# Patient Record
Sex: FEMALE | Race: BLACK OR AFRICAN AMERICAN | NOT HISPANIC OR LATINO | Employment: OTHER | URBAN - METROPOLITAN AREA
[De-identification: names, ages, dates, MRNs, and addresses within clinical notes are randomized per-mention and may not be internally consistent; named-entity substitution may affect disease eponyms.]

---

## 2017-01-05 ENCOUNTER — OFFICE VISIT (OUTPATIENT)
Dept: SLEEP MEDICINE | Facility: CLINIC | Age: 77
End: 2017-01-05
Payer: MEDICARE

## 2017-01-05 VITALS
SYSTOLIC BLOOD PRESSURE: 138 MMHG | HEART RATE: 60 BPM | DIASTOLIC BLOOD PRESSURE: 60 MMHG | BODY MASS INDEX: 48.7 KG/M2 | HEIGHT: 61 IN | WEIGHT: 257.94 LBS

## 2017-01-05 DIAGNOSIS — G47.33 OSA (OBSTRUCTIVE SLEEP APNEA): Primary | ICD-10-CM

## 2017-01-05 PROCEDURE — 99213 OFFICE O/P EST LOW 20 MIN: CPT | Mod: S$PBB,,, | Performed by: PSYCHIATRY & NEUROLOGY

## 2017-01-05 PROCEDURE — 99213 OFFICE O/P EST LOW 20 MIN: CPT | Mod: PBBFAC | Performed by: PSYCHIATRY & NEUROLOGY

## 2017-01-05 PROCEDURE — 99999 PR PBB SHADOW E&M-EST. PATIENT-LVL III: CPT | Mod: PBBFAC,,, | Performed by: PSYCHIATRY & NEUROLOGY

## 2017-01-05 NOTE — LETTER
January 5, 2017      Vale Howard MD  [address]  [facility] [address]           [facility]  [address]  [facility] [address]  Phone: [phone]          Patient: Sue Giordano   MR Number: [id number]   YOB: 1940   Date of Visit: 1/5/2017       Dear Dr. Vale Howard:    Thank you for referring Sue Giordano to me for evaluation. Attached you will find relevant portions of my assessment and plan of care.    If you have questions, please do not hesitate to call me. I look forward to following Sue Giordano along with you.    Sincerely,    Wade Chairez MD    Enclosure  CC:  No Recipients    If you would like to receive this communication electronically, please contact [email or web address] or [phone] to request more information on [employer] access.    For providers and/or their staff who would like to refer a patient to [facility], please contact us through our one-stop-shop provider referral line, [facility], at [phone].    If you feel you have received this communication in error or would no longer like to receive these types of communications, please e-mail [email or web address]

## 2017-01-05 NOTE — PROGRESS NOTES
"Sister Pasquale returns to clinic today regarding the management of obstructive sleep apnea and CPAP equipment check.    Prior weight 271 lbs. Weight down to 257 lbs.    Now dealing with sciatica since her bed rest.    Since she has been back home, she has had difficulty using her CPAP.  Prior to this, her pressure was increased to 13 cm (based on titration)  She states this pressure increase was "working nicely"    ESS = 7 (prior 18)    She previously had excellent tolerance/ adherence with CPAP at pressure 10cm.   She has a nasal pillows mask, which she likes. Martin FX nasal pillows small size mask  She states that she really likes CPAP and it helps her feel better after a night of use.    Recent titration at weight 281 lbs revealed she needed pressure increase from 10 to 13 cm    Interrogation: CPAP at 13 cm; 897 hours total; Machine good condition (F&P model), sleep style machine; 200 series. 5.2 hr/night.     BASELINE PSG 12/23/11: + REJI, AHI 14.2, low O2 79%, wt 281 lbs. (Patient had sleep study in 2003, at which time she was titrated to 11 cm, wt 248 lbs)   TITRATION 5/9/16: Titrated to 13 cm; wt 281 lbs    DME = Medicare ([employer]) - administered by Access      Past Medical History   Diagnosis Date    Adrenal mass     Anemia     Arthritis     Asthma     CKD (chronic kidney disease) stage 2, GFR 60-89 ml/min     Diabetes mellitus     Diabetes mellitus type II     Diastolic dysfunction 10/10/2013    GERD (gastroesophageal reflux disease) 8/13/2012    Gout     Hep B w/ coma 1971    Hives 10/1/2013    Hyperlipidemia     Hypertension     Morbid obesity with BMI of 50.0-59.9, adult 2/11/2014    Obesity     Obesity     Other specified anemias 6/25/2015    Sleep apnea     Unspecified essential hypertension 6/25/2015       Past Surgical History   Procedure Laterality Date    Right total knee replacement      Joint replacement      Hysterectomy  1982     secondary uterine fibroids       Family " "History   Problem Relation Age of Onset    Cancer Mother     Hypertension Father     Cancer Sister     No Known Problems Brother     No Known Problems Maternal Aunt     No Known Problems Maternal Uncle     No Known Problems Paternal Aunt     No Known Problems Paternal Uncle     No Known Problems Maternal Grandmother     No Known Problems Maternal Grandfather     No Known Problems Paternal Grandmother     No Known Problems Paternal Grandfather     Glaucoma Neg Hx     Breast cancer Neg Hx     Colon cancer Neg Hx     Ovarian cancer Neg Hx     Stroke Neg Hx     Allergic rhinitis Neg Hx     Allergies Neg Hx     Angioedema Neg Hx     Asthma Neg Hx     Atopy Neg Hx     Eczema Neg Hx     Immunodeficiency Neg Hx     Rhinitis Neg Hx     Urticaria Neg Hx     Amblyopia Neg Hx     Blindness Neg Hx     Cataracts Neg Hx     Diabetes Neg Hx     Macular degeneration Neg Hx     Retinal detachment Neg Hx     Strabismus Neg Hx     Thyroid disease Neg Hx     Psoriasis Neg Hx        Social History   Substance Use Topics    Smoking status: Never Smoker    Smokeless tobacco: Never Used    Alcohol use No       ALLERGIES: Reviewed in EPIC    CURRENT MEDICATIONS: Reviewed in EPIC    ROS:   Weight down another 10+ lbs (intentional)  Denies palpitations, headaches, sinus congestion improved with nasal spray qhs prn.   Denies oral drying or nasal drying.      PHYSICAL EXAM:     Visit Vitals    /60    Pulse 60    Ht 5' 1" (1.549 m)    Wt 117 kg (257 lb 15 oz)    BMI 48.74 kg/m2     GENERAL: morbid obese body habitus, well groomed       ASSESSMENT:     Obstructive sleep apnea, with improvement of CPAP after nightly use. Control of sleepiness should improved with increased nightly usage. She has done really well until hospitalization. She plans to get back on CPAP, when feeling better, because it really helps her.    She has medical co-morbidities of hypertension and morbid obesity (BMI >30) . "     PLAN:     Continue CPAP 13 cm,   DME = [employer] (she reports using nasal pillows)  Continue Advair or Flonase NS 1-2 sprays qhs PRN to reduce potential nasal congestion.     Follow up 6 weeks: MD/NP

## 2017-01-05 NOTE — MR AVS SNAPSHOT
[facility]  [address]  [facility] [address]  Phone: [phone]                  Sue Giordano   2017 8:40 AM   Office Visit    Description:  Female : 1940   Provider:  Wade Chairez MD   Department:  [facility]           Reason for Visit     Sleep Apnea           Diagnoses this Visit        Comments    REJI (obstructive sleep apnea)    -  Primary            To Do List           Future Appointments        Provider Department Dept Phone    2017 10:00 AM Bonny Phillips RD [facility] - Diabetes Management [phone]    2017 10:00 AM LAB, APPOINTMENT [place] [facility] [phone]    2017 3:40 PM Db Viramontes III, MD [facility] Neurology [phone]    2017 10:30 AM Yogi Munson APRN, FNP [facility] - Endocrinology [phone]    2017 11:40 AM Priya Grewal MD [facility]-Physical Med [facility] Rehab [phone]      Goals (5 Years of Data)              Today    11/11/16    11/3/16    Increase physical activity           Weight < 250 lb (113.399 kg)   117 kg (257 lb 15 oz)  117 kg (257 lb 15 oz)  115.7 kg (255 lb)      Follow-Up and Disposition     Return in about 6 weeks (around 2017).    Follow-up and Disposition History      [facility] On Call     [facility] On Call Nurse Care Line -  Assistance  Registered nurses in the [facility] On Call Center provide clinical advisement, health education, appointment booking, and other advisory services.  Call for this free service at [phone].             Medications           Message regarding Medications     Verify the changes and/or additions to your medication regime listed below are the same as discussed with your clinician today.  If any of these changes or additions are incorrect, please notify your healthcare provider.        STOP taking these medications     gabapentin (NEURONTIN) 300 MG capsule Take 2 capsules (600 mg total) by mouth every  "evening.    oxycodone-acetaminophen (PERCOCET) 5-325 mg per tablet Take 1 tablet by mouth every 4 (four) hours as needed.           Verify that the below list of medications is an accurate representation of the medications you are currently taking.  If none reported, the list may be blank. If incorrect, please contact your healthcare provider. Carry this list with you in case of emergency.           Current Medications     ammonium lactate 12 % Crea Apply topically every morning.     aspirin 81 MG Chew Take 1 tablet (81 mg total) by mouth once daily.    atorvastatin (LIPITOR) 40 MG tablet Take 1 tablet (40 mg total) by mouth once daily.    BD AUTOSHIELD DUO PEN NEEDLE 30 gauge x 3/16" Ndle 1 each by Misc.(Non-Drug; Combo Route) route 3 (three) times daily. Use with Humalog and Levemir pen injections.    blood sugar diagnostic Strp BG monitoring 4 times a day. Pt needs test strips for [employer] Talking meter.    carvedilol (COREG) 6.25 MG tablet Take 1 tablet (6.25 mg total) by mouth 2 (two) times daily.    econazole nitrate 1 % cream Apply topically once daily. Apply to feet daily as directed.    ergocalciferol (VITAMIN D2) 50,000 unit Cap Take 1 capsule (50,000 Units total) by mouth every 7 days.    fluticasone-salmeterol 100-50 mcg/dose (ADVAIR DISKUS) 100-50 mcg/dose diskus inhaler INHALE 1 PUFF 2 TIMES A DAY    HUMALOG 100 unit/mL injection INJECT 15 UNITS UNDER SKIN BEFORE BREAKFAST AND DINNER PLUS CORRECTION 180-230+2, 231-280+4, 281-330 +6, 331-380 +8, >380 +10.    insulin lispro (HUMALOG KWIKPEN) 100 unit/mL InPn pen Inject 19 units w/ breakfast, 16 units w/ dinner plus scale 180-230 +2, 231-280 +4, 281-330 +6, 331-380 +8.    insulin NPH (HUMULIN N) 100 unit/mL injection INJECT 40 UNITS UNDER THE SKIN EVERY MORNING AND 15 UNITS EVERY EVENING, 30 day supply    insulin syringe-needle U-100 (BD INSULIN SYRINGE ULT-FINE II) 1 mL 31 x 5/16" Syrg 1 Syringe by Misc.(Non-Drug; Combo Route) route 2 (two) times daily. " "   lancets Misc Test daily    losartan (COZAAR) 100 MG tablet 1 tab by mouth daily    omeprazole (PRILOSEC) 20 MG capsule Take 1 capsule (20 mg total) by mouth once daily.    pen needle, diabetic (PEN NEEDLE) 32 gauge x 5/32" Ndle Uses 4 times a day.    torsemide (DEMADEX) 20 MG Tab TAKE 1 TABLET BY MOUTH ONCE DAILY.    insulin detemir (LEVEMIR FLEXTOUCH) 100 unit/mL (3 mL) SubQ InPn pen Inject 45 units at night.    senna (SENOKOT) 8.6 mg tablet Take 1 tablet by mouth once daily.    tramadol (ULTRAM) 50 mg tablet Take 1 tablet (50 mg total) by mouth 3 (three) times daily as needed for Pain.           Clinical Reference Information           Vital Signs - Last Recorded  Most recent update: 1/5/2017  8:24 AM by Joe RIVERA MA    BP Pulse Ht Wt BMI    138/60 60 5' 1" (1.549 m) 117 kg (257 lb 15 oz) 48.74 kg/m2      Blood Pressure          Most Recent Value    BP  138/60      Allergies as of 1/5/2017     Bactrim [Sulfamethoxazole-trimethoprim]    Azithromycin    Erythromycin    Gentamicin    Levofloxacin    Vancomycin      Immunizations Administered on Date of Encounter - 1/5/2017     None      "

## 2017-01-19 ENCOUNTER — OFFICE VISIT (OUTPATIENT)
Dept: INTERNAL MEDICINE | Facility: CLINIC | Age: 77
End: 2017-01-19
Payer: MEDICARE

## 2017-01-19 ENCOUNTER — CLINICAL SUPPORT (OUTPATIENT)
Dept: DIABETES | Facility: CLINIC | Age: 77
End: 2017-01-19
Payer: MEDICARE

## 2017-01-19 VITALS
BODY MASS INDEX: 49.08 KG/M2 | HEIGHT: 61 IN | SYSTOLIC BLOOD PRESSURE: 149 MMHG | HEART RATE: 80 BPM | DIASTOLIC BLOOD PRESSURE: 60 MMHG | WEIGHT: 259.94 LBS | TEMPERATURE: 98 F

## 2017-01-19 DIAGNOSIS — L60.0 INGROWING NAIL WITH INFECTION: Primary | ICD-10-CM

## 2017-01-19 DIAGNOSIS — E11.29 TYPE 2 DIABETES MELLITUS WITH RENAL MANIFESTATIONS NOT AT GOAL: ICD-10-CM

## 2017-01-19 DIAGNOSIS — E66.01 MORBID OBESITY DUE TO EXCESS CALORIES: ICD-10-CM

## 2017-01-19 PROCEDURE — 99213 OFFICE O/P EST LOW 20 MIN: CPT | Mod: S$PBB,GC,, | Performed by: INTERNAL MEDICINE

## 2017-01-19 PROCEDURE — 99999 PR PBB SHADOW E&M-EST. PATIENT-LVL V: CPT | Mod: PBBFAC,GC,, | Performed by: STUDENT IN AN ORGANIZED HEALTH CARE EDUCATION/TRAINING PROGRAM

## 2017-01-19 PROCEDURE — 99215 OFFICE O/P EST HI 40 MIN: CPT | Mod: PBBFAC | Performed by: STUDENT IN AN ORGANIZED HEALTH CARE EDUCATION/TRAINING PROGRAM

## 2017-01-19 PROCEDURE — G0108 DIAB MANAGE TRN  PER INDIV: HCPCS | Mod: PBBFAC | Performed by: DIETITIAN, REGISTERED

## 2017-01-19 RX ORDER — AMOXICILLIN AND CLAVULANATE POTASSIUM 875; 125 MG/1; MG/1
1 TABLET, FILM COATED ORAL 2 TIMES DAILY
Qty: 20 TABLET | Refills: 0 | Status: SHIPPED | OUTPATIENT
Start: 2017-01-19 | End: 2017-01-29

## 2017-01-19 NOTE — MR AVS SNAPSHOT
[facility]  [address]  [facility] [address]  Phone: [phone]  Fax: [phone]                  Sue Giordano   2017 2:45 PM   Office Visit    Description:  Female : 1940   Provider:  Alejandro Lozada MD   Department:  [facility]           Reason for Visit     Nail Problem           Diagnoses this Visit        Comments    Ingrowing nail with infection    -  Primary     Morbid obesity due to excess calories                To Do List           Future Appointments        Provider Department Dept Phone    2017 10:20 AM Valentin Marcum OD [facility] - Pediatric Optometry [phone]    2017 10:00 AM LAB, APPOINTMENT [place] [facility] [phone]    2017 3:40 PM Db Viramontes III, MD [facility] - Neurology [phone]    2017 10:40 AM Carrie Grey DPM [facility] - Podiatry [phone]    2017 10:30 AM Yogi Munson APRN, FNP [facility] - Endocrinology [phone]      Goals (5 Years of Data)              Today    17    Increase physical activity           Weight < 250 lb (113.399 kg)   117.9 kg (259 lb 14.8 oz)  117 kg (257 lb 15 oz)  117 kg (257 lb 15 oz)       These Medications        Disp Refills Start End    amoxicillin-clavulanate 875-125mg (AUGMENTIN) 875-125 mg per tablet 20 tablet 0 2017    Take 1 tablet by mouth 2 (two) times daily. - Oral    Pharmacy: [facility], [facility] [place], [place] - [address] [address] Ph #: [phone]         [facility] On Call     [facility] On Call Nurse Care Line -  Assistance  Registered nurses in the [facility] Center provide clinical advisement, health education, appointment booking, and other advisory services.  Call for this free service at [phone].             Medications           Message regarding Medications     Verify the changes and/or additions to your medication regime listed below are the same  "as discussed with your clinician today.  If any of these changes or additions are incorrect, please notify your healthcare provider.        START taking these NEW medications        Refills    amoxicillin-clavulanate 875-125mg (AUGMENTIN) 875-125 mg per tablet 0    Sig: Take 1 tablet by mouth 2 (two) times daily.    Class: Normal    Route: Oral           Verify that the below list of medications is an accurate representation of the medications you are currently taking.  If none reported, the list may be blank. If incorrect, please contact your healthcare provider. Carry this list with you in case of emergency.           Current Medications     ammonium lactate 12 % Crea Apply topically every morning.     amoxicillin-clavulanate 875-125mg (AUGMENTIN) 875-125 mg per tablet Take 1 tablet by mouth 2 (two) times daily.    aspirin 81 MG Chew Take 1 tablet (81 mg total) by mouth once daily.    atorvastatin (LIPITOR) 40 MG tablet Take 1 tablet (40 mg total) by mouth once daily.    BD AUTOSHIELD DUO PEN NEEDLE 30 gauge x 3/16" Ndle 1 each by Misc.(Non-Drug; Combo Route) route 3 (three) times daily. Use with Humalog and Levemir pen injections.    blood sugar diagnostic Strp BG monitoring 4 times a day. Pt needs test strips for [employer] Talking meter.    carvedilol (COREG) 6.25 MG tablet Take 1 tablet (6.25 mg total) by mouth 2 (two) times daily.    econazole nitrate 1 % cream Apply topically once daily. Apply to feet daily as directed.    ergocalciferol (VITAMIN D2) 50,000 unit Cap Take 1 capsule (50,000 Units total) by mouth every 7 days.    fluticasone-salmeterol 100-50 mcg/dose (ADVAIR DISKUS) 100-50 mcg/dose diskus inhaler INHALE 1 PUFF 2 TIMES A DAY    HUMALOG 100 unit/mL injection INJECT 15 UNITS UNDER SKIN BEFORE BREAKFAST AND DINNER PLUS CORRECTION 180-230+2, 231-280+4, 281-330 +6, 331-380 +8, >380 +10.    insulin detemir (LEVEMIR FLEXTOUCH) 100 unit/mL (3 mL) SubQ InPn pen Inject 45 units at night.    insulin lispro " "(HUMALOG KWIKPEN) 100 unit/mL InPn pen Inject 19 units w/ breakfast, 16 units w/ dinner plus scale 180-230 +2, 231-280 +4, 281-330 +6, 331-380 +8.    insulin NPH (HUMULIN N) 100 unit/mL injection INJECT 40 UNITS UNDER THE SKIN EVERY MORNING AND 15 UNITS EVERY EVENING, 30 day supply    insulin syringe-needle U-100 (BD INSULIN SYRINGE ULT-FINE II) 1 mL 31 x 5/16" Syrg 1 Syringe by Misc.(Non-Drug; Combo Route) route 2 (two) times daily.    lancets Misc Test daily    losartan (COZAAR) 100 MG tablet 1 tab by mouth daily    omeprazole (PRILOSEC) 20 MG capsule Take 1 capsule (20 mg total) by mouth once daily.    pen needle, diabetic (PEN NEEDLE) 32 gauge x 5/32" Ndle Uses 4 times a day.    senna (SENOKOT) 8.6 mg tablet Take 1 tablet by mouth once daily.    torsemide (DEMADEX) 20 MG Tab TAKE 1 TABLET BY MOUTH ONCE DAILY.    tramadol (ULTRAM) 50 mg tablet Take 1 tablet (50 mg total) by mouth 3 (three) times daily as needed for Pain.           Clinical Reference Information           Vital Signs - Last Recorded  Most recent update: 1/19/2017  2:46 PM by Beryl Vivar MA    BP Pulse Temp Ht Wt BMI    (!) 149/60 (BP Location: Left arm, Patient Position: Sitting) 80 98.3 °F (36.8 °C) (Oral) 5' 1" (1.549 m) 117.9 kg (259 lb 14.8 oz) 49.11 kg/m2      Blood Pressure          Most Recent Value    BP  (!)  149/60      Allergies as of 1/19/2017     Bactrim [Sulfamethoxazole-trimethoprim]    Azithromycin    Erythromycin    Gentamicin    Levofloxacin    Vancomycin      Immunizations Administered on Date of Encounter - 1/19/2017     None      Orders Placed During Today's Visit      Normal Orders This Visit    Ambulatory consult to Podiatry       "

## 2017-01-19 NOTE — PROGRESS NOTES
01/19/17 0000   Diabetes Type   Diabetes Type  Type II   Monitoring    Self Monitoring  SMBG 2-3 times daily- reports BG ranging from 120s-150s   Blood Glucose Logs No   Current Diabetes Treatment    Current Treatment Insulin  (Pt has not yet picked up Humalog and Levemir pens. Still currently using Humulin N 40units in am and 15units in pm. Prescribed Humalog 19 units with breakfast and 16units with dinner + correction 1:25 target 180 and Levemir 45units once every evening. )   Social History   Preferred Learning Method Face to Face;Reading Materials   Primary Support Self   Occupation nun   Barriers to Change   Learning Challenges  None   Readiness to Learn    Readiness to Learn  Acceptance   Diabetes Education Visit   Diabetes Education Record Assessment/Progress Post Program/Follow-up   Diabetes Education Assessment/Progress   Acute Complications (preventing, detecting, and treating acute complications) DC;5;W;1;IS  (Reviewed s/s of hypoglycemia and appropriate treatment. )   Medications (states correct name, dose, onset, peak, duration, side effects & timing of meds) DC;5;W;1;IS;D;R;3  (Pt has not yet started using Humalog and Levemir pens. Almost out of insulin vials. Pt verbalized would benefit from re-fresher on pen use. Provided re-training on Humalog and Levemir pens.     Patient will be taking Humalog pen 19 units with breakfast and 16 units with dinner + correction 1:25 with target 180 and Levemir Flex Pen 45 units every evening. Discussed mechanism of action of both types of insulin, medication/ meal timing, storage, site rotation, and disposal of pen needles. Taught/trained patient how to use Humalog Kwik Pen and Levemir pen, and on subcutaneous injection techniques. Provided [facility] Insulin Care Guide. Patient performed successful return demonstration of all.     Stressed must take Humalog 5-15 minutes before eating and taking Levemir once daily, same time every day. )   Monitoring (monitoring  blood glucose/other parameters &using results) DC;5;W;IS;1  (Reviewed SMBG, goal BG ranges and bringing log to appts. Provided BG log sheets.)   Goals   Medications In Progress  (Pt to appropriately take Humalog and Levemir pens as prescribed. )   Diabetes Care Plan/Intervention   Education Plan/Intervention Other  (Pt has appt with endocrine NP in about 3 weeks. Provided contact number to call with questions or when ready to schedule follow-up DE appt.)   Diabetes Self-Management Support Plan F/U Prov   Education Units of Time    Time Spent 45 min     Also during visit, pt verbalized pain at site of ingrown toenail removal. Observed toe and appears to have some drainage. Called and had pt scheduled for Urgent Care appt today at 2:45pm.

## 2017-01-19 NOTE — PROGRESS NOTES
Teaching Statement  I have personally taken the history and examined this patient and agree with the resident's history, exam and assessment and plan as stated above.  Teo Rivas MD

## 2017-01-26 ENCOUNTER — TELEPHONE (OUTPATIENT)
Dept: NEUROLOGY | Facility: CLINIC | Age: 77
End: 2017-01-26

## 2017-01-26 NOTE — TELEPHONE ENCOUNTER
Dr. Viramontes not in clinic on next visit scheduled for 2/2. Left vmail advising I rescheduled appt for next available on 3/20.    Asked for a call back if she feels the need to be seen sooner.

## 2017-01-31 ENCOUNTER — LAB VISIT (OUTPATIENT)
Dept: LAB | Facility: HOSPITAL | Age: 77
End: 2017-01-31
Payer: MEDICARE

## 2017-01-31 DIAGNOSIS — E11.29 TYPE 2 DIABETES MELLITUS WITH RENAL MANIFESTATIONS NOT AT GOAL: ICD-10-CM

## 2017-01-31 LAB
ESTIMATED AVG GLUCOSE: 166 MG/DL
HBA1C MFR BLD HPLC: 7.4 %

## 2017-01-31 PROCEDURE — 83036 HEMOGLOBIN GLYCOSYLATED A1C: CPT

## 2017-01-31 PROCEDURE — 36415 COLL VENOUS BLD VENIPUNCTURE: CPT

## 2017-02-01 DIAGNOSIS — R60.9 EDEMA: ICD-10-CM

## 2017-02-01 RX ORDER — TORSEMIDE 20 MG/1
TABLET ORAL
Qty: 90 TABLET | Refills: 0 | Status: SHIPPED | OUTPATIENT
Start: 2017-02-01 | End: 2017-07-21 | Stop reason: SDUPTHER

## 2017-02-03 ENCOUNTER — OFFICE VISIT (OUTPATIENT)
Dept: OPTOMETRY | Facility: CLINIC | Age: 77
End: 2017-02-03
Payer: MEDICARE

## 2017-02-03 DIAGNOSIS — E11.9 TYPE 2 DIABETES MELLITUS WITHOUT OPHTHALMIC MANIFESTATIONS: Primary | ICD-10-CM

## 2017-02-03 DIAGNOSIS — H25.813 COMBINED FORMS OF AGE-RELATED CATARACT OF BOTH EYES: ICD-10-CM

## 2017-02-03 PROCEDURE — 99213 OFFICE O/P EST LOW 20 MIN: CPT | Mod: PBBFAC,PO | Performed by: OPTOMETRIST

## 2017-02-03 PROCEDURE — 99999 PR PBB SHADOW E&M-EST. PATIENT-LVL III: CPT | Mod: PBBFAC,,, | Performed by: OPTOMETRIST

## 2017-02-03 PROCEDURE — 92015 DETERMINE REFRACTIVE STATE: CPT | Mod: ,,, | Performed by: OPTOMETRIST

## 2017-02-03 PROCEDURE — 92014 COMPRE OPH EXAM EST PT 1/>: CPT | Mod: S$PBB,,, | Performed by: OPTOMETRIST

## 2017-02-03 NOTE — PROGRESS NOTES
HPI     Sis Duran Giordano is a/an 76 y.o. Female who returns  for continued   diabetic eye care. She was diagnosed with Type 2 DM 12-15 years ago.  It   is being treated with Humalog and Humulin.  Her most recent blood sugar   measurement was 127.    She reports that she has  problems with her distance vision sometimes. So   far she only used glasses for reading and feels as if she needs a new Rx   for that since the near vision occurs to be blurry even when wearing the   glasses. She feels as if her diabetes is well controlled.    Hemoglobin A1C       Date                     Value               Ref Range             Status                01/31/2017               7.4 (H)             4.5 - 6.2 %           Final                 ----------    (+)blurred vision  (--)Headaches  (--)diplopia  (--)flashes  (--)floaters  (--)pain  (--)Itching  (--)tearing  (--)burning  (--)Dryness  (--) OTC Drops  (--)Photophobia       Last edited by Valentin Marcum, OD on 2/3/2017 12:32 PM.     ROS     Positive for: Gastrointestinal (GERD), Cardiovascular, Respiratory   (asthma), Allergic/Imm (drugs)    Negative for: Constitutional, Neurological, Skin, Genitourinary (htn,   hld, CAD), Musculoskeletal, HENT, Endocrine, Eyes, Psychiatric, Heme/Lymph      Last edited by Valentin Marcum, OD on 2/3/2017 12:32 PM. (History)        Assessment /Plan     For exam results, see Encounter Report.    1. Type 2 diabetes mellitus without ophthalmic manifestations  - No ocular  treatment needed; monitor annually    2. Combined forms of age-related cataract of both eyes  - no treatment needed at this time; Monitor annually    3. Refractive error with Presbyopia  - same specs ok for near vision only    Patient education; RTC in 1 year, sooner prn

## 2017-02-06 ENCOUNTER — OFFICE VISIT (OUTPATIENT)
Dept: PODIATRY | Facility: CLINIC | Age: 77
End: 2017-02-06
Payer: MEDICARE

## 2017-02-06 VITALS
HEIGHT: 61 IN | BODY MASS INDEX: 48.9 KG/M2 | WEIGHT: 259 LBS | DIASTOLIC BLOOD PRESSURE: 76 MMHG | SYSTOLIC BLOOD PRESSURE: 156 MMHG | HEART RATE: 73 BPM

## 2017-02-06 DIAGNOSIS — L91.8 SKIN TAG: ICD-10-CM

## 2017-02-06 DIAGNOSIS — E11.51 TYPE II DIABETES MELLITUS WITH PERIPHERAL CIRCULATORY DISORDER: Primary | ICD-10-CM

## 2017-02-06 DIAGNOSIS — L60.0 INGROWN NAIL: ICD-10-CM

## 2017-02-06 DIAGNOSIS — I89.0 LYMPHEDEMA: ICD-10-CM

## 2017-02-06 PROCEDURE — 99203 OFFICE O/P NEW LOW 30 MIN: CPT | Mod: 25,S$PBB,, | Performed by: PODIATRIST

## 2017-02-06 PROCEDURE — 99999 PR PBB SHADOW E&M-EST. PATIENT-LVL III: CPT | Mod: PBBFAC,,, | Performed by: PODIATRIST

## 2017-02-06 PROCEDURE — 99213 OFFICE O/P EST LOW 20 MIN: CPT | Mod: PBBFAC | Performed by: PODIATRIST

## 2017-02-06 NOTE — PATIENT INSTRUCTIONS
Wound Care Instructions:     Soaks: Soak in small clean basin of approximately 2 quarts of warm water filled with the following:   __X__ 2 Tablespoons of Epsom Salt   _____ 1/2 Cup of White Vinegar   _____ 1/4 Cup of Betadine Solution   _____ 4 Packets of Domeboro Tablets or Powder (See Package Instructions)     Soak for 10 minutes, 2 times per day, and continue soaks for 30 days.     Irrigate/Cleanse Daily with:   _____ 0.9% Saline Solution   _____ Dakins Solution   _____ Diluted Vinegar Solution     Location: Left  /  Right  /  Bilateral   Extremity: ___________________     Wound:         _____ Polysporin Ointment   __X__ Neosporin Plus Cream   _____ Bacitracin Ointment   _____ Betadine Ointment       ** AND **   _____ Betadine/Iodine Solution   _____ Prescribed Medication: ____ Silvadene, ____ Santyl, ____ Iodosorb, or    ____ Bactroban Cream/Ointment     Dressing:   __X__ Flexible Bandaid       ** OR **   _____ Mepilex   _____ Allevyn   __X__ Gauze Pad   _____ Nothing but Clean Sock   _____ Other: ___________     Wrap:   _____ Judith   _____ Kerlix   __X__ Paper Tape   _____ Coban   _____ Ace Bandage   _____ Other: ___________     Please call [phone] or [phone] to speak with the nurse regarding any questions about these instructions.     Remember the importance of good nutrition and blood sugar control to help prevent foot complications of diabetes and see your internist at least ever six months. Always wear proper shoe gear. Inspect your feet daily. Always call the clinic immediately if you notice something on your feet that is not normal such as a blister, wound, or foreign object stuck in your foot.

## 2017-02-06 NOTE — PROGRESS NOTES
Subjective:      Patient ID: Sue Giordano is a 76 y.o. female.    Chief Complaint: Ingrown Toenail and PCP (Lee 1/19/2017)    Sue Giordano is a 76 y.o. year-old diabetic female here by referral from Alejandro Lozada MD for diabetic foot evaluation and complaint of painful ingrown toenail bilateral hallux nail worse on the left. The patient is managing the diabetes under the care of Dr. Howard.  Sue Garzon has a past medical history of Adrenal mass; Anemia; Arthritis; Asthma; CKD (chronic kidney disease) stage 2, GFR 60-89 ml/min; Diabetes mellitus; Diabetes mellitus type II; Diastolic dysfunction (10/10/2013); GERD (gastroesophageal reflux disease) (8/13/2012); Gout; Hep B w/ coma (1971); Hives (10/1/2013); Hyperlipidemia; Hypertension; Morbid obesity with BMI of 50.0-59.9, adult (2/11/2014); Obesity; Obesity; Other specified anemias (6/25/2015); Sleep apnea; and Unspecified essential hypertension (6/25/2015)..    Sue Giordano has been seen by Dr. Timmons and Ellie in the past, but denies any history of wound.     Sue Giordano does not have complaints of numbness, tingling, burning of the feet. Sue Giordano is primarily here today for diabetic foot check and ingrown toenails and recent skin growth/mole.  Patient relates that about a month ago she was visited by a podiatrist at her facility who clipped out the corners of her nail and was told that her ingrown toenail should resolve.  Since then she has continued to have pain and discomfort along the right hallux lateral nail border and also 2 weeks ago started noticing pain on the left hallux lateral nail border.  She also relates that about 6 months ago she started noticing a growth on her right forefoot between the first and second toes that she believes has been enlarging and has been concerning to her.  She also request a prescription for diabetic shoes today.    PCP: Vale Howard MD    Date Last Seen by PCP: January 19, 2017    Current  shoe gear: Casual shoes    Hemoglobin A1C   Date Value Ref Range Status   01/31/2017 7.4 (H) 4.5 - 6.2 % Final     Comment:     According to ADA guidelines, hemoglobin A1C <7.0% represents  optimal control in non-pregnant diabetic patients.  Different  metrics may apply to specific populations.   Standards of Medical Care in Diabetes - 2016.  For the purpose of screening for the presence of diabetes:  <5.7%     Consistent with the absence of diabetes  5.7-6.4%  Consistent with increasing risk for diabetes   (prediabetes)  >or=6.5%  Consistent with diabetes  Currently no consensus exists for use of hemoglobin A1C  for diagnosis of diabetes for children.     10/26/2016 7.6 (H) 4.5 - 6.2 % Final     Comment:     According to ADA guidelines, hemoglobin A1C <7.0% represents  optimal control in non-pregnant diabetic patients.  Different  metrics may apply to specific populations.   Standards of Medical Care in Diabetes - 2016.  For the purpose of screening for the presence of diabetes:  <5.7%     Consistent with the absence of diabetes  5.7-6.4%  Consistent with increasing risk for diabetes   (prediabetes)  >or=6.5%  Consistent with diabetes  Currently no consensus exists for use of hemoglobin A1C  for diagnosis of diabetes for children.     07/13/2016 8.4 (H) 4.5 - 6.2 % Final     Comment:     According to ADA guidelines, hemoglobin A1C <7.0% represents  optimal control in non-pregnant diabetic patients.  Different  metrics may apply to specific populations.   Standards of Medical Care in Diabetes - 2016.  For the purpose of screening for the presence of diabetes:  <5.7%     Consistent with the absence of diabetes  5.7-6.4%  Consistent with increasing risk for diabetes   (prediabetes)  >or=6.5%  Consistent with diabetes  Currently no consensus exists for use of hemoglobin A1C  for diagnosis of diabetes for children.           Review of Systems   Constitution: Negative for chills and fever.   Cardiovascular: Negative for  chest pain.   Respiratory: Negative for shortness of breath.    Gastrointestinal: Negative for nausea and vomiting.           Objective:      Physical Exam   Constitutional: She is oriented to person, place, and time. She appears well-developed and well-nourished. No distress.   Cardiovascular:   Pulses:       Dorsalis pedis pulses are 2+ on the right side, and 2+ on the left side.        Posterior tibial pulses are 0 on the right side, and 0 on the left side.   Capillary fill time 3-5 seconds to digits bilateral feet.   Musculoskeletal:   Lower extremities:    Deformities: Right dorsal first webspace a 7 mm diameter raised skin lesion that is slightly darkened and firm.    Normal arch height and rectus feet bilaterally.     5/5 muscle strength and tone in all four quadrants bilaterally.     Pain-free range of motion in all four quadrants with stiffness and limitation bilaterally.     Pain on palpation: Bilateral hallux lateral nail borders.   Neurological: She is alert and oriented to person, place, and time. She displays no Babinski's sign on the right side. She displays no Babinski's sign on the left side.   Plantar protective threshold sensation by touch via 5.07 SWMF normal to the digits bilaterally.      Skin:   Lower extremities:    Normal turgor, texture, temperature bilaterally. Digital hair absent bilaterally.  Warm equally bilaterally.    Significiant bilateral foot and ankle edema.    No varicosities, pigmentary changes bilaterally.     No wounds, drainage, malodor, erythema, interdigital maceration were noted.     Skin lesions: dorsal 2nd interspace 7mm diameter evenly darkened raised firm nodule right foot.     Nails are thick, dystrophic and discolored with incurvation along the lateral nail borders of bilateral hallux.  The right hallux lateral nail border also with some fullness and keratotic buildup and mild serous sanguinous drainage.   Psychiatric: She has a normal mood and affect.   Nursing note  and vitals reviewed.            Assessment:       Encounter Diagnoses   Name Primary?    Type II diabetes mellitus with peripheral circulatory disorder Yes    Lymphedema     Ingrown nail     Skin tag          Plan:       Sue Garzon was seen today for ingrown toenail and pcp.    Diagnoses and all orders for this visit:    Type II diabetes mellitus with peripheral circulatory disorder  -     DIABETIC SHOES FOR HOME USE    Lymphedema  -     DIABETIC SHOES FOR HOME USE    Ingrown nail  -     DIABETIC SHOES FOR HOME USE    Skin tag  -     DIABETIC SHOES FOR HOME USE      I counseled the patient on her conditions, their implications and medical management. Basic diabetic foot care education and management was reviewed with the patient according to changes exhibited based on today's exam.  By patient request a prescription was also written for new diabetic shoes and insoles.  She will continue to receive palliative care at her facility through visiting podiatrist. For lower extremity swelling patient was guided on elevation. Patient has also consult with his primary care on medications that may be contributing to lower extremity swelling as well.     Reviewed treatment options with the patient today and the patient wished to defer nail surgery today because patient has not had trouble with ingrown in the past, she agreed to try a wedge resection today.     Utilizing sterile toenail clippers, the offending nail border was resected approximately 3 mm from its edge and carried down along the nail plate at an angle in order to wedge out the offending cryptotic portion of the nail plate. The offending border was then removed in toto. The remaining nail was currettaged smooth.  No blood was drawn. Patient tolerated the procedure well reporting relief of pain.    We did discuss the possibility of nail surgery in the future in the event of recurrence of pain or symptoms.  Patient was given a prescription for urea lotion for  softening of the surrounding skin and nail plate.  Patient will follow up as needed and call if any problems arise.    Reviewed findings and counseled patient on treatment options for the pigmented lesion. I explained to the patient that most skin lesions which have been present for years with no growth, single even coloration, symmetrical even borders, and no inflammatory symptoms are usually benign however no guarantees can be made and only way to make a definitive diagnosis is by biopsy and pathologic examination. Due to the recent accelerated growth and increased size the patient wished to proceed with scheduling for biopsy of the lesion.   .

## 2017-02-06 NOTE — MR AVS SNAPSHOT
[facility] - Podiatry  [address]  [facility] [address]  Phone: [phone]                  Sue Giordano   2017 10:40 AM   Office Visit    Description:  Female : 1940   Provider:  Carrie Grey DPM   Department:  [facility] - Podiatry           Reason for Visit     Ingrown Toenail     PCP           Diagnoses this Visit        Comments    Type II diabetes mellitus with peripheral circulatory disorder    -  Primary     Lymphedema         Ingrown nail         Skin tag                To Do List           Future Appointments        Provider Department Dept Phone    2017 10:30 AM LIBRA Seaman, FNP [facility] - Endocrinology [phone]    2017 11:40 AM Priay Grewal MD [facility]-Physical Med & Rehab [phone]    2017 11:20 AM Wade Chairez MD [facility] - Sleep Clinic [phone]    2017 11:00 AM Vale Howard MD [facility] - Internal Medicine [phone]    3/15/2017 10:00 AM Rhonda Anand DPM [facility] Podiat[facility] [phone]      Goals (5 Years of Data)              Today    17    Increase physical activity           Weight < 250 lb (113.399 kg)   117.5 kg (259 lb)  117.9 kg (259 lb 14.8 oz)  117 kg (257 lb 15 oz)      Follow-Up and Disposition     Return in about 1 month (around 3/6/2017) for DM check and discuss skin tag removal.      [facility]     [facility] -  Assistance  Registered nurses in the [facility] provide clinical advisement, health education, appointment booking, and other advisory services.  Call for this free service at [phone].             Medications           Message regarding Medications     Verify the changes and/or additions to your medication regime listed below are the same as discussed with your clinician today.  If any of these changes or additions are incorrect, please notify your healthcare provider.             Verify that the below list of  "medications is an accurate representation of the medications you are currently taking.  If none reported, the list may be blank. If incorrect, please contact your healthcare provider. Carry this list with you in case of emergency.           Current Medications     ammonium lactate 12 % Crea Apply topically every morning.     aspirin 81 MG Chew Take 1 tablet (81 mg total) by mouth once daily.    atorvastatin (LIPITOR) 40 MG tablet Take 1 tablet (40 mg total) by mouth once daily.    BD AUTOSHIELD DUO PEN NEEDLE 30 gauge x 3/16" Ndle 1 each by Misc.(Non-Drug; Combo Route) route 3 (three) times daily. Use with Humalog and Levemir pen injections.    blood sugar diagnostic Strp BG monitoring 4 times a day. Pt needs test strips for [employer] Talking meter.    carvedilol (COREG) 6.25 MG tablet Take 1 tablet (6.25 mg total) by mouth 2 (two) times daily.    econazole nitrate 1 % cream Apply topically once daily. Apply to feet daily as directed.    ergocalciferol (VITAMIN D2) 50,000 unit Cap Take 1 capsule (50,000 Units total) by mouth every 7 days.    fluticasone-salmeterol 100-50 mcg/dose (ADVAIR DISKUS) 100-50 mcg/dose diskus inhaler INHALE 1 PUFF 2 TIMES A DAY    HUMALOG 100 unit/mL injection INJECT 15 UNITS UNDER SKIN BEFORE BREAKFAST AND DINNER PLUS CORRECTION 180-230+2, 231-280+4, 281-330 +6, 331-380 +8, >380 +10.    insulin detemir (LEVEMIR FLEXTOUCH) 100 unit/mL (3 mL) SubQ InPn pen Inject 45 units at night.    insulin lispro (HUMALOG KWIKPEN) 100 unit/mL InPn pen Inject 19 units w/ breakfast, 16 units w/ dinner plus scale 180-230 +2, 231-280 +4, 281-330 +6, 331-380 +8.    insulin NPH (HUMULIN N) 100 unit/mL injection INJECT 40 UNITS UNDER THE SKIN EVERY MORNING AND 15 UNITS EVERY EVENING, 30 day supply    insulin syringe-needle U-100 (BD INSULIN SYRINGE ULT-FINE II) 1 mL 31 x 5/16" Syrg 1 Syringe by Misc.(Non-Drug; Combo Route) route 2 (two) times daily.    lancets [facility] Test daily    losartan (COZAAR) 100 MG tablet 1 " "tab by mouth daily    omeprazole (PRILOSEC) 20 MG capsule Take 1 capsule (20 mg total) by mouth once daily.    pen needle, diabetic (PEN NEEDLE) 32 gauge x 5/32" Ndle Uses 4 times a day.    senna (SENOKOT) 8.6 mg tablet Take 1 tablet by mouth once daily.    torsemide (DEMADEX) 20 MG Tab TAKE 1 TABLET BY MOUTH ONCE DAILY.    tramadol (ULTRAM) 50 mg tablet Take 1 tablet (50 mg total) by mouth 3 (three) times daily as needed for Pain.           Clinical Reference Information           Your Vitals Were     BP Pulse Height Weight BMI    156/76 73 5' 1" (1.549 m) 117.5 kg (259 lb) 48.94 kg/m2      Blood Pressure          Most Recent Value    BP  (!)  156/76      Allergies as of 2/6/2017     Bactrim [Sulfamethoxazole-trimethoprim]    Azithromycin    Erythromycin    Gentamicin    Levofloxacin    Vancomycin      Immunizations Administered on Date of Encounter - 2/6/2017     None      Orders Placed During Today's Visit      Normal Orders This Visit    DIABETIC SHOES FOR HOME USE       Instructions    Wound Care Instructions:     Soaks: Soak in small clean basin of approximately 2 quarts of warm water filled with the following:   __X__ 2 Tablespoons of Epsom Salt   _____ 1/2 Cup of White Vinegar   _____ 1/4 Cup of Betadine Solution   _____ 4 Packets of Domeboro Tablets or Powder (See Package Instructions)     Soak for 10 minutes, 2 times per day, and continue soaks for 30 days.     Irrigate/Cleanse Daily with:   _____ 0.9% Saline Solution   _____ Dakins Solution   _____ Diluted Vinegar Solution     Location: Left  /  Right  /  Bilateral   Extremity: ___________________     Wound:         _____ Polysporin Ointment   __X__ Neosporin Plus Cream   _____ Bacitracin Ointment   _____ Betadine Ointment       ** AND **   _____ Betadine/Iodine Solution   _____ Prescribed Medication: ____ Silvadene, ____ Santyl, ____ Iodosorb, or    ____ Bactroban Cream/Ointment     Dressing:   __X__ Flexible Bandaid       ** OR **   _____ Mepilex "   _____ Allevyn   __X__ Gauze Pad   _____ Nothing but Clean Sock   _____ Other: ___________     Wrap:   _____ Judith   _____ Kerlix   __X__ Paper Tape   _____ Coban   _____ Ace Bandage   _____ Other: ___________     Please call [phone] or [phone] to speak with the nurse regarding any questions about these instructions.     Remember the importance of good nutrition and blood sugar control to help prevent foot complications of diabetes and see your internist at least ever six months. Always wear proper shoe gear. Inspect your feet daily. Always call the clinic immediately if you notice something on your feet that is not normal such as a blister, wound, or foreign object stuck in your foot.          Language Assistance Services     ATTENTION: Language assistance services are available, free of charge. Please call [phone].      ATENCIÓN: Si ashley smith, tiene a cummings disposición servicios gratuitos de asistencia lingüística. Llame al [phone].     MONIQUE Ý: N?u b?n nói Ti?ng Vi?t, có các d?ch v? h? tr? ngôn ng? mi?n phí dành cho b?n. G?i s? [phone].         [facility] Pod[facility] complies with applicable Federal civil rights laws and does not discriminate on the basis of race, color, national origin, age, disability, or sex.

## 2017-02-06 NOTE — LETTER
February 6, 2017      Alejandro Lozada MD  [address]  [facility] [address]           [facility]  [address]  [facility] [address]  Phone: [phone]          Patient: Sue Giordano   MR Number: [id number]   YOB: 1940   Date of Visit: 2/6/2017       Dear Dr. Alejandro Lozada:    Thank you for referring Sue Giordano to me for evaluation. Attached you will find relevant portions of my assessment and plan of care.    If you have questions, please do not hesitate to call me. I look forward to following Sue Giordano along with you.    Sincerely,    Carrie Grey, DPM    Enclosure  CC:  No Recipients    If you would like to receive this communication electronically, please contact [email or web address] or [phone] to request more information on Meet My Friends Link access.    For providers and/or their staff who would like to refer a patient to [facility], please contact us through our one-stop-shop provider referral line, [facility] , at [phone].    If you feel you have received this communication in error or would no longer like to receive these types of communications, please e-mail [email or web address]

## 2017-02-07 ENCOUNTER — OFFICE VISIT (OUTPATIENT)
Dept: ENDOCRINOLOGY | Facility: CLINIC | Age: 77
End: 2017-02-07
Payer: COMMERCIAL

## 2017-02-07 ENCOUNTER — OFFICE VISIT (OUTPATIENT)
Dept: PHYSICAL MEDICINE AND REHAB | Facility: CLINIC | Age: 77
End: 2017-02-07
Payer: MEDICARE

## 2017-02-07 VITALS
DIASTOLIC BLOOD PRESSURE: 60 MMHG | HEART RATE: 68 BPM | BODY MASS INDEX: 48.03 KG/M2 | SYSTOLIC BLOOD PRESSURE: 138 MMHG | WEIGHT: 261 LBS | HEIGHT: 62 IN

## 2017-02-07 DIAGNOSIS — M54.32 BILATERAL SCIATICA: ICD-10-CM

## 2017-02-07 DIAGNOSIS — E66.01 MORBID OBESITY WITH BMI OF 50.0-59.9, ADULT: ICD-10-CM

## 2017-02-07 DIAGNOSIS — E78.2 MIXED HYPERLIPIDEMIA: ICD-10-CM

## 2017-02-07 DIAGNOSIS — G47.33 OBSTRUCTIVE SLEEP APNEA SYNDROME: ICD-10-CM

## 2017-02-07 DIAGNOSIS — E78.5 HYPERLIPIDEMIA, UNSPECIFIED HYPERLIPIDEMIA TYPE: ICD-10-CM

## 2017-02-07 DIAGNOSIS — R30.0 DYSURIA: ICD-10-CM

## 2017-02-07 DIAGNOSIS — E11.29 TYPE 2 DIABETES MELLITUS WITH RENAL MANIFESTATIONS NOT AT GOAL: Primary | ICD-10-CM

## 2017-02-07 DIAGNOSIS — M48.061 LUMBAR SPINAL STENOSIS: ICD-10-CM

## 2017-02-07 DIAGNOSIS — M47.816 LUMBAR FACET ARTHROPATHY: ICD-10-CM

## 2017-02-07 DIAGNOSIS — Z96.651 STATUS POST TOTAL KNEE REPLACEMENT, RIGHT: ICD-10-CM

## 2017-02-07 DIAGNOSIS — L03.90 CELLULITIS, UNSPECIFIED CELLULITIS SITE: ICD-10-CM

## 2017-02-07 DIAGNOSIS — M54.16 LEFT LUMBAR RADICULOPATHY: ICD-10-CM

## 2017-02-07 DIAGNOSIS — M54.31 BILATERAL SCIATICA: ICD-10-CM

## 2017-02-07 DIAGNOSIS — I51.89 DIASTOLIC DYSFUNCTION: ICD-10-CM

## 2017-02-07 DIAGNOSIS — N18.30 CKD (CHRONIC KIDNEY DISEASE) STAGE 3, GFR 30-59 ML/MIN: ICD-10-CM

## 2017-02-07 DIAGNOSIS — I25.10 CORONARY ARTERY DISEASE INVOLVING NATIVE CORONARY ARTERY WITHOUT ANGINA PECTORIS, UNSPECIFIED WHETHER NATIVE OR TRANSPLANTED HEART: ICD-10-CM

## 2017-02-07 DIAGNOSIS — I10 ESSENTIAL HYPERTENSION: ICD-10-CM

## 2017-02-07 DIAGNOSIS — G89.29 CHRONIC LEFT-SIDED LOW BACK PAIN, WITH SCIATICA PRESENCE UNSPECIFIED: Primary | ICD-10-CM

## 2017-02-07 DIAGNOSIS — E55.9 VITAMIN D DEFICIENCY DISEASE: ICD-10-CM

## 2017-02-07 DIAGNOSIS — M54.5 CHRONIC LEFT-SIDED LOW BACK PAIN, WITH SCIATICA PRESENCE UNSPECIFIED: Primary | ICD-10-CM

## 2017-02-07 DIAGNOSIS — M17.12 PRIMARY OSTEOARTHRITIS OF LEFT KNEE: ICD-10-CM

## 2017-02-07 DIAGNOSIS — R60.9 EDEMA, UNSPECIFIED TYPE: ICD-10-CM

## 2017-02-07 DIAGNOSIS — E66.01 MORBID OBESITY WITH BODY MASS INDEX OF 40.0-49.9: ICD-10-CM

## 2017-02-07 DIAGNOSIS — I89.0 LYMPHEDEMA: ICD-10-CM

## 2017-02-07 DIAGNOSIS — J45.909 ASTHMA IN ADULT, UNSPECIFIED ASTHMA SEVERITY, UNCOMPLICATED: ICD-10-CM

## 2017-02-07 PROCEDURE — 99213 OFFICE O/P EST LOW 20 MIN: CPT | Mod: PBBFAC | Performed by: NURSE PRACTITIONER

## 2017-02-07 PROCEDURE — 99211 OFF/OP EST MAY X REQ PHY/QHP: CPT | Mod: PBBFAC,27 | Performed by: PHYSICAL MEDICINE & REHABILITATION

## 2017-02-07 PROCEDURE — 99213 OFFICE O/P EST LOW 20 MIN: CPT | Mod: S$PBB,,, | Performed by: PHYSICAL MEDICINE & REHABILITATION

## 2017-02-07 PROCEDURE — 99999 PR PBB SHADOW E&M-EST. PATIENT-LVL I: CPT | Mod: PBBFAC,,, | Performed by: PHYSICAL MEDICINE & REHABILITATION

## 2017-02-07 PROCEDURE — 99214 OFFICE O/P EST MOD 30 MIN: CPT | Mod: S$GLB,,, | Performed by: NURSE PRACTITIONER

## 2017-02-07 PROCEDURE — 99999 PR PBB SHADOW E&M-EST. PATIENT-LVL III: CPT | Mod: PBBFAC,,, | Performed by: NURSE PRACTITIONER

## 2017-02-07 RX ORDER — INSULIN LISPRO 100 [IU]/ML
INJECTION, SOLUTION INTRAVENOUS; SUBCUTANEOUS
Qty: 1 BOX | Refills: 6 | Status: SHIPPED | OUTPATIENT
Start: 2017-02-07 | End: 2017-05-31 | Stop reason: SDUPTHER

## 2017-02-07 RX ORDER — DULOXETIN HYDROCHLORIDE 30 MG/1
30 CAPSULE, DELAYED RELEASE ORAL DAILY
Qty: 30 CAPSULE | Refills: 1 | Status: SHIPPED | OUTPATIENT
Start: 2017-02-07 | End: 2017-04-07 | Stop reason: SDUPTHER

## 2017-02-07 RX ORDER — ATORVASTATIN CALCIUM 80 MG/1
80 TABLET, FILM COATED ORAL DAILY
Qty: 90 TABLET | Refills: 3 | Status: SHIPPED | OUTPATIENT
Start: 2017-02-07 | End: 2017-09-29 | Stop reason: SDUPTHER

## 2017-02-07 NOTE — MR AVS SNAPSHOT
[facility]  [address]  [facility] [address]  Phone: [phone]                  Sue Giordano   2017 10:30 AM   Office Visit    Description:  Female : 1940   Provider:  LIBRA Seaman, CASEYP   Department:  [facility]           Reason for Visit     Diabetes Mellitus           Diagnoses this Visit        Comments    Type 2 diabetes mellitus with renal manifestations not at goal    -  Primary     CKD (chronic kidney disease) stage 3, GFR 30-59 ml/min         Mixed hyperlipidemia         Essential hypertension         Morbid obesity with body mass index of 40.0-49.9         Edema, unspecified type         Vitamin D deficiency disease         Obstructive sleep apnea syndrome         Bilateral sciatica         Coronary artery disease involving native coronary artery without angina pectoris, unspecified whether native or transplanted heart         Asthma in adult, unspecified asthma severity, uncomplicated         Hyperlipidemia, unspecified hyperlipidemia type         Lymphedema         Diastolic dysfunction         Cellulitis, unspecified cellulitis site         Dysuria                To Do List           Future Appointments        Provider Department Dept Phone    2017 11:40 AM MD Phillip Armendariz angelo-Physical Med & Rehab [phone]    2017 11:20 AM Wade Chairez MD [facility] - Sleep Clinic [phone]    2017 11:00 AM MD Phillip Flores [facility] - Internal Medicine [phone]    3/15/2017 10:00 AM EHSAN Guzman angelo - Podiatry [phone]    3/20/2017 3:20 PM Db Viramontes III, MD [facility] - Neurology [phone]      Goals (5 Years of Data)              Today    17    Increase physical activity           Weight < 250 lb (113.399 kg)   118.4 kg (261 lb)  117.5 kg (259 lb)  117.9 kg (259 lb 14.8 oz)      Follow-Up and Disposition     Return in about 3 months (around 2017).        These Medications        Disp Refills Start End    atorvastatin (LIPITOR) 80 MG tablet 90 tablet 3 2/7/2017 2/7/2018    Take 1 tablet (80 mg total) by mouth once daily. - Oral    [facility] [place] [id number] Ph #: [phone]       insulin lispro (HUMALOG KWIKPEN) 100 unit/mL InPn pen 1 Box 6 2/7/2017     Inject Humalog 19 units with breakfast and 16 units with Dinner.    [facility] [address] [facility] Ph #: [phone]         [facility]     [facility] On [facility] Nurse Care Line - 24/7 Assistance  Registered nurses in the [facility] provide clinical advisement, health education, appointment booking, and other advisory services.  Call for this free service at [phone].             Medications           Message regarding Medications     Verify the changes and/or additions to your medication regime listed below are the same as discussed with your clinician today.  If any of these changes or additions are incorrect, please notify your healthcare provider.        START taking these NEW medications        Refills    atorvastatin (LIPITOR) 80 MG tablet 3    Sig: Take 1 tablet (80 mg total) by mouth once daily.    Class: Normal    Route: Oral    insulin lispro (HUMALOG KWIKPEN) 100 unit/mL InPn pen 6    Sig: Inject Humalog 19 units with breakfast and 16 units with Dinner.    Class: Normal      STOP taking these medications     insulin NPH (HUMULIN N) 100 unit/mL injection INJECT 40 UNITS UNDER THE SKIN EVERY MORNING AND 15 UNITS EVERY EVENING, 30 day supply    HUMALOG 100 unit/mL injection INJECT 15 UNITS UNDER SKIN BEFORE BREAKFAST AND DINNER PLUS CORRECTION 180-230+2, 231-280+4, 281-330 +6, 331-380 +8, >380 +10.           Verify that the below list of medications is an accurate representation of the medications you are currently taking.  If none reported, the list may be blank. If incorrect, please contact your  "healthcare provider. Carry this list with you in case of emergency.           Current Medications     ammonium lactate 12 % Crea Apply topically every morning.     aspirin 81 MG Chew Take 1 tablet (81 mg total) by mouth once daily.    BD AUTOSHIELD DUO PEN NEEDLE 30 gauge x 3/16" Ndle 1 each by Misc.(Non-Drug; Combo Route) route 3 (three) times daily. Use with Humalog and Levemir pen injections.    blood sugar diagnostic Strp BG monitoring 4 times a day. Pt needs test strips for [employer] Talking meter.    carvedilol (COREG) 6.25 MG tablet Take 1 tablet (6.25 mg total) by mouth 2 (two) times daily.    econazole nitrate 1 % cream Apply topically once daily. Apply to feet daily as directed.    ergocalciferol (VITAMIN D2) 50,000 unit Cap Take 1 capsule (50,000 Units total) by mouth every 7 days.    fluticasone-salmeterol 100-50 mcg/dose (ADVAIR DISKUS) 100-50 mcg/dose diskus inhaler INHALE 1 PUFF 2 TIMES A DAY    insulin detemir (LEVEMIR FLEXTOUCH) 100 unit/mL (3 mL) SubQ InPn pen Inject 45 units at night.    insulin syringe-needle U-100 (BD INSULIN SYRINGE ULT-FINE II) 1 mL 31 x 5/16" Syrg 1 Syringe by Misc.(Non-Drug; Combo Route) route 2 (two) times daily.    lancets [facility] Test daily    losartan (COZAAR) 100 MG tablet 1 tab by mouth daily    pen needle, diabetic (PEN NEEDLE) 32 gauge x 5/32" Ndle Uses 4 times a day.    torsemide (DEMADEX) 20 MG Tab TAKE 1 TABLET BY MOUTH ONCE DAILY.    atorvastatin (LIPITOR) 80 MG tablet Take 1 tablet (80 mg total) by mouth once daily.    insulin lispro (HUMALOG KWIKPEN) 100 unit/mL InPn pen Inject Humalog 19 units with breakfast and 16 units with Dinner.    omeprazole (PRILOSEC) 20 MG capsule Take 1 capsule (20 mg total) by mouth once daily.    senna (SENOKOT) 8.6 mg tablet Take 1 tablet by mouth once daily.    tramadol (ULTRAM) 50 mg tablet Take 1 tablet (50 mg total) by mouth 3 (three) times daily as needed for Pain.           Clinical Reference Information           Your Vitals Were " "    BP Pulse Height Weight BMI    138/60 (BP Location: Right arm, Patient Position: Sitting, BP Method: Manual) 68 5' 2" (1.575 m) 118.4 kg (261 lb) 47.74 kg/m2      Blood Pressure          Most Recent Value    BP  138/60      Allergies as of 2/7/2017     Bactrim [Sulfamethoxazole-trimethoprim]    Azithromycin    Erythromycin    Gentamicin    Levofloxacin    Vancomycin      Immunizations Administered on Date of Encounter - 2/7/2017     None      Orders Placed During Today's Visit     Future Labs/Procedures Expected by Expires    Comprehensive metabolic panel  2/7/2017 4/8/2018    Hemoglobin A1c  2/7/2017 4/8/2018    Lipid panel  2/7/2017 4/8/2018    TSH  2/7/2017 4/8/2018    Vitamin D  2/7/2017 4/8/2018      Instructions    Start Levemir 45 units nightly.     Continue Humalog 19 units with breakfast and 16 units with dinner.     Increase Lipitor to 80 mg nightly. (for now take 2 tabs of 40 mg)        Language Assistance Services     ATTENTION: Language assistance services are available, free of charge. Please call [phone].      ATENCIÓN: Si ashley smith, tiene a cummings disposición servicios gratuitos de asistencia lingüística. Llame al [phone].     MONIQUE Ý: N?u b?n nói Ti?ng Vi?t, có các d?ch v? h? tr? ngôn ng? mi?n phí dành cho b?n. G?i s? [phone].         [facility] complies with applicable Federal civil rights laws and does not discriminate on the basis of race, color, national origin, age, disability, or sex.        "
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
## 2017-02-07 NOTE — PATIENT INSTRUCTIONS
Start Levemir 45 units nightly.     Continue Humalog 19 units with breakfast and 16 units with dinner.     Increase Lipitor to 80 mg nightly. (for now take 2 tabs of 40 mg)

## 2017-02-07 NOTE — PROGRESS NOTES
Subjective:       Patient ID: Sue Giordano is a 76 y.o. female.    Chief Complaint: No chief complaint on file.    HPI     HISTORY OF PRESENT ILLNESS:  Sister Pasquale is a 76-year-old black female with   HTN, DM, asthma, arthritis, morbid obesity, who is followed up in the Physical   Medicine Clinic for chronic low back pain with left lumbar radiculopathy.  His   last visit to the clinic was on 11/03/2016.  She was maintained on meloxicam,   gabapentin and p.r.n. tramadol.    The patient is coming today to the clinic due to recent exacerbation of her back   pain.  Her pain started getting worse about a month ago.  She denies any   preceding injury.  It is currently a constant aching pain in the lumbar spine   and across her back.  She started again having shooting pain to the left foot   with numbness.  Her pain is worse with standing and bending.  It is better with   sitting down.  Her maximum pain is 9/10 and minimum 2/10.  Today, it is 3/10.    The patient denies any lower extremity weakness.  She denies any bowel or   bladder incontinence.    The patient was taking meloxicam, but apparently ran out and has not restarted   yet.  She was taking gabapentin, but had to stop it secondary to severe   constipation.  She was taking tramadol, but it was not helping.  She was   previously on oxycodone/APAP with limited relief.  She is taking   over-the-counter Tylenol Extra Strength, usually one or two tablets daily with   reportedly good relief.      MS/HN  dd: 02/07/2017 12:00:23 (CST)  td: 02/07/2017 18:43:15 (CST)  Doc ID   #[id number]  Job ID #[id number]    CC:           Review of Systems   Constitutional: Negative for fatigue.   Eyes: Negative for visual disturbance.   Respiratory: Negative for chest tightness.    Cardiovascular: Negative for chest pain.   Gastrointestinal: Negative for blood in stool, constipation, nausea and vomiting.   Genitourinary: Negative for difficulty urinating.   Musculoskeletal:  Positive for arthralgias, back pain and gait problem. Negative for joint swelling and neck pain.   Skin: Negative for rash.   Neurological: Negative for dizziness and headaches.   Psychiatric/Behavioral: Negative for behavioral problems and sleep disturbance.       Objective:      Physical Exam   Constitutional: She appears well-developed and well-nourished. No distress.   HENT:   Head: Normocephalic and atraumatic.   Neck: Normal range of motion.   Musculoskeletal:   BLE:  ROM decreased at hips & knees (b/o girth & BLE edema).  Healed Rt TKA scar.  Strength:      RLE: 5 to 5-/5 at hip flexion, knee extension, ankle DF/PF.     LLE:  5 to 5-/5 at hip flexion, knee extension, ankle DF/PF.  Sensation to pinprick:     RLE: intact.      LLE: intact.   SLR (siting):      RLE: -ve.      LLE: -ve.     -ve tenderness over lumbar spine.      Gait: ambulating with a rollator walker.       Neurological: She is alert.   Skin: Skin is warm.   Psychiatric: She has a normal mood and affect.   Vitals reviewed.            Assessment:       1. Chronic left-sided low back pain, with sciatica presence unspecified    2. Lumbar facet arthropathy    3. Lumbar spinal stenosis    4. Primary osteoarthritis of left knee    5. Status post total knee replacement, right    6. Morbid obesity with BMI of 50.0-59.9, adult        Plan:       - Gabapentin (NEURONTIN) was discontinued b/o constipation.  - Start duloxetine (CYMBALTA) 30 MG capsule; Take 1 capsule (30 mg total) by mouth once daily.  - Increase Tylenol 500 mg, 1-2 po tid prn for pain. If no relief, may try Tylenol#3.  - Weight loss was encouraged (weight increased from 255 lbs last visit to 261 today).  - Return in about 2 months (around 4/7/2017).

## 2017-02-14 ENCOUNTER — OFFICE VISIT (OUTPATIENT)
Dept: SLEEP MEDICINE | Facility: CLINIC | Age: 77
End: 2017-02-14
Payer: COMMERCIAL

## 2017-02-14 VITALS
BODY MASS INDEX: 47.75 KG/M2 | DIASTOLIC BLOOD PRESSURE: 64 MMHG | WEIGHT: 259.5 LBS | HEART RATE: 65 BPM | SYSTOLIC BLOOD PRESSURE: 148 MMHG | HEIGHT: 62 IN

## 2017-02-14 DIAGNOSIS — G47.33 OSA (OBSTRUCTIVE SLEEP APNEA): Primary | ICD-10-CM

## 2017-02-14 DIAGNOSIS — E66.09 OBESITY DUE TO EXCESS CALORIES, UNSPECIFIED OBESITY SEVERITY: ICD-10-CM

## 2017-02-14 DIAGNOSIS — I10 ESSENTIAL HYPERTENSION: ICD-10-CM

## 2017-02-14 PROCEDURE — 99213 OFFICE O/P EST LOW 20 MIN: CPT | Mod: S$GLB,,, | Performed by: PSYCHIATRY & NEUROLOGY

## 2017-02-14 PROCEDURE — 99999 PR PBB SHADOW E&M-EST. PATIENT-LVL III: CPT | Mod: PBBFAC,,, | Performed by: PSYCHIATRY & NEUROLOGY

## 2017-02-14 PROCEDURE — 99213 OFFICE O/P EST LOW 20 MIN: CPT | Mod: PBBFAC | Performed by: PSYCHIATRY & NEUROLOGY

## 2017-02-14 NOTE — MR AVS SNAPSHOT
[facility]  [address]  [facility] [address]  Phone: [phone]                  Sue Giordano   2017 11:20 AM   Office Visit    Description:  Female : 1940   Provider:  Wade Chairez MD   Department:  [facility]           Reason for Visit     Sleep Apnea           Diagnoses this Visit        Comments    REJI (obstructive sleep apnea)    -  Primary     Essential hypertension         Obesity due to excess calories, unspecified obesity severity                To Do List           Future Appointments        Provider Department Dept Phone    2017 11:00 AM MD [facility] - Internal Medicine [phone]    3/15/2017 10:00 AM EHSAN Guzman angelo - Podiatry [phone]    3/20/2017 3:20 PM Db Viramontes III, MD [facility] - Neurology [phone]    4/3/2017 9:05 AM LAB, APPOINTMENT [place] [facility] [phone]    4/3/2017 9:10 AM SPECIMEN, [facility] [phone]      Goals (5 Years of Data)              Today    17    Increase physical activity           Weight < 250 lb (113.399 kg)   117.7 kg (259 lb 7.7 oz)  118.4 kg (261 lb)  117.5 kg (259 lb)      Follow-Up and Disposition     Return in about 3 months (around 2017).    Follow-up and Disposition History      [facility]     [facility] -  Assistance  Registered nurses in the [facility] provide clinical advisement, health education, appointment booking, and other advisory services.  Call for this free service at [phone].             Medications           Message regarding Medications     Verify the changes and/or additions to your medication regime listed below are the same as discussed with your clinician today.  If any of these changes or additions are incorrect, please notify your healthcare provider.             Verify that the below list  "of medications is an accurate representation of the medications you are currently taking.  If none reported, the list may be blank. If incorrect, please contact your healthcare provider. Carry this list with you in case of emergency.           Current Medications     ammonium lactate 12 % Crea Apply topically every morning.     aspirin 81 MG Chew Take 1 tablet (81 mg total) by mouth once daily.    atorvastatin (LIPITOR) 80 MG tablet Take 1 tablet (80 mg total) by mouth once daily.    blood sugar diagnostic Strp BG monitoring 4 times a day. Pt needs test strips for [employer] Talking meter.    carvedilol (COREG) 6.25 MG tablet Take 1 tablet (6.25 mg total) by mouth 2 (two) times daily.    duloxetine (CYMBALTA) 30 MG capsule Take 1 capsule (30 mg total) by mouth once daily.    econazole nitrate 1 % cream Apply topically once daily. Apply to feet daily as directed.    ergocalciferol (VITAMIN D2) 50,000 unit Cap Take 1 capsule (50,000 Units total) by mouth every 7 days.    fluticasone-salmeterol 100-50 mcg/dose (ADVAIR DISKUS) 100-50 mcg/dose diskus inhaler INHALE 1 PUFF 2 TIMES A DAY    insulin detemir (LEVEMIR FLEXTOUCH) 100 unit/mL (3 mL) SubQ InPn pen Inject 45 units at night.    insulin lispro (HUMALOG KWIKPEN) 100 unit/mL InPn pen Inject Humalog 19 units with breakfast and 16 units with Dinner.    lancets Misc Test daily    losartan (COZAAR) 100 MG tablet 1 tab by mouth daily    pen needle, diabetic (PEN NEEDLE) 32 gauge x 5/32" Ndle Uses 4 times a day.    torsemide (DEMADEX) 20 MG Tab TAKE 1 TABLET BY MOUTH ONCE DAILY.    omeprazole (PRILOSEC) 20 MG capsule Take 1 capsule (20 mg total) by mouth once daily.    senna (SENOKOT) 8.6 mg tablet Take 1 tablet by mouth once daily.    tramadol (ULTRAM) 50 mg tablet Take 1 tablet (50 mg total) by mouth 3 (three) times daily as needed for Pain.           Clinical Reference Information           Your Vitals Were     BP Pulse Height Weight BMI    148/64 65 5' 2" (1.575 m) 117.7 " kg (259 lb 7.7 oz) 47.46 kg/m2      Blood Pressure          Most Recent Value    BP  (!)  148/64      Allergies as of 2/14/2017     Bactrim [Sulfamethoxazole-trimethoprim]    Azithromycin    Erythromycin    Gentamicin    Levofloxacin    Vancomycin      Immunizations Administered on Date of Encounter - 2/14/2017     None      Language Assistance Services     ATTENTION: Language assistance services are available, free of charge. Please call [phone].      ATENCIÓN: Si habla español, tiene a cummings disposición servicios gratuitos de asistencia lingüística. Llame al [phone].     CHÚ Ý: N?u b?n nói Ti?ng Vi?t, có các d?ch v? h? tr? ngôn ng? mi?n phí dành cho b?n. G?i s? [phone].         [facility] complies with applicable Federal civil rights laws and does not discriminate on the basis of race, color, national origin, age, disability, or sex.

## 2017-02-14 NOTE — PROGRESS NOTES
"Sister Pasquale returns to clinic today regarding the management of obstructive sleep apnea and CPAP equipment check.    Prior weight 271 lbs. Weight down to 259 lbs.    She is struggling with CPAP, so sleeping in a chair (also using a wedge pillow in bed).  She is wanting to discontinue CPAP, because it is too cumbersome.  Since she has been back home, she has had difficulty using her CPAP.  Prior to this, her pressure was increased to 13 cm (based on titration)  She states this pressure increase was "working nicely"  She previously had excellent tolerance/ adherence with CPAP at pressure 10cm.   She has a nasal pillows mask, which she likes. Martin FX nasal pillows small size mask  She states that she really likes CPAP and it helps her feel better after a night of use.    Since starting celexa, she feels like she is doing so much better with pain control.  Still sleepy during the day.    ESS = 15    Recent titration at weight 281 lbs revealed she needed pressure increase from 10 to 13 cm    Interrogation: CPAP at 13 cm; 897 hours total; Machine good condition (F&P model), sleep style machine; 200 series. 5.2 hr/night.     BASELINE PSG 12/23/11: + REJI, AHI 14.2, low O2 79%, wt 281 lbs. (Patient had sleep study in 2003, at which time she was titrated to 11 cm, wt 248 lbs)   TITRATION 5/9/16: Titrated to 13 cm; wt 281 lbs    DME = Medicare (MOGL) - administered by [employer]      Past Medical History   Diagnosis Date    Adrenal mass     Anemia     Arthritis     Asthma     CKD (chronic kidney disease) stage 2, GFR 60-89 ml/min     Diabetes mellitus     Diabetes mellitus type II     Diastolic dysfunction 10/10/2013    GERD (gastroesophageal reflux disease) 8/13/2012    Gout     Hep B w/ coma 1971    Hives 10/1/2013    Hyperlipidemia     Hypertension     Morbid obesity with BMI of 50.0-59.9, adult 2/11/2014    Obesity     Obesity     Other specified anemias 6/25/2015    Sleep apnea     Unspecified " "essential hypertension 6/25/2015       Past Surgical History   Procedure Laterality Date    Right total knee replacement      Joint replacement      Hysterectomy  1982     secondary uterine fibroids       Family History   Problem Relation Age of Onset    Cancer Mother     Hypertension Father     Cancer Sister     No Known Problems Brother     No Known Problems Maternal Aunt     No Known Problems Maternal Uncle     No Known Problems Paternal Aunt     No Known Problems Paternal Uncle     No Known Problems Maternal Grandmother     No Known Problems Maternal Grandfather     No Known Problems Paternal Grandmother     No Known Problems Paternal Grandfather     Glaucoma Neg Hx     Breast cancer Neg Hx     Colon cancer Neg Hx     Ovarian cancer Neg Hx     Stroke Neg Hx     Allergic rhinitis Neg Hx     Allergies Neg Hx     Angioedema Neg Hx     Asthma Neg Hx     Atopy Neg Hx     Eczema Neg Hx     Immunodeficiency Neg Hx     Rhinitis Neg Hx     Urticaria Neg Hx     Amblyopia Neg Hx     Blindness Neg Hx     Cataracts Neg Hx     Diabetes Neg Hx     Macular degeneration Neg Hx     Retinal detachment Neg Hx     Strabismus Neg Hx     Thyroid disease Neg Hx     Psoriasis Neg Hx        Social History   Substance Use Topics    Smoking status: Never Smoker    Smokeless tobacco: Never Used    Alcohol use No       ALLERGIES: Reviewed in EPIC    CURRENT MEDICATIONS: Reviewed in EPIC    ROS:   Weight down another 10+ lbs (intentional)  Denies palpitations, headaches, sinus congestion improved with nasal spray qhs prn.   Denies oral drying or nasal drying.      PHYSICAL EXAM:     Visit Vitals    BP (!) 148/64    Pulse 65    Ht 5' 2" (1.575 m)    Wt 117.7 kg (259 lb 7.7 oz)    BMI 47.46 kg/m2     GENERAL: morbid obese body habitus, well groomed       ASSESSMENT:     Obstructive sleep apnea, with improvement of CPAP after nightly use. Control of sleepiness should improved with increased nightly " CPAP usage. She is losing weight since her peak. Now with better pain control, she feels more successful with exercise and diet.    She has medical co-morbidities of hypertension and morbid obesity (BMI >30) .     PLAN:     1. Continue CPAP 13 cm, Goal of an additional 400 hours before next visit.  2. Sleep with head of bed elevation and incline  3. Weight loss goal of 225 lbs.  4. If achieves weight, we can reassess whether CPAP is indicated.    DME = [employer] (she reports using nasal pillows)    Continue Advair or Flonase NS 1-2 sprays qhs PRN to reduce potential nasal congestion.     Follow up 3 months: MD/NP

## 2017-02-16 ENCOUNTER — OFFICE VISIT (OUTPATIENT)
Dept: INTERNAL MEDICINE | Facility: CLINIC | Age: 77
End: 2017-02-16
Payer: MEDICARE

## 2017-02-16 VITALS
WEIGHT: 257.94 LBS | DIASTOLIC BLOOD PRESSURE: 80 MMHG | SYSTOLIC BLOOD PRESSURE: 138 MMHG | HEIGHT: 59 IN | BODY MASS INDEX: 52 KG/M2

## 2017-02-16 DIAGNOSIS — L03.115 CELLULITIS OF BOTH LOWER EXTREMITIES: Primary | ICD-10-CM

## 2017-02-16 DIAGNOSIS — A04.8 H. PYLORI INFECTION: ICD-10-CM

## 2017-02-16 DIAGNOSIS — E66.01 MORBID OBESITY DUE TO EXCESS CALORIES: ICD-10-CM

## 2017-02-16 DIAGNOSIS — G47.33 OBSTRUCTIVE SLEEP APNEA SYNDROME: ICD-10-CM

## 2017-02-16 DIAGNOSIS — I10 ESSENTIAL HYPERTENSION: ICD-10-CM

## 2017-02-16 DIAGNOSIS — E78.2 MIXED HYPERLIPIDEMIA: ICD-10-CM

## 2017-02-16 DIAGNOSIS — L03.116 CELLULITIS OF BOTH LOWER EXTREMITIES: Primary | ICD-10-CM

## 2017-02-16 DIAGNOSIS — E11.29 TYPE 2 DIABETES MELLITUS WITH RENAL MANIFESTATIONS NOT AT GOAL: ICD-10-CM

## 2017-02-16 PROCEDURE — 99999 PR PBB SHADOW E&M-EST. PATIENT-LVL IV: CPT | Mod: PBBFAC,,, | Performed by: INTERNAL MEDICINE

## 2017-02-16 PROCEDURE — 99214 OFFICE O/P EST MOD 30 MIN: CPT | Mod: S$PBB,,, | Performed by: INTERNAL MEDICINE

## 2017-02-16 PROCEDURE — 99214 OFFICE O/P EST MOD 30 MIN: CPT | Mod: PBBFAC | Performed by: INTERNAL MEDICINE

## 2017-02-16 RX ORDER — DOXYCYCLINE 100 MG/1
100 CAPSULE ORAL 2 TIMES DAILY
Qty: 28 CAPSULE | Refills: 0 | Status: SHIPPED | OUTPATIENT
Start: 2017-02-16 | End: 2017-03-22

## 2017-02-16 RX ORDER — CEPHALEXIN 500 MG/1
500 CAPSULE ORAL EVERY 8 HOURS
Qty: 42 CAPSULE | Refills: 0 | Status: SHIPPED | OUTPATIENT
Start: 2017-02-16 | End: 2017-02-26

## 2017-02-16 NOTE — PROGRESS NOTES
Subjective:       Patient ID: Sue Giordano is a 76 y.o. female.    Chief Complaint: Follow-up    HPI Comments: Follow up multiple issues    Doing well on Cymbalta other than making her sleepy, but it is helping in place of the diclofenac and gabapentin. Discussed taking this at night.    BP a little high today    DM doing well.    Saw the sleep team recently- REJI adjusted.  Discussed    Deliberate weight loss, a few pounds in past few months.    Still with leg swelling, recurrent venous stasis.   Red in past few weeks again.  Ingrown toenail in January and tx with Augmentin.    Patient Active Problem List:     Vitamin D deficiency disease     Obstructive sleep apnea syndrome     Mixed hyperlipidemia     Adrenal mass- adenoma stable since 2004 (1.8 cm and 8/13/12 (2 cm); stable 2016 2.4 cm     Gastroesophageal reflux disease without esophagitis     H. 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   HENT: Negative for ear discharge, sinus pressure and sore throat.    Eyes: Negative for visual disturbance.   Respiratory: Positive for apnea. Negative for cough, chest tightness, shortness of breath and wheezing.         On tx   Cardiovascular: Positive for leg swelling. Negative for chest pain and palpitations.   Gastrointestinal: Negative for abdominal distention, abdominal pain, anal bleeding, blood in stool, constipation, diarrhea, nausea and vomiting.    Genitourinary: Negative for dysuria, frequency, hematuria and vaginal bleeding.   Musculoskeletal: Positive for arthralgias. Negative for gait problem, joint swelling and myalgias.        Sx better   Skin: Positive for rash.        Venous stasis- cellulitis   Neurological: Negative for dizziness, tremors, weakness, light-headedness and headaches.   Hematological: Negative for adenopathy. Does not bruise/bleed easily.   Psychiatric/Behavioral: Negative for confusion, hallucinations, sleep disturbance and suicidal ideas.       Objective:      Physical Exam   Constitutional: She is oriented to person, place, and time. She appears well-developed and well-nourished.   HENT:   Head: Normocephalic and atraumatic.   Right Ear: External ear normal.   Left Ear: External ear normal.   Nose: Nose normal.   Mouth/Throat: Oropharynx is clear and moist. No oropharyngeal exudate.   Eyes: Conjunctivae and EOM are normal. No scleral icterus.   Neck: Normal range of motion. Neck supple. No JVD present. No thyromegaly present.   Cardiovascular: Normal rate, regular rhythm, normal heart sounds and intact distal pulses.  Exam reveals no gallop.    No murmur heard.  Pulmonary/Chest: Effort normal and breath sounds normal. No respiratory distress. She has no wheezes. She exhibits no tenderness.   Abdominal: Soft. Bowel sounds are normal. She exhibits no distension and no mass. There is no tenderness. There is no rebound and no guarding.   Musculoskeletal: Normal range of motion. She exhibits no edema or tenderness.   Lymphadenopathy:     She has no cervical adenopathy.   Neurological: She is alert and oriented to person, place, and time. She displays normal reflexes. No cranial nerve deficit. Coordination normal.   Skin: Skin is warm. Rash noted. No erythema.   Venous stasis, mild cellulitis both LE   Psychiatric: She has a normal mood and affect. Her behavior is normal. Judgment and thought content normal.   Nursing note and vitals  reviewed.      Assessment:       1. Cellulitis of both lower extremities    2. Essential hypertension    3. Mixed hyperlipidemia    4. Obstructive sleep apnea syndrome    5. 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 Morbid obesity due to excess calories    7. H. pylori infection        Plan:         Cellulitis of both lower extremities: Topical measures, elevation support hose reviewed again.  Follow-up one month, return sooner with problems prior  -     Ambulatory consult to Wound Clinic  -     doxycycline (MONODOX) 100 MG capsule; Take 1 capsule (100 mg total) by mouth 2 (two) times daily.  Dispense: 28 capsule; Refill: 0  -     cephALEXin (KEFLEX) 500 MG capsule; Take 1 capsule (500 mg total) by mouth every 8 (eight) hours.  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 pylori infection  -     H. pylori antigen, stool; Future; Expected date: 2/16/17

## 2017-02-16 NOTE — MR AVS SNAPSHOT
[facility]  [address]  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 pylori infection                To Do List           Future Appointments        Provider Department Dept Phone    3/15/2017 10:00 AM Rhonda Anand DPM [facility] Podiatry [phone]    3/20/2017 3:20 PM Db Viramontes III, MD [facility] Neurology [phone]    4/3/2017 9:05 AM LAB, APPOINTMENT [place] [facility] [phone]    4/3/2017 9:10 AM SPECIMEN, MAIN CAMPUS [facility] [phone]    2017 11:20 AM Santi Schulz MD [facility] - Nephrology [phone]      Goals (5 Years of Data)              Today    17    Increase physical activity           Weight < 250 lb (113.399 kg)   117 kg (257 lb 15 oz)  117.7 kg (259 lb 7.7 oz)  118.4 kg (261 lb)       These Medications        Disp Refills Start End    doxycycline (MONODOX) 100 MG capsule 28 capsule 0 2017     Take 1 capsule (100 mg total) by mouth 2 (two) times daily. - Oral    Pharm[facility]: [facility] [place] [facility] [id number]  [facility] Ph #: [phone]       cephALEXin (KEFLEX) 500 MG capsule 42 capsule 0 2017    Take 1 capsule (500 mg total) by mouth every 8 (eight) hours. - Oral    Pharmacy: [facility] [place] [facility] [place] [address]  [facility] Ph #: [phone]          [facility]     [facility] Care [facility] - 24/7 Assistance  Registered nurses in the [facility] provide clinical advisement, health education, appointment booking, and other advisory services.  Call for this free service at [phone].             Medications           Message regarding Medications     Verify the changes and/or additions to your medication regime listed below are the same as discussed with your clinician today.  If any of these changes or additions are incorrect, please notify your healthcare provider.        START taking these NEW medications        Refills    doxycycline (MONODOX) 100 MG capsule 0    Sig: Take 1 capsule (100 mg total) by mouth 2 (two) times daily.    Class: Normal    Route: Oral    cephALEXin (KEFLEX) 500 MG capsule 0    Sig: Take 1 capsule (500 mg total) by mouth every 8 (eight) hours.    Class: Normal    Route: Oral      STOP taking these medications     tramadol (ULTRAM) 50 mg tablet Take 1 tablet (50 mg total) by mouth 3 (three) times daily as needed for Pain.           Verify that the below list of medications is an accurate representation of the medications you are currently taking.  If none reported, the list may be blank. If incorrect, please contact your healthcare provider. Carry this list with you in case of emergency.           Current Medications     ammonium lactate 12 % Crea Apply topically every morning.     aspirin 81 MG Chew Take 1 tablet (81 mg total) by mouth once daily.    atorvastatin (LIPITOR) 80 MG tablet Take 1 tablet (80 mg total) by mouth once daily.    blood sugar diagnostic Strp BG monitoring 4 times a day. Pt needs test strips for [employer] T[employer] meter.    carvedilol (COREG) 6.25 MG tablet Take 1 tablet (6.25 mg total) by mouth 2 (two) times daily.    duloxetine (CYMBALTA) 30 MG capsule Take 1 capsule (30 mg total) by mouth once daily.    econazole nitrate 1 % cream Apply topically once daily. Apply to feet daily as  "directed.    ergocalciferol (VITAMIN D2) 50,000 unit Cap Take 1 capsule (50,000 Units total) by mouth every 7 days.    fluticasone-salmeterol 100-50 mcg/dose (ADVAIR DISKUS) 100-50 mcg/dose diskus inhaler INHALE 1 PUFF 2 TIMES A DAY    insulin detemir (LEVEMIR FLEXTOUCH) 100 unit/mL (3 mL) SubQ InPn pen Inject 45 units at night.    insulin lispro (HUMALOG KWIKPEN) 100 unit/mL InPn pen Inject Humalog 19 units with breakfast and 16 units with Dinner.    lancets Misc Test daily    losartan (COZAAR) 100 MG tablet 1 tab by mouth daily    pen needle, diabetic (PEN NEEDLE) 32 gauge x 5/32" Ndle Uses 4 times a day.    torsemide (DEMADEX) 20 MG Tab TAKE 1 TABLET BY MOUTH ONCE DAILY.    cephALEXin (KEFLEX) 500 MG capsule Take 1 capsule (500 mg total) by mouth every 8 (eight) hours.    doxycycline (MONODOX) 100 MG capsule Take 1 capsule (100 mg total) by mouth 2 (two) times daily.    omeprazole (PRILOSEC) 20 MG capsule Take 1 capsule (20 mg total) by mouth once daily.    senna (SENOKOT) 8.6 mg tablet Take 1 tablet by mouth once daily.           Clinical Reference Information           Your Vitals Were     BP Height Weight BMI       138/80 4' 11" (1.499 m) 117 kg (257 lb 15 oz) 52.1 kg/m2       Blood Pressure          Most Recent Value    BP  138/80      Allergies as of 2/16/2017     Bactrim [Sulfamethoxazole-trimethoprim]    Azithromycin    Erythromycin    Gentamicin    Levofloxacin    Vancomycin      Immunizations Administered on Date of Encounter - 2/16/2017     None      Orders Placed During Today's Visit      Normal Orders This Visit    Ambulatory consult to Wound Clinic     Future Labs/Procedures Expected by Expires    H. pylori antigen, stool  2/16/2017 2/16/2018      Language Assistance Services     ATTENTION: Language assistance services are available, free of charge. Please call [phone].      ATENCIÓN: Si habla luis, tiene a cummings disposición servicios gratuitos de asistencia lingüística. Llame al " [phone].     MONIQUE Ý: N?u b?n nói Ti?ng Vi?t, có các d?ch v? h? tr? ngôn ng? mi?n phí dành cho b?n. G?i s? [phone].         [facility] complies with applicable Federal civil rights laws and does not discriminate on the basis of race, color, national origin, age, disability, or sex.

## 2017-02-20 ENCOUNTER — TELEPHONE (OUTPATIENT)
Dept: PODIATRY | Facility: CLINIC | Age: 77
End: 2017-02-20

## 2017-02-20 NOTE — TELEPHONE ENCOUNTER
Extended conversation, no open wound, no temp, no pain.  Currently wearing Circaid garment.  Will call if any drainage occurs.

## 2017-03-08 RX ORDER — DICLOFENAC SODIUM 10 MG/G
2 GEL TOPICAL DAILY
Qty: 100 G | Refills: 1 | Status: SHIPPED | OUTPATIENT
Start: 2017-03-08 | End: 2018-08-30 | Stop reason: SDUPTHER

## 2017-03-08 RX ORDER — DICLOFENAC SODIUM 10 MG/G
2 GEL TOPICAL DAILY
COMMUNITY
End: 2017-03-08 | Stop reason: SDUPTHER

## 2017-03-08 NOTE — TELEPHONE ENCOUNTER
----- Message from Yunier Martínez sent at 3/8/2017  1:28 PM CST -----  Contact: [facility]  Stated that a PA is needed for Rx diclofenac sodium 1 % Gel    Please call [facility] [phone] (Phone)  [phone] (Fax)

## 2017-03-08 NOTE — TELEPHONE ENCOUNTER
Called insurance company for Diclofenac sodium for pt and it approved up to 2020 and PA no is [id number].

## 2017-03-08 NOTE — TELEPHONE ENCOUNTER
----- Message from Jing Real sent at 3/8/2017  9:45 AM CST -----  Contact: [facility] / [phone]  Pt is requesting a prescription for Diclofenac 1% Gel.     [facility] - [place], [place] - [address] [facility] [address]  [phone] (Phone)  [phone] (Fax)

## 2017-03-09 ENCOUNTER — TELEPHONE (OUTPATIENT)
Dept: ENDOCRINOLOGY | Facility: CLINIC | Age: 77
End: 2017-03-09

## 2017-03-09 NOTE — TELEPHONE ENCOUNTER
----- Message from Geri Linder sent at 3/8/2017  3:36 PM CST -----  Contact: [facility]  tel: [phone]  Returning a call To ms. uMnson.    Needs the prior authorization no. And the expiration date.   The insurance co. [employer] has no record of this. Called [phone].    Pls call to discuss this further .

## 2017-03-09 NOTE — TELEPHONE ENCOUNTER
Called insurance again regarding another PA for Diclofan gel is approved up to Dec 31st 2017. PA no is [id number]. Also notified a pharmacy.

## 2017-03-15 ENCOUNTER — OFFICE VISIT (OUTPATIENT)
Dept: PODIATRY | Facility: CLINIC | Age: 77
End: 2017-03-15
Payer: MEDICARE

## 2017-03-15 VITALS
RESPIRATION RATE: 18 BRPM | HEIGHT: 59 IN | HEART RATE: 82 BPM | SYSTOLIC BLOOD PRESSURE: 173 MMHG | BODY MASS INDEX: 51.81 KG/M2 | DIASTOLIC BLOOD PRESSURE: 74 MMHG | WEIGHT: 257 LBS

## 2017-03-15 DIAGNOSIS — L03.039 PARONYCHIA OF TOENAIL, UNSPECIFIED LATERALITY: Primary | ICD-10-CM

## 2017-03-15 DIAGNOSIS — E11.51 TYPE II DIABETES MELLITUS WITH PERIPHERAL CIRCULATORY DISORDER: ICD-10-CM

## 2017-03-15 PROCEDURE — 11750 EXCISION NAIL&NAIL MATRIX: CPT | Mod: 51,TA,S$GLB, | Performed by: PODIATRIST

## 2017-03-15 PROCEDURE — 99213 OFFICE O/P EST LOW 20 MIN: CPT | Mod: S$PBB,25,, | Performed by: PODIATRIST

## 2017-03-15 PROCEDURE — 99213 OFFICE O/P EST LOW 20 MIN: CPT | Mod: PBBFAC | Performed by: PODIATRIST

## 2017-03-15 PROCEDURE — 99999 PR PBB SHADOW E&M-EST. PATIENT-LVL III: CPT | Mod: PBBFAC,,, | Performed by: PODIATRIST

## 2017-03-15 PROCEDURE — 11750 EXCISION NAIL&NAIL MATRIX: CPT | Mod: PBBFAC | Performed by: PODIATRIST

## 2017-03-15 NOTE — PROGRESS NOTES
Subjective:      Patient ID: Sue Giordano is a 77 y.o. female.    Chief Complaint: PCP (Vale Howard MD 2/16/17); Diabetic Foot Exam (mold on top of Rt foot biopsy); Nail Care; Foot Problem; Nail Problem (bilateral great toenails ); and Leg Swelling (bilateral )    Sue Garzon is a 77 y.o. female who presents to the clinic upon referral from Dr. Lesly larry. provider found  for evaluation and treatment of diabetic feet. Sue Garzon has a past medical history of Adrenal mass; Anemia; Arthritis; Asthma; CKD (chronic kidney disease) stage 2, GFR 60-89 ml/min; Diabetes mellitus; Diabetes mellitus type II; Diastolic dysfunction (10/10/2013); GERD (gastroesophageal reflux disease) (8/13/2012); Gout; Hep B w/ coma (1971); Hives (10/1/2013); Hyperlipidemia; Hypertension; Morbid obesity with BMI of 50.0-59.9, adult (2/11/2014); Obesity; Obesity; Other specified anemias (6/25/2015); Sleep apnea; and Unspecified essential hypertension (6/25/2015). Patient relates no major problem with feet. Only complaints today consist of painful ingrown toenails .    PCP: Vale Howard MD    Date Last Seen by PCP:   Chief Complaint   Patient presents with    PCP     Vale Howard MD 2/16/17    Diabetic Foot Exam     mold on top of Rt foot biopsy    Nail Care    Foot Problem    Nail Problem     bilateral great toenails     Leg Swelling     bilateral          Current shoe gear: Casual shoes    Hemoglobin A1C   Date Value Ref Range Status   01/31/2017 7.4 (H) 4.5 - 6.2 % Final     Comment:     According to ADA guidelines, hemoglobin A1C <7.0% represents  optimal control in non-pregnant diabetic patients.  Different  metrics may apply to specific populations.   Standards of Medical Care in Diabetes - 2016.  For the purpose of screening for the presence of diabetes:  <5.7%     Consistent with the absence of diabetes  5.7-6.4%  Consistent with increasing risk for diabetes   (prediabetes)  >or=6.5%  Consistent with diabetes  Currently  no consensus exists for use of hemoglobin A1C  for diagnosis of diabetes for children.     10/26/2016 7.6 (H) 4.5 - 6.2 % Final     Comment:     According to ADA guidelines, hemoglobin A1C <7.0% represents  optimal control in non-pregnant diabetic patients.  Different  metrics may apply to specific populations.   Standards of Medical Care in Diabetes - 2016.  For the purpose of screening for the presence of diabetes:  <5.7%     Consistent with the absence of diabetes  5.7-6.4%  Consistent with increasing risk for diabetes   (prediabetes)  >or=6.5%  Consistent with diabetes  Currently no consensus exists for use of hemoglobin A1C  for diagnosis of diabetes for children.     07/13/2016 8.4 (H) 4.5 - 6.2 % Final     Comment:     According to ADA guidelines, hemoglobin A1C <7.0% represents  optimal control in non-pregnant diabetic patients.  Different  metrics may apply to specific populations.   Standards of Medical Care in Diabetes - 2016.  For the purpose of screening for the presence of diabetes:  <5.7%     Consistent with the absence of diabetes  5.7-6.4%  Consistent with increasing risk for diabetes   (prediabetes)  >or=6.5%  Consistent with diabetes  Currently no consensus exists for use of hemoglobin A1C  for diagnosis of diabetes for children.             Review of Systems   Constitution: Negative for chills, decreased appetite and fever.   Cardiovascular: Negative for leg swelling.   Skin: Positive for nail changes.   Musculoskeletal: Negative for arthritis, joint pain, joint swelling and myalgias.   Gastrointestinal: Negative for nausea and vomiting.   Neurological: Negative for loss of balance, numbness and paresthesias.           Objective:      Physical Exam   Constitutional: She is oriented to person, place, and time. She appears well-developed and well-nourished.   Cardiovascular:   Pulses:       Dorsalis pedis pulses are 2+ on the right side, and 2+ on the left side.        Posterior tibial pulses are  2+ on the right side, and 2+ on the left side.   Musculoskeletal: She exhibits no edema or tenderness.        Right ankle: Normal.        Left ankle: Normal.        Right foot: There is no swelling, no crepitus and no deformity.        Left foot: There is no swelling, no crepitus and no deformity.   Adequate joint range of motion without pain, limitation, nor crepitation Bilateral feet and ankle joints. Muscle strength is 5/5 in all groups bilaterally.         Lymphadenopathy:   No palpable lymph nodes   Neurological: She is alert and oriented to person, place, and time. She has normal strength.   Skin: Skin is warm, dry and intact. No abrasion, no bruising and no rash noted. No erythema. Nails show no clubbing.   Medial R, lateral L  hallux nail margin  with ingrown nail plate. Surrounding erythema and minimal edema is noted there is small  granuloma formation noted. no malodor      Psychiatric: She has a normal mood and affect. Her behavior is normal.             Assessment:       Encounter Diagnoses   Name Primary?    Paronychia of toenail, unspecified laterality Yes    Type II diabetes mellitus with peripheral circulatory disorder          Plan:       Sue Garzon was seen today for pcp, diabetic foot exam, nail care, foot problem, nail problem and leg swelling.    Diagnoses and all orders for this visit:    Paronychia of toenail, unspecified laterality    Type II diabetes mellitus with peripheral circulatory disorder      I counseled the patient on her conditions, their implications and medical management.    Treatment options discussed with patient.  Patient would like permanent procedure.  Patient understands a 5-10% recurrence rate of ingrown nail.  Patient understands all potential risks and complications as well as alternatives.  No guarantees are given or implied as to the outcome.  Consent forms read signed witnessed and the chart.  See op report.    MATRIXECTOMY OP REPORT    SURGEON: Rhonda Whitfield,  DPM    PRE-OP DX: onychocryptosis    POST-OP DX: same    PROCEDURE: L medial, R lateral hallux  matrixectomy      ANESTHESIA: local    HEMOSTASIS: penrose digital tourniquet for less then 3 minutes.    EBL: Less than 5 cc    After obtaining verbal and written consent for nail procedure, I injected the toe via digital block with 3 cc of  2% Xylocaine plain  for anesthesia. After anesthesia was achieved,Tourniquet applied to toe. The area was cleansed with betadine. Next I incised the nail border approximately 3 mm from its edge and carried the nail plate incision down the entire length of the nail plate to the matrix area. The offending border was freed from all fibrous adhesions and removed from the operative site in toto.  Phenol 90% was then applied to the matrix area 3 times for a total of 90 seconds. The tourniquet was released and CFT was immediate. The area was flushed with alcohol and dried, and dressed with antibiotic cream and a dry, sterile, compressive dressing. Patient tolerated the procedure well. Written and verbal post-operative instructions were dispensed to the patient on post-operative nail care. Pt. to follow-up in one week but should call immediately if any signs of infection, such as fever, chills, sweats, increased redness or pain.          .

## 2017-03-15 NOTE — MR AVS SNAPSHOT
[facility] - Podiatry  [address]  [place] [address]  Phone: [phone]                  Sue Giordano   3/15/2017 10:00 AM   Office Visit    Description:  Female : 1940   Provider:  Rhonda Anand DPM   Department:  [facility] - Podiatry           Reason for Visit     PCP     Diabetic Foot Exam     Nail Care     Foot Problem     Nail Problem     Leg Swelling           Diagnoses this Visit        Comments    Paronychia of toenail, unspecified laterality    -  Primary            To Do List           Future Appointments        Provider Department Dept Phone    3/20/2017 3:20 PM Db Viramontes III, MD [facility] - Neurology [phone]    4/3/2017 9:05 AM LAB, APPOINT[place] [facility] [phone]    4/3/2017 9:10 AM SPECIMEN, MAIN CAMPUS [facility] [phone]    2017 11:20 AM Santi Schulz MD [facility] - Nephrology [phone]    2017 11:40 AM Priya Grewal MD [facility] Med [facility] Re[facility] [phone]      Goals (5 Years of Data)              Today    17    Increase physical activity           Weight < 250 lb (113.399 kg)   116.6 kg (257 lb)  117 kg (257 lb 15 oz)  117.7 kg (259 lb 7.7 oz)      [facility]     [facility] -  Assistance  Registered nurses in the [facility] provide clinical advisement, health education, appointment booking, and other advisory services.  Call for this free service at [phone].             Medications           Message regarding Medications     Verify the changes and/or additions to your medication regime listed below are the same as discussed with your clinician today.  If any of these changes or additions are incorrect, please notify your healthcare provider.             Verify that the below list of medications is an accurate representation of the medications you are currently taking.  If none reported, the list may  "be blank. If incorrect, please contact your healthcare provider. Carry this list with you in case of emergency.           Current Medications     ammonium lactate 12 % Crea Apply topically every morning.     aspirin 81 MG Chew Take 1 tablet (81 mg total) by mouth once daily.    atorvastatin (LIPITOR) 80 MG tablet Take 1 tablet (80 mg total) by mouth once daily.    blood sugar diagnostic Strp BG monitoring 4 times a day. Pt needs test strips for Em[employer] Talking meter.    carvedilol (COREG) 6.25 MG tablet Take 1 tablet (6.25 mg total) by mouth 2 (two) times daily.    diclofenac sodium 1 % Gel Apply 2 g topically once daily.    doxycycline (MONODOX) 100 MG capsule Take 1 capsule (100 mg total) by mouth 2 (two) times daily.    econazole nitrate 1 % cream Apply topically once daily. Apply to feet daily as directed.    ergocalciferol (VITAMIN D2) 50,000 unit Cap Take 1 capsule (50,000 Units total) by mouth every 7 days.    fluticasone-salmeterol 100-50 mcg/dose (ADVAIR DISKUS) 100-50 mcg/dose diskus inhaler INHALE 1 PUFF 2 TIMES A DAY    insulin detemir (LEVEMIR FLEXTOUCH) 100 unit/mL (3 mL) SubQ InPn pen Inject 45 units at night.    insulin lispro (HUMALOG KWIKPEN) 100 unit/mL InPn pen Inject Humalog 19 units with breakfast and 16 units with Dinner.    lancets Misc Test daily    losartan (COZAAR) 100 MG tablet 1 tab by mouth daily    pen needle, diabetic (PEN NEEDLE) 32 gauge x 5/32" Ndle Uses 4 times a day.    torsemide (DEMADEX) 20 MG Tab TAKE 1 TABLET BY MOUTH ONCE DAILY.    duloxetine (CYMBALTA) 30 MG capsule Take 1 capsule (30 mg total) by mouth once daily.    omeprazole (PRILOSEC) 20 MG capsule Take 1 capsule (20 mg total) by mouth once daily.    senna (SENOKOT) 8.6 mg tablet Take 1 tablet by mouth once daily.           Clinical Reference Information           Your Vitals Were     BP Pulse Resp Height Weight BMI    173/74 82 18 4' 11" (1.499 m) 116.6 kg (257 lb) 51.91 kg/m2      Blood Pressure          Most Recent " "Value    BP  (!)  173/74      Allergies as of 3/15/2017     Bactrim [Sulfamethoxazole-trimethoprim]    Azithromycin    Erythromycin    Gentamicin    Levofloxacin    Vancomycin      Immunizations Administered on Date of Encounter - 3/15/2017     None      Instructions    POST NAIL PROCEDURE INSTRUCTIONS    1. Leave bandage intact for 12-24 hours. If the bandage sticks as you try to remove it, soak it in warm water until it lifts off.    2. Soak toe daily in warm water and Epsom salts for 5 - 8 minutes.     3. Dry foot completely after soaking, and apply a small amount of triple antibiotic ointment (neosporin works fine!) and a fabric or cloth bandaid ("plastic" bandaids tend to lift off with ointment use).  Wear open-toed shoes as needed for comfort.     4. Take Advil or Tylenol as needed for pain.     5. Your toe may drain for the next few days. Normal drainage is yellow-to-pink, and clear, much like the fluid in a blister. Watch for redness spreading up your toe into your foot, white thick drainage (pus), pain unrelieved by medication, or nausea/vomiting/fever/chills. These are signs of infection. Please call the clinic or visit your doctor.    6. You may stop soaking and dressing toe once it stops draining (about 3 - 7 days).           Language Assistance Services     ATTENTION: Language assistance services are available, free of charge. Please call [phone].      ATENCIÓN: Si habla español, tiene a cummings disposición servicios gratuitos de asistencia lingüística. Llame al [phone].     [facility] Ý: N?u b?n nói Ti?ng Vi?t, có các d?ch v? h? tr? ngôn ng? mi?n phí dành cho b?n. G?i s? [phone].         [facility] complies with applicable Federal civil rights laws and does not discriminate on the basis of race, color, national origin, age, disability, or sex.        "

## 2017-03-22 ENCOUNTER — TELEPHONE (OUTPATIENT)
Dept: INTERNAL MEDICINE | Facility: CLINIC | Age: 77
End: 2017-03-22

## 2017-03-22 ENCOUNTER — OFFICE VISIT (OUTPATIENT)
Dept: INTERNAL MEDICINE | Facility: CLINIC | Age: 77
End: 2017-03-22
Payer: MEDICARE

## 2017-03-22 ENCOUNTER — HOSPITAL ENCOUNTER (OUTPATIENT)
Dept: RADIOLOGY | Facility: HOSPITAL | Age: 77
Discharge: HOME | End: 2017-03-22
Attending: NURSE PRACTITIONER
Payer: MEDICARE

## 2017-03-22 VITALS
TEMPERATURE: 99 F | WEIGHT: 263 LBS | HEART RATE: 73 BPM | DIASTOLIC BLOOD PRESSURE: 50 MMHG | OXYGEN SATURATION: 98 % | SYSTOLIC BLOOD PRESSURE: 160 MMHG | HEIGHT: 62 IN | BODY MASS INDEX: 48.4 KG/M2

## 2017-03-22 DIAGNOSIS — I87.2 VENOUS STASIS DERMATITIS, UNSPECIFIED LATERALITY: ICD-10-CM

## 2017-03-22 DIAGNOSIS — M25.562 ACUTE PAIN OF LEFT KNEE: Primary | ICD-10-CM

## 2017-03-22 DIAGNOSIS — M25.562 ACUTE PAIN OF LEFT KNEE: ICD-10-CM

## 2017-03-22 DIAGNOSIS — M17.12 PRIMARY OSTEOARTHRITIS OF LEFT KNEE: Primary | ICD-10-CM

## 2017-03-22 PROCEDURE — 99999 PR PBB SHADOW E&M-EST. PATIENT-LVL IV: CPT | Mod: PBBFAC,,, | Performed by: NURSE PRACTITIONER

## 2017-03-22 PROCEDURE — 99213 OFFICE O/P EST LOW 20 MIN: CPT | Mod: S$PBB,,, | Performed by: NURSE PRACTITIONER

## 2017-03-22 PROCEDURE — 73560 X-RAY EXAM OF KNEE 1 OR 2: CPT | Mod: 26,LT,, | Performed by: RADIOLOGY

## 2017-03-22 PROCEDURE — 73560 X-RAY EXAM OF KNEE 1 OR 2: CPT | Mod: TC,LT

## 2017-03-22 RX ORDER — TRAMADOL HYDROCHLORIDE 50 MG/1
50 TABLET ORAL EVERY 8 HOURS PRN
Qty: 20 TABLET | Refills: 0 | Status: SHIPPED | OUTPATIENT
Start: 2017-03-22 | End: 2017-04-01

## 2017-03-22 NOTE — MR AVS SNAPSHOT
[facility]  [address]  [facility] [address]  Phone: [phone]  Fax: [phone]                  Sue Giordano   3/22/2017 2:00 PM   Office Visit    Description:  Female : 1940   Provider:  Alta No NP   Department:  [facility]           Reason for Visit     Knee Pain           Diagnoses this Visit        Comments    Acute pain of left knee    -  Primary     Venous stasis dermatitis, unspecified laterality                To Do List           Future Appointments        Provider Department Dept Phone    4/3/2017 9:05 AM LAB, APPOINTMENT [place] [facility] [phone]    4/3/2017 9:10 AM SPECIMEN, MAIN CAMPUS [facility] [phone]    2017 11:20 AM Santi Schulz MD [facility] - Nephrology [phone]    2017 11:40 AM Priya Grewal MD [facility] Re[facility] [phone]    2017 8:00 AM LAB, APPOINTMENT [place] [facility] [phone]      Goals (5 Years of Data)              Today    3/15/17    2/16/17    Increase physical activity           Weight < 250 lb (113.399 kg)   119.3 kg (263 lb)  116.6 kg (257 lb)  117 kg (257 lb 15 oz)       These Medications        Disp Refills Start End    tramadol (ULTRAM) 50 mg tablet 20 tablet 0 3/22/2017 2017    Take 1 tablet (50 mg total) by mouth every 8 (eight) hours as needed for Pain. - Oral    Pharmacy: [facility] - [place], [place] - [address] Ph #: [phone]         [facility]     [facility] -  Assistance  Registered nurses in the [facility] provide clinical advisement, health education, appointment booking, and other advisory services.  Call for this free service at [phone].             Medications           Message regarding Medications     Verify the changes and/or additions to your medication regime listed below  are the same as discussed with your clinician today.  If any of these changes or additions are incorrect, please notify your healthcare provider.        START taking these NEW medications        Refills    tramadol (ULTRAM) 50 mg tablet 0    Sig: Take 1 tablet (50 mg total) by mouth every 8 (eight) hours as needed for Pain.    Class: Normal    Route: Oral      STOP taking these medications     doxycycline (MONODOX) 100 MG capsule Take 1 capsule (100 mg total) by mouth 2 (two) times daily.           Verify that the below list of medications is an accurate representation of the medications you are currently taking.  If none reported, the list may be blank. If incorrect, please contact your healthcare provider. Carry this list with you in case of emergency.           Current Medications     ammonium lactate 12 % Crea Apply topically every morning.     aspirin 81 MG Chew Take 1 tablet (81 mg total) by mouth once daily.    atorvastatin (LIPITOR) 80 MG tablet Take 1 tablet (80 mg total) by mouth once daily.    blood sugar diagnostic Strp BG monitoring 4 times a day. Pt needs test strips for Em[employer] Talking meter.    carvedilol (COREG) 6.25 MG tablet Take 1 tablet (6.25 mg total) by mouth 2 (two) times daily.    diclofenac sodium 1 % Gel Apply 2 g topically once daily.    duloxetine (CYMBALTA) 30 MG capsule Take 1 capsule (30 mg total) by mouth once daily.    econazole nitrate 1 % cream Apply topically once daily. Apply to feet daily as directed.    ergocalciferol (VITAMIN D2) 50,000 unit Cap Take 1 capsule (50,000 Units total) by mouth every 7 days.    fluticasone-salmeterol 100-50 mcg/dose (ADVAIR DISKUS) 100-50 mcg/dose diskus inhaler INHALE 1 PUFF 2 TIMES A DAY    insulin detemir (LEVEMIR FLEXTOUCH) 100 unit/mL (3 mL) SubQ InPn pen Inject 45 units at night.    insulin lispro (HUMALOG KWIKPEN) 100 unit/mL InPn pen Inject Humalog 19 units with breakfast and 16 units with Dinner.    lancets Misc Test daily    losartan  "(COZAAR) 100 MG tablet 1 tab by mouth daily    pen needle, diabetic (PEN NEEDLE) 32 gauge x 5/32" Ndle Uses 4 times a day.    torsemide (DEMADEX) 20 MG Tab TAKE 1 TABLET BY MOUTH ONCE DAILY.    omeprazole (PRILOSEC) 20 MG capsule Take 1 capsule (20 mg total) by mouth once daily.    senna (SENOKOT) 8.6 mg tablet Take 1 tablet by mouth once daily.    tramadol (ULTRAM) 50 mg tablet Take 1 tablet (50 mg total) by mouth every 8 (eight) hours as needed for Pain.           Clinical Reference Information           Your Vitals Were     BP Pulse Temp Height Weight SpO2    160/50 73 98.7 °F (37.1 °C) 5' 2" (1.575 m) 119.3 kg (263 lb) 98%    BMI                48.1 kg/m2          Blood Pressure          Most Recent Value    BP  (!)  160/50      Allergies as of 3/22/2017     Bactrim [Sulfamethoxazole-trimethoprim]    Azithromycin    Erythromycin    Gentamicin    Levofloxacin    Vancomycin      Immunizations Administered on Date of Encounter - 3/22/2017     None      Orders Placed During Today's Visit     Future Labs/Procedures Expected by Expires    CBC auto differential  3/22/2017 5/21/2018    Comprehensive metabolic panel  3/22/2017 3/22/2018    X-Ray Knee AP LAT with [place] Left  3/22/2017 3/22/2018      Language Assistance Services     ATTENTION: Language assistance services are available, free of charge. Please call [phone].      ATENCIÓN: Si habla español, tiene a cummings disposición servicios gratuitos de asistencia lingüística. Llame al [phone].     CHÚ Ý: N?u b?n nói Ti?ng Vi?t, có các d?ch v? h? tr? ngôn ng? mi?n phí dành cho b?n. G?i s? [phone].         [facility] complies with applicable Federal civil rights laws and does not discriminate on the basis of race, color, national origin, age, disability, or sex.        "

## 2017-03-22 NOTE — MEDICAL/APP STUDENT
"Subjective:       Patient ID: Sue Giordano is a 77 y.o. female.    Chief Complaint: Knee Pain    Knee Pain    The incident occurred 2 days ago. The incident occurred at home. There was no injury mechanism. The pain is present in the left knee. The quality of the pain is described as aching, cramping, shooting and stabbing. The pain is severe. Associated symptoms include an inability to bear weight and a loss of motion. Pertinent negatives include no muscle weakness, numbness or tingling. The symptoms are aggravated by movement, palpation and weight bearing. Treatments tried: Voltaren gel. The treatment provided moderate relief.     Review of Systems   Constitutional: Positive for fatigue. Negative for fever.   Eyes: Positive for redness.   Respiratory: Negative for shortness of breath.    Cardiovascular: Negative for chest pain.   Gastrointestinal: Negative for diarrhea, nausea and vomiting.   Genitourinary: Negative for difficulty urinating.   Musculoskeletal: Positive for arthralgias and joint swelling.   Skin: Positive for color change.        "I have had this cellulitis on both my legs for about 25 years and they say it is never going to go away. It is not painful at all."   Neurological: Negative for tingling and numbness.       Objective:      Physical Exam   Constitutional: She is oriented to person, place, and time. She appears well-developed.   HENT:   Head: Normocephalic and atraumatic.   Cardiovascular: Normal rate and regular rhythm.    Pulmonary/Chest: Effort normal and breath sounds normal.   Musculoskeletal:        Left knee: She exhibits decreased range of motion and swelling. She exhibits no effusion, no deformity, no laceration and no erythema. Tenderness found.   Neurological: She is alert and oriented to person, place, and time.   Skin: Skin is warm and dry. There is erythema.        Erythematic, raised lesions with welps. Non tender, not warm to touch.    Psychiatric: She has a normal " mood and affect. Her behavior is normal. Judgment and thought content normal.   Vitals reviewed.      Assessment:       1. Acute pain of left knee    2. Venous stasis dermatitis, unspecified laterality        Plan:       Sue Garzon was seen today for knee pain.    Diagnoses and all orders for this visit:    Acute pain of left knee  -     X-Ray Knee AP LAT with [place] Left; Future  -     CBC auto differential; Future  -     Comprehensive metabolic panel; Future  -     tramadol (ULTRAM) 50 mg tablet; Take 1 tablet (50 mg total) by mouth every 8 (eight) hours as needed for Pain.    Venous stasis dermatitis, unspecified laterality

## 2017-03-22 NOTE — PROGRESS NOTES
"I attest that this patient was seen and evaluated by KATHY Ecnarnacion, then presented to me. I then examined the patient independently and together we agreed on a diagnosis and treatment plan which was then presented to the patient. See her note dated today for full visit information.    Subjective:        Patient ID: Sue Giordano is a 77 y.o. female.     Chief Complaint: Knee Pain     Knee Pain    The incident occurred 2 days ago. The incident occurred at home. There was no injury mechanism. The pain is present in the left knee. The quality of the pain is described as aching, cramping, shooting and stabbing. The pain is severe. Associated symptoms include an inability to bear weight and a loss of motion. Pertinent negatives include no muscle weakness, numbness or tingling. The symptoms are aggravated by movement, palpation and weight bearing. Treatments tried: Voltaren gel. The treatment provided moderate relief.      Review of Systems   Constitutional: Positive for fatigue. Negative for fever.   Eyes: Positive for redness.   Respiratory: Negative for shortness of breath.   Cardiovascular: Negative for chest pain.   Gastrointestinal: Negative for diarrhea, nausea and vomiting.   Genitourinary: Negative for difficulty urinating.   Musculoskeletal: Positive for arthralgias and joint swelling.   Skin: Positive for color change.   "I have had this cellulitis on both my legs for about 25 years and they say it is never going to go away. It is not painful at all."   Neurological: Negative for tingling and numbness.       Objective:      Physical Exam   Constitutional: She is oriented to person, place, and time. She appears well-developed.   HENT:   Head: Normocephalic and atraumatic.   Cardiovascular: Normal rate and regular rhythm.   Pulmonary/Chest: Effort normal and breath sounds normal.   Musculoskeletal:   Left knee: She exhibits decreased range of motion and swelling. She exhibits no effusion, no " deformity, no laceration and no erythema. Tenderness found.   Neurological: She is alert and oriented to person, place, and time.   Skin: Skin is warm and dry. There is erythema.        Erythematic, raised lesions with bullae. Non tender, not warm to touch.    Psychiatric: She has a normal mood and affect. Her behavior is normal. Judgment and thought content normal.   Vitals reviewed.      Assessment:       1. Acute pain of left knee    2. Venous stasis dermatitis, unspecified laterality        Plan:       Sue Garzon was seen today for knee pain.     Diagnoses and all orders for this visit:     Acute pain of left knee  - X-Ray Knee AP LAT with [place] Left; Future  - CBC auto differential; Future  - Comprehensive metabolic panel; Future  - tramadol (ULTRAM) 50 mg tablet; Take 1 tablet (50 mg total) by mouth every 8 (eight) hours as needed for Pain.     Venous stasis dermatitis, unspecified laterality

## 2017-03-22 NOTE — TELEPHONE ENCOUNTER
LM, awaiting return call.   She should schedule follow up with orthopedics for degenerative joint disease and spurring of left knee.

## 2017-03-24 ENCOUNTER — OFFICE VISIT (OUTPATIENT)
Dept: ORTHOPEDICS | Facility: CLINIC | Age: 77
End: 2017-03-24
Payer: MEDICARE

## 2017-03-24 VITALS — BODY MASS INDEX: 48.61 KG/M2 | WEIGHT: 264.13 LBS | HEIGHT: 62 IN

## 2017-03-24 DIAGNOSIS — M17.12 PRIMARY OSTEOARTHRITIS OF LEFT KNEE: Primary | ICD-10-CM

## 2017-03-24 PROCEDURE — 20610 DRAIN/INJ JOINT/BURSA W/O US: CPT | Mod: S$PBB,LT,, | Performed by: NURSE PRACTITIONER

## 2017-03-24 PROCEDURE — 99203 OFFICE O/P NEW LOW 30 MIN: CPT | Mod: S$PBB,25,, | Performed by: NURSE PRACTITIONER

## 2017-03-24 PROCEDURE — 20610 DRAIN/INJ JOINT/BURSA W/O US: CPT | Mod: PBBFAC | Performed by: NURSE PRACTITIONER

## 2017-03-24 PROCEDURE — 99213 OFFICE O/P EST LOW 20 MIN: CPT | Mod: PBBFAC,25 | Performed by: NURSE PRACTITIONER

## 2017-03-24 PROCEDURE — 96372 THER/PROPH/DIAG INJ SC/IM: CPT | Mod: PBBFAC | Performed by: NURSE PRACTITIONER

## 2017-03-24 RX ORDER — TRIAMCINOLONE ACETONIDE 40 MG/ML
40 INJECTION, SUSPENSION INTRA-ARTICULAR; INTRAMUSCULAR
Status: COMPLETED | OUTPATIENT
Start: 2017-03-24 | End: 2017-03-24

## 2017-03-24 RX ADMIN — TRIAMCINOLONE ACETONIDE 40 MG: 40 INJECTION, SUSPENSION INTRA-ARTICULAR; INTRAMUSCULAR at 10:03

## 2017-03-24 NOTE — PROGRESS NOTES
"CC: Pain of the Left Knee      HPI: Pt with left knee pain for the past 2-3 days. She was walking down the hallway at school on Monday and felt significant pain in the left knee suddenly. She felt like the knee "locked up" and she has had trouble standing on the left leg since. She had a right knee replacement years ago in [place]. She was seen in urgent care and the xray showed degenerative changes in the left knee so she was referred to orthopedics for further treatment. She has been taking tramadol for the pain. She is not ready for a knee replacement at this time. She is unable to ambulate due to pain and she has been getting around in a wheelchair.    ROS  General: denies fever and chills  Resp: no c/o sob  CVS: no c/o cp  MSK: c/o left knee pain which is aching and feels "stuck"     PE  General: AAOx3, pleasant and cooperative  Resp: respirations even and unlabored  MSK: left knee exam  0 degrees extension  90 degrees flexion  No warmth or erythema   - effusion    Xray:  Reviewed by me: Degenerative changes and bony spurring can be seen in the left knee.  Joint space narrowing narrowing laterally and patellofemoral bony spurring.  No joint effusion is seen.    Assessment:  Left knee djd    Plan:  Cortisone injection today  RICE  F/u if no improvement to discuss knee replacement    Knee Injection Procedure Note    Pre-operative Diagnosis: left knee degenerative arthritis    Post-operative Diagnosis: same    Indications: left knee pain    Anesthesia: none    Procedure Details     Verbal consent was obtained for the procedure. The injection site was identified and the skin was prepared with alcohol. The left knee was injected from an anterolateral approach with 1 ml of Kenalog and 5 ml Lidocaine under sterile technique using a 22 gauge needle. The needle was removed and the area cleansed and dressed.    Complications:  None; patient tolerated the procedure well.    she was advised to rest the knee today, using " ice and elevation as needed for comfort and swelling. she did receive immediate relief of the knee pain. she was told this would be short lived and is secondary to the lidocaine. she may have an increase in discomfort tonight followed by steady improvement over the next several days. It may take 1-3 weeks following the injection to get the full benefit of the medication.

## 2017-03-27 ENCOUNTER — TELEPHONE (OUTPATIENT)
Dept: ORTHOPEDICS | Facility: CLINIC | Age: 77
End: 2017-03-27

## 2017-03-27 NOTE — TELEPHONE ENCOUNTER
I spoke with patient regarding call and she stated she's still having pain and was instructed by Nahomy to call if no relief. I informed Nahomy of patient's condition and she instructed me to inform patient that she will check with Dr Jimenez for an appointment and we'll call her back. Patient verbalized understanding.

## 2017-03-27 NOTE — TELEPHONE ENCOUNTER
----- Message from Anila Alas sent at 3/27/2017  9:26 AM CDT -----  Contact: self: [phone]  Pt called and would like to speak to doctor. No other information was given.     Pt can be reached at [phone]      Thank you

## 2017-03-28 RX ORDER — INSULIN HUMAN 100 [IU]/ML
INJECTION, SUSPENSION SUBCUTANEOUS
Qty: 20 ML | Refills: 11 | Status: SHIPPED | OUTPATIENT
Start: 2017-03-28 | End: 2017-05-31

## 2017-03-29 DIAGNOSIS — M25.562 ACUTE PAIN OF LEFT KNEE: Primary | ICD-10-CM

## 2017-03-29 NOTE — PROGRESS NOTES
Pt is having severe left knee pain and is unable to bear weight on the knee. Will order an MRI for further evaluation. She is getting no relief with tramadol or mobic.

## 2017-03-30 ENCOUNTER — HOSPITAL ENCOUNTER (OUTPATIENT)
Dept: RADIOLOGY | Facility: HOSPITAL | Age: 77
Discharge: HOME | End: 2017-03-30
Attending: NURSE PRACTITIONER
Payer: MEDICARE

## 2017-03-30 ENCOUNTER — TELEPHONE (OUTPATIENT)
Dept: ORTHOPEDICS | Facility: CLINIC | Age: 77
End: 2017-03-30

## 2017-03-30 DIAGNOSIS — M25.562 ACUTE PAIN OF LEFT KNEE: ICD-10-CM

## 2017-03-30 PROCEDURE — 73721 MRI JNT OF LWR EXTRE W/O DYE: CPT | Mod: 26,LT,, | Performed by: RADIOLOGY

## 2017-03-30 PROCEDURE — 73721 MRI JNT OF LWR EXTRE W/O DYE: CPT | Mod: TC,LT

## 2017-03-30 NOTE — TELEPHONE ENCOUNTER
----- Message from Nahomy Rockwell NP sent at 3/29/2017  5:48 PM CDT -----  Can you please please please help me and see if there is any way they can get this patient's MRI done Thursday or Friday. I took the next available on Tuesday, but she can't even bear weight on the knee. Thank you so much!  Nahomy

## 2017-03-30 NOTE — TELEPHONE ENCOUNTER
Spoke to pt and she was given an appointment on today at 6:45 pm with our mri department. Pt was pleased.

## 2017-03-31 ENCOUNTER — TELEPHONE (OUTPATIENT)
Dept: ORTHOPEDICS | Facility: CLINIC | Age: 77
End: 2017-03-31

## 2017-04-03 ENCOUNTER — LAB VISIT (OUTPATIENT)
Dept: LAB | Facility: HOSPITAL | Age: 77
End: 2017-04-03
Attending: INTERNAL MEDICINE
Payer: MEDICARE

## 2017-04-03 ENCOUNTER — OFFICE VISIT (OUTPATIENT)
Dept: ORTHOPEDICS | Facility: CLINIC | Age: 77
End: 2017-04-03
Payer: COMMERCIAL

## 2017-04-03 DIAGNOSIS — N18.30 CHRONIC KIDNEY DISEASE, STAGE III (MODERATE): ICD-10-CM

## 2017-04-03 DIAGNOSIS — M17.12 PRIMARY OSTEOARTHRITIS OF LEFT KNEE: Primary | ICD-10-CM

## 2017-04-03 LAB
ALBUMIN SERPL BCP-MCNC: 3.1 G/DL
ANION GAP SERPL CALC-SCNC: 7 MMOL/L
BUN SERPL-MCNC: 21 MG/DL
CALCIUM SERPL-MCNC: 9.7 MG/DL
CHLORIDE SERPL-SCNC: 105 MMOL/L
CO2 SERPL-SCNC: 28 MMOL/L
CREAT SERPL-MCNC: 1 MG/DL
EST. GFR  (AFRICAN AMERICAN): >60 ML/MIN/1.73 M^2
EST. GFR  (NON AFRICAN AMERICAN): 55.2 ML/MIN/1.73 M^2
GLUCOSE SERPL-MCNC: 105 MG/DL
PHOSPHATE SERPL-MCNC: 3.4 MG/DL
POTASSIUM SERPL-SCNC: 4 MMOL/L
SODIUM SERPL-SCNC: 140 MMOL/L

## 2017-04-03 PROCEDURE — 99214 OFFICE O/P EST MOD 30 MIN: CPT | Mod: S$PBB,,, | Performed by: ORTHOPAEDIC SURGERY

## 2017-04-03 PROCEDURE — 80069 RENAL FUNCTION PANEL: CPT

## 2017-04-03 PROCEDURE — 36415 COLL VENOUS BLD VENIPUNCTURE: CPT

## 2017-04-03 NOTE — PROGRESS NOTES
CHIEF COMPLAINT: Left  Knee pain.                                                          HISTORY OF PRESENT ILLNESS:  The patient is a 77 y.o. female  who presents  for evaluation of her left knee pain. She had the sudden onset of knee pain 3 weeks ago.  Minimal/no symptoms prior.   She wishes to discuss TKR as she has had a prior TKR.    Pain Duration: 3 weeks  Pain Quality: sharp  Pain Context:improving  Pain Timing: intermittent  Pain Location:anterior/medial  Pain Severity: moderate  Modifying Factors: cannot take NSAID's due to renal disease; recent injection beginning to help.  Associated Signs and Symptoms: she has pre-existing sciatica    She  presents for further treatment recommendations.    She admits radicular pain and low back pain.  She denies distal paresthesias, or calf pain concerning for vascular claudication.  She has no history of discreet prior trauma.                                                                                                                       PAST MEDICAL HISTORY:    Past Medical History:   Diagnosis Date    Adrenal mass     Anemia     Arthritis     Asthma     CKD (chronic kidney disease) stage 2, GFR 60-89 ml/min     Diabetes mellitus     Diabetes mellitus type II     Diastolic dysfunction 10/10/2013    GERD (gastroesophageal reflux disease) 8/13/2012    Gout     Hep B w/ coma 1971    Hives 10/1/2013    Hyperlipidemia     Hypertension     Morbid obesity with BMI of 50.0-59.9, adult 2/11/2014    Obesity     Obesity     Other specified anemias 6/25/2015    Sleep apnea     Unspecified essential hypertension 6/25/2015                                                                                                            PAST SURGICAL HISTORY:    Past Surgical History:   Procedure Laterality Date    HYSTERECTOMY  1982    secondary uterine fibroids    JOINT REPLACEMENT      Right total knee replacement                                                                                                                  SOCIAL HISTORY:  Reviewed per EPIC history for tobacco or alcohol use and she   is an active  77 y.o.  female                                                                             FAMILY MEDICAL HISTORY:  family history includes Cancer in her mother and sister; Hypertension in her father; No Known Problems in her brother, maternal aunt, maternal grandfather, maternal grandmother, maternal uncle, paternal aunt, paternal grandfather, paternal grandmother, and paternal uncle. There is no history of Glaucoma, Breast cancer, Colon cancer, Ovarian cancer, Stroke, Allergic rhinitis, Allergies, Angioedema, Asthma, Atopy, Eczema, Immunodeficiency, Rhinitis, Urticaria, Amblyopia, Blindness, Cataracts, Diabetes, Macular degeneration, Retinal detachment, Strabismus, Thyroid disease, or Psoriasis.                                                                                                                                       PHYSICAL EXAMINATION:                                                        GENERAL:  A well-developed, well-nourished 77 y.o. female who is alert and       oriented in no acute distress.      Gait: She  Comes in a wheelchair.                   EXTREMITIES:  Examination of lower extremities reveals there is no visible mass or deformity.    Left knee:  ROM 5-80    Ligamentously stable to varus/valgus stress.    Anterior and posterior drawers negative.    No pain over pes bursa.    No warmth    No erythema    Effusion No    Right knee:  ROM 5-85 - well healed anterior incision    Ligamentously stable to varus/valgus stress.    Anterior and posterior drawers negative.    No pain over pes bursa.    No warmth    No erythema    Effusion No    The skin over both lower extremities is normal and unremarkable.  She has a  painless range of motion of the hips and ankles bilaterally.   Sensation is intact in both lower extremities.    There are  no motor deficits in the lower extremities bilaterally.   Pedal pulses are palpable distally bilaterally.    She has no calf tenderness to palpation nor edema.                                   She has imaging which was reviewed with the patient and shows mild - moderate osteoarthritis.  She has an MRI which shows advanced PF DJD, but overall maintenance of tibial/femoral cartilage                                                                               IMPRESSION: Osteoarthritis left knee.                             PLAN:  Knee was asymptomatic until acute onset of pain 3 weeks ago.  Pain is improving.  Given radiographic findings, clinical symptoms and her relatively high risk profile/co-morbidities I have recommended against TKR and she concurs.  Cannot take NSAID's due to renal disease.

## 2017-04-06 ENCOUNTER — OFFICE VISIT (OUTPATIENT)
Dept: NEPHROLOGY | Facility: CLINIC | Age: 77
End: 2017-04-06
Payer: MEDICARE

## 2017-04-06 VITALS
BODY MASS INDEX: 47.67 KG/M2 | WEIGHT: 259.06 LBS | DIASTOLIC BLOOD PRESSURE: 80 MMHG | OXYGEN SATURATION: 98 % | HEIGHT: 62 IN | SYSTOLIC BLOOD PRESSURE: 130 MMHG | HEART RATE: 66 BPM

## 2017-04-06 DIAGNOSIS — E11.21 DIABETIC NEPHROPATHY ASSOCIATED WITH TYPE 2 DIABETES MELLITUS: ICD-10-CM

## 2017-04-06 DIAGNOSIS — I10 ESSENTIAL HYPERTENSION: ICD-10-CM

## 2017-04-06 DIAGNOSIS — N18.1 CKD (CHRONIC KIDNEY DISEASE) STAGE 1, GFR 90 ML/MIN OR GREATER: Primary | ICD-10-CM

## 2017-04-06 DIAGNOSIS — E66.01 MORBID OBESITY, UNSPECIFIED OBESITY TYPE: ICD-10-CM

## 2017-04-06 DIAGNOSIS — M50.30 DDD (DEGENERATIVE DISC DISEASE), CERVICAL: ICD-10-CM

## 2017-04-06 PROCEDURE — 99999 PR PBB SHADOW E&M-EST. PATIENT-LVL IV: CPT | Mod: PBBFAC,,, | Performed by: INTERNAL MEDICINE

## 2017-04-06 PROCEDURE — 99213 OFFICE O/P EST LOW 20 MIN: CPT | Mod: S$PBB,,, | Performed by: INTERNAL MEDICINE

## 2017-04-06 PROCEDURE — 99214 OFFICE O/P EST MOD 30 MIN: CPT | Mod: PBBFAC | Performed by: INTERNAL MEDICINE

## 2017-04-06 NOTE — PROGRESS NOTES
Patient is here today for follow up evaluation of CKD Last seen in renal office 10/16/16, Baseline Cr 1.0 mg/dl; Her most recent lab (4/3/17) Cr 1.0 mg/dl; eGFR; > 60 ml/min; potassium 4.0 mmol/L. There is a hx of diabetes; complicated by nephropathy; last A1c; 7.4% She has chronic pain syndrome. Today she has no new complaints    ROS;  Morbidly obese; lymphadema; s/p CVA  Mood and Affect;  Appropriate    Exam  Morbidly obese woman; no acute distress; oriented x 3  HEENT;  CHEST; Clear P&A  HEART; RR; S1&S2 no murmur rub gallop  ABD; Obese  EXT; (+) lymphaedema    Impression  CKD Stable    Plan  Return Visit; 6 mo

## 2017-04-07 ENCOUNTER — OFFICE VISIT (OUTPATIENT)
Dept: PHYSICAL MEDICINE AND REHAB | Facility: CLINIC | Age: 77
End: 2017-04-07
Payer: MEDICARE

## 2017-04-07 VITALS — HEART RATE: 65 BPM | HEIGHT: 62 IN | DIASTOLIC BLOOD PRESSURE: 72 MMHG | SYSTOLIC BLOOD PRESSURE: 177 MMHG

## 2017-04-07 DIAGNOSIS — M54.16 LEFT LUMBAR RADICULOPATHY: ICD-10-CM

## 2017-04-07 DIAGNOSIS — G89.29 CHRONIC LEFT-SIDED LOW BACK PAIN, WITH SCIATICA PRESENCE UNSPECIFIED: Primary | ICD-10-CM

## 2017-04-07 DIAGNOSIS — E66.01 MORBID OBESITY WITH BMI OF 50.0-59.9, ADULT: ICD-10-CM

## 2017-04-07 DIAGNOSIS — Z96.651 STATUS POST TOTAL KNEE REPLACEMENT, RIGHT: ICD-10-CM

## 2017-04-07 DIAGNOSIS — M17.12 PRIMARY OSTEOARTHRITIS OF LEFT KNEE: ICD-10-CM

## 2017-04-07 DIAGNOSIS — M54.5 CHRONIC LEFT-SIDED LOW BACK PAIN, WITH SCIATICA PRESENCE UNSPECIFIED: Primary | ICD-10-CM

## 2017-04-07 DIAGNOSIS — M48.061 LUMBAR SPINAL STENOSIS: ICD-10-CM

## 2017-04-07 DIAGNOSIS — M47.816 LUMBAR FACET ARTHROPATHY: ICD-10-CM

## 2017-04-07 PROCEDURE — 99213 OFFICE O/P EST LOW 20 MIN: CPT | Mod: S$PBB,,, | Performed by: PHYSICAL MEDICINE & REHABILITATION

## 2017-04-07 PROCEDURE — 99212 OFFICE O/P EST SF 10 MIN: CPT | Mod: PBBFAC | Performed by: PHYSICAL MEDICINE & REHABILITATION

## 2017-04-07 PROCEDURE — 99999 PR PBB SHADOW E&M-EST. PATIENT-LVL II: CPT | Mod: PBBFAC,,, | Performed by: PHYSICAL MEDICINE & REHABILITATION

## 2017-04-07 RX ORDER — DULOXETIN HYDROCHLORIDE 60 MG/1
60 CAPSULE, DELAYED RELEASE ORAL DAILY
Qty: 30 CAPSULE | Refills: 3 | Status: SHIPPED | OUTPATIENT
Start: 2017-04-07 | End: 2017-07-14 | Stop reason: SDUPTHER

## 2017-04-07 NOTE — PROGRESS NOTES
Subjective:       Patient ID: Sue Giordano is a 77 y.o. female.    Chief Complaint: No chief complaint on file.    HPI     HISTORY OF PRESENT ILLNESS:  Mrs. Giordano is a 77-year-old black female with   past medical history of hypertension, diabetes mellitus, asthma, osteoarthritis   status post right TKA, and morbid obesity.  She is [facility] for chronic low back pain with history of lumbar radiculopathy.    Her last visit to the clinic was on 02/07/2017 after exacerbation of her back   pain.  She was placed on Cymbalta.  The gabapentin was discontinued secondary to   side effect (constipation).  Since her last visit, the patient was seen by   Orthopedic Surgery.  She underwent left intraarticular steroid injection with   limited relief.  However, her pain improved and no need for surgical   intervention was planned.    The patient reports that her back pain has been better.  It is an intermittent   aching pain in the lumbar spine.  She used to have shooting pain to the left   foot, but this has nearly subsided.  Her pain is worse with activity and better   with rest.  Her maximum pain is 7/10 and minimum 2/10.  Today, it is 5/10.  The   patient denies any change in her lower extremity strength.  She denies any bowel   or bladder incontinence.  Her left knee pain has been under fair control.    She is currently taking Cymbalta 50 mg p.o. once per day.  She takes Tylenol 500   mg p.r.n., usually two tablets daily.      MS/HN  dd: 04/07/2017 12:18:33 (CDT)  td: 04/08/2017 12:01:35 (CDT)  Doc ID   #[id number]  Job ID #[id number]    CC:         Review of Systems   Constitutional: Negative for fatigue.   Respiratory: Negative for chest tightness.    Cardiovascular: Negative for chest pain.   Gastrointestinal: Negative for blood in stool, constipation, nausea and vomiting.   Genitourinary: Negative for difficulty urinating.   Musculoskeletal: Positive for arthralgias, back pain and  gait problem. Negative for joint swelling and neck pain.   Skin: Negative for rash.   Neurological: Negative for dizziness and headaches.   Psychiatric/Behavioral: Negative for behavioral problems and sleep disturbance.       Objective:      Physical Exam   Constitutional: She appears well-developed and well-nourished. No distress.   Coming to the clinic in a manual wheelchair propelled by medical assistant.     HENT:   Head: Normocephalic and atraumatic.   Neck: Normal range of motion.   Musculoskeletal:   BLE:  ROM decreased at hips & knees (b/o girth & BLE edema).  Healed Rt TKA scar.  Strength:      RLE: 5 to 5-/5 at hip flexion, knee extension, ankle DF/PF.     LLE:  5 to 5-/5 at hip flexion, knee extension, ankle DF/PF.  Sensation to pinprick:     RLE: intact.      LLE: intact.   SLR (siting):      RLE: -ve.      LLE: -ve.     -ve tenderness over lumbar spine.           Neurological: She is alert.   Skin: Skin is warm.   Psychiatric: She has a normal mood and affect.   Vitals reviewed.            Assessment:       1. Chronic left-sided low back pain, with sciatica presence unspecified    2. Lumbar facet arthropathy    3. Lumbar spinal stenosis    4. Left lumbar radiculopathy    5. Primary osteoarthritis of left knee    6. Status post total knee replacement, right    7. Morbid obesity with BMI of 50.0-59.9, adult        Plan:       - Increase duloxetine (CYMBALTA) 60 MG capsule; Take 1 capsule (60 mg total) by mouth once daily.  - Increase Tylenol 500 mg, 1-2 po tid prn for pain. If no relief, may try Tylenol#3.  - Weight loss was encouraged.  - Return in about 3 months (around 7/7/2017).

## 2017-04-19 ENCOUNTER — TELEPHONE (OUTPATIENT)
Dept: NEUROLOGY | Facility: CLINIC | Age: 77
End: 2017-04-19

## 2017-04-19 NOTE — TELEPHONE ENCOUNTER
Dr. Viramontes will not be in the clinic on 5/18 when Sister Duran Logan is scheduled.     Unable to reach by phone. Rescheduled to 5/23 at 3pm. Letter mailed advising of this.

## 2017-05-02 ENCOUNTER — LAB VISIT (OUTPATIENT)
Dept: LAB | Facility: HOSPITAL | Age: 77
End: 2017-05-02
Payer: COMMERCIAL

## 2017-05-02 DIAGNOSIS — E55.9 VITAMIN D DEFICIENCY DISEASE: ICD-10-CM

## 2017-05-02 DIAGNOSIS — E11.29 TYPE 2 DIABETES MELLITUS WITH RENAL MANIFESTATIONS NOT AT GOAL: ICD-10-CM

## 2017-05-02 LAB
25(OH)D3+25(OH)D2 SERPL-MCNC: 30 NG/ML
ALBUMIN SERPL BCP-MCNC: 3 G/DL
ALP SERPL-CCNC: 93 U/L
ALT SERPL W/O P-5'-P-CCNC: 16 U/L
ANION GAP SERPL CALC-SCNC: 8 MMOL/L
AST SERPL-CCNC: 15 U/L
BILIRUB SERPL-MCNC: 0.4 MG/DL
BUN SERPL-MCNC: 17 MG/DL
CALCIUM SERPL-MCNC: 9.4 MG/DL
CHLORIDE SERPL-SCNC: 106 MMOL/L
CHOLEST/HDLC SERPL: 4.5 {RATIO}
CO2 SERPL-SCNC: 26 MMOL/L
CREAT SERPL-MCNC: 1 MG/DL
EST. GFR  (AFRICAN AMERICAN): >60 ML/MIN/1.73 M^2
EST. GFR  (NON AFRICAN AMERICAN): 55.2 ML/MIN/1.73 M^2
GLUCOSE SERPL-MCNC: 117 MG/DL
HDL/CHOLESTEROL RATIO: 22 %
HDLC SERPL-MCNC: 177 MG/DL
HDLC SERPL-MCNC: 39 MG/DL
LDLC SERPL CALC-MCNC: 119.6 MG/DL
NONHDLC SERPL-MCNC: 138 MG/DL
POTASSIUM SERPL-SCNC: 4.2 MMOL/L
PROT SERPL-MCNC: 7.7 G/DL
SODIUM SERPL-SCNC: 140 MMOL/L
TRIGL SERPL-MCNC: 92 MG/DL
TSH SERPL DL<=0.005 MIU/L-ACNC: 0.83 UIU/ML

## 2017-05-02 PROCEDURE — 82306 VITAMIN D 25 HYDROXY: CPT

## 2017-05-02 PROCEDURE — 80061 LIPID PANEL: CPT

## 2017-05-02 PROCEDURE — 83036 HEMOGLOBIN GLYCOSYLATED A1C: CPT

## 2017-05-02 PROCEDURE — 36415 COLL VENOUS BLD VENIPUNCTURE: CPT

## 2017-05-02 PROCEDURE — 80053 COMPREHEN METABOLIC PANEL: CPT

## 2017-05-02 PROCEDURE — 84443 ASSAY THYROID STIM HORMONE: CPT

## 2017-05-03 LAB
ESTIMATED AVG GLUCOSE: 192 MG/DL
HBA1C MFR BLD HPLC: 8.3 %

## 2017-05-17 ENCOUNTER — OFFICE VISIT (OUTPATIENT)
Dept: SLEEP MEDICINE | Facility: CLINIC | Age: 77
End: 2017-05-17
Payer: MEDICARE

## 2017-05-17 VITALS
WEIGHT: 264.31 LBS | SYSTOLIC BLOOD PRESSURE: 152 MMHG | HEIGHT: 62 IN | BODY MASS INDEX: 48.64 KG/M2 | HEART RATE: 91 BPM | DIASTOLIC BLOOD PRESSURE: 67 MMHG

## 2017-05-17 DIAGNOSIS — G47.33 OSA (OBSTRUCTIVE SLEEP APNEA): Primary | ICD-10-CM

## 2017-05-17 PROCEDURE — 99213 OFFICE O/P EST LOW 20 MIN: CPT | Mod: S$PBB,,, | Performed by: PSYCHIATRY & NEUROLOGY

## 2017-05-17 PROCEDURE — 3078F DIAST BP <80 MM HG: CPT | Mod: ,,, | Performed by: PSYCHIATRY & NEUROLOGY

## 2017-05-17 PROCEDURE — 99213 OFFICE O/P EST LOW 20 MIN: CPT | Mod: PBBFAC | Performed by: PSYCHIATRY & NEUROLOGY

## 2017-05-17 PROCEDURE — 3077F SYST BP >= 140 MM HG: CPT | Mod: ,,, | Performed by: PSYCHIATRY & NEUROLOGY

## 2017-05-17 PROCEDURE — 1159F MED LIST DOCD IN RCRD: CPT | Mod: ,,, | Performed by: PSYCHIATRY & NEUROLOGY

## 2017-05-17 PROCEDURE — 1125F AMNT PAIN NOTED PAIN PRSNT: CPT | Mod: ,,, | Performed by: PSYCHIATRY & NEUROLOGY

## 2017-05-17 PROCEDURE — 99999 PR PBB SHADOW E&M-EST. PATIENT-LVL III: CPT | Mod: PBBFAC,,, | Performed by: PSYCHIATRY & NEUROLOGY

## 2017-05-17 PROCEDURE — 1160F RVW MEDS BY RX/DR IN RCRD: CPT | Mod: ,,, | Performed by: PSYCHIATRY & NEUROLOGY

## 2017-05-17 NOTE — PROGRESS NOTES
"Sister Pasquale returns to clinic today regarding the management of obstructive sleep apnea and CPAP equipment check.    Prior weight 271 lbs. Weight down to 264 lbs.    She is still limited to use CPAP by her bad knees. Staying up late to watch political news.    She is struggling with CPAP, so sleeping in a chair (also using a wedge pillow in bed).  She is wanting to discontinue CPAP, because it is too cumbersome.  Since she has been back home, she has had difficulty using her CPAP.  Prior to this, her pressure was increased to 13 cm (based on titration)  She states this pressure increase was "working nicely".    She previously had excellent tolerance/ adherence with CPAP at pressure 10cm.   She has a nasal pillows mask, which she likes. [employer] FX nasal pillows small size mask  She states that she really likes CPAP and it helps her feel better after a night of use.    Since starting celexa, she feels like she is doing so much better with pain control.  Still sleepy during the day.    ESS = 15    Recent titration at weight 281 lbs revealed she needed pressure increase from 10 to 13 cm    Interrogation: CPAP at 13 cm; 897 hours total; Machine good condition (F&P model), sleep style machine; 200 series. 5.2 hr/night.     BASELINE PSG 12/23/11: + REJI, AHI 14.2, low O2 79%, wt 281 lbs. (Patient had sleep study in 2003, at which time she was titrated to 11 cm, wt 248 lbs)   TITRATION 5/9/16: Titrated to 13 cm; wt 281 lbs    DME = Medicare ([employer]) - administered by [employer]      Past Medical History:   Diagnosis Date    Adrenal mass     Anemia     Arthritis     Asthma     CKD (chronic kidney disease) stage 2, GFR 60-89 ml/min     Diabetes mellitus     Diabetes mellitus type II     Diastolic dysfunction 10/10/2013    GERD (gastroesophageal reflux disease) 8/13/2012    Gout     Hep B w/ coma 1971    Hives 10/1/2013    Hyperlipidemia     Hypertension     Morbid obesity with BMI of 50.0-59.9, adult 2/11/2014    " "Obesity     Obesity     Other specified anemias 6/25/2015    Sleep apnea     Unspecified essential hypertension 6/25/2015       Past Surgical History:   Procedure Laterality Date    HYSTERECTOMY  1982    secondary uterine fibroids    JOINT REPLACEMENT      Right total knee replacement         Family History   Problem Relation Age of Onset    Cancer Mother     Hypertension Father     Cancer Sister     No Known Problems Brother     No Known Problems Maternal Aunt     No Known Problems Maternal Uncle     No Known Problems Paternal Aunt     No Known Problems Paternal Uncle     No Known Problems Maternal Grandmother     No Known Problems Maternal Grandfather     No Known Problems Paternal Grandmother     No Known Problems Paternal Grandfather     Glaucoma Neg Hx     Breast cancer Neg Hx     Colon cancer Neg Hx     Ovarian cancer Neg Hx     Stroke Neg Hx     Allergic rhinitis Neg Hx     Allergies Neg Hx     Angioedema Neg Hx     Asthma Neg Hx     Atopy Neg Hx     Eczema Neg Hx     Immunodeficiency Neg Hx     Rhinitis Neg Hx     Urticaria Neg Hx     Amblyopia Neg Hx     Blindness Neg Hx     Cataracts Neg Hx     Diabetes Neg Hx     Macular degeneration Neg Hx     Retinal detachment Neg Hx     Strabismus Neg Hx     Thyroid disease Neg Hx     Psoriasis Neg Hx        Social History   Substance Use Topics    Smoking status: Never Smoker    Smokeless tobacco: Never Used    Alcohol use No       ALLERGIES: Reviewed in EPIC    CURRENT MEDICATIONS: Reviewed in EPIC    ROS:   Weight up another 4 lbs  Denies palpitations, headaches, sinus congestion improved with nasal spray qhs prn.   Denies oral drying or nasal drying.      PHYSICAL EXAM:   BP (!) 152/67  Pulse 91  Ht 5' 2" (1.575 m)  Wt 119.9 kg (264 lb 5.3 oz)  BMI 48.35 kg/m2  GENERAL: morbid obese body habitus, well groomed       ASSESSMENT:     Obstructive sleep apnea, with improvement of CPAP after nightly use. Control of " sleepiness should improved with increased nightly CPAP usage. She is losing weight since her peak. Now with better pain control, she feels more successful with exercise and diet.    She has medical co-morbidities of hypertension and morbid obesity (BMI >30) .     PLAN:     1. Continue CPAP 13 cm, Goal of an additional 400 hours before next visit.  2. Sleep with head of bed elevation and incline  3. Weight loss goal to 225 lbs.  4. If achieves weight, we can reassess whether CPAP is indicated.    DME = [employer] (she reports using nasal pillows)    Continue Advair or Flonase NS 1-2 sprays qhs PRN to reduce potential nasal congestion.     Follow up 3 months: MD/NP

## 2017-05-17 NOTE — MR AVS SNAPSHOT
[facility]  [address]  [facility] [address]  Phone: [phone]                  Sue Giordano   2017 9:00 AM   Office Visit    Description:  Female : 1940   Provider:  Wade Chairez MD   Department:  [facility]           Reason for Visit     Sleep Apnea           Diagnoses this Visit        Comments    REJI (obstructive sleep apnea)    -  Primary            To Do List           Future Appointments        Provider Department Dept Phone    2017 3:00 PM Db Viramontes III, MD [facility] [phone]    2017 11:20 AM Priya Grewal MD [facility] [phone]    2017 9:20 AM Wade Chairez MD [facility] [phone]      Goals (5 Years of Data)              Today    4/6/17    3/24/17    Increase physical activity           Weight < 250 lb (113.399 kg)   119.9 kg (264 lb 5.3 oz)  117.5 kg (259 lb 0.7 oz)  119.8 kg (264 lb 1.8 oz)      Follow-Up and Disposition     Return in about 3 months (around 2017).    Follow-up and Disposition History      [facility]     [facility] Nurse Care Line -  Assistance  Unless otherwise directed by your provider, please contact [facility], our nurse care line that is available for  assistance.     Registered nurses in the [facility] provide: appointment scheduling, clinical advisement, health education, and other advisory services.  Call: [phone] (toll free)               Medications           Message regarding Medications     Verify the changes and/or additions to your medication regime listed below are the same as discussed with your clinician today.  If any of these changes or additions are incorrect, please notify your healthcare provider.             Verify that the below list of medications is an accurate representation of the medications you are currently taking.  If none reported, the list may be blank. If  "incorrect, please contact your healthcare provider. Carry this list with you in case of emergency.           Current Medications     ammonium lactate 12 % Crea Apply topically every morning.     aspirin 81 MG Chew Take 1 tablet (81 mg total) by mouth once daily.    atorvastatin (LIPITOR) 80 MG tablet Take 1 tablet (80 mg total) by mouth once daily.    blood sugar diagnostic Strp BG monitoring 4 times a day. Pt needs test strips for [employer] Talking meter.    carvedilol (COREG) 6.25 MG tablet Take 1 tablet (6.25 mg total) by mouth 2 (two) times daily.    diclofenac sodium 1 % Gel Apply 2 g topically once daily.    econazole nitrate 1 % cream Apply topically once daily. Apply to feet daily as directed.    ergocalciferol (VITAMIN D2) 50,000 unit Cap Take 1 capsule (50,000 Units total) by mouth every 7 days.    fluticasone-salmeterol 100-50 mcg/dose (ADVAIR DISKUS) 100-50 mcg/dose diskus inhaler INHALE 1 PUFF 2 TIMES A DAY    HUMULIN N 100 unit/mL injection INJECT 46 UNITS UNDER SKIN EVERY MORNING AND 15 UNITS EVERY EVENING    insulin detemir (LEVEMIR FLEXTOUCH) 100 unit/mL (3 mL) SubQ InPn pen Inject 45 units at night.    insulin lispro (HUMALOG KWIKPEN) 100 unit/mL InPn pen Inject Humalog 19 units with breakfast and 16 units with Dinner.    lancets Misc Test daily    losartan (COZAAR) 100 MG tablet 1 tab by mouth daily    pen needle, diabetic (PEN NEEDLE) 32 gauge x 5/32" Ndle Uses 4 times a day.    torsemide (DEMADEX) 20 MG Tab TAKE 1 TABLET BY MOUTH ONCE DAILY.    duloxetine (CYMBALTA) 60 MG capsule Take 1 capsule (60 mg total) by mouth once daily.    omeprazole (PRILOSEC) 20 MG capsule Take 1 capsule (20 mg total) by mouth once daily.    senna (SENOKOT) 8.6 mg tablet Take 1 tablet by mouth once daily.           Clinical Reference Information           Your Vitals Were     BP Pulse Height Weight BMI    152/67 91 5' 2" (1.575 m) 119.9 kg (264 lb 5.3 oz) 48.35 kg/m2      Blood Pressure          Most Recent Value    BP  " (!)  152/67      Allergies as of 5/17/2017     Bactrim [Sulfamethoxazole-trimethoprim]    Azithromycin    Erythromycin    Gentamicin    Levofloxacin    Vancomycin      Immunizations Administered on Date of Encounter - 5/17/2017     None      [facility] Sign-Up     Activating your [facility] account is as easy as 1-2-3!     1) Visit [email or web address], select Sign Up Now, enter this activation code and your date of birth, then select Next.  Activation code not generated  Current Patient Portal Status: Account disabled      2) Create a username and password to use when you visit [facility] in the future and select a security question in case you lose your password and select Next.    3) Enter your e-mail address and click Sign Up!    Additional Information  If you have questions, please e-mail [email or web address] or call [phone] to talk to our [facility] staff. Remember, [facility] is NOT to be used for urgent needs. For medical emergencies, dial 911.         Language Assistance Services     ATTENTION: Language assistance services are available, free of charge. Please call [phone].      ATENCIÓN: Si habla español, tiene a cummings disposición servicios gratuitos de asistencia lingüística. Llame al [phone].     CHÚ Ý: N?u b?n nói Ti?ng Vi?t, có các d?ch v? h? tr? ngôn ng? mi?n phí dành cho b?n. G?i s? [phone].         [facility] complies with applicable Federal civil rights laws and does not discriminate on the basis of race, color, national origin, age, disability, or sex.

## 2017-05-18 ENCOUNTER — TELEPHONE (OUTPATIENT)
Dept: INTERNAL MEDICINE | Facility: CLINIC | Age: 77
End: 2017-05-18

## 2017-05-18 ENCOUNTER — OFFICE VISIT (OUTPATIENT)
Dept: INTERNAL MEDICINE | Facility: CLINIC | Age: 77
End: 2017-05-18
Payer: MEDICARE

## 2017-05-18 VITALS
DIASTOLIC BLOOD PRESSURE: 70 MMHG | BODY MASS INDEX: 49.01 KG/M2 | TEMPERATURE: 98 F | SYSTOLIC BLOOD PRESSURE: 158 MMHG | HEART RATE: 93 BPM | OXYGEN SATURATION: 90 % | WEIGHT: 266.31 LBS | HEIGHT: 62 IN

## 2017-05-18 DIAGNOSIS — E11.59 HYPERTENSION ASSOCIATED WITH DIABETES: ICD-10-CM

## 2017-05-18 DIAGNOSIS — L03.115 CELLULITIS OF BOTH LOWER EXTREMITIES: Primary | ICD-10-CM

## 2017-05-18 DIAGNOSIS — L03.116 CELLULITIS OF BOTH LOWER EXTREMITIES: Primary | ICD-10-CM

## 2017-05-18 DIAGNOSIS — I15.2 HYPERTENSION ASSOCIATED WITH DIABETES: ICD-10-CM

## 2017-05-18 DIAGNOSIS — I89.0 LYMPHEDEMA OF BOTH LOWER EXTREMITIES: ICD-10-CM

## 2017-05-18 PROCEDURE — 3045F PR MOST RECENT HEMOGLOBIN A1C LEVEL 7.0-9.0%: CPT | Mod: ,,, | Performed by: INTERNAL MEDICINE

## 2017-05-18 PROCEDURE — 3066F NEPHROPATHY DOC TX: CPT | Mod: ,,, | Performed by: INTERNAL MEDICINE

## 2017-05-18 PROCEDURE — 99214 OFFICE O/P EST MOD 30 MIN: CPT | Mod: PBBFAC | Performed by: INTERNAL MEDICINE

## 2017-05-18 PROCEDURE — 3078F DIAST BP <80 MM HG: CPT | Mod: ,,, | Performed by: INTERNAL MEDICINE

## 2017-05-18 PROCEDURE — 1126F AMNT PAIN NOTED NONE PRSNT: CPT | Mod: ,,, | Performed by: INTERNAL MEDICINE

## 2017-05-18 PROCEDURE — 3077F SYST BP >= 140 MM HG: CPT | Mod: ,,, | Performed by: INTERNAL MEDICINE

## 2017-05-18 PROCEDURE — 99213 OFFICE O/P EST LOW 20 MIN: CPT | Mod: S$PBB,,, | Performed by: INTERNAL MEDICINE

## 2017-05-18 PROCEDURE — 99999 PR PBB SHADOW E&M-EST. PATIENT-LVL IV: CPT | Mod: PBBFAC,,, | Performed by: INTERNAL MEDICINE

## 2017-05-18 PROCEDURE — 1160F RVW MEDS BY RX/DR IN RCRD: CPT | Mod: ,,, | Performed by: INTERNAL MEDICINE

## 2017-05-18 PROCEDURE — 1159F MED LIST DOCD IN RCRD: CPT | Mod: ,,, | Performed by: INTERNAL MEDICINE

## 2017-05-18 RX ORDER — DOXYCYCLINE 100 MG/1
100 CAPSULE ORAL 2 TIMES DAILY
Qty: 14 CAPSULE | Refills: 0 | Status: SHIPPED | OUTPATIENT
Start: 2017-05-18 | End: 2017-05-25

## 2017-05-18 RX ORDER — CEPHALEXIN 500 MG/1
500 CAPSULE ORAL EVERY 8 HOURS
Qty: 21 CAPSULE | Refills: 0 | Status: SHIPPED | OUTPATIENT
Start: 2017-05-18 | End: 2017-07-21

## 2017-05-18 NOTE — TELEPHONE ENCOUNTER
----- Message from Donya Cadet sent at 5/18/2017  1:49 PM CDT -----  Contact: call pt at [phone]  Patient is calling in regards to her earlier message about her cellulitis with blisters, she states some are now popping and she would really like to speak with someone from the office

## 2017-05-18 NOTE — Clinical Note
Brian Collazo, Seen as Urgent Care visit for cellulitis. Asked the team to schedule her a follow up visit with you in 1-2 weeks. ~[facility]

## 2017-05-18 NOTE — PROGRESS NOTES
"Subjective:       Patient ID: Sue Giordano is a 77 y.o. female.    Chief Complaint: Blisters- Cellulitis both legs    HPI    Patient of Vale Howard MD, here today for Urgent Care visit. PMH of REJI, CVA, sciatica, asthma, HTN, CAD, T2DM, CKD, HLD, GERD, obesity, lymphedema. Has history of left leg abscess 07/2016, failed outpt [facility], admitted and changed to Rocephin and [facility], discharge with doxy and Keflex.    Patient reports worsening lymphedema over the last few weeks.  Over the last few days she has developed redness and swelling of her bilateral lower extremities along with blisters, one of which has ruptured and has led to some leaking.  She denies fevers, chills, nausea, or vomiting.    Review of Systems    As per HPI    Objective:      Physical Exam   Constitutional: She is oriented to person, place, and time. No distress.   -American woman whose Body mass index is 48.71 kg/(m^2).    Neurological: She is alert and oriented to person, place, and time.   Skin: She is not diaphoretic.   Diffuse, tense lymphedema of bilateral lower extremities along with erythema is told to his shins bilaterally.  On the right leg medial aspect along the calf is an area of ulceration without active exudate or drainage.  tenderness to palpation along blister in left lower extremity, no other tenderness.   Nursing note and vitals reviewed.      Vitals:    05/18/17 1708   BP: (!) 158/70   Pulse: 93   Temp: 98.1 °F (36.7 °C)   TempSrc: Oral   SpO2: (!) 90%   Weight: 120.8 kg (266 lb 5.1 oz)   Height: 5' 2" (1.575 m)     Body mass index is 48.71 kg/(m^2).    RESULTS: Reviewed labs from last 1 months    Assessment:       1. Cellulitis of both lower extremities    2. Lymphedema of both lower extremities    3. Hypertension associated with diabetes        Plan:   Sue Garzon was seen today for blisters- cellulitis both legs.    Diagnoses and all orders for this visit:    Cellulitis of both lower extremities:  Recurrent " problem, we'll treat as below.  Follow-up with PCP in 1-2 weeks to ensure resolution of infection.  Notified patient to contact the office for fever, chills, or other signs of systemic infection.  Nursing to assist with managing the wounds today.  -     cephALEXin (KEFLEX) 500 MG capsule; Take 1 capsule (500 mg total) by mouth every 8 (eight) hours.  -     doxycycline (VIBRAMYCIN) 100 MG Cap; Take 1 capsule (100 mg total) by mouth 2 (two) times daily.  -     Ambulatory referral to Wound Clinic    Lymphedema of both lower extremities:  Return to physical therapy for exercises to limit lymphedema.  Patient reports swelling has been getting worse, continue compression stockings.  -     Ambulatory Referral to Physical/Occupational Therapy    Hypertension associated with diabetes:  Not at goal today.  Follow-up with PCP for continued evaluation and monitoring.    Keep regular follow up appointments with Vale Howard MD. Will ask for her to be scheduled in 1-2 weeks.  Fei Hickman MD  Internal Medicine    Portions of this note were completed using [employer] dictation software. Please excuse typographical or syntax errors.

## 2017-05-18 NOTE — TELEPHONE ENCOUNTER
Should pt come in for and UC appointment or can she have something called in to the pharmacy   Please advise

## 2017-05-18 NOTE — TELEPHONE ENCOUNTER
----- Message from Anabel Cortes sent at 5/18/2017 11:38 AM CDT -----  Contact: Self/[phone] cell   Pt said that she is calling in regards to she has Cellulitis in both legs with blisters pt stated that she does not want the blisters to pop and would like to know what does  recommend. Please call and advise            Thank you

## 2017-05-19 ENCOUNTER — TELEPHONE (OUTPATIENT)
Dept: INTERNAL MEDICINE | Facility: CLINIC | Age: 77
End: 2017-05-19

## 2017-05-19 DIAGNOSIS — L03.119 CELLULITIS OF LOWER EXTREMITY, UNSPECIFIED LATERALITY: Primary | ICD-10-CM

## 2017-05-19 DIAGNOSIS — I89.0 LYMPHEDEMA: ICD-10-CM

## 2017-05-19 NOTE — TELEPHONE ENCOUNTER
----- Message from Fei Hickman MD sent at 5/18/2017  5:36 PM CDT -----  Brian Collazo,  Seen as Urgent Care visit for cellulitis. Asked the team to schedule her a follow up visit with you in 1-2 weeks. ~[facility]

## 2017-05-19 NOTE — TELEPHONE ENCOUNTER
Tried to call pt---pt unable to come to phone. I scheduled a wound care appt for 5/31/17 at 9am. Need to make sure this is ok with the pt.

## 2017-05-19 NOTE — TELEPHONE ENCOUNTER
She needs an EP with me in 2-3 weeks    She needs wound care now orders in    Please let me know when scheduled thanks

## 2017-05-22 NOTE — TELEPHONE ENCOUNTER
Wound care appt 5/23 also 5/31    I tried to call both numbers that are listed was not able to leave a message is at either one    Will review chart; I'm hopeful that she will go to the appointment tomorrow and if not we will continue to try to reach her to schedule the appointment.

## 2017-05-23 ENCOUNTER — OFFICE VISIT (OUTPATIENT)
Dept: WOUND CARE | Facility: CLINIC | Age: 77
End: 2017-05-23
Payer: MEDICARE

## 2017-05-23 ENCOUNTER — OFFICE VISIT (OUTPATIENT)
Dept: NEUROLOGY | Facility: CLINIC | Age: 77
End: 2017-05-23
Payer: MEDICARE

## 2017-05-23 VITALS
TEMPERATURE: 97 F | SYSTOLIC BLOOD PRESSURE: 167 MMHG | HEART RATE: 104 BPM | HEIGHT: 59 IN | DIASTOLIC BLOOD PRESSURE: 84 MMHG | WEIGHT: 264 LBS | BODY MASS INDEX: 53.22 KG/M2

## 2017-05-23 VITALS
WEIGHT: 262.38 LBS | DIASTOLIC BLOOD PRESSURE: 88 MMHG | SYSTOLIC BLOOD PRESSURE: 142 MMHG | HEIGHT: 59 IN | HEART RATE: 80 BPM | BODY MASS INDEX: 52.89 KG/M2

## 2017-05-23 DIAGNOSIS — L97.921 ULCERS OF BOTH LOWER EXTREMITIES, LIMITED TO BREAKDOWN OF SKIN: ICD-10-CM

## 2017-05-23 DIAGNOSIS — I89.0 LYMPHEDEMA: Primary | ICD-10-CM

## 2017-05-23 DIAGNOSIS — G50.1 ATYPICAL FACIAL PAIN: ICD-10-CM

## 2017-05-23 DIAGNOSIS — G50.0 TRIGEMINAL NEURALGIA OF RIGHT SIDE OF FACE: Primary | ICD-10-CM

## 2017-05-23 DIAGNOSIS — L97.911 ULCERS OF BOTH LOWER EXTREMITIES, LIMITED TO BREAKDOWN OF SKIN: ICD-10-CM

## 2017-05-23 PROCEDURE — 99212 OFFICE O/P EST SF 10 MIN: CPT | Mod: 25,S$PBB,, | Performed by: NURSE PRACTITIONER

## 2017-05-23 PROCEDURE — 3079F DIAST BP 80-89 MM HG: CPT | Mod: ,,, | Performed by: NURSE PRACTITIONER

## 2017-05-23 PROCEDURE — 3045F PR MOST RECENT HEMOGLOBIN A1C LEVEL 7.0-9.0%: CPT | Mod: ,,, | Performed by: NURSE PRACTITIONER

## 2017-05-23 PROCEDURE — 99214 OFFICE O/P EST MOD 30 MIN: CPT | Mod: S$PBB,,, | Performed by: PSYCHIATRY & NEUROLOGY

## 2017-05-23 PROCEDURE — 1126F AMNT PAIN NOTED NONE PRSNT: CPT | Mod: ,,, | Performed by: NURSE PRACTITIONER

## 2017-05-23 PROCEDURE — 3077F SYST BP >= 140 MM HG: CPT | Mod: ,,, | Performed by: NURSE PRACTITIONER

## 2017-05-23 PROCEDURE — 99999 PR PBB SHADOW E&M-EST. PATIENT-LVL V: CPT | Mod: PBBFAC,,, | Performed by: NURSE PRACTITIONER

## 2017-05-23 PROCEDURE — 99999 PR PBB SHADOW E&M-EST. PATIENT-LVL III: CPT | Mod: PBBFAC,,, | Performed by: PSYCHIATRY & NEUROLOGY

## 2017-05-23 PROCEDURE — 1160F RVW MEDS BY RX/DR IN RCRD: CPT | Mod: ,,, | Performed by: NURSE PRACTITIONER

## 2017-05-23 PROCEDURE — 29580 STRAPPING UNNA BOOT: CPT | Mod: 50,S$PBB,, | Performed by: NURSE PRACTITIONER

## 2017-05-23 PROCEDURE — 29580 STRAPPING UNNA BOOT: CPT | Mod: 50,PBBFAC | Performed by: NURSE PRACTITIONER

## 2017-05-23 PROCEDURE — 99215 OFFICE O/P EST HI 40 MIN: CPT | Mod: PBBFAC,27 | Performed by: NURSE PRACTITIONER

## 2017-05-23 PROCEDURE — 3066F NEPHROPATHY DOC TX: CPT | Mod: ,,, | Performed by: NURSE PRACTITIONER

## 2017-05-23 PROCEDURE — 99213 OFFICE O/P EST LOW 20 MIN: CPT | Mod: PBBFAC | Performed by: PSYCHIATRY & NEUROLOGY

## 2017-05-23 PROCEDURE — 1159F MED LIST DOCD IN RCRD: CPT | Mod: ,,, | Performed by: NURSE PRACTITIONER

## 2017-05-23 NOTE — PROGRESS NOTES
Subjective:       Patient ID: Sue Giordano is a 77 y.o. female.    Reason for Consult: Headache      Interval History:  Sue Giordano is here for follow up. Their condition is clinically stable.  She was unable to continue taking gabapentin due to constipation.  Upon looking up in the pharmacopeia apparently there is a 1-4% chance for constipation with gabapentin.  She notes that she is still having right-sided facial pain after a dental procedure and notes that she has been prescribed duloxetine for other neuropathic pain and this seems to be helping.  She notes occasionally she will take an over-the-counter Tylenol for her pain and this seems to help    Objective:     Vitals:    05/23/17 1513   BP: (!) 142/88   Pulse: 80     No allodynia on examination on the right V2 dermatome.  She does have her pain distribution in this area. Otherwise CN 2-12 without focal deficit.  Focused examination was undertaken today. Over 50% of face to face time of 25 minute visit time was in giving guidance, counseling and discussing treatment options.    Assessment/Plan:     Problem List        Neuro    Atypical facial pain    Trigeminal neuralgia of right side of face - Primary    Overview     For years now  Previously on Gabapentin, but caused constipation         Current Assessment & Plan     Obtain MRI Fei for secondary Trigeminal Neuralgia  Continue Duloxetine, Tylenol PRN         Relevant Orders    MRI Brain W WO Contrast    MRA Brain with and without contrast        77-year-old female presents for evaluation and follow-up of trigeminal neuralgia.  At this point in time as she has been refractory to treatment by conventional means that we'll obtain an MRI of the brain with and without contrast as well as an MRA of the brain to look for secondary causes of trigeminal neuralgia such as a structural lesion and/or tortuous intracranial vasculature.  We have discussed that for now she may continue taking Cymbalta  and taking Tylenol on an as-needed basis as her symptoms are not severe.  Whether or not this is related to a dental procedure is hard for me to  at this point in time I have told her that we will obtain the brain imaging and proceed from there.  I will follow up with them in 2 month(s).  The patient verbalizes understanding and agreement with the treatment plan. I have discussed risks, benefits and alternatives to the treatment plan. Questions were sought and answered to her stated verbal satisfaction.        Ronald Viramontes MD    This note is dictated on Dragon Natural Speaking word recognition program. There are word recognition mistakes that are occasionally missed on review.

## 2017-05-23 NOTE — PROGRESS NOTES
Subjective:       Patient ID: Sue Giordano is a 77 y.o. female.    Chief Complaint: Wound Check    Edema     Wound Check     This patient is well known to my service.  She has had problems with lymphedema for 20 years now.  When her legs swell, she has problems with weeping and blister formation.  She was wearing her circaid stockings when she developed blisters to both lower legs.  When the blisters erupted they left ulcers in their place.  She has no pain.  She is afebrile.  She denies increased swelling, redness or purulent drainage.  Her medical history is significant for poorly controlled type II diabetes, lymphedema and chronic kidney disease.    Review of Systems  Unchanged from prior visit.  Objective:      Physical Exam   Constitutional: She is oriented to person, place, and time. She appears well-developed and well-nourished. No distress.   HENT:   Head: Normocephalic and atraumatic.   Neck: Normal range of motion.   Pulmonary/Chest: Effort normal. No respiratory distress.   Musculoskeletal: Normal range of motion. She exhibits edema. She exhibits no tenderness.        Legs:  Neurological: She is alert and oriented to person, place, and time.   Skin: Skin is warm and dry. No rash noted. She is not diaphoretic. No cyanosis or erythema. Nails show no clubbing.   Psychiatric: She has a normal mood and affect. Her behavior is normal. Judgment and thought content normal.   Nursing note and vitals reviewed.    ..  Hemoglobin A1C   Date Value Ref Range Status   05/02/2017 8.3 (H) 4.5 - 6.2 % Final     Comment:     According to ADA guidelines, hemoglobin A1C <7.0% represents  optimal control in non-pregnant diabetic patients.  Different  metrics may apply to specific populations.   Standards of Medical Care in Diabetes - 2016.  For the purpose of screening for the presence of diabetes:  <5.7%     Consistent with the absence of diabetes  5.7-6.4%  Consistent with increasing risk for diabetes    (prediabetes)  >or=6.5%  Consistent with diabetes  Currently no consensus exists for use of hemoglobin A1C  for diagnosis of diabetes for children.     01/31/2017 7.4 (H) 4.5 - 6.2 % Final     Comment:     According to ADA guidelines, hemoglobin A1C <7.0% represents  optimal control in non-pregnant diabetic patients.  Different  metrics may apply to specific populations.   Standards of Medical Care in Diabetes - 2016.  For the purpose of screening for the presence of diabetes:  <5.7%     Consistent with the absence of diabetes  5.7-6.4%  Consistent with increasing risk for diabetes   (prediabetes)  >or=6.5%  Consistent with diabetes  Currently no consensus exists for use of hemoglobin A1C  for diagnosis of diabetes for children.     10/26/2016 7.6 (H) 4.5 - 6.2 % Final     Comment:     According to ADA guidelines, hemoglobin A1C <7.0% represents  optimal control in non-pregnant diabetic patients.  Different  metrics may apply to specific populations.   Standards of Medical Care in Diabetes - 2016.  For the purpose of screening for the presence of diabetes:  <5.7%     Consistent with the absence of diabetes  5.7-6.4%  Consistent with increasing risk for diabetes   (prediabetes)  >or=6.5%  Consistent with diabetes  Currently no consensus exists for use of hemoglobin A1C  for diagnosis of diabetes for children.       Sue Garzon was seen in the clinic room and placed in the supine position on the treatment table.  Both legs were cleansed with Easi-clense sponges and dried thoroughly.  A hydrofiber dressing was applied to the wounds.  Eucerin cream was applied to the lower legs.  The patient's feet were positioned at a 90 degree angle.  A zinc oxide wrap, followed by kerlix roll gauze and coban were applied using a spiral technique avoiding creases or folds.  The wraps were started behind the first metatarsal and ended below the tibial tubercle of the knee.  There was overlap of each turn half the width of the previous  turn.  The compression wraps will be changed every 7 days.    Assessment:       1. Lymphedema    2. Uncontrolled type 2 diabetes mellitus with complication, with long-term current use of insulin    3. Ulcers of both lower extremities, limited to breakdown of skin        Plan:           Unna boot bilateral lower legs as detailed above.  Patient was warned not to get the dressings wet and to use cast covers for showering.  Should the dressing become wet, she is to remove it, place a wet-to-dry dressing over the wound, cover with gauze and roll gauze and use ace wraps for compression and to secure bandages.  She should then notify this office as soon as possible to have a new dressing applied.  Return to clinic in one week.    Left lateral leg    Right medial leg

## 2017-05-29 ENCOUNTER — TELEPHONE (OUTPATIENT)
Dept: ENDOCRINOLOGY | Facility: CLINIC | Age: 77
End: 2017-05-29

## 2017-05-29 ENCOUNTER — TELEPHONE (OUTPATIENT)
Dept: INTERNAL MEDICINE | Facility: CLINIC | Age: 77
End: 2017-05-29

## 2017-05-29 NOTE — TELEPHONE ENCOUNTER
----- Message from LIBRA Seaman, RANULFO sent at 5/29/2017 10:06 AM CDT -----  Hi yall!  Can you all please make sure Sis. Duran Giordano brings logs to next visit.  a1c went up by a point.  Primary care doctor has concerns,-just sent message.  She has appt. with me on 5/31 (Wed).  Please give her a call, she is not on the portal system.  Thanks,  Yogi

## 2017-05-29 NOTE — TELEPHONE ENCOUNTER
Spoke with pt. She did not realize she missed the endo appt. Rescheduled for 5/31/17. Also, scheduled for follow up with Dr. Howard 7/21/17. Pt would like to wait 3 weeks until legs have healed before scheduling a podiatry appt.

## 2017-05-29 NOTE — TELEPHONE ENCOUNTER
Spoke with the pt and notified that bring a BG logs when she have a appointment with Analilia. Pt verbally  understand.

## 2017-05-29 NOTE — TELEPHONE ENCOUNTER
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  She probably was in training w/ me when orders were placed.  I have a f/u appt w/ Sis. Duran Giordano on 5/31.  I see that her a1c had gone up by almost 1%.  I will have her send logs in June and will make adjustments this Wednesday.  Have a great day and week.  I will also send a message for her to bring logs to next appt as well.    ----- Message -----  From: Vale Howard MD  Sent: 5/28/2017   7:17 PM  To: Jennifer Borrego NP    Hi Ms Borrego-    Can you tell me which department you are with?  You ordered labs on [facility] about a month ago but it does not look like you addressed them.  Are you with Endocrinology?    Vale Howard MD, FACP

## 2017-05-29 NOTE — TELEPHONE ENCOUNTER
She did not keep her endo appt and her sugar is out of range    This needs to be rescheduled    Ms Arpit    She also needs to see Podiatry and me    Please call her to schedule all thanks

## 2017-05-31 ENCOUNTER — OFFICE VISIT (OUTPATIENT)
Dept: ENDOCRINOLOGY | Facility: CLINIC | Age: 77
End: 2017-05-31
Payer: MEDICARE

## 2017-05-31 ENCOUNTER — OFFICE VISIT (OUTPATIENT)
Dept: WOUND CARE | Facility: CLINIC | Age: 77
End: 2017-05-31
Payer: MEDICARE

## 2017-05-31 VITALS
TEMPERATURE: 98 F | DIASTOLIC BLOOD PRESSURE: 83 MMHG | BODY MASS INDEX: 53.59 KG/M2 | HEIGHT: 59 IN | SYSTOLIC BLOOD PRESSURE: 197 MMHG | WEIGHT: 265.81 LBS | HEART RATE: 64 BPM

## 2017-05-31 VITALS
HEIGHT: 59 IN | DIASTOLIC BLOOD PRESSURE: 84 MMHG | BODY MASS INDEX: 53.48 KG/M2 | SYSTOLIC BLOOD PRESSURE: 130 MMHG | WEIGHT: 265.31 LBS | HEART RATE: 78 BPM

## 2017-05-31 DIAGNOSIS — L03.119 CELLULITIS OF LOWER EXTREMITY, UNSPECIFIED LATERALITY: ICD-10-CM

## 2017-05-31 DIAGNOSIS — E66.01 MORBID OBESITY DUE TO EXCESS CALORIES: ICD-10-CM

## 2017-05-31 DIAGNOSIS — E78.2 MIXED HYPERLIPIDEMIA: ICD-10-CM

## 2017-05-31 DIAGNOSIS — I25.84 CORONARY ARTERY DISEASE DUE TO CALCIFIED CORONARY LESION: Chronic | ICD-10-CM

## 2017-05-31 DIAGNOSIS — E11.29 TYPE 2 DIABETES MELLITUS WITH RENAL MANIFESTATIONS NOT AT GOAL: Primary | ICD-10-CM

## 2017-05-31 DIAGNOSIS — L97.911 ULCERS OF BOTH LOWER EXTREMITIES, LIMITED TO BREAKDOWN OF SKIN: Primary | ICD-10-CM

## 2017-05-31 DIAGNOSIS — E55.9 VITAMIN D DEFICIENCY DISEASE: ICD-10-CM

## 2017-05-31 DIAGNOSIS — B35.3 TINEA PEDIS OF BOTH FEET: ICD-10-CM

## 2017-05-31 DIAGNOSIS — I10 ESSENTIAL HYPERTENSION: ICD-10-CM

## 2017-05-31 DIAGNOSIS — L97.921 ULCERS OF BOTH LOWER EXTREMITIES, LIMITED TO BREAKDOWN OF SKIN: Primary | ICD-10-CM

## 2017-05-31 DIAGNOSIS — I89.0 LYMPHEDEMA: ICD-10-CM

## 2017-05-31 DIAGNOSIS — N18.30 CKD (CHRONIC KIDNEY DISEASE) STAGE 3, GFR 30-59 ML/MIN: ICD-10-CM

## 2017-05-31 DIAGNOSIS — I25.10 CORONARY ARTERY DISEASE DUE TO CALCIFIED CORONARY LESION: Chronic | ICD-10-CM

## 2017-05-31 PROCEDURE — 3045F PR MOST RECENT HEMOGLOBIN A1C LEVEL 7.0-9.0%: CPT | Mod: ,,, | Performed by: NURSE PRACTITIONER

## 2017-05-31 PROCEDURE — 29580 STRAPPING UNNA BOOT: CPT | Mod: 50,PBBFAC | Performed by: NURSE PRACTITIONER

## 2017-05-31 PROCEDURE — 99215 OFFICE O/P EST HI 40 MIN: CPT | Mod: S$PBB,,, | Performed by: NURSE PRACTITIONER

## 2017-05-31 PROCEDURE — 99499 UNLISTED E&M SERVICE: CPT | Mod: S$PBB,,, | Performed by: NURSE PRACTITIONER

## 2017-05-31 PROCEDURE — 99215 OFFICE O/P EST HI 40 MIN: CPT | Mod: PBBFAC,25 | Performed by: NURSE PRACTITIONER

## 2017-05-31 PROCEDURE — 1159F MED LIST DOCD IN RCRD: CPT | Mod: ,,, | Performed by: NURSE PRACTITIONER

## 2017-05-31 PROCEDURE — 1126F AMNT PAIN NOTED NONE PRSNT: CPT | Mod: ,,, | Performed by: NURSE PRACTITIONER

## 2017-05-31 PROCEDURE — 3066F NEPHROPATHY DOC TX: CPT | Mod: ,,, | Performed by: NURSE PRACTITIONER

## 2017-05-31 PROCEDURE — 99999 PR PBB SHADOW E&M-EST. PATIENT-LVL V: CPT | Mod: PBBFAC,,, | Performed by: NURSE PRACTITIONER

## 2017-05-31 PROCEDURE — 29580 STRAPPING UNNA BOOT: CPT | Mod: 50,S$PBB,, | Performed by: NURSE PRACTITIONER

## 2017-05-31 RX ORDER — INSULIN LISPRO 100 [IU]/ML
INJECTION, SOLUTION INTRAVENOUS; SUBCUTANEOUS
Qty: 1 BOX | Refills: 6 | Status: ON HOLD | OUTPATIENT
Start: 2017-05-31 | End: 2017-08-25 | Stop reason: ALTCHOICE

## 2017-05-31 RX ORDER — ERGOCALCIFEROL 1.25 MG/1
50000 CAPSULE ORAL
Qty: 12 CAPSULE | Refills: 3 | Status: SHIPPED | OUTPATIENT
Start: 2017-05-31 | End: 2018-02-02 | Stop reason: DRUGHIGH

## 2017-05-31 NOTE — PROGRESS NOTES
Subjective:       Patient ID: Sue Giordano is a 77 y.o. female.    Chief Complaint: Wound Check    Wound Check     Edema     This patient is seen today for reevaluation of ulcers to both lower leg.  She has had problems with lymphedema for 20 years now.  When her legs swell, she has problems with weeping and blister formation.  She was wearing her circaid stockings when she developed blisters to both lower legs. However, the circaid stockings are a year old. Unna boots were placed bilaterally last week.  The wounds are healing as evidenced by wound contracture and epithelialization.  She has no pain.  She is afebrile.  She denies increased swelling, redness or purulent drainage.  Her medical history is significant for poorly controlled type II diabetes, lymphedema and chronic kidney disease.    Review of Systems  Unchanged from prior visit.  Objective:      Physical Exam   Constitutional: She is oriented to person, place, and time. She appears well-developed and well-nourished. No distress.   HENT:   Head: Normocephalic and atraumatic.   Neck: Normal range of motion.   Pulmonary/Chest: Effort normal. No respiratory distress.   Musculoskeletal: Normal range of motion. She exhibits edema. She exhibits no tenderness.        Legs:  Neurological: She is alert and oriented to person, place, and time.   Skin: Skin is warm and dry. No rash noted. She is not diaphoretic. No cyanosis or erythema. Nails show no clubbing.   Psychiatric: She has a normal mood and affect. Her behavior is normal. Judgment and thought content normal.   Nursing note and vitals reviewed.    ..  Hemoglobin A1C   Date Value Ref Range Status   05/02/2017 8.3 (H) 4.5 - 6.2 % Final     Comment:     According to ADA guidelines, hemoglobin A1C <7.0% represents  optimal control in non-pregnant diabetic patients.  Different  metrics may apply to specific populations.   Standards of Medical Care in Diabetes - 2016.  For the purpose of screening for  the presence of diabetes:  <5.7%     Consistent with the absence of diabetes  5.7-6.4%  Consistent with increasing risk for diabetes   (prediabetes)  >or=6.5%  Consistent with diabetes  Currently no consensus exists for use of hemoglobin A1C  for diagnosis of diabetes for children.     01/31/2017 7.4 (H) 4.5 - 6.2 % Final     Comment:     According to ADA guidelines, hemoglobin A1C <7.0% represents  optimal control in non-pregnant diabetic patients.  Different  metrics may apply to specific populations.   Standards of Medical Care in Diabetes - 2016.  For the purpose of screening for the presence of diabetes:  <5.7%     Consistent with the absence of diabetes  5.7-6.4%  Consistent with increasing risk for diabetes   (prediabetes)  >or=6.5%  Consistent with diabetes  Currently no consensus exists for use of hemoglobin A1C  for diagnosis of diabetes for children.     10/26/2016 7.6 (H) 4.5 - 6.2 % Final     Comment:     According to ADA guidelines, hemoglobin A1C <7.0% represents  optimal control in non-pregnant diabetic patients.  Different  metrics may apply to specific populations.   Standards of Medical Care in Diabetes - 2016.  For the purpose of screening for the presence of diabetes:  <5.7%     Consistent with the absence of diabetes  5.7-6.4%  Consistent with increasing risk for diabetes   (prediabetes)  >or=6.5%  Consistent with diabetes  Currently no consensus exists for use of hemoglobin A1C  for diagnosis of diabetes for children.       Sue Garzon was seen in the clinic room and placed in the supine position on the treatment table.  Both legs were cleansed with Easi-clense sponges and dried thoroughly.  A mepilex foam dressing was applied to the right leg wound and mepilex lite foam dressing was applied to the left leg wounds.  Eucerin cream was applied to the lower legs.  The patient's feet were positioned at a 90 degree angle.  A zinc oxide wrap, followed by kerlix roll gauze and coban were applied using a  spiral technique avoiding creases or folds.  The wraps were started behind the first metatarsal and ended below the tibial tubercle of the knee.  There was overlap of each turn half the width of the previous turn.  The compression wraps will be changed every 7 days.    Assessment:       1. Ulcers of both lower extremities, limited to breakdown of skin    2. Uncontrolled type 2 diabetes mellitus with complication, with long-term current use of insulin    3. Lymphedema    4. Tinea pedis of both feet        Plan:           Unna boot bilateral lower legs as detailed above.  Patient was warned not to get the dressings wet and to use cast covers for showering.  Should the dressing become wet, she is to remove it, place a wet-to-dry dressing over the wound, cover with gauze and roll gauze and use ace wraps for compression and to secure bandages.  She should then notify this office as soon as possible to have a new dressing applied.  Return to clinic in one week.    Left lateral leg      Left posterior leg        Right medial leg

## 2017-05-31 NOTE — PATIENT INSTRUCTIONS
Snacks can be an important part of a balanced, healthy meal plan. They allow you to eat more frequently, feeling full and satisfied throughout the day. Also, they allow you to spread carbohydrates evenly, which may stabilize blood sugars.  Plus, snacks are enjoyable!     The amount of carbohydrate needed at snacks varies. Generally, about 15-30 grams of carbohydrate per snack is recommended.  Below you will find some tasty treats.       0-5 gm carb   Crystal Light   Vitamin Water Zero   Herbal tea, unsweetened   2 tsp peanut butter on celery   1./2 cup sugar-free jell-o   1 sugar-free popsicle   ¼ cup blueberries   8oz Blue Aniyah unsweetened almond milk   5 baby carrots & celery sticks, cucumbers, bell peppers dipped in ¼ cup salsa, 2Tbsp light ranch dressing or 2Tbsp plain Greek yogurt   10 Goldfish crackers   ½ oz low-fat cheese or string cheese   1 closed handful of nuts, unsalted   1 Tbsp of sunflower seeds, unsalted   1 cup Smart Pop popcorn   1 whole grain brown rice cake        15 gm carb   1 small piece of fruit or ½ banana or 1/2 cup lite canned fruit   3 ramon cracker squares   3 cups Smart Pop popcorn, top spray butter, York lite salt or cinnamon and Truvia   5 Vanilla Wafers   ½ cup low fat, no added sugar ice cream or frozen yogurt (Blue bell, Blue Bunny, Weight Watchers, Skinny Cow)   ½ turkey, ham, or chicken sandwich   ½ c fruit with ½ c Cottage cheese   4-6 unsalted wheat crackers with 1 oz low fat cheese or 1 tbsp peanut butter    30-45 goldfish crackers (depending on flavor)    7-8 Yarsani mini brown rice cakes (caramel, apple cinnamon, chocolate)    12 Yarsani mini brown rice cakes (cheddar, bbq, ranch)    1/3 cup hummus dip with raw veg   1/2 whole wheat jessica, 1Tbsp hummus   Mini Pizza (1/2 whole wheat English muffin, low-fat  cheese, tomato sauce)   100 calorie snack pack (Oreo, Chips Ahoy, Ritz Mix, Baked Cheetos)   4-6 oz. light or Greek Style yogurt  (Flores, Shila, Placido, [facility][note 3])   ½ cup sugar-free pudding     6 in. wheat tortilla or jessica oven toasted chips (topped with spray butter flavoring, cinnamon, Truvia OR spray butter, garlic powder, chili powder)    18 BBQ Popchips (available at [employer], [employer], Fresh Market)                   Diabetes Support Group Meetings    Date Topics   February 9 Health Promotion/Nutrition   March 9 Taking Care of Your Kidneys   April 13 Taking Care of Your Feet   May 11 Ease Your Mind with Diabetes   June 8 Hurricane and Emergency Preparedness   July 13 Family & Caregiver Support for Diabetes   *August 17  Taking Care of Your Eyes   September 14 Devices & Technology   October 12 Recipes & Treats   November 9 Getting Pumped Up for Diabetes   December 14 Year-End Close Out            Meetings are held in the [facility]) of the [facility] located at [address] [place] [address]. Please call [phone] for additional information.    Free service, offered every 2nd Thursday of every month, except in August! Family members and/or friends are welcome as well!  Support group is for patients with type 1 or type 2 diabetes.    From 3:30p to 4:30p        Insulin Solution for injection  What is this medicine?  INSULIN DETEMIR (IN cummings juanita DE [place]) is a human-made form of insulin. This drug lowers the amount of sugar in your blood. It is a long-acting insulin that is usually given once or twice a day.  How should I use this medicine?  This medicine is for injection under the skin. Take this medicine at the same time(s) each day. Use exactly as directed. This insulin should never be mixed in the same syringe with other insulins before injection. Do not vigorously shake insulin before use. You will be taught how to adjust doses for activities and illness. Do not use more insulin than prescribed. Do not use more or less often than prescribed.  Always check the  appearance of your insulin before using it. This medicine should be clear and colorless like water. Do not use if it is cloudy, thickened, colored, or has solid particles in it.  It is important that you put your used needles and syringes in a special sharps container. Do not put them in a trash can. If you do not have a sharps container, call your pharmacist or healthcare provider to get one.  Talk to your pediatrician regarding the use of this medicine in children. While this drug may be prescribed for children as young as 2 years for selected conditions, precautions do apply.  What side effects may I notice from receiving this medicine?  Side effects that you should report to your health care professional or doctor as soon as possible:  · allergic reactions like skin rash, itching or hives, swelling of the face, lips, or tongue  · breathing problems  · signs and symptoms of high blood sugar such as dizziness, dry mouth, dry skin, fruity breath, nausea, stomach pain, increased hunger or thirst, increased urination  · signs and symptoms of low blood sugar such as feeling anxious, confusion, dizziness, increased hunger, unusually weak or tired, sweating, shakiness, cold, irritable, headache, blurred vision, fast heartbeat, loss of consciousness  Side effects that usually do not require medical attention (report to your health care professional or doctor if they continue or are bothersome):  · increase or decrease in fatty tissue under the skin due to overuse of a particular injection site  · itching, burning, swelling, or rash at site where injected  What may interact with this medicine?  · other medicines for diabetes  Many medications may cause an increase or decrease in blood sugar, these include:  · alcohol containing beverages  · aspirin and aspirin-like drugs  · chloramphenicol  · chromium  · diuretics  · female hormones, like estrogens or progestins and birth control pills  · heart  medicines  · isoniazid  · MAOIs like Carbex, Eldepryl, Marplan, Nardil, and Parnate  · male hormones or anabolic steroids  · medicines for weight loss  · medicines for allergies, asthma, cold, or cough  · medicines for mental problems  · niacin  · NSAIDs, medicines for pain and inflammation, like ibuprofen or naproxen  · pentamidine  · phenytoin  · probenecid  · quinolone antibiotics like ciprofloxacin, levofloxacin, ofloxacin  · some herbal dietary supplements  · steroid medicines like prednisone or cortisone  · thyroid medicine  Some medications can hide the warning symptoms of low blood sugar. You may need to monitor your blood sugar more closely if you are taking one of these medications. These include:  · beta-blockers such as atenolol, metoprolol, propranolol  · clonidine  · guanethidine  · reserpine  What if I miss a dose?  It is important not to miss a dose. Your health care professional or doctor should discuss a plan for missed doses with you. If you do miss a dose, follow their plan. Do not take double doses.  Where should I keep my medicine?  Keep out of the reach of children.  Store Flextouch Pens in a refrigerator between 2 and 8 degrees C (36 and 46 degrees F.) Do not freeze or use if the insulin has been frozen. Once opened, the pens should be kept at room temperature, below 30 degrees C (86 degrees F). Do not store in the refrigerator once opened. Once opened, the insulin can be used for 42 days. After 42 days, the Flextouch Pen should be thrown away.  Store unopened insulin vials in a refrigerator between 2 and 8 degrees C (36 and 46 degrees F). Do not freeze or use if the insulin has been frozen. Opened vials (vials currently in use) should be stored in a refrigerator, never a freezer. If refrigeration is not possible, the opened vial can be stored unrefrigerated at room temperature, below 30 degrees C (86 degrees F) for up to 42 days. After 42 days, the vial of insulin should be thrown away.  Keeping your insulin at room temperature decreases the amount of pain during injection.  Protect from light and excessive heat. Throw away any unused medicine after the expiration date or after the specified time for room temperature storage has passed.  What should I tell my health care provider before I take this medicine?  They need to know if you have any of these conditions:  · episodes of hypoglycemia  · kidney disease  · liver disease  · an unusual or allergic reaction to insulin, metacresol, other medicines, foods, dyes, or preservatives  · pregnant or trying to get pregnant  · breast-feeding  What should I watch for while using this medicine?  Visit your health care professional or doctor for regular checks on your progress.  A test called the HbA1C (A1C) will be monitored. This is a simple blood test. It measures your blood sugar control over the last 2 to 3 months. You will receive this test every 3 to 6 months.  Learn how to check your blood sugar. Learn the symptoms of low and high blood sugar and how to manage them.  Always carry a quick-source of sugar with you in case you have symptoms of low blood sugar. Examples include hard sugar candy or glucose tablets. Make sure others know that you can choke if you eat or drink when you develop serious symptoms of low blood sugar, such as seizures or unconsciousness. They must get medical help at once.  Tell your doctor or health care professional if you have high blood sugar. You might need to change the dose of your medicine. If you are sick or exercising more than usual, you might need to change the dose of your medicine.  Do not skip meals. Ask your doctor or health care professional if you should avoid alcohol. Many nonprescription cough and cold products contain sugar or alcohol. These can affect blood sugar.  Make sure that you have the right kind of syringe for the type of insulin you use. Try not to change the brand and type of insulin or syringe  unless your health care professional or doctor tells you to. Switching insulin brand or type can cause dangerously high or low blood sugar. Always keep an extra supply of insulin, syringes, and needles on hand. Use a syringe one time only. Throw away syringe and needle in a closed container to prevent accidental needle sticks.  Insulin pens and cartridges should never be shared. Even if the needle is changed, sharing may result in passing of viruses like hepatitis or HIV.  Wear a medical ID bracelet or chain, and carry a card that describes your disease and details of your medicine and dosage times.  Date Last Reviewed:   NOTE:This sheet is a summary. It may not cover all possible information. If you have questions about this medicine, talk to your doctor, pharmacist, or health care provider. Copyright© 2016 [employer]        Insulin Lispro injection  What is this medicine?  INSULIN LISPRO (IN cummings juanita LYE sproe) is a human-made form of insulin. This drug lowers the amount of sugar in your blood. This medicine is a rapid-acting insulin that starts working faster than regular insulin. It will not work as long as regular insulin.  How should I use this medicine?  This medicine is for injection under the skin or infusion into a vein. This medicine may be given by health care professional in a hospital or clinic setting. It is important to follow the directions given to you by your health care professional or doctor. You should inject this medicine within 15 minutes before or after your meal. Have food ready before injection. Do not delay eating. You will be taught how to use this medicine and how to adjust doses for activities and illness. Do not use more insulin than prescribed. Do not use more or less often than prescribed.  Always check the appearance of your insulin before using it. This medicine should be clear and colorless like water. Do not use it if it is cloudy, thickened, colored, or has solid particles in  it.  It is important that you put your used needles and syringes in a special sharps container. Do not put them in a trash can. If you do not have a sharps container, call your pharmacist or healthcare provider to get one.  Talk to your pediatrician regarding the use of this medicine in children. Special care may be needed.  What side effects may I notice from receiving this medicine?  Side effects that you should report to your health care professional or doctor as soon as possible:  · allergic reactions like skin rash, itching or hives, swelling of the face, lips, or tongue  · breathing problems  · signs and symptoms of high blood sugar such as dizziness, dry mouth, dry skin, fruity breath, nausea, stomach pain, increased hunger or thirst, increased urination  · signs and symptoms of low blood sugar such as feeling anxious, confusion, dizziness, increased hunger, unusually weak or tired, sweating, shakiness, cold, irritable, headache, blurred vision, fast heartbeat, loss of consciousness  Side effects that usually do not require medical attention (report to your health care professional or doctor if they continue or are bothersome):  · increase or decrease in fatty tissue under the skin due to overuse of a particular injection site  · itching, burning, swelling, or rash at site where injected  What may interact with this medicine?  · other medicines for diabetes  Many medications may cause an increase or decrease in blood sugar, these include:  · alcohol containing beverages  · aspirin and aspirin-like drugs  · chloramphenicol  · chromium  · diuretics  · female hormones, like estrogens or progestins and birth control pills  · heart medicines  · isoniazid  · male hormones or anabolic steroids  · medicines for weight loss  · medicines for allergies, asthma, cold, or cough  · medicines for mental problems  · medicines called MAO Inhibitors like Nardil, Parnate, Marplan, Eldepryl  · niacin  · NSAIDs, medicines for  pain and inflammation, like ibuprofen or naproxen  · pentamidine  · phenytoin  · probenecid  · quinolone antibiotics like ciprofloxacin, levofloxacin, ofloxacin  · some herbal dietary supplements  · steroid medicines like prednisone or cortisone  · thyroid medicine  Some medications can hide the warning symptoms of low blood sugar. You may need to monitor your blood sugar more closely if you are taking one of these medications. These include:  · beta-blockers such as atenolol, metoprolol, propranolol  · clonidine  · guanethidine  · reserpine  What if I miss a dose?  It is important not to miss a dose. Your health care professional or doctor should discuss a plan for missed doses with you. If you do miss a dose, follow their plan. Do not take double doses.  Where should I keep my medicine?  Keep out of the reach of children.  Store unopened insulin vials in a refrigerator between 2 and 8 degrees C (36 and 46 degrees F). Do not freeze or use if the insulin has been frozen. Opened vials (vials currently in use) may be stored in the refrigerator or at room temperature, at approximately 30 degrees C (86 degrees F) or cooler. Keeping your insulin at room temperature decreases the amount of pain during injection. Once opened, your insulin can be used for 28 days. After 28 days, the vial of insulin should be thrown away.  Store unopened cartridges or disposable pens in a refrigerator between 2 and 8 degrees C (36 and 46 degrees F.) Do not freeze or use if the insulin has been frozen. Once opened, the disposable pens and cartridges that are inserted into pens should be kept at room temperature, approximately 30 degrees C (80 degrees F) or cooler. Do not store in the refrigerator. Once opened, the insulin can be used for 28 days. After 28 days, the cartridge or disposable pen should be thrown away.  Protect from light and excessive heat. Throw away any unused medicine after the expiration date or after the specified time for  room temperature storage has passed.  What should I tell my health care provider before I take this medicine?  They need to know if you have any of these conditions:  · episodes of hypoglycemia  · kidney disease  · liver disease  · an unusual or allergic reaction to insulin, metacresol, other medicines, foods, dyes, or preservatives  · pregnant or trying to get pregnant  · breast-feeding  What should I watch for while using this medicine?  Visit your health care professional or doctor for regular checks on your progress.  A test called the HbA1C (A1C) will be monitored. This is a simple blood test. It measures your blood sugar control over the last 2 to 3 months. You will receive this test every 3 to 6 months.  Learn how to check your blood sugar. Learn the symptoms of low and high blood sugar and how to manage them.  Always carry a quick-source of sugar with you in case you have symptoms of low blood sugar. Examples include hard sugar candy or glucose tablets. Make sure others know that you can choke if you eat or drink when you develop serious symptoms of low blood sugar, such as seizures or unconsciousness. They must get medical help at once.  Tell your doctor or health care professional if you have high blood sugar. You might need to change the dose of your medicine. If you are sick or exercising more than usual, you might need to change the dose of your medicine.  Do not skip meals. Ask your doctor or health care professional if you should avoid alcohol. Many nonprescription cough and cold products contain sugar or alcohol. These can affect blood sugar.  Make sure that you have the right kind of syringe for the type of insulin you use. Try not to change the brand and type of insulin or syringe unless your health care professional or doctor tells you to. Switching insulin brand or type can cause dangerously high or low blood sugar. Always keep an extra supply of insulin, syringes, and needles on hand. Use a  syringe one time only. Throw away syringe and needle in a closed container to prevent accidental needle sticks.  Insulin pens and cartridges should never be shared. Even if the needle is changed, sharing may result in passing of viruses like hepatitis or HIV.  Wear a medical ID bracelet or chain, and carry a card that describes your disease and details of your medicine and dosage times.  Date Last Reviewed:   NOTE:This sheet is a summary. It may not cover all possible information. If you have questions about this medicine, talk to your doctor, pharmacist, or health care provider. Copyright© 2016 [employer]        Hypoglycemia (Low Blood Sugar)     Fast-acting sugar includes a cup of nonfat milk.     Too little sugar (glucose) in your blood is called hypoglycemia or low blood sugar. Low blood sugar usually means anything lower than 70 mg/dL. Talk with your healthcare provider about your target range and what level is too low for you. Diabetes itself doesnt cause low blood sugar. But some of the treatments for diabetes, such as pills or insulin, may raise your risk for it. Low blood sugar may cause you to pass out or have a seizure. So always treat low blood sugar right away, but don't overeat.  Special note: Always carry a source of fast-acting sugar and a snack in case of hypoglycemia.   What you may notice  If you have low blood sugar, you may have one or more of these symptoms:  · Shakiness or dizziness  · Cold, clammy skin or sweating  · Feelings of hunger  · Headache  · Nervousness  · A hard, fast heartbeat  · Weakness  · Confusion or irritability  · Blurred vision  · Having nightmares or waking up confused or sweating  · Numbness or tingling in the lips or tongue  What you should do  Here are tips to follow if you have hypoglycemia:   · First check your blood sugar. If it is too low (out of your target range), eat or drink 15 to 20 grams of fast-acting sugar. This may be 3 to 4 glucose tablets, 4 ounces  (half a cup) of fruit juice or regular (nondiet) soda, 8 ounces (1 cup) of fat-free milk, or 1 tablespoon of honey. Dont take more than this, or your blood sugar may go too high.  · Wait 15 minutes. Then recheck your blood sugar if you can.  · If your blood sugar is still too low, repeat the steps above and check your blood sugar again. If your blood sugar still has not returned to your target range, contact your healthcare provider or seek emergency care.  · Once your blood sugar returns to target range, eat a snack or meal.  Preventing low blood sugar  Things you can do include the following:   · If your condition needs a strict treatment plan, eat your meals and snacks at the same times each day. Dont skip meals!  · If your treatment plan lets you change when you eat and what you eat, learn how to change the time and dose of your rapid-acting insulin to match this.   · Ask your healthcare provider if it is safe for you to drink alcohol. Never drink on an empty stomach.  · Take your medicine at the prescribed times.  · Always carry a source of fast-acting sugar and a snack when youre away from home.  Other things to do  Additional tips include the following:  · Carry a medical ID card, a compact USB drive, or wear a medical alert bracelet or necklace. It should say that you have diabetes. It should also say what to do if you pass out or have a seizure.  · Make sure your family, friends, and coworkers know the signs of low blood sugar. Tell them what to do if your blood sugar falls very low and you cant treat yourself.  · Keep a glucagon emergency kit handy. Be sure your family, friends, and coworkers know how and when to use it. Check it regularly and replace the glucagon before it expires.  · Talk with your health care team about other things you can do to prevent low blood sugar.     If you have unexplained hypoglycemia or hypoglycemia several times, call your healthcare provider.   Date Last Reviewed:  5/1/2016  © 3639-7435 [employer]. [address] [place] [address], [place] [address]. All rights reserved. This information is not intended as a substitute for professional medical care. Always follow your healthcare professional's instructions.
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
## 2017-06-02 ENCOUNTER — HOSPITAL ENCOUNTER (OUTPATIENT)
Dept: RADIOLOGY | Facility: OTHER | Age: 77
Discharge: HOME | End: 2017-06-02
Attending: PSYCHIATRY & NEUROLOGY
Payer: MEDICARE

## 2017-06-02 DIAGNOSIS — G50.0 TRIGEMINAL NEURALGIA OF RIGHT SIDE OF FACE: ICD-10-CM

## 2017-06-02 PROCEDURE — 25500020 PHARM REV CODE 255: Performed by: PSYCHIATRY & NEUROLOGY

## 2017-06-02 PROCEDURE — A9585 GADOBUTROL INJECTION: HCPCS | Performed by: PSYCHIATRY & NEUROLOGY

## 2017-06-02 PROCEDURE — 70553 MRI BRAIN STEM W/O & W/DYE: CPT | Mod: 26,,, | Performed by: RADIOLOGY

## 2017-06-02 PROCEDURE — 70553 MRI BRAIN STEM W/O & W/DYE: CPT | Mod: TC

## 2017-06-02 PROCEDURE — 70544 MR ANGIOGRAPHY HEAD W/O DYE: CPT | Mod: TC

## 2017-06-02 RX ORDER — GADOBUTROL 604.72 MG/ML
10 INJECTION INTRAVENOUS
Status: COMPLETED | OUTPATIENT
Start: 2017-06-02 | End: 2017-06-02

## 2017-06-02 RX ADMIN — GADOBUTROL 10 ML: 604.72 INJECTION INTRAVENOUS at 02:06

## 2017-06-07 ENCOUNTER — OFFICE VISIT (OUTPATIENT)
Dept: WOUND CARE | Facility: CLINIC | Age: 77
End: 2017-06-07
Payer: MEDICARE

## 2017-06-07 VITALS
SYSTOLIC BLOOD PRESSURE: 162 MMHG | DIASTOLIC BLOOD PRESSURE: 69 MMHG | HEIGHT: 59 IN | TEMPERATURE: 98 F | HEART RATE: 67 BPM | WEIGHT: 261.88 LBS | BODY MASS INDEX: 52.8 KG/M2

## 2017-06-07 DIAGNOSIS — I89.0 LYMPHEDEMA: ICD-10-CM

## 2017-06-07 DIAGNOSIS — L97.911 ULCER OF RIGHT LOWER EXTREMITY, LIMITED TO BREAKDOWN OF SKIN: Primary | ICD-10-CM

## 2017-06-07 DIAGNOSIS — B35.3 TINEA PEDIS OF BOTH FEET: ICD-10-CM

## 2017-06-07 PROCEDURE — 99499 UNLISTED E&M SERVICE: CPT | Mod: S$PBB,,, | Performed by: NURSE PRACTITIONER

## 2017-06-07 PROCEDURE — 99999 PR PBB SHADOW E&M-EST. PATIENT-LVL V: CPT | Mod: PBBFAC,,, | Performed by: NURSE PRACTITIONER

## 2017-06-07 PROCEDURE — 99215 OFFICE O/P EST HI 40 MIN: CPT | Mod: PBBFAC,25 | Performed by: NURSE PRACTITIONER

## 2017-06-07 PROCEDURE — 29580 STRAPPING UNNA BOOT: CPT | Mod: PBBFAC,RT | Performed by: NURSE PRACTITIONER

## 2017-06-07 PROCEDURE — 29580 STRAPPING UNNA BOOT: CPT | Mod: S$PBB,CAST,RT, | Performed by: NURSE PRACTITIONER

## 2017-06-07 NOTE — PROGRESS NOTES
Subjective:       Patient ID: Sue Giordano is a 77 y.o. female.    Chief Complaint: Wound Check    Wound Check     Edema     This patient is seen today for reevaluation of ulcers to both lower leg.  She has had problems with lymphedema for 20 years now.  When her legs swell, she has problems with weeping and blister formation.  She was wearing her circaid stockings when she developed blisters to both lower legs. However, the circaid stockings are a year old. Unna boots were placed bilaterally last week.  The left leg wounds are healed and the right leg wound is healing as evidenced by wound contracture and epithelialization.  She has no pain.  She is afebrile.  She denies increased swelling, redness or purulent drainage.  Her medical history is significant for poorly controlled type II diabetes, lymphedema and chronic kidney disease.    Review of Systems  Unchanged from prior visit.  Objective:      Physical Exam   Constitutional: She is oriented to person, place, and time. She appears well-developed and well-nourished. No distress.   HENT:   Head: Normocephalic and atraumatic.   Neck: Normal range of motion.   Pulmonary/Chest: Effort normal. No respiratory distress.   Musculoskeletal: Normal range of motion. She exhibits edema. She exhibits no tenderness.        Legs:  Neurological: She is alert and oriented to person, place, and time.   Skin: Skin is warm and dry. No rash noted. She is not diaphoretic. No cyanosis or erythema. Nails show no clubbing.   Psychiatric: She has a normal mood and affect. Her behavior is normal. Judgment and thought content normal.   Nursing note and vitals reviewed.    ..  Hemoglobin A1C   Date Value Ref Range Status   05/02/2017 8.3 (H) 4.5 - 6.2 % Final     Comment:     According to ADA guidelines, hemoglobin A1C <7.0% represents  optimal control in non-pregnant diabetic patients.  Different  metrics may apply to specific populations.   Standards of Medical Care in Diabetes -  2016.  For the purpose of screening for the presence of diabetes:  <5.7%     Consistent with the absence of diabetes  5.7-6.4%  Consistent with increasing risk for diabetes   (prediabetes)  >or=6.5%  Consistent with diabetes  Currently no consensus exists for use of hemoglobin A1C  for diagnosis of diabetes for children.     01/31/2017 7.4 (H) 4.5 - 6.2 % Final     Comment:     According to ADA guidelines, hemoglobin A1C <7.0% represents  optimal control in non-pregnant diabetic patients.  Different  metrics may apply to specific populations.   Standards of Medical Care in Diabetes - 2016.  For the purpose of screening for the presence of diabetes:  <5.7%     Consistent with the absence of diabetes  5.7-6.4%  Consistent with increasing risk for diabetes   (prediabetes)  >or=6.5%  Consistent with diabetes  Currently no consensus exists for use of hemoglobin A1C  for diagnosis of diabetes for children.     10/26/2016 7.6 (H) 4.5 - 6.2 % Final     Comment:     According to ADA guidelines, hemoglobin A1C <7.0% represents  optimal control in non-pregnant diabetic patients.  Different  metrics may apply to specific populations.   Standards of Medical Care in Diabetes - 2016.  For the purpose of screening for the presence of diabetes:  <5.7%     Consistent with the absence of diabetes  5.7-6.4%  Consistent with increasing risk for diabetes   (prediabetes)  >or=6.5%  Consistent with diabetes  Currently no consensus exists for use of hemoglobin A1C  for diagnosis of diabetes for children.       Sue Garzon was seen in the clinic room and placed in the supine position on the treatment table.  Both legs were cleansed with Easi-clense sponges and dried thoroughly.  A hydrofiber dressing was applied to the right leg wound.  Eucerin cream was applied to the lower legs.  The patient's foot was positioned at a 90 degree angle.  A zinc oxide wrap, followed by kerlix roll gauze and coban were applied using a spiral technique avoiding  creases or folds.  The wrap was started behind the first metatarsal and ended below the tibial tubercle of the knee.  There was overlap of each turn half the width of the previous turn.  The compression wrap will be changed every 7 days.    Assessment:       1. Ulcer of right lower extremity, limited to breakdown of skin    2. Uncontrolled type 2 diabetes mellitus with complication, with long-term current use of insulin    3. Lymphedema    4. Tinea pedis of both feet        Plan:           Unna boot right lower leg as detailed above.  Kerlix and coban left lower leg for compression.  Patient was warned not to get the dressings wet and to use cast covers for showering.  Should the dressing become wet, she is to remove it, place a wet-to-dry dressing over the wound, cover with gauze and roll gauze and use ace wraps for compression and to secure bandages.  She should then notify this office as soon as possible to have a new dressing applied.  Return to clinic in one week.    Left lateral leg      Left posterior leg        Right medial leg

## 2017-06-14 ENCOUNTER — OFFICE VISIT (OUTPATIENT)
Dept: WOUND CARE | Facility: CLINIC | Age: 77
End: 2017-06-14
Payer: MEDICARE

## 2017-06-14 VITALS
SYSTOLIC BLOOD PRESSURE: 172 MMHG | HEART RATE: 67 BPM | BODY MASS INDEX: 53.18 KG/M2 | HEIGHT: 59 IN | WEIGHT: 263.81 LBS | TEMPERATURE: 98 F | DIASTOLIC BLOOD PRESSURE: 69 MMHG

## 2017-06-14 DIAGNOSIS — I89.0 LYMPHEDEMA: ICD-10-CM

## 2017-06-14 DIAGNOSIS — B35.3 TINEA PEDIS OF BOTH FEET: ICD-10-CM

## 2017-06-14 DIAGNOSIS — L97.911 ULCER OF RIGHT LOWER EXTREMITY, LIMITED TO BREAKDOWN OF SKIN: ICD-10-CM

## 2017-06-14 PROCEDURE — 99215 OFFICE O/P EST HI 40 MIN: CPT | Mod: PBBFAC,25 | Performed by: NURSE PRACTITIONER

## 2017-06-14 PROCEDURE — 29580 STRAPPING UNNA BOOT: CPT | Mod: S$PBB,CAST,RT, | Performed by: NURSE PRACTITIONER

## 2017-06-14 PROCEDURE — 99499 UNLISTED E&M SERVICE: CPT | Mod: S$PBB,,, | Performed by: NURSE PRACTITIONER

## 2017-06-14 PROCEDURE — 29580 STRAPPING UNNA BOOT: CPT | Mod: PBBFAC,RT | Performed by: NURSE PRACTITIONER

## 2017-06-14 PROCEDURE — 99999 PR PBB SHADOW E&M-EST. PATIENT-LVL V: CPT | Mod: PBBFAC,,, | Performed by: NURSE PRACTITIONER

## 2017-06-14 NOTE — PATIENT INSTRUCTIONS
Elevate legs as much as possible. Do not get the dressings wet and use cast covers for showering.  Should the dressing become wet, remove it, place a wet-to-dry dressing over the wound, cover with gauze and roll gauze and use ace wraps for compression and to secure bandages.  Notify this office as soon as possible to have a new dressing applied.

## 2017-06-14 NOTE — PROGRESS NOTES
Subjective:       Patient ID: Sue Giordano is a 77 y.o. female.    Chief Complaint: Wound Check    Wound Check     Edema     This patient is seen today for reevaluation of ulcers to both lower leg.  She has had problems with lymphedema for 20 years now.  When her legs swell, she has problems with weeping and blister formation.  She was wearing her circaid stockings when she developed blisters to both lower legs. However, the circaid stockings are a year old. An Unna boot was placed on the right leg last week.  The wound is healing as evidenced by wound contracture and epithelialization.  Her pain level is 6/10.  She is afebrile.  She denies increased swelling, redness or purulent drainage.  Her medical history is significant for poorly controlled type II diabetes, lymphedema and chronic kidney disease.    Review of Systems  Unchanged from prior visit.  Objective:      Physical Exam   Constitutional: She is oriented to person, place, and time. She appears well-developed and well-nourished. No distress.   HENT:   Head: Normocephalic and atraumatic.   Neck: Normal range of motion.   Pulmonary/Chest: Effort normal. No respiratory distress.   Musculoskeletal: Normal range of motion. She exhibits edema. She exhibits no tenderness.        Legs:  Neurological: She is alert and oriented to person, place, and time.   Skin: Skin is warm and dry. No rash noted. She is not diaphoretic. No cyanosis or erythema. Nails show no clubbing.   Psychiatric: She has a normal mood and affect. Her behavior is normal. Judgment and thought content normal.   Nursing note and vitals reviewed.    ..  Hemoglobin A1C   Date Value Ref Range Status   05/02/2017 8.3 (H) 4.5 - 6.2 % Final     Comment:     According to ADA guidelines, hemoglobin A1C <7.0% represents  optimal control in non-pregnant diabetic patients.  Different  metrics may apply to specific populations.   Standards of Medical Care in Diabetes - 2016.  For the purpose of  screening for the presence of diabetes:  <5.7%     Consistent with the absence of diabetes  5.7-6.4%  Consistent with increasing risk for diabetes   (prediabetes)  >or=6.5%  Consistent with diabetes  Currently no consensus exists for use of hemoglobin A1C  for diagnosis of diabetes for children.     01/31/2017 7.4 (H) 4.5 - 6.2 % Final     Comment:     According to ADA guidelines, hemoglobin A1C <7.0% represents  optimal control in non-pregnant diabetic patients.  Different  metrics may apply to specific populations.   Standards of Medical Care in Diabetes - 2016.  For the purpose of screening for the presence of diabetes:  <5.7%     Consistent with the absence of diabetes  5.7-6.4%  Consistent with increasing risk for diabetes   (prediabetes)  >or=6.5%  Consistent with diabetes  Currently no consensus exists for use of hemoglobin A1C  for diagnosis of diabetes for children.     10/26/2016 7.6 (H) 4.5 - 6.2 % Final     Comment:     According to ADA guidelines, hemoglobin A1C <7.0% represents  optimal control in non-pregnant diabetic patients.  Different  metrics may apply to specific populations.   Standards of Medical Care in Diabetes - 2016.  For the purpose of screening for the presence of diabetes:  <5.7%     Consistent with the absence of diabetes  5.7-6.4%  Consistent with increasing risk for diabetes   (prediabetes)  >or=6.5%  Consistent with diabetes  Currently no consensus exists for use of hemoglobin A1C  for diagnosis of diabetes for children.       Sue Garzon was seen in the clinic room and placed in the supine position on the treatment table.  Both legs were cleansed with Easi-clense sponges and dried thoroughly.  A mepilex foam dressing was applied to the right leg wound.  Eucerin cream was applied to the lower legs.  The patient's foot was positioned at a 90 degree angle.  A zinc oxide wrap, followed by kerlix roll gauze and coban were applied using a spiral technique avoiding creases or folds.  The  wrap was started behind the first metatarsal and ended below the tibial tubercle of the knee.  There was overlap of each turn half the width of the previous turn.  The compression wrap will be changed every 7 days.    Assessment:       1. Uncontrolled type 2 diabetes mellitus with complication, with long-term current use of insulin    2. Ulcer of right lower extremity, limited to breakdown of skin    3. Lymphedema    4. Tinea pedis of both feet        Plan:           Unna boot right lower leg as detailed above.  Kerlix and coban left lower leg for compression.  Patient was warned not to get the dressings wet and to use cast covers for showering.  Should the dressing become wet, she is to remove it, place a wet-to-dry dressing over the wound, cover with gauze and roll gauze and use ace wraps for compression and to secure bandages.  She should then notify this office as soon as possible to have a new dressing applied.  Return to clinic in one week.    Right medial leg

## 2017-06-21 ENCOUNTER — OFFICE VISIT (OUTPATIENT)
Dept: WOUND CARE | Facility: CLINIC | Age: 77
End: 2017-06-21
Payer: MEDICARE

## 2017-06-21 VITALS
BODY MASS INDEX: 53.32 KG/M2 | HEART RATE: 85 BPM | WEIGHT: 264.5 LBS | TEMPERATURE: 98 F | HEIGHT: 59 IN | DIASTOLIC BLOOD PRESSURE: 80 MMHG | SYSTOLIC BLOOD PRESSURE: 175 MMHG

## 2017-06-21 DIAGNOSIS — I89.0 LYMPHEDEMA: ICD-10-CM

## 2017-06-21 DIAGNOSIS — B35.3 TINEA PEDIS OF BOTH FEET: ICD-10-CM

## 2017-06-21 DIAGNOSIS — L60.3 DYSTROPHIC NAIL: ICD-10-CM

## 2017-06-21 PROBLEM — L97.911 ULCER OF RIGHT LOWER EXTREMITY, LIMITED TO BREAKDOWN OF SKIN: Status: RESOLVED | Noted: 2017-05-23 | Resolved: 2017-06-21

## 2017-06-21 PROCEDURE — 3066F NEPHROPATHY DOC TX: CPT | Mod: ,,, | Performed by: NURSE PRACTITIONER

## 2017-06-21 PROCEDURE — 99212 OFFICE O/P EST SF 10 MIN: CPT | Mod: S$PBB,,, | Performed by: NURSE PRACTITIONER

## 2017-06-21 PROCEDURE — 1126F AMNT PAIN NOTED NONE PRSNT: CPT | Mod: ,,, | Performed by: NURSE PRACTITIONER

## 2017-06-21 PROCEDURE — 1159F MED LIST DOCD IN RCRD: CPT | Mod: ,,, | Performed by: NURSE PRACTITIONER

## 2017-06-21 PROCEDURE — 3045F PR MOST RECENT HEMOGLOBIN A1C LEVEL 7.0-9.0%: CPT | Mod: ,,, | Performed by: NURSE PRACTITIONER

## 2017-06-21 PROCEDURE — 99215 OFFICE O/P EST HI 40 MIN: CPT | Mod: PBBFAC | Performed by: NURSE PRACTITIONER

## 2017-06-21 PROCEDURE — 99999 PR PBB SHADOW E&M-EST. PATIENT-LVL V: CPT | Mod: PBBFAC,,, | Performed by: NURSE PRACTITIONER

## 2017-06-21 NOTE — PROGRESS NOTES
Subjective:       Patient ID: Sue Giordano is a 77 y.o. female.    Chief Complaint: Wound Check    Wound Check     Edema     This patient is seen today for reevaluation of ulcers to both lower leg.  She has had problems with lymphedema for 20 years now.  When her legs swell, she has problems with weeping and blister formation.  She was wearing her circaid stockings when she developed blisters to both lower legs. However, the circaid stockings are a year old. An Unna boot was placed on the right leg last week.  The wound is now healed.  She does not have any pain.  She is afebrile.  She denies increased swelling, redness or purulent drainage.  Her medical history is significant for poorly controlled type II diabetes, lymphedema and chronic kidney disease.    Review of Systems  Unchanged from prior visit.  Objective:      Physical Exam   Constitutional: She is oriented to person, place, and time. She appears well-developed and well-nourished. No distress.   HENT:   Head: Normocephalic and atraumatic.   Neck: Normal range of motion.   Pulmonary/Chest: Effort normal. No respiratory distress.   Musculoskeletal: Normal range of motion. She exhibits edema. She exhibits no tenderness.        Legs:  Neurological: She is alert and oriented to person, place, and time.   Skin: Skin is warm and dry. No rash noted. She is not diaphoretic. No cyanosis or erythema. Nails show no clubbing.   Psychiatric: She has a normal mood and affect. Her behavior is normal. Judgment and thought content normal.   Nursing note and vitals reviewed.    ..  Hemoglobin A1C   Date Value Ref Range Status   05/02/2017 8.3 (H) 4.5 - 6.2 % Final     Comment:     According to ADA guidelines, hemoglobin A1C <7.0% represents  optimal control in non-pregnant diabetic patients.  Different  metrics may apply to specific populations.   Standards of Medical Care in Diabetes - 2016.  For the purpose of screening for the presence of diabetes:  <5.7%      Consistent with the absence of diabetes  5.7-6.4%  Consistent with increasing risk for diabetes   (prediabetes)  >or=6.5%  Consistent with diabetes  Currently no consensus exists for use of hemoglobin A1C  for diagnosis of diabetes for children.     01/31/2017 7.4 (H) 4.5 - 6.2 % Final     Comment:     According to ADA guidelines, hemoglobin A1C <7.0% represents  optimal control in non-pregnant diabetic patients.  Different  metrics may apply to specific populations.   Standards of Medical Care in Diabetes - 2016.  For the purpose of screening for the presence of diabetes:  <5.7%     Consistent with the absence of diabetes  5.7-6.4%  Consistent with increasing risk for diabetes   (prediabetes)  >or=6.5%  Consistent with diabetes  Currently no consensus exists for use of hemoglobin A1C  for diagnosis of diabetes for children.     10/26/2016 7.6 (H) 4.5 - 6.2 % Final     Comment:     According to ADA guidelines, hemoglobin A1C <7.0% represents  optimal control in non-pregnant diabetic patients.  Different  metrics may apply to specific populations.   Standards of Medical Care in Diabetes - 2016.  For the purpose of screening for the presence of diabetes:  <5.7%     Consistent with the absence of diabetes  5.7-6.4%  Consistent with increasing risk for diabetes   (prediabetes)  >or=6.5%  Consistent with diabetes  Currently no consensus exists for use of hemoglobin A1C  for diagnosis of diabetes for children.         Assessment:       1. Uncontrolled type 2 diabetes mellitus with complication, with long-term current use of insulin    2. Tinea pedis of both feet    3. Lymphedema    4. Dystrophic nail        Plan:           Refer to podiatry for diabetic foot evaluation and nail reduction.  Pad ankles with ABD pad.  Kerlix and coban bilateral lower legs to prevent recurrence of wounds.  Prescription given for 20-30 mmHg knee high compression hose with a zipper.  Return to clinic as needed.    Right medial leg  healed

## 2017-06-27 ENCOUNTER — OFFICE VISIT (OUTPATIENT)
Dept: NEUROLOGY | Facility: CLINIC | Age: 77
End: 2017-06-27
Payer: COMMERCIAL

## 2017-06-27 VITALS
WEIGHT: 262.13 LBS | HEIGHT: 59 IN | DIASTOLIC BLOOD PRESSURE: 74 MMHG | BODY MASS INDEX: 52.84 KG/M2 | HEART RATE: 83 BPM | SYSTOLIC BLOOD PRESSURE: 172 MMHG

## 2017-06-27 DIAGNOSIS — I63.89 CEREBRAL INFARCTION DUE TO OTHER MECHANISM: Primary | ICD-10-CM

## 2017-06-27 DIAGNOSIS — G50.0 TRIGEMINAL NEURALGIA OF RIGHT SIDE OF FACE: ICD-10-CM

## 2017-06-27 PROCEDURE — 1159F MED LIST DOCD IN RCRD: CPT | Mod: S$GLB,,, | Performed by: PSYCHIATRY & NEUROLOGY

## 2017-06-27 PROCEDURE — 99213 OFFICE O/P EST LOW 20 MIN: CPT | Mod: PBBFAC | Performed by: PSYCHIATRY & NEUROLOGY

## 2017-06-27 PROCEDURE — 1125F AMNT PAIN NOTED PAIN PRSNT: CPT | Mod: S$GLB,,, | Performed by: PSYCHIATRY & NEUROLOGY

## 2017-06-27 PROCEDURE — 99214 OFFICE O/P EST MOD 30 MIN: CPT | Mod: S$GLB,,, | Performed by: PSYCHIATRY & NEUROLOGY

## 2017-06-27 PROCEDURE — 99999 PR PBB SHADOW E&M-EST. PATIENT-LVL III: CPT | Mod: PBBFAC,,, | Performed by: PSYCHIATRY & NEUROLOGY

## 2017-06-27 NOTE — PATIENT INSTRUCTIONS
Physical Activity in the pool 30 minutes for 5 days a week   Continue Statin and ASA  Hypertension/Diabetes/Lipids management with PCP

## 2017-06-27 NOTE — PROGRESS NOTES
Subjective:       Patient ID: Sue Giordano is a 77 y.o. female.    Reason for Consult: Facial Pain      Interval History:  Sue Giordano is here for follow up. Their condition she notes that this has diminished.  We had previously spoken about this may be possibly related to a dental procedure.  We have reviewed her MRI on today's visit discussing fact that there is no space-occupying lesion or mass in the brain that would be responsible for her current symptoms.     Objective:     Vitals:    06/27/17 1532   BP: (!) 172/74   Pulse: 83     Patient is awake alert oriented to person place and time.  Moves all 4 extremities against gravity.  Gait and station within normal limits.  There is no allodynia or hyperpathia on sensation of the face.  Focused examination was undertaken today. Over 50% of face to face time of 25 minute visit time was in giving guidance, counseling and discussing treatment options.    I personally reviewed the patient's imaging and relayed my impression to her.  Results for orders placed or performed during the hospital encounter of 06/02/17   MRA Brain without contrast    Narrative    CLINICAL INDICATION: 77 year old F with Trigeminal Neuralgia of the right side of the face, refractory to treatment, concern for brainstem lesion or compression of cranial nerve roots     TECHNIQUE: MRI of the trigeminal nerves with and without contrast and noncontrast MRA of the brain. Multiplanar multisequence thin sequence MR imaging of the trigeminal nerves was performed before and after the administration of 10 mL Gadavist intravenous contrast. Additional limited whole brain imaging was also performed. Noncontrast 3D time-of-flight intracranial MR angiography was performed through the [facility] with MIP reformatting.    COMPARISON: Head CT 12/22/15.  Maxillofacial CT 02/24/16.    FINDINGS:    Examination is overall mildly degraded related to motion artifact.       Cisternal trigeminal  nerves: No obvious morphologic abnormality or abnormal enhancement.      Remainder of Intracranial compartment:    Ventricles and sulci are normal in size for age without evidence of hydrocephalus. No extra-axial blood or fluid collections.    Moderate patchy and confluent periventricular and subcortical white matter T2/FLAIR signal hyperintensity as well as mild central pontine T2/low signal hyperintensity likely relates to sequela of chronic microvascular ischemic disease. No mass lesion, acute hemorrhage, edema or acute infarct. No abnormal enhancement.      Anterior intracranial circulation: No high-grade focal stenosis, occlusion, aneurysm, or vascular malformation. Bilateral posterior committing arteries are present.    Posterior intracranial circulation: No high-grade focal stenosis, occlusion, aneurysm, or vascular malformation. Left vertebral artery is dominant.      Skull/extracranial contents (limited evaluation): Bone marrow signal intensity is normal.    Impression    Mildly motion degraded examination without significant abnormality, apart from findings of chronic microvascular ischemic disease.      Electronically signed by: NATE CARTER  Date:     06/02/17  Time:    16:45    Results for orders placed or performed during the hospital encounter of 06/02/17   MRI Brain W WO Contrast    Narrative    CLINICAL INDICATION: 77 year old F with Trigeminal Neuralgia of the right side of the face, refractory to treatment, concern for brainstem lesion or compression of cranial nerve roots     TECHNIQUE: MRI of the trigeminal nerves with and without contrast and noncontrast MRA of the brain. Multiplanar multisequence thin sequence MR imaging of the trigeminal nerves was performed before and after the administration of 10 mL Gadavist intravenous contrast. Additional limited whole brain imaging was also performed. Noncontrast 3D time-of-flight intracranial MR angiography was performed through the [facility] with  MIP reformatting.    COMPARISON: Head CT 12/22/15.  Maxillofacial CT 02/24/16.    FINDINGS:    Examination is overall mildly degraded related to motion artifact.       Cisternal trigeminal nerves: No obvious morphologic abnormality or abnormal enhancement.      Remainder of Intracranial compartment:    Ventricles and sulci are normal in size for age without evidence of hydrocephalus. No extra-axial blood or fluid collections.    Moderate patchy and confluent periventricular and subcortical white matter T2/FLAIR signal hyperintensity as well as mild central pontine T2/low signal hyperintensity likely relates to sequela of chronic microvascular ischemic disease. No mass lesion, acute hemorrhage, edema or acute infarct. No abnormal enhancement.      Anterior intracranial circulation: No high-grade focal stenosis, occlusion, aneurysm, or vascular malformation. Bilateral posterior committing arteries are present.    Posterior intracranial circulation: No high-grade focal stenosis, occlusion, aneurysm, or vascular malformation. Left vertebral artery is dominant.      Skull/extracranial contents (limited evaluation): Bone marrow signal intensity is normal.    Impression    Mildly motion degraded examination without significant abnormality, apart from findings of chronic microvascular ischemic disease.      Electronically signed by: NATE CARTER  Date:     06/02/17  Time:    16:45    Results for orders placed or performed during the hospital encounter of 12/22/15   CT Head Without Contrast    Narrative    CT brain without contrast.    Comparison: None     Technique: Multiple 5 mm axial images of the head were obtained without intravenous contrast.    Findings: Small focal areas of hypoattenuation involving the right insula and left putamen suggestive of prominent perivascular spaces versus lacunar type infarcts. no evidence for acute intracranial hemorrhage or sulcal effacement. The ventricles are normal in size and  configuration without evidence for hydrocephalus.  There is no midline shift or mass effect. The visualized paranasal sinuses and mastoid air cells are clear.    Impression    No acute intracranial abnormality. No intracranial hemorrhage. Further evaluation as warranted clinically.    Small foci of hypoattenuation involving the right insula and left putamen suggestive of lacunar type infarct versus prominent perivascular spaces.      Electronically signed by: LATASHA HERNANDEZ DO  Date:     12/22/15  Time:    13:21        Assessment/Plan:        Problem List Items Addressed This Visit        Neuro    Cerebral infarction - Primary    Overview     Multiple areas of lacunar infarction. Stroke risk factors include HTN, DM2, Dyslipidemia.   Continue ASA 81mg/ Statin therapy  I have encouraged 30 minutes of physical activity daily for 5 days a week. She has access to a pool so this should not be so jarring to her joints.          Trigeminal neuralgia of right side of face    Overview     For years now  Previously on Gabapentin, but caused constipation  Dissipating over time  Not related to intracranial abnormalities  Possibly related to dental procedure         Current Assessment & Plan     Symptoms improving, continue to monitor clinically           Other Visit Diagnoses    None.         I have discussed the patient's imaging with her at great length today it does appear that she has significant problems with chronic microvascular ischemic change.  There are multiple areas of gliosis.  We have had a long discussion today about her stroke risk factors.  I have encouraged physical activity per the American Heart Association and American stroke Association.  I will give her time to active these healthy lifestyle changes and see her back approximately 6 months from now.  With regards to her multiple stroke risk factors I will defer to primary care doctor about blood pressure control and diabetes control.  She can continue her  aspirin and her statin therapy.  I will follow up with them in 6 month(s).  The patient verbalizes understanding and agreement with the treatment plan. I have discussed risks, benefits and alternatives to the treatment plan. Questions were sought and answered to her stated verbal satisfaction.        Ronald Viramontes MD    This note is dictated on Dragon Natural Speaking word recognition program. There are word recognition mistakes that are occasionally missed on review.

## 2017-06-29 ENCOUNTER — OFFICE VISIT (OUTPATIENT)
Dept: PODIATRY | Facility: CLINIC | Age: 77
End: 2017-06-29
Payer: MEDICARE

## 2017-06-29 VITALS
BODY MASS INDEX: 53.02 KG/M2 | WEIGHT: 263 LBS | SYSTOLIC BLOOD PRESSURE: 175 MMHG | HEART RATE: 69 BPM | HEIGHT: 59 IN | DIASTOLIC BLOOD PRESSURE: 70 MMHG | RESPIRATION RATE: 18 BRPM

## 2017-06-29 DIAGNOSIS — I89.0 LYMPHEDEMA: ICD-10-CM

## 2017-06-29 DIAGNOSIS — B35.1 ONYCHOMYCOSIS DUE TO DERMATOPHYTE: ICD-10-CM

## 2017-06-29 DIAGNOSIS — L84 CORN OR CALLUS: ICD-10-CM

## 2017-06-29 DIAGNOSIS — E11.51 TYPE II DIABETES MELLITUS WITH PERIPHERAL CIRCULATORY DISORDER: Primary | ICD-10-CM

## 2017-06-29 PROCEDURE — 99999 PR PBB SHADOW E&M-EST. PATIENT-LVL III: CPT | Mod: PBBFAC,,, | Performed by: PODIATRIST

## 2017-06-29 PROCEDURE — 11721 DEBRIDE NAIL 6 OR MORE: CPT | Mod: 59,PBBFAC | Performed by: PODIATRIST

## 2017-06-29 PROCEDURE — 11056 PARNG/CUTG B9 HYPRKR LES 2-4: CPT | Mod: S$PBB,Q9,, | Performed by: PODIATRIST

## 2017-06-29 PROCEDURE — 99213 OFFICE O/P EST LOW 20 MIN: CPT | Mod: PBBFAC,25 | Performed by: PODIATRIST

## 2017-06-29 PROCEDURE — 11056 PARNG/CUTG B9 HYPRKR LES 2-4: CPT | Mod: PBBFAC | Performed by: PODIATRIST

## 2017-06-29 PROCEDURE — 99499 UNLISTED E&M SERVICE: CPT | Mod: S$PBB,,, | Performed by: PODIATRIST

## 2017-06-29 PROCEDURE — 11721 DEBRIDE NAIL 6 OR MORE: CPT | Mod: 59,Q9,S$PBB, | Performed by: PODIATRIST

## 2017-06-29 NOTE — LETTER
June 29, 2017      Kellie Blue, ROSALIO  [address]  [facility] [address]           [facility]  [address]  [facility] [address]  Phone: [phone]          Patient: Sue Giordano   MR Number: [id number]   YOB: 1940   Date of Visit: 6/29/2017       Dear Kellie Blue:    Thank you for referring Sue Giordano to me for evaluation. Attached you will find relevant portions of my assessment and plan of care.    If you have questions, please do not hesitate to call me. I look forward to following Sue Giordano along with you.    Sincerely,    Rhonda Anand, EHSAN    Enclosure  CC:  No Recipients    If you would like to receive this communication electronically, please contact [email or web address] or [phone] to request more information on [facility] access.    For providers and/or their staff who would like to refer a patient to [facility], please contact us through our one-stop-shop provider referral line, [facility], at [phone].    If you feel you have received this communication in error or would no longer like to receive these types of communications, please e-mail [email or web address]

## 2017-06-29 NOTE — PROGRESS NOTES
Subjective:      Patient ID: Sue Giordano is a 77 y.o. female.    Chief Complaint: PCP (Fei Hickman MD 5/18/17); Diabetic Foot Exam; Nail Care; and Foot Swelling    Sue Garzon is a 77 y.o. female who presents to the clinic for evaluation and treatment of high risk feet. Sue Garzon has a past medical history of Adrenal mass; Anemia; Arthritis; Asthma; CKD (chronic kidney disease) stage 2, GFR 60-89 ml/min; Diabetes mellitus; Diabetes mellitus type II; Diastolic dysfunction (10/10/2013); GERD (gastroesophageal reflux disease) (8/13/2012); Gout; Hep B w/ coma (1971); Hives (10/1/2013); Hyperlipidemia; Hypertension; Morbid obesity with BMI of 50.0-59.9, adult (2/11/2014); Obesity; Obesity; Other specified anemias (6/25/2015); Sleep apnea; and Unspecified essential hypertension (6/25/2015). The patient's chief complaint is long, thick toenails. This patient has documented high risk feet requiring routine maintenance secondary to diabetes mellitis and those secondary complications of diabetes, as mentioned..    PCP: Vale Howard MD    Date Last Seen by PCP:   Chief Complaint   Patient presents with    PCP     Fei Hickman MD 5/18/17    Diabetic Foot Exam    Nail Care    Foot Swelling       Hemoglobin A1C   Date Value Ref Range Status   05/02/2017 8.3 (H) 4.5 - 6.2 % Final     Comment:     According to ADA guidelines, hemoglobin A1C <7.0% represents  optimal control in non-pregnant diabetic patients.  Different  metrics may apply to specific populations.   Standards of Medical Care in Diabetes - 2016.  For the purpose of screening for the presence of diabetes:  <5.7%     Consistent with the absence of diabetes  5.7-6.4%  Consistent with increasing risk for diabetes   (prediabetes)  >or=6.5%  Consistent with diabetes  Currently no consensus exists for use of hemoglobin A1C  for diagnosis of diabetes for children.     01/31/2017 7.4 (H) 4.5 - 6.2 % Final     Comment:     According to ADA guidelines,  hemoglobin A1C <7.0% represents  optimal control in non-pregnant diabetic patients.  Different  metrics may apply to specific populations.   Standards of Medical Care in Diabetes - 2016.  For the purpose of screening for the presence of diabetes:  <5.7%     Consistent with the absence of diabetes  5.7-6.4%  Consistent with increasing risk for diabetes   (prediabetes)  >or=6.5%  Consistent with diabetes  Currently no consensus exists for use of hemoglobin A1C  for diagnosis of diabetes for children.     10/26/2016 7.6 (H) 4.5 - 6.2 % Final     Comment:     According to ADA guidelines, hemoglobin A1C <7.0% represents  optimal control in non-pregnant diabetic patients.  Different  metrics may apply to specific populations.   Standards of Medical Care in Diabetes - 2016.  For the purpose of screening for the presence of diabetes:  <5.7%     Consistent with the absence of diabetes  5.7-6.4%  Consistent with increasing risk for diabetes   (prediabetes)  >or=6.5%  Consistent with diabetes  Currently no consensus exists for use of hemoglobin A1C  for diagnosis of diabetes for children.         Review of Systems   Constitution: Negative for chills, decreased appetite and fever.   Cardiovascular: Positive for leg swelling.   Skin: Positive for dry skin, nail changes and poor wound healing.   Musculoskeletal: Negative for arthritis, back pain, joint pain, joint swelling and myalgias.   Gastrointestinal: Negative for nausea and vomiting.   Neurological: Negative for loss of balance, numbness and paresthesias.           Objective:      Physical Exam   Constitutional: She is oriented to person, place, and time. She appears well-developed and well-nourished.   Cardiovascular:   Pulses:       Dorsalis pedis pulses are 2+ on the right side, and 2+ on the left side.        Posterior tibial pulses are 2+ on the right side, and 2+ on the left side.   Musculoskeletal: She exhibits no edema or tenderness.        Right ankle: Normal.         Left ankle: Normal.        Right foot: There is no swelling, no crepitus and no deformity.        Left foot: There is no swelling, no crepitus and no deformity.   Adequate joint range of motion without pain, limitation, nor crepitation Bilateral feet and ankle joints. Muscle strength is 5/5 in all groups bilaterally.         Lymphadenopathy:   B/l lymph edema    Neurological: She is alert and oriented to person, place, and time. She has normal strength.   Skin: Skin is warm, dry and intact. No rash noted. No erythema. Nails show no clubbing.   Nails x10 are elongated by  2-4mm's, thickened by 2-7 mm's, dystrophic, and are darkened in  coloration . Xerosis Bilaterally. No open lesions noted.    Hyperkeratotic tissue noted to medial HIPJ b/l      Psychiatric: She has a normal mood and affect. Her behavior is normal.             Assessment:       Encounter Diagnoses   Name Primary?    Type II diabetes mellitus with peripheral circulatory disorder Yes    Lymphedema     Corn or callus     Onychomycosis due to dermatophyte          Plan:       Sue Garzon was seen today for pcp, diabetic foot exam, nail care and foot swelling.    Diagnoses and all orders for this visit:    Type II diabetes mellitus with peripheral circulatory disorder    Lymphedema    Corn or callus    Onychomycosis due to dermatophyte      I counseled the patient on her conditions, their implications and medical management.        - Shoe inspection. Diabetic Foot Education. Patient reminded of the importance of good nutrition and blood sugar control to help prevent podiatric complications of diabetes. Patient instructed on proper foot hygeine. We discussed wearing proper shoe gear, daily foot inspections, never walking without protective shoe gear, never putting sharp instruments to feet, routine podiatric nail visits every 2-3 months.      - With patient's permission, nails were aggressively reduced and debrided x 10 to their soft tissue attachment  mechanically and with electric , removing all offending nail and debris. Patient relates relief following the procedure. She will continue to monitor the areas daily, inspect her feet, wear protective shoe gear when ambulatory, moisturizer to maintain skin integrity and follow in this office in approximately 2-3 months, sooner p.r.n.    - After cleansing the  area w/ alcohol prep pad the above mentioned hyperkeratosis was trimmed utilizing No 15 scapel, to a smooth base with out incident. Patient tolerated this  well and reported comfort to the area of  medial HIPJ b/l

## 2017-07-03 ENCOUNTER — TELEPHONE (OUTPATIENT)
Dept: ENDOCRINOLOGY | Facility: CLINIC | Age: 77
End: 2017-07-03

## 2017-07-03 NOTE — TELEPHONE ENCOUNTER
Called a pt regarding needles Rx. Pt need a long needle to enough to hold her insulin to her skin. Called in Rx for long needle to the pharmacy for pt.

## 2017-07-05 ENCOUNTER — TELEPHONE (OUTPATIENT)
Dept: ENDOCRINOLOGY | Facility: CLINIC | Age: 77
End: 2017-07-05

## 2017-07-05 NOTE — TELEPHONE ENCOUNTER
----- Message from Yunier Martínez sent at 7/5/2017  4:12 PM CDT -----  Contact: [employer]  Requesting a call back in regards to the patient medication.    [phone] (Phone)  [phone] (Fax)

## 2017-07-14 ENCOUNTER — OFFICE VISIT (OUTPATIENT)
Dept: PHYSICAL MEDICINE AND REHAB | Facility: CLINIC | Age: 77
End: 2017-07-14
Payer: MEDICARE

## 2017-07-14 VITALS
HEIGHT: 59 IN | DIASTOLIC BLOOD PRESSURE: 71 MMHG | SYSTOLIC BLOOD PRESSURE: 159 MMHG | BODY MASS INDEX: 53.22 KG/M2 | HEART RATE: 77 BPM | WEIGHT: 264 LBS

## 2017-07-14 DIAGNOSIS — E66.01 MORBID OBESITY WITH BMI OF 50.0-59.9, ADULT: ICD-10-CM

## 2017-07-14 DIAGNOSIS — G89.29 CHRONIC LEFT-SIDED LOW BACK PAIN, WITH SCIATICA PRESENCE UNSPECIFIED: Primary | ICD-10-CM

## 2017-07-14 DIAGNOSIS — M17.12 PRIMARY OSTEOARTHRITIS OF LEFT KNEE: ICD-10-CM

## 2017-07-14 DIAGNOSIS — M54.16 LEFT LUMBAR RADICULOPATHY: ICD-10-CM

## 2017-07-14 DIAGNOSIS — Z96.651 STATUS POST TOTAL KNEE REPLACEMENT, RIGHT: ICD-10-CM

## 2017-07-14 DIAGNOSIS — M54.5 CHRONIC LEFT-SIDED LOW BACK PAIN, WITH SCIATICA PRESENCE UNSPECIFIED: Primary | ICD-10-CM

## 2017-07-14 DIAGNOSIS — M47.816 LUMBAR FACET ARTHROPATHY: ICD-10-CM

## 2017-07-14 DIAGNOSIS — M48.061 LUMBAR SPINAL STENOSIS: ICD-10-CM

## 2017-07-14 PROCEDURE — 99999 PR PBB SHADOW E&M-EST. PATIENT-LVL III: CPT | Mod: PBBFAC,,, | Performed by: PHYSICAL MEDICINE & REHABILITATION

## 2017-07-14 PROCEDURE — 99213 OFFICE O/P EST LOW 20 MIN: CPT | Mod: S$PBB,,, | Performed by: PHYSICAL MEDICINE & REHABILITATION

## 2017-07-14 PROCEDURE — 1126F AMNT PAIN NOTED NONE PRSNT: CPT | Mod: ,,, | Performed by: PHYSICAL MEDICINE & REHABILITATION

## 2017-07-14 PROCEDURE — 99213 OFFICE O/P EST LOW 20 MIN: CPT | Mod: PBBFAC | Performed by: PHYSICAL MEDICINE & REHABILITATION

## 2017-07-14 PROCEDURE — 1159F MED LIST DOCD IN RCRD: CPT | Mod: ,,, | Performed by: PHYSICAL MEDICINE & REHABILITATION

## 2017-07-14 RX ORDER — DULOXETIN HYDROCHLORIDE 30 MG/1
30 CAPSULE, DELAYED RELEASE ORAL 2 TIMES DAILY
Qty: 60 CAPSULE | Refills: 3 | Status: SHIPPED | OUTPATIENT
Start: 2017-07-14 | End: 2017-08-31

## 2017-07-14 NOTE — PROGRESS NOTES
Subjective:       Patient ID: Sue Giordano is a 77 y.o. female.    Chief Complaint: No chief complaint on file.    HPI     HISTORY OF PRESENT ILLNESS:  Sister Pasquale is a 77-year-old black female with   past medical history of HTN, DM, asthma, OA status post right TKA and morbid   obesity.  She is followed up in the Physical Medicine Clinic for chronic low   back pain with history of lumbar radiculopathy and OA of the left knee.  Her   last visit was on 04/07/2017.  She was maintained on Cymbalta and p.r.n.    Tylenol.    The patient is coming today to the clinic for followup.  Her back pain has been   under fair control.  It is an intermittent aching pain in the lumbar spine.  She   denies any radiation to her legs.  Her pain is worse with activity and better   with rest.  Her maximum pain is 7-8/10 and minimum 3-4/10.  Today, it is 5-6/10.    The patient denies any change in her lower extremity strength.  She denies any   bowel or bladder incontinence.  She denies any leg numbness.    Her left knee pain has been recently worse.  She did, however, have some   increased activity with walking.  Her maximum pain is 7-8/10 and minimum 3-4/10.    Today, it is 5-6/10.  The patient denies any swelling or warmth of her knee.    She has been ambulating using a Rollator walker.    She is currently taking Cymbalta 60 mg p.o. once per day.  She believes it   helps, but making her sleepy during the day even when she takes it in the   evening.  She takes Tylenol 500 mg p.r.n., usually one tablet three times per   day.      MS/HN  dd: 07/14/2017 11:59:56 (CDT)  td: 07/15/2017 09:09:55 (CDT)  Doc ID   #[id number]  Job ID #[id number]    CC:           Review of Systems   Constitutional: Negative for fatigue.   Respiratory: Negative for chest tightness.    Cardiovascular: Negative for chest pain.   Gastrointestinal: Negative for blood in stool, constipation, nausea and vomiting.   Genitourinary: Negative for difficulty urinating.    Musculoskeletal: Positive for arthralgias, back pain and gait problem. Negative for joint swelling and neck pain.   Skin: Negative for rash.   Neurological: Negative for dizziness and headaches.   Psychiatric/Behavioral: Negative for behavioral problems and sleep disturbance.       Objective:      Physical Exam   Constitutional: She appears well-developed and well-nourished. No distress.   Coming to the clinic in a manual wheelchair propelled by medical assistant.     HENT:   Head: Normocephalic and atraumatic.   Neck: Normal range of motion.   Musculoskeletal:   BLE:  ROM decreased at hips & knees (b/o girth & BLE edema).  Healed Rt TKA scar.  Strength:      RLE: 5 to 5-/5 at hip flexion, knee extension, ankle DF/PF.     LLE:  5 to 5-/5 at hip flexion, knee extension, ankle DF/PF.  Sensation to pinprick:     RLE: intact.      LLE: intact.   SLR (siting):      RLE: -ve.      LLE: -ve.     -ve tenderness over lumbar spine.           Neurological: She is alert.   Skin: Skin is warm.   Psychiatric: She has a normal mood and affect.   Vitals reviewed.            Assessment:       1. Chronic left-sided low back pain, with sciatica presence unspecified    2. Lumbar facet arthropathy    3. Lumbar spinal stenosis    4. Left lumbar radiculopathy    5. Primary osteoarthritis of left knee    6. Status post total knee replacement, right    7. Morbid obesity with BMI of 50.0-59.9, adult        Plan:       - Change duloxetine (CYMBALTA) 30 MG capsule; Take 1 capsule (30 mg total) by mouth 2 (two) times daily. May lower it to once daily as instructed if sedation persists  - Increase Tylenol 500 mg, 1-2 po tid prn for pain. If no relief.  - Weight loss was again encouraged.  - Return in about 3 months (around 10/14/2017).

## 2017-07-21 ENCOUNTER — OFFICE VISIT (OUTPATIENT)
Dept: INTERNAL MEDICINE | Facility: CLINIC | Age: 77
End: 2017-07-21
Payer: COMMERCIAL

## 2017-07-21 ENCOUNTER — HOSPITAL ENCOUNTER (OUTPATIENT)
Dept: RADIOLOGY | Facility: HOSPITAL | Age: 77
Discharge: HOME | End: 2017-07-21
Attending: INTERNAL MEDICINE
Payer: MEDICARE

## 2017-07-21 VITALS
WEIGHT: 271.19 LBS | HEART RATE: 75 BPM | OXYGEN SATURATION: 98 % | SYSTOLIC BLOOD PRESSURE: 150 MMHG | HEIGHT: 60 IN | BODY MASS INDEX: 53.24 KG/M2 | DIASTOLIC BLOOD PRESSURE: 70 MMHG

## 2017-07-21 DIAGNOSIS — I25.10 CORONARY ARTERY DISEASE DUE TO CALCIFIED CORONARY LESION: Chronic | ICD-10-CM

## 2017-07-21 DIAGNOSIS — I89.0 LYMPHEDEMA: ICD-10-CM

## 2017-07-21 DIAGNOSIS — I50.33 ACUTE ON CHRONIC DIASTOLIC CONGESTIVE HEART FAILURE: Primary | ICD-10-CM

## 2017-07-21 DIAGNOSIS — N18.30 CKD (CHRONIC KIDNEY DISEASE) STAGE 3, GFR 30-59 ML/MIN: ICD-10-CM

## 2017-07-21 DIAGNOSIS — E27.8 ADRENAL MASS: ICD-10-CM

## 2017-07-21 DIAGNOSIS — I10 ESSENTIAL HYPERTENSION: ICD-10-CM

## 2017-07-21 DIAGNOSIS — I25.84 CORONARY ARTERY DISEASE DUE TO CALCIFIED CORONARY LESION: Chronic | ICD-10-CM

## 2017-07-21 DIAGNOSIS — G47.33 OBSTRUCTIVE SLEEP APNEA SYNDROME: ICD-10-CM

## 2017-07-21 DIAGNOSIS — E78.2 MIXED HYPERLIPIDEMIA: ICD-10-CM

## 2017-07-21 DIAGNOSIS — E11.29 TYPE 2 DIABETES MELLITUS WITH RENAL MANIFESTATIONS NOT AT GOAL: ICD-10-CM

## 2017-07-21 DIAGNOSIS — I51.89 DIASTOLIC DYSFUNCTION: ICD-10-CM

## 2017-07-21 DIAGNOSIS — E66.01 MORBID OBESITY DUE TO EXCESS CALORIES: ICD-10-CM

## 2017-07-21 DIAGNOSIS — J45.909 ASTHMA IN ADULT WITHOUT COMPLICATION: ICD-10-CM

## 2017-07-21 DIAGNOSIS — I77.9 BILATERAL CAROTID ARTERY DISEASE: ICD-10-CM

## 2017-07-21 DIAGNOSIS — R07.9 CHEST PAIN, UNSPECIFIED TYPE: ICD-10-CM

## 2017-07-21 PROBLEM — G50.1 ATYPICAL FACIAL PAIN: Status: RESOLVED | Noted: 2017-05-23 | Resolved: 2017-07-21

## 2017-07-21 PROCEDURE — 99215 OFFICE O/P EST HI 40 MIN: CPT | Mod: PBBFAC,25 | Performed by: INTERNAL MEDICINE

## 2017-07-21 PROCEDURE — 1159F MED LIST DOCD IN RCRD: CPT | Mod: S$GLB,,, | Performed by: INTERNAL MEDICINE

## 2017-07-21 PROCEDURE — 3066F NEPHROPATHY DOC TX: CPT | Mod: S$GLB,,, | Performed by: INTERNAL MEDICINE

## 2017-07-21 PROCEDURE — 99215 OFFICE O/P EST HI 40 MIN: CPT | Mod: S$GLB,,, | Performed by: INTERNAL MEDICINE

## 2017-07-21 PROCEDURE — 93010 ELECTROCARDIOGRAM REPORT: CPT | Mod: S$GLB,,, | Performed by: INTERNAL MEDICINE

## 2017-07-21 PROCEDURE — 99999 PR PBB SHADOW E&M-EST. PATIENT-LVL V: CPT | Mod: PBBFAC,,, | Performed by: INTERNAL MEDICINE

## 2017-07-21 PROCEDURE — 94640 AIRWAY INHALATION TREATMENT: CPT | Mod: PBBFAC

## 2017-07-21 PROCEDURE — 71020 XR CHEST PA AND LATERAL: CPT | Mod: 26,,, | Performed by: RADIOLOGY

## 2017-07-21 PROCEDURE — 3045F PR MOST RECENT HEMOGLOBIN A1C LEVEL 7.0-9.0%: CPT | Mod: S$GLB,,, | Performed by: INTERNAL MEDICINE

## 2017-07-21 PROCEDURE — 93005 ELECTROCARDIOGRAM TRACING: CPT | Mod: PBBFAC | Performed by: INTERNAL MEDICINE

## 2017-07-21 PROCEDURE — 1125F AMNT PAIN NOTED PAIN PRSNT: CPT | Mod: S$GLB,,, | Performed by: INTERNAL MEDICINE

## 2017-07-21 PROCEDURE — 71020 XR CHEST PA AND LATERAL: CPT | Mod: TC

## 2017-07-21 RX ORDER — CARVEDILOL 6.25 MG/1
6.25 TABLET ORAL 2 TIMES DAILY
Qty: 60 TABLET | Refills: 11 | Status: ON HOLD | OUTPATIENT
Start: 2017-07-21 | End: 2017-08-30

## 2017-07-21 RX ORDER — ALBUTEROL SULFATE 0.83 MG/ML
2.5 SOLUTION RESPIRATORY (INHALATION)
Status: COMPLETED | OUTPATIENT
Start: 2017-07-21 | End: 2017-07-21

## 2017-07-21 RX ORDER — LOSARTAN POTASSIUM 100 MG/1
TABLET ORAL
Qty: 30 TABLET | Refills: 6 | Status: SHIPPED | OUTPATIENT
Start: 2017-07-21 | End: 2018-02-02 | Stop reason: SDUPTHER

## 2017-07-21 RX ORDER — TORSEMIDE 20 MG/1
20 TABLET ORAL DAILY
Qty: 90 TABLET | Refills: 3 | Status: SHIPPED | OUTPATIENT
Start: 2017-07-21 | End: 2018-02-02 | Stop reason: SDUPTHER

## 2017-07-21 RX ORDER — FLUTICASONE PROPIONATE AND SALMETEROL 100; 50 UG/1; UG/1
POWDER RESPIRATORY (INHALATION)
Qty: 60 EACH | Refills: 6 | Status: SHIPPED | OUTPATIENT
Start: 2017-07-21 | End: 2017-09-15 | Stop reason: DRUGHIGH

## 2017-07-21 RX ADMIN — ALBUTEROL SULFATE 2.5 MG: 0.83 SOLUTION RESPIRATORY (INHALATION) at 05:07

## 2017-07-21 NOTE — PROGRESS NOTES
Subjective:       Patient ID: Sue Giordano is a 77 y.o. female.    Chief Complaint: Follow-up; Shortness of Breath; and Hypertension    BP still high today.   Admits to eating more salt than usual.  Unfortunately, has gained all of her weight back.  Has also been using Naprosyn on a regular basis.  I've advised her that she cannot take any anti-inflammatories due to her multiple issues particularly her diastolic dysfunction, hypertension and CKD.     Weight up.   Discussed again.  She is willing to be seen in the bariatric clinic.  She admits to eating sweets and large portions.     DM not doing well.  Most recent A1c is above 8.  Denies polydipsia but does have some polyuria.  Is on a fluid pill but has not been taking regularly.  Discussed that she really needs to be taking this Demadex on a regular basis.     Saw the sleep team recently- REJI adjusted.  Discussed.    She's been found to have evidence of CVA based on MRIs done in 2017.  She's had no focal symptoms.  This was reviewed.  She does have carotid disease and uncontrolled hypertension.      Still with leg swelling, recurrent venous stasis.   No recent cellulitis.    Has not seen her cardiologist since 2015.    She has seen podiatry in June 2017, neurology in May 2017, endocrinology May 2017, sleep clinic May 2017, and nephrology in April 2017.    She's had some atypical chest discomfort that is not exactly exertional.  She's been using her inhalers area she still feels short of breath on occasion.  She cannot lie down to sleep but this is not a new issue.    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     CKD (chronic kidney disease) stage 3, GFR 30-59  ml/min     Chronic rhinitis     Diastolic dysfunction: see ECHO 2016     Tinea pedis     Lymphedema     Senile cataracts of both eyes     Cervical radiculopathy     Essential hypertension     Asthma in adult without complication     Coronary artery disease due to calcified coronary lesion     Cerebral infarction     Bilateral sciatica     Morbid obesity due to excess calories     Trigeminal neuralgia of right side of face     Dystrophic nail     Bilateral carotid artery disease        Shortness of Breath   Associated symptoms include leg swelling. Pertinent negatives include no ear pain, fever or wheezing.   Hypertension   Associated symptoms include shortness of breath.     Review of Systems   Constitutional: Positive for fatigue. Negative for chills and fever.   HENT: Negative for congestion, ear pain and postnasal drip.    Eyes: Negative.    Respiratory: Positive for cough and shortness of breath. Negative for chest tightness and wheezing.    Cardiovascular: Positive for leg swelling.        See above   Gastrointestinal: Negative.    Endocrine: Positive for polyuria. Negative for polydipsia and polyphagia.   Genitourinary: Positive for frequency.   Musculoskeletal: Positive for arthralgias.   Skin:        Venous stasis       Objective:      Physical Exam   Constitutional: She is oriented to person, place, and time. She appears well-developed and well-nourished.   HENT:   Head: Normocephalic and atraumatic.   Right Ear: External ear normal.   Left Ear: External ear normal.   Mouth/Throat: Oropharynx is clear and moist.   Eyes: Conjunctivae are normal. Pupils are equal, round, and reactive to light.   Neck: Normal range of motion. Neck supple. No thyromegaly present.   Cardiovascular: Normal rate, regular rhythm and normal heart sounds.    Pulmonary/Chest: No respiratory distress. She has no wheezes. She has no rales.   Pulse ox 98% on RA  Peak flow 250     Abdominal: Soft. Bowel sounds are normal.    Musculoskeletal: She exhibits no edema.   Lymphadenopathy:     She has no cervical adenopathy.   Neurological: She is alert and oriented to person, place, and time.   Skin: Skin is warm and dry. No rash noted. No erythema.       Assessment:       1. Acute on chronic diastolic congestive heart failure    2. Obstructive sleep apnea syndrome    3. Essential hypertension    4. Coronary artery disease due to calcified coronary lesion    5. 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 CKD (chronic kidney disease) stage 3, GFR 30-59 ml/min    7. Adrenal mass- adenoma stable since 2004 (1.8 cm and 8/13/12 (2 cm); stable 2016 2.4 cm    8. Lymphedema    9. Mixed hyperlipidemia    10. Morbid obesity due to excess calories    11. Diastolic dysfunction: see ECHO 2016    12. Asthma in adult without complication    13. Bilateral carotid artery disease    14. Chest pain, unspecified type        Plan:         Acute on chronic diastolic congestive heart failure: see below; keep cardiology asessment    Obstructive sleep apnea syndrome: Compliance urged    Essential hypertension: Resume all meds, compliance urged    Coronary artery disease due to calcified coronary lesion: No current issues to suggest ischemia, compliance with all medication reviewed.  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: Diet and lifestyle issues reviewed; keep endocrinology follow-up    CKD (chronic kidney disease) stage 3, GFR 30-59 ml/min: Discontinue NSAIDs    Adrenal mass- adenoma stable since 2004 (1.8 cm and 8/13/12 (2 cm); stable 2016 2.4 cm    Lymphedema: Stable    Mixed hyperlipidemia: Stable, continue meds    Morbid obesity due to excess calories  -     Ambulatory consult to Bariatric Surgery    Diastolic dysfunction: see ECHO 2016  -     2D Echo w/ Color Flow Doppler; Future  -     X-Ray Chest PA And Lateral;  Future; Expected date: 07/21/2017  -     Brain natriuretic peptide; Future; Expected date: 07/21/2017    Asthma in adult without complication  -     X-Ray Chest PA And Lateral; Future; Expected date: 07/21/2017  -     albuterol nebulizer solution 2.5 mg; Take 3 mLs (2.5 mg total) by nebulization one time.    Bilateral carotid artery disease  -     CAR Ultrasound doppler carotid bliateral; Future    Chest pain, unspecified type  -     EKG 12-lead    Other orders  -     fluticasone-salmeterol 100-50 mcg/dose (ADVAIR DISKUS) 100-50 mcg/dose diskus inhaler; INHALE 1 PUFF 2 TIMES A DAY  Dispense: 60 each; Refill: 6  -     torsemide (DEMADEX) 20 MG Tab; Take 1 tablet (20 mg total) by mouth once daily.  Dispense: 90 tablet; Refill: 3  -     losartan (COZAAR) 100 MG tablet; 1 tab by mouth daily  Dispense: 30 tablet; Refill: 6  -     carvedilol (COREG) 6.25 MG tablet; Take 1 tablet (6.25 mg total) by mouth 2 (two) times daily.  Dispense: 60 tablet; Refill: 11     EKG in office shows no ischemia   Peak flow 175-200, poor effort.  With better effort was 250.  After breathing treatment improved to 300   Chest x-ray shows a little bit of increased fluid, discussed options including hospitalization.  She does not feel that she needs to be hospitalized.  She will to try outpatient treatment.  I recommended that she completely discontinue anti-inflammatories altogether and resume her Demadex daily.  She needs to see cardiology within the next 1-2 weeks.  Signs and symptoms of CHF reviewed.  If not improved in the next several days, can bring in to the hospital as a direct admission.  Low salt diet reviewed  Compliance with all medications reviewed, several of her blood pressure medications have not been refilled for the past several months and all of them were refilled today and all of this was educated and reiterated  Diet issues discussed at length  Diabetes compliance and control reviewed  Follow-up with me in 6 weeks, sooner  with problems prior    Patient evaluated for over 60 minutes with this appoinment, including diagnostic testing and treatment.  All questions answered,  chart reviewed and care coordinated.    I will review all studies and determine further tx depending on findings

## 2017-07-22 ENCOUNTER — TELEPHONE (OUTPATIENT)
Dept: INTERNAL MEDICINE | Facility: CLINIC | Age: 77
End: 2017-07-22

## 2017-07-22 DIAGNOSIS — J45.909 ASTHMA IN ADULT WITHOUT COMPLICATION: Primary | ICD-10-CM

## 2017-08-04 ENCOUNTER — TELEPHONE (OUTPATIENT)
Dept: INTERNAL MEDICINE | Facility: CLINIC | Age: 77
End: 2017-08-04

## 2017-08-04 RX ORDER — DOXYCYCLINE 100 MG/1
100 CAPSULE ORAL 2 TIMES DAILY
Qty: 20 CAPSULE | Refills: 1 | Status: ON HOLD | OUTPATIENT
Start: 2017-08-04 | End: 2017-08-25 | Stop reason: CLARIF

## 2017-08-04 NOTE — TELEPHONE ENCOUNTER
----- Message from Sarah Bethmontse Zarco sent at 8/4/2017  3:29 PM CDT -----  Contact: self/[phone]  RX request - refill or new RX.  Is this a refill or new RX:  Refill  RX name and strength: doxycycline (VIBRAMYCIN) 100 MG Cap      RX request - refill or new RX.  Is this a refill or new RX:  Refill  RX name and strength: Kiflexin  Directions:   Is this a 30 day or 90 day RX:    Pharmacy name and phone #: [facility] - [place], [place] - [address] [facility] [address]  # [phone]  Comments:  Please call once rx is sent to the pharmacy.       Thank you!!!

## 2017-08-04 NOTE — TELEPHONE ENCOUNTER
----- Message from Sarah Bethmontse Zarco sent at 8/4/2017  3:29 PM CDT -----  Contact: self/[phone]  RX request - refill or new RX.  Is this a refill or new RX:  Refill  RX name and strength: doxycycline (VIBRAMYCIN) 100 MG Cap      RX request - refill or new RX.  Is this a refill or new RX:  Refill  RX name and strength: Kiflexin  Directions:   Is this a 30 day or 90 day RX:    Pharmacy name and phone #: [facility] - [place], [place] - [address] [facility] [address]  # [phone]  Comments:  Please call once rx is sent to the pharmacy.       Thank you!!!

## 2017-08-04 NOTE — TELEPHONE ENCOUNTER
Pt stated that she is having a abiola up  On her wound her legs that is starting to bubble and she stated that Doxy usually takes care of it   Please advice

## 2017-08-04 NOTE — TELEPHONE ENCOUNTER
Pt stated that she needs Doxy to be send to her pharmacy because sh is having a flare up on her legs a little painful and this usually takes care of it before it gets works   Please advise

## 2017-08-04 NOTE — TELEPHONE ENCOUNTER
Bella, prescription sent in.  Follow-up poor results.  May need appointment in infectious disease clinic or wound clinic.

## 2017-08-16 ENCOUNTER — OFFICE VISIT (OUTPATIENT)
Dept: PULMONOLOGY | Facility: CLINIC | Age: 77
End: 2017-08-16
Payer: COMMERCIAL

## 2017-08-16 VITALS
OXYGEN SATURATION: 98 % | TEMPERATURE: 98 F | HEIGHT: 59 IN | DIASTOLIC BLOOD PRESSURE: 76 MMHG | WEIGHT: 270.94 LBS | BODY MASS INDEX: 54.62 KG/M2 | HEART RATE: 73 BPM | SYSTOLIC BLOOD PRESSURE: 126 MMHG

## 2017-08-16 DIAGNOSIS — J45.909 UNCOMPLICATED ASTHMA, UNSPECIFIED ASTHMA SEVERITY: Primary | ICD-10-CM

## 2017-08-16 DIAGNOSIS — G47.33 OSA (OBSTRUCTIVE SLEEP APNEA): ICD-10-CM

## 2017-08-16 DIAGNOSIS — R09.82 POST-NASAL DRAINAGE: ICD-10-CM

## 2017-08-16 DIAGNOSIS — I51.89 DIASTOLIC DYSFUNCTION: ICD-10-CM

## 2017-08-16 DIAGNOSIS — E66.01 MORBID OBESITY DUE TO EXCESS CALORIES: ICD-10-CM

## 2017-08-16 DIAGNOSIS — R06.02 SHORTNESS OF BREATH: ICD-10-CM

## 2017-08-16 PROCEDURE — 99999 PR PBB SHADOW E&M-EST. PATIENT-LVL IV: CPT | Mod: PBBFAC,,, | Performed by: NURSE PRACTITIONER

## 2017-08-16 PROCEDURE — 99214 OFFICE O/P EST MOD 30 MIN: CPT | Mod: S$PBB,,, | Performed by: NURSE PRACTITIONER

## 2017-08-16 PROCEDURE — 3074F SYST BP LT 130 MM HG: CPT | Mod: ,,, | Performed by: NURSE PRACTITIONER

## 2017-08-16 PROCEDURE — 3078F DIAST BP <80 MM HG: CPT | Mod: ,,, | Performed by: NURSE PRACTITIONER

## 2017-08-16 PROCEDURE — 1126F AMNT PAIN NOTED NONE PRSNT: CPT | Mod: ,,, | Performed by: NURSE PRACTITIONER

## 2017-08-16 PROCEDURE — 99214 OFFICE O/P EST MOD 30 MIN: CPT | Mod: PBBFAC | Performed by: NURSE PRACTITIONER

## 2017-08-16 PROCEDURE — 1159F MED LIST DOCD IN RCRD: CPT | Mod: ,,, | Performed by: NURSE PRACTITIONER

## 2017-08-16 RX ORDER — FLUTICASONE PROPIONATE 50 MCG
2 SPRAY, SUSPENSION (ML) NASAL DAILY
Qty: 1 BOTTLE | Refills: 3 | Status: SHIPPED | OUTPATIENT
Start: 2017-08-16 | End: 2019-01-18 | Stop reason: SDUPTHER

## 2017-08-16 RX ORDER — ALBUTEROL SULFATE 90 UG/1
2 AEROSOL, METERED RESPIRATORY (INHALATION) EVERY 6 HOURS PRN
Qty: 1 INHALER | Refills: 3 | Status: SHIPPED | OUTPATIENT
Start: 2017-08-16 | End: 2017-09-15 | Stop reason: SDUPTHER

## 2017-08-16 NOTE — PATIENT INSTRUCTIONS
Continue with advair 100 2 puffs 2 times daily  Take albuterol for rescue 2 puffs every 6 hours as needed for wheezing or shortness of breath.  Nasal Saline:  Recipe 1/4 teaspoon of salt and 1/4 teaspoon of baking soda in distilled water.  Be sure to tilt head forward as shown in picture.Squirt into nostril 2-3 times until you taste saline in back of throat. Spit, and blow nose. Do this 4 times, alternating right and left nostril. Do this routine 2 times per day once in morning and once at night.      Gargle with warm salt water 2 times per day once in morning and once at night.   Flonase 2 sprays each nostril daily  Continue all of these things for 2 weeks  Follow up with cardiology for echocardiogram and consult  [place], Lung volumes, DLCO, 6MWT prior to visit  Follow up in 1 month  Contact us prior to if any issues or concerns should arise.

## 2017-08-16 NOTE — PROGRESS NOTES
Subjective:       Patient ID: Sue Giordano is a 77 y.o. female.    Chief Complaint: asthma     HPI  Sue Giordano is a 77 y.o. female who presents as new patient for pulmonary evaluation. Her medical history includes CKD, DM, diastolic dysfunction, HTN, REJI, and asthma. She is compliant with her advair 100 mcg 2 puffs 2 times daily. She states she was diagnosed with asthma several years ago and has been using advair which she feels helps her breathing. She does not have an albuterol inhaler for rescue and states she does get short of breath during the day at times. She was scheduled to obtain an echo on 8/2 with cards consult to follow but missed those appointments. She would like to schedule a new appointment today if possible. She complains of having to sleep sitting She has not had any formal lung function testing. She has never been hospitalized for breathing issues but does go to ER at least once a year for shortness of breath with last episode in July. She is a never smoker. 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 No hx TB or exposure. No cough, chest pain, wheezing fever or chills.     Review of patient's allergies indicates:   Allergen Reactions    Bactrim [sulfamethoxazole-trimethoprim]      Other reaction(s): up set stomach rash/hives    Azithromycin     Erythromycin Other (See Comments)     Other reaction(s): Unknown  Other reaction(s): Stomach upset    Gentamicin      Other reaction(s): Unknown    Levofloxacin Hives     Other reaction(s): nervousness    Vancomycin      Other reaction(s): Itching     Past Medical History:   Diagnosis Date    Adrenal mass     Anemia     Arthritis     Asthma     Bilateral carotid artery disease 7/21/2017    CKD (chronic kidney disease) stage 2, GFR 60-89 ml/min     Diabetes mellitus     Diabetes mellitus type II     Diastolic dysfunction 10/10/2013    GERD (gastroesophageal reflux disease) 8/13/2012    Gout     Hep B w/ coma 1971    Hives  "10/1/2013    Hyperlipidemia     Hypertension     Morbid obesity with BMI of 50.0-59.9, adult 2/11/2014    Obesity     Obesity     Other specified anemias 6/25/2015    Sleep apnea     Unspecified essential hypertension 6/25/2015     Past Surgical History:   Procedure Laterality Date    HYSTERECTOMY  1982    secondary uterine fibroids    JOINT REPLACEMENT      Right total knee replacement       Family History     Problem Relation (Age of Onset)    Cancer Mother, Sister    Hypertension Father    No Known Problems Brother, Maternal Aunt, Maternal Uncle, Paternal Aunt, Paternal Uncle, Maternal Grandmother, Maternal Grandfather, Paternal Grandmother, Paternal Grandfather        Social History Main Topics    Smoking status: Never Smoker    Smokeless tobacco: Never Used    Alcohol use No    Drug use: No    Sexual activity: No       Review of Systems   Constitutional: Negative for fever and chills.   HENT: Positive for postnasal drip and congestion. Negative for trouble swallowing.    Eyes: Negative for redness.   Respiratory: Positive for orthopnea. Negative for cough, hemoptysis and wheezing.    Cardiovascular: Positive for leg swelling (bilaterally).   Musculoskeletal: Positive for gait problem (ambulates with cane).   Gastrointestinal: Negative for acid reflux.   Neurological: Negative for dizziness.   Hematological: Negative for adenopathy.   Psychiatric/Behavioral: Positive for sleep disturbance.       Objective:       Vitals:    08/16/17 1314   BP: 126/76   Pulse: 73   Temp: 97.7 °F (36.5 °C)   SpO2: 98%   Weight: 122.9 kg (270 lb 15.1 oz)   Height: 4' 11" (1.499 m)     Physical Exam   Constitutional: She is oriented to person, place, and time.   HENT:   Head: Normocephalic.   Mouth/Throat: Mallampati Score: III.   Neck: Normal range of motion.   Cardiovascular: Normal rate and regular rhythm.    Pulmonary/Chest: Normal expansion, symmetric chest wall expansion, effort normal and breath sounds normal. " She has no wheezes. She has no rhonchi. She has no rales.   Musculoskeletal: She exhibits edema.   Lymphadenopathy: No supraclavicular adenopathy is present.     She has no cervical adenopathy.   Neurological: She is alert and oriented to person, place, and time.   Psychiatric: She has a normal mood and affect.   Vitals reviewed.    Personal Diagnostic Review    CXR reviewed 7/21/17:   Labs reviewed   Assessment:         Outpatient Encounter Prescriptions as of 8/16/2017   Medication Sig Dispense Refill    ammonium lactate 12 % Crea Apply topically every morning.       aspirin 81 MG Chew Take 1 tablet (81 mg total) by mouth once daily.      atorvastatin (LIPITOR) 80 MG tablet Take 1 tablet (80 mg total) by mouth once daily. 90 tablet 3    blood sugar diagnostic Strp BG monitoring 4 times a day. Pt needs test strips for Embrace Talking meter. (Patient taking differently: BG monitoring 2 times a day. Pt needs test strips for Embrace Talking meter.) 450 strip prn    carvedilol (COREG) 6.25 MG tablet Take 1 tablet (6.25 mg total) by mouth 2 (two) times daily. 60 tablet 11    diclofenac sodium 1 % Gel Apply 2 g topically once daily. 100 g 1    doxycycline (MONODOX) 100 MG capsule Take 1 capsule (100 mg total) by mouth 2 (two) times daily. 20 capsule 1    duloxetine (CYMBALTA) 30 MG capsule Take 1 capsule (30 mg total) by mouth 2 (two) times daily. May lower it to once daily as instructed if sedation persists 60 capsule 3    econazole nitrate 1 % cream Apply topically once daily. Apply to feet daily as directed. 85 g 1    ergocalciferol (VITAMIN D2) 50,000 unit Cap Take 1 capsule (50,000 Units total) by mouth every 7 days. 12 capsule 3    fluticasone-salmeterol 100-50 mcg/dose (ADVAIR DISKUS) 100-50 mcg/dose diskus inhaler INHALE 1 PUFF 2 TIMES A DAY 60 each 6    insulin detemir (LEVEMIR FLEXTOUCH) 100 unit/mL (3 mL) SubQ InPn pen Inject 48 units at night. 1 Box 6    insulin lispro (HUMALOG KWIKPEN) 100  "unit/mL InPn pen Inject 18 units w/ breakfast and 18 units w/ dinner. Scale 150-200 +2, 201-250 +4, 251-300 +6, 301-350 +8. 1 Box 6    lancets Misc Test daily 100 each 12    losartan (COZAAR) 100 MG tablet 1 tab by mouth daily 30 tablet 6    pen needle, diabetic (PEN NEEDLE) 32 gauge x 5/32" Ndle Uses 4 times a day. 150 each 6    torsemide (DEMADEX) 20 MG Tab Take 1 tablet (20 mg total) by mouth once daily. 90 tablet 3     No facility-administered encounter medications on file as of 8/16/2017.      Problem List Items Addressed This Visit        Cardiac/Vascular    Diastolic dysfunction: see ECHO 2016       Endocrine    Morbid obesity due to excess calories      Other Visit Diagnoses     Uncomplicated asthma, unspecified asthma severity    -  Primary    Relevant Medications    fluticasone (FLONASE) 50 mcg/actuation nasal spray    albuterol 90 mcg/actuation inhaler    Other Relevant Orders    DLCO-Carbon Monoxide Diffusing Capacity    LUNG VOLUMES    Spirometry with/without bronchodilator    Stress test, pulmonary    Shortness of breath        Relevant Orders    Ambulatory consult to Cardiology    Post-nasal drainage        REJI (obstructive sleep apnea)            Plan:       Continue with advair 100 2 puffs 2 times daily  Take albuterol for rescue 2 puffs every 6 hours as needed for wheezing or shortness of breath.  Nasal Saline:  Recipe 1/4 teaspoon of salt and 1/4 teaspoon of baking soda in distilled water.  Be sure to tilt head forward as shown in picture.Squirt into nostril 2-3 times until you taste saline in back of throat. Spit, and blow nose. Do this 4 times, alternating right and left nostril. Do this routine 2 times per day once in morning and once at night.      Gargle with warm salt water 2 times per day once in morning and once at night.   Flonase 2 sprays each nostril daily  Continue all of these things for 2 weeks  [place], Lung volumes, DLCO, 6MWT prior to visit  Continue with follow up for REJI "   Follow up in 1 month  Contact prior to if any issues or concerns should arise.   Patient verbalized understanding of all information, instruction, education, recommendations provided and agrees with plan of care.

## 2017-08-16 NOTE — LETTER
August 16, 2017      Vale Howard MD  [address]  [place] [address]           [facility]  [address]  [facility] [address]  Phone: [phone]          Patient: Sue Giordano   MR Number: [id number]   YOB: 1940   Date of Visit: 8/16/2017       Dear Dr. Vale Howard:    Thank you for referring Sue Giordano to me for evaluation. Attached you will find relevant portions of my assessment and plan of care.    If you have questions, please do not hesitate to call me. I look forward to following Sue Giordano along with you.    Sincerely,    Shaunna Connolly, NP    Enclosure  CC:  No Recipients    If you would like to receive this communication electronically, please contact [email or web address] or [phone] to request more information on [employer] [employer] access.    For providers and/or their staff who would like to refer a patient to [facility], please contact us through our one-stop-shop provider referral line, Clare Cisneros, at [phone].    If you feel you have received this communication in error or would no longer like to receive these types of communications, please e-mail [email or web address]

## 2017-08-20 ENCOUNTER — HOSPITAL ENCOUNTER (OUTPATIENT)
Facility: HOSPITAL | Age: 77
LOS: 1 days | Discharge: HOME | End: 2017-08-21
Attending: EMERGENCY MEDICINE | Admitting: HOSPITALIST
Payer: MEDICARE
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
## 2017-08-20 DIAGNOSIS — I89.0 LYMPHEDEMA: ICD-10-CM

## 2017-08-20 DIAGNOSIS — L03.115 CELLULITIS OF RIGHT LOWER EXTREMITY: Primary | ICD-10-CM

## 2017-08-20 DIAGNOSIS — B99.9 INFECTION: ICD-10-CM

## 2017-08-20 PROCEDURE — 12002 RPR S/N/AX/GEN/TRNK2.6-7.5CM: CPT | Mod: RT,,, | Performed by: EMERGENCY MEDICINE

## 2017-08-20 PROCEDURE — 99284 EMERGENCY DEPT VISIT MOD MDM: CPT | Mod: 25,,, | Performed by: EMERGENCY MEDICINE

## 2017-08-20 PROCEDURE — 96372 THER/PROPH/DIAG INJ SC/IM: CPT

## 2017-08-20 PROCEDURE — 99285 EMERGENCY DEPT VISIT HI MDM: CPT | Mod: 25

## 2017-08-20 PROCEDURE — 82962 GLUCOSE BLOOD TEST: CPT

## 2017-08-20 PROCEDURE — 12002 RPR S/N/AX/GEN/TRNK2.6-7.5CM: CPT

## 2017-08-20 PROCEDURE — 96360 HYDRATION IV INFUSION INIT: CPT

## 2017-08-21 ENCOUNTER — TELEPHONE (OUTPATIENT)
Dept: PODIATRY | Facility: CLINIC | Age: 77
End: 2017-08-21

## 2017-08-21 VITALS
HEART RATE: 58 BPM | TEMPERATURE: 99 F | WEIGHT: 270 LBS | OXYGEN SATURATION: 99 % | DIASTOLIC BLOOD PRESSURE: 65 MMHG | RESPIRATION RATE: 19 BRPM | SYSTOLIC BLOOD PRESSURE: 151 MMHG | BODY MASS INDEX: 54.43 KG/M2 | HEIGHT: 59 IN

## 2017-08-21 PROBLEM — L03.115 CELLULITIS OF RIGHT LOWER EXTREMITY: Status: ACTIVE | Noted: 2017-08-21

## 2017-08-21 PROBLEM — I87.2 VENOUS STASIS DERMATITIS OF BOTH LOWER EXTREMITIES: Status: ACTIVE | Noted: 2017-08-21

## 2017-08-21 LAB
ALBUMIN SERPL BCP-MCNC: 2.7 G/DL
ALP SERPL-CCNC: 78 U/L
ALT SERPL W/O P-5'-P-CCNC: 11 U/L
ANION GAP SERPL CALC-SCNC: 10 MMOL/L
AST SERPL-CCNC: 16 U/L
BASOPHILS # BLD AUTO: 0.03 K/UL
BASOPHILS NFR BLD: 0.4 %
BILIRUB SERPL-MCNC: 0.2 MG/DL
BUN SERPL-MCNC: 22 MG/DL
CALCIUM SERPL-MCNC: 8.8 MG/DL
CHLORIDE SERPL-SCNC: 108 MMOL/L
CO2 SERPL-SCNC: 21 MMOL/L
CREAT SERPL-MCNC: 1 MG/DL
DIFFERENTIAL METHOD: ABNORMAL
EOSINOPHIL # BLD AUTO: 0.2 K/UL
EOSINOPHIL NFR BLD: 3.1 %
ERYTHROCYTE [DISTWIDTH] IN BLOOD BY AUTOMATED COUNT: 15.1 %
EST. GFR  (AFRICAN AMERICAN): >60 ML/MIN/1.73 M^2
EST. GFR  (NON AFRICAN AMERICAN): 55.2 ML/MIN/1.73 M^2
ESTIMATED AVG GLUCOSE: 189 MG/DL
GLUCOSE SERPL-MCNC: 239 MG/DL
HBA1C MFR BLD HPLC: 8.2 %
HCT VFR BLD AUTO: 35.2 %
HGB BLD-MCNC: 10.9 G/DL
LACTATE SERPL-SCNC: 2.6 MMOL/L
LYMPHOCYTES # BLD AUTO: 1.6 K/UL
LYMPHOCYTES NFR BLD: 21.3 %
MCH RBC QN AUTO: 28.2 PG
MCHC RBC AUTO-ENTMCNC: 31 G/DL
MCV RBC AUTO: 91 FL
MONOCYTES # BLD AUTO: 0.6 K/UL
MONOCYTES NFR BLD: 8 %
NEUTROPHILS # BLD AUTO: 5.1 K/UL
NEUTROPHILS NFR BLD: 66.9 %
PLATELET # BLD AUTO: 186 K/UL
PMV BLD AUTO: 10.5 FL
POCT GLUCOSE: 284 MG/DL (ref 70–110)
POTASSIUM SERPL-SCNC: 4.6 MMOL/L
PROT SERPL-MCNC: 7 G/DL
RBC # BLD AUTO: 3.87 M/UL
SODIUM SERPL-SCNC: 139 MMOL/L
WBC # BLD AUTO: 7.66 K/UL

## 2017-08-21 PROCEDURE — 94761 N-INVAS EAR/PLS OXIMETRY MLT: CPT

## 2017-08-21 PROCEDURE — 93010 ELECTROCARDIOGRAM REPORT: CPT | Mod: ,,, | Performed by: INTERNAL MEDICINE

## 2017-08-21 PROCEDURE — 85025 COMPLETE CBC W/AUTO DIFF WBC: CPT

## 2017-08-21 PROCEDURE — 83036 HEMOGLOBIN GLYCOSYLATED A1C: CPT

## 2017-08-21 PROCEDURE — 25000003 PHARM REV CODE 250: Performed by: STUDENT IN AN ORGANIZED HEALTH CARE EDUCATION/TRAINING PROGRAM

## 2017-08-21 PROCEDURE — 99234 HOSP IP/OBS SM DT SF/LOW 45: CPT | Mod: ,,, | Performed by: HOSPITALIST

## 2017-08-21 PROCEDURE — 83605 ASSAY OF LACTIC ACID: CPT

## 2017-08-21 PROCEDURE — 63600175 PHARM REV CODE 636 W HCPCS: Performed by: STUDENT IN AN ORGANIZED HEALTH CARE EDUCATION/TRAINING PROGRAM

## 2017-08-21 PROCEDURE — 80053 COMPREHEN METABOLIC PANEL: CPT

## 2017-08-21 PROCEDURE — G0378 HOSPITAL OBSERVATION PER HR: HCPCS

## 2017-08-21 PROCEDURE — 12002 RPR S/N/AX/GEN/TRNK2.6-7.5CM: CPT

## 2017-08-21 RX ORDER — NAPROXEN SODIUM 220 MG/1
81 TABLET, FILM COATED ORAL DAILY
Status: DISCONTINUED | OUTPATIENT
Start: 2017-08-21 | End: 2017-08-21 | Stop reason: HOSPADM

## 2017-08-21 RX ORDER — GABAPENTIN 100 MG/1
300 CAPSULE ORAL 3 TIMES DAILY
Status: ON HOLD | COMMUNITY
End: 2017-08-25

## 2017-08-21 RX ORDER — LOSARTAN POTASSIUM 50 MG/1
100 TABLET ORAL DAILY
Status: DISCONTINUED | OUTPATIENT
Start: 2017-08-21 | End: 2017-08-21 | Stop reason: HOSPADM

## 2017-08-21 RX ORDER — IBUPROFEN 200 MG
16 TABLET ORAL
Status: DISCONTINUED | OUTPATIENT
Start: 2017-08-21 | End: 2017-08-21 | Stop reason: HOSPADM

## 2017-08-21 RX ORDER — ONDANSETRON 2 MG/ML
4 INJECTION INTRAMUSCULAR; INTRAVENOUS EVERY 12 HOURS PRN
Status: DISCONTINUED | OUTPATIENT
Start: 2017-08-21 | End: 2017-08-21 | Stop reason: HOSPADM

## 2017-08-21 RX ORDER — INSULIN ASPART 100 [IU]/ML
1-10 INJECTION, SOLUTION INTRAVENOUS; SUBCUTANEOUS
Status: DISCONTINUED | OUTPATIENT
Start: 2017-08-21 | End: 2017-08-21 | Stop reason: HOSPADM

## 2017-08-21 RX ORDER — INSULIN ASPART 100 [IU]/ML
10 INJECTION, SOLUTION INTRAVENOUS; SUBCUTANEOUS 2 TIMES DAILY WITH MEALS
Status: DISCONTINUED | OUTPATIENT
Start: 2017-08-21 | End: 2017-08-21 | Stop reason: HOSPADM

## 2017-08-21 RX ORDER — DIMETHYL FUMARATE 240 MG/1
240 CAPSULE ORAL 2 TIMES DAILY
COMMUNITY
End: 2017-08-23

## 2017-08-21 RX ORDER — TORSEMIDE 20 MG/1
20 TABLET ORAL DAILY
Status: DISCONTINUED | OUTPATIENT
Start: 2017-08-21 | End: 2017-08-21 | Stop reason: HOSPADM

## 2017-08-21 RX ORDER — ACETAMINOPHEN 325 MG/1
650 TABLET ORAL EVERY 4 HOURS PRN
Status: DISCONTINUED | OUTPATIENT
Start: 2017-08-21 | End: 2017-08-21 | Stop reason: HOSPADM

## 2017-08-21 RX ORDER — ALBUTEROL SULFATE 90 UG/1
2 AEROSOL, METERED RESPIRATORY (INHALATION) EVERY 6 HOURS PRN
Status: DISCONTINUED | OUTPATIENT
Start: 2017-08-21 | End: 2017-08-21 | Stop reason: HOSPADM

## 2017-08-21 RX ORDER — CARVEDILOL 3.12 MG/1
6.25 TABLET ORAL 2 TIMES DAILY
Status: DISCONTINUED | OUTPATIENT
Start: 2017-08-21 | End: 2017-08-21 | Stop reason: HOSPADM

## 2017-08-21 RX ORDER — ATORVASTATIN CALCIUM 20 MG/1
80 TABLET, FILM COATED ORAL DAILY
Status: DISCONTINUED | OUTPATIENT
Start: 2017-08-21 | End: 2017-08-21 | Stop reason: HOSPADM

## 2017-08-21 RX ORDER — SENNOSIDES 8.6 MG/1
1 TABLET ORAL DAILY
COMMUNITY
End: 2017-08-23

## 2017-08-21 RX ORDER — FLUTICASONE PROPIONATE 50 MCG
2 SPRAY, SUSPENSION (ML) NASAL DAILY
Status: DISCONTINUED | OUTPATIENT
Start: 2017-08-21 | End: 2017-08-21 | Stop reason: HOSPADM

## 2017-08-21 RX ORDER — ASPIRIN 325 MG
100 TABLET, DELAYED RELEASE (ENTERIC COATED) ORAL DAILY
COMMUNITY
End: 2017-08-23

## 2017-08-21 RX ORDER — GLUCAGON 1 MG
1 KIT INJECTION
Status: DISCONTINUED | OUTPATIENT
Start: 2017-08-21 | End: 2017-08-21 | Stop reason: HOSPADM

## 2017-08-21 RX ORDER — MELOXICAM 7.5 MG/1
7.5 TABLET ORAL 2 TIMES DAILY
COMMUNITY
End: 2017-08-21 | Stop reason: HOSPADM

## 2017-08-21 RX ORDER — CARVEDILOL 3.12 MG/1
6.25 TABLET ORAL
Status: COMPLETED | OUTPATIENT
Start: 2017-08-21 | End: 2017-08-21

## 2017-08-21 RX ORDER — TRAZODONE HYDROCHLORIDE 100 MG/1
100 TABLET ORAL NIGHTLY
COMMUNITY
End: 2017-08-23

## 2017-08-21 RX ORDER — SODIUM CHLORIDE 0.9 % (FLUSH) 0.9 %
3 SYRINGE (ML) INJECTION EVERY 8 HOURS
Status: DISCONTINUED | OUTPATIENT
Start: 2017-08-21 | End: 2017-08-21 | Stop reason: HOSPADM

## 2017-08-21 RX ORDER — CARVEDILOL 3.12 MG/1
6.25 TABLET ORAL
Status: DISCONTINUED | OUTPATIENT
Start: 2017-08-21 | End: 2017-08-21

## 2017-08-21 RX ORDER — FLUTICASONE FUROATE AND VILANTEROL 100; 25 UG/1; UG/1
1 POWDER RESPIRATORY (INHALATION) DAILY
Status: DISCONTINUED | OUTPATIENT
Start: 2017-08-21 | End: 2017-08-21 | Stop reason: HOSPADM

## 2017-08-21 RX ORDER — ENOXAPARIN SODIUM 100 MG/ML
40 INJECTION SUBCUTANEOUS EVERY 12 HOURS
Status: DISCONTINUED | OUTPATIENT
Start: 2017-08-21 | End: 2017-08-21 | Stop reason: HOSPADM

## 2017-08-21 RX ORDER — LIDOCAINE HYDROCHLORIDE 10 MG/ML
100 INJECTION, SOLUTION EPIDURAL; INFILTRATION; INTRACAUDAL; PERINEURAL
Status: COMPLETED | OUTPATIENT
Start: 2017-08-21 | End: 2017-08-21

## 2017-08-21 RX ORDER — IBUPROFEN 200 MG
24 TABLET ORAL
Status: DISCONTINUED | OUTPATIENT
Start: 2017-08-21 | End: 2017-08-21 | Stop reason: HOSPADM

## 2017-08-21 RX ORDER — BACLOFEN 10 MG/1
10 TABLET ORAL 3 TIMES DAILY
COMMUNITY
End: 2017-08-23

## 2017-08-21 RX ORDER — LORAZEPAM 1 MG/1
1 TABLET ORAL EVERY 12 HOURS PRN
COMMUNITY
End: 2017-08-23

## 2017-08-21 RX ORDER — OXYBUTYNIN CHLORIDE 5 MG/1
5 TABLET ORAL DAILY
Status: ON HOLD | COMMUNITY
End: 2017-08-25 | Stop reason: CLARIF

## 2017-08-21 RX ADMIN — SODIUM CHLORIDE 500 ML: 0.9 INJECTION, SOLUTION INTRAVENOUS at 02:08

## 2017-08-21 RX ADMIN — INSULIN DETEMIR 30 UNITS: 100 INJECTION, SOLUTION SUBCUTANEOUS at 03:08

## 2017-08-21 RX ADMIN — LIDOCAINE HYDROCHLORIDE 100 MG: 10 INJECTION, SOLUTION EPIDURAL; INFILTRATION; INTRACAUDAL; PERINEURAL at 01:08

## 2017-08-21 RX ADMIN — ACETAMINOPHEN 650 MG: 325 TABLET ORAL at 05:08

## 2017-08-21 RX ADMIN — CARVEDILOL 6.25 MG: 3.12 TABLET, FILM COATED ORAL at 02:08

## 2017-08-21 NOTE — ED PROVIDER NOTES
Encounter Date: 8/20/2017       History     Chief Complaint   Patient presents with    Fall     Pt fell walking up bus steps, has laceration to right leg. Bleeding controlled by EMS. Denies hitting head. Denies blood thinners.      76 y/o F h/o COPD, CKD, chronic lower extremity cellulitis, HTN, DM2 p/w with laceration. Patient was attempting to get on bus and hit right leg on rim of step.  Was caught by surrounding people, did not fall, did not hit head, did not lose consciousness, mechanical injury.  Denies numbness, coldness, tingling to toes.  Denies other injuries.  Currently being treated for cellulitis, on day 5 of doxy, had negative DVT study.           Review of patient's allergies indicates:   Allergen Reactions    Bactrim [sulfamethoxazole-trimethoprim]      Other reaction(s): up set stomach rash/hives    Azithromycin     Erythromycin Other (See Comments)     Other reaction(s): Unknown  Other reaction(s): Stomach upset    Gentamicin      Other reaction(s): Unknown    Levofloxacin Hives     Other reaction(s): nervousness    Vancomycin      Other reaction(s): Itching     Past Medical History:   Diagnosis Date    Adrenal mass     Anemia     Arthritis     Asthma     Bilateral carotid artery disease 7/21/2017    CKD (chronic kidney disease) stage 2, GFR 60-89 ml/min     Diabetes mellitus     Diabetes mellitus type II     Diastolic dysfunction 10/10/2013    GERD (gastroesophageal reflux disease) 8/13/2012    Gout     Hep B w/ coma 1971    Hives 10/1/2013    Hyperlipidemia     Hypertension     Morbid obesity with BMI of 50.0-59.9, adult 2/11/2014    Obesity     Obesity     Other specified anemias 6/25/2015    Sleep apnea     Unspecified essential hypertension 6/25/2015     Past Surgical History:   Procedure Laterality Date    HYSTERECTOMY  1982    secondary uterine fibroids    JOINT REPLACEMENT      Right total knee replacement       Family History   Problem Relation Age of Onset     Cancer Mother     Hypertension Father     Cancer Sister     No Known Problems Brother     No Known Problems Maternal Aunt     No Known Problems Maternal Uncle     No Known Problems Paternal Aunt     No Known Problems Paternal Uncle     No Known Problems Maternal Grandmother     No Known Problems Maternal Grandfather     No Known Problems Paternal Grandmother     No Known Problems Paternal Grandfather     Glaucoma Neg Hx     Breast cancer Neg Hx     Colon cancer Neg Hx     Ovarian cancer Neg Hx     Stroke Neg Hx     Allergic rhinitis Neg Hx     Allergies Neg Hx     Angioedema Neg Hx     Asthma Neg Hx     Atopy Neg Hx     Eczema Neg Hx     Immunodeficiency Neg Hx     Rhinitis Neg Hx     Urticaria Neg Hx     Amblyopia Neg Hx     Blindness Neg Hx     Cataracts Neg Hx     Diabetes Neg Hx     Macular degeneration Neg Hx     Retinal detachment Neg Hx     Strabismus Neg Hx     Thyroid disease Neg Hx     Psoriasis Neg Hx      Social History   Substance Use Topics    Smoking status: Never Smoker    Smokeless tobacco: Never Used    Alcohol use No     Review of Systems   Constitutional: Negative for fever.   HENT: Negative for sore throat.    Respiratory: Negative for shortness of breath.    Cardiovascular: Negative for chest pain.   Gastrointestinal: Negative for nausea.   Genitourinary: Negative for dysuria.   Musculoskeletal: Negative for back pain.   Skin: Positive for wound. Negative for rash.   Neurological: Negative for weakness.   Hematological: Does not bruise/bleed easily.       Physical Exam     Initial Vitals [08/20/17 2320]   BP Pulse Resp Temp SpO2   (!) 199/90 82 18 98.9 °F (37.2 °C) 98 %      MAP       126.33         Physical Exam    Nursing note and vitals reviewed.  Constitutional: She appears well-developed and well-nourished. She is not diaphoretic. No distress.   HENT:   Head: Normocephalic and atraumatic.   Right Ear: External ear normal.   Left Ear: External ear  normal.   Mouth/Throat: Oropharynx is clear and moist. No oropharyngeal exudate.   Eyes: Conjunctivae and EOM are normal. Pupils are equal, round, and reactive to light. Right eye exhibits no discharge. Left eye exhibits no discharge. No scleral icterus.   Neck: Normal range of motion. No tracheal deviation present. No JVD present.   Cardiovascular: Normal rate, regular rhythm, normal heart sounds and intact distal pulses. Exam reveals no gallop and no friction rub.    No murmur heard.  Pulmonary/Chest: Breath sounds normal. No stridor. No respiratory distress. She has no wheezes. She has no rhonchi. She has no rales.   Abdominal: Soft. She exhibits no distension. There is no tenderness.   Musculoskeletal: Normal range of motion. She exhibits no edema or tenderness.        Right lower leg: She exhibits laceration.        Legs:  Neurological: She is alert and oriented to person, place, and time.   Skin: Skin is warm and dry. Capillary refill takes less than 2 seconds.         ED Course   Lac Repair  Date/Time: 8/21/2017 1:45 AM  Performed by: TERI BLAKE  Authorized by: REBECCA AGEE   Consent Done: Yes  Consent: Verbal consent obtained.  Risks and benefits: risks, benefits and alternatives were discussed  Consent given by: patient  Body area: lower extremity  Location details: right lower leg  Laceration length: 3 cm  Foreign bodies: no foreign bodies  Tendon involvement: none  Nerve involvement: none  Vascular damage: no  Anesthesia: local infiltration    Anesthesia:  Local Anesthetic: bupivacaine 0.25% with epinephrine  Anesthetic total: 6 mL  Patient sedated: no  Irrigation solution: saline  Irrigation method: jet lavage  Amount of cleaning: standard  Debridement: none  Degree of undermining: none  Skin closure: 3-0 Prolene  Number of sutures: 8  Technique: simple  Approximation: close  Approximation difficulty: simple  Dressing: Steri-Strips and gauze roll  Patient tolerance: Patient tolerated the  procedure well with no immediate complications        Labs Reviewed   CBC W/ AUTO DIFFERENTIAL - Abnormal; Notable for the following:        Result Value    RBC 3.87 (*)     Hemoglobin 10.9 (*)     Hematocrit 35.2 (*)     MCHC 31.0 (*)     RDW 15.1 (*)     All other components within normal limits   COMPREHENSIVE METABOLIC PANEL - Abnormal; Notable for the following:     CO2 21 (*)     Glucose 239 (*)     Albumin 2.7 (*)     eGFR if non  55.2 (*)     All other components within normal limits   LACTIC ACID, PLASMA - Abnormal; Notable for the following:     Lactate (Lactic Acid) 2.6 (*)     All other components within normal limits   HEMOGLOBIN A1C - Abnormal; Notable for the following:     Hemoglobin A1C 8.2 (*)     Estimated Avg Glucose 189 (*)     All other components within normal limits    Narrative:     ADD ON TEST HEMOGLOBIN A1C PER DR DAYANA GOLDSTEIN ORDER #[id number]   08/21/2017  03:57    POCT GLUCOSE - Abnormal; Notable for the following:     POCT Glucose 284 (*)     All other components within normal limits   HEMOGLOBIN A1C   POCT GLUCOSE MONITORING CONTINUOUS             Medical Decision Making:   Initial Assessment:   76 y/o F presents after fall, only injury reported in laceration to right anterior lower leg, neurovascularly in tact, no other injuries. Will get plain film of tib-fib, repair.     Butch Ventura MD [facility] Emergency Medicine  12:29 AM 8/21/2017    ED Management:  Repaired laceration. Patient complaining of chills, cellulitis appears not improved after 5d doxy. Will admit for IV antibiotic therapy.     Butch Ventura MD [facility] Emergency Medicine  1:44 AM 8/21/2017                     ED Course     Clinical Impression:   The primary encounter diagnosis was Cellulitis of right lower extremity. Diagnoses of Fall and Infection were also pertinent to this visit.                           Butch Ventura MD  Resident  08/21/17 0433

## 2017-08-21 NOTE — PLAN OF CARE
Vale Howard MD   [address] / [place] [address]       [facility] - [place] - [address]  [place] [address]  Phone: [phone] Fax: [phone]    [facility] [place] - [address]  [address]  [place] [address]  Phone: [phone] Fax: [phone]    [facility] - [place] - [address]  [address]  [place] [address]  Phone: [phone] Fax: [phone]    Payor: [employer] / Plan: [employer] / Product Type: Commercial /         08/21/17 0851   Discharge Assessment   Assessment Type Discharge Planning Assessment   Confirmed/corrected address and phone number on facesheet? Yes   Assessment information obtained from? Patient   Expected Length of Stay (days) 1   Communicated expected length of stay with patient/caregiver yes   Prior to hospitilization cognitive status: Alert/Oriented   Prior to hospitalization functional status: Assistive Equipment   Current cognitive status: Alert/Oriented   Current Functional Status: Assistive Equipment   Arrived From home or self-care   Lives With other (see comments)  ([facility] lives in a convent.)   Able to Return to Prior Arrangements yes   Is patient able to care for self after discharge? Yes   Patient's perception of discharge disposition home or selfcare   Readmission Within The Last 30 Days no previous admission in last 30 days   Patient currently being followed by outpatient case management? No   Patient currently receives home health services? No   Does the patient currently use HME? Yes   Patient currently receives private duty nursing? No   Patient currently receives any other outside agency services? No   Equipment Currently Used at Home walker, standard;cane, straight   Do you have any problems affording any of your prescribed medications? No   Is the patient taking  medications as prescribed? yes   Do you have any financial concerns preventing you from receiving the healthcare you need? No   Does the patient have transportation to healthcare appointments? Yes   Transportation Available car;family or friend will provide   On Dialysis? No   Does the patient receive services at the Coumadin Clinic? No   Discharge Plan A Home   Patient/Family In Agreement With Plan yes

## 2017-08-21 NOTE — HOSPITAL COURSE
Ms. Giordano was seen in the ED for a laceration to her right lower extremity. Hospital medicine was then consulted for right lower extremity cellulitis with potential need of IV antibiotics.  In the ED, patient's lac was sutured, and she received a 500cc bolus of NS.  Her BP was found to be elevated at 199/90 that improved after given her home carvedilol. Patient's blood sugar was also elevated at 284 for which she was given detemir 30U.  After a thorough physical exam, it was decided that patient's lower extremity erythema more likely resembled stasis dermatitis and did not have other signs of cellulitis (tenderness, warm to touch).  Patient did not need to be admitted for IV antibiotics, and she was discharged in stable condition from the ED.  She was instructed to complete her course of Doxycycline that she had been taking prior to presentation.

## 2017-08-21 NOTE — ASSESSMENT & PLAN NOTE
- BP uncontrolled with latest /88  - Start home Coreg 6.25mg BID, Losartan 100mg qday, and Torsemide 20mg qday  - Will continue to follow and adjust meds as needed

## 2017-08-21 NOTE — DISCHARGE SUMMARY
[facility] Hospital Medicine  Discharge Summary      Patient Name: Sue Giordano  MRN: [id number]  Admission Date: 8/20/2017  Hospital Length of Stay: 0 days  Discharge Date and Time:  08/21/2017 4:42 AM  Attending Physician: Umesh Lennon MD   Discharging Provider: Jeanie Mckeon MD  Primary Care Provider: Vale Howard MD  [facility] Medicine Team: [facility] HOSP MED 2 Jeanie Mckeon MD    HPI:   Ms. Giordano is a 76 yo female with obesity, REJI, asthma, CKD3 (baseline Cr 1.0-1.3), uncontrolled HTN, DM2 (5/2017 A1c 8.3), and CAD who presents to the ED for a laceration to her right lower extremity.  Patient fell while walking up the step to the bus, cutting her right lower leg and reports bleeding profusely.  She did not lose consciousness prior to falling or even after, and she did not hit her head.  Patient has chronic bilateral lower extremity edema and chronic cellulitis, for which she is currently taking doxycycline.  She says that she has been taking 100mg BID for about a week now with minimal improvement in lower extremity erythema.  Denies any pain or weeping from b/l lower extremities. Patient also reports having difficulty controlling her BP lately, although she has been compliant with her medications.  However, she reports not following a low sodium or diabetic diet. Patient has a CPAP for nighttime, but she does not like to use it.  She does not use home oxygen either.  She denies any fevers, chills, headaches, dizziness, vision changes, SOB, chest pain, N/V/D, or changes in urinary frequency.     In the ED, patient's BP on arrival was 199/90 and her home carvedilol was given.  She had the laceration to her RLE sutured and was given a 500 cc bolus of NS.     * No surgery found *      Indwelling Lines/Drains at time of discharge:   Lines/Drains/Airways          No matching active lines, drains, or airways        Hospital Course:   Ms. Giordano was seen in the ED for a  laceration to her right lower extremity. Hospital medicine was then consulted for right lower extremity cellulitis with potential need of IV antibiotics.  In the ED, patient's lac was sutured, and she received a 500cc bolus of NS.  Her BP was found to be elevated at 199/90 that improved after given her home carvedilol. Patient's blood sugar was also elevated at 284 for which she was given detemir 30U.  After a thorough physical exam, it was decided that patient's lower extremity erythema more likely resembled stasis dermatitis and did not have other signs of cellulitis (tenderness, warm to touch).  Patient did not need to be admitted for IV antibiotics, and she was discharged in stable condition from the ED.  She was instructed to complete her course of Doxycycline that she had been taking prior to presentation.      Consults:     Significant Diagnostic Studies: Labs:   CMP   Recent Labs  Lab 08/21/17  0240      K 4.6      CO2 21*   *   BUN 22   CREATININE 1.0   CALCIUM 8.8   PROT 7.0   ALBUMIN 2.7*   BILITOT 0.2   ALKPHOS 78   AST 16   ALT 11   ANIONGAP 10   ESTGFRAFRICA >60.0   EGFRNONAA 55.2*    and CBC   Recent Labs  Lab 08/21/17  0240   WBC 7.66   HGB 10.9*   HCT 35.2*          Pending Diagnostic Studies:     None        Final Active Diagnoses:    Diagnosis Date Noted POA    PRINCIPAL PROBLEM:  Cellulitis of right lower extremity [L03.115] 08/21/2017 Yes    Morbid obesity due to excess calories [E66.01] 02/16/2017 Yes    Essential hypertension [I10] 06/25/2015 Yes    Type 2 diabetes mellitus with renal manifestations not at goal [E11.29] 01/29/2013 Yes    Obstructive sleep apnea syndrome [G47.33] 08/13/2012 Yes    Mixed hyperlipidemia [E78.2] 08/13/2012 Yes    Gastroesophageal reflux disease without esophagitis [K21.9] 08/13/2012 Yes      Problems Resolved During this Admission:    Diagnosis Date Noted Date Resolved POA      No new Assessment & Plan notes have been filed under  this hospital service since the last note was generated.  Service: Hospital Medicine      Discharged Condition: good    Disposition:     Follow Up:  Follow-up Information     Vale Howard MD In 1 week.    Specialty:  Internal Medicine  Contact information:  [address]  [facility] [address] [phone]                 Patient Instructions:   No discharge procedures on file.  Medications:  Reconciled Home Medications:   Current Discharge Medication List      CONTINUE these medications which have NOT CHANGED    Details   albuterol 90 mcg/actuation inhaler Inhale 2 puffs into the lungs every 6 (six) hours as needed for Wheezing or Shortness of Breath. Rescue  Qty: 1 Inhaler, Refills: 3    Associated Diagnoses: Uncomplicated asthma, unspecified asthma severity      aspirin 81 MG Chew Take 1 tablet (81 mg total) by mouth once daily.    Associated Diagnoses: Coronary artery disease      atorvastatin (LIPITOR) 80 MG tablet Take 1 tablet (80 mg total) by mouth once daily.  Qty: 90 tablet, Refills: 3    Associated Diagnoses: Type 2 diabetes mellitus with renal manifestations not at goal      baclofen (LIORESAL) 10 MG tablet Take 10 mg by mouth 3 (three) times daily.      blood sugar diagnostic Strp BG monitoring 4 times a day. Pt needs test strips for [employer] meter.  Qty: 450 strip, Refills: prn    Comments: 90 day supply  Associated Diagnoses: Type II or unspecified type diabetes mellitus with other specified manifestations, uncontrolled      carvedilol (COREG) 6.25 MG tablet Take 1 tablet (6.25 mg total) by mouth 2 (two) times daily.  Qty: 60 tablet, Refills: 11      co-enzyme Q-10 50 mg capsule Take 100 mg by mouth once daily.      dimethyl fumarate 240 mg CpDR Take 240 mg by mouth 2 (two) times daily.      doxycycline (MONODOX) 100 MG capsule Take 1 capsule (100 mg total) by mouth 2 (two) times daily.  Qty: 20 capsule, Refills: 1      ergocalciferol (VITAMIN D2) 50,000 unit Cap Take 1 capsule (50,000  "Units total) by mouth every 7 days.  Qty: 12 capsule, Refills: 3    Associated Diagnoses: Vitamin D deficiency disease      fluticasone (FLONASE) 50 mcg/actuation nasal spray 2 sprays by Each Nare route once daily.  Qty: 1 Bottle, Refills: 3    Associated Diagnoses: Uncomplicated asthma, unspecified asthma severity      fluticasone-salmeterol 100-50 mcg/dose (ADVAIR DISKUS) 100-50 mcg/dose diskus inhaler INHALE 1 PUFF 2 TIMES A DAY  Qty: 60 each, Refills: 6      gabapentin (NEURONTIN) 100 MG capsule Take 300 mg by mouth 3 (three) times daily.      insulin detemir (LEVEMIR FLEXTOUCH) 100 unit/mL (3 mL) SubQ InPn pen Inject 48 units at night.  Qty: 1 Box, Refills: 6      insulin lispro (HUMALOG KWIKPEN) 100 unit/mL InPn pen Inject 18 units w/ breakfast and 18 units w/ dinner. Scale 150-200 +2, 201-250 +4, 251-300 +6, 301-350 +8.  Qty: 1 Box, Refills: 6    Associated Diagnoses: Type 2 diabetes mellitus with renal manifestations not at goal      lancets Misc Test daily  Qty: 100 each, Refills: 12    Associated Diagnoses: Type II or unspecified type diabetes mellitus with renal manifestations, uncontrolled      lorazepam (ATIVAN) 1 MG tablet Take 1 mg by mouth every 12 (twelve) hours as needed for Anxiety.      losartan (COZAAR) 100 MG tablet 1 tab by mouth daily  Qty: 30 tablet, Refills: 6      meloxicam (MOBIC) 7.5 MG tablet Take 7.5 mg by mouth 2 (two) times daily.      oxybutynin (DITROPAN) 5 MG Tab Take 5 mg by mouth once daily.      pen needle, diabetic (PEN NEEDLE) 32 gauge x 5/32" Ndle Uses 4 times a day.  Qty: 150 each, Refills: 6      senna (SENOKOT) 8.6 mg tablet Take 1 tablet by mouth once daily.      torsemide (DEMADEX) 20 MG Tab Take 1 tablet (20 mg total) by mouth once daily.  Qty: 90 tablet, Refills: 3      trazodone (DESYREL) 100 MG tablet Take 100 mg by mouth every evening.      ammonium lactate 12 % Crea Apply topically every morning.       diclofenac sodium 1 % Gel Apply 2 g topically once " daily.  Qty: 100 g, Refills: 1      duloxetine (CYMBALTA) 30 MG capsule Take 1 capsule (30 mg total) by mouth 2 (two) times daily. May lower it to once daily as instructed if sedation persists  Qty: 60 capsule, Refills: 3    Associated Diagnoses: Chronic left-sided low back pain, with sciatica presence unspecified; Left lumbar radiculopathy      econazole nitrate 1 % cream Apply topically once daily. Apply to feet daily as directed.  Qty: 85 g, Refills: 1    Associated Diagnoses: Tinea pedis of both feet               HOS POC IP DISCHARGE SUMMARY    Jeanie Mckeon MD  Department of Hospital Medicine  [facility]

## 2017-08-21 NOTE — ASSESSMENT & PLAN NOTE
- Hospital medicine consulted for suspected right lower extremity cellulitis. Patient has already taken ~1 week worth of Doxy 100mg BID. Pt with erythema and bullous lesions to BLE that most likely resembles stasis dermatitis. No significant warmth or TTP to suggest cellulitis.     - Does not require IV antibiotics at this time

## 2017-08-21 NOTE — H&P
[facility] Hospital Medicine  History & Physical    Patient Name: Sue Giordano  MRN: [id number]  Admission Date: 8/20/2017  Attending Physician: Umesh Lennon MD   Primary Care Provider: Vale Howard MD    [facility] Medicine Team: [facility] HOSP MED 2 Jeanie Mckeon MD     Patient information was obtained from patient and ER records.     Subjective:     Principal Problem:<principal problem not specified>    Chief Complaint:   Chief Complaint   Patient presents with    Fall     Pt fell walking up bus steps, has laceration to right leg. Bleeding controlled by EMS. Denies hitting head. Denies blood thinners.         HPI: Ms. Giordano is a 76 yo female with obesity, REJI, asthma, CKD3 (baseline Cr 1.0-1.3), uncontrolled HTN, DM2 (5/2017 A1c 8.3), and CAD who presents to the ED for a laceration to her right lower extremity.  Patient fell while walking up the step to the bus, cutting her right lower leg and reports bleeding profusely.  She did not lose consciousness prior to falling or even after, and she did not hit her head.  Patient has chronic bilateral lower extremity edema and chronic cellulitis, for which she is currently taking doxycycline.  She says that she has been taking 100mg BID for about a week now with minimal improvement in lower extremity erythema.  Denies any pain or weeping from b/l lower extremities. Patient also reports having difficulty controlling her BP lately, although she has been compliant with her medications.  However, she reports not following a low sodium or diabetic diet. Patient has a CPAP for nighttime, but she does not like to use it.  She does not use home oxygen either.  She denies any fevers, chills, headaches, dizziness, vision changes, SOB, chest pain, N/V/D, or changes in urinary frequency.     In the ED, patient's BP on arrival was 199/90 and her home carvedilol was given.  She had the laceration to her RLE sutured and was given a 500 cc bolus of  NS.     Past Medical History:   Diagnosis Date    Adrenal mass     Anemia     Arthritis     Asthma     Bilateral carotid artery disease 7/21/2017    CKD (chronic kidney disease) stage 2, GFR 60-89 ml/min     Diabetes mellitus     Diabetes mellitus type II     Diastolic dysfunction 10/10/2013    GERD (gastroesophageal reflux disease) 8/13/2012    Gout     Hep B w/ coma 1971    Hives 10/1/2013    Hyperlipidemia     Hypertension     Morbid obesity with BMI of 50.0-59.9, adult 2/11/2014    Obesity     Obesity     Other specified anemias 6/25/2015    Sleep apnea     Unspecified essential hypertension 6/25/2015       Past Surgical History:   Procedure Laterality Date    HYSTERECTOMY  1982    secondary uterine fibroids    JOINT REPLACEMENT      Right total knee replacement         Review of patient's allergies indicates:   Allergen Reactions    Bactrim [sulfamethoxazole-trimethoprim]      Other reaction(s): up set stomach rash/hives    Azithromycin     Erythromycin Other (See Comments)     Other reaction(s): Unknown  Other reaction(s): Stomach upset    Gentamicin      Other reaction(s): Unknown    Levofloxacin Hives     Other reaction(s): nervousness    Vancomycin      Other reaction(s): Itching       No current facility-administered medications on file prior to encounter.      Current Outpatient Prescriptions on File Prior to Encounter   Medication Sig    albuterol 90 mcg/actuation inhaler Inhale 2 puffs into the lungs every 6 (six) hours as needed for Wheezing or Shortness of Breath. Rescue    ammonium lactate 12 % Crea Apply topically every morning.     aspirin 81 MG Chew Take 1 tablet (81 mg total) by mouth once daily.    atorvastatin (LIPITOR) 80 MG tablet Take 1 tablet (80 mg total) by mouth once daily.    blood sugar diagnostic Strp BG monitoring 4 times a day. Pt needs test strips for Embrace Talking meter. (Patient taking differently: BG monitoring 2 times a day. Pt needs test  "strips for Embrace Talking meter.)    carvedilol (COREG) 6.25 MG tablet Take 1 tablet (6.25 mg total) by mouth 2 (two) times daily.    diclofenac sodium 1 % Gel Apply 2 g topically once daily.    doxycycline (MONODOX) 100 MG capsule Take 1 capsule (100 mg total) by mouth 2 (two) times daily.    duloxetine (CYMBALTA) 30 MG capsule Take 1 capsule (30 mg total) by mouth 2 (two) times daily. May lower it to once daily as instructed if sedation persists    econazole nitrate 1 % cream Apply topically once daily. Apply to feet daily as directed.    ergocalciferol (VITAMIN D2) 50,000 unit Cap Take 1 capsule (50,000 Units total) by mouth every 7 days.    fluticasone (FLONASE) 50 mcg/actuation nasal spray 2 sprays by Each Nare route once daily.    fluticasone-salmeterol 100-50 mcg/dose (ADVAIR DISKUS) 100-50 mcg/dose diskus inhaler INHALE 1 PUFF 2 TIMES A DAY    insulin detemir (LEVEMIR FLEXTOUCH) 100 unit/mL (3 mL) SubQ InPn pen Inject 48 units at night.    insulin lispro (HUMALOG KWIKPEN) 100 unit/mL InPn pen Inject 18 units w/ breakfast and 18 units w/ dinner. Scale 150-200 +2, 201-250 +4, 251-300 +6, 301-350 +8.    lancets Misc Test daily    losartan (COZAAR) 100 MG tablet 1 tab by mouth daily    pen needle, diabetic (PEN NEEDLE) 32 gauge x 5/32" Ndle Uses 4 times a day.    torsemide (DEMADEX) 20 MG Tab Take 1 tablet (20 mg total) by mouth once daily.     Family History     Problem Relation (Age of Onset)    Cancer Mother, Sister    Hypertension Father    No Known Problems Brother, Maternal Aunt, Maternal Uncle, Paternal Aunt, Paternal Uncle, Maternal Grandmother, Maternal Grandfather, Paternal Grandmother, Paternal Grandfather        Social History Main Topics    Smoking status: Never Smoker    Smokeless tobacco: Never Used    Alcohol use No    Drug use: No    Sexual activity: No     Review of Systems   Constitutional: Positive for fatigue (2/2 decreased sleep recently ). Negative for appetite change, " chills and fever.   HENT: Negative for congestion, sore throat and trouble swallowing.    Eyes: Negative for visual disturbance.   Respiratory: Negative for cough and shortness of breath.    Cardiovascular: Positive for leg swelling. Negative for chest pain.   Gastrointestinal: Negative for abdominal distention, abdominal pain, constipation, diarrhea, nausea and vomiting.   Genitourinary: Negative for difficulty urinating, dysuria, frequency and hematuria.   Musculoskeletal: Negative for arthralgias and myalgias.   Skin: Positive for rash and wound.   Neurological: Negative for dizziness, weakness, light-headedness and headaches.     Objective:     Vital Signs (Most Recent):  Temp: 98.9 °F (37.2 °C) (08/20/17 2320)  Pulse: 72 (08/20/17 2350)  Resp: 18 (08/20/17 2350)  BP: (!) 211/88 (08/21/17 0032)  SpO2: 100 % (08/20/17 2350) Vital Signs (24h Range):  Temp:  [98.9 °F (37.2 °C)] 98.9 °F (37.2 °C)  Pulse:  [72-82] 72  Resp:  [18] 18  SpO2:  [98 %-100 %] 100 %  BP: (199-211)/(88-90) 211/88     Weight: 122.5 kg (270 lb)  Body mass index is 54.53 kg/m².    Physical Exam   Constitutional: She is oriented to person, place, and time. She appears well-developed. No distress.   Morbidly obese   HENT:   Head: Normocephalic and atraumatic.   Eyes: Conjunctivae and EOM are normal. Pupils are equal, round, and reactive to light.   Neck: No JVD present.   Cardiovascular: Normal rate, regular rhythm, normal heart sounds and intact distal pulses.    No murmur heard.  Pulmonary/Chest: Effort normal and breath sounds normal. No respiratory distress. She has no wheezes.   Abdominal: Soft. Bowel sounds are normal. She exhibits no distension. There is no tenderness. There is no rebound and no guarding.   Musculoskeletal: Normal range of motion. She exhibits edema (BLE). She exhibits no tenderness.   Neurological: She is alert and oriented to person, place, and time.   Skin: Rash (to BLE, likely resembling stasis dermatitis with blisters  to anterior shins) noted. She is not diaphoretic. There is erythema (to BLE).   No significant warmth to BLE over the area of erythema. Right LE with wound dressing in place.    Psychiatric: She has a normal mood and affect.        Significant Labs:   Recent Results (from the past 24 hour(s))   CBC auto differential    Collection Time: 08/21/17  2:40 AM   Result Value Ref Range    WBC 7.66 3.90 - 12.70 K/uL    RBC 3.87 (L) 4.00 - 5.40 M/uL    Hemoglobin 10.9 (L) 12.0 - 16.0 g/dL    Hematocrit 35.2 (L) 37.0 - 48.5 %    MCV 91 82 - 98 fL    MCH 28.2 27.0 - 31.0 pg    MCHC 31.0 (L) 32.0 - 36.0 g/dL    RDW 15.1 (H) 11.5 - 14.5 %    Platelets 186 150 - 350 K/uL    MPV 10.5 9.2 - 12.9 fL    Gran # 5.1 1.8 - 7.7 K/uL    Lymph # 1.6 1.0 - 4.8 K/uL    Mono # 0.6 0.3 - 1.0 K/uL    Eos # 0.2 0.0 - 0.5 K/uL    Baso # 0.03 0.00 - 0.20 K/uL    Gran% 66.9 38.0 - 73.0 %    Lymph% 21.3 18.0 - 48.0 %    Mono% 8.0 4.0 - 15.0 %    Eosinophil% 3.1 0.0 - 8.0 %    Basophil% 0.4 0.0 - 1.9 %    Differential Method Automated    Comprehensive metabolic panel    Collection Time: 08/21/17  2:40 AM   Result Value Ref Range    Sodium 139 136 - 145 mmol/L    Potassium 4.6 3.5 - 5.1 mmol/L    Chloride 108 95 - 110 mmol/L    CO2 21 (L) 23 - 29 mmol/L    Glucose 239 (H) 70 - 110 mg/dL    BUN, Bld 22 8 - 23 mg/dL    Creatinine 1.0 0.5 - 1.4 mg/dL    Calcium 8.8 8.7 - 10.5 mg/dL    Total Protein 7.0 6.0 - 8.4 g/dL    Albumin 2.7 (L) 3.5 - 5.2 g/dL    Total Bilirubin 0.2 0.1 - 1.0 mg/dL    Alkaline Phosphatase 78 55 - 135 U/L    AST 16 10 - 40 U/L    ALT 11 10 - 44 U/L    Anion Gap 10 8 - 16 mmol/L    eGFR if African American >60.0 >60 mL/min/1.73 m^2    eGFR if non  55.2 (A) >60 mL/min/1.73 m^2   Lactic acid, plasma    Collection Time: 08/21/17  2:40 AM   Result Value Ref Range    Lactate (Lactic Acid) 2.6 (H) 0.5 - 2.2 mmol/L   ]    Significant Imaging:      Assessment/Plan:     Cellulitis of right lower extremity    - Hospital  medicine consulted for suspected right lower extremity cellulitis. Patient has already taken ~1 week worth of Doxy 100mg BID. Pt with erythema and bullous lesions to BLE that most likely resembles stasis dermatitis. No significant warmth or TTP to suggest cellulitis.     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    - 5/2017  A1c 8.3 and pt reports not following diabetic diet  - Home insulin regimen: Levemir 48U qHS, Humalog 18U with breakfast & 18U with dinner  - Will start Detemir 30U qHS, and Aspart 10U BIDWM with moderate SSI prn and monitor blood glucose, adjusting meds as needed          Mixed hyperlipidemia    - 5/2017, Total Cholesterol 77  - Continue home Lipitor 80mg PO qday        Obstructive sleep apnea syndrome    - Does not wish to use CPAP  - start Breo; Albuterol inhaler prn              VTE Risk Mitigation         Ordered     enoxaparin injection 40 mg  Every 12 hours     Route:  Subcutaneous        08/21/17 0321     Medium Risk of VTE  Once      08/21/17 0321             Jeanie Mckeon MD  Department of Hospital Medicine   [facility]

## 2017-08-21 NOTE — SUBJECTIVE & OBJECTIVE
Past Medical History:   Diagnosis Date    Adrenal mass     Anemia     Arthritis     Asthma     Bilateral carotid artery disease 7/21/2017    CKD (chronic kidney disease) stage 2, GFR 60-89 ml/min     Diabetes mellitus     Diabetes mellitus type II     Diastolic dysfunction 10/10/2013    GERD (gastroesophageal reflux disease) 8/13/2012    Gout     Hep B w/ coma 1971    Hives 10/1/2013    Hyperlipidemia     Hypertension     Morbid obesity with BMI of 50.0-59.9, adult 2/11/2014    Obesity     Obesity     Other specified anemias 6/25/2015    Sleep apnea     Unspecified essential hypertension 6/25/2015       Past Surgical History:   Procedure Laterality Date    HYSTERECTOMY  1982    secondary uterine fibroids    JOINT REPLACEMENT      Right total knee replacement         Review of patient's allergies indicates:   Allergen Reactions    Bactrim [sulfamethoxazole-trimethoprim]      Other reaction(s): up set stomach rash/hives    Azithromycin     Erythromycin Other (See Comments)     Other reaction(s): Unknown  Other reaction(s): Stomach upset    Gentamicin      Other reaction(s): Unknown    Levofloxacin Hives     Other reaction(s): nervousness    Vancomycin      Other reaction(s): Itching       No current facility-administered medications on file prior to encounter.      Current Outpatient Prescriptions on File Prior to Encounter   Medication Sig    albuterol 90 mcg/actuation inhaler Inhale 2 puffs into the lungs every 6 (six) hours as needed for Wheezing or Shortness of Breath. Rescue    ammonium lactate 12 % Crea Apply topically every morning.     aspirin 81 MG Chew Take 1 tablet (81 mg total) by mouth once daily.    atorvastatin (LIPITOR) 80 MG tablet Take 1 tablet (80 mg total) by mouth once daily.    blood sugar diagnostic Strp BG monitoring 4 times a day. Pt needs test strips for Embrace Talking meter. (Patient taking differently: BG monitoring 2 times a day. Pt needs test strips for  "Embrace Talking meter.)    carvedilol (COREG) 6.25 MG tablet Take 1 tablet (6.25 mg total) by mouth 2 (two) times daily.    diclofenac sodium 1 % Gel Apply 2 g topically once daily.    doxycycline (MONODOX) 100 MG capsule Take 1 capsule (100 mg total) by mouth 2 (two) times daily.    duloxetine (CYMBALTA) 30 MG capsule Take 1 capsule (30 mg total) by mouth 2 (two) times daily. May lower it to once daily as instructed if sedation persists    econazole nitrate 1 % cream Apply topically once daily. Apply to feet daily as directed.    ergocalciferol (VITAMIN D2) 50,000 unit Cap Take 1 capsule (50,000 Units total) by mouth every 7 days.    fluticasone (FLONASE) 50 mcg/actuation nasal spray 2 sprays by Each Nare route once daily.    fluticasone-salmeterol 100-50 mcg/dose (ADVAIR DISKUS) 100-50 mcg/dose diskus inhaler INHALE 1 PUFF 2 TIMES A DAY    insulin detemir (LEVEMIR FLEXTOUCH) 100 unit/mL (3 mL) SubQ InPn pen Inject 48 units at night.    insulin lispro (HUMALOG KWIKPEN) 100 unit/mL InPn pen Inject 18 units w/ breakfast and 18 units w/ dinner. Scale 150-200 +2, 201-250 +4, 251-300 +6, 301-350 +8.    lancets Misc Test daily    losartan (COZAAR) 100 MG tablet 1 tab by mouth daily    pen needle, diabetic (PEN NEEDLE) 32 gauge x 5/32" Ndle Uses 4 times a day.    torsemide (DEMADEX) 20 MG Tab Take 1 tablet (20 mg total) by mouth once daily.     Family History     Problem Relation (Age of Onset)    Cancer Mother, Sister    Hypertension Father    No Known Problems Brother, Maternal Aunt, Maternal Uncle, Paternal Aunt, Paternal Uncle, Maternal Grandmother, Maternal Grandfather, Paternal Grandmother, Paternal Grandfather        Social History Main Topics    Smoking status: Never Smoker    Smokeless tobacco: Never Used    Alcohol use No    Drug use: No    Sexual activity: No     Review of Systems   Constitutional: Positive for fatigue (2/2 decreased sleep recently ). Negative for appetite change, chills and " fever.   HENT: Negative for congestion, sore throat and trouble swallowing.    Eyes: Negative for visual disturbance.   Respiratory: Negative for cough and shortness of breath.    Cardiovascular: Positive for leg swelling. Negative for chest pain.   Gastrointestinal: Negative for abdominal distention, abdominal pain, constipation, diarrhea, nausea and vomiting.   Genitourinary: Negative for difficulty urinating, dysuria, frequency and hematuria.   Musculoskeletal: Negative for arthralgias and myalgias.   Skin: Positive for rash and wound.   Neurological: Negative for dizziness, weakness, light-headedness and headaches.     Objective:     Vital Signs (Most Recent):  Temp: 98.9 °F (37.2 °C) (08/20/17 2320)  Pulse: 72 (08/20/17 2350)  Resp: 18 (08/20/17 2350)  BP: (!) 211/88 (08/21/17 0032)  SpO2: 100 % (08/20/17 2350) Vital Signs (24h Range):  Temp:  [98.9 °F (37.2 °C)] 98.9 °F (37.2 °C)  Pulse:  [72-82] 72  Resp:  [18] 18  SpO2:  [98 %-100 %] 100 %  BP: (199-211)/(88-90) 211/88     Weight: 122.5 kg (270 lb)  Body mass index is 54.53 kg/m².    Physical Exam   Constitutional: She is oriented to person, place, and time. She appears well-developed. No distress.   Morbidly obese   HENT:   Head: Normocephalic and atraumatic.   Eyes: Conjunctivae and EOM are normal. Pupils are equal, round, and reactive to light.   Neck: No JVD present.   Cardiovascular: Normal rate, regular rhythm, normal heart sounds and intact distal pulses.    No murmur heard.  Pulmonary/Chest: Effort normal and breath sounds normal. No respiratory distress. She has no wheezes.   Abdominal: Soft. Bowel sounds are normal. She exhibits no distension. There is no tenderness. There is no rebound and no guarding.   Musculoskeletal: Normal range of motion. She exhibits edema (BLE). She exhibits no tenderness.   Neurological: She is alert and oriented to person, place, and time.   Skin: Rash (to BLE, likely resembling stasis dermatitis with blisters to  anterior shins) noted. She is not diaphoretic. There is erythema (to BLE).   No significant warmth to BLE over the area of erythema. Right LE with wound dressing in place.    Psychiatric: She has a normal mood and affect.        Significant Labs:   Recent Results (from the past 24 hour(s))   CBC auto differential    Collection Time: 08/21/17  2:40 AM   Result Value Ref Range    WBC 7.66 3.90 - 12.70 K/uL    RBC 3.87 (L) 4.00 - 5.40 M/uL    Hemoglobin 10.9 (L) 12.0 - 16.0 g/dL    Hematocrit 35.2 (L) 37.0 - 48.5 %    MCV 91 82 - 98 fL    MCH 28.2 27.0 - 31.0 pg    MCHC 31.0 (L) 32.0 - 36.0 g/dL    RDW 15.1 (H) 11.5 - 14.5 %    Platelets 186 150 - 350 K/uL    MPV 10.5 9.2 - 12.9 fL    Gran # 5.1 1.8 - 7.7 K/uL    Lymph # 1.6 1.0 - 4.8 K/uL    Mono # 0.6 0.3 - 1.0 K/uL    Eos # 0.2 0.0 - 0.5 K/uL    Baso # 0.03 0.00 - 0.20 K/uL    Gran% 66.9 38.0 - 73.0 %    Lymph% 21.3 18.0 - 48.0 %    Mono% 8.0 4.0 - 15.0 %    Eosinophil% 3.1 0.0 - 8.0 %    Basophil% 0.4 0.0 - 1.9 %    Differential Method Automated    Comprehensive metabolic panel    Collection Time: 08/21/17  2:40 AM   Result Value Ref Range    Sodium 139 136 - 145 mmol/L    Potassium 4.6 3.5 - 5.1 mmol/L    Chloride 108 95 - 110 mmol/L    CO2 21 (L) 23 - 29 mmol/L    Glucose 239 (H) 70 - 110 mg/dL    BUN, Bld 22 8 - 23 mg/dL    Creatinine 1.0 0.5 - 1.4 mg/dL    Calcium 8.8 8.7 - 10.5 mg/dL    Total Protein 7.0 6.0 - 8.4 g/dL    Albumin 2.7 (L) 3.5 - 5.2 g/dL    Total Bilirubin 0.2 0.1 - 1.0 mg/dL    Alkaline Phosphatase 78 55 - 135 U/L    AST 16 10 - 40 U/L    ALT 11 10 - 44 U/L    Anion Gap 10 8 - 16 mmol/L    eGFR if African American >60.0 >60 mL/min/1.73 m^2    eGFR if non  55.2 (A) >60 mL/min/1.73 m^2   Lactic acid, plasma    Collection Time: 08/21/17  2:40 AM   Result Value Ref Range    Lactate (Lactic Acid) 2.6 (H) 0.5 - 2.2 mmol/L   ]    Significant Imaging:

## 2017-08-21 NOTE — PLAN OF CARE
Future Appointments  Date Time Provider Department Center   8/23/2017 4:00 PM ECHO, MAIN CAMPUS [facility] ECHOLAB [facility]   8/23/2017 5:00 PM Marcial Lora MD [facility] CARDIO [facility]   8/24/2017 10:00 AM LAB, APPOINTMENT [facility] LAB [facility]   8/31/2017 11:00 AM Yogi Munson, APRN, FNP [facility] ENDODIA [facility]   9/1/2017 11:20 AM Wade Chairez MD [facility] SLEEP [facility]   9/15/2017 10:00 AM PULMONARY FUNCTION [facility] PULMLAB [facility]   9/15/2017 10:15 AM PULMONARY FUNCTION [facility] PULMLAB [facility]   9/15/2017 10:30 AM PULMONARY FUNCTION [facility] PULMLAB [facility]   9/15/2017 11:00 AM SIX, MINUTE WALK [facility] PUL WLK [facility]   9/15/2017 3:00 PM Shaunna Connolly NP [facility] PULMSVC [facility]   10/3/2017 10:20 AM Santi Schulz MD [facility] NEPHRO [facility]   11/8/2017 11:20 AM Priya Grewal MD [facility] PHYSMED [facility]   12/20/2017 11:40 AM Db Viramontes III, MD [facility] NEURO [facility]        08/21/17 1306   Final Note   Assessment Type Final Discharge Note   Discharge Disposition Home   What phone number can be called within the next 1-3 days to see how you are doing after discharge? [phone]   Hospital Follow Up  Appt(s) scheduled? Yes   Discharge/Hospital Encounter Summary to ([facility]) PCP n/a   Referral to Outpatient Case Management complete? n/a   Referral to / orders for Home Health Complete? n/a   30 day supply of medicines given at discharge, if documented non-compliance / non-adherence? n/a   Any social issues identified prior to discharge? No   Did you assess the readiness or willingness of the family or caregiver to support self management of care? Yes   Right Care Referral Info   Post Acute Recommendation No Care

## 2017-08-21 NOTE — NURSING
Discharge instructions reviewed and given to patient. Patient verbalized understanding. Patients IV removed free of complications. Patient escorted off floor in  free of SS of distress.

## 2017-08-21 NOTE — ED NOTES
Sue Duran Giordano, a 77 y.o. female presents to the ED with a c/c of a fall. Pt sustained laceration to the right leg and bleeding was controlled by EMS. Pt states she takes aspirin. Pt denies LOC or hitting head. Cardiac monitoring initiated. Pt is resting in bed. ABCs intact.      Chief Complaint   Patient presents with    Fall     Pt fell walking up bus steps, has laceration to right leg. Bleeding controlled by EMS. Denies hitting head. Denies blood thinners.      Review of patient's allergies indicates:   Allergen Reactions    Bactrim [sulfamethoxazole-trimethoprim]      Other reaction(s): up set stomach rash/hives    Azithromycin     Erythromycin Other (See Comments)     Other reaction(s): Unknown  Other reaction(s): Stomach upset    Gentamicin      Other reaction(s): Unknown    Levofloxacin Hives     Other reaction(s): nervousness    Vancomycin      Other reaction(s): Itching     Past Medical History:   Diagnosis Date    Adrenal mass     Anemia     Arthritis     Asthma     Bilateral carotid artery disease 7/21/2017    CKD (chronic kidney disease) stage 2, GFR 60-89 ml/min     Diabetes mellitus     Diabetes mellitus type II     Diastolic dysfunction 10/10/2013    GERD (gastroesophageal reflux disease) 8/13/2012    Gout     Hep B w/ coma 1971    Hives 10/1/2013    Hyperlipidemia     Hypertension     Morbid obesity with BMI of 50.0-59.9, adult 2/11/2014    Obesity     Obesity     Other specified anemias 6/25/2015    Sleep apnea     Unspecified essential hypertension 6/25/2015

## 2017-08-21 NOTE — ASSESSMENT & PLAN NOTE
- 5/2017  A1c 8.3 and pt reports not following diabetic diet  - Home insulin regimen: Levemir 48U qHS, Humalog 18U with breakfast & 18U with dinner  - Will start Detemir 30U qHS, and Aspart 10U BIDWM with moderate SSI prn and monitor blood glucose, adjusting meds as needed

## 2017-08-21 NOTE — HPI
Ms. Giordano is a 76 yo female with obesity, REJI, asthma, CKD3 (baseline Cr 1.0-1.3), uncontrolled HTN, DM2 (5/2017 A1c 8.3), and CAD who presents to the ED for a laceration to her right lower extremity.  Patient fell while walking up the step to the bus, cutting her right lower leg and reports bleeding profusely.  She did not lose consciousness prior to falling or even after, and she did not hit her head.  Patient has chronic bilateral lower extremity edema and chronic cellulitis, for which she is currently taking doxycycline.  She says that she has been taking 100mg BID for about a week now with minimal improvement in lower extremity erythema.  Denies any pain or weeping from b/l lower extremities. Patient also reports having difficulty controlling her BP lately, although she has been compliant with her medications.  However, she reports not following a low sodium or diabetic diet. Patient has a CPAP for nighttime, but she does not like to use it.  She does not use home oxygen either.  She denies any fevers, chills, headaches, dizziness, vision changes, SOB, chest pain, N/V/D, or changes in urinary frequency.     In the ED, patient's BP on arrival was 199/90 and her home carvedilol was given.  She had the laceration to her RLE sutured and was given a 500 cc bolus of NS.

## 2017-08-21 NOTE — PLAN OF CARE
Problem: Patient Care Overview  Goal: Plan of Care Review  Outcome: Ongoing (interventions implemented as appropriate)  Pt arrived to the room at 0502. Pt in no distress, drsg changed, VSS, will continue to monitor.

## 2017-08-23 ENCOUNTER — OFFICE VISIT (OUTPATIENT)
Dept: CARDIOLOGY | Facility: CLINIC | Age: 77
End: 2017-08-23
Payer: COMMERCIAL

## 2017-08-23 ENCOUNTER — HOSPITAL ENCOUNTER (OUTPATIENT)
Dept: CARDIOLOGY | Facility: CLINIC | Age: 77
Discharge: HOME | End: 2017-08-23
Payer: COMMERCIAL

## 2017-08-23 VITALS
DIASTOLIC BLOOD PRESSURE: 74 MMHG | BODY MASS INDEX: 54.67 KG/M2 | WEIGHT: 271.19 LBS | HEIGHT: 59 IN | HEART RATE: 68 BPM | SYSTOLIC BLOOD PRESSURE: 146 MMHG

## 2017-08-23 DIAGNOSIS — I87.2 VENOUS STASIS DERMATITIS OF BOTH LOWER EXTREMITIES: ICD-10-CM

## 2017-08-23 DIAGNOSIS — I87.2 VENOUS INSUFFICIENCY: ICD-10-CM

## 2017-08-23 DIAGNOSIS — N18.30 CKD (CHRONIC KIDNEY DISEASE) STAGE 3, GFR 30-59 ML/MIN: ICD-10-CM

## 2017-08-23 DIAGNOSIS — E78.2 MIXED HYPERLIPIDEMIA: ICD-10-CM

## 2017-08-23 DIAGNOSIS — I10 ESSENTIAL HYPERTENSION: ICD-10-CM

## 2017-08-23 DIAGNOSIS — E66.01 MORBID OBESITY DUE TO EXCESS CALORIES: ICD-10-CM

## 2017-08-23 DIAGNOSIS — I51.89 DIASTOLIC DYSFUNCTION: ICD-10-CM

## 2017-08-23 DIAGNOSIS — R60.9 EDEMA, UNSPECIFIED TYPE: ICD-10-CM

## 2017-08-23 DIAGNOSIS — I25.10 CORONARY ARTERY DISEASE DUE TO CALCIFIED CORONARY LESION: Chronic | ICD-10-CM

## 2017-08-23 DIAGNOSIS — G47.33 OSA (OBSTRUCTIVE SLEEP APNEA): ICD-10-CM

## 2017-08-23 DIAGNOSIS — I25.84 CORONARY ARTERY DISEASE DUE TO CALCIFIED CORONARY LESION: Chronic | ICD-10-CM

## 2017-08-23 DIAGNOSIS — I89.0 LYMPHEDEMA: ICD-10-CM

## 2017-08-23 DIAGNOSIS — R06.09 DOE (DYSPNEA ON EXERTION): Primary | ICD-10-CM

## 2017-08-23 DIAGNOSIS — I77.9 BILATERAL CAROTID ARTERY DISEASE: ICD-10-CM

## 2017-08-23 LAB
DIASTOLIC DYSFUNCTION: YES
ESTIMATED PA SYSTOLIC PRESSURE: 41.44
GLOBAL PERICARDIAL EFFUSION: ABNORMAL
MITRAL VALVE REGURGITATION: ABNORMAL
RETIRED EF AND QEF - SEE NOTES: 65 (ref 55–65)

## 2017-08-23 PROCEDURE — 99999 PR PBB SHADOW E&M-EST. PATIENT-LVL III: CPT | Mod: PBBFAC,,, | Performed by: INTERNAL MEDICINE

## 2017-08-23 PROCEDURE — 1159F MED LIST DOCD IN RCRD: CPT | Mod: ,,, | Performed by: INTERNAL MEDICINE

## 2017-08-23 PROCEDURE — 3077F SYST BP >= 140 MM HG: CPT | Mod: ,,, | Performed by: INTERNAL MEDICINE

## 2017-08-23 PROCEDURE — 93306 TTE W/DOPPLER COMPLETE: CPT | Mod: PBBFAC | Performed by: INTERNAL MEDICINE

## 2017-08-23 PROCEDURE — 99214 OFFICE O/P EST MOD 30 MIN: CPT | Mod: S$PBB,,, | Performed by: INTERNAL MEDICINE

## 2017-08-23 PROCEDURE — 3078F DIAST BP <80 MM HG: CPT | Mod: ,,, | Performed by: INTERNAL MEDICINE

## 2017-08-23 PROCEDURE — 99213 OFFICE O/P EST LOW 20 MIN: CPT | Mod: PBBFAC,25 | Performed by: INTERNAL MEDICINE

## 2017-08-23 NOTE — LETTER
August 23, 2017      Vale Howard MD  [address]  [facility] [address]           [facility]  [address]  [facility] [address]  Phone: [phone]          Patient: Sue Giordano   MR Number: [id number]   YOB: 1940   Date of Visit: 8/23/2017       Dear Dr. Vale Howard:    Thank you for referring Sue Giordano to me for evaluation. Attached you will find relevant portions of my assessment and plan of care.    If you have questions, please do not hesitate to call me. I look forward to following Sue Giordano along with you.    Sincerely,    Marcial Lora MD    Enclosure  CC:  No Recipients    If you would like to receive this communication electronically, please contact [email or web address] or [phone] to request more information on [employer] Link access.    For providers and/or their staff who would like to refer a patient to [facility], please contact us through our one-stop-shop provider referral line, [facility] , at [phone].    If you feel you have received this communication in error or would no longer like to receive these types of communications, please e-mail [email or web address]

## 2017-08-23 NOTE — PROGRESS NOTES
Subjective:    Patient ID:  Sue Giordano is a 77 y.o. female who presents for evaluation of Congestive Heart Failure      HPI   Sister Pasquale is followed by Dr Howard with hypertension, diabetes, and REJI is referred for evaluation of one year of LAYNE. She denies chest pain and has no history of CAD.                      CONCLUSIONS     1 - Normal left ventricular systolic function (EF 60-65%).     2 - Concentric remodeling.     3 - No wall motion abnormalities.     4 - Biatrial enlargement.     5 - Impaired LV relaxation, elevated LAP (grade 2 diastolic dysfunction).     6 - Normal right ventricular systolic function .     7 - Trivial to mild mitral regurgitation.     8 - Trivial to mild pulmonic regurgitation.     9 - Trivial pericardial effusion.     10 - Pulmonary hypertension. The estimated PA systolic pressure is 41 mmHg.             This document has been electronically    SIGNED BY: Placido Spencer MD On: 08/23/2017 16:59      Specimen         `  Lab Results   Component Value Date     08/21/2017    K 4.6 08/21/2017     08/21/2017    CO2 21 (L) 08/21/2017    BUN 22 08/21/2017    CREATININE 1.0 08/21/2017     (H) 08/21/2017    HGBA1C 8.2 (H) 08/21/2017    MG 1.8 10/28/2016    AST 16 08/21/2017    ALT 11 08/21/2017    ALBUMIN 2.7 (L) 08/21/2017    PROT 7.0 08/21/2017    BILITOT 0.2 08/21/2017    WBC 7.66 08/21/2017    HGB 10.9 (L) 08/21/2017    HCT 35.2 (L) 08/21/2017    HCT 32 (L) 09/20/2013    MCV 91 08/21/2017     08/21/2017    INR 1.1 04/17/2005    TSH 0.830 05/02/2017         Lab Results   Component Value Date    CHOL 177 05/02/2017    HDL 39 (L) 05/02/2017    TRIG 92 05/02/2017       Lab Results   Component Value Date    LDLCALC 119.6 05/02/2017       Past Medical History:   Diagnosis Date    Adrenal mass     Anemia     Arthritis     Asthma     Bilateral carotid artery disease 7/21/2017    CKD (chronic kidney disease) stage 2, GFR 60-89 ml/min     Diabetes mellitus      Diabetes mellitus type II     Diastolic dysfunction 10/10/2013    GERD (gastroesophageal reflux disease) 8/13/2012    Gout     Hep B w/ coma 1971    Hives 10/1/2013    Hyperlipidemia     Hypertension     Morbid obesity with BMI of 50.0-59.9, adult 2/11/2014    Obesity     Obesity     Other specified anemias 6/25/2015    Sleep apnea     Unspecified essential hypertension 6/25/2015       Current Outpatient Prescriptions:     albuterol 90 mcg/actuation inhaler, Inhale 2 puffs into the lungs every 6 (six) hours as needed for Wheezing or Shortness of Breath. Rescue, Disp: 1 Inhaler, Rfl: 3    ammonium lactate 12 % Crea, Apply topically every morning. , Disp: , Rfl:     aspirin 81 MG Chew, Take 1 tablet (81 mg total) by mouth once daily., Disp: , Rfl:     atorvastatin (LIPITOR) 80 MG tablet, Take 1 tablet (80 mg total) by mouth once daily., Disp: 90 tablet, Rfl: 3    blood sugar diagnostic Strp, BG monitoring 4 times a day. Pt needs test strips for [employer] Talking meter. (Patient taking differently: BG monitoring 2 times a day. Pt needs test strips for [employer] Talking meter.), Disp: 450 strip, Rfl: prn    carvedilol (COREG) 6.25 MG tablet, Take 1 tablet (6.25 mg total) by mouth 2 (two) times daily., Disp: 60 tablet, Rfl: 11    diclofenac sodium 1 % Gel, Apply 2 g topically once daily., Disp: 100 g, Rfl: 1    doxycycline (MONODOX) 100 MG capsule, Take 1 capsule (100 mg total) by mouth 2 (two) times daily., Disp: 20 capsule, Rfl: 1    duloxetine (CYMBALTA) 30 MG capsule, Take 1 capsule (30 mg total) by mouth 2 (two) times daily. May lower it to once daily as instructed if sedation persists, Disp: 60 capsule, Rfl: 3    econazole nitrate 1 % cream, Apply topically once daily. Apply to feet daily as directed., Disp: 85 g, Rfl: 1    ergocalciferol (VITAMIN D2) 50,000 unit Cap, Take 1 capsule (50,000 Units total) by mouth every 7 days., Disp: 12 capsule, Rfl: 3    fluticasone (FLONASE) 50 mcg/actuation  "nasal spray, 2 sprays by Each Nare route once daily., Disp: 1 Bottle, Rfl: 3    fluticasone-salmeterol 100-50 mcg/dose (ADVAIR DISKUS) 100-50 mcg/dose diskus inhaler, INHALE 1 PUFF 2 TIMES A DAY, Disp: 60 each, Rfl: 6    insulin detemir (LEVEMIR FLEXTOUCH) 100 unit/mL (3 mL) SubQ InPn pen, Inject 48 units at night., Disp: 1 Box, Rfl: 6    insulin lispro (HUMALOG KWIKPEN) 100 unit/mL InPn pen, Inject 18 units w/ breakfast and 18 units w/ dinner. Scale 150-200 +2, 201-250 +4, 251-300 +6, 301-350 +8., Disp: 1 Box, Rfl: 6    lancets Misc, Test daily, Disp: 100 each, Rfl: 12    losartan (COZAAR) 100 MG tablet, 1 tab by mouth daily, Disp: 30 tablet, Rfl: 6    oxybutynin (DITROPAN) 5 MG Tab, Take 5 mg by mouth once daily., Disp: , Rfl:     pen needle, diabetic (PEN NEEDLE) 32 gauge x 5/32" Ndle, Uses 4 times a day., Disp: 150 each, Rfl: 6    torsemide (DEMADEX) 20 MG Tab, Take 1 tablet (20 mg total) by mouth once daily., Disp: 90 tablet, Rfl: 3    gabapentin (NEURONTIN) 100 MG capsule, Take 300 mg by mouth 3 (three) times daily., Disp: , Rfl:         Review of Systems   Cardiovascular: Positive for dyspnea on exertion and leg swelling.   Neurological: Positive for excessive daytime sleepiness.   Psychiatric/Behavioral: The patient has insomnia.         Objective:BP (!) 146/74 (BP Location: Left arm, Patient Position: Sitting, BP Method: Large (Manual))   Pulse 68   Ht 4' 11" (1.499 m)   Wt 123 kg (271 lb 2.7 oz)   BMI 54.77 kg/m²           Physical Exam   Musculoskeletal: She exhibits edema (plus 4 brawny edema).   Skin:              Assessment:       1. LAYNE (dyspnea on exertion)    2. Coronary artery disease due to calcified coronary lesion    3. Mixed hyperlipidemia    4. Diastolic dysfunction: see ECHO 2016    5. Essential hypertension    6. Bilateral carotid artery disease    7. Venous stasis dermatitis of both lower extremities    8. CKD (chronic kidney disease) stage 3, GFR 30-59 ml/min    9. Morbid " obesity due to excess calories    10. Lymphedema    11. Venous insufficiency    12. REJI (obstructive sleep apnea)    13. Edema, unspecified type         Plan:       Sue Garzon was seen today for congestive heart failure.    Diagnoses and all orders for this visit:    LAYNE (dyspnea on exertion)  -     B-TYPE NATRIURETIC PEPTIDE; Future; Expected date: 08/23/2017    Coronary artery disease due to calcified coronary lesion    Mixed hyperlipidemia    Diastolic dysfunction: see ECHO 2016    Essential hypertension    Bilateral carotid artery disease    Venous stasis dermatitis of both lower extremities    CKD (chronic kidney disease) stage 3, GFR 30-59 ml/min    Morbid obesity due to excess calories    Lymphedema    Venous insufficiency    REJI (obstructive sleep apnea)    Edema, unspecified type  -     B-TYPE NATRIURETIC PEPTIDE; Future; Expected date: 08/23/2017    Do not  think the good Sister has heart failure.  PFS and Sleep clinic have been ordered

## 2017-08-25 ENCOUNTER — OFFICE VISIT (OUTPATIENT)
Dept: WOUND CARE | Facility: CLINIC | Age: 77
End: 2017-08-25
Payer: MEDICARE

## 2017-08-25 ENCOUNTER — TELEPHONE (OUTPATIENT)
Dept: INTERNAL MEDICINE | Facility: CLINIC | Age: 77
End: 2017-08-25

## 2017-08-25 ENCOUNTER — HOSPITAL ENCOUNTER (INPATIENT)
Facility: HOSPITAL | Age: 77
LOS: 5 days | Discharge: HOME | DRG: 603 | End: 2017-08-30
Attending: HOSPITALIST | Admitting: HOSPITALIST
Payer: MEDICARE

## 2017-08-25 VITALS
WEIGHT: 270.13 LBS | HEIGHT: 60 IN | SYSTOLIC BLOOD PRESSURE: 166 MMHG | DIASTOLIC BLOOD PRESSURE: 64 MMHG | TEMPERATURE: 98 F | HEART RATE: 87 BPM | BODY MASS INDEX: 53.03 KG/M2

## 2017-08-25 DIAGNOSIS — N18.30 CKD (CHRONIC KIDNEY DISEASE) STAGE 3, GFR 30-59 ML/MIN: ICD-10-CM

## 2017-08-25 DIAGNOSIS — E66.01 MORBID OBESITY DUE TO EXCESS CALORIES: ICD-10-CM

## 2017-08-25 DIAGNOSIS — I25.84 CORONARY ARTERY DISEASE DUE TO CALCIFIED CORONARY LESION: Chronic | ICD-10-CM

## 2017-08-25 DIAGNOSIS — S81.801D TRAUMATIC OPEN WOUND OF RIGHT LOWER LEG, SUBSEQUENT ENCOUNTER: ICD-10-CM

## 2017-08-25 DIAGNOSIS — G47.33 OBSTRUCTIVE SLEEP APNEA SYNDROME: ICD-10-CM

## 2017-08-25 DIAGNOSIS — L03.115 CELLULITIS OF LEG, RIGHT: Primary | ICD-10-CM

## 2017-08-25 DIAGNOSIS — I10 ESSENTIAL HYPERTENSION: ICD-10-CM

## 2017-08-25 DIAGNOSIS — I87.2 VENOUS INSUFFICIENCY: ICD-10-CM

## 2017-08-25 DIAGNOSIS — I51.89 DIASTOLIC DYSFUNCTION: ICD-10-CM

## 2017-08-25 DIAGNOSIS — E11.42 TYPE 2 DIABETES MELLITUS WITH DIABETIC POLYNEUROPATHY, WITH LONG-TERM CURRENT USE OF INSULIN: ICD-10-CM

## 2017-08-25 DIAGNOSIS — I25.10 CORONARY ARTERY DISEASE DUE TO CALCIFIED CORONARY LESION: Chronic | ICD-10-CM

## 2017-08-25 DIAGNOSIS — I87.2 VENOUS STASIS DERMATITIS OF BOTH LOWER EXTREMITIES: ICD-10-CM

## 2017-08-25 DIAGNOSIS — K21.9 GASTROESOPHAGEAL REFLUX DISEASE WITHOUT ESOPHAGITIS: ICD-10-CM

## 2017-08-25 DIAGNOSIS — Z79.4 TYPE 2 DIABETES MELLITUS WITH DIABETIC POLYNEUROPATHY, WITH LONG-TERM CURRENT USE OF INSULIN: ICD-10-CM

## 2017-08-25 DIAGNOSIS — S81.801A TRAUMATIC OPEN WOUND OF RIGHT LOWER LEG, INITIAL ENCOUNTER: ICD-10-CM

## 2017-08-25 DIAGNOSIS — I89.0 LYMPHEDEMA: Primary | ICD-10-CM

## 2017-08-25 DIAGNOSIS — E78.2 MIXED HYPERLIPIDEMIA: ICD-10-CM

## 2017-08-25 DIAGNOSIS — J45.909 ASTHMA IN ADULT WITHOUT COMPLICATION: ICD-10-CM

## 2017-08-25 PROBLEM — L60.3 DYSTROPHIC NAIL: Status: RESOLVED | Noted: 2017-06-21 | Resolved: 2017-08-25

## 2017-08-25 PROBLEM — G50.0 TRIGEMINAL NEURALGIA OF RIGHT SIDE OF FACE: Status: RESOLVED | Noted: 2017-05-23 | Resolved: 2017-08-25

## 2017-08-25 PROBLEM — I77.9 BILATERAL CAROTID ARTERY DISEASE: Status: RESOLVED | Noted: 2017-07-21 | Resolved: 2017-08-25

## 2017-08-25 PROBLEM — R06.09 DOE (DYSPNEA ON EXERTION): Status: RESOLVED | Noted: 2017-08-23 | Resolved: 2017-08-25

## 2017-08-25 PROBLEM — M54.31 BILATERAL SCIATICA: Status: RESOLVED | Noted: 2017-02-07 | Resolved: 2017-08-25

## 2017-08-25 PROBLEM — M54.32 BILATERAL SCIATICA: Status: RESOLVED | Noted: 2017-02-07 | Resolved: 2017-08-25

## 2017-08-25 LAB
ALBUMIN SERPL BCP-MCNC: 3 G/DL
ALP SERPL-CCNC: 92 U/L
ALT SERPL W/O P-5'-P-CCNC: 14 U/L
ANION GAP SERPL CALC-SCNC: 8 MMOL/L
AST SERPL-CCNC: 19 U/L
BASOPHILS # BLD AUTO: 0.03 K/UL
BASOPHILS NFR BLD: 0.4 %
BILIRUB SERPL-MCNC: 0.2 MG/DL
BUN SERPL-MCNC: 20 MG/DL
CALCIUM SERPL-MCNC: 9.3 MG/DL
CHLORIDE SERPL-SCNC: 105 MMOL/L
CO2 SERPL-SCNC: 27 MMOL/L
CREAT SERPL-MCNC: 1 MG/DL
CRP SERPL-MCNC: 4.6 MG/L
DIFFERENTIAL METHOD: ABNORMAL
EOSINOPHIL # BLD AUTO: 0.4 K/UL
EOSINOPHIL NFR BLD: 5 %
ERYTHROCYTE [DISTWIDTH] IN BLOOD BY AUTOMATED COUNT: 15.4 %
ERYTHROCYTE [SEDIMENTATION RATE] IN BLOOD BY WESTERGREN METHOD: 78 MM/HR
EST. GFR  (AFRICAN AMERICAN): >60 ML/MIN/1.73 M^2
EST. GFR  (NON AFRICAN AMERICAN): 55.2 ML/MIN/1.73 M^2
GLUCOSE SERPL-MCNC: 164 MG/DL
HCT VFR BLD AUTO: 35 %
HGB BLD-MCNC: 10.9 G/DL
LYMPHOCYTES # BLD AUTO: 1.7 K/UL
LYMPHOCYTES NFR BLD: 21.6 %
MCH RBC QN AUTO: 27.5 PG
MCHC RBC AUTO-ENTMCNC: 31.1 G/DL
MCV RBC AUTO: 88 FL
MONOCYTES # BLD AUTO: 0.6 K/UL
MONOCYTES NFR BLD: 7.5 %
NEUTROPHILS # BLD AUTO: 5.1 K/UL
NEUTROPHILS NFR BLD: 65.2 %
PLATELET # BLD AUTO: 143 K/UL
PMV BLD AUTO: 12 FL
POCT GLUCOSE: 173 MG/DL (ref 70–110)
POCT GLUCOSE: 211 MG/DL (ref 70–110)
POTASSIUM SERPL-SCNC: 5.2 MMOL/L
PROT SERPL-MCNC: 8.3 G/DL
RBC # BLD AUTO: 3.96 M/UL
SODIUM SERPL-SCNC: 140 MMOL/L
WBC # BLD AUTO: 7.83 K/UL

## 2017-08-25 PROCEDURE — 99999 PR PBB SHADOW E&M-EST. PATIENT-LVL V: CPT | Mod: PBBFAC,,, | Performed by: NURSE PRACTITIONER

## 2017-08-25 PROCEDURE — 1126F AMNT PAIN NOTED NONE PRSNT: CPT | Mod: ,,, | Performed by: NURSE PRACTITIONER

## 2017-08-25 PROCEDURE — 3077F SYST BP >= 140 MM HG: CPT | Mod: ,,, | Performed by: NURSE PRACTITIONER

## 2017-08-25 PROCEDURE — 86140 C-REACTIVE PROTEIN: CPT

## 2017-08-25 PROCEDURE — 63600175 PHARM REV CODE 636 W HCPCS: Performed by: STUDENT IN AN ORGANIZED HEALTH CARE EDUCATION/TRAINING PROGRAM

## 2017-08-25 PROCEDURE — 1159F MED LIST DOCD IN RCRD: CPT | Mod: ,,, | Performed by: NURSE PRACTITIONER

## 2017-08-25 PROCEDURE — 99223 1ST HOSP IP/OBS HIGH 75: CPT | Mod: ,,, | Performed by: INTERNAL MEDICINE

## 2017-08-25 PROCEDURE — 36415 COLL VENOUS BLD VENIPUNCTURE: CPT

## 2017-08-25 PROCEDURE — 99212 OFFICE O/P EST SF 10 MIN: CPT | Mod: S$PBB,,, | Performed by: NURSE PRACTITIONER

## 2017-08-25 PROCEDURE — 99215 OFFICE O/P EST HI 40 MIN: CPT | Mod: PBBFAC | Performed by: NURSE PRACTITIONER

## 2017-08-25 PROCEDURE — 85025 COMPLETE CBC W/AUTO DIFF WBC: CPT

## 2017-08-25 PROCEDURE — 85651 RBC SED RATE NONAUTOMATED: CPT

## 2017-08-25 PROCEDURE — 3078F DIAST BP <80 MM HG: CPT | Mod: ,,, | Performed by: NURSE PRACTITIONER

## 2017-08-25 PROCEDURE — 80053 COMPREHEN METABOLIC PANEL: CPT

## 2017-08-25 PROCEDURE — 11000001 HC ACUTE MED/SURG PRIVATE ROOM

## 2017-08-25 PROCEDURE — 25000003 PHARM REV CODE 250: Performed by: STUDENT IN AN ORGANIZED HEALTH CARE EDUCATION/TRAINING PROGRAM

## 2017-08-25 PROCEDURE — 63600175 PHARM REV CODE 636 W HCPCS: Performed by: INTERNAL MEDICINE

## 2017-08-25 RX ORDER — ENOXAPARIN SODIUM 100 MG/ML
40 INJECTION SUBCUTANEOUS EVERY 24 HOURS
Status: DISCONTINUED | OUTPATIENT
Start: 2017-08-25 | End: 2017-08-30 | Stop reason: HOSPADM

## 2017-08-25 RX ORDER — CARVEDILOL 12.5 MG/1
12.5 TABLET ORAL 2 TIMES DAILY
Status: DISCONTINUED | OUTPATIENT
Start: 2017-08-25 | End: 2017-08-30 | Stop reason: HOSPADM

## 2017-08-25 RX ORDER — CARVEDILOL 6.25 MG/1
6.25 TABLET ORAL 2 TIMES DAILY
Status: DISCONTINUED | OUTPATIENT
Start: 2017-08-25 | End: 2017-08-25

## 2017-08-25 RX ORDER — ACETAMINOPHEN 500 MG
1000 TABLET ORAL 2 TIMES DAILY PRN
COMMUNITY
End: 2018-02-02 | Stop reason: SDUPTHER

## 2017-08-25 RX ORDER — GLUCAGON 1 MG
1 KIT INJECTION
Status: DISCONTINUED | OUTPATIENT
Start: 2017-08-25 | End: 2017-08-30 | Stop reason: HOSPADM

## 2017-08-25 RX ORDER — TROLAMINE SALICYLATE 10 G/100G
CREAM TOPICAL
COMMUNITY

## 2017-08-25 RX ORDER — INSULIN ASPART 100 [IU]/ML
0-5 INJECTION, SOLUTION INTRAVENOUS; SUBCUTANEOUS
Status: DISCONTINUED | OUTPATIENT
Start: 2017-08-25 | End: 2017-08-30 | Stop reason: HOSPADM

## 2017-08-25 RX ORDER — IBUPROFEN 200 MG
24 TABLET ORAL
Status: DISCONTINUED | OUTPATIENT
Start: 2017-08-25 | End: 2017-08-30 | Stop reason: HOSPADM

## 2017-08-25 RX ORDER — IBUPROFEN 200 MG
16 TABLET ORAL
Status: DISCONTINUED | OUTPATIENT
Start: 2017-08-25 | End: 2017-08-30 | Stop reason: HOSPADM

## 2017-08-25 RX ORDER — HYDRALAZINE HYDROCHLORIDE 25 MG/1
25 TABLET, FILM COATED ORAL EVERY 6 HOURS PRN
Status: DISCONTINUED | OUTPATIENT
Start: 2017-08-25 | End: 2017-08-30 | Stop reason: HOSPADM

## 2017-08-25 RX ORDER — TORSEMIDE 10 MG/1
20 TABLET ORAL DAILY
Status: DISCONTINUED | OUTPATIENT
Start: 2017-08-26 | End: 2017-08-30 | Stop reason: HOSPADM

## 2017-08-25 RX ORDER — IPRATROPIUM BROMIDE AND ALBUTEROL SULFATE 2.5; .5 MG/3ML; MG/3ML
3 SOLUTION RESPIRATORY (INHALATION) EVERY 6 HOURS PRN
Status: DISCONTINUED | OUTPATIENT
Start: 2017-08-25 | End: 2017-08-30 | Stop reason: HOSPADM

## 2017-08-25 RX ORDER — INSULIN LISPRO 100 [IU]/ML
INJECTION, SOLUTION INTRAVENOUS; SUBCUTANEOUS
COMMUNITY
End: 2017-08-31

## 2017-08-25 RX ORDER — ATORVASTATIN CALCIUM 20 MG/1
80 TABLET, FILM COATED ORAL DAILY
Status: DISCONTINUED | OUTPATIENT
Start: 2017-08-26 | End: 2017-08-30 | Stop reason: HOSPADM

## 2017-08-25 RX ORDER — FLUTICASONE PROPIONATE 50 MCG
2 SPRAY, SUSPENSION (ML) NASAL DAILY
Status: DISCONTINUED | OUTPATIENT
Start: 2017-08-26 | End: 2017-08-30 | Stop reason: HOSPADM

## 2017-08-25 RX ORDER — LOSARTAN POTASSIUM 50 MG/1
100 TABLET ORAL DAILY
Status: DISCONTINUED | OUTPATIENT
Start: 2017-08-26 | End: 2017-08-30 | Stop reason: HOSPADM

## 2017-08-25 RX ORDER — GABAPENTIN 300 MG/1
300 CAPSULE ORAL 3 TIMES DAILY
Status: DISCONTINUED | OUTPATIENT
Start: 2017-08-25 | End: 2017-08-25

## 2017-08-25 RX ORDER — NAPROXEN SODIUM 220 MG/1
81 TABLET, FILM COATED ORAL DAILY
Status: DISCONTINUED | OUTPATIENT
Start: 2017-08-26 | End: 2017-08-30 | Stop reason: HOSPADM

## 2017-08-25 RX ADMIN — ENOXAPARIN SODIUM 40 MG: 100 INJECTION SUBCUTANEOUS at 04:08

## 2017-08-25 RX ADMIN — HYDRALAZINE HYDROCHLORIDE 25 MG: 25 TABLET, FILM COATED ORAL at 08:08

## 2017-08-25 RX ADMIN — AMPICILLIN SODIUM AND SULBACTAM SODIUM 3 G: 2; 1 INJECTION, POWDER, FOR SOLUTION INTRAMUSCULAR; INTRAVENOUS at 06:08

## 2017-08-25 RX ADMIN — INSULIN ASPART 1 UNITS: 100 INJECTION, SOLUTION INTRAVENOUS; SUBCUTANEOUS at 08:08

## 2017-08-25 RX ADMIN — INSULIN DETEMIR 48 UNITS: 100 INJECTION, SOLUTION SUBCUTANEOUS at 08:08

## 2017-08-25 RX ADMIN — CARVEDILOL 12.5 MG: 12.5 TABLET, FILM COATED ORAL at 08:08

## 2017-08-25 NOTE — HPI
Sister Pasquale is a 76 y/o lady with PMHx obesity, REJI, asthma, CKD3 (baseline Cr 1.0-1.3), uncontrolled HTN, DM2 (5/2017 A1c 8.3), CAD and chronic lower extremity lymphedema who is a direct admit from wound care clinic for right lower extremity cellulitis. 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   She was seen in the ED on the night of 8/20/17 and the wound was sutured. She states she was not given antibiotics at that time. Per recent discharge note, it was decided her lower extremity erythema was likely due to stasis dermatitis, because she did not have other signs of cellulitis (tenderness, warm to touch). Since being discharged on 8/20, the patient states her wound has become progressively more erythematous over this past week, however she denies having lower extremity pain. She was seen in wound care clinic this morning and sent here for IV antibiotics. The patient endorses finishing a 10 day course of doxycycline this week prescribed by her PCP for chronic cellulitis with no improvement in symptoms. She denies any recent fevers, chills, CP, n/v/d, or dysuria.

## 2017-08-25 NOTE — ASSESSMENT & PLAN NOTE
- Will continue home insulin detemir 48 units QHS.  - SSI  - Will continue to monitor blood glucose.

## 2017-08-25 NOTE — PROGRESS NOTES
Subjective:       Patient ID: Sue Giordano is a 77 y.o. female.    Chief Complaint: Wound Check    Wound Check     Edema     This patient is well known to my service.  She is seen today for evaluation and management of a traumatic wound to the right lower leg.  Patient fell while walking up the step to the bus, cutting her right lower leg and reports bleeding profusely.  She did not lose consciousness prior to falling or even after, and she did not hit her head.  She was seen in the ED on 8/20/17 and the wound was sutured.  She was not given IV antibiotics at that time because it was decided that patient's lower extremity erythema more likely resembled stasis dermatitis and did not have other signs of cellulitis (tenderness, warm to touch).  She comes to clinic today with increased swelling, redness, warmth and tenderness in the leg.  She has completed the clindamycin she was given orally.   She has chronic bilateral lower extremity edema.  She has had problems with lymphedema for 20 years now.  Her pain level is 7-8/10.  She is afebrile.  Her medical history is significant for poorly controlled type II diabetes, lymphedema and chronic kidney disease.    Review of Systems  Unchanged from prior visit.  Objective:      Physical Exam   Constitutional: She is oriented to person, place, and time. She appears well-developed and well-nourished. No distress.   HENT:   Head: Normocephalic and atraumatic.   Neck: Normal range of motion.   Pulmonary/Chest: Effort normal. No respiratory distress.   Musculoskeletal: Normal range of motion. She exhibits edema. She exhibits no tenderness.        Legs:  Neurological: She is alert and oriented to person, place, and time.   Skin: Skin is warm and dry. No rash noted. She is not diaphoretic. No cyanosis or erythema. Nails show no clubbing.   Psychiatric: She has a normal mood and affect. Her behavior is normal. Judgment and thought content normal.   Nursing note and vitals  reviewed.    ..  Hemoglobin A1C   Date Value Ref Range Status   08/24/2017 8.2 (H) 4.0 - 5.6 % Final     Comment:     According to ADA guidelines, hemoglobin A1c <7.0% represents  optimal control in non-pregnant diabetic patients. Different  metrics may apply to specific patient populations.   Standards of Medical Care in Diabetes-2016.  For the purpose of screening for the presence of diabetes:  <5.7%     Consistent with the absence of diabetes  5.7-6.4%  Consistent with increasing risk for diabetes   (prediabetes)  >or=6.5%  Consistent with diabetes  Currently, no consensus exists for use of hemoglobin A1c  for diagnosis of diabetes for children.  This Hemoglobin A1c assay has significant interference with fetal   hemoglobin   (HbF). The results are invalid for patients with abnormal amounts of   HbF,   including those with known Hereditary Persistence   of Fetal Hemoglobin. Heterozygous hemoglobin variants (HbAS, HbAC,   HbAD, HbAE, HbA2) do not significantly interfere with this assay;   however, presence of multiple variants in a sample may impact the %   interference.     08/21/2017 8.2 (H) 4.0 - 5.6 % Final     Comment:     According to ADA guidelines, hemoglobin A1c <7.0% represents  optimal control in non-pregnant diabetic patients. Different  metrics may apply to specific patient populations.   Standards of Medical Care in Diabetes-2016.  For the purpose of screening for the presence of diabetes:  <5.7%     Consistent with the absence of diabetes  5.7-6.4%  Consistent with increasing risk for diabetes   (prediabetes)  >or=6.5%  Consistent with diabetes  Currently, no consensus exists for use of hemoglobin A1c  for diagnosis of diabetes for children.  This Hemoglobin A1c assay has significant interference with fetal   hemoglobin   (HbF). The results are invalid for patients with abnormal amounts of   HbF,   including those with known Hereditary Persistence   of Fetal Hemoglobin. Heterozygous hemoglobin  variants (HbAS, HbAC,   HbAD, HbAE, HbA2) do not significantly interfere with this assay;   however, presence of multiple variants in a sample may impact the %   interference.     05/02/2017 8.3 (H) 4.5 - 6.2 % Final     Comment:     According to ADA guidelines, hemoglobin A1C <7.0% represents  optimal control in non-pregnant diabetic patients.  Different  metrics may apply to specific populations.   Standards of Medical Care in Diabetes - 2016.  For the purpose of screening for the presence of diabetes:  <5.7%     Consistent with the absence of diabetes  5.7-6.4%  Consistent with increasing risk for diabetes   (prediabetes)  >or=6.5%  Consistent with diabetes  Currently no consensus exists for use of hemoglobin A1C  for diagnosis of diabetes for children.         Assessment:       1. Lymphedema    2. Traumatic open wound of right lower leg, initial encounter    3. Venous insufficiency        Plan:           Will admit to the hospital today for IV antibiotic therapy.  Return to clinic in one to two weeks.    Right medial leg healed

## 2017-08-25 NOTE — MEDICAL/APP STUDENT
"HISTORY & PHYSICAL  Hospital Medicine    Team: [facility] [place] HOSP MED 2    PRESENTING HISTORY     Chief Complaint/Reason for Admission: cellulitis of R lower leg    HPI: Ms Giordano is a 78 yo female with PMHx of obesity, REJI, asthma, CKD3 (baseline Cr 1.0-1.3), uncontrolled HTN, DM2 (5/2017 HbA1c 8.3), CAD, Hep B, "several TIAs", chronic cellulitis (30 years subjectively), who presents as a direct admit to hospital medicine from her wound care clinic today.     Patient has ongoing cellulitis, sees Dr Howard as her PCP and recently completed a 10 day course of doxycycline PO 2 days ago. On top of the cellulitis, patient fell while walking up the step to the bus on Sunday, which resulted in profuse bleeding, and laceration on R lower leg. She had presented to [facility] ED, received stiches on her laceration and was discharged same day. Denies being on clindamycin or any other antibiotics for her laceration. Today when she presented to her wound care clinic, the right leg was warm, tender and erythematous, and she was subsequently admitted to hospital medicine for IV antibiotics. Patient denies fevers, chills, N/V, headaches, chest pain, cough, abdominal pain, and urinary symptoms. She endorses SOB, and constipation and takes Milk of Magnesia at home.   Patient reports that she was in [place] 30 years ago, and was infected with Hep B at that time.    Review of Systems:  Review of Systems   Constitutional: Negative for chills, diaphoresis, fever, malaise/fatigue and weight loss.   HENT: Negative for congestion, ear discharge, ear pain, hearing loss, nosebleeds, sore throat and tinnitus.    Eyes: Negative for blurred vision, double vision, photophobia, pain, discharge and redness.   Respiratory: Positive for shortness of breath. Negative for cough, hemoptysis, sputum production, wheezing and stridor.    Cardiovascular: Positive for orthopnea and leg swelling. Negative for chest pain, palpitations, claudication and PND. "   Gastrointestinal: Positive for constipation. Negative for abdominal pain, blood in stool, diarrhea, heartburn, melena, nausea and vomiting.   Genitourinary: Negative for dysuria, flank pain, frequency, hematuria and urgency.   Musculoskeletal: Negative for back pain, falls, joint pain, myalgias and neck pain.   Skin: Negative for itching and rash.   Neurological: Negative for dizziness, tingling, tremors, sensory change, speech change, focal weakness, seizures, loss of consciousness, weakness and headaches.   Endo/Heme/Allergies: Negative for environmental allergies and polydipsia. Does not bruise/bleed easily.   Psychiatric/Behavioral: Negative for depression, hallucinations, memory loss, substance abuse and suicidal ideas. The patient is not nervous/anxious and does not have insomnia.          PAST HISTORY:     Past Medical History:   Diagnosis Date    Adrenal mass- adenoma stable since 2004 (1.8 cm and 8/13/12 (2 cm); stable 2016 2.4 cm     Adrenal mass- adenoma stable since 2004 (1.8 cm and 8/13/12 (2 cm); stable 2016 2.4 cm    Asthma in adult without complication 6/25/2015    Bilateral carotid artery disease 7/21/2017    Bilateral sciatica 2/7/2017    Cerebral infarction 1/29/2016    Multiple areas of lacunar infarction. Stroke risk factors include HTN, DM2, Dyslipidemia.  Continue ASA 81mg/ Statin therapy I have encouraged 30 minutes of physical activity daily for 5 days a week. She has access to a pool so this should not be so jarring to her joints.     Cervical radiculopathy 3/18/2015    Chronic rhinitis 4/9/2013    CKD (chronic kidney disease) stage 3, GFR 30-59 ml/min 1/29/2013    Coronary artery disease due to calcified coronary lesion 6/25/2015    Diastolic dysfunction 10/10/2013    Essential hypertension 6/25/2015    Gastroesophageal reflux disease without esophagitis 8/13/2012    Gout     Hep B w/ coma 1971    Hives 10/1/2013    Mixed hyperlipidemia 8/13/2012    Morbid obesity with  BMI of 50.0-59.9, adult 2/11/2014    Obstructive sleep apnea syndrome 8/13/2012    Senile cataracts of both eyes 1/22/2015    Tinea pedis 10/14/2013    Traumatic open wound of right lower leg 8/25/2017    Trigeminal neuralgia of right side of face 5/23/2017    For years now Previously on Gabapentin, but caused constipation Dissipating over time Not related to intracranial abnormalities Possibly related to dental procedure    Type 2 diabetes mellitus with diabetic polyneuropathy, with long-term current use of insulin 1/29/2013    Venous stasis dermatitis of both lower extremities 8/21/2017    Vitamin D deficiency disease 8/13/2012       Past Surgical History:   Procedure Laterality Date    HYSTERECTOMY  1982    secondary uterine fibroids    JOINT REPLACEMENT      Right total knee replacement         Family History   Problem Relation Age of Onset    Cancer Mother     Hypertension Father     Cancer Sister     No Known Problems Brother     No Known Problems Maternal Aunt     No Known Problems Maternal Uncle     No Known Problems Paternal Aunt     No Known Problems Paternal Uncle     No Known Problems Maternal Grandmother     No Known Problems Maternal Grandfather     No Known Problems Paternal Grandmother     No Known Problems Paternal Grandfather     Glaucoma Neg Hx     Breast cancer Neg Hx     Colon cancer Neg Hx     Ovarian cancer Neg Hx     Stroke Neg Hx     Allergic rhinitis Neg Hx     Allergies Neg Hx     Angioedema Neg Hx     Asthma Neg Hx     Atopy Neg Hx     Eczema Neg Hx     Immunodeficiency Neg Hx     Rhinitis Neg Hx     Urticaria Neg Hx     Amblyopia Neg Hx     Blindness Neg Hx     Cataracts Neg Hx     Diabetes Neg Hx     Macular degeneration Neg Hx     Retinal detachment Neg Hx     Strabismus Neg Hx     Thyroid disease Neg Hx     Psoriasis Neg Hx        Social History     Social History    Marital status: Single     Spouse name: N/A    Number of children: N/A     Years of education: N/A     Social History Main Topics    Smoking status: Never Smoker    Smokeless tobacco: Never Used    Alcohol use No    Drug use: No    Sexual activity: No     Other Topics Concern    None     Social History Narrative    Single with no children.        MEDICATIONS & ALLERGIES:     No current facility-administered medications on file prior to encounter.      Current Outpatient Prescriptions on File Prior to Encounter   Medication Sig Dispense Refill    albuterol 90 mcg/actuation inhaler Inhale 2 puffs into the lungs every 6 (six) hours as needed for Wheezing or Shortness of Breath. Rescue 1 Inhaler 3    ammonium lactate 12 % Crea Apply topically every morning.       aspirin 81 MG Chew Take 1 tablet (81 mg total) by mouth once daily.      atorvastatin (LIPITOR) 80 MG tablet Take 1 tablet (80 mg total) by mouth once daily. 90 tablet 3    blood sugar diagnostic Strp BG monitoring 4 times a day. Pt needs test strips for Embrace Talking meter. (Patient taking differently: BG monitoring 2 times a day. Pt needs test strips for Embrace Talking meter.) 450 strip prn    carvedilol (COREG) 6.25 MG tablet Take 1 tablet (6.25 mg total) by mouth 2 (two) times daily. 60 tablet 11    diclofenac sodium 1 % Gel Apply 2 g topically once daily. 100 g 1    doxycycline (MONODOX) 100 MG capsule Take 1 capsule (100 mg total) by mouth 2 (two) times daily. 20 capsule 1    duloxetine (CYMBALTA) 30 MG capsule Take 1 capsule (30 mg total) by mouth 2 (two) times daily. May lower it to once daily as instructed if sedation persists 60 capsule 3    econazole nitrate 1 % cream Apply topically once daily. Apply to feet daily as directed. 85 g 1    ergocalciferol (VITAMIN D2) 50,000 unit Cap Take 1 capsule (50,000 Units total) by mouth every 7 days. 12 capsule 3    fluticasone (FLONASE) 50 mcg/actuation nasal spray 2 sprays by Each Nare route once daily. 1 Bottle 3    fluticasone-salmeterol 100-50 mcg/dose  "(ADVAIR DISKUS) 100-50 mcg/dose diskus inhaler INHALE 1 PUFF 2 TIMES A DAY 60 each 6    gabapentin (NEURONTIN) 100 MG capsule Take 300 mg by mouth 3 (three) times daily.      insulin detemir (LEVEMIR FLEXTOUCH) 100 unit/mL (3 mL) SubQ InPn pen Inject 48 units at night. 1 Box 6    insulin lispro (HUMALOG KWIKPEN) 100 unit/mL InPn pen Inject 18 units w/ breakfast and 18 units w/ dinner. Scale 150-200 +2, 201-250 +4, 251-300 +6, 301-350 +8. 1 Box 6    lancets Misc Test daily 100 each 12    losartan (COZAAR) 100 MG tablet 1 tab by mouth daily 30 tablet 6    oxybutynin (DITROPAN) 5 MG Tab Take 5 mg by mouth once daily.      pen needle, diabetic (PEN NEEDLE) 32 gauge x 5/32" Ndle Uses 4 times a day. 150 each 6    torsemide (DEMADEX) 20 MG Tab Take 1 tablet (20 mg total) by mouth once daily. 90 tablet 3        Review of patient's allergies indicates:   Allergen Reactions    Bactrim [sulfamethoxazole-trimethoprim]      Other reaction(s): up set stomach rash/hives    Azithromycin     Erythromycin Other (See Comments)     Other reaction(s): Unknown  Other reaction(s): Stomach upset    Gentamicin      Other reaction(s): Unknown    Levofloxacin Hives     Other reaction(s): nervousness    Vancomycin      Other reaction(s): Itching       OBJECTIVE:     Vital Signs:  Temp:  [98 °F (36.7 °C)-98.2 °F (36.8 °C)] 98 °F (36.7 °C)  Pulse:  [76-87] 76  Resp:  [16] 16  SpO2:  [100 %] 100 %  BP: (166-177)/(64-74) 177/74  There is no height or weight on file to calculate BMI.     Physical Exam:  Physical Exam   Constitutional: She is oriented to person, place, and time. She appears well-nourished. No distress.   HENT:   Head: Normocephalic and atraumatic.   Right Ear: External ear normal.   Left Ear: External ear normal.   Nose: Nose normal.   Mouth/Throat: Oropharynx is clear and moist.   Eyes: Conjunctivae and EOM are normal. Pupils are equal, round, and reactive to light. Right eye exhibits no discharge. Left eye exhibits " no discharge. No scleral icterus.   Neck: Normal range of motion. Neck supple. No JVD present. No tracheal deviation present. No thyromegaly present.   Cardiovascular: Normal rate, regular rhythm and intact distal pulses.  Exam reveals no gallop and no friction rub.    No murmur heard.  Pulmonary/Chest: Effort normal and breath sounds normal. No stridor. No respiratory distress. She has no wheezes. She has no rales. She exhibits no tenderness.   Abdominal: Soft. Bowel sounds are normal. She exhibits no distension and no mass. There is no tenderness. There is no rebound and no guarding.   Musculoskeletal: She exhibits edema and tenderness.   Lymphadenopathy:     She has no cervical adenopathy.   Neurological: She is alert and oriented to person, place, and time. She has normal reflexes.   Skin: She is not diaphoretic. There is erythema.   Psychiatric: She has a normal mood and affect. Her behavior is normal. Judgment and thought content normal.       Laboratory  Lab Results   Component Value Date    WBC 7.66 08/21/2017    HGB 10.9 (L) 08/21/2017    HCT 35.2 (L) 08/21/2017    MCV 91 08/21/2017     08/21/2017     No results for input(s): GLU, NA, K, CL, CO2, BUN, CREATININE, CALCIUM, MG in the last 24 hours.  Lab Results   Component Value Date    INR 1.1 04/17/2005     Lab Results   Component Value Date    HGBA1C 8.2 (H) 08/24/2017     No results for input(s): POCTGLUCOSE in the last 72 hours.      Assessment and Plan:    1. Cellulitis:       -Ampicillin-Sulbactam 3g IV q8h at 100ml/hr      - Consult wound care  2.Continue home medications for ongoing medical conditions.                  Nohemi Braxton, MS3

## 2017-08-25 NOTE — ASSESSMENT & PLAN NOTE
- Patient recently finished a 10 day course of doxycycline.   - ESR, CRP pending.  - Unasyn 3g IV Q8.  - Will consult wound care.

## 2017-08-25 NOTE — PLAN OF CARE
"Direct Admit from Clinic Acceptance Note    Clinic Physician or Mid-Level provider/Clinic giving report: Kellie Mirza from Wound Care clinic    Accepting Physician for admission to hospital: Roma Mejia     Date of acceptance: 08/25/2017     Reason for direct admission from clinic or home: Ms. Giordano is a 76 yo female with obesity, REJI, asthma, CKD3 (baseline Cr 1.0-1.3), uncontrolled HTN, DM2 (5/2017 A1c 8.3), and CAD who presents to wound care clinic today for follow up for a laceration to her right lower extremity. She originally injured her legt on 8/20, though it was already being treated for cellulitis at the time of the accident.     "Patient fell while walking up the step to the bus, cutting her right lower leg and reports bleeding profusely.  She did not lose consciousness prior to falling or even after, and she did not hit her head.  Patient has chronic bilateral lower extremity edema and chronic cellulitis, for which she is currently taking doxycycline." She was instructed to complete her course of Doxycycline that she had been taking prior to presentation.      Leg now painful, warm to touch, and red, concerning for cellulitis and need for IV antibiotics.      To Do List upon arrival: review allergies with patient as it looks like these are not true allergies and pt may be able to take some of these antibiotics.    Please call extension [phone] upon patient arrival to floor for Hospital Medicine admit team assignment and for additional admit orders for the patient.    "

## 2017-08-25 NOTE — TELEPHONE ENCOUNTER
----- Message from Pebbles De La Rosa sent at 8/25/2017  2:02 PM CDT -----  Contact: Self>cALL [phone]  Patient is at [facility]  in the admit room and will be admitted to the hospital for her legs.

## 2017-08-25 NOTE — PROGRESS NOTES
Patient arrive to room 954A awake,alert and oriented. Able to verbalized needs and wants. Respirations regular and unlabored on room air. Oriented to the unit , room and call light. Assessment per flowsheet. Denies pain at this time. Safety precautions maintained.

## 2017-08-25 NOTE — HOSPITAL COURSE
Sister Pasquale is a 78 y/o lady with PMHx obesity, REJI, asthma, CKD3 (baseline Cr 1.0-1.3), uncontrolled HTN, DM2 (5/2017 A1c 8.3), CAD and chronic lower extremity lymphedema who is a direct admit from wound care clinic for right lower extremity cellulitis. Patient was started on IV unasyn.   8/27 : Unasyn discontinue and Clindamycin started

## 2017-08-25 NOTE — H&P
[facility] Medicine  History & Physical    Patient Name: Sue Giordano  MRN: [id number]  Admission Date: 8/25/2017  Attending Physician: Roma Mejia MD   Primary Care Provider: Vale Howard MD    [facility] Medicine Team: [facility] HOSP MED 2 Milan Jamison MD     Patient information was obtained from patient and past medical records.     Subjective:     Principal Problem:Cellulitis of leg, right    Chief Complaint: No chief complaint on file.       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   She was seen in the ED on the night of 8/20/17 and the wound was sutured. She states she was not given antibiotics at that time. Per recent discharge note, it was decided her lower extremity erythema was likely due to stasis dermatitis, because she did not have other signs of cellulitis (tenderness, warm to touch). Since being discharged on 8/20, the patient states her wound has become progressively more erythematous over this past week, however she denies having lower extremity pain. She was seen in wound care clinic this morning and sent here for IV antibiotics. The patient endorses finishing a 10 day course of doxycycline this week prescribed by her PCP for chronic cellulitis with no improvement in symptoms. She denies any recent fevers, chills, CP, n/v/d, or dysuria.     Past Medical History:   Diagnosis Date    Adrenal mass- adenoma stable since 2004 (1.8 cm and 8/13/12 (2 cm); stable 2016 2.4 cm     Adrenal mass- adenoma stable since 2004 (1.8 cm and 8/13/12 (2 cm); stable 2016 2.4 cm    Asthma in adult without complication 6/25/2015    Bilateral carotid artery  disease 7/21/2017    Bilateral sciatica 2/7/2017    Cerebral infarction 1/29/2016    Multiple areas of lacunar infarction. Stroke risk factors include HTN, DM2, Dyslipidemia.  Continue ASA 81mg/ Statin therapy I have encouraged 30 minutes of physical activity daily for 5 days a week. She has access to a pool so this should not be so jarring to her joints.     Cervical radiculopathy 3/18/2015    Chronic rhinitis 4/9/2013    CKD (chronic kidney disease) stage 3, GFR 30-59 ml/min 1/29/2013    Coronary artery disease due to calcified coronary lesion 6/25/2015    Diastolic dysfunction 10/10/2013    Essential hypertension 6/25/2015    Gastroesophageal reflux disease without esophagitis 8/13/2012    Gout     Hep B w/ coma 1971    Hives 10/1/2013    Mixed hyperlipidemia 8/13/2012    Morbid obesity with BMI of 50.0-59.9, adult 2/11/2014    Obstructive sleep apnea syndrome 8/13/2012    Senile cataracts of both eyes 1/22/2015    Tinea pedis 10/14/2013    Traumatic open wound of right lower leg 8/25/2017    Trigeminal neuralgia of right side of face 5/23/2017    For years now Previously on Gabapentin, but caused constipation Dissipating over time Not related to intracranial abnormalities Possibly related to dental procedure    Type 2 diabetes mellitus with diabetic polyneuropathy, with long-term current use of insulin 1/29/2013    Venous stasis dermatitis of both lower extremities 8/21/2017    Vitamin D deficiency disease 8/13/2012       Past Surgical History:   Procedure Laterality Date    HYSTERECTOMY  1982    secondary uterine fibroids    JOINT REPLACEMENT      Right total knee replacement         Review of patient's allergies indicates:   Allergen Reactions    Bactrim [sulfamethoxazole-trimethoprim]      Other reaction(s): up set stomach rash/hives    Azithromycin     Erythromycin Other (See Comments)     Other reaction(s): Unknown  Other reaction(s): Stomach upset    Gentamicin      Other  reaction(s): Unknown    Levofloxacin Hives     Other reaction(s): nervousness    Vancomycin      Other reaction(s): Itching       No current facility-administered medications on file prior to encounter.      Current Outpatient Prescriptions on File Prior to Encounter   Medication Sig    albuterol 90 mcg/actuation inhaler Inhale 2 puffs into the lungs every 6 (six) hours as needed for Wheezing or Shortness of Breath. Rescue    ammonium lactate 12 % Crea Apply topically every morning.     aspirin 81 MG Chew Take 1 tablet (81 mg total) by mouth once daily.    atorvastatin (LIPITOR) 80 MG tablet Take 1 tablet (80 mg total) by mouth once daily.    blood sugar diagnostic Strp BG monitoring 4 times a day. Pt needs test strips for Em[employer] Talking meter. (Patient taking differently: BG monitoring 2 times a day. Pt needs test strips for Em[employer] Talking meter.)    carvedilol (COREG) 6.25 MG tablet Take 1 tablet (6.25 mg total) by mouth 2 (two) times daily.    diclofenac sodium 1 % Gel Apply 2 g topically once daily.    doxycycline (MONODOX) 100 MG capsule Take 1 capsule (100 mg total) by mouth 2 (two) times daily.    duloxetine (CYMBALTA) 30 MG capsule Take 1 capsule (30 mg total) by mouth 2 (two) times daily. May lower it to once daily as instructed if sedation persists    econazole nitrate 1 % cream Apply topically once daily. Apply to feet daily as directed.    ergocalciferol (VITAMIN D2) 50,000 unit Cap Take 1 capsule (50,000 Units total) by mouth every 7 days.    fluticasone (FLONASE) 50 mcg/actuation nasal spray 2 sprays by Each Nare route once daily.    fluticasone-salmeterol 100-50 mcg/dose (ADVAIR DISKUS) 100-50 mcg/dose diskus inhaler INHALE 1 PUFF 2 TIMES A DAY    gabapentin (NEURONTIN) 100 MG capsule Take 300 mg by mouth 3 (three) times daily.    insulin detemir (LEVEMIR FLEXTOUCH) 100 unit/mL (3 mL) SubQ InPn pen Inject 48 units at night.    insulin lispro (HUMALOG KWIKPEN) 100 unit/mL InPn pen  "Inject 18 units w/ breakfast and 18 units w/ dinner. Scale 150-200 +2, 201-250 +4, 251-300 +6, 301-350 +8.    lancets Misc Test daily    losartan (COZAAR) 100 MG tablet 1 tab by mouth daily    oxybutynin (DITROPAN) 5 MG Tab Take 5 mg by mouth once daily.    pen needle, diabetic (PEN NEEDLE) 32 gauge x 5/32" Ndle Uses 4 times a day.    torsemide (DEMADEX) 20 MG Tab Take 1 tablet (20 mg total) by mouth once daily.     Family History     Problem Relation (Age of Onset)    Cancer Mother, Sister    Hypertension Father    No Known Problems Brother, Maternal Aunt, Maternal Uncle, Paternal Aunt, Paternal Uncle, Maternal Grandmother, Maternal Grandfather, Paternal Grandmother, Paternal Grandfather        Social History Main Topics    Smoking status: Never Smoker    Smokeless tobacco: Never Used    Alcohol use No    Drug use: No    Sexual activity: No     Review of Systems   Constitutional: Negative for chills and fever.   HENT: Negative for congestion and sore throat.    Eyes: Negative for photophobia and visual disturbance.   Respiratory: Negative for cough and chest tightness.         Dyspnea with exertion.   Cardiovascular: Positive for leg swelling. Negative for chest pain and palpitations.   Gastrointestinal: Positive for constipation. Negative for abdominal pain, diarrhea, nausea and vomiting.   Genitourinary: Negative for dysuria and hematuria.   Musculoskeletal: Negative for arthralgias and myalgias.   Skin: Positive for rash and wound.   Neurological: Negative for dizziness, light-headedness and headaches.     Objective:     Vital Signs (Most Recent):  Temp: 98 °F (36.7 °C) (08/25/17 1505)  Pulse: 76 (08/25/17 1505)  Resp: 16 (08/25/17 1505)  BP: (!) 177/74 (08/25/17 1505)  SpO2: 100 % (08/25/17 1505) Vital Signs (24h Range):  Temp:  [98 °F (36.7 °C)-98.2 °F (36.8 °C)] 98 °F (36.7 °C)  Pulse:  [76-87] 76  Resp:  [16] 16  SpO2:  [100 %] 100 %  BP: (166-177)/(64-74) 177/74        There is no height or weight " on file to calculate BMI.    Physical Exam   Constitutional: She is oriented to person, place, and time. She appears well-developed and well-nourished. No distress.   HENT:   Head: Normocephalic and atraumatic.   Mouth/Throat: Oropharynx is clear and moist.   Eyes: EOM are normal. Pupils are equal, round, and reactive to light.   Neck: Normal range of motion. Neck supple. No JVD present.   Cardiovascular: Normal rate, regular rhythm and normal heart sounds.  Exam reveals no gallop and no friction rub.    No murmur heard.  Bilateral radial pulses 2+   Pulmonary/Chest: Effort normal and breath sounds normal. No respiratory distress. She has no wheezes. She has no rales.   Abdominal: Soft. Bowel sounds are normal. She exhibits no distension. There is no tenderness. There is no guarding.   Neurological: She is alert and oriented to person, place, and time.   Skin: Skin is warm. Rash noted. There is erythema.   Sharply demarcated erythematous area of the right lower extremity. Mildly tender and warm to touch. Sharply demarcated erythematous of the LLE as well - cool to touch and non tender to palpation.         Significant Labs:   Recent Results (from the past 24 hour(s))   POCT glucose    Collection Time: 08/25/17  4:25 PM   Result Value Ref Range    POCT Glucose 173 (H) 70 - 110 mg/dL         Significant Imaging: No imaging    Assessment/Plan:     * Cellulitis of leg, right    - Patient recently finished a 10 day course of doxycycline.   - ESR, CRP pending.  - Unasyn 3g IV Q8.  - Will consult wound care.           Traumatic open wound of right lower leg    - Will consult wound care  - Unasyn 3g IV Q8.           Venous stasis dermatitis of both lower extremities    - Continue compression stockings.  - Will consult wound care.           Morbid obesity due to excess calories    - Will consult PT/OT  - Diabetic Renal diet 2000 calories.           Coronary artery disease due to calcified coronary lesion    - Aspirin 81 mg  QD  - Atorvastatin 80 mg QD.          Asthma in adult without complication    - Duo-nebs Q6H PRN for wheezing or SOB.  - Continue home fluticasone inhaler.           Essential hypertension    - Carvedilol 6.25 mg PO BID.   - Losartan 100 mg QD  - Torsemide 20 mg QD          Diastolic dysfunction: see ECHO 2016    - 2D echo on 8/23: Normal left ventricular systolic function (EF 60-65%).  Impaired LV relaxation, elevated LAP (grade 2 diastolic dysfunction). Pulmonary hypertension. PA systolic pressure is 41 mmHg.   - Carvedilol 6.25 mg PO BID.   - Losartan 100 mg QD  - Torsemide 20 mg QD          CKD (chronic kidney disease) stage 3, GFR 30-59 ml/min    - Will continue tight BP and glucose control.  - Daily BMPs          Type 2 diabetes mellitus with diabetic polyneuropathy, with long-term current use of insulin    - Will continue home insulin detemir 48 units QHS.  - SSI  - Will continue to monitor blood glucose.           Mixed hyperlipidemia    - atorvastatin 80 mg QD.           Obstructive sleep apnea syndrome    - Patient on CPAP at home.  - Will order CPAP QHS.             VTE Risk Mitigation         Ordered     enoxaparin injection 40 mg  Daily     Route:  Subcutaneous        08/25/17 1532     Medium Risk of VTE  Once      08/25/17 1532             Milan Jamison MD PGY-1   Department of Hospital Medicine   [facility] [address]

## 2017-08-25 NOTE — SUBJECTIVE & OBJECTIVE
Past Medical History:   Diagnosis Date    Adrenal mass- adenoma stable since 2004 (1.8 cm and 8/13/12 (2 cm); stable 2016 2.4 cm     Adrenal mass- adenoma stable since 2004 (1.8 cm and 8/13/12 (2 cm); stable 2016 2.4 cm    Asthma in adult without complication 6/25/2015    Bilateral carotid artery disease 7/21/2017    Bilateral sciatica 2/7/2017    Cerebral infarction 1/29/2016    Multiple areas of lacunar infarction. Stroke risk factors include HTN, DM2, Dyslipidemia.  Continue ASA 81mg/ Statin therapy I have encouraged 30 minutes of physical activity daily for 5 days a week. She has access to a pool so this should not be so jarring to her joints.     Cervical radiculopathy 3/18/2015    Chronic rhinitis 4/9/2013    CKD (chronic kidney disease) stage 3, GFR 30-59 ml/min 1/29/2013    Coronary artery disease due to calcified coronary lesion 6/25/2015    Diastolic dysfunction 10/10/2013    Essential hypertension 6/25/2015    Gastroesophageal reflux disease without esophagitis 8/13/2012    Gout     Hep B w/ coma 1971    Hives 10/1/2013    Mixed hyperlipidemia 8/13/2012    Morbid obesity with BMI of 50.0-59.9, adult 2/11/2014    Obstructive sleep apnea syndrome 8/13/2012    Senile cataracts of both eyes 1/22/2015    Tinea pedis 10/14/2013    Traumatic open wound of right lower leg 8/25/2017    Trigeminal neuralgia of right side of face 5/23/2017    For years now Previously on Gabapentin, but caused constipation Dissipating over time Not related to intracranial abnormalities Possibly related to dental procedure    Type 2 diabetes mellitus with diabetic polyneuropathy, with long-term current use of insulin 1/29/2013    Venous stasis dermatitis of both lower extremities 8/21/2017    Vitamin D deficiency disease 8/13/2012       Past Surgical History:   Procedure Laterality Date    HYSTERECTOMY  1982    secondary uterine fibroids    JOINT REPLACEMENT      Right total knee replacement         Review  of patient's allergies indicates:   Allergen Reactions    Bactrim [sulfamethoxazole-trimethoprim]      Other reaction(s): up set stomach rash/hives    Azithromycin     Erythromycin Other (See Comments)     Other reaction(s): Unknown  Other reaction(s): Stomach upset    Gentamicin      Other reaction(s): Unknown    Levofloxacin Hives     Other reaction(s): nervousness    Vancomycin      Other reaction(s): Itching       No current facility-administered medications on file prior to encounter.      Current Outpatient Prescriptions on File Prior to Encounter   Medication Sig    albuterol 90 mcg/actuation inhaler Inhale 2 puffs into the lungs every 6 (six) hours as needed for Wheezing or Shortness of Breath. Rescue    ammonium lactate 12 % Crea Apply topically every morning.     aspirin 81 MG Chew Take 1 tablet (81 mg total) by mouth once daily.    atorvastatin (LIPITOR) 80 MG tablet Take 1 tablet (80 mg total) by mouth once daily.    blood sugar diagnostic Strp BG monitoring 4 times a day. Pt needs test strips for [employer] Talking meter. (Patient taking differently: BG monitoring 2 times a day. Pt needs test strips for [employer] Talking meter.)    carvedilol (COREG) 6.25 MG tablet Take 1 tablet (6.25 mg total) by mouth 2 (two) times daily.    diclofenac sodium 1 % Gel Apply 2 g topically once daily.    doxycycline (MONODOX) 100 MG capsule Take 1 capsule (100 mg total) by mouth 2 (two) times daily.    duloxetine (CYMBALTA) 30 MG capsule Take 1 capsule (30 mg total) by mouth 2 (two) times daily. May lower it to once daily as instructed if sedation persists    econazole nitrate 1 % cream Apply topically once daily. Apply to feet daily as directed.    ergocalciferol (VITAMIN D2) 50,000 unit Cap Take 1 capsule (50,000 Units total) by mouth every 7 days.    fluticasone (FLONASE) 50 mcg/actuation nasal spray 2 sprays by Each Nare route once daily.    fluticasone-salmeterol 100-50 mcg/dose (ADVAIR DISKUS) 100-50  "mcg/dose diskus inhaler INHALE 1 PUFF 2 TIMES A DAY    gabapentin (NEURONTIN) 100 MG capsule Take 300 mg by mouth 3 (three) times daily.    insulin detemir (LEVEMIR FLEXTOUCH) 100 unit/mL (3 mL) SubQ InPn pen Inject 48 units at night.    insulin lispro (HUMALOG KWIKPEN) 100 unit/mL InPn pen Inject 18 units w/ breakfast and 18 units w/ dinner. Scale 150-200 +2, 201-250 +4, 251-300 +6, 301-350 +8.    lancets Misc Test daily    losartan (COZAAR) 100 MG tablet 1 tab by mouth daily    oxybutynin (DITROPAN) 5 MG Tab Take 5 mg by mouth once daily.    pen needle, diabetic (PEN NEEDLE) 32 gauge x 5/32" Ndle Uses 4 times a day.    torsemide (DEMADEX) 20 MG Tab Take 1 tablet (20 mg total) by mouth once daily.     Family History     Problem Relation (Age of Onset)    Cancer Mother, Sister    Hypertension Father    No Known Problems Brother, Maternal Aunt, Maternal Uncle, Paternal Aunt, Paternal Uncle, Maternal Grandmother, Maternal Grandfather, Paternal Grandmother, Paternal Grandfather        Social History Main Topics    Smoking status: Never Smoker    Smokeless tobacco: Never Used    Alcohol use No    Drug use: No    Sexual activity: No     Review of Systems   Constitutional: Negative for chills and fever.   HENT: Negative for congestion and sore throat.    Eyes: Negative for photophobia and visual disturbance.   Respiratory: Negative for cough and chest tightness.         Dyspnea with exertion.   Cardiovascular: Positive for leg swelling. Negative for chest pain and palpitations.   Gastrointestinal: Positive for constipation. Negative for abdominal pain, diarrhea, nausea and vomiting.   Genitourinary: Negative for dysuria and hematuria.   Musculoskeletal: Negative for arthralgias and myalgias.   Skin: Positive for rash and wound.   Neurological: Negative for dizziness, light-headedness and headaches.     Objective:     Vital Signs (Most Recent):  Temp: 98 °F (36.7 °C) (08/25/17 1505)  Pulse: 76 (08/25/17 " 1505)  Resp: 16 (08/25/17 1505)  BP: (!) 177/74 (08/25/17 1505)  SpO2: 100 % (08/25/17 1505) Vital Signs (24h Range):  Temp:  [98 °F (36.7 °C)-98.2 °F (36.8 °C)] 98 °F (36.7 °C)  Pulse:  [76-87] 76  Resp:  [16] 16  SpO2:  [100 %] 100 %  BP: (166-177)/(64-74) 177/74        There is no height or weight on file to calculate BMI.    Physical Exam   Constitutional: She is oriented to person, place, and time. She appears well-developed and well-nourished. No distress.   HENT:   Head: Normocephalic and atraumatic.   Mouth/Throat: Oropharynx is clear and moist.   Eyes: EOM are normal. Pupils are equal, round, and reactive to light.   Neck: Normal range of motion. Neck supple. No JVD present.   Cardiovascular: Normal rate, regular rhythm and normal heart sounds.  Exam reveals no gallop and no friction rub.    No murmur heard.  Bilateral radial pulses 2+   Pulmonary/Chest: Effort normal and breath sounds normal. No respiratory distress. She has no wheezes. She has no rales.   Abdominal: Soft. Bowel sounds are normal. She exhibits no distension. There is no tenderness. There is no guarding.   Neurological: She is alert and oriented to person, place, and time.   Skin: Skin is warm. Rash noted. There is erythema.   Sharply demarcated erythematous area of the right lower extremity. Mildly tender and warm to touch. Sharply demarcated erythematous of the LLE as well - cool to touch and non tender to palpation.         Significant Labs:   Recent Results (from the past 24 hour(s))   POCT glucose    Collection Time: 08/25/17  4:25 PM   Result Value Ref Range    POCT Glucose 173 (H) 70 - 110 mg/dL         Significant Imaging: No imaging

## 2017-08-26 LAB
ALBUMIN SERPL BCP-MCNC: 3.1 G/DL
ALP SERPL-CCNC: 94 U/L
ALT SERPL W/O P-5'-P-CCNC: 13 U/L
ANION GAP SERPL CALC-SCNC: 5 MMOL/L
ANISOCYTOSIS BLD QL SMEAR: SLIGHT
AST SERPL-CCNC: 15 U/L
BASOPHILS # BLD AUTO: 0.02 K/UL
BASOPHILS NFR BLD: 0.3 %
BILIRUB SERPL-MCNC: 0.3 MG/DL
BUN SERPL-MCNC: 16 MG/DL
CALCIUM SERPL-MCNC: 9.4 MG/DL
CHLORIDE SERPL-SCNC: 104 MMOL/L
CO2 SERPL-SCNC: 31 MMOL/L
CREAT SERPL-MCNC: 1 MG/DL
DIFFERENTIAL METHOD: ABNORMAL
EOSINOPHIL # BLD AUTO: 0.4 K/UL
EOSINOPHIL NFR BLD: 5.3 %
ERYTHROCYTE [DISTWIDTH] IN BLOOD BY AUTOMATED COUNT: 15.1 %
EST. GFR  (AFRICAN AMERICAN): >60 ML/MIN/1.73 M^2
EST. GFR  (NON AFRICAN AMERICAN): 55.2 ML/MIN/1.73 M^2
GLUCOSE SERPL-MCNC: 153 MG/DL
HCT VFR BLD AUTO: 38 %
HGB BLD-MCNC: 11.7 G/DL
HYPOCHROMIA BLD QL SMEAR: ABNORMAL
LYMPHOCYTES # BLD AUTO: 1.6 K/UL
LYMPHOCYTES NFR BLD: 21 %
MCH RBC QN AUTO: 28.2 PG
MCHC RBC AUTO-ENTMCNC: 30.8 G/DL
MCV RBC AUTO: 92 FL
MONOCYTES # BLD AUTO: 0.7 K/UL
MONOCYTES NFR BLD: 8.8 %
NEUTROPHILS # BLD AUTO: 5 K/UL
NEUTROPHILS NFR BLD: 64.6 %
OVALOCYTES BLD QL SMEAR: ABNORMAL
PLATELET # BLD AUTO: 198 K/UL
PMV BLD AUTO: 12.2 FL
POCT GLUCOSE: 130 MG/DL (ref 70–110)
POCT GLUCOSE: 141 MG/DL (ref 70–110)
POCT GLUCOSE: 146 MG/DL (ref 70–110)
POCT GLUCOSE: 206 MG/DL (ref 70–110)
POIKILOCYTOSIS BLD QL SMEAR: SLIGHT
POLYCHROMASIA BLD QL SMEAR: ABNORMAL
POTASSIUM SERPL-SCNC: 4.4 MMOL/L
PROT SERPL-MCNC: 8.1 G/DL
RBC # BLD AUTO: 4.15 M/UL
SODIUM SERPL-SCNC: 140 MMOL/L
WBC # BLD AUTO: 7.71 K/UL

## 2017-08-26 PROCEDURE — 63600175 PHARM REV CODE 636 W HCPCS: Performed by: INTERNAL MEDICINE

## 2017-08-26 PROCEDURE — 25000003 PHARM REV CODE 250: Performed by: STUDENT IN AN ORGANIZED HEALTH CARE EDUCATION/TRAINING PROGRAM

## 2017-08-26 PROCEDURE — 25000242 PHARM REV CODE 250 ALT 637 W/ HCPCS: Performed by: STUDENT IN AN ORGANIZED HEALTH CARE EDUCATION/TRAINING PROGRAM

## 2017-08-26 PROCEDURE — 80053 COMPREHEN METABOLIC PANEL: CPT

## 2017-08-26 PROCEDURE — 99232 SBSQ HOSP IP/OBS MODERATE 35: CPT | Mod: ,,, | Performed by: INTERNAL MEDICINE

## 2017-08-26 PROCEDURE — 85025 COMPLETE CBC W/AUTO DIFF WBC: CPT

## 2017-08-26 PROCEDURE — 63600175 PHARM REV CODE 636 W HCPCS: Performed by: STUDENT IN AN ORGANIZED HEALTH CARE EDUCATION/TRAINING PROGRAM

## 2017-08-26 PROCEDURE — 11000001 HC ACUTE MED/SURG PRIVATE ROOM

## 2017-08-26 PROCEDURE — 36415 COLL VENOUS BLD VENIPUNCTURE: CPT

## 2017-08-26 RX ORDER — POLYETHYLENE GLYCOL 3350 17 G/17G
17 POWDER, FOR SOLUTION ORAL ONCE
Status: COMPLETED | OUTPATIENT
Start: 2017-08-26 | End: 2017-08-26

## 2017-08-26 RX ORDER — ACETAMINOPHEN 325 MG/1
650 TABLET ORAL ONCE
Status: COMPLETED | OUTPATIENT
Start: 2017-08-26 | End: 2017-08-26

## 2017-08-26 RX ORDER — AMOXICILLIN 250 MG
1 CAPSULE ORAL DAILY PRN
Status: DISCONTINUED | OUTPATIENT
Start: 2017-08-26 | End: 2017-08-30 | Stop reason: HOSPADM

## 2017-08-26 RX ADMIN — INSULIN DETEMIR 48 UNITS: 100 INJECTION, SOLUTION SUBCUTANEOUS at 09:08

## 2017-08-26 RX ADMIN — ATORVASTATIN CALCIUM 80 MG: 20 TABLET, FILM COATED ORAL at 08:08

## 2017-08-26 RX ADMIN — TORSEMIDE 20 MG: 10 TABLET ORAL at 08:08

## 2017-08-26 RX ADMIN — LOSARTAN POTASSIUM 100 MG: 50 TABLET, FILM COATED ORAL at 08:08

## 2017-08-26 RX ADMIN — STANDARDIZED SENNA CONCENTRATE AND DOCUSATE SODIUM 1 TABLET: 8.6; 5 TABLET, FILM COATED ORAL at 11:08

## 2017-08-26 RX ADMIN — POLYETHYLENE GLYCOL 3350 17 G: 17 POWDER, FOR SOLUTION ORAL at 09:08

## 2017-08-26 RX ADMIN — ACETAMINOPHEN 650 MG: 325 TABLET ORAL at 12:08

## 2017-08-26 RX ADMIN — AMPICILLIN SODIUM AND SULBACTAM SODIUM 3 G: 2; 1 INJECTION, POWDER, FOR SOLUTION INTRAMUSCULAR; INTRAVENOUS at 12:08

## 2017-08-26 RX ADMIN — AMPICILLIN SODIUM AND SULBACTAM SODIUM 3 G: 2; 1 INJECTION, POWDER, FOR SOLUTION INTRAMUSCULAR; INTRAVENOUS at 08:08

## 2017-08-26 RX ADMIN — FLUTICASONE PROPIONATE 2 SPRAY: 50 SPRAY, METERED NASAL at 08:08

## 2017-08-26 RX ADMIN — AMPICILLIN SODIUM AND SULBACTAM SODIUM 3 G: 2; 1 INJECTION, POWDER, FOR SOLUTION INTRAMUSCULAR; INTRAVENOUS at 04:08

## 2017-08-26 RX ADMIN — HYDRALAZINE HYDROCHLORIDE 25 MG: 25 TABLET, FILM COATED ORAL at 03:08

## 2017-08-26 RX ADMIN — ASPIRIN 81 MG CHEWABLE TABLET 81 MG: 81 TABLET CHEWABLE at 08:08

## 2017-08-26 RX ADMIN — CARVEDILOL 12.5 MG: 12.5 TABLET, FILM COATED ORAL at 09:08

## 2017-08-26 RX ADMIN — ACETAMINOPHEN 650 MG: 325 TABLET ORAL at 09:08

## 2017-08-26 RX ADMIN — ENOXAPARIN SODIUM 40 MG: 100 INJECTION SUBCUTANEOUS at 04:08

## 2017-08-26 RX ADMIN — INSULIN ASPART 1 UNITS: 100 INJECTION, SOLUTION INTRAVENOUS; SUBCUTANEOUS at 09:08

## 2017-08-26 RX ADMIN — CARVEDILOL 12.5 MG: 12.5 TABLET, FILM COATED ORAL at 08:08

## 2017-08-26 NOTE — PLAN OF CARE
Problem: Patient Care Overview  Goal: Plan of Care Review  Outcome: Ongoing (interventions implemented as appropriate)  POC reviewed w pt,afebrile,iv abx given,insulin admin according to order,elevated bp treated w prn hydralazine.

## 2017-08-26 NOTE — PLAN OF CARE
Problem: Patient Care Overview  Goal: Plan of Care Review  Outcome: Ongoing (interventions implemented as appropriate)  POC reviewed with pt.  Pt verbalized understanding.  Questions and concerns addressed, and no acute events today.  Patient continues to receive IV antibiotics, CBG monitored throughout the day.  Patient complains of mild headache occasionally.  Pt progressing toward goals, will continue to monitor.  See flowsheets for full assessment and VS info.

## 2017-08-26 NOTE — MEDICAL/APP STUDENT
"Progress Note  Hospital Medicine    Patient Name: Sue Giordano  YOB: 1940    Admit Date: 8/25/2017                     LOS: 1    SUBJECTIVE:     Reason for Admission:  Cellulitis of leg, right     HPI:Ms Giordano is a 76 yo female with PMHx of obesity, REJI, asthma, CKD3 (baseline Cr 1.0-1.3), uncontrolled HTN, DM2 (5/2017 HbA1c 8.3), CAD, Hep B, "several TIAs", chronic cellulitis (30 years subjectively), who presents as a direct admit to hospital medicine from her wound care clinic today.      Patient has ongoing cellulitis, sees Dr Howard as her PCP and recently completed a 10 day course of doxycycline PO 2 days ago. On top of the cellulitis, patient fell while walking up the step to the bus on Sunday, which resulted in profuse bleeding, and laceration on R lower leg. She had presented to [facility] ED, received stiches on her laceration and was discharged same day. Denies being on clindamycin or any other antibiotics for her laceration. Yesterday when she presented to her wound care clinic, the right leg was warm, tender and erythematous, and she was subsequently admitted to hospital medicine for IV antibiotics. Patient denies fevers, chills, N/V, headaches, chest pain, cough, abdominal pain, and urinary symptoms. She endorses SOB, and constipation and takes Milk of Magnesia at home.   Patient reports that she was in [place] 30 years ago, and was infected with Hep B at that time.      Interval history: Patient reports no acute events overnight. Reports headache 7/10 this morning. Also has requested stool softeners for constipation. Patient uses Milk of Magnesia at home. Last BM was yesterday morning.  Patient has also requested to see a  this AM.    Denies fevers, chills, blurry vision, N/V, chest pain, SOB, abdominal pain and urinary symptoms.      Review of Systems   Constitutional: Negative.    HENT: Negative for congestion, ear discharge, ear pain, hearing loss, nosebleeds, " sore throat and tinnitus.    Eyes: Negative.  Negative for redness.   Respiratory: Negative for cough, hemoptysis, sputum production, shortness of breath, wheezing and stridor.    Cardiovascular: Positive for leg swelling. Negative for chest pain, palpitations, orthopnea, claudication and PND.   Gastrointestinal: Positive for constipation.   Genitourinary: Negative.    Musculoskeletal: Negative.    Skin: Negative.    Neurological: Positive for headaches.   Endo/Heme/Allergies: Negative.    Psychiatric/Behavioral: Negative.          OBJECTIVE:     Vital Signs Range (Last 24H):  Temp:  [97.4 °F (36.3 °C)-98.2 °F (36.8 °C)]   Pulse:  [63-87]   Resp:  [16-18]   BP: (161-194)/(64-89)   SpO2:  [96 %-100 %] Body mass index is 52.73 kg/m².  Wt Readings from Last 1 Encounters:   08/25/17 1957 122.5 kg (270 lb)   08/25/17 1505 122.5 kg (270 lb 1 oz)       I & O (Last 24H):  Intake/Output Summary (Last 24 hours) at 08/26/17 0808  Last data filed at 08/26/17 0007   Gross per 24 hour   Intake              540 ml   Output                0 ml   Net              540 ml       Physical Exam:  Physical Exam   Constitutional: She is oriented to person, place, and time and well-developed, well-nourished, and in no distress. No distress.   HENT:   Head: Normocephalic and atraumatic.   Right Ear: External ear normal.   Left Ear: External ear normal.   Nose: Nose normal.   Mouth/Throat: Oropharynx is clear and moist. No oropharyngeal exudate.   Eyes: Conjunctivae and EOM are normal. Pupils are equal, round, and reactive to light. Right eye exhibits no discharge. Left eye exhibits no discharge. No scleral icterus.   Neck: Normal range of motion. Neck supple. No JVD present. No tracheal deviation present. No thyromegaly present.   Cardiovascular: Normal rate, regular rhythm, normal heart sounds and intact distal pulses.  Exam reveals no gallop and no friction rub.    No murmur heard.  Pulmonary/Chest: Effort normal and breath sounds normal.  No stridor. No respiratory distress. She has no wheezes. She has no rales. She exhibits no tenderness.   Abdominal: Soft. Bowel sounds are normal. She exhibits no distension and no mass. There is no tenderness. There is no rebound and no guarding.   Musculoskeletal: Normal range of motion.   Lymphadenopathy:     She has no cervical adenopathy.   Neurological: She is alert and oriented to person, place, and time.   Skin: Skin is warm and dry. No rash noted. She is not diaphoretic. No erythema. No pallor.     Right leg cellulitis: improved tenderness, and temperature    Diagnostic Results:    Recent Labs  Lab 08/21/17  0240 08/25/17  1718   WBC 7.66 7.83   HGB 10.9* 10.9*   HCT 35.2* 35.0*    143*       Recent Labs  Lab 08/25/17  1718 08/26/17  0629    140   K 5.2* 4.4    104   CO2 27 31*   BUN 20 16   CREATININE 1.0 1.0   * 153*   CALCIUM 9.3 9.4       Recent Labs  Lab 08/21/17  0240 08/25/17  1718 08/26/17  0629   ALKPHOS 78 92 94   ALT 11 14 13   AST 16 19 15   ALBUMIN 2.7* 3.0* 3.1*   PROT 7.0 8.3 8.1   BILITOT 0.2 0.2 0.3     Lab Results   Component Value Date    HGBA1C 8.2 (H) 08/24/2017     Recent Labs      08/25/17   1625  08/25/17 2005   POCTGLUCOSE  173*  211*       ESR: 78 elevated    ASSESSMENT/PLAN:       1. Cellulitis of right lower leg: continue Unsyn IV 3mg Q8h. Would care daily. 2. HTN: BP spiked in the last 24 hours. Skipped her medications yesterday. Will recheck BP after 9AM dose, adjust medications if BP remains high after the morning dose today.  3. Hyperglycemia: continue home insulin detemir 48 units QHS and SSI.  4. Headache: tylenol  5.Constipation: Mirolax  6. Continue regimen for DM2, HLD, REJI, CAD, asthma      Nohemi Braxton, MS3

## 2017-08-26 NOTE — ASSESSMENT & PLAN NOTE
- Patient recently finished a 10 day course of doxycycline.   - ESR, CRP pending.  - Unasyn 3g IV Q8.  - Wound care consulted.

## 2017-08-26 NOTE — SUBJECTIVE & OBJECTIVE
Interval History: NAEON. Patient slept well. She states her lower leg is hurting less this morning.     Review of Systems   Constitutional: Negative for chills and fever.   HENT: Negative for congestion and sore throat.    Eyes: Negative for photophobia and visual disturbance.   Respiratory: Negative for cough and chest tightness.         Dyspnea with exertion.   Cardiovascular: Positive for leg swelling. Negative for chest pain and palpitations.   Gastrointestinal: Positive for constipation. Negative for abdominal pain, diarrhea, nausea and vomiting.   Genitourinary: Negative for dysuria and hematuria.   Musculoskeletal: Negative for arthralgias and myalgias.   Skin: Positive for rash and wound.   Neurological: Negative for dizziness, light-headedness and headaches.     Objective:     Vital Signs (Most Recent):  Temp: 98.3 °F (36.8 °C) (08/26/17 0807)  Pulse: 73 (08/26/17 0807)  Resp: 16 (08/26/17 0807)  BP: (!) 153/68 (08/26/17 0807)  SpO2: 97 % (08/26/17 0807) Vital Signs (24h Range):  Temp:  [97.4 °F (36.3 °C)-98.3 °F (36.8 °C)] 98.3 °F (36.8 °C)  Pulse:  [63-87] 73  Resp:  [16-18] 16  SpO2:  [96 %-100 %] 97 %  BP: (153-194)/(64-89) 153/68     Weight: 122.5 kg (270 lb)  Body mass index is 52.73 kg/m².    Intake/Output Summary (Last 24 hours) at 08/26/17 0853  Last data filed at 08/26/17 0007   Gross per 24 hour   Intake              540 ml   Output                0 ml   Net              540 ml      Physical Exam   Constitutional: She is oriented to person, place, and time. She appears well-developed and well-nourished. No distress.   HENT:   Head: Normocephalic and atraumatic.   Mouth/Throat: Oropharynx is clear and moist.   Eyes: EOM are normal. Pupils are equal, round, and reactive to light.   Neck: Normal range of motion. Neck supple. No JVD present.   Cardiovascular: Normal rate, regular rhythm and normal heart sounds.  Exam reveals no gallop and no friction rub.    No murmur heard.  Bilateral radial pulses  2+   Pulmonary/Chest: Effort normal and breath sounds normal. No respiratory distress. She has no wheezes. She has no rales.   Abdominal: Soft. Bowel sounds are normal. She exhibits no distension. There is no tenderness. There is no guarding.   Neurological: She is alert and oriented to person, place, and time.   Skin: Skin is warm. Capillary refill takes less than 2 seconds. Rash noted. There is erythema.   Sharply demarcated erythematous area of the right lower extremity. Mildly tender and warm to touch. Sharply demarcated erythematous of the LLE as well - cool to touch and non tender to palpation.        Significant Labs:   Recent Results (from the past 24 hour(s))   POCT glucose    Collection Time: 08/25/17  4:25 PM   Result Value Ref Range    POCT Glucose 173 (H) 70 - 110 mg/dL   Comprehensive Metabolic Panel (CMP)    Collection Time: 08/25/17  5:18 PM   Result Value Ref Range    Sodium 140 136 - 145 mmol/L    Potassium 5.2 (H) 3.5 - 5.1 mmol/L    Chloride 105 95 - 110 mmol/L    CO2 27 23 - 29 mmol/L    Glucose 164 (H) 70 - 110 mg/dL    BUN, Bld 20 8 - 23 mg/dL    Creatinine 1.0 0.5 - 1.4 mg/dL    Calcium 9.3 8.7 - 10.5 mg/dL    Total Protein 8.3 6.0 - 8.4 g/dL    Albumin 3.0 (L) 3.5 - 5.2 g/dL    Total Bilirubin 0.2 0.1 - 1.0 mg/dL    Alkaline Phosphatase 92 55 - 135 U/L    AST 19 10 - 40 U/L    ALT 14 10 - 44 U/L    Anion Gap 8 8 - 16 mmol/L    eGFR if African American >60.0 >60 mL/min/1.73 m^2    eGFR if non  55.2 (A) >60 mL/min/1.73 m^2   CBC with Automated Differential    Collection Time: 08/25/17  5:18 PM   Result Value Ref Range    WBC 7.83 3.90 - 12.70 K/uL    RBC 3.96 (L) 4.00 - 5.40 M/uL    Hemoglobin 10.9 (L) 12.0 - 16.0 g/dL    Hematocrit 35.0 (L) 37.0 - 48.5 %    MCV 88 82 - 98 fL    MCH 27.5 27.0 - 31.0 pg    MCHC 31.1 (L) 32.0 - 36.0 g/dL    RDW 15.4 (H) 11.5 - 14.5 %    Platelets 143 (L) 150 - 350 K/uL    MPV 12.0 9.2 - 12.9 fL    Gran # 5.1 1.8 - 7.7 K/uL    Lymph # 1.7 1.0 -  4.8 K/uL    Mono # 0.6 0.3 - 1.0 K/uL    Eos # 0.4 0.0 - 0.5 K/uL    Baso # 0.03 0.00 - 0.20 K/uL    Gran% 65.2 38.0 - 73.0 %    Lymph% 21.6 18.0 - 48.0 %    Mono% 7.5 4.0 - 15.0 %    Eosinophil% 5.0 0.0 - 8.0 %    Basophil% 0.4 0.0 - 1.9 %    Differential Method Automated    Sedimentation rate, manual    Collection Time: 08/25/17  5:18 PM   Result Value Ref Range    Sed Rate 78 (H) 0 - 20 mm/Hr   C-reactive protein    Collection Time: 08/25/17  5:18 PM   Result Value Ref Range    CRP 4.6 0.0 - 8.2 mg/L   POCT glucose    Collection Time: 08/25/17  8:05 PM   Result Value Ref Range    POCT Glucose 211 (H) 70 - 110 mg/dL   Comprehensive Metabolic Panel (CMP)    Collection Time: 08/26/17  6:29 AM   Result Value Ref Range    Sodium 140 136 - 145 mmol/L    Potassium 4.4 3.5 - 5.1 mmol/L    Chloride 104 95 - 110 mmol/L    CO2 31 (H) 23 - 29 mmol/L    Glucose 153 (H) 70 - 110 mg/dL    BUN, Bld 16 8 - 23 mg/dL    Creatinine 1.0 0.5 - 1.4 mg/dL    Calcium 9.4 8.7 - 10.5 mg/dL    Total Protein 8.1 6.0 - 8.4 g/dL    Albumin 3.1 (L) 3.5 - 5.2 g/dL    Total Bilirubin 0.3 0.1 - 1.0 mg/dL    Alkaline Phosphatase 94 55 - 135 U/L    AST 15 10 - 40 U/L    ALT 13 10 - 44 U/L    Anion Gap 5 (L) 8 - 16 mmol/L    eGFR if African American >60.0 >60 mL/min/1.73 m^2    eGFR if non  55.2 (A) >60 mL/min/1.73 m^2   CBC with Automated Differential    Collection Time: 08/26/17  6:29 AM   Result Value Ref Range    WBC 7.71 3.90 - 12.70 K/uL    RBC 4.15 4.00 - 5.40 M/uL    Hemoglobin 11.7 (L) 12.0 - 16.0 g/dL    Hematocrit 38.0 37.0 - 48.5 %    MCV 92 82 - 98 fL    MCH 28.2 27.0 - 31.0 pg    MCHC 30.8 (L) 32.0 - 36.0 g/dL    RDW 15.1 (H) 11.5 - 14.5 %    Platelets 198 150 - 350 K/uL    MPV 12.2 9.2 - 12.9 fL         Significant Imaging: no imaging.

## 2017-08-26 NOTE — PROGRESS NOTES
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   She was seen in the ED on the night of 8/20/17 and the wound was sutured. She states she was not given antibiotics at that time. Per recent discharge note, it was decided her lower extremity erythema was likely due to stasis dermatitis, because she did not have other signs of cellulitis (tenderness, warm to touch). Since being discharged on 8/20, the patient states her wound has become progressively more erythematous over this past week, however she denies having lower extremity pain. She was seen in wound care clinic this morning and sent here for IV antibiotics. The patient endorses finishing a 10 day course of doxycycline this week prescribed by her PCP for chronic cellulitis with no improvement in symptoms. She denies any recent fevers, chills, CP, n/v/d, or dysuria.     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 Patient was started on IV unasyn.     Interval History: NAEON. Patient slept well. She states her lower  leg is hurting less this morning.     Review of Systems   Constitutional: Negative for chills and fever.   HENT: Negative for congestion and sore throat.    Eyes: Negative for photophobia and visual disturbance.   Respiratory: Negative for cough and chest tightness.         Dyspnea with exertion.   Cardiovascular: Positive for leg swelling. Negative for chest pain and palpitations.   Gastrointestinal: Positive for constipation. Negative for abdominal pain, diarrhea, nausea and vomiting.   Genitourinary: Negative for dysuria and hematuria.   Musculoskeletal: Negative for arthralgias and myalgias.   Skin: Positive for rash and wound.   Neurological: Negative for dizziness, light-headedness and headaches.     Objective:     Vital Signs (Most Recent):  Temp: 98.3 °F (36.8 °C) (08/26/17 0807)  Pulse: 73 (08/26/17 0807)  Resp: 16 (08/26/17 0807)  BP: (!) 153/68 (08/26/17 0807)  SpO2: 97 % (08/26/17 0807) Vital Signs (24h Range):  Temp:  [97.4 °F (36.3 °C)-98.3 °F (36.8 °C)] 98.3 °F (36.8 °C)  Pulse:  [63-87] 73  Resp:  [16-18] 16  SpO2:  [96 %-100 %] 97 %  BP: (153-194)/(64-89) 153/68     Weight: 122.5 kg (270 lb)  Body mass index is 52.73 kg/m².    Intake/Output Summary (Last 24 hours) at 08/26/17 0853  Last data filed at 08/26/17 0007   Gross per 24 hour   Intake              540 ml   Output                0 ml   Net              540 ml      Physical Exam   Constitutional: She is oriented to person, place, and time. She appears well-developed and well-nourished. No distress.   HENT:   Head: Normocephalic and atraumatic.   Mouth/Throat: Oropharynx is clear and moist.   Eyes: EOM are normal. Pupils are equal, round, and reactive to light.   Neck: Normal range of motion. Neck supple. No JVD present.   Cardiovascular: Normal rate, regular rhythm and normal heart sounds.  Exam reveals no gallop and no friction rub.    No murmur heard.  Bilateral radial pulses 2+   Pulmonary/Chest: Effort normal and breath sounds normal. No  respiratory distress. She has no wheezes. She has no rales.   Abdominal: Soft. Bowel sounds are normal. She exhibits no distension. There is no tenderness. There is no guarding.   Neurological: She is alert and oriented to person, place, and time.   Skin: Skin is warm. Capillary refill takes less than 2 seconds. Rash noted. There is erythema.   Sharply demarcated erythematous area of the right lower extremity. Mildly tender and warm to touch. Sharply demarcated erythematous of the LLE as well - cool to touch and non tender to palpation.        Significant Labs:   Recent Results (from the past 24 hour(s))   POCT glucose    Collection Time: 08/25/17  4:25 PM   Result Value Ref Range    POCT Glucose 173 (H) 70 - 110 mg/dL   Comprehensive Metabolic Panel (CMP)    Collection Time: 08/25/17  5:18 PM   Result Value Ref Range    Sodium 140 136 - 145 mmol/L    Potassium 5.2 (H) 3.5 - 5.1 mmol/L    Chloride 105 95 - 110 mmol/L    CO2 27 23 - 29 mmol/L    Glucose 164 (H) 70 - 110 mg/dL    BUN, Bld 20 8 - 23 mg/dL    Creatinine 1.0 0.5 - 1.4 mg/dL    Calcium 9.3 8.7 - 10.5 mg/dL    Total Protein 8.3 6.0 - 8.4 g/dL    Albumin 3.0 (L) 3.5 - 5.2 g/dL    Total Bilirubin 0.2 0.1 - 1.0 mg/dL    Alkaline Phosphatase 92 55 - 135 U/L    AST 19 10 - 40 U/L    ALT 14 10 - 44 U/L    Anion Gap 8 8 - 16 mmol/L    eGFR if African American >60.0 >60 mL/min/1.73 m^2    eGFR if non  55.2 (A) >60 mL/min/1.73 m^2   CBC with Automated Differential    Collection Time: 08/25/17  5:18 PM   Result Value Ref Range    WBC 7.83 3.90 - 12.70 K/uL    RBC 3.96 (L) 4.00 - 5.40 M/uL    Hemoglobin 10.9 (L) 12.0 - 16.0 g/dL    Hematocrit 35.0 (L) 37.0 - 48.5 %    MCV 88 82 - 98 fL    MCH 27.5 27.0 - 31.0 pg    MCHC 31.1 (L) 32.0 - 36.0 g/dL    RDW 15.4 (H) 11.5 - 14.5 %    Platelets 143 (L) 150 - 350 K/uL    MPV 12.0 9.2 - 12.9 fL    Gran # 5.1 1.8 - 7.7 K/uL    Lymph # 1.7 1.0 - 4.8 K/uL    Mono # 0.6 0.3 - 1.0 K/uL    Eos # 0.4 0.0 - 0.5 K/uL     Baso # 0.03 0.00 - 0.20 K/uL    Gran% 65.2 38.0 - 73.0 %    Lymph% 21.6 18.0 - 48.0 %    Mono% 7.5 4.0 - 15.0 %    Eosinophil% 5.0 0.0 - 8.0 %    Basophil% 0.4 0.0 - 1.9 %    Differential Method Automated    Sedimentation rate, manual    Collection Time: 08/25/17  5:18 PM   Result Value Ref Range    Sed Rate 78 (H) 0 - 20 mm/Hr   C-reactive protein    Collection Time: 08/25/17  5:18 PM   Result Value Ref Range    CRP 4.6 0.0 - 8.2 mg/L   POCT glucose    Collection Time: 08/25/17  8:05 PM   Result Value Ref Range    POCT Glucose 211 (H) 70 - 110 mg/dL   Comprehensive Metabolic Panel (CMP)    Collection Time: 08/26/17  6:29 AM   Result Value Ref Range    Sodium 140 136 - 145 mmol/L    Potassium 4.4 3.5 - 5.1 mmol/L    Chloride 104 95 - 110 mmol/L    CO2 31 (H) 23 - 29 mmol/L    Glucose 153 (H) 70 - 110 mg/dL    BUN, Bld 16 8 - 23 mg/dL    Creatinine 1.0 0.5 - 1.4 mg/dL    Calcium 9.4 8.7 - 10.5 mg/dL    Total Protein 8.1 6.0 - 8.4 g/dL    Albumin 3.1 (L) 3.5 - 5.2 g/dL    Total Bilirubin 0.3 0.1 - 1.0 mg/dL    Alkaline Phosphatase 94 55 - 135 U/L    AST 15 10 - 40 U/L    ALT 13 10 - 44 U/L    Anion Gap 5 (L) 8 - 16 mmol/L    eGFR if African American >60.0 >60 mL/min/1.73 m^2    eGFR if non  55.2 (A) >60 mL/min/1.73 m^2   CBC with Automated Differential    Collection Time: 08/26/17  6:29 AM   Result Value Ref Range    WBC 7.71 3.90 - 12.70 K/uL    RBC 4.15 4.00 - 5.40 M/uL    Hemoglobin 11.7 (L) 12.0 - 16.0 g/dL    Hematocrit 38.0 37.0 - 48.5 %    MCV 92 82 - 98 fL    MCH 28.2 27.0 - 31.0 pg    MCHC 30.8 (L) 32.0 - 36.0 g/dL    RDW 15.1 (H) 11.5 - 14.5 %    Platelets 198 150 - 350 K/uL    MPV 12.2 9.2 - 12.9 fL         Significant Imaging: no imaging.     Assessment/Plan:      * Cellulitis of leg, right    - Patient recently finished a 10 day course of doxycycline.   - ESR, CRP pending.  - Unasyn 3g IV Q8.  - Wound care consulted.           Traumatic open wound of right lower leg    - Wound care  consulted.   - Continue Unasyn 3g IV Q8.           Venous stasis dermatitis of both lower extremities    - Continue compression stockings.  - Will consult wound care.           Morbid obesity due to excess calories    - PT/OT  - Diabetic Renal diet 2000 calories.           Coronary artery disease due to calcified coronary lesion    - Aspirin 81 mg QD  - Atorvastatin 80 mg QD.          Asthma in adult without complication    - Duo-nebs Q6H PRN for wheezing or SOB.  - Continue home fluticasone inhaler.           Essential hypertension    - Carvedilol 6.25 mg PO BID.   - Losartan 100 mg QD  - Torsemide 20 mg QD          Diastolic dysfunction: see ECHO 2016    - Carvedilol 6.25 mg PO BID.   - Losartan 100 mg QD  - Torsemide 20 mg QD          CKD (chronic kidney disease) stage 3, GFR 30-59 ml/min    - Will continue tight BP and glucose control.  - Daily BMPs          Type 2 diabetes mellitus with diabetic polyneuropathy, with long-term current use of insulin    - Will continue home insulin detemir 48 units QHS.  - SSI  - Will continue to monitor blood glucose.           Mixed hyperlipidemia    - atorvastatin 80 mg QD.           Obstructive sleep apnea syndrome    - Patient on CPAP at home.  -  CPAP QHS.             VTE Risk Mitigation         Ordered     enoxaparin injection 40 mg  Daily     Route:  Subcutaneous        08/25/17 1532     Medium Risk of VTE  Once      08/25/17 1532              Milan Jamison MD PGY-1   Department of Hospital Medicine   [facility]

## 2017-08-27 LAB
ALBUMIN SERPL BCP-MCNC: 2.6 G/DL
ALP SERPL-CCNC: 80 U/L
ALT SERPL W/O P-5'-P-CCNC: 14 U/L
ANION GAP SERPL CALC-SCNC: 7 MMOL/L
AST SERPL-CCNC: 22 U/L
BILIRUB SERPL-MCNC: 0.3 MG/DL
BUN SERPL-MCNC: 16 MG/DL
CALCIUM SERPL-MCNC: 8.7 MG/DL
CHLORIDE SERPL-SCNC: 104 MMOL/L
CO2 SERPL-SCNC: 26 MMOL/L
CREAT SERPL-MCNC: 1 MG/DL
EST. GFR  (AFRICAN AMERICAN): >60 ML/MIN/1.73 M^2
EST. GFR  (NON AFRICAN AMERICAN): 55.2 ML/MIN/1.73 M^2
GLUCOSE SERPL-MCNC: 114 MG/DL
POCT GLUCOSE: 107 MG/DL (ref 70–110)
POCT GLUCOSE: 134 MG/DL (ref 70–110)
POCT GLUCOSE: 217 MG/DL (ref 70–110)
POCT GLUCOSE: 235 MG/DL (ref 70–110)
POTASSIUM SERPL-SCNC: 5.1 MMOL/L
PROT SERPL-MCNC: 7.3 G/DL
SODIUM SERPL-SCNC: 137 MMOL/L

## 2017-08-27 PROCEDURE — 25000003 PHARM REV CODE 250: Performed by: INTERNAL MEDICINE

## 2017-08-27 PROCEDURE — 25000003 PHARM REV CODE 250: Performed by: STUDENT IN AN ORGANIZED HEALTH CARE EDUCATION/TRAINING PROGRAM

## 2017-08-27 PROCEDURE — 99232 SBSQ HOSP IP/OBS MODERATE 35: CPT | Mod: ,,, | Performed by: INTERNAL MEDICINE

## 2017-08-27 PROCEDURE — S0077 INJECTION, CLINDAMYCIN PHOSP: HCPCS | Performed by: STUDENT IN AN ORGANIZED HEALTH CARE EDUCATION/TRAINING PROGRAM

## 2017-08-27 PROCEDURE — 63600175 PHARM REV CODE 636 W HCPCS: Performed by: INTERNAL MEDICINE

## 2017-08-27 PROCEDURE — 36415 COLL VENOUS BLD VENIPUNCTURE: CPT

## 2017-08-27 PROCEDURE — 63600175 PHARM REV CODE 636 W HCPCS: Performed by: STUDENT IN AN ORGANIZED HEALTH CARE EDUCATION/TRAINING PROGRAM

## 2017-08-27 PROCEDURE — 11000001 HC ACUTE MED/SURG PRIVATE ROOM

## 2017-08-27 PROCEDURE — 80053 COMPREHEN METABOLIC PANEL: CPT

## 2017-08-27 RX ORDER — HYDRALAZINE HYDROCHLORIDE 25 MG/1
25 TABLET, FILM COATED ORAL EVERY 12 HOURS
Status: DISCONTINUED | OUTPATIENT
Start: 2017-08-27 | End: 2017-08-30 | Stop reason: HOSPADM

## 2017-08-27 RX ORDER — HYDRALAZINE HYDROCHLORIDE 25 MG/1
25 TABLET, FILM COATED ORAL EVERY 12 HOURS
Status: DISCONTINUED | OUTPATIENT
Start: 2017-08-27 | End: 2017-08-27

## 2017-08-27 RX ORDER — CLINDAMYCIN PHOSPHATE 150 MG/ML
900 INJECTION, SOLUTION INTRAVENOUS
Status: DISCONTINUED | OUTPATIENT
Start: 2017-08-27 | End: 2017-08-27

## 2017-08-27 RX ORDER — TRIAMCINOLONE ACETONIDE 1 MG/G
CREAM TOPICAL 2 TIMES DAILY
Status: DISCONTINUED | OUTPATIENT
Start: 2017-08-27 | End: 2017-08-30 | Stop reason: HOSPADM

## 2017-08-27 RX ADMIN — FLUTICASONE PROPIONATE 2 SPRAY: 50 SPRAY, METERED NASAL at 08:08

## 2017-08-27 RX ADMIN — TRIAMCINOLONE ACETONIDE: 1 CREAM TOPICAL at 09:08

## 2017-08-27 RX ADMIN — INSULIN ASPART 2 UNITS: 100 INJECTION, SOLUTION INTRAVENOUS; SUBCUTANEOUS at 04:08

## 2017-08-27 RX ADMIN — Medication 1 CAPSULE: at 09:08

## 2017-08-27 RX ADMIN — CARVEDILOL 12.5 MG: 12.5 TABLET, FILM COATED ORAL at 09:08

## 2017-08-27 RX ADMIN — TRIAMCINOLONE ACETONIDE: 1 CREAM TOPICAL at 11:08

## 2017-08-27 RX ADMIN — DEXTROSE 900 MG: 50 INJECTION, SOLUTION INTRAVENOUS at 09:08

## 2017-08-27 RX ADMIN — HYDRALAZINE HYDROCHLORIDE 25 MG: 25 TABLET, FILM COATED ORAL at 09:08

## 2017-08-27 RX ADMIN — ATORVASTATIN CALCIUM 80 MG: 20 TABLET, FILM COATED ORAL at 08:08

## 2017-08-27 RX ADMIN — INSULIN DETEMIR 48 UNITS: 100 INJECTION, SOLUTION SUBCUTANEOUS at 09:08

## 2017-08-27 RX ADMIN — AMPICILLIN SODIUM AND SULBACTAM SODIUM 3 G: 2; 1 INJECTION, POWDER, FOR SOLUTION INTRAMUSCULAR; INTRAVENOUS at 12:08

## 2017-08-27 RX ADMIN — INSULIN ASPART 1 UNITS: 100 INJECTION, SOLUTION INTRAVENOUS; SUBCUTANEOUS at 09:08

## 2017-08-27 RX ADMIN — Medication 1 CAPSULE: at 11:08

## 2017-08-27 RX ADMIN — ENOXAPARIN SODIUM 40 MG: 100 INJECTION SUBCUTANEOUS at 04:08

## 2017-08-27 RX ADMIN — LOSARTAN POTASSIUM 100 MG: 50 TABLET, FILM COATED ORAL at 08:08

## 2017-08-27 RX ADMIN — HYDRALAZINE HYDROCHLORIDE 25 MG: 25 TABLET, FILM COATED ORAL at 03:08

## 2017-08-27 RX ADMIN — DEXTROSE 900 MG: 50 INJECTION, SOLUTION INTRAVENOUS at 04:08

## 2017-08-27 RX ADMIN — CARVEDILOL 12.5 MG: 12.5 TABLET, FILM COATED ORAL at 08:08

## 2017-08-27 RX ADMIN — ASPIRIN 81 MG CHEWABLE TABLET 81 MG: 81 TABLET CHEWABLE at 08:08

## 2017-08-27 RX ADMIN — TORSEMIDE 20 MG: 10 TABLET ORAL at 08:08

## 2017-08-27 NOTE — PLAN OF CARE
Problem: Patient Care Overview  Goal: Plan of Care Review  Outcome: Ongoing (interventions implemented as appropriate)  POC reviewed with pt and family.  Pt verbalized understanding.  Questions and concerns addressed, and no acute events today.  Patient continues to receive IV antibiotics.  Patient's wound cleaned and corticosteroid cream applied.  Pt progressing toward goals, will continue to monitor.  See flowsheets for full assessment and VS info.

## 2017-08-27 NOTE — ASSESSMENT & PLAN NOTE
- still not well controlled. But she reports eating lot of salts. Changed her diet to be low salt diet   - Carvedilol 12.5 mg PO BID.   - Losartan 100 mg QD  - Torsemide 20 mg QD  - Hydralazine 25 mg PO q 6 hs PRN

## 2017-08-27 NOTE — ASSESSMENT & PLAN NOTE
- Patient recently finished a 10 day course of doxycycline.   - ESR 78 and CRP 4.6  - since pt. Cellulitis is with ,meinimal improvement, will d/c Unasyn 3g IV Q8 8/27 and start Clindamycin (8/27)   - Wound care consulted.

## 2017-08-27 NOTE — PROGRESS NOTES
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   She was seen in the ED on the night of 8/20/17 and the wound was sutured. She states she was not given antibiotics at that time. Per recent discharge note, it was decided her lower extremity erythema was likely due to stasis dermatitis, because she did not have other signs of cellulitis (tenderness, warm to touch). Since being discharged on 8/20, the patient states her wound has become progressively more erythematous over this past week, however she denies having lower extremity pain. She was seen in wound care clinic this morning and sent here for IV antibiotics. The patient endorses finishing a 10 day course of doxycycline this week prescribed by her PCP for chronic cellulitis with no improvement in symptoms. She denies any recent fevers, chills, CP, n/v/d, or dysuria.     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 Patient was started on IV unasyn.   8/27 : Unasyn discontinue and Clindamycin started     Interval History:  COLLINS. BP not well controlled.  Patient slept well. She states her lower leg is hurting less this morning but still worm.     Review of Systems   Constitutional: Negative for chills and fever.   HENT: Negative for congestion and sore throat.    Eyes: Negative for photophobia and visual disturbance.   Respiratory: Negative for cough and chest tightness.         Dyspnea with exertion.   Cardiovascular: Positive for leg swelling. Negative for chest pain and palpitations.   Gastrointestinal: Positive for constipation. Negative for abdominal pain, diarrhea, nausea and vomiting.   Genitourinary: Negative for dysuria and hematuria.   Musculoskeletal: Negative for arthralgias and myalgias.   Skin: Positive for rash and wound.   Neurological: Negative for dizziness, light-headedness and headaches.     Objective:     Vital Signs (Most Recent):  Temp: 97.8 °F (36.6 °C) (08/27/17 0736)  Pulse: 73 (08/27/17 0736)  Resp: 17 (08/27/17 0736)  BP: (!) 185/78 (08/27/17 0736)  SpO2: 98 % (08/27/17 0736) Vital Signs (24h Range):  Temp:  [97.3 °F (36.3 °C)-98.6 °F (37 °C)] 97.8 °F (36.6 °C)  Pulse:  [58-79] 73  Resp:  [16-18] 17  SpO2:  [96 %-99 %] 98 %  BP: (145-197)/(65-79) 185/78     Weight: 122.5 kg (270 lb)  Body mass index is 52.73 kg/m².    Intake/Output Summary (Last 24 hours) at 08/27/17 1001  Last data filed at 08/27/17 0500   Gross per 24 hour   Intake             1280 ml   Output                0 ml   Net             1280 ml      Physical Exam   Constitutional: She is oriented to person, place, and time.   Cardiovascular: Normal rate, regular rhythm and normal heart sounds.  Exam reveals no gallop and no friction rub.    No murmur heard.  Bilateral radial pulses 2+   Pulmonary/Chest: Effort normal and breath sounds normal. No respiratory distress. She has no wheezes. She has no rales.   Abdominal: Soft. Bowel sounds are normal. She exhibits no distension. There is no tenderness. There is no guarding.   Neurological: She is  alert and oriented to person, place, and time.   Skin: Skin is warm. Capillary refill takes less than 2 seconds. Rash noted. There is erythema.   Sharply demarcated erythematous area of the right lower extremity. Mildly tender and warm to touch. Sharply demarcated erythematous of the LLE as well - cool to touch and non tender to palpation.        Significant Labs:     Recent Labs  Lab 08/25/17  1718 08/26/17  0629 08/27/17  0600    140 137   K 5.2* 4.4 5.1    104 104   CO2 27 31* 26   BUN 20 16 16   CREATININE 1.0 1.0 1.0   CALCIUM 9.3 9.4 8.7       Significant Imaging: no imaging.     Assessment/Plan:      Traumatic open wound of right lower leg    - Wound care consulted.             Venous stasis dermatitis of both lower extremities    - Continue compression stockings.  - Will consult wound care.           Morbid obesity due to excess calories    - PT/OT  - Diabetic Renal diet 2000 calories.           Coronary artery disease due to calcified coronary lesion    - Aspirin 81 mg QD  - Atorvastatin 80 mg QD.          Asthma in adult without complication    - Duo-nebs Q6H PRN for wheezing or SOB.  - Continue home fluticasone inhaler.           Essential hypertension    - still not well controlled. But she reports eating lot of salts. Changed her diet to be low salt diet   - Carvedilol 12.5 mg PO BID.   - Losartan 100 mg QD  - Torsemide 20 mg QD  - Hydralazine 25 mg PO q 6 hs PRN           Diastolic dysfunction: see ECHO 2016    - Carvedilol 6.25 mg PO BID.   - Losartan 100 mg QD  - Torsemide 20 mg QD          CKD (chronic kidney disease) stage 3, GFR 30-59 ml/min    - Will continue tight BP and glucose control.  - Daily BMPs          Type 2 diabetes mellitus with diabetic polyneuropathy, with long-term current use of insulin    - Will continue home insulin detemir 48 units QHS.  - SSI  - Will continue to monitor blood glucose.           Mixed hyperlipidemia    - atorvastatin 80 mg QD.           Obstructive  sleep apnea syndrome    - Patient on CPAP at home   -  CPAP QHS.           * Cellulitis of leg, right    - Patient recently finished a 10 day course of doxycycline.   - ESR 78 and CRP 4.6  - since pt. Cellulitis is with ,meinimal improvement, will d/c Unasyn 3g IV Q8 8/27 and start Clindamycin (8/27)   - Wound care consulted.             VTE Risk Mitigation         Ordered     enoxaparin injection 40 mg  Daily     Route:  Subcutaneous        08/25/17 1532     Medium Risk of VTE  Once      08/25/17 1532              Zulema Owens MD  Department of Hospital Medicine   [facility]

## 2017-08-27 NOTE — PLAN OF CARE
Problem: Patient Care Overview  Goal: Plan of Care Review  Outcome: Ongoing (interventions implemented as appropriate)  POC reviewed and stated understanding,no fall or injury,afebrile on abx,prefers to sleep in recliner,cbg covered with insulin per order,wound to RLE reddened,blistered,and weeping so mepilex applied,no pain reported.

## 2017-08-27 NOTE — SUBJECTIVE & OBJECTIVE
Interval History: NAEON. BP not well controlled.  Patient slept well. She states her lower leg is hurting less this morning but still worm.     Review of Systems   Constitutional: Negative for chills and fever.   HENT: Negative for congestion and sore throat.    Eyes: Negative for photophobia and visual disturbance.   Respiratory: Negative for cough and chest tightness.         Dyspnea with exertion.   Cardiovascular: Positive for leg swelling. Negative for chest pain and palpitations.   Gastrointestinal: Positive for constipation. Negative for abdominal pain, diarrhea, nausea and vomiting.   Genitourinary: Negative for dysuria and hematuria.   Musculoskeletal: Negative for arthralgias and myalgias.   Skin: Positive for rash and wound.   Neurological: Negative for dizziness, light-headedness and headaches.     Objective:     Vital Signs (Most Recent):  Temp: 97.8 °F (36.6 °C) (08/27/17 0736)  Pulse: 73 (08/27/17 0736)  Resp: 17 (08/27/17 0736)  BP: (!) 185/78 (08/27/17 0736)  SpO2: 98 % (08/27/17 0736) Vital Signs (24h Range):  Temp:  [97.3 °F (36.3 °C)-98.6 °F (37 °C)] 97.8 °F (36.6 °C)  Pulse:  [58-79] 73  Resp:  [16-18] 17  SpO2:  [96 %-99 %] 98 %  BP: (145-197)/(65-79) 185/78     Weight: 122.5 kg (270 lb)  Body mass index is 52.73 kg/m².    Intake/Output Summary (Last 24 hours) at 08/27/17 1001  Last data filed at 08/27/17 0500   Gross per 24 hour   Intake             1280 ml   Output                0 ml   Net             1280 ml      Physical Exam   Constitutional: She is oriented to person, place, and time.   Cardiovascular: Normal rate, regular rhythm and normal heart sounds.  Exam reveals no gallop and no friction rub.    No murmur heard.  Bilateral radial pulses 2+   Pulmonary/Chest: Effort normal and breath sounds normal. No respiratory distress. She has no wheezes. She has no rales.   Abdominal: Soft. Bowel sounds are normal. She exhibits no distension. There is no tenderness. There is no guarding.    Neurological: She is alert and oriented to person, place, and time.   Skin: Skin is warm. Capillary refill takes less than 2 seconds. Rash noted. There is erythema.   Sharply demarcated erythematous area of the right lower extremity. Mildly tender and warm to touch. Sharply demarcated erythematous of the LLE as well - cool to touch and non tender to palpation.        Significant Labs:     Recent Labs  Lab 08/25/17  1718 08/26/17  0629 08/27/17  0600    140 137   K 5.2* 4.4 5.1    104 104   CO2 27 31* 26   BUN 20 16 16   CREATININE 1.0 1.0 1.0   CALCIUM 9.3 9.4 8.7       Significant Imaging: no imaging.

## 2017-08-28 PROBLEM — L03.90 CELLULITIS: Status: ACTIVE | Noted: 2017-08-28

## 2017-08-28 LAB
ALBUMIN SERPL BCP-MCNC: 2.4 G/DL
ALP SERPL-CCNC: 81 U/L
ALT SERPL W/O P-5'-P-CCNC: 14 U/L
ANION GAP SERPL CALC-SCNC: 8 MMOL/L
AST SERPL-CCNC: 14 U/L
BILIRUB SERPL-MCNC: 0.3 MG/DL
BUN SERPL-MCNC: 21 MG/DL
CALCIUM SERPL-MCNC: 8.6 MG/DL
CHLORIDE SERPL-SCNC: 102 MMOL/L
CO2 SERPL-SCNC: 27 MMOL/L
CREAT SERPL-MCNC: 1.2 MG/DL
EST. GFR  (AFRICAN AMERICAN): 51.1 ML/MIN/1.73 M^2
EST. GFR  (NON AFRICAN AMERICAN): 44.3 ML/MIN/1.73 M^2
GLUCOSE SERPL-MCNC: 181 MG/DL
POCT GLUCOSE: 124 MG/DL (ref 70–110)
POCT GLUCOSE: 127 MG/DL (ref 70–110)
POCT GLUCOSE: 131 MG/DL (ref 70–110)
POCT GLUCOSE: 201 MG/DL (ref 70–110)
POTASSIUM SERPL-SCNC: 4.2 MMOL/L
PROT SERPL-MCNC: 6.8 G/DL
SODIUM SERPL-SCNC: 137 MMOL/L

## 2017-08-28 PROCEDURE — S0077 INJECTION, CLINDAMYCIN PHOSP: HCPCS | Performed by: INTERNAL MEDICINE

## 2017-08-28 PROCEDURE — 80053 COMPREHEN METABOLIC PANEL: CPT

## 2017-08-28 PROCEDURE — 36415 COLL VENOUS BLD VENIPUNCTURE: CPT

## 2017-08-28 PROCEDURE — 11000001 HC ACUTE MED/SURG PRIVATE ROOM

## 2017-08-28 PROCEDURE — 99232 SBSQ HOSP IP/OBS MODERATE 35: CPT | Mod: ,,, | Performed by: INTERNAL MEDICINE

## 2017-08-28 PROCEDURE — S0077 INJECTION, CLINDAMYCIN PHOSP: HCPCS | Performed by: STUDENT IN AN ORGANIZED HEALTH CARE EDUCATION/TRAINING PROGRAM

## 2017-08-28 PROCEDURE — 25000003 PHARM REV CODE 250: Performed by: STUDENT IN AN ORGANIZED HEALTH CARE EDUCATION/TRAINING PROGRAM

## 2017-08-28 PROCEDURE — 25000003 PHARM REV CODE 250: Performed by: INTERNAL MEDICINE

## 2017-08-28 PROCEDURE — 63600175 PHARM REV CODE 636 W HCPCS: Performed by: STUDENT IN AN ORGANIZED HEALTH CARE EDUCATION/TRAINING PROGRAM

## 2017-08-28 RX ORDER — CLINDAMYCIN PHOSPHATE 900 MG/50ML
900 INJECTION, SOLUTION INTRAVENOUS
Status: DISCONTINUED | OUTPATIENT
Start: 2017-08-28 | End: 2017-08-30

## 2017-08-28 RX ADMIN — HYDRALAZINE HYDROCHLORIDE 25 MG: 25 TABLET, FILM COATED ORAL at 09:08

## 2017-08-28 RX ADMIN — INSULIN ASPART 1 UNITS: 100 INJECTION, SOLUTION INTRAVENOUS; SUBCUTANEOUS at 10:08

## 2017-08-28 RX ADMIN — CARVEDILOL 12.5 MG: 12.5 TABLET, FILM COATED ORAL at 10:08

## 2017-08-28 RX ADMIN — ENOXAPARIN SODIUM 40 MG: 100 INJECTION SUBCUTANEOUS at 05:08

## 2017-08-28 RX ADMIN — FLUTICASONE PROPIONATE 2 SPRAY: 50 SPRAY, METERED NASAL at 09:08

## 2017-08-28 RX ADMIN — LOSARTAN POTASSIUM 100 MG: 50 TABLET, FILM COATED ORAL at 09:08

## 2017-08-28 RX ADMIN — Medication 1 CAPSULE: at 10:08

## 2017-08-28 RX ADMIN — ATORVASTATIN CALCIUM 80 MG: 20 TABLET, FILM COATED ORAL at 09:08

## 2017-08-28 RX ADMIN — HYDRALAZINE HYDROCHLORIDE 25 MG: 25 TABLET, FILM COATED ORAL at 10:08

## 2017-08-28 RX ADMIN — ASPIRIN 81 MG CHEWABLE TABLET 81 MG: 81 TABLET CHEWABLE at 09:08

## 2017-08-28 RX ADMIN — DEXTROSE 900 MG: 50 INJECTION, SOLUTION INTRAVENOUS at 12:08

## 2017-08-28 RX ADMIN — Medication 1 CAPSULE: at 09:08

## 2017-08-28 RX ADMIN — CARVEDILOL 12.5 MG: 12.5 TABLET, FILM COATED ORAL at 09:08

## 2017-08-28 RX ADMIN — TORSEMIDE 20 MG: 10 TABLET ORAL at 09:08

## 2017-08-28 RX ADMIN — TRIAMCINOLONE ACETONIDE: 1 CREAM TOPICAL at 10:08

## 2017-08-28 RX ADMIN — CLINDAMYCIN IN 5 PERCENT DEXTROSE 900 MG: 18 INJECTION, SOLUTION INTRAVENOUS at 09:08

## 2017-08-28 RX ADMIN — INSULIN DETEMIR 48 UNITS: 100 INJECTION, SOLUTION SUBCUTANEOUS at 10:08

## 2017-08-28 RX ADMIN — CLINDAMYCIN IN 5 PERCENT DEXTROSE 900 MG: 18 INJECTION, SOLUTION INTRAVENOUS at 05:08

## 2017-08-28 NOTE — ASSESSMENT & PLAN NOTE
- Patient recently finished a 10 day course of doxycycline, and recently discontinued Unasyn.   - ESR 78 and CRP 4.6  - On day 2 of Clindamycin, will continue.   - Wound care consulted.   -Improving, however will need to remain in hospital for at least one more day to monitor further improvement.

## 2017-08-28 NOTE — PROGRESS NOTES
Consult received on patient's cellulitis to right lower leg for previous laceration that patient stated received on while getting on bus. Sutures noted, patient stated has been in place since 8/20/17. Discussed with Dr. Sawyer, recommend suture removal and dressing wound with mepilex AG and change twice a week. Dr. Sawyer in agreement, patient tolerated suture removal, educated patient on wound care, verbalized understanding. Patient follows Kellie SMYTH on outpatient basis, patient to follow-up in two week with Mirza SMYTH. Extra supplies ordered to room, nursing to continue care.     08/28/17 1131       Wound 08/25/17 0800 Laceration lower leg   Date First Assessed/Time First Assessed: 08/25/17 0800   Pre-existing: Yes  Wound Type: Laceration  Side: Right  Orientation: lower  Location: leg   Wound WDL ex   Drainage Amount scant   Drainage Characteristics/Odor serous;no odor   Wound Base moist;pink   Periwound Area (purple discoloration located above laceration)   Wound Edges open   Non-staged Wound Description Partial thickness   Cleansed W/ sterile normal saline   Interventions (suture removal)   Dressing Dressing applied  (mepilex AG and bordered gauze)   Dressing Change Due 08/31/17

## 2017-08-28 NOTE — PLAN OF CARE
Problem: Patient Care Overview  Goal: Plan of Care Review  Outcome: Ongoing (interventions implemented as appropriate)  POC reviewed,pt stated understanding,insulin given as ordered,no fall,RLE wound cleansed w sterile NS, ordered cream applied and covered with kerlix,sutures noted intact,surrounding skin shiny and reddened,BLE firm and edematous.no pain reported.

## 2017-08-28 NOTE — PLAN OF CARE
Problem: Fall Risk (Adult)  Goal: Identify Related Risk Factors and Signs and Symptoms  Related risk factors and signs and symptoms are identified upon initiation of Human Response Clinical Practice Guideline (CPG)   Outcome: Ongoing (interventions implemented as appropriate)  Patient reports no falls this shift.Safety interventions in place. nurses call bell within reach side rails up x3.

## 2017-08-28 NOTE — SUBJECTIVE & OBJECTIVE
Interval History:   No acute events overnight. Patient states that she feels as though her right leg is firmer to the touch than yesterday, but the swelling and pain has improved. Denies fever/chills, nausea, vomiting, SOB.     Review of Systems   Constitutional: Negative for chills and fever.   HENT: Negative for congestion and sore throat.    Eyes: Negative for photophobia and visual disturbance.   Respiratory: Negative for cough and chest tightness.    Cardiovascular: Positive for leg swelling. Negative for chest pain and palpitations.   Gastrointestinal: Positive for constipation. Negative for abdominal pain, diarrhea, nausea and vomiting.   Genitourinary: Negative for dysuria and hematuria.   Musculoskeletal: Negative for arthralgias and myalgias.   Skin: Positive for rash and wound.   Neurological: Negative for dizziness, light-headedness and headaches.     Objective:     Vital Signs (Most Recent):  Temp: 98.2 °F (36.8 °C) (08/28/17 1105)  Pulse: 66 (08/28/17 1105)  Resp: 17 (08/28/17 1105)  BP: (!) 123/58 (08/28/17 1105)  SpO2: 98 % (08/28/17 1105) Vital Signs (24h Range):  Temp:  [97.5 °F (36.4 °C)-98.2 °F (36.8 °C)] 98.2 °F (36.8 °C)  Pulse:  [63-76] 66  Resp:  [16-17] 17  SpO2:  [96 %-98 %] 98 %  BP: (110-157)/(58-67) 123/58     Weight: 122.5 kg (270 lb)  Body mass index is 52.73 kg/m².    Intake/Output Summary (Last 24 hours) at 08/28/17 1258  Last data filed at 08/28/17 0200   Gross per 24 hour   Intake             1010 ml   Output                0 ml   Net             1010 ml      Physical Exam   Constitutional: She is oriented to person, place, and time.   Cardiovascular: Normal rate, regular rhythm and normal heart sounds.  Exam reveals no gallop and no friction rub.    No murmur heard.  Bilateral radial pulses 2+   Pulmonary/Chest: Effort normal and breath sounds normal. No respiratory distress. She has no wheezes. She has no rales.   Abdominal: Soft. Bowel sounds are normal. She exhibits no  distension. There is no tenderness. There is no guarding.   Neurological: She is alert and oriented to person, place, and time.   Skin: Skin is warm. Capillary refill takes less than 2 seconds. Rash noted. There is erythema.   Sharply demarcated erythematous area of the right lower extremity. Mildly tender and warm to touch. Indurated today and appears swelling has improved.        Significant Labs:   Recent Results (from the past 24 hour(s))   POCT glucose    Collection Time: 08/27/17  4:42 PM   Result Value Ref Range    POCT Glucose 217 (H) 70 - 110 mg/dL   POCT glucose    Collection Time: 08/27/17  9:26 PM   Result Value Ref Range    POCT Glucose 235 (H) 70 - 110 mg/dL   Comprehensive Metabolic Panel (CMP)    Collection Time: 08/28/17  4:38 AM   Result Value Ref Range    Sodium 137 136 - 145 mmol/L    Potassium 4.2 3.5 - 5.1 mmol/L    Chloride 102 95 - 110 mmol/L    CO2 27 23 - 29 mmol/L    Glucose 181 (H) 70 - 110 mg/dL    BUN, Bld 21 8 - 23 mg/dL    Creatinine 1.2 0.5 - 1.4 mg/dL    Calcium 8.6 (L) 8.7 - 10.5 mg/dL    Total Protein 6.8 6.0 - 8.4 g/dL    Albumin 2.4 (L) 3.5 - 5.2 g/dL    Total Bilirubin 0.3 0.1 - 1.0 mg/dL    Alkaline Phosphatase 81 55 - 135 U/L    AST 14 10 - 40 U/L    ALT 14 10 - 44 U/L    Anion Gap 8 8 - 16 mmol/L    eGFR if African American 51.1 (A) >60 mL/min/1.73 m^2    eGFR if non  44.3 (A) >60 mL/min/1.73 m^2   POCT glucose    Collection Time: 08/28/17  7:43 AM   Result Value Ref Range    POCT Glucose 127 (H) 70 - 110 mg/dL   POCT glucose    Collection Time: 08/28/17 11:53 AM   Result Value Ref Range    POCT Glucose 131 (H) 70 - 110 mg/dL

## 2017-08-28 NOTE — PLAN OF CARE
Vale Howard MD   [address] / [place] [address]       [facility] - [place] - [id number]  [place] [address]  Phone: [phone] Fax: [phone]    [facility] [place] - [address]  [address]  [place] [address]  Phone: [phone] Fax: [phone]    [facility] - [place] - [address]  [address]  [place] [address]  Phone: [phone] Fax: [phone]    Payor: [employer] / Plan: [employer] / Product Type: Commercial /         08/28/17 0959   Discharge Assessment   Assessment Type Discharge Planning Assessment   Confirmed/corrected address and phone number on facesheet? Yes   Assessment information obtained from? Patient   Expected Length of Stay (days) 3   Communicated expected length of stay with patient/caregiver yes   Prior to hospitilization cognitive status: Alert/Oriented   Prior to hospitalization functional status: Assistive Equipment   Current cognitive status: Alert/Oriented   Current Functional Status: Assistive Equipment   Lives With other (see comments)  ([facility])   Able to Return to Prior Arrangements yes   Is patient able to care for self after discharge? Yes   Patient's perception of discharge disposition home or selfcare   Readmission Within The Last 30 Days previous discharge plan unsuccessful   Patient currently being followed by outpatient case management? No   Patient currently receives any other outside agency services? No   Equipment Currently Used at Home walker, standard;cane, straight   Do you have any problems affording any of your prescribed medications? No   Is the patient taking medications as prescribed? yes   Does the patient have transportation home? Yes   Transportation Available family or friend will provide   Does the patient receive services at the Coumadin  Clinic? No   Discharge Plan A Home with family   Discharge Plan B Home Health   Patient/Family In Agreement With Plan yes   Readmission Questionnaire   At the time of your discharge, did someone talk to you about what your health problems were? Yes   At the time of discharge, did someone talk to you about what to watch out for regarding worsening of your health problem? Yes   At the time of discharge, did someone talk to you about what to do if you experienced worsening of your health problem? Yes   At the time of discharge, did someone talk to you about which medication to take when you left the hospital and which ones to stop taking? Yes   At the time of discharge, did someone talk to you about when and where to follow up with a doctor after you left the hospital? Yes   Do you have problems taking your medications as prescribed? No   Do you have any problems affording any of  your prescribed medications? No   Do you have problems obtaining/receiving your medications? No   Does the patient have transportation to healthcare appointments? Yes   Does the patient have family/friends to help with healtcare needs after discharge? yes   Does your caregiver provide all the help you need? Yes

## 2017-08-28 NOTE — PHARMACY MED REC
"Admission Medication Reconciliation - Pharmacy Consult Note    The home medication history was taken by Ignacia Chávez, Pharmacy Tech.     Based on information gathered and subsequent review by the clinical pharmacist, the items below may need attention.     You may go to "Admission" then "Reconcile Home Medications" tabs to review and/or act upon these items. Based on information gathered and subsequent review by the clinical pharmacist, the items below may need attention.    Potentially problematic discrepancies with current MAR  o Patient IS taking the following which was not ordered upon admit  o Cymbalta 30 mg PO q12h    o Patient is taking a drug DIFFERENTLY than how ordered upon admit  o Carvedilol 6.25 mg PO q12h  o Home insulin regimen: Humalog 15 units every morning and 18 units every evening in combination with Humulin N 40 units every morning and 18 units every evening; Levemir  48 units nightly was prescribed but patient has not started on this at home    Potential issues to be addressed PRIOR TO DISCHARGE  o Configure insulin regimen for home use    Please address this information as you see fit.  Feel free to contact us if you have any questions or require assistance.    Julio Jackson, PharmD  [id number]        Patient's prior to admission medication regimen was as follows:  Prescriptions Prior to Admission   Medication Sig Dispense Refill Last Dose    acetaminophen (TYLENOL) 500 MG tablet Take 1,000 mg by mouth 2 (two) times daily as needed for Pain.       albuterol 90 mcg/actuation inhaler Inhale 2 puffs into the lungs every 6 (six) hours as needed for Wheezing or Shortness of Breath. Rescue 1 Inhaler 3 8/25/2017 at morning    ammonium lactate 12 % Crea Apply topically every morning.    8/25/2017 at [facility]    aspirin 81 MG Chew Take 1 tablet (81 mg total) by mouth once daily.   8/25/2017 at morning    atorvastatin (LIPITOR) 80 MG tablet Take 1 tablet (80 mg total) by mouth once daily. 90 tablet 3 " 8/25/2017 at morning    blood sugar diagnostic Strp BG monitoring 4 times a day. Pt needs test strips for Embrace Talking meter. (Patient taking differently: BG monitoring 2 times a day. Pt needs test strips for Embrace Talking meter.) 450 strip prn Taking    carvedilol (COREG) 6.25 MG tablet Take 1 tablet (6.25 mg total) by mouth 2 (two) times daily. 60 tablet 11 8/25/2017 at morning    diclofenac sodium 1 % Gel Apply 2 g topically once daily. 100 g 1     duloxetine (CYMBALTA) 30 MG capsule Take 1 capsule (30 mg total) by mouth 2 (two) times daily. May lower it to once daily as instructed if sedation persists 60 capsule 3 8/25/2017 at morning    econazole nitrate 1 % cream Apply topically once daily. Apply to feet daily as directed. 85 g 1 8/25/2017 at morning    ergocalciferol (VITAMIN D2) 50,000 unit Cap Take 1 capsule (50,000 Units total) by mouth every 7 days. (Patient taking differently: Take 50,000 Units by mouth every Sunday. ) 12 capsule 3 Past Week at Unknown time    fluticasone (FLONASE) 50 mcg/actuation nasal spray 2 sprays by Each Nare route once daily. (Patient taking differently: 2 sprays by Each Nare route daily as needed for Rhinitis. ) 1 Bottle 3 8/25/2017 at Unknown time    fluticasone-salmeterol 100-50 mcg/dose (ADVAIR DISKUS) 100-50 mcg/dose diskus inhaler INHALE 1 PUFF 2 TIMES A DAY 60 each 6 8/25/2017 at Unknown time    insulin lispro (HUMALOG) 100 unit/mL injection Inject 15 units into the skin every morning and 18 units into the skin every evening  in combination with  humulin N       insulin NPH (NOVOLIN N) 100 unit/mL injection Inject 40 units into the skin every morning and 18 units into the skin every evening in combination with Humalog.       lancets Misc Test daily (Patient taking differently: Test twice  daily) 100 each 12 Taking    losartan (COZAAR) 100 MG tablet 1 tab by mouth daily 30 tablet 6 8/25/2017 at night    torsemide (DEMADEX) 20 MG Tab Take 1 tablet (20 mg  total) by mouth once daily. 90 tablet 3 8/25/2017 at morning    trolamine salicylate (ASPERCREME) 10 % cream Apply topically as needed.       insulin detemir (LEVEMIR FLEXTOUCH) 100 unit/mL (3 mL) SubQ InPn pen Inject 48 units at night. 1 Box 6 Taking         Please insert appropriate  SmartPhrase below:

## 2017-08-28 NOTE — PROGRESS NOTES
[facility] Hospital Medicine  Progress Note    Patient Name: Sue Giordano  MRN: [id number]  Patient Class: IP- Inpatient   Admission Date: 8/25/2017  Length of Stay: 3 days  Attending Physician: Umesh Higgins MD  Primary Care Provider: Vale Howard MD    [facility] Medicine Team: [facility] MED 2 Brian Sawyer MD    Subjective:     Principal Problem:Cellulitis of leg, right    HPI:  Sister Pasquale is a 78 y/o lady with PMHx obesity, REJI, asthma, CKD3 (baseline Cr 1.0-1.3), uncontrolled HTN, DM2 (5/2017 A1c 8.3), CAD and chronic lower extremity lymphedema who is a direct admit from wound care clinic for right lower extremity cellulitis. 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   She was seen in the ED on the night of 8/20/17 and the wound was sutured. She states she was not given antibiotics at that time. Per recent discharge note, it was decided her lower extremity erythema was likely due to stasis dermatitis, because she did not have other signs of cellulitis (tenderness, warm to touch). Since being discharged on 8/20, the patient states her wound has become progressively more erythematous over this past week, however she denies having lower extremity pain. She was seen in wound care clinic this morning and sent here for IV antibiotics. The patient endorses finishing a 10 day course of doxycycline this week prescribed by her PCP for chronic cellulitis with no improvement in symptoms. She denies any recent fevers, chills, CP, n/v/d, or dysuria.     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 Patient was started on IV unasyn.   8/27 : Unasyn discontinue and Clindamycin started     Interval History:    No acute events overnight. Patient states that she feels as though her right leg is firmer to the touch than yesterday, but the swelling and pain has improved. Denies fever/chills, nausea, vomiting, SOB.     Review of Systems   Constitutional: Negative for chills and fever.   HENT: Negative for congestion and sore throat.    Eyes: Negative for photophobia and visual disturbance.   Respiratory: Negative for cough and chest tightness.    Cardiovascular: Positive for leg swelling. Negative for chest pain and palpitations.   Gastrointestinal: Positive for constipation. Negative for abdominal pain, diarrhea, nausea and vomiting.   Genitourinary: Negative for dysuria and hematuria.   Musculoskeletal: Negative for arthralgias and myalgias.   Skin: Positive for rash and wound.   Neurological: Negative for dizziness, light-headedness and headaches.     Objective:     Vital Signs (Most Recent):  Temp: 98.2 °F (36.8 °C) (08/28/17 1105)  Pulse: 66 (08/28/17 1105)  Resp: 17 (08/28/17 1105)  BP: (!) 123/58 (08/28/17 1105)  SpO2: 98 % (08/28/17 1105) Vital Signs (24h Range):  Temp:  [97.5 °F (36.4 °C)-98.2 °F (36.8 °C)] 98.2 °F (36.8 °C)  Pulse:  [63-76] 66  Resp:  [16-17] 17  SpO2:  [96 %-98 %] 98 %  BP: (110-157)/(58-67) 123/58     Weight: 122.5 kg (270 lb)  Body mass index is 52.73 kg/m².    Intake/Output Summary (Last 24 hours) at 08/28/17 1258  Last data filed at 08/28/17 0200   Gross per 24 hour   Intake             1010 ml   Output                0 ml   Net             1010 ml      Physical Exam   Constitutional: She is oriented to person, place, and time.   Cardiovascular: Normal rate, regular rhythm and normal heart sounds.  Exam reveals no gallop and no friction rub.    No murmur heard.  Bilateral radial pulses 2+   Pulmonary/Chest: Effort normal and breath sounds normal. No respiratory distress. She has no wheezes. She has no rales.   Abdominal: Soft. Bowel sounds are normal. She exhibits no distension. There is no  tenderness. There is no guarding.   Neurological: She is alert and oriented to person, place, and time.   Skin: Skin is warm. Capillary refill takes less than 2 seconds. Rash noted. There is erythema.   Sharply demarcated erythematous area of the right lower extremity. Mildly tender and warm to touch. Indurated today and appears swelling has improved.        Significant Labs:   Recent Results (from the past 24 hour(s))   POCT glucose    Collection Time: 08/27/17  4:42 PM   Result Value Ref Range    POCT Glucose 217 (H) 70 - 110 mg/dL   POCT glucose    Collection Time: 08/27/17  9:26 PM   Result Value Ref Range    POCT Glucose 235 (H) 70 - 110 mg/dL   Comprehensive Metabolic Panel (CMP)    Collection Time: 08/28/17  4:38 AM   Result Value Ref Range    Sodium 137 136 - 145 mmol/L    Potassium 4.2 3.5 - 5.1 mmol/L    Chloride 102 95 - 110 mmol/L    CO2 27 23 - 29 mmol/L    Glucose 181 (H) 70 - 110 mg/dL    BUN, Bld 21 8 - 23 mg/dL    Creatinine 1.2 0.5 - 1.4 mg/dL    Calcium 8.6 (L) 8.7 - 10.5 mg/dL    Total Protein 6.8 6.0 - 8.4 g/dL    Albumin 2.4 (L) 3.5 - 5.2 g/dL    Total Bilirubin 0.3 0.1 - 1.0 mg/dL    Alkaline Phosphatase 81 55 - 135 U/L    AST 14 10 - 40 U/L    ALT 14 10 - 44 U/L    Anion Gap 8 8 - 16 mmol/L    eGFR if African American 51.1 (A) >60 mL/min/1.73 m^2    eGFR if non  44.3 (A) >60 mL/min/1.73 m^2   POCT glucose    Collection Time: 08/28/17  7:43 AM   Result Value Ref Range    POCT Glucose 127 (H) 70 - 110 mg/dL   POCT glucose    Collection Time: 08/28/17 11:53 AM   Result Value Ref Range    POCT Glucose 131 (H) 70 - 110 mg/dL     Assessment/Plan:      Traumatic open wound of right lower leg    - Wound care consulted.   - Stitches removed and will be re-dressed.             Venous stasis dermatitis of both lower extremities    - Continue compression stockings.  - consult to wound care.           Morbid obesity due to excess calories    - PT/OT  - Diabetic Renal diet 2000 calories.            Coronary artery disease due to calcified coronary lesion    - Aspirin 81 mg QD  - Atorvastatin 80 mg QD.          Asthma in adult without complication    - Duo-nebs Q6H PRN for wheezing or SOB.  - Continue home fluticasone inhaler.           Essential hypertension    - still not well controlled. But she reports eating lot of salts. Changed her diet to be low salt diet   - Carvedilol 12.5 mg PO BID.   - Losartan 100 mg QD  - Torsemide 20 mg QD  - Hydralazine 25 mg PO q 6 hs PRN           Diastolic dysfunction: see ECHO 2016    - Carvedilol 6.25 mg PO BID.   - Losartan 100 mg QD  - Torsemide 20 mg QD          CKD (chronic kidney disease) stage 3, GFR 30-59 ml/min    - Will continue tight BP and glucose control.  - Daily BMPs          Type 2 diabetes mellitus with diabetic polyneuropathy, with long-term current use of insulin    - Will continue home insulin detemir 48 units QHS.  - SSI  - Will continue to monitor blood glucose.           Gastroesophageal reflux disease without esophagitis              Mixed hyperlipidemia    - atorvastatin 80 mg QD.           Obstructive sleep apnea syndrome    - Patient on CPAP at home   -  CPAP QHS.           * Cellulitis of leg, right    - Patient recently finished a 10 day course of doxycycline, and recently discontinued Unasyn.   - ESR 78 and CRP 4.6  - On day 2 of Clindamycin, will continue.   - Wound care consulted.   -Improving, however will need to remain in hospital for at least one more day to monitor further improvement.           VTE Risk Mitigation         Ordered     enoxaparin injection 40 mg  Daily     Route:  Subcutaneous        08/25/17 1532     Medium Risk of VTE  Once      08/25/17 1532        Dispo: Will continue hospitalization for antibiotic administration.       Brian Sawyer MD  Department of Hospital Medicine   [facility]

## 2017-08-29 LAB
ALBUMIN SERPL BCP-MCNC: 2.4 G/DL
ALP SERPL-CCNC: 76 U/L
ALT SERPL W/O P-5'-P-CCNC: 14 U/L
ANION GAP SERPL CALC-SCNC: 9 MMOL/L
AST SERPL-CCNC: 17 U/L
BILIRUB SERPL-MCNC: 0.2 MG/DL
BUN SERPL-MCNC: 23 MG/DL
CALCIUM SERPL-MCNC: 8.4 MG/DL
CHLORIDE SERPL-SCNC: 101 MMOL/L
CO2 SERPL-SCNC: 28 MMOL/L
CREAT SERPL-MCNC: 1.2 MG/DL
EST. GFR  (AFRICAN AMERICAN): 51.1 ML/MIN/1.73 M^2
EST. GFR  (NON AFRICAN AMERICAN): 44.3 ML/MIN/1.73 M^2
GLUCOSE SERPL-MCNC: 143 MG/DL
POCT GLUCOSE: 118 MG/DL (ref 70–110)
POCT GLUCOSE: 129 MG/DL (ref 70–110)
POCT GLUCOSE: 170 MG/DL (ref 70–110)
POCT GLUCOSE: 195 MG/DL (ref 70–110)
POTASSIUM SERPL-SCNC: 4 MMOL/L
PROT SERPL-MCNC: 6.6 G/DL
SODIUM SERPL-SCNC: 138 MMOL/L

## 2017-08-29 PROCEDURE — S0077 INJECTION, CLINDAMYCIN PHOSP: HCPCS | Performed by: INTERNAL MEDICINE

## 2017-08-29 PROCEDURE — 80053 COMPREHEN METABOLIC PANEL: CPT

## 2017-08-29 PROCEDURE — 25000003 PHARM REV CODE 250: Performed by: INTERNAL MEDICINE

## 2017-08-29 PROCEDURE — 11000001 HC ACUTE MED/SURG PRIVATE ROOM

## 2017-08-29 PROCEDURE — 25000003 PHARM REV CODE 250: Performed by: STUDENT IN AN ORGANIZED HEALTH CARE EDUCATION/TRAINING PROGRAM

## 2017-08-29 PROCEDURE — 36415 COLL VENOUS BLD VENIPUNCTURE: CPT

## 2017-08-29 PROCEDURE — 99232 SBSQ HOSP IP/OBS MODERATE 35: CPT | Mod: ,,, | Performed by: HOSPITALIST

## 2017-08-29 PROCEDURE — 63600175 PHARM REV CODE 636 W HCPCS: Performed by: STUDENT IN AN ORGANIZED HEALTH CARE EDUCATION/TRAINING PROGRAM

## 2017-08-29 RX ADMIN — TORSEMIDE 20 MG: 10 TABLET ORAL at 08:08

## 2017-08-29 RX ADMIN — FLUTICASONE PROPIONATE 2 SPRAY: 50 SPRAY, METERED NASAL at 08:08

## 2017-08-29 RX ADMIN — Medication 1 CAPSULE: at 08:08

## 2017-08-29 RX ADMIN — CLINDAMYCIN IN 5 PERCENT DEXTROSE 900 MG: 18 INJECTION, SOLUTION INTRAVENOUS at 09:08

## 2017-08-29 RX ADMIN — CARVEDILOL 12.5 MG: 12.5 TABLET, FILM COATED ORAL at 08:08

## 2017-08-29 RX ADMIN — CLINDAMYCIN IN 5 PERCENT DEXTROSE 900 MG: 18 INJECTION, SOLUTION INTRAVENOUS at 12:08

## 2017-08-29 RX ADMIN — LOSARTAN POTASSIUM 100 MG: 50 TABLET, FILM COATED ORAL at 08:08

## 2017-08-29 RX ADMIN — HYDRALAZINE HYDROCHLORIDE 25 MG: 25 TABLET, FILM COATED ORAL at 08:08

## 2017-08-29 RX ADMIN — CLINDAMYCIN IN 5 PERCENT DEXTROSE 900 MG: 18 INJECTION, SOLUTION INTRAVENOUS at 05:08

## 2017-08-29 RX ADMIN — ENOXAPARIN SODIUM 40 MG: 100 INJECTION SUBCUTANEOUS at 05:08

## 2017-08-29 RX ADMIN — ATORVASTATIN CALCIUM 80 MG: 20 TABLET, FILM COATED ORAL at 08:08

## 2017-08-29 RX ADMIN — ASPIRIN 81 MG CHEWABLE TABLET 81 MG: 81 TABLET CHEWABLE at 08:08

## 2017-08-29 RX ADMIN — INSULIN DETEMIR 48 UNITS: 100 INJECTION, SOLUTION SUBCUTANEOUS at 08:08

## 2017-08-29 NOTE — ASSESSMENT & PLAN NOTE
- Patient recently finished a 10 day course of doxycycline, and recently discontinued Unasyn.   - ESR 78 and CRP 4.6  - On day 3 of Clindamycin, will continue.   - Wound care consulted.   -Improving, however will need to remain in hospital for at least one more day to monitor further improvement.

## 2017-08-29 NOTE — PROGRESS NOTES
[facility] Hospital Medicine  Progress Note    Patient Name: Sue Giordano  MRN: [id number]  Patient Class: IP- Inpatient   Admission Date: 8/25/2017  Length of Stay: 4 days  Attending Physician: Alex Hector MD  Primary Care Provider: Vale Howard MD    [facility] Medicine Team: [facility] MED 2 Brian Sawyer MD    Subjective:     Principal Problem:Cellulitis of leg, right    HPI:  Sister Pasquale is a 78 y/o lady with PMHx obesity, REJI, asthma, CKD3 (baseline Cr 1.0-1.3), uncontrolled HTN, DM2 (5/2017 A1c 8.3), CAD and chronic lower extremity lymphedema who is a direct admit from wound care clinic for right lower extremity cellulitis. 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   She was seen in the ED on the night of 8/20/17 and the wound was sutured. She states she was not given antibiotics at that time. Per recent discharge note, it was decided her lower extremity erythema was likely due to stasis dermatitis, because she did not have other signs of cellulitis (tenderness, warm to touch). Since being discharged on 8/20, the patient states her wound has become progressively more erythematous over this past week, however she denies having lower extremity pain. She was seen in wound care clinic this morning and sent here for IV antibiotics. The patient endorses finishing a 10 day course of doxycycline this week prescribed by her PCP for chronic cellulitis with no improvement in symptoms. She denies any recent fevers, chills, CP, n/v/d, or dysuria.     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 Patient was started on IV unasyn.   8/27 : Unasyn discontinue and Clindamycin started     Interval  History: NAEON. Patient states that she feels fine this morning. Denies Fever/chills, chest pain, headache, SOB. Blood pressure has been well controlled in last 24 hrs.      Review of Systems   Constitutional: Negative for chills and fever.   HENT: Negative for congestion and sore throat.    Eyes: Negative for photophobia and visual disturbance.   Respiratory: Negative for cough and chest tightness.    Cardiovascular: Positive for leg swelling. Negative for chest pain and palpitations.   Gastrointestinal: Negative for abdominal pain, constipation, diarrhea, nausea and vomiting.   Genitourinary: Negative for dysuria and hematuria.   Musculoskeletal: Negative for arthralgias and myalgias.   Skin: Positive for rash and wound.   Neurological: Negative for dizziness, light-headedness and headaches.     Objective:     Vital Signs (Most Recent):  Temp: 98 °F (36.7 °C) (08/29/17 0821)  Pulse: 66 (08/29/17 0821)  Resp: 18 (08/29/17 0821)  BP: (!) 109/52 (08/29/17 0821)  SpO2: 96 % (08/29/17 0821) Vital Signs (24h Range):  Temp:  [97.2 °F (36.2 °C)-98.2 °F (36.8 °C)] 98 °F (36.7 °C)  Pulse:  [64-73] 66  Resp:  [17-18] 18  SpO2:  [96 %-99 %] 96 %  BP: (109-144)/(52-63) 109/52     Weight: 122.5 kg (270 lb)  Body mass index is 52.73 kg/m².    Intake/Output Summary (Last 24 hours) at 08/29/17 0855  Last data filed at 08/29/17 0600   Gross per 24 hour   Intake               50 ml   Output                0 ml   Net               50 ml      Physical Exam   Constitutional: She is oriented to person, place, and time. She appears well-developed and well-nourished. No distress.   Cardiovascular: Normal rate, regular rhythm and normal heart sounds.  Exam reveals no gallop and no friction rub.    No murmur heard.  Bilateral radial pulses 2+   Pulmonary/Chest: Effort normal and breath sounds normal. No respiratory distress. She has no wheezes. She has no rales.   Abdominal: Soft. Bowel sounds are normal. She exhibits no distension. There  is no tenderness. There is no guarding.   Neurological: She is alert and oriented to person, place, and time.   Skin: Skin is warm. Capillary refill takes less than 2 seconds. Rash noted. She is not diaphoretic. There is erythema.   Sharply demarcated erythematous area of the right lower extremity. Mildly tender and warm to the touch. Indurated today and appears swelling has improved. Still some serous drainage, but no purulence.    Vitals reviewed.      Significant Labs:   Recent Lab Results       08/29/17  0818 08/29/17  0411 08/28/17  2201 08/28/17  1736 08/28/17  1153      Albumin  2.4(L)        Alkaline Phosphatase  76        ALT  14        Anion Gap  9        AST  17        Total Bilirubin  0.2  Comment:  For infants and newborns, interpretation of results should be based  on gestational age, weight and in agreement with clinical  observations.  Premature Infant recommended reference ranges:  Up to 24 hours.............<8.0 mg/dL  Up to 48 hours............<12.0 mg/dL  3-5 days..................<15.0 mg/dL  6-29 days.................<15.0 mg/dL          BUN, Bld  23        Calcium  8.4(L)        Chloride  101        CO2  28        Creatinine  1.2        eGFR if   51.1(A)        eGFR if non   44.3  Comment:  Calculation used to obtain the estimated glomerular filtration  rate (eGFR) is the CKD-EPI equation. Since race is unknown   in our information system, the eGFR values for   -American and Non--American patients are given   for each creatinine result.  (A)        Glucose  143(H)        POCT Glucose 129(H)  201(H) 124(H) 131(H)     Potassium  4.0        Total Protein  6.6        Sodium  138                      Assessment/Plan:      * Cellulitis of leg, right    - Patient recently finished a 10 day course of doxycycline, and recently discontinued Unasyn.   - ESR 78 and CRP 4.6  - On day 3 of Clindamycin, will continue.   - Wound care consulted.   -Improving,  however will need to remain in hospital for at least one more day to monitor further improvement.         Essential hypertension    - still not well controlled. But she reports eating lot of salts. Changed her diet to be low salt diet   - Carvedilol 12.5 mg PO BID.   - Losartan 100 mg QD  - Torsemide 20 mg QD  -Hydralazine 25 BID          Cellulitis              Traumatic open wound of right lower leg    - Wound care consulted.   - Stitches removed and will be re-dressed.             Venous stasis dermatitis of both lower extremities    - Continue compression stockings.  - consult to wound care.           Morbid obesity due to excess calories    - PT/OT  - Diabetic Renal diet 2000 calories.           Coronary artery disease due to calcified coronary lesion    - Aspirin 81 mg QD  - Atorvastatin 80 mg QD.          Asthma in adult without complication    - Duo-nebs Q6H PRN for wheezing or SOB.  - Continue home fluticasone inhaler.           Diastolic dysfunction: see ECHO 2016    - Carvedilol 12.5 mg PO BID.   - Losartan 100 mg QD  - Torsemide 20 mg QD  -Hydralazine 25 BID          CKD (chronic kidney disease) stage 3, GFR 30-59 ml/min    - Will continue tight BP and glucose control.  - Daily BMPs          Type 2 diabetes mellitus with diabetic polyneuropathy, with long-term current use of insulin    - Will continue home insulin detemir 48 units QHS.  - low dose SSI  - Will continue to monitor blood glucose.           Gastroesophageal reflux disease without esophagitis              Mixed hyperlipidemia    - atorvastatin 80 mg QD.           Obstructive sleep apnea syndrome    - Patient on CPAP at home   -  CPAP QHS.             VTE Risk Mitigation         Ordered     enoxaparin injection 40 mg  Daily     Route:  Subcutaneous        08/25/17 1532     Medium Risk of VTE  Once      08/25/17 1532            Brian Sawyer MD  Department of Hospital Medicine   [facility]

## 2017-08-29 NOTE — SUBJECTIVE & OBJECTIVE
Interval History: NAEON. Patient states that she feels fine this morning. Denies Fever/chills, chest pain, headache, SOB. Blood pressure has been well controlled in last 24 hrs.      Review of Systems   Constitutional: Negative for chills and fever.   HENT: Negative for congestion and sore throat.    Eyes: Negative for photophobia and visual disturbance.   Respiratory: Negative for cough and chest tightness.    Cardiovascular: Positive for leg swelling. Negative for chest pain and palpitations.   Gastrointestinal: Negative for abdominal pain, constipation, diarrhea, nausea and vomiting.   Genitourinary: Negative for dysuria and hematuria.   Musculoskeletal: Negative for arthralgias and myalgias.   Skin: Positive for rash and wound.   Neurological: Negative for dizziness, light-headedness and headaches.     Objective:     Vital Signs (Most Recent):  Temp: 98 °F (36.7 °C) (08/29/17 0821)  Pulse: 66 (08/29/17 0821)  Resp: 18 (08/29/17 0821)  BP: (!) 109/52 (08/29/17 0821)  SpO2: 96 % (08/29/17 0821) Vital Signs (24h Range):  Temp:  [97.2 °F (36.2 °C)-98.2 °F (36.8 °C)] 98 °F (36.7 °C)  Pulse:  [64-73] 66  Resp:  [17-18] 18  SpO2:  [96 %-99 %] 96 %  BP: (109-144)/(52-63) 109/52     Weight: 122.5 kg (270 lb)  Body mass index is 52.73 kg/m².    Intake/Output Summary (Last 24 hours) at 08/29/17 0855  Last data filed at 08/29/17 0600   Gross per 24 hour   Intake               50 ml   Output                0 ml   Net               50 ml      Physical Exam   Constitutional: She is oriented to person, place, and time. She appears well-developed and well-nourished. No distress.   Cardiovascular: Normal rate, regular rhythm and normal heart sounds.  Exam reveals no gallop and no friction rub.    No murmur heard.  Bilateral radial pulses 2+   Pulmonary/Chest: Effort normal and breath sounds normal. No respiratory distress. She has no wheezes. She has no rales.   Abdominal: Soft. Bowel sounds are normal. She exhibits no  distension. There is no tenderness. There is no guarding.   Neurological: She is alert and oriented to person, place, and time.   Skin: Skin is warm. Capillary refill takes less than 2 seconds. Rash noted. She is not diaphoretic. There is erythema.   Sharply demarcated erythematous area of the right lower extremity. Mildly tender and warm to the touch. Indurated today and appears swelling has improved. Still some serous drainage, but no purulence.    Vitals reviewed.      Significant Labs:   Recent Lab Results       08/29/17  0818 08/29/17  0411 08/28/17  2201 08/28/17  1736 08/28/17  1153      Albumin  2.4(L)        Alkaline Phosphatase  76        ALT  14        Anion Gap  9        AST  17        Total Bilirubin  0.2  Comment:  For infants and newborns, interpretation of results should be based  on gestational age, weight and in agreement with clinical  observations.  Premature Infant recommended reference ranges:  Up to 24 hours.............<8.0 mg/dL  Up to 48 hours............<12.0 mg/dL  3-5 days..................<15.0 mg/dL  6-29 days.................<15.0 mg/dL          BUN, Bld  23        Calcium  8.4(L)        Chloride  101        CO2  28        Creatinine  1.2        eGFR if   51.1(A)        eGFR if non   44.3  Comment:  Calculation used to obtain the estimated glomerular filtration  rate (eGFR) is the CKD-EPI equation. Since race is unknown   in our information system, the eGFR values for   -American and Non--American patients are given   for each creatinine result.  (A)        Glucose  143(H)        POCT Glucose 129(H)  201(H) 124(H) 131(H)     Potassium  4.0        Total Protein  6.6        Sodium  138

## 2017-08-29 NOTE — PHYSICIAN QUERY
"PT Name: Sue Giordano  MR #: [id number]    Physician Query Form - Heart  Condition Clarification     CDS/: Letha Varner RN             Contact information:  [phone] form is a permanent document in the medical record.     Query Date: August 29, 2017    By submitting this query, we are merely seeking further clarification of documentation. Please utilize your independent clinical judgment when addressing the question(s) below.    The medical record contains the following   Indicators     Supporting Clinical Findings Location in Medical Record    BNP     x EF EF  60-65%   8/25 [facility]    Radiology findings     x Echo Results Diastolic dysfunction:  See echo 2016    2D echo on 8/23: Normal left ventricular systolic function (EF 60-65%).  Impaired LV relaxation, elevated LAP (grade 2 diastolic dysfunction). Pulmonary hypertension. PA systolic  pressure is 41 mmHg.    8/25 [facility]    "Ascites" documented      "SOB" or "LAYNE" documented      "Hypoxia" documented      Heart Failure documented     x "Edema" documented Patient has chronic bilateral lower extremity edema and chronic cellulitis    BLE firm and edematous      8/25 Hospital med note      8/28 RN note   x Diuretics/Meds - Carvedilol 6.25 mg PO BID.   - Losartan 100 mg QD   - Torsemide 20 mg QD        8/25 [facility]   x Treatment: Diet: diabetic 2000calorie, Laura, Low sodium @gm diet 8/28 NSG orders      Other:          Provider, please specify diagnosis or diagnoses associated with above clinical findings.                                   [X  ] Chronic Diastolic Heart Failure (EF > 40)*    [  ] Other Type of Heart Failure (please specify type): _________________________    [  ] Heart Failure Ruled Out    [  ] Other (please specify): ___________________________________    [  ] Clinically Undetermined            *American [employer] Association                                                                                                          Please document " in your progress notes daily for the duration of treatment until resolved and include in your discharge summary.

## 2017-08-29 NOTE — ASSESSMENT & PLAN NOTE
- still not well controlled. But she reports eating lot of salts. Changed her diet to be low salt diet   - Carvedilol 12.5 mg PO BID.   - Losartan 100 mg QD  - Torsemide 20 mg QD  -Hydralazine 25 BID

## 2017-08-29 NOTE — PLAN OF CARE
Problem: Patient Care Overview  Goal: Plan of Care Review  Outcome: Ongoing (interventions implemented as appropriate)  Pt A&O x 4.  VSS on room air. No complaints of pain. R leg dressing dry, intact.  No drainage.  Legs elevated on arm chair.  Pt slept in between care on arm chair.  Pt received IV clindamycin.  Fall risk precautions in place - pt close to toilet, call light within reach, frequent rounding. HS glucose 201.  Supplemental aspart given.  Will continue to monitor pt.

## 2017-08-29 NOTE — ASSESSMENT & PLAN NOTE
- Will continue home insulin detemir 48 units QHS.  - low dose SSI  - Will continue to monitor blood glucose.

## 2017-08-30 ENCOUNTER — TELEPHONE (OUTPATIENT)
Dept: WOUND CARE | Facility: CLINIC | Age: 77
End: 2017-08-30

## 2017-08-30 VITALS
OXYGEN SATURATION: 99 % | HEART RATE: 69 BPM | HEIGHT: 60 IN | BODY MASS INDEX: 53.01 KG/M2 | SYSTOLIC BLOOD PRESSURE: 121 MMHG | TEMPERATURE: 98 F | RESPIRATION RATE: 18 BRPM | WEIGHT: 270 LBS | DIASTOLIC BLOOD PRESSURE: 56 MMHG

## 2017-08-30 LAB
ALBUMIN SERPL BCP-MCNC: 2.5 G/DL
ALP SERPL-CCNC: 76 U/L
ALT SERPL W/O P-5'-P-CCNC: 16 U/L
ANION GAP SERPL CALC-SCNC: 10 MMOL/L
AST SERPL-CCNC: 20 U/L
BILIRUB SERPL-MCNC: 0.4 MG/DL
BUN SERPL-MCNC: 29 MG/DL
CALCIUM SERPL-MCNC: 8.4 MG/DL
CHLORIDE SERPL-SCNC: 105 MMOL/L
CO2 SERPL-SCNC: 25 MMOL/L
CREAT SERPL-MCNC: 1.2 MG/DL
EST. GFR  (AFRICAN AMERICAN): 51.1 ML/MIN/1.73 M^2
EST. GFR  (NON AFRICAN AMERICAN): 44.3 ML/MIN/1.73 M^2
GLUCOSE SERPL-MCNC: 111 MG/DL
POCT GLUCOSE: 142 MG/DL (ref 70–110)
POCT GLUCOSE: 87 MG/DL (ref 70–110)
POTASSIUM SERPL-SCNC: 4.2 MMOL/L
PROT SERPL-MCNC: 6.7 G/DL
SODIUM SERPL-SCNC: 140 MMOL/L

## 2017-08-30 PROCEDURE — S0077 INJECTION, CLINDAMYCIN PHOSP: HCPCS | Performed by: INTERNAL MEDICINE

## 2017-08-30 PROCEDURE — 25000003 PHARM REV CODE 250: Performed by: STUDENT IN AN ORGANIZED HEALTH CARE EDUCATION/TRAINING PROGRAM

## 2017-08-30 PROCEDURE — 36415 COLL VENOUS BLD VENIPUNCTURE: CPT

## 2017-08-30 PROCEDURE — 99238 HOSP IP/OBS DSCHRG MGMT 30/<: CPT | Mod: ,,, | Performed by: HOSPITALIST

## 2017-08-30 PROCEDURE — 80053 COMPREHEN METABOLIC PANEL: CPT

## 2017-08-30 PROCEDURE — 25000003 PHARM REV CODE 250: Performed by: INTERNAL MEDICINE

## 2017-08-30 PROCEDURE — 25000003 PHARM REV CODE 250: Performed by: HOSPITALIST

## 2017-08-30 RX ORDER — ALUMINUM HYDROXIDE, MAGNESIUM HYDROXIDE, AND SIMETHICONE 2400; 240; 2400 MG/30ML; MG/30ML; MG/30ML
30 SUSPENSION ORAL EVERY 6 HOURS
Status: DISCONTINUED | OUTPATIENT
Start: 2017-08-30 | End: 2017-08-30 | Stop reason: HOSPADM

## 2017-08-30 RX ORDER — CLINDAMYCIN HYDROCHLORIDE 150 MG/1
450 CAPSULE ORAL EVERY 6 HOURS
Status: DISCONTINUED | OUTPATIENT
Start: 2017-08-30 | End: 2017-08-30 | Stop reason: HOSPADM

## 2017-08-30 RX ORDER — CARVEDILOL 12.5 MG/1
12.5 TABLET ORAL 2 TIMES DAILY
Qty: 60 TABLET | Refills: 11 | Status: SHIPPED | OUTPATIENT
Start: 2017-08-30 | End: 2017-11-02

## 2017-08-30 RX ORDER — ALUMINUM HYDROXIDE, MAGNESIUM HYDROXIDE, AND SIMETHICONE 2400; 240; 2400 MG/30ML; MG/30ML; MG/30ML
30 SUSPENSION ORAL EVERY 6 HOURS
Refills: 0 | COMMUNITY
Start: 2017-08-30 | End: 2019-01-10 | Stop reason: ALTCHOICE

## 2017-08-30 RX ORDER — HYDRALAZINE HYDROCHLORIDE 25 MG/1
25 TABLET, FILM COATED ORAL EVERY 12 HOURS
Qty: 60 TABLET | Refills: 11 | Status: SHIPPED | OUTPATIENT
Start: 2017-08-30 | End: 2017-11-02 | Stop reason: DRUGHIGH

## 2017-08-30 RX ORDER — CLINDAMYCIN HYDROCHLORIDE 150 MG/1
450 CAPSULE ORAL EVERY 6 HOURS
Qty: 66 CAPSULE | Refills: 0 | Status: SHIPPED | OUTPATIENT
Start: 2017-08-30 | End: 2017-09-08 | Stop reason: SDUPTHER

## 2017-08-30 RX ORDER — DULOXETIN HYDROCHLORIDE 30 MG/1
30 CAPSULE, DELAYED RELEASE ORAL 2 TIMES DAILY
Status: DISCONTINUED | OUTPATIENT
Start: 2017-08-30 | End: 2017-08-30 | Stop reason: HOSPADM

## 2017-08-30 RX ADMIN — LOSARTAN POTASSIUM 100 MG: 50 TABLET, FILM COATED ORAL at 09:08

## 2017-08-30 RX ADMIN — ALUMINUM HYDROXIDE, MAGNESIUM HYDROXIDE, AND DIMETHICONE 30 ML: 400; 400; 40 SUSPENSION ORAL at 11:08

## 2017-08-30 RX ADMIN — DULOXETINE 30 MG: 30 CAPSULE, DELAYED RELEASE ORAL at 11:08

## 2017-08-30 RX ADMIN — ASPIRIN 81 MG CHEWABLE TABLET 81 MG: 81 TABLET CHEWABLE at 09:08

## 2017-08-30 RX ADMIN — TORSEMIDE 20 MG: 10 TABLET ORAL at 09:08

## 2017-08-30 RX ADMIN — CARVEDILOL 12.5 MG: 12.5 TABLET, FILM COATED ORAL at 09:08

## 2017-08-30 RX ADMIN — CLINDAMYCIN HYDROCHLORIDE 450 MG: 150 CAPSULE ORAL at 05:08

## 2017-08-30 RX ADMIN — Medication 1 CAPSULE: at 09:08

## 2017-08-30 RX ADMIN — HYDRALAZINE HYDROCHLORIDE 25 MG: 25 TABLET, FILM COATED ORAL at 02:08

## 2017-08-30 RX ADMIN — CLINDAMYCIN IN 5 PERCENT DEXTROSE 900 MG: 18 INJECTION, SOLUTION INTRAVENOUS at 01:08

## 2017-08-30 RX ADMIN — ATORVASTATIN CALCIUM 80 MG: 20 TABLET, FILM COATED ORAL at 09:08

## 2017-08-30 RX ADMIN — CLINDAMYCIN HYDROCHLORIDE 450 MG: 150 CAPSULE ORAL at 11:08

## 2017-08-30 RX ADMIN — SODIUM CHLORIDE 500 ML: 0.9 INJECTION, SOLUTION INTRAVENOUS at 09:08

## 2017-08-30 NOTE — PLAN OF CARE
Problem: Patient Care Overview  Goal: Plan of Care Review  Outcome: Ongoing (interventions implemented as appropriate)  Pt A&O x 4.  VSS on room air.  No complaints of pain.  R lower leg dressing clean, dry, intact.  22 G PIV placed in L forearm.  IV clindamycin given.  HS glucose 195.  No supplemental aspart insulin needed.  Pt urinated x 1.  Up to toilet.  No BM this shift.  Will continue to monitor pt.

## 2017-08-30 NOTE — PROGRESS NOTES
[facility] Hospital Medicine  Progress Note    Patient Name: Sue Giordano  MRN: [id number]  Patient Class: IP- Inpatient   Admission Date: 8/25/2017  Length of Stay: 5 days  Attending Physician: Alex Hector MD  Primary Care Provider: Vale Howard MD    [facility] Medicine Team: [facility] 2 Brian Sawyer MD    Subjective:     Principal Problem:Cellulitis of leg, right    HPI:  Sister Pasquale is a 78 y/o lady with PMHx obesity, REJI, asthma, CKD3 (baseline Cr 1.0-1.3), uncontrolled HTN, DM2 (5/2017 A1c 8.3), CAD and chronic lower extremity lymphedema who is a direct admit from wound care clinic for right lower extremity cellulitis. 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   She was seen in the ED on the night of 8/20/17 and the wound was sutured. She states she was not given antibiotics at that time. Per recent discharge note, it was decided her lower extremity erythema was likely due to stasis dermatitis, because she did not have other signs of cellulitis (tenderness, warm to touch). Since being discharged on 8/20, the patient states her wound has become progressively more erythematous over this past week, however she denies having lower extremity pain. She was seen in wound care clinic this morning and sent here for IV antibiotics. The patient endorses finishing a 10 day course of doxycycline this week prescribed by her PCP for chronic cellulitis with no improvement in symptoms. She denies any recent fevers, chills, CP, n/v/d, or dysuria.     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 Patient was started on IV unasyn.   8/27 : Unasyn discontinue and Clindamycin started     Interval  History: NAEON. Patient states that she feels fine this morning, but expereinces intermittent stabbing pain to area of cellulitis. Denies Fever/chills, chest pain, headache, SOB. Blood pressure has been well controlled in last 24 hrs.      Review of Systems   Constitutional: Negative for chills and fever.   HENT: Negative for congestion and sore throat.    Eyes: Negative for photophobia and visual disturbance.   Respiratory: Negative for cough and chest tightness.    Cardiovascular: Positive for leg swelling. Negative for chest pain and palpitations.   Gastrointestinal: Negative for abdominal pain, constipation, diarrhea, nausea and vomiting.   Genitourinary: Negative for dysuria and hematuria.   Musculoskeletal: Negative for arthralgias and myalgias.   Skin: Positive for rash and wound.   Neurological: Negative for dizziness, light-headedness and headaches.     Objective:     Vital Signs (Most Recent):  Temp: 97.5 °F (36.4 °C) (08/30/17 0728)  Pulse: 67 (08/30/17 0728)  Resp: 18 (08/30/17 0728)  BP: 116/69 (08/30/17 0728)  SpO2: 96 % (08/30/17 0728) Vital Signs (24h Range):  Temp:  [97.4 °F (36.3 °C)-98.5 °F (36.9 °C)] 97.5 °F (36.4 °C)  Pulse:  [60-92] 67  Resp:  [16-19] 18  SpO2:  [96 %-98 %] 96 %  BP: (109-140)/(53-69) 116/69     Weight: 122.5 kg (270 lb)  Body mass index is 52.73 kg/m².    Intake/Output Summary (Last 24 hours) at 08/30/17 0902  Last data filed at 08/30/17 0600   Gross per 24 hour   Intake             2090 ml   Output             2001 ml   Net               89 ml      Physical Exam   Constitutional: She is oriented to person, place, and time. She appears well-developed and well-nourished. No distress.   Cardiovascular: Normal rate, regular rhythm and normal heart sounds.  Exam reveals no gallop and no friction rub.    No murmur heard.  Bilateral radial pulses 2+   Pulmonary/Chest: Effort normal and breath sounds normal. No respiratory distress. She has no wheezes. She has no rales.   Abdominal:  Soft. Bowel sounds are normal. She exhibits no distension. There is no tenderness. There is no guarding.   Neurological: She is alert and oriented to person, place, and time.   Skin: Skin is warm. Capillary refill takes less than 2 seconds. Rash noted. She is not diaphoretic. There is erythema.   Sharply demarcated erythematous area of the right lower extremity. Mildly tender and warm to the touch. Indurated today and appears swelling has improved. Still some serous drainage, but no purulence. Improving slowly.    Vitals reviewed.      Significant Labs:   Recent Lab Results       08/30/17  0558 08/29/17  2052 08/29/17  1711 08/29/17  1111      Albumin 2.5(L)        Alkaline Phosphatase 76        ALT 16        Anion Gap 10        AST 20        Total Bilirubin 0.4  Comment:  For infants and newborns, interpretation of results should be based  on gestational age, weight and in agreement with clinical  observations.  Premature Infant recommended reference ranges:  Up to 24 hours.............<8.0 mg/dL  Up to 48 hours............<12.0 mg/dL  3-5 days..................<15.0 mg/dL  6-29 days.................<15.0 mg/dL          BUN, Bld 29(H)        Calcium 8.4(L)        Chloride 105        CO2 25        Creatinine 1.2        eGFR if  51.1(A)        eGFR if non  44.3  Comment:  Calculation used to obtain the estimated glomerular filtration  rate (eGFR) is the CKD-EPI equation. Since race is unknown   in our information system, the eGFR values for   -American and Non--American patients are given   for each creatinine result.  (A)        Glucose 111(H)        POCT Glucose  195(H) 170(H) 118(H)     Potassium 4.2        Total Protein 6.7        Sodium 140            Assessment/Plan:      * Cellulitis of leg, right    - Patient recently finished a 10 day course of doxycycline, and recently discontinued Unasyn.   - ESR 78 and CRP 4.6  - On day 4 of IV Clindamycin, will switch to PO  clindamycin for a 10 day total course.           Essential hypertension    - Carvedilol 12.5 mg PO BID.   - Losartan 100 mg QD  - Torsemide 20 mg QD  -Hydralazine 25 BID          Cellulitis              Traumatic open wound of right lower leg    - Wound care consulted.   - Stitches removed and will be re-dressed.             Venous stasis dermatitis of both lower extremities    - Continue compression stockings.  - consult to wound care.           Morbid obesity due to excess calories    - PT/OT  - Diabetic Renal diet 2000 calories.           Coronary artery disease due to calcified coronary lesion    - Aspirin 81 mg QD  - Atorvastatin 80 mg QD.          Asthma in adult without complication    - Duo-nebs Q6H PRN for wheezing or SOB.  - Continue home fluticasone inhaler.           Diastolic dysfunction: see ECHO 2016    - Carvedilol 12.5 mg PO BID.   - Losartan 100 mg QD  - Torsemide 20 mg QD  -Hydralazine 25 BID          CKD (chronic kidney disease) stage 3, GFR 30-59 ml/min    - Will continue tight BP and glucose control.  - Daily BMPs          Type 2 diabetes mellitus with diabetic polyneuropathy, with long-term current use of insulin    - Will continue home insulin detemir 48 units QHS.  - low dose SSI  - Will continue to monitor blood glucose.   - Will start duloxetine 30 BID for neuropathic pain.           Gastroesophageal reflux disease without esophagitis              Mixed hyperlipidemia    - atorvastatin 80 mg QD.           Obstructive sleep apnea syndrome    - Patient on CPAP at home   -  CPAP QHS.             VTE Risk Mitigation         Ordered     enoxaparin injection 40 mg  Daily     Route:  Subcutaneous        08/25/17 1532     Medium Risk of VTE  Once      08/25/17 1532        Dispo: Patient will be discharged on PO clindamycin 150 4x/day for a total 10 day course since start of IV clindamycin.     Brian Sawyer MD  Department of Hospital Medicine   [facility]

## 2017-08-30 NOTE — PLAN OF CARE
Problem: Diabetes, Type 2 (Adult)  Goal: Signs and Symptoms of Listed Potential Problems Will be Absent, Minimized or Managed (Diabetes, Type 2)  Signs and symptoms of listed potential problems will be absent, minimized or managed by discharge/transition of care (reference Diabetes, Type 2 (Adult) CPG).   Outcome: Ongoing (interventions implemented as appropriate)  Pt blood sugars stable, no insulin coverage required this shift.

## 2017-08-30 NOTE — ASSESSMENT & PLAN NOTE
- Will continue home insulin detemir 48 units QHS.  - low dose SSI  - Will continue to monitor blood glucose.   - Will start duloxetine 30 BID for neuropathic pain.

## 2017-08-30 NOTE — PROGRESS NOTES
Pharmacist Intervention IV to PO Note    Sis Duran Giordano is a 77 y.o. female being treated with IV medication Clindamycin    Patient Data:    Vital Signs (Most Recent):  Temp: 97.5 °F (36.4 °C) (08/30/17 0728)  Pulse: 67 (08/30/17 0728)  Resp: 18 (08/30/17 0728)  BP: 116/69 (08/30/17 0728)  SpO2: 96 % (08/30/17 0728) Vital Signs (72h Range):  Temp:  [97.2 °F (36.2 °C)-98.5 °F (36.9 °C)]   Pulse:  [60-92]   Resp:  [16-19]   BP: (109-157)/(52-69)   SpO2:  [95 %-99 %]      CBC:    Recent Labs     Lab 08/25/17  1718 08/26/17  0629   WBC 7.83 7.71   RBC 3.96* 4.15   HGB 10.9* 11.7*   HCT 35.0* 38.0   * 198   MCV 88 92   MCH 27.5 28.2   MCHC 31.1* 30.8*     CMP:     Recent Labs     Lab 08/28/17  0438 08/29/17  0411 08/30/17  0558   * 143* 111*   CALCIUM 8.6* 8.4* 8.4*   ALBUMIN 2.4* 2.4* 2.5*   PROT 6.8 6.6 6.7    138 140   K 4.2 4.0 4.2   CO2 27 28 25    101 105   BUN 21 23 29*   CREATININE 1.2 1.2 1.2   ALKPHOS 81 76 76   ALT 14 14 16   AST 14 17 20   BILITOT 0.3 0.2 0.4       Dietary Orders:  Diet Orders            Diet Diabetic 2000 Calories Renal, Low Sodium,2gm: Diabetic 2000 starting at 08/27 0959            Based on the following criteria, this patient qualifies for intravenous to oral conversion:  [x] The patients gastrointestinal tract is functioning (tolerating medications via oral or enteral route for 24 hours and tolerating food or enteral feeds for 24 hours).  [x] The patient is hemodynamically stable for 24 hours (heart rate <100 beats per minute, systolic blood pressure >99 mm Hg, and respiratory rate <20 breaths per minute).  [x] The patient shows clinical improvement (afebrile for at least 24 hours and white blood cell count downtrending or normalized). Additionally, the patient must be non-neutropenic (absolute neutrophil count >500 cells/mm3).  [x] For antimicrobials, the patient has received IV therapy for at least 24 hours.    IV medication Clindamycin 900 mg q8h will  be changed to Clindamycin 450 mg q6h PO    Pharmacist's Name: KIMBERLY HAHN  Pharmacist's Extension: [phone]

## 2017-08-30 NOTE — SUBJECTIVE & OBJECTIVE
Interval History: NAEON. Patient states that she feels fine this morning, but expereinces intermittent stabbing pain to area of cellulitis. Denies Fever/chills, chest pain, headache, SOB. Blood pressure has been well controlled in last 24 hrs.      Review of Systems   Constitutional: Negative for chills and fever.   HENT: Negative for congestion and sore throat.    Eyes: Negative for photophobia and visual disturbance.   Respiratory: Negative for cough and chest tightness.    Cardiovascular: Positive for leg swelling. Negative for chest pain and palpitations.   Gastrointestinal: Negative for abdominal pain, constipation, diarrhea, nausea and vomiting.   Genitourinary: Negative for dysuria and hematuria.   Musculoskeletal: Negative for arthralgias and myalgias.   Skin: Positive for rash and wound.   Neurological: Negative for dizziness, light-headedness and headaches.     Objective:     Vital Signs (Most Recent):  Temp: 97.5 °F (36.4 °C) (08/30/17 0728)  Pulse: 67 (08/30/17 0728)  Resp: 18 (08/30/17 0728)  BP: 116/69 (08/30/17 0728)  SpO2: 96 % (08/30/17 0728) Vital Signs (24h Range):  Temp:  [97.4 °F (36.3 °C)-98.5 °F (36.9 °C)] 97.5 °F (36.4 °C)  Pulse:  [60-92] 67  Resp:  [16-19] 18  SpO2:  [96 %-98 %] 96 %  BP: (109-140)/(53-69) 116/69     Weight: 122.5 kg (270 lb)  Body mass index is 52.73 kg/m².    Intake/Output Summary (Last 24 hours) at 08/30/17 0902  Last data filed at 08/30/17 0600   Gross per 24 hour   Intake             2090 ml   Output             2001 ml   Net               89 ml      Physical Exam   Constitutional: She is oriented to person, place, and time. She appears well-developed and well-nourished. No distress.   Cardiovascular: Normal rate, regular rhythm and normal heart sounds.  Exam reveals no gallop and no friction rub.    No murmur heard.  Bilateral radial pulses 2+   Pulmonary/Chest: Effort normal and breath sounds normal. No respiratory distress. She has no wheezes. She has no rales.    Abdominal: Soft. Bowel sounds are normal. She exhibits no distension. There is no tenderness. There is no guarding.   Neurological: She is alert and oriented to person, place, and time.   Skin: Skin is warm. Capillary refill takes less than 2 seconds. Rash noted. She is not diaphoretic. There is erythema.   Sharply demarcated erythematous area of the right lower extremity. Mildly tender and warm to the touch. Indurated today and appears swelling has improved. Still some serous drainage, but no purulence. Improving slowly.    Vitals reviewed.      Significant Labs:   Recent Lab Results       08/30/17  0558 08/29/17  2052 08/29/17  1711 08/29/17  1111      Albumin 2.5(L)        Alkaline Phosphatase 76        ALT 16        Anion Gap 10        AST 20        Total Bilirubin 0.4  Comment:  For infants and newborns, interpretation of results should be based  on gestational age, weight and in agreement with clinical  observations.  Premature Infant recommended reference ranges:  Up to 24 hours.............<8.0 mg/dL  Up to 48 hours............<12.0 mg/dL  3-5 days..................<15.0 mg/dL  6-29 days.................<15.0 mg/dL          BUN, Bld 29(H)        Calcium 8.4(L)        Chloride 105        CO2 25        Creatinine 1.2        eGFR if  51.1(A)        eGFR if non  44.3  Comment:  Calculation used to obtain the estimated glomerular filtration  rate (eGFR) is the CKD-EPI equation. Since race is unknown   in our information system, the eGFR values for   -American and Non--American patients are given   for each creatinine result.  (A)        Glucose 111(H)        POCT Glucose  195(H) 170(H) 118(H)     Potassium 4.2        Total Protein 6.7        Sodium 140

## 2017-08-30 NOTE — PROGRESS NOTES
D/c instructions reviewed and given. Pt verbalized understanding. PIV removed with catheter intact. Pt waiting at bedside for family to arrive will notify nurse when ready for transport

## 2017-08-30 NOTE — PLAN OF CARE
Future Appointments  Date Time Provider Department Center   8/31/2017 11:00 AM LIBRA Seaman, FNP [facility] ENDODIA [facility]   9/1/2017 11:20 AM Wade Chairez MD [facility] SLEEP [facility]   9/8/2017 8:00 AM PHYSICIAN, PRIORITY CLINIC [facility] IMPRICL [facility]   9/13/2017 11:00 AM RANULFO Bailey [facility] IMPRICL [facility]   9/15/2017 10:00 AM PULMONARY FUNCTION [facility] PULMLAB [facility]   9/15/2017 10:15 AM PULMONARY FUNCTION [facility] PULMLAB [facility]   9/15/2017 10:30 AM PULMONARY FUNCTION [facility] PULMLAB [facility]   9/15/2017 11:00 AM SIX, MINUTE WALK [facility] PUL WLK [facility]   9/15/2017 3:00 PM Shaunna Connolly NP [facility] PULMSVC [facility]   10/3/2017 10:20 AM Santi Schulz MD [facility] NEPHRO [facility]   11/8/2017 11:20 AM Priya Grewal MD [facility] PHYSMED [facility]   12/20/2017 11:40 AM Db Viramontes III, MD [facility] NEURO [facility]          08/30/17 1509   Final Note   Assessment Type Final Discharge Note   Discharge Disposition Home   What phone number can be called within the next 1-3 days to see how you are doing after discharge? [phone]   Hospital Follow Up  Appt(s) scheduled? Yes   Right Care Referral Info   Post Acute Recommendation No Care

## 2017-08-30 NOTE — TELEPHONE ENCOUNTER
----- Message from Agapito Miller sent at 8/30/2017  3:57 PM CDT -----  Patient states that she is still in the hospital and would like to speak with nurse in [place] what she needs to do about her wounds//please call back at [phone]//thank you

## 2017-08-30 NOTE — ASSESSMENT & PLAN NOTE
- Patient recently finished a 10 day course of doxycycline, and recently discontinued Unasyn.   - ESR 78 and CRP 4.6  - On day 4 of IV Clindamycin, will switch to PO clindamycin for a 10 day total course.
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
## 2017-08-31 ENCOUNTER — OFFICE VISIT (OUTPATIENT)
Dept: ENDOCRINOLOGY | Facility: CLINIC | Age: 77
End: 2017-08-31
Payer: MEDICARE

## 2017-08-31 VITALS
RESPIRATION RATE: 22 BRPM | DIASTOLIC BLOOD PRESSURE: 84 MMHG | HEIGHT: 59 IN | BODY MASS INDEX: 53.5 KG/M2 | HEART RATE: 60 BPM | SYSTOLIC BLOOD PRESSURE: 138 MMHG | WEIGHT: 265.38 LBS

## 2017-08-31 DIAGNOSIS — L03.115 CELLULITIS OF LEG, RIGHT: ICD-10-CM

## 2017-08-31 DIAGNOSIS — E66.01 MORBID OBESITY DUE TO EXCESS CALORIES: ICD-10-CM

## 2017-08-31 DIAGNOSIS — N18.30 CKD (CHRONIC KIDNEY DISEASE) STAGE 3, GFR 30-59 ML/MIN: ICD-10-CM

## 2017-08-31 DIAGNOSIS — I25.84 CORONARY ARTERY DISEASE DUE TO CALCIFIED CORONARY LESION: Chronic | ICD-10-CM

## 2017-08-31 DIAGNOSIS — S81.801D TRAUMATIC OPEN WOUND OF RIGHT LOWER LEG, SUBSEQUENT ENCOUNTER: ICD-10-CM

## 2017-08-31 DIAGNOSIS — E55.9 VITAMIN D DEFICIENCY DISEASE: ICD-10-CM

## 2017-08-31 DIAGNOSIS — I10 ESSENTIAL HYPERTENSION: ICD-10-CM

## 2017-08-31 DIAGNOSIS — Z79.4 TYPE 2 DIABETES MELLITUS WITH DIABETIC POLYNEUROPATHY, WITH LONG-TERM CURRENT USE OF INSULIN: Primary | ICD-10-CM

## 2017-08-31 DIAGNOSIS — E11.42 TYPE 2 DIABETES MELLITUS WITH DIABETIC POLYNEUROPATHY, WITH LONG-TERM CURRENT USE OF INSULIN: Primary | ICD-10-CM

## 2017-08-31 DIAGNOSIS — I25.10 CORONARY ARTERY DISEASE DUE TO CALCIFIED CORONARY LESION: Chronic | ICD-10-CM

## 2017-08-31 DIAGNOSIS — E78.2 MIXED HYPERLIPIDEMIA: ICD-10-CM

## 2017-08-31 PROCEDURE — 99215 OFFICE O/P EST HI 40 MIN: CPT | Mod: PBBFAC | Performed by: NURSE PRACTITIONER

## 2017-08-31 PROCEDURE — 3045F PR MOST RECENT HEMOGLOBIN A1C LEVEL 7.0-9.0%: CPT | Mod: ,,, | Performed by: NURSE PRACTITIONER

## 2017-08-31 PROCEDURE — 1159F MED LIST DOCD IN RCRD: CPT | Mod: ,,, | Performed by: NURSE PRACTITIONER

## 2017-08-31 PROCEDURE — 3079F DIAST BP 80-89 MM HG: CPT | Mod: ,,, | Performed by: NURSE PRACTITIONER

## 2017-08-31 PROCEDURE — 1126F AMNT PAIN NOTED NONE PRSNT: CPT | Mod: ,,, | Performed by: NURSE PRACTITIONER

## 2017-08-31 PROCEDURE — 3066F NEPHROPATHY DOC TX: CPT | Mod: ,,, | Performed by: NURSE PRACTITIONER

## 2017-08-31 PROCEDURE — 3075F SYST BP GE 130 - 139MM HG: CPT | Mod: ,,, | Performed by: NURSE PRACTITIONER

## 2017-08-31 PROCEDURE — 99999 PR PBB SHADOW E&M-EST. PATIENT-LVL V: CPT | Mod: PBBFAC,,, | Performed by: NURSE PRACTITIONER

## 2017-08-31 PROCEDURE — 99215 OFFICE O/P EST HI 40 MIN: CPT | Mod: S$PBB,,, | Performed by: NURSE PRACTITIONER

## 2017-08-31 RX ORDER — INSULIN LISPRO 100 [IU]/ML
INJECTION, SOLUTION INTRAVENOUS; SUBCUTANEOUS
Qty: 1 BOX | Refills: 6
Start: 2017-08-31 | End: 2018-06-29 | Stop reason: SDUPTHER

## 2017-08-31 NOTE — PATIENT INSTRUCTIONS
Snacks can be an important part of a balanced, healthy meal plan. They allow you to eat more frequently, feeling full and satisfied throughout the day. Also, they allow you to spread carbohydrates evenly, which may stabilize blood sugars.  Plus, snacks are enjoyable!     The amount of carbohydrate needed at snacks varies. Generally, about 15-30 grams of carbohydrate per snack is recommended.  Below you will find some tasty treats.       0-5 gm carb   Crystal Light   Vitamin Water Zero   Herbal tea, unsweetened   2 tsp peanut butter on celery   1./2 cup sugar-free jell-o   1 sugar-free popsicle   ¼ cup blueberries   8oz Blue Aniyah unsweetened almond milk   5 baby carrots & celery sticks, cucumbers, bell peppers dipped in ¼ cup salsa, 2Tbsp light ranch dressing or 2Tbsp plain Greek yogurt   10 Goldfish crackers   ½ oz low-fat cheese or string cheese   1 closed handful of nuts, unsalted   1 Tbsp of sunflower seeds, unsalted   1 cup Smart Pop popcorn   1 whole grain brown rice cake        15 gm carb   1 small piece of fruit or ½ banana or 1/2 cup lite canned fruit   3 ramon cracker squares   3 cups Smart Pop popcorn, top spray butter, York lite salt or cinnamon and Truvia   5 Vanilla Wafers   ½ cup low fat, no added sugar ice cream or frozen yogurt (Blue bell, Blue Bunny, Weight Watchers, Skinny Cow)   ½ turkey, ham, or chicken sandwich   ½ c fruit with ½ c Cottage cheese   4-6 unsalted wheat crackers with 1 oz low fat cheese or 1 tbsp peanut butter    30-45 goldfish crackers (depending on flavor)    7-8 Mormonism mini brown rice cakes (caramel, apple cinnamon, chocolate)    12 Mormonism mini brown rice cakes (cheddar, bbq, ranch)    1/3 cup hummus dip with raw veg   1/2 whole wheat jessica, 1Tbsp hummus   Mini Pizza (1/2 whole wheat English muffin, low-fat  cheese, tomato sauce)   100 calorie snack pack (Oreo, Chips Ahoy, Ritz Mix, Baked Cheetos)   4-6 oz. light or Greek Style yogurt  (Shila Tanner, [place], [facility])   ½ cup sugar-free pudding     6 in. wheat tortilla or jessica oven toasted chips (topped with spray butter flavoring, cinnamon, Truvia OR spray butter, garlic powder, chili powder)    18 BBQ Popchips (available at Target, Whole Foods, Fresh Market)                   Diabetes Support Group Meetings    Date Topics   February 9 Health Promotion/Nutrition   March 9 Taking Care of Your Kidneys   April 13 Taking Care of Your Feet   May 11 Ease Your Mind with Diabetes   June 8 Hurricane and Emergency Preparedness   July 13 Family & Caregiver Support for Diabetes   *August 17  Taking Care of Your Eyes   September 14 Devices & Technology   October 12 Recipes & Treats   November 9 Getting Pumped Up for Diabetes   December 14 Year-End Close Out            Meetings are held in the [facility] (A) of the [facility] located at [address] [place] [address]. Please call [phone] for additional information.    Free service, offered every 2nd Thursday of every month, except in August! Family members and/or friends are welcome as well!  Support group is for patients with type 1 or type 2 diabetes.    From 3:30p to 4:30p
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
## 2017-09-01 ENCOUNTER — NURSE TRIAGE (OUTPATIENT)
Dept: ADMINISTRATIVE | Facility: CLINIC | Age: 77
End: 2017-09-01

## 2017-09-01 ENCOUNTER — HOSPITAL ENCOUNTER (EMERGENCY)
Facility: HOSPITAL | Age: 77
Discharge: HOME | End: 2017-09-01
Attending: FAMILY MEDICINE
Payer: COMMERCIAL

## 2017-09-01 VITALS
HEART RATE: 80 BPM | HEIGHT: 59 IN | DIASTOLIC BLOOD PRESSURE: 81 MMHG | SYSTOLIC BLOOD PRESSURE: 207 MMHG | BODY MASS INDEX: 54.43 KG/M2 | RESPIRATION RATE: 18 BRPM | OXYGEN SATURATION: 97 % | TEMPERATURE: 99 F | WEIGHT: 270 LBS

## 2017-09-01 DIAGNOSIS — Z51.89 VISIT FOR WOUND CHECK: Primary | ICD-10-CM

## 2017-09-01 PROCEDURE — 99282 EMERGENCY DEPT VISIT SF MDM: CPT | Mod: ,,, | Performed by: PHYSICIAN ASSISTANT

## 2017-09-01 PROCEDURE — 99283 EMERGENCY DEPT VISIT LOW MDM: CPT

## 2017-09-01 NOTE — DISCHARGE INSTRUCTIONS
Continue your oral antibiotics (clindamycin) every 6 hours as directed.  Call your primary care doctor as soon as possible and ask for home health for wound care.  Follow up closely with wound care on Tuesday at 8 AM.  Return to the emergency department for new or worsening symptoms such as fever, chills, pain, swelling, discharge.    Future Appointments  Date Time Provider Department Center   9/5/2017 8:00 AM Kellie Blue NP [facility] WOUND [facility]   9/8/2017 8:00 AM PHYSICIAN, PRIORITY CLINIC [facility] IMPRICL [facility]   9/13/2017 11:00 AM RANULFO Bailey [facility] IMPRICL [facility]   9/15/2017 10:00 AM PULMONARY FUNCTION [facility] PULMLAB [facility]   9/15/2017 10:15 AM PULMONARY FUNCTION [facility] PULMLAB [facility]   9/15/2017 10:30 AM PULMONARY FUNCTION [facility] PULMLAB [facility]   9/15/2017 11:00 AM SIX, MINUTE WALK NOM PUL WLK [facility]   9/15/2017 3:00 PM Shaunna Connolly NP [facility] PULMSVC [facility]   10/3/2017 10:20 AM Santi Schulz MD [facility] NEPHRO [facility]   11/8/2017 11:20 AM Priya Grewal MD [facility] PHYSMED [facility]   12/4/2017 8:40 AM Wade Chairez MD [facility] SLEEP [facility]   12/11/2017 7:50 AM LAB, APPOINTMENT [facility] LAB VNP [facility]   12/13/2017 11:00 AM LIBRA Seaman, P [facility] ENDODIA [facility]   12/20/2017 11:40 AM Db Viramontes III, MD [facility] NEURO [facility]     Our goal in the emergency department is to always give you outstanding care and exceptional service. You may receive a survey by mail or e-mail in the next week regarding your experience in our ED. We would greatly appreciate your completing and returning the survey. Your feedback provides us with a way to recognize our staff who give very good care and it helps us learn how to improve when your experience was below our aspiration of excellence.

## 2017-09-01 NOTE — TELEPHONE ENCOUNTER
Reason for Disposition   Black (necrotic) or blisters develop in wound    Protocols used: ST WOUND INFECTION-A-[place]    Pt does not feel like her wound was properly cared for in the hospital. Right leg wound drsg placed Monday and was suppose to be taken off on Thursday, yesteray and drsg still in place because pt did not want to mess anything up.  Pt states she did have someone look at wound yesterday and it was black. Care advice given. Pt is upset that she feels this should have been addressed prior to discharge.

## 2017-09-01 NOTE — ED NOTES
"Wound to RLE cleansed with NS and patted dry.  Mepilex AG foam dressing applied to wound bed.  ABD pad applied then leg was wrapped with 6" kerlix and coban.  Patient instructed to leave new dressing in place until Tuesday when seen at the Wound Care clinic.  Patient tolerated dressing change well and verbalized 100% understanding of instructions.  "

## 2017-09-02 NOTE — ED PROVIDER NOTES
"Encounter Date: 9/1/2017       History     Chief Complaint   Patient presents with    Wound Check     right lower leg wound. admitted late August for cellulitis. not sent home with home health. wound leaking. no fever. she "can tell something is happening under skin because she is getting stabbing pain"     Sister Pasquale is a 77-year-old female with a past medical history of HTN, DM2, CKD, chronic lymphedema who presents the ED with wound check.  Patient was recently discharged from the hospital after being admitted for right lower extremity cellulitis 2 days ago.  She is compliant with her oral antibiotics.  She presents the ED because she states that she is unable to change her dressing and does not have any follow-up.  She denies any fever or chills.  She states that her lower extremity is at baseline today.  She denies any pain to the area.          Review of patient's allergies indicates:   Allergen Reactions    Bactrim [sulfamethoxazole-trimethoprim]      Other reaction(s): up set stomach rash/hives    Azithromycin     Erythromycin Other (See Comments)     Other reaction(s): Unknown  Other reaction(s): Stomach upset    Gentamicin      Other reaction(s): Unknown    Levofloxacin Hives     Other reaction(s): nervousness    Vancomycin      Other reaction(s): Itching     Past Medical History:   Diagnosis Date    Adrenal mass- adenoma stable since 2004 (1.8 cm and 8/13/12 (2 cm); stable 2016 2.4 cm     Adrenal mass- adenoma stable since 2004 (1.8 cm and 8/13/12 (2 cm); stable 2016 2.4 cm    Asthma in adult without complication 6/25/2015    Bilateral carotid artery disease 7/21/2017    Bilateral sciatica 2/7/2017    Cellulitis of right leg 08/16/2017    Cerebral infarction 1/29/2016    Multiple areas of lacunar infarction. Stroke risk factors include HTN, DM2, Dyslipidemia.  Continue ASA 81mg/ Statin therapy I have encouraged 30 minutes of physical activity daily for 5 days a week. She has access to a " pool so this should not be so jarring to her joints.     Cervical radiculopathy 3/18/2015    Chronic rhinitis 4/9/2013    CKD (chronic kidney disease) stage 3, GFR 30-59 ml/min 1/29/2013    Coronary artery disease due to calcified coronary lesion 6/25/2015    Diastolic dysfunction 10/10/2013    Essential hypertension 6/25/2015    Gastroesophageal reflux disease without esophagitis 8/13/2012    Gout     Hep B w/ coma 1971    Hives 10/1/2013    Mixed hyperlipidemia 8/13/2012    Morbid obesity with BMI of 50.0-59.9, adult 2/11/2014    Obstructive sleep apnea syndrome 8/13/2012    Senile cataracts of both eyes 1/22/2015    Tinea pedis 10/14/2013    Traumatic open wound of right lower leg 8/25/2017    Trigeminal neuralgia of right side of face 5/23/2017    For years now Previously on Gabapentin, but caused constipation Dissipating over time Not related to intracranial abnormalities Possibly related to dental procedure    Type 2 diabetes mellitus with diabetic polyneuropathy, with long-term current use of insulin 1/29/2013    Venous stasis dermatitis of both lower extremities 8/21/2017    Vitamin D deficiency disease 8/13/2012     Past Surgical History:   Procedure Laterality Date    HYSTERECTOMY  1982    secondary uterine fibroids    JOINT REPLACEMENT      Right total knee replacement       Family History   Problem Relation Age of Onset    Cancer Mother     Hypertension Father     Cancer Sister     No Known Problems Brother     No Known Problems Maternal Aunt     No Known Problems Maternal Uncle     No Known Problems Paternal Aunt     No Known Problems Paternal Uncle     No Known Problems Maternal Grandmother     No Known Problems Maternal Grandfather     No Known Problems Paternal Grandmother     No Known Problems Paternal Grandfather     Glaucoma Neg Hx     Breast cancer Neg Hx     Colon cancer Neg Hx     Ovarian cancer Neg Hx     Stroke Neg Hx     Allergic rhinitis Neg Hx      Allergies Neg Hx     Angioedema Neg Hx     Asthma Neg Hx     Atopy Neg Hx     Eczema Neg Hx     Immunodeficiency Neg Hx     Rhinitis Neg Hx     Urticaria Neg Hx     Amblyopia Neg Hx     Blindness Neg Hx     Cataracts Neg Hx     Diabetes Neg Hx     Macular degeneration Neg Hx     Retinal detachment Neg Hx     Strabismus Neg Hx     Thyroid disease Neg Hx     Psoriasis Neg Hx      Social History   Substance Use Topics    Smoking status: Never Smoker    Smokeless tobacco: Never Used    Alcohol use No     Review of Systems   Constitutional: Negative for chills and fever.   HENT: Negative for sore throat.    Respiratory: Negative for shortness of breath.    Cardiovascular: Negative for chest pain.   Gastrointestinal: Negative for nausea.   Genitourinary: Negative for dysuria.   Musculoskeletal: Negative for back pain.   Skin: Positive for wound. Negative for rash.   Neurological: Negative for weakness.   Hematological: Does not bruise/bleed easily.       Physical Exam     Initial Vitals [09/01/17 1135]   BP Pulse Resp Temp SpO2   (!) 207/81 80 18 98.5 °F (36.9 °C) 97 %      MAP       123         Physical Exam    Vitals reviewed.  Constitutional: She appears well-developed and well-nourished. She is not diaphoretic.   Very pleasant elderly female in NAD and nontoxic appearing.   HENT:   Head: Normocephalic and atraumatic.   Nose: Nose normal.   Eyes: Conjunctivae and EOM are normal.   Neck: Normal range of motion.   Cardiovascular: Normal rate, regular rhythm and normal heart sounds. Exam reveals no friction rub.    No murmur heard.  Pulmonary/Chest: Breath sounds normal. No respiratory distress. She has no wheezes. She has no rales.   Abdominal: Soft. Bowel sounds are normal. She exhibits no distension. There is no tenderness. There is no rebound.   Musculoskeletal: Normal range of motion.        Legs:  Neurological: She is alert and oriented to person, place, and time. She has normal strength. No  sensory deficit.   Skin: Skin is warm and dry. No erythema. No pallor.   Psychiatric: She has a normal mood and affect. Her behavior is normal. Judgment and thought content normal.         ED Course   Procedures  Labs Reviewed - No data to display          Medical Decision Making:   History:   Old Medical Records: I decided to obtain old medical records.       APC / Resident Notes:   On exam, afebrile.  Wound noted to right lower extremity without signs of worsening cellulitis.  Patient presents the ED because she is unable to change her dressing and does not have proper follow-up.  Will redress wound with Mepilex here. Instructed not to change until f/u wound check.  I called wound care clinic and set up an appointment for patient on Tuesday.  She was instructed to follow-up with PCP asap for the possibility of home health care.  I feel that she does not need any labs or imaging at this time. Strict ED return precaution given. She is stable for discharge and comfortable with plan.  All questions answered.  I have reviewed patient's chart and discussed this case with my supervising MMONROE              ED Course      Clinical Impression:   The encounter diagnosis was Visit for wound check.    Disposition:   Disposition: Discharged  Condition: Stable                        Susan Ochoa PA-C  09/02/17 1123

## 2017-09-05 ENCOUNTER — OFFICE VISIT (OUTPATIENT)
Dept: WOUND CARE | Facility: CLINIC | Age: 77
End: 2017-09-05
Payer: MEDICARE

## 2017-09-05 ENCOUNTER — TELEPHONE (OUTPATIENT)
Dept: INTERNAL MEDICINE | Facility: CLINIC | Age: 77
End: 2017-09-05

## 2017-09-05 VITALS
DIASTOLIC BLOOD PRESSURE: 67 MMHG | HEART RATE: 81 BPM | TEMPERATURE: 97 F | WEIGHT: 271.63 LBS | SYSTOLIC BLOOD PRESSURE: 168 MMHG | BODY MASS INDEX: 49.99 KG/M2 | HEIGHT: 62 IN

## 2017-09-05 DIAGNOSIS — S81.801D TRAUMATIC OPEN WOUND OF RIGHT LOWER LEG, SUBSEQUENT ENCOUNTER: Primary | ICD-10-CM

## 2017-09-05 DIAGNOSIS — I87.2 VENOUS STASIS DERMATITIS OF BOTH LOWER EXTREMITIES: ICD-10-CM

## 2017-09-05 PROBLEM — L03.90 CELLULITIS: Status: RESOLVED | Noted: 2017-08-28 | Resolved: 2017-09-05

## 2017-09-05 PROBLEM — L03.115 CELLULITIS OF LEG, RIGHT: Status: RESOLVED | Noted: 2017-08-25 | Resolved: 2017-09-05

## 2017-09-05 PROCEDURE — 99999 PR PBB SHADOW E&M-EST. PATIENT-LVL V: CPT | Mod: PBBFAC,,, | Performed by: NURSE PRACTITIONER

## 2017-09-05 PROCEDURE — 99212 OFFICE O/P EST SF 10 MIN: CPT | Mod: 25,S$PBB,, | Performed by: NURSE PRACTITIONER

## 2017-09-05 PROCEDURE — 3077F SYST BP >= 140 MM HG: CPT | Mod: ,,, | Performed by: NURSE PRACTITIONER

## 2017-09-05 PROCEDURE — 29580 STRAPPING UNNA BOOT: CPT | Mod: PBBFAC,RT | Performed by: NURSE PRACTITIONER

## 2017-09-05 PROCEDURE — 1125F AMNT PAIN NOTED PAIN PRSNT: CPT | Mod: ,,, | Performed by: NURSE PRACTITIONER

## 2017-09-05 PROCEDURE — 3078F DIAST BP <80 MM HG: CPT | Mod: ,,, | Performed by: NURSE PRACTITIONER

## 2017-09-05 PROCEDURE — 1159F MED LIST DOCD IN RCRD: CPT | Mod: ,,, | Performed by: NURSE PRACTITIONER

## 2017-09-05 PROCEDURE — 29580 STRAPPING UNNA BOOT: CPT | Mod: S$PBB,RT,, | Performed by: NURSE PRACTITIONER

## 2017-09-05 PROCEDURE — 99215 OFFICE O/P EST HI 40 MIN: CPT | Mod: PBBFAC | Performed by: NURSE PRACTITIONER

## 2017-09-05 NOTE — PROGRESS NOTES
Subjective:       Patient ID: Sue Giordano is a 77 y.o. female.    Chief Complaint: Wound Check    Wound Check     Edema     This patient is seen today for reevaluation of a traumatic wound to the right lower leg.  She was recently admitted to [facility] from 8/25-8/30/17 for cellulitis of the right leg.  Home health was not ordered for her on discharge from the hospital.  She does not drive and could qualify for home health services.  She is still on clindamycin orally.   She has chronic bilateral lower extremity edema.  She has had problems with lymphedema for 20 years now.  Her pain level is 6/10.  She is afebrile.  Her medical history is significant for poorly controlled type II diabetes, lymphedema and chronic kidney disease.    Review of Systems  Unchanged from prior visit.  Objective:      Physical Exam   Constitutional: She is oriented to person, place, and time. She appears well-developed and well-nourished. No distress.   HENT:   Head: Normocephalic and atraumatic.   Neck: Normal range of motion.   Pulmonary/Chest: Effort normal. No respiratory distress.   Musculoskeletal: Normal range of motion. She exhibits edema. She exhibits no tenderness.        Legs:  Neurological: She is alert and oriented to person, place, and time.   Skin: Skin is warm and dry. No rash noted. She is not diaphoretic. No cyanosis or erythema. Nails show no clubbing.   Psychiatric: She has a normal mood and affect. Her behavior is normal. Judgment and thought content normal.   Nursing note and vitals reviewed.    ..  Hemoglobin A1C   Date Value Ref Range Status   08/24/2017 8.2 (H) 4.0 - 5.6 % Final     Comment:     According to ADA guidelines, hemoglobin A1c <7.0% represents  optimal control in non-pregnant diabetic patients. Different  metrics may apply to specific patient populations.   Standards of Medical Care in Diabetes-2016.  For the purpose of screening for the presence of diabetes:  <5.7%     Consistent with the absence  of diabetes  5.7-6.4%  Consistent with increasing risk for diabetes   (prediabetes)  >or=6.5%  Consistent with diabetes  Currently, no consensus exists for use of hemoglobin A1c  for diagnosis of diabetes for children.  This Hemoglobin A1c assay has significant interference with fetal   hemoglobin   (HbF). The results are invalid for patients with abnormal amounts of   HbF,   including those with known Hereditary Persistence   of Fetal Hemoglobin. Heterozygous hemoglobin variants (HbAS, HbAC,   HbAD, HbAE, HbA2) do not significantly interfere with this assay;   however, presence of multiple variants in a sample may impact the %   interference.     08/21/2017 8.2 (H) 4.0 - 5.6 % Final     Comment:     According to ADA guidelines, hemoglobin A1c <7.0% represents  optimal control in non-pregnant diabetic patients. Different  metrics may apply to specific patient populations.   Standards of Medical Care in Diabetes-2016.  For the purpose of screening for the presence of diabetes:  <5.7%     Consistent with the absence of diabetes  5.7-6.4%  Consistent with increasing risk for diabetes   (prediabetes)  >or=6.5%  Consistent with diabetes  Currently, no consensus exists for use of hemoglobin A1c  for diagnosis of diabetes for children.  This Hemoglobin A1c assay has significant interference with fetal   hemoglobin   (HbF). The results are invalid for patients with abnormal amounts of   HbF,   including those with known Hereditary Persistence   of Fetal Hemoglobin. Heterozygous hemoglobin variants (HbAS, HbAC,   HbAD, HbAE, HbA2) do not significantly interfere with this assay;   however, presence of multiple variants in a sample may impact the %   interference.     05/02/2017 8.3 (H) 4.5 - 6.2 % Final     Comment:     According to ADA guidelines, hemoglobin A1C <7.0% represents  optimal control in non-pregnant diabetic patients.  Different  metrics may apply to specific populations.   Standards of Medical Care in Diabetes -  2016.  For the purpose of screening for the presence of diabetes:  <5.7%     Consistent with the absence of diabetes  5.7-6.4%  Consistent with increasing risk for diabetes   (prediabetes)  >or=6.5%  Consistent with diabetes  Currently no consensus exists for use of hemoglobin A1C  for diagnosis of diabetes for children.       Sue Garzon was seen in the clinic room and placed in the supine position on the treatment table.  The right leg was cleansed with Easi-clense sponges and dried thoroughly.  A mepilex foam dressing was applied to the wound.  Eucerin cream was applied to the lower legs.  The patient's foot was positioned at a 90 degree angle.  A zinc oxide wrap, followed by kerlix roll gauze and coban were applied using a spiral technique avoiding creases or folds.  The wrap was started behind the first metatarsal and ended below the tibial tubercle of the knee.  There was overlap of each turn half the width of the previous turn.  The compression wrap will be changed every 3-4 days.    Assessment:       1. Traumatic open wound of right lower leg, subsequent encounter    2. Venous stasis dermatitis of both lower extremities        Plan:           Unna boot right lower leg as detailed above.  Patient was warned not to get the dressings wet and to use cast covers for showering.  Should the dressing become wet, she is to remove it, place a wet-to-dry dressing over the wound, cover with gauze and roll gauze and use ace wraps for compression and to secure bandages.  She should then notify home health as soon as possible to have a new dressing applied.  Return to clinic in 2 weeks.

## 2017-09-05 NOTE — TELEPHONE ENCOUNTER
----- Message from Kellie Blue NP sent at 9/5/2017  9:59 AM CDT -----  She was discharged from the hospital without home health and could probably benefit from their services.  If you order home health I am happy to give them wound care oders if you let me know which agency.  Thanks,  Thalia

## 2017-09-05 NOTE — Clinical Note
She was discharged from the hospital without home health and could probably benefit from their services.  If you order home health I am happy to give them wound care oders if you let me know which agency. Thanks, Thalia

## 2017-09-05 NOTE — PATIENT INSTRUCTIONS
Elevate legs as much as possible. Do not get the dressings wet and use cast covers for showering.  Should the dressing become wet, remove it, place a wet-to-dry dressing over the wound, cover with gauze and roll gauze and use ace wraps for compression and to secure bandages.  Notify home health as soon as possible to have a new dressing applied.    Take acidophilus tablets as directed on the bottle while you are taking the clindamycin.

## 2017-09-07 ENCOUNTER — TELEPHONE (OUTPATIENT)
Dept: INTERNAL MEDICINE | Facility: CLINIC | Age: 77
End: 2017-09-07

## 2017-09-07 NOTE — TELEPHONE ENCOUNTER
Yes, it would be helpful to monitor her weight and have her work on a low salt, diabetic diet and weight loss.

## 2017-09-07 NOTE — TELEPHONE ENCOUNTER
----- Message from Monae Aldridge sent at 9/7/2017  8:43 AM CDT -----  Contact: heraclioskenzie [facility] les/sussy/[phone]  [facility] called in regards to pt being admitted to her services . They wanted to know should they be monitoring her weigh.      Please advise

## 2017-09-08 ENCOUNTER — OFFICE VISIT (OUTPATIENT)
Dept: PRIMARY CARE CLINIC | Facility: CLINIC | Age: 77
End: 2017-09-08
Payer: MEDICARE

## 2017-09-08 VITALS
WEIGHT: 274.5 LBS | HEART RATE: 72 BPM | HEIGHT: 59 IN | DIASTOLIC BLOOD PRESSURE: 64 MMHG | SYSTOLIC BLOOD PRESSURE: 142 MMHG | OXYGEN SATURATION: 99 % | BODY MASS INDEX: 55.34 KG/M2

## 2017-09-08 DIAGNOSIS — N18.30 CKD (CHRONIC KIDNEY DISEASE) STAGE 3, GFR 30-59 ML/MIN: ICD-10-CM

## 2017-09-08 DIAGNOSIS — I25.84 CORONARY ARTERY DISEASE DUE TO CALCIFIED CORONARY LESION: Chronic | ICD-10-CM

## 2017-09-08 DIAGNOSIS — G47.33 OBSTRUCTIVE SLEEP APNEA SYNDROME: ICD-10-CM

## 2017-09-08 DIAGNOSIS — E66.01 MORBID OBESITY DUE TO EXCESS CALORIES: ICD-10-CM

## 2017-09-08 DIAGNOSIS — J45.909 ASTHMA IN ADULT WITHOUT COMPLICATION: ICD-10-CM

## 2017-09-08 DIAGNOSIS — Z79.4 TYPE 2 DIABETES MELLITUS WITH DIABETIC POLYNEUROPATHY, WITH LONG-TERM CURRENT USE OF INSULIN: ICD-10-CM

## 2017-09-08 DIAGNOSIS — K21.9 GASTROESOPHAGEAL REFLUX DISEASE WITHOUT ESOPHAGITIS: ICD-10-CM

## 2017-09-08 DIAGNOSIS — I25.10 CORONARY ARTERY DISEASE DUE TO CALCIFIED CORONARY LESION: Chronic | ICD-10-CM

## 2017-09-08 DIAGNOSIS — E11.42 TYPE 2 DIABETES MELLITUS WITH DIABETIC POLYNEUROPATHY, WITH LONG-TERM CURRENT USE OF INSULIN: ICD-10-CM

## 2017-09-08 DIAGNOSIS — I10 ESSENTIAL HYPERTENSION: ICD-10-CM

## 2017-09-08 DIAGNOSIS — I87.2 VENOUS STASIS DERMATITIS OF BOTH LOWER EXTREMITIES: ICD-10-CM

## 2017-09-08 DIAGNOSIS — S81.801D TRAUMATIC OPEN WOUND OF RIGHT LOWER LEG, SUBSEQUENT ENCOUNTER: Primary | ICD-10-CM

## 2017-09-08 DIAGNOSIS — E78.2 MIXED HYPERLIPIDEMIA: ICD-10-CM

## 2017-09-08 DIAGNOSIS — I51.89 DIASTOLIC DYSFUNCTION: ICD-10-CM

## 2017-09-08 PROCEDURE — 99999 PR PBB SHADOW E&M-EST. PATIENT-LVL III: CPT | Mod: PBBFAC,,,

## 2017-09-08 PROCEDURE — 99213 OFFICE O/P EST LOW 20 MIN: CPT | Mod: PBBFAC

## 2017-09-08 PROCEDURE — 99495 TRANSJ CARE MGMT MOD F2F 14D: CPT | Mod: PBBFAC

## 2017-09-08 PROCEDURE — 99495 TRANSJ CARE MGMT MOD F2F 14D: CPT | Mod: S$PBB,,, | Performed by: INTERNAL MEDICINE

## 2017-09-08 RX ORDER — CLINDAMYCIN HYDROCHLORIDE 300 MG/1
300 CAPSULE ORAL EVERY 6 HOURS
Qty: 28 CAPSULE | Refills: 0 | Status: SHIPPED | OUTPATIENT
Start: 2017-09-08 | End: 2017-09-15

## 2017-09-08 NOTE — PROGRESS NOTES
[facility]  New Visit Progress Note   Recent Hospital Discharge     PRESENTING HISTORY     Chief Complaint/Reason for Visit:  Follow up Hospital Discharge   Chief Complaint   Patient presents with    Hospital Follow Up     PCP: Vale Howard MD    History of Present Illness: Ms. Sue Giordano is a 77 y.o. female who was recently admitted to the hospital.    Admission Date: 8/25/2017  Hospital Length of Stay: 5 days  Discharge Date and Time: 8/30/2017  5:30 PM  Attending Physician: Alex Hector MD   Discharging Provider: Brian Sawyer MD  Primary Care Provider: Vale Howard MD  Hospital Medicine Team: [facility] MED 2 Brian Sawyer MD     HPI:   Sister Pasquale is a 76 y/o lady with PMHx obesity, REJI, asthma, CKD3 (baseline Cr 1.0-1.3), uncontrolled HTN, DM2 (5/2017 A1c 8.3), CAD and chronic lower extremity lymphedema who is a direct admit from wound care clinic for right lower extremity cellulitis. 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   She was seen in the ED on the night of 8/20/17 and the wound was sutured. She states she was not given antibiotics at that time. Per recent discharge note, it was decided her lower extremity erythema was likely due to stasis dermatitis, because she did not have other signs of cellulitis (tenderness, warm to touch). Since being discharged on 8/20, the patient states her wound has become progressively more erythematous over this past week, however she denies having lower extremity pain. She was seen in wound care clinic this morning and sent here for IV antibiotics. The patient endorses finishing a 10 day course of doxycycline this week prescribed by her PCP for chronic cellulitis with no improvement in symptoms. She denies any recent fevers, chills, CP, n/v/d, or dysuria.      * No surgery found *      Indwelling Lines/Drains at time of discharge:       Lines/Drains/Airways            No  matching active lines, drains, or airways          Hospital Course:   Sister Pasquale is a 78 y/o lady with PMHx obesity, REJI, asthma, CKD3 (baseline Cr 1.0-1.3), uncontrolled HTN, DM2 (5/2017 A1c 8.3), CAD and chronic lower extremity lymphedema who is a direct admit from wound care clinic for right lower extremity cellulitis. Patient was started on IV unasyn.   8/27 : Unasyn discontinue and Clindamycin started           Pending Diagnostic Studies:      None                  Final Active Diagnoses:     Diagnosis Date Noted POA    PRINCIPAL PROBLEM:  Cellulitis of leg, right [L03.115] 08/25/2017 Yes    Essential hypertension [I10] 06/25/2015 Yes    Cellulitis [L03.90] 08/28/2017 Yes    Traumatic open wound of right lower leg [S81.801A] 08/25/2017 Yes    Venous stasis dermatitis of both lower extremities [I87.2] 08/21/2017 Yes    Morbid obesity due to excess calories [E66.01] 02/16/2017 Yes    Asthma in adult without complication [J45.909] 06/25/2015 Yes    Coronary artery disease due to calcified coronary lesion [I25.10, I25.84] 06/25/2015 Yes       Chronic    Diastolic dysfunction: see ECHO 2016 [I51.9] 10/10/2013 Yes    Type 2 diabetes mellitus with diabetic polyneuropathy, with long-term current use of insulin [E11.42, Z79.4] 01/29/2013 Not Applicable    CKD (chronic kidney disease) stage 3, GFR 30-59 ml/min [N18.3] 01/29/2013 Yes    Obstructive sleep apnea syndrome [G47.33] 08/13/2012 Yes    Mixed hyperlipidemia [E78.2] 08/13/2012 Yes    Gastroesophageal reflux disease without esophagitis [K21.9] 08/13/2012 Yes       Problems Resolved During this Admission:     Diagnosis Date Noted Date Resolved POA          * Cellulitis of leg, right     - Patient recently finished a 10 day course of doxycycline, and recently discontinued Unasyn.   - ESR 78 and CRP 4.6  - On day 4 of IV Clindamycin, will switch to PO clindamycin for a 10 day total course.           Essential hypertension     - Carvedilol 12.5 mg PO  BID.   - Losartan 100 mg QD  - Torsemide 20 mg QD  -Hydralazine 25 BID          Coronary artery disease due to calcified coronary lesion     - Aspirin 81 mg QD  - Atorvastatin 80 mg QD.          Type 2 diabetes mellitus with diabetic polyneuropathy, with long-term current use of insulin     - Will continue home insulin detemir 48 units QHS.  - low dose SSI  - Will continue to monitor blood glucose.   - Will start duloxetine 30 BID for neuropathic pain.           Mixed hyperlipidemia     - atorvastatin 80 mg QD.              9-5-17: Wound Care Visit  Светлана [place] right lower leg.  Patient was warned not to get the dressings wet and to use cast covers for showering.  Should the dressing become wet, she is to remove it, place a wet-to-dry dressing over the wound, cover with gauze and roll gauze and use ace wraps for compression and to secure bandages.  She should then notify home health as soon as possible to have a new dressing applied.  Return to clinic in 2 weeks.  ___________________________________________________________________    Today:  She gained 5 lbs since discharge.  She has 4 more days of antibiotics left.  No fever but occasional chills.  Mildly SOB. No coughing.  No chest pain.     PAST HISTORY:     Past Medical History:   Diagnosis Date    Adrenal mass- adenoma stable since 2004 (1.8 cm and 8/13/12 (2 cm); stable 2016 2.4 cm     Adrenal mass- adenoma stable since 2004 (1.8 cm and 8/13/12 (2 cm); stable 2016 2.4 cm    Asthma in adult without complication 6/25/2015    Bilateral carotid artery disease 7/21/2017    Bilateral sciatica 2/7/2017    Cellulitis of right leg 08/16/2017    Cerebral infarction 1/29/2016    Multiple areas of lacunar infarction. Stroke risk factors include HTN, DM2, Dyslipidemia.  Continue ASA 81mg/ Statin therapy I have encouraged 30 minutes of physical activity daily for 5 days a week. She has access to a pool so this should not be so jarring to her joints.     Cervical  radiculopathy 3/18/2015    Chronic rhinitis 4/9/2013    CKD (chronic kidney disease) stage 3, GFR 30-59 ml/min 1/29/2013    Coronary artery disease due to calcified coronary lesion 6/25/2015    Diastolic dysfunction 10/10/2013    Essential hypertension 6/25/2015    Gastroesophageal reflux disease without esophagitis 8/13/2012    Gout     Hep B w/ coma 1971    Hives 10/1/2013    Mixed hyperlipidemia 8/13/2012    Morbid obesity with BMI of 50.0-59.9, adult 2/11/2014    Obstructive sleep apnea syndrome 8/13/2012    Senile cataracts of both eyes 1/22/2015    Traumatic open wound of right lower leg 8/25/2017    Trigeminal neuralgia of right side of face 5/23/2017    For years now Previously on Gabapentin, but caused constipation Dissipating over time Not related to intracranial abnormalities Possibly related to dental procedure    Type 2 diabetes mellitus with diabetic polyneuropathy, with long-term current use of insulin 1/29/2013    Venous stasis dermatitis of both lower extremities 8/21/2017    Vitamin D deficiency disease 8/13/2012       Past Surgical History:   Procedure Laterality Date    HYSTERECTOMY  1982    secondary uterine fibroids    JOINT REPLACEMENT      Right total knee replacement         Family History   Problem Relation Age of Onset    Cancer Mother     Hypertension Father     Cancer Sister     No Known Problems Brother     No Known Problems Maternal Aunt     No Known Problems Maternal Uncle     No Known Problems Paternal Aunt     No Known Problems Paternal Uncle     No Known Problems Maternal Grandmother     No Known Problems Maternal Grandfather     No Known Problems Paternal Grandmother     No Known Problems Paternal Grandfather     Glaucoma Neg Hx     Breast cancer Neg Hx     Colon cancer Neg Hx     Ovarian cancer Neg Hx     Stroke Neg Hx     Allergic rhinitis Neg Hx     Allergies Neg Hx     Angioedema Neg Hx     Asthma Neg Hx     Atopy Neg Hx     Eczema  Neg Hx     Immunodeficiency Neg Hx     Rhinitis Neg Hx     Urticaria Neg Hx     Amblyopia Neg Hx     Blindness Neg Hx     Cataracts Neg Hx     Diabetes Neg Hx     Macular degeneration Neg Hx     Retinal detachment Neg Hx     Strabismus Neg Hx     Thyroid disease Neg Hx     Psoriasis Neg Hx        Social History     Social History    Marital status: Single     Spouse name: N/A    Number of children: N/A    Years of education: N/A     Social History Main Topics    Smoking status: Never Smoker    Smokeless tobacco: Never Used    Alcohol use No    Drug use: No    Sexual activity: No     Other Topics Concern    None     Social History Narrative    Single with no children.        MEDICATIONS & ALLERGIES:     Current Outpatient Prescriptions on File Prior to Visit   Medication Sig Dispense Refill    acetaminophen (TYLENOL) 500 MG tablet Take 1,000 mg by mouth 2 (two) times daily as needed for Pain.      albuterol 90 mcg/actuation inhaler Inhale 2 puffs into the lungs every 6 (six) hours as needed for Wheezing or Shortness of Breath. Rescue 1 Inhaler 3    aluminum & magnesium hydroxide-simethicone (MYLANTA MAX STRENGTH) 400-400-40 mg/5 mL suspension Take 30 mLs by mouth every 6 (six) hours.  0    ammonium lactate 12 % Crea Apply topically every morning.       aspirin 81 MG Chew Take 1 tablet (81 mg total) by mouth once daily.      atorvastatin (LIPITOR) 80 MG tablet Take 1 tablet (80 mg total) by mouth once daily. 90 tablet 3    blood sugar diagnostic Strp BG monitoring 4 times a day. Pt needs test strips for Embrace Talking meter. (Patient taking differently: BG monitoring 2 times a day. Pt needs test strips for Embrace Talking meter.) 450 strip prn    carvedilol (COREG) 12.5 MG tablet Take 1 tablet (12.5 mg total) by mouth 2 (two) times daily. 60 tablet 11    clindamycin (CLEOCIN) 150 MG capsule Take 3 capsules (450 mg total) by mouth every 6 (six) hours. 66 capsule 0    diclofenac sodium 1  % Gel Apply 2 g topically once daily. 100 g 1    econazole nitrate 1 % cream Apply topically once daily. Apply to feet daily as directed. 85 g 1    ergocalciferol (VITAMIN D2) 50,000 unit Cap Take 1 capsule (50,000 Units total) by mouth every 7 days. (Patient taking differently: Take 50,000 Units by mouth every Sunday. ) 12 capsule 3    fluticasone (FLONASE) 50 mcg/actuation nasal spray 2 sprays by Each Nare route once daily. (Patient taking differently: 2 sprays by Each Nare route daily as needed for Rhinitis. ) 1 Bottle 3    fluticasone-salmeterol 100-50 mcg/dose (ADVAIR DISKUS) 100-50 mcg/dose diskus inhaler INHALE 1 PUFF 2 TIMES A DAY 60 each 6    hydrALAZINE (APRESOLINE) 25 MG tablet Take 1 tablet (25 mg total) by mouth every 12 (twelve) hours. 60 tablet 11    insulin detemir (LEVEMIR FLEXTOUCH) 100 unit/mL (3 mL) SubQ InPn pen Inject 48 units at daily. 1 Box 6    insulin lispro (HUMALOG KWIKPEN) 100 unit/mL InPn pen Inject 18 units at breakfast and dinner plus scale 180-230 +2, 231-280+4, 281-330 +6, 331-380+8. 1 Box 6    lancets Misc Test daily (Patient taking differently: Test twice  daily) 100 each 12    losartan (COZAAR) 100 MG tablet 1 tab by mouth daily 30 tablet 6    torsemide (DEMADEX) 20 MG Tab Take 1 tablet (20 mg total) by mouth once daily. 90 tablet 3    trolamine salicylate (ASPERCREME) 10 % cream Apply topically as needed.       No current facility-administered medications on file prior to visit.         Review of patient's allergies indicates:   Allergen Reactions    Bactrim [sulfamethoxazole-trimethoprim]      Other reaction(s): up set stomach rash/hives    Azithromycin     Erythromycin Other (See Comments)     Other reaction(s): Unknown  Other reaction(s): Stomach upset    Gentamicin      Other reaction(s): Unknown    Levofloxacin Hives     Other reaction(s): nervousness    Vancomycin      Other reaction(s): Itching       OBJECTIVE:     Vital Signs:  Vitals:    09/08/17 0916    BP: (!) 142/64   Pulse: 72     Wt Readings from Last 1 Encounters:   09/08/17 0916 124.5 kg (274 lb 7.6 oz)     Body mass index is 55.44 kg/m².     Physical Exam:  General: Well developed, well nourished. No distress.  HEENT: Head is normocephalic, atraumatic   Eyes: Clear conjunctiva.  Neck: Supple, symmetrical neck; trachea midline.  Lungs: Clear to auscultation bilaterally and normal respiratory effort.  Cardiovascular: Heart with regular rate and rhythm.    Abdomen: Abdomen is soft, non-tender non-distended with normal bowel sounds.  Skin: Skin color, texture, turgor normal.   I removed the right LE wrapping. There is good granulation tissue to the stage II wound.  This looks improved from wound care photo few days ago.  Musculoskeletal: Normal gait.   Psychiatric: Not depressed.    Laboratory  Lab Results   Component Value Date    WBC 7.71 08/26/2017    HGB 11.7 (L) 08/26/2017    HCT 38.0 08/26/2017    MCV 92 08/26/2017     08/26/2017     BMP  Lab Results   Component Value Date     08/30/2017    K 4.2 08/30/2017     08/30/2017    CO2 25 08/30/2017    BUN 29 (H) 08/30/2017    CREATININE 1.2 08/30/2017    CALCIUM 8.4 (L) 08/30/2017    ANIONGAP 10 08/30/2017    ESTGFRAFRICA 51.1 (A) 08/30/2017    EGFRNONAA 44.3 (A) 08/30/2017     Lab Results   Component Value Date    ALT 16 08/30/2017    AST 20 08/30/2017    ALKPHOS 76 08/30/2017    BILITOT 0.4 08/30/2017     Lab Results   Component Value Date    INR 1.1 04/17/2005     Lab Results   Component Value Date    HGBA1C 8.2 (H) 08/24/2017       TRANSITION OF CARE:     [facility] On Call Contact Note: 9-1-17    Family and/or Caretaker present at visit?  No.  Diagnostic tests reviewed/disposition: No diagnosic tests pending after this hospitalization.  Disease/illness education: Cellulitis, Lymphedema, HTN, DM2.  Home health/community services discussion/referrals: Patient has home health established at Patient cannot remember. Home Health is coming at 11  am today to redo the dressing..   Establishment or re-establishment of referral orders for community resources: No other necessary community resources.   Discussion with other health care providers: No discussion with other health care providers necessary.     Medications Reconciliation:   I have reconciled the patient's home medications and discharge medications with the patient/family. I have updated all changes.  Refer to After-Visit Medication List.    ASSESSMENT & PLAN:       Traumatic open wound of right lower leg, subsequent encounter  Venous stasis dermatitis of both lower extremities  - Wound is healing with granulation tissue.    Will continue Clindamycin 300 mg QID for 7 more day after current abx course.  - Continue wound care follow up.    CKD (chronic kidney disease) stage 3, GFR 30-59 ml/min  - Stable renal function. On ARB.    Type 2 diabetes mellitus with diabetic polyneuropathy, with long-term current use of insulin  - Stable. Continue current dosage of insulin. Need to improve diet.    Mixed hyperlipidemia  Coronary artery disease due to calcified coronary lesion  Diastolic dysfunction: see ECHO 2016  Essential hypertension  - 145/60.  Did not take BP med this morning.    Regimen: Coreg 12. 5 BID, hydralazine 25 mg BID, Losartan 100 mg daily.   - Weight gain of 4-5 lbs.  Will increase Demadex to 20 mg BID for 5 days then back to daily.    Gastroesophageal reflux disease without esophagitis  - On Mylanta PRN.    Obstructive sleep apnea syndrome  Asthma in adult without complication  - PFT 17.    Morbid obesity due to excess calories  - Discussed low salt and diet.    Instructions for the patient:  Fluid Management Instructions      Take Torsemide 20 m tablet twice daily for 5 days then resume 1 tablet daily.    Monitor daily weight.  Regular activity within patient's limitations.  Low salt, low fat and low choleterol diet and restrict fluid < 2L per day.  Chew gum to avoid thirst  sensation.  Call MD if SOB, chest pain, weight gain > 2-3 lbs per day and/or 5-6 lbs per week.       Antibiotic:  Finish current Clindamycin then get new prescription: Clindamycin 300 mg 1 tablet every 6 hours for 7 more day.   Take yogurt (Activia, Greek, Tongan or Kefir) 1 serving twice daily for 10 days to prevent antibiotic associated diarrhea or yeast infection.      Scheduled Follow-up :  Future Appointments  Date Time Provider Department Center   9/15/2017 10:00 AM PULMONARY FUNCTION [facility] PULMLAB [facility]   9/15/2017 10:15 AM PULMONARY FUNCTION [facility] PULMLAB [facility]   9/15/2017 10:30 AM PULMONARY FUNCTION [facility] PULMLAB [facility]   9/15/2017 11:00 AM SIX, MINUTE WALK [facility] PUL WLK [facility]   9/15/2017 3:00 PM Shaunna Connolly NP [facility] PULMSVC [facility]   9/19/2017 9:40 AM Kellie Blue NP [facility] WOUND [facility]   10/3/2017 10:20 AM Santi Schulz MD [facility] NEPHRO [facility]   10/9/2017 1:00 PM Rhonda Anand, DPM [facility] POD [facility]   10/10/2017 9:00 AM Vale Howard MD [facility] IM [facility] PCW   11/8/2017 11:20 AM Priya Grewal MD [facility] PHYSMED [facility]   12/4/2017 8:40 AM Wade Chairez MD [facility] SLEEP [facility]   12/11/2017 7:50 AM LAB, APPOINTMENT [facility] LAB VNP [facility]   12/13/2017 11:00 AM LIBRA Seaman, FNP [facility] ENDODIA [facility]   12/20/2017 11:40 AM Db Viramontes III, MD [facility] NEURO [facility]       After Visit Medication List :     Medication List          Accurate as of 9/8/17  9:22 AM. If you have any questions, ask your nurse or doctor.               CHANGE how you take these medications    blood sugar diagnostic Strp  BG monitoring 4 times a day. Pt needs test strips for Embrace Talking meter.  What changed:  additional instructions     ergocalciferol 50,000 unit Cap  Commonly known as:  VITAMIN D2  Take 1 capsule (50,000 Units total) by mouth every 7 days.  What changed:  when to take this     fluticasone 50 mcg/actuation nasal spray  Commonly known as:  FLONASE  2  sprays by Each Nare route once daily.  What changed:  · when to take this  · reasons to take this     lancets Misc  Test daily  What changed:  additional instructions        CONTINUE taking these medications    acetaminophen 500 MG tablet  Commonly known as:  TYLENOL     albuterol 90 mcg/actuation inhaler  Inhale 2 puffs into the lungs every 6 (six) hours as needed for Wheezing or Shortness of Breath. Rescue     aluminum & magnesium hydroxide-simethicone 400-400-40 mg/5 mL suspension  Commonly known as:  MYLANTA MAX STRENGTH  Take 30 mLs by mouth every 6 (six) hours.     ammonium lactate 12 % Crea     aspirin 81 MG Chew  Take 1 tablet (81 mg total) by mouth once daily.     atorvastatin 80 MG tablet  Commonly known as:  LIPITOR  Take 1 tablet (80 mg total) by mouth once daily.     carvedilol 12.5 MG tablet  Commonly known as:  COREG  Take 1 tablet (12.5 mg total) by mouth 2 (two) times daily.     clindamycin 150 MG capsule  Commonly known as:  CLEOCIN  Take 3 capsules (450 mg total) by mouth every 6 (six) hours.     diclofenac sodium 1 % Gel  Apply 2 g topically once daily.     econazole nitrate 1 % cream  Apply topically once daily. Apply to feet daily as directed.     fluticasone-salmeterol 100-50 mcg/dose 100-50 mcg/dose diskus inhaler  Commonly known as:  ADVAIR DISKUS  INHALE 1 PUFF 2 TIMES A DAY     hydrALAZINE 25 MG tablet  Commonly known as:  APRESOLINE  Take 1 tablet (25 mg total) by mouth every 12 (twelve) hours.     insulin detemir 100 unit/mL (3 mL) Inpn pen  Commonly known as:  LEVEMIR FLEXTOUCH  Inject 48 units at daily.     insulin lispro 100 unit/mL Inpn pen  Commonly known as:  HUMALOG KWIKPEN  Inject 18 units at breakfast and dinner plus scale 180-230 +2, 231-280+4, 281-330 +6, 331-380+8.     losartan 100 MG tablet  Commonly known as:  COZAAR  1 tab by mouth daily     torsemide 20 MG Tab  Commonly known as:  DEMADEX  Take 1 tablet (20 mg total) by mouth once daily.     trolamine salicylate 10 %  cream  Commonly known as:  ASPERCREME              Signing Physician:  Umesh Higgins MD

## 2017-09-08 NOTE — PATIENT INSTRUCTIONS
Fluid Management Instructions      Take Torsemide 20 m tablet twice daily for 5 days then resume 1 tablet daily.    Monitor daily weight.  Regular activity within patient's limitations.  Low salt, low fat and low choleterol diet and restrict fluid < 2L per day.  Chew gum to avoid thirst sensation.  Call MD if SOB, chest pain, weight gain > 2-3 lbs per day and/or 5-6 lbs per week.       Antibiotic:  Finish current Clindamycin then get new prescription: Clindamycin 300 mg 1 tablet every 6 hours for 7 more day.   Take yogurt (Activia, Greek, Slovak or Kefir) 1 serving twice daily for 10 days to prevent antibiotic associated diarrhea or yeast infection.

## 2017-09-10 NOTE — TELEPHONE ENCOUNTER
Spoke with Elzbieta and advised her that they should monitor her weight and work on a low salt, Diabetic diet and weight loss. She stated that she would put in the order and verbalized understanding.

## 2017-09-14 ENCOUNTER — TELEPHONE (OUTPATIENT)
Dept: PULMONOLOGY | Facility: CLINIC | Age: 77
End: 2017-09-14

## 2017-09-14 NOTE — TELEPHONE ENCOUNTER
"Spoke with pt. I advised the pt I was calling to confirm her appointment tomorrow with the N.P.  Pt stated that she was glad she had me (alanna MILAN) on the phone because the pt stated that she was having "really bad and tight chest pain and tightness" and that it has been going on for the past two days. Pt stated that the chest tightness has been getting worse over the past two days and that she has been having labored breathing. Pt also stated that she has been having shortness of breath. Pt also stated that it is hard for her to breath going from room to room. I advised pt that it would be best to go to the ER with the symptoms that she was telling me. Pt stated that she has been in and out of the ER and does not want to go again because she just left from the ER . I asked the pt if she would mind holding while I ask the provider. The provider stated and advised for the pt to go to ER because of the symptoms she was complaining of. I advised the pt on what the provider advised and that these symptoms constitute a visit to the ER . Pt stated "ok".  "

## 2017-09-15 ENCOUNTER — HOSPITAL ENCOUNTER (OUTPATIENT)
Dept: PULMONOLOGY | Facility: CLINIC | Age: 77
Discharge: HOME | End: 2017-09-15
Payer: MEDICARE

## 2017-09-15 ENCOUNTER — HOSPITAL ENCOUNTER (OUTPATIENT)
Dept: PULMONOLOGY | Facility: CLINIC | Age: 77
Discharge: HOME | End: 2017-09-15
Payer: COMMERCIAL

## 2017-09-15 ENCOUNTER — OFFICE VISIT (OUTPATIENT)
Dept: PULMONOLOGY | Facility: CLINIC | Age: 77
End: 2017-09-15
Payer: MEDICARE

## 2017-09-15 VITALS
OXYGEN SATURATION: 99 % | TEMPERATURE: 99 F | WEIGHT: 270 LBS | BODY MASS INDEX: 46.1 KG/M2 | SYSTOLIC BLOOD PRESSURE: 140 MMHG | HEART RATE: 73 BPM | HEIGHT: 64 IN | DIASTOLIC BLOOD PRESSURE: 62 MMHG

## 2017-09-15 DIAGNOSIS — J45.909 UNCOMPLICATED ASTHMA, UNSPECIFIED ASTHMA SEVERITY: ICD-10-CM

## 2017-09-15 DIAGNOSIS — J44.9 CHRONIC OBSTRUCTIVE PULMONARY DISEASE, UNSPECIFIED COPD TYPE: Primary | ICD-10-CM

## 2017-09-15 DIAGNOSIS — E66.01 MORBID OBESITY DUE TO EXCESS CALORIES: ICD-10-CM

## 2017-09-15 DIAGNOSIS — J45.40 MODERATE PERSISTENT ASTHMA WITHOUT COMPLICATION: ICD-10-CM

## 2017-09-15 LAB
POST FEV1 FVC: 0.68
POST FEV1: 0.98
POST FVC: 1.44
PRE FEV1 FVC: 68
PRE FEV1: 1.01
PRE FVC: 1.49
PREDICTED FEV1 FVC: 78
PREDICTED FEV1: 2.04
PREDICTED FVC: 2.66

## 2017-09-15 PROCEDURE — 99213 OFFICE O/P EST LOW 20 MIN: CPT | Mod: PBBFAC,25 | Performed by: NURSE PRACTITIONER

## 2017-09-15 PROCEDURE — 3078F DIAST BP <80 MM HG: CPT | Mod: ,,, | Performed by: NURSE PRACTITIONER

## 2017-09-15 PROCEDURE — 94729 DIFFUSING CAPACITY: CPT | Mod: PBBFAC | Performed by: INTERNAL MEDICINE

## 2017-09-15 PROCEDURE — 94727 GAS DIL/WSHOT DETER LNG VOL: CPT | Mod: PBBFAC | Performed by: INTERNAL MEDICINE

## 2017-09-15 PROCEDURE — 94060 EVALUATION OF WHEEZING: CPT | Mod: PBBFAC | Performed by: INTERNAL MEDICINE

## 2017-09-15 PROCEDURE — 3077F SYST BP >= 140 MM HG: CPT | Mod: ,,, | Performed by: NURSE PRACTITIONER

## 2017-09-15 PROCEDURE — 94060 EVALUATION OF WHEEZING: CPT | Mod: 26,S$PBB,, | Performed by: INTERNAL MEDICINE

## 2017-09-15 PROCEDURE — 94729 DIFFUSING CAPACITY: CPT | Mod: 26,S$PBB,, | Performed by: INTERNAL MEDICINE

## 2017-09-15 PROCEDURE — 1126F AMNT PAIN NOTED NONE PRSNT: CPT | Mod: ,,, | Performed by: NURSE PRACTITIONER

## 2017-09-15 PROCEDURE — 99999 PR PBB SHADOW E&M-EST. PATIENT-LVL III: CPT | Mod: PBBFAC,,, | Performed by: NURSE PRACTITIONER

## 2017-09-15 PROCEDURE — 1159F MED LIST DOCD IN RCRD: CPT | Mod: ,,, | Performed by: NURSE PRACTITIONER

## 2017-09-15 PROCEDURE — 94727 GAS DIL/WSHOT DETER LNG VOL: CPT | Mod: 26,S$PBB,, | Performed by: INTERNAL MEDICINE

## 2017-09-15 PROCEDURE — 99213 OFFICE O/P EST LOW 20 MIN: CPT | Mod: 25,S$PBB,, | Performed by: NURSE PRACTITIONER

## 2017-09-15 RX ORDER — FLUTICASONE PROPIONATE AND SALMETEROL 500; 50 UG/1; UG/1
1 POWDER RESPIRATORY (INHALATION) 2 TIMES DAILY
Qty: 60 EACH | Refills: 11 | Status: SHIPPED | OUTPATIENT
Start: 2017-09-15 | End: 2018-10-04 | Stop reason: SDUPTHER

## 2017-09-15 RX ORDER — ALBUTEROL SULFATE 90 UG/1
2 AEROSOL, METERED RESPIRATORY (INHALATION) EVERY 4 HOURS PRN
Qty: 1 INHALER | Refills: 6 | Status: SHIPPED | OUTPATIENT
Start: 2017-09-15 | End: 2018-11-16 | Stop reason: SDUPTHER

## 2017-09-15 NOTE — PROGRESS NOTES
Subjective:       Patient ID: Sue Giordano is a 77 y.o. female.    Chief Complaint: Asthma/COPD    HPI  Sue Giordano is a 77 y.o. female who presents today for follow up asthma. She had a recent fall and was hospitalized for right lower extremity wound with cellulitis. She was having shortness of breath and wheezing for a few days and realized she should use her albuterol and flonase. She used both yesterday and reports her breathing is better. She is compliant with her Advair 100mcg, due to increased need for albuterol and results of PFT's (FEV1 1.01 49% with ratio of 67%) may need to increase dose. She had her echo which showed diastolic dysfunction and pulmonary HTN, which she is being followed by cards. She states she knows her weight is a contributor to her SOB as well and she needs to be more active. She has no fever, chills, or chest pain.     Review of patient's allergies indicates:   Allergen Reactions    Bactrim [sulfamethoxazole-trimethoprim]      Other reaction(s): up set stomach rash/hives    Azithromycin     Erythromycin Other (See Comments)     Other reaction(s): Unknown  Other reaction(s): Stomach upset    Gentamicin      Other reaction(s): Unknown    Levofloxacin Hives     Other reaction(s): nervousness    Vancomycin      Other reaction(s): Itching     Past Medical History:   Diagnosis Date    Adrenal mass- adenoma stable since 2004 (1.8 cm and 8/13/12 (2 cm); stable 2016 2.4 cm     Adrenal mass- adenoma stable since 2004 (1.8 cm and 8/13/12 (2 cm); stable 2016 2.4 cm    Asthma in adult without complication 6/25/2015    Bilateral carotid artery disease 7/21/2017    Bilateral sciatica 2/7/2017    Cellulitis of right leg 08/16/2017    Cerebral infarction 1/29/2016    Multiple areas of lacunar infarction. Stroke risk factors include HTN, DM2, Dyslipidemia.  Continue ASA 81mg/ Statin therapy I have encouraged 30 minutes of physical activity daily for 5 days a week. She has  access to a pool so this should not be so jarring to her joints.     Cervical radiculopathy 3/18/2015    Chronic rhinitis 4/9/2013    CKD (chronic kidney disease) stage 3, GFR 30-59 ml/min 1/29/2013    Coronary artery disease due to calcified coronary lesion 6/25/2015    Diastolic dysfunction 10/10/2013    Essential hypertension 6/25/2015    Gastroesophageal reflux disease without esophagitis 8/13/2012    Gout     Hep B w/ coma 1971    Hives 10/1/2013    Mixed hyperlipidemia 8/13/2012    Morbid obesity with BMI of 50.0-59.9, adult 2/11/2014    Obstructive sleep apnea syndrome 8/13/2012    Senile cataracts of both eyes 1/22/2015    Traumatic open wound of right lower leg 8/25/2017    Trigeminal neuralgia of right side of face 5/23/2017    For years now Previously on Gabapentin, but caused constipation Dissipating over time Not related to intracranial abnormalities Possibly related to dental procedure    Type 2 diabetes mellitus with diabetic polyneuropathy, with long-term current use of insulin 1/29/2013    Venous stasis dermatitis of both lower extremities 8/21/2017    Vitamin D deficiency disease 8/13/2012     Past Surgical History:   Procedure Laterality Date    HYSTERECTOMY  1982    secondary uterine fibroids    JOINT REPLACEMENT      Right total knee replacement       Family History     Problem Relation (Age of Onset)    Cancer Mother, Sister    Hypertension Father    No Known Problems Brother, Maternal Aunt, Maternal Uncle, Paternal Aunt, Paternal Uncle, Maternal Grandmother, Maternal Grandfather, Paternal Grandmother, Paternal Grandfather        Social History Main Topics    Smoking status: Never Smoker    Smokeless tobacco: Never Used    Alcohol use No    Drug use: No    Sexual activity: No       Review of Systems   Constitutional: Negative for fever and chills.   HENT: Negative for trouble swallowing.    Respiratory: Positive for cough, shortness of breath, wheezing and use of  "rescue inhaler.    Cardiovascular: Positive for leg swelling (recent hospital stay for cellulitis due to wound from fall). Negative for chest pain.   Musculoskeletal: Positive for gait problem.   Neurological: Negative for headaches.       Objective:       ,  Vitals:    09/15/17 1520   BP: (!) 140/62   Pulse: 73   Temp: 99.4 °F (37.4 °C)  Comment: Drank coffee right before appointment.   SpO2: 99%   Weight: 122.5 kg (270 lb)   Height: 5' 4" (1.626 m)     Physical Exam   Constitutional: She is oriented to person, place, and time. She appears well-developed and well-nourished.   HENT:   Head: Normocephalic.   Cardiovascular: Normal rate, normal heart sounds and intact distal pulses.    Pulmonary/Chest: Normal expansion, symmetric chest wall expansion, effort normal and breath sounds normal. No respiratory distress. She has no rhonchi. She has no rales.   Musculoskeletal: She exhibits edema.   Neurological: She is alert and oriented to person, place, and time.   Skin: Skin is warm and dry.   Psychiatric: She has a normal mood and affect.   Vitals reviewed.    Personal Diagnostic Review    PFT's 9/15/17 reviewed: severe obstruction air trapping decreased DLCO see flow sheet  Echo 8/23/17 reviewed: diastolic dysfunction, PAH  Assessment:         Outpatient Encounter Prescriptions as of 9/15/2017   Medication Sig Dispense Refill    acetaminophen (TYLENOL) 500 MG tablet Take 1,000 mg by mouth 2 (two) times daily as needed for Pain.      albuterol 90 mcg/actuation inhaler Inhale 2 puffs into the lungs every 6 (six) hours as needed for Wheezing or Shortness of Breath. Rescue 1 Inhaler 3    aluminum & magnesium hydroxide-simethicone (MYLANTA MAX STRENGTH) 400-400-40 mg/5 mL suspension Take 30 mLs by mouth every 6 (six) hours.  0    ammonium lactate 12 % Crea Apply topically every morning.       aspirin 81 MG Chew Take 1 tablet (81 mg total) by mouth once daily.      atorvastatin (LIPITOR) 80 MG tablet Take 1 tablet (80 " mg total) by mouth once daily. 90 tablet 3    blood sugar diagnostic Strp BG monitoring 4 times a day. Pt needs test strips for [employer] Talking meter. (Patient taking differently: BG monitoring 2 times a day. Pt needs test strips for Em[employer] Talking meter.) 450 strip prn    carvedilol (COREG) 12.5 MG tablet Take 1 tablet (12.5 mg total) by mouth 2 (two) times daily. 60 tablet 11    clindamycin (CLEOCIN) 300 MG capsule Take 1 capsule (300 mg total) by mouth every 6 (six) hours. 28 capsule 0    diclofenac sodium 1 % Gel Apply 2 g topically once daily. 100 g 1    econazole nitrate 1 % cream Apply topically once daily. Apply to feet daily as directed. 85 g 1    ergocalciferol (VITAMIN D2) 50,000 unit Cap Take 1 capsule (50,000 Units total) by mouth every 7 days. (Patient taking differently: Take 50,000 Units by mouth every Sunday. ) 12 capsule 3    fluticasone (FLONASE) 50 mcg/actuation nasal spray 2 sprays by Each Nare route once daily. (Patient taking differently: 2 sprays by Each Nare route daily as needed for Rhinitis. ) 1 Bottle 3    fluticasone-salmeterol 100-50 mcg/dose (ADVAIR DISKUS) 100-50 mcg/dose diskus inhaler INHALE 1 PUFF 2 TIMES A DAY 60 each 6    hydrALAZINE (APRESOLINE) 25 MG tablet Take 1 tablet (25 mg total) by mouth every 12 (twelve) hours. 60 tablet 11    insulin detemir (LEVEMIR FLEXTOUCH) 100 unit/mL (3 mL) SubQ InPn pen Inject 48 units at daily. 1 Box 6    insulin lispro (HUMALOG KWIKPEN) 100 unit/mL InPn pen Inject 18 units at breakfast and dinner plus scale 180-230 +2, 231-280+4, 281-330 +6, 331-380+8. 1 Box 6    lancets Misc Test daily (Patient taking differently: Test twice  daily) 100 each 12    losartan (COZAAR) 100 MG tablet 1 tab by mouth daily 30 tablet 6    torsemide (DEMADEX) 20 MG Tab Take 1 tablet (20 mg total) by mouth once daily. 90 tablet 3    trolamine salicylate (ASPERCREME) 10 % cream Apply topically as needed.       No facility-administered encounter  medications on file as of 9/15/2017.      Problem List Items Addressed This Visit        Pulmonary    Uncomplicated asthma    Relevant Medications    albuterol 90 mcg/actuation inhaler       Endocrine    Morbid obesity due to excess calories      Other Visit Diagnoses     Chronic obstructive pulmonary disease, unspecified COPD type    -  Primary        Plan:       · Increased advair to 500 mcg 1 puff 2 times daily  · Take albuterol for rescue 2 puffs every 4 hours as needed   · Continue flonase  · Continue to follow up with cardiology  Follow up in 1 month for asthma  Contact prior to if any issues or concerns should arise.   Patient verbalized understanding of all information, instruction, education, recommendations provided and agrees with plan of care.

## 2017-09-15 NOTE — PATIENT INSTRUCTIONS
· Increased advair to 500 mcg 1 puff 2 times daily  · Take albuterol for rescue 2 puffs every 4 hours as needed   · Continue flonase  Follow up in 1 month for asthma/COPD  Contact prior to if any issues or concerns should arise.

## 2017-09-19 ENCOUNTER — TELEPHONE (OUTPATIENT)
Dept: PULMONOLOGY | Facility: CLINIC | Age: 77
End: 2017-09-19

## 2017-09-19 ENCOUNTER — TELEPHONE (OUTPATIENT)
Dept: WOUND CARE | Facility: CLINIC | Age: 77
End: 2017-09-19

## 2017-09-19 ENCOUNTER — HOSPITAL ENCOUNTER (EMERGENCY)
Facility: HOSPITAL | Age: 77
Discharge: HOME | End: 2017-09-19
Attending: EMERGENCY MEDICINE
Payer: COMMERCIAL

## 2017-09-19 ENCOUNTER — TELEPHONE (OUTPATIENT)
Dept: ADMINISTRATIVE | Facility: CLINIC | Age: 77
End: 2017-09-19

## 2017-09-19 VITALS
SYSTOLIC BLOOD PRESSURE: 152 MMHG | OXYGEN SATURATION: 100 % | TEMPERATURE: 99 F | HEIGHT: 59 IN | HEART RATE: 84 BPM | WEIGHT: 270 LBS | DIASTOLIC BLOOD PRESSURE: 74 MMHG | RESPIRATION RATE: 19 BRPM | BODY MASS INDEX: 54.43 KG/M2

## 2017-09-19 DIAGNOSIS — R06.02 SOB (SHORTNESS OF BREATH): ICD-10-CM

## 2017-09-19 LAB
ALBUMIN SERPL BCP-MCNC: 3 G/DL
ALP SERPL-CCNC: 89 U/L
ALT SERPL W/O P-5'-P-CCNC: 18 U/L
ANION GAP SERPL CALC-SCNC: 11 MMOL/L
AST SERPL-CCNC: 15 U/L
BASOPHILS # BLD AUTO: 0.03 K/UL
BASOPHILS NFR BLD: 0.3 %
BILIRUB SERPL-MCNC: 0.3 MG/DL
BNP SERPL-MCNC: 27 PG/ML
BUN SERPL-MCNC: 35 MG/DL
CALCIUM SERPL-MCNC: 9.4 MG/DL
CHLORIDE SERPL-SCNC: 107 MMOL/L
CO2 SERPL-SCNC: 23 MMOL/L
CREAT SERPL-MCNC: 1.2 MG/DL
DIFFERENTIAL METHOD: ABNORMAL
EOSINOPHIL # BLD AUTO: 0.4 K/UL
EOSINOPHIL NFR BLD: 4.5 %
ERYTHROCYTE [DISTWIDTH] IN BLOOD BY AUTOMATED COUNT: 15.6 %
EST. GFR  (AFRICAN AMERICAN): 51.1 ML/MIN/1.73 M^2
EST. GFR  (NON AFRICAN AMERICAN): 44.3 ML/MIN/1.73 M^2
GLUCOSE SERPL-MCNC: 64 MG/DL
HCT VFR BLD AUTO: 33.7 %
HGB BLD-MCNC: 10.9 G/DL
LYMPHOCYTES # BLD AUTO: 2.3 K/UL
LYMPHOCYTES NFR BLD: 24.3 %
MAGNESIUM SERPL-MCNC: 2.2 MG/DL
MCH RBC QN AUTO: 28.3 PG
MCHC RBC AUTO-ENTMCNC: 32.3 G/DL
MCV RBC AUTO: 88 FL
MONOCYTES # BLD AUTO: 0.5 K/UL
MONOCYTES NFR BLD: 4.8 %
NEUTROPHILS # BLD AUTO: 6.3 K/UL
NEUTROPHILS NFR BLD: 65.9 %
PHOSPHATE SERPL-MCNC: 4 MG/DL
PLATELET # BLD AUTO: 231 K/UL
PMV BLD AUTO: 11 FL
POTASSIUM SERPL-SCNC: 4.5 MMOL/L
PROT SERPL-MCNC: 8.4 G/DL
RBC # BLD AUTO: 3.85 M/UL
SODIUM SERPL-SCNC: 141 MMOL/L
TROPONIN I SERPL DL<=0.01 NG/ML-MCNC: 0.01 NG/ML
WBC # BLD AUTO: 9.57 K/UL

## 2017-09-19 PROCEDURE — 83880 ASSAY OF NATRIURETIC PEPTIDE: CPT

## 2017-09-19 PROCEDURE — 80053 COMPREHEN METABOLIC PANEL: CPT

## 2017-09-19 PROCEDURE — 99284 EMERGENCY DEPT VISIT MOD MDM: CPT | Mod: ,,, | Performed by: EMERGENCY MEDICINE

## 2017-09-19 PROCEDURE — 84484 ASSAY OF TROPONIN QUANT: CPT

## 2017-09-19 PROCEDURE — 84100 ASSAY OF PHOSPHORUS: CPT

## 2017-09-19 PROCEDURE — 99284 EMERGENCY DEPT VISIT MOD MDM: CPT

## 2017-09-19 PROCEDURE — 83735 ASSAY OF MAGNESIUM: CPT

## 2017-09-19 PROCEDURE — 85025 COMPLETE CBC W/AUTO DIFF WBC: CPT

## 2017-09-19 NOTE — TELEPHONE ENCOUNTER
Spoke with Sue Giordano on telephone. Instructed patient to go to ER because she likely needs to be diuresed due to LE edema and crackles heard by LPN which are associated with CHF. She also probably needs IV abx if she has foul smell coming from her LE wound. Patient verbalized understanding of all information and recommendations provided and agreed.

## 2017-09-19 NOTE — TELEPHONE ENCOUNTER
----- Message from Khushboo Flores sent at 9/19/2017  1:00 PM CDT -----  Contact: Pt Home Health Nurse Amrita   Pt would like to be called back regarding speaking with doctor as soon as possible. Pt is experiencing high blood pressure. Pt wound on right lower leg is has an extreme file odor.        Pt can be reached at [phone].

## 2017-09-20 ENCOUNTER — OFFICE VISIT (OUTPATIENT)
Dept: WOUND CARE | Facility: CLINIC | Age: 77
End: 2017-09-20
Payer: COMMERCIAL

## 2017-09-20 VITALS
WEIGHT: 272.31 LBS | TEMPERATURE: 98 F | HEART RATE: 76 BPM | DIASTOLIC BLOOD PRESSURE: 70 MMHG | SYSTOLIC BLOOD PRESSURE: 153 MMHG | HEIGHT: 59 IN | BODY MASS INDEX: 54.9 KG/M2

## 2017-09-20 DIAGNOSIS — S81.801D TRAUMATIC OPEN WOUND OF RIGHT LOWER LEG, SUBSEQUENT ENCOUNTER: Primary | ICD-10-CM

## 2017-09-20 DIAGNOSIS — I87.2 VENOUS STASIS DERMATITIS OF BOTH LOWER EXTREMITIES: ICD-10-CM

## 2017-09-20 PROCEDURE — 99499 UNLISTED E&M SERVICE: CPT | Mod: S$PBB,,, | Performed by: NURSE PRACTITIONER

## 2017-09-20 PROCEDURE — 99215 OFFICE O/P EST HI 40 MIN: CPT | Mod: PBBFAC | Performed by: NURSE PRACTITIONER

## 2017-09-20 PROCEDURE — 11042 DBRDMT SUBQ TIS 1ST 20SQCM/<: CPT | Mod: PBBFAC,RT | Performed by: NURSE PRACTITIONER

## 2017-09-20 PROCEDURE — 99999 PR PBB SHADOW E&M-EST. PATIENT-LVL V: CPT | Mod: PBBFAC,,, | Performed by: NURSE PRACTITIONER

## 2017-09-20 PROCEDURE — 11042 DBRDMT SUBQ TIS 1ST 20SQCM/<: CPT | Mod: S$PBB,RT,, | Performed by: NURSE PRACTITIONER

## 2017-09-20 NOTE — DISCHARGE INSTRUCTIONS
- return to ED if SOB worsens  - instructing to increase dose of torsemide x 2 for the next 4 days.   - make an appointment with PCP

## 2017-09-20 NOTE — PROGRESS NOTES
Subjective:       Patient ID: Sue Giordano is a 77 y.o. female.    Chief Complaint: Wound Check    Wound Check     Edema     This patient is seen today for reevaluation of a traumatic wound to the right lower leg.  She was recently admitted to [facility] from 8/25-8/30/17 for cellulitis of the right leg.  She is receiving home health services through [facility].  She is supposed to have an unna boot on the leg but comes to clinic with ace wraps instead.  The wound is much larger in size than on the last visit.  She has chronic bilateral lower extremity edema.  She has had problems with lymphedema for 20 years now.  She has no pain.  She is afebrile.  Her medical history is significant for poorly controlled type II diabetes, lymphedema and chronic kidney disease.    Review of Systems  Unchanged from prior visit.  Objective:      Physical Exam   Constitutional: She is oriented to person, place, and time. She appears well-developed and well-nourished. No distress.   HENT:   Head: Normocephalic and atraumatic.   Neck: Normal range of motion.   Pulmonary/Chest: Effort normal. No respiratory distress.   Musculoskeletal: Normal range of motion. She exhibits edema. She exhibits no tenderness.        Legs:  Neurological: She is alert and oriented to person, place, and time.   Skin: Skin is warm and dry. No rash noted. She is not diaphoretic. No cyanosis or erythema. Nails show no clubbing.   Psychiatric: She has a normal mood and affect. Her behavior is normal. Judgment and thought content normal.   Nursing note and vitals reviewed.    ..  Hemoglobin A1C   Date Value Ref Range Status   08/24/2017 8.2 (H) 4.0 - 5.6 % Final     Comment:     According to ADA guidelines, hemoglobin A1c <7.0% represents  optimal control in non-pregnant diabetic patients. Different  metrics may apply to specific patient populations.   Standards of Medical Care in Diabetes-2016.  For the purpose of screening for the presence of diabetes:  <5.7%      Consistent with the absence of diabetes  5.7-6.4%  Consistent with increasing risk for diabetes   (prediabetes)  >or=6.5%  Consistent with diabetes  Currently, no consensus exists for use of hemoglobin A1c  for diagnosis of diabetes for children.  This Hemoglobin A1c assay has significant interference with fetal   hemoglobin   (HbF). The results are invalid for patients with abnormal amounts of   HbF,   including those with known Hereditary Persistence   of Fetal Hemoglobin. Heterozygous hemoglobin variants (HbAS, HbAC,   HbAD, HbAE, HbA2) do not significantly interfere with this assay;   however, presence of multiple variants in a sample may impact the %   interference.     08/21/2017 8.2 (H) 4.0 - 5.6 % Final     Comment:     According to ADA guidelines, hemoglobin A1c <7.0% represents  optimal control in non-pregnant diabetic patients. Different  metrics may apply to specific patient populations.   Standards of Medical Care in Diabetes-2016.  For the purpose of screening for the presence of diabetes:  <5.7%     Consistent with the absence of diabetes  5.7-6.4%  Consistent with increasing risk for diabetes   (prediabetes)  >or=6.5%  Consistent with diabetes  Currently, no consensus exists for use of hemoglobin A1c  for diagnosis of diabetes for children.  This Hemoglobin A1c assay has significant interference with fetal   hemoglobin   (HbF). The results are invalid for patients with abnormal amounts of   HbF,   including those with known Hereditary Persistence   of Fetal Hemoglobin. Heterozygous hemoglobin variants (HbAS, HbAC,   HbAD, HbAE, HbA2) do not significantly interfere with this assay;   however, presence of multiple variants in a sample may impact the %   interference.     05/02/2017 8.3 (H) 4.5 - 6.2 % Final     Comment:     According to ADA guidelines, hemoglobin A1C <7.0% represents  optimal control in non-pregnant diabetic patients.  Different  metrics may apply to specific populations.   Standards  of Medical Care in Diabetes - 2016.  For the purpose of screening for the presence of diabetes:  <5.7%     Consistent with the absence of diabetes  5.7-6.4%  Consistent with increasing risk for diabetes   (prediabetes)  >or=6.5%  Consistent with diabetes  Currently no consensus exists for use of hemoglobin A1C  for diagnosis of diabetes for children.     Procedure: Sue Garzon was seen in the clinic room and placed in the supine position on the treatment table. Attention was directed to the wound which was located on the right shin, where an wound measuring 1.8x5.0x0.4 cm is noted. The wound was covered with 100% fibrin. No acute signs of infection were noted. The wound was non-tender. The wound was prepped with alcohol. Utilizing a scissors and pickups, I debrided the wound full-thickness through skin and subcutaneous tissue to excise viable and nonviable tissue inside the wound margins. The patient tolerated well. Because of a lack of sensation, anesthesia was not necessary. The wound was flushed with wound . There was minimal bleeding with debridement which was well controlled with direct pressure. The wound was then dressed with medihoney gel. The patient tolerated the procedure well.    The right leg was cleansed with Easi-clense sponges and dried thoroughly.  Medihoney gel and a hydrofiber dressing was applied to the wound.  Eucerin cream was applied to the lower legs.  The patient's foot was positioned at a 90 degree angle.  A zinc oxide wrap, followed by kerlix roll gauze and coban were applied using a spiral technique avoiding creases or folds.  The wrap was started behind the first metatarsal and ended below the tibial tubercle of the knee.  There was overlap of each turn half the width of the previous turn.  The compression wrap will be changed every 3-4 days.    Assessment:       1. Traumatic open wound of right lower leg, subsequent encounter    2. Venous stasis dermatitis of both lower extremities         Plan:           Unna boot right lower leg as detailed above.  Patient was warned not to get the dressings wet and to use cast covers for showering.  Should the dressing become wet, she is to remove it, place a wet-to-dry dressing over the wound, cover with gauze and roll gauze and use ace wraps for compression and to secure bandages.  She should then notify home health as soon as possible to have a new dressing applied.  Return to clinic in 2 weeks.    Home Health Orders:  Cleanse right leg wound with wound .   Pad both ankles with ABD pads.  Apply medihoney gel to right leg wound and cover with a hydrofiber dressing.  Unna boot (zinc oxide, kerlix and coban) right lower leg.  Kerlix and coban left lower leg for compression.  Darco shoes bilateral feet for ambulation.       Right shin pre-debridement    Right shin post-debridement

## 2017-09-20 NOTE — ED TRIAGE NOTES
Chronic leg swelling. Open wound on her right leg for a month. Sob for 2 weeks. Denies chestpain, fever.      LOC: The patient is awake, alert, aware of environment with an appropriate affect. Oriented x4, speaking appropriately  APPEARANCE: Pt resting comfortably, in no acute distress, pt is clean and well groomed, clothing properly fastened  SKIN:The skin is warm and dry, color consistent with ethnicity, patient has normal skin turgor and moist mucus membranes. Open wound on her right lower leg  RESPIRATORY:Airway is open and patent, respirations are spontaneous, patient has a normal effort and rate, no accessory muscle use noted.  CARDIAC: Normal rate and rhythm, no peripheral edema noted, capillary refill < 3 seconds, bilateral radial pulses 2+.  ABDOMEN: Soft, non tender, non distended. Bowel sounds present x 4 quadrants.   NEUROLOGIC: PERRLA, facial expression is symmetrical, patient moving all extremities spontaneously, normal sensation in all extremities when touched with a finger.  Follows all commands appropriately  MUSCULOSKELETAL: Patient moving all extremities spontaneously, swelling both legs

## 2017-09-20 NOTE — ED PROVIDER NOTES
"Encounter Date: 9/19/2017    SCRIBE #1 NOTE: I, Jonna Pathak, am scribing for, and in the presence of,  Dr. Eng. I have scribed the following portions of the note - the Resident attestation.       History     Chief Complaint   Patient presents with    Leg Swelling     needing to be diuresed and leg wounds open     76 yo AAF w/ hx of HTN, DM2 on insulin, CAD, asthma, REJI, diastolic dysfunction w/ preserved EF and PA pressure 44, presenting for SOB and leg swelling (worse than baseline, has chronic lower leg lymphedema) of 2 weeks duration and orthopnea (worse recently but has been a chronic issue as well). She has recently been to the ED for leg cellulitis w/ ulcerated wound of lower right leg and was discharged on abx. She has home health for wound care and states the wound has looked worse recently, "wetter." She denies any f/a/c, n/v, cp, heart palpitations.           Review of patient's allergies indicates:   Allergen Reactions    Bactrim [sulfamethoxazole-trimethoprim]      Other reaction(s): up set stomach rash/hives    Azithromycin     Erythromycin Other (See Comments)     Other reaction(s): Unknown  Other reaction(s): Stomach upset    Gentamicin      Other reaction(s): Unknown    Levofloxacin Hives     Other reaction(s): nervousness    Vancomycin      Other reaction(s): Itching     Past Medical History:   Diagnosis Date    Adrenal mass- adenoma stable since 2004 (1.8 cm and 8/13/12 (2 cm); stable 2016 2.4 cm     Adrenal mass- adenoma stable since 2004 (1.8 cm and 8/13/12 (2 cm); stable 2016 2.4 cm    Asthma in adult without complication 6/25/2015    Bilateral carotid artery disease 7/21/2017    Bilateral sciatica 2/7/2017    Cellulitis of right leg 08/16/2017    Cerebral infarction 1/29/2016    Multiple areas of lacunar infarction. Stroke risk factors include HTN, DM2, Dyslipidemia.  Continue ASA 81mg/ Statin therapy I have encouraged 30 minutes of physical activity daily for 5 days a week. " She has access to a pool so this should not be so jarring to her joints.     Cervical radiculopathy 3/18/2015    Chronic rhinitis 4/9/2013    CKD (chronic kidney disease) stage 3, GFR 30-59 ml/min 1/29/2013    Coronary artery disease due to calcified coronary lesion 6/25/2015    Diastolic dysfunction 10/10/2013    Essential hypertension 6/25/2015    Gastroesophageal reflux disease without esophagitis 8/13/2012    Gout     Hep B w/ coma 1971    Hives 10/1/2013    Mixed hyperlipidemia 8/13/2012    Morbid obesity with BMI of 50.0-59.9, adult 2/11/2014    Obstructive sleep apnea syndrome 8/13/2012    Senile cataracts of both eyes 1/22/2015    Traumatic open wound of right lower leg 8/25/2017    Trigeminal neuralgia of right side of face 5/23/2017    For years now Previously on Gabapentin, but caused constipation Dissipating over time Not related to intracranial abnormalities Possibly related to dental procedure    Type 2 diabetes mellitus with diabetic polyneuropathy, with long-term current use of insulin 1/29/2013    Venous stasis dermatitis of both lower extremities 8/21/2017    Vitamin D deficiency disease 8/13/2012     Past Surgical History:   Procedure Laterality Date    HYSTERECTOMY  1982    secondary uterine fibroids    JOINT REPLACEMENT      Right total knee replacement       Family History   Problem Relation Age of Onset    Cancer Mother     Hypertension Father     Cancer Sister     No Known Problems Brother     No Known Problems Maternal Aunt     No Known Problems Maternal Uncle     No Known Problems Paternal Aunt     No Known Problems Paternal Uncle     No Known Problems Maternal Grandmother     No Known Problems Maternal Grandfather     No Known Problems Paternal Grandmother     No Known Problems Paternal Grandfather     Glaucoma Neg Hx     Breast cancer Neg Hx     Colon cancer Neg Hx     Ovarian cancer Neg Hx     Stroke Neg Hx     Allergic rhinitis Neg Hx      Allergies Neg Hx     Angioedema Neg Hx     Asthma Neg Hx     Atopy Neg Hx     Eczema Neg Hx     Immunodeficiency Neg Hx     Rhinitis Neg Hx     Urticaria Neg Hx     Amblyopia Neg Hx     Blindness Neg Hx     Cataracts Neg Hx     Diabetes Neg Hx     Macular degeneration Neg Hx     Retinal detachment Neg Hx     Strabismus Neg Hx     Thyroid disease Neg Hx     Psoriasis Neg Hx      Social History   Substance Use Topics    Smoking status: Never Smoker    Smokeless tobacco: Never Used    Alcohol use No     Review of Systems   Constitutional: Positive for unexpected weight change (increased weight). Negative for fever.   HENT: Negative for sore throat.    Respiratory: Positive for shortness of breath. Negative for cough and chest tightness.    Cardiovascular: Positive for leg swelling. Negative for chest pain and palpitations.   Gastrointestinal: Positive for abdominal distention and abdominal pain. Negative for nausea.   Genitourinary: Negative for dysuria.   Musculoskeletal: Negative for back pain.        Lower leg pain R > L.   Skin: Negative for rash.   Neurological: Negative for weakness.   Hematological: Does not bruise/bleed easily.       Physical Exam     Initial Vitals [09/19/17 1623]   BP Pulse Resp Temp SpO2   (!) 162/69 66 18 99.3 °F (37.4 °C) 97 %      MAP       100         Physical Exam    Constitutional: She appears well-developed.   HENT:   Head: Normocephalic and atraumatic.   Eyes: Pupils are equal, round, and reactive to light.   Cardiovascular: Normal rate, regular rhythm, normal heart sounds and intact distal pulses.   No murmur heard.  Pulmonary/Chest: Breath sounds normal. No respiratory distress. She has no wheezes. She has no rhonchi. She has no rales.   Abdominal: Soft. Bowel sounds are normal. She exhibits no distension. There is no tenderness.   Musculoskeletal: She exhibits no edema.         ED Course   Procedures  Labs Reviewed   TROPONIN I   MAGNESIUM   PHOSPHORUS   B-TYPE  "NATRIURETIC PEPTIDE   CBC W/ AUTO DIFFERENTIAL   COMPREHENSIVE METABOLIC PANEL             Medical Decision Making:   History:   Old Medical Records: I decided to obtain old medical records.  Initial Assessment:   76 yo AAF w/ hx of HTN, DM II, CAD, REJI, and HFwPEF presenting w/ 2 week history of SOB and leg swelling. She has had chronic lymphedema of her legs for the past 20 years but states her swelling is worse recently. She is compliant with medications. She does not have chest pain, tachycardia, tachypnea, nausea, or vomiting and on ascultation her lungs are clear. CXR shows no pulmonary edema. Cardiac markers are not elevated. She is having increased orthopnea.      She has a recent episode of cellulitis for which she is receiving home health care and is on antibiotics and expresses that the wound looks worse to her, in her words looks "wetter" but on examination appears to have granulation tissues, does not look grossly infected, no rubor or calor. She has no leukocytosis and she reports no symptoms of fever. At this time her presentation is most concerning for exacerbation of her HF.   Differential Diagnosis:   HF exacerbation  ACS  Cellulitis  Exacerbation of her lymphedema  Clinical Tests:   Lab Tests: Ordered and Reviewed  Radiological Study: Ordered and Reviewed  Medical Tests: Ordered and Reviewed  ED Management:  EKG, CXR, CBC, CMP, BMP, troponins.   Given her CXR, EKG, cardiac enzymes and signs of infection are all wnl we feel the patient is stable for discharge.   Discharged w/ instructions to double dose of torsemide for the next four days and to f/u with her PCP and to avoid salty foods.   Instructions to return if symptoms worsen.             Scribe Attestation:   Scribe #1: I performed the above scribed service and the documentation accurately describes the services I performed. I attest to the accuracy of the note.    Attending Attestation:   Physician Attestation Statement for Resident:  As the " supervising MD   Physician Attestation Statement: I have personally seen and examined this patient.   I agree with the above history. -: 77 y.o. female with history of HTN, diabetes type II, CAD, REJI presents with 2 weeks of SOB accompanied with some leg swelling. Pt states she has had lymphedema of the legs for 20 years but states it is getting worse. She states she has been feeling SOB when lying down at home.    As the supervising MD I agree with the above PE.   -: No tachypnea. Lung clear. No crackles at the bases. Mild JVD.    As the supervising MD I agree with the above treatment, course, plan, and disposition.   -: Will get labs, cardiac markers, and chest x-ray.   I have reviewed and agree with the residents interpretation of the following: lab data, x-rays and EKG.          Physician Attestation for Scribe:  Physician Attestation Statement for Scribe #1: I, Dr. Eng, reviewed documentation, as scribed by Jonna Pathak in my presence, and it is both accurate and complete.                 ED Course as of Sep 19 2052   Tue Sep 19, 2017   2050 No acute pulmonary findings X-Ray Chest PA And Lateral [MO]   2051 No leukocytosis, less concerned for bacteremia or active cellulitis WBC: 9.57 [MO]      ED Course User Index  [MO] Curry Avalos MD     Clinical Impression:   The encounter diagnosis was SOB (shortness of breath).    Disposition:   Disposition: Discharged  Condition: Stable                        Nirav Stewart MD  09/24/17 0053

## 2017-09-24 ENCOUNTER — TELEPHONE (OUTPATIENT)
Dept: INTERNAL MEDICINE | Facility: CLINIC | Age: 77
End: 2017-09-24

## 2017-09-24 NOTE — TELEPHONE ENCOUNTER
Can you check if she might be able to see Dr Schmidt?  Multiple hospital stays for edema and cellulitis, non compliance    thanks

## 2017-09-25 NOTE — TELEPHONE ENCOUNTER
Yes, please reach out to have her establish care. We can see her at first available new patient appointment slot.    Thanks,  BARB

## 2017-09-26 NOTE — TELEPHONE ENCOUNTER
Patient has been scheduled to come in on this Friday . Patient has been advised to please bring in all medications and OTC supplements  . Patient has also been informed to write down questions  which will be discussed at Appt. .     Patient verbalized great understanding and informed me that she is looking forward to this appt.

## 2017-09-28 ENCOUNTER — TELEPHONE (OUTPATIENT)
Dept: INTERNAL MEDICINE | Facility: CLINIC | Age: 77
End: 2017-09-28

## 2017-09-28 NOTE — TELEPHONE ENCOUNTER
----- Message from Sol Angeles sent at 9/28/2017 10:30 AM CDT -----  Contact: pt [phone]  Patient is returning a phone call.  Who left a message for the patient: Leeanna  Does patient know what this is regarding:  A message was left yesterday  Comments:

## 2017-09-29 ENCOUNTER — OFFICE VISIT (OUTPATIENT)
Dept: WOUND CARE | Facility: CLINIC | Age: 77
End: 2017-09-29
Payer: MEDICARE

## 2017-09-29 ENCOUNTER — OFFICE VISIT (OUTPATIENT)
Dept: INTERNAL MEDICINE | Facility: CLINIC | Age: 77
End: 2017-09-29
Payer: COMMERCIAL

## 2017-09-29 VITALS
HEART RATE: 60 BPM | BODY MASS INDEX: 54.66 KG/M2 | HEIGHT: 59 IN | WEIGHT: 271.13 LBS | SYSTOLIC BLOOD PRESSURE: 170 MMHG | DIASTOLIC BLOOD PRESSURE: 72 MMHG | TEMPERATURE: 98 F

## 2017-09-29 VITALS
OXYGEN SATURATION: 95 % | BODY MASS INDEX: 54.62 KG/M2 | WEIGHT: 270.94 LBS | SYSTOLIC BLOOD PRESSURE: 110 MMHG | TEMPERATURE: 99 F | HEART RATE: 96 BPM | HEIGHT: 59 IN | DIASTOLIC BLOOD PRESSURE: 52 MMHG

## 2017-09-29 DIAGNOSIS — I87.2 VENOUS STASIS DERMATITIS OF BOTH LOWER EXTREMITIES: ICD-10-CM

## 2017-09-29 DIAGNOSIS — E11.29 TYPE 2 DIABETES MELLITUS WITH RENAL MANIFESTATIONS NOT AT GOAL: ICD-10-CM

## 2017-09-29 DIAGNOSIS — S81.801D TRAUMATIC OPEN WOUND OF RIGHT LOWER LEG, SUBSEQUENT ENCOUNTER: ICD-10-CM

## 2017-09-29 DIAGNOSIS — R60.0 BILATERAL LEG EDEMA: ICD-10-CM

## 2017-09-29 DIAGNOSIS — E66.01 MORBID OBESITY DUE TO EXCESS CALORIES: ICD-10-CM

## 2017-09-29 DIAGNOSIS — S81.801A TRAUMATIC OPEN WOUND OF RIGHT LOWER LEG, INITIAL ENCOUNTER: Primary | ICD-10-CM

## 2017-09-29 DIAGNOSIS — E55.9 VITAMIN D DEFICIENCY DISEASE: ICD-10-CM

## 2017-09-29 PROCEDURE — 99499 UNLISTED E&M SERVICE: CPT | Mod: S$PBB,,, | Performed by: NURSE PRACTITIONER

## 2017-09-29 PROCEDURE — 29580 STRAPPING UNNA BOOT: CPT | Mod: S$PBB,RT,, | Performed by: NURSE PRACTITIONER

## 2017-09-29 PROCEDURE — 99215 OFFICE O/P EST HI 40 MIN: CPT | Mod: S$GLB,,, | Performed by: INTERNAL MEDICINE

## 2017-09-29 PROCEDURE — 29580 STRAPPING UNNA BOOT: CPT | Mod: PBBFAC,RT | Performed by: NURSE PRACTITIONER

## 2017-09-29 PROCEDURE — 99999 PR PBB SHADOW E&M-EST. PATIENT-LVL III: CPT | Mod: PBBFAC,,, | Performed by: INTERNAL MEDICINE

## 2017-09-29 PROCEDURE — 99999 PR PBB SHADOW E&M-EST. PATIENT-LVL V: CPT | Mod: PBBFAC,,, | Performed by: NURSE PRACTITIONER

## 2017-09-29 PROCEDURE — 99215 OFFICE O/P EST HI 40 MIN: CPT | Mod: PBBFAC,25 | Performed by: NURSE PRACTITIONER

## 2017-09-29 NOTE — PATIENT INSTRUCTIONS
"TODAY:  - only water as beverage  - Wed cut back on food (no desserts)   Her average breakfast: oatmeal with 7 raisins, pitted prunes with cottage cheese  Lunch: "all kinds of stuff goes on there" salad, meat, vegetables  Dinner: pot pies, pizza, sausage, spaghetti  "

## 2017-09-29 NOTE — PROGRESS NOTES
Subjective:       Patient ID: Sue Giordano is a 77 y.o. female.    Chief Complaint: No chief complaint on file.    Wound Check     Edema     This patient is seen today for reevaluation of a traumatic wound to the right lower leg.  She was recently admitted to [facility] from 8/25-8/30/17 for cellulitis of the right leg.  She is receiving home health services through [facility].  An unna boot is on the leg and the wound is healing as evidenced by increased granulation and wound contracture.  She has chronic bilateral lower extremity edema.  She has had problems with lymphedema for 20 years now.  She has no pain.  She is afebrile.  Her medical history is significant for poorly controlled type II diabetes, lymphedema and chronic kidney disease.    Review of Systems  Unchanged from prior visit.  Objective:      Physical Exam   Constitutional: She is oriented to person, place, and time. She appears well-developed and well-nourished. No distress.   HENT:   Head: Normocephalic and atraumatic.   Neck: Normal range of motion.   Pulmonary/Chest: Effort normal. No respiratory distress.   Musculoskeletal: Normal range of motion. She exhibits edema. She exhibits no tenderness.        Legs:  Neurological: She is alert and oriented to person, place, and time.   Skin: Skin is warm and dry. No rash noted. She is not diaphoretic. No cyanosis or erythema. Nails show no clubbing.   Psychiatric: She has a normal mood and affect. Her behavior is normal. Judgment and thought content normal.   Nursing note and vitals reviewed.    ..  Hemoglobin A1C   Date Value Ref Range Status   08/24/2017 8.2 (H) 4.0 - 5.6 % Final     Comment:     According to ADA guidelines, hemoglobin A1c <7.0% represents  optimal control in non-pregnant diabetic patients. Different  metrics may apply to specific patient populations.   Standards of Medical Care in Diabetes-2016.  For the purpose of screening for the presence of diabetes:  <5.7%     Consistent with the  absence of diabetes  5.7-6.4%  Consistent with increasing risk for diabetes   (prediabetes)  >or=6.5%  Consistent with diabetes  Currently, no consensus exists for use of hemoglobin A1c  for diagnosis of diabetes for children.  This Hemoglobin A1c assay has significant interference with fetal   hemoglobin   (HbF). The results are invalid for patients with abnormal amounts of   HbF,   including those with known Hereditary Persistence   of Fetal Hemoglobin. Heterozygous hemoglobin variants (HbAS, HbAC,   HbAD, HbAE, HbA2) do not significantly interfere with this assay;   however, presence of multiple variants in a sample may impact the %   interference.     08/21/2017 8.2 (H) 4.0 - 5.6 % Final     Comment:     According to ADA guidelines, hemoglobin A1c <7.0% represents  optimal control in non-pregnant diabetic patients. Different  metrics may apply to specific patient populations.   Standards of Medical Care in Diabetes-2016.  For the purpose of screening for the presence of diabetes:  <5.7%     Consistent with the absence of diabetes  5.7-6.4%  Consistent with increasing risk for diabetes   (prediabetes)  >or=6.5%  Consistent with diabetes  Currently, no consensus exists for use of hemoglobin A1c  for diagnosis of diabetes for children.  This Hemoglobin A1c assay has significant interference with fetal   hemoglobin   (HbF). The results are invalid for patients with abnormal amounts of   HbF,   including those with known Hereditary Persistence   of Fetal Hemoglobin. Heterozygous hemoglobin variants (HbAS, HbAC,   HbAD, HbAE, HbA2) do not significantly interfere with this assay;   however, presence of multiple variants in a sample may impact the %   interference.     05/02/2017 8.3 (H) 4.5 - 6.2 % Final     Comment:     According to ADA guidelines, hemoglobin A1C <7.0% represents  optimal control in non-pregnant diabetic patients.  Different  metrics may apply to specific populations.   Standards of Medical Care in  Diabetes - 2016.  For the purpose of screening for the presence of diabetes:  <5.7%     Consistent with the absence of diabetes  5.7-6.4%  Consistent with increasing risk for diabetes   (prediabetes)  >or=6.5%  Consistent with diabetes  Currently no consensus exists for use of hemoglobin A1C  for diagnosis of diabetes for children.     Procedure: Sue Garzon was seen in the clinic room and placed in the supine position on the treatment table. Attention was directed to the wound which was located on the right shin, where an wound measuring 1.5x4.3x0.3 cm is noted. The wound was covered with 100% fibrin. No acute signs of infection were noted. The wound was non-tender. The wound was prepped with alcohol. Utilizing a scissors and pickups, I debrided the wound full-thickness through skin and subcutaneous tissue to excise viable and nonviable tissue inside the wound margins. The patient tolerated well. Because of a lack of sensation, anesthesia was not necessary. The wound was flushed with wound . There was minimal bleeding with debridement which was well controlled with direct pressure. The wound was then dressed with medihoney gel. The patient tolerated the procedure well.    The right leg was cleansed with Easi-clense sponges and dried thoroughly.  Medihoney gel and a hydrofiber dressing was applied to the wound.  Eucerin cream was applied to the lower legs.  The patient's foot was positioned at a 90 degree angle.  A zinc oxide wrap, followed by kerlix roll gauze and coban were applied using a spiral technique avoiding creases or folds.  The wrap was started behind the first metatarsal and ended below the tibial tubercle of the knee.  There was overlap of each turn half the width of the previous turn.  The compression wrap will be changed every 3-4 days.    Assessment:       1. Traumatic open wound of right lower leg, initial encounter    2. Venous stasis dermatitis of both lower extremities    3. Bilateral leg  edema        Plan:           Unna boot right lower leg as detailed above.  Patient was warned not to get the dressings wet and to use cast covers for showering.  Should the dressing become wet, she is to remove it, place a wet-to-dry dressing over the wound, cover with gauze and roll gauze and use ace wraps for compression and to secure bandages.  She should then notify home health as soon as possible to have a new dressing applied.  Return to clinic in 2 weeks.    Home Health Orders:  Cleanse right leg wound with wound .   Pad both ankles with ABD pads.  Apply medihoney gel to right leg wound and cover with a hydrofiber dressing.  Unna boot (zinc oxide, kerlix and coban) right lower leg.  Kerlix and coban left lower leg for compression.  Darco shoes bilateral feet for ambulation.     Right shin post-debridement

## 2017-09-29 NOTE — PROGRESS NOTES
"Primary Care Provider Appointment    Subjective:      Patient ID: Sue Giordano is a 77 y.o. female with chronic LLE ulcer, severe obesity, REJI, DM    Chief Complaint: Establish Care (Patient is here to Est. Care) and Knee Pain (1+ day , Patient stated that she has arthritis)    Patient was transferred to this clinic by Dr Howard due to high ED utilization and compliance issues.     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 She sees wound care today.    She is noncompliant with CPAP. She does minimal exercise with home PT.    She reports her fasting glucose have been "good" at around 118. She compliant with levemir 48, humalog 18-18.    Her average breakfast: oatmeal with 7 raisins, pitted prunes with cottage cheese  Lunch: "all kinds of stuff goes on there" salad, meat, vegetables  Dinner: pot pies, pizza, sausage, spaghetti  She states that she eats fritos after soaking them in water to "get the salt off"    Social History  Components    Tobacco (-)     Alcohol (-) rare    Ililcit drugs (-)    Sex (-) nun    Employment (+) Retired principal from [employer], now supervisor of [employer]         Past Surgical History:   Procedure Laterality Date    HYSTERECTOMY  1982    secondary uterine fibroids    JOINT REPLACEMENT      Right total knee replacement         Past Medical History:   Diagnosis Date    Adrenal mass- adenoma stable since 2004 (1.8 cm and 8/13/12 (2 cm); stable 2016 2.4 cm     Adrenal mass- adenoma stable since 2004 (1.8 cm and 8/13/12 (2 cm); stable 2016 2.4 cm    Asthma in adult without complication 6/25/2015    Bilateral carotid artery disease 7/21/2017    Bilateral sciatica 2/7/2017    Cellulitis of right leg 08/16/2017    Cerebral infarction 1/29/2016    Multiple " areas of lacunar infarction. Stroke risk factors include HTN, DM2, Dyslipidemia.  Continue ASA 81mg/ Statin therapy I have encouraged 30 minutes of physical activity daily for 5 days a week. She has access to a pool so this should not be so jarring to her joints.     Cervical radiculopathy 3/18/2015    Chronic rhinitis 4/9/2013    CKD (chronic kidney disease) stage 3, GFR 30-59 ml/min 1/29/2013    Coronary artery disease due to calcified coronary lesion 6/25/2015    Diastolic dysfunction 10/10/2013    Essential hypertension 6/25/2015    Gastroesophageal reflux disease without esophagitis 8/13/2012    Gout     Hep B w/ coma 1971    Hives 10/1/2013    Mixed hyperlipidemia 8/13/2012    Morbid obesity with BMI of 50.0-59.9, adult 2/11/2014    Obstructive sleep apnea syndrome 8/13/2012    Senile cataracts of both eyes 1/22/2015    Traumatic open wound of right lower leg 8/25/2017    Trigeminal neuralgia of right side of face 5/23/2017    For years now Previously on Gabapentin, but caused constipation Dissipating over time Not related to intracranial abnormalities Possibly related to dental procedure    Type 2 diabetes mellitus with diabetic polyneuropathy, with long-term current use of insulin 1/29/2013    Venous stasis dermatitis of both lower extremities 8/21/2017    Vitamin D deficiency disease 8/13/2012         Family History   Problem Relation Age of Onset    Cancer Mother     Hypertension Father     Cancer Sister     No Known Problems Brother     No Known Problems Maternal Aunt     No Known Problems Maternal Uncle     No Known Problems Paternal Aunt     No Known Problems Paternal Uncle     No Known Problems Maternal Grandmother     No Known Problems Maternal Grandfather     No Known Problems Paternal Grandmother     No Known Problems Paternal Grandfather     Glaucoma Neg Hx     Breast cancer Neg Hx     Colon cancer Neg Hx     Ovarian cancer Neg Hx     Stroke Neg Hx     Allergic  "rhinitis Neg Hx     Allergies Neg Hx     Angioedema Neg Hx     Asthma Neg Hx     Atopy Neg Hx     Eczema Neg Hx     Immunodeficiency Neg Hx     Rhinitis Neg Hx     Urticaria Neg Hx     Amblyopia Neg Hx     Blindness Neg Hx     Cataracts Neg Hx     Diabetes Neg Hx     Macular degeneration Neg Hx     Retinal detachment Neg Hx     Strabismus Neg Hx     Thyroid disease Neg Hx     Psoriasis Neg Hx        Review of Systems   Constitutional: Positive for activity change and appetite change. Negative for unexpected weight change.   Cardiovascular: Positive for leg swelling.   Gastrointestinal: Negative for constipation and diarrhea.   Skin: Positive for wound.   Hematological: Does not bruise/bleed easily.       Objective:   BP (!) 110/52 (BP Location: Right arm, Patient Position: Sitting, BP Method: Medium (Manual))   Pulse 96   Temp 98.5 °F (36.9 °C)   Ht 4' 11" (1.499 m)   Wt 122.9 kg (270 lb 15.1 oz)   SpO2 95%   BMI 54.72 kg/m²     Physical Exam   Constitutional: She appears well-developed.   Severe obesity   Eyes: EOM are normal.   Cardiovascular: Normal rate.    Pulmonary/Chest: Effort normal.   Abdominal:   Obese abdomen   Musculoskeletal: She exhibits edema.   Legs in unna boots bilaterally  Open ulcers on exam during wound care evaluation   Neurological: She is alert.   Psychiatric: She has a normal mood and affect.   Poor insight into disease           Lab Results   Component Value Date    WBC 9.57 09/19/2017    HGB 10.9 (L) 09/19/2017    HCT 33.7 (L) 09/19/2017     09/19/2017    CHOL 177 05/02/2017    TRIG 92 05/02/2017    HDL 39 (L) 05/02/2017    ALT 18 09/19/2017    AST 15 09/19/2017     09/19/2017    K 4.5 09/19/2017     09/19/2017    CREATININE 1.2 09/19/2017    BUN 35 (H) 09/19/2017    CO2 23 09/19/2017    TSH 0.830 05/02/2017    INR 1.1 04/17/2005    HGBA1C 8.2 (H) 08/24/2017       Current Outpatient Prescriptions on File Prior to Visit   Medication Sig Dispense " Refill    acetaminophen (TYLENOL) 500 MG tablet Take 1,000 mg by mouth 2 (two) times daily as needed for Pain.      albuterol 90 mcg/actuation inhaler Inhale 2 puffs into the lungs every 4 (four) hours as needed for Wheezing or Shortness of Breath. Rescue 1 Inhaler 6    aluminum & magnesium hydroxide-simethicone (MYLANTA MAX STRENGTH) 400-400-40 mg/5 mL suspension Take 30 mLs by mouth every 6 (six) hours.  0    ammonium lactate 12 % Crea Apply topically every morning.       aspirin 81 MG Chew Take 1 tablet (81 mg total) by mouth once daily.      blood sugar diagnostic Strp BG monitoring 4 times a day. Pt needs test strips for Embrace Talking meter. (Patient taking differently: BG monitoring 2 times a day. Pt needs test strips for Embrace Talking meter.) 450 strip prn    carvedilol (COREG) 12.5 MG tablet Take 1 tablet (12.5 mg total) by mouth 2 (two) times daily. 60 tablet 11    diclofenac sodium 1 % Gel Apply 2 g topically once daily. 100 g 1    econazole nitrate 1 % cream Apply topically once daily. Apply to feet daily as directed. 85 g 1    ergocalciferol (VITAMIN D2) 50,000 unit Cap Take 1 capsule (50,000 Units total) by mouth every 7 days. (Patient taking differently: Take 50,000 Units by mouth every Sunday. ) 12 capsule 3    fluticasone (FLONASE) 50 mcg/actuation nasal spray 2 sprays by Each Nare route once daily. (Patient taking differently: 2 sprays by Each Nare route daily as needed for Rhinitis. ) 1 Bottle 3    fluticasone-salmeterol 500-50 mcg/dose (ADVAIR DISKUS) 500-50 mcg/dose DsDv diskus inhaler Inhale 1 puff into the lungs 2 (two) times daily. Controller 60 each 11    hydrALAZINE (APRESOLINE) 25 MG tablet Take 1 tablet (25 mg total) by mouth every 12 (twelve) hours. 60 tablet 11    insulin detemir (LEVEMIR FLEXTOUCH) 100 unit/mL (3 mL) SubQ InPn pen Inject 48 units at daily. 1 Box 6    insulin lispro (HUMALOG KWIKPEN) 100 unit/mL InPn pen Inject 18 units at breakfast and dinner plus  scale 180-230 +2, 231-280+4, 281-330 +6, 331-380+8. 1 Box 6    lancets Misc Test daily (Patient taking differently: Test twice  daily) 100 each 12    losartan (COZAAR) 100 MG tablet 1 tab by mouth daily 30 tablet 6    torsemide (DEMADEX) 20 MG Tab Take 1 tablet (20 mg total) by mouth once daily. 90 tablet 3    trolamine salicylate (ASPERCREME) 10 % cream Apply topically as needed.       No current facility-administered medications on file prior to visit.          Assessment:   77 y.o. female with multiple co-morbid illnesses here to establish care with new PCP and continue work-up of chronic issues notably chronic LLE ulcer, severe obesity, REJI, DM.     Plan:     Problem List Items Addressed This Visit        Cardiac/Vascular    Venous stasis dermatitis of both lower extremities     LE swelling bilaterally with poor wound healing, routine La Nena boot with wound care  · Continue La Nena boot, per wound care            Endocrine    Vitamin D deficiency disease     Level 30 in 5/2017  · Continue daily supplementation         Morbid obesity due to excess calories     BMI >50, eats excessively, DM, HTN, REJI  · Counseled on restricting caloric intake one day per week- Wed  · No desserts, reduced carbs            Orthopedic    Traumatic open wound of right lower leg     Chronic nonhealing wound of RLE, wearing La Nena boot  · Wound care appt today           Other Visit Diagnoses     Type 2 diabetes mellitus with renal manifestations not at goal        Relevant Medications    atorvastatin (LIPITOR) 80 MG tablet          Health Maintenance       Date Due Completion Date    TETANUS VACCINE [birth year]/1960 ---    Influenza Vaccine 08/01/2017 10/28/2016    Override on 10/7/2015: Done    Override on 10/3/2014: Done    Override on 10/10/2013: Done    Override on 9/13/2011: Done    DEXA SCAN 01/26/2018 1/26/2015    Override on 12/13/2011: Done    Eye Exam 02/03/2018 2/3/2017    Override on 2/17/2016: Done    Override on 1/22/2015: Done     Override on 8/16/2012: Done    Hemoglobin A1c 02/24/2018 8/24/2017    Override on 7/13/2016: Done    Lipid Panel 05/02/2018 5/2/2017    Foot Exam 05/31/2018 5/31/2017 (Done)    Override on 5/31/2017: Done    Override on 7/20/2016: Done    Colonoscopy 08/13/2020 8/13/2010 (Done)    Override on 8/13/2010: Done          Return in about 6 weeks (around 11/10/2017). One hour spent with this patient today, half of that in counseling.    Tami Schmidt MD/MPH  Internal Medicine  [facility]  [phone]

## 2017-10-03 ENCOUNTER — OFFICE VISIT (OUTPATIENT)
Dept: NEPHROLOGY | Facility: CLINIC | Age: 77
End: 2017-10-03
Payer: MEDICARE

## 2017-10-03 VITALS
HEIGHT: 59 IN | DIASTOLIC BLOOD PRESSURE: 60 MMHG | OXYGEN SATURATION: 95 % | WEIGHT: 271.19 LBS | BODY MASS INDEX: 54.67 KG/M2 | HEART RATE: 79 BPM | SYSTOLIC BLOOD PRESSURE: 130 MMHG

## 2017-10-03 DIAGNOSIS — M50.30 DDD (DEGENERATIVE DISC DISEASE), CERVICAL: ICD-10-CM

## 2017-10-03 DIAGNOSIS — N18.30 CHRONIC KIDNEY DISEASE (CKD), STAGE III (MODERATE): Primary | ICD-10-CM

## 2017-10-03 DIAGNOSIS — I89.0 LYMPHEDEMA OF LOWER EXTREMITY, UNSPECIFIED LATERALITY: ICD-10-CM

## 2017-10-03 DIAGNOSIS — I10 ESSENTIAL HYPERTENSION: ICD-10-CM

## 2017-10-03 DIAGNOSIS — E11.21 DIABETIC NEPHROPATHY ASSOCIATED WITH TYPE 2 DIABETES MELLITUS: ICD-10-CM

## 2017-10-03 PROCEDURE — 99999 PR PBB SHADOW E&M-EST. PATIENT-LVL IV: CPT | Mod: PBBFAC,,, | Performed by: INTERNAL MEDICINE

## 2017-10-03 PROCEDURE — 99214 OFFICE O/P EST MOD 30 MIN: CPT | Mod: PBBFAC | Performed by: INTERNAL MEDICINE

## 2017-10-03 PROCEDURE — 99214 OFFICE O/P EST MOD 30 MIN: CPT | Mod: S$PBB,,, | Performed by: INTERNAL MEDICINE

## 2017-10-03 NOTE — ASSESSMENT & PLAN NOTE
LE swelling bilaterally with poor wound healing, routine La Nena boot with wound care  · Continue La Nena boot, per wound care

## 2017-10-03 NOTE — PROGRESS NOTES
Patient is here today for follow up evaluation of CKD III. Last seen in renal office 4/6/17. Baseline Cr 1.0-1.2 mg/dl. Her most recent lab (9/19/17) Cr 1.2 mg/dl; BUN; 35 mg/dl; eGFR; 41ml/min; potassium 4.5 mmol/L. Recent fall with traumatic injury to RLE; otherwise no new complaints    ROS;  Morbidly obese woman; no acute distress; ambulating with the assistance of walker  Mood and Affect; Appropriate  Otherwise non-contributory    Exam  HEENT; Grossly Intact  CHEST; Clear P&A; no rales or rhonchi  HEART; RR; S1&S2 no murmur rub gallop  ABD; Obese; non-tender; (-)CVAT  EXT; (-)Edema    Impression  OSCAR/CKD Increase BUN/Cr; Overdiuresis and/or  Poorly controlled diabetes  Hold torsemide 3 d then restart at 10 mg/d  Repeat labs 2 weeks    Plan  Return Visit; 3 mo; pending above

## 2017-10-03 NOTE — ASSESSMENT & PLAN NOTE
BMI >50, eats excessively, DM, HTN, REJI  · Counseled on restricting caloric intake one day per week- Wed  · No desserts, reduced carbs

## 2017-10-09 ENCOUNTER — OFFICE VISIT (OUTPATIENT)
Dept: PODIATRY | Facility: CLINIC | Age: 77
End: 2017-10-09
Payer: COMMERCIAL

## 2017-10-09 VITALS
RESPIRATION RATE: 18 BRPM | HEART RATE: 66 BPM | WEIGHT: 271 LBS | BODY MASS INDEX: 54.63 KG/M2 | SYSTOLIC BLOOD PRESSURE: 162 MMHG | HEIGHT: 59 IN | DIASTOLIC BLOOD PRESSURE: 64 MMHG

## 2017-10-09 DIAGNOSIS — L84 CORN OR CALLUS: ICD-10-CM

## 2017-10-09 DIAGNOSIS — I89.0 LYMPHEDEMA: ICD-10-CM

## 2017-10-09 DIAGNOSIS — E11.51 TYPE II DIABETES MELLITUS WITH PERIPHERAL CIRCULATORY DISORDER: Primary | ICD-10-CM

## 2017-10-09 DIAGNOSIS — B35.1 ONYCHOMYCOSIS DUE TO DERMATOPHYTE: ICD-10-CM

## 2017-10-09 PROCEDURE — 11056 PARNG/CUTG B9 HYPRKR LES 2-4: CPT | Mod: Q9,S$GLB,, | Performed by: PODIATRIST

## 2017-10-09 PROCEDURE — 99499 UNLISTED E&M SERVICE: CPT | Mod: S$GLB,,, | Performed by: PODIATRIST

## 2017-10-09 PROCEDURE — 99999 PR PBB SHADOW E&M-EST. PATIENT-LVL IV: CPT | Mod: PBBFAC,,, | Performed by: PODIATRIST

## 2017-10-09 PROCEDURE — 11721 DEBRIDE NAIL 6 OR MORE: CPT | Mod: 59,Q9,S$GLB, | Performed by: PODIATRIST

## 2017-10-09 NOTE — PROGRESS NOTES
Subjective:      Patient ID: Sue Giordano is a 77 y.o. female.    Chief Complaint: PCP (Tami Schmidt MD 9/29/17); Diabetic Foot Exam; and Nail Care    Sue Garzon is a 77 y.o. female who presents to the clinic for evaluation and treatment of high risk feet. Sue Garzon has a past medical history of Adrenal mass- adenoma stable since 2004 (1.8 cm and 8/13/12 (2 cm); stable 2016 2.4 cm; Asthma in adult without complication (6/25/2015); Bilateral carotid artery disease (7/21/2017); Bilateral sciatica (2/7/2017); Cellulitis of right leg (08/16/2017); Cerebral infarction (1/29/2016); Cervical radiculopathy (3/18/2015); Chronic rhinitis (4/9/2013); CKD (chronic kidney disease) stage 3, GFR 30-59 ml/min (1/29/2013); Coronary artery disease due to calcified coronary lesion (6/25/2015); Diastolic dysfunction (10/10/2013); Essential hypertension (6/25/2015); Gastroesophageal reflux disease without esophagitis (8/13/2012); Gout; Hep B w/ coma (1971); Hives (10/1/2013); Mixed hyperlipidemia (8/13/2012); Morbid obesity with BMI of 50.0-59.9, adult (2/11/2014); Obstructive sleep apnea syndrome (8/13/2012); Senile cataracts of both eyes (1/22/2015); Traumatic open wound of right lower leg (8/25/2017); Trigeminal neuralgia of right side of face (5/23/2017); Type 2 diabetes mellitus with diabetic polyneuropathy, with long-term current use of insulin (1/29/2013); Venous stasis dermatitis of both lower extremities (8/21/2017); and Vitamin D deficiency disease (8/13/2012). The patient's chief complaint is long, thick toenails. This patient has documented high risk feet requiring routine maintenance secondary to diabetes mellitis and those secondary complications of diabetes, as mentioned..    PCP: Tami Schmidt MD    Date Last Seen by PCP:   Chief Complaint   Patient presents with    PCP     Tami Schmidt MD 9/29/17    Diabetic Foot Exam    Nail Care       Hemoglobin A1C   Date Value Ref Range Status    08/24/2017 8.2 (H) 4.0 - 5.6 % Final     Comment:     According to ADA guidelines, hemoglobin A1c <7.0% represents  optimal control in non-pregnant diabetic patients. Different  metrics may apply to specific patient populations.   Standards of Medical Care in Diabetes-2016.  For the purpose of screening for the presence of diabetes:  <5.7%     Consistent with the absence of diabetes  5.7-6.4%  Consistent with increasing risk for diabetes   (prediabetes)  >or=6.5%  Consistent with diabetes  Currently, no consensus exists for use of hemoglobin A1c  for diagnosis of diabetes for children.  This Hemoglobin A1c assay has significant interference with fetal   hemoglobin   (HbF). The results are invalid for patients with abnormal amounts of   HbF,   including those with known Hereditary Persistence   of Fetal Hemoglobin. Heterozygous hemoglobin variants (HbAS, HbAC,   HbAD, HbAE, HbA2) do not significantly interfere with this assay;   however, presence of multiple variants in a sample may impact the %   interference.     08/21/2017 8.2 (H) 4.0 - 5.6 % Final     Comment:     According to ADA guidelines, hemoglobin A1c <7.0% represents  optimal control in non-pregnant diabetic patients. Different  metrics may apply to specific patient populations.   Standards of Medical Care in Diabetes-2016.  For the purpose of screening for the presence of diabetes:  <5.7%     Consistent with the absence of diabetes  5.7-6.4%  Consistent with increasing risk for diabetes   (prediabetes)  >or=6.5%  Consistent with diabetes  Currently, no consensus exists for use of hemoglobin A1c  for diagnosis of diabetes for children.  This Hemoglobin A1c assay has significant interference with fetal   hemoglobin   (HbF). The results are invalid for patients with abnormal amounts of   HbF,   including those with known Hereditary Persistence   of Fetal Hemoglobin. Heterozygous hemoglobin variants (HbAS, HbAC,   HbAD, HbAE, HbA2) do not significantly  interfere with this assay;   however, presence of multiple variants in a sample may impact the %   interference.     05/02/2017 8.3 (H) 4.5 - 6.2 % Final     Comment:     According to ADA guidelines, hemoglobin A1C <7.0% represents  optimal control in non-pregnant diabetic patients.  Different  metrics may apply to specific populations.   Standards of Medical Care in Diabetes - 2016.  For the purpose of screening for the presence of diabetes:  <5.7%     Consistent with the absence of diabetes  5.7-6.4%  Consistent with increasing risk for diabetes   (prediabetes)  >or=6.5%  Consistent with diabetes  Currently no consensus exists for use of hemoglobin A1C  for diagnosis of diabetes for children.         Review of Systems   Constitution: Negative for chills, decreased appetite and fever.   Cardiovascular: Positive for leg swelling.   Skin: Positive for dry skin, nail changes and poor wound healing.   Musculoskeletal: Negative for arthritis, back pain, joint pain, joint swelling and myalgias.   Gastrointestinal: Negative for nausea and vomiting.   Neurological: Negative for loss of balance, numbness and paresthesias.           Objective:      Physical Exam   Constitutional: She is oriented to person, place, and time. She appears well-developed and well-nourished.   Cardiovascular:   Pulses:       Dorsalis pedis pulses are 2+ on the right side, and 2+ on the left side.        Posterior tibial pulses are 2+ on the right side, and 2+ on the left side.   Musculoskeletal: She exhibits no edema or tenderness.        Right ankle: Normal.        Left ankle: Normal.        Right foot: There is no swelling, no crepitus and no deformity.        Left foot: There is no swelling, no crepitus and no deformity.   Adequate joint range of motion without pain, limitation, nor crepitation Bilateral feet and ankle joints. Muscle strength is 5/5 in all groups bilaterally.         Lymphadenopathy:   B/l lymph edema    Neurological: She is  alert and oriented to person, place, and time. She has normal strength.   Skin: Skin is warm, dry and intact. No rash noted. No erythema. Nails show no clubbing.   Nails x10 are elongated by  2-6 mm's, thickened by 2-7 mm's, dystrophic, and are darkened in  coloration . Xerosis Bilaterally. No open lesions noted.    Hyperkeratotic tissue noted to medial HIPJ b/l      Psychiatric: She has a normal mood and affect. Her behavior is normal.   Vitals reviewed.            Assessment:       Encounter Diagnoses   Name Primary?    Type II diabetes mellitus with peripheral circulatory disorder Yes    Lymphedema     Corn or callus     Onychomycosis due to dermatophyte          Plan:       Sue Garzon was seen today for pcp, diabetic foot exam and nail care.    Diagnoses and all orders for this visit:    Type II diabetes mellitus with peripheral circulatory disorder    Lymphedema    Corn or callus    Onychomycosis due to dermatophyte      I counseled the patient on her conditions, their implications and medical management.        - Shoe inspection. Diabetic Foot Education. Patient reminded of the importance of good nutrition and blood sugar control to help prevent podiatric complications of diabetes. Patient instructed on proper foot hygeine. We discussed wearing proper shoe gear, daily foot inspections, never walking without protective shoe gear, never putting sharp instruments to feet, routine podiatric nail visits every 2-3 months.      - With patient's permission, nails were aggressively reduced and debrided x 10 to their soft tissue attachment mechanically and with electric , removing all offending nail and debris. Patient relates relief following the procedure. She will continue to monitor the areas daily, inspect her feet, wear protective shoe gear when ambulatory, moisturizer to maintain skin integrity and follow in this office in approximately 2-3 months, sooner p.r.n.    - After cleansing the  area w/ alcohol  prep pad the above mentioned hyperkeratosis was trimmed utilizing No 15 scapel, to a smooth base with out incident. Patient tolerated this  well and reported comfort to the area of  medial HIPJ b/l

## 2017-10-16 ENCOUNTER — OFFICE VISIT (OUTPATIENT)
Dept: PULMONOLOGY | Facility: CLINIC | Age: 77
End: 2017-10-16
Payer: MEDICARE

## 2017-10-16 VITALS
OXYGEN SATURATION: 98 % | WEIGHT: 272 LBS | DIASTOLIC BLOOD PRESSURE: 48 MMHG | BODY MASS INDEX: 54.84 KG/M2 | TEMPERATURE: 97 F | SYSTOLIC BLOOD PRESSURE: 152 MMHG | HEART RATE: 64 BPM | HEIGHT: 59 IN

## 2017-10-16 DIAGNOSIS — G47.33 OBSTRUCTIVE SLEEP APNEA SYNDROME: Primary | ICD-10-CM

## 2017-10-16 DIAGNOSIS — J45.909 UNCOMPLICATED ASTHMA, UNSPECIFIED ASTHMA SEVERITY, UNSPECIFIED WHETHER PERSISTENT: ICD-10-CM

## 2017-10-16 PROCEDURE — 99215 OFFICE O/P EST HI 40 MIN: CPT | Mod: PBBFAC | Performed by: NURSE PRACTITIONER

## 2017-10-16 PROCEDURE — 99999 PR PBB SHADOW E&M-EST. PATIENT-LVL V: CPT | Mod: PBBFAC,,, | Performed by: NURSE PRACTITIONER

## 2017-10-16 PROCEDURE — 99213 OFFICE O/P EST LOW 20 MIN: CPT | Mod: S$PBB,,, | Performed by: NURSE PRACTITIONER

## 2017-10-16 NOTE — PROGRESS NOTES
Subjective:       Patient ID: Sue Giordano is a 77 y.o. female.    Chief Complaint: asthma/COPD    HPI  Sue Giordano is a 77 y.o. female who presents today for follow up asthma. She reports she has been doing well with her breathing until she noticed she started getting short of breath. She states she was placed on 2 new BP medications and associated her SOB with the medications. She subsequently stopped the medications Coreg & Hydralazine for a few days and had no issues with her breathing. She restarted a few days ago and has become short of breath since. Her BP is low today and she states since starting these new medications she has noticed it has been running low. She is taking her advair 500mcg 1 puff 2 times daily. She has used her albuterol when she is short of breath. She is taking allergy pill as well as flonase daily. She has no other complaints or concerns at this time. No fever, chills, chest pain, dizziness or hemoptysis.     Review of patient's allergies indicates:   Allergen Reactions    Bactrim [sulfamethoxazole-trimethoprim]      Other reaction(s): up set stomach rash/hives    Azithromycin     Erythromycin Other (See Comments)     Other reaction(s): Unknown  Other reaction(s): Stomach upset    Gentamicin      Other reaction(s): Unknown    Levofloxacin Hives     Other reaction(s): nervousness    Vancomycin      Other reaction(s): Itching     Past Medical History:   Diagnosis Date    Adrenal mass- adenoma stable since 2004 (1.8 cm and 8/13/12 (2 cm); stable 2016 2.4 cm     Adrenal mass- adenoma stable since 2004 (1.8 cm and 8/13/12 (2 cm); stable 2016 2.4 cm    Asthma in adult without complication 6/25/2015    Bilateral carotid artery disease 7/21/2017    Bilateral sciatica 2/7/2017    Cellulitis of right leg 08/16/2017    Cerebral infarction 1/29/2016    Multiple areas of lacunar infarction. Stroke risk factors include HTN, DM2, Dyslipidemia.  Continue ASA 81mg/ Statin  therapy I have encouraged 30 minutes of physical activity daily for 5 days a week. She has access to a pool so this should not be so jarring to her joints.     Cervical radiculopathy 3/18/2015    Chronic rhinitis 4/9/2013    CKD (chronic kidney disease) stage 3, GFR 30-59 ml/min 1/29/2013    Coronary artery disease due to calcified coronary lesion 6/25/2015    Diastolic dysfunction 10/10/2013    Essential hypertension 6/25/2015    Gastroesophageal reflux disease without esophagitis 8/13/2012    Gout     Hep B w/ coma 1971    Hives 10/1/2013    Mixed hyperlipidemia 8/13/2012    Morbid obesity with BMI of 50.0-59.9, adult 2/11/2014    Obstructive sleep apnea syndrome 8/13/2012    Senile cataracts of both eyes 1/22/2015    Traumatic open wound of right lower leg 8/25/2017    Trigeminal neuralgia of right side of face 5/23/2017    For years now Previously on Gabapentin, but caused constipation Dissipating over time Not related to intracranial abnormalities Possibly related to dental procedure    Type 2 diabetes mellitus with diabetic polyneuropathy, with long-term current use of insulin 1/29/2013    Venous stasis dermatitis of both lower extremities 8/21/2017    Vitamin D deficiency disease 8/13/2012     Past Surgical History:   Procedure Laterality Date    HYSTERECTOMY  1982    secondary uterine fibroids    JOINT REPLACEMENT      Right total knee replacement       Family History     Problem Relation (Age of Onset)    Cancer Mother, Sister    Hypertension Father    No Known Problems Brother, Maternal Aunt, Maternal Uncle, Paternal Aunt, Paternal Uncle, Maternal Grandmother, Maternal Grandfather, Paternal Grandmother, Paternal Grandfather        Social History Main Topics    Smoking status: Never Smoker    Smokeless tobacco: Never Used    Alcohol use No    Drug use: No    Sexual activity: No       Review of Systems   Constitutional: Negative for fever and chills.   HENT: Positive for  "congestion. Negative for trouble swallowing.    Respiratory: Positive for shortness of breath and use of rescue inhaler. Negative for cough, hemoptysis and sputum production.    Cardiovascular: Positive for leg swelling. Negative for chest pain.   Skin: Negative for rash.   Neurological: Negative for dizziness.   Psychiatric/Behavioral: Negative for confusion.       Objective:       Vitals:    10/16/17 1012 10/16/17 1043   BP: (!) 90/48 (!) 152/48   Pulse: 68 64   Temp: 96.6 °F (35.9 °C)    SpO2: (!) 93% 98%   Weight: 123.4 kg (272 lb)    Height: 4' 11" (1.499 m)      Physical Exam   Constitutional: She is oriented to person, place, and time. She appears well-developed. She is obese.   HENT:   Head: Normocephalic.   Cardiovascular: Normal rate, normal heart sounds and intact distal pulses.    Pulmonary/Chest: Normal expansion, symmetric chest wall expansion, effort normal and breath sounds normal. No respiratory distress. She has no wheezes. She has no rhonchi. She has no rales.   Musculoskeletal: She exhibits no edema.   Neurological: She is alert and oriented to person, place, and time.   Psychiatric: She has a normal mood and affect.   Vitals reviewed.    Personal Diagnostic Review      Assessment:           Outpatient Encounter Prescriptions as of 10/16/2017   Medication Sig Dispense Refill    acetaminophen (TYLENOL) 500 MG tablet Take 1,000 mg by mouth 2 (two) times daily as needed for Pain.      albuterol 90 mcg/actuation inhaler Inhale 2 puffs into the lungs every 4 (four) hours as needed for Wheezing or Shortness of Breath. Rescue 1 Inhaler 6    aluminum & magnesium hydroxide-simethicone (MYLANTA MAX STRENGTH) 400-400-40 mg/5 mL suspension Take 30 mLs by mouth every 6 (six) hours.  0    ammonium lactate 12 % Crea Apply topically every morning.       aspirin 81 MG Chew Take 1 tablet (81 mg total) by mouth once daily.      atorvastatin (LIPITOR) 80 MG tablet Take 1 tablet (80 mg total) by mouth once " daily. 90 tablet 3    blood sugar diagnostic Strp BG monitoring 4 times a day. Pt needs test strips for Embrace Talking meter. (Patient taking differently: BG monitoring 2 times a day. Pt needs test strips for Em[employer] Talking meter.) 450 strip prn    carvedilol (COREG) 12.5 MG tablet Take 1 tablet (12.5 mg total) by mouth 2 (two) times daily. 60 tablet 11    diclofenac sodium 1 % Gel Apply 2 g topically once daily. 100 g 1    econazole nitrate 1 % cream Apply topically once daily. Apply to feet daily as directed. 85 g 1    ergocalciferol (VITAMIN D2) 50,000 unit Cap Take 1 capsule (50,000 Units total) by mouth every 7 days. (Patient taking differently: Take 50,000 Units by mouth every Sunday. ) 12 capsule 3    fluticasone (FLONASE) 50 mcg/actuation nasal spray 2 sprays by Each Nare route once daily. (Patient taking differently: 2 sprays by Each Nare route daily as needed for Rhinitis. ) 1 Bottle 3    fluticasone-salmeterol 500-50 mcg/dose (ADVAIR DISKUS) 500-50 mcg/dose DsDv diskus inhaler Inhale 1 puff into the lungs 2 (two) times daily. Controller 60 each 11    hydrALAZINE (APRESOLINE) 25 MG tablet Take 1 tablet (25 mg total) by mouth every 12 (twelve) hours. 60 tablet 11    insulin detemir (LEVEMIR FLEXTOUCH) 100 unit/mL (3 mL) SubQ InPn pen Inject 48 units at daily. 1 Box 6    insulin lispro (HUMALOG KWIKPEN) 100 unit/mL InPn pen Inject 18 units at breakfast and dinner plus scale 180-230 +2, 231-280+4, 281-330 +6, 331-380+8. 1 Box 6    lancets Misc Test daily (Patient taking differently: Test twice  daily) 100 each 12    losartan (COZAAR) 100 MG tablet 1 tab by mouth daily 30 tablet 6    torsemide (DEMADEX) 20 MG Tab Take 1 tablet (20 mg total) by mouth once daily. 90 tablet 3    trolamine salicylate (ASPERCREME) 10 % cream Apply topically as needed.       No facility-administered encounter medications on file as of 10/16/2017.      Problem List Items Addressed This Visit        Pulmonary     Uncomplicated asthma       Other    Obstructive sleep apnea syndrome - Primary      Other Visit Diagnoses    None.         Plan:       · Take advair to 500 mcg 1 puff 2 times daily  · Take albuterol for rescue 2 puffs every 4 hours as needed   · Continue flonase  · Advised to stop taking Coreg due to low BP and shortness of breath since starting with resolution when stopped previously.  · Follow up in 3 months for asthma  · Contact prior to if any issues or concerns should arise.

## 2017-10-16 NOTE — PATIENT INSTRUCTIONS
· Take advair to 500 mcg 1 puff 2 times daily  · Take albuterol for rescue 2 puffs every 4 hours as needed   · Continue flonase  · Advised to stop taking Coreg due to low BP and shortness of breath since starting medication with resolution when stopped previously.  · Follow up in 3 months for asthma/COPD  · Contact prior to if any issues or concerns should arise.

## 2017-10-17 ENCOUNTER — OFFICE VISIT (OUTPATIENT)
Dept: WOUND CARE | Facility: CLINIC | Age: 77
End: 2017-10-17
Payer: MEDICARE

## 2017-10-17 VITALS
SYSTOLIC BLOOD PRESSURE: 163 MMHG | HEIGHT: 59 IN | DIASTOLIC BLOOD PRESSURE: 67 MMHG | WEIGHT: 275.19 LBS | TEMPERATURE: 98 F | HEART RATE: 78 BPM | BODY MASS INDEX: 55.48 KG/M2

## 2017-10-17 DIAGNOSIS — R60.0 BILATERAL LEG EDEMA: ICD-10-CM

## 2017-10-17 DIAGNOSIS — S81.801A TRAUMATIC OPEN WOUND OF RIGHT LOWER LEG, INITIAL ENCOUNTER: Primary | ICD-10-CM

## 2017-10-17 DIAGNOSIS — I87.2 VENOUS STASIS DERMATITIS OF BOTH LOWER EXTREMITIES: ICD-10-CM

## 2017-10-17 PROCEDURE — 29580 STRAPPING UNNA BOOT: CPT | Mod: S$PBB,CAST,RT, | Performed by: NURSE PRACTITIONER

## 2017-10-17 PROCEDURE — 99499 UNLISTED E&M SERVICE: CPT | Mod: S$PBB,,, | Performed by: NURSE PRACTITIONER

## 2017-10-17 PROCEDURE — 29580 STRAPPING UNNA BOOT: CPT | Mod: PBBFAC,RT | Performed by: NURSE PRACTITIONER

## 2017-10-17 PROCEDURE — 99999 PR PBB SHADOW E&M-EST. PATIENT-LVL V: CPT | Mod: PBBFAC,,, | Performed by: NURSE PRACTITIONER

## 2017-10-17 PROCEDURE — 99215 OFFICE O/P EST HI 40 MIN: CPT | Mod: PBBFAC,25 | Performed by: NURSE PRACTITIONER

## 2017-10-17 NOTE — PATIENT INSTRUCTIONS
Elevate legs as much as possible. Do not get the dressings wet and use cast covers for showering.  Should the dressing become wet, remove it, place a wet-to-dry dressing over the wound, cover with gauze and roll gauze and use ace wraps for compression and to secure bandages.  Notify home health as soon as possible to have a new dressing applied.      You may begin wearing your compression hose on the left leg.  Apply hose first thing in the morning and remove at bedtime.

## 2017-10-17 NOTE — PROGRESS NOTES
Subjective:       Patient ID: Sue Giordano is a 77 y.o. female.    Chief Complaint: Wound Check    Wound Check     Edema     This patient is seen today for reevaluation of a traumatic wound to the right lower leg.  She was recently admitted to [facility] from 8/25-8/30/17 for cellulitis of the right leg.  She is receiving home health services through [facility].  An unna boot is on the leg and the wound is healing as evidenced by wound contracture.  She has chronic bilateral lower extremity edema.  She has had problems with lymphedema for 20 years now.  She has no pain.  She is afebrile.  Her medical history is significant for poorly controlled type II diabetes, lymphedema and chronic kidney disease.  [facility] is seeing the patient three times weekly.  Review of Systems  Unchanged from prior visit.  Objective:      Physical Exam   Constitutional: She is oriented to person, place, and time. She appears well-developed and well-nourished. No distress.   HENT:   Head: Normocephalic and atraumatic.   Neck: Normal range of motion.   Pulmonary/Chest: Effort normal. No respiratory distress.   Musculoskeletal: Normal range of motion. She exhibits edema. She exhibits no tenderness.        Legs:  Neurological: She is alert and oriented to person, place, and time.   Skin: Skin is warm and dry. No rash noted. She is not diaphoretic. No cyanosis or erythema. Nails show no clubbing.   Psychiatric: She has a normal mood and affect. Her behavior is normal. Judgment and thought content normal.   Nursing note and vitals reviewed.    ..  Hemoglobin A1C   Date Value Ref Range Status   08/24/2017 8.2 (H) 4.0 - 5.6 % Final     Comment:     According to ADA guidelines, hemoglobin A1c <7.0% represents  optimal control in non-pregnant diabetic patients. Different  metrics may apply to specific patient populations.   Standards of Medical Care in Diabetes-2016.  For the purpose of screening for the presence of diabetes:  <5.7%     Consistent  with the absence of diabetes  5.7-6.4%  Consistent with increasing risk for diabetes   (prediabetes)  >or=6.5%  Consistent with diabetes  Currently, no consensus exists for use of hemoglobin A1c  for diagnosis of diabetes for children.  This Hemoglobin A1c assay has significant interference with fetal   hemoglobin   (HbF). The results are invalid for patients with abnormal amounts of   HbF,   including those with known Hereditary Persistence   of Fetal Hemoglobin. Heterozygous hemoglobin variants (HbAS, HbAC,   HbAD, HbAE, HbA2) do not significantly interfere with this assay;   however, presence of multiple variants in a sample may impact the %   interference.     08/21/2017 8.2 (H) 4.0 - 5.6 % Final     Comment:     According to ADA guidelines, hemoglobin A1c <7.0% represents  optimal control in non-pregnant diabetic patients. Different  metrics may apply to specific patient populations.   Standards of Medical Care in Diabetes-2016.  For the purpose of screening for the presence of diabetes:  <5.7%     Consistent with the absence of diabetes  5.7-6.4%  Consistent with increasing risk for diabetes   (prediabetes)  >or=6.5%  Consistent with diabetes  Currently, no consensus exists for use of hemoglobin A1c  for diagnosis of diabetes for children.  This Hemoglobin A1c assay has significant interference with fetal   hemoglobin   (HbF). The results are invalid for patients with abnormal amounts of   HbF,   including those with known Hereditary Persistence   of Fetal Hemoglobin. Heterozygous hemoglobin variants (HbAS, HbAC,   HbAD, HbAE, HbA2) do not significantly interfere with this assay;   however, presence of multiple variants in a sample may impact the %   interference.     05/02/2017 8.3 (H) 4.5 - 6.2 % Final     Comment:     According to ADA guidelines, hemoglobin A1C <7.0% represents  optimal control in non-pregnant diabetic patients.  Different  metrics may apply to specific populations.   Standards of Medical  Care in Diabetes - 2016.  For the purpose of screening for the presence of diabetes:  <5.7%     Consistent with the absence of diabetes  5.7-6.4%  Consistent with increasing risk for diabetes   (prediabetes)  >or=6.5%  Consistent with diabetes  Currently no consensus exists for use of hemoglobin A1C  for diagnosis of diabetes for children.     The right leg was cleansed with Easi-clense sponges and dried thoroughly.  Medihoney gel and a hydrofiber dressing was applied to the wound.  Eucerin cream was applied to the lower legs.  The patient's foot was positioned at a 90 degree angle.  A zinc oxide wrap, followed by kerlix roll gauze and coban were applied using a spiral technique avoiding creases or folds.  The wrap was started behind the first metatarsal and ended below the tibial tubercle of the knee.  There was overlap of each turn half the width of the previous turn.  The compression wrap will be changed every 3-4 days.    Assessment:       1. Traumatic open wound of right lower leg, initial encounter    2. Venous stasis dermatitis of both lower extremities    3. Bilateral leg edema        Plan:           Unna boot right lower leg as detailed above.  Patient was warned not to get the dressings wet and to use cast covers for showering.  Should the dressing become wet, she is to remove it, place a wet-to-dry dressing over the wound, cover with gauze and roll gauze and use ace wraps for compression and to secure bandages.  She should then notify home health as soon as possible to have a new dressing applied.  Return to clinic in 2 weeks.    Home Health Orders:  Cleanse right leg wound with wound .   Pad both ankles with ABD pads.  Apply medihoney gel to right leg wound and cover with a hydrofiber dressing.  Unna boot (zinc oxide, kerlix and coban) right lower leg.  Kerlix and coban left lower leg for compression.  Darco shoes bilateral feet for ambulation.     Right shin

## 2017-10-30 RX ORDER — ATORVASTATIN CALCIUM 80 MG/1
80 TABLET, FILM COATED ORAL DAILY
Qty: 90 TABLET | Refills: 3 | Status: SHIPPED | OUTPATIENT
Start: 2017-10-30 | End: 2018-02-02 | Stop reason: SDUPTHER

## 2017-10-31 ENCOUNTER — TELEPHONE (OUTPATIENT)
Dept: ENDOCRINOLOGY | Facility: CLINIC | Age: 77
End: 2017-10-31

## 2017-10-31 NOTE — TELEPHONE ENCOUNTER
Spoke with the pt and pt says her BG was runing very good which is 120, 105, 101. Pt is wondering that is she needs to cont her daily humolog injection twice a day? Pt will sent a BG logs back in office by tomorrow.

## 2017-10-31 NOTE — TELEPHONE ENCOUNTER
----- Message from Geri Niyah sent at 10/31/2017  8:57 AM CDT -----  Contact: Sister Duran Giordano tel:    [phone]   Pt.says she has questions and would like to speak to the nurse about her problems.

## 2017-11-01 ENCOUNTER — TELEPHONE (OUTPATIENT)
Dept: INTERNAL MEDICINE | Facility: CLINIC | Age: 77
End: 2017-11-01

## 2017-11-01 ENCOUNTER — TELEPHONE (OUTPATIENT)
Dept: ADMINISTRATIVE | Facility: CLINIC | Age: 77
End: 2017-11-01

## 2017-11-01 NOTE — TELEPHONE ENCOUNTER
Patient appt could not be moved up due to , department not having space , Patient has been schedule to come in tomorrow afternoon for Dr. Schmidt to examine patient Lt leg  , and to keep scheduled appt. with Thalia .    Patient verbalized great understanding .

## 2017-11-02 ENCOUNTER — LAB VISIT (OUTPATIENT)
Dept: LAB | Facility: HOSPITAL | Age: 77
End: 2017-11-02
Attending: INTERNAL MEDICINE
Payer: MEDICARE

## 2017-11-02 ENCOUNTER — OFFICE VISIT (OUTPATIENT)
Dept: INTERNAL MEDICINE | Facility: CLINIC | Age: 77
End: 2017-11-02
Payer: COMMERCIAL

## 2017-11-02 VITALS
SYSTOLIC BLOOD PRESSURE: 146 MMHG | BODY MASS INDEX: 53.87 KG/M2 | OXYGEN SATURATION: 98 % | DIASTOLIC BLOOD PRESSURE: 60 MMHG | HEIGHT: 59 IN | HEART RATE: 76 BPM | TEMPERATURE: 99 F | WEIGHT: 267.19 LBS

## 2017-11-02 DIAGNOSIS — L03.116 CELLULITIS OF LEFT LOWER EXTREMITY: ICD-10-CM

## 2017-11-02 DIAGNOSIS — B35.3 TINEA PEDIS OF BOTH FEET: ICD-10-CM

## 2017-11-02 DIAGNOSIS — J45.909 UNCOMPLICATED ASTHMA, UNSPECIFIED ASTHMA SEVERITY, UNSPECIFIED WHETHER PERSISTENT: ICD-10-CM

## 2017-11-02 DIAGNOSIS — G47.33 OBSTRUCTIVE SLEEP APNEA SYNDROME: ICD-10-CM

## 2017-11-02 DIAGNOSIS — S81.801A TRAUMATIC OPEN WOUND OF RIGHT LOWER LEG, INITIAL ENCOUNTER: ICD-10-CM

## 2017-11-02 DIAGNOSIS — I10 ESSENTIAL HYPERTENSION: ICD-10-CM

## 2017-11-02 DIAGNOSIS — I87.2 VENOUS STASIS DERMATITIS OF BOTH LOWER EXTREMITIES: ICD-10-CM

## 2017-11-02 DIAGNOSIS — E27.8 ADRENAL MASS: ICD-10-CM

## 2017-11-02 LAB
ANION GAP SERPL CALC-SCNC: 10 MMOL/L
BASOPHILS # BLD AUTO: 0.05 K/UL
BASOPHILS NFR BLD: 0.5 %
BUN SERPL-MCNC: 25 MG/DL
CALCIUM SERPL-MCNC: 9.7 MG/DL
CHLORIDE SERPL-SCNC: 104 MMOL/L
CO2 SERPL-SCNC: 24 MMOL/L
CREAT SERPL-MCNC: 1.1 MG/DL
DIFFERENTIAL METHOD: ABNORMAL
EOSINOPHIL # BLD AUTO: 0.4 K/UL
EOSINOPHIL NFR BLD: 4.1 %
ERYTHROCYTE [DISTWIDTH] IN BLOOD BY AUTOMATED COUNT: 14.9 %
EST. GFR  (AFRICAN AMERICAN): 56.8 ML/MIN/1.73 M^2
EST. GFR  (NON AFRICAN AMERICAN): 49.2 ML/MIN/1.73 M^2
GLUCOSE SERPL-MCNC: 76 MG/DL
HCT VFR BLD AUTO: 38.4 %
HGB BLD-MCNC: 11.5 G/DL
LYMPHOCYTES # BLD AUTO: 1.8 K/UL
LYMPHOCYTES NFR BLD: 18.5 %
MCH RBC QN AUTO: 27.1 PG
MCHC RBC AUTO-ENTMCNC: 29.9 G/DL
MCV RBC AUTO: 91 FL
MONOCYTES # BLD AUTO: 0.6 K/UL
MONOCYTES NFR BLD: 6.4 %
NEUTROPHILS # BLD AUTO: 6.7 K/UL
NEUTROPHILS NFR BLD: 70.3 %
NRBC BLD-RTO: 0 /100 WBC
PLATELET # BLD AUTO: 181 K/UL
PMV BLD AUTO: 12.1 FL
POTASSIUM SERPL-SCNC: 4.3 MMOL/L
RBC # BLD AUTO: 4.24 M/UL
SODIUM SERPL-SCNC: 138 MMOL/L
WBC # BLD AUTO: 9.57 K/UL

## 2017-11-02 PROCEDURE — 99215 OFFICE O/P EST HI 40 MIN: CPT | Mod: S$GLB,,, | Performed by: INTERNAL MEDICINE

## 2017-11-02 PROCEDURE — 85025 COMPLETE CBC W/AUTO DIFF WBC: CPT

## 2017-11-02 PROCEDURE — 99999 PR PBB SHADOW E&M-EST. PATIENT-LVL IV: CPT | Mod: PBBFAC,,, | Performed by: INTERNAL MEDICINE

## 2017-11-02 PROCEDURE — 80048 BASIC METABOLIC PNL TOTAL CA: CPT

## 2017-11-02 PROCEDURE — 36415 COLL VENOUS BLD VENIPUNCTURE: CPT

## 2017-11-02 RX ORDER — CLINDAMYCIN HYDROCHLORIDE 300 MG/1
300 CAPSULE ORAL 3 TIMES DAILY
Qty: 24 CAPSULE | Refills: 0 | Status: SHIPPED | OUTPATIENT
Start: 2017-11-02 | End: 2017-12-04

## 2017-11-02 RX ORDER — CLOTRIMAZOLE 1 %
CREAM (GRAM) TOPICAL 2 TIMES DAILY
Qty: 113 G | Refills: 3 | Status: SHIPPED | OUTPATIENT
Start: 2017-11-02 | End: 2022-05-25

## 2017-11-02 RX ORDER — GUAIFENESIN/DEXTROMETHORPHAN 100-5 MG/5
1 LIQUID (ML) ORAL 2 TIMES DAILY
Qty: 130 G | Refills: 3 | Status: SHIPPED | OUTPATIENT
Start: 2017-11-02 | End: 2017-11-02 | Stop reason: SDUPTHER

## 2017-11-02 RX ORDER — GUAIFENESIN/DEXTROMETHORPHAN 100-5 MG/5
1 LIQUID (ML) ORAL 2 TIMES DAILY
Qty: 130 G | Refills: 3 | Status: ON HOLD | OUTPATIENT
Start: 2017-11-02 | End: 2021-10-26

## 2017-11-02 RX ORDER — CLOTRIMAZOLE 1 %
CREAM (GRAM) TOPICAL 2 TIMES DAILY
Qty: 45 G | Refills: 3 | Status: SHIPPED | OUTPATIENT
Start: 2017-11-02 | End: 2017-11-02 | Stop reason: SDUPTHER

## 2017-11-02 RX ORDER — HYDRALAZINE HYDROCHLORIDE 50 MG/1
50 TABLET, FILM COATED ORAL EVERY 12 HOURS
Qty: 180 TABLET | Refills: 3 | Status: SHIPPED | OUTPATIENT
Start: 2017-11-02 | End: 2018-02-02 | Stop reason: SDUPTHER

## 2017-11-02 NOTE — Clinical Note
Brian Vásquez, Sue Duran Logan did seem to have an infection today in her RLE in additional with tinea pedis. I started clinda and topical clotrimazole on toes, with vicks vaporub on toenails. She sees you next week.  I was not able to replace the unna boot after examining the leg and cleaning the wound. I'd like to come to your clinic at some point to learn how to do that at some point.  Thanks! BARB

## 2017-11-02 NOTE — ASSESSMENT & PLAN NOTE
BP not controlled on hydralazine 25mg BID, losartan 100mg, torsemide; carvedilol discontinued by pulm  · Increase hydralazine to 50mg  · Continue losartan 100mg  · Continue torsemide

## 2017-11-02 NOTE — ASSESSMENT & PLAN NOTE
LE swelling bilaterally with poor wound healing, routine [place] boot with wound care  · Continue [place] boot, per wound care  · Treat cellulitis  · Wound care performed in clinic today  · Gentle debridement, application of medihoney, wet to dry dressing, gauze wrap, coban wrap  · F/U wound care clinic

## 2017-11-02 NOTE — ASSESSMENT & PLAN NOTE
Symptom improvement with discontinuation of BB by Pulm, compliant with inhalers  · Continue albuterol and advair  · F/U Pulm

## 2017-11-02 NOTE — PATIENT INSTRUCTIONS
TODAY:  - continue cutting back on food on Wednesdays  - restart your home exercises in the morning   + stretching handout   + seated exercise handout  - increase hydralazine to 50mg every 12 hours   + double your current tablets of 25mg every 12 hours until done with bottle  - treat toe infection with lotrimin cream and spray twice daily   + spray bottle image printed  - start vicks vaporub on toenails twice daily  - take full dose of torsemide 20mg in the morning  - clindamycin 300mg three times daily for one week  - labs today

## 2017-11-02 NOTE — PROGRESS NOTES
"Primary Care Provider Appointment    Subjective:      Patient ID: Sue Giordano is a 77 y.o. female with obesity, LE edema, HTN    Chief Complaint: Edema (Lt leg swelling , heladio pain); Wound Infection ( Lt leg); and Medication Management (Patient would like to go over medication Coreg " She was taken off of this medication")    Patient with acute erythema of LLE for 3 days as discovered by home health (who called the office to report this yesterday). Patient afebrile on exam, but RLE erythematous, no malodor, some serous discharge soaking the bandage. She finished a course of clindamycin on 9/15/17. In the last year she has been on clinda, doxy, augmentin, amoxicillin for wound infection.     Her CBG readings have been "very good" from 112-130. She sent these readings to her endocrinologist, and attributes her better glucose control to the increase in levemir to 48U. She takes short-acting insulin 18U before breakfast and dinner (6am, 6pm). She states "I have a good appetite. I like rice and potatoes." She was able to cut back on food on Wednesdays.     Pateint's carvedilol was discontinued by Pulm due to hypotension and cough/SOB. She has resolution of those symptoms since then, but is hypertensive in clinic today. She continues to take torsemide with good urine output, her dose was cut in half 3 weeks ago by an unknown provider (nothing is documented in recent notes about decreasing it). Her BP regimen today is hydralazine 25mg BID, losartan 100mg, torsemide 20mg.    Past Surgical History:   Procedure Laterality Date    HYSTERECTOMY  1982    secondary uterine fibroids    JOINT REPLACEMENT      Right total knee replacement         Past Medical History:   Diagnosis Date    Adrenal mass- adenoma stable since 2004 (1.8 cm and 8/13/12 (2 cm); stable 2016 2.4 cm     Adrenal mass- adenoma stable since 2004 (1.8 cm and 8/13/12 (2 cm); stable 2016 2.4 cm    Asthma in adult without complication 6/25/2015    " Bilateral carotid artery disease 7/21/2017    Bilateral sciatica 2/7/2017    Cellulitis of right leg 08/16/2017    Cerebral infarction 1/29/2016    Multiple areas of lacunar infarction. Stroke risk factors include HTN, DM2, Dyslipidemia.  Continue ASA 81mg/ Statin therapy I have encouraged 30 minutes of physical activity daily for 5 days a week. She has access to a pool so this should not be so jarring to her joints.     Cervical radiculopathy 3/18/2015    Chronic rhinitis 4/9/2013    CKD (chronic kidney disease) stage 3, GFR 30-59 ml/min 1/29/2013    Coronary artery disease due to calcified coronary lesion 6/25/2015    Diastolic dysfunction 10/10/2013    Essential hypertension 6/25/2015    Gastroesophageal reflux disease without esophagitis 8/13/2012    Gout     Hep B w/ coma 1971    Hives 10/1/2013    Mixed hyperlipidemia 8/13/2012    Morbid obesity with BMI of 50.0-59.9, adult 2/11/2014    Obstructive sleep apnea syndrome 8/13/2012    Senile cataracts of both eyes 1/22/2015    Traumatic open wound of right lower leg 8/25/2017    Trigeminal neuralgia of right side of face 5/23/2017    For years now Previously on Gabapentin, but caused constipation Dissipating over time Not related to intracranial abnormalities Possibly related to dental procedure    Type 2 diabetes mellitus with diabetic polyneuropathy, with long-term current use of insulin 1/29/2013    Venous stasis dermatitis of both lower extremities 8/21/2017    Vitamin D deficiency disease 8/13/2012       Review of Systems   Constitutional: Positive for activity change and appetite change. Negative for unexpected weight change.   Cardiovascular: Positive for leg swelling.   Gastrointestinal: Negative for constipation and diarrhea.   Skin: Positive for wound.   Hematological: Does not bruise/bleed easily.   Psychiatric/Behavioral: Negative for behavioral problems and decreased concentration.       Objective:   BP (!) 146/60 (BP Location:  "Right arm, Patient Position: Sitting, BP Method: Medium (Manual))   Pulse 76   Temp 98.8 °F (37.1 °C)   Ht 4' 11" (1.499 m)   Wt 121.2 kg (267 lb 3.2 oz)   SpO2 98%   BMI 53.97 kg/m²     Physical Exam   Constitutional: She appears well-developed.   Severe obesity   Eyes: EOM are normal.   Cardiovascular: Normal rate.    Pulmonary/Chest: Effort normal.   Abdominal:   Obese abdomen   Musculoskeletal: She exhibits edema.   Erythematous wound in RLE with serous output  RLE wrapped in Unna boot on initiation of exam, LLE not wrapped   Neurological: She is alert.   Psychiatric: She has a normal mood and affect.   Poor insight into disease       Lab Results   Component Value Date    WBC 9.57 09/19/2017    HGB 10.9 (L) 09/19/2017    HCT 33.7 (L) 09/19/2017     09/19/2017    CHOL 177 05/02/2017    TRIG 92 05/02/2017    HDL 39 (L) 05/02/2017    ALT 18 09/19/2017    AST 15 09/19/2017     09/19/2017    K 4.5 09/19/2017     09/19/2017    CREATININE 1.2 09/19/2017    BUN 35 (H) 09/19/2017    CO2 23 09/19/2017    TSH 0.830 05/02/2017    INR 1.1 04/17/2005    HGBA1C 8.2 (H) 08/24/2017                 Assessment:   77 y.o. female with multiple co-morbid illnesses here to continue work-up of chronic issues notably obesity, LE edema, HTN.     Plan:     Problem List Items Addressed This Visit        Derm    Tinea pedis     Seen on exam, with recurrent cellulitis in RLE  · Clotrimazole cream and powder spray  · Advised good foot hygeine         Relevant Medications    clotrimazole (LOTRIMIN) 1 % cream    miconazole nitrate (LOTRIMIN AF POWDER) 2 % AerP       Pulmonary    Uncomplicated asthma     Symptom improvement with discontinuation of BB by Pulm, compliant with inhalers  · Continue albuterol and advair  · F/U Pulm            Cardiac/Vascular    Essential hypertension     BP not controlled on hydralazine 25mg BID, losartan 100mg, torsemide; carvedilol discontinued by pulm  · Increase hydralazine to " 50mg  · Continue losartan 100mg  · Continue torsemide          Relevant Medications    hydrALAZINE (APRESOLINE) 50 MG tablet    Venous stasis dermatitis of both lower extremities     LE swelling bilaterally with poor wound healing, routine La Nena boot with wound care  · Continue La Nena boot, per wound care  · Treat cellulitis  · Wound care performed in clinic today  · Gentle debridement, application of medihoney, wet to dry dressing, gauze wrap, coban wrap  · F/U wound care clinic         Relevant Medications    clindamycin (CLEOCIN) 300 MG capsule    Other Relevant Orders    CBC auto differential    Basic metabolic panel       ID    Cellulitis of left lower extremity     Acute episode on 11/2/17 with serous discharge from open wound, evidence of tinea pedis  · Wound care in clinic today  · Wound debridement  · Treat tinea pedis  · Start clindamycin 300mg TID for 7 days  · F/U wound care in 1 week         Relevant Medications    clindamycin (CLEOCIN) 300 MG capsule    Other Relevant Orders    CBC auto differential    Basic metabolic panel       Endocrine    Adrenal mass- adenoma stable since 2004 (1.8 cm and 8/13/12 (2 cm); stable 2016 2.4 cm     Adrenal mass- adenoma stable since 2004 (1.8 cm and 8/13/12 (2 cm); stable 2016 2.4 cm  · monitor            Orthopedic    Traumatic open wound of right lower leg     Chronic nonhealing wound of RLE, wearing La Nena boot  · Wound care appt soon  · Wound care performed in clinic today  · Gentle debridement, application of medihoney, wet to dry dressing, gauze wrap, coban wrap  · Start antibiotics due to erythema on 11/2/17  · Check labs for infection            Other    Obstructive sleep apnea syndrome     Noncompliant with CPAP  · Advised to use machine  · F/u sleep clinic               Health Maintenance       Date Due Completion Date    TETANUS VACCINE 03[birth year]/1960 ---    Influenza Vaccine 08/01/2017 10/28/2016- DONE ALREADY    Override on 10/7/2015: Done    Override on 10/3/2014:  Done    Override on 10/10/2013: Done    Override on 9/13/2011: Done    DEXA SCAN 01/26/2018 1/26/2015    Override on 12/13/2011: Done    Hemoglobin A1c 02/24/2018 8/24/2017    Override on 7/13/2016: Done    Lipid Panel 05/02/2018 5/2/2017    Eye Exam 05/23/2018 5/23/2017    Override on 2/17/2016: Done    Override on 1/22/2015: Done    Override on 8/16/2012: Done    Foot Exam 05/31/2018 5/31/2017 (Done)    Override on 5/31/2017: Done    Override on 7/20/2016: Done    Colonoscopy 08/13/2020 8/13/2010 (Done)    Override on 8/13/2010: Done          Return in about 4 weeks (around 11/30/2017). . One hour spent with this patient today, half of that in counseling.    Tami Schmidt MD/MPH  Internal Medicine  [facility]  [phone]

## 2017-11-02 NOTE — ASSESSMENT & PLAN NOTE
Chronic nonhealing wound of RLE, wearing La Nena boot  · Wound care appt soon  · Wound care performed in clinic today  · Gentle debridement, application of medihoney, wet to dry dressing, gauze wrap, coban wrap  · Start antibiotics due to erythema on 11/2/17  · Check labs for infection

## 2017-11-02 NOTE — ASSESSMENT & PLAN NOTE
Seen on exam, with recurrent cellulitis in RLE  · Clotrimazole cream and powder spray  · Advised good foot hygeine

## 2017-11-02 NOTE — ASSESSMENT & PLAN NOTE
Acute episode on 11/2/17 with serous discharge from open wound, evidence of tinea pedis  · Wound care in clinic today  · Wound debridement  · Treat tinea pedis  · Start clindamycin 300mg TID for 7 days  · F/U wound care in 1 week

## 2017-11-03 ENCOUNTER — TELEPHONE (OUTPATIENT)
Dept: INTERNAL MEDICINE | Facility: CLINIC | Age: 77
End: 2017-11-03

## 2017-11-03 NOTE — TELEPHONE ENCOUNTER
Please let patient know that her labs did not show infection. The redness in her legs and the increased output from her wound are likely related to fluid in her legs. The recent decrease in her water pill is the most likely cause, and not infection.     She should take the torsemide 20mg, and should not decrease the dose any time soon. Please advise her to call me if another provider decreases it.    Also the antibiotic will likely not cure this. She can complete the course if she wants, but the torsemide is the main medication that will help. She also needs to elevate her legs as much as possible, and continue the unna boot with home health.    Thanks,  BARB

## 2017-11-03 NOTE — TELEPHONE ENCOUNTER
Patient has been informed that  her labs did not show infection. Patient has been advised that the redness in her legs and the increased output from her wound are likely related to fluid in her legs. Patient informed that  the recent decrease in her water pill is the most likely cause, and not infection.      Patient has been advised that she should take the torsemide 20mg, and should not decrease the dose any time soon. Patient has been advise to call Dr. Schmidt  if another provider decreases it.     Patient has also been informed that  the antibiotic will likely not cure this. Patient has been informed that she  can complete the course if she wants, but the torsemide is the main medication that will help. Patient has  Also been informed that she  needs to elevate her legs as much as possible, and continue the unna boot with home health.    Patient verbalized great understanding

## 2017-11-08 ENCOUNTER — OFFICE VISIT (OUTPATIENT)
Dept: PHYSICAL MEDICINE AND REHAB | Facility: CLINIC | Age: 77
End: 2017-11-08
Payer: MEDICARE

## 2017-11-08 ENCOUNTER — OFFICE VISIT (OUTPATIENT)
Dept: WOUND CARE | Facility: CLINIC | Age: 77
End: 2017-11-08
Payer: MEDICARE

## 2017-11-08 VITALS
SYSTOLIC BLOOD PRESSURE: 150 MMHG | DIASTOLIC BLOOD PRESSURE: 75 MMHG | BODY MASS INDEX: 45.41 KG/M2 | WEIGHT: 266.88 LBS | WEIGHT: 266 LBS | SYSTOLIC BLOOD PRESSURE: 160 MMHG | HEART RATE: 91 BPM | BODY MASS INDEX: 53.8 KG/M2 | DIASTOLIC BLOOD PRESSURE: 68 MMHG | HEART RATE: 92 BPM | HEIGHT: 64 IN | HEIGHT: 59 IN | TEMPERATURE: 98 F

## 2017-11-08 DIAGNOSIS — M47.816 LUMBAR FACET ARTHROPATHY: ICD-10-CM

## 2017-11-08 DIAGNOSIS — E66.01 MORBID OBESITY WITH BMI OF 50.0-59.9, ADULT: ICD-10-CM

## 2017-11-08 DIAGNOSIS — G89.29 CHRONIC LEFT-SIDED LOW BACK PAIN, WITH SCIATICA PRESENCE UNSPECIFIED: Primary | ICD-10-CM

## 2017-11-08 DIAGNOSIS — M17.12 PRIMARY OSTEOARTHRITIS OF LEFT KNEE: ICD-10-CM

## 2017-11-08 DIAGNOSIS — I87.2 VENOUS STASIS DERMATITIS OF BOTH LOWER EXTREMITIES: ICD-10-CM

## 2017-11-08 DIAGNOSIS — M48.061 SPINAL STENOSIS OF LUMBAR REGION WITHOUT NEUROGENIC CLAUDICATION: ICD-10-CM

## 2017-11-08 DIAGNOSIS — M54.5 CHRONIC LEFT-SIDED LOW BACK PAIN, WITH SCIATICA PRESENCE UNSPECIFIED: Primary | ICD-10-CM

## 2017-11-08 DIAGNOSIS — M54.16 LEFT LUMBAR RADICULOPATHY: ICD-10-CM

## 2017-11-08 DIAGNOSIS — R60.0 BILATERAL LEG EDEMA: ICD-10-CM

## 2017-11-08 DIAGNOSIS — Z96.651 STATUS POST TOTAL KNEE REPLACEMENT, RIGHT: ICD-10-CM

## 2017-11-08 DIAGNOSIS — S81.801A TRAUMATIC OPEN WOUND OF RIGHT LOWER LEG, INITIAL ENCOUNTER: Primary | ICD-10-CM

## 2017-11-08 PROCEDURE — 99999 PR PBB SHADOW E&M-EST. PATIENT-LVL V: CPT | Mod: PBBFAC,,, | Performed by: NURSE PRACTITIONER

## 2017-11-08 PROCEDURE — 99215 OFFICE O/P EST HI 40 MIN: CPT | Mod: PBBFAC,25 | Performed by: NURSE PRACTITIONER

## 2017-11-08 PROCEDURE — 99213 OFFICE O/P EST LOW 20 MIN: CPT | Mod: S$PBB,,, | Performed by: PHYSICAL MEDICINE & REHABILITATION

## 2017-11-08 PROCEDURE — 29580 STRAPPING UNNA BOOT: CPT | Mod: S$PBB,RT,, | Performed by: NURSE PRACTITIONER

## 2017-11-08 PROCEDURE — 99213 OFFICE O/P EST LOW 20 MIN: CPT | Mod: PBBFAC,27 | Performed by: PHYSICAL MEDICINE & REHABILITATION

## 2017-11-08 PROCEDURE — 29580 STRAPPING UNNA BOOT: CPT | Mod: PBBFAC,RT | Performed by: NURSE PRACTITIONER

## 2017-11-08 PROCEDURE — 99999 PR PBB SHADOW E&M-EST. PATIENT-LVL III: CPT | Mod: PBBFAC,,, | Performed by: PHYSICAL MEDICINE & REHABILITATION

## 2017-11-08 PROCEDURE — 99499 UNLISTED E&M SERVICE: CPT | Mod: S$PBB,,, | Performed by: NURSE PRACTITIONER

## 2017-11-08 NOTE — PROGRESS NOTES
Subjective:       Patient ID: Sue Giordano is a 77 y.o. female.    Chief Complaint: No chief complaint on file.    HPI     HISTORY OF PRESENT ILLNESS:  Pasquale is a 77-year-old black female with HTN, DM,   asthma, OA, status post right TKA and morbid obesity.  She is followed up in   the Physical Medicine Clinic for chronic low back pain with history of lumbar   radiculopathy and OA of the left knee.  Her last visit to the clinic was on   07/14/2017.  She was maintained on Cymbalta, p.r.n. tramadol and diclofenac gel.    The patient is coming today to the clinic for followup.  Her back pain has been   better.  It is an intermittent aching pain in her lumbar spine.  She denies any   radiation to her legs.  Her maximum pain is 7/10 and minimum 0/10.  Today, it is   0/10.  The patient denies any lower extremity weakness.  She denies any bowel   or bladder incontinence.  She has occasional mild tingling sensations in the   left ankle.    Her left knee pain has also been better.  It is an intermittent aching pain   aggravated by weightbearing.  Her maximum pain is 7/10 and minimum 0/10.  Today,   it is 0/10.    The patient was taking Cymbalta, but stopped that since her last visit due to   improvement in her symptoms.  She takes Tylenol 500 mg p.r.n., usually a couple   of times per day.  She uses Voltaren gel, mostly to her left knee as needed a   few times per week.  She reports good relief with its use.  She is working on   her weight loss.      MS/HN  dd: 11/08/2017 11:14:45 (CST)  td: 11/09/2017 07:52:25 (CST)  Doc ID   #[id number]  Job ID #[id number]    CC:             Review of Systems   Constitutional: Negative for fatigue.   Respiratory: Negative for chest tightness.    Cardiovascular: Negative for chest pain.   Gastrointestinal: Negative for constipation, nausea and vomiting.   Genitourinary: Negative for difficulty urinating.   Musculoskeletal: Positive for arthralgias, back pain and gait problem. Negative  for joint swelling and neck pain.   Skin: Negative for rash.   Neurological: Negative for dizziness and headaches.   Psychiatric/Behavioral: Negative for behavioral problems and sleep disturbance.       Objective:      Physical Exam   Constitutional: She appears well-developed and well-nourished. No distress.   Coming to the clinic in a manual wheelchair propelled by medical assistant.     HENT:   Head: Normocephalic and atraumatic.   Neck: Normal range of motion.   Musculoskeletal:   BLE:  ROM decreased at hips & knees (b/o girth & BLE edema).  Healed Rt TKA scar.  Strength:      RLE: 5 to 5-/5 at hip flexion, knee extension, ankle DF/PF.     LLE:  5 to 5-/5 at hip flexion, knee extension, ankle DF/PF.  Sensation to pinprick:     RLE: intact.      LLE: intact.   SLR (siting):      RLE: -ve.      LLE: -ve.     -ve tenderness over lumbar spine.           Neurological: She is alert.   Skin: Skin is warm.   Psychiatric: She has a normal mood and affect.   Vitals reviewed.            Assessment:       1. Chronic left-sided low back pain, with sciatica presence unspecified    2. Lumbar facet arthropathy    3. Spinal stenosis of lumbar region without neurogenic claudication    4. Left lumbar radiculopathy    5. Primary osteoarthritis of left knee    6. Status post total knee replacement, right    7. Morbid obesity with BMI of 50.0-59.9, adult        Plan:       - Continue Tylenol 500 mg, 1-2 po tid prn for pain. If no relief.  - Continue diclofenac gel tid prn for knee pain.  - Weight loss was again encouraged.  - Return in about 6 months (around 5/8/2018).

## 2017-11-08 NOTE — Clinical Note
She looks great.  She will probably be healed before she needs to come back.  I will have home health notify me when she is healed so they can discharge her. Thalia

## 2017-11-08 NOTE — PROGRESS NOTES
Subjective:       Patient ID: Sue Giordano is a 77 y.o. female.    Chief Complaint: Wound Check    Wound Check     Edema     This patient is seen today for reevaluation of a traumatic wound to the right lower leg.  She was recently admitted to [facility] from 8/25-8/30/17 for cellulitis of the right leg.  She is receiving home health services through [facility].  An unna boot is on the leg and the wound is healing as evidenced by wound contracture.  She has chronic bilateral lower extremity edema.  She has had problems with lymphedema for 20 years now.  She has no pain.  She is afebrile.  Her medical history is significant for poorly controlled type II diabetes, lymphedema and chronic kidney disease.  [facility] is seeing the patient three times weekly.  Review of Systems  Unchanged from prior visit.  Objective:      Physical Exam   Constitutional: She is oriented to person, place, and time. She appears well-developed and well-nourished. No distress.   HENT:   Head: Normocephalic and atraumatic.   Neck: Normal range of motion.   Pulmonary/Chest: Effort normal. No respiratory distress.   Musculoskeletal: Normal range of motion. She exhibits edema. She exhibits no tenderness.        Legs:  Neurological: She is alert and oriented to person, place, and time.   Skin: Skin is warm and dry. No rash noted. She is not diaphoretic. No cyanosis or erythema. Nails show no clubbing.   Psychiatric: She has a normal mood and affect. Her behavior is normal. Judgment and thought content normal.   Nursing note and vitals reviewed.    ..  Hemoglobin A1C   Date Value Ref Range Status   08/24/2017 8.2 (H) 4.0 - 5.6 % Final     Comment:     According to ADA guidelines, hemoglobin A1c <7.0% represents  optimal control in non-pregnant diabetic patients. Different  metrics may apply to specific patient populations.   Standards of Medical Care in Diabetes-2016.  For the purpose of screening for the presence of diabetes:  <5.7%     Consistent  with the absence of diabetes  5.7-6.4%  Consistent with increasing risk for diabetes   (prediabetes)  >or=6.5%  Consistent with diabetes  Currently, no consensus exists for use of hemoglobin A1c  for diagnosis of diabetes for children.  This Hemoglobin A1c assay has significant interference with fetal   hemoglobin   (HbF). The results are invalid for patients with abnormal amounts of   HbF,   including those with known Hereditary Persistence   of Fetal Hemoglobin. Heterozygous hemoglobin variants (HbAS, HbAC,   HbAD, HbAE, HbA2) do not significantly interfere with this assay;   however, presence of multiple variants in a sample may impact the %   interference.     08/21/2017 8.2 (H) 4.0 - 5.6 % Final     Comment:     According to ADA guidelines, hemoglobin A1c <7.0% represents  optimal control in non-pregnant diabetic patients. Different  metrics may apply to specific patient populations.   Standards of Medical Care in Diabetes-2016.  For the purpose of screening for the presence of diabetes:  <5.7%     Consistent with the absence of diabetes  5.7-6.4%  Consistent with increasing risk for diabetes   (prediabetes)  >or=6.5%  Consistent with diabetes  Currently, no consensus exists for use of hemoglobin A1c  for diagnosis of diabetes for children.  This Hemoglobin A1c assay has significant interference with fetal   hemoglobin   (HbF). The results are invalid for patients with abnormal amounts of   HbF,   including those with known Hereditary Persistence   of Fetal Hemoglobin. Heterozygous hemoglobin variants (HbAS, HbAC,   HbAD, HbAE, HbA2) do not significantly interfere with this assay;   however, presence of multiple variants in a sample may impact the %   interference.     05/02/2017 8.3 (H) 4.5 - 6.2 % Final     Comment:     According to ADA guidelines, hemoglobin A1C <7.0% represents  optimal control in non-pregnant diabetic patients.  Different  metrics may apply to specific populations.   Standards of Medical  Care in Diabetes - 2016.  For the purpose of screening for the presence of diabetes:  <5.7%     Consistent with the absence of diabetes  5.7-6.4%  Consistent with increasing risk for diabetes   (prediabetes)  >or=6.5%  Consistent with diabetes  Currently no consensus exists for use of hemoglobin A1C  for diagnosis of diabetes for children.     The right leg was cleansed with Easi-clense sponges and dried thoroughly.  Medihoney gel and a hydrofiber dressing was applied to the wound.  Eucerin cream was applied to the lower legs.  The patient's foot was positioned at a 90 degree angle.  A zinc oxide wrap, followed by kerlix roll gauze and coban were applied using a spiral technique avoiding creases or folds.  The wrap was started behind the first metatarsal and ended below the tibial tubercle of the knee.  There was overlap of each turn half the width of the previous turn.  The compression wrap will be changed every 3-4 days.    Assessment:       1. Traumatic open wound of right lower leg, initial encounter    2. Venous stasis dermatitis of both lower extremities    3. Bilateral leg edema        Plan:           Unna boot right lower leg as detailed above.  Patient was warned not to get the dressings wet and to use cast covers for showering.  Should the dressing become wet, she is to remove it, place a wet-to-dry dressing over the wound, cover with gauze and roll gauze and use ace wraps for compression and to secure bandages.  She should then notify home health as soon as possible to have a new dressing applied.  Return to clinic in 3 weeks or prn if healed.    Home Health Orders:  Cleanse right leg wound with wound .   Pad both ankles with ABD pads.  Apply medihoney gel to right leg wound and cover with a hydrofiber dressing.  Unna boot (zinc oxide, kerlix and coban) right lower leg three times weekly.  Circaid stocking left lower leg for compression.  Darco shoes bilateral feet for ambulation.  Skilled nurse  visit-3 x weekly     Right shin

## 2017-11-14 ENCOUNTER — TELEPHONE (OUTPATIENT)
Dept: ENDOCRINOLOGY | Facility: CLINIC | Age: 77
End: 2017-11-14

## 2017-11-14 NOTE — TELEPHONE ENCOUNTER
----- Message from LIBRA Seaman, RANULFO sent at 11/14/2017  9:46 AM CST -----  Please let Sis Duran Giordano to keep everything the same right now.  Overall looks great.  Watch her intake for thanksgiving and that weekend.  Drink plenty of water.  No changes at this time.  Thanks,  Yogi

## 2017-11-22 ENCOUNTER — TELEPHONE (OUTPATIENT)
Dept: INTERNAL MEDICINE | Facility: CLINIC | Age: 77
End: 2017-11-22

## 2017-11-22 NOTE — TELEPHONE ENCOUNTER
Correct it is both of her legs , patient has been informed that this is not an infection , and that this is venous stasis which is causing the redness and blistering .  Patient has been instructed to please elevate her legs and to please continue the compression stockings.    *Patient informed me that she has not been elevating her legs and wearing her compression stockings off and on do to her planning and helping out with the thanksgiving feast  , but that today she will elevate both of her legs and wear her compression stockings daily .

## 2017-11-22 NOTE — TELEPHONE ENCOUNTER
If it's both legs it is not an infection, it is venous stasis as the cause of redness and blistering. She needs to elevate her legs and continue the compression wraps. Is she doing this?    Thanks,  KJ

## 2017-11-22 NOTE — TELEPHONE ENCOUNTER
----- Message from Monae Aldridge sent at 11/22/2017 11:06 AM CST -----  Contact: heraclioskenzie [facility]/jayme/[phone]  [facility] called in regards to pt has swelling and redness on her lower extremities.        Please advise

## 2017-11-30 ENCOUNTER — TELEPHONE (OUTPATIENT)
Dept: WOUND CARE | Facility: CLINIC | Age: 77
End: 2017-11-30

## 2017-11-30 PROBLEM — L03.115 CELLULITIS OF LEG, RIGHT: Status: RESOLVED | Noted: 2017-08-25 | Resolved: 2017-11-30

## 2017-12-04 ENCOUNTER — OFFICE VISIT (OUTPATIENT)
Dept: INTERNAL MEDICINE | Facility: CLINIC | Age: 77
End: 2017-12-04
Payer: MEDICARE

## 2017-12-04 ENCOUNTER — LAB VISIT (OUTPATIENT)
Dept: LAB | Facility: HOSPITAL | Age: 77
End: 2017-12-04
Attending: NURSE PRACTITIONER
Payer: MEDICARE

## 2017-12-04 ENCOUNTER — OFFICE VISIT (OUTPATIENT)
Dept: SLEEP MEDICINE | Facility: CLINIC | Age: 77
End: 2017-12-04
Payer: MEDICARE

## 2017-12-04 VITALS
SYSTOLIC BLOOD PRESSURE: 146 MMHG | HEART RATE: 87 BPM | DIASTOLIC BLOOD PRESSURE: 66 MMHG | BODY MASS INDEX: 45.66 KG/M2 | WEIGHT: 266 LBS

## 2017-12-04 VITALS
HEIGHT: 59 IN | WEIGHT: 267.44 LBS | BODY MASS INDEX: 53.92 KG/M2 | DIASTOLIC BLOOD PRESSURE: 58 MMHG | HEART RATE: 74 BPM | SYSTOLIC BLOOD PRESSURE: 134 MMHG | OXYGEN SATURATION: 99 %

## 2017-12-04 DIAGNOSIS — G47.33 OBSTRUCTIVE SLEEP APNEA SYNDROME: ICD-10-CM

## 2017-12-04 DIAGNOSIS — Z79.4 TYPE 2 DIABETES MELLITUS WITH DIABETIC POLYNEUROPATHY, WITH LONG-TERM CURRENT USE OF INSULIN: ICD-10-CM

## 2017-12-04 DIAGNOSIS — E66.01 MORBID OBESITY DUE TO EXCESS CALORIES: ICD-10-CM

## 2017-12-04 DIAGNOSIS — S81.801D TRAUMATIC OPEN WOUND OF RIGHT LOWER LEG, SUBSEQUENT ENCOUNTER: ICD-10-CM

## 2017-12-04 DIAGNOSIS — E11.42 TYPE 2 DIABETES MELLITUS WITH DIABETIC POLYNEUROPATHY, WITH LONG-TERM CURRENT USE OF INSULIN: ICD-10-CM

## 2017-12-04 DIAGNOSIS — G47.33 OSA (OBSTRUCTIVE SLEEP APNEA): Primary | ICD-10-CM

## 2017-12-04 DIAGNOSIS — I87.2 VENOUS STASIS DERMATITIS OF BOTH LOWER EXTREMITIES: ICD-10-CM

## 2017-12-04 DIAGNOSIS — M17.0 PRIMARY OSTEOARTHRITIS OF BOTH KNEES: ICD-10-CM

## 2017-12-04 LAB
ESTIMATED AVG GLUCOSE: 160 MG/DL
HBA1C MFR BLD HPLC: 7.2 %

## 2017-12-04 PROCEDURE — 99214 OFFICE O/P EST MOD 30 MIN: CPT | Mod: PBBFAC,27 | Performed by: INTERNAL MEDICINE

## 2017-12-04 PROCEDURE — 99999 PR PBB SHADOW E&M-EST. PATIENT-LVL II: CPT | Mod: PBBFAC,,, | Performed by: PSYCHIATRY & NEUROLOGY

## 2017-12-04 PROCEDURE — 83036 HEMOGLOBIN GLYCOSYLATED A1C: CPT

## 2017-12-04 PROCEDURE — 36415 COLL VENOUS BLD VENIPUNCTURE: CPT

## 2017-12-04 PROCEDURE — 99213 OFFICE O/P EST LOW 20 MIN: CPT | Mod: S$PBB,,, | Performed by: PSYCHIATRY & NEUROLOGY

## 2017-12-04 PROCEDURE — 99215 OFFICE O/P EST HI 40 MIN: CPT | Mod: S$PBB,,, | Performed by: INTERNAL MEDICINE

## 2017-12-04 PROCEDURE — 99999 PR PBB SHADOW E&M-EST. PATIENT-LVL IV: CPT | Mod: PBBFAC,,, | Performed by: INTERNAL MEDICINE

## 2017-12-04 PROCEDURE — 99212 OFFICE O/P EST SF 10 MIN: CPT | Mod: PBBFAC | Performed by: PSYCHIATRY & NEUROLOGY

## 2017-12-04 NOTE — ASSESSMENT & PLAN NOTE
Chronic nonhealing wound of RLE, wearing La Nena boot  · Discharged from wound care  · Will not start antibiotics because erythema (likely venous stasis)

## 2017-12-04 NOTE — PROGRESS NOTES
"Primary Care Provider Appointment    Subjective:      Patient ID: Scarlet Giordano is a 77 y.o. female with HLD, CHF, HTN, morbid obesity    Chief Complaint: Follow-up (4 wk)    Patient with complaints of LE erythema and swelling. She was discharged from wound care and , therefore is no longer receiving unna boot. She is not using the entire circaid apparatus in entirety- she only uses the neoprene velcro wrap with no pull-up stocking. She states that [facility] can help her with the application, but is on leave due to back injury.    She also complains of knee/thigh pain. She missed a celebration 2 nights ago because of the pain. She does not know what her pain meds are. She states that her knee feels "sliced and left open." She is resistant to do outpatient PT, but agrees to do it twice weekly.    She reports her home CBG readings have been in 110-130s. She is injecting herself with insulin. She shared her readings with her endocrinologist. She takes 48U determir, 18U before meals. She is cutting back on food on Wednesdays (her vocations day). She is due for A1c next week.    Past Surgical History:   Procedure Laterality Date    HYSTERECTOMY  1982    secondary uterine fibroids    JOINT REPLACEMENT      Right total knee replacement         Past Medical History:   Diagnosis Date    Adrenal mass- adenoma stable since 2004 (1.8 cm and 8/13/12 (2 cm); stable 2016 2.4 cm     Adrenal mass- adenoma stable since 2004 (1.8 cm and 8/13/12 (2 cm); stable 2016 2.4 cm    Asthma in adult without complication 6/25/2015    Bilateral carotid artery disease 7/21/2017    Bilateral sciatica 2/7/2017    Cellulitis of right leg 08/16/2017    Cerebral infarction 1/29/2016    Multiple areas of lacunar infarction. Stroke risk factors include HTN, DM2, Dyslipidemia.  Continue ASA 81mg/ Statin therapy I have encouraged 30 minutes of physical activity daily for 5 days a week. She has access to a pool so this should not be so " "jarring to her joints.     Cervical radiculopathy 3/18/2015    Chronic rhinitis 4/9/2013    CKD (chronic kidney disease) stage 3, GFR 30-59 ml/min 1/29/2013    Coronary artery disease due to calcified coronary lesion 6/25/2015    Diastolic dysfunction 10/10/2013    Essential hypertension 6/25/2015    Gastroesophageal reflux disease without esophagitis 8/13/2012    Gout     Hep B w/ coma 1971    Hives 10/1/2013    Mixed hyperlipidemia 8/13/2012    Morbid obesity with BMI of 50.0-59.9, adult 2/11/2014    Obstructive sleep apnea syndrome 8/13/2012    Senile cataracts of both eyes 1/22/2015    Traumatic open wound of right lower leg 8/25/2017    Trigeminal neuralgia of right side of face 5/23/2017    For years now Previously on Gabapentin, but caused constipation Dissipating over time Not related to intracranial abnormalities Possibly related to dental procedure    Type 2 diabetes mellitus with diabetic polyneuropathy, with long-term current use of insulin 1/29/2013    Venous stasis dermatitis of both lower extremities 8/21/2017    Vitamin D deficiency disease 8/13/2012       Review of Systems   Constitutional: Positive for activity change and appetite change. Negative for unexpected weight change.   Cardiovascular: Positive for leg swelling.   Gastrointestinal: Negative for constipation and diarrhea.   Skin: Positive for wound.   Hematological: Does not bruise/bleed easily.   Psychiatric/Behavioral: Negative for behavioral problems and decreased concentration.       Objective:   BP (!) 134/58 (BP Location: Left arm, Patient Position: Sitting, BP Method: Large (Manual))   Pulse 74   Ht 4' 11" (1.499 m)   Wt 121.3 kg (267 lb 6.7 oz)   SpO2 99%   BMI 54.01 kg/m²     Physical Exam   Constitutional: She appears well-developed.   Severe obesity   Eyes: EOM are normal.   Cardiovascular: Normal rate.    Pulmonary/Chest: Effort normal.   Abdominal:   Obese abdomen   Musculoskeletal: She exhibits edema. "   Significant swelling in legs bilaterally   Neurological: She is alert.   Skin:   Erythematous LE bilaterally  No evidence of infection in LE bilaterally  Wound RLE healed   Psychiatric: She has a normal mood and affect.   Poor insight into disease       Lab Results   Component Value Date    WBC 9.57 11/02/2017    HGB 11.5 (L) 11/02/2017    HCT 38.4 11/02/2017     11/02/2017    CHOL 177 05/02/2017    TRIG 92 05/02/2017    HDL 39 (L) 05/02/2017    ALT 18 09/19/2017    AST 15 09/19/2017     11/02/2017    K 4.3 11/02/2017     11/02/2017    CREATININE 1.1 11/02/2017    BUN 25 (H) 11/02/2017    CO2 24 11/02/2017    TSH 0.830 05/02/2017    INR 1.1 04/17/2005    HGBA1C 8.2 (H) 08/24/2017                 Assessment:   77 y.o. female with multiple co-morbid illnesses here to continue work-up of chronic issues notably HLD, CHF, HTN, morbid obesity    Plan:     Problem List Items Addressed This Visit        Cardiac/Vascular    Venous stasis dermatitis of both lower extremities     LE swelling bilaterally with poor wound healing, routine [place]ena boot with wound care  · Continue circaid apparatus  · Advised to use compression stockings  · Will NOT start antibiotics  · Antibiotics stopped             Endocrine    Morbid obesity due to excess calories     BMI >50, eats excessively, DM, HTN, REJI  · Counseled on restricting caloric intake one day per week- Wed  · No desserts, reduced carbs            Orthopedic    Traumatic open wound of right lower leg     Chronic nonhealing wound of RLE, wearing [place] Nena boot  · Discharged from wound care  · Will not start antibiotics because erythema (likely venous stasis)         Primary osteoarthritis of both knees     Knee XR in 2/2017 showing joint space narrowing narrowing laterally and patellofemoral bony spurring.  · Start PT at [facility] on [address]  · Advised to lose weight            Other    Obstructive sleep apnea syndrome     Noncompliant with CPAP  · Advised to use  machine  · F/u sleep clinic today  · Advised to place machine next to recliner chair               Health Maintenance       Date Due Completion Date    TETANUS VACCINE 03/08/1960 ---    DEXA SCAN 01/26/2018 1/26/2015    Override on 12/13/2011: Done    Hemoglobin A1c 02/24/2018 8/24/2017    Override on 7/13/2016: Done    Lipid Panel 05/02/2018 5/2/2017    Eye Exam 05/23/2018 5/23/2017    Override on 2/17/2016: Done    Override on 1/22/2015: Done    Override on 8/16/2012: Done    Foot Exam 05/31/2018 5/31/2017 (Done)    Override on 5/31/2017: Done    Override on 7/20/2016: Done    Colonoscopy 08/13/2020 8/13/2010 (Done)    Override on 8/13/2010: Done          Return in about 6 weeks (around 1/15/2018). . One hour spent with this patient today, half of that in counseling.    Tami Schmidt MD/MPH  Internal Medicine  [facility]  [phone]

## 2017-12-04 NOTE — Clinical Note
Brian Vásquez, This patient is no longer using an unna boot, but does have circaid wraps. Although her wound has healed, her LE swelling seems to have worsened since discontinuation of unna boots. I advised her to use her compression stocking under the circaid wrap (she had not been using this feature), and ordered outpatient PT. Anything else to add? Should she go back to unna boot?  Thanks, BARB

## 2017-12-04 NOTE — PATIENT INSTRUCTIONS
TODAY:  - continue torsemide 20mg daily  - wash, lotion, circaid wrap (with compression stockings) daily  - elevate legs as much as possible  - use CPAP in the recliner chair  - continue insulin as directed  - start PT outpatient at [address] [place] [address]  - cut back on food on Wednesdays  - avoid surgary drinks  - check A1c today (move lab from 12/11 to today)

## 2017-12-04 NOTE — ASSESSMENT & PLAN NOTE
Knee XR in 2/2017 showing joint space narrowing narrowing laterally and patellofemoral bony spurring.  · Start PT at [facility] [address]  · Advised to lose weight

## 2017-12-04 NOTE — ASSESSMENT & PLAN NOTE
LE swelling bilaterally with poor wound healing, routine La Nena boot with wound care  · Continue circaid apparatus  · Advised to use compression stockings  · Will NOT start antibiotics  · Antibiotics stopped

## 2017-12-04 NOTE — PROGRESS NOTES
"Sister Pasquale returns to clinic today regarding the management of obstructive sleep apnea and CPAP equipment check.    Prior weight is 264 lbs.    She is still limited to use CPAP by her sleeping environment.  She has been sleeping in a chair (also using a wedge pillow in bed).    She is wanting to continue CPAP.  Her pressure was increased to 13 cm (based on titration)  She states this pressure increase was "working nicely".    She previously had excellent tolerance/ adherence with CPAP at pressure 10cm.   She has a nasal pillows mask, which she likes. Martin FX nasal pillows small size mask  She states that she really likes CPAP and it helps her feel better after a night of use.    Since starting celexa, she feels like she is doing so much better with pain control.  Still sleepy during the day.    ESS = 12    Recent titration at weight 281 lbs revealed she needed pressure increase from 10 to 13 cm    Interrogation: CPAP at 13 cm; 897 hours total; Machine good condition (F&P model), sleep style machine; 200 series. 5.2 hr/night.     BASELINE PSG 12/23/11: + REJI, AHI 14.2, low O2 79%, wt 281 lbs. (Patient had sleep study in 2003, at which time she was titrated to 11 cm, wt 248 lbs)   TITRATION 5/9/16: Titrated to 13 cm; wt 281 lbs    DME = Medicare ([employer]) - administered by [employer]      Past Medical History:   Diagnosis Date    Adrenal mass- adenoma stable since 2004 (1.8 cm and 8/13/12 (2 cm); stable 2016 2.4 cm     Adrenal mass- adenoma stable since 2004 (1.8 cm and 8/13/12 (2 cm); stable 2016 2.4 cm    Asthma in adult without complication 6/25/2015    Bilateral carotid artery disease 7/21/2017    Bilateral sciatica 2/7/2017    Cellulitis of right leg 08/16/2017    Cerebral infarction 1/29/2016    Multiple areas of lacunar infarction. Stroke risk factors include HTN, DM2, Dyslipidemia.  Continue ASA 81mg/ Statin therapy I have encouraged 30 minutes of physical activity daily for 5 days a week. She has access " to a pool so this should not be so jarring to her joints.     Cervical radiculopathy 3/18/2015    Chronic rhinitis 4/9/2013    CKD (chronic kidney disease) stage 3, GFR 30-59 ml/min 1/29/2013    Coronary artery disease due to calcified coronary lesion 6/25/2015    Diastolic dysfunction 10/10/2013    Essential hypertension 6/25/2015    Gastroesophageal reflux disease without esophagitis 8/13/2012    Gout     Hep B w/ coma 1971    Hives 10/1/2013    Mixed hyperlipidemia 8/13/2012    Morbid obesity with BMI of 50.0-59.9, adult 2/11/2014    Obstructive sleep apnea syndrome 8/13/2012    Senile cataracts of both eyes 1/22/2015    Traumatic open wound of right lower leg 8/25/2017    Trigeminal neuralgia of right side of face 5/23/2017    For years now Previously on Gabapentin, but caused constipation Dissipating over time Not related to intracranial abnormalities Possibly related to dental procedure    Type 2 diabetes mellitus with diabetic polyneuropathy, with long-term current use of insulin 1/29/2013    Venous stasis dermatitis of both lower extremities 8/21/2017    Vitamin D deficiency disease 8/13/2012       Past Surgical History:   Procedure Laterality Date    HYSTERECTOMY  1982    secondary uterine fibroids    JOINT REPLACEMENT      Right total knee replacement         Family History   Problem Relation Age of Onset    Cancer Mother     Hypertension Father     Cancer Sister     No Known Problems Brother     No Known Problems Maternal Aunt     No Known Problems Maternal Uncle     No Known Problems Paternal Aunt     No Known Problems Paternal Uncle     No Known Problems Maternal Grandmother     No Known Problems Maternal Grandfather     No Known Problems Paternal Grandmother     No Known Problems Paternal Grandfather     Glaucoma Neg Hx     Breast cancer Neg Hx     Colon cancer Neg Hx     Ovarian cancer Neg Hx     Stroke Neg Hx     Allergic rhinitis Neg Hx     Allergies Neg Hx      Angioedema Neg Hx     Asthma Neg Hx     Atopy Neg Hx     Eczema Neg Hx     Immunodeficiency Neg Hx     Rhinitis Neg Hx     Urticaria Neg Hx     Amblyopia Neg Hx     Blindness Neg Hx     Cataracts Neg Hx     Diabetes Neg Hx     Macular degeneration Neg Hx     Retinal detachment Neg Hx     Strabismus Neg Hx     Thyroid disease Neg Hx     Psoriasis Neg Hx        Social History   Substance Use Topics    Smoking status: Never Smoker    Smokeless tobacco: Never Used    Alcohol use No       ALLERGIES: Reviewed in EPIC    CURRENT MEDICATIONS: Reviewed in EPIC    ROS:   Weight up 2 lbs  Denies palpitations, headaches, sinus congestion improved with nasal spray qhs prn.   Denies oral drying or nasal drying.      PHYSICAL EXAM:   BP (!) 146/66 (BP Location: Right arm, Patient Position: Sitting)   Pulse 87   Wt 120.7 kg (266 lb)   BMI 45.66 kg/m²   GENERAL: morbid obese body habitus, well groomed       ASSESSMENT:     Obstructive sleep apnea, with improvement of CPAP after nightly use. Control of sleepiness should improved with increased nightly CPAP usage. Her weight is stable since her peak. Now with better pain control, she feels more successful with exercise and diet.    She has medical co-morbidities of hypertension and morbid obesity (BMI >30) .     PLAN:     1. Continue CPAP 13 cm, Goal of an additional 400 hours before next visit. She reports that she will move her CPAP to her sleeping chair.   2. Sleep with head of bed elevation and incline  3. Weight loss goal to 225 lbs.  4. If achieves weight, we can reassess whether CPAP is indicated.    DME = [employer] (she reports using nasal pillows)    Continue Advair or Flonase NS 1-2 sprays qhs PRN to reduce potential nasal congestion.     Follow up 1-2 months: MD/NP

## 2017-12-06 ENCOUNTER — TELEPHONE (OUTPATIENT)
Dept: INTERNAL MEDICINE | Facility: CLINIC | Age: 77
End: 2017-12-06

## 2017-12-13 ENCOUNTER — OFFICE VISIT (OUTPATIENT)
Dept: ENDOCRINOLOGY | Facility: CLINIC | Age: 77
End: 2017-12-13
Payer: MEDICARE

## 2017-12-13 VITALS
BODY MASS INDEX: 54.16 KG/M2 | WEIGHT: 268.63 LBS | DIASTOLIC BLOOD PRESSURE: 74 MMHG | HEIGHT: 59 IN | SYSTOLIC BLOOD PRESSURE: 129 MMHG | HEART RATE: 83 BPM

## 2017-12-13 DIAGNOSIS — E78.2 MIXED HYPERLIPIDEMIA: ICD-10-CM

## 2017-12-13 DIAGNOSIS — E66.01 MORBID OBESITY DUE TO EXCESS CALORIES: ICD-10-CM

## 2017-12-13 DIAGNOSIS — E11.42 TYPE 2 DIABETES MELLITUS WITH DIABETIC POLYNEUROPATHY, WITH LONG-TERM CURRENT USE OF INSULIN: Primary | ICD-10-CM

## 2017-12-13 DIAGNOSIS — G47.33 OBSTRUCTIVE SLEEP APNEA SYNDROME: ICD-10-CM

## 2017-12-13 DIAGNOSIS — E27.8 ADRENAL MASS: ICD-10-CM

## 2017-12-13 DIAGNOSIS — I25.84 CORONARY ARTERY DISEASE DUE TO CALCIFIED CORONARY LESION: Chronic | ICD-10-CM

## 2017-12-13 DIAGNOSIS — N18.30 CKD (CHRONIC KIDNEY DISEASE) STAGE 3, GFR 30-59 ML/MIN: ICD-10-CM

## 2017-12-13 DIAGNOSIS — I10 ESSENTIAL HYPERTENSION: ICD-10-CM

## 2017-12-13 DIAGNOSIS — R60.0 BILATERAL LEG EDEMA: ICD-10-CM

## 2017-12-13 DIAGNOSIS — I25.10 CORONARY ARTERY DISEASE DUE TO CALCIFIED CORONARY LESION: Chronic | ICD-10-CM

## 2017-12-13 DIAGNOSIS — Z79.4 TYPE 2 DIABETES MELLITUS WITH DIABETIC POLYNEUROPATHY, WITH LONG-TERM CURRENT USE OF INSULIN: Primary | ICD-10-CM

## 2017-12-13 PROCEDURE — 99214 OFFICE O/P EST MOD 30 MIN: CPT | Mod: PBBFAC | Performed by: NURSE PRACTITIONER

## 2017-12-13 PROCEDURE — 99999 PR PBB SHADOW E&M-EST. PATIENT-LVL IV: CPT | Mod: PBBFAC,,, | Performed by: NURSE PRACTITIONER

## 2017-12-13 PROCEDURE — 99214 OFFICE O/P EST MOD 30 MIN: CPT | Mod: S$PBB,,, | Performed by: NURSE PRACTITIONER

## 2017-12-13 NOTE — PATIENT INSTRUCTIONS
Snacks can be an important part of a balanced, healthy meal plan. They allow you to eat more frequently, feeling full and satisfied throughout the day. Also, they allow you to spread carbohydrates evenly, which may stabilize blood sugars.  Plus, snacks are enjoyable!     The amount of carbohydrate needed at snacks varies. Generally, about 15-30 grams of carbohydrate per snack is recommended.  Below you will find some tasty treats.       0-5 gm carb   Crystal Light   Vitamin Water Zero   Herbal tea, unsweetened   2 tsp peanut butter on celery   1./2 cup sugar-free jell-o   1 sugar-free popsicle   ¼ cup blueberries   8oz Blue Aniyah unsweetened almond milk   5 baby carrots & celery sticks, cucumbers, bell peppers dipped in ¼ cup salsa, 2Tbsp light ranch dressing or 2Tbsp plain Greek yogurt   10 Goldfish crackers   ½ oz low-fat cheese or string cheese   1 closed handful of nuts, unsalted   1 Tbsp of sunflower seeds, unsalted   1 cup Smart Pop popcorn   1 whole grain brown rice cake        15 gm carb   1 small piece of fruit or ½ banana or 1/2 cup lite canned fruit   3 ramon cracker squares   3 cups Smart Pop popcorn, top spray butter, York lite salt or cinnamon and Truvia   5 Vanilla Wafers   ½ cup low fat, no added sugar ice cream or frozen yogurt (Blue bell, Blue Bunny, Weight Watchers, Skinny Cow)   ½ turkey, ham, or chicken sandwich   ½ c fruit with ½ c Cottage cheese   4-6 unsalted wheat crackers with 1 oz low fat cheese or 1 tbsp peanut butter    30-45 goldfish crackers (depending on flavor)    7-8 Methodist mini brown rice cakes (caramel, apple cinnamon, chocolate)    12 Methodist mini brown rice cakes (cheddar, bbq, ranch)    1/3 cup hummus dip with raw veg   1/2 whole wheat jessica, 1Tbsp hummus   Mini Pizza (1/2 whole wheat English muffin, low-fat  cheese, tomato sauce)   100 calorie snack pack (Oreo, Chips Ahoy, Ritz Mix, Baked Cheetos)   4-6 oz. light or Greek Style yogurt  (Flores, Shila, [facility], [facility])   ½ cup sugar-free pudding     6 in. wheat tortilla or jessica oven toasted chips (topped with spray butter flavoring, cinnamon, Truvia OR spray butter, garlic powder, chili powder)    18 BBQ Popchips (available at Target, Whole Foods, Fresh Market)
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
## 2017-12-15 ENCOUNTER — CLINICAL SUPPORT (OUTPATIENT)
Dept: REHABILITATION | Facility: HOSPITAL | Age: 77
End: 2017-12-15
Attending: INTERNAL MEDICINE
Payer: MEDICARE

## 2017-12-15 DIAGNOSIS — G89.29 CHRONIC PAIN OF LEFT KNEE: ICD-10-CM

## 2017-12-15 DIAGNOSIS — M25.562 CHRONIC PAIN OF LEFT KNEE: ICD-10-CM

## 2017-12-15 DIAGNOSIS — R29.898 WEAKNESS OF LEFT LOWER EXTREMITY: ICD-10-CM

## 2017-12-15 PROCEDURE — 97162 PT EVAL MOD COMPLEX 30 MIN: CPT | Mod: PO

## 2017-12-15 PROCEDURE — G8979 MOBILITY GOAL STATUS: HCPCS | Mod: CK,PO

## 2017-12-15 PROCEDURE — G8978 MOBILITY CURRENT STATUS: HCPCS | Mod: CL,PO

## 2017-12-15 NOTE — PLAN OF CARE
OUTPATIENT PHYSICAL THERAPY  PHYSICAL THERAPY EVALUATION    Name: Duran Logan [facility] Number: [id number]    Diagnosis:   1. Chronic pain of left knee     2. Weakness of left lower extremity        Physician: Tami Schmidt, *  Treatment Orders: PT Eval and Treat  Past Medical History:   Diagnosis Date    Adrenal mass- adenoma stable since 2004 (1.8 cm and 8/13/12 (2 cm); stable 2016 2.4 cm     Adrenal mass- adenoma stable since 2004 (1.8 cm and 8/13/12 (2 cm); stable 2016 2.4 cm    Asthma in adult without complication 6/25/2015    Bilateral carotid artery disease 7/21/2017    Bilateral sciatica 2/7/2017    Cellulitis of right leg 08/16/2017    Cerebral infarction 1/29/2016    Multiple areas of lacunar infarction. Stroke risk factors include HTN, DM2, Dyslipidemia.  Continue ASA 81mg/ Statin therapy I have encouraged 30 minutes of physical activity daily for 5 days a week. She has access to a pool so this should not be so jarring to her joints.     Cervical radiculopathy 3/18/2015    Chronic rhinitis 4/9/2013    CKD (chronic kidney disease) stage 3, GFR 30-59 ml/min 1/29/2013    Coronary artery disease due to calcified coronary lesion 6/25/2015    Diastolic dysfunction 10/10/2013    Essential hypertension 6/25/2015    Gastroesophageal reflux disease without esophagitis 8/13/2012    Gout     Hep B w/ coma 1971    Hives 10/1/2013    Mixed hyperlipidemia 8/13/2012    Morbid obesity with BMI of 50.0-59.9, adult 2/11/2014    Obstructive sleep apnea syndrome 8/13/2012    Senile cataracts of both eyes 1/22/2015    Traumatic open wound of right lower leg 8/25/2017    Trigeminal neuralgia of right side of face 5/23/2017    For years now Previously on Gabapentin, but caused constipation Dissipating over time Not related to intracranial abnormalities Possibly related to dental procedure    Type 2 diabetes mellitus with diabetic polyneuropathy, with long-term current use of insulin 1/29/2013     Venous stasis dermatitis of both lower extremities 8/21/2017    Vitamin D deficiency disease 8/13/2012     Current Outpatient Prescriptions   Medication Sig    acetaminophen (TYLENOL) 500 MG tablet Take 1,000 mg by mouth 2 (two) times daily as needed for Pain.    albuterol 90 mcg/actuation inhaler Inhale 2 puffs into the lungs every 4 (four) hours as needed for Wheezing or Shortness of Breath. Rescue    aluminum & magnesium hydroxide-simethicone (MYLANTA MAX STRENGTH) 400-400-40 mg/5 mL suspension Take 30 mLs by mouth every 6 (six) hours.    ammonium lactate 12 % Crea Apply topically every morning.     aspirin 81 MG Chew Take 1 tablet (81 mg total) by mouth once daily.    atorvastatin (LIPITOR) 80 MG tablet Take 1 tablet (80 mg total) by mouth once daily.    blood sugar diagnostic Strp BG monitoring 4 times a day. Pt needs test strips for Em[employer] Talking meter. (Patient taking differently: BG monitoring 2 times a day. Pt needs test strips for Em[employer] Talking meter.)    clotrimazole (LOTRIMIN) 1 % cream Apply topically 2 (two) times daily.    diclofenac sodium 1 % Gel Apply 2 g topically once daily.    econazole nitrate 1 % cream Apply topically once daily. Apply to feet daily as directed.    ergocalciferol (VITAMIN D2) 50,000 unit Cap Take 1 capsule (50,000 Units total) by mouth every 7 days. (Patient taking differently: Take 50,000 Units by mouth every Sunday. )    fluticasone (FLONASE) 50 mcg/actuation nasal spray 2 sprays by Each Nare route once daily. (Patient taking differently: 2 sprays by Each Nare route daily as needed for Rhinitis. )    fluticasone-salmeterol 500-50 mcg/dose (ADVAIR DISKUS) 500-50 mcg/dose DsDv diskus inhaler Inhale 1 puff into the lungs 2 (two) times daily. Controller    hydrALAZINE (APRESOLINE) 50 MG tablet Take 1 tablet (50 mg total) by mouth every 12 (twelve) hours.    insulin detemir (LEVEMIR FLEXTOUCH) 100 unit/mL (3 mL) SubQ InPn pen Inject 48 units at daily.     "insulin lispro (HUMALOG KWIKPEN) 100 unit/mL InPn pen Inject 18 units at breakfast and dinner plus scale 180-230 +2, 231-280+4, 281-330 +6, 331-380+8.    lancets Misc Test daily (Patient taking differently: Test twice  daily)    losartan (COZAAR) 100 MG tablet 1 tab by mouth daily    miconazole nitrate (LOTRIMIN AF POWDER) 2 % AerP Apply 1 application topically 2 (two) times daily.    torsemide (DEMADEX) 20 MG Tab Take 1 tablet (20 mg total) by mouth once daily.    trolamine salicylate (ASPERCREME) 10 % cream Apply topically as needed.     No current facility-administered medications for this visit.      Review of patient's allergies indicates:   Allergen Reactions    Bactrim [sulfamethoxazole-trimethoprim]      Other reaction(s): up set stomach rash/hives    Azithromycin     Erythromycin Other (See Comments)     Other reaction(s): Unknown  Other reaction(s): Stomach upset    Gentamicin      Other reaction(s): Unknown    Levofloxacin Hives     Other reaction(s): nervousness    Vancomycin      Other reaction(s): Itching       Time in: 10:00 AM   Time Out: 11:00 AM   Total Treatment Time: 60 minutes    Date of eval: 12/15/2017  Visit #: 1/10  Auth expiration: 12/31/17  POC expiration: 2/28/18     Precautions: standard    Subjective   History of Present Illness: pt reports having pain in her left knee for about a knee, but the severity has worsened in the past month. Pt reports that she was walking a took a step down and it began to hurt. Pt reports that it is a severe sharp pain that feels as though "someone cut me open with a knife." Pt reports that she had LBP and sciatica "into my butt" L> R until recently. Pt reports falling in August and cutting open her R shin that has healed. Pt reports being a part of [employer] with her [employer] affiliation.  Onset: gradual   Patient c/o: continuous symptoms; pt reports inconsistent symptoms that do not always come on with the same activities  Pain Scale: Duran rates " "pain on a scale of 0-10 to be6 currently; 8 at worst; 2 at best .  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  Patient's goals: "I would like to walk normally."      Objective   Mental status: oriented x3  Posture/ Alignment: Fair    GAIT DEVIATIONS: Duran amb with decreased christo, decreased weight-shifting ability and increased lateral sway toward L, trendelenberg gait, increased b knee valgus.    FUNCTION:   - Sit <--> Stand: uses B UE, demonstrates R LE preference throughout  - Bed Mobility: Min A c/ B UE support and increased time required  - Stairs: uses B UE throughout, alternating gait upon ascent, step to gait (R LE first) during descent    ROM:    AROM Right Left Comment Normal   Knee Extension: 75 degrees 75 degrees  0 deg   Knee Flexion: 0 degrees 0 degrees  145 deg          *pain      Strength:      Right Left Comment   Hip Flexion: 4-/5 4-/5    Hip ABD: 4/5 3+/5    Hip ADD: 4/5 4/5    Hip Extension: 4/5 4-/5    Knee Ext: 4+/5 4+/5    Knee Flex: 4+/5 4+/5          Special Tests:  Trendelenberg sign: L pos, R neg    Palpation: no tenderness to palpation on B knees    Joint Play: difficulty assessing patellar motion due to excess adipose    Pt/family was provided educational information, including: role of PT, goals for PT, scheduling - pt verbalized understanding. Discussed insurance plan with pt.     Assessment   Duran is a 77 y.o. female referred to " outpatient physical therapy with a medical diagnosis of knee pain due to hip mm weakness and osteoarthritis. Demonstrates impairments including limitations as described in the problem list. Pt prognosis is Fair. Positive prognostic factors include motivation to improve. Negative prognostic factors include busy work and personal life schedule, decreased tolerance to activity, sedentary lifestyle. Pt will benefit from skilled outpatient physical therapy to address the above stated deficits, provide pt/family education, and to maximize pt's level of independence.     History  Co-morbidities and personal factors that may impact the plan of care Examination  Body Structures and Functions, activity limitations and participation restrictions that may impact the plan of care    Clinical Presentation   Co-morbidities:   difficulty sleeping, high BMI and lymphadema, prior R knee TKA        Personal Factors:   coping style  lifestyle Body Regions:   lower extremities    Body Systems:   ROM  strength  balance  gait  motor control        Participation Restrictions:   Unable to ambulate without AD     Activity limitations:   Mobility  walking    Self care  no deficits    Domestic Life  assisting others    Community and Social Life  community life  recreation and leisure  Voodoo and spirituality         evolving clinical presentation with changing clinical characteristics                      moderate   high  high Decision Making/ Complexity Score:  moderate     Pt's spiritual, cultural and educational needs considered and pt agreeable to plan of care and goals as stated below:     Anticipated Barriers for physical therapy: potential difficulty with scheduling and finding transportation to and from clinic    GOALS:  Short Term GOALS:  In 4 weeks, pt. will:  Pt will report decrease in pain </= 5 at worst in order to better tolerate standing.  Pt will demonstrate improved posture during exercises with minimal cueing in order to  improve body mechanics with activity.  Pt will demonstrate increased L LE mm strength by 1 MMT grade to increase tolerance to standing activity.  Pt will demonstrate compliance with HEP.     Long Term GOALS:  In 8 weeks, pt. Will:  Pt will report decrease in pain </= 3 in order to perform work duties.  Pt will demonstrate improved posture with lifting 20# floor to waist 5/5 trials with no cueing in order to improve carrying ability.  Pt will demonstrate increased strength in B LE to 4+/5 MMT grade to increase ability to ambulate >/= 1 mile.  Pt will be independent with HEP and SX management     G-Codes:  Intake 40% 60% Current Status CL - At least 60 percent but less than 80 percent  Predicted 50% 50% Goal Status+ CK - At least 40 percent but less than 60 percent    Plan   Outpatient physical therapy 1- 2 times weekly to include: pt ed, HEP, therapeutic exercises, therapeutic activities, neuromuscular re-education/ balance exercises, manual therapy, and modalities prn. Cont PT for 2-3 months. Pt may be seen by PTA as part of the rehabilitation team.     I certify the need for these services furnished under this plan of treatment and while under my care.    Marco aMrtin, PT

## 2017-12-19 ENCOUNTER — CLINICAL SUPPORT (OUTPATIENT)
Dept: REHABILITATION | Facility: HOSPITAL | Age: 77
End: 2017-12-19
Attending: INTERNAL MEDICINE
Payer: MEDICARE

## 2017-12-19 DIAGNOSIS — R29.898 WEAKNESS OF LEFT LOWER EXTREMITY: ICD-10-CM

## 2017-12-19 DIAGNOSIS — M25.562 CHRONIC PAIN OF LEFT KNEE: ICD-10-CM

## 2017-12-19 DIAGNOSIS — G89.29 CHRONIC PAIN OF LEFT KNEE: ICD-10-CM

## 2017-12-19 PROCEDURE — 97110 THERAPEUTIC EXERCISES: CPT | Mod: PO

## 2017-12-19 NOTE — PROGRESS NOTES
OUTPATIENT PHYSICAL THERAPY  PHYSICAL PROGRESS NOTE     Name: Duran Logan [facility] Number: [id number]     Diagnosis:   1. Chronic pain of left knee      2. Weakness of left lower extremity         Physician: Tami Schmidt, *  Treatment Orders: PT Eval and Treat       Past Medical History:   Diagnosis Date    Adrenal mass- adenoma stable since 2004 (1.8 cm and 8/13/12 (2 cm); stable 2016 2.4 cm       Adrenal mass- adenoma stable since 2004 (1.8 cm and 8/13/12 (2 cm); stable 2016 2.4 cm    Asthma in adult without complication 6/25/2015    Bilateral carotid artery disease 7/21/2017    Bilateral sciatica 2/7/2017    Cellulitis of right leg 08/16/2017    Cerebral infarction 1/29/2016     Multiple areas of lacunar infarction. Stroke risk factors include HTN, DM2, Dyslipidemia.  Continue ASA 81mg/ Statin therapy I have encouraged 30 minutes of physical activity daily for 5 days a week. She has access to a pool so this should not be so jarring to her joints.     Cervical radiculopathy 3/18/2015    Chronic rhinitis 4/9/2013    CKD (chronic kidney disease) stage 3, GFR 30-59 ml/min 1/29/2013    Coronary artery disease due to calcified coronary lesion 6/25/2015    Diastolic dysfunction 10/10/2013    Essential hypertension 6/25/2015    Gastroesophageal reflux disease without esophagitis 8/13/2012    Gout      Hep B w/ coma 1971    Hives 10/1/2013    Mixed hyperlipidemia 8/13/2012    Morbid obesity with BMI of 50.0-59.9, adult 2/11/2014    Obstructive sleep apnea syndrome 8/13/2012    Senile cataracts of both eyes 1/22/2015    Traumatic open wound of right lower leg 8/25/2017    Trigeminal neuralgia of right side of face 5/23/2017     For years now Previously on Gabapentin, but caused constipation Dissipating over time Not related to intracranial abnormalities Possibly related to dental procedure    Type 2 diabetes mellitus with diabetic polyneuropathy, with long-term current use of insulin  "1/29/2013    Venous stasis dermatitis of both lower extremities 8/21/2017    Vitamin D deficiency disease 8/13/2012           Time in: 8:07 AM   Time Out: 9:00 AM   Total Treatment Time: 53 minutes     Date of eval: 12/15/2017  Visit #: 2/10  Auth expiration: 12/31/17  POC expiration: 2/28/18     Precautions: standard     History of Present Illness: pt reports having pain in her left knee for about a knee, but the severity has worsened in the past month. Pt reports that she was walking a took a step down and it began to hurt. Pt reports that it is a severe sharp pain that feels as though "someone cut me open with a knife." Pt reports that she had LBP and sciatica "into my butt" L> R until recently. Pt reports falling in August and cutting open her R shin that has healed. Pt reports being a part of [employer] with her [employer] affiliation.      Subjective     Pt reports she had no soreness following the evaluation.  Pt reports the pain is sharp and on top of the knee when she is standing or walking.      Pain: 7/10 L knee    Objective       Observation: pt amb with SC into treatment    Pt participated in therapeutic exercises for 53 minutes including:    Patient needed to be draped with sheet due to wearing habit    SLR x 10  Heel slides x 20  Bridges x 10 - challenging  SAQ #2 2 x 10  LAQ 2 x 10  Seated hip adduction x 20 5 sec  Seated hip abduction with blue theraband x 20 5 sec  Standing HS stretch at stairs 3 x 30 sec  Standing Marches x 10  Standing hip extension x 10  Standing hip abduction x 10  Step ups x 10       Written Exercises provided: all exercises were printed and reviewed before the end of treatment  Pt/family was provided educational information, including: role of PT, goals for PT, scheduling - pt verbalized understanding. Discussed insurance plan with pt.      Assessment     Patient tolerated treatment well with no increase in pain.  Pt reported fatigue following the end of treatment.  Will try and " progress as tolerated. Pt will benefit from skilled outpatient physical therapy to address the above stated deficits, provide pt/family education, and to maximize pt's level of independence.              History  Co-morbidities and personal factors that may impact the plan of care Examination  Body Structures and Functions, activity limitations and participation restrictions that may impact the plan of care   Clinical Presentation   Co-morbidities:   difficulty sleeping, high BMI and lymphadema, prior R knee TKA           Personal Factors:   coping style  lifestyle Body Regions:   lower extremities     Body Systems:   ROM  strength  balance  gait  motor control           Participation Restrictions:   Unable to ambulate without AD    Activity limitations:   Mobility  walking     Self care  no deficits     Domestic Life  assisting others     Community and Social Life  community life  recreation and leisure  Pentecostalism and spirituality             evolving clinical presentation with changing clinical characteristics                                moderate   high   high Decision Making/ Complexity Score:  moderate      Pt's spiritual, cultural and educational needs considered and pt agreeable to plan of care and goals as stated below:      Anticipated Barriers for physical therapy: potential difficulty with scheduling and finding transportation to and from clinic     GOALS:  Short Term GOALS:  In 4 weeks, pt. will:  Pt will report decrease in pain </= 5 at worst in order to better tolerate standing.  Pt will demonstrate improved posture during exercises with minimal cueing in order to improve body mechanics with activity.  Pt will demonstrate increased L LE mm strength by 1 MMT grade to increase tolerance to standing activity.  Pt will demonstrate compliance with HEP.     Long Term GOALS:  In 8 weeks, pt. Will:  Pt will report decrease in pain </= 3 in order to perform work duties.  Pt will demonstrate improved posture  with lifting 20# floor to waist 5/5 trials with no cueing in order to improve carrying ability.  Pt will demonstrate increased strength in B LE to 4+/5 MMT grade to increase ability to ambulate >/= 1 mile.  Pt will be independent with HEP and SX management      G-Codes:  Intake 40% 60% Current Status CL - At least 60 percent but less than 80 percent  Predicted 50% 50% Goal Status+ CK - At least 40 percent but less than 60 percent     Plan   Outpatient physical therapy 1- 2 times weekly to include: pt ed, HEP, therapeutic exercises, therapeutic activities, neuromuscular re-education/ balance exercises, manual therapy, and modalities prn. Cont PT for 2-3 months. Pt may be seen by PTA as part of the rehabilitation team.

## 2017-12-20 ENCOUNTER — OFFICE VISIT (OUTPATIENT)
Dept: NEUROLOGY | Facility: CLINIC | Age: 77
End: 2017-12-20
Payer: MEDICARE

## 2017-12-20 VITALS — HEIGHT: 59 IN | HEART RATE: 84 BPM

## 2017-12-20 DIAGNOSIS — E55.9 VITAMIN D DEFICIENCY DISEASE: ICD-10-CM

## 2017-12-20 DIAGNOSIS — G89.29 CHRONIC PAIN OF LEFT KNEE: ICD-10-CM

## 2017-12-20 DIAGNOSIS — E78.2 MIXED HYPERLIPIDEMIA: ICD-10-CM

## 2017-12-20 DIAGNOSIS — I63.89 CEREBRAL INFARCTION DUE TO OTHER MECHANISM: Primary | ICD-10-CM

## 2017-12-20 DIAGNOSIS — E11.42 TYPE 2 DIABETES MELLITUS WITH DIABETIC POLYNEUROPATHY, WITH LONG-TERM CURRENT USE OF INSULIN: ICD-10-CM

## 2017-12-20 DIAGNOSIS — I10 ESSENTIAL HYPERTENSION: ICD-10-CM

## 2017-12-20 DIAGNOSIS — M25.562 CHRONIC PAIN OF LEFT KNEE: ICD-10-CM

## 2017-12-20 DIAGNOSIS — G50.0 TRIGEMINAL NEURALGIA OF RIGHT SIDE OF FACE: ICD-10-CM

## 2017-12-20 DIAGNOSIS — Z79.4 TYPE 2 DIABETES MELLITUS WITH DIABETIC POLYNEUROPATHY, WITH LONG-TERM CURRENT USE OF INSULIN: ICD-10-CM

## 2017-12-20 PROCEDURE — 99213 OFFICE O/P EST LOW 20 MIN: CPT | Mod: PBBFAC | Performed by: PSYCHIATRY & NEUROLOGY

## 2017-12-20 PROCEDURE — 99999 PR PBB SHADOW E&M-EST. PATIENT-LVL III: CPT | Mod: PBBFAC,,, | Performed by: PSYCHIATRY & NEUROLOGY

## 2017-12-20 PROCEDURE — 99214 OFFICE O/P EST MOD 30 MIN: CPT | Mod: S$PBB,,, | Performed by: PSYCHIATRY & NEUROLOGY

## 2017-12-20 NOTE — PROGRESS NOTES
Subjective:       Patient ID: Duran Giordano is a 77 y.o. female.    Reason for Consult: Facial Pain      Interval History:  Duran Giordano is here for follow up. Their condition has changed.  She notes that she no longer has the trigeminal neuralgia pain.  She notes it has been 2 months that she has been pain-free from that.  She is no longer taking the gabapentin.  She has taken my advice last visit it does engage in 30 minutes of exercise at least 5 days a week.  We have also discussed that her HbA1c has decreased due to her efforts as well.  I have praised her for her accomplishments.  We have discussed her left knee pain today as well as her hypertension and dyslipidemia.  We have discussed stroke prevention by using aspirin as well.      Objective:     Vitals:    12/20/17 1157   Pulse: 84     Patient is awake alert oriented to person place and time.  Moves all 4 extremities against gravity.  There is less give way strength in the right lower extremity compared to last visit.  There is tenderness to palpation at the joint line of the left knee.  Cranial nerves II through XII without focal deficit.   Focused examination was undertaken today. Over 50% of face to face time of 25 minute visit time was in giving guidance, counseling and discussing treatment options.    Results for orders placed or performed during the hospital encounter of 06/02/17   MRA Brain without contrast    Narrative    CLINICAL INDICATION: 77 year old F with Trigeminal Neuralgia of the right side of the face, refractory to treatment, concern for brainstem lesion or compression of cranial nerve roots     TECHNIQUE: MRI of the trigeminal nerves with and without contrast and noncontrast MRA of the brain. Multiplanar multisequence thin sequence MR imaging of the trigeminal nerves was performed before and after the administration of 10 mL Gadavist intravenous contrast. Additional limited whole brain imaging was also performed. Noncontrast 3D  time-of-flight intracranial MR angiography was performed through the [facility] with MIP reformatting.    COMPARISON: Head CT 12/22/15.  Maxillofacial CT 02/24/16.    FINDINGS:    Examination is overall mildly degraded related to motion artifact.       Cisternal trigeminal nerves: No obvious morphologic abnormality or abnormal enhancement.      Remainder of Intracranial compartment:    Ventricles and sulci are normal in size for age without evidence of hydrocephalus. No extra-axial blood or fluid collections.    Moderate patchy and confluent periventricular and subcortical white matter T2/FLAIR signal hyperintensity as well as mild central pontine T2/low signal hyperintensity likely relates to sequela of chronic microvascular ischemic disease. No mass lesion, acute hemorrhage, edema or acute infarct. No abnormal enhancement.      Anterior intracranial circulation: No high-grade focal stenosis, occlusion, aneurysm, or vascular malformation. Bilateral posterior committing arteries are present.    Posterior intracranial circulation: No high-grade focal stenosis, occlusion, aneurysm, or vascular malformation. Left vertebral artery is dominant.      Skull/extracranial contents (limited evaluation): Bone marrow signal intensity is normal.    Impression    Mildly motion degraded examination without significant abnormality, apart from findings of chronic microvascular ischemic disease.      Electronically signed by: NATE CARTER  Date:     06/02/17  Time:    16:45    Results for orders placed or performed during the hospital encounter of 06/02/17   MRI Brain W WO Contrast    Narrative    CLINICAL INDICATION: 77 year old F with Trigeminal Neuralgia of the right side of the face, refractory to treatment, concern for brainstem lesion or compression of cranial nerve roots     TECHNIQUE: MRI of the trigeminal nerves with and without contrast and noncontrast MRA of the brain. Multiplanar multisequence thin sequence MR imaging  of the trigeminal nerves was performed before and after the administration of 10 mL Gadavist intravenous contrast. Additional limited whole brain imaging was also performed. Noncontrast 3D time-of-flight intracranial MR angiography was performed through the [facility] with MIP reformatting.    COMPARISON: Head CT 12/22/15.  Maxillofacial CT 02/24/16.    FINDINGS:    Examination is overall mildly degraded related to motion artifact.       Cisternal trigeminal nerves: No obvious morphologic abnormality or abnormal enhancement.      Remainder of Intracranial compartment:    Ventricles and sulci are normal in size for age without evidence of hydrocephalus. No extra-axial blood or fluid collections.    Moderate patchy and confluent periventricular and subcortical white matter T2/FLAIR signal hyperintensity as well as mild central pontine T2/low signal hyperintensity likely relates to sequela of chronic microvascular ischemic disease. No mass lesion, acute hemorrhage, edema or acute infarct. No abnormal enhancement.      Anterior intracranial circulation: No high-grade focal stenosis, occlusion, aneurysm, or vascular malformation. Bilateral posterior committing arteries are present.    Posterior intracranial circulation: No high-grade focal stenosis, occlusion, aneurysm, or vascular malformation. Left vertebral artery is dominant.      Skull/extracranial contents (limited evaluation): Bone marrow signal intensity is normal.    Impression    Mildly motion degraded examination without significant abnormality, apart from findings of chronic microvascular ischemic disease.      Electronically signed by: NATE CARTER  Date:     06/02/17  Time:    16:45    Results for orders placed or performed during the hospital encounter of 12/22/15   CT Head Without Contrast    Narrative    CT brain without contrast.    Comparison: None     Technique: Multiple 5 mm axial images of the head were obtained without intravenous  contrast.    Findings: Small focal areas of hypoattenuation involving the right insula and left putamen suggestive of prominent perivascular spaces versus lacunar type infarcts. no evidence for acute intracranial hemorrhage or sulcal effacement. The ventricles are normal in size and configuration without evidence for hydrocephalus.  There is no midline shift or mass effect. The visualized paranasal sinuses and mastoid air cells are clear.    Impression    No acute intracranial abnormality. No intracranial hemorrhage. Further evaluation as warranted clinically.    Small foci of hypoattenuation involving the right insula and left putamen suggestive of lacunar type infarct versus prominent perivascular spaces.      Electronically signed by: LATASHA HERNANDEZ DO  Date:     12/22/15  Time:    13:21        Assessment/Plan:     Problem List Items Addressed This Visit        Cardiac/Vascular    Mixed hyperlipidemia    Essential hypertension    Overview     BP not controlled on hydralazine 25mg BID, losartan 100mg, torsemide; carvedilol discontinued by pulm            Endocrine    Vitamin D deficiency disease    Overview     Level 30 in 5/2017         Type 2 diabetes mellitus with diabetic polyneuropathy, with long-term current use of insulin       Orthopedic    Chronic pain of left knee      Other Visit Diagnoses     Cerebral infarction due to other mechanism    -  Primary    Trigeminal neuralgia of right side of face            77-year-old female presents for follow-up.  At this time her trigeminal neuralgia has resolved.  I'll continue to see her for stroke prevention on a yearly basis. I have recommended she try walking in the shallow end of the pool for her regular exercise, as this may reduce the impact on her left knee joint. She is currently in physical therapy for her left knee. I have encouraged her to try the topical diclofenac for her knee as well. We have discussed her easy bleeding at this time we have discussed the  importance of antiplatelet therapy due to her history of stroke.  I would advise her to continue her aspirin but to avoid adding any oral anti-inflammatories as this may predispose her to easier bleeding as well.   I will follow up with them in 12 month(s).  The patient verbalizes understanding and agreement with the treatment plan. I have discussed risks, benefits and alternatives to the treatment plan. Questions were sought and answered to her stated verbal satisfaction.        Ronald Viramontes MD    This note is dictated on Dragon Natural Speaking word recognition program. There are word recognition mistakes that are occasionally missed on review.

## 2017-12-28 ENCOUNTER — CLINICAL SUPPORT (OUTPATIENT)
Dept: REHABILITATION | Facility: HOSPITAL | Age: 77
End: 2017-12-28
Attending: INTERNAL MEDICINE
Payer: MEDICARE

## 2017-12-28 DIAGNOSIS — R29.898 WEAKNESS OF LEFT LOWER EXTREMITY: ICD-10-CM

## 2017-12-28 DIAGNOSIS — M25.562 CHRONIC PAIN OF LEFT KNEE: ICD-10-CM

## 2017-12-28 DIAGNOSIS — G89.29 CHRONIC PAIN OF LEFT KNEE: ICD-10-CM

## 2017-12-28 PROCEDURE — 97110 THERAPEUTIC EXERCISES: CPT | Mod: PO

## 2017-12-28 NOTE — PROGRESS NOTES
OUTPATIENT PHYSICAL THERAPY  PHYSICAL PROGRESS NOTE     Name: Duran Logan [facility] Number: [id number]     Diagnosis:   1. Chronic pain of left knee      2. Weakness of left lower extremity         Physician: Tami Schmidt, *  Treatment Orders: PT Eval and Treat       Past Medical History:   Diagnosis Date    Adrenal mass- adenoma stable since 2004 (1.8 cm and 8/13/12 (2 cm); stable 2016 2.4 cm       Adrenal mass- adenoma stable since 2004 (1.8 cm and 8/13/12 (2 cm); stable 2016 2.4 cm    Asthma in adult without complication 6/25/2015    Bilateral carotid artery disease 7/21/2017    Bilateral sciatica 2/7/2017    Cellulitis of right leg 08/16/2017    Cerebral infarction 1/29/2016     Multiple areas of lacunar infarction. Stroke risk factors include HTN, DM2, Dyslipidemia.  Continue ASA 81mg/ Statin therapy I have encouraged 30 minutes of physical activity daily for 5 days a week. She has access to a pool so this should not be so jarring to her joints.     Cervical radiculopathy 3/18/2015    Chronic rhinitis 4/9/2013    CKD (chronic kidney disease) stage 3, GFR 30-59 ml/min 1/29/2013    Coronary artery disease due to calcified coronary lesion 6/25/2015    Diastolic dysfunction 10/10/2013    Essential hypertension 6/25/2015    Gastroesophageal reflux disease without esophagitis 8/13/2012    Gout      Hep B w/ coma 1971    Hives 10/1/2013    Mixed hyperlipidemia 8/13/2012    Morbid obesity with BMI of 50.0-59.9, adult 2/11/2014    Obstructive sleep apnea syndrome 8/13/2012    Senile cataracts of both eyes 1/22/2015    Traumatic open wound of right lower leg 8/25/2017    Trigeminal neuralgia of right side of face 5/23/2017     For years now Previously on Gabapentin, but caused constipation Dissipating over time Not related to intracranial abnormalities Possibly related to dental procedure    Type 2 diabetes mellitus with diabetic polyneuropathy, with long-term current use of insulin  "1/29/2013    Venous stasis dermatitis of both lower extremities 8/21/2017    Vitamin D deficiency disease 8/13/2012           Time in: 10:00 AM   Time Out: 11:00 AM   Total Treatment Time: 50 minutes     Date of eval: 12/15/2017  Visit #: 3/10  Auth expiration: 12/31/17  POC expiration: 2/28/18     Precautions: standard     History of Present Illness: pt reports having pain in her left knee for about a knee, but the severity has worsened in the past month. Pt reports that she was walking a took a step down and it began to hurt. Pt reports that it is a severe sharp pain that feels as though "someone cut me open with a knife." Pt reports that she had LBP and sciatica "into my butt" L> R until recently. Pt reports falling in August and cutting open her R shin that has healed. Pt reports being a part of [employer] with her [employer] affiliation.      Subjective     Pt reports having increased knee pain following her recent vacation home to [place]. Pt reports compliance with most of her HEP    Pain: 8/10 L knee    Objective       Observation: pt amb with SPC into treatment    Pt participated in therapeutic exercises for 45 minutes including:  Standing HS stretch at stairs 3 x 30 sec  Fwd lunge stretch on step 3 x 30 sec  Gastroc stretch x 1 min  Standing hip extension x 10  Standing hip abduction x 10  Toe taps at step x 10  Step ups x 10   Seated hip adduction x 20 5 sec  Seated hip abduction with blue theraband x 20 5 sec    Patient needs draped for supine exercises due to wearing habit    Exercises not performed today:  SLR x 10  Heel slides x 20  Bridges x 10 - challenging  Standing Marches x 10- NP due to c/o pain in LLE  SAQ #2 2 x 10  LAQ 2 x 10     Pt received MHP to low back for pain relief per pt request- 8 min    Written Exercises provided: continue with previously issued HEP.  Pt/family was provided educational information, including: role of PT, goals for PT, scheduling - pt verbalized understanding. Discussed " insurance plan with pt.      Assessment     Patient tolerated treatment well with minor increase in pain with standing marches (did not perform).  Pt reported fatigue during today's session requiring rest breaks throughout. Pt will benefit from skilled outpatient physical therapy to address the above stated deficits, provide pt/family education, and to maximize pt's level of independence.              History  Co-morbidities and personal factors that may impact the plan of care Examination  Body Structures and Functions, activity limitations and participation restrictions that may impact the plan of care   Clinical Presentation   Co-morbidities:   difficulty sleeping, high BMI and lymphadema, prior R knee TKA           Personal Factors:   coping style  lifestyle Body Regions:   lower extremities     Body Systems:   ROM  strength  balance  gait  motor control           Participation Restrictions:   Unable to ambulate without AD    Activity limitations:   Mobility  walking     Self care  no deficits     Domestic Life  assisting others     Community and Social Life  community life  recreation and leisure  [employer] and spirituality             evolving clinical presentation with changing clinical characteristics                                moderate   high   high Decision Making/ Complexity Score:  moderate      Pt's spiritual, cultural and educational needs considered and pt agreeable to plan of care and goals as stated below:      Anticipated Barriers for physical therapy: potential difficulty with scheduling and finding transportation to and from clinic     GOALS:  Short Term GOALS:  In 4 weeks, pt. will:  Pt will report decrease in pain </= 5 at worst in order to better tolerate standing.  Pt will demonstrate improved posture during exercises with minimal cueing in order to improve body mechanics with activity.  Pt will demonstrate increased L LE mm strength by 1 MMT grade to increase tolerance to standing  activity.  Pt will demonstrate compliance with HEP.     Long Term GOALS:  In 8 weeks, pt. Will:  Pt will report decrease in pain </= 3 in order to perform work duties.  Pt will demonstrate improved posture with lifting 20# floor to waist 5/5 trials with no cueing in order to improve carrying ability.  Pt will demonstrate increased strength in B LE to 4+/5 MMT grade to increase ability to ambulate >/= 1 mile.  Pt will be independent with HEP and SX management      G-Codes:  Intake 40% 60% Current Status CL - At least 60 percent but less than 80 percent  Predicted 50% 50% Goal Status+ CK - At least 40 percent but less than 60 percent     Plan   Continue with pt POC towards PT goals.

## 2018-01-04 ENCOUNTER — CLINICAL SUPPORT (OUTPATIENT)
Dept: REHABILITATION | Facility: HOSPITAL | Age: 78
End: 2018-01-04
Attending: INTERNAL MEDICINE
Payer: MEDICARE

## 2018-01-04 DIAGNOSIS — R29.898 WEAKNESS OF LEFT LOWER EXTREMITY: ICD-10-CM

## 2018-01-04 DIAGNOSIS — G89.29 CHRONIC PAIN OF LEFT KNEE: ICD-10-CM

## 2018-01-04 DIAGNOSIS — M25.562 CHRONIC PAIN OF LEFT KNEE: ICD-10-CM

## 2018-01-04 PROCEDURE — 97110 THERAPEUTIC EXERCISES: CPT | Mod: PO

## 2018-01-04 NOTE — PROGRESS NOTES
OUTPATIENT PHYSICAL THERAPY  PHYSICAL PROGRESS NOTE     Name: Duran Logan [facility] Number: [id number]     Diagnosis:   1. Chronic pain of left knee      2. Weakness of left lower extremity         Physician: Tami Schmidt, *  Treatment Orders: PT Eval and Treat       Past Medical History:   Diagnosis Date    Adrenal mass- adenoma stable since 2004 (1.8 cm and 8/13/12 (2 cm); stable 2016 2.4 cm       Adrenal mass- adenoma stable since 2004 (1.8 cm and 8/13/12 (2 cm); stable 2016 2.4 cm    Asthma in adult without complication 6/25/2015    Bilateral carotid artery disease 7/21/2017    Bilateral sciatica 2/7/2017    Cellulitis of right leg 08/16/2017    Cerebral infarction 1/29/2016     Multiple areas of lacunar infarction. Stroke risk factors include HTN, DM2, Dyslipidemia.  Continue ASA 81mg/ Statin therapy I have encouraged 30 minutes of physical activity daily for 5 days a week. She has access to a pool so this should not be so jarring to her joints.     Cervical radiculopathy 3/18/2015    Chronic rhinitis 4/9/2013    CKD (chronic kidney disease) stage 3, GFR 30-59 ml/min 1/29/2013    Coronary artery disease due to calcified coronary lesion 6/25/2015    Diastolic dysfunction 10/10/2013    Essential hypertension 6/25/2015    Gastroesophageal reflux disease without esophagitis 8/13/2012    Gout      Hep B w/ coma 1971    Hives 10/1/2013    Mixed hyperlipidemia 8/13/2012    Morbid obesity with BMI of 50.0-59.9, adult 2/11/2014    Obstructive sleep apnea syndrome 8/13/2012    Senile cataracts of both eyes 1/22/2015    Traumatic open wound of right lower leg 8/25/2017    Trigeminal neuralgia of right side of face 5/23/2017     For years now Previously on Gabapentin, but caused constipation Dissipating over time Not related to intracranial abnormalities Possibly related to dental procedure    Type 2 diabetes mellitus with diabetic polyneuropathy, with long-term current use of insulin  "1/29/2013    Venous stasis dermatitis of both lower extremities 8/21/2017    Vitamin D deficiency disease 8/13/2012           Time in: 10:00 AM   Time Out: 11:00 AM   Total 1:1 Treatment Time: 30 minutes     Date of eval: 12/15/2017  Visit #: 4/10  Auth expiration: 12/31/17  POC expiration: 2/28/18     Precautions: standard     History of Present Illness: pt reports having pain in her left knee for about a knee, but the severity has worsened in the past month. Pt reports that she was walking a took a step down and it began to hurt. Pt reports that it is a severe sharp pain that feels as though "someone cut me open with a knife." Pt reports that she had LBP and sciatica "into my butt" L> R until recently. Pt reports falling in August and cutting open her R shin that has healed. Pt reports being a part of [employer] with her [employer] affiliation.      Subjective     Pt reports having decreased knee pain recently and having no trouble ambulating. Pt reports walking with rollator typically, presents to clinic c/ SPC today. Pt reports new years' resolution to "do my exercises, eat better, and use my c-pap."    Pain: 7/10 L knee    Objective     Observation: pt amb with SPC into treatment    Pt participated in therapeutic exercises for 45 minutes including:  Standing HS stretch at stairs 3 x 30 sec  Fwd lunge stretch on step 3 x 30 sec  Gastroc stretch 3 x 30 sec  Standing hip extension c/ 1# 2 x 10  Standing hip abduction c/ 1# 2 x 10  Standing Marches c/ 1# x 10  Toe taps at step x 10  Step ups x 10   Seated hip adduction x 20 5 sec  Seated hip abduction with blue theraband x 20 5 sec  LAQ c/ 1# and eccentric lowering 3 x 5  HS curls c/ OTB 2 x 5  Sit <> stand x 5 (will attempt at next visit)    Patient needs draped for supine exercises due to wearing habit    Exercises not performed today:  SLR x 10  Heel slides x 20  Bridges x 10 - challenging  SAQ #2 2 x 10       Pt received MHP to low back for pain relief per pt " request- 8 min    Written Exercises provided: continue with previously issued HEP and to incorporate walking daily into her routine for improved cardiovascular endurance.  Pt/family was provided educational information, including: role of PT, goals for PT, scheduling - pt verbalized understanding. Discussed insurance plan with pt.      Assessment     Patient tolerated treatment well no exacerbation of symptoms.  Pt reported fatigue during today's session requiring rest breaks throughout, but fewer breaks than last visit. Pt has agreed to attempt to walk for exercise on a regular basis. Pt will benefit from skilled outpatient physical therapy to address the above stated deficits, provide pt/family education, and to maximize pt's level of independence.              History  Co-morbidities and personal factors that may impact the plan of care Examination  Body Structures and Functions, activity limitations and participation restrictions that may impact the plan of care   Clinical Presentation   Co-morbidities:   difficulty sleeping, high BMI and lymphadema, prior R knee TKA           Personal Factors:   coping style  lifestyle Body Regions:   lower extremities     Body Systems:   ROM  strength  balance  gait  motor control           Participation Restrictions:   Unable to ambulate without AD    Activity limitations:   Mobility  walking     Self care  no deficits     Domestic Life  assisting others     Community and Social Life  community life  recreation and leisure  [employer] and spirituality             evolving clinical presentation with changing clinical characteristics                                moderate   high   high Decision Making/ Complexity Score:  moderate      Pt's spiritual, cultural and educational needs considered and pt agreeable to plan of care and goals as stated below:      Anticipated Barriers for physical therapy: potential difficulty with scheduling and finding transportation to and from  clinic     GOALS:  Short Term GOALS:  In 4 weeks, pt. will:  Pt will report decrease in pain </= 5 at worst in order to better tolerate standing.  Pt will demonstrate improved posture during exercises with minimal cueing in order to improve body mechanics with activity.  Pt will demonstrate increased L LE mm strength by 1 MMT grade to increase tolerance to standing activity.  Pt will demonstrate compliance with HEP.     Long Term GOALS:  In 8 weeks, pt. Will:  Pt will report decrease in pain </= 3 in order to perform work duties.  Pt will demonstrate improved posture with lifting 20# floor to waist 5/5 trials with no cueing in order to improve carrying ability.  Pt will demonstrate increased strength in B LE to 4+/5 MMT grade to increase ability to ambulate >/= 1 mile.  Pt will be independent with HEP and SX management      G-Codes:  Intake 40% 60% Current Status CL - At least 60 percent but less than 80 percent  Predicted 50% 50% Goal Status+ CK - At least 40 percent but less than 60 percent     Plan   Continue with pt POC towards PT goals. Will reassess pt progress at next visit.

## 2018-01-09 ENCOUNTER — OFFICE VISIT (OUTPATIENT)
Dept: PODIATRY | Facility: CLINIC | Age: 78
End: 2018-01-09
Payer: MEDICARE

## 2018-01-09 ENCOUNTER — LAB VISIT (OUTPATIENT)
Dept: LAB | Facility: HOSPITAL | Age: 78
End: 2018-01-09
Attending: INTERNAL MEDICINE
Payer: MEDICARE

## 2018-01-09 VITALS
BODY MASS INDEX: 56.04 KG/M2 | HEIGHT: 59 IN | WEIGHT: 278 LBS | SYSTOLIC BLOOD PRESSURE: 184 MMHG | DIASTOLIC BLOOD PRESSURE: 69 MMHG | HEART RATE: 95 BPM

## 2018-01-09 DIAGNOSIS — L84 CORN OR CALLUS: ICD-10-CM

## 2018-01-09 DIAGNOSIS — N18.30 CHRONIC KIDNEY DISEASE (CKD), STAGE III (MODERATE): ICD-10-CM

## 2018-01-09 DIAGNOSIS — B35.1 ONYCHOMYCOSIS DUE TO DERMATOPHYTE: ICD-10-CM

## 2018-01-09 DIAGNOSIS — E11.51 TYPE II DIABETES MELLITUS WITH PERIPHERAL CIRCULATORY DISORDER: Primary | ICD-10-CM

## 2018-01-09 DIAGNOSIS — I89.0 LYMPHEDEMA: ICD-10-CM

## 2018-01-09 LAB
ALBUMIN SERPL BCP-MCNC: 3.1 G/DL
ANION GAP SERPL CALC-SCNC: 8 MMOL/L
BUN SERPL-MCNC: 25 MG/DL
CALCIUM SERPL-MCNC: 9.8 MG/DL
CHLORIDE SERPL-SCNC: 104 MMOL/L
CO2 SERPL-SCNC: 28 MMOL/L
CREAT SERPL-MCNC: 1.2 MG/DL
EST. GFR  (AFRICAN AMERICAN): 51.1 ML/MIN/1.73 M^2
EST. GFR  (NON AFRICAN AMERICAN): 44.3 ML/MIN/1.73 M^2
GLUCOSE SERPL-MCNC: 130 MG/DL
PHOSPHATE SERPL-MCNC: 3.6 MG/DL
POTASSIUM SERPL-SCNC: 4.7 MMOL/L
SODIUM SERPL-SCNC: 140 MMOL/L

## 2018-01-09 PROCEDURE — 11721 DEBRIDE NAIL 6 OR MORE: CPT | Mod: 59,Q9,PBBFAC | Performed by: PODIATRIST

## 2018-01-09 PROCEDURE — 11056 PARNG/CUTG B9 HYPRKR LES 2-4: CPT | Mod: Q9,S$PBB,, | Performed by: PODIATRIST

## 2018-01-09 PROCEDURE — 80069 RENAL FUNCTION PANEL: CPT

## 2018-01-09 PROCEDURE — 99499 UNLISTED E&M SERVICE: CPT | Mod: S$PBB,,, | Performed by: PODIATRIST

## 2018-01-09 PROCEDURE — 36415 COLL VENOUS BLD VENIPUNCTURE: CPT

## 2018-01-09 PROCEDURE — 11721 DEBRIDE NAIL 6 OR MORE: CPT | Mod: 59,Q9,S$PBB, | Performed by: PODIATRIST

## 2018-01-09 PROCEDURE — 11056 PARNG/CUTG B9 HYPRKR LES 2-4: CPT | Mod: Q9,PBBFAC | Performed by: PODIATRIST

## 2018-01-09 PROCEDURE — 99999 PR PBB SHADOW E&M-EST. PATIENT-LVL III: CPT | Mod: PBBFAC,,, | Performed by: PODIATRIST

## 2018-01-09 PROCEDURE — 99213 OFFICE O/P EST LOW 20 MIN: CPT | Mod: PBBFAC,25 | Performed by: PODIATRIST

## 2018-01-10 NOTE — PROGRESS NOTES
Subjective:      Patient ID: Duran Giordano is a 77 y.o. female.    Chief Complaint: Diabetes Mellitus (bilateral pcp Dr Pierce 12/4/17); Diabetic Foot Exam; and Nail Care    Durna is a 77 y.o. female who presents to the clinic for evaluation and treatment of high risk feet. Duran has a past medical history of Adrenal mass- adenoma stable since 2004 (1.8 cm and 8/13/12 (2 cm); stable 2016 2.4 cm; Asthma in adult without complication (6/25/2015); Bilateral carotid artery disease (7/21/2017); Bilateral sciatica (2/7/2017); Cellulitis of right leg (08/16/2017); Cerebral infarction (1/29/2016); Cervical radiculopathy (3/18/2015); Chronic rhinitis (4/9/2013); CKD (chronic kidney disease) stage 3, GFR 30-59 ml/min (1/29/2013); Coronary artery disease due to calcified coronary lesion (6/25/2015); Diastolic dysfunction (10/10/2013); Essential hypertension (6/25/2015); Gastroesophageal reflux disease without esophagitis (8/13/2012); Gout; Hep B w/ coma (1971); Hives (10/1/2013); Mixed hyperlipidemia (8/13/2012); Morbid obesity with BMI of 50.0-59.9, adult (2/11/2014); Obstructive sleep apnea syndrome (8/13/2012); Senile cataracts of both eyes (1/22/2015); Traumatic open wound of right lower leg (8/25/2017); Trigeminal neuralgia of right side of face (5/23/2017); Type 2 diabetes mellitus with diabetic polyneuropathy, with long-term current use of insulin (1/29/2013); Venous stasis dermatitis of both lower extremities (8/21/2017); and Vitamin D deficiency disease (8/13/2012). The patient's chief complaint is long, thick toenails. This patient has documented high risk feet requiring routine maintenance secondary to diabetes mellitis and those secondary complications of diabetes, as mentioned..    PCP: Tami Schmidt MD    Date Last Seen by PCP:   Chief Complaint   Patient presents with    Diabetes Mellitus     bilateral pcp Dr Pierce 12/4/17    Diabetic Foot Exam    Nail Care       Hemoglobin A1C   Date Value  Ref Range Status   12/04/2017 7.2 (H) 4.0 - 5.6 % Final     Comment:     According to ADA guidelines, hemoglobin A1c <7.0% represents  optimal control in non-pregnant diabetic patients. Different  metrics may apply to specific patient populations.   Standards of Medical Care in Diabetes-2016.  For the purpose of screening for the presence of diabetes:  <5.7%     Consistent with the absence of diabetes  5.7-6.4%  Consistent with increasing risk for diabetes   (prediabetes)  >or=6.5%  Consistent with diabetes  Currently, no consensus exists for use of hemoglobin A1c  for diagnosis of diabetes for children.  This Hemoglobin A1c assay has significant interference with fetal   hemoglobin   (HbF). The results are invalid for patients with abnormal amounts of   HbF,   including those with known Hereditary Persistence   of Fetal Hemoglobin. Heterozygous hemoglobin variants (HbAS, HbAC,   HbAD, HbAE, HbA2) do not significantly interfere with this assay;   however, presence of multiple variants in a sample may impact the %   interference.     08/24/2017 8.2 (H) 4.0 - 5.6 % Final     Comment:     According to ADA guidelines, hemoglobin A1c <7.0% represents  optimal control in non-pregnant diabetic patients. Different  metrics may apply to specific patient populations.   Standards of Medical Care in Diabetes-2016.  For the purpose of screening for the presence of diabetes:  <5.7%     Consistent with the absence of diabetes  5.7-6.4%  Consistent with increasing risk for diabetes   (prediabetes)  >or=6.5%  Consistent with diabetes  Currently, no consensus exists for use of hemoglobin A1c  for diagnosis of diabetes for children.  This Hemoglobin A1c assay has significant interference with fetal   hemoglobin   (HbF). The results are invalid for patients with abnormal amounts of   HbF,   including those with known Hereditary Persistence   of Fetal Hemoglobin. Heterozygous hemoglobin variants (HbAS, HbAC,   HbAD, HbAE, HbA2) do not  significantly interfere with this assay;   however, presence of multiple variants in a sample may impact the %   interference.     08/21/2017 8.2 (H) 4.0 - 5.6 % Final     Comment:     According to ADA guidelines, hemoglobin A1c <7.0% represents  optimal control in non-pregnant diabetic patients. Different  metrics may apply to specific patient populations.   Standards of Medical Care in Diabetes-2016.  For the purpose of screening for the presence of diabetes:  <5.7%     Consistent with the absence of diabetes  5.7-6.4%  Consistent with increasing risk for diabetes   (prediabetes)  >or=6.5%  Consistent with diabetes  Currently, no consensus exists for use of hemoglobin A1c  for diagnosis of diabetes for children.  This Hemoglobin A1c assay has significant interference with fetal   hemoglobin   (HbF). The results are invalid for patients with abnormal amounts of   HbF,   including those with known Hereditary Persistence   of Fetal Hemoglobin. Heterozygous hemoglobin variants (HbAS, HbAC,   HbAD, HbAE, HbA2) do not significantly interfere with this assay;   however, presence of multiple variants in a sample may impact the %   interference.         Review of Systems   Constitution: Negative for chills, decreased appetite and fever.   Cardiovascular: Positive for leg swelling.   Skin: Positive for dry skin, nail changes and poor wound healing.   Musculoskeletal: Negative for arthritis, back pain, joint pain, joint swelling and myalgias.   Gastrointestinal: Negative for nausea and vomiting.   Neurological: Negative for loss of balance, numbness and paresthesias.           Objective:      Physical Exam   Constitutional: She is oriented to person, place, and time. She appears well-developed and well-nourished.   Cardiovascular:   Pulses:       Dorsalis pedis pulses are 2+ on the right side, and 2+ on the left side.        Posterior tibial pulses are 2+ on the right side, and 2+ on the left side.   Musculoskeletal: She  exhibits no edema or tenderness.        Right ankle: Normal.        Left ankle: Normal.        Right foot: There is no swelling, no crepitus and no deformity.        Left foot: There is no swelling, no crepitus and no deformity.   Adequate joint range of motion without pain, limitation, nor crepitation Bilateral feet and ankle joints. Muscle strength is 5/5 in all groups bilaterally.         Lymphadenopathy:   B/l lymph edema    Neurological: She is alert and oriented to person, place, and time. She has normal strength.   Skin: Skin is warm, dry and intact. No rash noted. No erythema. Nails show no clubbing.   Nails x10 are elongated by  2-5 mm's, thickened by 2-7 mm's, dystrophic, and are darkened in  coloration . Xerosis Bilaterally. No open lesions noted.    Hyperkeratotic tissue noted to medial HIPJ b/l      Psychiatric: She has a normal mood and affect. Her behavior is normal.   Vitals reviewed.            Assessment:       Encounter Diagnoses   Name Primary?    Type II diabetes mellitus with peripheral circulatory disorder Yes    Lymphedema     Corn or callus     Onychomycosis due to dermatophyte          Plan:       Duran was seen today for diabetes mellitus, diabetic foot exam and nail care.    Diagnoses and all orders for this visit:    Type II diabetes mellitus with peripheral circulatory disorder    Lymphedema    Corn or callus    Onychomycosis due to dermatophyte      I counseled the patient on her conditions, their implications and medical management.        - Shoe inspection. Diabetic Foot Education. Patient reminded of the importance of good nutrition and blood sugar control to help prevent podiatric complications of diabetes. Patient instructed on proper foot hygeine. We discussed wearing proper shoe gear, daily foot inspections, never walking without protective shoe gear, never putting sharp instruments to feet, routine podiatric nail visits every 2-3 months.      - With patient's permission,  nails were aggressively reduced and debrided x 10 to their soft tissue attachment mechanically and with electric , removing all offending nail and debris. Patient relates relief following the procedure. She will continue to monitor the areas daily, inspect her feet, wear protective shoe gear when ambulatory, moisturizer to maintain skin integrity and follow in this office in approximately 2-3 months, sooner p.r.n.    - After cleansing the  area w/ alcohol prep pad the above mentioned hyperkeratosis was trimmed utilizing No 15 scapel, to a smooth base with out incident. Patient tolerated this  well and reported comfort to the area of  medial HIPJ b/l

## 2018-01-10 NOTE — PROGRESS NOTES
Patient, Duran Giordano (MRN #[id number]), presented with a recorded BMI of 56.15 kg/m^2 consistent with the definition of morbid obesity (ICD-10 E66.01). The patient's morbid obesity was monitored, evaluated, addressed and/or treated. This addendum to the medical record is made on 01/10/2018.

## 2018-01-15 ENCOUNTER — TELEPHONE (OUTPATIENT)
Dept: OPTOMETRY | Facility: CLINIC | Age: 78
End: 2018-01-15

## 2018-01-16 ENCOUNTER — CLINICAL SUPPORT (OUTPATIENT)
Dept: REHABILITATION | Facility: HOSPITAL | Age: 78
End: 2018-01-16
Attending: INTERNAL MEDICINE
Payer: MEDICARE

## 2018-01-16 DIAGNOSIS — M25.562 CHRONIC PAIN OF LEFT KNEE: ICD-10-CM

## 2018-01-16 DIAGNOSIS — R29.898 WEAKNESS OF LEFT LOWER EXTREMITY: ICD-10-CM

## 2018-01-16 DIAGNOSIS — G89.29 CHRONIC PAIN OF LEFT KNEE: ICD-10-CM

## 2018-01-16 PROCEDURE — 97110 THERAPEUTIC EXERCISES: CPT | Mod: PO

## 2018-01-16 NOTE — PROGRESS NOTES
OUTPATIENT PHYSICAL THERAPY  PHYSICAL PROGRESS NOTE     Name: Duran Logan [facility] Number: [id number]     Diagnosis:   1. Chronic pain of left knee      2. Weakness of left lower extremity         Physician: Tami Schmidt, *  Treatment Orders: PT Eval and Treat       Past Medical History:   Diagnosis Date    Adrenal mass- adenoma stable since 2004 (1.8 cm and 8/13/12 (2 cm); stable 2016 2.4 cm       Adrenal mass- adenoma stable since 2004 (1.8 cm and 8/13/12 (2 cm); stable 2016 2.4 cm    Asthma in adult without complication 6/25/2015    Bilateral carotid artery disease 7/21/2017    Bilateral sciatica 2/7/2017    Cellulitis of right leg 08/16/2017    Cerebral infarction 1/29/2016     Multiple areas of lacunar infarction. Stroke risk factors include HTN, DM2, Dyslipidemia.  Continue ASA 81mg/ Statin therapy I have encouraged 30 minutes of physical activity daily for 5 days a week. She has access to a pool so this should not be so jarring to her joints.     Cervical radiculopathy 3/18/2015    Chronic rhinitis 4/9/2013    CKD (chronic kidney disease) stage 3, GFR 30-59 ml/min 1/29/2013    Coronary artery disease due to calcified coronary lesion 6/25/2015    Diastolic dysfunction 10/10/2013    Essential hypertension 6/25/2015    Gastroesophageal reflux disease without esophagitis 8/13/2012    Gout      Hep B w/ coma 1971    Hives 10/1/2013    Mixed hyperlipidemia 8/13/2012    Morbid obesity with BMI of 50.0-59.9, adult 2/11/2014    Obstructive sleep apnea syndrome 8/13/2012    Senile cataracts of both eyes 1/22/2015    Traumatic open wound of right lower leg 8/25/2017    Trigeminal neuralgia of right side of face 5/23/2017     For years now Previously on Gabapentin, but caused constipation Dissipating over time Not related to intracranial abnormalities Possibly related to dental procedure    Type 2 diabetes mellitus with diabetic polyneuropathy, with long-term current use of insulin  "1/29/2013    Venous stasis dermatitis of both lower extremities 8/21/2017    Vitamin D deficiency disease 8/13/2012           Time in: 1:05 pm  Time Out: 2:10 pm  Total 1:1 Treatment Time: 55 minutes     Date of eval: 12/15/2017  Visit #: 4/10  Auth expiration: 12/31/17  POC expiration: 2/28/18     Precautions: standard     History of Present Illness: pt reports having pain in her left knee for about a knee, but the severity has worsened in the past month. Pt reports that she was walking a took a step down and it began to hurt. Pt reports that it is a severe sharp pain that feels as though "someone cut me open with a knife." Pt reports that she had LBP and sciatica "into my butt" L> R until recently. Pt reports falling in August and cutting open her R shin that has healed. Pt reports being a part of [employer] with her [employer] affiliation.      Subjective     Pt reports she has been taking the fluid pill for about a week and she thinks she is feeling better. Patient reports she has been walking 3 or 4 times a day, but she hasn't increased the distance or the amount of times she normally goes. Pt reports she has been doing this for about 4 years with the rollator,  Pt reports she uses the cane whenever she is out in the community.    Pain: 5/10 L knee    Objective     Observation: pt amb with SPC into treatment    Pt participated in therapeutic exercises for 55 minutes including:  Standing HS stretch at stairs 3 x 30 sec  Gastroc stretch 3 x 30 sec  Standing hip extension c/ 1# 2 x 10  Standing hip abduction c/ 1# 2 x 10  Standing Marches c/ 1# x 10  Toe taps at step x 10  Step ups x 10   Seated hip adduction x 30 5 sec  Seated hip abduction with blue theraband x 30 5 sec  LAQ c/ 1# and eccentric lowering 3 x 5  HS curls c/ OTB x 10  Sit <> stand x 10    Patient needs draped for supine exercises due to wearing habit    Exercises not performed today:  SLR x 10  Heel slides x 20  Bridges x 10 - challenging  SAQ #2 2 x " 10  Fwd lunge stretch on step 3 x 30 sec     Pt received MHP to low back for pain relief per pt request- 8 min    Written Exercises provided: continue with previously issued HEP and to incorporate walking daily into her routine for improved cardiovascular endurance.  Pt/family was provided educational information, including: role of PT, goals for PT, scheduling - pt verbalized understanding. Discussed insurance plan with pt.      Assessment     Patient tolerated treatment well no exacerbation of symptoms.  Pt requires rest breaks throughout exercises secondary to fatigue.  Pt was able to perform sit to stand with her hands on her knees with mild difficulty. Pt will benefit from skilled outpatient physical therapy to address the above stated deficits, provide pt/family education, and to maximize pt's level of independence.              History  Co-morbidities and personal factors that may impact the plan of care Examination  Body Structures and Functions, activity limitations and participation restrictions that may impact the plan of care   Clinical Presentation   Co-morbidities:   difficulty sleeping, high BMI and lymphadema, prior R knee TKA           Personal Factors:   coping style  lifestyle Body Regions:   lower extremities     Body Systems:   ROM  strength  balance  gait  motor control           Participation Restrictions:   Unable to ambulate without AD    Activity limitations:   Mobility  walking     Self care  no deficits     Domestic Life  assisting others     Community and Social Life  community life  recreation and leisure  Latter day and spirituality             evolving clinical presentation with changing clinical characteristics                                moderate   high   high Decision Making/ Complexity Score:  moderate      Pt's spiritual, cultural and educational needs considered and pt agreeable to plan of care and goals as stated below:      Anticipated Barriers for physical therapy: potential  difficulty with scheduling and finding transportation to and from clinic     GOALS:  Short Term GOALS:  In 4 weeks, pt. will:  Pt will report decrease in pain </= 5 at worst in order to better tolerate standing.  Pt will demonstrate improved posture during exercises with minimal cueing in order to improve body mechanics with activity.  Pt will demonstrate increased L LE mm strength by 1 MMT grade to increase tolerance to standing activity.  Pt will demonstrate compliance with HEP.     Long Term GOALS:  In 8 weeks, pt. Will:  Pt will report decrease in pain </= 3 in order to perform work duties.  Pt will demonstrate improved posture with lifting 20# floor to waist 5/5 trials with no cueing in order to improve carrying ability.  Pt will demonstrate increased strength in B LE to 4+/5 MMT grade to increase ability to ambulate >/= 1 mile.  Pt will be independent with HEP and SX management      G-Codes:  Intake 40% 60% Current Status CL - At least 60 percent but less than 80 percent  Predicted 50% 50% Goal Status+ CK - At least 40 percent but less than 60 percent     Plan   Continue with pt POC towards PT goals. Will reassess pt progress at next visit.

## 2018-01-19 ENCOUNTER — TELEPHONE (OUTPATIENT)
Dept: INTERNAL MEDICINE | Facility: CLINIC | Age: 78
End: 2018-01-19

## 2018-01-22 ENCOUNTER — OFFICE VISIT (OUTPATIENT)
Dept: SLEEP MEDICINE | Facility: CLINIC | Age: 78
End: 2018-01-22
Payer: MEDICARE

## 2018-01-22 ENCOUNTER — OFFICE VISIT (OUTPATIENT)
Dept: NEPHROLOGY | Facility: CLINIC | Age: 78
End: 2018-01-22
Payer: MEDICARE

## 2018-01-22 VITALS
HEART RATE: 92 BPM | SYSTOLIC BLOOD PRESSURE: 118 MMHG | DIASTOLIC BLOOD PRESSURE: 56 MMHG | WEIGHT: 271.63 LBS | BODY MASS INDEX: 54.76 KG/M2 | HEIGHT: 59 IN

## 2018-01-22 VITALS
BODY MASS INDEX: 54.67 KG/M2 | SYSTOLIC BLOOD PRESSURE: 118 MMHG | DIASTOLIC BLOOD PRESSURE: 80 MMHG | OXYGEN SATURATION: 98 % | HEART RATE: 89 BPM | WEIGHT: 271.19 LBS | HEIGHT: 59 IN

## 2018-01-22 DIAGNOSIS — N18.30 CHRONIC KIDNEY DISEASE (CKD), STAGE III (MODERATE): Primary | ICD-10-CM

## 2018-01-22 DIAGNOSIS — E66.01 MORBID OBESITY: ICD-10-CM

## 2018-01-22 DIAGNOSIS — E11.21 DIABETIC NEPHROPATHY ASSOCIATED WITH TYPE 2 DIABETES MELLITUS: ICD-10-CM

## 2018-01-22 DIAGNOSIS — G47.33 OSA (OBSTRUCTIVE SLEEP APNEA): Primary | ICD-10-CM

## 2018-01-22 DIAGNOSIS — I50.32 CHRONIC DIASTOLIC HEART FAILURE: ICD-10-CM

## 2018-01-22 DIAGNOSIS — M06.9 RHEUMATOID ARTHRITIS, INVOLVING UNSPECIFIED SITE, UNSPECIFIED RHEUMATOID FACTOR PRESENCE: ICD-10-CM

## 2018-01-22 PROCEDURE — 99213 OFFICE O/P EST LOW 20 MIN: CPT | Mod: S$PBB,,, | Performed by: PSYCHIATRY & NEUROLOGY

## 2018-01-22 PROCEDURE — 99999 PR PBB SHADOW E&M-EST. PATIENT-LVL III: CPT | Mod: PBBFAC,,, | Performed by: PSYCHIATRY & NEUROLOGY

## 2018-01-22 PROCEDURE — 99999 PR PBB SHADOW E&M-EST. PATIENT-LVL IV: CPT | Mod: PBBFAC,,, | Performed by: INTERNAL MEDICINE

## 2018-01-22 PROCEDURE — 99214 OFFICE O/P EST MOD 30 MIN: CPT | Mod: PBBFAC,27 | Performed by: INTERNAL MEDICINE

## 2018-01-22 PROCEDURE — 99213 OFFICE O/P EST LOW 20 MIN: CPT | Mod: PBBFAC | Performed by: PSYCHIATRY & NEUROLOGY

## 2018-01-22 PROCEDURE — 99499 UNLISTED E&M SERVICE: CPT | Mod: S$PBB,,, | Performed by: INTERNAL MEDICINE

## 2018-01-22 NOTE — PROGRESS NOTES
Patient is here today for follow up evaluation of CKD III. Last seen in renal office 10/3/17 Baseline Cr 1.1-1.2 mg/dl  Her most recent lab (1/9/18) Cr; 1.2 mg/dl; eGFR 51 ml/min; potassium 4.7 mmol/L    Impression   CKD III Stable  Diabetes Last A1c 7.2%; at or below goal A1c    Plan  Return Visit; 6 mo

## 2018-01-22 NOTE — PROGRESS NOTES
"Sister Pasquale returns to clinic today regarding the management of obstructive sleep apnea and CPAP equipment check.    She is using CPAP by her sleeping chair  She has been sleeping in a chair (also using a wedge pillow in bed).  Her pressure was set to 13 cm (based on titration)  She states this pressure increase was "working nicely".  Using the ramp set at 4 cm  Humidity set at 3  Her mouth sometimes feels dry.  She has a nasal pillows mask, which she likes. [employer] FX nasal pillows small size mask  She states that she really likes CPAP and it helps her feel better after a night of use.    Still sleepy during the day, but recently improved.    ESS = 15    Recent titration at weight 281 lbs revealed she needed pressure increase from 10 to 13 cm    Interrogation: CPAP at 13 cm; 943 hours total; Machine good condition (F&P model), sleep style machine; 200 series. 5.0 hr/night.     BASELINE PSG 12/23/11: + REJI, AHI 14.2, low O2 79%, wt 281 lbs. (Patient had sleep study in 2003, at which time she was titrated to 11 cm, wt 248 lbs)   TITRATION 5/9/16: Titrated to 13 cm; wt 281 lbs    DME = Medicare ([employer]) - administered by [employer]      Past Medical History:   Diagnosis Date    Adrenal mass- adenoma stable since 2004 (1.8 cm and 8/13/12 (2 cm); stable 2016 2.4 cm     Adrenal mass- adenoma stable since 2004 (1.8 cm and 8/13/12 (2 cm); stable 2016 2.4 cm    Asthma in adult without complication 6/25/2015    Bilateral carotid artery disease 7/21/2017    Bilateral sciatica 2/7/2017    Cellulitis of right leg 08/16/2017    Cerebral infarction 1/29/2016    Multiple areas of lacunar infarction. Stroke risk factors include HTN, DM2, Dyslipidemia.  Continue ASA 81mg/ Statin therapy I have encouraged 30 minutes of physical activity daily for 5 days a week. She has access to a pool so this should not be so jarring to her joints.     Cervical radiculopathy 3/18/2015    Chronic rhinitis 4/9/2013    CKD (chronic kidney disease) " stage 3, GFR 30-59 ml/min 1/29/2013    Coronary artery disease due to calcified coronary lesion 6/25/2015    Diastolic dysfunction 10/10/2013    Essential hypertension 6/25/2015    Gastroesophageal reflux disease without esophagitis 8/13/2012    Gout     Hep B w/ coma 1971    Hives 10/1/2013    Mixed hyperlipidemia 8/13/2012    Morbid obesity with BMI of 50.0-59.9, adult 2/11/2014    Obstructive sleep apnea syndrome 8/13/2012    Senile cataracts of both eyes 1/22/2015    Traumatic open wound of right lower leg 8/25/2017    Trigeminal neuralgia of right side of face 5/23/2017    For years now Previously on Gabapentin, but caused constipation Dissipating over time Not related to intracranial abnormalities Possibly related to dental procedure    Type 2 diabetes mellitus with diabetic polyneuropathy, with long-term current use of insulin 1/29/2013    Venous stasis dermatitis of both lower extremities 8/21/2017    Vitamin D deficiency disease 8/13/2012       Past Surgical History:   Procedure Laterality Date    HYSTERECTOMY  1982    secondary uterine fibroids    JOINT REPLACEMENT      Right total knee replacement         Family History   Problem Relation Age of Onset    Cancer Mother     Hypertension Father     Cancer Sister     No Known Problems Brother     No Known Problems Maternal Aunt     No Known Problems Maternal Uncle     No Known Problems Paternal Aunt     No Known Problems Paternal Uncle     No Known Problems Maternal Grandmother     No Known Problems Maternal Grandfather     No Known Problems Paternal Grandmother     No Known Problems Paternal Grandfather     Glaucoma Neg Hx     Breast cancer Neg Hx     Colon cancer Neg Hx     Ovarian cancer Neg Hx     Stroke Neg Hx     Allergic rhinitis Neg Hx     Allergies Neg Hx     Angioedema Neg Hx     Asthma Neg Hx     Atopy Neg Hx     Eczema Neg Hx     Immunodeficiency Neg Hx     Rhinitis Neg Hx     Urticaria Neg Hx      "Amblyopia Neg Hx     Blindness Neg Hx     Cataracts Neg Hx     Diabetes Neg Hx     Macular degeneration Neg Hx     Retinal detachment Neg Hx     Strabismus Neg Hx     Thyroid disease Neg Hx     Psoriasis Neg Hx        Social History   Substance Use Topics    Smoking status: Never Smoker    Smokeless tobacco: Never Used    Alcohol use No       ALLERGIES: Reviewed in EPIC    CURRENT MEDICATIONS: Reviewed in EPIC    ROS:   Weight up 5 lbs  Denies palpitations, headaches, sinus congestion improved with nasal spray qhs prn.   Denies oral drying or nasal drying.      PHYSICAL EXAM:  Prior weight is 266 lbs.  BP (!) 118/56   Pulse 92   Ht 4' 11" (1.499 m)   Wt 123.2 kg (271 lb 9.7 oz)   BMI 54.86 kg/m²   GENERAL: morbid obese body habitus, well groomed       ASSESSMENT:     Obstructive sleep apnea, with improvement of CPAP after nightly use. Control of sleepiness should improved with increased nightly CPAP usage. Her weight is still under from her peak weight. Now with better pain control, she feels more successful with exercise and diet.    She has medical co-morbidities of hypertension and morbid obesity (BMI >30) .     PLAN:     1. Continue CPAP 13 cm, Goal of an additional 400 hours before next visit. She reports that she has moved her CPAP to her sleeping chair.   2. Sleep with head of bed elevation and incline  3. Weight loss goal to 225 lbs.  4. If achieves weight, we can reassess whether CPAP is indicated.    DME = [employer] (she reports using nasal pillows)    Continue Advair or Flonase NS 1-2 sprays qhs PRN to reduce potential nasal congestion.     Follow up 2 months: MD/NP   "

## 2018-01-30 ENCOUNTER — CLINICAL SUPPORT (OUTPATIENT)
Dept: REHABILITATION | Facility: HOSPITAL | Age: 78
End: 2018-01-30
Attending: INTERNAL MEDICINE
Payer: MEDICARE

## 2018-01-30 DIAGNOSIS — G89.29 CHRONIC PAIN OF LEFT KNEE: ICD-10-CM

## 2018-01-30 DIAGNOSIS — R29.898 WEAKNESS OF LEFT LOWER EXTREMITY: ICD-10-CM

## 2018-01-30 DIAGNOSIS — M25.562 CHRONIC PAIN OF LEFT KNEE: ICD-10-CM

## 2018-01-30 PROCEDURE — 97110 THERAPEUTIC EXERCISES: CPT | Mod: PO

## 2018-01-30 PROCEDURE — G8978 MOBILITY CURRENT STATUS: HCPCS | Mod: CK,PO

## 2018-01-30 PROCEDURE — G8979 MOBILITY GOAL STATUS: HCPCS | Mod: CK,PO

## 2018-01-30 NOTE — PROGRESS NOTES
OUTPATIENT PHYSICAL THERAPY  PHYSICAL PROGRESS NOTE     Name: Duran Logan [facility] Number: [id number]     Diagnosis:   1. Chronic pain of left knee      2. Weakness of left lower extremity         Physician: Tami Schmidt, *  Treatment Orders: PT Eval and Treat       Past Medical History:   Diagnosis Date    Adrenal mass- adenoma stable since 2004 (1.8 cm and 8/13/12 (2 cm); stable 2016 2.4 cm       Adrenal mass- adenoma stable since 2004 (1.8 cm and 8/13/12 (2 cm); stable 2016 2.4 cm    Asthma in adult without complication 6/25/2015    Bilateral carotid artery disease 7/21/2017    Bilateral sciatica 2/7/2017    Cellulitis of right leg 08/16/2017    Cerebral infarction 1/29/2016     Multiple areas of lacunar infarction. Stroke risk factors include HTN, DM2, Dyslipidemia.  Continue ASA 81mg/ Statin therapy I have encouraged 30 minutes of physical activity daily for 5 days a week. She has access to a pool so this should not be so jarring to her joints.     Cervical radiculopathy 3/18/2015    Chronic rhinitis 4/9/2013    CKD (chronic kidney disease) stage 3, GFR 30-59 ml/min 1/29/2013    Coronary artery disease due to calcified coronary lesion 6/25/2015    Diastolic dysfunction 10/10/2013    Essential hypertension 6/25/2015    Gastroesophageal reflux disease without esophagitis 8/13/2012    Gout      Hep B w/ coma 1971    Hives 10/1/2013    Mixed hyperlipidemia 8/13/2012    Morbid obesity with BMI of 50.0-59.9, adult 2/11/2014    Obstructive sleep apnea syndrome 8/13/2012    Senile cataracts of both eyes 1/22/2015    Traumatic open wound of right lower leg 8/25/2017    Trigeminal neuralgia of right side of face 5/23/2017     For years now Previously on Gabapentin, but caused constipation Dissipating over time Not related to intracranial abnormalities Possibly related to dental procedure    Type 2 diabetes mellitus with diabetic polyneuropathy, with long-term current use of insulin  "1/29/2013    Venous stasis dermatitis of both lower extremities 8/21/2017    Vitamin D deficiency disease 8/13/2012           Time in: 1:00 pm  Time Out: 2:00 pm  Total 1:1 Treatment Time: 30 minutes     Date of eval: 12/15/2017  Visit #: 6/10  Auth expiration: 12/31/17  POC expiration: 2/28/18     Precautions: standard     History of Present Illness: pt reports having pain in her left knee for about a knee, but the severity has worsened in the past month. Pt reports that she was walking a took a step down and it began to hurt. Pt reports that it is a severe sharp pain that feels as though "someone cut me open with a knife." Pt reports that she had LBP and sciatica "into my butt" L> R until recently. Pt reports falling in August and cutting open her R shin that has healed. Pt reports being a part of [employer] with her [employer] affiliation.      Subjective     Pt reports feeling that she is walking more. Pt reports compliance with CPAP use and has improved sleep. Pt reports taking an Aleve, "even though I know I'm not supposed to." Pt reports pain at worst to be 7/10.  Pain: 4/10 L knee, 6/10 earlier due to cold weather    Objective   GAIT DEVIATIONS: Duran amb with decreased christo, decreased weight-shifting ability and increased lateral sway toward L, trendelenberg gait, increased b knee valgus. Pt ambulates with SPC.     FUNCTION:   - Sit <--> Stand: uses B UE, demonstrates R LE preference throughout  - Bed Mobility: Min A c/ B UE support and increased time required  - Stairs: uses B UE throughout, alternating gait upon ascent, step to gait (R LE first) during descent     ROM:     AROM Right Left Comment Normal   Knee Extension: 0 degrees 0 degrees   0 deg   Knee Flexion: 75 degrees 75 degrees   145 deg               *pain        Strength:        Right Left Comment   Hip Flexion: 4/5 4/5     Hip ABD: 4/5 4/5     Hip ADD: 4/5 4/5     Hip Extension: 4/5 4/5     Knee Ext: 4+/5 4+/5     Knee Flex: 4+/5 4+/5   "            Special Tests:  Trendelenberg sign: L pos, R neg     Palpation: no tenderness to palpation on B knees     Joint Play: difficulty assessing patellar motion due to excess adipose    Pt participated in therapeutic exercises for 55 minutes including:  Standing HS stretch at stairs 3 x 30 sec  Gastroc stretch 3 x 30 sec  Standing hip extension c/ 1# 2 x 10  Standing hip abduction c/ 1# 2 x 10  Standing Marches c/ 1# x 10  Toe taps at step x 10  Step ups x 10   Seated hip adduction x 30 5 sec  Seated hip abduction with blue theraband x 30 5 sec  LAQ c/ 1# and eccentric lowering 3 x 5  HS curls c/ OTB x 10  Sit <> stand x 10    Patient needs draped for supine exercises due to wearing habit    Exercises not performed today:  SLR x 10  Heel slides x 20  Bridges x 10 - challenging  SAQ #2 2 x 10  Fwd lunge stretch on step 3 x 30 sec     Pt received MHP to low back for pain relief per pt request- 8 min    Written Exercises provided: continue with previously issued HEP and to incorporate walking daily into her routine for improved cardiovascular endurance.  Pt/family was provided educational information, including: HEP, importance of daily activity, weight management in pain control, role of PT, goals for PT, scheduling - pt verbalized understanding. Discussed insurance plan with pt.      Assessment     Patient tolerated treatment well no exacerbation of symptoms.  Pt requires rest breaks throughout exercises secondary to fatigue.  Pt was able to perform sit to stand with her hands on her knees with mild difficulty. Pt will benefit from skilled outpatient physical therapy to address the above stated deficits, provide pt/family education, and to maximize pt's level of independence.              History  Co-morbidities and personal factors that may impact the plan of care Examination  Body Structures and Functions, activity limitations and participation restrictions that may impact the plan of care   Clinical  Presentation   Co-morbidities:   difficulty sleeping, high BMI and lymphadema, prior R knee TKA           Personal Factors:   coping style  lifestyle Body Regions:   lower extremities     Body Systems:   ROM  strength  balance  gait  motor control           Participation Restrictions:   Unable to ambulate without AD    Activity limitations:   Mobility  walking     Self care  no deficits     Domestic Life  assisting others     Community and Social Life  community life  recreation and leisure  Spiritism and spirituality             evolving clinical presentation with changing clinical characteristics                                moderate   high   high Decision Making/ Complexity Score:  moderate      Pt's spiritual, cultural and educational needs considered and pt agreeable to plan of care and goals as stated below:      Anticipated Barriers for physical therapy: potential difficulty with scheduling and finding transportation to and from clinic     GOALS:  Short Term GOALS:  In 4 weeks, pt. will:  Pt will report decrease in pain </= 5 at worst in order to better tolerate standing.  Pt will demonstrate improved posture during exercises with minimal cueing in order to improve body mechanics with activity.   Pt will demonstrate increased L LE mm strength by 1 MMT grade to increase tolerance to standing activity. -met 1/30  Pt will demonstrate compliance with HEP. -met 1/30     G-Codes:  Intake 40% 60%  Predicted 50% 50% Goal Status+ CK - At least 40 percent but less than 60 percent  1/30/2018 45% 55% Current Status CK - At least 40 percent but less than 60 percent     Plan   Pt has been provided with HEP in order to address lower extremity weakness and has received education on HEP and importance of exercise in symptom management. Currently, the pt reports no functional limitations and reports no change in her knee pain since beginning therapy. Physical therapist has advised on importance of weight management in  decreasing her symptoms and has advised pt to communicate with PCP regarding potential pain management options at this point. PT instructed patient to call or e-mail with any further questions or if the pt's condition should worsen over next few weeks; however, no further PT scheduled at this time. Plan to discharge pt, if no change in status or complaints of pain or decreased functional mobility within 1 month. No skilled care is indicated at this time.     Pt will return for 1 final visit to review her HEP and answer any questions.  Following discharge from PT, pt would benefit from Wellness Program to continue current walking and activity level.     Marco Martin, PT, DPT

## 2018-01-31 ENCOUNTER — TELEPHONE (OUTPATIENT)
Dept: ENDOCRINOLOGY | Facility: CLINIC | Age: 78
End: 2018-01-31

## 2018-01-31 NOTE — TELEPHONE ENCOUNTER
----- Message from Carine Hong sent at 1/30/2018 10:22 AM CST -----  Contact: Pt  [phone]  Arpit  /   Pt calling to get test result  With an appt too see  with labs orders   Call the pt back to schedule

## 2018-01-31 NOTE — TELEPHONE ENCOUNTER
Sue Giordano isn't feeling well, left a message with patient caregiver about her appointment and results

## 2018-02-02 ENCOUNTER — OFFICE VISIT (OUTPATIENT)
Dept: INTERNAL MEDICINE | Facility: CLINIC | Age: 78
End: 2018-02-02
Payer: MEDICARE

## 2018-02-02 VITALS
WEIGHT: 275 LBS | SYSTOLIC BLOOD PRESSURE: 172 MMHG | BODY MASS INDEX: 55.44 KG/M2 | HEIGHT: 59 IN | DIASTOLIC BLOOD PRESSURE: 72 MMHG | HEART RATE: 86 BPM | OXYGEN SATURATION: 97 %

## 2018-02-02 DIAGNOSIS — E11.69 TYPE 2 DIABETES MELLITUS WITH HYPERLIPIDEMIA: ICD-10-CM

## 2018-02-02 DIAGNOSIS — I50.32 CHRONIC DIASTOLIC HEART FAILURE: ICD-10-CM

## 2018-02-02 DIAGNOSIS — E27.8 ADRENAL MASS: ICD-10-CM

## 2018-02-02 DIAGNOSIS — I87.2 VENOUS STASIS DERMATITIS OF BOTH LOWER EXTREMITIES: ICD-10-CM

## 2018-02-02 DIAGNOSIS — R60.0 BILATERAL LEG EDEMA: ICD-10-CM

## 2018-02-02 DIAGNOSIS — E11.42 TYPE 2 DIABETES MELLITUS WITH DIABETIC POLYNEUROPATHY, WITH LONG-TERM CURRENT USE OF INSULIN: ICD-10-CM

## 2018-02-02 DIAGNOSIS — I15.2 HYPERTENSION ASSOCIATED WITH DIABETES: Primary | ICD-10-CM

## 2018-02-02 DIAGNOSIS — J45.909 UNCOMPLICATED ASTHMA, UNSPECIFIED ASTHMA SEVERITY, UNSPECIFIED WHETHER PERSISTENT: ICD-10-CM

## 2018-02-02 DIAGNOSIS — M47.816 LUMBAR FACET ARTHROPATHY: ICD-10-CM

## 2018-02-02 DIAGNOSIS — I10 ESSENTIAL HYPERTENSION: ICD-10-CM

## 2018-02-02 DIAGNOSIS — E55.9 VITAMIN D DEFICIENCY DISEASE: ICD-10-CM

## 2018-02-02 DIAGNOSIS — E78.5 TYPE 2 DIABETES MELLITUS WITH HYPERLIPIDEMIA: ICD-10-CM

## 2018-02-02 DIAGNOSIS — E11.59 HYPERTENSION ASSOCIATED WITH DIABETES: Primary | ICD-10-CM

## 2018-02-02 DIAGNOSIS — Z79.4 TYPE 2 DIABETES MELLITUS WITH DIABETIC POLYNEUROPATHY, WITH LONG-TERM CURRENT USE OF INSULIN: ICD-10-CM

## 2018-02-02 DIAGNOSIS — M17.0 PRIMARY OSTEOARTHRITIS OF BOTH KNEES: ICD-10-CM

## 2018-02-02 DIAGNOSIS — G47.33 OBSTRUCTIVE SLEEP APNEA SYNDROME: ICD-10-CM

## 2018-02-02 DIAGNOSIS — Z12.31 ENCOUNTER FOR SCREENING MAMMOGRAM FOR BREAST CANCER: ICD-10-CM

## 2018-02-02 DIAGNOSIS — E66.01 MORBID OBESITY DUE TO EXCESS CALORIES: ICD-10-CM

## 2018-02-02 DIAGNOSIS — M89.9 DISORDER OF BONE: ICD-10-CM

## 2018-02-02 DIAGNOSIS — E11.29 TYPE 2 DIABETES MELLITUS WITH RENAL MANIFESTATIONS NOT AT GOAL: ICD-10-CM

## 2018-02-02 DIAGNOSIS — I25.10 CORONARY ARTERY DISEASE, ANGINA PRESENCE UNSPECIFIED, UNSPECIFIED VESSEL OR LESION TYPE, UNSPECIFIED WHETHER NATIVE OR TRANSPLANTED HEART: ICD-10-CM

## 2018-02-02 PROCEDURE — 1159F MED LIST DOCD IN RCRD: CPT | Mod: ,,, | Performed by: INTERNAL MEDICINE

## 2018-02-02 PROCEDURE — 99999 PR PBB SHADOW E&M-EST. PATIENT-LVL IV: CPT | Mod: PBBFAC,,, | Performed by: INTERNAL MEDICINE

## 2018-02-02 PROCEDURE — 99214 OFFICE O/P EST MOD 30 MIN: CPT | Mod: PBBFAC | Performed by: INTERNAL MEDICINE

## 2018-02-02 PROCEDURE — 1125F AMNT PAIN NOTED PAIN PRSNT: CPT | Mod: ,,, | Performed by: INTERNAL MEDICINE

## 2018-02-02 PROCEDURE — 99215 OFFICE O/P EST HI 40 MIN: CPT | Mod: S$PBB,,, | Performed by: INTERNAL MEDICINE

## 2018-02-02 RX ORDER — AMLODIPINE BESYLATE 5 MG/1
5 TABLET ORAL DAILY
Qty: 90 TABLET | Refills: 3 | Status: SHIPPED | OUTPATIENT
Start: 2018-02-02 | End: 2018-03-16

## 2018-02-02 RX ORDER — ATORVASTATIN CALCIUM 80 MG/1
80 TABLET, FILM COATED ORAL DAILY
Qty: 90 TABLET | Refills: 3 | Status: SHIPPED | OUTPATIENT
Start: 2018-02-02 | End: 2019-03-19

## 2018-02-02 RX ORDER — VIT C/E/ZN/COPPR/LUTEIN/ZEAXAN 250MG-90MG
1000 CAPSULE ORAL DAILY
Qty: 90 CAPSULE | Refills: 3 | Status: SHIPPED | OUTPATIENT
Start: 2018-02-02 | End: 2018-12-14

## 2018-02-02 RX ORDER — LOSARTAN POTASSIUM 100 MG/1
100 TABLET ORAL NIGHTLY
Qty: 90 TABLET | Refills: 3 | Status: SHIPPED | OUTPATIENT
Start: 2018-02-02 | End: 2018-10-10

## 2018-02-02 RX ORDER — TORSEMIDE 20 MG/1
20 TABLET ORAL DAILY
Qty: 90 TABLET | Refills: 3 | Status: SHIPPED | OUTPATIENT
Start: 2018-02-02 | End: 2019-03-19

## 2018-02-02 RX ORDER — ACETAMINOPHEN 500 MG
1000 TABLET ORAL 2 TIMES DAILY PRN
Qty: 360 TABLET | Refills: 3 | Status: ON HOLD | OUTPATIENT
Start: 2018-02-02 | End: 2022-06-30 | Stop reason: HOSPADM

## 2018-02-02 RX ORDER — HYDRALAZINE HYDROCHLORIDE 50 MG/1
50 TABLET, FILM COATED ORAL EVERY 12 HOURS
Qty: 180 TABLET | Refills: 3 | Status: SHIPPED | OUTPATIENT
Start: 2018-02-02 | End: 2018-02-02 | Stop reason: SDUPTHER

## 2018-02-02 RX ORDER — NAPROXEN SODIUM 220 MG/1
81 TABLET, FILM COATED ORAL DAILY
Qty: 90 TABLET | Refills: 3 | Status: SHIPPED | OUTPATIENT
Start: 2018-02-02 | End: 2018-12-14

## 2018-02-02 RX ORDER — HYDRALAZINE HYDROCHLORIDE 50 MG/1
100 TABLET, FILM COATED ORAL EVERY 12 HOURS
Qty: 180 TABLET | Refills: 3
Start: 2018-02-02 | End: 2018-05-21 | Stop reason: SDUPTHER

## 2018-02-02 NOTE — PATIENT INSTRUCTIONS
TODAY:  - start pill packing program at [facility]   + scripts sent today  - letter to Sr Isabel Harding for assistance in the home with wrapping  - re-order PT (Maryanne Martin)   + PCP to message Maryanne for insurance coverage  - follow-up in 4 weeks to monitor mood  - recheck BP  - mammogram and DEXA on the same day  - increase hydralazine to 100mg twice daily (2 tablets, twice daily)  - continue losartan 100mg  - when pills are packed, will start amlodipine 5mg

## 2018-02-02 NOTE — LETTER
February 2, 2018    Duran Giordano  [address] [address] [facility] [place] [address]             [facility]  [address]  [facility] [address]  Phone: [phone]  Fax: [phone] Dear Sister Isabel Harding:    I am the primary care provider for Sr Duran Giordano. She needs daily wound care with leg wrappings to treat her venous stasis. She does not qualify for home health at this time, but does need assistance in the [facility] with her leg wrappings because of the difficulties she experiences when performing this on herself.    Could you assist her with identifying a person in the convent who can help her with daily leg wrappings?    If you have any questions or concerns, please don't hesitate to call.    Sincerely,        Tami Schmidt MD

## 2018-02-02 NOTE — ASSESSMENT & PLAN NOTE
LE swelling bilaterally with poor wound healing, routine La Nena boot with wound care  · Continue La Nena boot, per wound care  · Letter to Sr Isabel Harding to get assistance in the convent  · Treat cellulitis PRN

## 2018-02-02 NOTE — PROGRESS NOTES
Adherence packed for 30 days:    Morning card:  Amlodipine 5 mg  Aspirin 81 mg  Hydralazine 50 mg  Losartan 100 mg  Torsemide 20 mg  Vitamin D3 1,000 IU        Evening card:  Atorvastatin 80 mg  Hydralazine 50 mg

## 2018-02-02 NOTE — ASSESSMENT & PLAN NOTE
Compliant with CPAP (previously noncompliant)  · Advised to continue using machine  · Advised to place machine next to recliner chair

## 2018-02-02 NOTE — ASSESSMENT & PLAN NOTE
Likely due to venous status (discharged from lymphedema clinic in 2014)  · Letter written to Sr Isabel Harding to get assitance in the convent with leg wrappings

## 2018-02-02 NOTE — ASSESSMENT & PLAN NOTE
BP not controlled on hydralazine 50mg BID, losartan 100mg, torsemide; carvedilol discontinued by pulm due to SOB  · Increase hydralazine to 100mg  · Continue losartan 100mg  · Start amlodipine 5mg in pill pack  · Continue torsemide   · Digital HTN program at next appt

## 2018-02-02 NOTE — Clinical Note
Dennys needs pill packing program. All relevant scripts sent to pharmacy today. She has meds at home that she wants to keep at home. Please fill pill packs with new scripts, and I can get them out to her.  Thanks, KJ

## 2018-02-02 NOTE — ASSESSMENT & PLAN NOTE
Knee XR in 2/2017 showing joint space narrowing narrowing laterally and patellofemoral bony spurring.  · Continue PT at [facility] [address]  · Advised to lose weight

## 2018-02-02 NOTE — PROGRESS NOTES
"Primary Care Provider Appointment    Subjective:      Patient ID: Duran Giordano is a 77 y.o. female with HTN, HLD, leg ulcers, venous stasis    Chief Complaint: Follow-up    Her BP is elevated today, she states it has been uncontrolled for 3 weeks for unknown reason. She reports compliance with BP meds and torsemide. Pulm discontinued BB in 10/2017 due to SOB.    She endorses loneliness and perhaps depression for past 4 days. She had a recent URI, had sick contacts at the con[facility]. She states she is tired. Her PHQ-2 was 2.     Her venous stasis ulcers are worsened with her inability to wrap her legs. The sister that previously wrapped them for her was given another assignment. She is requesting assistance with wrapping her legs daily. She is doing PT at [facility] [address]. Her RLE wound has healed, no longer followed by wound care.    Her last A1c was 7.2 in 12/2017. Her BMP in 1/2018 showed stable kidney function. She is now using insulin pens, she states "I am really taking charge of myself, before I was haphazardly injecting. The pen is very easy." She states her AM fasting glucose have been 118-130s. She is doing humalog before breakfast and dinner, and levemir 48U in the AM.    She uses her CPAP and a wedge pillow to elevate her legs.    Past Surgical History:   Procedure Laterality Date    HYSTERECTOMY  1982    secondary uterine fibroids    JOINT REPLACEMENT      Right total knee replacement         Past Medical History:   Diagnosis Date    Adrenal mass- adenoma stable since 2004 (1.8 cm and 8/13/12 (2 cm); stable 2016 2.4 cm     Adrenal mass- adenoma stable since 2004 (1.8 cm and 8/13/12 (2 cm); stable 2016 2.4 cm    Asthma in adult without complication 6/25/2015    Bilateral carotid artery disease 7/21/2017    Bilateral sciatica 2/7/2017    Cellulitis of right leg 08/16/2017    Cerebral infarction 1/29/2016    Multiple areas of lacunar infarction. Stroke risk factors include HTN, DM2, " "Dyslipidemia.  Continue ASA 81mg/ Statin therapy I have encouraged 30 minutes of physical activity daily for 5 days a week. She has access to a pool so this should not be so jarring to her joints.     Cervical radiculopathy 3/18/2015    Chronic rhinitis 4/9/2013    CKD (chronic kidney disease) stage 3, GFR 30-59 ml/min 1/29/2013    Coronary artery disease due to calcified coronary lesion 6/25/2015    Diastolic dysfunction 10/10/2013    Essential hypertension 6/25/2015    Gastroesophageal reflux disease without esophagitis 8/13/2012    Gout     Hep B w/ coma 1971    Hives 10/1/2013    Mixed hyperlipidemia 8/13/2012    Morbid obesity with BMI of 50.0-59.9, adult 2/11/2014    Obstructive sleep apnea syndrome 8/13/2012    Senile cataracts of both eyes 1/22/2015    Traumatic open wound of right lower leg 8/25/2017    Trigeminal neuralgia of right side of face 5/23/2017    For years now Previously on Gabapentin, but caused constipation Dissipating over time Not related to intracranial abnormalities Possibly related to dental procedure    Type 2 diabetes mellitus with diabetic polyneuropathy, with long-term current use of insulin 1/29/2013    Venous stasis dermatitis of both lower extremities 8/21/2017    Vitamin D deficiency disease 8/13/2012       Review of Systems   Constitutional: Positive for activity change and appetite change. Negative for unexpected weight change.   Cardiovascular: Positive for leg swelling.   Gastrointestinal: Negative for constipation and diarrhea.   Skin: Positive for wound.   Hematological: Does not bruise/bleed easily.   Psychiatric/Behavioral: Negative for behavioral problems and decreased concentration.       Objective:   BP (!) 172/72 (BP Location: Left arm, Patient Position: Sitting, BP Method: Large (Manual))   Pulse 86   Ht 4' 11" (1.499 m)   Wt 124.7 kg (275 lb)   SpO2 97%   BMI 55.54 kg/m²     Physical Exam   Constitutional: She appears well-developed.   Severe " obesity   Eyes: EOM are normal.   Cardiovascular: Normal rate.    Pulmonary/Chest: Effort normal.   Abdominal:   Obese abdomen   Musculoskeletal: She exhibits edema.   Significant swelling in legs bilaterally   Neurological: She is alert.   Skin:   Erythematous LE bilaterally  No evidence of infection in LE bilaterally  Wound RLE healed   Psychiatric: She has a normal mood and affect.   Poor insight into disease       Lab Results   Component Value Date    WBC 9.57 11/02/2017    HGB 11.5 (L) 11/02/2017    HCT 38.4 11/02/2017     11/02/2017    CHOL 177 05/02/2017    TRIG 92 05/02/2017    HDL 39 (L) 05/02/2017    ALT 18 09/19/2017    AST 15 09/19/2017     01/09/2018    K 4.7 01/09/2018     01/09/2018    CREATININE 1.2 01/09/2018    BUN 25 (H) 01/09/2018    CO2 28 01/09/2018    TSH 0.830 05/02/2017    INR 1.1 04/17/2005    HGBA1C 7.2 (H) 12/04/2017         Assessment:   77 y.o. female with multiple co-morbid illnesses here to continue work-up of chronic issues notably HTN, HLD, leg ulcers, venous stasis    Plan:     Problem List Items Addressed This Visit        Pulmonary    Uncomplicated asthma     Symptom improvement with discontinuation of BB by Pulm, compliant with inhalers  · Continue albuterol and advair  · F/U Pulm            Cardiac/Vascular    Chronic diastolic heart failure     Diastolic heart failure, edema treated with torsemide  · Continue torsemide         Hypertension associated with diabetes - Primary     BP not controlled on hydralazine 50mg BID, losartan 100mg, torsemide; carvedilol discontinued by pulm due to SOB  · Increase hydralazine to 100mg  · Continue losartan 100mg  · Start amlodipine 5mg in pill pack  · Continue torsemide   · Digital HTN program at next appt         Relevant Medications    amLODIPine (NORVASC) 5 MG tablet    losartan (COZAAR) 100 MG tablet    torsemide (DEMADEX) 20 MG Tab    hydrALAZINE (APRESOLINE) 50 MG tablet    Venous stasis dermatitis of both lower  extremities     LE swelling bilaterally with poor wound healing, routine La Nena boot with wound care  · Continue La Nena boot, per wound care  · Letter to Sr Isabel Harding to get assistance in the con[facility]  · Treat cellulitis PRN            Endocrine    Vitamin D deficiency disease     Level 30 in 5/2017  · Continue daily supplementation         Relevant Medications    cholecalciferol, vitamin D3, 1,000 unit capsule    Other Relevant Orders    DXA Bone Density Spine And Hip    Type 2 diabetes mellitus with hyperlipidemia     High ASCVD due to DM  · Continue statin and ASA  · Control DM with insulin         Adrenal mass- adenoma stable since 2004 (1.8 cm and 8/13/12 (2 cm); stable 2016 2.4 cm     Adrenal mass- adenoma stable since 2004 (1.8 cm and 8/13/12 (2 cm); stable 2016 2.4 cm  · monitor         Type 2 diabetes mellitus with diabetic polyneuropathy, with long-term current use of insulin     Abnormal foot exam, followed by podiatry q2 mos  · Continue routine care         Relevant Medications    atorvastatin (LIPITOR) 80 MG tablet    Uncontrolled secondary diabetes mellitus with stage 3 CKD (GFR 30-59)     A1c 7.2 (improved from 10 previously), GFR improving  · Continue to control DM  · Avoid NSAIDs         Morbid obesity due to excess calories     BMI >50, eats excessively, DM, HTN, REJI  · Counseled on restricting caloric intake one day per week- Wed  · No desserts, reduced carbs         Relevant Orders    DXA Bone Density Spine And Hip       Orthopedic    Primary osteoarthritis of both knees     Knee XR in 2/2017 showing joint space narrowing narrowing laterally and patellofemoral bony spurring.  · Continue PT at [facility] [address]  · Advised to lose weight         Relevant Medications    acetaminophen (TYLENOL) 500 MG tablet    Lumbar facet arthropathy     Seen on imaging, compliant with PT  · Continue PT         Relevant Medications    acetaminophen (TYLENOL) 500 MG tablet       Other    Obstructive sleep apnea  syndrome     Compliant with CPAP (previously noncompliant)  · Advised to continue using machine  · Advised to place machine next to recliner chair         Bilateral leg edema     Likely due to venous status (discharged from lymphedema clinic in 2014)  · Letter written to Sr Isabel Harding to get assitance in the convent with leg wrappings           Other Visit Diagnoses     Coronary artery disease, angina presence unspecified, unspecified vessel or lesion type, unspecified whether native or transplanted heart        Relevant Medications    aspirin 81 MG Chew    Type 2 diabetes mellitus with renal manifestations not at goal        Relevant Medications    atorvastatin (LIPITOR) 80 MG tablet    Essential hypertension        Encounter for screening mammogram for breast cancer        Relevant Orders    Mammo Digital Screening Bilat with CAD    Disorder of bone         Relevant Orders    DXA Bone Density Spine And Hip          Health Maintenance       Date Due Completion Date    TETANUS VACCINE 03/08/1958 ---    DEXA SCAN 01/26/2018 1/26/2015- TODAY    Override on 12/13/2011: Done    Lipid Panel 05/02/2018 5/2/2017    Eye Exam 05/23/2018 5/23/2017    Override on 2/17/2016: Done    Override on 1/22/2015: Done    Override on 8/16/2012: Done    Foot Exam 05/31/2018 5/31/2017 (Done)    Override on 5/31/2017: Done    Override on 7/20/2016: Done    Hemoglobin A1c 06/04/2018 12/4/2017    Override on 7/13/2016: Done    High Dose Statin 01/22/2019 1/22/2018    Colonoscopy 08/13/2020 8/13/2010 (Done)    Override on 8/13/2010: Done          Follow-up in about 6 weeks (around 3/16/2018). . One hour spent with this patient today, half of that in counseling.    Tami Schmidt MD/MPH  Internal Medicine  [facility]  [phone]

## 2018-02-07 ENCOUNTER — CLINICAL SUPPORT (OUTPATIENT)
Dept: REHABILITATION | Facility: HOSPITAL | Age: 78
End: 2018-02-07
Attending: INTERNAL MEDICINE
Payer: MEDICARE

## 2018-02-07 ENCOUNTER — TELEPHONE (OUTPATIENT)
Dept: INTERNAL MEDICINE | Facility: CLINIC | Age: 78
End: 2018-02-07

## 2018-02-07 DIAGNOSIS — G89.29 CHRONIC PAIN OF LEFT KNEE: ICD-10-CM

## 2018-02-07 DIAGNOSIS — R29.898 WEAKNESS OF LEFT LOWER EXTREMITY: ICD-10-CM

## 2018-02-07 DIAGNOSIS — M25.562 CHRONIC PAIN OF LEFT KNEE: ICD-10-CM

## 2018-02-07 PROCEDURE — 97110 THERAPEUTIC EXERCISES: CPT | Mod: PO

## 2018-02-07 NOTE — TELEPHONE ENCOUNTER
Can we mail this patient's bubble pack to her? I will not be able to deliver on Friday. She lives in a [facility] in [facility].    Thanks,  BARB

## 2018-02-07 NOTE — PROGRESS NOTES
OUTPATIENT PHYSICAL THERAPY  PHYSICAL PROGRESS NOTE     Name: Duran Logan [facility] Number: [id number]     Diagnosis:   1. Chronic pain of left knee      2. Weakness of left lower extremity         Physician: Tami Schmidt, *  Treatment Orders: PT Eval and Treat       Past Medical History:   Diagnosis Date    Adrenal mass- adenoma stable since 2004 (1.8 cm and 8/13/12 (2 cm); stable 2016 2.4 cm       Adrenal mass- adenoma stable since 2004 (1.8 cm and 8/13/12 (2 cm); stable 2016 2.4 cm    Asthma in adult without complication 6/25/2015    Bilateral carotid artery disease 7/21/2017    Bilateral sciatica 2/7/2017    Cellulitis of right leg 08/16/2017    Cerebral infarction 1/29/2016     Multiple areas of lacunar infarction. Stroke risk factors include HTN, DM2, Dyslipidemia.  Continue ASA 81mg/ Statin therapy I have encouraged 30 minutes of physical activity daily for 5 days a week. She has access to a pool so this should not be so jarring to her joints.     Cervical radiculopathy 3/18/2015    Chronic rhinitis 4/9/2013    CKD (chronic kidney disease) stage 3, GFR 30-59 ml/min 1/29/2013    Coronary artery disease due to calcified coronary lesion 6/25/2015    Diastolic dysfunction 10/10/2013    Essential hypertension 6/25/2015    Gastroesophageal reflux disease without esophagitis 8/13/2012    Gout      Hep B w/ coma 1971    Hives 10/1/2013    Mixed hyperlipidemia 8/13/2012    Morbid obesity with BMI of 50.0-59.9, adult 2/11/2014    Obstructive sleep apnea syndrome 8/13/2012    Senile cataracts of both eyes 1/22/2015    Traumatic open wound of right lower leg 8/25/2017    Trigeminal neuralgia of right side of face 5/23/2017     For years now Previously on Gabapentin, but caused constipation Dissipating over time Not related to intracranial abnormalities Possibly related to dental procedure    Type 2 diabetes mellitus with diabetic polyneuropathy, with long-term current use of insulin  "1/29/2013    Venous stasis dermatitis of both lower extremities 8/21/2017    Vitamin D deficiency disease 8/13/2012           Time in: 10:03 am  Time Out: 10: 54 am  Total 1:1 Treatment Time: 26 minutes     Date of eval: 12/15/2017  Visit #: 7/10  Auth expiration: 12/31/17  POC expiration: 2/28/18     Precautions: standard     History of Present Illness: pt reports having pain in her left knee for about a knee, but the severity has worsened in the past month. Pt reports that she was walking a took a step down and it began to hurt. Pt reports that it is a severe sharp pain that feels as though "someone cut me open with a knife." Pt reports that she had LBP and sciatica "into my butt" L> R until recently. Pt reports falling in August and cutting open her R shin that has healed. Pt reports being a part of [employer] with her [employer] affiliation.      Subjective     Pt reports she received a call about the Wellness center but she doesn't think she will do it because she will have to get someone to bring her every time.    Pain: 4/10 L knee    Objective       Pt participated in therapeutic exercises for 51 minutes including:  Standing HS stretch at stairs 3 x 30 sec  Gastroc stretch 3 x 30 sec  Standing hip extension c/ 1# 2 x 10  Standing hip abduction c/ 1# 2 x 10  Standing Marches c/ 1# x 10  Toe taps at step x 10  Step ups x 10   Seated hip adduction x 30 5 sec  Seated hip abduction with blue theraband x 30 5 sec  LAQ c/ 1# and eccentric lowering 3 x 5  HS curls c/ OTB x 10  Sit <> stand x 10    Pt received 1 on 1 therapeutic exercise for 26 minutes    Exercises not performed today:  SLR x 10  Heel slides x 20  Bridges x 10 - challenging  SAQ #2 2 x 10  Fwd lunge stretch on step 3 x 30 sec     Pt received MHP to low back for pain relief per pt request- 8 min    Written Exercises provided: continue with previously issued HEP and to incorporate walking daily into her routine for improved cardiovascular " endurance.  Pt/family was provided educational information, including: HEP, importance of daily activity, weight management in pain control, role of PT, goals for PT, scheduling - pt verbalized understanding. Discussed insurance plan with pt.      Assessment     Patient tolerated treatment well no exacerbation of symptoms.  Pt is discharged from therapy and will continue with exercises at home to maintain progress.             History  Co-morbidities and personal factors that may impact the plan of care Examination  Body Structures and Functions, activity limitations and participation restrictions that may impact the plan of care   Clinical Presentation   Co-morbidities:   difficulty sleeping, high BMI and lymphadema, prior R knee TKA           Personal Factors:   coping style  lifestyle Body Regions:   lower extremities     Body Systems:   ROM  strength  balance  gait  motor control           Participation Restrictions:   Unable to ambulate without AD    Activity limitations:   Mobility  walking     Self care  no deficits     Domestic Life  assisting others     Community and Social Life  community life  recreation and leisure  Baptism and spirituality             evolving clinical presentation with changing clinical characteristics                                moderate   high   high Decision Making/ Complexity Score:  moderate      Pt's spiritual, cultural and educational needs considered and pt agreeable to plan of care and goals as stated below:      Anticipated Barriers for physical therapy: potential difficulty with scheduling and finding transportation to and from clinic     GOALS:  Short Term GOALS:  In 4 weeks, pt. will:  Pt will report decrease in pain </= 5 at worst in order to better tolerate standing.  Pt will demonstrate improved posture during exercises with minimal cueing in order to improve body mechanics with activity.   Pt will demonstrate increased L LE mm strength by 1 MMT grade to increase  tolerance to standing activity. -met 1/30  Pt will demonstrate compliance with HEP. -met 1/30     G-Codes:  Intake 40% 60%  Predicted 50% 50% Goal Status+ CK - At least 40 percent but less than 60 percent  1/30/2018 45% 55% Current Status CK - At least 40 percent but less than 60 percent     Plan     Pt is discharged to wellness program

## 2018-02-15 ENCOUNTER — OFFICE VISIT (OUTPATIENT)
Dept: OPTOMETRY | Facility: CLINIC | Age: 78
End: 2018-02-15
Payer: MEDICARE

## 2018-02-15 DIAGNOSIS — E11.9 TYPE 2 DIABETES MELLITUS WITHOUT OPHTHALMIC MANIFESTATIONS: Primary | ICD-10-CM

## 2018-02-15 DIAGNOSIS — H25.813 COMBINED FORMS OF AGE-RELATED CATARACT OF BOTH EYES: ICD-10-CM

## 2018-02-15 DIAGNOSIS — I10 ESSENTIAL HYPERTENSION: ICD-10-CM

## 2018-02-15 PROBLEM — S81.801A TRAUMATIC OPEN WOUND OF RIGHT LOWER LEG: Status: RESOLVED | Noted: 2017-08-25 | Resolved: 2018-02-15

## 2018-02-15 PROBLEM — M25.562 CHRONIC PAIN OF LEFT KNEE: Status: RESOLVED | Noted: 2017-12-15 | Resolved: 2018-02-15

## 2018-02-15 PROBLEM — G89.29 CHRONIC PAIN OF LEFT KNEE: Status: RESOLVED | Noted: 2017-12-15 | Resolved: 2018-02-15

## 2018-02-15 PROCEDURE — 99211 OFF/OP EST MAY X REQ PHY/QHP: CPT | Mod: PBBFAC | Performed by: OPTOMETRIST

## 2018-02-15 PROCEDURE — 92015 DETERMINE REFRACTIVE STATE: CPT | Mod: ,,, | Performed by: OPTOMETRIST

## 2018-02-15 PROCEDURE — 99999 PR PBB SHADOW E&M-EST. PATIENT-LVL I: CPT | Mod: PBBFAC,,, | Performed by: OPTOMETRIST

## 2018-02-15 PROCEDURE — 92014 COMPRE OPH EXAM EST PT 1/>: CPT | Mod: S$PBB,,, | Performed by: OPTOMETRIST

## 2018-02-15 NOTE — PROGRESS NOTES
HPI     Duran Giordano is a 77 y.o. Female who comes in  for continued diabetic eye   care. She was diagnosed with Type 2 DM about 15 years ago years ago.  It   is being treated with humalog and levimir.  Her most recent blood sugar   measurement was 111, this morning.    Sister Duran Logan reports that she feels as if her distance vision is ok   and she uses the readers for near work.   Hemoglobin A1C       Date                     Value               Ref Range             Status                12/04/2017               7.2 (H)             4.0 - 5.6 %           Final              ----------    (--)blurred vision  (--)Headaches  (--)diplopia  (--)flashes  (--)floaters  (--)pain  (--)Itching  (--)tearing  (--)burning  (--)Dryness  (--) OTC Drops  (--)Photophobia    Last edited by Valentin Marcum, OD on 2/15/2018 10:13 AM. (History)        Review of Systems   Respiratory:        Asthma     Cardiovascular:        HTN   Gastrointestinal:        GERD   Musculoskeletal: Positive for joint pain.   Endo/Heme/Allergies:        Allergies: bactrim, azythromycin, erythromycin, gentamycin, levofloxacin, vancomycin  Type 2 DM  Vitamin D deficiency       Assessment /Plan     For exam results, see Encounter Report.    1. Type 2 diabetes mellitus without ophthalmic manifestations  - No ocular  treatment needed; monitor annually    2. Essential hypertension without retinopathy  - No ocular  treatment needed; monitor annually    3. Combined forms of age-related cataract of both eyes - not subjectively visually significant  - no treatment needed at this time; Monitor annually    4. Refractive error with Presbyopia  - Spec Rx per final Rx below for distance only  Glasses Prescription (2/15/2018)        Sphere Cylinder Dist VA Add    Right +1.50 Sphere 20/25-2     Left -1.25 Sphere 20/25-2     Type:  SVL - distance    Expiration Date:  2/16/2019    Comments:  Opposite signs        Glasses Prescription (2/15/2018)  #2       Sphere Cylinder Dist  VA Add    Right +1.50 Sphere  +2.75    Left -1.25 Sphere  +2.75    Type:  Bifocal or SVL near    Expiration Date:  2/16/2019    Comments:  Opposite signs                Patient education; RTC in 1 year with DFE, sooner prn at [facility]

## 2018-02-16 ENCOUNTER — TELEPHONE (OUTPATIENT)
Dept: INTERNAL MEDICINE | Facility: CLINIC | Age: 78
End: 2018-02-16

## 2018-02-16 NOTE — TELEPHONE ENCOUNTER
Pt called with a question about her pill pack, she states that something is not right with it and wants to talk to you about it.

## 2018-02-22 ENCOUNTER — HOSPITAL ENCOUNTER (OUTPATIENT)
Dept: RADIOLOGY | Facility: CLINIC | Age: 78
Discharge: HOME | End: 2018-02-22
Attending: INTERNAL MEDICINE
Payer: MEDICARE

## 2018-02-22 ENCOUNTER — HOSPITAL ENCOUNTER (OUTPATIENT)
Dept: RADIOLOGY | Facility: HOSPITAL | Age: 78
Discharge: HOME | End: 2018-02-22
Attending: INTERNAL MEDICINE
Payer: MEDICARE

## 2018-02-22 DIAGNOSIS — E55.9 VITAMIN D DEFICIENCY DISEASE: ICD-10-CM

## 2018-02-22 DIAGNOSIS — E66.01 MORBID OBESITY DUE TO EXCESS CALORIES: ICD-10-CM

## 2018-02-22 DIAGNOSIS — M89.9 DISORDER OF BONE: ICD-10-CM

## 2018-02-22 DIAGNOSIS — Z12.31 ENCOUNTER FOR SCREENING MAMMOGRAM FOR BREAST CANCER: ICD-10-CM

## 2018-02-22 PROCEDURE — 77067 SCR MAMMO BI INCL CAD: CPT | Mod: TC

## 2018-02-22 PROCEDURE — 77067 SCR MAMMO BI INCL CAD: CPT | Mod: 26,,, | Performed by: RADIOLOGY

## 2018-02-22 PROCEDURE — 77080 DXA BONE DENSITY AXIAL: CPT | Mod: 26,,, | Performed by: INTERNAL MEDICINE

## 2018-02-22 PROCEDURE — 77063 BREAST TOMOSYNTHESIS BI: CPT | Mod: 26,,, | Performed by: RADIOLOGY

## 2018-02-22 PROCEDURE — 77080 DXA BONE DENSITY AXIAL: CPT | Mod: TC

## 2018-02-23 ENCOUNTER — TELEPHONE (OUTPATIENT)
Dept: INTERNAL MEDICINE | Facility: CLINIC | Age: 78
End: 2018-02-23

## 2018-02-23 NOTE — TELEPHONE ENCOUNTER
Spoke with pt and informed that mammogram was normal and next test isn't due until next year. Pt verbalized understanding.

## 2018-02-23 NOTE — TELEPHONE ENCOUNTER
----- Message from Tami Schmidt MD sent at 2/23/2018 10:23 AM CST -----  Please let patient know that her mammogram was normal. The next is due in one year

## 2018-02-26 ENCOUNTER — TELEPHONE (OUTPATIENT)
Dept: INTERNAL MEDICINE | Facility: CLINIC | Age: 78
End: 2018-02-26

## 2018-02-26 NOTE — TELEPHONE ENCOUNTER
----- Message from Tami Schmidt MD sent at 2/26/2018  9:07 AM CST -----  Please let patient know that her bone density scan was normal. She should continue her vitamin D supplements to keep her bones strong. Exercise will help her maintain her bone strength, especially activities that are weight bearing (walking, jogging, weight lifting).    Thanks,  KJ

## 2018-02-26 NOTE — TELEPHONE ENCOUNTER
Spoke with pt to inform of normal bone density scan. Pt advised to continue vit D supplements as well as exercise regimen. Pt verbalized understanding.

## 2018-02-27 ENCOUNTER — TELEPHONE (OUTPATIENT)
Dept: INTERNAL MEDICINE | Facility: CLINIC | Age: 78
End: 2018-02-27

## 2018-02-27 NOTE — TELEPHONE ENCOUNTER
----- Message from Floridajaswant Howe sent at 2/27/2018 12:32 PM CST -----  Contact: Patient [phone]  Patient is requesting a call from you about blisters on her leg.     Please call and advise.    Thank You

## 2018-02-27 NOTE — TELEPHONE ENCOUNTER
"Pt called office reporting fever, "queasy" stomach, and hard, non itchy bumps on legs. She states that she doesn't feel well.Please advise.     Thanks !  "

## 2018-02-28 ENCOUNTER — TELEPHONE (OUTPATIENT)
Dept: INTERNAL MEDICINE | Facility: CLINIC | Age: 78
End: 2018-02-28

## 2018-02-28 NOTE — TELEPHONE ENCOUNTER
Please get more information on what patient has eaten this week, and what the bumps look like. Are they new?    Thanks,  KJ

## 2018-02-28 NOTE — TELEPHONE ENCOUNTER
Those are venous stasis ulcers due to excess fluid in her legs. Is she using her compression boot and stockings? Is she elevating her legs, or has she been on the go a lot lately?    She needs to take time for elevation and get assistance with applying those boots. Did she get help from Sister Isabel Harding with finding someone to help her apply the boots and stockings?    Thanks,  KJ

## 2018-02-28 NOTE — TELEPHONE ENCOUNTER
Yes, apply the stockings and wear the boots as long as it isn't painful and the skin is intact. She needs to elevate as much as possible.    Please also have her call by Friday with an update before the weekend.    Thanks,  BARB

## 2018-02-28 NOTE — TELEPHONE ENCOUNTER
Spoke with pt and she states that she does wear compression stockings as well and the boot but  hasn't for the past 2 days out of fear of popping the bumps. She admits to elevating her legs. Pt would like to know should she try to apply the stockings over the bumps?    Please advise   Thanks

## 2018-02-28 NOTE — TELEPHONE ENCOUNTER
"Spoke with pt and she reports eating non greasy foods, steamed veggies, oatmeal, eggs, and steamed/ boiled chicken. Pt describes bumps as red, raised, hard, and puffy in appearance. She denies pain and itchiness. She states "they're just in the way."  Please advise.    Thanks!  "

## 2018-02-28 NOTE — TELEPHONE ENCOUNTER
Spoke with pt and advised that per Dr. Schmidt, she is to apply stockings and boots as well as elevate legs. Pt verbalized understanding and states that she'll update the office by Friday.

## 2018-03-06 ENCOUNTER — OFFICE VISIT (OUTPATIENT)
Dept: ENDOCRINOLOGY | Facility: CLINIC | Age: 78
End: 2018-03-06
Payer: MEDICARE

## 2018-03-06 VITALS
RESPIRATION RATE: 16 BRPM | SYSTOLIC BLOOD PRESSURE: 112 MMHG | HEART RATE: 79 BPM | WEIGHT: 268.88 LBS | DIASTOLIC BLOOD PRESSURE: 52 MMHG | BODY MASS INDEX: 62.22 KG/M2 | HEIGHT: 55 IN

## 2018-03-06 DIAGNOSIS — G47.33 OBSTRUCTIVE SLEEP APNEA SYNDROME: ICD-10-CM

## 2018-03-06 DIAGNOSIS — I25.84 CORONARY ARTERY DISEASE DUE TO CALCIFIED CORONARY LESION: Chronic | ICD-10-CM

## 2018-03-06 DIAGNOSIS — I25.10 CORONARY ARTERY DISEASE DUE TO CALCIFIED CORONARY LESION: Chronic | ICD-10-CM

## 2018-03-06 DIAGNOSIS — R60.0 BILATERAL LEG EDEMA: ICD-10-CM

## 2018-03-06 DIAGNOSIS — I87.2 VENOUS STASIS DERMATITIS OF BOTH LOWER EXTREMITIES: ICD-10-CM

## 2018-03-06 DIAGNOSIS — E11.42 TYPE 2 DIABETES MELLITUS WITH DIABETIC POLYNEUROPATHY, WITH LONG-TERM CURRENT USE OF INSULIN: Primary | ICD-10-CM

## 2018-03-06 DIAGNOSIS — E11.59 HYPERTENSION ASSOCIATED WITH DIABETES: ICD-10-CM

## 2018-03-06 DIAGNOSIS — I50.32 CHRONIC DIASTOLIC HEART FAILURE: ICD-10-CM

## 2018-03-06 DIAGNOSIS — Z79.4 TYPE 2 DIABETES MELLITUS WITH DIABETIC POLYNEUROPATHY, WITH LONG-TERM CURRENT USE OF INSULIN: Primary | ICD-10-CM

## 2018-03-06 DIAGNOSIS — I15.2 HYPERTENSION ASSOCIATED WITH DIABETES: ICD-10-CM

## 2018-03-06 DIAGNOSIS — M17.0 PRIMARY OSTEOARTHRITIS OF BOTH KNEES: ICD-10-CM

## 2018-03-06 PROCEDURE — 99214 OFFICE O/P EST MOD 30 MIN: CPT | Mod: S$PBB,,, | Performed by: NURSE PRACTITIONER

## 2018-03-06 PROCEDURE — 99999 PR PBB SHADOW E&M-EST. PATIENT-LVL IV: CPT | Mod: PBBFAC,,, | Performed by: NURSE PRACTITIONER

## 2018-03-06 PROCEDURE — 99214 OFFICE O/P EST MOD 30 MIN: CPT | Mod: PBBFAC | Performed by: NURSE PRACTITIONER

## 2018-03-06 NOTE — PATIENT INSTRUCTIONS
Snacks can be an important part of a balanced, healthy meal plan. They allow you to eat more frequently, feeling full and satisfied throughout the day. Also, they allow you to spread carbohydrates evenly, which may stabilize blood sugars.  Plus, snacks are enjoyable!     The amount of carbohydrate needed at snacks varies. Generally, about 15-30 grams of carbohydrate per snack is recommended.  Below you will find some tasty treats.       0-5 gm carb   Crystal Light   Vitamin Water Zero   Herbal tea, unsweetened   2 tsp peanut butter on celery   1./2 cup sugar-free jell-o   1 sugar-free popsicle   ¼ cup blueberries   8oz Blue Aniyah unsweetened almond milk   5 baby carrots & celery sticks, cucumbers, bell peppers dipped in ¼ cup salsa, 2Tbsp light ranch dressing or 2Tbsp plain Greek yogurt   10 Goldfish crackers   ½ oz low-fat cheese or string cheese   1 closed handful of nuts, unsalted   1 Tbsp of sunflower seeds, unsalted   1 cup Smart Pop popcorn   1 whole grain brown rice cake        15 gm carb   1 small piece of fruit or ½ banana or 1/2 cup lite canned fruit   3 ramon cracker squares   3 cups Smart Pop popcorn, top spray butter, York lite salt or cinnamon and Truvia   5 Vanilla Wafers   ½ cup low fat, no added sugar ice cream or frozen yogurt (Blue bell, Blue Bunny, Weight Watchers, Skinny Cow)   ½ turkey, ham, or chicken sandwich   ½ c fruit with ½ c Cottage cheese   4-6 unsalted wheat crackers with 1 oz low fat cheese or 1 tbsp peanut butter    30-45 goldfish crackers (depending on flavor)    7-8 Rastafari mini brown rice cakes (caramel, apple cinnamon, chocolate)    12 Rastafari mini brown rice cakes (cheddar, bbq, ranch)    1/3 cup hummus dip with raw veg   1/2 whole wheat jessica, 1Tbsp hummus   Mini Pizza (1/2 whole wheat English muffin, low-fat  cheese, tomato sauce)   100 calorie snack pack (Oreo, Chips Ahoy, Ritz Mix, Baked Cheetos)   4-6 oz. light or Greek Style yogurt  (Shila Tanner, Placido, [facility])   ½ cup sugar-free pudding     6 in. wheat tortilla or jessica oven toasted chips (topped with spray butter flavoring, cinnamon, Truvia OR spray butter, garlic powder, chili powder)    18 BBQ Popchips (available at Target, Whole Foods, Fresh Market)                 Diabetes Support Group Meetings         Date: Topic:   February 8 Health Promotion/Cooking Demo   March 8 Taking Care of Your Kidneys   April 12 Taking Care of Your Feet   May 10 Ease Your Mind with Diabetes   Carmen 14 Summer Treats/Cooking Demo   July 12 Super Market Sweep   August 9 Taking Care of Your Eyes   Sept 13 Technology/ADA updates   October 11 Recipes & Treats/Cooking Demo   November 8 Heart Health/Pump it up!   December 13 Year-End Close Out        Meetings are held in the [facility] (A) of the [facility] located at [address] [place] [address]. Please call [phone] for additional information.    Free service, offered every 2nd Thursday of every month! Family members and/or friends are welcome as well!  Support group is for patients with type 1 or type 2 diabetes.    From 3:30p to 4:30p
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
## 2018-03-09 ENCOUNTER — HOSPITAL ENCOUNTER (EMERGENCY)
Facility: HOSPITAL | Age: 78
Discharge: HOME | End: 2018-03-09
Attending: EMERGENCY MEDICINE
Payer: MEDICARE

## 2018-03-09 VITALS
DIASTOLIC BLOOD PRESSURE: 73 MMHG | TEMPERATURE: 98 F | RESPIRATION RATE: 18 BRPM | BODY MASS INDEX: 54.43 KG/M2 | SYSTOLIC BLOOD PRESSURE: 174 MMHG | HEIGHT: 59 IN | WEIGHT: 270 LBS | OXYGEN SATURATION: 98 % | HEART RATE: 90 BPM

## 2018-03-09 DIAGNOSIS — I87.2 CHRONIC VENOUS STASIS DERMATITIS OF BOTH LOWER EXTREMITIES: Primary | ICD-10-CM

## 2018-03-09 LAB
ALBUMIN SERPL BCP-MCNC: 3.3 G/DL
ALP SERPL-CCNC: 94 U/L
ALT SERPL W/O P-5'-P-CCNC: 18 U/L
ANION GAP SERPL CALC-SCNC: 8 MMOL/L
ANISOCYTOSIS BLD QL SMEAR: SLIGHT
AST SERPL-CCNC: 19 U/L
BASOPHILS # BLD AUTO: 0.04 K/UL
BASOPHILS NFR BLD: 0.4 %
BILIRUB SERPL-MCNC: 0.3 MG/DL
BNP SERPL-MCNC: 22 PG/ML
BUN SERPL-MCNC: 26 MG/DL
CALCIUM SERPL-MCNC: 9.4 MG/DL
CHLORIDE SERPL-SCNC: 107 MMOL/L
CO2 SERPL-SCNC: 25 MMOL/L
CREAT SERPL-MCNC: 1.1 MG/DL
CRP SERPL-MCNC: 5.8 MG/L
DIFFERENTIAL METHOD: ABNORMAL
EOSINOPHIL # BLD AUTO: 0.4 K/UL
EOSINOPHIL NFR BLD: 4.9 %
ERYTHROCYTE [DISTWIDTH] IN BLOOD BY AUTOMATED COUNT: 15.9 %
ERYTHROCYTE [SEDIMENTATION RATE] IN BLOOD BY WESTERGREN METHOD: 69 MM/HR
EST. GFR  (AFRICAN AMERICAN): 56.4 ML/MIN/1.73 M^2
EST. GFR  (NON AFRICAN AMERICAN): 48.9 ML/MIN/1.73 M^2
GLUCOSE SERPL-MCNC: 104 MG/DL
HCT VFR BLD AUTO: 35.6 %
HGB BLD-MCNC: 11.1 G/DL
HYPOCHROMIA BLD QL SMEAR: ABNORMAL
IMM GRANULOCYTES # BLD AUTO: 0.02 K/UL
IMM GRANULOCYTES NFR BLD AUTO: 0.2 %
LYMPHOCYTES # BLD AUTO: 1.4 K/UL
LYMPHOCYTES NFR BLD: 16.1 %
MCH RBC QN AUTO: 26.8 PG
MCHC RBC AUTO-ENTMCNC: 31.2 G/DL
MCV RBC AUTO: 86 FL
MONOCYTES # BLD AUTO: 0.6 K/UL
MONOCYTES NFR BLD: 7.1 %
NEUTROPHILS # BLD AUTO: 6.4 K/UL
NEUTROPHILS NFR BLD: 71.3 %
NRBC BLD-RTO: 0 /100 WBC
PLATELET # BLD AUTO: 219 K/UL
PLATELET BLD QL SMEAR: ABNORMAL
PMV BLD AUTO: 10.6 FL
POTASSIUM SERPL-SCNC: 4.6 MMOL/L
PROT SERPL-MCNC: 8.1 G/DL
RBC # BLD AUTO: 4.14 M/UL
SODIUM SERPL-SCNC: 140 MMOL/L
WBC # BLD AUTO: 8.97 K/UL

## 2018-03-09 PROCEDURE — 85651 RBC SED RATE NONAUTOMATED: CPT

## 2018-03-09 PROCEDURE — 86140 C-REACTIVE PROTEIN: CPT

## 2018-03-09 PROCEDURE — 99283 EMERGENCY DEPT VISIT LOW MDM: CPT

## 2018-03-09 PROCEDURE — 99283 EMERGENCY DEPT VISIT LOW MDM: CPT | Mod: ,,, | Performed by: PHYSICIAN ASSISTANT

## 2018-03-09 PROCEDURE — 80053 COMPREHEN METABOLIC PANEL: CPT

## 2018-03-09 PROCEDURE — 85025 COMPLETE CBC W/AUTO DIFF WBC: CPT

## 2018-03-09 PROCEDURE — 83880 ASSAY OF NATRIURETIC PEPTIDE: CPT

## 2018-03-09 NOTE — ED PROVIDER NOTES
Encounter Date: 3/9/2018       History     Chief Complaint   Patient presents with    Generalized Body Aches     chills,      78 year old female with medical history of Hepatitis B, CHF, Obesity, Gout, CAD, DMII, CKDIII, HTN, chronic BLE cellulitis presenting to the ED with the chief complaint of BLE swelling and chills. Patient reports for 1 week she has noticed increased swelling in her lower legs. She reports having a fellow nun assist with compression wrappings in the mornings who also noticed worsening swelling today. She states there are new areas of erythema on her legs. Patient reports having chills that began today, but denies having any fever. She states her eyes appear more yellow which she associates with having an infection. She denies any new lower extremity wounds or having pain currently. She denies numbness and paresthesias in her legs. She reports last being treated with abx for LE cellulitis 1 year ago.           Review of patient's allergies indicates:   Allergen Reactions    Bactrim [sulfamethoxazole-trimethoprim]      Other reaction(s): up set stomach rash/hives    Azithromycin     Erythromycin Other (See Comments)     Other reaction(s): Unknown  Other reaction(s): Stomach upset    Gentamicin      Other reaction(s): Unknown    Levofloxacin Hives     Other reaction(s): nervousness    Vancomycin      Other reaction(s): Itching     Past Medical History:   Diagnosis Date    Adrenal mass- adenoma stable since 2004 (1.8 cm and 8/13/12 (2 cm); stable 2016 2.4 cm     Adrenal mass- adenoma stable since 2004 (1.8 cm and 8/13/12 (2 cm); stable 2016 2.4 cm    Asthma in adult without complication 6/25/2015    Bilateral carotid artery disease 7/21/2017    Bilateral sciatica 2/7/2017    Cellulitis of right leg 08/16/2017    Cerebral infarction 1/29/2016    Multiple areas of lacunar infarction. Stroke risk factors include HTN, DM2, Dyslipidemia.  Continue ASA 81mg/ Statin therapy I have encouraged  30 minutes of physical activity daily for 5 days a week. She has access to a pool so this should not be so jarring to her joints.     Cervical radiculopathy 3/18/2015    Chronic knee pain     Chronic rhinitis 4/9/2013    CKD (chronic kidney disease) stage 3, GFR 30-59 ml/min 1/29/2013    Coronary artery disease due to calcified coronary lesion 6/25/2015    Diastolic dysfunction 10/10/2013    Essential hypertension 6/25/2015    Gastroesophageal reflux disease without esophagitis 8/13/2012    Gout     Hep B w/ coma 1971    Hives 10/1/2013    Mixed hyperlipidemia 8/13/2012    Morbid obesity with BMI of 50.0-59.9, adult 2/11/2014    Obstructive sleep apnea syndrome 8/13/2012    Senile cataracts of both eyes 1/22/2015    Traumatic open wound of right lower leg 8/25/2017    Trigeminal neuralgia of right side of face 5/23/2017    For years now Previously on Gabapentin, but caused constipation Dissipating over time Not related to intracranial abnormalities Possibly related to dental procedure    Type 2 diabetes mellitus with diabetic polyneuropathy, with long-term current use of insulin 1/29/2013    Venous stasis dermatitis of both lower extremities 8/21/2017    Vitamin D deficiency disease 8/13/2012     Past Surgical History:   Procedure Laterality Date    HYSTERECTOMY  1982    secondary uterine fibroids    JOINT REPLACEMENT      Right total knee replacement       Family History   Problem Relation Age of Onset    Cancer Mother     Hypertension Father     Cancer Sister     No Known Problems Brother     No Known Problems Maternal Aunt     No Known Problems Maternal Uncle     No Known Problems Paternal Aunt     No Known Problems Paternal Uncle     No Known Problems Maternal Grandmother     No Known Problems Maternal Grandfather     No Known Problems Paternal Grandmother     No Known Problems Paternal Grandfather     Glaucoma Neg Hx     Breast cancer Neg Hx     Colon cancer Neg Hx      Ovarian cancer Neg Hx     Stroke Neg Hx     Allergic rhinitis Neg Hx     Allergies Neg Hx     Angioedema Neg Hx     Asthma Neg Hx     Atopy Neg Hx     Eczema Neg Hx     Immunodeficiency Neg Hx     Rhinitis Neg Hx     Urticaria Neg Hx     Amblyopia Neg Hx     Blindness Neg Hx     Cataracts Neg Hx     Diabetes Neg Hx     Macular degeneration Neg Hx     Retinal detachment Neg Hx     Strabismus Neg Hx     Thyroid disease Neg Hx     Psoriasis Neg Hx      Social History   Substance Use Topics    Smoking status: Never Smoker    Smokeless tobacco: Never Used    Alcohol use No     Review of Systems   Constitutional: Positive for chills. Negative for diaphoresis, fatigue and fever.   HENT: Negative for congestion, sore throat and trouble swallowing.    Eyes: Negative for pain and redness.   Respiratory: Negative for cough and shortness of breath.    Cardiovascular: Positive for leg swelling. Negative for chest pain.   Gastrointestinal: Negative for abdominal pain, diarrhea, nausea and vomiting.   Genitourinary: Negative for dysuria, hematuria and pelvic pain.   Musculoskeletal: Negative for back pain, neck pain and neck stiffness.   Skin: Negative for rash and wound.   Neurological: Negative for weakness, light-headedness, numbness and headaches.       Physical Exam     Initial Vitals [03/09/18 1233]   BP Pulse Resp Temp SpO2   (!) 174/73 90 18 98.1 °F (36.7 °C) 98 %      MAP       106.67         Physical Exam    Constitutional: She appears well-developed and well-nourished. She is not diaphoretic. She is Obese . No distress.   HENT:   Head: Normocephalic and atraumatic.   Mouth/Throat: Oropharynx is clear and moist. No oropharyngeal exudate.   Eyes: Conjunctivae and EOM are normal. Pupils are equal, round, and reactive to light. No scleral icterus.   Neck: Normal range of motion. Neck supple.   Cardiovascular: Normal rate and regular rhythm.   DP pulses obtained with doppler   Pulmonary/Chest: Breath  sounds normal. No respiratory distress. She has no wheezes.   Abdominal: Soft. Bowel sounds are normal. She exhibits no distension. There is no tenderness.   Musculoskeletal:        Legs:  BLE non-pitting edema with skin thickening. BL calf TTP. Able to ambulate with cane.   Neurological: She is alert and oriented to person, place, and time. She has normal strength.   Skin:   No lower extremity ulcerations.          ED Course   Procedures  Labs Reviewed   CBC W/ AUTO DIFFERENTIAL - Abnormal; Notable for the following:        Result Value    Hemoglobin 11.1 (*)     Hematocrit 35.6 (*)     MCH 26.8 (*)     MCHC 31.2 (*)     RDW 15.9 (*)     Lymph% 16.1 (*)     All other components within normal limits   SEDIMENTATION RATE, MANUAL - Abnormal; Notable for the following:     Sed Rate 69 (*)     All other components within normal limits   COMPREHENSIVE METABOLIC PANEL - Abnormal; Notable for the following:     BUN, Bld 26 (*)     Albumin 3.3 (*)     eGFR if  56.4 (*)     eGFR if non  48.9 (*)     All other components within normal limits   B-TYPE NATRIURETIC PEPTIDE   C-REACTIVE PROTEIN                   APC / Resident Notes:   78 year old female with medical history of Hepatitis B, CHF, Obesity, Gout, CAD, DMII, CKDIII, HTN, chronic BLE cellulitis presenting to the ED c/o BLE swelling and chills. DDx includes but not limited to venous stasis dermatitis, cellulitis, CHF exacerbation, lymphedema, gout, DVT, OSCAR, arterial occlusion. Will proceed with labs.     ESR 69 (BL ), CRP 5.8, WBC 8.9, Electrolytes stable, BNP 22    Etiology most consistent with chronic venous dermatitis. Do not suspect cellulitis at this time as inflammatory markers stable. Advised patient to elevate her legs at night and to continue wearing her compression stockings. Advised her to follow-up with her PCP if her swelling continues to worsen. Return to ED precautions given. All of the patient's questions were  answered. I have discussed the care of this patient with my supervising physician.         Attending Attestation:     Physician Attestation Statement for NP/PA:   I have conducted a face to face encounter with this patient in addition to the NP/PA, due to Medical Complexity    Other NP/PA Attestation Additions:      Medical Decision Making: Chronic venous stasis disease.  BNP negative.  (-) cellulitis                    Clinical Impression:   The encounter diagnosis was Chronic venous stasis dermatitis of both lower extremities.    Disposition:   Disposition: Discharged  Condition: Stable                        Fei Simpson PA-C  03/10/18 0044       Jo Traore MD  03/14/18 0003

## 2018-03-09 NOTE — DISCHARGE INSTRUCTIONS
Your labs today do not show signs of a skin infection and you do not require antibiotics at this time. Please follow-up with your primary care provider if your swelling continues to worsen. Please keep your legs elevated at night and continue to wear your compression devices.    Our goal in the emergency department is to always give you outstanding care and exceptional service. You may receive a survey by mail or e-mail in the next week regarding your experience in our ED. We would greatly appreciate your completing and returning the survey. Your feedback provides us with a way to recognize our staff who give very good care and it helps us learn how to improve when your experience was below our aspiration of excellence.

## 2018-03-09 NOTE — ED NOTES
Patient identifiers verified and correct for MS Giordano  C/C: Chills, gen weakness  APPEARANCE: awake and alert in NAD.  SKIN: warm, dry. BLE redness, discolorization, no open or draining wounds noted.   MUSCULOSKELETAL: Patient moving all extremities spontaneously, Ambulates independently.  RESPIRATORY: Denies shortness of breath.Respirations unlabored.   CARDIAC: Denies CP, 2+ distal pulses; no peripheral edema  ABDOMEN: S/ND/NT, Denies nausea  : voids spontaneously, denies difficulty  Neurologic: AAO x 4; follows commands equal strength in all extremities; denies numbness/tingling. Denies dizziness Positive weakness

## 2018-03-09 NOTE — ED TRIAGE NOTES
"Patient states 1 week h/o "feeling lousy" with chills inside of body, states she feels like her eyes may be jaundiced. States 30 year h/o cellulitis BLE, may need IV antibiotics. New rx Norvasc 5 mg x 2 weeks, states last time she was on this medication she also felt "bad"  "

## 2018-03-14 ENCOUNTER — LAB VISIT (OUTPATIENT)
Dept: LAB | Facility: HOSPITAL | Age: 78
End: 2018-03-14
Payer: MEDICARE

## 2018-03-14 DIAGNOSIS — Z79.4 TYPE 2 DIABETES MELLITUS WITH DIABETIC POLYNEUROPATHY, WITH LONG-TERM CURRENT USE OF INSULIN: ICD-10-CM

## 2018-03-14 DIAGNOSIS — E11.42 TYPE 2 DIABETES MELLITUS WITH DIABETIC POLYNEUROPATHY, WITH LONG-TERM CURRENT USE OF INSULIN: ICD-10-CM

## 2018-03-14 LAB
ESTIMATED AVG GLUCOSE: 154 MG/DL
HBA1C MFR BLD HPLC: 7 %

## 2018-03-14 PROCEDURE — 83036 HEMOGLOBIN GLYCOSYLATED A1C: CPT

## 2018-03-14 PROCEDURE — 36415 COLL VENOUS BLD VENIPUNCTURE: CPT

## 2018-03-15 ENCOUNTER — TELEPHONE (OUTPATIENT)
Dept: INTERNAL MEDICINE | Facility: CLINIC | Age: 78
End: 2018-03-15

## 2018-03-16 ENCOUNTER — TELEPHONE (OUTPATIENT)
Dept: ENDOCRINOLOGY | Facility: CLINIC | Age: 78
End: 2018-03-16

## 2018-03-16 ENCOUNTER — OFFICE VISIT (OUTPATIENT)
Dept: INTERNAL MEDICINE | Facility: CLINIC | Age: 78
End: 2018-03-16
Payer: MEDICARE

## 2018-03-16 VITALS
SYSTOLIC BLOOD PRESSURE: 110 MMHG | OXYGEN SATURATION: 99 % | HEART RATE: 78 BPM | TEMPERATURE: 98 F | WEIGHT: 271.19 LBS | BODY MASS INDEX: 54.67 KG/M2 | DIASTOLIC BLOOD PRESSURE: 50 MMHG | HEIGHT: 59 IN

## 2018-03-16 DIAGNOSIS — R60.0 BILATERAL LEG EDEMA: ICD-10-CM

## 2018-03-16 DIAGNOSIS — E78.5 TYPE 2 DIABETES MELLITUS WITH HYPERLIPIDEMIA: ICD-10-CM

## 2018-03-16 DIAGNOSIS — I87.2 VENOUS STASIS DERMATITIS OF BOTH LOWER EXTREMITIES: ICD-10-CM

## 2018-03-16 DIAGNOSIS — I15.2 HYPERTENSION ASSOCIATED WITH DIABETES: ICD-10-CM

## 2018-03-16 DIAGNOSIS — E11.59 HYPERTENSION ASSOCIATED WITH DIABETES: ICD-10-CM

## 2018-03-16 DIAGNOSIS — E11.69 TYPE 2 DIABETES MELLITUS WITH HYPERLIPIDEMIA: ICD-10-CM

## 2018-03-16 PROCEDURE — 99999 PR PBB SHADOW E&M-EST. PATIENT-LVL III: CPT | Mod: PBBFAC,,, | Performed by: INTERNAL MEDICINE

## 2018-03-16 PROCEDURE — 99215 OFFICE O/P EST HI 40 MIN: CPT | Mod: S$PBB,,, | Performed by: INTERNAL MEDICINE

## 2018-03-16 PROCEDURE — 99213 OFFICE O/P EST LOW 20 MIN: CPT | Mod: PBBFAC | Performed by: INTERNAL MEDICINE

## 2018-03-16 NOTE — ASSESSMENT & PLAN NOTE
Likely due to venous status (discharged from lymphedema clinic in 2014)  · Letter written to Sr Isabel Harding to get assitance in the [facility] with leg wrappings  · LE ultrasound to evaluate for DVT  · Continue cercaid wrappings daily  · Elevate at night  · Discontinue amlodipine  · Check CBC

## 2018-03-16 NOTE — ASSESSMENT & PLAN NOTE
BP controlled on hydralazine 50mg BID, losartan 100mg, amlodipine 5mg, torsemide; carvedilol discontinued by pulm due to SOB  · Increase hydralazine to 100mg  · Continue losartan 100mg  · Change to bedtime  · Discontinue amlodipine 5mg due to leg swelling  · Continue torsemide   · Digital HTN program at next appt

## 2018-03-16 NOTE — Clinical Note
Please change pill pack as follows. She may come to  today  Morning card: Amlodipine 5 mg- REMOVE Aspirin 81 mg Hydralazine 50 mg Losartan 100 mg- MOVE TO EVENING PACK Torsemide 20 mg Vitamin D3 1,000 IU   Evening card: Atorvastatin 80 mg Hydralazine 50 mg

## 2018-03-16 NOTE — PROGRESS NOTES
Adherence packed for 60 days:    Morning card:  Aspirin 81 mg  Hydralazine 50 mg  Torsemide 20 mg  Vitamin D3 1,000 IU    Evening card:  Atorvastatin 80 mg  Hydralazine 50 mg  Losartan 100 mg

## 2018-03-16 NOTE — ASSESSMENT & PLAN NOTE
LE swelling bilaterally with poor wound healing, routine circaids with wound care  · Continue circaids boot, per wound care  · Letter to Sr sIabel Harding to get assistance in the [facility]  · Treat cellulitis PRN  · Check CBC  · Discontinue amlodipine

## 2018-03-16 NOTE — PATIENT INSTRUCTIONS
TODAY:  - labs today  - US of veins  - podiatry appt  - continue all interventions for venous stasis   + leg elevation   + cercaid wraps   + exercise  - amlodipine discontinued  - losartan moved to bedtime  - refilled pill packs   +  from pharmacy

## 2018-03-16 NOTE — PROGRESS NOTES
Primary Care Provider Appointment    Subjective:      Patient ID: Duran Giordano is a 78 y.o. female with severe venous stasis, DM, HLD, CLD, CAD    Chief Complaint: Follow-up; Chills (pt feel she has inward fever x2weeks/ cold outside ); and Hospital Follow Up (went to ER on Friday )    Patient seen in ED for acute leg swelling and concern for cellulitis. Swelling was considered chronic venous stasis and not infection. BNP was not elevated, WBC was normal, electrolytes normal, but sed rate elevated. Temp normal. Her doppler pulses were identified. She has been worked up for gout, lymphedema, heart failure in the past with stable findings. She is participating in [employer]. She has no fever today.     She states she ate shrimp for her birthday, and had a gout flair up.    Her A1c decreased to 7 on 3/14/18. She was seen by Endo 3/6/18, with continuation levemir 48, humalog 18U plus sliding scale. Seen by Nephrology in 1/2018, but no note signed yet. Her blood glucose is controlled except for one outlying 261 due to checking AFTER eating.    Mammo was normal. She is compliant with CPAP, but stays up all night watching the news.    She is participating in pill packing with [facility], and is compliant with meds dosed at 6am, 6pm. Pressure is better controlled with this intervention.    Adherence packed for 30 days on 2/2/18:     Morning card:  Amlodipine 5 mg  Aspirin 81 mg  Hydralazine 50 mg  Losartan 100 mg  Torsemide 20 mg  Vitamin D3 1,000 IU     Evening card:  Atorvastatin 80 mg  Hydralazine 50 mg    Past Surgical History:   Procedure Laterality Date    HYSTERECTOMY  1982    secondary uterine fibroids    JOINT REPLACEMENT      Right total knee replacement         Past Medical History:   Diagnosis Date    Adrenal mass- adenoma stable since 2004 (1.8 cm and 8/13/12 (2 cm); stable 2016 2.4 cm     Adrenal mass- adenoma stable since 2004 (1.8 cm and 8/13/12 (2 cm); stable 2016 2.4 cm    Asthma in  adult without complication 6/25/2015    Bilateral carotid artery disease 7/21/2017    Bilateral sciatica 2/7/2017    Cellulitis of right leg 08/16/2017    Cerebral infarction 1/29/2016    Multiple areas of lacunar infarction. Stroke risk factors include HTN, DM2, Dyslipidemia.  Continue ASA 81mg/ Statin therapy I have encouraged 30 minutes of physical activity daily for 5 days a week. She has access to a pool so this should not be so jarring to her joints.     Cervical radiculopathy 3/18/2015    Chronic knee pain     Chronic rhinitis 4/9/2013    CKD (chronic kidney disease) stage 3, GFR 30-59 ml/min 1/29/2013    Coronary artery disease due to calcified coronary lesion 6/25/2015    Diastolic dysfunction 10/10/2013    Essential hypertension 6/25/2015    Gastroesophageal reflux disease without esophagitis 8/13/2012    Gout     Hep B w/ coma 1971    Hives 10/1/2013    Mixed hyperlipidemia 8/13/2012    Morbid obesity with BMI of 50.0-59.9, adult 2/11/2014    Obstructive sleep apnea syndrome 8/13/2012    Senile cataracts of both eyes 1/22/2015    Traumatic open wound of right lower leg 8/25/2017    Trigeminal neuralgia of right side of face 5/23/2017    For years now Previously on Gabapentin, but caused constipation Dissipating over time Not related to intracranial abnormalities Possibly related to dental procedure    Type 2 diabetes mellitus with diabetic polyneuropathy, with long-term current use of insulin 1/29/2013    Venous stasis dermatitis of both lower extremities 8/21/2017    Vitamin D deficiency disease 8/13/2012       Review of Systems   Constitutional: Positive for activity change and appetite change. Negative for unexpected weight change.   Cardiovascular: Positive for leg swelling.   Gastrointestinal: Negative for constipation and diarrhea.   Skin: Positive for wound.   Hematological: Does not bruise/bleed easily.   Psychiatric/Behavioral: Negative for behavioral problems and  "decreased concentration.       Objective:   BP (!) 110/50 (BP Location: Right arm, Patient Position: Sitting, BP Method: Large (Manual))   Pulse 78   Temp 98.2 °F (36.8 °C) (Oral)   Ht 4' 11" (1.499 m)   Wt 123 kg (271 lb 2.7 oz)   SpO2 99%   BMI 54.77 kg/m²     Physical Exam   Constitutional: She appears well-developed.   Severe obesity   Eyes: EOM are normal.   Cardiovascular: Normal rate.    Pulmonary/Chest: Effort normal.   Abdominal:   Obese abdomen   Musculoskeletal: She exhibits edema.   Significant swelling in legs bilaterally   Neurological: She is alert.   Skin:   Erythematous LE bilaterally  No evidence of infection in LE bilaterally  Wound RLE healed   Psychiatric: She has a normal mood and affect.   Poor insight into disease       Lab Results   Component Value Date    WBC 8.97 03/09/2018    HGB 11.1 (L) 03/09/2018    HCT 35.6 (L) 03/09/2018     03/09/2018    CHOL 177 05/02/2017    TRIG 92 05/02/2017    HDL 39 (L) 05/02/2017    ALT 18 03/09/2018    AST 19 03/09/2018     03/09/2018    K 4.6 03/09/2018     03/09/2018    CREATININE 1.1 03/09/2018    BUN 26 (H) 03/09/2018    CO2 25 03/09/2018    TSH 0.830 05/02/2017    INR 1.1 04/17/2005    HGBA1C 7.0 (H) 03/14/2018         Assessment:   78 y.o. female with multiple co-morbid illnesses here to continue work-up of chronic issues notably  severe venous stasis, DM, HLD, CLD, CAD    Plan:     Problem List Items Addressed This Visit        Cardiac/Vascular    Hypertension associated with diabetes     BP controlled on hydralazine 50mg BID, losartan 100mg, amlodipine 5mg, torsemide; carvedilol discontinued by pulm due to SOB  · Increase hydralazine to 100mg  · Continue losartan 100mg  · Change to bedtime  · Discontinue amlodipine 5mg due to leg swelling  · Continue torsemide   · Digital HTN program at next appt         Venous stasis dermatitis of both lower extremities     LE swelling bilaterally with poor wound healing, routine circaids " with wound care  · Continue circaids boot, per wound care  · Letter to [facility] [employer] to get assistance in the [facility]  · Treat cellulitis PRN  · Check CBC  · Discontinue amlodipine         Relevant Orders    Cardiology Lab US Lower Extremity Arteries Bilateral    CBC auto differential       Endocrine    Type 2 diabetes mellitus with hyperlipidemia     High ASCVD due to DM  · Continue statin and ASA  · Control DM with insulin            Other    Bilateral leg edema     Likely due to venous status (discharged from lymphedema clinic in 2014)  · Letter written to Sr Isabel Harding to get assitance in the [facility] with leg wrappings  · LE ultrasound to evaluate for DVT  · Continue cercaid wrappings daily  · Elevate at night  · Discontinue amlodipine  · Check CBC         Relevant Orders    Cardiology Lab US Lower Extremity Arteries Bilateral    CBC auto differential    Uric acid          Health Maintenance       Date Due Completion Date    TETANUS VACCINE 03[birth year]/1960 ---    Lipid Panel 05/02/2018 5/2/2017- TODAY    Foot Exam 05/31/2018 5/31/2017 (Done)- ALREADY DONE    Override on 5/31/2017: Done    Override on 7/20/2016: Done    Hemoglobin A1c 09/14/2018 3/14/2018- IN 5/2018    Override on 7/13/2016: Done    Eye Exam 02/15/2019 2/15/2018    Override on 2/17/2016: Done    Override on 1/22/2015: Done    Override on 8/16/2012: Done    DEXA SCAN 02/22/2021 2/22/2018    Override on 12/13/2011: Done          Follow-up in about 2 months (around 5/16/2018). . One hour spent with this patient today, half of that in counseling.    Tami Schmidt MD/MPH  Internal Medicine  [facility]  [phone]

## 2018-03-16 NOTE — TELEPHONE ENCOUNTER
I spoke with patient about regards to her message, I spoke with Arpit about it she stated that the patient can use her sliding scale or she can wait until lunch to take her insulin

## 2018-03-16 NOTE — TELEPHONE ENCOUNTER
----- Message from Jing Real sent at 3/16/2018  9:23 AM CDT -----  Contact: Self [phone]  PT forgot to take insulin this morning. She ate and then tested her Bg which is 261. She wants to know if she needs to take the insulin or wait until later.

## 2018-03-18 ENCOUNTER — TELEPHONE (OUTPATIENT)
Dept: INTERNAL MEDICINE | Facility: CLINIC | Age: 78
End: 2018-03-18

## 2018-03-26 RX ORDER — INSULIN DETEMIR 100 [IU]/ML
INJECTION, SOLUTION SUBCUTANEOUS
Qty: 18 ML | Refills: 6 | Status: SHIPPED | OUTPATIENT
Start: 2018-03-26 | End: 2018-10-12 | Stop reason: SDUPTHER

## 2018-04-02 ENCOUNTER — CLINICAL SUPPORT (OUTPATIENT)
Dept: CARDIOLOGY | Facility: CLINIC | Age: 78
End: 2018-04-02
Attending: INTERNAL MEDICINE
Payer: MEDICARE

## 2018-04-02 DIAGNOSIS — R60.0 BILATERAL LEG EDEMA: ICD-10-CM

## 2018-04-02 DIAGNOSIS — I87.2 VENOUS STASIS DERMATITIS OF BOTH LOWER EXTREMITIES: ICD-10-CM

## 2018-04-02 PROCEDURE — 93970 EXTREMITY STUDY: CPT | Mod: PBBFAC | Performed by: INTERNAL MEDICINE

## 2018-04-03 ENCOUNTER — TELEPHONE (OUTPATIENT)
Dept: INTERNAL MEDICINE | Facility: CLINIC | Age: 78
End: 2018-04-03

## 2018-04-04 ENCOUNTER — OFFICE VISIT (OUTPATIENT)
Dept: PODIATRY | Facility: CLINIC | Age: 78
End: 2018-04-04
Payer: MEDICARE

## 2018-04-04 VITALS
HEIGHT: 55 IN | WEIGHT: 281 LBS | SYSTOLIC BLOOD PRESSURE: 140 MMHG | BODY MASS INDEX: 65.03 KG/M2 | HEART RATE: 94 BPM | DIASTOLIC BLOOD PRESSURE: 63 MMHG

## 2018-04-04 DIAGNOSIS — E11.51 TYPE II DIABETES MELLITUS WITH PERIPHERAL CIRCULATORY DISORDER: Primary | ICD-10-CM

## 2018-04-04 DIAGNOSIS — I89.0 LYMPHEDEMA: ICD-10-CM

## 2018-04-04 DIAGNOSIS — L84 CORN OR CALLUS: ICD-10-CM

## 2018-04-04 DIAGNOSIS — B35.1 ONYCHOMYCOSIS DUE TO DERMATOPHYTE: ICD-10-CM

## 2018-04-04 PROCEDURE — 11721 DEBRIDE NAIL 6 OR MORE: CPT | Mod: 59,Q9,S$PBB, | Performed by: PODIATRIST

## 2018-04-04 PROCEDURE — 99999 PR PBB SHADOW E&M-EST. PATIENT-LVL II: CPT | Mod: PBBFAC,,, | Performed by: PODIATRIST

## 2018-04-04 PROCEDURE — 99212 OFFICE O/P EST SF 10 MIN: CPT | Mod: PBBFAC | Performed by: PODIATRIST

## 2018-04-04 PROCEDURE — 11721 DEBRIDE NAIL 6 OR MORE: CPT | Mod: Q9,PBBFAC | Performed by: PODIATRIST

## 2018-04-04 PROCEDURE — 11056 PARNG/CUTG B9 HYPRKR LES 2-4: CPT | Mod: 59,Q9,PBBFAC | Performed by: PODIATRIST

## 2018-04-04 PROCEDURE — 11056 PARNG/CUTG B9 HYPRKR LES 2-4: CPT | Mod: Q9,S$PBB,, | Performed by: PODIATRIST

## 2018-04-04 PROCEDURE — 99499 UNLISTED E&M SERVICE: CPT | Mod: S$PBB,,, | Performed by: PODIATRIST

## 2018-04-04 NOTE — PROGRESS NOTES
Subjective:      Patient ID: Duran Giordano is a 78 y.o. female.    Chief Complaint: Diabetes Mellitus (DR. SCHMIDT 03/16/2018); Diabetic Foot Exam; and Nail Care    Duran is a 78 y.o. female who presents to the clinic for evaluation and treatment of high risk feet. Duran has a past medical history of Adrenal mass- adenoma stable since 2004 (1.8 cm and 8/13/12 (2 cm); stable 2016 2.4 cm; Asthma in adult without complication (6/25/2015); Bilateral carotid artery disease (7/21/2017); Bilateral sciatica (2/7/2017); Cellulitis of right leg (08/16/2017); Cerebral infarction (1/29/2016); Cervical radiculopathy (3/18/2015); Chronic knee pain; Chronic rhinitis (4/9/2013); CKD (chronic kidney disease) stage 3, GFR 30-59 ml/min (1/29/2013); Coronary artery disease due to calcified coronary lesion (6/25/2015); Diastolic dysfunction (10/10/2013); Essential hypertension (6/25/2015); Gastroesophageal reflux disease without esophagitis (8/13/2012); Gout; Hep B w/ coma (1971); Hives (10/1/2013); Mixed hyperlipidemia (8/13/2012); Morbid obesity with BMI of 50.0-59.9, adult (2/11/2014); Obstructive sleep apnea syndrome (8/13/2012); Senile cataracts of both eyes (1/22/2015); Traumatic open wound of right lower leg (8/25/2017); Trigeminal neuralgia of right side of face (5/23/2017); Type 2 diabetes mellitus with diabetic polyneuropathy, with long-term current use of insulin (1/29/2013); Venous stasis dermatitis of both lower extremities (8/21/2017); and Vitamin D deficiency disease (8/13/2012). The patient's chief complaint is long, thick toenails. This patient has documented high risk feet requiring routine maintenance secondary to diabetes mellitis and those secondary complications of diabetes, as mentioned..    PCP: Tami Schmidt MD    Date Last Seen by PCP:   Chief Complaint   Patient presents with    Diabetes Mellitus     DR. SCHMIDT 03/16/2018    Diabetic Foot Exam    Nail Care       Hemoglobin A1C   Date  Value Ref Range Status   03/14/2018 7.0 (H) 4.0 - 5.6 % Final     Comment:     According to ADA guidelines, hemoglobin A1c <7.0% represents  optimal control in non-pregnant diabetic patients. Different  metrics may apply to specific patient populations.   Standards of Medical Care in Diabetes-2016.  For the purpose of screening for the presence of diabetes:  <5.7%     Consistent with the absence of diabetes  5.7-6.4%  Consistent with increasing risk for diabetes   (prediabetes)  >or=6.5%  Consistent with diabetes  Currently, no consensus exists for use of hemoglobin A1c  for diagnosis of diabetes for children.  This Hemoglobin A1c assay has significant interference with fetal   hemoglobin   (HbF). The results are invalid for patients with abnormal amounts of   HbF,   including those with known Hereditary Persistence   of Fetal Hemoglobin. Heterozygous hemoglobin variants (HbAS, HbAC,   HbAD, HbAE, HbA2) do not significantly interfere with this assay;   however, presence of multiple variants in a sample may impact the %   interference.     12/04/2017 7.2 (H) 4.0 - 5.6 % Final     Comment:     According to ADA guidelines, hemoglobin A1c <7.0% represents  optimal control in non-pregnant diabetic patients. Different  metrics may apply to specific patient populations.   Standards of Medical Care in Diabetes-2016.  For the purpose of screening for the presence of diabetes:  <5.7%     Consistent with the absence of diabetes  5.7-6.4%  Consistent with increasing risk for diabetes   (prediabetes)  >or=6.5%  Consistent with diabetes  Currently, no consensus exists for use of hemoglobin A1c  for diagnosis of diabetes for children.  This Hemoglobin A1c assay has significant interference with fetal   hemoglobin   (HbF). The results are invalid for patients with abnormal amounts of   HbF,   including those with known Hereditary Persistence   of Fetal Hemoglobin. Heterozygous hemoglobin variants (HbAS, HbAC,   HbAD, HbAE, HbA2) do  not significantly interfere with this assay;   however, presence of multiple variants in a sample may impact the %   interference.     08/24/2017 8.2 (H) 4.0 - 5.6 % Final     Comment:     According to ADA guidelines, hemoglobin A1c <7.0% represents  optimal control in non-pregnant diabetic patients. Different  metrics may apply to specific patient populations.   Standards of Medical Care in Diabetes-2016.  For the purpose of screening for the presence of diabetes:  <5.7%     Consistent with the absence of diabetes  5.7-6.4%  Consistent with increasing risk for diabetes   (prediabetes)  >or=6.5%  Consistent with diabetes  Currently, no consensus exists for use of hemoglobin A1c  for diagnosis of diabetes for children.  This Hemoglobin A1c assay has significant interference with fetal   hemoglobin   (HbF). The results are invalid for patients with abnormal amounts of   HbF,   including those with known Hereditary Persistence   of Fetal Hemoglobin. Heterozygous hemoglobin variants (HbAS, HbAC,   HbAD, HbAE, HbA2) do not significantly interfere with this assay;   however, presence of multiple variants in a sample may impact the %   interference.         Review of Systems   Constitution: Negative for chills, decreased appetite and fever.   Cardiovascular: Positive for leg swelling.   Skin: Positive for dry skin, nail changes and poor wound healing. Negative for color change.   Musculoskeletal: Negative for arthritis, back pain, joint pain, joint swelling and myalgias.   Gastrointestinal: Negative for nausea and vomiting.   Neurological: Negative for loss of balance, numbness and paresthesias.           Objective:      Physical Exam   Constitutional: She is oriented to person, place, and time. She appears well-developed and well-nourished.   Cardiovascular:   Pulses:       Dorsalis pedis pulses are 2+ on the right side, and 2+ on the left side.        Posterior tibial pulses are 2+ on the right side, and 2+ on the left  side.   Musculoskeletal: She exhibits no edema or tenderness.        Right ankle: Normal.        Left ankle: Normal.        Right foot: There is no swelling, no crepitus and no deformity.        Left foot: There is no swelling, no crepitus and no deformity.   Adequate joint range of motion without pain, limitation, nor crepitation Bilateral feet and ankle joints. Muscle strength is 5/5 in all groups bilaterally.         Lymphadenopathy:   B/l lymph edema    Neurological: She is alert and oriented to person, place, and time. She has normal strength.   Skin: Skin is warm, dry and intact. No rash noted. No erythema. Nails show no clubbing.   Nails x10 are elongated by  2-7 mm's, thickened by 2-7 mm's, dystrophic, and are darkened in  coloration . Xerosis Bilaterally. No open lesions noted.    Hyperkeratotic tissue noted to medial HIPJ b/l      Psychiatric: She has a normal mood and affect. Her behavior is normal.   Nursing note and vitals reviewed.            Assessment:       Encounter Diagnoses   Name Primary?    Type II diabetes mellitus with peripheral circulatory disorder Yes    Corn or callus     Onychomycosis due to dermatophyte     Lymphedema          Plan:       Duran was seen today for diabetes mellitus, diabetic foot exam and nail care.    Diagnoses and all orders for this visit:    Type II diabetes mellitus with peripheral circulatory disorder    Corn or callus    Onychomycosis due to dermatophyte    Lymphedema      I counseled the patient on her conditions, their implications and medical management.        - Shoe inspection. Diabetic Foot Education. Patient reminded of the importance of good nutrition and blood sugar control to help prevent podiatric complications of diabetes. Patient instructed on proper foot hygeine. We discussed wearing proper shoe gear, daily foot inspections, never walking without protective shoe gear, never putting sharp instruments to feet, routine podiatric nail visits every 2-3  months.      - With patient's permission, nails were aggressively reduced and debrided x 10 to their soft tissue attachment mechanically and with electric , removing all offending nail and debris. Patient relates relief following the procedure. She will continue to monitor the areas daily, inspect her feet, wear protective shoe gear when ambulatory, moisturizer to maintain skin integrity and follow in this office in approximately 2-3 months, sooner p.r.n.    - After cleansing the  area w/ alcohol prep pad the above mentioned hyperkeratosis was trimmed utilizing No 15 scapel, to a smooth base with out incident. Patient tolerated this  well and reported comfort to the area of  medial HIPJ b/l

## 2018-04-11 ENCOUNTER — TELEPHONE (OUTPATIENT)
Dept: INTERNAL MEDICINE | Facility: CLINIC | Age: 78
End: 2018-04-11

## 2018-04-11 NOTE — TELEPHONE ENCOUNTER
Please verify with patient that she checks her blood sugar twice daily, but is injecting insulin 3 times daily. If this is correct, then let her diabetes supply company know of her regimen.    Thanks,  ALEXANDRA Garcia MD   Caller: Patient cell [phone] Phone [phone]             TYSONI    Previous Messages      ----- Message -----   From: Marcelo Alvarado   Sent: 4/10/2018   3:41 PM   To: Roxana Grey Staff     Patient would like for you to give the approval that she is only taking her Insulin 2 Times per day stated by patient and not 4 Times per day, The following company needs the information, so they will not send out more than needed in Supplies,  [employer] [address] [place] [id number] Phone [phone].     Please call and advise   Thank you

## 2018-05-08 ENCOUNTER — OFFICE VISIT (OUTPATIENT)
Dept: SLEEP MEDICINE | Facility: CLINIC | Age: 78
End: 2018-05-08
Payer: MEDICARE

## 2018-05-08 VITALS
DIASTOLIC BLOOD PRESSURE: 60 MMHG | HEIGHT: 59 IN | SYSTOLIC BLOOD PRESSURE: 160 MMHG | WEIGHT: 279.31 LBS | HEART RATE: 90 BPM | BODY MASS INDEX: 56.31 KG/M2

## 2018-05-08 DIAGNOSIS — G47.33 OSA (OBSTRUCTIVE SLEEP APNEA): Primary | ICD-10-CM

## 2018-05-08 PROCEDURE — 99999 PR PBB SHADOW E&M-EST. PATIENT-LVL III: CPT | Mod: PBBFAC,,, | Performed by: PSYCHIATRY & NEUROLOGY

## 2018-05-08 PROCEDURE — 99213 OFFICE O/P EST LOW 20 MIN: CPT | Mod: S$PBB,,, | Performed by: PSYCHIATRY & NEUROLOGY

## 2018-05-08 PROCEDURE — 99213 OFFICE O/P EST LOW 20 MIN: CPT | Mod: PBBFAC | Performed by: PSYCHIATRY & NEUROLOGY

## 2018-05-08 NOTE — PROGRESS NOTES
Sister Pasquale returns to clinic today regarding the management of obstructive sleep apnea and CPAP equipment check.    She is using CPAP by her sleeping chair  She has been sleeping in a chair (also using a wedge pillow in bed).  Her pressure was set to 13 cm (based on titration)    She states this pressure increase is fine.  Using the ramp set at 4 cm  Humidity set at 4  Her mouth sometimes feels dry.  She has a nasal pillows mask, which she likes. Martin FX nasal pillows small size mask  She states that she really likes CPAP and it helps her feel better after a night of use.    Still sleepy during the day    ESS = 15    Recent titration at weight 281 lbs revealed she needed pressure increase from 10 to 13 cm    Interrogation: CPAP at 13 cm; 943 hours total; Machine good condition (F&P model), sleep style machine; 200 series. 5.0 hr/night.     BASELINE PSG 12/23/11: + REJI, AHI 14.2, low O2 79%, wt 281 lbs. (Patient had sleep study in 2003, at which time she was titrated to 11 cm, wt 248 lbs)   TITRATION 5/9/16: Titrated to 13 cm; wt 281 lbs    DME = Medicare ([facility])      Past Medical History:   Diagnosis Date    Adrenal mass- adenoma stable since 2004 (1.8 cm and 8/13/12 (2 cm); stable 2016 2.4 cm     Adrenal mass- adenoma stable since 2004 (1.8 cm and 8/13/12 (2 cm); stable 2016 2.4 cm    Asthma in adult without complication 6/25/2015    Bilateral carotid artery disease 7/21/2017    Bilateral sciatica 2/7/2017    Cellulitis of right leg 08/16/2017    Cerebral infarction 1/29/2016    Multiple areas of lacunar infarction. Stroke risk factors include HTN, DM2, Dyslipidemia.  Continue ASA 81mg/ Statin therapy I have encouraged 30 minutes of physical activity daily for 5 days a week. She has access to a pool so this should not be so jarring to her joints.     Cervical radiculopathy 3/18/2015    Chronic knee pain     Chronic rhinitis 4/9/2013    CKD (chronic kidney disease) stage 3, GFR 30-59 ml/min  1/29/2013    Coronary artery disease due to calcified coronary lesion 6/25/2015    Diastolic dysfunction 10/10/2013    Essential hypertension 6/25/2015    Gastroesophageal reflux disease without esophagitis 8/13/2012    Gout     Hep B w/ coma 1971    Hives 10/1/2013    Mixed hyperlipidemia 8/13/2012    Morbid obesity with BMI of 50.0-59.9, adult 2/11/2014    Obstructive sleep apnea syndrome 8/13/2012    Senile cataracts of both eyes 1/22/2015    Traumatic open wound of right lower leg 8/25/2017    Trigeminal neuralgia of right side of face 5/23/2017    For years now Previously on Gabapentin, but caused constipation Dissipating over time Not related to intracranial abnormalities Possibly related to dental procedure    Type 2 diabetes mellitus with diabetic polyneuropathy, with long-term current use of insulin 1/29/2013    Venous stasis dermatitis of both lower extremities 8/21/2017    Vitamin D deficiency disease 8/13/2012       Past Surgical History:   Procedure Laterality Date    HYSTERECTOMY  1982    secondary uterine fibroids    JOINT REPLACEMENT      Right total knee replacement         Family History   Problem Relation Age of Onset    Cancer Mother     Hypertension Father     Cancer Sister     No Known Problems Brother     No Known Problems Maternal Aunt     No Known Problems Maternal Uncle     No Known Problems Paternal Aunt     No Known Problems Paternal Uncle     No Known Problems Maternal Grandmother     No Known Problems Maternal Grandfather     No Known Problems Paternal Grandmother     No Known Problems Paternal Grandfather     Glaucoma Neg Hx     Breast cancer Neg Hx     Colon cancer Neg Hx     Ovarian cancer Neg Hx     Stroke Neg Hx     Allergic rhinitis Neg Hx     Allergies Neg Hx     Angioedema Neg Hx     Asthma Neg Hx     Atopy Neg Hx     Eczema Neg Hx     Immunodeficiency Neg Hx     Rhinitis Neg Hx     Urticaria Neg Hx     Amblyopia Neg Hx      "Blindness Neg Hx     Cataracts Neg Hx     Diabetes Neg Hx     Macular degeneration Neg Hx     Retinal detachment Neg Hx     Strabismus Neg Hx     Thyroid disease Neg Hx     Psoriasis Neg Hx        Social History   Substance Use Topics    Smoking status: Never Smoker    Smokeless tobacco: Never Used    Alcohol use No       ALLERGIES: Reviewed in EPIC    CURRENT MEDICATIONS: Reviewed in EPIC    ROS:   Weight up 5 lbs  Denies palpitations, headaches, sinus congestion improved with nasal spray qhs prn.   Denies oral drying or nasal drying.      PHYSICAL EXAM:  Prior weight is 266 lbs.  BP (!) 160/60   Pulse 90   Ht 4' 11" (1.499 m)   Wt 126.7 kg (279 lb 5.2 oz)   BMI 56.42 kg/m²   GENERAL: morbid obese body habitus, well groomed       ASSESSMENT:     Obstructive sleep apnea, with improvement of CPAP after nightly use. Control of sleepiness should improved with increased nightly CPAP usage. Her weight is still under from her peak weight. Now with better pain control, she feels more successful with exercise and diet.    She has medical co-morbidities of hypertension and morbid obesity (BMI >30) .     PLAN:     1. Continue CPAP 13 cm, Goal of an additional 400 hours before next visit. She reports that she is moving CPAP back to her bedroom.   2. Sleep with head of bed elevation and incline  3. Weight loss goal to 225 lbs.  4. If achieves weight, we can reassess whether CPAP is indicated.    DME = Medicare ([facility])(she reports using nasal pillows)    Continue Advair or Flonase NS 1-2 sprays qhs PRN to reduce potential nasal congestion.     Follow up 2 months: MD/NP   "

## 2018-05-15 ENCOUNTER — TELEPHONE (OUTPATIENT)
Dept: INTERNAL MEDICINE | Facility: CLINIC | Age: 78
End: 2018-05-15

## 2018-05-15 NOTE — TELEPHONE ENCOUNTER
Spoke with pt she check her BS twice a day and after looking through the chart she dose use [facility] also pt said her BS have been 135

## 2018-05-16 ENCOUNTER — TELEPHONE (OUTPATIENT)
Dept: INTERNAL MEDICINE | Facility: CLINIC | Age: 78
End: 2018-05-16

## 2018-05-16 NOTE — TELEPHONE ENCOUNTER
We can try to see her tomorrow or Friday. Please let her know that Anam[facility] will reach out about it tomorrow. I emailed An[facility] about it.    Thanks,  KJ

## 2018-05-16 NOTE — TELEPHONE ENCOUNTER
Please let patient know that her paperwork from [facility] is for a knee brace, not for diabetes testing supplies. Does she want diabetes testing supplies? If so, please call [employer] at [phone] and let them know that she needs an order form for testing supplies NOT a knee brace.    Thanks,  BARB Luna MA 16 hours ago (3:20 PM)         Spoke with pt she check her BS twice a day and after looking through the chart she dose use [facility] also pt said her BS have been 135

## 2018-05-16 NOTE — TELEPHONE ENCOUNTER
Called and spoke with patient and she confirmed that she did request a knee brace from [facility] [employer]. She doesn't need any Diabetic supplies, but she will call the company back and cancel the request for a brace cause she will be buying a brace from [facility].    Patient also stated that she hasnt been feeling well for about 1 week, not sure if its infection, but she said she is feeling feverish, legs swelling, Cellulitis acting up and overall she just isnt feeling well. She said that she is usually prescribed an antibiotic when she has these episodes.   Please advise   Thank you  ALEXANDRA Haynes

## 2018-05-21 ENCOUNTER — OFFICE VISIT (OUTPATIENT)
Dept: PRIMARY CARE CLINIC | Facility: CLINIC | Age: 78
End: 2018-05-21
Payer: MEDICARE

## 2018-05-21 ENCOUNTER — LAB VISIT (OUTPATIENT)
Dept: LAB | Facility: HOSPITAL | Age: 78
End: 2018-05-21
Attending: INTERNAL MEDICINE
Payer: MEDICARE

## 2018-05-21 VITALS
HEIGHT: 59 IN | BODY MASS INDEX: 54.22 KG/M2 | WEIGHT: 268.94 LBS | DIASTOLIC BLOOD PRESSURE: 40 MMHG | OXYGEN SATURATION: 97 % | HEART RATE: 95 BPM | SYSTOLIC BLOOD PRESSURE: 108 MMHG

## 2018-05-21 DIAGNOSIS — R50.9 FEVER OF UNKNOWN ORIGIN (FUO): ICD-10-CM

## 2018-05-21 DIAGNOSIS — I87.2 VENOUS STASIS DERMATITIS OF BOTH LOWER EXTREMITIES: ICD-10-CM

## 2018-05-21 DIAGNOSIS — E11.42 TYPE 2 DIABETES MELLITUS WITH DIABETIC POLYNEUROPATHY, WITH LONG-TERM CURRENT USE OF INSULIN: ICD-10-CM

## 2018-05-21 DIAGNOSIS — E11.69 TYPE 2 DIABETES MELLITUS WITH HYPERLIPIDEMIA: ICD-10-CM

## 2018-05-21 DIAGNOSIS — I15.2 HYPERTENSION ASSOCIATED WITH DIABETES: ICD-10-CM

## 2018-05-21 DIAGNOSIS — B15.9: ICD-10-CM

## 2018-05-21 DIAGNOSIS — Z79.4 TYPE 2 DIABETES MELLITUS WITH DIABETIC POLYNEUROPATHY, WITH LONG-TERM CURRENT USE OF INSULIN: ICD-10-CM

## 2018-05-21 DIAGNOSIS — E78.5 TYPE 2 DIABETES MELLITUS WITH HYPERLIPIDEMIA: ICD-10-CM

## 2018-05-21 DIAGNOSIS — R60.0 BILATERAL LEG EDEMA: ICD-10-CM

## 2018-05-21 DIAGNOSIS — N28.1 KIDNEY CYSTS: ICD-10-CM

## 2018-05-21 DIAGNOSIS — E11.59 HYPERTENSION ASSOCIATED WITH DIABETES: ICD-10-CM

## 2018-05-21 DIAGNOSIS — E66.01 MORBID OBESITY DUE TO EXCESS CALORIES: ICD-10-CM

## 2018-05-21 LAB
ALBUMIN SERPL BCP-MCNC: 3.3 G/DL
ALP SERPL-CCNC: 94 U/L
ALT SERPL W/O P-5'-P-CCNC: 21 U/L
ANION GAP SERPL CALC-SCNC: 8 MMOL/L
AST SERPL-CCNC: 18 U/L
BASOPHILS # BLD AUTO: 0.02 K/UL
BASOPHILS NFR BLD: 0.2 %
BILIRUB SERPL-MCNC: 0.4 MG/DL
BNP SERPL-MCNC: <10 PG/ML
BUN SERPL-MCNC: 35 MG/DL
CALCIUM SERPL-MCNC: 9.5 MG/DL
CHLORIDE SERPL-SCNC: 105 MMOL/L
CHOLEST SERPL-MCNC: 124 MG/DL
CHOLEST/HDLC SERPL: 3.4 {RATIO}
CO2 SERPL-SCNC: 29 MMOL/L
CREAT SERPL-MCNC: 1.4 MG/DL
DIFFERENTIAL METHOD: ABNORMAL
EOSINOPHIL # BLD AUTO: 0.4 K/UL
EOSINOPHIL NFR BLD: 3.4 %
ERYTHROCYTE [DISTWIDTH] IN BLOOD BY AUTOMATED COUNT: 16.3 %
ERYTHROCYTE [SEDIMENTATION RATE] IN BLOOD BY WESTERGREN METHOD: 97 MM/HR
EST. GFR  (AFRICAN AMERICAN): 42 ML/MIN/1.73 M^2
EST. GFR  (NON AFRICAN AMERICAN): 37 ML/MIN/1.73 M^2
ESTIMATED AVG GLUCOSE: 154 MG/DL
GLUCOSE SERPL-MCNC: 106 MG/DL
HBA1C MFR BLD HPLC: 7 %
HCT VFR BLD AUTO: 38 %
HDLC SERPL-MCNC: 36 MG/DL
HDLC SERPL: 29 %
HGB BLD-MCNC: 11.7 G/DL
LDLC SERPL CALC-MCNC: 70.8 MG/DL
LYMPHOCYTES # BLD AUTO: 1.5 K/UL
LYMPHOCYTES NFR BLD: 14.7 %
MCH RBC QN AUTO: 27.5 PG
MCHC RBC AUTO-ENTMCNC: 30.8 G/DL
MCV RBC AUTO: 89 FL
MONOCYTES # BLD AUTO: 0.8 K/UL
MONOCYTES NFR BLD: 7.4 %
NEUTROPHILS # BLD AUTO: 7.8 K/UL
NEUTROPHILS NFR BLD: 74.1 %
NONHDLC SERPL-MCNC: 88 MG/DL
PLATELET # BLD AUTO: 247 K/UL
PMV BLD AUTO: 11.1 FL
POTASSIUM SERPL-SCNC: 4.4 MMOL/L
PROCALCITONIN SERPL IA-MCNC: 0.1 NG/ML
PROT SERPL-MCNC: 8.3 G/DL
RBC # BLD AUTO: 4.26 M/UL
SODIUM SERPL-SCNC: 142 MMOL/L
TRIGL SERPL-MCNC: 86 MG/DL
TSH SERPL DL<=0.005 MIU/L-ACNC: 0.75 UIU/ML
WBC # BLD AUTO: 10.47 K/UL

## 2018-05-21 PROCEDURE — 99215 OFFICE O/P EST HI 40 MIN: CPT | Mod: S$GLB,,, | Performed by: INTERNAL MEDICINE

## 2018-05-21 PROCEDURE — 85025 COMPLETE CBC W/AUTO DIFF WBC: CPT

## 2018-05-21 PROCEDURE — 84443 ASSAY THYROID STIM HORMONE: CPT

## 2018-05-21 PROCEDURE — 83036 HEMOGLOBIN GLYCOSYLATED A1C: CPT

## 2018-05-21 PROCEDURE — 85060 BLOOD SMEAR INTERPRETATION: CPT | Mod: ,,, | Performed by: PATHOLOGY

## 2018-05-21 PROCEDURE — 80053 COMPREHEN METABOLIC PANEL: CPT

## 2018-05-21 PROCEDURE — 80061 LIPID PANEL: CPT

## 2018-05-21 PROCEDURE — 83880 ASSAY OF NATRIURETIC PEPTIDE: CPT

## 2018-05-21 PROCEDURE — 84145 PROCALCITONIN (PCT): CPT

## 2018-05-21 PROCEDURE — 85651 RBC SED RATE NONAUTOMATED: CPT

## 2018-05-21 PROCEDURE — 36415 COLL VENOUS BLD VENIPUNCTURE: CPT

## 2018-05-21 RX ORDER — HYDRALAZINE HYDROCHLORIDE 50 MG/1
50 TABLET, FILM COATED ORAL EVERY 12 HOURS
Qty: 180 TABLET | Refills: 3
Start: 2018-05-21 | End: 2018-10-10

## 2018-05-21 NOTE — ASSESSMENT & PLAN NOTE
Hep B infection in [facility] in 1971, was hospitalize for 2 weeks at that time, unknown treatment.  · Referral to Hepatitis clinic

## 2018-05-21 NOTE — PROGRESS NOTES
Primary Care Provider Appointment    Subjective:      Patient ID: Duran Giordano is a 78 y.o. female with severe venous stasis, FUO, DM, HTN, HLD, obesity    Chief Complaint: Hospital Follow Up; Leg Swelling; Menopause; and Fever (inward fever )    Patient seen with home visit on Thursday. She has intermittent low-grade fevers and sensation of warmth. She had a temp of 99.6 on Thursday, and 99.2 on Saturday. She has stopped eating pepper, siracha two months ago. She has history of travel to [facility] for 6 years. Was infected with hepatitis A while there. She has never been seen by Hepatitis Clinic here. Reports a 2 week hospitalization at the time of infection in [facility], then one month quarantine on return to [place]. Has never had work-up since then. Her last CT abdomen was 10/2016 with normal appearing liver.    Her last period was 44 when she had a hysterectomy due to fibroids and symptoms. She reports that she never had hot flashes or any other menopausal symptoms at that time.    On CT abdomen in 10/2016 patient had numerous cysts(?) that have been asymptomatic. She is willing to have these monitored.    She had period of SOB 3 mos ago while ambulating. She has +/- 10# weight loss since 1/2018. She is cutting back on portion size, and using low-fat dressing. She reports her AM fasting glucose has been in 120s, last A1c was 7. She injects levemir 48U, she injects 18U before breakfast and dinner.    She has been noncompliant with CPAP for one month, then restarted 4 days ago. She had an appt with sleep clinic and was convinced to use it daily. Has follow-up with sleep clinic in 6/2018.    She takes all meds as follows and an extra dose of torsemide at lunch on her own.  On 5/15/18 Adherence packed for 60 days:     Morning card:  Aspirin 81 mg  Hydralazine 50 mg  Torsemide 20 mg  Vitamin D3 1,000 IU     Evening card:  Atorvastatin 80 mg  Hydralazine 50 mg  Losartan 100 mg    Past Surgical History:   Procedure  Laterality Date    HYSTERECTOMY  1982    secondary uterine fibroids    JOINT REPLACEMENT      Right total knee replacement         Past Medical History:   Diagnosis Date    Adrenal mass- adenoma stable since 2004 (1.8 cm and 8/13/12 (2 cm); stable 2016 2.4 cm     Adrenal mass- adenoma stable since 2004 (1.8 cm and 8/13/12 (2 cm); stable 2016 2.4 cm    Asthma in adult without complication 6/25/2015    Bilateral carotid artery disease 7/21/2017    Bilateral sciatica 2/7/2017    Cellulitis of right leg 08/16/2017    Cerebral infarction 1/29/2016    Multiple areas of lacunar infarction. Stroke risk factors include HTN, DM2, Dyslipidemia.  Continue ASA 81mg/ Statin therapy I have encouraged 30 minutes of physical activity daily for 5 days a week. She has access to a pool so this should not be so jarring to her joints.     Cervical radiculopathy 3/18/2015    Chronic knee pain     Chronic rhinitis 4/9/2013    CKD (chronic kidney disease) stage 3, GFR 30-59 ml/min 1/29/2013    Coronary artery disease due to calcified coronary lesion 6/25/2015    Diastolic dysfunction 10/10/2013    Essential hypertension 6/25/2015    Gastroesophageal reflux disease without esophagitis 8/13/2012    Gout     Hives 10/1/2013    Mixed hyperlipidemia 8/13/2012    Morbid obesity with BMI of 50.0-59.9, adult 2/11/2014    Obstructive sleep apnea syndrome 8/13/2012    Senile cataracts of both eyes 1/22/2015    Traumatic open wound of right lower leg 8/25/2017    Trigeminal neuralgia of right side of face 5/23/2017    For years now Previously on Gabapentin, but caused constipation Dissipating over time Not related to intracranial abnormalities Possibly related to dental procedure    Type 2 diabetes mellitus with diabetic polyneuropathy, with long-term current use of insulin 1/29/2013    Venous stasis dermatitis of both lower extremities 8/21/2017    Vitamin D deficiency disease 8/13/2012       Review of Systems  "  Constitutional: Positive for activity change and appetite change. Negative for unexpected weight change.   Cardiovascular: Positive for leg swelling.   Gastrointestinal: Negative for constipation and diarrhea.   Skin: Positive for wound.   Hematological: Does not bruise/bleed easily.   Psychiatric/Behavioral: Negative for behavioral problems and decreased concentration.       Objective:   BP (!) 108/40 (BP Location: Right arm, Patient Position: Sitting, BP Method: Large (Manual))   Pulse 95   Ht 4' 11" (1.499 m)   Wt 122 kg (268 lb 15.4 oz)   SpO2 97%   BMI 54.32 kg/m²     Physical Exam   Constitutional: She appears well-developed.   Severe obesity   Eyes: EOM are normal.   Cardiovascular: Normal rate.    Pulmonary/Chest: Effort normal.   Abdominal:   Obese abdomen   Musculoskeletal: She exhibits edema.   Significant swelling in legs bilaterally   Neurological: She is alert.   Skin:   Erythematous LE bilaterally  No evidence of infection in LE bilaterally  Wound RLE healed   Psychiatric: She has a normal mood and affect.   Poor insight into disease       Lab Results   Component Value Date    WBC 9.97 03/16/2018    HGB 11.5 (L) 03/16/2018    HCT 37.6 03/16/2018     03/16/2018    CHOL 177 05/02/2017    TRIG 92 05/02/2017    HDL 39 (L) 05/02/2017    ALT 18 03/09/2018    AST 19 03/09/2018     03/09/2018    K 4.6 03/09/2018     03/09/2018    CREATININE 1.1 03/09/2018    BUN 26 (H) 03/09/2018    CO2 25 03/09/2018    TSH 0.830 05/02/2017    INR 1.1 04/17/2005    HGBA1C 7.0 (H) 03/14/2018         Assessment:   78 y.o. female with multiple co-morbid illnesses here to continue work-up of chronic issues notably severe venous stasis, FUO, DM, HTN, HLD, obesity.     Plan:     Problem List Items Addressed This Visit        Cardiac/Vascular    Hypertension associated with diabetes     BP overtreated on hydralazine 100mg BID, losartan 100mg qhs, torsemide; carvedilol discontinued by pulm due to SOB, " amlodipine discontinued by PCP due to leg swelling  · hydralazine increased to 100mg  · Continue losartan 100mg at bedtime  · Continue torsemide   · Consider Digital HTN program in future         Relevant Medications    hydrALAZINE (APRESOLINE) 50 MG tablet    Venous stasis dermatitis of both lower extremities     LE swelling bilaterally with poor wound healing, routine circaids with wound care  · Continue circaids boot, per wound care  · Letter to [facility] [employer] to get assistance in the [facility]  · Treat cellulitis PRN  · Monitor CBC  · Discontinue amlodipine            Renal/    Kidney cysts     Numerous possible cysts on US in 10/2016  · Image and monitor         Relevant Orders    US Retroperitoneal Complete (Kidney and       Endocrine    Type 2 diabetes mellitus with hyperlipidemia     High ASCVD due to DM  · Continue statin and ASA  · Control DM with insulin         Relevant Orders    Hemoglobin A1c    Lipid panel    Type 2 diabetes mellitus with diabetic polyneuropathy, with long-term current use of insulin     Abnormal foot exam, followed by podiatry q2 mos  · Continue routine care         Morbid obesity due to excess calories     BMI >50, eats excessively, DM, HTN, REJI  · Counseled on restricting caloric intake one day per week- Wed  · No desserts, reduced carbs            GI    Viral hepatitis A without coma     Hep B infection in [facility] in 1971, was hospitalize for 2 weeks at that time, unknown treatment.  · Referral to Hepatitis clinic            Other    Bilateral leg edema     Likely due to venous status (discharged from lymphedema clinic in 2014)  · Letter written to [facility] [employer] [facility] to get assitance in the [facility] with leg wrappings  · LE ultrasound to evaluate for DVT neg  · Last performed in 6/2016  · Order again today due to recent LAYNE episode  · Continue cercaid wrappings daily  · Elevate at night  · Discontinue amlodipine  · Monitor CBC         Relevant Orders    CBC auto differential     Procalcitonin    Cardiology Lab US Lower Extremity Veins Bilateral    B-TYPE NATRIURETIC PEPTIDE    SEDIMENTATION RATE, MANUAL    Fever of unknown origin (FUO)     Patient with significant venous stasis, obesity, h/o hep A (in 1970s), provoked menopause with hysterectomy (1982)  · Labs today to assess for infection  · Monitor legs for venous stasis         Relevant Orders    CBC auto differential    Pathologist Interpretation Differential    Comprehensive metabolic panel    TSH    Procalcitonin          Health Maintenance       Date Due Completion Date    TETANUS VACCINE 03/08/1960 ---    Lipid Panel 05/02/2018 5/2/2017- TODAY    Influenza Vaccine 08/01/2018 9/2/2017    Override on 10/7/2015: Done    Override on 10/3/2014: Done    Override on 10/10/2013: Done    Override on 9/13/2011: Done    Hemoglobin A1c 09/14/2018 3/14/2018- TODAY    Override on 7/13/2016: Done    Foot Exam 01/19/2019 1/19/2018 (Done)    Override on 1/19/2018: Done (Dr head)    Override on 5/31/2017: Done    Override on 7/20/2016: Done    Eye Exam 02/15/2019 2/15/2018    Override on 2/17/2016: Done    Override on 1/22/2015: Done    Override on 8/16/2012: Done    Mammogram 02/22/2020 2/22/2018    DEXA SCAN 02/22/2021 2/22/2018    Override on 12/13/2011: Done          Follow-up in about 3 months (around 8/21/2018). . One hour spent with this patient today, half of that in counseling.    Tami Schmidt MD/MPH  Internal Medicine  [facility]  [phone]

## 2018-05-21 NOTE — ASSESSMENT & PLAN NOTE
LE swelling bilaterally with poor wound healing, routine circaids with wound care  · Continue circaids boot, per wound care  · Letter to [facility] [employer] to get assistance in the [facility]  · Treat cellulitis PRN  · Monitor CBC  · Discontinue amlodipine

## 2018-05-21 NOTE — Clinical Note
Brian Munson, I'm drawing an A1c today (with other labs for acute issues), do you mind canceling your labs for 5/30 to not duplicate services?  Thanks, KJ

## 2018-05-21 NOTE — ASSESSMENT & PLAN NOTE
Patient with significant venous stasis, obesity, h/o hep A (in 1970s), provoked menopause with hysterectomy (1982)  · Labs today to assess for infection  · Monitor legs for venous stasis

## 2018-05-21 NOTE — ASSESSMENT & PLAN NOTE
BP overtreated on hydralazine 100mg BID, losartan 100mg qhs, torsemide; carvedilol discontinued by pulm due to SOB, amlodipine discontinued by PCP due to leg swelling  · hydralazine increased to 100mg  · Continue losartan 100mg at bedtime  · Continue torsemide   · Consider Digital HTN program in future

## 2018-05-21 NOTE — PATIENT INSTRUCTIONS
"TODAY:  - leg vein ultrasound  - kidney ultrasound  - labs  - continue cutting back on food each day (especially Wednesdays)  - check glucose letter when having a sweating episode  - call and verify the dose of hydralazine, is it 50mg?  - mindfulness while eating   + "calm" aylin    NEXT:  - consider Gynecology evaluation for hormonal imbalances if you continue to have fevers  "

## 2018-05-21 NOTE — ASSESSMENT & PLAN NOTE
Likely due to venous status (discharged from lymphedema clinic in 2014)  · Letter written to [facility] [employer] to get assitance in the [facility] with leg wrappings  · LE ultrasound to evaluate for DVT neg  · Last performed in 6/2016  · Order again today due to recent LAYNE episode  · Continue cercaid wrappings daily  · Elevate at night  · Discontinue amlodipine  · Monitor CBC

## 2018-05-22 ENCOUNTER — TELEPHONE (OUTPATIENT)
Dept: INTERNAL MEDICINE | Facility: CLINIC | Age: 78
End: 2018-05-22

## 2018-05-22 LAB
PATH REV BLD -IMP: NORMAL
PATH REV BLD -IMP: NORMAL

## 2018-05-22 NOTE — TELEPHONE ENCOUNTER
Please let patient know that her labs show she does NOT have infection or heart failure. Her anemia is improving, A1c is 7 (which is at goal), cholesterol controlled. Her kidney function is slightly worse. She should stop the lunchtime dose of bumex for now (as it is hard on the kidneys) and see if her legs stay stable.    Please ask her to keep us posted on the fevers, but it does not seem to have an infectious origin at this time. If she wants to see Gynecology to evaluate whether it is hormonal, we can set her up with an appoitnment.    For now, she should continue cutting back on portion size and try to practice mindfulness while eating. Those 2 things will help in numerous ways.    Thanks,  BARB

## 2018-06-20 ENCOUNTER — TELEPHONE (OUTPATIENT)
Dept: ENDOCRINOLOGY | Facility: CLINIC | Age: 78
End: 2018-06-20

## 2018-06-20 NOTE — TELEPHONE ENCOUNTER
----- Message from Maximus Martinez sent at 6/20/2018  1:38 PM CDT -----  Contact: Pt Sister Pasquale  Pt would like to be called back regarding being seen on Wednesday if an opening is available.  Pt can be reached at  Main [phone].      Thank You.

## 2018-06-25 ENCOUNTER — HOSPITAL ENCOUNTER (OUTPATIENT)
Dept: RADIOLOGY | Facility: HOSPITAL | Age: 78
Discharge: HOME | End: 2018-06-25
Attending: INTERNAL MEDICINE
Payer: MEDICARE

## 2018-06-25 ENCOUNTER — TELEPHONE (OUTPATIENT)
Dept: INTERNAL MEDICINE | Facility: CLINIC | Age: 78
End: 2018-06-25

## 2018-06-25 ENCOUNTER — CLINICAL SUPPORT (OUTPATIENT)
Dept: CARDIOLOGY | Facility: CLINIC | Age: 78
End: 2018-06-25
Attending: INTERNAL MEDICINE
Payer: MEDICARE

## 2018-06-25 DIAGNOSIS — N28.1 KIDNEY CYSTS: ICD-10-CM

## 2018-06-25 DIAGNOSIS — R60.0 BILATERAL LEG EDEMA: ICD-10-CM

## 2018-06-25 PROCEDURE — 93970 EXTREMITY STUDY: CPT | Mod: PBBFAC | Performed by: INTERNAL MEDICINE

## 2018-06-25 PROCEDURE — 76770 US EXAM ABDO BACK WALL COMP: CPT | Mod: TC

## 2018-06-25 PROCEDURE — 76770 US EXAM ABDO BACK WALL COMP: CPT | Mod: 26,,, | Performed by: RADIOLOGY

## 2018-06-25 NOTE — TELEPHONE ENCOUNTER
Please let patient know that the ultrasound showed cysts on her kidneys. This is NOT cancer.    Thanks,  BARB

## 2018-06-27 ENCOUNTER — HOSPITAL ENCOUNTER (OUTPATIENT)
Dept: RADIOLOGY | Facility: HOSPITAL | Age: 78
Discharge: HOME | End: 2018-06-27
Attending: INTERNAL MEDICINE
Payer: MEDICARE

## 2018-06-27 ENCOUNTER — OFFICE VISIT (OUTPATIENT)
Dept: PODIATRY | Facility: CLINIC | Age: 78
End: 2018-06-27
Payer: MEDICARE

## 2018-06-27 ENCOUNTER — OFFICE VISIT (OUTPATIENT)
Dept: PRIMARY CARE CLINIC | Facility: CLINIC | Age: 78
End: 2018-06-27
Payer: MEDICARE

## 2018-06-27 VITALS
BODY MASS INDEX: 56.31 KG/M2 | HEART RATE: 90 BPM | OXYGEN SATURATION: 97 % | HEIGHT: 59 IN | TEMPERATURE: 99 F | DIASTOLIC BLOOD PRESSURE: 54 MMHG | SYSTOLIC BLOOD PRESSURE: 138 MMHG | WEIGHT: 279.31 LBS

## 2018-06-27 VITALS
WEIGHT: 279 LBS | HEIGHT: 59 IN | BODY MASS INDEX: 56.24 KG/M2 | HEART RATE: 85 BPM | DIASTOLIC BLOOD PRESSURE: 67 MMHG | SYSTOLIC BLOOD PRESSURE: 163 MMHG | RESPIRATION RATE: 19 BRPM

## 2018-06-27 DIAGNOSIS — J45.909 UNCOMPLICATED ASTHMA, UNSPECIFIED ASTHMA SEVERITY, UNSPECIFIED WHETHER PERSISTENT: ICD-10-CM

## 2018-06-27 DIAGNOSIS — E11.42 TYPE 2 DIABETES MELLITUS WITH DIABETIC POLYNEUROPATHY, WITH LONG-TERM CURRENT USE OF INSULIN: ICD-10-CM

## 2018-06-27 DIAGNOSIS — I89.0 LYMPHEDEMA: ICD-10-CM

## 2018-06-27 DIAGNOSIS — I77.1 TORTUOUS AORTA: ICD-10-CM

## 2018-06-27 DIAGNOSIS — I15.2 HYPERTENSION ASSOCIATED WITH DIABETES: ICD-10-CM

## 2018-06-27 DIAGNOSIS — R50.9 FEVER OF UNKNOWN ORIGIN (FUO): ICD-10-CM

## 2018-06-27 DIAGNOSIS — E11.59 HYPERTENSION ASSOCIATED WITH DIABETES: ICD-10-CM

## 2018-06-27 DIAGNOSIS — B35.1 ONYCHOMYCOSIS DUE TO DERMATOPHYTE: ICD-10-CM

## 2018-06-27 DIAGNOSIS — L84 CORN OR CALLUS: ICD-10-CM

## 2018-06-27 DIAGNOSIS — M17.0 PRIMARY OSTEOARTHRITIS OF BOTH KNEES: ICD-10-CM

## 2018-06-27 DIAGNOSIS — E27.8 ADRENAL MASS: ICD-10-CM

## 2018-06-27 DIAGNOSIS — R60.0 BILATERAL LEG EDEMA: ICD-10-CM

## 2018-06-27 DIAGNOSIS — Z11.4 ENCOUNTER FOR SCREENING FOR HIV: ICD-10-CM

## 2018-06-27 DIAGNOSIS — Z79.4 TYPE 2 DIABETES MELLITUS WITH DIABETIC POLYNEUROPATHY, WITH LONG-TERM CURRENT USE OF INSULIN: ICD-10-CM

## 2018-06-27 DIAGNOSIS — E11.51 TYPE II DIABETES MELLITUS WITH PERIPHERAL CIRCULATORY DISORDER: Primary | ICD-10-CM

## 2018-06-27 PROCEDURE — 71046 X-RAY EXAM CHEST 2 VIEWS: CPT | Mod: 26,,, | Performed by: RADIOLOGY

## 2018-06-27 PROCEDURE — 99999 PR PBB SHADOW E&M-EST. PATIENT-LVL III: CPT | Mod: PBBFAC,,, | Performed by: PODIATRIST

## 2018-06-27 PROCEDURE — 11721 DEBRIDE NAIL 6 OR MORE: CPT | Mod: Q9,PBBFAC | Performed by: PODIATRIST

## 2018-06-27 PROCEDURE — 99215 OFFICE O/P EST HI 40 MIN: CPT | Mod: 25,S$GLB,, | Performed by: INTERNAL MEDICINE

## 2018-06-27 PROCEDURE — 11056 PARNG/CUTG B9 HYPRKR LES 2-4: CPT | Mod: 59,Q9,PBBFAC | Performed by: PODIATRIST

## 2018-06-27 PROCEDURE — 94640 AIRWAY INHALATION TREATMENT: CPT | Mod: S$GLB,,, | Performed by: INTERNAL MEDICINE

## 2018-06-27 PROCEDURE — 11056 PARNG/CUTG B9 HYPRKR LES 2-4: CPT | Mod: 59,Q9,S$PBB, | Performed by: PODIATRIST

## 2018-06-27 PROCEDURE — 71046 X-RAY EXAM CHEST 2 VIEWS: CPT | Mod: TC

## 2018-06-27 PROCEDURE — 11721 DEBRIDE NAIL 6 OR MORE: CPT | Mod: 59,Q9,S$PBB, | Performed by: PODIATRIST

## 2018-06-27 PROCEDURE — 99213 OFFICE O/P EST LOW 20 MIN: CPT | Mod: PBBFAC,25 | Performed by: PODIATRIST

## 2018-06-27 PROCEDURE — 99499 UNLISTED E&M SERVICE: CPT | Mod: S$PBB,,, | Performed by: PODIATRIST

## 2018-06-27 RX ORDER — IPRATROPIUM BROMIDE AND ALBUTEROL SULFATE 2.5; .5 MG/3ML; MG/3ML
3 SOLUTION RESPIRATORY (INHALATION)
Status: COMPLETED | OUTPATIENT
Start: 2018-06-27 | End: 2018-06-27

## 2018-06-27 RX ADMIN — IPRATROPIUM BROMIDE AND ALBUTEROL SULFATE 3 ML: 2.5; .5 SOLUTION RESPIRATORY (INHALATION) at 08:06

## 2018-06-27 NOTE — ASSESSMENT & PLAN NOTE
Likely due to venous status (discharged from lymphedema clinic in 2014)  · Is receiving assitance in the [facility] with leg wrappings  · LE ultrasound to evaluate for DVT neg  · Neg studies in 6/2016, 6/2018  · Continue cercaid wrappings daily  · Elevate at night  · Discontinued amlodipine  · Monitor CBC

## 2018-06-27 NOTE — ASSESSMENT & PLAN NOTE
Knee XR in 2/2017 showing joint space narrowing narrowing laterally and patellofemoral bony spurring.  · Completed PT at [facility] on [address]  · Advised to lose weight  · Cane ordered from DME

## 2018-06-27 NOTE — ASSESSMENT & PLAN NOTE
Seen on XR in 6/2018  · Continue statin, APT  · Monitor for cardiac events  · Advised lifestyle changes

## 2018-06-27 NOTE — ASSESSMENT & PLAN NOTE
Adrenal mass- adenoma stable since 2004 (1.8 cm and 8/13/12 (2 cm); stable 2016 2.4 cm  · Monitor  · After labs result, will likely repeat CT abd/chest to also work-up FUO

## 2018-06-27 NOTE — PATIENT INSTRUCTIONS
TODAY:  - order cane from DME  - PCP to message Dr Anand about diabetic shoes  - labs today  - duoneb treatment  - CXR    NEXT:  - consider CT abd/pelvis/chest

## 2018-06-27 NOTE — ASSESSMENT & PLAN NOTE
Patient with significant venous stasis, obesity, h/o hep A (in 1970s), provoked menopause with hysterectomy (1982)  · Labs neg for infection  · Elevated ESR, uric acid  · Monitor legs for venous stasis  · Neg DVT US

## 2018-06-27 NOTE — PROGRESS NOTES
"Primary Care Provider Appointment    Subjective:      Patient ID: Duran Giordano is a 78 y.o. female with hot flushes, HTN, DM, CHF, morbid obesity    Chief Complaint: Wheezing    Patient with constant low-grade hot flushes. Lab work-up negative with normal WBC, neg pro-calcitonin. Pertinent positives were elevated uric acid, sed rate. LE doppler neg for DVT.    Patient suspected that hydralazine (which was recently started) was the cause of the fever, but she stopped it for a week and the fever did not juan j.    Renal US showed cysts.    She complains of wheezing and "phlegm around my heart." She takes torsemide 20mg daily. She has good urine output on this. Her BMI is 56. She reports "I've never suffered with asthma." She does use albuterol and advair.     Her weight is 279#. She has been snacking more lately with numerous [facility] the [facility].    ADDENDUM: CXR stable     Past Surgical History:   Procedure Laterality Date    HYSTERECTOMY  1982    secondary uterine fibroids    JOINT REPLACEMENT      Right total knee replacement         Past Medical History:   Diagnosis Date    Adrenal mass- adenoma stable since 2004 (1.8 cm and 8/13/12 (2 cm); stable 2016 2.4 cm     Adrenal mass- adenoma stable since 2004 (1.8 cm and 8/13/12 (2 cm); stable 2016 2.4 cm    Asthma in adult without complication 6/25/2015    Bilateral carotid artery disease 7/21/2017    Bilateral sciatica 2/7/2017    Cellulitis of right leg 08/16/2017    Cerebral infarction 1/29/2016    Multiple areas of lacunar infarction. Stroke risk factors include HTN, DM2, Dyslipidemia.  Continue ASA 81mg/ Statin therapy I have encouraged 30 minutes of physical activity daily for 5 days a week. She has access to a pool so this should not be so jarring to her joints.     Cervical radiculopathy 3/18/2015    Chronic knee pain     Chronic rhinitis 4/9/2013    CKD (chronic kidney disease) stage 3, GFR 30-59 ml/min 1/29/2013    Coronary artery disease " "due to calcified coronary lesion 6/25/2015    Diastolic dysfunction 10/10/2013    Essential hypertension 6/25/2015    Gastroesophageal reflux disease without esophagitis 8/13/2012    Gout     Hives 10/1/2013    Mixed hyperlipidemia 8/13/2012    Morbid obesity with BMI of 50.0-59.9, adult 2/11/2014    Obstructive sleep apnea syndrome 8/13/2012    Senile cataracts of both eyes 1/22/2015    Traumatic open wound of right lower leg 8/25/2017    Trigeminal neuralgia of right side of face 5/23/2017    For years now Previously on Gabapentin, but caused constipation Dissipating over time Not related to intracranial abnormalities Possibly related to dental procedure    Type 2 diabetes mellitus with diabetic polyneuropathy, with long-term current use of insulin 1/29/2013    Venous stasis dermatitis of both lower extremities 8/21/2017    Vitamin D deficiency disease 8/13/2012       Review of Systems   Constitutional: Positive for activity change and appetite change. Negative for unexpected weight change.   Cardiovascular: Positive for leg swelling.   Gastrointestinal: Negative for constipation and diarrhea.   Skin: Positive for wound.   Hematological: Does not bruise/bleed easily.   Psychiatric/Behavioral: Negative for behavioral problems and decreased concentration.       Objective:   BP (!) 138/54 (BP Location: Right arm, Patient Position: Sitting, BP Method: Large (Manual))   Pulse 90   Temp 98.5 °F (36.9 °C) (Oral)   Ht 4' 11" (1.499 m)   Wt 126.7 kg (279 lb 5.2 oz)   SpO2 97%   BMI 56.42 kg/m²     Physical Exam   Constitutional: She appears well-developed.   Severe obesity   Eyes: EOM are normal.   Cardiovascular: Normal rate.    Pulmonary/Chest: Effort normal and breath sounds normal.   No wheezing, but obese body habitus   Abdominal:   Obese abdomen   Musculoskeletal: She exhibits edema.   Significant swelling in legs bilaterally   Neurological: She is alert.   Skin:   Erythematous LE bilaterally  No " evidence of infection in LE bilaterally  Wound RLE healed   Psychiatric: She has a normal mood and affect.   Poor insight into disease       Lab Results   Component Value Date    WBC 10.47 05/21/2018    HGB 11.7 (L) 05/21/2018    HCT 38.0 05/21/2018     05/21/2018    CHOL 124 05/21/2018    TRIG 86 05/21/2018    HDL 36 (L) 05/21/2018    ALT 21 05/21/2018    AST 18 05/21/2018     05/21/2018    K 4.4 05/21/2018     05/21/2018    CREATININE 1.4 05/21/2018    BUN 35 (H) 05/21/2018    CO2 29 05/21/2018    TSH 0.746 05/21/2018    INR 1.1 04/17/2005    HGBA1C 7.0 (H) 05/21/2018         Assessment:   78 y.o. female with multiple co-morbid illnesses here to continue work-up of chronic issues notably with hot flushes, HTN, DM, CHF, morbid obesity.     Plan:     Problem List Items Addressed This Visit        Pulmonary    Uncomplicated asthma     Symptom improvement with discontinuation of BB by Pulm, compliant with inhalers  · Continue albuterol and advair  · F/U Pulm  · CXR today         Relevant Medications    albuterol-ipratropium 2.5 mg-0.5 mg/3 mL nebulizer solution 3 mL (Completed)    Other Relevant Orders    X-Ray Chest PA And Lateral (Completed)    CANE FOR HOME USE       Cardiac/Vascular    Hypertension associated with diabetes     BP overtreated on hydralazine 100mg BID, losartan 100mg qhs, torsemide; carvedilol discontinued by pulm due to SOB, amlodipine discontinued by PCP due to leg swelling  · Continue all meds  · Consider Digital HTN program in future         Tortuous aorta     Seen on XR in 6/2018  · Continue statin, APT  · Monitor for cardiac events  · Advised lifestyle changes            Endocrine    Adrenal mass- adenoma stable since 2004 (1.8 cm and 8/13/12 (2 cm); stable 2016 2.4 cm     Adrenal mass- adenoma stable since 2004 (1.8 cm and 8/13/12 (2 cm); stable 2016 2.4 cm  · Monitor  · After labs result, will likely repeat CT abd/chest to also work-up FUO         Type 2 diabetes mellitus  with diabetic polyneuropathy, with long-term current use of insulin     Abnormal foot exam, followed by podiatry q2 mos  · Continue routine care            Orthopedic    Primary osteoarthritis of both knees     Knee XR in 2/2017 showing joint space narrowing narrowing laterally and patellofemoral bony spurring.  · Completed PT at [facility] on [address]  · Advised to lose weight  · Cane ordered from DME         Relevant Orders    CANE FOR HOME USE       Other    Bilateral leg edema     Likely due to venous status (discharged from lymphedema clinic in 2014)  · Is receiving assitance in the [facility] with leg wrappings  · LE ultrasound to evaluate for DVT neg  · Neg studies in 6/2016, 6/2018  · Continue cercaid wrappings daily  · Elevate at night  · Discontinued amlodipine  · Monitor CBC         Relevant Orders    CANE FOR HOME USE    Fever of unknown origin (FUO)     Patient with significant venous stasis, obesity, h/o hep A (in 1970s), provoked menopause with hysterectomy (1982)  · Labs neg for infection  · Elevated ESR, uric acid  · Monitor legs for venous stasis  · Neg DVT US         Relevant Orders    Comprehensive metabolic panel    CBC auto differential    Rheumatoid factor    LACTATE DEHYDROGENASE    C-REACTIVE PROTEIN    Sedimentation rate    HIV 1 / 2 ANTIBODY    CULTURE, BLOOD    CK    APOLONIA    PROTEIN ELECTROPHORESIS, SERUM      Other Visit Diagnoses     Encounter for screening for HIV         Relevant Orders    HIV 1 / 2 ANTIBODY          Health Maintenance       Date Due Completion Date    TETANUS VACCINE 03/08/1960 ---    Influenza Vaccine 08/01/2018 9/2/2017    Override on 10/7/2015: Done    Override on 10/3/2014: Done    Override on 10/10/2013: Done    Override on 9/13/2011: Done    Hemoglobin A1c 11/21/2018 5/21/2018    Override on 7/13/2016: Done    Foot Exam 01/19/2019 1/19/2018 (Done)    Override on 1/19/2018: Done (Dr head)    Override on 5/31/2017: Done    Override on 7/20/2016: Done    Eye  Exam 02/15/2019 2/15/2018    Override on 2/17/2016: Done    Override on 1/22/2015: Done    Override on 8/16/2012: Done    Lipid Panel 05/21/2019 5/21/2018    Mammogram 02/22/2020 2/22/2018    DEXA SCAN 02/22/2021 2/22/2018    Override on 12/13/2011: Done          Follow-up in about 2 months (around 8/27/2018). . One hour spent with this patient today, half of that in counseling.    Tami Schmidt MD/MPH  Internal Medicine  [facility]  [phone]

## 2018-06-27 NOTE — ASSESSMENT & PLAN NOTE
Symptom improvement with discontinuation of BB by Pulm, compliant with inhalers  · Continue albuterol and advair  · F/U Pulm  · CXR today

## 2018-06-27 NOTE — ASSESSMENT & PLAN NOTE
BP overtreated on hydralazine 100mg BID, losartan 100mg qhs, torsemide; carvedilol discontinued by pulm due to SOB, amlodipine discontinued by PCP due to leg swelling  · Continue all meds  · Consider Digital HTN program in future

## 2018-06-27 NOTE — PROGRESS NOTES
Subjective:      Patient ID: Duran Giordano is a 78 y.o. female.    Chief Complaint: PCP (Tami Schmidt MD 6/27/18); Diabetic Foot Exam; Nail Care; Ingrown Toenail; and Toe Pain    Duran is a 78 y.o. female who presents to the clinic for evaluation and treatment of high risk feet. Duran has a past medical history of Adrenal mass- adenoma stable since 2004 (1.8 cm and 8/13/12 (2 cm); stable 2016 2.4 cm; Asthma in adult without complication (6/25/2015); Bilateral carotid artery disease (7/21/2017); Bilateral sciatica (2/7/2017); Cellulitis of right leg (08/16/2017); Cerebral infarction (1/29/2016); Cervical radiculopathy (3/18/2015); Chronic knee pain; Chronic rhinitis (4/9/2013); CKD (chronic kidney disease) stage 3, GFR 30-59 ml/min (1/29/2013); Coronary artery disease due to calcified coronary lesion (6/25/2015); Diastolic dysfunction (10/10/2013); Essential hypertension (6/25/2015); Gastroesophageal reflux disease without esophagitis (8/13/2012); Gout; Hives (10/1/2013); Mixed hyperlipidemia (8/13/2012); Morbid obesity with BMI of 50.0-59.9, adult (2/11/2014); Obstructive sleep apnea syndrome (8/13/2012); Senile cataracts of both eyes (1/22/2015); Traumatic open wound of right lower leg (8/25/2017); Trigeminal neuralgia of right side of face (5/23/2017); Type 2 diabetes mellitus with diabetic polyneuropathy, with long-term current use of insulin (1/29/2013); Venous stasis dermatitis of both lower extremities (8/21/2017); and Vitamin D deficiency disease (8/13/2012). The patient's chief complaint is long, thick toenails. This patient has documented high risk feet requiring routine maintenance secondary to diabetes mellitis and those secondary complications of diabetes, as mentioned..    PCP: Tami Schmidt MD    Date Last Seen by PCP:   Chief Complaint   Patient presents with    PCP     Tami Schmidt MD 6/27/18    Diabetic Foot Exam    Nail Care    Ingrown Toenail    Toe Pain        Hemoglobin A1C   Date Value Ref Range Status   05/21/2018 7.0 (H) 4.0 - 5.6 % Final     Comment:     According to ADA guidelines, hemoglobin A1c <7.0% represents  optimal control in non-pregnant diabetic patients. Different  metrics may apply to specific patient populations.   Standards of Medical Care in Diabetes-2016.  For the purpose of screening for the presence of diabetes:  <5.7%     Consistent with the absence of diabetes  5.7-6.4%  Consistent with increasing risk for diabetes   (prediabetes)  >or=6.5%  Consistent with diabetes  Currently, no consensus exists for use of hemoglobin A1c  for diagnosis of diabetes for children.  This Hemoglobin A1c assay has significant interference with fetal   hemoglobin   (HbF). The results are invalid for patients with abnormal amounts of   HbF,   including those with known Hereditary Persistence   of Fetal Hemoglobin. Heterozygous hemoglobin variants (HbAS, HbAC,   HbAD, HbAE, HbA2) do not significantly interfere with this assay;   however, presence of multiple variants in a sample may impact the %   interference.     03/14/2018 7.0 (H) 4.0 - 5.6 % Final     Comment:     According to ADA guidelines, hemoglobin A1c <7.0% represents  optimal control in non-pregnant diabetic patients. Different  metrics may apply to specific patient populations.   Standards of Medical Care in Diabetes-2016.  For the purpose of screening for the presence of diabetes:  <5.7%     Consistent with the absence of diabetes  5.7-6.4%  Consistent with increasing risk for diabetes   (prediabetes)  >or=6.5%  Consistent with diabetes  Currently, no consensus exists for use of hemoglobin A1c  for diagnosis of diabetes for children.  This Hemoglobin A1c assay has significant interference with fetal   hemoglobin   (HbF). The results are invalid for patients with abnormal amounts of   HbF,   including those with known Hereditary Persistence   of Fetal Hemoglobin. Heterozygous hemoglobin variants (HbAS,  HbAC,   HbAD, HbAE, HbA2) do not significantly interfere with this assay;   however, presence of multiple variants in a sample may impact the %   interference.     12/04/2017 7.2 (H) 4.0 - 5.6 % Final     Comment:     According to ADA guidelines, hemoglobin A1c <7.0% represents  optimal control in non-pregnant diabetic patients. Different  metrics may apply to specific patient populations.   Standards of Medical Care in Diabetes-2016.  For the purpose of screening for the presence of diabetes:  <5.7%     Consistent with the absence of diabetes  5.7-6.4%  Consistent with increasing risk for diabetes   (prediabetes)  >or=6.5%  Consistent with diabetes  Currently, no consensus exists for use of hemoglobin A1c  for diagnosis of diabetes for children.  This Hemoglobin A1c assay has significant interference with fetal   hemoglobin   (HbF). The results are invalid for patients with abnormal amounts of   HbF,   including those with known Hereditary Persistence   of Fetal Hemoglobin. Heterozygous hemoglobin variants (HbAS, HbAC,   HbAD, HbAE, HbA2) do not significantly interfere with this assay;   however, presence of multiple variants in a sample may impact the %   interference.         Review of Systems   Constitution: Negative for chills, decreased appetite and fever.   Cardiovascular: Positive for leg swelling.   Skin: Positive for dry skin, nail changes and poor wound healing. Negative for color change.   Musculoskeletal: Negative for arthritis, back pain, joint pain, joint swelling and myalgias.   Gastrointestinal: Negative for nausea and vomiting.   Neurological: Negative for loss of balance, numbness and paresthesias.           Objective:      Physical Exam   Constitutional: She is oriented to person, place, and time. She appears well-developed and well-nourished.   Cardiovascular:   Pulses:       Dorsalis pedis pulses are 2+ on the right side, and 2+ on the left side.        Posterior tibial pulses are 2+ on the  right side, and 2+ on the left side.   Musculoskeletal: She exhibits no edema or tenderness.        Right ankle: Normal.        Left ankle: Normal.        Right foot: There is no swelling, no crepitus and no deformity.        Left foot: There is no swelling, no crepitus and no deformity.   Adequate joint range of motion without pain, limitation, nor crepitation Bilateral feet and ankle joints. Muscle strength is 5/5 in all groups bilaterally.         Lymphadenopathy:   B/l lymph edema    Neurological: She is alert and oriented to person, place, and time. She has normal strength.   Skin: Skin is warm, dry and intact. No rash noted. No erythema. Nails show no clubbing.   Nails x10 are elongated by  2-5 mm's, thickened by 2-7 mm's, dystrophic, and are darkened in  coloration . Xerosis Bilaterally. No open lesions noted.    Hyperkeratotic tissue noted to medial HIPJ b/l      Psychiatric: She has a normal mood and affect. Her behavior is normal.   Nursing note and vitals reviewed.            Assessment:       Encounter Diagnoses   Name Primary?    Type II diabetes mellitus with peripheral circulatory disorder Yes    Corn or callus     Onychomycosis due to dermatophyte     Lymphedema          Plan:       Duran was seen today for pcp, diabetic foot exam, nail care, ingrown toenail and toe pain.    Diagnoses and all orders for this visit:    Type II diabetes mellitus with peripheral circulatory disorder  -     DIABETIC SHOES FOR HOME USE    Corn or callus  -     DIABETIC SHOES FOR HOME USE    Onychomycosis due to dermatophyte  -     DIABETIC SHOES FOR HOME USE    Lymphedema  -     DIABETIC SHOES FOR HOME USE      I counseled the patient on her conditions, their implications and medical management.        - Shoe inspection. Diabetic Foot Education. Patient reminded of the importance of good nutrition and blood sugar control to help prevent podiatric complications of diabetes. Patient instructed on proper foot hygeine. We  discussed wearing proper shoe gear, daily foot inspections, never walking without protective shoe gear, never putting sharp instruments to feet, routine podiatric nail visits every 2-3 months.      - With patient's permission, nails were aggressively reduced and debrided x 10 to their soft tissue attachment mechanically and with electric , removing all offending nail and debris. Patient relates relief following the procedure. She will continue to monitor the areas daily, inspect her feet, wear protective shoe gear when ambulatory, moisturizer to maintain skin integrity and follow in this office in approximately 2-3 months, sooner p.r.n.    - After cleansing the  area w/ alcohol prep pad the above mentioned hyperkeratosis was trimmed utilizing No 15 scapel, to a smooth base with out incident. Patient tolerated this  well and reported comfort to the area of  medial HIPJ b/l

## 2018-06-29 RX ORDER — INSULIN LISPRO 100 [IU]/ML
INJECTION, SOLUTION INTRAVENOUS; SUBCUTANEOUS
Qty: 2 BOX | Refills: 3 | Status: SHIPPED | OUTPATIENT
Start: 2018-06-29 | End: 2018-10-12 | Stop reason: SDUPTHER

## 2018-07-01 ENCOUNTER — TELEPHONE (OUTPATIENT)
Dept: INTERNAL MEDICINE | Facility: CLINIC | Age: 78
End: 2018-07-01

## 2018-07-01 NOTE — TELEPHONE ENCOUNTER
Please let patient know that all of her labs were normal. Her anemia is improving. The labs do not show a reason for her fevers. We'll talk about considering imaging if she continues to feel warm all the time.    Thanks,  KJ

## 2018-07-06 ENCOUNTER — PATIENT OUTREACH (OUTPATIENT)
Dept: ADMINISTRATIVE | Facility: HOSPITAL | Age: 78
End: 2018-07-06

## 2018-07-06 NOTE — PROGRESS NOTES
VM left for pt, that she'd have to  her cane at [facility]  because the cane is not a deliverable item , Address :  [address] [place] [address] , phone number [phone].

## 2018-07-16 ENCOUNTER — OFFICE VISIT (OUTPATIENT)
Dept: PRIMARY CARE CLINIC | Facility: CLINIC | Age: 78
End: 2018-07-16
Payer: MEDICARE

## 2018-07-16 ENCOUNTER — TELEPHONE (OUTPATIENT)
Dept: INTERNAL MEDICINE | Facility: CLINIC | Age: 78
End: 2018-07-16

## 2018-07-16 ENCOUNTER — LAB VISIT (OUTPATIENT)
Dept: LAB | Facility: HOSPITAL | Age: 78
End: 2018-07-16
Attending: INTERNAL MEDICINE
Payer: MEDICARE

## 2018-07-16 VITALS
HEIGHT: 59 IN | OXYGEN SATURATION: 98 % | WEIGHT: 279 LBS | SYSTOLIC BLOOD PRESSURE: 118 MMHG | HEART RATE: 71 BPM | BODY MASS INDEX: 56.24 KG/M2 | DIASTOLIC BLOOD PRESSURE: 58 MMHG

## 2018-07-16 DIAGNOSIS — I50.32 CHRONIC DIASTOLIC HEART FAILURE: ICD-10-CM

## 2018-07-16 DIAGNOSIS — I87.2 VENOUS STASIS DERMATITIS OF BOTH LOWER EXTREMITIES: ICD-10-CM

## 2018-07-16 DIAGNOSIS — E11.42 TYPE 2 DIABETES MELLITUS WITH DIABETIC POLYNEUROPATHY, WITH LONG-TERM CURRENT USE OF INSULIN: ICD-10-CM

## 2018-07-16 DIAGNOSIS — R50.9 FEVER OF UNKNOWN ORIGIN (FUO): ICD-10-CM

## 2018-07-16 DIAGNOSIS — J45.909 UNCOMPLICATED ASTHMA, UNSPECIFIED ASTHMA SEVERITY, UNSPECIFIED WHETHER PERSISTENT: ICD-10-CM

## 2018-07-16 DIAGNOSIS — Z79.4 TYPE 2 DIABETES MELLITUS WITH DIABETIC POLYNEUROPATHY, WITH LONG-TERM CURRENT USE OF INSULIN: ICD-10-CM

## 2018-07-16 LAB
ANION GAP SERPL CALC-SCNC: 7 MMOL/L
BASOPHILS # BLD AUTO: 0.03 K/UL
BASOPHILS NFR BLD: 0.3 %
BUN SERPL-MCNC: 16 MG/DL
CALCIUM SERPL-MCNC: 9.6 MG/DL
CHLORIDE SERPL-SCNC: 109 MMOL/L
CO2 SERPL-SCNC: 25 MMOL/L
CREAT SERPL-MCNC: 1.1 MG/DL
D DIMER PPP IA.FEU-MCNC: 1.65 MG/L FEU
DIFFERENTIAL METHOD: ABNORMAL
EOSINOPHIL # BLD AUTO: 0.4 K/UL
EOSINOPHIL NFR BLD: 4.2 %
ERYTHROCYTE [DISTWIDTH] IN BLOOD BY AUTOMATED COUNT: 15.6 %
EST. GFR  (AFRICAN AMERICAN): 56 ML/MIN/1.73 M^2
EST. GFR  (NON AFRICAN AMERICAN): 49 ML/MIN/1.73 M^2
GLUCOSE SERPL-MCNC: 119 MG/DL
HCT VFR BLD AUTO: 37.1 %
HGB BLD-MCNC: 11.4 G/DL
LYMPHOCYTES # BLD AUTO: 1.3 K/UL
LYMPHOCYTES NFR BLD: 14.6 %
MCH RBC QN AUTO: 27.7 PG
MCHC RBC AUTO-ENTMCNC: 30.7 G/DL
MCV RBC AUTO: 90 FL
MONOCYTES # BLD AUTO: 0.7 K/UL
MONOCYTES NFR BLD: 7.4 %
NEUTROPHILS # BLD AUTO: 6.4 K/UL
NEUTROPHILS NFR BLD: 73.3 %
PLATELET # BLD AUTO: 221 K/UL
PMV BLD AUTO: 10.4 FL
POTASSIUM SERPL-SCNC: 4.5 MMOL/L
PROCALCITONIN SERPL IA-MCNC: 0.09 NG/ML
RBC # BLD AUTO: 4.11 M/UL
SODIUM SERPL-SCNC: 141 MMOL/L
WBC # BLD AUTO: 8.74 K/UL

## 2018-07-16 PROCEDURE — 84145 PROCALCITONIN (PCT): CPT

## 2018-07-16 PROCEDURE — 36415 COLL VENOUS BLD VENIPUNCTURE: CPT

## 2018-07-16 PROCEDURE — 99215 OFFICE O/P EST HI 40 MIN: CPT | Mod: 25,S$GLB,, | Performed by: INTERNAL MEDICINE

## 2018-07-16 PROCEDURE — 85379 FIBRIN DEGRADATION QUANT: CPT

## 2018-07-16 PROCEDURE — 85025 COMPLETE CBC W/AUTO DIFF WBC: CPT

## 2018-07-16 PROCEDURE — 80048 BASIC METABOLIC PNL TOTAL CA: CPT

## 2018-07-16 PROCEDURE — 94640 AIRWAY INHALATION TREATMENT: CPT | Mod: S$GLB,,, | Performed by: INTERNAL MEDICINE

## 2018-07-16 RX ORDER — IPRATROPIUM BROMIDE AND ALBUTEROL SULFATE 2.5; .5 MG/3ML; MG/3ML
3 SOLUTION RESPIRATORY (INHALATION) EVERY 6 HOURS PRN
Qty: 1 BOX | Refills: 3 | Status: SHIPPED | OUTPATIENT
Start: 2018-07-16 | End: 2019-01-22 | Stop reason: SDUPTHER

## 2018-07-16 RX ORDER — IPRATROPIUM BROMIDE AND ALBUTEROL SULFATE 2.5; .5 MG/3ML; MG/3ML
3 SOLUTION RESPIRATORY (INHALATION)
Status: COMPLETED | OUTPATIENT
Start: 2018-07-16 | End: 2018-07-16

## 2018-07-16 RX ADMIN — IPRATROPIUM BROMIDE AND ALBUTEROL SULFATE 3 ML: 2.5; .5 SOLUTION RESPIRATORY (INHALATION) at 11:07

## 2018-07-16 NOTE — PATIENT INSTRUCTIONS
TODAY:  - nebulizer treatment in clinic   + order nebulizer machine for the home from [facility]   + order duoneb medicine from [facility] pharmacy   + use nebulizer every 2 hours when short of breath  - elevate legs all night every night  - wrap legs every day  - CPAP every night and during the day when you're short of breath  - increase torsemide to twice daily for next 3 days  - labs today

## 2018-07-16 NOTE — TELEPHONE ENCOUNTER
Paperwork for diabetic shoes completed. Please fax order form along with last clinic note from Dr Anand with the order to [employer] at [phone]    Thanks,  BARB

## 2018-07-16 NOTE — ASSESSMENT & PLAN NOTE
Symptom improvement with discontinuation of BB by Pulm, compliant with inhalers  · Continue albuterol and advair  · F/U Pulm  · duoneb today  · Will order nebulizer for home use

## 2018-07-16 NOTE — Clinical Note
loida will  neb machine in a few minutes from you, and the duoneb medicine from an outside pharmacy.  Thanks, KJ

## 2018-07-16 NOTE — PROGRESS NOTES
Primary Care Provider Appointment    Subjective:      Patient ID: Duran Giordano is a 78 y.o. female with REJI, venous stasis ulcers, CHF    Chief Complaint: Leg Swelling; Wheezing; Diabetes; and Leg Pain    Patient spent the night with one of the other sisters last night in the hospital, did not use CPAP, did not elevate legs. Patient with worsened leg swelling and erythema bilaterally. Did not elevate legs last night, did not do leg wrappings yesterday.     She has SOB, only used CPAP for 3 hours last night. Is requesting duoneb treatments for home use PRN.    Her glucose readings have been controlled, in 120s. She's injecting 48U levemir, short-acting insulin 18U before breakfast and dinner. A1c controlled at 7 in 6/2018.    She is unable to assess her neck warmth and FUO at this time because she is so tired from staying up all night.    Has sleep medicine appointment tomorrow, then dentist appt on Wed.    Past Surgical History:   Procedure Laterality Date    HYSTERECTOMY  1982    secondary uterine fibroids    JOINT REPLACEMENT      Right total knee replacement         Past Medical History:   Diagnosis Date    Adrenal mass- adenoma stable since 2004 (1.8 cm and 8/13/12 (2 cm); stable 2016 2.4 cm     Adrenal mass- adenoma stable since 2004 (1.8 cm and 8/13/12 (2 cm); stable 2016 2.4 cm    Asthma in adult without complication 6/25/2015    Bilateral carotid artery disease 7/21/2017    Bilateral sciatica 2/7/2017    Cellulitis of right leg 08/16/2017    Cerebral infarction 1/29/2016    Multiple areas of lacunar infarction. Stroke risk factors include HTN, DM2, Dyslipidemia.  Continue ASA 81mg/ Statin therapy I have encouraged 30 minutes of physical activity daily for 5 days a week. She has access to a pool so this should not be so jarring to her joints.     Cervical radiculopathy 3/18/2015    Chronic knee pain     Chronic rhinitis 4/9/2013    CKD (chronic kidney disease) stage 3, GFR 30-59 ml/min  "1/29/2013    Coronary artery disease due to calcified coronary lesion 6/25/2015    Diastolic dysfunction 10/10/2013    Essential hypertension 6/25/2015    Gastroesophageal reflux disease without esophagitis 8/13/2012    Gout     Hives 10/1/2013    Mixed hyperlipidemia 8/13/2012    Morbid obesity with BMI of 50.0-59.9, adult 2/11/2014    Obstructive sleep apnea syndrome 8/13/2012    Senile cataracts of both eyes 1/22/2015    Traumatic open wound of right lower leg 8/25/2017    Trigeminal neuralgia of right side of face 5/23/2017    For years now Previously on Gabapentin, but caused constipation Dissipating over time Not related to intracranial abnormalities Possibly related to dental procedure    Type 2 diabetes mellitus with diabetic polyneuropathy, with long-term current use of insulin 1/29/2013    Venous stasis dermatitis of both lower extremities 8/21/2017    Vitamin D deficiency disease 8/13/2012       Review of Systems   Constitutional: Positive for activity change and appetite change. Negative for unexpected weight change.   Cardiovascular: Positive for leg swelling.   Gastrointestinal: Negative for constipation and diarrhea.   Skin: Positive for color change, rash and wound.   Hematological: Does not bruise/bleed easily.   Psychiatric/Behavioral: Positive for sleep disturbance. Negative for behavioral problems and decreased concentration.       Objective:   BP (!) 118/58 (BP Location: Right arm, Patient Position: Sitting, BP Method: Large (Manual))   Pulse 71   Ht 4' 11" (1.499 m)   Wt 126.6 kg (279 lb)   SpO2 98%   BMI 56.35 kg/m²     Physical Exam   Constitutional: She appears well-developed.   Severe obesity   Eyes: EOM are normal.   Cardiovascular: Normal rate.    Pulmonary/Chest: Effort normal and breath sounds normal.   No wheezing, but obese body habitus   Abdominal:   Obese abdomen   Musculoskeletal: She exhibits edema.   Significant swelling in legs bilaterally   Neurological: She " is alert.   Skin:   Erythematous LE bilaterally  No evidence of infection in LE bilaterally  Wound RLE healed   Psychiatric: She has a normal mood and affect.   Poor insight into disease       Lab Results   Component Value Date    WBC 10.31 06/27/2018    HGB 11.9 (L) 06/27/2018    HCT 38.6 06/27/2018     06/27/2018    CHOL 124 05/21/2018    TRIG 86 05/21/2018    HDL 36 (L) 05/21/2018    ALT 20 06/27/2018    AST 19 06/27/2018     06/27/2018    K 4.6 06/27/2018     06/27/2018    CREATININE 1.2 06/27/2018    BUN 29 (H) 06/27/2018    CO2 27 06/27/2018    TSH 0.746 05/21/2018    INR 1.1 04/17/2005    HGBA1C 7.0 (H) 05/21/2018         Assessment:   78 y.o. female with multiple co-morbid illnesses here to continue work-up of chronic issues notably REJI, venous stasis ulcers, CHF     Plan:     Problem List Items Addressed This Visit        Pulmonary    Uncomplicated asthma     Symptom improvement with discontinuation of BB by Pulm, compliant with inhalers  · Continue albuterol and advair  · F/U Pulm  · duoneb today  · Will order nebulizer for home use         Relevant Medications    albuterol-ipratropium 2.5 mg-0.5 mg/3 mL nebulizer solution 3 mL (Completed)    albuterol-ipratropium (DUO-NEB) 2.5 mg-0.5 mg/3 mL nebulizer solution    Other Relevant Orders    NEBULIZER FOR HOME USE       Cardiac/Vascular    Chronic diastolic heart failure     Diastolic heart failure, edema treated with torsemide  · Continue torsemide  · Increase to BID for next 3 days         Relevant Medications    albuterol-ipratropium 2.5 mg-0.5 mg/3 mL nebulizer solution 3 mL (Completed)    Other Relevant Orders    CBC auto differential    Basic metabolic panel    Venous stasis dermatitis of both lower extremities     LE swelling bilaterally with poor wound healing, routine circaids with wound care  · Continue circaids boot, per wound care  · Treat cellulitis PRN  · Monitor CBC  · Discontinue amlodipine         Relevant Orders     Procalcitonin    D dimer, quantitative       Endocrine    Type 2 diabetes mellitus with diabetic polyneuropathy, with long-term current use of insulin     Abnormal foot exam, followed by podiatry q2 mos  · Continue routine care            Other    Fever of unknown origin (FUO)     Patient with significant venous stasis, obesity, h/o hep A (in 1970s), provoked menopause with hysterectomy (1982)  · Previous labs neg for infection  · Elevated ESR, uric acid  · Monitor legs for venous stasis and infection  · Recent neg DVT [facility] Maintenance       Date Due Completion Date    TETANUS VACCINE 03/08/1960 ---    Influenza Vaccine 08/01/2018 9/2/2017    Override on 10/7/2015: Done    Override on 10/3/2014: Done    Override on 10/10/2013: Done    Override on 9/13/2011: Done    Hemoglobin A1c 11/21/2018 5/21/2018    Override on 7/13/2016: Done    Foot Exam 01/19/2019 1/19/2018 (Done)    Override on 1/19/2018: Done (Dr head)    Override on 5/31/2017: Done    Override on 7/20/2016: Done    Eye Exam 02/15/2019 2/15/2018    Override on 2/17/2016: Done    Override on 1/22/2015: Done    Override on 8/16/2012: Done    Lipid Panel 05/21/2019 5/21/2018    Mammogram 02/22/2020 2/22/2018    DEXA SCAN 02/22/2021 2/22/2018    Override on 12/13/2011: Done          Follow-up in about 4 days (around 7/20/2018) for this Friday. . One hour spent with this patient today, half of that in counseling.    Tami Schmidt MD/MPH  Internal Medicine  [facility]  [phone]

## 2018-07-16 NOTE — ASSESSMENT & PLAN NOTE
Diastolic heart failure, edema treated with torsemide  · Continue torsemide  · Increase to BID for next 3 days

## 2018-07-16 NOTE — ASSESSMENT & PLAN NOTE
Patient with significant venous stasis, obesity, h/o hep A (in 1970s), provoked menopause with hysterectomy (1982)  · Previous labs neg for infection  · Elevated ESR, uric acid  · Monitor legs for venous stasis and infection  · Recent neg DVT US

## 2018-07-16 NOTE — ASSESSMENT & PLAN NOTE
LE swelling bilaterally with poor wound healing, routine circaids with wound care  · Continue circaids boot, per wound care  · Treat cellulitis PRN  · Monitor CBC  · Discontinue amlodipine

## 2018-07-17 ENCOUNTER — TELEPHONE (OUTPATIENT)
Dept: INTERNAL MEDICINE | Facility: CLINIC | Age: 78
End: 2018-07-17

## 2018-07-17 ENCOUNTER — TELEPHONE (OUTPATIENT)
Dept: SLEEP MEDICINE | Facility: CLINIC | Age: 78
End: 2018-07-17

## 2018-07-17 NOTE — TELEPHONE ENCOUNTER
Called patient to remind him/her of their appointment tomorrow morning with provider and also WAS REMINDED to bring CPAP machine IF AVAILABLE and IF needed to reschedule or cancel to call me back at my extension provided to patient.    __ Patient Confirmed  X Left Voicemail

## 2018-07-18 ENCOUNTER — OFFICE VISIT (OUTPATIENT)
Dept: SLEEP MEDICINE | Facility: CLINIC | Age: 78
End: 2018-07-18
Payer: MEDICARE

## 2018-07-18 VITALS
HEART RATE: 90 BPM | BODY MASS INDEX: 56.27 KG/M2 | SYSTOLIC BLOOD PRESSURE: 160 MMHG | DIASTOLIC BLOOD PRESSURE: 60 MMHG | WEIGHT: 279.13 LBS | HEIGHT: 59 IN

## 2018-07-18 DIAGNOSIS — G47.33 OSA (OBSTRUCTIVE SLEEP APNEA): Primary | ICD-10-CM

## 2018-07-18 PROCEDURE — 99213 OFFICE O/P EST LOW 20 MIN: CPT | Mod: PBBFAC | Performed by: PSYCHIATRY & NEUROLOGY

## 2018-07-18 PROCEDURE — 99999 PR PBB SHADOW E&M-EST. PATIENT-LVL III: CPT | Mod: PBBFAC,,, | Performed by: PSYCHIATRY & NEUROLOGY

## 2018-07-18 PROCEDURE — 99213 OFFICE O/P EST LOW 20 MIN: CPT | Mod: S$PBB,,, | Performed by: PSYCHIATRY & NEUROLOGY

## 2018-07-18 NOTE — PROGRESS NOTES
Sister Pasquale returns to clinic today regarding the management of obstructive sleep apnea and CPAP equipment check.    She is using CPAP by her sleeping chair  She has been sleeping in a chair (also using a wedge pillow in bed).  Her pressure was set to 13 cm (based on titration)    She states this pressure increase is fine. Now using it next to her sleeping chair  Using the ramp set at 4 cm  Humidity set at 4  Her mouth sometimes feels dry.  She has a nasal pillows mask, which she likes. [employer] FX nasal pillows small size mask  She states that she really likes CPAP and it helps her feel better after a night of use.    Still sleepy during the day, but better recently after increasing CPAP use    Interrogation SleepStyle 200: CPAP at 13 cm; 1089 hours total; Machine good condition (F&P model), sleep style machine; 200 series. 4.8 hr/night.       EPWORTH SLEEPINESS SCALE 7/18/2018   Sitting and reading 2   Watching TV 2   Sitting, inactive in a public place (e.g. a theatre or a meeting) 3   As a passenger in a car for an hour without a break 2   Lying down to rest in the afternoon when circumstances permit 2   Sitting and talking to someone 0   Sitting quietly after a lunch without alcohol 2   In a car, while stopped for a few minutes in traffic 2   Total score 15       Recent titration at weight 281 lbs revealed she needed pressure increase from 10 to 13 cm    BASELINE PSG 12/23/11: + REJI, AHI 14.2, low O2 79%, wt 281 lbs. (Patient had sleep study in 2003, at which time she was titrated to 11 cm, wt 248 lbs)   TITRATION 5/9/16: Titrated to 13 cm; wt 281 lbs    DME = Medicare ([employer])      Past Medical History:   Diagnosis Date    Adrenal mass- adenoma stable since 2004 (1.8 cm and 8/13/12 (2 cm); stable 2016 2.4 cm     Adrenal mass- adenoma stable since 2004 (1.8 cm and 8/13/12 (2 cm); stable 2016 2.4 cm    Asthma in adult without complication 6/25/2015    Bilateral carotid artery disease 7/21/2017     Bilateral sciatica 2/7/2017    Cellulitis of right leg 08/16/2017    Cerebral infarction 1/29/2016    Multiple areas of lacunar infarction. Stroke risk factors include HTN, DM2, Dyslipidemia.  Continue ASA 81mg/ Statin therapy I have encouraged 30 minutes of physical activity daily for 5 days a week. She has access to a pool so this should not be so jarring to her joints.     Cervical radiculopathy 3/18/2015    Chronic knee pain     Chronic rhinitis 4/9/2013    CKD (chronic kidney disease) stage 3, GFR 30-59 ml/min 1/29/2013    Coronary artery disease due to calcified coronary lesion 6/25/2015    Diastolic dysfunction 10/10/2013    Essential hypertension 6/25/2015    Gastroesophageal reflux disease without esophagitis 8/13/2012    Gout     Hives 10/1/2013    Mixed hyperlipidemia 8/13/2012    Morbid obesity with BMI of 50.0-59.9, adult 2/11/2014    Obstructive sleep apnea syndrome 8/13/2012    Senile cataracts of both eyes 1/22/2015    Traumatic open wound of right lower leg 8/25/2017    Trigeminal neuralgia of right side of face 5/23/2017    For years now Previously on Gabapentin, but caused constipation Dissipating over time Not related to intracranial abnormalities Possibly related to dental procedure    Type 2 diabetes mellitus with diabetic polyneuropathy, with long-term current use of insulin 1/29/2013    Venous stasis dermatitis of both lower extremities 8/21/2017    Vitamin D deficiency disease 8/13/2012       Past Surgical History:   Procedure Laterality Date    HYSTERECTOMY  1982    secondary uterine fibroids    JOINT REPLACEMENT      Right total knee replacement         Family History   Problem Relation Age of Onset    Cancer Mother     Hypertension Father     Cancer Sister     No Known Problems Brother     No Known Problems Maternal Aunt     No Known Problems Maternal Uncle     No Known Problems Paternal Aunt     No Known Problems Paternal Uncle     No Known Problems  "Maternal Grandmother     No Known Problems Maternal Grandfather     No Known Problems Paternal Grandmother     No Known Problems Paternal Grandfather     Glaucoma Neg Hx     Breast cancer Neg Hx     Colon cancer Neg Hx     Ovarian cancer Neg Hx     Stroke Neg Hx     Allergic rhinitis Neg Hx     Allergies Neg Hx     Angioedema Neg Hx     Asthma Neg Hx     Atopy Neg Hx     Eczema Neg Hx     Immunodeficiency Neg Hx     Rhinitis Neg Hx     Urticaria Neg Hx     Amblyopia Neg Hx     Blindness Neg Hx     Cataracts Neg Hx     Diabetes Neg Hx     Macular degeneration Neg Hx     Retinal detachment Neg Hx     Strabismus Neg Hx     Thyroid disease Neg Hx     Psoriasis Neg Hx        Social History   Substance Use Topics    Smoking status: Never Smoker    Smokeless tobacco: Never Used    Alcohol use No       ALLERGIES: Reviewed in EPIC    CURRENT MEDICATIONS: Reviewed in EPIC    ROS:   Weight up 5 lbs  Denies palpitations, headaches, sinus congestion improved with nasal spray qhs prn.   Denies oral drying or nasal drying.      PHYSICAL EXAM:  Prior weight is 266 lbs.  BP (!) 160/60 (BP Location: Left arm, Patient Position: Sitting, BP Method: Medium (Manual))   Pulse 90   Ht 4' 11" (1.499 m)   Wt 126.6 kg (279 lb 1.6 oz)   BMI 56.37 kg/m²   GENERAL: morbid obese body habitus, well groomed       ASSESSMENT:     Obstructive sleep apnea, with improvement of CPAP after nightly use. Control of sleepiness has improved with increased nightly CPAP usage. Her weight is still under from her peak weight. Now with better pain control, she feels more successful with exercise and diet. Sufficient nightly CPAP use >4 hours per night. Feels more rested. Benefiting from CPAP    She has medical co-morbidities of hypertension and morbid obesity (BMI >30) .     PLAN:     1. Continue CPAP 13 cm, Goal of an additional 400 hours before next visit. She reports that she is moving CPAP back to her bedroom.   2. Sleep with " head of bed elevation and incline  3. Weight loss goal to 225 lbs.  4. If achieves weight, we can reassess whether CPAP is indicated.    DME = Medicare ([employer] [facility])(she reports using nasal pillows)    Continue Advair or Flonase NS 1-2 sprays qhs PRN to reduce potential nasal congestion.     Follow up 3-6 months: MD/NP

## 2018-07-20 ENCOUNTER — TELEPHONE (OUTPATIENT)
Dept: NEPHROLOGY | Facility: CLINIC | Age: 78
End: 2018-07-20

## 2018-07-20 ENCOUNTER — OFFICE VISIT (OUTPATIENT)
Dept: PRIMARY CARE CLINIC | Facility: CLINIC | Age: 78
End: 2018-07-20
Payer: MEDICARE

## 2018-07-20 VITALS
DIASTOLIC BLOOD PRESSURE: 58 MMHG | OXYGEN SATURATION: 99 % | SYSTOLIC BLOOD PRESSURE: 126 MMHG | WEIGHT: 281.5 LBS | HEIGHT: 59 IN | BODY MASS INDEX: 56.75 KG/M2 | HEART RATE: 72 BPM

## 2018-07-20 DIAGNOSIS — N18.30 CKD (CHRONIC KIDNEY DISEASE) STAGE 3, GFR 30-59 ML/MIN: ICD-10-CM

## 2018-07-20 DIAGNOSIS — R60.0 BILATERAL LEG EDEMA: ICD-10-CM

## 2018-07-20 DIAGNOSIS — R29.898 WEAKNESS OF LEFT LOWER EXTREMITY: Primary | ICD-10-CM

## 2018-07-20 DIAGNOSIS — N28.1 KIDNEY CYSTS: ICD-10-CM

## 2018-07-20 DIAGNOSIS — I50.32 CHRONIC DIASTOLIC HEART FAILURE: ICD-10-CM

## 2018-07-20 DIAGNOSIS — G47.33 OBSTRUCTIVE SLEEP APNEA SYNDROME: ICD-10-CM

## 2018-07-20 DIAGNOSIS — N18.30 CHRONIC KIDNEY DISEASE, STAGE III (MODERATE): Primary | ICD-10-CM

## 2018-07-20 DIAGNOSIS — E66.01 MORBID OBESITY DUE TO EXCESS CALORIES: ICD-10-CM

## 2018-07-20 PROCEDURE — 99215 OFFICE O/P EST HI 40 MIN: CPT | Mod: S$GLB,,, | Performed by: INTERNAL MEDICINE

## 2018-07-20 NOTE — PROGRESS NOTES
Primary Care Provider Appointment    Subjective:      Patient ID: Duran Giordano is a 78 y.o. female with severe venous stasis, controlled DM, REJI, HTN     Chief Complaint: Leg Swelling (f/u )    Patient with acute leg swelling at last appointment after not using CPAP, not elevating legs, not taking meds for one night. She improved with diuresis for past week, more leg elevations, more leg wraps, increased use of CPAP, PRN neb treatments. Legs wrapped before presenting to clinic today (usually are not). She states the erythema has improved. All labs drawn at last appt negative for infection (normal WBC, neg pro-calcitonin).    Seen by sleep medicine yesterday. Advised to increase hours on CPAP, then return in 6 mos. Patient in agreement and plans to comply.    Her AM glucose was in 120s today. Is overdue for Endo follow-up, is due for Nephrology follow-up.    She is using nebulizer as needed with improvement in SOB. Wants to switch to masks during neb treaments.    She is able to perform her work responsibilities at the [employer] now that chronic conditions are better controlled.    Her  at the [employer] is cooking lighter foods for her with low salt. She will request more of these.    Past Surgical History:   Procedure Laterality Date    HYSTERECTOMY  1982    secondary uterine fibroids    JOINT REPLACEMENT      Right total knee replacement         Past Medical History:   Diagnosis Date    Adrenal mass- adenoma stable since 2004 (1.8 cm and 8/13/12 (2 cm); stable 2016 2.4 cm     Adrenal mass- adenoma stable since 2004 (1.8 cm and 8/13/12 (2 cm); stable 2016 2.4 cm    Asthma in adult without complication 6/25/2015    Bilateral carotid artery disease 7/21/2017    Bilateral sciatica 2/7/2017    Cellulitis of right leg 08/16/2017    Cerebral infarction 1/29/2016    Multiple areas of lacunar infarction. Stroke risk factors include HTN, DM2, Dyslipidemia.  Continue ASA 81mg/ Statin therapy I have encouraged 30  "minutes of physical activity daily for 5 days a week. She has access to a pool so this should not be so jarring to her joints.     Cervical radiculopathy 3/18/2015    Chronic knee pain     Chronic rhinitis 4/9/2013    CKD (chronic kidney disease) stage 3, GFR 30-59 ml/min 1/29/2013    Coronary artery disease due to calcified coronary lesion 6/25/2015    Diastolic dysfunction 10/10/2013    Essential hypertension 6/25/2015    Gastroesophageal reflux disease without esophagitis 8/13/2012    Gout     Hives 10/1/2013    Mixed hyperlipidemia 8/13/2012    Morbid obesity with BMI of 50.0-59.9, adult 2/11/2014    Obstructive sleep apnea syndrome 8/13/2012    Senile cataracts of both eyes 1/22/2015    Traumatic open wound of right lower leg 8/25/2017    Trigeminal neuralgia of right side of face 5/23/2017    For years now Previously on Gabapentin, but caused constipation Dissipating over time Not related to intracranial abnormalities Possibly related to dental procedure    Type 2 diabetes mellitus with diabetic polyneuropathy, with long-term current use of insulin 1/29/2013    Venous stasis dermatitis of both lower extremities 8/21/2017    Vitamin D deficiency disease 8/13/2012       Review of Systems   Constitutional: Positive for activity change and appetite change. Negative for unexpected weight change.   Cardiovascular: Positive for leg swelling.   Gastrointestinal: Negative for constipation and diarrhea.   Skin: Positive for color change, rash and wound.   Hematological: Does not bruise/bleed easily.   Psychiatric/Behavioral: Positive for sleep disturbance. Negative for behavioral problems and decreased concentration.       Objective:   BP (!) 126/58 (BP Location: Left arm, Patient Position: Sitting, BP Method: Large (Manual))   Pulse 72   Ht 4' 11" (1.499 m)   Wt 127.7 kg (281 lb 8.4 oz)   SpO2 99%   BMI 56.86 kg/m²     Physical Exam   Constitutional: She appears well-developed.   Severe obesity "   Eyes: EOM are normal.   Cardiovascular: Normal rate.    Pulmonary/Chest: Effort normal and breath sounds normal.   No wheezing, but obese body habitus   Abdominal:   Obese abdomen   Musculoskeletal: She exhibits edema.   Legs wrapped bilaterally   Neurological: She is alert.   Skin:   Legs wrapped blaterally   Psychiatric: She has a normal mood and affect.   Poor insight into disease       Lab Results   Component Value Date    WBC 8.74 07/16/2018    HGB 11.4 (L) 07/16/2018    HCT 37.1 07/16/2018     07/16/2018    CHOL 124 05/21/2018    TRIG 86 05/21/2018    HDL 36 (L) 05/21/2018    ALT 20 06/27/2018    AST 19 06/27/2018     07/20/2018    K 4.1 07/20/2018     07/20/2018    CREATININE 1.0 07/20/2018    BUN 23 07/20/2018    CO2 25 07/20/2018    TSH 0.746 05/21/2018    INR 1.1 04/17/2005    HGBA1C 7.0 (H) 05/21/2018         Assessment:   78 y.o. female with multiple co-morbid illnesses here to continue work-up of chronic issues notably  severe venous stasis, controlled DM, REJI, HTN     Plan:     Problem List Items Addressed This Visit        Cardiac/Vascular    Chronic diastolic heart failure     Diastolic heart failure, edema treated with torsemide  · Continue torsemide  · Increase to BID for next week  · Check labs today         Relevant Orders    Basic metabolic panel (Completed)       Renal/    Kidney cysts    CKD (chronic kidney disease) stage 3, GFR 30-59 ml/min     Increase diuretic due to worsening edema  · Monitor BMP         Relevant Orders    Basic metabolic panel (Completed)       Endocrine    Morbid obesity due to excess calories     BMI >50, eats excessively, DM, HTN, REJI  · Counseled on restricting caloric intake one day per week- Wed  · No desserts, reduced carbs  · New cane tips ordered from DME         Relevant Medications    cane tips Misc       Orthopedic    Weakness of left lower extremity - Primary    Relevant Medications    cane tips Misc       Other    Obstructive sleep  apnea syndrome     Compliant with CPAP (previously noncompliant), but now not using for enough hours each night  · Advised to continue using machine  · Advised to place machine next to recliner chair or use in her bedroom  · Increase hours on machine         Bilateral leg edema     Likely due to venous status (discharged from lymphedema clinic in 2014)  · Is receiving assitance in the [employer] with leg wrappings  · LE ultrasound to evaluate for DVT neg  · Neg studies in 6/2016, 6/2018  · Continue cercaid wrappings daily  · Elevate at night  · Discontinued amlodipine  · Monitor CBC               Health Maintenance       Date Due Completion Date    TETANUS VACCINE [birth year]/1960 ---    Influenza Vaccine 08/01/2018 9/2/2017    Override on 10/7/2015: Done    Override on 10/3/2014: Done    Override on 10/10/2013: Done    Override on 9/13/2011: Done    Hemoglobin A1c 11/21/2018 5/21/2018    Override on 7/13/2016: Done    Foot Exam 01/19/2019 1/19/2018 (Done)    Override on 1/19/2018: Done (Dr head)    Override on 5/31/2017: Done    Override on 7/20/2016: Done    Eye Exam 02/15/2019 2/15/2018    Override on 2/17/2016: Done    Override on 1/22/2015: Done    Override on 8/16/2012: Done    Lipid Panel 05/21/2019 5/21/2018    Mammogram 02/22/2020 2/22/2018    DEXA SCAN 02/22/2021 2/22/2018    Override on 12/13/2011: Done          Follow-up in about 6 weeks (around 8/31/2018). . One hour spent with this patient today, half of that in counseling.    Tami Schmidt MD/MPH  Internal Medicine  [facility]  [phone]

## 2018-07-20 NOTE — ASSESSMENT & PLAN NOTE
BMI >50, eats excessively, DM, HTN, REJI  · Counseled on restricting caloric intake one day per week- Wed  · No desserts, reduced carbs  · New cane tips ordered from DME

## 2018-07-20 NOTE — TELEPHONE ENCOUNTER
Called freddy to set up pt appt and labs for august.   ----- Message from Freddy Luna MA sent at 7/20/2018  9:07 AM CDT -----  Contact: freddy [phone] or [phone]   Hi my name is Freddy my ext is [phone] I need a apt for sister Pasquale please add her to the schedule she has CKD and needs a f/u apt. Thanks

## 2018-07-20 NOTE — PATIENT INSTRUCTIONS
TODAY:  - continue the increased torsemide for the next week  - provided face mask for nebulizer treatments  - keep legs wrapped or elevated (or both) as much as possible  - use CPAP all night  - appt with Dr Schulz (Nephrology) and ROSALIO Munson (Endo)  - cane tip order faxed to [facility]  - lab today

## 2018-07-20 NOTE — ASSESSMENT & PLAN NOTE
Diastolic heart failure, edema treated with torsemide  · Continue torsemide  · Increase to BID for next week  · Check labs today

## 2018-07-21 NOTE — ASSESSMENT & PLAN NOTE
Compliant with CPAP (previously noncompliant), but now not using for enough hours each night  · Advised to continue using machine  · Advised to place machine next to recliner chair or use in her bedroom  · Increase hours on machine

## 2018-08-03 ENCOUNTER — DOCUMENTATION ONLY (OUTPATIENT)
Dept: PHARMACY | Facility: CLINIC | Age: 78
End: 2018-08-03

## 2018-08-03 NOTE — PROGRESS NOTES
Adherence packed for 28 days:     Morning card:  Aspirin 81 mg  Hydralazine 50 mg  Torsemide 20 mg  Vitamin D3 1,000 IU     Evening card:  Atorvastatin 80 mg  Hydralazine 50 mg  Losartan 100 mg    30''    Same day delivered

## 2018-08-15 ENCOUNTER — TELEPHONE (OUTPATIENT)
Dept: NEPHROLOGY | Facility: CLINIC | Age: 78
End: 2018-08-15

## 2018-08-15 NOTE — TELEPHONE ENCOUNTER
Called pt to inform her , her appt on the 20th has been cancel and reschedule to another day . Pt verbally understood, and im sending another reminder out to her

## 2018-08-17 ENCOUNTER — LAB VISIT (OUTPATIENT)
Dept: LAB | Facility: HOSPITAL | Age: 78
End: 2018-08-17
Payer: MEDICARE

## 2018-08-17 DIAGNOSIS — N18.30 CHRONIC KIDNEY DISEASE, STAGE III (MODERATE): ICD-10-CM

## 2018-08-17 LAB
ALBUMIN SERPL BCP-MCNC: 3.3 G/DL
ANION GAP SERPL CALC-SCNC: 7 MMOL/L
BUN SERPL-MCNC: 33 MG/DL
CALCIUM SERPL-MCNC: 10.2 MG/DL
CHLORIDE SERPL-SCNC: 106 MMOL/L
CO2 SERPL-SCNC: 29 MMOL/L
CREAT SERPL-MCNC: 1.2 MG/DL
EST. GFR  (AFRICAN AMERICAN): 50.7 ML/MIN/1.73 M^2
EST. GFR  (NON AFRICAN AMERICAN): 44 ML/MIN/1.73 M^2
GLUCOSE SERPL-MCNC: 129 MG/DL
PHOSPHATE SERPL-MCNC: 3.6 MG/DL
POTASSIUM SERPL-SCNC: 4.6 MMOL/L
SODIUM SERPL-SCNC: 142 MMOL/L

## 2018-08-17 PROCEDURE — 36415 COLL VENOUS BLD VENIPUNCTURE: CPT

## 2018-08-17 PROCEDURE — 80069 RENAL FUNCTION PANEL: CPT

## 2018-08-23 ENCOUNTER — TELEPHONE (OUTPATIENT)
Dept: PODIATRY | Facility: CLINIC | Age: 78
End: 2018-08-23

## 2018-08-23 NOTE — TELEPHONE ENCOUNTER
----- Message from Jermaine Belcher sent at 8/23/2018 10:40 AM CDT -----  Contact: Self/ [phone] or [phone]  Patient Requesting Sooner Appointment.     Reason for sooner appt.: Patient is having a ingrown toe nail and having pain from that ingrown toe nail.   Communication Preference: Cell phone [phone] or [phone]

## 2018-08-29 ENCOUNTER — OFFICE VISIT (OUTPATIENT)
Dept: NEPHROLOGY | Facility: CLINIC | Age: 78
End: 2018-08-29
Payer: MEDICARE

## 2018-08-29 ENCOUNTER — LAB VISIT (OUTPATIENT)
Dept: LAB | Facility: HOSPITAL | Age: 78
End: 2018-08-29
Attending: INTERNAL MEDICINE
Payer: MEDICARE

## 2018-08-29 ENCOUNTER — OFFICE VISIT (OUTPATIENT)
Dept: PRIMARY CARE CLINIC | Facility: CLINIC | Age: 78
End: 2018-08-29
Payer: MEDICARE

## 2018-08-29 VITALS
HEIGHT: 59 IN | HEART RATE: 74 BPM | BODY MASS INDEX: 55.11 KG/M2 | SYSTOLIC BLOOD PRESSURE: 140 MMHG | OXYGEN SATURATION: 98 % | DIASTOLIC BLOOD PRESSURE: 70 MMHG | WEIGHT: 273.38 LBS

## 2018-08-29 VITALS
BODY MASS INDEX: 55.73 KG/M2 | SYSTOLIC BLOOD PRESSURE: 120 MMHG | HEIGHT: 59 IN | OXYGEN SATURATION: 99 % | WEIGHT: 276.44 LBS | DIASTOLIC BLOOD PRESSURE: 50 MMHG | HEART RATE: 66 BPM

## 2018-08-29 DIAGNOSIS — I10 ESSENTIAL HYPERTENSION: ICD-10-CM

## 2018-08-29 DIAGNOSIS — N18.30 CHRONIC KIDNEY DISEASE (CKD), STAGE III (MODERATE): Primary | ICD-10-CM

## 2018-08-29 DIAGNOSIS — I50.30 DIASTOLIC HEART FAILURE, UNSPECIFIED HF CHRONICITY: ICD-10-CM

## 2018-08-29 DIAGNOSIS — G47.33 OSA (OBSTRUCTIVE SLEEP APNEA): ICD-10-CM

## 2018-08-29 DIAGNOSIS — I87.2 VENOUS STASIS DERMATITIS OF BOTH LOWER EXTREMITIES: ICD-10-CM

## 2018-08-29 DIAGNOSIS — R50.9 FEVER OF UNKNOWN ORIGIN (FUO): ICD-10-CM

## 2018-08-29 DIAGNOSIS — G47.33 OBSTRUCTIVE SLEEP APNEA SYNDROME: ICD-10-CM

## 2018-08-29 DIAGNOSIS — E55.9 VITAMIN D DEFICIENCY DISEASE: ICD-10-CM

## 2018-08-29 DIAGNOSIS — E66.01 MORBID OBESITY: ICD-10-CM

## 2018-08-29 DIAGNOSIS — J45.909 UNCOMPLICATED ASTHMA, UNSPECIFIED ASTHMA SEVERITY, UNSPECIFIED WHETHER PERSISTENT: ICD-10-CM

## 2018-08-29 DIAGNOSIS — E11.21 DIABETIC NEPHROPATHY ASSOCIATED WITH TYPE 2 DIABETES MELLITUS: ICD-10-CM

## 2018-08-29 LAB
25(OH)D3+25(OH)D2 SERPL-MCNC: 30 NG/ML
ALBUMIN SERPL BCP-MCNC: 3.2 G/DL
ALP SERPL-CCNC: 86 U/L
ALT SERPL W/O P-5'-P-CCNC: 17 U/L
ANION GAP SERPL CALC-SCNC: 8 MMOL/L
AST SERPL-CCNC: 18 U/L
BASOPHILS # BLD AUTO: 0.02 K/UL
BASOPHILS NFR BLD: 0.3 %
BILIRUB SERPL-MCNC: 0.5 MG/DL
BUN SERPL-MCNC: 22 MG/DL
CALCIUM SERPL-MCNC: 9.6 MG/DL
CHLORIDE SERPL-SCNC: 106 MMOL/L
CO2 SERPL-SCNC: 27 MMOL/L
CREAT SERPL-MCNC: 1.1 MG/DL
DIFFERENTIAL METHOD: ABNORMAL
EOSINOPHIL # BLD AUTO: 0.4 K/UL
EOSINOPHIL NFR BLD: 5 %
ERYTHROCYTE [DISTWIDTH] IN BLOOD BY AUTOMATED COUNT: 16.2 %
EST. GFR  (AFRICAN AMERICAN): 56 ML/MIN/1.73 M^2
EST. GFR  (NON AFRICAN AMERICAN): 49 ML/MIN/1.73 M^2
ESTIMATED AVG GLUCOSE: 166 MG/DL
GLUCOSE SERPL-MCNC: 114 MG/DL
HBA1C MFR BLD HPLC: 7.4 %
HCT VFR BLD AUTO: 37.5 %
HGB BLD-MCNC: 11.6 G/DL
LYMPHOCYTES # BLD AUTO: 1.2 K/UL
LYMPHOCYTES NFR BLD: 15.6 %
MCH RBC QN AUTO: 27.8 PG
MCHC RBC AUTO-ENTMCNC: 30.9 G/DL
MCV RBC AUTO: 90 FL
MONOCYTES # BLD AUTO: 0.5 K/UL
MONOCYTES NFR BLD: 5.6 %
NEUTROPHILS # BLD AUTO: 5.9 K/UL
NEUTROPHILS NFR BLD: 73.5 %
PLATELET # BLD AUTO: 209 K/UL
PMV BLD AUTO: 10.6 FL
POTASSIUM SERPL-SCNC: 4.3 MMOL/L
PROT SERPL-MCNC: 8.1 G/DL
RBC # BLD AUTO: 4.18 M/UL
SODIUM SERPL-SCNC: 141 MMOL/L
WBC # BLD AUTO: 7.97 K/UL

## 2018-08-29 PROCEDURE — 80053 COMPREHEN METABOLIC PANEL: CPT

## 2018-08-29 PROCEDURE — 85025 COMPLETE CBC W/AUTO DIFF WBC: CPT

## 2018-08-29 PROCEDURE — 99999 PR PBB SHADOW E&M-EST. PATIENT-LVL V: CPT | Mod: PBBFAC,,, | Performed by: INTERNAL MEDICINE

## 2018-08-29 PROCEDURE — 99215 OFFICE O/P EST HI 40 MIN: CPT | Mod: S$GLB,,, | Performed by: INTERNAL MEDICINE

## 2018-08-29 PROCEDURE — 82306 VITAMIN D 25 HYDROXY: CPT

## 2018-08-29 PROCEDURE — 99215 OFFICE O/P EST HI 40 MIN: CPT | Mod: PBBFAC | Performed by: INTERNAL MEDICINE

## 2018-08-29 PROCEDURE — 83036 HEMOGLOBIN GLYCOSYLATED A1C: CPT

## 2018-08-29 PROCEDURE — 99213 OFFICE O/P EST LOW 20 MIN: CPT | Mod: S$PBB,,, | Performed by: INTERNAL MEDICINE

## 2018-08-29 PROCEDURE — 36415 COLL VENOUS BLD VENIPUNCTURE: CPT

## 2018-08-29 NOTE — ASSESSMENT & PLAN NOTE
A1c 7.2 (improved from 10 previously), GFR improving  · Continue to control DM  · Avoid NSAIDs  · Monitor A1c

## 2018-08-29 NOTE — ASSESSMENT & PLAN NOTE
Patient with significant venous stasis, obesity, h/o hep A (in 1970s), provoked menopause with hysterectomy (1982)  · Previous labs neg for infection  · Elevated ESR, uric acid  · Monitor legs for venous stasis and infection  · Recent neg DVT US  · Monitor symptoms

## 2018-08-29 NOTE — ASSESSMENT & PLAN NOTE
Symptom improvement with discontinuation of BB by Pulm, compliant with inhalers  · Continue albuterol and advair  · F/U Pulm  · New referral to NP Cathleen today  · Continue neb treatments

## 2018-08-29 NOTE — PROGRESS NOTES
Sister Duran Giordano  is here today for follow up evaluation of CKD III. Last seen in renal office 1/22/18. Baseline Cr; 1.1-1.2 mg/dl. Her most recent lab is listed below. She is morbidly obese; hx of  hypertension and diabetes; last A1c; 7.0%.  Today she has no new complaints    Last relevant lab results shown below:  Lab Results   Component Value Date    K 4.3 08/29/2018    CREATININE 1.1 08/29/2018    ESTGFRAFRICA 56 (A) 08/29/2018    EGFRNONAA 49 (A) 08/29/2018       Physical Exam   Morbidly obese woman; no acute distress; oriented x 3  BP; 140/70    HEENT; Grossly Intact  CHEST; Clear P&A; no rales or rhonchi  HEART; RR; S1&S2 no murmur rub gallop  ABD; Obese; BS(+) non-tender; (-)CVAT   EXT; (+++) bilateral  lymphaedema ; lower extremity;     Impression:  CKD III At baseline function; Stable    Hypertension Borderline control    Diabetes  At goal A1c      Plan:  Return Visit; 6 mo

## 2018-08-29 NOTE — PATIENT INSTRUCTIONS
TODAY:  - labs today  - attend nephrology appt with Dr Schulz  - Pulmonology referral with ROSALIO Connolly  - continue pill packing

## 2018-08-29 NOTE — ASSESSMENT & PLAN NOTE
Compliant with CPAP only 4 hours per night (previously noncompliant)  · Advised to continue using machine  · Advised to place machine next to recliner chair or use in her bedroom  · Increase hours on machine  · Advised to lose weight

## 2018-08-29 NOTE — PROGRESS NOTES
"Primary Care Provider Appointment    Subjective:      Patient ID: Duran Giordano is a 78 y.o. female with venous stasis, reactive airway disease, DM, HFpEF    Chief Complaint: Asthma; Cough; Follow-up (6 week ); and Edema    She continues to have productive cough. She is compliant with advair, albuterol and duonebs PRN. "I can't seem to shake it." She uses her CPAP only 4 hours per night. Last seen by Dr Chairez 2 mos ago. At her last appt with sleep medicine on 7/18/18 she was advised to increase use of CPAP, and lose weight (goal of 225#). Seen by Pulm in 10/2017 with continuation of advair, albuterol. Valentina CONNELL'carmine. She was lost to follow-up, will reschedule today. Her last echo was 8/2017, showed diastolic dysfunction. She takes torsemide daily.    Her sugars have been "very good, way down." In 120s both AM and PM readings. She takes 48U levemir, 18U humalog before breakfast and dinner.    Her subjective fever continues in the afternoon. Work-up negative thus far, although uric acid and sed rate were elevated. She is not as concerned about it today.    Pills packed by pharmacy as follows:  Adherence packed for 28 days:     Morning card:  Aspirin 81 mg  Hydralazine 50 mg  Torsemide 20 mg  Vitamin D3 1,000 IU     Evening card:  Atorvastatin 80 mg  Hydralazine 50 mg  Losartan 100 mg     30''     Same day delivered          Electronically signed by Mercedes Lagunas, PharmD at 8/3/2018  4:46 PM     Past Surgical History:   Procedure Laterality Date    HYSTERECTOMY  1982    secondary uterine fibroids    JOINT REPLACEMENT      Right total knee replacement         Past Medical History:   Diagnosis Date    Adrenal mass- adenoma stable since 2004 (1.8 cm and 8/13/12 (2 cm); stable 2016 2.4 cm     Adrenal mass- adenoma stable since 2004 (1.8 cm and 8/13/12 (2 cm); stable 2016 2.4 cm    Asthma in adult without complication 6/25/2015    Bilateral carotid artery disease 7/21/2017    Bilateral sciatica 2/7/2017    " Cellulitis of right leg 08/16/2017    Cerebral infarction 1/29/2016    Multiple areas of lacunar infarction. Stroke risk factors include HTN, DM2, Dyslipidemia.  Continue ASA 81mg/ Statin therapy I have encouraged 30 minutes of physical activity daily for 5 days a week. She has access to a pool so this should not be so jarring to her joints.     Cervical radiculopathy 3/18/2015    Chronic knee pain     Chronic rhinitis 4/9/2013    CKD (chronic kidney disease) stage 3, GFR 30-59 ml/min 1/29/2013    Coronary artery disease due to calcified coronary lesion 6/25/2015    Diastolic dysfunction 10/10/2013    Essential hypertension 6/25/2015    Gastroesophageal reflux disease without esophagitis 8/13/2012    Gout     Hives 10/1/2013    Mixed hyperlipidemia 8/13/2012    Morbid obesity with BMI of 50.0-59.9, adult 2/11/2014    Obstructive sleep apnea syndrome 8/13/2012    Senile cataracts of both eyes 1/22/2015    Traumatic open wound of right lower leg 8/25/2017    Trigeminal neuralgia of right side of face 5/23/2017    For years now Previously on Gabapentin, but caused constipation Dissipating over time Not related to intracranial abnormalities Possibly related to dental procedure    Type 2 diabetes mellitus with diabetic polyneuropathy, with long-term current use of insulin 1/29/2013    Venous stasis dermatitis of both lower extremities 8/21/2017    Vitamin D deficiency disease 8/13/2012       Review of Systems   Constitutional: Positive for activity change and appetite change. Negative for unexpected weight change.   Respiratory: Positive for cough.    Cardiovascular: Positive for leg swelling.   Gastrointestinal: Negative for constipation and diarrhea.   Skin: Positive for color change, rash and wound.   Hematological: Does not bruise/bleed easily.   Psychiatric/Behavioral: Positive for sleep disturbance. Negative for behavioral problems and decreased concentration.       Objective:   BP (!) 120/50  "(BP Location: Right arm, Patient Position: Sitting, BP Method: Large (Manual))   Pulse 66   Ht 4' 11" (1.499 m)   Wt 125.4 kg (276 lb 7.3 oz)   SpO2 99%   BMI 55.84 kg/m²     Physical Exam   Constitutional: She appears well-developed.   Severe obesity   Eyes: EOM are normal.   Cardiovascular: Normal rate.   Pulmonary/Chest: Effort normal and breath sounds normal.   No wheezing, but obese body habitus   Abdominal:   Obese abdomen   Musculoskeletal: She exhibits edema.   Legs wrapped bilaterally   Neurological: She is alert.   Skin:   Legs wrapped blaterally   Psychiatric: She has a normal mood and affect.   Poor insight into disease       Lab Results   Component Value Date    WBC 8.74 07/16/2018    HGB 11.4 (L) 07/16/2018    HCT 37.1 07/16/2018     07/16/2018    CHOL 124 05/21/2018    TRIG 86 05/21/2018    HDL 36 (L) 05/21/2018    ALT 20 06/27/2018    AST 19 06/27/2018     08/17/2018    K 4.6 08/17/2018     08/17/2018    CREATININE 1.2 08/17/2018    BUN 33 (H) 08/17/2018    CO2 29 08/17/2018    TSH 0.746 05/21/2018    INR 1.1 04/17/2005    HGBA1C 7.0 (H) 05/21/2018         Assessment:   78 y.o. female with multiple co-morbid illnesses here to continue work-up of chronic issues notably venous stasis, reactive airway disease, DM, HFpEF     Plan:     Problem List Items Addressed This Visit        Pulmonary    Uncomplicated asthma     Symptom improvement with discontinuation of BB by Pulm, compliant with inhalers  · Continue albuterol and advair  · F/U Pulm  · New referral to [facility] Connolly today  · Continue neb treatments         Relevant Orders    Ambulatory Referral to Pulmonology       Cardiac/Vascular    Venous stasis dermatitis of both lower extremities     LE swelling bilaterally with poor wound healing, routine circaids with wound care  · Continue circaids boot, per wound care  · Treat cellulitis PRN  · Monitor CBC  · Discontinue amlodipine         Relevant Orders    CBC auto differential    "    Endocrine    Vitamin D deficiency disease     Level 30 in 5/2017  · Continue daily supplementation  · Monitor level         Relevant Orders    Vitamin D    Uncontrolled secondary diabetes mellitus with stage 3 CKD (GFR 30-59)     A1c 7.2 (improved from 10 previously), GFR improving  · Continue to control DM  · Avoid NSAIDs  · Monitor A1c         Relevant Orders    Hemoglobin A1c    Comprehensive metabolic panel       Other    Obstructive sleep apnea syndrome     Compliant with CPAP only 4 hours per night (previously noncompliant)  · Advised to continue using machine  · Advised to place machine next to recliner chair or use in her bedroom  · Increase hours on machine  · Advised to lose weight         Relevant Orders    Ambulatory Referral to Pulmonology    Fever of unknown origin (FUO)     Patient with significant venous stasis, obesity, h/o hep A (in 1970s), provoked menopause with hysterectomy (1982)  · Previous labs neg for infection  · Elevated ESR, uric acid  · Monitor legs for venous stasis and infection  · Recent neg DVT US  · Monitor symptoms               Health Maintenance       Date Due Completion Date    TETANUS VACCINE 03/08/1960 ---    Influenza Vaccine 08/01/2018 9/2/2017    Override on 10/7/2015: Done    Override on 10/3/2014: Done    Override on 10/10/2013: Done    Override on 9/13/2011: Done    Hemoglobin A1c 11/21/2018 5/21/2018- TODAY    Override on 7/13/2016: Done    Foot Exam 01/19/2019 1/19/2018 (Done)    Override on 1/19/2018: Done (Dr head)    Override on 5/31/2017: Done    Override on 7/20/2016: Done    Eye Exam 02/15/2019 2/15/2018    Override on 2/17/2016: Done    Override on 1/22/2015: Done    Override on 8/16/2012: Done    Lipid Panel 05/21/2019 5/21/2018    Mammogram 02/22/2020 2/22/2018    DEXA SCAN 02/22/2021 2/22/2018    Override on 12/13/2011: Done          Follow-up in about 2 months (around 10/29/2018). . One hour spent with this patient today, half of that in  counseling.    Tami Schmidt MD/MPH  Internal Medicine  [facility]  [phone]

## 2018-08-30 ENCOUNTER — TELEPHONE (OUTPATIENT)
Dept: INTERNAL MEDICINE | Facility: CLINIC | Age: 78
End: 2018-08-30

## 2018-08-30 DIAGNOSIS — E55.9 VITAMIN D DEFICIENCY DISEASE: Primary | ICD-10-CM

## 2018-08-30 RX ORDER — PEN NEEDLE, DIABETIC 29 G X1/2"
NEEDLE, DISPOSABLE MISCELLANEOUS
Qty: 200 EACH | Refills: 6 | Status: SHIPPED | OUTPATIENT
Start: 2018-08-30 | End: 2019-09-06 | Stop reason: SDUPTHER

## 2018-08-30 RX ORDER — DICLOFENAC SODIUM 10 MG/G
GEL TOPICAL
Qty: 100 G | Refills: 2 | Status: SHIPPED | OUTPATIENT
Start: 2018-08-30 | End: 2018-08-31 | Stop reason: SDUPTHER

## 2018-08-30 RX ORDER — DICLOFENAC SODIUM 10 MG/G
GEL TOPICAL
Qty: 100 G | Refills: 2 | Status: SHIPPED | OUTPATIENT
Start: 2018-08-30 | End: 2018-08-30 | Stop reason: SDUPTHER

## 2018-08-30 NOTE — TELEPHONE ENCOUNTER
Please let patient know that all of her labs look good! Her a1c was 7.4 (a little higher than the last one but this is ok!), anemia improved, kidney function good, liver function good.    Thanks,  KJ

## 2018-08-31 RX ORDER — DICLOFENAC SODIUM 10 MG/G
GEL TOPICAL
Qty: 100 G | Refills: 2 | Status: SHIPPED | OUTPATIENT
Start: 2018-08-31 | End: 2018-09-06 | Stop reason: SDUPTHER

## 2018-09-04 ENCOUNTER — OFFICE VISIT (OUTPATIENT)
Dept: PODIATRY | Facility: CLINIC | Age: 78
End: 2018-09-04
Payer: MEDICARE

## 2018-09-04 VITALS
HEART RATE: 100 BPM | HEIGHT: 59 IN | DIASTOLIC BLOOD PRESSURE: 69 MMHG | BODY MASS INDEX: 55.04 KG/M2 | SYSTOLIC BLOOD PRESSURE: 168 MMHG | WEIGHT: 273 LBS

## 2018-09-04 DIAGNOSIS — Z79.4 TYPE 2 DIABETES MELLITUS WITH DIABETIC POLYNEUROPATHY, WITH LONG-TERM CURRENT USE OF INSULIN: Primary | ICD-10-CM

## 2018-09-04 DIAGNOSIS — L60.0 INGROWN NAIL: ICD-10-CM

## 2018-09-04 DIAGNOSIS — E11.42 TYPE 2 DIABETES MELLITUS WITH DIABETIC POLYNEUROPATHY, WITH LONG-TERM CURRENT USE OF INSULIN: Primary | ICD-10-CM

## 2018-09-04 DIAGNOSIS — B35.1 ONYCHOMYCOSIS DUE TO DERMATOPHYTE: ICD-10-CM

## 2018-09-04 PROCEDURE — 11721 DEBRIDE NAIL 6 OR MORE: CPT | Mod: Q9,S$PBB,, | Performed by: PODIATRIST

## 2018-09-04 PROCEDURE — 99213 OFFICE O/P EST LOW 20 MIN: CPT | Mod: PBBFAC | Performed by: PODIATRIST

## 2018-09-04 PROCEDURE — 99999 PR PBB SHADOW E&M-EST. PATIENT-LVL III: CPT | Mod: PBBFAC,,, | Performed by: PODIATRIST

## 2018-09-04 PROCEDURE — 11721 DEBRIDE NAIL 6 OR MORE: CPT | Mod: Q9,PBBFAC | Performed by: PODIATRIST

## 2018-09-04 PROCEDURE — 99499 UNLISTED E&M SERVICE: CPT | Mod: S$PBB,,, | Performed by: PODIATRIST

## 2018-09-05 PROBLEM — L60.0 INGROWN NAIL: Status: ACTIVE | Noted: 2018-09-05

## 2018-09-05 PROBLEM — B35.1 ONYCHOMYCOSIS DUE TO DERMATOPHYTE: Status: ACTIVE | Noted: 2018-09-05

## 2018-09-06 DIAGNOSIS — E55.9 VITAMIN D DEFICIENCY DISEASE: ICD-10-CM

## 2018-09-06 RX ORDER — DICLOFENAC SODIUM 10 MG/G
GEL TOPICAL
Qty: 100 G | Refills: 2 | Status: CANCELLED | OUTPATIENT
Start: 2018-09-06

## 2018-09-06 RX ORDER — DICLOFENAC SODIUM 10 MG/G
GEL TOPICAL
Qty: 100 G | Refills: 2 | Status: SHIPPED | OUTPATIENT
Start: 2018-09-06 | End: 2020-08-07

## 2018-09-06 NOTE — TELEPHONE ENCOUNTER
----- Message from Pratima Aragon sent at 9/6/2018 12:17 PM CDT -----  Contact: self [phone]  .Rx Refill/Request     Is this a Refill or New Rx:  New rx  Rx Name and Strength:  voltern Gel   Preferred Pharmacy with phone number: [facility], [facility] - [place], [place] - [address]  Communication Preference:  Additional Information:

## 2018-09-06 NOTE — PROGRESS NOTES
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    Chief Complaint: Diabetes Mellitus (bilataral pcp Dr Schmidt 3/16/18); Diabetic Foot Exam; Foot Pain; and Nail Care    Duran is a 78 y.o. female who presents to the clinic for evaluation and treatment of high risk feet. Duran has a past medical history of Adrenal mass- adenoma stable since 2004 (1.8 cm and 8/13/12 (2 cm); stable 2016 2.4 cm, Asthma in adult without complication (6/25/2015), Bilateral carotid artery disease (7/21/2017), Bilateral sciatica (2/7/2017), Cellulitis of right leg (08/16/2017), Cerebral infarction (1/29/2016), Cervical radiculopathy (3/18/2015), Chronic knee pain, Chronic rhinitis (4/9/2013), CKD (chronic kidney disease) stage 3, GFR 30-59 ml/min (1/29/2013), Coronary artery disease due to calcified coronary lesion (6/25/2015), Diastolic dysfunction (10/10/2013), Essential hypertension (6/25/2015), Gastroesophageal reflux disease without esophagitis (8/13/2012), Gout, Hives (10/1/2013), Mixed hyperlipidemia (8/13/2012), Morbid obesity with BMI of 50.0-59.9, adult (2/11/2014), Obstructive sleep apnea syndrome (8/13/2012), Senile cataracts of both eyes (1/22/2015), Traumatic open wound of right lower leg (8/25/2017), Trigeminal neuralgia of right side of face (5/23/2017), Type 2 diabetes mellitus with diabetic polyneuropathy, with long-term current use of insulin (1/29/2013), Venous stasis dermatitis of both lower extremities (8/21/2017), and Vitamin D deficiency disease (8/13/2012). The patient's chief complaint is long, thick toenails, especially the left great toenail that is hurting significantly with wearing shoes and when walking. This patient has documented high risk feet requiring routine maintenance secondary to diabetes mellitis and those secondary complications of diabetes, as mentioned.    PCP: Tami Schmidt MD    Date Last Seen by PCP:   Chief Complaint   Patient presents with    Diabetes  Mellitus     bilataral pcp Dr Schmidt 3/16/18    Diabetic Foot Exam    Foot Pain    Nail Care        Current shoe gear:  Affected Foot: Casual shoes     Unaffected Foot: Casual shoes    Hemoglobin A1C   Date Value Ref Range Status   08/29/2018 7.4 (H) 4.0 - 5.6 % Final     Comment:     ADA Screening Guidelines:  5.7-6.4%  Consistent with prediabetes  >or=6.5%  Consistent with diabetes  High levels of fetal hemoglobin interfere with the HbA1C  assay. Heterozygous hemoglobin variants (HbS, HgC, etc)do  not significantly interfere with this assay.   However, presence of multiple variants may affect accuracy.     05/21/2018 7.0 (H) 4.0 - 5.6 % Final     Comment:     According to ADA guidelines, hemoglobin A1c <7.0% represents  optimal control in non-pregnant diabetic patients. Different  metrics may apply to specific patient populations.   Standards of Medical Care in Diabetes-2016.  For the purpose of screening for the presence of diabetes:  <5.7%     Consistent with the absence of diabetes  5.7-6.4%  Consistent with increasing risk for diabetes   (prediabetes)  >or=6.5%  Consistent with diabetes  Currently, no consensus exists for use of hemoglobin A1c  for diagnosis of diabetes for children.  This Hemoglobin A1c assay has significant interference with fetal   hemoglobin   (HbF). The results are invalid for patients with abnormal amounts of   HbF,   including those with known Hereditary Persistence   of Fetal Hemoglobin. Heterozygous hemoglobin variants (HbAS, HbAC,   HbAD, HbAE, HbA2) do not significantly interfere with this assay;   however, presence of multiple variants in a sample may impact the %   interference.     03/14/2018 7.0 (H) 4.0 - 5.6 % Final     Comment:     According to ADA guidelines, hemoglobin A1c <7.0% represents  optimal control in non-pregnant diabetic patients. Different  metrics may apply to specific patient populations.   Standards of Medical Care in Diabetes-2016.  For the purpose of  screening for the presence of diabetes:  <5.7%     Consistent with the absence of diabetes  5.7-6.4%  Consistent with increasing risk for diabetes   (prediabetes)  >or=6.5%  Consistent with diabetes  Currently, no consensus exists for use of hemoglobin A1c  for diagnosis of diabetes for children.  This Hemoglobin A1c assay has significant interference with fetal   hemoglobin   (HbF). The results are invalid for patients with abnormal amounts of   HbF,   including those with known Hereditary Persistence   of Fetal Hemoglobin. Heterozygous hemoglobin variants (HbAS, HbAC,   HbAD, HbAE, HbA2) do not significantly interfere with this assay;   however, presence of multiple variants in a sample may impact the %   interference.         Review of Systems   Cardiovascular: Positive for leg swelling.   Skin: Positive for dry skin, nail changes and poor wound healing.   Neurological: Positive for numbness. Negative for paresthesias.           Objective:      Physical Exam   Constitutional: She is oriented to person, place, and time. She appears well-developed and well-nourished.   Cardiovascular:   Pulses:       Dorsalis pedis pulses are 2+ on the right side, and 2+ on the left side.        Posterior tibial pulses are 2+ on the right side, and 2+ on the left side.   Dorsalis pedis and posterior tibial pulses are palpable bilaterally. Toes are cool to touch. Feet are warm proximally.There is decreased digital hair bilateral. Skin is atrophic, hyperpigmented, and moderately edematous.     Musculoskeletal: She exhibits no edema or tenderness.        Right ankle: Normal.        Left ankle: Normal.        Right foot: There is no swelling, no crepitus and no deformity.        Left foot: There is no swelling, no crepitus and no deformity.   Adequate joint range of motion without pain, limitation, nor crepitation Bilateral feet and ankle joints. Muscle strength is 5/5 in all groups bilaterally.         Lymphadenopathy:   B/l lymph  edema    Neurological: She is alert and oriented to person, place, and time. She has normal strength.   Decreased sharp/dull sensation bilateral feet.   Skin: Skin is warm, dry and intact. No rash noted. No erythema. Nails show no clubbing.   Nails x10 are elongated by 2-4 mm, thickened by 2-5 mm, dystrophic, and are darkened in coloration. There is subungual debris. Nails are brittle. L halux nail medial border is ingrown and exquisitely tender to touch without acute signs of infection.     Xerosis Bilaterally. No open lesions noted.    Hyperkeratotic tissue mild around heels.      Psychiatric: She has a normal mood and affect. Her behavior is normal.   Nursing note and vitals reviewed.            Assessment:       Encounter Diagnoses   Name Primary?    Type 2 diabetes mellitus with diabetic polyneuropathy, with long-term current use of insulin Yes    Onychomycosis due to dermatophyte     Ingrown nail          Plan:       Duran was seen today for diabetes mellitus, diabetic foot exam, foot pain and nail care.    Diagnoses and all orders for this visit:    Type 2 diabetes mellitus with diabetic polyneuropathy, with long-term current use of insulin    Onychomycosis due to dermatophyte    Ingrown nail    - Patient was given written and verbal instructions regarding foot condition.  I counseled the patient on her conditions, their implications and medical management.    - Shoe inspection. Diabetic Foot Education. Patient reminded of the importance of good nutrition and blood sugar control to help prevent podiatric complications of diabetes. Patient instructed on proper foot hygeine. We discussed wearing proper shoe gear, daily foot inspections, never walking without protective shoe gear, never putting sharp instruments to feet, routine podiatric nail visits every 2-3 months.      - With patient's permission, nails were aggressively reduced and debrided x 10 to their soft tissue attachment mechanically and with  electric , removing all offending nail and debris. Patient relates relief following the procedure. No blood was drawn. She will continue to monitor the areas daily, inspect her feet, wear protective shoe gear when ambulatory, moisturizer to maintain skin integrity and follow in this office in approximately 2-3 months, sooner p.r.n.

## 2018-09-11 ENCOUNTER — DOCUMENTATION ONLY (OUTPATIENT)
Dept: PHARMACY | Facility: CLINIC | Age: 78
End: 2018-09-11

## 2018-09-11 ENCOUNTER — OFFICE VISIT (OUTPATIENT)
Dept: PRIMARY CARE CLINIC | Facility: CLINIC | Age: 78
End: 2018-09-11
Payer: MEDICARE

## 2018-09-11 VITALS
WEIGHT: 279.31 LBS | DIASTOLIC BLOOD PRESSURE: 38 MMHG | BODY MASS INDEX: 56.31 KG/M2 | HEART RATE: 96 BPM | SYSTOLIC BLOOD PRESSURE: 140 MMHG | HEIGHT: 59 IN | OXYGEN SATURATION: 96 %

## 2018-09-11 DIAGNOSIS — I87.2 VENOUS STASIS DERMATITIS OF BOTH LOWER EXTREMITIES: ICD-10-CM

## 2018-09-11 PROCEDURE — 99212 OFFICE O/P EST SF 10 MIN: CPT | Mod: S$GLB,,, | Performed by: INTERNAL MEDICINE

## 2018-09-11 NOTE — PROGRESS NOTES
Primary Care Provider Appointment    Subjective:      Patient ID: Duran Giordano is a 78 y.o. female with venous stasis ulcers, HTN, DM    Chief Complaint: Wound Infection (5 days )    Patient seen urgently today due to leg ulcer. She stopped using her circaids 4 days ago when the ulcer began. She has a bandaid on it in clinic. The wound does not look infected. She has no clinical evidence of infection.    Past Surgical History:   Procedure Laterality Date    HYSTERECTOMY  1982    secondary uterine fibroids    JOINT REPLACEMENT      Right total knee replacement         Past Medical History:   Diagnosis Date    Adrenal mass- adenoma stable since 2004 (1.8 cm and 8/13/12 (2 cm); stable 2016 2.4 cm     Adrenal mass- adenoma stable since 2004 (1.8 cm and 8/13/12 (2 cm); stable 2016 2.4 cm    Asthma in adult without complication 6/25/2015    Bilateral carotid artery disease 7/21/2017    Bilateral sciatica 2/7/2017    Cellulitis of right leg 08/16/2017    Cerebral infarction 1/29/2016    Multiple areas of lacunar infarction. Stroke risk factors include HTN, DM2, Dyslipidemia.  Continue ASA 81mg/ Statin therapy I have encouraged 30 minutes of physical activity daily for 5 days a week. She has access to a pool so this should not be so jarring to her joints.     Cervical radiculopathy 3/18/2015    Chronic knee pain     Chronic rhinitis 4/9/2013    CKD (chronic kidney disease) stage 3, GFR 30-59 ml/min 1/29/2013    Coronary artery disease due to calcified coronary lesion 6/25/2015    Diastolic dysfunction 10/10/2013    Essential hypertension 6/25/2015    Gastroesophageal reflux disease without esophagitis 8/13/2012    Gout     Hives 10/1/2013    Mixed hyperlipidemia 8/13/2012    Morbid obesity with BMI of 50.0-59.9, adult 2/11/2014    Obstructive sleep apnea syndrome 8/13/2012    Senile cataracts of both eyes 1/22/2015    Traumatic open wound of right lower leg 8/25/2017    Trigeminal  "neuralgia of right side of face 5/23/2017    For years now Previously on Gabapentin, but caused constipation Dissipating over time Not related to intracranial abnormalities Possibly related to dental procedure    Type 2 diabetes mellitus with diabetic polyneuropathy, with long-term current use of insulin 1/29/2013    Venous stasis dermatitis of both lower extremities 8/21/2017    Vitamin D deficiency disease 8/13/2012       Review of Systems   Constitutional: Positive for activity change and appetite change. Negative for unexpected weight change.   Cardiovascular: Positive for leg swelling.   Gastrointestinal: Negative for constipation and diarrhea.   Skin: Positive for color change, rash and wound.   Hematological: Does not bruise/bleed easily.   Psychiatric/Behavioral: Positive for sleep disturbance. Negative for behavioral problems and decreased concentration.       Objective:   BP (!) 140/38 (BP Location: Right arm, Patient Position: Sitting, BP Method: Large (Manual))   Pulse 96   Ht 4' 11" (1.499 m)   Wt 126.7 kg (279 lb 5.2 oz)   SpO2 96%   BMI 56.42 kg/m²     Physical Exam   Constitutional: She appears well-developed.   Severe obesity   Eyes: EOM are normal.   Cardiovascular: Normal rate.   Pulmonary/Chest: Effort normal and breath sounds normal.   No wheezing, but obese body habitus   Abdominal:   Obese abdomen   Musculoskeletal: She exhibits edema.   Worsened venous stasis   Neurological: She is alert.   Skin:   Stage 1 ulcer on L leg  Worsened venous stasis   Psychiatric: She has a normal mood and affect.   Poor insight into disease       Lab Results   Component Value Date    WBC 7.97 08/29/2018    HGB 11.6 (L) 08/29/2018    HCT 37.5 08/29/2018     08/29/2018    CHOL 124 05/21/2018    TRIG 86 05/21/2018    HDL 36 (L) 05/21/2018    ALT 17 08/29/2018    AST 18 08/29/2018     08/29/2018    K 4.3 08/29/2018     08/29/2018    CREATININE 1.1 08/29/2018    BUN 22 08/29/2018    CO2 27 " 08/29/2018    TSH 0.746 05/21/2018    INR 1.1 04/17/2005    HGBA1C 7.4 (H) 08/29/2018         Assessment:   78 y.o. female with multiple co-morbid illnesses here to continue work-up of chronic issues notably venous stasis ulcers, HTN, DM     Plan:     Problem List Items Addressed This Visit        Cardiac/Vascular    Venous stasis dermatitis of both lower extremities     LE swelling bilaterally with poor wound healing, routine circaids with wound care  · Continue circaids boot, per wound care  · Treat cellulitis PRN  · Monitor CBC  · Discontinue amlodipine  · Given explicit wound care instructions (see patient instructions)               Health Maintenance       Date Due Completion Date    TETANUS VACCINE 03/08/1960 ---    Influenza Vaccine 08/01/2018 9/2/2017- TODAY    Override on 10/7/2015: Done    Override on 10/3/2014: Done    Override on 10/10/2013: Done    Override on 9/13/2011: Done    Foot Exam 01/19/2019 1/19/2018 (Done)    Override on 1/19/2018: Done (Dr head)    Override on 5/31/2017: Done    Override on 7/20/2016: Done    Eye Exam 02/15/2019 2/15/2018    Override on 2/17/2016: Done    Override on 1/22/2015: Done    Override on 8/16/2012: Done    Hemoglobin A1c 02/28/2019 8/29/2018    Override on 7/13/2016: Done    Lipid Panel 05/21/2019 5/21/2018    Mammogram 02/22/2020 2/22/2018    DEXA SCAN 02/22/2021 2/22/2018    Override on 12/13/2011: Done          Follow-up if symptoms worsen or fail to improve, for already has follow-up. . One hour spent with this patient today, half of that in counseling.    Tami Schmidt MD/MPH  Internal Medicine  [facility]  [phone]

## 2018-09-11 NOTE — ASSESSMENT & PLAN NOTE
LE swelling bilaterally with poor wound healing, routine circaids with wound care  · Continue circaids boot, per wound care  · Treat cellulitis PRN  · Monitor CBC  · Discontinue amlodipine  · Given explicit wound care instructions (see patient instructions)

## 2018-09-11 NOTE — PATIENT INSTRUCTIONS
TODAY:  - if you develop a leg ulcer treat as follows:   1 + apply medihoney to wound   2 + apply gauze   3 + wrap with coban  - then make your own wrapping   4 + wrap with long gauze   5 + wrap with ACE bandage  - continue using circaid on other leg  - then elevate both legs

## 2018-09-11 NOTE — PROGRESS NOTES
Adherence packed for 28 days on 09/05/18:     Morning card:  Aspirin 81 mg  Hydralazine 50 mg  Torsemide 20 mg     Evening card:  Atorvastatin 80 mg  Hydralazine 50 mg  Losartan 100 mg  Vitamin D3 1,000 IU     30''     Same day delivered

## 2018-09-12 ENCOUNTER — IMMUNIZATION (OUTPATIENT)
Dept: INTERNAL MEDICINE | Facility: CLINIC | Age: 78
End: 2018-09-12
Payer: MEDICARE

## 2018-09-12 PROCEDURE — 90662 IIV NO PRSV INCREASED AG IM: CPT | Mod: PBBFAC

## 2018-09-17 ENCOUNTER — TELEPHONE (OUTPATIENT)
Dept: PRIMARY CARE CLINIC | Facility: CLINIC | Age: 78
End: 2018-09-17

## 2018-09-17 NOTE — TELEPHONE ENCOUNTER
----- Message from Anamaria Luna MA sent at 9/17/2018 10:42 AM CDT -----  Contact: self/[phone]  Pt called to ask when should she take off bandage from leg Please, advise   ----- Message -----  From: Colton Martinez  Sent: 9/17/2018  10:30 AM  To: Roxana Grey Staff    Pt states that she needs to know how long she should keep her bandage on. Please advise.      Thanks

## 2018-09-17 NOTE — TELEPHONE ENCOUNTER
PCP called patient and addressed her leg discharge. She was advised to change dressing once its soaked. She states it is improving, but wound still weeping clear fluid.    She also has acute on chronic back pain, which is worse than usual today. She has been sitting more than usual with her leg ulcer. Was advised to walk more.    BARB Spencer

## 2018-09-17 NOTE — TELEPHONE ENCOUNTER
Has she changed the bandage since her last appointment?     How is the leg swelling?    Is the wound still putting out a discharge?    Thanks,  KJ

## 2018-10-04 ENCOUNTER — OFFICE VISIT (OUTPATIENT)
Dept: PULMONOLOGY | Facility: CLINIC | Age: 78
End: 2018-10-04
Payer: MEDICARE

## 2018-10-04 VITALS
DIASTOLIC BLOOD PRESSURE: 72 MMHG | HEART RATE: 97 BPM | WEIGHT: 275.38 LBS | OXYGEN SATURATION: 100 % | SYSTOLIC BLOOD PRESSURE: 140 MMHG | BODY MASS INDEX: 55.52 KG/M2 | HEIGHT: 59 IN

## 2018-10-04 DIAGNOSIS — E66.01 MORBID OBESITY WITH BMI OF 50.0-59.9, ADULT: ICD-10-CM

## 2018-10-04 DIAGNOSIS — J45.40 MODERATE PERSISTENT REACTIVE AIRWAY DISEASE WITHOUT COMPLICATION: Primary | ICD-10-CM

## 2018-10-04 DIAGNOSIS — R09.81 NASAL CONGESTION: ICD-10-CM

## 2018-10-04 DIAGNOSIS — G47.33 OSA ON CPAP: ICD-10-CM

## 2018-10-04 PROBLEM — J45.909 REACTIVE AIRWAY DISEASE WITHOUT COMPLICATION: Status: ACTIVE | Noted: 2018-10-04

## 2018-10-04 PROCEDURE — 99999 PR PBB SHADOW E&M-EST. PATIENT-LVL V: CPT | Mod: PBBFAC,,, | Performed by: INTERNAL MEDICINE

## 2018-10-04 PROCEDURE — 99214 OFFICE O/P EST MOD 30 MIN: CPT | Mod: S$PBB,,, | Performed by: INTERNAL MEDICINE

## 2018-10-04 PROCEDURE — 99215 OFFICE O/P EST HI 40 MIN: CPT | Mod: PBBFAC | Performed by: INTERNAL MEDICINE

## 2018-10-04 RX ORDER — FLUTICASONE PROPIONATE AND SALMETEROL 500; 50 UG/1; UG/1
1 POWDER RESPIRATORY (INHALATION) 2 TIMES DAILY
Qty: 60 EACH | Refills: 11 | Status: SHIPPED | OUTPATIENT
Start: 2018-10-04 | End: 2019-10-25 | Stop reason: SDUPTHER

## 2018-10-04 RX ORDER — CETIRIZINE HYDROCHLORIDE 10 MG/1
10 TABLET ORAL DAILY
Qty: 30 TABLET | Refills: 2 | COMMUNITY
Start: 2018-10-04 | End: 2022-05-25

## 2018-10-04 NOTE — PROGRESS NOTES
"Subjective:       Patient ID: Duran Giordano is a 78 y.o. female.    Chief Complaint: Asthma    HPI   77 yo F with RAD of new onset about 2-3 years ago. She was placed on advair  with improvement symptoms. She was last seen in ED 9/14/2017 for dyspnea which was associated at this time with lymphedema and HFpEF. She is on torsemide daily. For the past 3-4 months, she reports worsening sob, wheezing, dry cough "symphony in chest" . She takes albuterol rescue  inhaler every 3-4 days and nebulizer about every other day which helps. She reports increase fatigue with walking 1-2 blocks and would need to rest. She walks with a cane due to hip pain and if she walks slow, she does not get SOB.   She reports her symptoms are more during the daytime and on exertion. Typically around 5 pm when she goes to her chapel to sing. This improves with treatment but her voice has changed. She denies any unusual exposure during this time. She does report a lot of trees where she lives but no smoke, no pets. She reports congestion in the morning which improves when she wears a nightcap so thinks that her air-conditioner may be triggering this. She admits to being noncompliant with her CPAP.     Review of Systems   Constitutional: Positive for activity change and fatigue. Negative for fever, chills, weight gain and appetite change.   HENT: Positive for voice change. Negative for postnasal drip, sinus pressure and trouble swallowing.    Respiratory: Positive for apnea (control on REJI), cough, shortness of breath, wheezing, dyspnea on extertion and use of rescue inhaler. Negative for snoring, sputum production and asthma nighttime symptoms.    Cardiovascular: Positive for leg swelling. Negative for chest pain and palpitations.   Musculoskeletal: Positive for gait problem.   Gastrointestinal: Negative for nausea, vomiting and acid reflux.   Neurological: Positive for dizziness.       Objective:      Physical Exam   Constitutional: " She is oriented to person, place, and time. She is obese.   HENT:   Head: Normocephalic.   Nose: Nose normal. No mucosal edema.   Mouth/Throat: Oropharynx is clear and moist. Mallampati Score: I.   Cardiovascular: Normal rate, regular rhythm and normal heart sounds.   Pulmonary/Chest: Normal expansion, effort normal and breath sounds normal.   Neurological: She is alert and oriented to person, place, and time.   Ambulates with cane   Psychiatric: She has a normal mood and affect. Her behavior is normal. Thought content normal.     Personal Diagnostic Review    CXR 6/27/2018 No acute lobar consolidations or pneumothorax or pulmonary vascular congestion or pleural effusions.  There is osteopenia.  PFT 9/15/2017 : FVC 1.49 (56%) post bronchodilator 1.44 (97%); FEV1 1.01 (50%) Postbronchodilator 0.98 (97%)  FEV1/FVC 68%  TLC 55%  DLCO 67%  BASELINE PSG 12/23/11: + REJI, AHI 14.2, low O2 79%, wt 281 lbs. (Patient had sleep study in 2003, at which time she was titrated to 11 cm, wt 248 lbs)   TITRATION 5/9/16: Titrated to 13 cm; wt 281 lbs  Assessment:       1. Moderate persistent reactive airway disease without complication    2. Nasal congestion    3. REJI on CPAP    4. Morbid obesity with BMI of 50.0-59.9, adult        Outpatient Encounter Medications as of 10/4/2018   Medication Sig Dispense Refill    acetaminophen (TYLENOL) 500 MG tablet Take 2 tablets (1,000 mg total) by mouth 2 (two) times daily as needed for Pain. 360 tablet 3    albuterol 90 mcg/actuation inhaler Inhale 2 puffs into the lungs every 4 (four) hours as needed for Wheezing or Shortness of Breath. Rescue 1 Inhaler 6    albuterol-ipratropium (DUO-NEB) 2.5 mg-0.5 mg/3 mL nebulizer solution Take 3 mLs by nebulization every 6 (six) hours as needed for Wheezing. Rescue 1 Box 3    aluminum & magnesium hydroxide-simethicone (MYLANTA MAX STRENGTH) 400-400-40 mg/5 mL suspension Take 30 mLs by mouth every 6 (six) hours.  0    ammonium lactate 12 % Crea Apply  topically every morning.       aspirin 81 MG Chew Take 1 tablet (81 mg total) by mouth once daily. 90 tablet 3    atorvastatin (LIPITOR) 80 MG tablet Take 1 tablet (80 mg total) by mouth once daily. 90 tablet 3    blood sugar diagnostic Strp BG monitoring 4 times a day. Pt needs test strips for Em[employer] Talking meter. (Patient taking differently: BG monitoring 2 times a day. Pt needs test strips for Embrace Talking meter.) 450 strip prn    cane tips Misc 1 application by Misc.(Non-Drug; Combo Route) route once daily. 4 each 0    cholecalciferol, vitamin D3, 1,000 unit capsule Take 1 capsule (1,000 Units total) by mouth once daily. 90 capsule 3    clotrimazole (LOTRIMIN) 1 % cream Apply topically 2 (two) times daily. 113 g 3    diclofenac sodium 1 % Gel APPLY 2 GRAMS TOPICALLY DAILY 100 g 2    econazole nitrate 1 % cream Apply topically once daily. Apply to feet daily as directed. 85 g 1    fluticasone (FLONASE) 50 mcg/actuation nasal spray 2 sprays by Each Nare route once daily. (Patient taking differently: 2 sprays by Each Nare route daily as needed for Rhinitis. ) 1 Bottle 3    fluticasone-salmeterol 500-50 mcg/dose (ADVAIR DISKUS) 500-50 mcg/dose DsDv diskus inhaler Inhale 1 puff into the lungs 2 (two) times daily. Controller 60 each 11    HUMALOG KWIKPEN INSULIN 100 unit/mL InPn pen INJECT 18 UNITS UNDER SKIN WITH BREAKFAST AND 18 UNITS WITH DINNER. SLIDING SCALE 150-200+2, 201-250 +4, 251-300 +6, 301-350 +8 2 Box 3    hydrALAZINE (APRESOLINE) 50 MG tablet Take 1 tablet (50 mg total) by mouth every 12 (twelve) hours. 180 tablet 3    lancets Misc Test daily (Patient taking differently: Test twice  daily) 100 each 12    LEVEMIR FLEXTOUCH U-100 INSULN 100 unit/mL (3 mL) InPn pen INJECT 48 UNITS UNDER SKIN AT NIGHT 18 mL 6    losartan (COZAAR) 100 MG tablet Take 1 tablet (100 mg total) by mouth every evening. 1 tab by mouth daily 90 tablet 3    miconazole nitrate (LOTRIMIN AF POWDER) 2 % AerP Apply 1  "application topically 2 (two) times daily. 130 g 3    nebulizer and compressor (COMP-AIR ELITE COMP NEB SYSTEM) Berna use as directed 1 each 0    OPW TEST CLAIM - DO NOT FILL Take by nebulization. OPW test claim. Do not fill. 1 mL 0    PEN NEEDLE 29 gauge x 1/2" Ndle TEST FOUR TIMES A DAY WITH  each 6    torsemide (DEMADEX) 20 MG Tab Take 1 tablet (20 mg total) by mouth once daily. 90 tablet 3    trolamine salicylate (ASPERCREME) 10 % cream Apply topically as needed.      [DISCONTINUED] fluticasone-salmeterol 500-50 mcg/dose (ADVAIR DISKUS) 500-50 mcg/dose DsDv diskus inhaler Inhale 1 puff into the lungs 2 (two) times daily. Controller 60 each 11    cetirizine (ZYRTEC) 10 MG tablet Take 1 tablet (10 mg total) by mouth once daily. 30 tablet 2    sod chlor-bicarb-squeez bottle (NEILMED SINUS RINSE COMPLETE) pkdv Use as directed 1 each 5     No facility-administered encounter medications on file as of 10/4/2018.      Orders Placed This Encounter   Procedures    X-Ray Chest PA And Lateral     Standing Status:   Future     Standing Expiration Date:   10/5/2019    Brain natriuretic peptide     Standing Status:   Future     Standing Expiration Date:   12/3/2019     Plan:       Moderate persistent reactive airway disease without complication  Comments:  Continue advair and albuterol prn, dyspnea may be multifactorial given late onset symptoms, Recommend patient check BNP, repeat chest xray.   Orders:  -     fluticasone-salmeterol 500-50 mcg/dose (ADVAIR DISKUS) 500-50 mcg/dose DsDv diskus inhaler; Inhale 1 puff into the lungs 2 (two) times daily. Controller  Dispense: 60 each; Refill: 11  -     Brain natriuretic peptide; Future; Expected date: 10/04/2018  -     X-Ray Chest PA And Lateral; Future; Expected date: 10/04/2018    Nasal congestion  Comments:  Start neilmed sinus rinse, flonase and zyrtec  Orders:  -     cetirizine (ZYRTEC) 10 MG tablet; Take 1 tablet (10 mg total) by mouth once daily.  Dispense: " 30 tablet; Refill: 2  -     sod chlor-bicarb-squeez bottle (NEILMED SINUS RINSE COMPLETE) pkdv; Use as directed  Dispense: 1 each; Refill: 5    REJI on CPAP  Comments:  encourage patient to comply with cpap and follow up with sleep    Morbid obesity with BMI of 50.0-59.9, adult  Comments:  patient aware of need drastic weight loss and encourage to increase activity    Follow-up in about 3 months (around 1/4/2019), or if symptoms worsen or fail to improve.

## 2018-10-04 NOTE — LETTER
October 4, 2018      Tami Schmidt MD  [address]  [place] [address]           [facility]  [address]  [place] [address]  Phone: [phone]          Patient: Duran Giordano   MR Number: [id number]   YOB: 1940   Date of Visit: 10/4/2018       Dear Dr. Tami Schmidt:    Thank you for referring Duran Giordano to me for evaluation. Attached you will find relevant portions of my assessment and plan of care.    If you have questions, please do not hesitate to call me. I look forward to following Duran Giordano along with you.    Sincerely,    Kathleen Henry, NP    Enclosure  CC:  No Recipients    If you would like to receive this communication electronically, please contact [email or web address] or [phone] to request more information on [employer] access.    For providers and/or their staff who would like to refer a patient to [facility], please contact us through our one-stop-shop provider referral line, [facility], at [phone].    If you feel you have received this communication in error or would no longer like to receive these types of communications, please e-mail [email or web address]

## 2018-10-04 NOTE — PATIENT INSTRUCTIONS
Neilmed sinus rinse  CHeck Bloodwork BNP  Start zyrtec and flonase  Continue advair and rescue  Comply and follow up with sleep

## 2018-10-08 ENCOUNTER — TELEPHONE (OUTPATIENT)
Dept: PRIMARY CARE CLINIC | Facility: CLINIC | Age: 78
End: 2018-10-08

## 2018-10-10 ENCOUNTER — HOSPITAL ENCOUNTER (OUTPATIENT)
Dept: RADIOLOGY | Facility: HOSPITAL | Age: 78
Discharge: HOME | End: 2018-10-10
Attending: NURSE PRACTITIONER
Payer: MEDICARE

## 2018-10-10 ENCOUNTER — OFFICE VISIT (OUTPATIENT)
Dept: PRIMARY CARE CLINIC | Facility: CLINIC | Age: 78
End: 2018-10-10
Payer: MEDICARE

## 2018-10-10 ENCOUNTER — TELEPHONE (OUTPATIENT)
Dept: INTERNAL MEDICINE | Facility: CLINIC | Age: 78
End: 2018-10-10

## 2018-10-10 VITALS
BODY MASS INDEX: 56.82 KG/M2 | WEIGHT: 281.31 LBS | SYSTOLIC BLOOD PRESSURE: 138 MMHG | DIASTOLIC BLOOD PRESSURE: 60 MMHG

## 2018-10-10 DIAGNOSIS — R50.9 FEVER OF UNKNOWN ORIGIN (FUO): ICD-10-CM

## 2018-10-10 DIAGNOSIS — J45.40 MODERATE PERSISTENT REACTIVE AIRWAY DISEASE WITHOUT COMPLICATION: ICD-10-CM

## 2018-10-10 DIAGNOSIS — I50.32 CHRONIC DIASTOLIC HEART FAILURE: ICD-10-CM

## 2018-10-10 DIAGNOSIS — I15.2 HYPERTENSION ASSOCIATED WITH DIABETES: Primary | ICD-10-CM

## 2018-10-10 DIAGNOSIS — E11.59 HYPERTENSION ASSOCIATED WITH DIABETES: Primary | ICD-10-CM

## 2018-10-10 DIAGNOSIS — E11.42 TYPE 2 DIABETES MELLITUS WITH DIABETIC POLYNEUROPATHY, WITH LONG-TERM CURRENT USE OF INSULIN: ICD-10-CM

## 2018-10-10 DIAGNOSIS — J45.20 MILD INTERMITTENT ASTHMA WITHOUT COMPLICATION: ICD-10-CM

## 2018-10-10 DIAGNOSIS — I15.2 HYPERTENSION ASSOCIATED WITH DIABETES: ICD-10-CM

## 2018-10-10 DIAGNOSIS — Z79.4 TYPE 2 DIABETES MELLITUS WITH DIABETIC POLYNEUROPATHY, WITH LONG-TERM CURRENT USE OF INSULIN: ICD-10-CM

## 2018-10-10 DIAGNOSIS — E11.59 HYPERTENSION ASSOCIATED WITH DIABETES: ICD-10-CM

## 2018-10-10 PROCEDURE — 99215 OFFICE O/P EST HI 40 MIN: CPT | Mod: S$GLB,,, | Performed by: INTERNAL MEDICINE

## 2018-10-10 PROCEDURE — 71046 X-RAY EXAM CHEST 2 VIEWS: CPT | Mod: 26,,, | Performed by: RADIOLOGY

## 2018-10-10 PROCEDURE — 71046 X-RAY EXAM CHEST 2 VIEWS: CPT | Mod: TC

## 2018-10-10 RX ORDER — IRBESARTAN 300 MG/1
300 TABLET ORAL NIGHTLY
Qty: 90 TABLET | Refills: 3 | Status: SHIPPED | OUTPATIENT
Start: 2018-10-10 | End: 2019-09-26

## 2018-10-10 RX ORDER — IRBESARTAN 150 MG/1
300 TABLET ORAL NIGHTLY
Qty: 180 TABLET | Refills: 3 | Status: SHIPPED | OUTPATIENT
Start: 2018-10-10 | End: 2018-10-10 | Stop reason: SDUPTHER

## 2018-10-10 NOTE — ASSESSMENT & PLAN NOTE
BP overtreated on hydralazine 100mg BID, losartan 100mg qhs, torsemide; carvedilol discontinued by pulm due to SOB, amlodipine discontinued by PCP due to leg swelling  · Discontinue hydralazine  · Change losartan to ibesartan 300mg  · Consider Digital HTN program in future

## 2018-10-10 NOTE — MEDICAL/APP STUDENT
"79 y/o female with past medical history of diastolic heart failure, ckd stage 3, type 2 DM, morbid obesity, osteoarthritis, REJI on CPAP.     Who presents today for routine follow-up of her medical conditions.    Endorses shortness of breath, worse at 5pm (evening), she says during prayers its difficult for her to catch her breath. Currently takes flonase, albuterol, advair. Report medications help her breath. Denies any issues with these medications. Also on CPAP for REJI, sleeps comfortable throughout the night. 10/4 had a visit with pulmonology clinic. No changes made at that time.     For patient's heart failure she reports she takes torsemide, says it works very well. Notices much improvement with edema.     For patients hypertension she takes hydralazine 50mg q12hr, endorses that hydralazine makes her head feel "hot", began this 6 months ago. Also takes losartan 100mg, denies any swelling or allergic reactions on losartan. Denies any orthostatic issues.      Reports of chronic leg ulcers which have been improving, trolamine salicyate (a spercreme), diclofenac, and eucerin cream with improvement of her ulcers. She walks fine, uses support stocking/circaids for the swelling in her legs bilaterally.    She would like to get her plantar hyperkeratosis looked at by podiatry (?).     She takes daily vitamin D3 1000 units for Vitamin D deficiency. Denies any nausea, vomiting, weakness, abdominal pain.     Reports her morning sugars are 135, takes 48 levimir (morning), 18 U humulog (morning and evening). A1C last visit was 7.4 trending up from 7.0 before. Denies any dizziness or headaches, numbness or tingliness in hands or feet, heart burn. Denies of constipation or diarrhea. Denies of chest pain, chest tightness, palpitations.    Reports her diet still consists of salt. She reports it is noticeable when she has salt-intake. Drinks about 3 x 20 ounces of water a day.     Reports she exercises at [address] [employer] " and fitness center. Was there 8 months ago for 6 weeks. Currently she is not getting much exercise.     Weight: 127.6 kg; BP:138/60.

## 2018-10-10 NOTE — ASSESSMENT & PLAN NOTE
Patient with significant venous stasis, obesity, h/o hep A (in 1970s), provoked menopause with hysterectomy (1982)  · Previous labs neg for infection  · Elevated ESR, uric acid  · Monitor legs for venous stasis and infection  · Recent neg DVT US  · Discontinue hydralazine today  · Monitor symptoms

## 2018-10-10 NOTE — Clinical Note
Could you change losartan to ibesartan 300mg (2 pills qhs) and remove hydralazine from her pill pack? She may be having an adverse reaction to hydralazine. She will bring in on 10/12/18.Thanks,BARB

## 2018-10-10 NOTE — ASSESSMENT & PLAN NOTE
A1c 7.2 (improved from 10 previously), GFR improving  · Continue to control DM  · Avoid NSAIDs  · Monitor A1c  · Followed by Endocrinology

## 2018-10-10 NOTE — PROGRESS NOTES
Primary Care Provider Appointment    Subjective:      Patient ID: Duran Giordano is a 78 y.o. female with venous stasis, CHF, HTN, foot calluses, DM    Chief Complaint: Leg Swelling and Diabetes    Patient with chronic ulcers that are stable. Her legs are less inflamed today, she is hydrating her skin before performing venous stasis wrappings.    Her home glucose readings are normal, and has controlled A1c.    She is sad today because of her nurse committing suicide.     She has been having subjective fevers for the past few months, which she reports is associated with starting hydralazine (first prescribed in 5/2018). No recorded fevers at with home nurse or in clinic.    She had a 5# unintentional weight gain. She is only taking torsemide once daily at this time. She is using CPAP throughout the night. Seen by Pulm and inhalers continued.    She wants more help with debriding her toenails and removing calluses.    Adherence packed for 28 days on 09/05/18:     Morning card:  Aspirin 81 mg  Hydralazine 50 mg  Torsemide 20 mg     Evening card:  Atorvastatin 80 mg  Hydralazine 50 mg  Losartan 100 mg  Vitamin D3 1,000 IU        Past Surgical History:   Procedure Laterality Date    HYSTERECTOMY  1982    secondary uterine fibroids    JOINT REPLACEMENT      Right total knee replacement         Past Medical History:   Diagnosis Date    Adrenal mass- adenoma stable since 2004 (1.8 cm and 8/13/12 (2 cm); stable 2016 2.4 cm     Adrenal mass- adenoma stable since 2004 (1.8 cm and 8/13/12 (2 cm); stable 2016 2.4 cm    Asthma in adult without complication 6/25/2015    Bilateral carotid artery disease 7/21/2017    Bilateral sciatica 2/7/2017    Cellulitis of right leg 08/16/2017    Cerebral infarction 1/29/2016    Multiple areas of lacunar infarction. Stroke risk factors include HTN, DM2, Dyslipidemia.  Continue ASA 81mg/ Statin therapy I have encouraged 30 minutes of physical activity daily for 5 days a week.  She has access to a pool so this should not be so jarring to her joints.     Cervical radiculopathy 3/18/2015    Chronic knee pain     Chronic rhinitis 4/9/2013    CKD (chronic kidney disease) stage 3, GFR 30-59 ml/min 1/29/2013    Coronary artery disease due to calcified coronary lesion 6/25/2015    Diastolic dysfunction 10/10/2013    Essential hypertension 6/25/2015    Gastroesophageal reflux disease without esophagitis 8/13/2012    Gout     Hives 10/1/2013    Mixed hyperlipidemia 8/13/2012    Morbid obesity with BMI of 50.0-59.9, adult 2/11/2014    Obstructive sleep apnea syndrome 8/13/2012    Senile cataracts of both eyes 1/22/2015    Traumatic open wound of right lower leg 8/25/2017    Trigeminal neuralgia of right side of face 5/23/2017    For years now Previously on Gabapentin, but caused constipation Dissipating over time Not related to intracranial abnormalities Possibly related to dental procedure    Type 2 diabetes mellitus with diabetic polyneuropathy, with long-term current use of insulin 1/29/2013    Venous stasis dermatitis of both lower extremities 8/21/2017    Vitamin D deficiency disease 8/13/2012       Review of Systems   Constitutional: Positive for activity change and appetite change. Negative for unexpected weight change.   Cardiovascular: Positive for leg swelling.   Gastrointestinal: Negative for constipation and diarrhea.   Skin: Positive for color change, rash and wound.   Hematological: Does not bruise/bleed easily.   Psychiatric/Behavioral: Positive for sleep disturbance. Negative for behavioral problems and decreased concentration.       Objective:   /60   Wt 127.6 kg (281 lb 4.9 oz)   BMI 56.82 kg/m²     Physical Exam   Constitutional: She appears well-developed.   Severe obesity   Eyes: EOM are normal.   Cardiovascular: Normal rate.   Pulmonary/Chest: Effort normal and breath sounds normal.   No wheezing, but obese body habitus   Abdominal:   Obese abdomen    Musculoskeletal: She exhibits edema.   Worsened venous stasis   Neurological: She is alert.   Skin:   Stage 1 ulcer on L leg  Worsened venous stasis   Psychiatric: She has a normal mood and affect.   Poor insight into disease       Lab Results   Component Value Date    WBC 7.97 08/29/2018    HGB 11.6 (L) 08/29/2018    HCT 37.5 08/29/2018     08/29/2018    CHOL 124 05/21/2018    TRIG 86 05/21/2018    HDL 36 (L) 05/21/2018    ALT 17 08/29/2018    AST 18 08/29/2018     08/29/2018    K 4.3 08/29/2018     08/29/2018    CREATININE 1.1 08/29/2018    BUN 22 08/29/2018    CO2 27 08/29/2018    TSH 0.746 05/21/2018    INR 1.1 04/17/2005    HGBA1C 7.4 (H) 08/29/2018         Assessment:   78 y.o. female with multiple co-morbid illnesses here to continue work-up of chronic issues notably venous stasis, CHF, HTN, foot calluses, DM    Plan:     Problem List Items Addressed This Visit        Pulmonary    Uncomplicated asthma     Symptom improvement with discontinuation of BB by Pulm, compliant with inhalers  · Continue albuterol and advair  · F/U Pulm  · Continue neb treatments  · CXR, labs today            Cardiac/Vascular    Chronic diastolic heart failure     Diastolic heart failure, edema treated with torsemide  · Continue torsemide  · Increase to BID for next week  · Check labs today         Relevant Medications    irbesartan (AVAPRO) 150 MG tablet    Hypertension associated with diabetes     BP overtreated on hydralazine 100mg BID, losartan 100mg qhs, torsemide; carvedilol discontinued by pulm due to SOB, amlodipine discontinued by PCP due to leg swelling  · Discontinue hydralazine  · Change losartan to ibesartan 300mg  · Consider Digital HTN program in future         Relevant Medications    irbesartan (AVAPRO) 150 MG tablet    Other Relevant Orders    Hemoglobin A1c    Basic metabolic panel       Endocrine    Type 2 diabetes mellitus with diabetic polyneuropathy, with long-term current use of insulin      Abnormal foot exam, followed by podiatry q2 mos  · Continue routine care  · Advised to soak feet and moisturize         Relevant Orders    Hemoglobin A1c    Uncontrolled secondary diabetes mellitus with stage 3 CKD (GFR 30-59)     A1c 7.2 (improved from 10 previously), GFR improving  · Continue to control DM  · Avoid NSAIDs  · Monitor A1c  · Followed by Endocrinology            Other    Fever of unknown origin (FUO)     Patient with significant venous stasis, obesity, h/o hep A (in 1970s), provoked menopause with hysterectomy (1982)  · Previous labs neg for infection  · Elevated ESR, uric acid  · Monitor legs for venous stasis and infection  · Recent neg DVT US  · Discontinue hydralazine today  · Monitor symptoms               Health Maintenance       Date Due Completion Date    TETANUS VACCINE 03/08/1960 ---    Foot Exam 01/19/2019 1/19/2018 (Done)    Override on 1/19/2018: Done (Dr head)    Override on 5/31/2017: Done    Override on 7/20/2016: Done    Eye Exam 02/15/2019 2/15/2018    Override on 2/17/2016: Done    Override on 1/22/2015: Done    Override on 8/16/2012: Done    Hemoglobin A1c 02/28/2019 8/29/2018    Override on 7/13/2016: Done    Lipid Panel 05/21/2019 5/21/2018    Mammogram 02/22/2020 2/22/2018    DEXA SCAN 02/22/2021 2/22/2018    Override on 12/13/2011: Done          Follow-up in about 2 months (around 12/10/2018). . One hour spent with this patient today, half of that in counseling.    Tami Schmidt MD/MPH  Internal Medicine  [facility]  [phone]

## 2018-10-10 NOTE — ASSESSMENT & PLAN NOTE
Abnormal foot exam, followed by podiatry q2 mos  · Continue routine care  · Advised to soak feet and moisturize

## 2018-10-10 NOTE — PATIENT INSTRUCTIONS
TODAY:  - remove hydralazine from pill packs  - change losartan to ibesartan 300mg will place in pill packs  - bring pill packs to next appointment to have meds changed  - continue elevating legs and wrapping them  - use CPAP nightly  - add A1c, BMP to labs today  - soak and moisturize your feet   + podiatry appt in 11/2018  - primary care pharmacy phone number [phone]

## 2018-10-10 NOTE — TELEPHONE ENCOUNTER
New script sent for ibesartan 300mg.    Patient will need her pills repacked. She needs hydralazine taken out and losartan replaced with ibesartan.      She will bring all meds in on 10/12, same day as her Endocrinology appt.    Thanks,  BARB  ----- Message from Koffi Hancock, Alvina sent at 10/10/2018 11:51 AM CDT -----  Brian DAY we recieced a prescription at primary care pharmacy for Sister Duran Giordano for irbesartan 150 mg @ 2 tablets daily, he insurance will not cover 2 per day, but will cover 300 mg 1 QD.

## 2018-10-10 NOTE — ASSESSMENT & PLAN NOTE
Symptom improvement with discontinuation of BB by Pulm, compliant with inhalers  · Continue albuterol and advair  · F/U Pulm  · Continue neb treatments  · CXR, labs today

## 2018-10-11 NOTE — PROGRESS NOTES
Adherence packed for 30 days:     Morning card:  Aspirin 81 mg  Torsemide 20 mg     Evening card:  Atorvastatin 80 mg  Irbesartan 300 mg  Vitamin D3 1,000 IU

## 2018-10-12 ENCOUNTER — OFFICE VISIT (OUTPATIENT)
Dept: ENDOCRINOLOGY | Facility: CLINIC | Age: 78
End: 2018-10-12
Payer: MEDICARE

## 2018-10-12 ENCOUNTER — TELEPHONE (OUTPATIENT)
Dept: INTERNAL MEDICINE | Facility: CLINIC | Age: 78
End: 2018-10-12

## 2018-10-12 VITALS
DIASTOLIC BLOOD PRESSURE: 74 MMHG | HEIGHT: 59 IN | SYSTOLIC BLOOD PRESSURE: 133 MMHG | HEART RATE: 80 BPM | BODY MASS INDEX: 56 KG/M2 | WEIGHT: 277.75 LBS

## 2018-10-12 DIAGNOSIS — I15.2 HYPERTENSION ASSOCIATED WITH DIABETES: ICD-10-CM

## 2018-10-12 DIAGNOSIS — G47.33 OSA ON CPAP: ICD-10-CM

## 2018-10-12 DIAGNOSIS — E11.69 TYPE 2 DIABETES MELLITUS WITH HYPERLIPIDEMIA: ICD-10-CM

## 2018-10-12 DIAGNOSIS — Z79.4 TYPE 2 DIABETES MELLITUS WITH DIABETIC POLYNEUROPATHY, WITH LONG-TERM CURRENT USE OF INSULIN: Primary | ICD-10-CM

## 2018-10-12 DIAGNOSIS — E11.59 HYPERTENSION ASSOCIATED WITH DIABETES: ICD-10-CM

## 2018-10-12 DIAGNOSIS — E11.42 TYPE 2 DIABETES MELLITUS WITH DIABETIC POLYNEUROPATHY, WITH LONG-TERM CURRENT USE OF INSULIN: Primary | ICD-10-CM

## 2018-10-12 DIAGNOSIS — E55.9 VITAMIN D DEFICIENCY DISEASE: ICD-10-CM

## 2018-10-12 DIAGNOSIS — E78.5 TYPE 2 DIABETES MELLITUS WITH HYPERLIPIDEMIA: ICD-10-CM

## 2018-10-12 DIAGNOSIS — E66.01 MORBID OBESITY WITH BMI OF 50.0-59.9, ADULT: ICD-10-CM

## 2018-10-12 DIAGNOSIS — N18.30 CKD (CHRONIC KIDNEY DISEASE) STAGE 3, GFR 30-59 ML/MIN: ICD-10-CM

## 2018-10-12 DIAGNOSIS — M17.0 PRIMARY OSTEOARTHRITIS OF BOTH KNEES: ICD-10-CM

## 2018-10-12 PROCEDURE — 99999 PR PBB SHADOW E&M-EST. PATIENT-LVL V: CPT | Mod: PBBFAC,,, | Performed by: NURSE PRACTITIONER

## 2018-10-12 PROCEDURE — 99214 OFFICE O/P EST MOD 30 MIN: CPT | Mod: S$PBB,,, | Performed by: NURSE PRACTITIONER

## 2018-10-12 PROCEDURE — 99215 OFFICE O/P EST HI 40 MIN: CPT | Mod: PBBFAC | Performed by: NURSE PRACTITIONER

## 2018-10-12 RX ORDER — INSULIN LISPRO 100 [IU]/ML
INJECTION, SOLUTION INTRAVENOUS; SUBCUTANEOUS
Qty: 2 BOX | Refills: 6 | Status: SHIPPED | OUTPATIENT
Start: 2018-10-12 | End: 2019-06-18

## 2018-10-12 NOTE — TELEPHONE ENCOUNTER
Please let patient know that all of her labs are normal and her A1c is at goal at 7.4!    Great job!    Thanks,  KJ

## 2018-10-12 NOTE — PATIENT INSTRUCTIONS
Snacks can be an important part of a balanced, healthy meal plan. They allow you to eat more frequently, feeling full and satisfied throughout the day. Also, they allow you to spread carbohydrates evenly, which may stabilize blood sugars.  Plus, snacks are enjoyable!     The amount of carbohydrate needed at snacks varies. Generally, about 15-30 grams of carbohydrate per snack is recommended.  Below you will find some tasty treats.       0-5 gm carb   Crystal Light   Vitamin Water Zero   Herbal tea, unsweetened   2 tsp peanut butter on celery   1./2 cup sugar-free jell-o   1 sugar-free popsicle   ¼ cup blueberries   8oz Blue Aniyah unsweetened almond milk   5 baby carrots & celery sticks, cucumbers, bell peppers dipped in ¼ cup salsa, 2Tbsp light ranch dressing or 2Tbsp plain Greek yogurt   10 Goldfish crackers   ½ oz low-fat cheese or string cheese   1 closed handful of nuts, unsalted   1 Tbsp of sunflower seeds, unsalted   1 cup Smart Pop popcorn   1 whole grain brown rice cake        15 gm carb   1 small piece of fruit or ½ banana or 1/2 cup lite canned fruit   3 ramon cracker squares   3 cups Smart Pop popcorn, top spray butter, York lite salt or cinnamon and Truvia   5 Vanilla Wafers   ½ cup low fat, no added sugar ice cream or frozen yogurt (Blue bell, Blue Bunny, Weight Watchers, Skinny Cow)   ½ turkey, ham, or chicken sandwich   ½ c fruit with ½ c Cottage cheese   4-6 unsalted wheat crackers with 1 oz low fat cheese or 1 tbsp peanut butter    30-45 goldfish crackers (depending on flavor)    7-8 Mandaen mini brown rice cakes (caramel, apple cinnamon, chocolate)    12 Mandaen mini brown rice cakes (cheddar, bbq, ranch)    1/3 cup hummus dip with raw veg   1/2 whole wheat jessica, 1Tbsp hummus   Mini Pizza (1/2 whole wheat English muffin, low-fat  cheese, tomato sauce)   100 calorie snack pack (Oreo, Chips Ahoy, Ritz Mix, Baked Cheetos)   4-6 oz. light or Greek Style yogurt  (Shial Tanner, Placido, [facility])   ½ cup sugar-free pudding     6 in. wheat tortilla or jessica oven toasted chips (topped with spray butter flavoring, cinnamon, Truvia OR spray butter, garlic powder, chili powder)    18 BBQ Popchips (available at [employer], Whole Foods, Fresh Market)                   Diabetes Support Group Meetings         Date: Topic:   February 8 Health Promotion/Cooking Demo   March 8 Taking Care of Your Kidneys   April 12 Taking Care of Your Feet   May 10 Ease Your Mind with Diabetes   Carmen 14 Summer Treats/Cooking Demo   July 12 Super Market Sweep   August 9 Taking Care of Your Eyes   Sept 13 Technology/ADA updates   October 11 Recipes & Treats/Cooking Demo   November 8 Heart Health/Pump it up!   December 13 Year-End Close Out        Meetings are held in the [facility] (A) of the [facility] located at [address] [place] [address]. Please call [phone] for additional information.    Free service, offered every 2nd Thursday of every month! Family members and/or friends are welcome as well!  Support group is for patients with type 1 or type 2 diabetes.    From 3:30p to 4:30p
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
## 2018-10-17 ENCOUNTER — TELEPHONE (OUTPATIENT)
Dept: SLEEP MEDICINE | Facility: CLINIC | Age: 78
End: 2018-10-17

## 2018-10-17 NOTE — TELEPHONE ENCOUNTER
----- Message from Kathleen Henry NP sent at 10/16/2018  7:22 PM CDT -----  Please inform pt that her chest xray is normal. Thanks.

## 2018-10-18 ENCOUNTER — TELEPHONE (OUTPATIENT)
Dept: INTERNAL MEDICINE | Facility: CLINIC | Age: 78
End: 2018-10-18

## 2018-10-18 NOTE — TELEPHONE ENCOUNTER
Scripts will be rejected. There's no medical indication for them.    Thanks,  KJ    ----- Message from Anamaria Luna MA sent at 10/18/2018  3:47 PM CDT -----  Contact: [email or web address] [phone]  Please, advise   ----- Message -----  From: Marcelo Alvarado  Sent: 10/18/2018   3:28 PM  To: R[facility] Grey Staff    RX request - refill or new RX.  Is this a refill or new RX:  New  RX name and strength: Lidocaine and  prilocaine cream .25     Pharmacy name and phone #   [facility] Contact [phone] Fax [phone]  Comments:      Please call an advise  Thank you

## 2018-11-05 ENCOUNTER — OFFICE VISIT (OUTPATIENT)
Dept: PODIATRY | Facility: CLINIC | Age: 78
End: 2018-11-05
Payer: MEDICARE

## 2018-11-05 VITALS
HEIGHT: 59 IN | RESPIRATION RATE: 18 BRPM | WEIGHT: 279 LBS | BODY MASS INDEX: 56.24 KG/M2 | HEART RATE: 77 BPM | SYSTOLIC BLOOD PRESSURE: 146 MMHG | DIASTOLIC BLOOD PRESSURE: 70 MMHG

## 2018-11-05 DIAGNOSIS — E11.42 TYPE 2 DIABETES MELLITUS WITH DIABETIC POLYNEUROPATHY, WITH LONG-TERM CURRENT USE OF INSULIN: Primary | ICD-10-CM

## 2018-11-05 DIAGNOSIS — Z79.4 TYPE 2 DIABETES MELLITUS WITH DIABETIC POLYNEUROPATHY, WITH LONG-TERM CURRENT USE OF INSULIN: Primary | ICD-10-CM

## 2018-11-05 DIAGNOSIS — L84 CORN OR CALLUS: ICD-10-CM

## 2018-11-05 DIAGNOSIS — B35.1 ONYCHOMYCOSIS DUE TO DERMATOPHYTE: ICD-10-CM

## 2018-11-05 PROCEDURE — 11721 DEBRIDE NAIL 6 OR MORE: CPT | Mod: Q9,PBBFAC | Performed by: PODIATRIST

## 2018-11-05 PROCEDURE — 99499 UNLISTED E&M SERVICE: CPT | Mod: S$PBB,,, | Performed by: PODIATRIST

## 2018-11-05 PROCEDURE — 11721 DEBRIDE NAIL 6 OR MORE: CPT | Mod: 59,Q9,S$PBB, | Performed by: PODIATRIST

## 2018-11-05 PROCEDURE — 11056 PARNG/CUTG B9 HYPRKR LES 2-4: CPT | Mod: 59,Q9,PBBFAC | Performed by: PODIATRIST

## 2018-11-05 PROCEDURE — 11056 PARNG/CUTG B9 HYPRKR LES 2-4: CPT | Mod: Q9,S$PBB,, | Performed by: PODIATRIST

## 2018-11-05 PROCEDURE — 99999 PR PBB SHADOW E&M-EST. PATIENT-LVL III: CPT | Mod: PBBFAC,,, | Performed by: PODIATRIST

## 2018-11-05 PROCEDURE — 99213 OFFICE O/P EST LOW 20 MIN: CPT | Mod: PBBFAC,25 | Performed by: PODIATRIST

## 2018-11-05 NOTE — PROGRESS NOTES
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    Chief Complaint: PCP (Tami Schmidt MD 3/16/18); Diabetic Foot Exam; Nail Problem (left great toenail ); Heel Pain (left heel pain ); and Nail Care    Duran is a 78 y.o. female who presents to the clinic for evaluation and treatment of high risk feet. Duran has a past medical history of Adrenal mass- adenoma stable since 2004 (1.8 cm and 8/13/12 (2 cm); stable 2016 2.4 cm, Asthma in adult without complication (6/25/2015), Bilateral carotid artery disease (7/21/2017), Bilateral sciatica (2/7/2017), Cellulitis of right leg (08/16/2017), Cerebral infarction (1/29/2016), Cervical radiculopathy (3/18/2015), Chronic knee pain, Chronic rhinitis (4/9/2013), CKD (chronic kidney disease) stage 3, GFR 30-59 ml/min (1/29/2013), Coronary artery disease due to calcified coronary lesion (6/25/2015), Diastolic dysfunction (10/10/2013), Essential hypertension (6/25/2015), Gastroesophageal reflux disease without esophagitis (8/13/2012), Gout, Hives (10/1/2013), Mixed hyperlipidemia (8/13/2012), Morbid obesity with BMI of 50.0-59.9, adult (2/11/2014), Obstructive sleep apnea syndrome (8/13/2012), Senile cataracts of both eyes (1/22/2015), Traumatic open wound of right lower leg (8/25/2017), Trigeminal neuralgia of right side of face (5/23/2017), Type 2 diabetes mellitus with diabetic polyneuropathy, with long-term current use of insulin (1/29/2013), Venous stasis dermatitis of both lower extremities (8/21/2017), and Vitamin D deficiency disease (8/13/2012). The patient's chief complaint is long, thick toenails.  This patient has documented high risk feet requiring routine maintenance secondary to diabetes mellitis and those secondary complications of diabetes, as mentioned.    PCP: Tami Schmidt MD    Date Last Seen by PCP:   Chief Complaint   Patient presents with    PCP     Tami Schmidt MD 3/16/18    Diabetic Foot Exam    Nail  Problem     left great toenail     Heel Pain     left heel pain     Nail Care        Current shoe gear:  Affected Foot: Casual shoes     Unaffected Foot: Casual shoes    Hemoglobin A1C   Date Value Ref Range Status   10/10/2018 7.3 (H) 4.0 - 5.6 % Final     Comment:     ADA Screening Guidelines:  5.7-6.4%  Consistent with prediabetes  >or=6.5%  Consistent with diabetes  High levels of fetal hemoglobin interfere with the HbA1C  assay. Heterozygous hemoglobin variants (HbS, HgC, etc)do  not significantly interfere with this assay.   However, presence of multiple variants may affect accuracy.     08/29/2018 7.4 (H) 4.0 - 5.6 % Final     Comment:     ADA Screening Guidelines:  5.7-6.4%  Consistent with prediabetes  >or=6.5%  Consistent with diabetes  High levels of fetal hemoglobin interfere with the HbA1C  assay. Heterozygous hemoglobin variants (HbS, HgC, etc)do  not significantly interfere with this assay.   However, presence of multiple variants may affect accuracy.     05/21/2018 7.0 (H) 4.0 - 5.6 % Final     Comment:     According to ADA guidelines, hemoglobin A1c <7.0% represents  optimal control in non-pregnant diabetic patients. Different  metrics may apply to specific patient populations.   Standards of Medical Care in Diabetes-2016.  For the purpose of screening for the presence of diabetes:  <5.7%     Consistent with the absence of diabetes  5.7-6.4%  Consistent with increasing risk for diabetes   (prediabetes)  >or=6.5%  Consistent with diabetes  Currently, no consensus exists for use of hemoglobin A1c  for diagnosis of diabetes for children.  This Hemoglobin A1c assay has significant interference with fetal   hemoglobin   (HbF). The results are invalid for patients with abnormal amounts of   HbF,   including those with known Hereditary Persistence   of Fetal Hemoglobin. Heterozygous hemoglobin variants (HbAS, HbAC,   HbAD, HbAE, HbA2) do not significantly interfere with this assay;   however, presence of  multiple variants in a sample may impact the %   interference.         Review of Systems   Cardiovascular: Positive for leg swelling.   Skin: Positive for color change, dry skin, nail changes and poor wound healing. Negative for flushing.   Neurological: Positive for numbness. Negative for paresthesias.           Objective:      Physical Exam   Constitutional: She is oriented to person, place, and time. She appears well-developed and well-nourished.   Cardiovascular:   Pulses:       Dorsalis pedis pulses are 2+ on the right side, and 2+ on the left side.        Posterior tibial pulses are 2+ on the right side, and 2+ on the left side.   Dorsalis pedis and posterior tibial pulses are palpable bilaterally. Toes are cool to touch. Feet are warm proximally.There is decreased digital hair bilateral. Skin is atrophic, hyperpigmented, and moderately edematous.     Musculoskeletal: She exhibits no edema or tenderness.        Right ankle: Normal.        Left ankle: Normal.        Right foot: There is no swelling, no crepitus and no deformity.        Left foot: There is no swelling, no crepitus and no deformity.   Adequate joint range of motion without pain, limitation, nor crepitation Bilateral feet and ankle joints. Muscle strength is 5/5 in all groups bilaterally.         Lymphadenopathy:   B/l lymph edema    Neurological: She is alert and oriented to person, place, and time. She has normal strength.   Decreased sharp/dull sensation bilateral feet.   Skin: Skin is warm, dry and intact. No abrasion, no bruising and no rash noted. No erythema. Nails show no clubbing.   Nails x10 are elongated by 2-4 mm, thickened by 4-7 mm, dystrophic, and are darkened in coloration. There is subungual debris. Nails are brittle. L halux nail medial border is ingrown and exquisitely tender to touch without acute signs of infection.       Hyperkeratotic tissue mild around heels.      Psychiatric: She has a normal mood and affect. Her behavior  is normal.   Nursing note and vitals reviewed.            Assessment:       Encounter Diagnoses   Name Primary?    Type 2 diabetes mellitus with diabetic polyneuropathy, with long-term current use of insulin Yes    Onychomycosis due to dermatophyte     Corn or callus          Plan:       Duran was seen today for pcp, diabetic foot exam, nail problem, heel pain and nail care.    Diagnoses and all orders for this visit:    Type 2 diabetes mellitus with diabetic polyneuropathy, with long-term current use of insulin    Onychomycosis due to dermatophyte    Corn or callus    - Patient was given written and verbal instructions regarding foot condition.  I counseled the patient on her conditions, their implications and medical management.    - Shoe inspection. Diabetic Foot Education. Patient reminded of the importance of good nutrition and blood sugar control to help prevent podiatric complications of diabetes. Patient instructed on proper foot hygeine. We discussed wearing proper shoe gear, daily foot inspections, never walking without protective shoe gear, never putting sharp instruments to feet, routine podiatric nail visits every 2-3 months.      - With patient's permission, nails were aggressively reduced and debrided x 10 to their soft tissue attachment mechanically and with electric , removing all offending nail and debris. Patient relates relief following the procedure. No blood was drawn. She will continue to monitor the areas daily, inspect her feet, wear protective shoe gear when ambulatory, moisturizer to maintain skin integrity and follow in this office in approximately 2-3 months, sooner p.r.n.    - After cleansing the  area w/ alcohol prep pad the above mentioned hyperkeratosis was trimmed utilizing No 15 scapel, to a smooth base with out incident. Patient tolerated this  well and reported comfort to the area of calcaneal rim b/l

## 2018-11-16 DIAGNOSIS — J45.40 MODERATE PERSISTENT ASTHMA WITHOUT COMPLICATION: ICD-10-CM

## 2018-11-16 RX ORDER — ALBUTEROL SULFATE 90 UG/1
2 AEROSOL, METERED RESPIRATORY (INHALATION) EVERY 4 HOURS PRN
Qty: 1 INHALER | Refills: 6 | Status: SHIPPED | OUTPATIENT
Start: 2018-11-16 | End: 2019-01-22 | Stop reason: SDUPTHER

## 2018-11-16 RX ORDER — ALBUTEROL SULFATE 90 UG/1
2 AEROSOL, METERED RESPIRATORY (INHALATION) EVERY 4 HOURS PRN
Qty: 1 INHALER | Refills: 6 | Status: SHIPPED | OUTPATIENT
Start: 2018-11-16 | End: 2019-01-10 | Stop reason: SDUPTHER

## 2018-11-16 NOTE — TELEPHONE ENCOUNTER
----- Message from Carine Hong sent at 11/16/2018 11:49 AM CST -----  Pharmacy Calling    Reason for call:     Refill     Pharmacy Name:     [facility]    Prescription Name:    [facility]   Phone Number:     [phone]  Additional Information:

## 2018-11-27 ENCOUNTER — OFFICE VISIT (OUTPATIENT)
Dept: PODIATRY | Facility: CLINIC | Age: 78
End: 2018-11-27
Payer: MEDICARE

## 2018-11-27 VITALS
WEIGHT: 280 LBS | HEART RATE: 61 BPM | RESPIRATION RATE: 18 BRPM | SYSTOLIC BLOOD PRESSURE: 163 MMHG | BODY MASS INDEX: 56.45 KG/M2 | DIASTOLIC BLOOD PRESSURE: 66 MMHG | HEIGHT: 59 IN

## 2018-11-27 DIAGNOSIS — L60.0 INGROWN NAIL: Primary | ICD-10-CM

## 2018-11-27 DIAGNOSIS — M79.675 GREAT TOE PAIN, LEFT: ICD-10-CM

## 2018-11-27 PROCEDURE — 99999 PR PBB SHADOW E&M-EST. PATIENT-LVL III: CPT | Mod: PBBFAC,,, | Performed by: PODIATRIST

## 2018-11-27 PROCEDURE — 11750 EXCISION NAIL&NAIL MATRIX: CPT | Mod: S$PBB,TA,, | Performed by: PODIATRIST

## 2018-11-27 PROCEDURE — 99213 OFFICE O/P EST LOW 20 MIN: CPT | Mod: PBBFAC,25 | Performed by: PODIATRIST

## 2018-11-27 PROCEDURE — 11750 EXCISION NAIL&NAIL MATRIX: CPT | Mod: PBBFAC | Performed by: PODIATRIST

## 2018-11-27 PROCEDURE — 99213 OFFICE O/P EST LOW 20 MIN: CPT | Mod: S$PBB,25,, | Performed by: PODIATRIST

## 2018-11-27 NOTE — PROGRESS NOTES
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    Chief Complaint: Follow-up (ingrown nail procedured ) and Toe Pain    Duran is a 78 y.o. female who presents to the clinic for evaluation and treatment of high risk feet. Duran has a past medical history of Adrenal mass- adenoma stable since 2004 (1.8 cm and 8/13/12 (2 cm); stable 2016 2.4 cm, Asthma in adult without complication (6/25/2015), Bilateral carotid artery disease (7/21/2017), Bilateral sciatica (2/7/2017), Cellulitis of right leg (08/16/2017), Cerebral infarction (1/29/2016), Cervical radiculopathy (3/18/2015), Chronic knee pain, Chronic rhinitis (4/9/2013), CKD (chronic kidney disease) stage 3, GFR 30-59 ml/min (1/29/2013), Coronary artery disease due to calcified coronary lesion (6/25/2015), Diastolic dysfunction (10/10/2013), Essential hypertension (6/25/2015), Gastroesophageal reflux disease without esophagitis (8/13/2012), Gout, Hives (10/1/2013), Mixed hyperlipidemia (8/13/2012), Morbid obesity with BMI of 50.0-59.9, adult (2/11/2014), Obstructive sleep apnea syndrome (8/13/2012), Senile cataracts of both eyes (1/22/2015), Traumatic open wound of right lower leg (8/25/2017), Trigeminal neuralgia of right side of face (5/23/2017), Type 2 diabetes mellitus with diabetic polyneuropathy, with long-term current use of insulin (1/29/2013), Venous stasis dermatitis of both lower extremities (8/21/2017), and Vitamin D deficiency disease (8/13/2012). The patient's chief complaint is  PAINFUL INGROWN TOENAIL L GREAT TOE  This patient has documented high risk feet requiring routine maintenance secondary to diabetes mellitis and those secondary complications of diabetes, as mentioned.    PCP: Tami Schmidt MD    Date Last Seen by PCP:   Chief Complaint   Patient presents with    Follow-up     ingrown nail procedured     Toe Pain        Current shoe gear:  Affected Foot: Casual shoes     Unaffected Foot: Casual  dasia    Hemoglobin A1C   Date Value Ref Range Status   10/10/2018 7.3 (H) 4.0 - 5.6 % Final     Comment:     ADA Screening Guidelines:  5.7-6.4%  Consistent with prediabetes  >or=6.5%  Consistent with diabetes  High levels of fetal hemoglobin interfere with the HbA1C  assay. Heterozygous hemoglobin variants (HbS, HgC, etc)do  not significantly interfere with this assay.   However, presence of multiple variants may affect accuracy.     08/29/2018 7.4 (H) 4.0 - 5.6 % Final     Comment:     ADA Screening Guidelines:  5.7-6.4%  Consistent with prediabetes  >or=6.5%  Consistent with diabetes  High levels of fetal hemoglobin interfere with the HbA1C  assay. Heterozygous hemoglobin variants (HbS, HgC, etc)do  not significantly interfere with this assay.   However, presence of multiple variants may affect accuracy.     05/21/2018 7.0 (H) 4.0 - 5.6 % Final     Comment:     According to ADA guidelines, hemoglobin A1c <7.0% represents  optimal control in non-pregnant diabetic patients. Different  metrics may apply to specific patient populations.   Standards of Medical Care in Diabetes-2016.  For the purpose of screening for the presence of diabetes:  <5.7%     Consistent with the absence of diabetes  5.7-6.4%  Consistent with increasing risk for diabetes   (prediabetes)  >or=6.5%  Consistent with diabetes  Currently, no consensus exists for use of hemoglobin A1c  for diagnosis of diabetes for children.  This Hemoglobin A1c assay has significant interference with fetal   hemoglobin   (HbF). The results are invalid for patients with abnormal amounts of   HbF,   including those with known Hereditary Persistence   of Fetal Hemoglobin. Heterozygous hemoglobin variants (HbAS, HbAC,   HbAD, HbAE, HbA2) do not significantly interfere with this assay;   however, presence of multiple variants in a sample may impact the %   interference.         Review of Systems   Cardiovascular: Positive for leg swelling.   Skin: Positive for dry  skin and nail changes. Negative for flushing.   Neurological: Positive for numbness. Negative for paresthesias.           Objective:      Physical Exam   Constitutional: She is oriented to person, place, and time. She appears well-developed and well-nourished.   Cardiovascular:   Pulses:       Dorsalis pedis pulses are 2+ on the right side, and 2+ on the left side.        Posterior tibial pulses are 2+ on the right side, and 2+ on the left side.   Dorsalis pedis and posterior tibial pulses are palpable bilaterally. Toes are cool to touch. Feet are warm proximally.There is decreased digital hair bilateral. Skin is atrophic, hyperpigmented, and moderately edematous.     Musculoskeletal: She exhibits no edema or tenderness.        Right ankle: Normal.        Left ankle: Normal.        Right foot: There is no swelling, no crepitus and no deformity.        Left foot: There is no swelling, no crepitus and no deformity.   Adequate joint range of motion without pain, limitation, nor crepitation Bilateral feet and ankle joints. Muscle strength is 5/5 in all groups bilaterally.         Lymphadenopathy:   B/l lymph edema    Neurological: She is alert and oriented to person, place, and time. She has normal strength.   Decreased sharp/dull sensation bilateral feet.   Skin: Skin is warm, dry and intact. No abrasion, no bruising and no rash noted. No erythema. Nails show no clubbing.   Medial  hallux nail margin of l foot with ingrown nail plate. Surrounding erythema and minimal edema is noted there is no granuloma formation noted. no malodor      Psychiatric: She has a normal mood and affect. Her behavior is normal.   Nursing note and vitals reviewed.            Assessment:       Encounter Diagnoses   Name Primary?    Ingrown nail - Left Foot Yes    Great toe pain, left          Plan:       Duran was seen today for follow-up and toe pain.    Diagnoses and all orders for this visit:    Ingrown nail - Left Foot    Great toe pain,  left    - Patient was given written and verbal instructions regarding foot condition.  I counseled the patient on her conditions, their implications and medical management.    Treatment options discussed with patient.  Patient would like permanent procedure.  Patient understands a 5-10% recurrence rate of ingrown nail.  Patient understands all potential risks and complications as well as alternatives.  No guarantees are given or implied as to the outcome.  Consent forms read signed witnessed and the chart.  See op report.    MATRIXECTOMY OP REPORT    SURGEON: Rhonda Whitfield DPM    PRE-OP DX: onychocryptosis    POST-OP DX: same    PROCEDURE: medial l hallux  matrixectomy      ANESTHESIA: local    HEMOSTASIS: penrose digital tourniquet for less then 3 minutes.    EBL: Less than 5 cc    After obtaining verbal and written consent for nail procedure, I injected the toe via digital block with 3 cc of  2% Xylocaine plain  for anesthesia. After anesthesia was achieved,Tourniquet applied to toe. The area was cleansed with betadine. Next I incised the nail border approximately 3 mm from its edge and carried the nail plate incision down the entire length of the nail plate to the matrix area. The offending border was freed from all fibrous adhesions and removed from the operative site in toto.  Phenol 90% was then applied to the matrix area 3 times for a total of 90 seconds. The tourniquet was released and CFT was immediate. The area was flushed with alcohol and dried, and dressed with antibiotic cream and a dry, sterile, compressive dressing. Patient tolerated the procedure well. Written and verbal post-operative instructions were dispensed to the patient on post-operative nail care. Pt. to follow-up in one week but should call immediately if any signs of infection, such as fever, chills, sweats, increased redness or pain.

## 2018-12-04 ENCOUNTER — OFFICE VISIT (OUTPATIENT)
Dept: PODIATRY | Facility: CLINIC | Age: 78
End: 2018-12-04
Payer: MEDICARE

## 2018-12-04 VITALS
HEART RATE: 80 BPM | SYSTOLIC BLOOD PRESSURE: 182 MMHG | BODY MASS INDEX: 56.89 KG/M2 | DIASTOLIC BLOOD PRESSURE: 69 MMHG | WEIGHT: 282.19 LBS | HEIGHT: 59 IN | RESPIRATION RATE: 18 BRPM

## 2018-12-04 DIAGNOSIS — Z98.890 S/P NAIL SURGERY: Primary | ICD-10-CM

## 2018-12-04 PROCEDURE — 99999 PR PBB SHADOW E&M-EST. PATIENT-LVL III: CPT | Mod: PBBFAC,,, | Performed by: PODIATRIST

## 2018-12-04 PROCEDURE — 99213 OFFICE O/P EST LOW 20 MIN: CPT | Mod: PBBFAC | Performed by: PODIATRIST

## 2018-12-04 PROCEDURE — 99024 POSTOP FOLLOW-UP VISIT: CPT | Mod: POP,,, | Performed by: PODIATRIST

## 2018-12-04 NOTE — PROGRESS NOTES
SUBJECTIVE: Patient returns to the clinic one week status post nail procedure.  Patient has no complaints of fever chills or sweats.  Minimal pain.  Patient has been dressing as instructed.     PAST MEDICAL HISTORY: Unchanged    Physical examination: General: Pt. is in no acute distress, alert and oriented x 3.    Podiatric examination: Vascular:Capillary refill time is within normal limits.  Dermatologic: Surgical site is clean without any signs of infection. minimal drainage.  No significant erythema.    IMPRESSION: 1 week postop nail procedure with satisfactory progress no signs of infection.    PLAN: Patient to continue local care until completely healed with no drainage and no redness.  Patient should call the clinic immediately if any signs of infection such as fever chills sweats increased redness or pain but was otherwise discharged.

## 2018-12-11 ENCOUNTER — TELEPHONE (OUTPATIENT)
Dept: INTERNAL MEDICINE | Facility: CLINIC | Age: 78
End: 2018-12-11

## 2018-12-11 ENCOUNTER — TELEPHONE (OUTPATIENT)
Dept: WOUND CARE | Facility: CLINIC | Age: 78
End: 2018-12-11

## 2018-12-11 ENCOUNTER — OFFICE VISIT (OUTPATIENT)
Dept: PRIMARY CARE CLINIC | Facility: CLINIC | Age: 78
End: 2018-12-11
Payer: MEDICARE

## 2018-12-11 VITALS
WEIGHT: 279.75 LBS | DIASTOLIC BLOOD PRESSURE: 60 MMHG | HEART RATE: 100 BPM | BODY MASS INDEX: 56.4 KG/M2 | HEIGHT: 59 IN | SYSTOLIC BLOOD PRESSURE: 128 MMHG | OXYGEN SATURATION: 99 %

## 2018-12-11 DIAGNOSIS — I50.32 CHRONIC DIASTOLIC HEART FAILURE: ICD-10-CM

## 2018-12-11 DIAGNOSIS — I87.2 VENOUS STASIS DERMATITIS OF BOTH LOWER EXTREMITIES: ICD-10-CM

## 2018-12-11 DIAGNOSIS — I15.2 HYPERTENSION ASSOCIATED WITH DIABETES: ICD-10-CM

## 2018-12-11 DIAGNOSIS — G47.33 OSA ON CPAP: ICD-10-CM

## 2018-12-11 DIAGNOSIS — E11.59 HYPERTENSION ASSOCIATED WITH DIABETES: ICD-10-CM

## 2018-12-11 PROCEDURE — 99215 OFFICE O/P EST HI 40 MIN: CPT | Mod: S$GLB,,, | Performed by: INTERNAL MEDICINE

## 2018-12-11 NOTE — Clinical Note
Brian Vásquez,Ms Pasquale developed another ulcer on her RLE. She stopped using her circaid compressions 2 mos ago when she developed blisters. Now her legs are significantly larger and has 2 open wounds.Could you or an appropriate colleague see her in clinic?Thanks,BARB

## 2018-12-11 NOTE — ASSESSMENT & PLAN NOTE
Previously compliant with CPAP only 4 hours per night, now noncompliant  · Advised to continue using machine  · Advised to place machine next to recliner chair or use in her bedroom  · Increase hours on machine  · Advised to lose weight

## 2018-12-11 NOTE — TELEPHONE ENCOUNTER
Please let patient know that ROSALIO Hong will see her in clinic, they will reach out with the appt.    BARB Spencer    ----- Message from Zena Hong NP sent at 12/11/2018 12:54 PM CST -----  Yes, will have F[facility] call her and schedule an appt    Thank You,  Zena PEÑAP-C    ----- Message -----  From: Tami Schmidt MD  Sent: 12/11/2018  11:22 AM  To: ROSALIO Mead,  Ms Giordano developed another ulcer on her RLE. She stopped using her circaid compressions 2 mos ago when she developed blisters. Now her legs are significantly larger and has 2 open wounds.    Could you or an appropriate colleague see her in clinic?    BARB Hicks

## 2018-12-11 NOTE — ASSESSMENT & PLAN NOTE
LE swelling bilaterally with poor wound healing, routine circaids with wound care  · Continue circaids boot, per wound care  · Treat cellulitis PRN  · Monitor CBC  · Discontinued amlodipine at previous appt  · New script for compression stockings given   · Will call [employer] for fitting

## 2018-12-11 NOTE — TELEPHONE ENCOUNTER
----- Message from Zena Hong NP sent at 12/11/2018 12:54 PM CST -----  Can you please schedule this patient    ThanksZena  ----- Message -----  From: Tami Schmidt MD  Sent: 12/11/2018  11:22 AM  To: ROSALIO Mead,  Ms Pasquale developed another ulcer on her RLE. She stopped using her circaid compressions 2 mos ago when she developed blisters. Now her legs are significantly larger and has 2 open wounds.    Could you or an appropriate colleague see her in clinic?    Thanks,  BARB

## 2018-12-11 NOTE — Clinical Note
Dear Dr Marcum and staff,Sister Duran Logan is requesting an appointment with Dr Marcum only as an optometrist. I understand that your practice is primarily for pediatrics, but this patient states that she only wants care from Dr Marcum.Thanks,BARB (PCP)P.S. Dr Marcum- I sat next to you at the OPP dinner last week!

## 2018-12-11 NOTE — ASSESSMENT & PLAN NOTE
BP controlled ibesartan 300mg, torsemide; carvedilol discontinued by pulm due to SOB, amlodipine discontinued by PCP due to leg swelling  · Discontinued hydralazine due to overtreatment of HTN  · Changed losartan to ibesartan 300mg  · Consider Digital HTN program in future

## 2018-12-11 NOTE — PATIENT INSTRUCTIONS
TODAY:  - PCP to message wound care for suggestions  - Restart cercaid compressions  - new script for compression stockings given  - refer to [employer] to get new cercaids and compression stockings  - appt with Dr Marcum for eye exam  - wound care appt  - labs today  - use your CPAP  - continue cutting back on food

## 2018-12-12 ENCOUNTER — TELEPHONE (OUTPATIENT)
Dept: WOUND CARE | Facility: CLINIC | Age: 78
End: 2018-12-12

## 2018-12-12 ENCOUNTER — TELEPHONE (OUTPATIENT)
Dept: INTERNAL MEDICINE | Facility: CLINIC | Age: 78
End: 2018-12-12

## 2018-12-12 NOTE — TELEPHONE ENCOUNTER
Please let patient know that her labs, her A1c was 7.1 which was well-controlled!    Her other labs are stable, Hg (blood count) is 11.6. Kidney function is good, electrolytes normal.    Great job!!!  KJ

## 2018-12-12 NOTE — TELEPHONE ENCOUNTER
Left voice message asking patient to call [phone] to confirm tomorrow's appointment 12/13/18 at 10am.

## 2018-12-14 DIAGNOSIS — I25.10 CORONARY ARTERY DISEASE, ANGINA PRESENCE UNSPECIFIED, UNSPECIFIED VESSEL OR LESION TYPE, UNSPECIFIED WHETHER NATIVE OR TRANSPLANTED HEART: ICD-10-CM

## 2018-12-14 DIAGNOSIS — E55.9 VITAMIN D DEFICIENCY DISEASE: ICD-10-CM

## 2018-12-14 RX ORDER — VIT C/E/ZN/COPPR/LUTEIN/ZEAXAN 250MG-90MG
1000 CAPSULE ORAL DAILY
Qty: 90 CAPSULE | Refills: 3 | Status: SHIPPED | OUTPATIENT
Start: 2018-12-14 | End: 2020-12-02 | Stop reason: SDUPTHER

## 2018-12-14 RX ORDER — ASPIRIN 81 MG/1
81 TABLET ORAL DAILY
Qty: 90 TABLET | Refills: 3 | Status: SHIPPED | OUTPATIENT
Start: 2018-12-14 | End: 2021-05-26

## 2018-12-17 ENCOUNTER — OFFICE VISIT (OUTPATIENT)
Dept: WOUND CARE | Facility: CLINIC | Age: 78
End: 2018-12-17
Payer: MEDICARE

## 2018-12-17 VITALS
RESPIRATION RATE: 24 BRPM | SYSTOLIC BLOOD PRESSURE: 172 MMHG | TEMPERATURE: 98 F | WEIGHT: 282.88 LBS | HEART RATE: 81 BPM | DIASTOLIC BLOOD PRESSURE: 70 MMHG | HEIGHT: 59 IN | BODY MASS INDEX: 57.03 KG/M2

## 2018-12-17 DIAGNOSIS — L97.919 VENOUS ULCER OF RIGHT LEG: Primary | ICD-10-CM

## 2018-12-17 DIAGNOSIS — I89.0 LYMPHEDEMA OF BOTH LOWER EXTREMITIES: ICD-10-CM

## 2018-12-17 DIAGNOSIS — I83.019 VENOUS ULCER OF RIGHT LEG: Primary | ICD-10-CM

## 2018-12-17 DIAGNOSIS — E66.01 MORBID OBESITY WITH BMI OF 50.0-59.9, ADULT: ICD-10-CM

## 2018-12-17 PROBLEM — R60.0 BILATERAL LEG EDEMA: Status: RESOLVED | Noted: 2017-09-29 | Resolved: 2018-12-17

## 2018-12-17 PROCEDURE — 99999 PR PBB SHADOW E&M-EST. PATIENT-LVL IV: CPT | Mod: PBBFAC,,, | Performed by: NURSE PRACTITIONER

## 2018-12-17 PROCEDURE — 99212 OFFICE O/P EST SF 10 MIN: CPT | Mod: 25,S$PBB,, | Performed by: NURSE PRACTITIONER

## 2018-12-17 PROCEDURE — 99214 OFFICE O/P EST MOD 30 MIN: CPT | Mod: PBBFAC | Performed by: NURSE PRACTITIONER

## 2018-12-17 NOTE — PROGRESS NOTES
Subjective:       Patient ID: Duran Giordano is a 78 y.o. female.    Chief Complaint: Wound Check      This 78 y.o.  female with multiple chronic medical conditions includingType 2 DM, CKD, CHF, Lymphedema, Morbid obesity, HTN, and Anemias. She normally wears circaid wraps but over the last few months has been having problems with increased fluid and ulcers. She presents today for an evaluation. She reports she will be leaving town over the holidays. Her care will be accomadated for the period of time. She has been applying Medihoney to the wounds and no compression at this time.       Review of Systems    Unchanged from prior visit.  Objective:      Physical Exam   Constitutional: She is oriented to person, place, and time. She appears well-developed and well-nourished. No distress.   HENT:   Head: Normocephalic and atraumatic.   Neck: Normal range of motion.   Pulmonary/Chest: Effort normal. No respiratory distress.   Musculoskeletal: Normal range of motion. She exhibits edema. She exhibits no tenderness.        Legs:  Neurological: She is alert and oriented to person, place, and time.   Skin: Skin is warm and dry. No rash noted. She is not diaphoretic. No cyanosis or erythema. Nails show no clubbing.   Psychiatric: She has a normal mood and affect. Her behavior is normal. Judgment and thought content normal.   Nursing note and vitals reviewed.            The right leg was cleansed with Easi-clense sponges and dried thoroughly.  Mepilex foam was applied to the wound.  Eucerin cream was applied to the lower legs.  The patient's foot was positioned at a 90 degree angle.  A zinc oxide wrap, followed by kerlix roll gauze and coban were applied using a spiral technique avoiding creases or folds.  The wrap was started behind the first metatarsal and ended below the tibial tubercle of the knee.  There was overlap of each turn half the width of the previous turn.  The compression wrap will be  changed every 3-4 days.  Lab Results   Component Value Date    HGBA1C 7.1 (H) 12/11/2018     Assessment:       1. Venous ulcer of right leg    2. Lymphedema of both lower extremities    3. Morbid obesity with BMI of 50.0-59.9, adult        Plan:           Unna boot right lower leg as detailed above.  Patient was warned not to get the dressings wet and to use cast covers for showering.  Should the dressing become wet, she is to remove it, place a wet-to-dry dressing over the wound, cover with gauze and roll gauze and use ace wraps for compression and to secure bandages.  She should then notify home health as soon as possible to have a new dressing applied.    In one week from today, 12/24/2018 cut off dressing to both lower legs, clean wounds and legs with soap and water, apply absorbant pad to wounds, secure circaids to both lower legs at 30mmHg    Return to clinic January 3rd for re-evaluation    Continue to elevate legs  Monitor sodium in diet

## 2018-12-17 NOTE — PATIENT INSTRUCTIONS
Patient was warned not to get the dressings wet and to use cast covers for showering.  Should the dressing become wet, he is to remove it, place a wet-to-dry dressing over the wound, cover with gauze and roll gauze and use ace wraps for compression and to secure bandages.  He should then notify this office as soon as possible to have a new dressing applied.    In one week from today, 12/24/2018 cut off dressing to both lower legs, clean wounds and legs with soap and water, apply absorbant pad to wounds, secure circaids to both lower legs at 30mmHg    Return to clinic January 3rd for re-evaluation    Continue to elevate legs  Monitor sodium in diet

## 2018-12-18 NOTE — PROGRESS NOTES
Adherence packed for 30 days:     Morning card:  Aspirin 81 mg  Torsemide 20 mg     Evening card:  Atorvastatin 80 mg  Irbesartan 300 mg (300 mg on back order, used 2 150mg tablets)  Vitamin D3 1,000 IU

## 2018-12-19 ENCOUNTER — OFFICE VISIT (OUTPATIENT)
Dept: WOUND CARE | Facility: CLINIC | Age: 78
End: 2018-12-19
Payer: MEDICARE

## 2018-12-19 VITALS
SYSTOLIC BLOOD PRESSURE: 154 MMHG | WEIGHT: 280 LBS | TEMPERATURE: 98 F | HEART RATE: 92 BPM | BODY MASS INDEX: 56.45 KG/M2 | DIASTOLIC BLOOD PRESSURE: 68 MMHG | HEIGHT: 59 IN

## 2018-12-19 DIAGNOSIS — I89.0 LYMPHEDEMA OF BOTH LOWER EXTREMITIES: ICD-10-CM

## 2018-12-19 DIAGNOSIS — I83.019 VENOUS ULCER OF RIGHT LEG: Primary | ICD-10-CM

## 2018-12-19 DIAGNOSIS — E66.01 MORBID OBESITY WITH BMI OF 50.0-59.9, ADULT: ICD-10-CM

## 2018-12-19 DIAGNOSIS — L97.919 VENOUS ULCER OF RIGHT LEG: Primary | ICD-10-CM

## 2018-12-19 PROCEDURE — 29580 STRAPPING UNNA BOOT: CPT | Mod: S$PBB,RT,, | Performed by: NURSE PRACTITIONER

## 2018-12-19 PROCEDURE — 99213 OFFICE O/P EST LOW 20 MIN: CPT | Mod: PBBFAC | Performed by: NURSE PRACTITIONER

## 2018-12-19 PROCEDURE — 29580 STRAPPING UNNA BOOT: CPT | Mod: PBBFAC,RT | Performed by: NURSE PRACTITIONER

## 2018-12-19 PROCEDURE — 99499 UNLISTED E&M SERVICE: CPT | Mod: S$PBB,,, | Performed by: NURSE PRACTITIONER

## 2018-12-19 PROCEDURE — 99999 PR PBB SHADOW E&M-EST. PATIENT-LVL III: CPT | Mod: PBBFAC,,, | Performed by: NURSE PRACTITIONER

## 2018-12-19 NOTE — PATIENT INSTRUCTIONS
Elevate legs as much as possible. Do not get the dressings wet and use cast covers for showering.  Should the dressing become wet, remove it, place a wet-to-dry dressing over the wound, cover with gauze and roll gauze and use ace wraps for compression and to secure bandages.  Notify clinic as soon as possible to have a new dressing applied.      In one week from today or if wraps fall down leg,  cut off dressing to both lower legs, clean wounds and legs with soap and water, apply absorbant pad to wounds, secure circaids to both lower legs at 30mmHg    Return to clinic January 3rd for re-evaluation    Continue to elevate legs  Monitor sodium in diet

## 2018-12-19 NOTE — PROGRESS NOTES
Subjective:       Patient ID: Duran Giordano is a 78 y.o. female.    Chief Complaint: Wound Check      This 78 y.o.  female with multiple chronic medical conditions includingType 2 DM, CKD, CHF, Lymphedema, Morbid obesity, HTN, and Anemias. She normally wears circaid wraps but over the last few months has been having problems with increased fluid and ulcers. She presents today for an evaluation. She reports she will be leaving town over the holidays. Her care will be accomadated for the period of time. She has been applying Medihoney to the wounds and no compression at this time. The patient was placed in coflex dressing last clinic visit however she scheduled her another appt due to wrap falling down leg.       Review of Systems  Unchanged from prior visit.  Objective:      Physical Exam   Constitutional: She is oriented to person, place, and time. She appears well-developed and well-nourished. No distress.   HENT:   Head: Normocephalic and atraumatic.   Neck: Normal range of motion.   Pulmonary/Chest: Effort normal. No respiratory distress.   Musculoskeletal: Normal range of motion. She exhibits edema. She exhibits no tenderness.        Legs:  Neurological: She is alert and oriented to person, place, and time.   Skin: Skin is warm and dry. No rash noted. She is not diaphoretic. No cyanosis or erythema. Nails show no clubbing.   Psychiatric: She has a normal mood and affect. Her behavior is normal. Judgment and thought content normal.   Nursing note and vitals reviewed.            The right leg was cleansed with Easi-clense sponges and dried thoroughly.  Mepilex foam was applied to the wound.  Eucerin cream was applied to the lower legs.  The patient's foot was positioned at a 90 degree angle.  A hydrofiber with gauze applied over wound and maceration. Acoflex wrap was applied using a spiral technique avoiding creases or folds.  The wrap was started behind the first metatarsal and ended  below the tibial tubercle of the knee.  There was overlap of each turn half the width of the previous turn.  The compression wrap will be cut off in 7 days and the patient will wrap her legs with circaid wraps while she is out of town.   Lab Results   Component Value Date    HGBA1C 7.1 (H) 12/11/2018     Assessment:       1. Venous ulcer of right leg    2. Lymphedema of both lower extremities    3. Morbid obesity with BMI of 50.0-59.9, adult        Plan:           Unna boot right lower leg as detailed above.  Patient was warned not to get the dressings wet and to use cast covers for showering.  Should the dressing become wet, she is to remove it, place a wet-to-dry dressing over the wound, cover with gauze and roll gauze and use ace wraps for compression and to secure bandages.  She should then notify home health as soon as possible to have a new dressing applied.    In one week from today or if wraps fall down leg,  cut off dressing to both lower legs, clean wounds and legs with soap and water, apply absorbant pad to wounds, secure circaids to both lower legs at 30mmHg    Return to clinic January 3rd for re-evaluation    Continue to elevate legs  Monitor sodium in diet

## 2019-01-03 ENCOUNTER — OFFICE VISIT (OUTPATIENT)
Dept: WOUND CARE | Facility: CLINIC | Age: 79
End: 2019-01-03
Payer: MEDICARE

## 2019-01-03 VITALS
BODY MASS INDEX: 55.91 KG/M2 | TEMPERATURE: 98 F | HEIGHT: 59 IN | WEIGHT: 277.31 LBS | DIASTOLIC BLOOD PRESSURE: 70 MMHG | HEART RATE: 84 BPM | RESPIRATION RATE: 22 BRPM | SYSTOLIC BLOOD PRESSURE: 150 MMHG

## 2019-01-03 DIAGNOSIS — I89.0 LYMPHEDEMA OF BOTH LOWER EXTREMITIES: ICD-10-CM

## 2019-01-03 DIAGNOSIS — E66.01 MORBID OBESITY WITH BMI OF 50.0-59.9, ADULT: ICD-10-CM

## 2019-01-03 DIAGNOSIS — L97.919 VENOUS ULCER OF RIGHT LEG: Primary | ICD-10-CM

## 2019-01-03 DIAGNOSIS — I83.019 VENOUS ULCER OF RIGHT LEG: Primary | ICD-10-CM

## 2019-01-03 PROCEDURE — 99212 OFFICE O/P EST SF 10 MIN: CPT | Mod: 25,S$PBB,, | Performed by: NURSE PRACTITIONER

## 2019-01-03 PROCEDURE — 99214 OFFICE O/P EST MOD 30 MIN: CPT | Mod: PBBFAC,25 | Performed by: NURSE PRACTITIONER

## 2019-01-03 PROCEDURE — 99212 PR OFFICE/OUTPT VISIT, EST, LEVL II, 10-19 MIN: ICD-10-PCS | Mod: 25,S$PBB,, | Performed by: NURSE PRACTITIONER

## 2019-01-03 PROCEDURE — 99999 PR PBB SHADOW E&M-EST. PATIENT-LVL IV: ICD-10-PCS | Mod: PBBFAC,,, | Performed by: NURSE PRACTITIONER

## 2019-01-03 PROCEDURE — 29580 STRAPPING UNNA BOOT: CPT | Mod: S$PBB,RT,, | Performed by: NURSE PRACTITIONER

## 2019-01-03 PROCEDURE — 29580 PR STRAPPING UNNA BOOT: ICD-10-PCS | Mod: S$PBB,RT,, | Performed by: NURSE PRACTITIONER

## 2019-01-03 PROCEDURE — 29580 STRAPPING UNNA BOOT: CPT | Mod: PBBFAC,RT | Performed by: NURSE PRACTITIONER

## 2019-01-03 PROCEDURE — 99999 PR PBB SHADOW E&M-EST. PATIENT-LVL IV: CPT | Mod: PBBFAC,,, | Performed by: NURSE PRACTITIONER

## 2019-01-03 NOTE — PATIENT INSTRUCTIONS
Elevate legs as much as possible. Do not get the dressings wet and use cast covers for showering.  Should the dressing become wet, remove it, place a wet-to-dry dressing over the wound, cover with gauze and roll gauze and use ace wraps for compression and to secure bandages.  Notify clinic as soon as possible to have a new dressing applied.  Return to clinic in one week for dressing change  Circaids to left leg daily  Remove at night

## 2019-01-03 NOTE — PROGRESS NOTES
Subjective:       Patient ID: Duran Sister Doreen Giordano is a 78 y.o. female.    Chief Complaint: Wound Check      This 78 y.o.  female with multiple chronic medical conditions includingType 2 DM, CKD, CHF, Lymphedema, Morbid obesity, HTN, and Anemias. She normally wears circaid wraps but over the last few months has been having problems with increased fluid and ulcers. . She reports had left town for the holidays. She was instructed to remove her unna boot after one week and apply circaids until she returned. She is here for a follow up today. She has an open wound to the right lower leg. We will resume unna boot placement today. Circaids to the left lower leg.       Review of Systems    Unchanged from prior visit.  Objective:      Physical Exam   Constitutional: She is oriented to person, place, and time. She appears well-developed and well-nourished. No distress.   HENT:   Head: Normocephalic and atraumatic.   Neck: Normal range of motion.   Pulmonary/Chest: Effort normal. No respiratory distress.   Musculoskeletal: Normal range of motion. She exhibits edema. She exhibits no tenderness.        Legs:  Neurological: She is alert and oriented to person, place, and time.   Skin: Skin is warm and dry. No rash noted. She is not diaphoretic. No cyanosis or erythema. Nails show no clubbing.   Psychiatric: She has a normal mood and affect. Her behavior is normal. Judgment and thought content normal.   Nursing note and vitals reviewed.          Duran (Sister) was seen in the clinic room and placed in the supine position on the treatment table.  The leg was cleansed with Easi-clense sponges and dried thoroughly.  Eucerin cream was applied to the lower legs. A hydrofiber dressing was applied to the wound.  The patient's foot was positioned at a 90 degree angle.  The unna bootwas applied per package instructions.  The application was performed using a spiral technique avoiding creases or folds.  The wrap was  started behind the first metatarsal and ended below the tibial tubercle of the knee.  There was overlap of each turn half the width of the previous turn.  The compression wrap will be changed every 7 days.    Assessment:       1. Venous ulcer of right leg    2. Lymphedema of both lower extremities    3. Morbid obesity with BMI of 50.0-59.9, adult        Plan:           Unna boot right lower leg as detailed above.  Patient was warned not to get the dressings wet and to use cast covers for showering.  Should the dressing become wet, she is to remove it, place a wet-to-dry dressing over the wound, cover with gauze and roll gauze and use ace wraps for compression and to secure bandages.  She should then notify clinic as possible to have a new dressing applied.    Circaid to the left lower leg  Return to clinic in one week for dressing change    Continue to elevate legs  Monitor sodium in diet

## 2019-01-07 ENCOUNTER — TELEPHONE (OUTPATIENT)
Dept: ENDOCRINOLOGY | Facility: CLINIC | Age: 79
End: 2019-01-07

## 2019-01-07 NOTE — TELEPHONE ENCOUNTER
----- Message from Pratima Aragon sent at 1/7/2019 10:24 AM CST -----  Contact: [phone]  .Needs Advice    Reason for call:        Communication Preference:phone     Additional Information:pt states she  Needs an apt for January please call back to discuss

## 2019-01-10 ENCOUNTER — OFFICE VISIT (OUTPATIENT)
Dept: OPTOMETRY | Facility: CLINIC | Age: 79
End: 2019-01-10
Payer: MEDICARE

## 2019-01-10 DIAGNOSIS — E11.9 TYPE 2 DIABETES MELLITUS WITHOUT OPHTHALMIC MANIFESTATIONS: ICD-10-CM

## 2019-01-10 DIAGNOSIS — H25.813 COMBINED FORMS OF AGE-RELATED CATARACT OF BOTH EYES: Primary | ICD-10-CM

## 2019-01-10 PROBLEM — R50.9 FEVER OF UNKNOWN ORIGIN (FUO): Status: RESOLVED | Noted: 2018-05-21 | Resolved: 2019-01-10

## 2019-01-10 PROCEDURE — 99212 OFFICE O/P EST SF 10 MIN: CPT | Mod: PBBFAC | Performed by: OPTOMETRIST

## 2019-01-10 PROCEDURE — 92014 COMPRE OPH EXAM EST PT 1/>: CPT | Mod: S$PBB,,, | Performed by: OPTOMETRIST

## 2019-01-10 PROCEDURE — 92014 PR EYE EXAM, EST PATIENT,COMPREHESV: ICD-10-PCS | Mod: S$PBB,,, | Performed by: OPTOMETRIST

## 2019-01-10 PROCEDURE — 92015 PR REFRACTION: ICD-10-PCS | Mod: ,,, | Performed by: OPTOMETRIST

## 2019-01-10 PROCEDURE — 99999 PR PBB SHADOW E&M-EST. PATIENT-LVL II: ICD-10-PCS | Mod: PBBFAC,,, | Performed by: OPTOMETRIST

## 2019-01-10 PROCEDURE — 92015 DETERMINE REFRACTIVE STATE: CPT | Mod: ,,, | Performed by: OPTOMETRIST

## 2019-01-10 PROCEDURE — 99999 PR PBB SHADOW E&M-EST. PATIENT-LVL II: CPT | Mod: PBBFAC,,, | Performed by: OPTOMETRIST

## 2019-01-10 NOTE — LETTER
January 10, 2019      Tami Schmidt MD  [address]  [facility] [address]           [facility]  [address]  [facility] [address]  Phone: [phone]  Fax: [phone]          Patient: Duran Giordano   MR Number: [id number]   YOB: 1940   Date of Visit: 1/10/2019       Dear Dr. Tami Schmidt:    Thank you for referring Duran Giordano to me for evaluation. Attached you will find relevant portions of my assessment and plan of care.    If you have questions, please do not hesitate to call me. I look forward to following Duran Giordano along with you.    Sincerely,    Valentin Marcum, OD    Enclosure  CC:  No Recipients    If you would like to receive this communication electronically, please contact [email or web address] or [phone] to request more information on [email or web address] Link access.    For providers and/or their staff who would like to refer a patient to [facility], please contact us through our one-stop-shop provider referral line, [facility] , at [phone].    If you feel you have received this communication in error or would no longer like to receive these types of communications, please e-mail [email or web address]

## 2019-01-10 NOTE — PROGRESS NOTES
HPI     Duran Giordano is a 78 y.o. female who returns  for continued diabetic eye   care. She was diagnosed with Type 2 DM about 15 - 20 years ago years ago.    It is being treated with humalog and levimir.  Her most recent blood sugar   measurement was 121, this morning.     Sister Duran Logan's most recent exam with me was on 02/15/2018. She has   mixed, senile cataracts and refractive error.  There is no history of   retinopathy of any type. She reports that her vision with her most recent   prescription for readers ( last visit ) is not as clear as the previous   RX, so she does not use them. She also mentions intermittent redness of   the right lateral canthus. It usually spontaneously resolves within 48   hours.  She uses OTC Visine at times.      Hemoglobin A1C       Date                     Value               Ref Range             Status                12/11/2018               7.1 (H)             4.0 - 5.6 %           Final                    Date                     Value               Ref Range             Status                12/04/2017               7.2 (H)             4.0 - 5.6 %           Final              (+)blurred vision  (--)Headaches  (--)diplopia  (--)flashes  (--)floaters  (--)pain  (--)Itching  (--)tearing  (--)burning  (--)Dryness  (+) OTC Drops - OTC Visione  (--)Photophobia    Last edited by Valentin Marcum, OD on 1/10/2019 12:30 PM. (History)        Review of Systems   Constitutional: Negative for chills, fever and malaise/fatigue.   HENT: Negative for congestion and hearing loss.    Eyes: Positive for blurred vision. Negative for double vision, photophobia, pain, discharge and redness.   Respiratory: Negative.    Cardiovascular: Negative.    Gastrointestinal: Negative.    Genitourinary: Negative.    Musculoskeletal: Positive for joint pain.   Skin: Negative.    Neurological: Positive for weakness (lower left leg (related to cellulitis)). Negative for seizures.   Endo/Heme/Allergies:  Negative for environmental allergies.   Psychiatric/Behavioral: Negative.        Assessment /Plan     For exam results, see Encounter Report.    1. Combined forms of age-related cataract of both eyes  - BCVA 20/25 right; 20/20 left  - Defer surgery    2. Type 2 diabetes mellitus without ophthalmic manifestations  - No ocular  treatment needed; monitor annually    3. Antimetropia with presbyopia  - Spec Rx per final Rx below    Glasses Prescription (1/10/2019)        Sphere Cylinder Axis Add    Right +3.75 Sphere      Left +1.25 +1.00 180     Type:  SVL    Expiration Date:  1/11/2020    Comments:  Reading      Glasses Prescription (1/10/2019)  #2       Sphere Cylinder Axis Add    Right +1.50 Sphere  +2.50    Left -0.75 +1.00 180 +2.50    Type:  Bifocal or PAL    Expiration Date:  1/11/2020    Comments:  Opposite signs        Glasses Prescription (1/10/2019)  #3       Sphere Cylinder Axis Add    Right +1.50 Sphere      Left -0.75 +1.00 180     Type:  SVL    Expiration Date:  1/11/2020    Comments:  Opposite signs  Distance only              Patient education; RTC in 1 year with DFE, sooner prn at [facility]

## 2019-01-14 ENCOUNTER — OFFICE VISIT (OUTPATIENT)
Dept: WOUND CARE | Facility: CLINIC | Age: 79
End: 2019-01-14
Payer: MEDICARE

## 2019-01-14 VITALS
RESPIRATION RATE: 24 BRPM | BODY MASS INDEX: 46.3 KG/M2 | HEART RATE: 80 BPM | SYSTOLIC BLOOD PRESSURE: 196 MMHG | DIASTOLIC BLOOD PRESSURE: 82 MMHG | HEIGHT: 64 IN | WEIGHT: 271.19 LBS | TEMPERATURE: 97 F

## 2019-01-14 DIAGNOSIS — E66.01 MORBID OBESITY WITH BMI OF 50.0-59.9, ADULT: ICD-10-CM

## 2019-01-14 DIAGNOSIS — I89.0 LYMPHEDEMA OF BOTH LOWER EXTREMITIES: Primary | ICD-10-CM

## 2019-01-14 PROCEDURE — 99999 PR PBB SHADOW E&M-EST. PATIENT-LVL III: CPT | Mod: PBBFAC,,, | Performed by: NURSE PRACTITIONER

## 2019-01-14 PROCEDURE — 99212 OFFICE O/P EST SF 10 MIN: CPT | Mod: S$PBB,,, | Performed by: NURSE PRACTITIONER

## 2019-01-14 PROCEDURE — 99213 OFFICE O/P EST LOW 20 MIN: CPT | Mod: PBBFAC | Performed by: NURSE PRACTITIONER

## 2019-01-14 PROCEDURE — 99999 PR PBB SHADOW E&M-EST. PATIENT-LVL III: ICD-10-PCS | Mod: PBBFAC,,, | Performed by: NURSE PRACTITIONER

## 2019-01-14 PROCEDURE — 99212 PR OFFICE/OUTPT VISIT, EST, LEVL II, 10-19 MIN: ICD-10-PCS | Mod: S$PBB,,, | Performed by: NURSE PRACTITIONER

## 2019-01-14 NOTE — PATIENT INSTRUCTIONS
Cut off wrap to right lower leg within seven days  Apply circaids to both lower legs  Apply in the morning and remove in the evening  Wear compression garments every day  Continue to elevate legs as often as possible  Monitor sodium in diet  Attempt weight loss  Return to clinic as needed

## 2019-01-14 NOTE — PROGRESS NOTES
Subjective:       Patient ID: Duran Sister Doreen Giordano is a 78 y.o. female.    Chief Complaint: Wound Check      This 78 y.o.  female with multiple chronic medical conditions includingType 2 DM, CKD, CHF, Lymphedema, Morbid obesity, HTN, and Anemias. She normally wears circaid wraps but over the last few months has been having problems with increased fluid and ulcers. . She reports had left town for the holidays. She was instructed to remove her unna boot after one week and apply circaids until she returned. She is here for a follow up today. She had an open wound to the right lower leg which is fully epithelialized. She will be discharged from clinic with instruction to wear circaid wraps daily.      Review of Systems    Unchanged from prior visit.  Objective:      Physical Exam   Constitutional: She is oriented to person, place, and time. She appears well-developed and well-nourished. No distress.   HENT:   Head: Normocephalic and atraumatic.   Neck: Normal range of motion.   Pulmonary/Chest: Effort normal. No respiratory distress.   Musculoskeletal: Normal range of motion. She exhibits edema. She exhibits no tenderness.        Legs:  Neurological: She is alert and oriented to person, place, and time.   Skin: Skin is warm and dry. No rash noted. She is not diaphoretic. No cyanosis or erythema. Nails show no clubbing.   Psychiatric: She has a normal mood and affect. Her behavior is normal. Judgment and thought content normal.   Nursing note and vitals reviewed.              Duran (Sister) was seen in the clinic room and placed in the supine position on the treatment table.  The leg was cleansed with Easi-clense sponges and dried thoroughly.  Eucerin cream was applied to the lower legs. The patient's foot was positioned at a 90 degree angle. A kerlex roll gauze followed by coban was applied to the right leg. The application was performed using a spiral technique avoiding creases or folds.  The wrap  was started behind the first metatarsal and ended below the tibial tubercle of the knee.  There was overlap of each turn half the width of the previous turn.  The compression wrap will be removed within seven days and she will apply circaids.     Assessment:       1. Lymphedema of both lower extremities    2. Morbid obesity with BMI of 50.0-59.9, adult        Plan:           Cut off wrap to right lower leg within seven days  Apply circaids to both lower legs  Apply in the morning and remove in the evening  Wear compression garments every day  Continue to elevate legs as often as possible  Monitor sodium in diet  Attempt weight loss  Return to clinic as needed

## 2019-01-16 ENCOUNTER — TELEPHONE (OUTPATIENT)
Dept: ENDOCRINOLOGY | Facility: CLINIC | Age: 79
End: 2019-01-16

## 2019-01-16 NOTE — TELEPHONE ENCOUNTER
----- Message from Patricia Johnson sent at 1/16/2019  3:03 PM CST -----  Contact: Self  .Patient Returning Call from [facility]    Who Left Message for Patient: Flori  Communication Preference: [phone]  Additional Information: Appt

## 2019-01-18 ENCOUNTER — OFFICE VISIT (OUTPATIENT)
Dept: PRIMARY CARE CLINIC | Facility: CLINIC | Age: 79
End: 2019-01-18
Payer: MEDICARE

## 2019-01-18 VITALS
HEART RATE: 71 BPM | HEIGHT: 59 IN | SYSTOLIC BLOOD PRESSURE: 130 MMHG | DIASTOLIC BLOOD PRESSURE: 40 MMHG | WEIGHT: 268.94 LBS | OXYGEN SATURATION: 99 % | BODY MASS INDEX: 54.22 KG/M2

## 2019-01-18 DIAGNOSIS — E66.01 MORBID OBESITY WITH BMI OF 50.0-59.9, ADULT: ICD-10-CM

## 2019-01-18 DIAGNOSIS — E11.59 HYPERTENSION ASSOCIATED WITH DIABETES: ICD-10-CM

## 2019-01-18 DIAGNOSIS — I15.2 HYPERTENSION ASSOCIATED WITH DIABETES: ICD-10-CM

## 2019-01-18 DIAGNOSIS — I50.32 CHRONIC DIASTOLIC HEART FAILURE: ICD-10-CM

## 2019-01-18 DIAGNOSIS — R09.81 NASAL CONGESTION: ICD-10-CM

## 2019-01-18 DIAGNOSIS — G47.33 OSA ON CPAP: ICD-10-CM

## 2019-01-18 DIAGNOSIS — I77.1 TORTUOUS AORTA: ICD-10-CM

## 2019-01-18 DIAGNOSIS — I87.2 VENOUS STASIS DERMATITIS OF BOTH LOWER EXTREMITIES: ICD-10-CM

## 2019-01-18 DIAGNOSIS — J45.909 UNCOMPLICATED ASTHMA: ICD-10-CM

## 2019-01-18 DIAGNOSIS — M47.816 LUMBAR FACET ARTHROPATHY: ICD-10-CM

## 2019-01-18 DIAGNOSIS — J45.40 MODERATE PERSISTENT REACTIVE AIRWAY DISEASE WITHOUT COMPLICATION: ICD-10-CM

## 2019-01-18 DIAGNOSIS — N18.30 CKD (CHRONIC KIDNEY DISEASE) STAGE 3, GFR 30-59 ML/MIN: ICD-10-CM

## 2019-01-18 DIAGNOSIS — E27.8 ADRENAL MASS: ICD-10-CM

## 2019-01-18 PROCEDURE — 99215 OFFICE O/P EST HI 40 MIN: CPT | Mod: S$GLB,,, | Performed by: INTERNAL MEDICINE

## 2019-01-18 PROCEDURE — 99215 PR OFFICE/OUTPT VISIT, EST, LEVL V, 40-54 MIN: ICD-10-PCS | Mod: S$GLB,,, | Performed by: INTERNAL MEDICINE

## 2019-01-18 RX ORDER — GUAIFENESIN 600 MG/1
1200 TABLET, EXTENDED RELEASE ORAL 2 TIMES DAILY
Qty: 60 TABLET | Refills: 0 | Status: SHIPPED | OUTPATIENT
Start: 2019-01-18 | End: 2020-12-15

## 2019-01-18 RX ORDER — FLUTICASONE PROPIONATE 50 MCG
2 SPRAY, SUSPENSION (ML) NASAL DAILY
Qty: 1 BOTTLE | Refills: 3 | Status: SHIPPED | OUTPATIENT
Start: 2019-01-18

## 2019-01-18 NOTE — PATIENT INSTRUCTIONS
TODAY:  - continue CPAP  - use flonase daily  - Start mucinex twice daily   + scripts sent to [facility]  - Use nebulizer 2 to 4 times daily  - drink hot teas  - keep up the good work with the weight loss!!    NEXT:  - CXR and labs if worse

## 2019-01-18 NOTE — PROGRESS NOTES
Primary Care Provider Appointment: WALK-IN    Subjective:      Patient ID: Duran Giordano is a 78 y.o. female with acute URI, CAD, CKD 3    Chief Complaint: Nasal Congestion and Cough    Patient with acute URI. She is compliant with breathing treatments and CPAP. Compliant with all meds. She is not volume overloaded, has been compliant with diuretics.    She occasionally uses flonase, does not have mucinex. Has not used her inhalers recently.    All other conditions stable, participating in pill packing.    Past Surgical History:   Procedure Laterality Date    HYSTERECTOMY  1982    secondary uterine fibroids    JOINT REPLACEMENT      Right total knee replacement         Past Medical History:   Diagnosis Date    Adrenal mass- adenoma stable since 2004 (1.8 cm and 8/13/12 (2 cm); stable 2016 2.4 cm     Adrenal mass- adenoma stable since 2004 (1.8 cm and 8/13/12 (2 cm); stable 2016 2.4 cm    Asthma in adult without complication 6/25/2015    Bilateral carotid artery disease 7/21/2017    Bilateral sciatica 2/7/2017    Cellulitis of right leg 08/16/2017    Cerebral infarction 1/29/2016    Multiple areas of lacunar infarction. Stroke risk factors include HTN, DM2, Dyslipidemia.  Continue ASA 81mg/ Statin therapy I have encouraged 30 minutes of physical activity daily for 5 days a week. She has access to a pool so this should not be so jarring to her joints.     Cervical radiculopathy 3/18/2015    Chronic knee pain     Chronic rhinitis 4/9/2013    CKD (chronic kidney disease) stage 3, GFR 30-59 ml/min 1/29/2013    Coronary artery disease due to calcified coronary lesion 6/25/2015    Diastolic dysfunction 10/10/2013    Essential hypertension 6/25/2015    Gastroesophageal reflux disease without esophagitis 8/13/2012    Gout     Hives 10/1/2013    Mixed hyperlipidemia 8/13/2012    Morbid obesity with BMI of 50.0-59.9, adult 2/11/2014    Obstructive sleep apnea syndrome 8/13/2012    Senile  "cataracts of both eyes 1/22/2015    Traumatic open wound of right lower leg 8/25/2017    Trigeminal neuralgia of right side of face 5/23/2017    For years now Previously on Gabapentin, but caused constipation Dissipating over time Not related to intracranial abnormalities Possibly related to dental procedure    Type 2 diabetes mellitus with diabetic polyneuropathy, with long-term current use of insulin 1/29/2013    Venous stasis dermatitis of both lower extremities 8/21/2017    Vitamin D deficiency disease 8/13/2012       Review of Systems   Constitutional: Positive for activity change and appetite change. Negative for unexpected weight change.   Cardiovascular: Positive for leg swelling.   Gastrointestinal: Negative for constipation and diarrhea.   Skin: Positive for color change, rash and wound.   Hematological: Does not bruise/bleed easily.   Psychiatric/Behavioral: Positive for sleep disturbance. Negative for behavioral problems and decreased concentration.       Objective:   BP (!) 130/40 (BP Location: Right arm, Patient Position: Sitting, BP Method: Large (Manual))   Pulse 71   Ht 4' 11" (1.499 m)   Wt 122 kg (268 lb 15.4 oz)   SpO2 99%   BMI 54.32 kg/m²     Physical Exam   Constitutional: She appears well-developed.   Severe obesity   Eyes: EOM are normal.   Cardiovascular: Normal rate.   Pulmonary/Chest: Effort normal. She has wheezes.   wheezing   Abdominal:   Obese abdomen   Musculoskeletal: She exhibits edema.   improved venous stasis   Neurological: She is alert.   Skin:   Stage 1 ulcer on L leg  improved venous stasis   Psychiatric: She has a normal mood and affect.   Improving insight into disease       Lab Results   Component Value Date    WBC 8.33 12/11/2018    HGB 11.6 (L) 12/11/2018    HCT 40.0 12/11/2018     12/11/2018    CHOL 124 05/21/2018    TRIG 86 05/21/2018    HDL 36 (L) 05/21/2018    ALT 19 12/11/2018    AST 18 12/11/2018     12/11/2018    K 4.8 12/11/2018     " 12/11/2018    CREATININE 1.1 12/11/2018    BUN 22 12/11/2018    CO2 28 12/11/2018    TSH 0.746 05/21/2018    INR 1.1 04/17/2005    HGBA1C 7.1 (H) 12/11/2018         Assessment:   78 y.o. female with multiple co-morbid illnesses here to continue work-up of chronic issues notably with acute URI, CAD, CKD 3    Plan:     Problem List Items Addressed This Visit        ENT    Nasal congestion     Using flonase PRN  · Start daily flonase  · Start mucinex  · Use nebulizer QID         Relevant Medications    guaiFENesin (MUCINEX) 600 mg 12 hr tablet       Pulmonary    Reactive airway disease without complication     Continue advair and albuterol prn, increase duoneb treatment.   · Will order cxR at next appt in 5 days  · continue CPAP  · use flonase daily  · Start mucinex twice daily  · Use nebulizer 2 to 4 times daily         Relevant Medications    guaiFENesin (MUCINEX) 600 mg 12 hr tablet       Cardiac/Vascular    Chronic diastolic heart failure     Diastolic heart failure, edema treated with torsemide  · Continue torsemide  · Increase to BID for next week  · Check labs today         Hypertension associated with diabetes     BP controlled ibesartan 300mg, torsemide; carvedilol discontinued by pulm due to SOB, amlodipine discontinued by PCP due to leg swelling  · Discontinued hydralazine due to overtreatment of HTN  · Changed losartan to ibesartan 300mg  · Consider Digital HTN program in future         Venous stasis dermatitis of both lower extremities     LE swelling bilaterally with poor wound healing, routine circaids with wound care  · Continue circaids boot, per wound care  · Treat cellulitis PRN  · Monitor CBC  · Discontinue amlodipine  · Given explicit wound care instructions (see patient instructions)         Tortuous aorta     Seen on XR in 6/2018  · Continue statin, APT  · Monitor for cardiac events  · Advised lifestyle changes            Renal/    CKD (chronic kidney disease) stage 3, GFR 30-59 ml/min      Increase diuretic due to worsening edema  · Monitor BMP            Endocrine    Adrenal mass- adenoma stable since 2004 (1.8 cm and 8/13/12 (2 cm); stable 2016 2.4 cm     Adrenal mass- adenoma stable since 2004 (1.8 cm and 8/13/12 (2 cm); stable 2016 2.4 cm  · Monitor         Morbid obesity with BMI of 50.0-59.9, adult     BMI >50, eats excessively, DM, HTN, REJI  · Counseled on restricting caloric intake one day per week- Wed  · No desserts, reduced carbs  · New cane tips ordered from DME            Orthopedic    Lumbar facet arthropathy     Seen on imaging, compliant with PT  · Continue PT            Other    REJI on CPAP     Previously compliant with CPAP only 4 hours per night, now noncompliant  · Advised to continue using machine  · Advised to place machine next to recliner chair or use in her bedroom  · Increase hours on machine  · Advised to lose weight           Other Visit Diagnoses     Uncomplicated asthma        Relevant Medications    fluticasone (FLONASE) 50 mcg/actuation nasal spray    guaiFENesin (MUCINEX) 600 mg 12 hr tablet          Health Maintenance       Date Due Completion Date    TETANUS VACCINE 03/08/1960 ---    Lipid Panel 05/21/2019 5/21/2018    Hemoglobin A1c 06/11/2019 12/11/2018    Override on 7/13/2016: Done    Foot Exam 11/27/2019 11/27/2018 (Done)    Override on 11/27/2018: Done (Dr Anand)    Override on 1/19/2018: Done (Dr anand)    Override on 5/31/2017: Done    Override on 7/20/2016: Done    Eye Exam 01/10/2020 1/10/2019    Override on 2/17/2016: Done    Override on 1/22/2015: Done    Override on 8/16/2012: Done    Mammogram 02/22/2020 2/22/2018    DEXA SCAN 02/22/2021 2/22/2018    Override on 12/13/2011: Done          Follow-up if symptoms worsen or fail to improve, already has follow-up in 4 days. One hour spent with this patient today, half of that in counseling.    Tami Schmidt MD/MPH  Internal Medicine  [facility]  [facility]  [phone]

## 2019-01-18 NOTE — MEDICAL/APP STUDENT
"Subjective:       Patient ID: Duran Giordano is a 78 y.o. female.    Chief Complaint: Nasal Congestion and Cough    Came in because of blood in sputum from coughing. First noticed blood two days ago and has continued to have "little streams of blood" in sputum. Has been wheezing for a week and then developed into "a terrible" cough. Night before last was up all night coughing. Cough is productive and green, which the patient does not like. Has been taking robitussin, which she thinks helps as well as honey, lemon, and tylenol. Denies fevers, nausea, vomiting, diarrhea, changes in appetite. Endorses sick contacts at [place].     Currently has cellulitis but has circaids on. Circaids helping with leg swelling. Has not been using CPAP but says she is going to start using it again.  Has been taking insulin regularly and blood sugars have mostly been 125-130 with one reading at 109. Takes advare and albuterol "occasionally."    Has lost around 12 pounds since December; has been eating less.       Review of Systems   Constitutional: Positive for fatigue.   HENT: Negative for rhinorrhea, sinus pain, sneezing and sore throat.    Respiratory: Positive for chest tightness (at night) and shortness of breath.    Cardiovascular: Positive for leg swelling. Negative for chest pain and palpitations.   Gastrointestinal: Negative for abdominal distention and abdominal pain.   Musculoskeletal: Positive for arthralgias. Negative for myalgias.   Skin: Positive for wound (cellulitis).   Neurological: Negative for dizziness and headaches.   Psychiatric/Behavioral: Negative.        Objective:      Physical Exam   Constitutional: She is oriented to person, place, and time. She appears well-developed and well-nourished.   Cardiovascular: Normal rate, regular rhythm, normal heart sounds and intact distal pulses. Exam reveals no gallop and no friction rub.   No murmur heard.  Pulmonary/Chest: Effort normal and breath sounds normal. " She has no wheezes. She has no rales.   Abdominal: Soft. Bowel sounds are normal. She exhibits no distension. There is no tenderness.   Neurological: She is alert and oriented to person, place, and time.   Skin: Skin is warm and dry. Capillary refill takes less than 2 seconds.       Assessment:       No diagnosis found.    Plan:   Pulmonary   Acute bronchitis    -continue to take robitussin PRN   -patient is scheduled for regular f/u Tuesday    Cardiac   HF   -continue toresmide      Venous stasis of both lower extremities   Poor wound healing   -continue circaids

## 2019-01-18 NOTE — ASSESSMENT & PLAN NOTE
Continue advair and albuterol prn, increase duoneb treatment.   · Will order cxR at next appt in 5 days  · continue CPAP  · use flonase daily  · Start mucinex twice daily  · Use nebulizer 2 to 4 times daily

## 2019-01-22 ENCOUNTER — TELEPHONE (OUTPATIENT)
Dept: ENDOCRINOLOGY | Facility: CLINIC | Age: 79
End: 2019-01-22

## 2019-01-22 ENCOUNTER — OFFICE VISIT (OUTPATIENT)
Dept: PRIMARY CARE CLINIC | Facility: CLINIC | Age: 79
End: 2019-01-22
Payer: MEDICARE

## 2019-01-22 VITALS
HEIGHT: 59 IN | SYSTOLIC BLOOD PRESSURE: 150 MMHG | BODY MASS INDEX: 54.44 KG/M2 | DIASTOLIC BLOOD PRESSURE: 80 MMHG | WEIGHT: 270.06 LBS | HEART RATE: 76 BPM | OXYGEN SATURATION: 99 %

## 2019-01-22 DIAGNOSIS — J45.909 UNCOMPLICATED ASTHMA, UNSPECIFIED ASTHMA SEVERITY, UNSPECIFIED WHETHER PERSISTENT: ICD-10-CM

## 2019-01-22 DIAGNOSIS — Z79.4 TYPE 2 DIABETES MELLITUS WITH DIABETIC POLYNEUROPATHY, WITH LONG-TERM CURRENT USE OF INSULIN: Primary | ICD-10-CM

## 2019-01-22 DIAGNOSIS — E11.42 TYPE 2 DIABETES MELLITUS WITH DIABETIC POLYNEUROPATHY, WITH LONG-TERM CURRENT USE OF INSULIN: Primary | ICD-10-CM

## 2019-01-22 DIAGNOSIS — I89.0 LYMPHEDEMA OF BOTH LOWER EXTREMITIES: ICD-10-CM

## 2019-01-22 DIAGNOSIS — E66.01 MORBID OBESITY WITH BMI OF 50.0-59.9, ADULT: ICD-10-CM

## 2019-01-22 DIAGNOSIS — G47.33 OSA ON CPAP: ICD-10-CM

## 2019-01-22 DIAGNOSIS — J45.40 MODERATE PERSISTENT ASTHMA WITHOUT COMPLICATION: ICD-10-CM

## 2019-01-22 DIAGNOSIS — M47.816 LUMBAR FACET ARTHROPATHY: ICD-10-CM

## 2019-01-22 PROCEDURE — 99214 OFFICE O/P EST MOD 30 MIN: CPT | Mod: S$GLB,,, | Performed by: INTERNAL MEDICINE

## 2019-01-22 PROCEDURE — 99214 PR OFFICE/OUTPT VISIT, EST, LEVL IV, 30-39 MIN: ICD-10-PCS | Mod: S$GLB,,, | Performed by: INTERNAL MEDICINE

## 2019-01-22 RX ORDER — IPRATROPIUM BROMIDE AND ALBUTEROL SULFATE 2.5; .5 MG/3ML; MG/3ML
3 SOLUTION RESPIRATORY (INHALATION) EVERY 6 HOURS PRN
Qty: 3 BOX | Refills: 3 | Status: SHIPPED | OUTPATIENT
Start: 2019-01-22 | End: 2020-10-07

## 2019-01-22 RX ORDER — ALBUTEROL SULFATE 90 UG/1
2 AEROSOL, METERED RESPIRATORY (INHALATION) EVERY 4 HOURS PRN
Qty: 3 INHALER | Refills: 3 | Status: SHIPPED | OUTPATIENT
Start: 2019-01-22 | End: 2020-03-23 | Stop reason: SDUPTHER

## 2019-01-22 NOTE — Clinical Note
NP Ben and staffCould you get this patietn an appointment? She's had missed calls to schedule her.Thanks,BARB (PCP)

## 2019-01-22 NOTE — ASSESSMENT & PLAN NOTE
A1c 7.2 (improved from 10 previously), GFR improving  · Continue to control DM  · Avoid NSAIDs  · Monitor A1c  · Followed by Endocrinology  · May start trulicity  · PCP to message NP Contreras for followup.

## 2019-01-22 NOTE — MEDICAL/APP STUDENT
Subjective:       Patient ID: Duran Giordano is a 78 y.o. female.    Chief Complaint: Diabetes; Follow-up; and Medication Refill    Cough is better has good turned from green to white. Still coughing at night. Denies fevers, chills, diarrhea, vomiting, shortness of breath, confusion, worsening of symptoms. Has been using mucinex BID. Reports band like pain above buttocks with 5/10 intensity. Previously had sciatica. Sugar was 104 this morning. Express need for more albuterol. Patient asked about getting another facemask for nebulizer because her current facemask is uncomfortable/presses sharply on her nose.       Review of Systems   Constitutional: Negative for appetite change, diaphoresis and fatigue.   Respiratory: Negative for chest tightness and wheezing.    Cardiovascular: Negative for chest pain, palpitations and leg swelling.   Gastrointestinal: Negative for abdominal distention and nausea.   Musculoskeletal: Positive for back pain.   Skin: Positive for rash and wound.   Neurological: Negative for dizziness and headaches.   Psychiatric/Behavioral: Negative.        Objective:      Physical Exam   Constitutional: She is oriented to person, place, and time. She appears well-developed and well-nourished.   Cardiovascular: Normal rate, regular rhythm, normal heart sounds and intact distal pulses. Exam reveals no gallop and no friction rub.   No murmur heard.  Pulmonary/Chest: Effort normal and breath sounds normal.   Abdominal: Soft. Bowel sounds are normal.   Neurological: She is alert and oriented to person, place, and time.   Skin: Skin is warm and dry. Capillary refill takes less than 2 seconds.       Assessment:       No diagnosis found.    Plan:       URI  -call if symptoms worsen over next 7 days or if changes in mental status or persistent SOB

## 2019-01-22 NOTE — ASSESSMENT & PLAN NOTE
Previously compliant with CPAP only 4 hours per night, now noncompliant  · Advised to continue using machine  · Advised to place machine next to recliner chair or use in her bedroom  · Increase hours on machine  · Advised to lose weight  · Refer to sleep medicine for follow-up

## 2019-01-22 NOTE — Clinical Note
Hi Dr Howard,Wanted to update you on Sister Pasquale. No hospital admits in over 2 years! She is losing weight, wearing her circaid compression boots daily, all leg wounds healed, is participating in pill packing (so compliant with all meds), A1c drop from 8.2 to 7.1. She is doing fabulously! Thank you for the referral.Thanks,BARB

## 2019-01-22 NOTE — PROGRESS NOTES
"Primary Care Provider Appointment    Subjective:      Patient ID: Duran Giordano is a 78 y.o. female with obesity, asthma, HTN, DM, venous stasis    Chief Complaint: Diabetes; Follow-up; and Medication Refill    Patient feeling better today. Her URI symptoms have resolved with regular flonase use, duoneb treatments TID.    She continues to lose weight, she is around 270# and BMI down by 0.5 points. She is now eating oatmeal, cottage cheese with prunes, an egg in the morning. She is avoiding the juices in the morning, and drinking water in the morning. She is cutting down on portions at lunch. [facility], and does not feel like she is missing out on anything.    She is applying her cercaids daily with [facility] Ana Arevalo. Legs are much smaller than usual, even with the circaids.    She has no hospital visits for the past 2 years. Wounds are healed in LE. She is applying goldbond diabetic cream to her feet, allpying eucerin on legs. Was discharged from wound clinic. Is not experiencing leg leaking at this time.     Glucose is well-controlled on home checks, A1c controlled. Patient overdue for Endo appt, may be starting trulicity. She is injecting 18-18 short-acting and 48 long-acting insulin.     She is overdue for Nephro follow-up, previously seen by [facility].    She states "I love my pill packs." Her pill burden has decreased significantly since participating in pill packs.  Pills packed as follows:  Adherence packed for 30 days:     Morning card:  Aspirin 81 mg  Torsemide 20 mg     Evening card:  Atorvastatin 80 mg  Irbesartan 300 mg (300 mg on back order, used 2 150mg tablets)  Vitamin D3 1,000 IU      Past Surgical History:   Procedure Laterality Date    HYSTERECTOMY  1982    secondary uterine fibroids    JOINT REPLACEMENT      Right total knee replacement         Past Medical History:   Diagnosis Date    Adrenal mass- adenoma stable since 2004 (1.8 cm and 8/13/12 (2 cm); " stable 2016 2.4 cm     Adrenal mass- adenoma stable since 2004 (1.8 cm and 8/13/12 (2 cm); stable 2016 2.4 cm    Asthma in adult without complication 6/25/2015    Bilateral carotid artery disease 7/21/2017    Bilateral sciatica 2/7/2017    Cellulitis of right leg 08/16/2017    Cerebral infarction 1/29/2016    Multiple areas of lacunar infarction. Stroke risk factors include HTN, DM2, Dyslipidemia.  Continue ASA 81mg/ Statin therapy I have encouraged 30 minutes of physical activity daily for 5 days a week. She has access to a pool so this should not be so jarring to her joints.     Cervical radiculopathy 3/18/2015    Chronic knee pain     Chronic rhinitis 4/9/2013    CKD (chronic kidney disease) stage 3, GFR 30-59 ml/min 1/29/2013    Coronary artery disease due to calcified coronary lesion 6/25/2015    Diastolic dysfunction 10/10/2013    Essential hypertension 6/25/2015    Gastroesophageal reflux disease without esophagitis 8/13/2012    Gout     Hives 10/1/2013    Mixed hyperlipidemia 8/13/2012    Morbid obesity with BMI of 50.0-59.9, adult 2/11/2014    Obstructive sleep apnea syndrome 8/13/2012    Senile cataracts of both eyes 1/22/2015    Traumatic open wound of right lower leg 8/25/2017    Trigeminal neuralgia of right side of face 5/23/2017    For years now Previously on Gabapentin, but caused constipation Dissipating over time Not related to intracranial abnormalities Possibly related to dental procedure    Type 2 diabetes mellitus with diabetic polyneuropathy, with long-term current use of insulin 1/29/2013    Venous stasis dermatitis of both lower extremities 8/21/2017    Vitamin D deficiency disease 8/13/2012       Review of Systems   Constitutional: Positive for activity change. Negative for appetite change and unexpected weight change.   Cardiovascular: Positive for leg swelling.   Gastrointestinal: Negative for constipation and diarrhea.   Skin: Positive for color change. Negative  "for rash and wound.   Hematological: Does not bruise/bleed easily.   Psychiatric/Behavioral: Positive for sleep disturbance. Negative for behavioral problems and decreased concentration.       Objective:   BP (!) 150/80   Pulse 76   Ht 4' 11" (1.499 m)   Wt 122.5 kg (270 lb 1 oz)   SpO2 99%   BMI 54.55 kg/m²     Physical Exam   Constitutional: She appears well-developed.   Severe obesity   Eyes: EOM are normal.   Cardiovascular: Normal rate.   Pulmonary/Chest: Effort normal. She has no wheezes.   wheezing   Abdominal:   Obese abdomen   Musculoskeletal: She exhibits edema.   Neurological: She is alert.   Skin:   Resolved ulcers on legs  improved venous stasis   Psychiatric: She has a normal mood and affect.   Improving insight into disease       Lab Results   Component Value Date    WBC 8.33 12/11/2018    HGB 11.6 (L) 12/11/2018    HCT 40.0 12/11/2018     12/11/2018    CHOL 124 05/21/2018    TRIG 86 05/21/2018    HDL 36 (L) 05/21/2018    ALT 19 12/11/2018    AST 18 12/11/2018     12/11/2018    K 4.8 12/11/2018     12/11/2018    CREATININE 1.1 12/11/2018    BUN 22 12/11/2018    CO2 28 12/11/2018    TSH 0.746 05/21/2018    INR 1.1 04/17/2005    HGBA1C 7.1 (H) 12/11/2018         Assessment:   78 y.o. female with multiple co-morbid illnesses here to continue work-up of chronic issues notably obesity, asthma, HTN, DM, venous stasis     Plan:     Problem List Items Addressed This Visit        Pulmonary    Uncomplicated asthma    Relevant Medications    albuterol-ipratropium (DUO-NEB) 2.5 mg-0.5 mg/3 mL nebulizer solution    albuterol (PROVENTIL/VENTOLIN HFA) 90 mcg/actuation inhaler       Endocrine    Uncontrolled secondary diabetes mellitus with stage 3 CKD (GFR 30-59)     A1c 7.2 (improved from 10 previously), GFR improving  · Continue to control DM  · Avoid NSAIDs  · Monitor A1c  · Followed by Endocrinology  · May start trulicity  · PCP to message NP Contreras for followup.         Relevant Orders    " Ambulatory Referral to Nephrology    Morbid obesity with BMI of 50.0-59.9, adult     BMI >50, eats excessively, DM, HTN, REJI  · Counseled on restricting caloric intake  · No desserts, reduced carbs  · Continue low-salt, calorie restricted meals            Orthopedic    Lumbar facet arthropathy     Seen on imaging, completed PT  · Monitor  · Advised increase physical activity            Other    REJI on CPAP     Previously compliant with CPAP only 4 hours per night, now noncompliant  · Advised to continue using machine  · Advised to place machine next to recliner chair or use in her bedroom  · Increase hours on machine  · Advised to lose weight  · Refer to sleep medicine for follow-up         Relevant Orders    Ambulatory consult to Sleep Disorders    Lymphedema of both lower extremities     LE wounds resolved, daily circaid application by Sister Ana Arevalo  · Continue circaid application daily               Health Maintenance       Date Due Completion Date    TETANUS VACCINE 03/08/1958 ---    Lipid Panel 05/21/2019 5/21/2018    Hemoglobin A1c 06/11/2019 12/11/2018    Override on 7/13/2016: Done    Foot Exam 11/27/2019 11/27/2018 (Done)    Override on 11/27/2018: Done (Dr Anand)    Override on 1/19/2018: Done (Dr anand)    Override on 5/31/2017: Done    Override on 7/20/2016: Done    Eye Exam 01/10/2020 1/10/2019    Override on 2/17/2016: Done    Override on 1/22/2015: Done    Override on 8/16/2012: Done    Mammogram 02/22/2020 2/22/2018    DEXA SCAN 02/22/2021 2/22/2018    Override on 12/13/2011: Done          Follow-up in about 4 weeks (around 2/19/2019). One hour spent with this patient today, half of that in counseling.    Tami Schmidt MD/MPH  Internal Medicine  [facility]  [phone]

## 2019-01-22 NOTE — ASSESSMENT & PLAN NOTE
LE wounds resolved, daily circaid application by Sister Ana Arevalo  · Continue circaid application daily

## 2019-01-22 NOTE — PATIENT INSTRUCTIONS
TODAY:  - keep up the good work!  - refills of duoneb and albuterol  - PCP messaged BP Contreras for appt  - monthly appts  - Sleep medicine consult

## 2019-01-22 NOTE — ASSESSMENT & PLAN NOTE
BMI >50, eats excessively, DM, HTN, REJI  · Counseled on restricting caloric intake  · No desserts, reduced carbs  · Continue low-salt, calorie restricted meals

## 2019-01-23 ENCOUNTER — TELEPHONE (OUTPATIENT)
Dept: ENDOCRINOLOGY | Facility: CLINIC | Age: 79
End: 2019-01-23

## 2019-01-23 NOTE — TELEPHONE ENCOUNTER
----- Message from LIBRA De Leon FNP sent at 1/22/2019 10:19 AM CST -----  Admin time is fine  thanks  ----- Message -----  From: Jeanie Gonzalez MA  Sent: 1/22/2019  10:13 AM  To: LIBRA De Leon FNP    Do you want to double book somewhere or add her in during admin time?     ----- Message -----  From: Tami Schmidt MD  Sent: 1/22/2019   8:33 AM  To: Roxana Grey Staff, #    NP Contreras and staff  Could you get this patietn an appointment? She's had missed calls to schedule her.    Thanks,  KJ (PCP)

## 2019-01-30 ENCOUNTER — LAB VISIT (OUTPATIENT)
Dept: LAB | Facility: HOSPITAL | Age: 79
End: 2019-01-30
Attending: NURSE PRACTITIONER
Payer: MEDICARE

## 2019-01-30 DIAGNOSIS — E11.42 TYPE 2 DIABETES MELLITUS WITH DIABETIC POLYNEUROPATHY, WITH LONG-TERM CURRENT USE OF INSULIN: ICD-10-CM

## 2019-01-30 DIAGNOSIS — Z79.4 TYPE 2 DIABETES MELLITUS WITH DIABETIC POLYNEUROPATHY, WITH LONG-TERM CURRENT USE OF INSULIN: ICD-10-CM

## 2019-01-30 LAB
CHOLEST SERPL-MCNC: 142 MG/DL
CHOLEST/HDLC SERPL: 3.8 {RATIO}
ESTIMATED AVG GLUCOSE: 169 MG/DL
HBA1C MFR BLD HPLC: 7.5 %
HDLC SERPL-MCNC: 37 MG/DL
HDLC SERPL: 26.1 %
LDLC SERPL CALC-MCNC: 91.4 MG/DL
NONHDLC SERPL-MCNC: 105 MG/DL
TRIGL SERPL-MCNC: 68 MG/DL

## 2019-01-30 PROCEDURE — 36415 COLL VENOUS BLD VENIPUNCTURE: CPT

## 2019-01-30 PROCEDURE — 83036 HEMOGLOBIN GLYCOSYLATED A1C: CPT

## 2019-01-30 PROCEDURE — 80061 LIPID PANEL: CPT

## 2019-02-05 ENCOUNTER — OFFICE VISIT (OUTPATIENT)
Dept: ENDOCRINOLOGY | Facility: CLINIC | Age: 79
End: 2019-02-05
Payer: MEDICARE

## 2019-02-05 ENCOUNTER — OFFICE VISIT (OUTPATIENT)
Dept: PODIATRY | Facility: CLINIC | Age: 79
End: 2019-02-05
Payer: MEDICARE

## 2019-02-05 VITALS — HEART RATE: 77 BPM | DIASTOLIC BLOOD PRESSURE: 73 MMHG | SYSTOLIC BLOOD PRESSURE: 167 MMHG

## 2019-02-05 VITALS
HEART RATE: 77 BPM | WEIGHT: 267.63 LBS | HEIGHT: 59 IN | BODY MASS INDEX: 53.95 KG/M2 | DIASTOLIC BLOOD PRESSURE: 68 MMHG | SYSTOLIC BLOOD PRESSURE: 130 MMHG

## 2019-02-05 DIAGNOSIS — E11.42 TYPE 2 DIABETES MELLITUS WITH DIABETIC POLYNEUROPATHY, WITH LONG-TERM CURRENT USE OF INSULIN: ICD-10-CM

## 2019-02-05 DIAGNOSIS — I87.2 VENOUS STASIS DERMATITIS OF BOTH LOWER EXTREMITIES: ICD-10-CM

## 2019-02-05 DIAGNOSIS — E11.51 TYPE II DIABETES MELLITUS WITH PERIPHERAL CIRCULATORY DISORDER: Primary | ICD-10-CM

## 2019-02-05 DIAGNOSIS — N18.30 CKD (CHRONIC KIDNEY DISEASE) STAGE 3, GFR 30-59 ML/MIN: ICD-10-CM

## 2019-02-05 DIAGNOSIS — L84 CORN OR CALLUS: ICD-10-CM

## 2019-02-05 DIAGNOSIS — Z79.4 TYPE 2 DIABETES MELLITUS WITH DIABETIC POLYNEUROPATHY, WITH LONG-TERM CURRENT USE OF INSULIN: ICD-10-CM

## 2019-02-05 DIAGNOSIS — E66.01 MORBID OBESITY WITH BMI OF 50.0-59.9, ADULT: ICD-10-CM

## 2019-02-05 DIAGNOSIS — B35.1 ONYCHOMYCOSIS DUE TO DERMATOPHYTE: ICD-10-CM

## 2019-02-05 DIAGNOSIS — I50.32 CHRONIC DIASTOLIC HEART FAILURE: Primary | ICD-10-CM

## 2019-02-05 DIAGNOSIS — I15.2 HYPERTENSION ASSOCIATED WITH DIABETES: ICD-10-CM

## 2019-02-05 DIAGNOSIS — E11.59 HYPERTENSION ASSOCIATED WITH DIABETES: ICD-10-CM

## 2019-02-05 DIAGNOSIS — Z12.39 ENCOUNTER FOR SCREENING FOR MALIGNANT NEOPLASM OF BREAST: Primary | ICD-10-CM

## 2019-02-05 PROCEDURE — 99999 PR PBB SHADOW E&M-EST. PATIENT-LVL IV: CPT | Mod: PBBFAC,,, | Performed by: NURSE PRACTITIONER

## 2019-02-05 PROCEDURE — 99499 UNLISTED E&M SERVICE: CPT | Mod: S$PBB,,, | Performed by: PODIATRIST

## 2019-02-05 PROCEDURE — 99999 PR PBB SHADOW E&M-EST. PATIENT-LVL IV: ICD-10-PCS | Mod: PBBFAC,,, | Performed by: NURSE PRACTITIONER

## 2019-02-05 PROCEDURE — 11721 DEBRIDE NAIL 6 OR MORE: CPT | Mod: Q9,PBBFAC | Performed by: PODIATRIST

## 2019-02-05 PROCEDURE — 99213 OFFICE O/P EST LOW 20 MIN: CPT | Mod: PBBFAC,27 | Performed by: PODIATRIST

## 2019-02-05 PROCEDURE — 11057 PARNG/CUTG B9 HYPRKR LES >4: CPT | Mod: 59,Q9,PBBFAC | Performed by: PODIATRIST

## 2019-02-05 PROCEDURE — 99499 NO LOS: ICD-10-PCS | Mod: S$PBB,,, | Performed by: PODIATRIST

## 2019-02-05 PROCEDURE — 11057 PARNG/CUTG B9 HYPRKR LES >4: CPT | Mod: Q9,S$PBB,, | Performed by: PODIATRIST

## 2019-02-05 PROCEDURE — 11721 DEBRIDE NAIL 6 OR MORE: CPT | Mod: 59,Q9,S$PBB, | Performed by: PODIATRIST

## 2019-02-05 PROCEDURE — 11721 PR DEBRIDEMENT OF NAILS, 6 OR MORE: ICD-10-PCS | Mod: 59,Q9,S$PBB, | Performed by: PODIATRIST

## 2019-02-05 PROCEDURE — 11057 PR TRIM BENIGN HYPERKERATOTIC SKIN LESION,>4: ICD-10-PCS | Mod: Q9,S$PBB,, | Performed by: PODIATRIST

## 2019-02-05 PROCEDURE — 99214 OFFICE O/P EST MOD 30 MIN: CPT | Mod: S$PBB,,, | Performed by: NURSE PRACTITIONER

## 2019-02-05 PROCEDURE — 99214 OFFICE O/P EST MOD 30 MIN: CPT | Mod: PBBFAC | Performed by: NURSE PRACTITIONER

## 2019-02-05 PROCEDURE — 99999 PR PBB SHADOW E&M-EST. PATIENT-LVL III: ICD-10-PCS | Mod: PBBFAC,,, | Performed by: PODIATRIST

## 2019-02-05 PROCEDURE — 99999 PR PBB SHADOW E&M-EST. PATIENT-LVL III: CPT | Mod: PBBFAC,,, | Performed by: PODIATRIST

## 2019-02-05 PROCEDURE — 99214 PR OFFICE/OUTPT VISIT, EST, LEVL IV, 30-39 MIN: ICD-10-PCS | Mod: S$PBB,,, | Performed by: NURSE PRACTITIONER

## 2019-02-05 NOTE — PROGRESS NOTES
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    Chief Complaint: Routine Foot Care (2/5/19 Ben)    Duran is a 78 y.o. female who presents to the clinic for evaluation and treatment of high risk feet. Duran has a past medical history of Adrenal mass- adenoma stable since 2004 (1.8 cm and 8/13/12 (2 cm); stable 2016 2.4 cm, Asthma in adult without complication (6/25/2015), Bilateral carotid artery disease (7/21/2017), Bilateral sciatica (2/7/2017), Cellulitis of right leg (08/16/2017), Cerebral infarction (1/29/2016), Cervical radiculopathy (3/18/2015), Chronic knee pain, Chronic rhinitis (4/9/2013), CKD (chronic kidney disease) stage 3, GFR 30-59 ml/min (1/29/2013), Coronary artery disease due to calcified coronary lesion (6/25/2015), Diastolic dysfunction (10/10/2013), Essential hypertension (6/25/2015), Gastroesophageal reflux disease without esophagitis (8/13/2012), Gout, Hives (10/1/2013), Mixed hyperlipidemia (8/13/2012), Morbid obesity with BMI of 50.0-59.9, adult (2/11/2014), Obstructive sleep apnea syndrome (8/13/2012), Senile cataracts of both eyes (1/22/2015), Traumatic open wound of right lower leg (8/25/2017), Trigeminal neuralgia of right side of face (5/23/2017), Type 2 diabetes mellitus with diabetic polyneuropathy, with long-term current use of insulin (1/29/2013), Venous stasis dermatitis of both lower extremities (8/21/2017), and Vitamin D deficiency disease (8/13/2012). The patient's chief complaint is  HRFC  This patient has documented high risk feet requiring routine maintenance secondary to diabetes mellitis and those secondary complications of diabetes, as mentioned.    PCP: Tami Schmidt MD    Date Last Seen by PCP:   Chief Complaint   Patient presents with    Routine Foot Care     2/5/19 Ben        Current shoe gear:  Affected Foot: Casual shoes     Unaffected Foot: Casual shoes    Hemoglobin A1C   Date Value Ref Range Status   01/30/2019 7.5 (H) 4.0 -  5.6 % Final     Comment:     ADA Screening Guidelines:  5.7-6.4%  Consistent with prediabetes  >or=6.5%  Consistent with diabetes  High levels of fetal hemoglobin interfere with the HbA1C  assay. Heterozygous hemoglobin variants (HbS, HgC, etc)do  not significantly interfere with this assay.   However, presence of multiple variants may affect accuracy.     12/11/2018 7.1 (H) 4.0 - 5.6 % Final     Comment:     ADA Screening Guidelines:  5.7-6.4%  Consistent with prediabetes  >or=6.5%  Consistent with diabetes  High levels of fetal hemoglobin interfere with the HbA1C  assay. Heterozygous hemoglobin variants (HbS, HgC, etc)do  not significantly interfere with this assay.   However, presence of multiple variants may affect accuracy.     10/10/2018 7.3 (H) 4.0 - 5.6 % Final     Comment:     ADA Screening Guidelines:  5.7-6.4%  Consistent with prediabetes  >or=6.5%  Consistent with diabetes  High levels of fetal hemoglobin interfere with the HbA1C  assay. Heterozygous hemoglobin variants (HbS, HgC, etc)do  not significantly interfere with this assay.   However, presence of multiple variants may affect accuracy.         Review of Systems   Cardiovascular: Positive for leg swelling.   Skin: Positive for dry skin and nail changes. Negative for flushing and itching.   Neurological: Positive for numbness. Negative for paresthesias.           Objective:       Vitals:    02/05/19 1120   BP: (!) 167/73   Pulse: 77        Physical Exam   Constitutional: She is oriented to person, place, and time. She appears well-developed and well-nourished.   Cardiovascular:   Pulses:       Dorsalis pedis pulses are 2+ on the right side, and 2+ on the left side.        Posterior tibial pulses are 2+ on the right side, and 2+ on the left side.   Dorsalis pedis and posterior tibial pulses are palpable bilaterally. Toes are cool to touch. Feet are warm proximally.There is decreased digital hair bilateral. Skin is atrophic, hyperpigmented, and  moderately edematous.     Musculoskeletal: She exhibits no edema or tenderness.        Right ankle: Normal.        Left ankle: Normal.        Right foot: There is no swelling, no crepitus and no deformity.        Left foot: There is no swelling, no crepitus and no deformity.   Adequate joint range of motion without pain, limitation, nor crepitation Bilateral feet and ankle joints. Muscle strength is 5/5 in all groups bilaterally.         Lymphadenopathy:   B/l lymph edema    Neurological: She is alert and oriented to person, place, and time. She has normal strength.   Decreased sharp/dull sensation bilateral feet.   Skin: Skin is warm, dry and intact. No abrasion, no bruising, no burn, no lesion and no rash noted. No erythema. Nails show no clubbing.   Nails x10 are elongated by  3-4mm's, thickened by 2-3 mm's, dystrophic, and are darkened in  coloration . Xerosis Bilaterally. No open lesions noted.    Hyperkeratotic tissue noted to calcaneal rim b/l, distal 2nd toe b/l, plantar heel b/l       Psychiatric: She has a normal mood and affect. Her behavior is normal.   Nursing note and vitals reviewed.            Assessment:       Encounter Diagnoses   Name Primary?    Type II diabetes mellitus with peripheral circulatory disorder Yes    Onychomycosis due to dermatophyte     Corn or callus          Plan:       Duran was seen today for routine foot care.    Diagnoses and all orders for this visit:    Type II diabetes mellitus with peripheral circulatory disorder    Onychomycosis due to dermatophyte    Corn or callus    - Patient was given written and verbal instructions regarding foot condition.  I counseled the patient on her conditions, their implications and medical management.    Shoe inspection. Diabetic Foot Education. Patient reminded of the importance of good nutrition and blood sugar control to help prevent podiatric complications of diabetes. Patient instructed on proper foot hygeine. We discussed wearing  proper shoe gear, daily foot inspections, never walking without protective shoe gear, never putting sharp instruments to feet    - With patient's permission, nails were aggressively reduced and debrided x 10 to their soft tissue attachment mechanically and with electric , removing all offending nail and debris. Patient relates relief following the procedure. She will continue to monitor the areas daily, inspect her feet, wear protective shoe gear when ambulatory, moisturizer to maintain skin integrity and follow in this office in approximately 2-3 months, sooner p.r.n.    - After cleansing the  area w/ alcohol prep pad the above mentioned hyperkeratosis was trimmed utilizing No 15 scapel, to a smooth base with out incident. Patient tolerated this  well and reported comfort to the area of  calcaneal rim b/l, distal 2nd toe b/l, plantar heel b/l        - Return to clinic in 3m or sooner if problems arise

## 2019-02-05 NOTE — PATIENT INSTRUCTIONS
Snacks can be an important part of a balanced, healthy meal plan. They allow you to eat more frequently, feeling full and satisfied throughout the day. Also, they allow you to spread carbohydrates evenly, which may stabilize blood sugars.  Plus, snacks are enjoyable!     The amount of carbohydrate needed at snacks varies. Generally, about 15-30 grams of carbohydrate per snack is recommended.  Below you will find some tasty treats.       0-5 gm carb   Crystal Light   Vitamin Water Zero   Herbal tea, unsweetened   2 tsp peanut butter on celery   1./2 cup sugar-free jell-o   1 sugar-free popsicle   ¼ cup blueberries   8oz Blue Aniyah unsweetened almond milk   5 baby carrots & celery sticks, cucumbers, bell peppers dipped in ¼ cup salsa, 2Tbsp light ranch dressing or 2Tbsp plain Greek yogurt   10 Goldfish crackers   ½ oz low-fat cheese or string cheese   1 closed handful of nuts, unsalted   1 Tbsp of sunflower seeds, unsalted   1 cup Smart Pop popcorn   1 whole grain brown rice cake        15 gm carb   1 small piece of fruit or ½ banana or 1/2 cup lite canned fruit   3 ramon cracker squares   3 cups Smart Pop popcorn, top spray butter, York lite salt or cinnamon and Truvia   5 Vanilla Wafers   ½ cup low fat, no added sugar ice cream or frozen yogurt (Blue bell, Blue Bunny, Weight Watchers, Skinny Cow)   ½ turkey, ham, or chicken sandwich   ½ c fruit with ½ c Cottage cheese   4-6 unsalted wheat crackers with 1 oz low fat cheese or 1 tbsp peanut butter    30-45 goldfish crackers (depending on flavor)    7-8 Tenriism mini brown rice cakes (caramel, apple cinnamon, chocolate)    12 Tenriism mini brown rice cakes (cheddar, bbq, ranch)    1/3 cup hummus dip with raw veg   1/2 whole wheat jessica, 1Tbsp hummus   Mini Pizza (1/2 whole wheat English muffin, low-fat  cheese, tomato sauce)   100 calorie snack pack (Oreo, Chips Ahoy, Ritz Mix, Baked Cheetos)   4-6 oz. light or Greek Style yogurt  (Flores, Shila, Placido, [facility])   ½ cup sugar-free pudding     6 in. wheat tortilla or jessica oven toasted chips (topped with spray butter flavoring, cinnamon, Truvia OR spray butter, garlic powder, chili powder)    18 BBQ Popchips (available at [employer], Whole Foods, Fresh Market)                   Diabetes Support Group Meetings         Date: Topic:   February 14 Eat Fit SIGRID/Health Promotion   March 14 Taking Care of Your Smile   April 11 Spring into Healthy Eating/Cooking Demo   May 9 Ease Your Mind with Diabetes   Carmen 13 Summer Treats/Cooking Demo   July 11 Eat Fit SIGRID/Super Market Sweep   August 8 Taking Care of Your Eyes and Feet   Sept 12 Technology/ADA updates   October 10 Recipes & Treats/Cooking Demo   November 14 Heart Health/Pump it up!   December 12 Year-End Close Out        Meetings are held in the [facility] (A) of the [facility] located at [address] [place] [address]. Please call [phone] for additional information.    Free service, offered every 2nd Thursday of every month! Family members and/or friends are welcome as well!  Support group is for patients with type 1 or type 2 diabetes.    From 3:30p to 4:30p

## 2019-02-06 ENCOUNTER — OFFICE VISIT (OUTPATIENT)
Dept: SLEEP MEDICINE | Facility: CLINIC | Age: 79
End: 2019-02-06
Payer: MEDICARE

## 2019-02-06 VITALS
HEIGHT: 59 IN | DIASTOLIC BLOOD PRESSURE: 65 MMHG | WEIGHT: 269.38 LBS | HEART RATE: 71 BPM | BODY MASS INDEX: 54.31 KG/M2 | SYSTOLIC BLOOD PRESSURE: 127 MMHG

## 2019-02-06 DIAGNOSIS — G47.33 OSA (OBSTRUCTIVE SLEEP APNEA): Primary | ICD-10-CM

## 2019-02-06 PROCEDURE — 99214 OFFICE O/P EST MOD 30 MIN: CPT | Mod: S$PBB,,, | Performed by: INTERNAL MEDICINE

## 2019-02-06 PROCEDURE — 99214 PR OFFICE/OUTPT VISIT, EST, LEVL IV, 30-39 MIN: ICD-10-PCS | Mod: S$PBB,,, | Performed by: INTERNAL MEDICINE

## 2019-02-06 PROCEDURE — 99213 OFFICE O/P EST LOW 20 MIN: CPT | Mod: PBBFAC | Performed by: INTERNAL MEDICINE

## 2019-02-06 PROCEDURE — 99999 PR PBB SHADOW E&M-EST. PATIENT-LVL III: ICD-10-PCS | Mod: PBBFAC,,, | Performed by: INTERNAL MEDICINE

## 2019-02-06 PROCEDURE — 99999 PR PBB SHADOW E&M-EST. PATIENT-LVL III: CPT | Mod: PBBFAC,,, | Performed by: INTERNAL MEDICINE

## 2019-02-06 NOTE — PROGRESS NOTES
2/6/2018    Pt is following up in regards to the REJI and has hx of mild REJI AHI 14 as per Dr Chairez last note . She has stopped using the PAP therapy for the past 4-5 months and feels she was not feeling fresh and stopped. She sleeps in a chair. She is snoring and unclear about witnessed apnea. She does not drive. She feels sleepy in the late afternoon.       EPWORTH SLEEPINESS SCALE 2/6/2019   Sitting and reading 2   Watching TV 2   Sitting, inactive in a public place (e.g. a theatre or a meeting) 1   As a passenger in a car for an hour without a break 1   Lying down to rest in the afternoon when circumstances permit 3   Sitting and talking to someone 2   Sitting quietly after a lunch without alcohol 2   In a car, while stopped for a few minutes in traffic 2   Total score 15         07/18/2018  Sister Pasquale returns to clinic today regarding the management of obstructive sleep apnea and CPAP equipment check.    She is using CPAP by her sleeping chair  She has been sleeping in a chair (also using a wedge pillow in bed).  Her pressure was set to 13 cm (based on titration)    She states this pressure increase is fine. Now using it next to her sleeping chair  Using the ramp set at 4 cm  Humidity set at 4  Her mouth sometimes feels dry.  She has a nasal pillows mask, which she likes. Martin FX nasal pillows small size mask  She states that she really likes CPAP and it helps her feel better after a night of use.    Still sleepy during the day, but better recently after increasing CPAP use    Interrogation SleepStyle 200: CPAP at 13 cm; 1089 hours total; Machine good condition (F&P model), sleep style machine; 200 series. 4.8 hr/night.       EPWORTH SLEEPINESS SCALE 7/18/2018   Sitting and reading 2   Watching TV 2   Sitting, inactive in a public place (e.g. a theatre or a meeting) 3   As a passenger in a car for an hour without a break 2   Lying down to rest in the afternoon when circumstances permit 2   Sitting and  talking to someone 0   Sitting quietly after a lunch without alcohol 2   In a car, while stopped for a few minutes in traffic 2   Total score 15       Recent titration at weight 281 lbs revealed she needed pressure increase from 10 to 13 cm    BASELINE PSG 12/23/11: + REJI, AHI 14.2, low O2 79%, wt 281 lbs. (Patient had sleep study in 2003, at which time she was titrated to 11 cm, wt 248 lbs)   TITRATION 5/9/16: Titrated to 13 cm; wt 281 lbs    DME = Medicare ([employer])      Past Medical History:   Diagnosis Date    Adrenal mass- adenoma stable since 2004 (1.8 cm and 8/13/12 (2 cm); stable 2016 2.4 cm     Adrenal mass- adenoma stable since 2004 (1.8 cm and 8/13/12 (2 cm); stable 2016 2.4 cm    Asthma in adult without complication 6/25/2015    Bilateral carotid artery disease 7/21/2017    Bilateral sciatica 2/7/2017    Cellulitis of right leg 08/16/2017    Cerebral infarction 1/29/2016    Multiple areas of lacunar infarction. Stroke risk factors include HTN, DM2, Dyslipidemia.  Continue ASA 81mg/ Statin therapy I have encouraged 30 minutes of physical activity daily for 5 days a week. She has access to a pool so this should not be so jarring to her joints.     Cervical radiculopathy 3/18/2015    Chronic knee pain     Chronic rhinitis 4/9/2013    CKD (chronic kidney disease) stage 3, GFR 30-59 ml/min 1/29/2013    Coronary artery disease due to calcified coronary lesion 6/25/2015    Diastolic dysfunction 10/10/2013    Essential hypertension 6/25/2015    Gastroesophageal reflux disease without esophagitis 8/13/2012    Gout     Hives 10/1/2013    Mixed hyperlipidemia 8/13/2012    Morbid obesity with BMI of 50.0-59.9, adult 2/11/2014    Obstructive sleep apnea syndrome 8/13/2012    Senile cataracts of both eyes 1/22/2015    Traumatic open wound of right lower leg 8/25/2017    Trigeminal neuralgia of right side of face 5/23/2017    For years now Previously on Gabapentin, but caused constipation  "Dissipating over time Not related to intracranial abnormalities Possibly related to dental procedure    Type 2 diabetes mellitus with diabetic polyneuropathy, with long-term current use of insulin 1/29/2013    Venous stasis dermatitis of both lower extremities 8/21/2017    Vitamin D deficiency disease 8/13/2012       Past Surgical History:   Procedure Laterality Date    HYSTERECTOMY  1982    secondary uterine fibroids    JOINT REPLACEMENT      Right total knee replacement         Family History   Problem Relation Age of Onset    Cancer Mother     Hypertension Father     Cancer Sister     No Known Problems Brother     No Known Problems Maternal Aunt     No Known Problems Maternal Uncle     No Known Problems Paternal Aunt     No Known Problems Paternal Uncle     No Known Problems Maternal Grandmother     No Known Problems Maternal Grandfather     No Known Problems Paternal Grandmother     No Known Problems Paternal Grandfather     Glaucoma Neg Hx     Breast cancer Neg Hx     Colon cancer Neg Hx     Ovarian cancer Neg Hx     Stroke Neg Hx     Allergic rhinitis Neg Hx     Allergies Neg Hx     Angioedema Neg Hx     Asthma Neg Hx     Atopy Neg Hx     Eczema Neg Hx     Immunodeficiency Neg Hx     Rhinitis Neg Hx     Urticaria Neg Hx     Amblyopia Neg Hx     Blindness Neg Hx     Cataracts Neg Hx     Diabetes Neg Hx     Macular degeneration Neg Hx     Retinal detachment Neg Hx     Strabismus Neg Hx     Thyroid disease Neg Hx     Psoriasis Neg Hx        Social History     Tobacco Use    Smoking status: Never Smoker    Smokeless tobacco: Never Used   Substance Use Topics    Alcohol use: No    Drug use: No       ALLERGIES: Reviewed in EPIC    CURRENT MEDICATIONS: Reviewed in EPIC    ROS:   As per HPI.      PHYSICAL EXAM:  Prior weight is 266 lbs.  /65   Pulse 71   Ht 4' 11" (1.499 m)   Wt 122.2 kg (269 lb 6.4 oz)   BMI 54.41 kg/m²   GENERAL: morbid obese body habitus, well " groomed       ASSESSMENT:     Obstructive sleep apnea,  Currently non compliant  With the PAP therapy and artem does not have AHI data , having elevated ESS= 15 as not using the PAP therapy. She has medical co-morbidities of hypertension and morbid obesity (BMI >30) . Will suggest following :  PLAN:   Discussed the importance of compliance and long term impact of untreated REJI , pt understands and will start on the new machine as having old machine with no available AHI data. Will continue on CPAP 13 cm H20 and suggested to avoid sleeping supine and to loose weight.     DME = Medicare ([employer])(she reports using nasal pillows) new script send to the DME for the machine.         Follow up 2  Months: with georges David MD, FACP  Phone: [phone]  Fax: [phone]

## 2019-02-06 NOTE — PATIENT INSTRUCTIONS
Sleep Hygiene Practices    1. Try going to bed only when you are drowsy.    ?    2. If you are unable to fall asleep or stay asleep, leave your bedroom and engage in a quiet activity elsewhere. Do not permit yourself to fall asleep outside the bedroom. Return to bed when and only when you are sleepy. Repeat this process as often as necessary throughout night.    3. Maintain regular wake-up time, even on days off work & weekends    4. Use your bedroom for sleep and sex    5. Avoid napping during the daytime. If daytime sleepiness becomes overwhelming, limit nap time to a single nap of less than 1hr, no later than 3pm.    6. Distract your mind. Avoid clock watching. Lying in bed unable to sleep and frustrated needs to be avoided. Try reading or watching a videotape or listening to books on tape. It may be necessary to go into another room to do these.    7. Avoid caffeine within 4-6hrs of bedtime    8. Avoid use of nicotine close to bedtime    9. do not drink alcoholic beverages within 4-6hrs of bedtime    10. While a light snack before bedtime can help promote sound sleep, avoid large meals.    11. Obtain regular exercise, but avoid strenuous exercise within 4hrs of bedtime    12. Minimize light, noise, and extremes in temperature in the bedroom.    13. Precautions: The patient was advised to abstain from driving should they feel sleepy or drowsy.  ?    Use nasal saline three times a day and flonase at bed time.       Hermelinda or Chad will contact you to schedule your sleep study. Their number is [phone] (ext 2). The [facility] is located on 7th floor of the [facility].    We will call you when the sleep study results are ready - if you have not heard from us by 2 weeks from the date of the study, please call [phone] (ext 1).    You are advised to abstain from driving should you feel sleepy or drowsy.        * This information is provided had been directly obtained from the American [employer] of  [employer] wellness booklet on sleep hygiene. ([facility], [address] [place] [address]

## 2019-02-19 ENCOUNTER — OFFICE VISIT (OUTPATIENT)
Dept: PRIMARY CARE CLINIC | Facility: CLINIC | Age: 79
End: 2019-02-19
Payer: MEDICARE

## 2019-02-19 VITALS
HEART RATE: 92 BPM | BODY MASS INDEX: 53.78 KG/M2 | SYSTOLIC BLOOD PRESSURE: 160 MMHG | DIASTOLIC BLOOD PRESSURE: 40 MMHG | WEIGHT: 266.75 LBS | OXYGEN SATURATION: 98 % | HEIGHT: 59 IN

## 2019-02-19 DIAGNOSIS — E11.59 HYPERTENSION ASSOCIATED WITH DIABETES: ICD-10-CM

## 2019-02-19 DIAGNOSIS — Z12.39 BREAST CANCER SCREENING: ICD-10-CM

## 2019-02-19 DIAGNOSIS — I87.2 VENOUS STASIS DERMATITIS OF BOTH LOWER EXTREMITIES: ICD-10-CM

## 2019-02-19 DIAGNOSIS — I15.2 HYPERTENSION ASSOCIATED WITH DIABETES: ICD-10-CM

## 2019-02-19 PROCEDURE — 99215 OFFICE O/P EST HI 40 MIN: CPT | Mod: S$GLB,,, | Performed by: INTERNAL MEDICINE

## 2019-02-19 PROCEDURE — 99215 PR OFFICE/OUTPT VISIT, EST, LEVL V, 40-54 MIN: ICD-10-PCS | Mod: S$GLB,,, | Performed by: INTERNAL MEDICINE

## 2019-02-19 NOTE — ASSESSMENT & PLAN NOTE
BP controlled ibesartan 300mg, torsemide; carvedilol discontinued by pulm due to SOB, amlodipine discontinued by PCP due to leg swelling  · Discontinued hydralazine due to overtreatment of HTN  · Changed losartan to ibesartan 300mg  · Consider Digital HTN program in future  · Diabetes controlled with 18-18-48 insulin

## 2019-02-19 NOTE — PROGRESS NOTES
Primary Care Provider Appointment    Subjective:      Patient ID: Duran Giordano is a 78 y.o. female with obesity, DM, venous stasis ulcers    Chief Complaint: Diabetes; Hypertension; and Follow-up    Patient is now with lifestyle changes of a salad for dinner. She has lost 4# over last year, but weight has fluctuated. She states to med student that she has been drinking coke and candy.    Sr Eliz Arevalo applies circaids in the AM. She sleeps in a chair with the legs elevated, but not wrapped. Seen by wound care in 1/2019 when wounds were worse. Requests wound care today on RLE with open ulcer.    Followed by Endo, has controlled A1c with controlled readings. Last A1c at goal at 7.5. Lipids controlled. BP high-normal.    She is getting a new CPAP, is using it every night.    She is compliant with all meds due to pill packing, and injecting insulin as directed. Pill packed as follow:  Adherence packed for 30 days:     Morning card:  Aspirin 81 mg  Torsemide 20 mg     Evening card:  Atorvastatin 80 mg  Irbesartan 300 mg (300 mg on back order, used 2 150mg tablets)  Vitamin D3 1,000 IU                Electronically signed by Hal Mcclendon, PharmD at 1/14/2019  6:01 PM       Past Surgical History:   Procedure Laterality Date    HYSTERECTOMY  1982    secondary uterine fibroids    JOINT REPLACEMENT      Right total knee replacement         Past Medical History:   Diagnosis Date    Adrenal mass- adenoma stable since 2004 (1.8 cm and 8/13/12 (2 cm); stable 2016 2.4 cm     Adrenal mass- adenoma stable since 2004 (1.8 cm and 8/13/12 (2 cm); stable 2016 2.4 cm    Asthma in adult without complication 6/25/2015    Bilateral carotid artery disease 7/21/2017    Bilateral sciatica 2/7/2017    Cellulitis of right leg 08/16/2017    Cerebral infarction 1/29/2016    Multiple areas of lacunar infarction. Stroke risk factors include HTN, DM2, Dyslipidemia.  Continue ASA 81mg/ Statin therapy I have encouraged 30  "minutes of physical activity daily for 5 days a week. She has access to a pool so this should not be so jarring to her joints.     Cervical radiculopathy 3/18/2015    Chronic knee pain     Chronic rhinitis 4/9/2013    CKD (chronic kidney disease) stage 3, GFR 30-59 ml/min 1/29/2013    Coronary artery disease due to calcified coronary lesion 6/25/2015    Diastolic dysfunction 10/10/2013    Essential hypertension 6/25/2015    Gastroesophageal reflux disease without esophagitis 8/13/2012    Gout     Hives 10/1/2013    Mixed hyperlipidemia 8/13/2012    Morbid obesity with BMI of 50.0-59.9, adult 2/11/2014    Obstructive sleep apnea syndrome 8/13/2012    Senile cataracts of both eyes 1/22/2015    Traumatic open wound of right lower leg 8/25/2017    Trigeminal neuralgia of right side of face 5/23/2017    For years now Previously on Gabapentin, but caused constipation Dissipating over time Not related to intracranial abnormalities Possibly related to dental procedure    Type 2 diabetes mellitus with diabetic polyneuropathy, with long-term current use of insulin 1/29/2013    Venous stasis dermatitis of both lower extremities 8/21/2017    Vitamin D deficiency disease 8/13/2012       Review of Systems   Constitutional: Positive for activity change. Negative for appetite change and unexpected weight change.   Cardiovascular: Positive for leg swelling.   Gastrointestinal: Negative for constipation and diarrhea.   Skin: Positive for color change and wound. Negative for rash.   Hematological: Does not bruise/bleed easily.   Psychiatric/Behavioral: Positive for sleep disturbance. Negative for behavioral problems and decreased concentration.       Objective:   BP (!) 140/40 (BP Location: Right arm, Patient Position: Sitting, BP Method: Large (Manual))   Pulse 92   Ht 4' 11" (1.499 m)   Wt 121 kg (266 lb 12.1 oz)   SpO2 98%   BMI 53.88 kg/m²     Physical Exam   Constitutional: She appears well-developed. "   Severe obesity   Eyes: EOM are normal.   Cardiovascular: Normal rate.   Pulmonary/Chest: Effort normal. She has no wheezes.   Abdominal:   Obese abdomen   Musculoskeletal: She exhibits edema.   Neurological: She is alert.   Skin:   Ulcer on R leg  worsened venous stasis   Psychiatric: She has a normal mood and affect.   Improving insight into disease       Lab Results   Component Value Date    WBC 8.33 12/11/2018    HGB 11.6 (L) 12/11/2018    HCT 40.0 12/11/2018     12/11/2018    CHOL 142 01/30/2019    TRIG 68 01/30/2019    HDL 37 (L) 01/30/2019    ALT 19 12/11/2018    AST 18 12/11/2018     12/11/2018    K 4.8 12/11/2018     12/11/2018    CREATININE 1.1 12/11/2018    BUN 22 12/11/2018    CO2 28 12/11/2018    TSH 0.746 05/21/2018    INR 1.1 04/17/2005    HGBA1C 7.5 (H) 01/30/2019             Assessment:   78 y.o. female with multiple co-morbid illnesses here to continue work-up of chronic issues notably with obesity, DM, venous stasis ulcers.     Plan:     Problem List Items Addressed This Visit        Cardiac/Vascular    Hypertension associated with diabetes     BP controlled ibesartan 300mg, torsemide; carvedilol discontinued by pulm due to SOB, amlodipine discontinued by PCP due to leg swelling  · Discontinued hydralazine due to overtreatment of HTN  · Changed losartan to ibesartan 300mg  · Consider Digital HTN program in future  · Diabetes controlled with 18-18-48 insulin         Venous stasis dermatitis of both lower extremities     LE swelling bilaterally with poor wound healing, routine circaids with wound care  · Continue circaids boot, per wound care  · Apply bandage to wound  · Wound care today in clinic  · Treat cellulitis PRN  · Monitor CBC  · Discontinue amlodipine  · Given explicit wound care instructions            Renal/    Breast cancer screening     Annual mammograms, never abnormal  · Mammogram planned for 3/2019         Relevant Orders    Mammo Digital Screening Bilat           Health Maintenance       Date Due Completion Date    TETANUS VACCINE 03/08/1958 ---    Mammogram 02/22/2019 2/22/2018- TODAY    Hemoglobin A1c 07/30/2019 1/30/2019    Override on 7/13/2016: Done    Foot Exam 11/27/2019 11/27/2018 (Done)    Override on 11/27/2018: Done (Dr Anand)    Override on 1/19/2018: Done (Dr anand)    Override on 5/31/2017: Done    Override on 7/20/2016: Done    Eye Exam 01/10/2020 1/10/2019    Override on 2/17/2016: Done    Override on 1/22/2015: Done    Override on 8/16/2012: Done    Lipid Panel 01/30/2020 1/30/2019    DEXA SCAN 02/22/2021 2/22/2018    Override on 12/13/2011: Done          Follow-up in about 6 weeks (around 4/2/2019). One hour spent with this patient today, half of that in counseling.    Tami Schmidt MD/MPH  Internal Medicine  [facility]  [phone]

## 2019-02-19 NOTE — MEDICAL/APP STUDENT
"Subjective:       Patient ID: Duran Giordano is a 78 y.o. female.    Chief Complaint: Diabetes; Hypertension; and Follow-up    Patient states that she no longer has nasal congestion or URI symptoms but is hoarse in the morning which "goes away". She has been taking flonase at night before going to bed. She denies cough, and problems with sleep. Her new CPAP should be arriving soon.     Patient complains of non-painful "pimple" for last week on right shin. "Pimple has been oozing water."  She is only wearing her circaids on her left leg    Patient needs to call to schedule her mammogram.     She confesses to "guzzling sweets." She has been drinking a can of soda every other day with juice. She has also been indulging in candy. Has been taking blood sugars daily at home. Blood sugar was 124 this morning, which is normal for her.       Review of Systems    Objective:      Physical Exam    Assessment:       No diagnosis found.    Plan:        "

## 2019-02-19 NOTE — Clinical Note
Would a farrow wrap help this situation? She is using circaids during the day, but only elevating at night. Should anything else be done?BARB Spencer

## 2019-02-19 NOTE — PATIENT INSTRUCTIONS
TODAY:  - eat more nuts  - eat a salad for a meal daily  - continue the oatmeal for breakfast  - handicap parking paperwork  - will call you about whether wound care recommends farrow wrap

## 2019-02-19 NOTE — ASSESSMENT & PLAN NOTE
LE swelling bilaterally with poor wound healing, routine circaids with wound care  · Continue circaids boot, per wound care  · Apply bandage to wound  · Wound care today in clinic  · Treat cellulitis PRN  · Monitor CBC  · Discontinue amlodipine  · Given explicit wound care instructions

## 2019-03-19 DIAGNOSIS — E11.29 TYPE 2 DIABETES MELLITUS WITH RENAL MANIFESTATIONS NOT AT GOAL: ICD-10-CM

## 2019-03-19 DIAGNOSIS — I15.2 HYPERTENSION ASSOCIATED WITH DIABETES: ICD-10-CM

## 2019-03-19 DIAGNOSIS — E11.59 HYPERTENSION ASSOCIATED WITH DIABETES: ICD-10-CM

## 2019-03-19 RX ORDER — ATORVASTATIN CALCIUM 80 MG/1
80 TABLET, FILM COATED ORAL DAILY
Qty: 90 TABLET | Refills: 3 | Status: SHIPPED | OUTPATIENT
Start: 2019-03-19 | End: 2020-05-21 | Stop reason: SDUPTHER

## 2019-03-19 RX ORDER — TORSEMIDE 20 MG/1
20 TABLET ORAL DAILY
Qty: 90 TABLET | Refills: 3 | Status: SHIPPED | OUTPATIENT
Start: 2019-03-19 | End: 2020-11-05 | Stop reason: SDUPTHER

## 2019-04-09 ENCOUNTER — HOSPITAL ENCOUNTER (OUTPATIENT)
Dept: RADIOLOGY | Facility: HOSPITAL | Age: 79
Discharge: HOME | End: 2019-04-09
Attending: INTERNAL MEDICINE
Payer: MEDICARE

## 2019-04-09 ENCOUNTER — OFFICE VISIT (OUTPATIENT)
Dept: PRIMARY CARE CLINIC | Facility: CLINIC | Age: 79
End: 2019-04-09
Payer: MEDICARE

## 2019-04-09 VITALS
BODY MASS INDEX: 55.43 KG/M2 | OXYGEN SATURATION: 99 % | HEIGHT: 59 IN | SYSTOLIC BLOOD PRESSURE: 100 MMHG | DIASTOLIC BLOOD PRESSURE: 68 MMHG | HEART RATE: 69 BPM | WEIGHT: 274.94 LBS

## 2019-04-09 DIAGNOSIS — Z79.4 TYPE 2 DIABETES MELLITUS WITH DIABETIC POLYNEUROPATHY, WITH LONG-TERM CURRENT USE OF INSULIN: ICD-10-CM

## 2019-04-09 DIAGNOSIS — G47.33 OSA ON CPAP: ICD-10-CM

## 2019-04-09 DIAGNOSIS — Z12.39 ENCOUNTER FOR SCREENING FOR MALIGNANT NEOPLASM OF BREAST: ICD-10-CM

## 2019-04-09 DIAGNOSIS — E11.42 TYPE 2 DIABETES MELLITUS WITH DIABETIC POLYNEUROPATHY, WITH LONG-TERM CURRENT USE OF INSULIN: ICD-10-CM

## 2019-04-09 DIAGNOSIS — M10.9 ACUTE GOUT OF RIGHT WRIST, UNSPECIFIED CAUSE: Primary | ICD-10-CM

## 2019-04-09 PROCEDURE — 77067 SCR MAMMO BI INCL CAD: CPT | Mod: 26,,, | Performed by: RADIOLOGY

## 2019-04-09 PROCEDURE — 99215 PR OFFICE/OUTPT VISIT, EST, LEVL V, 40-54 MIN: ICD-10-PCS | Mod: S$GLB,,, | Performed by: INTERNAL MEDICINE

## 2019-04-09 PROCEDURE — 77067 SCR MAMMO BI INCL CAD: CPT | Mod: TC

## 2019-04-09 PROCEDURE — 99215 OFFICE O/P EST HI 40 MIN: CPT | Mod: S$GLB,,, | Performed by: INTERNAL MEDICINE

## 2019-04-09 PROCEDURE — 77067 MAMMO DIGITAL SCREENING BILAT WITH CAD: ICD-10-PCS | Mod: 26,,, | Performed by: RADIOLOGY

## 2019-04-09 NOTE — PROGRESS NOTES
Subjective:      Patient ID: Duran Giordano is a 79 y.o. female.    Chief Complaint: Hand Pain (2 weeks); Diabetes; Hypertension; CAD F/U; and Follow-up    79 years female patient came in to follow her medical condition and complaining of right wrist pain.    Right wrist pain started 2 weeks ago, suddenly when she woke up in the morning with no previous trauma, 9/10 in severity, non radiating, associated with swelling and warmth over the joint, pain increase with joint movements, make it difficult for her to brush her teeth and do her hair, she tried heating pads, wrist brace and tylenol with minimal improvement.No other joint swelling, no fevers and chills, patient has history of gout, last att ck was 4 years ago on her ankles, she was on restrict diet trying to eat healthy and lose weight but she quit last month because she had lots of festivals.  Reported no new venous ulcers, her CPAP ordered but hasn't received it yet, continue using pill pack and complaint with her meds.      Review of Systems   Constitutional: Negative for activity change, chills and fever.   HENT: Negative for congestion and sore throat.    Eyes: Negative for photophobia and visual disturbance.   Respiratory: Negative for cough and choking.    Cardiovascular: Positive for leg swelling. Negative for chest pain.   Gastrointestinal: Negative for abdominal distention, abdominal pain and anal bleeding.   Genitourinary: Negative for difficulty urinating and flank pain.   Musculoskeletal: Positive for arthralgias and joint swelling. Negative for neck pain.   Skin: Negative for rash.   Neurological: Negative for dizziness and headaches.   Psychiatric/Behavioral: Negative for agitation and confusion.     Objective:     Vitals:    04/09/19 1017   BP: 100/68   Pulse: 69     Physical Exam   Constitutional: She is oriented to person, place, and time. She appears well-nourished. No distress.   obese    HENT:   Head: Normocephalic and atraumatic.    Eyes: No scleral icterus.   Neck: Neck supple.   Cardiovascular: Normal rate, regular rhythm and normal heart sounds.   Pulmonary/Chest: Effort normal and breath sounds normal. No respiratory distress.   Abdominal: Soft. She exhibits no distension. There is no tenderness.   Musculoskeletal:        Right wrist: She exhibits tenderness, bony tenderness and swelling. She exhibits normal range of motion, no effusion, no crepitus and no laceration.        Left wrist: Normal. She exhibits normal range of motion, no tenderness, no bony tenderness, no swelling and no laceration.   warmth over right wrist    Neurological: She is alert and oriented to person, place, and time.   Skin: No rash noted. She is not diaphoretic.   Psychiatric: She has a normal mood and affect. Her behavior is normal.   Vitals reviewed.    Assessment and Plan :      1. Acute gout of right wrist, unspecified cause    2. Type 2 diabetes mellitus with diabetic polyneuropathy, with long-term current use of insulin    3. REJI on CPAP        Duran was seen today for hand pain, diabetes, hypertension, cad f/u and follow-up.    Diagnoses and all orders for this visit:    Acute gout of right wrist, unspecified cause  -     Basic metabolic panel; Future  -     Uric acid; Future  -     Sedimentation rate; Future  -     CBC auto differential; Future  -     Ambulatory Referral to Rheumatology    Type 2 diabetes mellitus with diabetic polyneuropathy, with long-term current use of insulin    REJI on CPAP               Bebe Poole  Internal Medicine, PGY 2  [phone]

## 2019-04-09 NOTE — PATIENT INSTRUCTIONS
TODAY:  - Rheum referral for wrist injection  - labs today to work up the gout  - try to get back into your healthy diet  - appt with sleep medicine (Dr David)  - start using your CPAP machine again

## 2019-04-09 NOTE — ASSESSMENT & PLAN NOTE
R wrist swelling and warmth, has metabolic syndrome  · Labs today  · Rheum referral for possible steroid wrist injection

## 2019-04-09 NOTE — Clinical Note
Dr David and staff,Could you set patietn up for follow-up appt? She is not using her CPAP reportedly because she's waiting for a new machine.Thanks,BARB (PCP)

## 2019-04-09 NOTE — Clinical Note
Brian Hyde,Could you help with this patient? She seems to be having an acute gout attack in the R wrist. Is of advanced age, diabetic, CKD, numerous chronic issues. I don't feel comfortable with systemic steroids nor NSAIDs. Could someone perform an interarticular steroid inject in the wrist for her?Referral placed.Thanks,BARB

## 2019-04-09 NOTE — PROGRESS NOTES
Primary Care Provider Appointment    Subjective:      Patient ID: Duran Giordano is a 79 y.o. female with DM, HTN, HLD, gout attack    Chief Complaint: Hand Pain (2 weeks); Diabetes; Hypertension; CAD F/U; and Follow-up    Patient with warmth and swelling of R wrist. Has h/o gout, but it usually occurs in the ankles. She has been eating a lot of fish for Lent. She may be eligible for intra-articular steroid injection instead of oral glucocorticoids given risk of diabetes. She has been using a heating pad on the wrist which is not helping.     Her DM is well-controlled. She checks her glucose daily, have been in 130s (all less than 200s).     Is using her cercaids for LE lymphedema. Has gained 10# in 2 mos. She stopped eating oatmeal, is now eating fried meats, hashbrowns for breakfast. Snacking with potato chips.     She has not been using her CPAP due to waiting for a new machine.     Past Surgical History:   Procedure Laterality Date    HYSTERECTOMY  1982    secondary uterine fibroids    JOINT REPLACEMENT      Right total knee replacement         Past Medical History:   Diagnosis Date    Adrenal mass- adenoma stable since 2004 (1.8 cm and 8/13/12 (2 cm); stable 2016 2.4 cm     Adrenal mass- adenoma stable since 2004 (1.8 cm and 8/13/12 (2 cm); stable 2016 2.4 cm    Asthma in adult without complication 6/25/2015    Bilateral carotid artery disease 7/21/2017    Bilateral sciatica 2/7/2017    Cellulitis of right leg 08/16/2017    Cerebral infarction 1/29/2016    Multiple areas of lacunar infarction. Stroke risk factors include HTN, DM2, Dyslipidemia.  Continue ASA 81mg/ Statin therapy I have encouraged 30 minutes of physical activity daily for 5 days a week. She has access to a pool so this should not be so jarring to her joints.     Cervical radiculopathy 3/18/2015    Chronic knee pain     Chronic rhinitis 4/9/2013    CKD (chronic kidney disease) stage 3, GFR 30-59 ml/min 1/29/2013     Coronary artery disease due to calcified coronary lesion 6/25/2015    Diastolic dysfunction 10/10/2013    Essential hypertension 6/25/2015    Gastroesophageal reflux disease without esophagitis 8/13/2012    Gout     Hives 10/1/2013    Mixed hyperlipidemia 8/13/2012    Morbid obesity with BMI of 50.0-59.9, adult 2/11/2014    Obstructive sleep apnea syndrome 8/13/2012    Senile cataracts of both eyes 1/22/2015    Traumatic open wound of right lower leg 8/25/2017    Trigeminal neuralgia of right side of face 5/23/2017    For years now Previously on Gabapentin, but caused constipation Dissipating over time Not related to intracranial abnormalities Possibly related to dental procedure    Type 2 diabetes mellitus with diabetic polyneuropathy, with long-term current use of insulin 1/29/2013    Venous stasis dermatitis of both lower extremities 8/21/2017    Vitamin D deficiency disease 8/13/2012       Social History     Socioeconomic History    Marital status: Single     Spouse name: Not on file    Number of children: Not on file    Years of education: Not on file    Highest education level: Not on file   Occupational History    Not on file   Social Needs    Financial resource strain: Not hard at all    Food insecurity:     Worry: Never true     Inability: Never true    Transportation needs:     Medical: No     Non-medical: No   Tobacco Use    Smoking status: Never Smoker    Smokeless tobacco: Never Used   Substance and Sexual Activity    Alcohol use: No    Drug use: No    Sexual activity: Never   Lifestyle    Physical activity:     Days per week: Not on file     Minutes per session: Not on file    Stress: Not on file   Relationships    Social connections:     Talks on phone: Not on file     Gets together: Not on file     Attends [employer] service: Not on file     Active member of club or organization: Not on file     Attends meetings of clubs or organizations: Not on file     Relationship  "status: Not on file   Other Topics Concern    Are you pregnant or think you may be? Not Asked    Breast-feeding Not Asked   Social History Narrative    Single with no children.        Review of Systems   Constitutional: Positive for activity change. Negative for appetite change and unexpected weight change.   Cardiovascular: Positive for leg swelling.   Gastrointestinal: Negative for constipation and diarrhea.   Skin: Positive for color change and wound. Negative for rash.   Hematological: Does not bruise/bleed easily.   Psychiatric/Behavioral: Positive for sleep disturbance. Negative for behavioral problems and decreased concentration.       Objective:   /68 (BP Location: Right arm, Patient Position: Sitting, BP Method: Medium (Manual))   Pulse 69   Ht 4' 11" (1.499 m)   Wt 124.7 kg (274 lb 14.6 oz)   SpO2 99%   BMI 55.53 kg/m²     Physical Exam   Constitutional: She appears well-developed.   Severe obesity   Eyes: EOM are normal.   Cardiovascular: Normal rate.   Pulmonary/Chest: Effort normal. She has no wheezes.   Abdominal:   Obese abdomen   Musculoskeletal: She exhibits edema, tenderness and deformity.   Warmth and swelling of R wrist   Neurological: She is alert.   Skin:   Improved venous stasis   Psychiatric: She has a normal mood and affect.   Improving insight into disease       Lab Results   Component Value Date    WBC 8.33 12/11/2018    HGB 11.6 (L) 12/11/2018    HCT 40.0 12/11/2018     12/11/2018    CHOL 142 01/30/2019    TRIG 68 01/30/2019    HDL 37 (L) 01/30/2019    ALT 19 12/11/2018    AST 18 12/11/2018     12/11/2018    K 4.8 12/11/2018     12/11/2018    CREATININE 1.1 12/11/2018    BUN 22 12/11/2018    CO2 28 12/11/2018    TSH 0.746 05/21/2018    INR 1.1 04/17/2005    HGBA1C 7.5 (H) 01/30/2019           RESULTS: Reviewed labs and images today    Assessment:   79 y.o. female with multiple co-morbid illnesses here to continue work-up of chronic issues notably with DM, " HTN, HLD, gout attack     Plan:     Problem List Items Addressed This Visit        Endocrine    Type 2 diabetes mellitus with diabetic polyneuropathy, with long-term current use of insulin     Abnormal foot exam, followed by podiatry q2 mos  · Continue routine care  · Advised to soak feet and moisturize            Orthopedic    Gout - Primary     R wrist swelling and warmth, has metabolic syndrome  · Labs today  · Rheum referral for possible steroid wrist injection         Relevant Orders    Basic metabolic panel    Uric acid    Sedimentation rate    CBC auto differential    Ambulatory Referral to Rheumatology       Other    REJI on CPAP     Previously compliant with CPAP only 4 hours per night, now noncompliant  · Advised to continue using machine  · Advised to place machine next to recliner chair or use in her bedroom  · Increase hours on machine  · Advised to lose weight  · Refer to sleep medicine for follow-up               Health Maintenance       Date Due Completion Date    TETANUS VACCINE 03/08/1958 ---    Mammogram 02/22/2019 2/22/2018- TODAY    Hemoglobin A1c 07/30/2019 1/30/2019    Override on 7/13/2016: Done    Foot Exam 11/27/2019 11/27/2018 (Done)    Override on 11/27/2018: Done (Dr Anand)    Override on 1/19/2018: Done (Dr anand)    Override on 5/31/2017: Done    Override on 7/20/2016: Done    Eye Exam 01/10/2020 1/10/2019    Override on 2/17/2016: Done    Override on 1/22/2015: Done    Override on 8/16/2012: Done    Lipid Panel 01/30/2020 1/30/2019    Aspirin/Antiplatelet Therapy 02/19/2020 2/19/2019    DEXA SCAN 02/22/2021 2/22/2018    Override on 12/13/2011: Done          Follow up in about 6 weeks (around 5/21/2019). Total face-to-face time was 60 min, 50% of this was spent on counseling and coordination of care. The following issues were discussed: DM, HTN, HLD, gout attack    Tami Schmidt MD/MPH  Internal Medicine  [facility]  [facility]  [phone]

## 2019-04-10 ENCOUNTER — HOSPITAL ENCOUNTER (OUTPATIENT)
Dept: RADIOLOGY | Facility: HOSPITAL | Age: 79
Discharge: HOME | End: 2019-04-10
Attending: INTERNAL MEDICINE
Payer: MEDICARE

## 2019-04-10 ENCOUNTER — TELEPHONE (OUTPATIENT)
Dept: PRIMARY CARE CLINIC | Facility: CLINIC | Age: 79
End: 2019-04-10

## 2019-04-10 ENCOUNTER — OFFICE VISIT (OUTPATIENT)
Dept: RHEUMATOLOGY | Facility: CLINIC | Age: 79
End: 2019-04-10
Payer: MEDICARE

## 2019-04-10 VITALS
HEART RATE: 63 BPM | WEIGHT: 274 LBS | HEIGHT: 59 IN | BODY MASS INDEX: 55.24 KG/M2 | DIASTOLIC BLOOD PRESSURE: 69 MMHG | SYSTOLIC BLOOD PRESSURE: 168 MMHG

## 2019-04-10 DIAGNOSIS — M1A.09X0 IDIOPATHIC CHRONIC GOUT OF MULTIPLE SITES WITHOUT TOPHUS: ICD-10-CM

## 2019-04-10 DIAGNOSIS — M1A.09X0 IDIOPATHIC CHRONIC GOUT OF MULTIPLE SITES WITHOUT TOPHUS: Primary | ICD-10-CM

## 2019-04-10 PROCEDURE — 73130 XR HAND COMPLETE 3 VIEWS BILATERAL: ICD-10-PCS | Mod: 26,50,, | Performed by: RADIOLOGY

## 2019-04-10 PROCEDURE — 73630 X-RAY EXAM OF FOOT: CPT | Mod: 50,TC

## 2019-04-10 PROCEDURE — 99999 PR PBB SHADOW E&M-EST. PATIENT-LVL III: ICD-10-PCS | Mod: PBBFAC,,, | Performed by: INTERNAL MEDICINE

## 2019-04-10 PROCEDURE — 73630 X-RAY EXAM OF FOOT: CPT | Mod: 26,50,, | Performed by: RADIOLOGY

## 2019-04-10 PROCEDURE — 73130 X-RAY EXAM OF HAND: CPT | Mod: 26,50,, | Performed by: RADIOLOGY

## 2019-04-10 PROCEDURE — 99999 PR PBB SHADOW E&M-EST. PATIENT-LVL III: CPT | Mod: PBBFAC,,, | Performed by: INTERNAL MEDICINE

## 2019-04-10 PROCEDURE — 99203 OFFICE O/P NEW LOW 30 MIN: CPT | Mod: S$PBB,,, | Performed by: INTERNAL MEDICINE

## 2019-04-10 PROCEDURE — 99213 OFFICE O/P EST LOW 20 MIN: CPT | Mod: PBBFAC,25 | Performed by: INTERNAL MEDICINE

## 2019-04-10 PROCEDURE — 73130 X-RAY EXAM OF HAND: CPT | Mod: 50,TC

## 2019-04-10 PROCEDURE — 99203 PR OFFICE/OUTPT VISIT, NEW, LEVL III, 30-44 MIN: ICD-10-PCS | Mod: S$PBB,,, | Performed by: INTERNAL MEDICINE

## 2019-04-10 PROCEDURE — 73630 XR FOOT COMPLETE 3 VIEW BILATERAL: ICD-10-PCS | Mod: 26,50,, | Performed by: RADIOLOGY

## 2019-04-10 RX ORDER — ALLOPURINOL 100 MG/1
50 TABLET ORAL DAILY
Qty: 15 TABLET | Refills: 3 | Status: SHIPPED | OUTPATIENT
Start: 2019-04-10 | End: 2019-05-10

## 2019-04-10 RX ORDER — COLCHICINE 0.6 MG/1
0.6 TABLET ORAL DAILY
Qty: 30 TABLET | Refills: 3 | Status: SHIPPED | OUTPATIENT
Start: 2019-04-10 | End: 2019-06-19

## 2019-04-10 RX ORDER — PREDNISONE 20 MG/1
TABLET ORAL
Qty: 60 TABLET | Refills: 2 | Status: SHIPPED | OUTPATIENT
Start: 2019-04-10 | End: 2020-08-18 | Stop reason: SDUPTHER

## 2019-04-10 ASSESSMENT — ROUTINE ASSESSMENT OF PATIENT INDEX DATA (RAPID3)
MDHAQ FUNCTION SCORE: .7
PSYCHOLOGICAL DISTRESS SCORE: 3.3
TOTAL RAPID3 SCORE: 4.94
AM STIFFNESS SCORE: 1, YES
PATIENT GLOBAL ASSESSMENT SCORE: 5.5
FATIGUE SCORE: 4.5
PAIN SCORE: 7

## 2019-04-10 NOTE — TELEPHONE ENCOUNTER
Please advise loida to attend her Rheum appt today. Her labs show that she is likely havign a gout attack. She would benefit from a steroid injection in her wrist.    Thanks,  BARB

## 2019-04-10 NOTE — PROGRESS NOTES
Chief Complaint   Patient presents with    Disease Management     management of gout       Patient was referred by :      History of presenting illness    79 year old black female comes in with chronic gout for 10 years    Acute onset pain in the     Ankles,feet : lasting for a week  Severe pain,swelling in the joint  The joint would turn red and blue  Precipitated by sea food  Cannot bear weight  Would get steroids,anti inflammatories    She was advised not to eat sea food    She did fine for a while     Last attack was 2 years ago    She now comes in with acute onset pain in the     Right wrist  Right MCP  This started 3 weeks ago  Didn't take anything     It feels like its her gout coming back  No known triggers     Right middle foot pain started a day ago    She has b/l LE cellulitis     Labs     GFR 50  Uric acid 8.5  ESR 72  H/H 11.9/39.9    Last year  APOLONIA neg  RF neg    Past history  HTN,venous stasis LE,b/l carotid artery disease,CAD,asthma,cataracts,CKD,diabetes  GERD,HLD,REJI   ,chronic diastolic HF  LS spine arthritis/sciatica     Family history  Mom cancer    Social history  Not a smoker or alcoholic      Review of Systems   Constitutional: Negative for activity change, appetite change, chills, diaphoresis, fatigue, fever and unexpected weight change.   HENT: Negative for congestion, dental problem, drooling, ear discharge, ear pain, facial swelling, hearing loss, mouth sores, nosebleeds, postnasal drip, rhinorrhea, sinus pressure, sinus pain, sneezing, sore throat, tinnitus, trouble swallowing and voice change.    Eyes: Negative for photophobia, pain, discharge, redness, itching and visual disturbance.   Respiratory: Negative for apnea, cough, choking, chest tightness, shortness of breath, wheezing and stridor.    Cardiovascular: Negative for chest pain, palpitations and leg swelling.   Gastrointestinal: Negative for abdominal distention, abdominal pain, anal bleeding, blood in stool,  constipation, diarrhea, nausea, rectal pain and vomiting.   Endocrine: Negative for cold intolerance, heat intolerance, polydipsia, polyphagia and polyuria.   Genitourinary: Negative for decreased urine volume, difficulty urinating, dysuria, enuresis, flank pain, frequency, genital sores, hematuria and urgency.   Musculoskeletal: Positive for arthralgias. Negative for back pain, gait problem, joint swelling, myalgias, neck pain and neck stiffness.   Skin: Negative for color change, pallor, rash and wound.   Allergic/Immunologic: Negative for environmental allergies, food allergies and immunocompromised state.   Neurological: Negative for dizziness, tremors, seizures, syncope, facial asymmetry, speech difficulty, weakness, light-headedness, numbness and headaches.   Hematological: Negative for adenopathy. Does not bruise/bleed easily.   Psychiatric/Behavioral: Negative for agitation, behavioral problems, confusion, decreased concentration, dysphoric mood, hallucinations, self-injury, sleep disturbance and suicidal ideas. The patient is not nervous/anxious and is not hyperactive.      Physical Exam     MATTHEWS-28 tender joint count: 0  MATTHEWS-28 swollen joint count: 0    Physical Exam   Constitutional: She is oriented to person, place, and time and well-developed, well-nourished, and in no distress. No distress.   HENT:   Head: Normocephalic.   Mouth/Throat: Oropharynx is clear and moist.   Eyes: Conjunctivae are normal. Pupils are equal, round, and reactive to light. Right eye exhibits no discharge. Left eye exhibits no discharge. No scleral icterus.   Neck: Normal range of motion. No thyromegaly present.   Cardiovascular: Normal rate, regular rhythm, normal heart sounds and intact distal pulses.    Pulmonary/Chest: Effort normal and breath sounds normal. No stridor.   Abdominal: Soft. Bowel sounds are normal.   Lymphadenopathy:     She has no cervical adenopathy.   Neurological: She is alert and oriented to person, place,  and time.   Skin: Skin is warm. No rash noted. She is not diaphoretic.     Psychiatric: Affect and judgment normal.   Musculoskeletal: Normal range of motion.           Assessment       79 year old black female comes in with chronic gout for 10 years    Initial attacks were in the ankles and feet : acute onset,pain,swelling and redness,triggered by sea food intake,responding to steroids and anti inflammatories    She now comes in with acute onset pain in the   Right wrist  Right MCP  Bottom of the right foot    She has been drinking a lot of fruit juices and eating a lot of chips    She has b/l LE cellulitis     Labs     GFR 50  Uric acid 8.5  ESR 72  H/H 11.9/39.9    Last year  APOLONIA neg  RF neg    She has   HTN,venous stasis LE,b/l carotid artery disease,CAD,asthma,cataracts,CKD,diabetes  GERD,HLD,REJI   ,chronic diastolic HF  LS spine arthritis/sciatica     This might be her gouty arthritis  We need to treat this acute episode and establish long term goals     1. Idiopathic chronic gout of multiple sites without tophus          New problem     Plan    Treatment goals :    Manage the acute attack  Prednisone 20 mg bid for 5 days and stop.Monitor BP,sugars at home  Ice application    ULT : allopurinol should never be stopped  Start 50 mg daily  If well tolerated stay on the dose,in 8 weeks rpt uric acid,LFTs,renal function and then titrate dose  Will follow CBC,CMP periodically  Risk of hypersensitivity syndrome discussed which is usually within the first 60 days,common in the setting of renal dysfunction and use of thiazides.  Cautious use of antibiotics like amoxicillin and ampicillin advised  If GI upset will split the dose of allopurinol    Colchicine 0.6 mg daily suggested : for acute attack prophylaxis   Side effects : Gi upset,bone marrow suppression,cardiac toxicity,arrythmias,myotoxicity  No statin use  Mild renal insufficiency  No liver disease  Not on  cyclosporine,tacrolimus,clarithromycin,erythromycin,verapamil,diltiazem,ketoconazole  Need to assess CBC,CMP discussed  Will stop colchicine 3 months after the target uric acid of less than 6 is achieved,and he has no flares    Dietary modifications discussed : gave a hand out of all foods to avoid : avoid organ meat,red meat,seafood,shell fish,anchovies,sardines,alcohol particularly beer,fructose containing soft drinks  Its ok to consume moderate wine,diet drinks,dairy proteins,coffee  Eat cherries  Eat purine rich vegetables    Avoid beta blockers  Avoid diuretics  If he develops hyperlipidemia : fenofibrate helps since it lowers uric acid level     Blood pressure monitoring  Weight loss suggested  Diabetes monitoring    If no response will consider : US/DECT,Synovial fluid analysis    Labs every 8 weeks until the goal uric acid is achieved and then every 6 months if asymptomatic    Look for other causes  Hand and foot xrays today    Duran was seen today for disease management.    Diagnoses and all orders for this visit:    Idiopathic chronic gout of multiple sites without tophus  -     CBC auto differential; Future  -     Comprehensive metabolic panel; Future  -     Uric acid; Future  -     Rheumatoid factor; Future  -     Cyclic citrul peptide antibody, IgG; Future  -     HLA B27 Antigen; Future  -     X-Ray Hand 3 View Bilateral; Future  -     X-Ray Foot Complete Bilateral; Future    Other orders  -     predniSONE (DELTASONE) 20 MG tablet; 20 mg twice daily for 5 days and later on use it only if she flares  -     allopurinol (ZYLOPRIM) 100 MG tablet; Take 0.5 tablets (50 mg total) by mouth once daily.  -     colchicine (COLCRYS) 0.6 mg tablet; Take 1 tablet (0.6 mg total) by mouth once daily.

## 2019-04-10 NOTE — LETTER
April 10, 2019      Tami Schmidt MD  [address]  [facility] [address]           [facility] - Rheumatology  [address]  [facility] [address]  Phone: [phone]  Fax: [phone]          Patient: Duran Giordano   MR Number: [id number]   YOB: 1940   Date of Visit: 4/10/2019       Dear Dr. Tami Schmidt:    Thank you for referring Duran Giordano to me for evaluation. Attached you will find relevant portions of my assessment and plan of care.    If you have questions, please do not hesitate to call me. I look forward to following Duran Giordano along with you.    Sincerely,    Ranjit Olson MD    Enclosure  CC:  No Recipients    If you would like to receive this communication electronically, please contact [email or web address] or [phone] to request more information on [employer] Link access.    For providers and/or their staff who would like to refer a patient to [facility], please contact us through our one-stop-shop provider referral line, [facility], at [phone].    If you feel you have received this communication in error or would no longer like to receive these types of communications, please e-mail [email or web address]

## 2019-04-11 ENCOUNTER — TELEPHONE (OUTPATIENT)
Dept: RHEUMATOLOGY | Facility: CLINIC | Age: 79
End: 2019-04-11

## 2019-04-11 NOTE — TELEPHONE ENCOUNTER
----- Message from Zoey Montague MA sent at 4/9/2019  4:32 PM CDT -----   I called and left a message for this patient , #Zoey  ----- Message -----  From: Tami Schmidt MD  Sent: 4/9/2019  11:05 AM  To: Prashanth [facility] Staff    Hi Dr Hyde,  Could you help with this patient? She seems to be having an acute gout attack in the R wrist. Is of advanced age, diabetic, CKD, numerous chronic issues. I don't feel comfortable with systemic steroids nor NSAIDs. Could someone perform an interarticular steroid inject in the wrist for her?    Referral placed.    Thanks,  KJ

## 2019-04-11 NOTE — TELEPHONE ENCOUNTER
----- Message from Tami Schmidt MD sent at 4/9/2019 11:05 AM CDT -----  Hi Dr Hyde,  Could you help with this patient? She seems to be having an acute gout attack in the R wrist. Is of advanced age, diabetic, CKD, numerous chronic issues. I don't feel comfortable with systemic steroids nor NSAIDs. Could someone perform an interarticular steroid inject in the wrist for her?    Referral placed.    Thanks,  KJ

## 2019-04-13 NOTE — TELEPHONE ENCOUNTER
Please let patient know that her labs continue to show that she does NOT have infection in her legs. The swelling and redness are due to her veins not getting enough blood back to the heart. She should continue elevating her legs and doing the cercaid wraps daily. Please make sure she is elevating her legs at night while she sleeps.    Please also let her know that her gout marker (uric acid) was elevated. Please provide her with the following lifestyle changes that will help both her gout and her legs  - weight loss in any way (decrease portion size)  - eat more fruits/veggies  - increase water decrease all alcohol  - cut back on fats from meats    Thanks,  KJ   significant improvement. very playful and interactive. tolerated 2 bottles of Pedialyte. D/C with PMD follow up and anticipatory guidance.  Return for worsening or persistent symptoms.  George Mcdonnell MD

## 2019-04-17 NOTE — PROGRESS NOTES
Adherence packed for 28 days using Dispill:       Morning card:  Aspirin 81 mg  Torsemide 20 mg     Evening card:  Atorvastatin 80 mg  Irbesartan 300 mg   Vitamin D3 1,000 IU

## 2019-04-18 ENCOUNTER — TELEPHONE (OUTPATIENT)
Dept: RHEUMATOLOGY | Facility: CLINIC | Age: 79
End: 2019-04-18

## 2019-04-24 ENCOUNTER — DOCUMENTATION ONLY (OUTPATIENT)
Dept: RHEUMATOLOGY | Facility: CLINIC | Age: 79
End: 2019-04-24

## 2019-05-08 ENCOUNTER — OFFICE VISIT (OUTPATIENT)
Dept: PODIATRY | Facility: CLINIC | Age: 79
End: 2019-05-08
Payer: MEDICARE

## 2019-05-08 VITALS
WEIGHT: 274 LBS | HEIGHT: 59 IN | SYSTOLIC BLOOD PRESSURE: 126 MMHG | DIASTOLIC BLOOD PRESSURE: 59 MMHG | RESPIRATION RATE: 18 BRPM | BODY MASS INDEX: 55.24 KG/M2 | HEART RATE: 86 BPM

## 2019-05-08 DIAGNOSIS — L84 CORN OR CALLUS: ICD-10-CM

## 2019-05-08 DIAGNOSIS — B35.1 ONYCHOMYCOSIS DUE TO DERMATOPHYTE: ICD-10-CM

## 2019-05-08 DIAGNOSIS — E11.51 TYPE II DIABETES MELLITUS WITH PERIPHERAL CIRCULATORY DISORDER: Primary | ICD-10-CM

## 2019-05-08 PROCEDURE — 11721 DEBRIDE NAIL 6 OR MORE: CPT | Mod: Q9,PBBFAC,59 | Performed by: PODIATRIST

## 2019-05-08 PROCEDURE — 11721 PR DEBRIDEMENT OF NAILS, 6 OR MORE: ICD-10-PCS | Mod: Q9,59,S$PBB, | Performed by: PODIATRIST

## 2019-05-08 PROCEDURE — 99213 OFFICE O/P EST LOW 20 MIN: CPT | Mod: PBBFAC | Performed by: PODIATRIST

## 2019-05-08 PROCEDURE — 11721 DEBRIDE NAIL 6 OR MORE: CPT | Mod: Q9,59,S$PBB, | Performed by: PODIATRIST

## 2019-05-08 PROCEDURE — 99499 UNLISTED E&M SERVICE: CPT | Mod: S$PBB,,, | Performed by: PODIATRIST

## 2019-05-08 PROCEDURE — 99499 NO LOS: ICD-10-PCS | Mod: S$PBB,,, | Performed by: PODIATRIST

## 2019-05-08 PROCEDURE — 11057 PARNG/CUTG B9 HYPRKR LES >4: CPT | Mod: 59,Q9,PBBFAC | Performed by: PODIATRIST

## 2019-05-08 PROCEDURE — 11057 PARNG/CUTG B9 HYPRKR LES >4: CPT | Mod: Q9,S$PBB,, | Performed by: PODIATRIST

## 2019-05-08 PROCEDURE — 99999 PR PBB SHADOW E&M-EST. PATIENT-LVL III: CPT | Mod: PBBFAC,,, | Performed by: PODIATRIST

## 2019-05-08 PROCEDURE — 99999 PR PBB SHADOW E&M-EST. PATIENT-LVL III: ICD-10-PCS | Mod: PBBFAC,,, | Performed by: PODIATRIST

## 2019-05-08 PROCEDURE — 11057 PR TRIM BENIGN HYPERKERATOTIC SKIN LESION,>4: ICD-10-PCS | Mod: Q9,S$PBB,, | Performed by: PODIATRIST

## 2019-05-08 NOTE — PROGRESS NOTES
Subjective:      Patient ID: Duran Giordano is a 79 y.o. female.    Chief Complaint: PCP (Tami Schmidt MD 4/09/19); Diabetic Foot Exam (nail care ); and Foot Problem (Gout pain )    Duran is a 79 y.o. female who presents to the clinic for evaluation and treatment of high risk feet. Duran has a past medical history of Adrenal mass- adenoma stable since 2004 (1.8 cm and 8/13/12 (2 cm); stable 2016 2.4 cm, Asthma in adult without complication (6/25/2015), Bilateral carotid artery disease (7/21/2017), Bilateral sciatica (2/7/2017), Cellulitis of right leg (08/16/2017), Cerebral infarction (1/29/2016), Cervical radiculopathy (3/18/2015), Chronic knee pain, Chronic rhinitis (4/9/2013), CKD (chronic kidney disease) stage 3, GFR 30-59 ml/min (1/29/2013), Coronary artery disease due to calcified coronary lesion (6/25/2015), Diastolic dysfunction (10/10/2013), Essential hypertension (6/25/2015), Gastroesophageal reflux disease without esophagitis (8/13/2012), Gout, Hives (10/1/2013), Mixed hyperlipidemia (8/13/2012), Morbid obesity with BMI of 50.0-59.9, adult (2/11/2014), Obstructive sleep apnea syndrome (8/13/2012), Senile cataracts of both eyes (1/22/2015), Traumatic open wound of right lower leg (8/25/2017), Trigeminal neuralgia of right side of face (5/23/2017), Type 2 diabetes mellitus with diabetic polyneuropathy, with long-term current use of insulin (1/29/2013), Venous stasis dermatitis of both lower extremities (8/21/2017), and Vitamin D deficiency disease (8/13/2012). The patient's chief complaint is  HRFC  This patient has documented high risk feet requiring routine maintenance secondary to diabetes mellitis and those secondary complications of diabetes, as mentioned.    PCP: Tami Schmidt MD    Date Last Seen by PCP:   Chief Complaint   Patient presents with    PCP     Tami Schmidt MD 4/09/19    Diabetic Foot Exam     nail care     Foot Problem     Gout pain         Current shoe  "gear:  Affected Foot: Casual shoes     Unaffected Foot: Casual shoes    Hemoglobin A1C   Date Value Ref Range Status   01/30/2019 7.5 (H) 4.0 - 5.6 % Final     Comment:     ADA Screening Guidelines:  5.7-6.4%  Consistent with prediabetes  >or=6.5%  Consistent with diabetes  High levels of fetal hemoglobin interfere with the HbA1C  assay. Heterozygous hemoglobin variants (HbS, HgC, etc)do  not significantly interfere with this assay.   However, presence of multiple variants may affect accuracy.     12/11/2018 7.1 (H) 4.0 - 5.6 % Final     Comment:     ADA Screening Guidelines:  5.7-6.4%  Consistent with prediabetes  >or=6.5%  Consistent with diabetes  High levels of fetal hemoglobin interfere with the HbA1C  assay. Heterozygous hemoglobin variants (HbS, HgC, etc)do  not significantly interfere with this assay.   However, presence of multiple variants may affect accuracy.     10/10/2018 7.3 (H) 4.0 - 5.6 % Final     Comment:     ADA Screening Guidelines:  5.7-6.4%  Consistent with prediabetes  >or=6.5%  Consistent with diabetes  High levels of fetal hemoglobin interfere with the HbA1C  assay. Heterozygous hemoglobin variants (HbS, HgC, etc)do  not significantly interfere with this assay.   However, presence of multiple variants may affect accuracy.         Review of Systems   Cardiovascular: Positive for leg swelling.   Skin: Positive for dry skin and nail changes. Negative for flushing and itching.   Neurological: Positive for numbness. Negative for paresthesias.           Objective:       Vitals:    05/08/19 1131   BP: (!) 126/59   Pulse: 86   Resp: 18   Weight: 124.3 kg (274 lb)   Height: 4' 11" (1.499 m)   PainSc:   6   PainLoc: Foot        Physical Exam   Constitutional: She is oriented to person, place, and time. She appears well-developed and well-nourished.   Cardiovascular:   Pulses:       Dorsalis pedis pulses are 2+ on the right side, and 2+ on the left side.        Posterior tibial pulses are 2+ on the " right side, and 2+ on the left side.   Dorsalis pedis and posterior tibial pulses are palpable bilaterally. Toes are cool to touch. Feet are warm proximally.There is decreased digital hair bilateral. Skin is atrophic, hyperpigmented, and moderately edematous.     Musculoskeletal: She exhibits no edema or tenderness.        Right ankle: Normal.        Left ankle: Normal.        Right foot: There is no swelling, no crepitus and no deformity.        Left foot: There is no swelling, no crepitus and no deformity.   Adequate joint range of motion without pain, limitation, nor crepitation Bilateral feet and ankle joints. Muscle strength is 5/5 in all groups bilaterally.         Lymphadenopathy:   B/l lymph edema    Neurological: She is alert and oriented to person, place, and time. She has normal strength.   Decreased sharp/dull sensation bilateral feet.   Skin: Skin is warm, dry and intact. No abrasion, no bruising, no burn, no lesion and no rash noted. No erythema. Nails show no clubbing.   Nails x10 are elongated by  4-7mm's, thickened by 2-3 mm's, dystrophic, and are darkened in  coloration . Xerosis Bilaterally. No open lesions noted.    Hyperkeratotic tissue noted to calcaneal rim b/l, distal 2nd toe b/l, plantar heel b/l       Psychiatric: She has a normal mood and affect. Her behavior is normal.   Nursing note and vitals reviewed.            Assessment:       Encounter Diagnoses   Name Primary?    Type II diabetes mellitus with peripheral circulatory disorder Yes    Onychomycosis due to dermatophyte     Corn or callus          Plan:       Duran was seen today for pcp, diabetic foot exam and foot problem.    Diagnoses and all orders for this visit:    Type II diabetes mellitus with peripheral circulatory disorder    Onychomycosis due to dermatophyte    Corn or callus    - Patient was given written and verbal instructions regarding foot condition.  I counseled the patient on her conditions, their implications and  medical management.    Shoe inspection. Diabetic Foot Education. Patient reminded of the importance of good nutrition and blood sugar control to help prevent podiatric complications of diabetes. Patient instructed on proper foot hygeine. We discussed wearing proper shoe gear, daily foot inspections, never walking without protective shoe gear, never putting sharp instruments to feet    - With patient's permission, nails were aggressively reduced and debrided x 10 to their soft tissue attachment mechanically and with electric , removing all offending nail and debris. Patient relates relief following the procedure. She will continue to monitor the areas daily, inspect her feet, wear protective shoe gear when ambulatory, moisturizer to maintain skin integrity and follow in this office in approximately 2-3 months, sooner p.r.n.    - After cleansing the  area w/ alcohol prep pad the above mentioned hyperkeratosis was trimmed utilizing No 15 scapel, to a smooth base with out incident. Patient tolerated this  well and reported comfort to the area of  calcaneal rim b/l, distal 2nd toe b/l, plantar heel b/l    - Return to clinic in 3m or sooner if problems arise

## 2019-05-23 ENCOUNTER — OFFICE VISIT (OUTPATIENT)
Dept: PRIMARY CARE CLINIC | Facility: CLINIC | Age: 79
End: 2019-05-23
Payer: MEDICARE

## 2019-05-23 VITALS
SYSTOLIC BLOOD PRESSURE: 130 MMHG | DIASTOLIC BLOOD PRESSURE: 50 MMHG | HEIGHT: 59 IN | HEART RATE: 79 BPM | OXYGEN SATURATION: 97 % | WEIGHT: 274.81 LBS | BODY MASS INDEX: 55.4 KG/M2

## 2019-05-23 DIAGNOSIS — G47.33 OSA ON CPAP: ICD-10-CM

## 2019-05-23 DIAGNOSIS — E11.59 HYPERTENSION ASSOCIATED WITH DIABETES: ICD-10-CM

## 2019-05-23 DIAGNOSIS — I89.0 LYMPHEDEMA OF BOTH LOWER EXTREMITIES: ICD-10-CM

## 2019-05-23 DIAGNOSIS — I15.2 HYPERTENSION ASSOCIATED WITH DIABETES: ICD-10-CM

## 2019-05-23 DIAGNOSIS — M10.9 GOUT, UNSPECIFIED CAUSE, UNSPECIFIED CHRONICITY, UNSPECIFIED SITE: ICD-10-CM

## 2019-05-23 PROCEDURE — 99215 PR OFFICE/OUTPT VISIT, EST, LEVL V, 40-54 MIN: ICD-10-PCS | Mod: S$GLB,,, | Performed by: INTERNAL MEDICINE

## 2019-05-23 PROCEDURE — 99215 OFFICE O/P EST HI 40 MIN: CPT | Mod: S$GLB,,, | Performed by: INTERNAL MEDICINE

## 2019-05-23 NOTE — PROGRESS NOTES
"Primary Care Provider Appointment    Subjective:      Patient ID: Duran Giordano is a 79 y.o. female with venous stasis, HTN, DM, CKD, gout    Chief Complaint: Arthritis; Diabetes (all over body ); and Follow-up (6 week)    Patient is compliant with leg wraps, she has a new sister who helps her with that. She is not using her CPAP.    Her BP and DM are both well-controlled. Last A1c was 7.5. She is compliant with meds in pill packs. "I love my pill packs."    She continues to have wrist pain related to gout. She is followed by Dr To, was on steroid taper for exacerbation one month ago (10mg for a few days, then 5 mg for a few days), but seems to be having a flair today. She states she is taking allopurinol, but it is not on the med list. Colchicine is on med list. She has taken daily torsemide "for years," but is open to cutting back on this.    She is stressed about the [employer] coming up for the [employer].     Pills packed as follows:  Adherence packed for 28 days using Dispill:        Morning card:  Aspirin 81 mg  Torsemide 20 mg     Evening card:  Atorvastatin 80 mg  Irbesartan 300 mg   Vitamin D3 1,000 IU         Electronically signed by Rachel Aguirre, PharmD at 5/13/2019  4:29 PM       Past Surgical History:   Procedure Laterality Date    HYSTERECTOMY  1982    secondary uterine fibroids    JOINT REPLACEMENT      Right total knee replacement         Past Medical History:   Diagnosis Date    Adrenal mass- adenoma stable since 2004 (1.8 cm and 8/13/12 (2 cm); stable 2016 2.4 cm     Adrenal mass- adenoma stable since 2004 (1.8 cm and 8/13/12 (2 cm); stable 2016 2.4 cm    Asthma in adult without complication 6/25/2015    Bilateral carotid artery disease 7/21/2017    Bilateral sciatica 2/7/2017    Cellulitis of right leg 08/16/2017    Cerebral infarction 1/29/2016    Multiple areas of lacunar infarction. Stroke risk factors include HTN, DM2, Dyslipidemia.  " Continue ASA 81mg/ Statin therapy I have encouraged 30 minutes of physical activity daily for 5 days a week. She has access to a pool so this should not be so jarring to her joints.     Cervical radiculopathy 3/18/2015    Chronic knee pain     Chronic rhinitis 4/9/2013    CKD (chronic kidney disease) stage 3, GFR 30-59 ml/min 1/29/2013    Coronary artery disease due to calcified coronary lesion 6/25/2015    Diastolic dysfunction 10/10/2013    Essential hypertension 6/25/2015    Gastroesophageal reflux disease without esophagitis 8/13/2012    Gout     Hives 10/1/2013    Mixed hyperlipidemia 8/13/2012    Morbid obesity with BMI of 50.0-59.9, adult 2/11/2014    Obstructive sleep apnea syndrome 8/13/2012    Senile cataracts of both eyes 1/22/2015    Traumatic open wound of right lower leg 8/25/2017    Trigeminal neuralgia of right side of face 5/23/2017    For years now Previously on Gabapentin, but caused constipation Dissipating over time Not related to intracranial abnormalities Possibly related to dental procedure    Type 2 diabetes mellitus with diabetic polyneuropathy, with long-term current use of insulin 1/29/2013    Venous stasis dermatitis of both lower extremities 8/21/2017    Vitamin D deficiency disease 8/13/2012       Social History     Socioeconomic History    Marital status: Single     Spouse name: Not on file    Number of children: Not on file    Years of education: Not on file    Highest education level: Not on file   Occupational History    Not on file   Social Needs    Financial resource strain: Not hard at all    Food insecurity:     Worry: Never true     Inability: Never true    Transportation needs:     Medical: No     Non-medical: No   Tobacco Use    Smoking status: Never Smoker    Smokeless tobacco: Never Used   Substance and Sexual Activity    Alcohol use: No    Drug use: No    Sexual activity: Never   Lifestyle    Physical activity:     Days per week: Not on  "file     Minutes per session: Not on file    Stress: Not on file   Relationships    Social connections:     Talks on phone: Not on file     Gets together: Not on file     Attends L[employer]-day service: Not on file     Active member of club or organization: Not on file     Attends meetings of clubs or organizations: Not on file     Relationship status: Not on file   Other Topics Concern    Are you pregnant or think you may be? Not Asked    Breast-feeding Not Asked   Social History Narrative    Single with no children.        Review of Systems   Constitutional: Positive for activity change. Negative for appetite change and unexpected weight change.   Cardiovascular: Positive for leg swelling.   Gastrointestinal: Negative for constipation and diarrhea.   Skin: Positive for color change and wound. Negative for rash.   Hematological: Does not bruise/bleed easily.   Psychiatric/Behavioral: Positive for sleep disturbance. Negative for behavioral problems and decreased concentration.       Objective:   BP (!) 130/50   Pulse 79   Ht 4' 11" (1.499 m)   Wt 124.6 kg (274 lb 12.9 oz)   SpO2 97%   BMI 55.50 kg/m²     Physical Exam   Constitutional: She appears well-developed.   Severe obesity   Eyes: EOM are normal.   Cardiovascular: Normal rate.   Pulmonary/Chest: Effort normal. She has no wheezes.   Abdominal:   Obese abdomen   Musculoskeletal: She exhibits edema, tenderness and deformity.   Warmth and swelling of R wrist   Neurological: She is alert.   Skin:   Improved venous stasis   Psychiatric: She has a normal mood and affect.   Improving insight into disease       Lab Results   Component Value Date    WBC 9.27 04/09/2019    HGB 11.9 (L) 04/09/2019    HCT 39.9 04/09/2019     04/09/2019    CHOL 142 01/30/2019    TRIG 68 01/30/2019    HDL 37 (L) 01/30/2019    ALT 19 12/11/2018    AST 18 12/11/2018     04/09/2019    K 4.3 04/09/2019     04/09/2019    CREATININE 1.2 04/09/2019    BUN 27 (H) 04/09/2019 "    CO2 29 04/09/2019    TSH 0.746 05/21/2018    INR 1.1 04/17/2005    HGBA1C 7.5 (H) 01/30/2019       RESULTS: Reviewed labs and images today    Assessment:   79 y.o. female with multiple co-morbid illnesses here to continue work-up of chronic issues notably with venous stasis, HTN, DM, CKD, gout    Plan:     Problem List Items Addressed This Visit        Cardiac/Vascular    Hypertension associated with diabetes     BP controlled ibesartan 300mg, torsemide; carvedilol discontinued by pulm due to SOB, amlodipine discontinued by PCP due to leg swelling  · Discontinued hydralazine due to overtreatment of HTN  · Changed losartan to ibesartan 300mg  · Consider Digital HTN program in future  · Diabetes controlled with 18-18-48 insulin            Orthopedic    Gout     R wrist swelling and warmth, has metabolic syndrome  · Labs today  · Rheum following            Other    REJI on CPAP     Previously compliant with CPAP only 4 hours per night, now noncompliant  · Advised to continue using machine  · Advised to place machine next to recliner chair or use in her bedroom  · Increase hours on machine  · Advised to lose weight  · Refer to sleep medicine for follow-up         Lymphedema of both lower extremities     LE wounds resolved, daily circaid application by Sister Ana Arevalo  · Continue circaid application daily               Health Maintenance       Date Due Completion Date    TETANUS VACCINE 03/08/1958 ---    Hemoglobin A1c 07/30/2019 1/30/2019- NEXT    Override on 7/13/2016: Done    Influenza Vaccine 08/01/2019 9/12/2018    Override on 10/7/2015: Done    Override on 10/3/2014: Done    Override on 10/10/2013: Done    Override on 9/13/2011: Done    Foot Exam 11/27/2019 11/27/2018 (Done)    Override on 11/27/2018: Done (Dr Anand)    Override on 1/19/2018: Done (Dr anand)    Override on 5/31/2017: Done    Override on 7/20/2016: Done    Eye Exam 01/10/2020 1/10/2019    Override on 2/17/2016: Done    Override  on 1/22/2015: Done    Override on 8/16/2012: Done    Lipid Panel 01/30/2020 1/30/2019    Mammogram 04/09/2020 4/9/2019    Aspirin/Antiplatelet Therapy 04/10/2020 4/10/2019    DEXA SCAN 02/22/2021 2/22/2018    Override on 12/13/2011: Done          Follow up in about 1 month (around 6/20/2019). Total face-to-face time was 60 min, 50% of this was spent on counseling and coordination of care. The following issues were discussed:with venous stasis, HTN, DM, CKD, gout    Tami Schmidt MD/MPH  Internal Medicine  [facility]  [phone]

## 2019-05-23 NOTE — PATIENT INSTRUCTIONS
TODAY:  - combine all labs from 6/11 to one appointment in primary care  - address:    Dr Carstarp[facility]   [address] [address]  - continue leg wraps  - use CPAP every night  - skip morning card with the water pill for the next few days to reduce your risk of gout   + if you gain 3 pounds in one day   + weigh yourself at the same time with same clothes every day and write it down  - cut back on salt (DON'T EAT ANY SALT!)    NEXT:  - sleep medicine appointment

## 2019-05-23 NOTE — MEDICAL/APP STUDENT
Subjective:       Patient ID: Duran Giordano is a 79 y.o. female.    Chief Complaint: Arthritis; Temporomandibular Joint Pain (all over body ); and Follow-up (6 week)    Sister Duran Miller F with idiopathic chronic gout of multiple sites, DM, HLD, CKD stage 3, presents for follow-up. Pt still has R wrist pain. Pt saw rheumatologist in April and was diagnosed with recurrent chronic gout. X-ray of wrist showed degenerative joint disease without fracture. Pt reported she was also diagnosed with RA many years ago, and her dad also has RA. Pt missed the ordered labs after the rheumatologist appointment testing for RA. Pt is compliant to prednisone and allopurinol, and dietary modification. Pt had one episode of SOB last night, took Mucinex, and felt better. Pt denied headache, dizziness, CP, palpitation. ROS is negative unless stated above.        Objective:      VS: WNL:   NAD: Supple No lymphadenopathy or thyromegaly  Lung/Chest: Normal breath sounds. No wheezes, crackles, or rhonchi.:   Heart: RRR. Normal S1/S2. No murmurs, gallop, or rubs.  Abdomen: Normal bowel sounds x4. No bruits.  Extremities: Tenderness on R wrist and warm. Edema on both LE bilateral. No cyanosis or clubbing. Pulses 2+      Assessment and Plan:       R wrist pain  -Labs for testing RA or other autoimmune diseases  -Continue Prednisone and Allopurinol  -Continue dietary modification (eg, avoid organ meat, red meat, seafood, alcohol, etc)  -Follow-up with Rheumatologist after lab results

## 2019-06-05 ENCOUNTER — TELEPHONE (OUTPATIENT)
Dept: SLEEP MEDICINE | Facility: CLINIC | Age: 79
End: 2019-06-05

## 2019-06-11 ENCOUNTER — LAB VISIT (OUTPATIENT)
Dept: LAB | Facility: HOSPITAL | Age: 79
End: 2019-06-11
Attending: INTERNAL MEDICINE
Payer: MEDICARE

## 2019-06-11 DIAGNOSIS — Z79.4 TYPE 2 DIABETES MELLITUS WITH DIABETIC POLYNEUROPATHY, WITH LONG-TERM CURRENT USE OF INSULIN: ICD-10-CM

## 2019-06-11 DIAGNOSIS — E11.42 TYPE 2 DIABETES MELLITUS WITH DIABETIC POLYNEUROPATHY, WITH LONG-TERM CURRENT USE OF INSULIN: ICD-10-CM

## 2019-06-11 DIAGNOSIS — M1A.09X0 IDIOPATHIC CHRONIC GOUT OF MULTIPLE SITES WITHOUT TOPHUS: ICD-10-CM

## 2019-06-11 LAB
ALBUMIN SERPL BCP-MCNC: 3.1 G/DL (ref 3.5–5.2)
ALP SERPL-CCNC: 85 U/L (ref 55–135)
ALT SERPL W/O P-5'-P-CCNC: 13 U/L (ref 10–44)
ANION GAP SERPL CALC-SCNC: 10 MMOL/L (ref 8–16)
AST SERPL-CCNC: 15 U/L (ref 10–40)
BASOPHILS # BLD AUTO: 0.02 K/UL (ref 0–0.2)
BASOPHILS NFR BLD: 0.2 % (ref 0–1.9)
BILIRUB SERPL-MCNC: 0.4 MG/DL (ref 0.1–1)
BUN SERPL-MCNC: 21 MG/DL (ref 8–23)
CALCIUM SERPL-MCNC: 9.5 MG/DL (ref 8.7–10.5)
CHLORIDE SERPL-SCNC: 107 MMOL/L (ref 95–110)
CO2 SERPL-SCNC: 25 MMOL/L (ref 23–29)
CREAT SERPL-MCNC: 1.2 MG/DL (ref 0.5–1.4)
DIFFERENTIAL METHOD: ABNORMAL
EOSINOPHIL # BLD AUTO: 0.3 K/UL (ref 0–0.5)
EOSINOPHIL NFR BLD: 3.7 % (ref 0–8)
ERYTHROCYTE [DISTWIDTH] IN BLOOD BY AUTOMATED COUNT: 16.1 % (ref 11.5–14.5)
EST. GFR  (AFRICAN AMERICAN): 50 ML/MIN/1.73 M^2
EST. GFR  (NON AFRICAN AMERICAN): 44 ML/MIN/1.73 M^2
ESTIMATED AVG GLUCOSE: 174 MG/DL (ref 68–131)
GLUCOSE SERPL-MCNC: 110 MG/DL (ref 70–110)
HBA1C MFR BLD HPLC: 7.7 % (ref 4–5.6)
HCT VFR BLD AUTO: 38.2 % (ref 37–48.5)
HGB BLD-MCNC: 11.5 G/DL (ref 12–16)
LYMPHOCYTES # BLD AUTO: 1.7 K/UL (ref 1–4.8)
LYMPHOCYTES NFR BLD: 19.3 % (ref 18–48)
MCH RBC QN AUTO: 27.8 PG (ref 27–31)
MCHC RBC AUTO-ENTMCNC: 30.1 G/DL (ref 32–36)
MCV RBC AUTO: 92 FL (ref 82–98)
MONOCYTES # BLD AUTO: 0.5 K/UL (ref 0.3–1)
MONOCYTES NFR BLD: 6.2 % (ref 4–15)
NEUTROPHILS # BLD AUTO: 6.1 K/UL (ref 1.8–7.7)
NEUTROPHILS NFR BLD: 70.5 % (ref 38–73)
PLATELET # BLD AUTO: 203 K/UL (ref 150–350)
PMV BLD AUTO: 10.5 FL (ref 9.2–12.9)
POTASSIUM SERPL-SCNC: 4.1 MMOL/L (ref 3.5–5.1)
PROT SERPL-MCNC: 7.9 G/DL (ref 6–8.4)
RBC # BLD AUTO: 4.14 M/UL (ref 4–5.4)
RHEUMATOID FACT SERPL-ACNC: <10 IU/ML (ref 0–15)
SODIUM SERPL-SCNC: 142 MMOL/L (ref 136–145)
URATE SERPL-MCNC: 7.9 MG/DL (ref 2.4–5.7)
WBC # BLD AUTO: 8.59 K/UL (ref 3.9–12.7)

## 2019-06-11 PROCEDURE — 86200 CCP ANTIBODY: CPT

## 2019-06-11 PROCEDURE — 36415 COLL VENOUS BLD VENIPUNCTURE: CPT

## 2019-06-11 PROCEDURE — 83036 HEMOGLOBIN GLYCOSYLATED A1C: CPT

## 2019-06-11 PROCEDURE — 86431 RHEUMATOID FACTOR QUANT: CPT

## 2019-06-11 PROCEDURE — 80053 COMPREHEN METABOLIC PANEL: CPT

## 2019-06-11 PROCEDURE — 85025 COMPLETE CBC W/AUTO DIFF WBC: CPT

## 2019-06-11 PROCEDURE — 84550 ASSAY OF BLOOD/URIC ACID: CPT

## 2019-06-13 LAB — CCP AB SER IA-ACNC: <0.5 U/ML

## 2019-06-14 ENCOUNTER — TELEPHONE (OUTPATIENT)
Dept: SLEEP MEDICINE | Facility: CLINIC | Age: 79
End: 2019-06-14

## 2019-06-14 NOTE — TELEPHONE ENCOUNTER
----- Message from Salvatore Ann sent at 6/14/2019  1:14 PM CDT -----  Contact: Adalid Gasca  Name of Who is Calling: Adalid Gasca    What is the request in detail:Adalid Gasca is requesting a call back regarding a PA that was faxed over for the pt Cpap replacement...... Please contact to further discuss and advise      Can the clinic reply by [facility]: No      What [facility]  [phone]

## 2019-06-18 ENCOUNTER — OFFICE VISIT (OUTPATIENT)
Dept: ENDOCRINOLOGY | Facility: CLINIC | Age: 79
End: 2019-06-18
Payer: MEDICARE

## 2019-06-18 VITALS
BODY MASS INDEX: 56.48 KG/M2 | HEIGHT: 59 IN | DIASTOLIC BLOOD PRESSURE: 60 MMHG | HEART RATE: 72 BPM | SYSTOLIC BLOOD PRESSURE: 128 MMHG | WEIGHT: 280.19 LBS

## 2019-06-18 DIAGNOSIS — I87.2 VENOUS STASIS DERMATITIS OF BOTH LOWER EXTREMITIES: ICD-10-CM

## 2019-06-18 DIAGNOSIS — I15.2 HYPERTENSION ASSOCIATED WITH DIABETES: ICD-10-CM

## 2019-06-18 DIAGNOSIS — Z79.4 TYPE 2 DIABETES MELLITUS WITH DIABETIC POLYNEUROPATHY, WITH LONG-TERM CURRENT USE OF INSULIN: Primary | ICD-10-CM

## 2019-06-18 DIAGNOSIS — I89.0 LYMPHEDEMA OF BOTH LOWER EXTREMITIES: ICD-10-CM

## 2019-06-18 DIAGNOSIS — E55.9 VITAMIN D DEFICIENCY DISEASE: ICD-10-CM

## 2019-06-18 DIAGNOSIS — E11.42 TYPE 2 DIABETES MELLITUS WITH DIABETIC POLYNEUROPATHY, WITH LONG-TERM CURRENT USE OF INSULIN: Primary | ICD-10-CM

## 2019-06-18 DIAGNOSIS — N18.30 CKD (CHRONIC KIDNEY DISEASE) STAGE 3, GFR 30-59 ML/MIN: ICD-10-CM

## 2019-06-18 DIAGNOSIS — E11.59 HYPERTENSION ASSOCIATED WITH DIABETES: ICD-10-CM

## 2019-06-18 DIAGNOSIS — E66.01 MORBID OBESITY WITH BMI OF 50.0-59.9, ADULT: ICD-10-CM

## 2019-06-18 DIAGNOSIS — I50.32 CHRONIC DIASTOLIC HEART FAILURE: ICD-10-CM

## 2019-06-18 PROCEDURE — 99215 OFFICE O/P EST HI 40 MIN: CPT | Mod: PBBFAC | Performed by: NURSE PRACTITIONER

## 2019-06-18 PROCEDURE — 99999 PR PBB SHADOW E&M-EST. PATIENT-LVL V: CPT | Mod: PBBFAC,,, | Performed by: NURSE PRACTITIONER

## 2019-06-18 PROCEDURE — 99214 PR OFFICE/OUTPT VISIT, EST, LEVL IV, 30-39 MIN: ICD-10-PCS | Mod: S$PBB,,, | Performed by: NURSE PRACTITIONER

## 2019-06-18 PROCEDURE — 99999 PR PBB SHADOW E&M-EST. PATIENT-LVL V: ICD-10-PCS | Mod: PBBFAC,,, | Performed by: NURSE PRACTITIONER

## 2019-06-18 PROCEDURE — 99214 OFFICE O/P EST MOD 30 MIN: CPT | Mod: S$PBB,,, | Performed by: NURSE PRACTITIONER

## 2019-06-18 RX ORDER — INSULIN DEGLUDEC 200 U/ML
INJECTION, SOLUTION SUBCUTANEOUS
Qty: 3 SYRINGE | Refills: 6 | Status: SHIPPED | OUTPATIENT
Start: 2019-06-18 | End: 2019-10-22

## 2019-06-18 RX ORDER — INSULIN LISPRO 100 [IU]/ML
INJECTION, SOLUTION INTRAVENOUS; SUBCUTANEOUS
Qty: 2 BOX | Refills: 6 | Status: SHIPPED | OUTPATIENT
Start: 2019-06-18 | End: 2019-10-22

## 2019-06-18 NOTE — PATIENT INSTRUCTIONS
Snacks can be an important part of a balanced, healthy meal plan. They allow you to eat more frequently, feeling full and satisfied throughout the day. Also, they allow you to spread carbohydrates evenly, which may stabilize blood sugars.  Plus, snacks are enjoyable!     The amount of carbohydrate needed at snacks varies. Generally, about 15-30 grams of carbohydrate per snack is recommended.  Below you will find some tasty treats.       0-5 gm carb   Crystal Light   Vitamin Water Zero   Herbal tea, unsweetened   2 tsp peanut butter on celery   1./2 cup sugar-free jell-o   1 sugar-free popsicle   ¼ cup blueberries   8oz Blue Aniyah unsweetened almond milk   5 baby carrots & celery sticks, cucumbers, bell peppers dipped in ¼ cup salsa, 2Tbsp light ranch dressing or 2Tbsp plain Greek yogurt   10 Goldfish crackers   ½ oz low-fat cheese or string cheese   1 closed handful of nuts, unsalted   1 Tbsp of sunflower seeds, unsalted   1 cup Smart Pop popcorn   1 whole grain brown rice cake        15 gm carb   1 small piece of fruit or ½ banana or 1/2 cup lite canned fruit   3 ramon cracker squares   3 cups Smart Pop popcorn, top spray butter, York lite salt or cinnamon and Truvia   5 Vanilla Wafers   ½ cup low fat, no added sugar ice cream or frozen yogurt (Blue bell, Blue Bunny, Weight Watchers, Skinny Cow)   ½ turkey, ham, or chicken sandwich   ½ c fruit with ½ c Cottage cheese   4-6 unsalted wheat crackers with 1 oz low fat cheese or 1 tbsp peanut butter    30-45 goldfish crackers (depending on flavor)    7-8 Mosque mini brown rice cakes (caramel, apple cinnamon, chocolate)    12 Mosque mini brown rice cakes (cheddar, bbq, ranch)    1/3 cup hummus dip with raw veg   1/2 whole wheat jessica, 1Tbsp hummus   Mini Pizza (1/2 whole wheat English muffin, low-fat  cheese, tomato sauce)   100 calorie snack pack (Oreo, Chips Ahoy, Ritz Mix, Baked Cheetos)   4-6 oz. light or Greek Style yogurt  (Choboi, Yoplait, Okios, [facility])   ½ cup sugar-free pudding     6 in. wheat tortilla or jessica oven toasted chips (topped with spray butter flavoring, cinnamon, Truvia OR spray butter, garlic powder, chili powder)    18 BBQ Popchips (available at [employer], [employer] Foods, Fresh Market)                   Diabetes Support Group Meetings         Date: Topic:   February 14 Eat Fit SIGRID/Health Promotion   March 14 Taking Care of Your Smile   April 11 Spring into Healthy Eating/Cooking Demo   May 9 Ease Your Mind with Diabetes   Carmen 13 Summer Treats/Cooking Demo   July 11 Eat Fit SIGRID/Super Market Sweep   August 8 Taking Care of Your Eyes and Feet   Sept 12 Technology/ADA updates   October 10 Recipes & Treats/Cooking Demo   November 14 Heart Health/Pump it up!   December 12 Year-End Close Out        Meetings are held in the [facility]) of the [facility] located at [address] [place] [address]. Please call [phone] for additional information.    Free service, offered every 2nd Thursday of every month! Family members and/or friends are welcome as well!  Support group is for patients with type 1 or type 2 diabetes.    From 3:30p to 4:30p        Insulin Degludec injection  What is this medicine?  INSULIN DEGLUDEC (IN cummings juanita de GLOO dek) is a human-made form of insulin. This drug lowers the amount of sugar in your blood. It is a long-acting insulin that is usually given once a day.  How should I use this medicine?  This medicine is for injection under the skin. Use exactly as directed. This insulin should never be mixed in the same syringe with other insulins before injection. Do not vigorously shake before use. You will be taught how to use this medicine and how to adjust doses for activities and illness. Do not use more insulin than prescribed.  Always check the appearance of your insulin before using it. This medicine should be clear and colorless like water.  Do not use it if it is cloudy, thickened, colored, or has solid particles in it.  It is important that you put your used needles and syringes in a special sharps container. Do not put them in a trash can. If you do not have a sharps container, call your pharmacist or healthcare provider to get one.  Talk to your pediatrician regarding the use of this medicine in children. Special care may be needed.  What side effects may I notice from receiving this medicine?  Side effects that you should report to your doctor or health care professional as soon as possible:  · allergic reactions like skin rash, itching or hives, swelling of the face, lips, or tongue  · breathing problems  · signs and symptoms of high blood sugar such as dizziness, dry mouth, dry skin, fruity breath, nausea, stomach pain, increased hunger or thirst, increased urination  · signs and symptoms of low blood sugar such as feeling anxious, confusion, dizziness, increased hunger, unusually weak or tired, sweating, shakiness, cold, irritable, headache, blurred vision, fast heartbeat, loss of consciousness  Side effects that usually do not require medical attention (Report these to your doctor or health care professional if they continue or are bothersome.):  · increase or decrease in fatty tissue under the skin due to overuse of a particular injection site  · itching, burning, swelling, or rash at site where injected  What may interact with this medicine?  · other medicines for diabetes  Many medications may cause an increase or decrease in blood sugar, these include:  · alcohol containing beverages  · aspirin and aspirin-like drugs  · certain medicines for HIV like indinavir, lopinavir; ritonavir, nelfinavir, ritonavir, saquinavir  · diuretics  · female hormones, like estrogens or progestins and birth control pills  · heart medicines  · isoniazid  · medicines for allergies, asthma, cold, or cough  · medicines for mental problems  · medicines called MAO  Inhibitors like Nardil, Parnate, Marplan, Eldepryl  · niacin  · octreotide  · pentamidine  · quinolone antibiotics like ciprofloxacin, levofloxacin, ofloxacin  · some herbal dietary supplements  · steroid medicines like prednisone or cortisone  · sulfasalazine  · sulfamethoxazole; trimethoprim  · thyroid medicine  Some medications can hide the warning symptoms of low blood sugar. You may need to monitor your blood sugar more closely if you are taking one of these medications. These include:  · beta-blockers such as atenolol, metoprolol, propranolol  · clonidine  · guanethidine  · reserpine  What if I miss a dose?  It is important not to miss a dose. Your health care professional or doctor should discuss a plan for missed doses with you. If you do miss a dose, follow their plan. Do not take double doses.  Where should I keep my medicine?  Keep out of the reach of children.  Store Flextouch Pens in a refrigerator between 2 and 8 degrees C (36 and 46 degrees F.) Do not freeze or use if the insulin has been frozen. Once opened, the pens should be kept at room temperature, below 30 degrees C (86 degrees F). Do not store in the refrigerator once opened. Once opened, the insulin can be used for 56 days. After 56 days, the Flextouch Pen should be thrown away.  Protect from light and excessive heat. Throw away any unused medicine after the expiration date or after the specified time for room temperature storage has passed.  What should I tell my health care provider before I take this medicine?  They need to know if you have any of these conditions:  · episodes of hypoglycemia  · kidney disease  · liver disease  · an unusual or allergic reaction to insulin, other medicines, foods, dyes, or preservatives  · pregnant or trying to get pregnant  · breast-feeding  What should I watch for while using this medicine?  Visit your health care professional or doctor for regular checks on your progress.  A test called the HbA1C (A1C)  will be monitored. This is a simple blood test. It measures your blood sugar control over the last 2 to 3 months. You will receive this test every 3 to 6 months.  Learn how to check your blood sugar. Learn the symptoms of low and high blood sugar and how to manage them.  Always carry a quick-source of sugar with you in case you have symptoms of low blood sugar. Examples include hard sugar candy or glucose tablets. Make sure others know that you can choke if you eat or drink when you develop serious symptoms of low blood sugar, such as seizures or unconsciousness. They must get medical help at once.  Tell your doctor or health care professional if you have high blood sugar. You might need to change the dose of your medicine. If you are sick or exercising more than usual, you might need to change the dose of your medicine.  Do not skip meals. Ask your doctor or health care professional if you should avoid alcohol. Many nonprescription cough and cold products contain sugar or alcohol. These can affect blood sugar.  Make sure that you have the right kind of syringe for the type of insulin you use. Try not to change the brand and type of insulin or syringe unless your health care professional or doctor tells you to. Switching insulin brand or type can cause dangerously high or low blood sugar. Always keep an extra supply of insulin, syringes, and needles on hand. Use a syringe one time only. Throw away syringe and needle in a closed container to prevent accidental needle sticks.  Insulin pens and cartridges should never be shared. Even if the needle is changed, sharing may result in passing of viruses like hepatitis or HIV.  Wear a medical ID bracelet or chain, and carry a card that describes your disease and details of your medicine and dosage times.  NOTE:This sheet is a summary. It may not cover all possible information. If you have questions about this medicine, talk to your doctor, pharmacist, or health care  provider. Copyright© 2017 [employer]        Insulin Lispro injection  What is this medicine?  INSULIN LISPRO (IN zoila shrestha) is a human-made form of insulin. This drug lowers the amount of sugar in your blood. This medicine is a rapid-acting insulin that starts working faster than regular insulin. It will not work as long as regular insulin.  How should I use this medicine?  This medicine is for injection under the skin or infusion into a vein. This medicine may be given by health care professional in a hospital or clinic setting. It is important to follow the directions given to you by your health care professional or doctor. You should inject this medicine within 15 minutes before or after your meal. Have food ready before injection. Do not delay eating. You will be taught how to use this medicine and how to adjust doses for activities and illness. Do not use more insulin than prescribed. Do not use more or less often than prescribed.  Always check the appearance of your insulin before using it. This medicine should be clear and colorless like water. Do not use it if it is cloudy, thickened, colored, or has solid particles in it.  It is important that you put your used needles and syringes in a special sharps container. Do not put them in a trash can. If you do not have a sharps container, call your pharmacist or healthcare provider to get one.  Talk to your pediatrician regarding the use of this medicine in children. Special care may be needed.  What side effects may I notice from receiving this medicine?  Side effects that you should report to your health care professional or doctor as soon as possible:  · allergic reactions like skin rash, itching or hives, swelling of the face, lips, or tongue  · breathing problems  · signs and symptoms of high blood sugar such as dizziness, dry mouth, dry skin, fruity breath, nausea, stomach pain, increased hunger or thirst, increased urination  · signs and symptoms  of low blood sugar such as feeling anxious, confusion, dizziness, increased hunger, unusually weak or tired, sweating, shakiness, cold, irritable, headache, blurred vision, fast heartbeat, loss of consciousness  Side effects that usually do not require medical attention (report to your health care professional or doctor if they continue or are bothersome):  · increase or decrease in fatty tissue under the skin due to overuse of a particular injection site  · itching, burning, swelling, or rash at site where injected  What may interact with this medicine?  · other medicines for diabetes  Many medications may cause an increase or decrease in blood sugar, these include:  · alcohol containing beverages  · aspirin and aspirin-like drugs  · chloramphenicol  · chromium  · diuretics  · female hormones, like estrogens or progestins and birth control pills  · heart medicines  · isoniazid  · male hormones or anabolic steroids  · medicines for weight loss  · medicines for allergies, asthma, cold, or cough  · medicines for mental problems  · medicines called MAO Inhibitors like Nardil, Parnate, Marplan, Eldepryl  · niacin  · NSAIDs, medicines for pain and inflammation, like ibuprofen or naproxen  · pentamidine  · phenytoin  · probenecid  · quinolone antibiotics like ciprofloxacin, levofloxacin, ofloxacin  · some herbal dietary supplements  · steroid medicines like prednisone or cortisone  · thyroid medicine  Some medications can hide the warning symptoms of low blood sugar. You may need to monitor your blood sugar more closely if you are taking one of these medications. These include:  · beta-blockers such as atenolol, metoprolol, propranolol  · clonidine  · guanethidine  · reserpine  What if I miss a dose?  It is important not to miss a dose. Your health care professional or doctor should discuss a plan for missed doses with you. If you do miss a dose, follow their plan. Do not take double doses.  Where should I keep my  medicine?  Keep out of the reach of children.  Store unopened insulin vials in a refrigerator between 2 and 8 degrees C (36 and 46 degrees F). Do not freeze or use if the insulin has been frozen. Opened vials (vials currently in use) may be stored in the refrigerator or at room temperature, at approximately 30 degrees C (86 degrees F) or cooler. Keeping your insulin at room temperature decreases the amount of pain during injection. Once opened, your insulin can be used for 28 days. After 28 days, the vial of insulin should be thrown away.  Store unopened cartridges or disposable pens in a refrigerator between 2 and 8 degrees C (36 and 46 degrees F.) Do not freeze or use if the insulin has been frozen. Once opened, the disposable pens and cartridges that are inserted into pens should be kept at room temperature, approximately 30 degrees C (80 degrees F) or cooler. Do not store in the refrigerator. Once opened, the insulin can be used for 28 days. After 28 days, the cartridge or disposable pen should be thrown away.  Protect from light and excessive heat. Throw away any unused medicine after the expiration date or after the specified time for room temperature storage has passed.  What should I tell my health care provider before I take this medicine?  They need to know if you have any of these conditions:  · episodes of hypoglycemia  · kidney disease  · liver disease  · an unusual or allergic reaction to insulin, metacresol, other medicines, foods, dyes, or preservatives  · pregnant or trying to get pregnant  · breast-feeding  What should I watch for while using this medicine?  Visit your health care professional or doctor for regular checks on your progress.  A test called the HbA1C (A1C) will be monitored. This is a simple blood test. It measures your blood sugar control over the last 2 to 3 months. You will receive this test every 3 to 6 months.  Learn how to check your blood sugar. Learn the symptoms of low and  high blood sugar and how to manage them.  Always carry a quick-source of sugar with you in case you have symptoms of low blood sugar. Examples include hard sugar candy or glucose tablets. Make sure others know that you can choke if you eat or drink when you develop serious symptoms of low blood sugar, such as seizures or unconsciousness. They must get medical help at once.  Tell your doctor or health care professional if you have high blood sugar. You might need to change the dose of your medicine. If you are sick or exercising more than usual, you might need to change the dose of your medicine.  Do not skip meals. Ask your doctor or health care professional if you should avoid alcohol. Many nonprescription cough and cold products contain sugar or alcohol. These can affect blood sugar.  Make sure that you have the right kind of syringe for the type of insulin you use. Try not to change the brand and type of insulin or syringe unless your health care professional or doctor tells you to. Switching insulin brand or type can cause dangerously high or low blood sugar. Always keep an extra supply of insulin, syringes, and needles on hand. Use a syringe one time only. Throw away syringe and needle in a closed container to prevent accidental needle sticks.  Insulin pens and cartridges should never be shared. Even if the needle is changed, sharing may result in passing of viruses like hepatitis or HIV.  Wear a medical ID bracelet or chain, and carry a card that describes your disease and details of your medicine and dosage times.  NOTE:This sheet is a summary. It may not cover all possible information. If you have questions about this medicine, talk to your doctor, pharmacist, or health care provider. Copyright© 2017 [employer]        Hypoglycemia (Low Blood Sugar)     Fast-acting sugar includes a cup of nonfat milk.     Too little sugar (glucose) in your blood is called hypoglycemia or low blood sugar. Low blood sugar  usually means anything lower than 70 mg/dL. Talk with your healthcare provider about your target range and what level is too low for you. Diabetes itself doesnt cause low blood sugar. But some of the treatments for diabetes, such as pills or insulin, may raise your risk for it. Low blood sugar may cause you to pass out or have a seizure. So always treat low blood sugar right away, but don't overeat.  Special note: Always carry a source of fast-acting sugar and a snack in case of hypoglycemia.   What you may notice  If you have low blood sugar, you may have one or more of these symptoms:  · Shakiness or dizziness  · Cold, clammy skin or sweating  · Feelings of hunger  · Headache  · Nervousness  · A hard, fast heartbeat  · Weakness  · Confusion or irritability  · Blurred vision  · Having nightmares or waking up confused or sweating  · Numbness or tingling in the lips or tongue  What you should do  Here are tips to follow if you have hypoglycemia:   · First check your blood sugar. If it is too low (out of your target range), eat or drink 15 to 20 grams of fast-acting sugar. This may be 3 to 4 glucose tablets, 4 ounces (half a cup) of fruit juice or regular (nondiet) soda, 8 ounces (1 cup) of fat-free milk, or 1 tablespoon of honey. Dont take more than this, or your blood sugar may go too high.  · Wait 15 minutes. Then recheck your blood sugar if you can.  · If your blood sugar is still too low, repeat the steps above and check your blood sugar again. If your blood sugar still has not returned to your target range, contact your healthcare provider or seek emergency care.  · Once your blood sugar returns to target range, eat a snack or meal.  Preventing low blood sugar  Things you can do include the following:   · If your condition needs a strict treatment plan, eat your meals and snacks at the same times each day. Dont skip meals!  · If your treatment plan lets you change when you eat and what you eat, learn how to  change the time and dose of your rapid-acting insulin to match this.   · Ask your healthcare provider if it is safe for you to drink alcohol. Never drink on an empty stomach.  · Take your medicine at the prescribed times.  · Always carry a source of fast-acting sugar and a snack when youre away from home.  Other things to do  Additional tips include the following:  · Carry a medical ID card, a compact USB drive, or wear a medical alert bracelet or necklace. It should say that you have diabetes. It should also say what to do if you pass out or have a seizure.  · Make sure your family, friends, and coworkers know the signs of low blood sugar. Tell them what to do if your blood sugar falls very low and you cant treat yourself.  · Keep a glucagon emergency kit handy. Be sure your family, friends, and coworkers know how and when to use it. Check it regularly and replace the glucagon before it expires.  · Talk with your health care team about other things you can do to prevent low blood sugar.     If you have unexplained hypoglycemia or hypoglycemia several times, call your healthcare provider.   Date Last Reviewed: 5/1/2016  © 2822-8366 [employer]. [address] [place] [address], [place] [address]. All rights reserved. This information is not intended as a substitute for professional medical care. Always follow your healthcare professional's instructions.
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
## 2019-06-19 ENCOUNTER — OFFICE VISIT (OUTPATIENT)
Dept: RHEUMATOLOGY | Facility: CLINIC | Age: 79
End: 2019-06-19
Payer: MEDICARE

## 2019-06-19 VITALS
SYSTOLIC BLOOD PRESSURE: 160 MMHG | WEIGHT: 285.5 LBS | BODY MASS INDEX: 57.56 KG/M2 | HEART RATE: 91 BPM | HEIGHT: 59 IN | DIASTOLIC BLOOD PRESSURE: 70 MMHG

## 2019-06-19 DIAGNOSIS — M18.11 PRIMARY OSTEOARTHRITIS OF FIRST CARPOMETACARPAL JOINT OF RIGHT HAND: ICD-10-CM

## 2019-06-19 DIAGNOSIS — M19.031 PRIMARY OSTEOARTHRITIS OF RIGHT WRIST: ICD-10-CM

## 2019-06-19 DIAGNOSIS — M25.531 RIGHT WRIST PAIN: Primary | ICD-10-CM

## 2019-06-19 DIAGNOSIS — M1A.09X0 IDIOPATHIC CHRONIC GOUT OF MULTIPLE SITES WITHOUT TOPHUS: ICD-10-CM

## 2019-06-19 PROCEDURE — 99999 PR PBB SHADOW E&M-EST. PATIENT-LVL V: CPT | Mod: PBBFAC,,, | Performed by: INTERNAL MEDICINE

## 2019-06-19 PROCEDURE — 99214 PR OFFICE/OUTPT VISIT, EST, LEVL IV, 30-39 MIN: ICD-10-PCS | Mod: S$PBB,25,, | Performed by: INTERNAL MEDICINE

## 2019-06-19 PROCEDURE — 20600 DRAIN/INJ JOINT/BURSA W/O US: CPT | Mod: 25,PBBFAC | Performed by: INTERNAL MEDICINE

## 2019-06-19 PROCEDURE — 99215 OFFICE O/P EST HI 40 MIN: CPT | Mod: PBBFAC,25 | Performed by: INTERNAL MEDICINE

## 2019-06-19 PROCEDURE — 99999 PR PBB SHADOW E&M-EST. PATIENT-LVL V: ICD-10-PCS | Mod: PBBFAC,,, | Performed by: INTERNAL MEDICINE

## 2019-06-19 PROCEDURE — 20600 DRAIN/INJ JOINT/BURSA W/O US: CPT | Mod: S$PBB,RT,, | Performed by: INTERNAL MEDICINE

## 2019-06-19 PROCEDURE — 99214 OFFICE O/P EST MOD 30 MIN: CPT | Mod: S$PBB,25,, | Performed by: INTERNAL MEDICINE

## 2019-06-19 PROCEDURE — 20600 PR DRAIN/INJECT SMALL JOINT/BURSA: ICD-10-PCS | Mod: S$PBB,RT,, | Performed by: INTERNAL MEDICINE

## 2019-06-19 RX ORDER — TRIAMCINOLONE ACETONIDE 40 MG/ML
40 INJECTION, SUSPENSION INTRA-ARTICULAR; INTRAMUSCULAR ONCE
Status: COMPLETED | OUTPATIENT
Start: 2019-06-19 | End: 2019-06-19

## 2019-06-19 RX ORDER — COLCHICINE 0.6 MG/1
0.6 TABLET ORAL DAILY
Qty: 30 TABLET | Refills: 4 | Status: SHIPPED | OUTPATIENT
Start: 2019-06-19 | End: 2019-07-24 | Stop reason: SDUPTHER

## 2019-06-19 RX ORDER — LIDOCAINE HYDROCHLORIDE 20 MG/ML
1 INJECTION, SOLUTION INFILTRATION; PERINEURAL ONCE
Status: COMPLETED | OUTPATIENT
Start: 2019-06-19 | End: 2019-06-19

## 2019-06-19 RX ORDER — ALLOPURINOL 100 MG/1
50 TABLET ORAL DAILY
Qty: 15 TABLET | Refills: 4 | Status: SHIPPED | OUTPATIENT
Start: 2019-06-19 | End: 2019-07-24 | Stop reason: SDUPTHER

## 2019-06-19 RX ADMIN — TRIAMCINOLONE ACETONIDE 40 MG: 40 INJECTION, SUSPENSION INTRA-ARTICULAR; INTRAMUSCULAR at 09:06

## 2019-06-19 RX ADMIN — LIDOCAINE HYDROCHLORIDE 1 ML: 20 INJECTION, SOLUTION INFILTRATION; PERINEURAL at 09:06

## 2019-06-19 ASSESSMENT — ROUTINE ASSESSMENT OF PATIENT INDEX DATA (RAPID3)
MDHAQ FUNCTION SCORE: .9
AM STIFFNESS SCORE: 1, YES
PATIENT GLOBAL ASSESSMENT SCORE: 5.5
PSYCHOLOGICAL DISTRESS SCORE: 1.1
PAIN SCORE: 7
FATIGUE SCORE: 4
TOTAL RAPID3 SCORE: 5.17

## 2019-06-19 NOTE — PROGRESS NOTES
Chief Complaint   Patient presents with    Disease Management     gout follow up       Patient with chronic gout for a follow up    History of presenting illness    79 year old black female comes in with chronic gout for 10 years      Usually she has had     Acute onset pain in the     Ankles,feet : lasting for a week  Severe pain,swelling in the joint  The joint would turn red and blue  Precipitated by sea food  Cannot bear weight  Would get steroids,anti inflammatories    She was advised not to eat sea food    She did fine for a while     Last attack was 2 years ago    She then came with pain in the     Right wrist  Right MCP  For 3 weeks   Right middle foot pain for a day    Labs     GFR 50  Uric acid 8.5  ESR 72  H/H 11.9/39.9    Last year    APOLONIA neg  RF neg      Feet xrays  Marked swelling of ankle and feet soft tissues, pes planus, DJD tarsal joints particularly on left, bilateral hallux valgus deformity, DJD 1st MTP joints.  Focal cortical thickening medial aspects left 2nd digit shaft meta tarsal.    Hand xrays  Tiny remote calcification lateral margin base proximal phalanx 3rd MCP joint.  Prominent DJD changes, some with erosion, 1st metacarpal-carpal and radiocarpal joint and radioulnar joints especially on left.    Enlargement lunate, overlying triquetral, pisiform not seen best advantage.  No typical gout erosive disease or tophi.    Flattening distal ulnar without ulnar styloid, narrowing of ulnar wrist joint with apparent absence of tri angulated fibrocartilage.    She was supposed to be on allopurinol 50 mg and colchicine 0.6 mg and she didn't comply    Today she comes with no more gout flares but with chronic right wrist pain      Past history    HTN,venous stasis LE,b/l carotid artery disease,CAD,asthma,cataracts,CKD,diabetes  GERD,HLD,REJI   ,chronic diastolic HF  LS spine arthritis/sciatica     Family history  Mom cancer    Social history  Not a smoker or alcoholic      Review of Systems    Constitutional: Negative for activity change, appetite change, chills, diaphoresis, fatigue, fever and unexpected weight change.   HENT: Negative for congestion, dental problem, drooling, ear discharge, ear pain, facial swelling, hearing loss, mouth sores, nosebleeds, postnasal drip, rhinorrhea, sinus pressure, sinus pain, sneezing, sore throat, tinnitus, trouble swallowing and voice change.    Eyes: Negative for photophobia, pain, discharge, redness, itching and visual disturbance.   Respiratory: Negative for apnea, cough, choking, chest tightness, shortness of breath, wheezing and stridor.    Cardiovascular: Negative for chest pain, palpitations and leg swelling.   Gastrointestinal: Negative for abdominal distention, abdominal pain, anal bleeding, blood in stool, constipation, diarrhea, nausea, rectal pain and vomiting.   Endocrine: Negative for cold intolerance, heat intolerance, polydipsia, polyphagia and polyuria.   Genitourinary: Negative for decreased urine volume, difficulty urinating, dysuria, enuresis, flank pain, frequency, genital sores, hematuria and urgency.   Musculoskeletal: Positive for arthralgias. Negative for back pain, gait problem, joint swelling, myalgias, neck pain and neck stiffness.   Skin: Negative for color change, pallor, rash and wound.   Allergic/Immunologic: Negative for environmental allergies, food allergies and immunocompromised state.   Neurological: Negative for dizziness, tremors, seizures, syncope, facial asymmetry, speech difficulty, weakness, light-headedness, numbness and headaches.   Hematological: Negative for adenopathy. Does not bruise/bleed easily.   Psychiatric/Behavioral: Negative for agitation, behavioral problems, confusion, decreased concentration, dysphoric mood, hallucinations, self-injury, sleep disturbance and suicidal ideas. The patient is not nervous/anxious and is not hyperactive.      Physical Exam     Tenderness:   RUE: wrist    Swelling:   RUE:  wrist    MATTHEWS-28 tender joint count: 1  MATTHEWS-28 swollen joint count: 1    Physical Exam   Constitutional: She is oriented to person, place, and time and well-developed, well-nourished, and in no distress. No distress.   HENT:   Head: Normocephalic.   Mouth/Throat: Oropharynx is clear and moist.   Eyes: Conjunctivae are normal. Pupils are equal, round, and reactive to light. Right eye exhibits no discharge. Left eye exhibits no discharge. No scleral icterus.   Neck: Normal range of motion. No thyromegaly present.   Cardiovascular: Normal rate, regular rhythm, normal heart sounds and intact distal pulses.    Pulmonary/Chest: Effort normal and breath sounds normal. No stridor.   Abdominal: Soft. Bowel sounds are normal.   Lymphadenopathy:     She has no cervical adenopathy.   Neurological: She is alert and oriented to person, place, and time.   Skin: Skin is warm. No rash noted. She is not diaphoretic.     Psychiatric: Affect and judgment normal.   Musculoskeletal: Normal range of motion.           Assessment       79 year old black female comes in with chronic gout for 10 years    Initial attacks were in the ankles and feet : acute onset,pain,swelling and redness,triggered by sea food intake,responding to steroids and anti inflammatories    She presented with acute onset pain in the     Right wrist  Right MCP  Bottom of the right foot    She has been drinking a lot of fruit juices and eating a lot of chips    Labs     GFR 50  Uric acid 8.5,down to 7.9  ESR 72  H/H 11.5/38.2    RF,CCP neg    Last year  APOLONIA neg  RF neg    She has   HTN,venous stasis LE,b/l carotid artery disease,CAD,asthma,cataracts,CKD,diabetes  GERD,HLD,REJI   ,chronic diastolic HF  LS spine arthritis/sciatica     We treated her for the acute attack but she didn't comply to the treatment goals    So today the plan will be to teach her the long term goals    Also right wrist on the xray seems to be abnormal  There is flattening of the distal ulna,I cant  see the ulnar styloid,the wrist joint is severely lost,wonder if this advanced degenerative arthritis       1. Right wrist pain    2. Primary osteoarthritis of right wrist    3. Idiopathic chronic gout of multiple sites without tophus    4. Primary osteoarthritis of first carpometacarpal joint of right hand          F/u  problem     Plan    MRI right wrist    Hand surgery referral    Inject the righT CMC today for advanced osteoarthritis    ULT : allopurinol should never be stopped  Start 50 mg daily  If well tolerated stay on the dose  Will follow CBC,CMP periodically  Risk of hypersensitivity syndrome discussed which is usually within the first 60 days,common in the setting of renal dysfunction and use of thiazides.  Cautious use of antibiotics like amoxicillin and ampicillin advised  If GI upset will split the dose of allopurinol    Colchicine 0.6 mg daily suggested : for acute attack prophylaxis   Side effects : Gi upset,bone marrow suppression,cardiac toxicity,arrythmias,myotoxicity  No statin use  Mild renal insufficiency  No liver disease  Not on cyclosporine,tacrolimus,clarithromycin,erythromycin,verapamil,diltiazem,ketoconazole  Need to assess CBC,CMP discussed  Will stop colchicine 3 months after the target uric acid of less than 6 is achieved,and he has no flares    Dietary modifications discussed : gave a hand out of all foods to avoid : avoid organ meat,red meat,seafood,shell fish,anchovies,sardines,alcohol particularly beer,fructose containing soft drinks  Its ok to consume moderate wine,diet drinks,dairy proteins,coffee  Eat cherries  Eat purine rich vegetables    Avoid beta blockers  Avoid diuretics  If he develops hyperlipidemia : fenofibrate helps since it lowers uric acid level     Blood pressure monitoring  Weight loss suggested  Diabetes monitoring    If no response will consider : US/DECT,Synovial fluid analysis    Labs in 3 months     Duran was seen today for disease management.    Diagnoses  and all orders for this visit:    Right wrist pain  -     MRI Wrist Joint Without Contrast Right; Future  -     Ambulatory referral to Hand Surgery    Primary osteoarthritis of right wrist  -     Ambulatory referral to Hand Surgery    Idiopathic chronic gout of multiple sites without tophus  -     CBC auto differential; Future  -     Comprehensive metabolic panel; Future  -     Uric acid; Future    Primary osteoarthritis of first carpometacarpal joint of right hand    Other orders  -     colchicine (COLCRYS) 0.6 mg tablet; Take 1 tablet (0.6 mg total) by mouth once daily.  -     allopurinol (ZYLOPRIM) 100 MG tablet; Take 0.5 tablets (50 mg total) by mouth once daily.  -     lidocaine HCL 20 mg/ml (2%) injection 1 mL  -     triamcinolone acetonide injection 40 mg      Right CMC osteoarthritis  Verbal consent taken  Risks and benefits explained  Right CMC cleaned  Local anesthetic sprayed  1 ml lidocaine with 40 mg kenalog injected  She did well

## 2019-06-21 ENCOUNTER — TELEPHONE (OUTPATIENT)
Dept: ORTHOPEDICS | Facility: CLINIC | Age: 79
End: 2019-06-21

## 2019-06-26 ENCOUNTER — OFFICE VISIT (OUTPATIENT)
Dept: ORTHOPEDICS | Facility: CLINIC | Age: 79
End: 2019-06-26
Payer: MEDICARE

## 2019-06-26 VITALS
DIASTOLIC BLOOD PRESSURE: 82 MMHG | WEIGHT: 285 LBS | HEIGHT: 59 IN | BODY MASS INDEX: 57.45 KG/M2 | HEART RATE: 90 BPM | SYSTOLIC BLOOD PRESSURE: 163 MMHG

## 2019-06-26 DIAGNOSIS — M65.4 DE QUERVAIN'S TENOSYNOVITIS, RIGHT: Primary | ICD-10-CM

## 2019-06-26 PROCEDURE — 76942 PR U/S GUIDANCE FOR NEEDLE GUIDANCE: ICD-10-PCS | Mod: 26,S$PBB,, | Performed by: PHYSICIAN ASSISTANT

## 2019-06-26 PROCEDURE — 20550 PR INJECT TENDON SHEATH/LIGAMENT: ICD-10-PCS | Mod: S$PBB,RT,, | Performed by: PHYSICIAN ASSISTANT

## 2019-06-26 PROCEDURE — 76942 ECHO GUIDE FOR BIOPSY: CPT | Mod: 26,S$PBB,, | Performed by: PHYSICIAN ASSISTANT

## 2019-06-26 PROCEDURE — 99214 PR OFFICE/OUTPT VISIT, EST, LEVL IV, 30-39 MIN: ICD-10-PCS | Mod: 25,S$PBB,, | Performed by: PHYSICIAN ASSISTANT

## 2019-06-26 PROCEDURE — 99999 PR PBB SHADOW E&M-EST. PATIENT-LVL III: CPT | Mod: PBBFAC,,, | Performed by: PHYSICIAN ASSISTANT

## 2019-06-26 PROCEDURE — 99213 OFFICE O/P EST LOW 20 MIN: CPT | Mod: PBBFAC | Performed by: PHYSICIAN ASSISTANT

## 2019-06-26 PROCEDURE — 99214 OFFICE O/P EST MOD 30 MIN: CPT | Mod: 25,S$PBB,, | Performed by: PHYSICIAN ASSISTANT

## 2019-06-26 PROCEDURE — 20550 NJX 1 TENDON SHEATH/LIGAMENT: CPT | Mod: PBBFAC | Performed by: PHYSICIAN ASSISTANT

## 2019-06-26 PROCEDURE — 99999 PR PBB SHADOW E&M-EST. PATIENT-LVL III: ICD-10-PCS | Mod: PBBFAC,,, | Performed by: PHYSICIAN ASSISTANT

## 2019-06-26 PROCEDURE — 20550 NJX 1 TENDON SHEATH/LIGAMENT: CPT | Mod: S$PBB,RT,, | Performed by: PHYSICIAN ASSISTANT

## 2019-06-26 PROCEDURE — 76942 ECHO GUIDE FOR BIOPSY: CPT | Mod: PBBFAC | Performed by: PHYSICIAN ASSISTANT

## 2019-06-26 RX ORDER — LIDOCAINE HYDROCHLORIDE 10 MG/ML
1 INJECTION, SOLUTION EPIDURAL; INFILTRATION; INTRACAUDAL; PERINEURAL ONCE
Status: COMPLETED | OUTPATIENT
Start: 2019-06-26 | End: 2019-06-26

## 2019-06-26 RX ORDER — DEXAMETHASONE SODIUM PHOSPHATE 4 MG/ML
4 INJECTION, SOLUTION INTRA-ARTICULAR; INTRALESIONAL; INTRAMUSCULAR; INTRAVENOUS; SOFT TISSUE
Status: COMPLETED | OUTPATIENT
Start: 2019-06-26 | End: 2019-06-26

## 2019-06-26 RX ADMIN — DEXAMETHASONE SODIUM PHOSPHATE 4 MG: 4 INJECTION INTRA-ARTICULAR; INTRALESIONAL; INTRAMUSCULAR; INTRAVENOUS; SOFT TISSUE at 09:06

## 2019-06-26 RX ADMIN — LIDOCAINE HYDROCHLORIDE 10 MG: 10 INJECTION, SOLUTION EPIDURAL; INFILTRATION; INTRACAUDAL; PERINEURAL at 10:06

## 2019-06-26 NOTE — PROGRESS NOTES
Subjective:      Patient ID: Duran Giordano is a 79 y.o. female.    Chief Complaint: Pain of the Left Hand and Pain of the Right Hand      HPI  Duran Giordano is a 79 y.o. female presenting today for right hand pain. She notes pain over the radial styloid extending into the thumb. Pain began a few weeks ago. No injury. Pain is increased with use. She was recently seen by Rheumatology who performed a right thumb CMC injection, she does note some relief from this. She has been wearing a small wrist wrap.       Review of patient's allergies indicates:   Allergen Reactions    Bactrim [sulfamethoxazole-trimethoprim]      Other reaction(s): up set stomach rash/hives    Azithromycin     Erythromycin Other (See Comments)     Other reaction(s): Unknown  Other reaction(s): Stomach upset    Gentamicin      Other reaction(s): Unknown    Levofloxacin Hives     Other reaction(s): nervousness    Shellfish containing products     Vancomycin      Other reaction(s): Itching         Current Outpatient Medications   Medication Sig Dispense Refill    acetaminophen (TYLENOL) 500 MG tablet Take 2 tablets (1,000 mg total) by mouth 2 (two) times daily as needed for Pain. 360 tablet 3    albuterol (PROVENTIL/VENTOLIN HFA) 90 mcg/actuation inhaler Inhale 2 puffs into the lungs every 4 (four) hours as needed for Wheezing or Shortness of Breath. Rescue 3 Inhaler 3    albuterol-ipratropium (DUO-NEB) 2.5 mg-0.5 mg/3 mL nebulizer solution Take 3 mLs by nebulization every 6 (six) hours as needed for Wheezing. Rescue 3 Box 3    allopurinol (ZYLOPRIM) 100 MG tablet Take 0.5 tablets (50 mg total) by mouth once daily. 15 tablet 4    ammonium lactate 12 % Crea Apply topically every morning.       aspirin (ECOTRIN) 81 MG EC tablet Take 1 tablet (81 mg total) by mouth once daily. 90 tablet 3    atorvastatin (LIPITOR) 80 MG tablet Take 1 tablet (80 mg total) by mouth once daily. 90 tablet 3    blood sugar diagnostic Strp BG monitoring  "4 times a day. Pt needs test strips for Embrace Talking meter. (Patient taking differently: BG monitoring 2 times a day. Pt needs test strips for Embrace Talking meter.) 450 strip prn    cane tips Misc 1 application by Misc.(Non-Drug; Combo Route) route once daily. 4 each 0    cholecalciferol, vitamin D3, (VITAMIN D3) 1,000 unit capsule Take 1 capsule (1,000 Units total) by mouth once daily. 90 capsule 3    clotrimazole (LOTRIMIN) 1 % cream Apply topically 2 (two) times daily. 113 g 3    colchicine (COLCRYS) 0.6 mg tablet Take 1 tablet (0.6 mg total) by mouth once daily. 30 tablet 4    diclofenac sodium 1 % Gel APPLY 2 GRAMS TOPICALLY DAILY 100 g 2    econazole nitrate 1 % cream Apply topically once daily. Apply to feet daily as directed. 85 g 1    fluticasone (FLONASE) 50 mcg/actuation nasal spray 2 sprays (100 mcg total) by Each Nare route once daily. 1 Bottle 3    fluticasone-salmeterol 500-50 mcg/dose (ADVAIR DISKUS) 500-50 mcg/dose DsDv diskus inhaler Inhale 1 puff into the lungs 2 (two) times daily. Controller 60 each 11    guaiFENesin (MUCINEX) 600 mg 12 hr tablet Take 2 tablets (1,200 mg total) by mouth 2 (two) times daily. 60 tablet 0    insulin degludec (TRESIBA FLEXTOUCH U-200) 200 unit/mL (3 mL) InPn Inject 52 units daily. 3 Syringe 6    insulin lispro (HUMALOG KWIKPEN INSULIN) 100 unit/mL pen Inject 18 units w/ breakfast and dinner, 6 units at lunch, plus scale 150-200+2, 201-250+4, 251-300+6, 301-350+8, >350+10. 2 Box 6    irbesartan (AVAPRO) 300 MG tablet Take 1 tablet (300 mg total) by mouth every evening. 90 tablet 3    lancets Misc Test daily (Patient taking differently: Test twice  daily) 100 each 12    miconazole nitrate (LOTRIMIN AF POWDER) 2 % AerP Apply 1 application topically 2 (two) times daily. 130 g 3    nebulizer and compressor (COMP-AIR ELITE COMP NEB SYSTEM) Berna use as directed 1 each 0    PEN NEEDLE 29 gauge x 1/2" Ndle TEST FOUR TIMES A DAY WITH  each 6    " predniSONE (DELTASONE) 20 MG tablet 20 mg twice daily for 5 days and later on use it only if she flares 60 tablet 2    sod chlor-bicarb-squeez bottle (NEILMED SINUS RINSE COMPLETE) pkdv Use as directed 1 each 5    torsemide (DEMADEX) 20 MG Tab Take 1 tablet (20 mg total) by mouth once daily. 90 tablet 3    trolamine salicylate (ASPERCREME) 10 % cream Apply topically as needed.      cetirizine (ZYRTEC) 10 MG tablet Take 1 tablet (10 mg total) by mouth once daily. 30 tablet 2     No current facility-administered medications for this visit.        Past Medical History:   Diagnosis Date    Adrenal mass- adenoma stable since 2004 (1.8 cm and 8/13/12 (2 cm); stable 2016 2.4 cm     Adrenal mass- adenoma stable since 2004 (1.8 cm and 8/13/12 (2 cm); stable 2016 2.4 cm    Asthma in adult without complication 6/25/2015    Bilateral carotid artery disease 7/21/2017    Bilateral sciatica 2/7/2017    Cellulitis of right leg 08/16/2017    Cerebral infarction 1/29/2016    Multiple areas of lacunar infarction. Stroke risk factors include HTN, DM2, Dyslipidemia.  Continue ASA 81mg/ Statin therapy I have encouraged 30 minutes of physical activity daily for 5 days a week. She has access to a pool so this should not be so jarring to her joints.     Cervical radiculopathy 3/18/2015    Chronic knee pain     Chronic rhinitis 4/9/2013    CKD (chronic kidney disease) stage 3, GFR 30-59 ml/min 1/29/2013    Coronary artery disease due to calcified coronary lesion 6/25/2015    Diastolic dysfunction 10/10/2013    Essential hypertension 6/25/2015    Gastroesophageal reflux disease without esophagitis 8/13/2012    Gout     Hives 10/1/2013    Mixed hyperlipidemia 8/13/2012    Morbid obesity with BMI of 50.0-59.9, adult 2/11/2014    Obstructive sleep apnea syndrome 8/13/2012    Senile cataracts of both eyes 1/22/2015    Traumatic open wound of right lower leg 8/25/2017    Trigeminal neuralgia of right side of face  "5/23/2017    For years now Previously on Gabapentin, but caused constipation Dissipating over time Not related to intracranial abnormalities Possibly related to dental procedure    Type 2 diabetes mellitus with diabetic polyneuropathy, with long-term current use of insulin 1/29/2013    Venous stasis dermatitis of both lower extremities 8/21/2017    Vitamin D deficiency disease 8/13/2012       Past Surgical History:   Procedure Laterality Date    HYSTERECTOMY  1982    secondary uterine fibroids    JOINT REPLACEMENT      Right total knee replacement         Review of Systems:  Constitutional: Negative for chills and fever.   Respiratory: Negative for cough and shortness of breath.    Gastrointestinal: Negative for nausea and vomiting.   Skin: Negative for rash.   Neurological: Negative for dizziness and headaches.   Psychiatric/Behavioral: Negative for depression.   MSK as in HPI       OBJECTIVE:     PHYSICAL EXAM:  BP (!) 163/82   Pulse 90   Ht 4' 11" (1.499 m)   Wt 129.3 kg (285 lb)   BMI 57.56 kg/m²     GEN:  NAD, well-developed, well-groomed.  NEURO: Awake, alert, and oriented. Normal attention and concentration.    PSYCH: Normal mood and affect. Behavior is normal.  HEENT: No cervical lymphadenopathy noted.  CARDIOVASCULAR: Radial pulses 2+ bilaterally. No LE edema noted.  PULMONARY: Breath sounds normal. No respiratory distress.  SKIN: Intact, no rashes.      MSK:   RUE:  Good active ROM of the wrist and fingers. Mild ttp thumb cmc. ttp over the first dorsal compartment/ radial styloid. Positive finklesteins. No ttp over the wrist joint line. AIN/PIN/Radial/Median/Ulnar Nerves assessed in isolation without deficit. Radial & Ulnar arteries palpated 2+. Capillary Refill <3s.      RADIOGRAPHS:  Xray bl hands 4/10/19  Impression     No fracture dislocation or gout changes.  Prominent DJD changes discussed above.     Comments: I have personally reviewed the imaging and I agree with the above radiologist's " report.    ASSESSMENT/PLAN:       ICD-10-CM ICD-9-CM   1. De Quervain's tenosynovitis, right M65.4 727.04       Orders Placed This Encounter    Ambulatory Referral to Physical/Occupational Therapy      Plan:   -Treatment options discussed. She wishes to proceed with right dequervains injection, thumb spica brace, and OT. Orders faxed to Ad[facility] Padilla at [facility].   -RTC 6 wks       PROCEDURE:  I have explained the risks, benefits, and alternatives of the procedure in detail.  The patient voices understanding and all questions have been answered.  The patient agrees to proceed as planned. US guidance used. So after a sterile prep of the skin in the normal fashion the right first dorsal compartment is injected using a 25 gauge needle with a combination of 1cc 1% plain lidocaine and 4 mg of dexamethasone.  The patient is cautioned and immediate relief of pain is secondary to the local anesthetic and will be temporary.  After the anesthetic wears off there may be a increase in pain that may last for a few hours or a few days and they should use ice to help alleviate this flair up of pain. Patient tolerated the procedure well.     15 min spent preparing/fitting/educating pt on brace.    The patient indicates understanding of these issues and agrees to the plan.    Dalila Cabezas PA-C  Hand Clinic   [facility]

## 2019-06-26 NOTE — LETTER
June 26, 2019      Ranjit Olson MD  [id number] [facility] [address]           [facility] [place] [address]  [address] [place] [address], [address]  [facility] [address]  Phone: [phone]          Patient: Duran Giordano   MR Number: [id number]   YOB: 1940   Date of Visit: 6/26/2019       Dear Dr. Ranjit Olson:    Thank you for referring Duran Giordano to me for evaluation. Attached you will find relevant portions of my assessment and plan of care.    If you have questions, please do not hesitate to call me. I look forward to following Duran Giordano along with you.    Sincerely,    Dalila Cabezas PA-C    Enclosure  CC:  No Recipients    If you would like to receive this communication electronically, please contact [email or web address] or [phone] to request more information on [employer] Link access.    For providers and/or their staff who would like to refer a patient to [facility], please contact us through our one-stop-shop provider referral line, [facility], at [phone].    If you feel you have received this communication in error or would no longer like to receive these types of communications, please e-mail [email or web address]

## 2019-07-01 ENCOUNTER — HOSPITAL ENCOUNTER (OUTPATIENT)
Dept: RADIOLOGY | Facility: HOSPITAL | Age: 79
Discharge: HOME | End: 2019-07-01
Attending: INTERNAL MEDICINE
Payer: MEDICARE

## 2019-07-01 DIAGNOSIS — M25.531 RIGHT WRIST PAIN: ICD-10-CM

## 2019-07-01 PROCEDURE — 73221 MRI WRIST WITHOUT CONTRAST RIGHT: ICD-10-PCS | Mod: 26,RT,, | Performed by: RADIOLOGY

## 2019-07-01 PROCEDURE — 73221 MRI JOINT UPR EXTREM W/O DYE: CPT | Mod: TC,RT

## 2019-07-01 PROCEDURE — 73221 MRI JOINT UPR EXTREM W/O DYE: CPT | Mod: 26,RT,, | Performed by: RADIOLOGY

## 2019-07-05 ENCOUNTER — TELEPHONE (OUTPATIENT)
Dept: INTERNAL MEDICINE | Facility: CLINIC | Age: 79
End: 2019-07-05

## 2019-07-05 DIAGNOSIS — M19.031 PRIMARY OSTEOARTHRITIS OF RIGHT WRIST: Primary | ICD-10-CM

## 2019-07-05 NOTE — TELEPHONE ENCOUNTER
Pt states you took her off of her fluid pills and it looks like she needs them everyday she state she is having fluid in both legs and in her throat like  Lot of flem, she wants to know should she go back on a regular because it looks like she needs them everyday

## 2019-07-05 NOTE — TELEPHONE ENCOUNTER
----- Message from Zena Contreras sent at 7/5/2019 11:35 AM CDT -----  Contact: Patient [phone]  Patient has stopped taking torsemide (DEMADEX) 20 MG Tab but gets phlegm and a build up of fluid in the throat(taste it) when not taking. Patient would like a call back to discuss.    Please call and advise  Thank you

## 2019-07-05 NOTE — TELEPHONE ENCOUNTER
Please advise patient that she can take the torsemide as often as she wants as she needs to. How often is she taking it now?    She could try taking it every other day, and daily only when she thinks she needs it.    Thanks  KJ    Kimi Del Angel MA 16 minutes ago (12:00 PM)         Pt states you took her off of her fluid pills and it looks like she needs them everyday she state she is having fluid in both legs and in her throat like  Lot of flem, she wants to know should she go back on a regular because it looks like she needs them everyday          Documentation       Kimi Del Angel MA 17 minutes ago (12:00 PM)         ----- Message from Zena Contreras sent at 7/5/2019 11:35 AM CDT -----  Contact: Patient [phone]  Patient has stopped taking torsemide (DEMADEX) 20 MG Tab but gets phlegm and a build up of fluid in the throat(taste it) when not taking. Patient would like a call back to discuss.     Please call and advise  Thank you

## 2019-07-08 ENCOUNTER — TELEPHONE (OUTPATIENT)
Dept: RHEUMATOLOGY | Facility: CLINIC | Age: 79
End: 2019-07-08

## 2019-07-08 NOTE — TELEPHONE ENCOUNTER
Left the pt message about the hand clinic, waiting for a call back to make sure she has the appointment date and time

## 2019-07-24 ENCOUNTER — TELEPHONE (OUTPATIENT)
Dept: PODIATRY | Facility: CLINIC | Age: 79
End: 2019-07-24

## 2019-07-24 ENCOUNTER — TELEPHONE (OUTPATIENT)
Dept: SLEEP MEDICINE | Facility: CLINIC | Age: 79
End: 2019-07-24

## 2019-07-24 ENCOUNTER — CLINICAL SUPPORT (OUTPATIENT)
Dept: DIABETES | Facility: CLINIC | Age: 79
End: 2019-07-24
Payer: MEDICARE

## 2019-07-24 ENCOUNTER — OFFICE VISIT (OUTPATIENT)
Dept: PRIMARY CARE CLINIC | Facility: CLINIC | Age: 79
End: 2019-07-24
Payer: MEDICARE

## 2019-07-24 VITALS
BODY MASS INDEX: 56.09 KG/M2 | SYSTOLIC BLOOD PRESSURE: 130 MMHG | HEART RATE: 80 BPM | HEIGHT: 59 IN | WEIGHT: 278.25 LBS | DIASTOLIC BLOOD PRESSURE: 70 MMHG | OXYGEN SATURATION: 99 %

## 2019-07-24 DIAGNOSIS — N76.0 ACUTE VAGINITIS: ICD-10-CM

## 2019-07-24 DIAGNOSIS — M10.00 IDIOPATHIC GOUT, UNSPECIFIED CHRONICITY, UNSPECIFIED SITE: ICD-10-CM

## 2019-07-24 DIAGNOSIS — I15.2 HYPERTENSION ASSOCIATED WITH DIABETES: ICD-10-CM

## 2019-07-24 DIAGNOSIS — E11.59 HYPERTENSION ASSOCIATED WITH DIABETES: ICD-10-CM

## 2019-07-24 DIAGNOSIS — Z79.4 TYPE 2 DIABETES MELLITUS WITH DIABETIC POLYNEUROPATHY, WITH LONG-TERM CURRENT USE OF INSULIN: ICD-10-CM

## 2019-07-24 DIAGNOSIS — E11.42 TYPE 2 DIABETES MELLITUS WITH DIABETIC POLYNEUROPATHY, WITH LONG-TERM CURRENT USE OF INSULIN: ICD-10-CM

## 2019-07-24 DIAGNOSIS — G47.33 OSA ON CPAP: ICD-10-CM

## 2019-07-24 DIAGNOSIS — J45.20 MILD INTERMITTENT REACTIVE AIRWAY DISEASE WITHOUT COMPLICATION: ICD-10-CM

## 2019-07-24 PROCEDURE — 99215 OFFICE O/P EST HI 40 MIN: CPT | Mod: 25,S$GLB,, | Performed by: INTERNAL MEDICINE

## 2019-07-24 PROCEDURE — 99215 PR OFFICE/OUTPT VISIT, EST, LEVL V, 40-54 MIN: ICD-10-PCS | Mod: 25,S$GLB,, | Performed by: INTERNAL MEDICINE

## 2019-07-24 PROCEDURE — 94640 AIRWAY INHALATION TREATMENT: CPT | Mod: S$GLB,,, | Performed by: INTERNAL MEDICINE

## 2019-07-24 PROCEDURE — 94640 PR INHAL RX, AIRWAY OBST/DX SPUTUM INDUCT: ICD-10-PCS | Mod: 59,S$GLB,, | Performed by: INTERNAL MEDICINE

## 2019-07-24 PROCEDURE — G0108 DIAB MANAGE TRN  PER INDIV: HCPCS | Mod: PBBFAC

## 2019-07-24 RX ORDER — COLCHICINE 0.6 MG/1
0.6 TABLET ORAL DAILY
Qty: 30 TABLET | Refills: 4 | Status: SHIPPED | OUTPATIENT
Start: 2019-07-24 | End: 2019-09-18

## 2019-07-24 RX ORDER — ALLOPURINOL 100 MG/1
50 TABLET ORAL DAILY
Qty: 15 TABLET | Refills: 4 | Status: SHIPPED | OUTPATIENT
Start: 2019-07-24 | End: 2019-09-18

## 2019-07-24 RX ORDER — IPRATROPIUM BROMIDE AND ALBUTEROL SULFATE 2.5; .5 MG/3ML; MG/3ML
3 SOLUTION RESPIRATORY (INHALATION)
Status: COMPLETED | OUTPATIENT
Start: 2019-07-24 | End: 2019-07-24

## 2019-07-24 RX ORDER — FLUCONAZOLE 150 MG/1
150 TABLET ORAL DAILY
Qty: 1 TABLET | Refills: 0 | Status: SHIPPED | OUTPATIENT
Start: 2019-07-24 | End: 2019-07-25

## 2019-07-24 RX ADMIN — IPRATROPIUM BROMIDE AND ALBUTEROL SULFATE 3 ML: 2.5; .5 SOLUTION RESPIRATORY (INHALATION) at 10:07

## 2019-07-24 NOTE — ASSESSMENT & PLAN NOTE
Continue advair and albuterol prn, increase duoneb treatment.   · continue CPAP  · She is NOT compliant  · Continue mucinex twice daily  · Use nebulizer 2 to 4 times daily

## 2019-07-24 NOTE — TELEPHONE ENCOUNTER
----- Message from Salvatore Ann sent at 7/24/2019 10:53 AM CDT -----  Contact: [facility]   Name of Who is Calling:[facility]     What is the request in detail:[facility] is requesting call back to get the pt schedule for a Cp[ap visit..... Please contact to further discuss and advise      Can the clinic reply by [facility]:     What Number to [facility] Back [facility]: [phone]

## 2019-07-24 NOTE — PATIENT INSTRUCTIONS
TODAY:  - take the diflucan tablet to treat vaginal infection  - see the Gynecologist  - occupational therapy at [facility]  - sleep medicine appointment (Dr David at [facility])  - pill pack with colchicine and allopurinol   + scripts sent to [facility]

## 2019-07-24 NOTE — ASSESSMENT & PLAN NOTE
R wrist swelling and warmth, has metabolic syndrome  · Rheum following  · Start colchicine and allopurinol  · Next pill pack will contain them  · Hand Surgery performed wrist injection with improvement

## 2019-07-24 NOTE — PROGRESS NOTES
Diabetes Education  Author: Tatyana Mtz RN  Date: 7/24/2019    Diabetes Care Management Summary  Diabetes Education Record Assessment/Progress: Initial  Current Diabetes Risk Level: Low         Diabetes Type  Diabetes Type : Type II    Diabetes History  Diabetes Diagnosis: >10 years  Current Treatment: Insulin  Reviewed Problem List with Patient: Yes    Health Maintenance was reviewed today with patient. Discussed with patient importance of routine eye exams, foot exams/foot care, blood work (i.e.: A1c, microalbumin, and lipid), dental visits, yearly flu vaccine, and pneumonia vaccine as indicated by PCP. Patient verbalized understanding.     Health Maintenance Topics with due status: Not Due       Topic Last Completion Date    DEXA SCAN 02/22/2018    Influenza Vaccine 09/12/2018    Foot Exam 11/27/2018    Eye Exam 01/10/2019    Lipid Panel 01/30/2019    Mammogram 04/09/2019    Hemoglobin A1c 06/11/2019    Aspirin/Antiplatelet Therapy 06/26/2019     Health Maintenance Due   Topic Date Due    TETANUS VACCINE  03/08/1958       Nutrition  Meal Planning: 3 meals per day, eats out seldom, drinks regular soda, water, snacks between meal, artificial sweeteners  What type of sweetener do you use?: Equal, Splenda  What type of beverages do you drink?: other (see comments), juice, regular soda/tea, water, milk(Coffee, With juice uses 3/4 water)  Meal Plan 24 Hour Recall - Breakfast: aleman, turkey and gravy, crossiant, orange juice  Meal Plan 24 Hour Recall - Lunch: potatoe salad, ribs, baked beans, shared a coke 1/2  Meal Plan 24 Hour Recall - Dinner: Cereal- honey oats, mild, cheeries, water  Meal Plan 24 Hour Recall - Snack: jalpeno potatoe chips,nancho cool ranch chips, grahm crackers, no salt potatoe chips, samra snaps, animal crackers    Monitoring   Monitoring: Other(One touch)  Self Monitoring : SMBG: BID ranging 109-115  Blood Glucose Logs: No  Do you use a personal continuous glucose monitor?: No  In the last  month, how often have you had a low blood sugar reaction?: more than once a week  What are your symptoms of low blood sugar?: drained, sweaty, feeling really bad  How do you treat low blood sugar?: orange juice  Can you tell when your blood sugar is too high?: no    Exercise   Exercise Type: none    Current Diabetes Treatment   Current Treatment: Insulin    Social History  Preferred Learning Method: Face to Face, Demonstration, Reading Materials  Primary Support: Friends, Other(Support from clergy members)  Smoking Status: Never a Smoker  Alcohol Use: Never    DDS-2 Score  ( > 3 = SIGNIFICANT DISTRESS): 2.5     Barriers to Change  Barriers to Change: Functional limitation(uses wheelchair)  Learning Challenges : None    Readiness to Learn   Readiness to Learn : Acceptance    Cultural Influences  Cultural Influences: None    Diabetes Education Assessment/Progress    Diabetes Disease Process (diabetes disease process and treatment options): Discussion, Instructed, Individual Session, Written Materials Provided, Comprehends Key Points  Discussed qualifying parameters of diabetes dx. Reviewed/Instructed on disease process and pt's most likely causes of recently 7.7 A1c. Discussed current treatment options, especially dietary and lifestyle changes as well as medication additions and/or changes. Patient verbalizes understanding of received information/education.     Nutrition (Incorporating nutritional management into one's lifestyle): Discussion, Instructed, Individual Session, Written Materials Provided, Comprehends Key Points  Emphasized importance of eliminating all SSB. Discussed SF drink options. Discussed carb vs non-carb foods and reviewed appropriate amounts of carbs to have at meals vs snacks. Recommended 30-45 gm carb at meals and 15 gm carb at snacks. Instructed on appropriate label reading and serving sizes of specific carb containing foods. Reviewed need to limit total/saturated fats despite lesser effect on  BG increase. Encouraged carb sources primarily from whole grains, fresh/frozen fruits, and low-fat milk and yogurt. Discussed meal plans and snack ideas amenable to pt. Patient verbalizes understanding of received information/education.     Physical Activity (incorporating physical activity into one's lifestyle): Discussion, Instructed, Individual Session, Written Materials Provided, Comprehends Key Points  Discussed goals and benefits of regular Physical Activity. Reviewed difference between active lifestyle and structured physical activity. Encouraged Physical Activity with increased heart rate for sustained duration as tolerated. Reviewed Physical Activity goals of >150 minutes weekly. Patient verbalizes understanding of received information/education.     Medications (states correct name, dose, onset, peak, duration, side effects & timing of meds): Discussion, Instructed, Individual Session, Written Materials Provided, Comprehends Key Points  Discussed timing and mechanism of action of rapid and long acting insulin. Reviewed need for rotation of injection sites, appropriate insulin storage, timing of insulin, safe disposal of used sharps and insulin pen preparation and use, including performing a prime shot of 2 units prior to injection and keeping pen in skin for a count of 10 before removing. Discussed possible side effects and how to avoid these. Reviewed hypoglycemia and treatment with Rule of 15. Discussed general vs severe hyperglycemia and risk of DKA. Emphasized need to take tresiba at same time daily and need to take humalog >4 hours apart. Emphasized need to take humalog injections 5-10 min prior to eating meals. Patient was not taking humalog 6 units at lunch time, she misunderstood directions. Patient was also not given sliding scale insulin, as she said she did not understand the instruction. Went over in detail how to use sliding scale insulin. Will notify Marianne NP.Patient verbalizes  understanding of received information/education.     Monitoring (monitoring blood glucose/other parameters & using results): Discussion, Instructed, Individual Session, Written Materials Provided, Comprehends Key Points  Discussed goal BGs for different times of day and in relation to meals. Instructed pt to test BG 3-4 times per day: fasting and 2-hours after any meal. Provided blood sugar logs and advised to return to MD for review.  Reviewed need for updated BG logs for all endo, PCP, and education appts. Patient verbalizes understanding of received information/education.     Acute Complications (preventing, detecting, and treating acute complications): Discussion, Instructed, Individual Session, Written Materials Provided, Comprehends Key Points  Discussed hypoglycemia vs hyperglycemia symptoms and discussed appropriate treatments for each. Reviewed that pt is at  risk of hypo with current medication regimen. Discussed general vs severe hyperglycemia and risk of DKA.    Chronic Complications (preventing, detecting, and treating chronic complications): Discussion, Instructed, Individual Session, Written Materials Provided, Comprehends Key Points  Reviewed annual diabetes care schedule and patient priorities. Patient verbalizes understanding of received information/education.     Clinical (diabetes, other pertinent medical history, and relevant comorbidities reviewed during visit): Discussion, Individual Session, Comprehends Key Points  Reviewed current medical history including co-morbidities.    Cognitive (knowledge of self-management skills, functional health literacy): Discussion, Individual Session, Comprehends Key Points  Arrives with adequate health management knowledge - poor specific knowledge of diabetes management. Leaves with increased knowledge base - will benefit from f/u in 3 months.    Psychosocial (emotional response to diabetes): Discussion, Individual Session  No negative feelings toward  diabetes dx or disease management noted. Diabetes distress questionnaire completed by patient and results were placed into Epic.     Diabetes Distress and Support Systems: Discussion, Individual Session  Diabetes distress questionnaire completed by patient and results were placed into Epic. Provided patient with written information regarding diabetes management resources and support.    Behavioral (readiness for change, lifestyle practices, self-care behaviors): Discussion, Individual Session  Appears  motivated to make recommended changes.    Goals  Patient has selected/evaluated goals during today's session: Yes, evaluated  Healthy Eating: Set(Will measure food and read food labels. Will count carbohydrates to equal 30-45 gm per meal.)  Start Date: 07/24/19  Target Date: 10/24/19  Medications: % Met(Closed Goal)  Met Percentage : 100%    Diabetes Care Plan/Intervention  Education Plan/Intervention: Individual Follow-Up DSMT    Diabetes Meal Plan  Restrictions: Restricted Carbohydrate  Carbohydrate Per Meal: 30-45g  Carbohydrate Per Snack : 7-15g    Today's Self-Management Care Plan was developed with the patient's input and is based on barriers identified during today's assessment.    The long and short-term goals in the care plan were written with the patient/caregiver's input. The patient has agreed to work toward these goals to improve her overall diabetes control.      The patient received a copy of today's self-management plan and verbalized understanding of the care plan, goals, and all of today's instructions.      The patient was encouraged to communicate with her physician and care team regarding her condition(s) and treatment.  I provided the patient with my contact information today and encouraged her to contact me via phone or patient portal as needed.     Education Units of Time   Time Spent: 60 min

## 2019-07-24 NOTE — ASSESSMENT & PLAN NOTE
Previously compliant with CPAP only 4 hours per night, now noncompliant  · Advised to continue using machine  · Needs sleep medicine follow-up  · Will make appoitnment  · Advised to lose weight  · Refer to sleep medicine for follow-up

## 2019-07-24 NOTE — PROGRESS NOTES
"Primary Care Provider Appointment    Subjective:      Patient ID: Duran Giordano is a 79 y.o. female with DM, obesity, CAD, CHF    Chief Complaint: Follow-up; Vaginal Discharge (pt states it have a smell to she has been using summers alan spray); and Wheezing (for about a week now )    Patient has malodorous vaginal discharge. She describes it as "perspiration". Is high-risk for fungal infection of the perineal, pannus. Also describes bumps on her labia.      DM is well-controlled with readings in the 100s. She injects 18-18-48 insulin. Does overeat consistently. Gets lots of "treats" for her good deeds for other nuns.     She is wheezing today, likely related to allergies. Reports her advair was changed, but there is no record of this uses her rescue inhaler. Is not consistently using her CPAP.    Rheum seen, and advised allopurinol 50mg. Per Dr KEATING "allopurinol should never be stopped". Also started on colchicine. She had hand injection with CATERINA Cabezas (with hand surgery).     She wants to switch her PT to a facility on [facility], which is "close to the mother house."     Patient continues to take daily diuretic, demodex, for CHF. She can not tolerate being weaned from it. BP controlled today, has been elevated recently.    She wants to stop the ASA since she is now on chronic NSAIDs for gout.    Pill packs as follows:  Adherence packed for 28 days using Dispill:        Morning card:  Aspirin 81 mg  Torsemide 20 mg     Evening card:  Atorvastatin 80 mg  Irbesartan 300 mg   Vitamin D3 1,000 IU         Electronically signed by Rachel Aguirre, PharmD at 5/13/2019  4:29 PM       Past Surgical History:   Procedure Laterality Date    HYSTERECTOMY  1982    secondary uterine fibroids    JOINT REPLACEMENT      Right total knee replacement         Past Medical History:   Diagnosis Date    Adrenal mass- adenoma stable since 2004 (1.8 cm and 8/13/12 (2 cm); stable 2016 2.4 cm     Adrenal mass- adenoma " stable since 2004 (1.8 cm and 8/13/12 (2 cm); stable 2016 2.4 cm    Asthma in adult without complication 6/25/2015    Bilateral carotid artery disease 7/21/2017    Bilateral sciatica 2/7/2017    Cellulitis of right leg 08/16/2017    Cerebral infarction 1/29/2016    Multiple areas of lacunar infarction. Stroke risk factors include HTN, DM2, Dyslipidemia.  Continue ASA 81mg/ Statin therapy I have encouraged 30 minutes of physical activity daily for 5 days a week. She has access to a pool so this should not be so jarring to her joints.     Cervical radiculopathy 3/18/2015    Chronic knee pain     Chronic rhinitis 4/9/2013    CKD (chronic kidney disease) stage 3, GFR 30-59 ml/min 1/29/2013    Coronary artery disease due to calcified coronary lesion 6/25/2015    Diastolic dysfunction 10/10/2013    Essential hypertension 6/25/2015    Gastroesophageal reflux disease without esophagitis 8/13/2012    Gout     Hives 10/1/2013    Mixed hyperlipidemia 8/13/2012    Morbid obesity with BMI of 50.0-59.9, adult 2/11/2014    Obstructive sleep apnea syndrome 8/13/2012    Senile cataracts of both eyes 1/22/2015    Traumatic open wound of right lower leg 8/25/2017    Trigeminal neuralgia of right side of face 5/23/2017    For years now Previously on Gabapentin, but caused constipation Dissipating over time Not related to intracranial abnormalities Possibly related to dental procedure    Type 2 diabetes mellitus with diabetic polyneuropathy, with long-term current use of insulin 1/29/2013    Venous stasis dermatitis of both lower extremities 8/21/2017    Vitamin D deficiency disease 8/13/2012       Social History     Socioeconomic History    Marital status: Single     Spouse name: Not on file    Number of children: Not on file    Years of education: Not on file    Highest education level: Not on file   Occupational History    Not on file   Social Needs    Financial resource strain: Not hard at all    Food  "insecurity:     Worry: Never true     Inability: Never true    Transportation needs:     Medical: No     Non-medical: No   Tobacco Use    Smoking status: Never Smoker    Smokeless tobacco: Never Used   Substance and Sexual Activity    Alcohol use: No    Drug use: No    Sexual activity: Never   Lifestyle    Physical activity:     Days per week: Not on file     Minutes per session: Not on file    Stress: Not on file   Relationships    Social connections:     Talks on phone: Not on file     Gets together: Not on file     Attends Sabianist service: Not on file     Active member of club or organization: Not on file     Attends meetings of clubs or organizations: Not on file     Relationship status: Not on file   Other Topics Concern    Are you pregnant or think you may be? Not Asked    Breast-feeding Not Asked   Social History Narrative    Single with no children.        Review of Systems   Constitutional: Positive for activity change. Negative for appetite change and unexpected weight change.   Respiratory: Positive for shortness of breath and wheezing.    Cardiovascular: Positive for leg swelling.   Gastrointestinal: Negative for constipation and diarrhea.   Genitourinary: Positive for genital sores and vaginal discharge.   Skin: Positive for color change and wound. Negative for rash.   Hematological: Does not bruise/bleed easily.   Psychiatric/Behavioral: Positive for sleep disturbance. Negative for behavioral problems and decreased concentration.       Objective:   /70   Pulse 80   Ht 4' 11" (1.499 m)   Wt 126.2 kg (278 lb 3.5 oz)   SpO2 99%   BMI 56.19 kg/m²     Physical Exam   Constitutional: She appears well-developed.   Severe obesity   Eyes: EOM are normal.   Cardiovascular: Normal rate.   Pulmonary/Chest: She is in respiratory distress. She has wheezes.   Abdominal:   Obese abdomen   Musculoskeletal: She exhibits no edema, tenderness or deformity.   Neurological: She is alert.   Skin: "   Improved venous stasis   Psychiatric: She has a normal mood and affect.   Improving insight into disease       Lab Results   Component Value Date    WBC 8.59 06/11/2019    HGB 11.5 (L) 06/11/2019    HCT 38.2 06/11/2019     06/11/2019    CHOL 142 01/30/2019    TRIG 68 01/30/2019    HDL 37 (L) 01/30/2019    ALT 13 06/11/2019    AST 15 06/11/2019     06/11/2019    K 4.1 06/11/2019     06/11/2019    CREATININE 1.2 06/11/2019    BUN 21 06/11/2019    CO2 25 06/11/2019    TSH 0.746 05/21/2018    INR 1.1 04/17/2005    HGBA1C 7.7 (H) 06/11/2019       RESULTS: Reviewed labs and images today    Assessment:   79 y.o. female with multiple co-morbid illnesses here to continue work-up of chronic issues notably with DM, obesity, CAD, CHF    Plan:     Problem List Items Addressed This Visit        Pulmonary    Reactive airway disease without complication     Continue advair and albuterol prn, increase duoneb treatment.   · continue CPAP  · She is NOT compliant  · Continue mucinex twice daily  · Use nebulizer 2 to 4 times daily         Relevant Medications    albuterol-ipratropium 2.5 mg-0.5 mg/3 mL nebulizer solution 3 mL (Completed)    albuterol-ipratropium 2.5 mg-0.5 mg/3 mL nebulizer solution 3 mL (Completed)       Cardiac/Vascular    Hypertension associated with diabetes     BP controlled ibesartan 300mg, torsemide; carvedilol discontinued by pulm due to SOB, amlodipine discontinued by PCP due to leg swelling  · Discontinued hydralazine due to overtreatment of HTN  · Changed losartan to ibesartan 300mg  · Consider Digital HTN program in future  · Diabetes controlled with 18-18-48 insulin            Renal/    Acute vaginitis     Excessive sweating, diabetes, wears stockings  · Diflucan 150mg one dose  · Refer to GYN         Relevant Medications    fluconazole (DIFLUCAN) 150 MG Tab    Other Relevant Orders    Ambulatory consult to Gynecology       Orthopedic    Gout     R wrist swelling and warmth, has  metabolic syndrome  · Rheum following  · Start colchicine and allopurinol  · Next pill pack will contain them  · Hand Surgery performed wrist injection with improvement         Relevant Medications    allopurinol (ZYLOPRIM) 100 MG tablet    colchicine (COLCRYS) 0.6 mg tablet       Other    REJI on CPAP     Previously compliant with CPAP only 4 hours per night, now noncompliant  · Advised to continue using machine  · Needs sleep medicine follow-up  · Will make appoitnment  · Advised to lose weight  · Refer to sleep medicine for follow-up         Relevant Orders    Ambulatory referral to Sleep Disorders          Health Maintenance       Date Due Completion Date    TETANUS VACCINE 03/08/1958 ---    Shingles Vaccine (2 of 3) 02/10/2015 12/16/2014    Influenza Vaccine 08/01/2019 9/12/2018    Override on 10/7/2015: Done    Override on 10/3/2014: Done    Override on 10/10/2013: Done    Override on 9/13/2011: Done    Foot Exam 11/27/2019 11/27/2018 (Done)    Override on 11/27/2018: Done (Dr Anand)    Override on 1/19/2018: Done (Dr anand)    Override on 5/31/2017: Done    Override on 7/20/2016: Done    Hemoglobin A1c 12/11/2019 6/11/2019    Override on 7/13/2016: Done    Eye Exam 01/10/2020 1/10/2019    Override on 2/17/2016: Done    Override on 1/22/2015: Done    Override on 8/16/2012: Done    Lipid Panel 01/30/2020 1/30/2019    Mammogram 04/09/2020 4/9/2019    Aspirin/Antiplatelet Therapy 06/26/2020 6/26/2019    DEXA SCAN 02/22/2021 2/22/2018    Override on 12/13/2011: Done          Follow up in about 3 months (around 10/24/2019). Total face-to-face time was 60 min, 50% of this was spent on counseling and coordination of care. The following issues were discussed: with DM, obesity, CAD, CHF    Tami Schmidt MD/MPH  Internal Medicine  [facility]  [phone]

## 2019-07-24 NOTE — NURSING
Used 2 pt identifiers and reviewed allergies prior to giving DUONEB TX, then asked pt to wait 15 mins post breathing tx to observe for s/sx of adverse reactions.    during which time percussion to back was done after approval from PCP .

## 2019-07-24 NOTE — TELEPHONE ENCOUNTER
Call placed to listed number in chart as no call back number for Tatyana listed in message.  Residence not aware of a Anjali and Sr Giordano is not available at this time to determine her needs for an appt.  Next available appt made, slip mailed, contact info provided for other issues.

## 2019-07-24 NOTE — TELEPHONE ENCOUNTER
----- Message from Tatyana Mtz RN sent at 7/24/2019 10:54 AM CDT -----  Patient would like an appointment to see , please call patient to arrange.    Tatyana Mtz RN, CCM

## 2019-08-05 ENCOUNTER — PATIENT OUTREACH (OUTPATIENT)
Dept: ADMINISTRATIVE | Facility: OTHER | Age: 79
End: 2019-08-05

## 2019-08-07 ENCOUNTER — OFFICE VISIT (OUTPATIENT)
Dept: ORTHOPEDICS | Facility: CLINIC | Age: 79
End: 2019-08-07
Payer: MEDICARE

## 2019-08-07 VITALS
HEIGHT: 59 IN | HEART RATE: 99 BPM | DIASTOLIC BLOOD PRESSURE: 73 MMHG | BODY MASS INDEX: 56.19 KG/M2 | SYSTOLIC BLOOD PRESSURE: 139 MMHG | OXYGEN SATURATION: 98 %

## 2019-08-07 DIAGNOSIS — M65.4 DE QUERVAIN'S TENOSYNOVITIS, RIGHT: Primary | ICD-10-CM

## 2019-08-07 PROCEDURE — 99213 PR OFFICE/OUTPT VISIT, EST, LEVL III, 20-29 MIN: ICD-10-PCS | Mod: S$PBB,,, | Performed by: PHYSICIAN ASSISTANT

## 2019-08-07 PROCEDURE — 99213 OFFICE O/P EST LOW 20 MIN: CPT | Mod: PBBFAC | Performed by: PHYSICIAN ASSISTANT

## 2019-08-07 PROCEDURE — 99999 PR PBB SHADOW E&M-EST. PATIENT-LVL III: CPT | Mod: PBBFAC,,, | Performed by: PHYSICIAN ASSISTANT

## 2019-08-07 PROCEDURE — 99999 PR PBB SHADOW E&M-EST. PATIENT-LVL III: ICD-10-PCS | Mod: PBBFAC,,, | Performed by: PHYSICIAN ASSISTANT

## 2019-08-07 PROCEDURE — 99213 OFFICE O/P EST LOW 20 MIN: CPT | Mod: S$PBB,,, | Performed by: PHYSICIAN ASSISTANT

## 2019-08-07 NOTE — PROGRESS NOTES
Subjective:      Patient ID: Duran Giordano is a 79 y.o. female.    Chief Complaint: Pain of the Right Hand and Follow-up      HPI  Duran Giordano is a 79 y.o. female presenting today for right hand pain. She notes pain over the radial styloid extending into the thumb. Pain began a few weeks ago. No injury. Pain is increased with use. She was recently seen by Rheumatology who performed a right thumb CMC injection, she does note some relief from this. She has been wearing a small wrist wrap.     8/7/19  Pt presents for follow up right dequervains. At last visit, injection was performed, she was placed into a brace, and therapy was ordered. She reports complete resolution of her symptoms. She is doing really well. She is scheduled to begin OT later in the week, she'd still like to go for one visit and learn exercises.     Review of patient's allergies indicates:   Allergen Reactions    Bactrim [sulfamethoxazole-trimethoprim]      Other reaction(s): up set stomach rash/hives    Azithromycin     Erythromycin Other (See Comments)     Other reaction(s): Unknown  Other reaction(s): Stomach upset    Gentamicin      Other reaction(s): Unknown    Levofloxacin Hives     Other reaction(s): nervousness    Shellfish containing products     Vancomycin      Other reaction(s): Itching         Current Outpatient Medications   Medication Sig Dispense Refill    acetaminophen (TYLENOL) 500 MG tablet Take 2 tablets (1,000 mg total) by mouth 2 (two) times daily as needed for Pain. 360 tablet 3    albuterol (PROVENTIL/VENTOLIN HFA) 90 mcg/actuation inhaler Inhale 2 puffs into the lungs every 4 (four) hours as needed for Wheezing or Shortness of Breath. Rescue 3 Inhaler 3    albuterol-ipratropium (DUO-NEB) 2.5 mg-0.5 mg/3 mL nebulizer solution Take 3 mLs by nebulization every 6 (six) hours as needed for Wheezing. Rescue 3 Box 3    allopurinol (ZYLOPRIM) 100 MG tablet Take 0.5 tablets (50 mg total) by mouth once daily. 15  tablet 4    ammonium lactate 12 % Crea Apply topically every morning.       aspirin (ECOTRIN) 81 MG EC tablet Take 1 tablet (81 mg total) by mouth once daily. 90 tablet 3    atorvastatin (LIPITOR) 80 MG tablet Take 1 tablet (80 mg total) by mouth once daily. 90 tablet 3    blood sugar diagnostic Strp BG monitoring 4 times a day. Pt needs test strips for Embrace Talking meter. (Patient taking differently: BG monitoring 2 times a day. Pt needs test strips for Embrace Talking meter.) 450 strip prn    cane tips Misc 1 application by Misc.(Non-Drug; Combo Route) route once daily. 4 each 0    cholecalciferol, vitamin D3, (VITAMIN D3) 1,000 unit capsule Take 1 capsule (1,000 Units total) by mouth once daily. 90 capsule 3    clotrimazole (LOTRIMIN) 1 % cream Apply topically 2 (two) times daily. 113 g 3    colchicine (COLCRYS) 0.6 mg tablet Take 1 tablet (0.6 mg total) by mouth once daily. 30 tablet 4    diclofenac sodium 1 % Gel APPLY 2 GRAMS TOPICALLY DAILY 100 g 2    econazole nitrate 1 % cream Apply topically once daily. Apply to feet daily as directed. 85 g 1    fluticasone (FLONASE) 50 mcg/actuation nasal spray 2 sprays (100 mcg total) by Each Nare route once daily. 1 Bottle 3    fluticasone-salmeterol 500-50 mcg/dose (ADVAIR DISKUS) 500-50 mcg/dose DsDv diskus inhaler Inhale 1 puff into the lungs 2 (two) times daily. Controller 60 each 11    guaiFENesin (MUCINEX) 600 mg 12 hr tablet Take 2 tablets (1,200 mg total) by mouth 2 (two) times daily. 60 tablet 0    insulin degludec (TRESIBA FLEXTOUCH U-200) 200 unit/mL (3 mL) InPn Inject 52 units daily. 3 Syringe 6    insulin lispro (HUMALOG KWIKPEN INSULIN) 100 unit/mL pen Inject 18 units w/ breakfast and dinner, 6 units at lunch, plus scale 150-200+2, 201-250+4, 251-300+6, 301-350+8, >350+10. 2 Box 6    irbesartan (AVAPRO) 300 MG tablet Take 1 tablet (300 mg total) by mouth every evening. 90 tablet 3    lancets Misc Test daily (Patient taking differently:  "Test twice  daily) 100 each 12    miconazole nitrate (LOTRIMIN AF POWDER) 2 % AerP Apply 1 application topically 2 (two) times daily. 130 g 3    nebulizer and compressor (COMP-AIR ELITE COMP NEB SYSTEM) Berna use as directed 1 each 0    PEN NEEDLE 29 gauge x 1/2" Ndle TEST FOUR TIMES A DAY WITH  each 6    predniSONE (DELTASONE) 20 MG tablet 20 mg twice daily for 5 days and later on use it only if she flares 60 tablet 2    sod chlor-bicarb-squeez bottle (NEILMED SINUS RINSE COMPLETE) pkdv Use as directed 1 each 5    torsemide (DEMADEX) 20 MG Tab Take 1 tablet (20 mg total) by mouth once daily. 90 tablet 3    trolamine salicylate (ASPERCREME) 10 % cream Apply topically as needed.      cetirizine (ZYRTEC) 10 MG tablet Take 1 tablet (10 mg total) by mouth once daily. 30 tablet 2     No current facility-administered medications for this visit.        Past Medical History:   Diagnosis Date    Adrenal mass- adenoma stable since 2004 (1.8 cm and 8/13/12 (2 cm); stable 2016 2.4 cm     Adrenal mass- adenoma stable since 2004 (1.8 cm and 8/13/12 (2 cm); stable 2016 2.4 cm    Asthma in adult without complication 6/25/2015    Bilateral carotid artery disease 7/21/2017    Bilateral sciatica 2/7/2017    Cellulitis of right leg 08/16/2017    Cerebral infarction 1/29/2016    Multiple areas of lacunar infarction. Stroke risk factors include HTN, DM2, Dyslipidemia.  Continue ASA 81mg/ Statin therapy I have encouraged 30 minutes of physical activity daily for 5 days a week. She has access to a pool so this should not be so jarring to her joints.     Cervical radiculopathy 3/18/2015    Chronic knee pain     Chronic rhinitis 4/9/2013    CKD (chronic kidney disease) stage 3, GFR 30-59 ml/min 1/29/2013    Coronary artery disease due to calcified coronary lesion 6/25/2015    Diastolic dysfunction 10/10/2013    Essential hypertension 6/25/2015    Gastroesophageal reflux disease without esophagitis 8/13/2012    " "Gout     Hives 10/1/2013    Mixed hyperlipidemia 8/13/2012    Morbid obesity with BMI of 50.0-59.9, adult 2/11/2014    Obstructive sleep apnea syndrome 8/13/2012    Senile cataracts of both eyes 1/22/2015    Traumatic open wound of right lower leg 8/25/2017    Trigeminal neuralgia of right side of face 5/23/2017    For years now Previously on Gabapentin, but caused constipation Dissipating over time Not related to intracranial abnormalities Possibly related to dental procedure    Type 2 diabetes mellitus with diabetic polyneuropathy, with long-term current use of insulin 1/29/2013    Venous stasis dermatitis of both lower extremities 8/21/2017    Vitamin D deficiency disease 8/13/2012       Past Surgical History:   Procedure Laterality Date    HYSTERECTOMY  1982    secondary uterine fibroids    JOINT REPLACEMENT      Right total knee replacement         Review of Systems:  Constitutional: Negative for chills and fever.   Respiratory: Negative for cough and shortness of breath.    Gastrointestinal: Negative for nausea and vomiting.   Skin: Negative for rash.   Neurological: Negative for dizziness and headaches.   Psychiatric/Behavioral: Negative for depression.   MSK as in HPI       OBJECTIVE:     PHYSICAL EXAM:  /73   Pulse 99   Ht 4' 11" (1.499 m)   SpO2 98%   BMI 56.19 kg/m²     GEN:  NAD, well-developed, well-groomed.  NEURO: Awake, alert, and oriented. Normal attention and concentration.    PSYCH: Normal mood and affect. Behavior is normal.  HEENT: No cervical lymphadenopathy noted.  CARDIOVASCULAR: Radial pulses 2+ bilaterally. No LE edema noted.  PULMONARY: Breath sounds normal. No respiratory distress.  SKIN: Intact, no rashes.      MSK:   RUE:  Good active ROM of the wrist and fingers. Mild ttp thumb cmc. No ttp over the first dorsal compartment/ radial styloid. negative finklesteins. No ttp over the wrist joint line. AIN/PIN/Radial/Median/Ulnar Nerves assessed in isolation without " deficit. Radial & Ulnar arteries palpated 2+. Capillary Refill <3s.      RADIOGRAPHS:  Xray bl hands 4/10/19  Impression     No fracture dislocation or gout changes.  Prominent DJD changes discussed above.     Comments: I have personally reviewed the imaging and I agree with the above radiologist's report.    ASSESSMENT/PLAN:       ICD-10-CM ICD-9-CM   1. De Quervain's tenosynovitis, right M65.4 727.04        Plan:   -symptoms resolved, doing well, follow up if needed      The patient indicates understanding of these issues and agrees to the plan.    Dalila Cabezas PA-C  Hand Clinic   [facility] [place]

## 2019-08-26 NOTE — PROGRESS NOTES
Adherence packed for 28 days using Dispill:     Morning card:  Allopurinol 50 mg  Colchicine 0.6 mg  Torsemide 20 mg     Evening card:  Atorvastatin 80 mg  Losartan 100 mg  Vitamin D3 1,000 IU    *8/26/19 Aspirin 81 removed from pack. Colchicine 0.6 mg and allopurinol 50 mg added. Irbesartan 300 mg changed to losartan 100 mg.

## 2019-09-04 ENCOUNTER — OFFICE VISIT (OUTPATIENT)
Dept: PODIATRY | Facility: CLINIC | Age: 79
End: 2019-09-04
Payer: MEDICARE

## 2019-09-04 VITALS
SYSTOLIC BLOOD PRESSURE: 173 MMHG | RESPIRATION RATE: 18 BRPM | HEIGHT: 59 IN | DIASTOLIC BLOOD PRESSURE: 71 MMHG | BODY MASS INDEX: 57.78 KG/M2 | WEIGHT: 286.63 LBS | HEART RATE: 88 BPM

## 2019-09-04 DIAGNOSIS — L84 CORN OR CALLUS: ICD-10-CM

## 2019-09-04 DIAGNOSIS — E11.51 TYPE II DIABETES MELLITUS WITH PERIPHERAL CIRCULATORY DISORDER: Primary | ICD-10-CM

## 2019-09-04 DIAGNOSIS — B35.1 ONYCHOMYCOSIS DUE TO DERMATOPHYTE: ICD-10-CM

## 2019-09-04 PROCEDURE — 99999 PR PBB SHADOW E&M-EST. PATIENT-LVL III: CPT | Mod: PBBFAC,,, | Performed by: PODIATRIST

## 2019-09-04 PROCEDURE — 11721 DEBRIDE NAIL 6 OR MORE: CPT | Mod: 59,Q9,S$PBB, | Performed by: PODIATRIST

## 2019-09-04 PROCEDURE — 99499 NO LOS: ICD-10-PCS | Mod: S$PBB,,, | Performed by: PODIATRIST

## 2019-09-04 PROCEDURE — 11721 PR DEBRIDEMENT OF NAILS, 6 OR MORE: ICD-10-PCS | Mod: 59,Q9,S$PBB, | Performed by: PODIATRIST

## 2019-09-04 PROCEDURE — 11721 DEBRIDE NAIL 6 OR MORE: CPT | Mod: Q9,PBBFAC | Performed by: PODIATRIST

## 2019-09-04 PROCEDURE — 11057 PR TRIM BENIGN HYPERKERATOTIC SKIN LESION,>4: ICD-10-PCS | Mod: Q9,S$PBB,, | Performed by: PODIATRIST

## 2019-09-04 PROCEDURE — 99999 PR PBB SHADOW E&M-EST. PATIENT-LVL III: ICD-10-PCS | Mod: PBBFAC,,, | Performed by: PODIATRIST

## 2019-09-04 PROCEDURE — 99499 UNLISTED E&M SERVICE: CPT | Mod: S$PBB,,, | Performed by: PODIATRIST

## 2019-09-04 PROCEDURE — 99213 OFFICE O/P EST LOW 20 MIN: CPT | Mod: PBBFAC,25 | Performed by: PODIATRIST

## 2019-09-04 PROCEDURE — 11057 PARNG/CUTG B9 HYPRKR LES >4: CPT | Mod: Q9,S$PBB,, | Performed by: PODIATRIST

## 2019-09-04 PROCEDURE — 11057 PARNG/CUTG B9 HYPRKR LES >4: CPT | Mod: 59,Q9,PBBFAC | Performed by: PODIATRIST

## 2019-09-04 NOTE — PROGRESS NOTES
Subjective:      Patient ID: Duran Giordano is a 79 y.o. female.    Chief Complaint: PCP (Tami Schmidt MD 7/24/19); Diabetic Foot Exam; Nail Care; Heel Pain (left heel ); and Gout (2 mos ago )    Duran is a 79 y.o. female who presents to the clinic for evaluation and treatment of high risk feet. Duran has a past medical history of Adrenal mass- adenoma stable since 2004 (1.8 cm and 8/13/12 (2 cm); stable 2016 2.4 cm, Asthma in adult without complication (6/25/2015), Bilateral carotid artery disease (7/21/2017), Bilateral sciatica (2/7/2017), Cellulitis of right leg (08/16/2017), Cerebral infarction (1/29/2016), Cervical radiculopathy (3/18/2015), Chronic knee pain, Chronic rhinitis (4/9/2013), CKD (chronic kidney disease) stage 3, GFR 30-59 ml/min (1/29/2013), Coronary artery disease due to calcified coronary lesion (6/25/2015), Diastolic dysfunction (10/10/2013), Essential hypertension (6/25/2015), Gastroesophageal reflux disease without esophagitis (8/13/2012), Gout, Hives (10/1/2013), Mixed hyperlipidemia (8/13/2012), Morbid obesity with BMI of 50.0-59.9, adult (2/11/2014), Obstructive sleep apnea syndrome (8/13/2012), Senile cataracts of both eyes (1/22/2015), Traumatic open wound of right lower leg (8/25/2017), Trigeminal neuralgia of right side of face (5/23/2017), Type 2 diabetes mellitus with diabetic polyneuropathy, with long-term current use of insulin (1/29/2013), Venous stasis dermatitis of both lower extremities (8/21/2017), and Vitamin D deficiency disease (8/13/2012). The patient's chief complaint is  HRFC  This patient has documented high risk feet requiring routine maintenance secondary to diabetes mellitis and those secondary complications of diabetes, as mentioned.    PCP: Tami Schmidt MD    Date Last Seen by PCP:   Chief Complaint   Patient presents with    PCP     Tami Schmidt MD 7/24/19    Diabetic Foot Exam    Nail Care    Heel Pain     left heel     Gout     2  "mos ago         Current shoe gear:  Affected Foot: Casual shoes     Unaffected Foot: Casual shoes    Hemoglobin A1C   Date Value Ref Range Status   06/11/2019 7.7 (H) 4.0 - 5.6 % Final     Comment:     ADA Screening Guidelines:  5.7-6.4%  Consistent with prediabetes  >or=6.5%  Consistent with diabetes  High levels of fetal hemoglobin interfere with the HbA1C  assay. Heterozygous hemoglobin variants (HbS, HgC, etc)do  not significantly interfere with this assay.   However, presence of multiple variants may affect accuracy.     01/30/2019 7.5 (H) 4.0 - 5.6 % Final     Comment:     ADA Screening Guidelines:  5.7-6.4%  Consistent with prediabetes  >or=6.5%  Consistent with diabetes  High levels of fetal hemoglobin interfere with the HbA1C  assay. Heterozygous hemoglobin variants (HbS, HgC, etc)do  not significantly interfere with this assay.   However, presence of multiple variants may affect accuracy.     12/11/2018 7.1 (H) 4.0 - 5.6 % Final     Comment:     ADA Screening Guidelines:  5.7-6.4%  Consistent with prediabetes  >or=6.5%  Consistent with diabetes  High levels of fetal hemoglobin interfere with the HbA1C  assay. Heterozygous hemoglobin variants (HbS, HgC, etc)do  not significantly interfere with this assay.   However, presence of multiple variants may affect accuracy.         Review of Systems   Cardiovascular: Positive for leg swelling.   Skin: Positive for dry skin and nail changes. Negative for flushing, itching and rash.   Neurological: Positive for numbness. Negative for paresthesias.           Objective:       Vitals:    09/04/19 1327   BP: (!) 173/71   Pulse: 88   Resp: 18   Weight: 130 kg (286 lb 9.6 oz)   Height: 4' 11" (1.499 m)   PainSc:   7   PainLoc: Foot        Physical Exam   Constitutional: She is oriented to person, place, and time. She appears well-developed and well-nourished.   Cardiovascular:   Pulses:       Dorsalis pedis pulses are 2+ on the right side, and 2+ on the left side.        " Posterior tibial pulses are 2+ on the right side, and 2+ on the left side.   Dorsalis pedis and posterior tibial pulses are palpable bilaterally. Toes are cool to touch. Feet are warm proximally.There is decreased digital hair bilateral. Skin is atrophic, hyperpigmented, and moderately edematous.     Musculoskeletal: She exhibits no tenderness.        Right ankle: Normal.        Left ankle: Normal.        Right foot: There is no swelling, no crepitus and no deformity.        Left foot: There is no swelling, no crepitus and no deformity.   Adequate joint range of motion without pain, limitation, nor crepitation Bilateral feet and ankle joints. Muscle strength is 5/5 in all groups bilaterally.         Lymphadenopathy:   B/l lymph edema    Neurological: She is alert and oriented to person, place, and time. She has normal strength.   Decreased sharp/dull sensation bilateral feet.   Skin: Skin is warm, dry and intact. No abrasion, no bruising, no burn, no lesion and no rash noted. No erythema. Nails show no clubbing.   Nails x10 are elongated by  4-5mm's, thickened by 2-3 mm's, dystrophic, and are darkened in  coloration . Xerosis Bilaterally. No open lesions noted.    Hyperkeratotic tissue noted to calcaneal rim b/l, distal 2nd toe b/l, plantar heel b/l    Non blanchable redness plantar central L heel no abscess, fluctuance, ecchymosis, increased temp nor skin break down noted    Psychiatric: She has a normal mood and affect. Her behavior is normal.   Nursing note and vitals reviewed.            Assessment:       Encounter Diagnoses   Name Primary?    Type II diabetes mellitus with peripheral circulatory disorder Yes    Onychomycosis due to dermatophyte     Corn or callus          Plan:       Duran was seen today for pcp, diabetic foot exam, nail care, heel pain and gout.    Diagnoses and all orders for this visit:    Type II diabetes mellitus with peripheral circulatory disorder    Onychomycosis due to  dermatophyte    Corn or callus    - Patient was given written and verbal instructions regarding foot condition.  I counseled the patient on her conditions, their implications and medical management.    Shoe inspection. Diabetic Foot Education. Patient reminded of the importance of good nutrition and blood sugar control to help prevent podiatric complications of diabetes. Patient instructed on proper foot hygeine. We discussed wearing proper shoe gear, daily foot inspections, never walking without protective shoe gear, never putting sharp instruments to feet    - With patient's permission, nails were aggressively reduced and debrided x 10 to their soft tissue attachment mechanically and with electric , removing all offending nail and debris. Patient relates relief following the procedure. She will continue to monitor the areas daily, inspect her feet, wear protective shoe gear when ambulatory, moisturizer to maintain skin integrity and follow in this office in approximately 2-3 months, sooner p.r.n.    - After cleansing the  area w/ alcohol prep pad the above mentioned hyperkeratosis was trimmed utilizing No 15 scapel, to a smooth base with out incident. Patient tolerated this  well and reported comfort to the area of  calcaneal rim b/l, distal 2nd toe b/l, plantar heel b/l    - small SDTI plantar L heel . No skin break down noted.    Advised patient  to remove all pressure from heels from bed/recliner and any other location the patient may rest his/her feet. Recommended to float heels at all times or as much as possible. Discussed Multi Podis boots and foam wedges     - Return to clinic in 3m or sooner if problems arise

## 2019-09-06 RX ORDER — PEN NEEDLE, DIABETIC 29 G X1/2"
NEEDLE, DISPOSABLE MISCELLANEOUS
Qty: 200 EACH | Refills: 11 | Status: SHIPPED | OUTPATIENT
Start: 2019-09-06 | End: 2020-09-17

## 2019-09-09 ENCOUNTER — OFFICE VISIT (OUTPATIENT)
Dept: OBSTETRICS AND GYNECOLOGY | Facility: CLINIC | Age: 79
End: 2019-09-09
Payer: MEDICARE

## 2019-09-09 VITALS
BODY MASS INDEX: 56.18 KG/M2 | DIASTOLIC BLOOD PRESSURE: 80 MMHG | SYSTOLIC BLOOD PRESSURE: 180 MMHG | HEIGHT: 59 IN | WEIGHT: 278.69 LBS

## 2019-09-09 DIAGNOSIS — L29.2 VULVAR ITCHING: Primary | ICD-10-CM

## 2019-09-09 PROCEDURE — 99999 PR PBB SHADOW E&M-EST. PATIENT-LVL IV: CPT | Mod: PBBFAC,,, | Performed by: OBSTETRICS & GYNECOLOGY

## 2019-09-09 PROCEDURE — 87661 TRICHOMONAS VAGINALIS AMPLIF: CPT

## 2019-09-09 PROCEDURE — 87481 CANDIDA DNA AMP PROBE: CPT | Mod: 59

## 2019-09-09 PROCEDURE — 99214 OFFICE O/P EST MOD 30 MIN: CPT | Mod: PBBFAC | Performed by: OBSTETRICS & GYNECOLOGY

## 2019-09-09 PROCEDURE — 99202 OFFICE O/P NEW SF 15 MIN: CPT | Mod: S$PBB,,, | Performed by: OBSTETRICS & GYNECOLOGY

## 2019-09-09 PROCEDURE — 99999 PR PBB SHADOW E&M-EST. PATIENT-LVL IV: ICD-10-PCS | Mod: PBBFAC,,, | Performed by: OBSTETRICS & GYNECOLOGY

## 2019-09-09 PROCEDURE — 99202 PR OFFICE/OUTPT VISIT, NEW, LEVL II, 15-29 MIN: ICD-10-PCS | Mod: S$PBB,,, | Performed by: OBSTETRICS & GYNECOLOGY

## 2019-09-09 PROCEDURE — 87801 DETECT AGNT MULT DNA AMPLI: CPT

## 2019-09-09 RX ORDER — ALLOPURINOL 100 MG/1
100 TABLET ORAL DAILY
COMMUNITY
End: 2019-09-18

## 2019-09-09 RX ORDER — CLOTRIMAZOLE AND BETAMETHASONE DIPROPIONATE 10; .64 MG/G; MG/G
CREAM TOPICAL
Qty: 15 G | Refills: 1 | Status: SHIPPED | OUTPATIENT
Start: 2019-09-09 | End: 2020-09-08

## 2019-09-09 RX ORDER — COLCHICINE 0.6 MG/1
0.6 TABLET ORAL DAILY
COMMUNITY
End: 2019-09-18

## 2019-09-09 NOTE — PROGRESS NOTES
"Gynecology    SUBJECTIVE:     Chief Complaint: Vaginal Itching (c/o itching for two months) and Vaginal Odor (has been "spraying vaginal area for smell")       History of Present Illness:  79 year old who presents with vaginal itching for several months as well as an odor.  Used diclofenac thinking it was for yeast.  Tried this without relief.  Tried summer's alan on the outside without relief.  However it helps with the odor.   Has not had any discharge.  She does have perspiration from her abdomen that drapes over her mons.  No vaginal bleeding.    Review of Systems:  Review of Systems   Genitourinary: Negative for pelvic pain, vaginal bleeding, vaginal discharge, vaginal pain, postmenopausal bleeding and vaginal odor.        OBJECTIVE:     Physical Exam:  Physical Exam   Constitutional: She is oriented to person, place, and time. She appears well-developed and well-nourished.   Pulmonary/Chest: Effort normal.   Abdominal: Soft.   Genitourinary: Vagina normal. No labial fusion. There is no rash, tenderness, lesion or injury on the right labia. There is no rash, tenderness, lesion or injury on the left labia. No erythema, tenderness or bleeding in the vagina. No foreign body in the vagina. No signs of injury around the vagina. No vaginal discharge found.   Genitourinary Comments: Urethra: normal appearing urethra with no masses, tenderness or lesions  Urethral meatus: normal size, anterior vaginal wall with no prolapse, no lesions     Neurological: She is alert and oriented to person, place, and time.   Skin: No pallor.   Psychiatric: She has a normal mood and affect. Her behavior is normal. Judgment and thought content normal.   Nursing note and vitals reviewed.      Chaperoned by: Dinh    ASSESSMENT:       ICD-10-CM ICD-9-CM    1. Vulvar itching L29.2 698.1 clotrimazole-betamethasone 1-0.05% (LOTRISONE) cream      Vaginosis Screen by DNA Probe          Plan:      Duran was seen today for vaginal itching and vaginal " odor.    Diagnoses and all orders for this visit:    Vulvar itching  -     clotrimazole-betamethasone 1-0.05% (LOTRISONE) cream; Apply to affected area 2 times daily  -     Vaginosis Screen by DNA Probe    - external genitalia without abnormality  - vaginosis screen sent today  - lotrisone cream prescribed for itching;   - discussed keeping her abdomen dry, perhaps using a pad under the fold of her skin to prevent sweat from dripping onto her vulva      Orders Placed This Encounter   Procedures    Vaginosis Screen by DNA Probe       No follow-ups on file.    Evelyn Brewer

## 2019-09-09 NOTE — LETTER
September 11, 2019      Tami Schmidt MD  [address]  [facility] [address]           [facility] 5th Floor  [address]  [facility] [address]  Phone: [phone]          Patient: Duran Giordano   MR Number: [id number]   YOB: 1940   Date of Visit: 9/9/2019       Dear Dr. Tami Schmidt:    Thank you for referring Duran Giordano to me for evaluation. Attached you will find relevant portions of my assessment and plan of care.    If you have questions, please do not hesitate to call me. I look forward to following Duran Giordano along with you.    Sincerely,    Evelyn Brewer MD    Enclosure  CC:  No Recipients    If you would like to receive this communication electronically, please contact [email or web address] or [phone] to request more information on [employer] Link access.    For providers and/or their staff who would like to refer a patient to [facility], please contact us through our one-stop-shop provider referral line, [facility], at [phone].    If you feel you have received this communication in error or would no longer like to receive these types of communications, please e-mail [email or web address]

## 2019-09-10 LAB
BACTERIAL VAGINOSIS DNA: NEGATIVE
CANDIDA GLABRATA DNA: NEGATIVE
CANDIDA KRUSEI DNA: NEGATIVE
CANDIDA RRNA VAG QL PROBE: NEGATIVE
T VAGINALIS RRNA GENITAL QL PROBE: NEGATIVE

## 2019-09-11 ENCOUNTER — TELEPHONE (OUTPATIENT)
Dept: OBSTETRICS AND GYNECOLOGY | Facility: CLINIC | Age: 79
End: 2019-09-11

## 2019-09-12 ENCOUNTER — LAB VISIT (OUTPATIENT)
Dept: LAB | Facility: HOSPITAL | Age: 79
End: 2019-09-12
Attending: INTERNAL MEDICINE
Payer: MEDICARE

## 2019-09-12 DIAGNOSIS — M1A.09X0 IDIOPATHIC CHRONIC GOUT OF MULTIPLE SITES WITHOUT TOPHUS: ICD-10-CM

## 2019-09-12 LAB
ALBUMIN SERPL BCP-MCNC: 3.5 G/DL (ref 3.5–5.2)
ALP SERPL-CCNC: 92 U/L (ref 55–135)
ALT SERPL W/O P-5'-P-CCNC: 15 U/L (ref 10–44)
ANION GAP SERPL CALC-SCNC: 8 MMOL/L (ref 8–16)
AST SERPL-CCNC: 23 U/L (ref 10–40)
BASOPHILS # BLD AUTO: 0.02 K/UL (ref 0–0.2)
BASOPHILS NFR BLD: 0.3 % (ref 0–1.9)
BILIRUB SERPL-MCNC: 0.3 MG/DL (ref 0.1–1)
BUN SERPL-MCNC: 33 MG/DL (ref 8–23)
CALCIUM SERPL-MCNC: 9.4 MG/DL (ref 8.7–10.5)
CHLORIDE SERPL-SCNC: 104 MMOL/L (ref 95–110)
CO2 SERPL-SCNC: 28 MMOL/L (ref 23–29)
CREAT SERPL-MCNC: 1.3 MG/DL (ref 0.5–1.4)
DIFFERENTIAL METHOD: ABNORMAL
EOSINOPHIL # BLD AUTO: 0.3 K/UL (ref 0–0.5)
EOSINOPHIL NFR BLD: 4.4 % (ref 0–8)
ERYTHROCYTE [DISTWIDTH] IN BLOOD BY AUTOMATED COUNT: 15.1 % (ref 11.5–14.5)
EST. GFR  (AFRICAN AMERICAN): 45.7 ML/MIN/1.73 M^2
EST. GFR  (NON AFRICAN AMERICAN): 39.7 ML/MIN/1.73 M^2
GLUCOSE SERPL-MCNC: 84 MG/DL (ref 70–110)
HCT VFR BLD AUTO: 43.3 % (ref 37–48.5)
HGB BLD-MCNC: 12.8 G/DL (ref 12–16)
IMM GRANULOCYTES # BLD AUTO: 0.01 K/UL (ref 0–0.04)
IMM GRANULOCYTES NFR BLD AUTO: 0.1 % (ref 0–0.5)
LYMPHOCYTES # BLD AUTO: 1.7 K/UL (ref 1–4.8)
LYMPHOCYTES NFR BLD: 22.3 % (ref 18–48)
MCH RBC QN AUTO: 27.6 PG (ref 27–31)
MCHC RBC AUTO-ENTMCNC: 29.6 G/DL (ref 32–36)
MCV RBC AUTO: 93 FL (ref 82–98)
MONOCYTES # BLD AUTO: 0.5 K/UL (ref 0.3–1)
MONOCYTES NFR BLD: 6.4 % (ref 4–15)
NEUTROPHILS # BLD AUTO: 5 K/UL (ref 1.8–7.7)
NEUTROPHILS NFR BLD: 66.5 % (ref 38–73)
NRBC BLD-RTO: 0 /100 WBC
PLATELET # BLD AUTO: 192 K/UL (ref 150–350)
PMV BLD AUTO: 12.2 FL (ref 9.2–12.9)
POTASSIUM SERPL-SCNC: 3.9 MMOL/L (ref 3.5–5.1)
PROT SERPL-MCNC: 8.7 G/DL (ref 6–8.4)
RBC # BLD AUTO: 4.64 M/UL (ref 4–5.4)
SODIUM SERPL-SCNC: 140 MMOL/L (ref 136–145)
URATE SERPL-MCNC: 8.4 MG/DL (ref 2.4–5.7)
WBC # BLD AUTO: 7.5 K/UL (ref 3.9–12.7)

## 2019-09-12 PROCEDURE — 85025 COMPLETE CBC W/AUTO DIFF WBC: CPT

## 2019-09-12 PROCEDURE — 80053 COMPREHEN METABOLIC PANEL: CPT

## 2019-09-12 PROCEDURE — 36415 COLL VENOUS BLD VENIPUNCTURE: CPT

## 2019-09-12 PROCEDURE — 84550 ASSAY OF BLOOD/URIC ACID: CPT

## 2019-09-18 ENCOUNTER — OFFICE VISIT (OUTPATIENT)
Dept: RHEUMATOLOGY | Facility: CLINIC | Age: 79
End: 2019-09-18
Payer: MEDICARE

## 2019-09-18 VITALS
HEIGHT: 59 IN | WEIGHT: 280 LBS | DIASTOLIC BLOOD PRESSURE: 64 MMHG | BODY MASS INDEX: 56.45 KG/M2 | SYSTOLIC BLOOD PRESSURE: 151 MMHG | HEART RATE: 82 BPM

## 2019-09-18 DIAGNOSIS — M1A.09X0 IDIOPATHIC CHRONIC GOUT OF MULTIPLE SITES WITHOUT TOPHUS: Primary | ICD-10-CM

## 2019-09-18 DIAGNOSIS — M18.11 PRIMARY OSTEOARTHRITIS OF FIRST CARPOMETACARPAL JOINT OF RIGHT HAND: ICD-10-CM

## 2019-09-18 DIAGNOSIS — M19.031 PRIMARY OSTEOARTHRITIS OF RIGHT WRIST: ICD-10-CM

## 2019-09-18 PROCEDURE — 99999 PR PBB SHADOW E&M-EST. PATIENT-LVL III: ICD-10-PCS | Mod: PBBFAC,,, | Performed by: INTERNAL MEDICINE

## 2019-09-18 PROCEDURE — 99214 OFFICE O/P EST MOD 30 MIN: CPT | Mod: S$PBB,,, | Performed by: INTERNAL MEDICINE

## 2019-09-18 PROCEDURE — 99214 PR OFFICE/OUTPT VISIT, EST, LEVL IV, 30-39 MIN: ICD-10-PCS | Mod: S$PBB,,, | Performed by: INTERNAL MEDICINE

## 2019-09-18 PROCEDURE — 99999 PR PBB SHADOW E&M-EST. PATIENT-LVL III: CPT | Mod: PBBFAC,,, | Performed by: INTERNAL MEDICINE

## 2019-09-18 PROCEDURE — 99213 OFFICE O/P EST LOW 20 MIN: CPT | Mod: PBBFAC | Performed by: INTERNAL MEDICINE

## 2019-09-18 RX ORDER — ALLOPURINOL 100 MG/1
100 TABLET ORAL DAILY
Qty: 30 TABLET | Refills: 6 | Status: SHIPPED | OUTPATIENT
Start: 2019-09-18 | End: 2020-07-14

## 2019-09-18 RX ORDER — COLCHICINE 0.6 MG/1
TABLET ORAL
Qty: 15 TABLET | Refills: 6 | Status: SHIPPED | OUTPATIENT
Start: 2019-09-18 | End: 2019-11-06 | Stop reason: SDUPTHER

## 2019-09-18 ASSESSMENT — ROUTINE ASSESSMENT OF PATIENT INDEX DATA (RAPID3)
TOTAL RAPID3 SCORE: 4.39
PAIN SCORE: 5.5
FATIGUE SCORE: 5.5
AM STIFFNESS SCORE: 1, YES
PSYCHOLOGICAL DISTRESS SCORE: 0
MDHAQ FUNCTION SCORE: .8
PATIENT GLOBAL ASSESSMENT SCORE: 5

## 2019-09-18 NOTE — PROGRESS NOTES
Chief Complaint   Patient presents with    Follow-up     gout and hand arthritis       Patient with chronic gout for a follow up    History of presenting illness    79 year old black female comes in with chronic gout for 10 years    Usually she has had     Acute onset pain in the     Ankles,feet : lasting for a week  Severe pain,swelling in the joint  The joint would turn red and blue  Precipitated by sea food  Cannot bear weight  Would get steroids,anti inflammatories    She was advised not to eat sea food    She did fine for a while     Last attack was 2 years ago    She then came with pain in the     Right wrist  Right MCP  For 3 weeks   Right middle foot pain for a day    Labs     GFR 50  Uric acid 8.5  ESR 72  H/H 11.9/39.9    Last year    APOLONIA neg  RF neg      Feet xrays  Marked swelling of ankle and feet soft tissues, pes planus, DJD tarsal joints particularly on left, bilateral hallux valgus deformity, DJD 1st MTP joints.  Focal cortical thickening medial aspects left 2nd digit shaft meta tarsal.    Hand xrays  Tiny remote calcification lateral margin base proximal phalanx 3rd MCP joint.  Prominent DJD changes, some with erosion, 1st metacarpal-carpal and radiocarpal joint and radioulnar joints especially on left.    Enlargement lunate, overlying triquetral, pisiform not seen best advantage.  No typical gout erosive disease or tophi.    Flattening distal ulnar without ulnar styloid, narrowing of ulnar wrist joint with apparent absence of tri angulated fibrocartilage.    She was supposed to be on allopurinol 50 mg and colchicine 0.6 mg and she didn't comply  We then put her back on it    Her uric acid is 8.4  GFR 45.7  CBC nml      She had mentioned chronic right wrist pain  We injected the right CMC   We did MRI right wrist  Ligaments: There is extensive tear of the TFCC, which appears markedly diminutive.  Scapholunate ligament not well seen.    Tendons: There is tendinosis and tenosynovitis of the adductor  pollicis longus and extensor pollicis brevis.  There is interstitial tear of the adductor pollicis brevis.  There is mild tenosynovitis of the extensor carpi radialis brevis and longus as well as the extensor carpi ulnaris.  The ECU is in the appropriate position.  Remaining extensor tendons and flexor tendons are unremarkable.    Bones: No fracture or infiltrative process.  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    Joints: There are severe degenerative changes at the radiocarpal joint with subchondral edema and cystic change predominantly within the distal radius and lunate.  Severe degenerative changes with subcortical cystic change in bulky osteophytes also noted at the base of thumb joint.  There is a small effusion at the wrist.    Miscellaneous: Carpal tunnel and Guyon's canal are unremarkable.      Impression       1. 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  2. First compartment tendinosis and tenosynovitis with interstitial tear of the adductor pollicis longus.  Mild tenosynovitis of the 2nd and 6th compartments.  3. Lunotriquetral coalition.  4. Severe base of thumb osteoarthritis.     We sent her to hand ortho  They injected her right hand de quervains  She has done well  She did OT and that has helped     Past history    HTN,venous stasis LE,b/l carotid artery disease,CAD,asthma,cataracts,CKD,diabetes  GERD,HLD,REJI   ,chronic diastolic HF  LS spine arthritis/sciatica     Family history  Mom cancer    Social history  Not a smoker or alcoholic      Review of Systems   Constitutional: Negative for activity change, appetite change, chills, diaphoresis, fatigue, fever and unexpected weight change.   HENT: Negative for congestion, dental problem, drooling, ear discharge, ear pain, facial swelling, hearing loss, mouth sores, nosebleeds, postnasal drip, rhinorrhea, sinus pressure, sinus pain, sneezing, sore throat, tinnitus, trouble swallowing and voice change.    Eyes: Negative for photophobia,  pain, discharge, redness, itching and visual disturbance.   Respiratory: Negative for apnea, cough, choking, chest tightness, shortness of breath, wheezing and stridor.    Cardiovascular: Negative for chest pain, palpitations and leg swelling.   Gastrointestinal: Negative for abdominal distention, abdominal pain, anal bleeding, blood in stool, constipation, diarrhea, nausea, rectal pain and vomiting.   Endocrine: Negative for cold intolerance, heat intolerance, polydipsia, polyphagia and polyuria.   Genitourinary: Negative for decreased urine volume, difficulty urinating, dysuria, enuresis, flank pain, frequency, genital sores, hematuria and urgency.   Musculoskeletal: Positive for arthralgias. Negative for back pain, gait problem, joint swelling, myalgias, neck pain and neck stiffness.   Skin: Negative for color change, pallor, rash and wound.   Allergic/Immunologic: Negative for environmental allergies, food allergies and immunocompromised state.   Neurological: Negative for dizziness, tremors, seizures, syncope, facial asymmetry, speech difficulty, weakness, light-headedness, numbness and headaches.   Hematological: Negative for adenopathy. Does not bruise/bleed easily.   Psychiatric/Behavioral: Negative for agitation, behavioral problems, confusion, decreased concentration, dysphoric mood, hallucinations, self-injury, sleep disturbance and suicidal ideas. The patient is not nervous/anxious and is not hyperactive.      Physical Exam     MATTHEWS-28 tender joint count: 0  MATTHEWS-28 swollen joint count: 0    Physical Exam   Constitutional: She is oriented to person, place, and time and well-developed, well-nourished, and in no distress. No distress.   HENT:   Head: Normocephalic.   Mouth/Throat: Oropharynx is clear and moist.   Eyes: Conjunctivae are normal. Pupils are equal, round, and reactive to light. Right eye exhibits no discharge. Left eye exhibits no discharge. No scleral icterus.   Neck: Normal range of motion. No  thyromegaly present.   Cardiovascular: Normal rate, regular rhythm, normal heart sounds and intact distal pulses.    Pulmonary/Chest: Effort normal and breath sounds normal. No stridor.   Abdominal: Soft. Bowel sounds are normal.   Lymphadenopathy:     She has no cervical adenopathy.   Neurological: She is alert and oriented to person, place, and time.   Skin: Skin is warm. No rash noted. She is not diaphoretic.     Psychiatric: Affect and judgment normal.   Musculoskeletal: Normal range of motion.           Assessment       79 year old black female comes in with chronic gout for 10 years    Initial attacks were in the ankles and feet : acute onset,pain,swelling and redness,triggered by sea food intake,responding to steroids and anti inflammatories    She presented with acute onset pain in the     Right wrist  Right MCP  Bottom of the right foot    Labs     GFR 50   Uric acid 8.5,down to 7.9 and then went upto 8.5   ESR 72  H/H 11.5/38.2    CBC,CMP stays stable    RF,CCP neg    Last year  APOLONIA neg  RF neg    She has   HTN,venous stasis LE,b/l carotid artery disease,CAD,asthma,cataracts,CKD,diabetes  GERD,HLD,REJI   ,chronic diastolic HF  LS spine arthritis/sciatica     No more gout attacks  She also had right wrist pain but when we injected the CMC and dequervains tendon she did great  She also did OT  There is lot of arthritis in the radiocarpal and CMC joints    Uric acid needs to be lesser than 6       1. Idiopathic chronic gout of multiple sites without tophus    2. Primary osteoarthritis of right wrist    3. Primary osteoarthritis of first carpometacarpal joint of right hand          F/u  problem     Plan    Continue hand splint and OT    ULT : allopurinol should never be stopped  Increase allopurinol to 100 mg daily   If well tolerated stay on the dose  Will follow CBC,CMP periodically  Cautious use of antibiotics like amoxicillin and ampicillin advised  If GI upset will split the dose of allopurinol      Colchicine 0.6 mg alternate daily suggested : for acute attack prophylaxis   Renal function is an issue,so will adjust dose of colchicine   Mild renal insufficiency noted  No liver disease  Not on cyclosporine,tacrolimus,clarithromycin,erythromycin,verapamil,diltiazem,ketoconazole  Need to assess CBC,CMP discussed  Will stop colchicine 3 months after the target uric acid of less than 6 is achieved,and he has no flares    Dietary modifications discussed : gave a hand out of all foods to avoid : avoid organ meat,red meat,seafood,shell fish,anchovies,sardines,alcohol particularly beer,fructose containing soft drinks  Its ok to consume moderate wine,diet drinks,dairy proteins,coffee  Eat cherries  Eat purine rich vegetables    Avoid beta blockers  Avoid diuretics  If he develops hyperlipidemia : fenofibrate helps since it lowers uric acid level     Blood pressure monitoring  Weight loss suggested  Diabetes monitoring    Labs in 3 months     Duran was seen today for follow-up.    Diagnoses and all orders for this visit:    Idiopathic chronic gout of multiple sites without tophus  -     CBC auto differential; Future  -     Comprehensive metabolic panel; Future  -     Uric acid; Future    Primary osteoarthritis of right wrist    Primary osteoarthritis of first carpometacarpal joint of right hand    Other orders  -     allopurinol (ZYLOPRIM) 100 MG tablet; Take 1 tablet (100 mg total) by mouth once daily.  -     colchicine (COLCRYS) 0.6 mg tablet; Take one tablet every other day

## 2019-09-19 ENCOUNTER — TELEPHONE (OUTPATIENT)
Dept: RHEUMATOLOGY | Facility: CLINIC | Age: 79
End: 2019-09-19

## 2019-09-19 NOTE — TELEPHONE ENCOUNTER
----- Message from Melanie Warren sent at 9/19/2019  3:56 PM CDT -----  Contact: Self   Pt is calling in regards to allopurinol (ZYLOPRIM) 100 MG tablet being picked up from the [facility]    [phone]

## 2019-09-19 NOTE — TELEPHONE ENCOUNTER
Called patient about the message she left concerning her medication she picked up from the pharmacy. I left a message for her to call me back

## 2019-09-25 DIAGNOSIS — E11.59 HYPERTENSION ASSOCIATED WITH DIABETES: Primary | ICD-10-CM

## 2019-09-25 DIAGNOSIS — I15.2 HYPERTENSION ASSOCIATED WITH DIABETES: Primary | ICD-10-CM

## 2019-09-26 RX ORDER — LOSARTAN POTASSIUM 100 MG/1
100 TABLET ORAL DAILY
Qty: 90 TABLET | Refills: 3 | Status: SHIPPED | OUTPATIENT
Start: 2019-09-26 | End: 2020-12-02 | Stop reason: SDUPTHER

## 2019-09-27 ENCOUNTER — TELEPHONE (OUTPATIENT)
Dept: PRIMARY CARE CLINIC | Facility: CLINIC | Age: 79
End: 2019-09-27

## 2019-09-27 NOTE — TELEPHONE ENCOUNTER
Can you recommend a replacement med and dose?    KJ  ----- Message from Gisele Sotelo sent at 9/27/2019  8:49 AM CDT -----  Regarding: losartan  Good Morning,    Losartan is currently unavailable.    Please send a new prescription for an alternative to [facility] if appropriate.    Thanks,  Gisele

## 2019-09-30 ENCOUNTER — TELEPHONE (OUTPATIENT)
Dept: PRIMARY CARE CLINIC | Facility: CLINIC | Age: 79
End: 2019-09-30

## 2019-10-01 NOTE — PROGRESS NOTES
Adherence packed for 28 days using Dispill:     Morning card:   Allopurinol 50 mg   Colchicine 0.6 mg   Torsemide 20 mg     Evening card:   Atorvastatin 80 mg   Losartan 100 mg   Vitamin D3 1,000 IU       *8/26/19 Aspirin 81 removed from pack. Colchicine 0.6 mg and allopurinol 50 mg added. Irbesartan 300 mg changed to losartan 100 mg.    *10/1/2019 - Losartan 100 mg tablets are on back order. Packed with 2-50 mg tablets this month

## 2019-10-16 ENCOUNTER — LAB VISIT (OUTPATIENT)
Dept: LAB | Facility: HOSPITAL | Age: 79
End: 2019-10-16
Attending: NURSE PRACTITIONER
Payer: MEDICARE

## 2019-10-16 DIAGNOSIS — Z79.4 TYPE 2 DIABETES MELLITUS WITH DIABETIC POLYNEUROPATHY, WITH LONG-TERM CURRENT USE OF INSULIN: ICD-10-CM

## 2019-10-16 DIAGNOSIS — E11.42 TYPE 2 DIABETES MELLITUS WITH DIABETIC POLYNEUROPATHY, WITH LONG-TERM CURRENT USE OF INSULIN: ICD-10-CM

## 2019-10-16 LAB
ESTIMATED AVG GLUCOSE: 146 MG/DL (ref 68–131)
HBA1C MFR BLD HPLC: 6.7 % (ref 4–5.6)

## 2019-10-16 PROCEDURE — 83036 HEMOGLOBIN GLYCOSYLATED A1C: CPT

## 2019-10-16 PROCEDURE — 36415 COLL VENOUS BLD VENIPUNCTURE: CPT

## 2019-10-16 NOTE — PROGRESS NOTES
Adherence packed for 28 days using Dispill:     Morning card:   Allopurinol 100 mg    Torsemide 20 mg     Evening card:   Atorvastatin 80 mg   Losartan 100 mg   Vitamin D3 1,000 IU

## 2019-10-21 ENCOUNTER — OFFICE VISIT (OUTPATIENT)
Dept: INTERNAL MEDICINE | Facility: CLINIC | Age: 79
End: 2019-10-21
Payer: MEDICARE

## 2019-10-21 VITALS
HEIGHT: 59 IN | SYSTOLIC BLOOD PRESSURE: 108 MMHG | BODY MASS INDEX: 57.25 KG/M2 | DIASTOLIC BLOOD PRESSURE: 60 MMHG | WEIGHT: 284 LBS

## 2019-10-21 DIAGNOSIS — I50.32 CHRONIC DIASTOLIC HEART FAILURE: ICD-10-CM

## 2019-10-21 DIAGNOSIS — E11.59 HYPERTENSION ASSOCIATED WITH DIABETES: ICD-10-CM

## 2019-10-21 DIAGNOSIS — E66.01 MORBID OBESITY WITH BMI OF 50.0-59.9, ADULT: ICD-10-CM

## 2019-10-21 DIAGNOSIS — E11.42 TYPE 2 DIABETES MELLITUS WITH DIABETIC POLYNEUROPATHY, WITH LONG-TERM CURRENT USE OF INSULIN: Primary | ICD-10-CM

## 2019-10-21 DIAGNOSIS — E55.9 VITAMIN D DEFICIENCY DISEASE: ICD-10-CM

## 2019-10-21 DIAGNOSIS — N18.30 CKD (CHRONIC KIDNEY DISEASE) STAGE 3, GFR 30-59 ML/MIN: ICD-10-CM

## 2019-10-21 DIAGNOSIS — M17.0 PRIMARY OSTEOARTHRITIS OF BOTH KNEES: ICD-10-CM

## 2019-10-21 DIAGNOSIS — I87.2 VENOUS STASIS DERMATITIS OF BOTH LOWER EXTREMITIES: ICD-10-CM

## 2019-10-21 DIAGNOSIS — Z79.4 TYPE 2 DIABETES MELLITUS WITH DIABETIC POLYNEUROPATHY, WITH LONG-TERM CURRENT USE OF INSULIN: Primary | ICD-10-CM

## 2019-10-21 DIAGNOSIS — I15.2 HYPERTENSION ASSOCIATED WITH DIABETES: ICD-10-CM

## 2019-10-21 DIAGNOSIS — G47.33 OSA ON CPAP: ICD-10-CM

## 2019-10-21 PROCEDURE — 99999 PR PBB SHADOW E&M-EST. PATIENT-LVL IV: CPT | Mod: PBBFAC,,, | Performed by: NURSE PRACTITIONER

## 2019-10-21 PROCEDURE — 99214 OFFICE O/P EST MOD 30 MIN: CPT | Mod: PBBFAC | Performed by: NURSE PRACTITIONER

## 2019-10-21 PROCEDURE — 99214 OFFICE O/P EST MOD 30 MIN: CPT | Mod: S$PBB,,, | Performed by: NURSE PRACTITIONER

## 2019-10-21 PROCEDURE — 99999 PR PBB SHADOW E&M-EST. PATIENT-LVL IV: ICD-10-PCS | Mod: PBBFAC,,, | Performed by: NURSE PRACTITIONER

## 2019-10-21 PROCEDURE — 99214 PR OFFICE/OUTPT VISIT, EST, LEVL IV, 30-39 MIN: ICD-10-PCS | Mod: S$PBB,,, | Performed by: NURSE PRACTITIONER
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
## 2019-10-21 NOTE — PATIENT INSTRUCTIONS
Snacks can be an important part of a balanced, healthy meal plan. They allow you to eat more frequently, feeling full and satisfied throughout the day. Also, they allow you to spread carbohydrates evenly, which may stabilize blood sugars.  Plus, snacks are enjoyable!     The amount of carbohydrate needed at snacks varies. Generally, about 15-30 grams of carbohydrate per snack is recommended.  Below you will find some tasty treats.       0-5 gm carb   Crystal Light   Vitamin Water Zero   Herbal tea, unsweetened   2 tsp peanut butter on celery   1./2 cup sugar-free jell-o   1 sugar-free popsicle   ¼ cup blueberries   8oz Blue Aniyah unsweetened almond milk   5 baby carrots & celery sticks, cucumbers, bell peppers dipped in ¼ cup salsa, 2Tbsp light ranch dressing or 2Tbsp plain Greek yogurt   10 Goldfish crackers   ½ oz low-fat cheese or string cheese   1 closed handful of nuts, unsalted   1 Tbsp of sunflower seeds, unsalted   1 cup Smart Pop popcorn   1 whole grain brown rice cake        15 gm carb   1 small piece of fruit or ½ banana or 1/2 cup lite canned fruit   3 ramon cracker squares   3 cups Smart Pop popcorn, top spray butter, York lite salt or cinnamon and Truvia   5 Vanilla Wafers   ½ cup low fat, no added sugar ice cream or frozen yogurt (Blue bell, Blue Bunny, Weight Watchers, Skinny Cow)   ½ turkey, ham, or chicken sandwich   ½ c fruit with ½ c Cottage cheese   4-6 unsalted wheat crackers with 1 oz low fat cheese or 1 tbsp peanut butter    30-45 goldfish crackers (depending on flavor)    7-8 Methodist mini brown rice cakes (caramel, apple cinnamon, chocolate)    12 Methodist mini brown rice cakes (cheddar, bbq, ranch)    1/3 cup hummus dip with raw veg   1/2 whole wheat jessica, 1Tbsp hummus   Mini Pizza (1/2 whole wheat English muffin, low-fat  cheese, tomato sauce)   100 calorie snack pack (Oreo, Chips Ahoy, Ritz Mix, Baked Cheetos)   4-6 oz. light or Greek Style yogurt  (Flores, Shila, Placido, [facility])   ½ cup sugar-free pudding     6 in. wheat tortilla or jessica oven toasted chips (topped with spray butter flavoring, cinnamon, Truvia OR spray butter, garlic powder, chili powder)    18 BBQ Popchips (available at Target, Whole Foods, Fresh Market)                   Diabetes Support Group Meetings         Date: Topic:   February 14 Eat Fit SIGRID/Health Promotion   March 14 Taking Care of Your Smile   April 11 Spring into Healthy Eating/Cooking Demo   May 9 Ease Your Mind with Diabetes   Carmen 13 Summer Treats/Cooking Demo   July 11 Eat Fit SIGRID/Super Market Sweep   August 8 Taking Care of Your Eyes and Feet   Sept 12 Technology/ADA updates   October 10 Recipes & Treats/Cooking Demo   November 14 Heart Health/Pump it up!   December 12 Year-End Close Out        Meetings are held in the [facility] (A) of the [facility] located at [address] [place] [address]. Please call [phone] for additional information.    Free service, offered every 2nd Thursday of every month! Family members and/or friends are welcome as well!  Support group is for patients with type 1 or type 2 diabetes.    From 3:30p to 4:30p

## 2019-10-22 ENCOUNTER — TELEPHONE (OUTPATIENT)
Dept: INTERNAL MEDICINE | Facility: CLINIC | Age: 79
End: 2019-10-22

## 2019-10-22 RX ORDER — INSULIN LISPRO 100 [IU]/ML
INJECTION, SOLUTION INTRAVENOUS; SUBCUTANEOUS
Qty: 2 BOX | Refills: 6
Start: 2019-10-22 | End: 2020-01-23

## 2019-10-22 RX ORDER — INSULIN DEGLUDEC 200 U/ML
INJECTION, SOLUTION SUBCUTANEOUS
Qty: 3 SYRINGE | Refills: 6
Start: 2019-10-22 | End: 2020-01-23

## 2019-10-22 NOTE — TELEPHONE ENCOUNTER
----- Message from LIBRA De Leon, CASEYP sent at 10/22/2019  8:13 AM CDT -----  Call w/ change  tresiba 45 units daily  humalog 15-5-15 B L D  Same scale  Change when starting jardiance pill in am  Drink plenty of water  Keep our office posted  a1c goal below 7.5%  Doing well-can cut insulin w/ pill added  thanks

## 2019-10-25 ENCOUNTER — PATIENT OUTREACH (OUTPATIENT)
Dept: ADMINISTRATIVE | Facility: OTHER | Age: 79
End: 2019-10-25

## 2019-10-25 DIAGNOSIS — J45.40 MODERATE PERSISTENT REACTIVE AIRWAY DISEASE WITHOUT COMPLICATION: ICD-10-CM

## 2019-10-25 RX ORDER — FLUTICASONE PROPIONATE AND SALMETEROL 500; 50 UG/1; UG/1
POWDER RESPIRATORY (INHALATION)
Qty: 60 EACH | Refills: 0 | Status: SHIPPED | OUTPATIENT
Start: 2019-10-25 | End: 2019-11-06 | Stop reason: SDUPTHER

## 2019-10-25 NOTE — ASSESSMENT & PLAN NOTE
Adrenal mass- adenoma stable since 2004 (1.8 cm and 8/13/12 (2 cm); stable 2016 2.4 cm  · Monitor   Health Maintenance Exam: age 19 year old     Chief Complaint   Patient presents with   • Physical     19 years 10 months   • Imm/Inj     Flu vaccine       History:  Gracia Whittington is a 19 year old female and is here for health maintenance exam.   Nursing notes reviewed.    · Concerns:    · Gracia has a history of hip pain, right greater then left.  She saw Dr. Rajput for this in 2017.  Her pain has been worse  for 2-3 months, no injury.  When she walks her hip pops, when it does this it can be hard to walk.  Her hip feels stiff if she walks a lot.  No swelling, redness, fever.  No weakness, numbness, tingling.  She would like to see ortho prior to seeing Dr. Rajput again.     · She has a history of headaches, these have improved.  They were daily and are now once per week.  No vision changes, night waking, vomiting, weakness, tingling, numbness, change in gait, change in bowel / bladder.    · She does have stomach aches after dairy.  She does also have stomachaches in the morning.  This has been long standing and has been staying the same.  No cough, chest pain, vomiting, bloody stools, diarrhea, constipation. She has been busy and is not always eating well, skips meals if busy.  She denies restriction, vomiting after eating, excessive exercise.    · She has anxiety and depression, was followed by psychiatry, her provider left and she needs to establish again.  She is on Effexor.   · She is followed by gynecology and is on ocps.      · Grade: Senior    School: [employer]    · Pre-participation history form:   · Hip pain as above  · Family history of asthma  · Muscle cramps with exercising  · Wears glasses / contacts  · Otherwise normal     · Adolescent questions:     · Lives with mom  · Doing ok in school.  Does have difficulty with her anxiety.   · Picky eater, not always eating with busy schedule  · No sports or activities  · Anxiety and depression, being treated.  Not suicidal.   · + sexual activity, one partner, using  condoms, on ocps, followed by gynecology  · No drugs, alcohol, tobacco, vaping    · PHQ-9:  Currently under treatment for depression.  Total score of 9.  She is not suicidal.     · Sleep concerns:  Has difficulty sleeping, takes Trazodone.      · Concerns about menstruation:  She was started on ocps for dysmenorrhea.  Is followed by gynecology.     · Dental:  Has had visit in past 12 months    · Second hand smoke exposure:  No  · Cholesterol / Heart Risk:  Low  · TB Risk:  Low    · Hearing/vision concerns:  None  · Vision screen: Sees eye doctor, has glasses / contracts  · Hearing screen: Pass at 20/25 dB for all frequencies    The patient's history and problem list were reviewed and updated as follows:  Patient Active Problem List    Diagnosis Date Noted   • Panic disorder with agoraphobia 10/15/2019     Priority: Low   • Major depressive disorder with single episode, in full remission (CMS/[facility]) 10/15/2019     Priority: Low   • JUSTUS (generalized anxiety disorder) 09/25/2017     Priority: Low   • Migraine with aura, without mention of intractable migraine without mention of status migrainosus 11/06/2014     Priority: Low        Past Medical History:   Diagnosis Date   • Allergic rhinitis, cause unspecified    • Anxiety    • Chronic headaches    • Depressive disorder    • Dizziness 11/6/2014   • History of dysmenorrhea    • Migraine with aura, without mention of intractable migraine without mention of status migrainosus 11/6/2014   • Mood changes 12/21/2014   • Motor and vocal tic disorder 2/2/2015   • Sleep walking        History reviewed. No pertinent surgical history.    ALLERGIES:   Allergen Reactions   • Elavil [Amitriptyline] DEPRESSION   • Imitrex [Sumatriptan Succinate] Other (See Comments)     Difficulty breathing   • Sertraline DEPRESSION   • Topamax Other (See Comments)     Mood issue       MEDICATIONS:  Effexor   Trazodone  Birth control     REVIEW OF SYSTEMS:   A 10 point review of systems was performed  including constitutional, EENT, cardiovascular, respiratory, gastrointestinal, genitourinary, skin, musculoskeletal and neurologic systems.  Apart from those items mentioned above, it is negative.    Physical:  The patient is alert and in no distress.  Vital Signs   Visit Vitals  /64   Pulse 80   Temp 98.1 °F (36.7 °C) (Tympanic)   Resp 16   Ht 5' 3.3\" (1.608 m)   Wt 44.7 kg   BMI 17.27 kg/m²       Head:  Normocephalic   Eyes:  Normal lids, conjunctivae and pupils. PERRL, EOMI.   Nose:  Normal turbinates and septum.    Ears: TMs normal. Ear canals normal.  Oropharynx: Moist mucous membranes without erythema or exudate.  Tonsils:  1+  Neck: Supple without significant adenopathy or masses.  Full range of motion.  Lungs: Clear to auscultation. No obvious tachypnea. Respiratory effort normal.  Heart: Regular rate and rhythm with normal heart sounds, no murmur. Precordium normal.  Abdomen: Soft and nontender, without mass or hepatosplenomegaly  Skin: Well-perfused without rash. Normal skin turgor.  Sports orthopedic exam normal. This includes range of motion of upper and lower extremities, rotator cuff strength testing, assessment of knee and ankle strength, assessment of gait, and forward-flexion examination of spine.  Neurologic: Normal muscle tone, strength and deep tendon reflexes. Facial movements normal and symmetric.   Genitalia: Deferred     ASSESSMENT:  · 19 year old female, well adolescent  · Right hip pain - has popping sound, stiffness when walks a lot.  This is a chronic problem that had improved, now worsening.    · Anxiety and depression - was followed by psychiatry, provider left, needs to establish with new provider.   · Dysmenorrhea, on ocps, followed by gynecology.   · History of headaches, improved.  · Weight loss and abdominal pain - not eating well (secondary to schedule), also has gastritis symptoms.     BEHAVIOR/GUIDANCE:  Anticipatory guidance provided as directed by the adolescent  questionnaire  and  the office visit discussion in regard to nutrition, activity, safety, and high-risk behaviors.      PLAN:  · Anticipatory guidance sheet ([employer])  · Sports clearance Yes  · Immunizations: Flu, Menactra, Bexsero  · Referral to ortho for evaluation and treatment of hip popping, stiffness.   · Continue current anxiety and depression medications.  Referral to psychiatry to establish care with new provider.    · Continue ocps and follow up with gynecology.   · Discussed nutrition.  To work on getting 3 balanced meals and snacks.  Omeprazole 20 mg daily for 8 weeks.  Screening labs ordered.  Will call with results.   · Follow up in 1 year  for WCE or sooner prn illness/concerns.    Amanda Hardin MD

## 2019-10-31 ENCOUNTER — PATIENT OUTREACH (OUTPATIENT)
Dept: ADMINISTRATIVE | Facility: OTHER | Age: 79
End: 2019-10-31

## 2019-11-06 ENCOUNTER — TELEPHONE (OUTPATIENT)
Dept: RHEUMATOLOGY | Facility: CLINIC | Age: 79
End: 2019-11-06

## 2019-11-06 ENCOUNTER — TELEPHONE (OUTPATIENT)
Dept: PRIMARY CARE CLINIC | Facility: CLINIC | Age: 79
End: 2019-11-06

## 2019-11-06 ENCOUNTER — OFFICE VISIT (OUTPATIENT)
Dept: PRIMARY CARE CLINIC | Facility: CLINIC | Age: 79
End: 2019-11-06
Payer: MEDICARE

## 2019-11-06 VITALS
OXYGEN SATURATION: 99 % | BODY MASS INDEX: 57.53 KG/M2 | HEIGHT: 59 IN | HEART RATE: 84 BPM | DIASTOLIC BLOOD PRESSURE: 60 MMHG | SYSTOLIC BLOOD PRESSURE: 120 MMHG | WEIGHT: 285.38 LBS

## 2019-11-06 DIAGNOSIS — I87.2 VENOUS STASIS DERMATITIS OF BOTH LOWER EXTREMITIES: ICD-10-CM

## 2019-11-06 DIAGNOSIS — J44.9 CHRONIC OBSTRUCTIVE PULMONARY DISEASE, UNSPECIFIED COPD TYPE: ICD-10-CM

## 2019-11-06 DIAGNOSIS — I89.0 LYMPHEDEMA OF BOTH LOWER EXTREMITIES: ICD-10-CM

## 2019-11-06 DIAGNOSIS — D69.2 SENILE PURPURA: ICD-10-CM

## 2019-11-06 DIAGNOSIS — J45.40 MODERATE PERSISTENT REACTIVE AIRWAY DISEASE WITHOUT COMPLICATION: ICD-10-CM

## 2019-11-06 DIAGNOSIS — M10.9 GOUT, UNSPECIFIED CAUSE, UNSPECIFIED CHRONICITY, UNSPECIFIED SITE: Primary | ICD-10-CM

## 2019-11-06 PROCEDURE — 99215 PR OFFICE/OUTPT VISIT, EST, LEVL V, 40-54 MIN: ICD-10-PCS | Mod: S$GLB,,, | Performed by: INTERNAL MEDICINE

## 2019-11-06 PROCEDURE — 99215 OFFICE O/P EST HI 40 MIN: CPT | Mod: S$GLB,,, | Performed by: INTERNAL MEDICINE

## 2019-11-06 RX ORDER — FLUTICASONE PROPIONATE AND SALMETEROL 500; 50 UG/1; UG/1
POWDER RESPIRATORY (INHALATION)
Qty: 60 EACH | Refills: 11 | Status: SHIPPED | OUTPATIENT
Start: 2019-11-06 | End: 2021-04-26 | Stop reason: SDUPTHER

## 2019-11-06 RX ORDER — COLCHICINE 0.6 MG/1
TABLET ORAL
Qty: 45 TABLET | Refills: 3 | Status: SHIPPED | OUTPATIENT
Start: 2019-11-06 | End: 2020-07-14

## 2019-11-06 NOTE — ASSESSMENT & PLAN NOTE
A1c 7.2 (improved from 10 previously), GFR improving  · Continue to control DM  · Avoid NSAIDs  · Monitor A1c  · Followed by Endocrinology  · May start trulicity

## 2019-11-06 NOTE — PATIENT INSTRUCTIONS
TODAY:  - PCP to message Dr KEATING about whether you should be taking colchicine every 2 days  - attend your sleep medicine appointment  - get help putting the wedge under your legs at night  - new inhaler at [facility]   + use the fluticasone-salmeterol discus inhaler twice daily

## 2019-11-06 NOTE — TELEPHONE ENCOUNTER
Please advise Sister Pasquale that I heard back from Dr KEATING and she should be taking her colchicine every 2 days. I sent a new script to [facility] pharmacy for this med.    Thanks,  KJ

## 2019-11-06 NOTE — TELEPHONE ENCOUNTER
----- Message from Ranjit Olson MD sent at 11/6/2019 11:32 AM CST -----  She is very noncompliant to diet or medications??  She is very nice but has issues understanding instructions  She has to take colchicine every other day    Thalia she will be one more person you have to schedule her during my call week  Second week of my call week is good      ----- Message -----  From: Tami Schmidt MD  Sent: 11/6/2019  10:56 AM CST  To: MD Dr RISHABH Velázquez,  Do you want Sister Pasquale to take colchicine every other day until her UA is 6? She is confused about these instructions and not taking the medication this way, and has not taken is since she last saw you in 9/2019. Do you want me to restart her on the colchicine q2 days? When do you want labs to monitor the UA?    Thanks,  BARB (PCP)

## 2019-11-06 NOTE — ASSESSMENT & PLAN NOTE
LE swelling bilaterally with poor wound healing, routine circaids with wound care  · Continue circaids boot, per wound care  · Treat cellulitis PRN, but is usually not the diagnosis  · Monitor CBC  · Discontinued amlodipine

## 2019-11-06 NOTE — Clinical Note
Dr KEATING,Do you want Sister Pasquale to take colchicine every other day until her UA is 6? She is confused about these instructions and not taking the medication this way, and has not taken is since she last saw you in 9/2019. Do you want me to restart her on the colchicine q2 days? When do you want labs to monitor the UA?Thanks,BARB (PCP)

## 2019-11-06 NOTE — PROGRESS NOTES
"Primary Care Provider Appointment    Subjective:      Patient ID: Duran Giordano is a 79 y.o. female with DM, HLD, HTN, gout    Chief Complaint: Follow-up and Cough    Patient venous stasis dermatitis. Leg swelling improves with cercaid use, but once the legs are dependent during the time that they are dependent. She wears circaids during the day, but sleeps in a chair at night with legs dependent.     She has 10# weight gain since 5/2019. Her A1c is trending down. Was switched to tresiba 52U daily, humalog 18U before breakfast and dinner. Continues to "eat like a pig." She is invited to numerous galas and the food at the mother house is reportedly delicious.    She missed her sleep apnea appointment, but will see Dr Tom in 2020. Her COPD is controlled on WYXELA inhaler (her insurance equivalent for advair).    BP is controlled on losartan and is compliant with statin.     Her gout symptoms are controlled on allopurinol. Is being tapered off of colchicine by Rheum (Dr KEATING), but patient is confused about these instructions and therefore stopped taking it. She is supposed to be taking it every 2 days. Per Dr KEATING's note "Will stop colchicine 3 months after the target uric acid of less than 6 is achieved,and he has no flares." Patient's UA is 8.4.    She participates in pill packs. "That was the best thing you could have done."  Adherence packed for 28 days using Dispill:      Morning card:   Allopurinol 100 mg    Torsemide 20 mg      Evening card:   Atorvastatin 80 mg   Losartan 100 mg   Vitamin D3 1,000 IU          Electronically signed by Rachel Aguirre, PharmD at 10/16/2019 11:06 AM       Past Surgical History:   Procedure Laterality Date    HYSTERECTOMY  1982    secondary uterine fibroids    JOINT REPLACEMENT      Right total knee replacement         Past Medical History:   Diagnosis Date    Adrenal mass- adenoma stable since 2004 (1.8 cm and 8/13/12 (2 cm); stable 2016 2.4 cm     Adrenal mass- adenoma " stable since 2004 (1.8 cm and 8/13/12 (2 cm); stable 2016 2.4 cm    Asthma in adult without complication 6/25/2015    Bilateral carotid artery disease 7/21/2017    Bilateral sciatica 2/7/2017    Cellulitis of right leg 08/16/2017    Cerebral infarction 1/29/2016    Multiple areas of lacunar infarction. Stroke risk factors include HTN, DM2, Dyslipidemia.  Continue ASA 81mg/ Statin therapy I have encouraged 30 minutes of physical activity daily for 5 days a week. She has access to a pool so this should not be so jarring to her joints.     Cervical radiculopathy 3/18/2015    Chronic knee pain     Chronic rhinitis 4/9/2013    CKD (chronic kidney disease) stage 3, GFR 30-59 ml/min 1/29/2013    Coronary artery disease due to calcified coronary lesion 6/25/2015    Diastolic dysfunction 10/10/2013    Essential hypertension 6/25/2015    Gastroesophageal reflux disease without esophagitis 8/13/2012    Gout     Hives 10/1/2013    Mixed hyperlipidemia 8/13/2012    Morbid obesity with BMI of 50.0-59.9, adult 2/11/2014    Obstructive sleep apnea syndrome 8/13/2012    Senile cataracts of both eyes 1/22/2015    Traumatic open wound of right lower leg 8/25/2017    Trigeminal neuralgia of right side of face 5/23/2017    For years now Previously on Gabapentin, but caused constipation Dissipating over time Not related to intracranial abnormalities Possibly related to dental procedure    Type 2 diabetes mellitus with diabetic polyneuropathy, with long-term current use of insulin 1/29/2013    Venous stasis dermatitis of both lower extremities 8/21/2017    Vitamin D deficiency disease 8/13/2012       Social History     Socioeconomic History    Marital status: Single     Spouse name: Not on file    Number of children: Not on file    Years of education: Not on file    Highest education level: Not on file   Occupational History    Not on file   Social Needs    Financial resource strain: Not hard at all    Food  "insecurity:     Worry: Never true     Inability: Never true    Transportation needs:     Medical: No     Non-medical: No   Tobacco Use    Smoking status: Never Smoker    Smokeless tobacco: Never Used   Substance and Sexual Activity    Alcohol use: No    Drug use: No    Sexual activity: Never   Lifestyle    Physical activity:     Days per week: Not on file     Minutes per session: Not on file    Stress: Not on file   Relationships    Social connections:     Talks on phone: Not on file     Gets together: Not on file     Attends Sabianist service: Not on file     Active member of club or organization: Not on file     Attends meetings of clubs or organizations: Not on file     Relationship status: Not on file   Other Topics Concern    Are you pregnant or think you may be? Not Asked    Breast-feeding Not Asked   Social History Narrative    Single with no children.        Review of Systems   Constitutional: Positive for activity change. Negative for appetite change and unexpected weight change.   Respiratory: Negative for shortness of breath and wheezing.    Cardiovascular: Positive for leg swelling.   Gastrointestinal: Negative for constipation and diarrhea.   Genitourinary: Negative for genital sores and vaginal discharge.   Skin: Positive for wound. Negative for color change and rash.   Hematological: Does not bruise/bleed easily.   Psychiatric/Behavioral: Positive for sleep disturbance. Negative for behavioral problems and decreased concentration.       Objective:   /60   Pulse 84   Ht 4' 11" (1.499 m)   Wt 129.5 kg (285 lb 6.2 oz)   SpO2 99%   BMI 57.64 kg/m²     Physical Exam   Constitutional: She appears well-developed.   Severe obesity   Eyes: EOM are normal.   Cardiovascular: Normal rate.   Pulmonary/Chest: She is in respiratory distress. She has wheezes.   Abdominal:   Obese abdomen   Musculoskeletal: She exhibits no edema, tenderness or deformity.   Neurological: She is alert.   Skin: "   Improved venous stasis   Psychiatric: She has a normal mood and affect.   Improving insight into disease       Lab Results   Component Value Date    WBC 7.50 09/12/2019    HGB 12.8 09/12/2019    HCT 43.3 09/12/2019     09/12/2019    CHOL 142 01/30/2019    TRIG 68 01/30/2019    HDL 37 (L) 01/30/2019    ALT 15 09/12/2019    AST 23 09/12/2019     09/12/2019    K 3.9 09/12/2019     09/12/2019    CREATININE 1.3 09/12/2019    BUN 33 (H) 09/12/2019    CO2 28 09/12/2019    TSH 0.746 05/21/2018    INR 1.1 04/17/2005    HGBA1C 6.7 (H) 10/16/2019             RESULTS: Reviewed labs and images today    Assessment:   79 y.o. female with multiple co-morbid illnesses here to continue work-up of chronic issues notably with DM, HLD, HTN, gout     Plan:     Problem List Items Addressed This Visit        Pulmonary    Reactive airway disease without complication    Relevant Medications    fluticasone-salmeterol diskus inhaler 500-50 mcg    Chronic obstructive pulmonary disease     Chronic COPD, symptoms controlled on inhalers  · Continue inhalers         Relevant Medications    fluticasone-salmeterol diskus inhaler 500-50 mcg       Cardiac/Vascular    Venous stasis dermatitis of both lower extremities     LE swelling bilaterally with poor wound healing, routine circaids with wound care  · Continue circaids boot, per wound care  · Treat cellulitis PRN, but is usually not the diagnosis  · Monitor CBC  · Discontinued amlodipine            Hematology    Senile purpura     Ecchymoses and purpura on UE bilaterally  · monitor            Endocrine    Uncontrolled secondary diabetes mellitus with stage 3 CKD (GFR 30-59)     A1c 7.2 (improved from 10 previously), GFR improving  · Continue to control DM  · Avoid NSAIDs  · Monitor A1c  · Followed by Endocrinology  · May start trulicity            Other    Lymphedema of both lower extremities     LE wounds resolved, daily circaid application by Sister Ana Arevalo or Doreen  Bacilio. Last seen by lymphedema clinic in 2014  · Continue circaid application daily  · Refuses stocking application at night  · Needs to elevate legs more at night               Health Maintenance       Date Due Completion Date    TETANUS VACCINE 03/08/1960 ---    Shingles Vaccine (2 of 3) 02/10/2015 12/16/2014    Influenza Vaccine (1) 09/01/2019 9/12/2018- PERFORMED AT [facility]    Override on 10/7/2015: Done    Override on 10/3/2014: Done    Override on 10/10/2013: Done    Override on 9/13/2011: Done    Foot Exam 11/27/2019 11/27/2018 (Done)- PLANNED    Override on 11/27/2018: Done (Dr Anand)    Override on 1/19/2018: Done (Dr anand)    Override on 5/31/2017: Done    Override on 7/20/2016: Done    Eye Exam 01/10/2020 1/10/2019    Override on 2/17/2016: Done    Override on 1/22/2015: Done    Override on 8/16/2012: Done    Lipid Panel 01/30/2020 1/30/2019    Mammogram 04/09/2020 4/9/2019    Hemoglobin A1c 04/16/2020 10/16/2019    Override on 7/13/2016: Done    Aspirin/Antiplatelet Therapy 10/21/2020 10/21/2019    DEXA SCAN 02/22/2021 2/22/2018    Override on 12/13/2011: Done          Follow up in about 6 weeks (around 12/18/2019). Total face-to-face time was 60 min, 50% of this was spent on counseling and coordination of care. The following issues were discussed: with DM, HLD, HTN, gout    Tami Schmidt MD/MPH  Internal Medicine  [facility]  [phone]

## 2019-11-06 NOTE — ASSESSMENT & PLAN NOTE
LE wounds resolved, daily circaid application by Sister Ana Arevalo or Doreen Ribera. Last seen by lymphedema clinic in 2014  · Continue circaid application daily  · Refuses stocking application at night  · Needs to elevate legs more at night

## 2019-11-19 ENCOUNTER — TELEPHONE (OUTPATIENT)
Dept: RHEUMATOLOGY | Facility: CLINIC | Age: 79
End: 2019-11-19

## 2019-11-19 NOTE — TELEPHONE ENCOUNTER
Tried to leave a message but the phone just rang. The pt wanted a sooner appointment than February but as of right now Dr. Olson does not have anything until march

## 2019-11-26 ENCOUNTER — PATIENT OUTREACH (OUTPATIENT)
Dept: ADMINISTRATIVE | Facility: OTHER | Age: 79
End: 2019-11-26

## 2019-11-26 ENCOUNTER — TELEPHONE (OUTPATIENT)
Dept: PODIATRY | Facility: CLINIC | Age: 79
End: 2019-11-26

## 2019-11-26 NOTE — TELEPHONE ENCOUNTER
----- Message from Riki Peterson sent at 11/26/2019  9:55 AM CST -----  Contact: self  Pt states she need an earlier appointment time due to no transportation after 12noon Pt ask for a call      Contact info

## 2019-11-27 ENCOUNTER — TELEPHONE (OUTPATIENT)
Dept: PRIMARY CARE CLINIC | Facility: CLINIC | Age: 79
End: 2019-11-27

## 2019-11-27 ENCOUNTER — TELEPHONE (OUTPATIENT)
Dept: PODIATRY | Facility: CLINIC | Age: 79
End: 2019-11-27

## 2019-11-27 ENCOUNTER — TELEPHONE (OUTPATIENT)
Dept: INTERNAL MEDICINE | Facility: CLINIC | Age: 79
End: 2019-11-27

## 2019-11-27 ENCOUNTER — CLINICAL SUPPORT (OUTPATIENT)
Dept: DIABETES | Facility: CLINIC | Age: 79
End: 2019-11-27
Payer: MEDICARE

## 2019-11-27 DIAGNOSIS — E11.42 TYPE 2 DIABETES MELLITUS WITH DIABETIC POLYNEUROPATHY, WITH LONG-TERM CURRENT USE OF INSULIN: ICD-10-CM

## 2019-11-27 DIAGNOSIS — Z79.4 TYPE 2 DIABETES MELLITUS WITH DIABETIC POLYNEUROPATHY, WITH LONG-TERM CURRENT USE OF INSULIN: ICD-10-CM

## 2019-11-27 PROCEDURE — G0108 DIAB MANAGE TRN  PER INDIV: HCPCS | Mod: PBBFAC

## 2019-11-27 NOTE — TELEPHONE ENCOUNTER
Please advise patient that the best way to get off of colchicine is to reduce her need for the diuretic. The more she elevates her legs and uses her compression the less she'll need daily torsemide.    Is she taking the colchicine every other day?    Thanks,  KJ

## 2019-11-27 NOTE — PROGRESS NOTES
Diabetes Education  Author: Tatyana Mtz RN  Date: 11/27/2019    Diabetes Care Management Summary  Diabetes Education Record Assessment/Progress: (Pt presents for Diabetic education follow-up visit. Seen by RN Educator on 7-24-19. Reviewed the following: medication regimen compliance, dietary compliance, blood sugar monitoring compliance and physical exercise compliance. Updated goals.)  Current Diabetes Risk Level: Low         Diabetes Type  Diabetes Type : Type II    Diabetes History  Diabetes Diagnosis: >10 years  Current Treatment: Insulin  Reviewed Problem List with Patient: Yes    Health Maintenance was reviewed today with patient. Discussed with patient importance of routine eye exams, foot exams/foot care, blood work (i.e.: A1c, microalbumin, and lipid), dental visits, yearly flu vaccine, and pneumonia vaccine as indicated by PCP. Patient verbalized understanding.     Health Maintenance Topics with due status: Not Due       Topic Last Completion Date    DEXA SCAN 02/22/2018    Eye Exam 01/10/2019    Lipid Panel 01/30/2019    Mammogram 04/09/2019    Hemoglobin A1c 10/16/2019    Aspirin/Antiplatelet Therapy 10/21/2019     Health Maintenance Due   Topic Date Due    TETANUS VACCINE  03/08/1960    Foot Exam  11/27/2019       Nutrition  Meal Planning: (Information was obtained at Initial visit for Diabetes Education. This visit is the Follow-up Diabetes Education visit. )    Monitoring   Monitoring: (Information was obtained at Initial visit for Diabetes Education. This visit is the Follow-up Diabetes Education visit. )    Exercise   Exercise Type: (Information was obtained at Initial visit for Diabetes Education. This visit is the Follow-up Diabetes Education visit. )    Current Diabetes Treatment   Current Treatment: Insulin    Social History  Preferred Learning Method: Face to Face, Demonstration, Reading Materials  Smoking Status: Never a Smoker    Barriers to Change  Barriers to Change: Functional  limitation(uses wheelchair)  Learning Challenges : None    Readiness to Learn   Readiness to Learn : Acceptance    Cultural Influences  Cultural Influences: None    Diabetes Education Assessment/Progress      Diabetes Disease Process (diabetes disease process and treatment options): Discussion, Instructed, Individual Session, Written Materials Provided, Demonstrates Understanding/Competency(verbalizes/demonstrates)  Patient reports being receptive to Diabetic treatment options, especially dietary and lifestyle changes as well as medication additions and/or changes.     Nutrition (Incorporating nutritional management into one's lifestyle): Discussion, Instructed, Individual Session, Written Materials Provided, Demonstrates Understanding/Competency (verbalizes/demonstrates)  Patient reports being compliant with recommendation of cutting back on CARBOHYDRATES. Patient has chosen to follow the plate method to adhere with CARBOHYDRATE restrictions. Patient reports measuring food to stay within allocated serving per the Plate Method and reading food labels in attempt to adhere to carbohydrate restrictions.    Physical Activity (incorporating physical activity into one's lifestyle): Discussion, Instructed, Individual Session, Written Materials Provided, Demonstrates Understanding/Competency (verbalizes/demonstrates)  Patient reports taking strides in improving physical activity. Patient verbalizes understanding of recommendation to achieve increased heart rate with physical activity for sustained duration as tolerated. Reviewed Physical Activity goals of >150 minutes weekly.     Medications (states correct name, dose, onset, peak, duration, side effects & timing of meds): Discussion, Instructed, Individual Session, Written Materials Provided, Demonstrates Understanding/Competency(verbalizes/demonstrates)  Patient reports being adherent with prescribed medication regimen.     Monitoring (monitoring blood glucose/other  parameters & using results): Discussion, Instructed, Individual Session, Written Materials Provided, Demonstrates Understanding/Competency (verbalizes/demonstrates)  Patient reports monitoring blood sugar BID times per day. Patient provides blood sugar logs, scanned into chart for MD to review. Patient aware to bring blood sugar logs and glucometer to appointment with Endo MD and PCP.     Acute Complications (preventing, detecting, and treating acute complications): Discussion, Instructed, Individual Session, Written Materials Provided, Demonstrates Understanding/Competency (verbalizes/demonstrates)  Patient reports knowing the signs and symptoms of hyperglycemia and hypoglycemia. Patient reports knowing the appropriate treatments for hyperglycemia and hypoglycemia.     Chronic Complications (preventing, detecting, and treating chronic complications): Discussion, Instructed, Individual Session, Written Materials Provided, Demonstrates Understanding/Competency (verbalizes/demonstrates)  Patient reports understanding of annual diabetes care schedule.       Clinical (diabetes, other pertinent medical history, and relevant comorbidities reviewed during visit): Not Covered/Deferred(Education was provided at Initial visit for Diabetes Education. This visit is the Follow-up Diabetes Education visit.)      Cognitive (knowledge of self-management skills, functional health literacy): Not Covered/Deferred(Education was provided at Initial visit for Diabetes Education. This visit is the Follow-up Diabetes Education visit.)      Psychosocial (emotional response to diabetes): Not Covered/Deferred(Unable to complete due to Time Constraints)      Diabetes Distress and Support Systems: Not Covered/Deferred(Unable to complete due to Time Constraints)        Behavioral (readiness for change, lifestyle practices, self-care behaviors): Not Covered/Deferred(Education was provided at Initial visit for Diabetes Education. This visit is the  Follow-up Diabetes Education visit.)        Goals  Patient has selected/evaluated goals during today's session: Yes, evaluated  Healthy Eating: % Met(Pt utilizing plate method when counting carbs)  Met Percentage : 75%  Medications: % Met  Met Percentage : 100%    Diabetes Self-Management Support Plan  Diabetes Learning: DM websites, DM apps, DM support group  Exercise/Nutrition: apps, websites  Stress Management: family, friends, [employer]  Review Status: Patient has selected and agrees to support plan., Patient was provided Diabetes Self-Management Support Plan document that includes support options.    Diabetes Care Plan/Intervention  Education Plan/Intervention: Other(Patient to follow-up with DM educator as needed, patient will need to be seen for DM education annually.)    Diabetes Meal Plan  Restrictions: Restricted Carbohydrate  Carbohydrate Per Meal: 30-45g  Carbohydrate Per Snack : 7-15g    Today's Self-Management Care Plan was developed with the patient's input and is based on barriers identified during today's assessment.    The long and short-term goals in the care plan were written with the patient/caregiver's input. The patient has agreed to work toward these goals to improve her overall diabetes control.      The patient received a copy of today's self-management plan and verbalized understanding of the care plan, goals, and all of today's instructions.      The patient was encouraged to communicate with her physician and care team regarding her condition(s) and treatment.  I provided the patient with my contact information today and encouraged her to contact me via phone or patient portal as needed.     Education Units of Time   Time Spent: 60 min

## 2019-11-27 NOTE — TELEPHONE ENCOUNTER
----- Message from Neva Peoples sent at 11/27/2019  2:18 PM CST -----  Contact: pt   Please call pt at [phone] or [phone]    Patient would like to reschedule her current appt to another sooner appt but before 12 noon (any day is ok)    Thank you

## 2019-11-27 NOTE — LETTER
November 27, 2019      Yogi Contreras, APRN, FNP  [address]  [facility] [id number]         Patient: Sister Duran Giordano   MR Number: [id number]   YOB: 1940   Date of Visit: 11/27/2019       Dear Dr. Contreras:    Thank you for referring Sister Duran Giordano for diabetes self-management education and support. She has completed all components of our Diabetes Management Program and her Self-Management Support Plan. Below is a summary of her clinical outcomes and goal progress.    Patient Outcomes:    A1c Status:   Lab Results   Component Value Date    HGBA1C 6.7 (H) 10/16/2019    HGBA1C 7.7 (H) 06/11/2019     Goals  Patient has selected/evaluated goals during today's session: Yes, evaluated  Healthy Eating: % Met(Pt utilizing plate method when counting carbs)  Met Percentage : 75%  Medications: % Met  Met Percentage : 100%    Diabetes Self-Management Support Plan  Diabetes Learning: DM websites, DM apps, DM support group  Exercise/Nutrition: apps, websites  Stress Management: family, friends, [employer]  Review Status: Patient has selected and agrees to support plan., Patient was provided Diabetes Self-Management Support Plan document that includes support options.    Follow up:   · Sister Duran Giordano to follow diabetes support plan indicated above  · Sister Duran Giordano to attend medical appointments as scheduled  · Sister Duran Giordano to update you on her DM education progress as needed      If you have questions, please do not hesitate to call me. I look forward to providing additional education and support as needed.    Sincerely,    Tatyana Mtz RN

## 2019-11-29 ENCOUNTER — TELEPHONE (OUTPATIENT)
Dept: PRIMARY CARE CLINIC | Facility: CLINIC | Age: 79
End: 2019-11-29

## 2019-11-29 NOTE — TELEPHONE ENCOUNTER
Delivered the PCP's message to RN  Advise patient that the best way to get off of colchicine is to reduce her need for the diuretic. The more she elevates her legs and uses her compression the less she'll need daily torsemide.     Is she taking the colchicine every other day?  Pt stated she is taking the colchicine every other day.

## 2019-12-12 NOTE — PROGRESS NOTES
Adherence packed for 56 days using Dispill:     Morning card:   Allopurinol 100 mg   Torsemide 20 mg     Evening card:   Atorvastatin 80 mg   Losartan 100 mg   Vitamin D3 1,000 IU

## 2019-12-27 ENCOUNTER — PATIENT OUTREACH (OUTPATIENT)
Dept: ADMINISTRATIVE | Facility: OTHER | Age: 79
End: 2019-12-27

## 2019-12-31 ENCOUNTER — OFFICE VISIT (OUTPATIENT)
Dept: PODIATRY | Facility: CLINIC | Age: 79
End: 2019-12-31
Payer: MEDICARE

## 2019-12-31 VITALS
SYSTOLIC BLOOD PRESSURE: 182 MMHG | DIASTOLIC BLOOD PRESSURE: 76 MMHG | RESPIRATION RATE: 20 BRPM | WEIGHT: 283 LBS | BODY MASS INDEX: 57.05 KG/M2 | HEART RATE: 90 BPM | HEIGHT: 59 IN

## 2019-12-31 DIAGNOSIS — E11.51 TYPE II DIABETES MELLITUS WITH PERIPHERAL CIRCULATORY DISORDER: Primary | ICD-10-CM

## 2019-12-31 DIAGNOSIS — B35.1 ONYCHOMYCOSIS DUE TO DERMATOPHYTE: ICD-10-CM

## 2019-12-31 DIAGNOSIS — L84 CORN OR CALLUS: ICD-10-CM

## 2019-12-31 PROCEDURE — 11721 PR DEBRIDEMENT OF NAILS, 6 OR MORE: ICD-10-PCS | Mod: Q9,59,S$PBB, | Performed by: PODIATRIST

## 2019-12-31 PROCEDURE — 99999 PR PBB SHADOW E&M-EST. PATIENT-LVL III: CPT | Mod: PBBFAC,,, | Performed by: PODIATRIST

## 2019-12-31 PROCEDURE — 11057 PR TRIM BENIGN HYPERKERATOTIC SKIN LESION,>4: ICD-10-PCS | Mod: Q9,S$PBB,, | Performed by: PODIATRIST

## 2019-12-31 PROCEDURE — 11721 DEBRIDE NAIL 6 OR MORE: CPT | Mod: Q9,59,PBBFAC | Performed by: PODIATRIST

## 2019-12-31 PROCEDURE — 99499 NO LOS: ICD-10-PCS | Mod: S$PBB,,, | Performed by: PODIATRIST

## 2019-12-31 PROCEDURE — 99999 PR PBB SHADOW E&M-EST. PATIENT-LVL III: ICD-10-PCS | Mod: PBBFAC,,, | Performed by: PODIATRIST

## 2019-12-31 PROCEDURE — 11057 PARNG/CUTG B9 HYPRKR LES >4: CPT | Mod: Q9,PBBFAC | Performed by: PODIATRIST

## 2019-12-31 PROCEDURE — 99213 OFFICE O/P EST LOW 20 MIN: CPT | Mod: PBBFAC,25 | Performed by: PODIATRIST

## 2019-12-31 PROCEDURE — 99499 UNLISTED E&M SERVICE: CPT | Mod: S$PBB,,, | Performed by: PODIATRIST

## 2019-12-31 PROCEDURE — 11721 DEBRIDE NAIL 6 OR MORE: CPT | Mod: Q9,59,S$PBB, | Performed by: PODIATRIST

## 2019-12-31 PROCEDURE — 11057 PARNG/CUTG B9 HYPRKR LES >4: CPT | Mod: Q9,S$PBB,, | Performed by: PODIATRIST

## 2019-12-31 NOTE — PROGRESS NOTES
Subjective:      Patient ID: Duran Giordano is a 79 y.o. female.    Chief Complaint: PCP (LIBRA De Leon, RANULFO 10/21/19); Diabetic Foot Exam; Nail Care; and Foot Swelling    Duran is a 79 y.o. female who presents to the clinic for evaluation and treatment of high risk feet. Duran has a past medical history of Adrenal mass- adenoma stable since 2004 (1.8 cm and 8/13/12 (2 cm); stable 2016 2.4 cm, Asthma in adult without complication (6/25/2015), Bilateral carotid artery disease (7/21/2017), Bilateral sciatica (2/7/2017), Cellulitis of right leg (08/16/2017), Cerebral infarction (1/29/2016), Cervical radiculopathy (3/18/2015), Chronic knee pain, Chronic rhinitis (4/9/2013), CKD (chronic kidney disease) stage 3, GFR 30-59 ml/min (1/29/2013), Coronary artery disease due to calcified coronary lesion (6/25/2015), Diastolic dysfunction (10/10/2013), Essential hypertension (6/25/2015), Gastroesophageal reflux disease without esophagitis (8/13/2012), Gout, Hives (10/1/2013), Mixed hyperlipidemia (8/13/2012), Morbid obesity with BMI of 50.0-59.9, adult (2/11/2014), Obstructive sleep apnea syndrome (8/13/2012), Senile cataracts of both eyes (1/22/2015), Traumatic open wound of right lower leg (8/25/2017), Trigeminal neuralgia of right side of face (5/23/2017), Type 2 diabetes mellitus with diabetic polyneuropathy, with long-term current use of insulin (1/29/2013), Venous stasis dermatitis of both lower extremities (8/21/2017), and Vitamin D deficiency disease (8/13/2012). The patient's chief complaint is  HRFC  This patient has documented high risk feet requiring routine maintenance secondary to diabetes mellitis and those secondary complications of diabetes, as mentioned.    PCP: Tami Schmidt MD    Date Last Seen by PCP:   Chief Complaint   Patient presents with    PCP     LIBRA De Leon, RANULFO 10/21/19    Diabetic Foot Exam    Nail Care    Foot Swelling        Current shoe gear:  Affected Foot:  "Casual shoes     Unaffected Foot: Casual shoes    Hemoglobin A1C   Date Value Ref Range Status   10/16/2019 6.7 (H) 4.0 - 5.6 % Final     Comment:     ADA Screening Guidelines:  5.7-6.4%  Consistent with prediabetes  >or=6.5%  Consistent with diabetes  High levels of fetal hemoglobin interfere with the HbA1C  assay. Heterozygous hemoglobin variants (HbS, HgC, etc)do  not significantly interfere with this assay.   However, presence of multiple variants may affect accuracy.     06/11/2019 7.7 (H) 4.0 - 5.6 % Final     Comment:     ADA Screening Guidelines:  5.7-6.4%  Consistent with prediabetes  >or=6.5%  Consistent with diabetes  High levels of fetal hemoglobin interfere with the HbA1C  assay. Heterozygous hemoglobin variants (HbS, HgC, etc)do  not significantly interfere with this assay.   However, presence of multiple variants may affect accuracy.     01/30/2019 7.5 (H) 4.0 - 5.6 % Final     Comment:     ADA Screening Guidelines:  5.7-6.4%  Consistent with prediabetes  >or=6.5%  Consistent with diabetes  High levels of fetal hemoglobin interfere with the HbA1C  assay. Heterozygous hemoglobin variants (HbS, HgC, etc)do  not significantly interfere with this assay.   However, presence of multiple variants may affect accuracy.         Review of Systems   Cardiovascular: Positive for leg swelling.   Skin: Positive for dry skin and nail changes. Negative for flushing, itching, rash and skin cancer.   Neurological: Positive for numbness. Negative for paresthesias.           Objective:       Vitals:    12/31/19 0934   BP: (!) 182/76   Pulse: 90   Resp: 20   Weight: 128.4 kg (283 lb)   Height: 4' 11" (1.499 m)   PainSc: 0-No pain        Physical Exam   Constitutional: She is oriented to person, place, and time. She appears well-developed and well-nourished.   Cardiovascular:   Pulses:       Dorsalis pedis pulses are 2+ on the right side, and 2+ on the left side.        Posterior tibial pulses are 2+ on the right side, and " 2+ on the left side.   Dorsalis pedis and posterior tibial pulses are palpable bilaterally. Toes are cool to touch. Feet are warm proximally.There is decreased digital hair bilateral. Skin is atrophic, hyperpigmented, and moderately edematous.     Musculoskeletal: She exhibits no tenderness.        Right ankle: Normal.        Left ankle: Normal.        Right foot: There is no swelling, no crepitus and no deformity.        Left foot: There is no swelling, no crepitus and no deformity.   Adequate joint range of motion without pain, limitation, nor crepitation Bilateral feet and ankle joints. Muscle strength is 5/5 in all groups bilaterally.         Lymphadenopathy:   B/l lymph edema    Neurological: She is alert and oriented to person, place, and time. She has normal strength.   Decreased sharp/dull sensation bilateral feet.   Skin: Skin is warm, dry and intact. No abrasion, no bruising, no burn, no lesion and no rash noted. No erythema. No pallor. Nails show no clubbing.   Nails x10 are elongated by  4-6mm's, thickened by 2-3 mm's, dystrophic, and are darkened in  coloration . Xerosis Bilaterally. No open lesions noted.    Hyperkeratotic tissue noted to calcaneal rim b/l, distal 2nd toe b/l, plantar heel b/l     Psychiatric: She has a normal mood and affect. Her behavior is normal.   Nursing note and vitals reviewed.            Assessment:       Encounter Diagnoses   Name Primary?    Type II diabetes mellitus with peripheral circulatory disorder Yes    Onychomycosis due to dermatophyte     Corn or callus          Plan:       Duran was seen today for pcp, diabetic foot exam, nail care and foot swelling.    Diagnoses and all orders for this visit:    Type II diabetes mellitus with peripheral circulatory disorder    Onychomycosis due to dermatophyte    Corn or callus    - Patient was given written and verbal instructions regarding foot condition.  I counseled the patient on her conditions, their implications and  medical management.    Shoe inspection. Diabetic Foot Education. Patient reminded of the importance of good nutrition and blood sugar control to help prevent podiatric complications of diabetes. Patient instructed on proper foot hygeine. We discussed wearing proper shoe gear, daily foot inspections, never walking without protective shoe gear, never putting sharp instruments to feet    - With patient's permission, nails were aggressively reduced and debrided x 10 to their soft tissue attachment mechanically and with electric , removing all offending nail and debris. Patient relates relief following the procedure. She will continue to monitor the areas daily, inspect her feet, wear protective shoe gear when ambulatory, moisturizer to maintain skin integrity and follow in this office in approximately 2-3 months, sooner p.r.n.    - After cleansing the  area w/ alcohol prep pad the above mentioned hyperkeratosis was trimmed utilizing No 15 scapel, to a smooth base with out incident. Patient tolerated this  well and reported comfort to the area of  calcaneal rim b/l, distal 2nd toe b/l, plantar heel b/l    - small SDTI plantar L heel . No skin break down noted.    Advised patient  to remove all pressure from heels from bed/recliner and any other location the patient may rest his/her feet. Recommended to float heels at all times or as much as possible. Discussed Multi Podis boots and foam wedges     - Return to clinic in 3m or sooner if problems arise

## 2020-01-02 ENCOUNTER — TELEPHONE (OUTPATIENT)
Dept: PRIMARY CARE CLINIC | Facility: CLINIC | Age: 80
End: 2020-01-02

## 2020-01-02 NOTE — TELEPHONE ENCOUNTER
----- Message from Zena Contreras sent at 1/2/2020  3:45 PM CST -----  Contact: Patient [phone]  Request call back this evening. Something came up and needs to cancel.   Needs to reschedule between 9:00- 12:00 and not on a Friday.     Please call and advise  Thank you

## 2020-01-02 NOTE — TELEPHONE ENCOUNTER
Spoke to Sr. Duran Logan, she has to cancel her appt due to transportation.  She is requesting to come in on either 1/16 or 1/23 when she will be for other appts.

## 2020-01-16 ENCOUNTER — OFFICE VISIT (OUTPATIENT)
Dept: PRIMARY CARE CLINIC | Facility: CLINIC | Age: 80
End: 2020-01-16
Payer: MEDICARE

## 2020-01-16 ENCOUNTER — LAB VISIT (OUTPATIENT)
Dept: LAB | Facility: HOSPITAL | Age: 80
End: 2020-01-16
Payer: MEDICARE

## 2020-01-16 ENCOUNTER — TELEPHONE (OUTPATIENT)
Dept: PRIMARY CARE CLINIC | Facility: CLINIC | Age: 80
End: 2020-01-16

## 2020-01-16 VITALS
WEIGHT: 289 LBS | BODY MASS INDEX: 58.26 KG/M2 | SYSTOLIC BLOOD PRESSURE: 128 MMHG | OXYGEN SATURATION: 97 % | HEART RATE: 74 BPM | DIASTOLIC BLOOD PRESSURE: 80 MMHG | HEIGHT: 59 IN

## 2020-01-16 DIAGNOSIS — E27.8 ADRENAL MASS: ICD-10-CM

## 2020-01-16 DIAGNOSIS — G47.33 OSA ON CPAP: ICD-10-CM

## 2020-01-16 DIAGNOSIS — I89.0 LYMPHEDEMA OF BOTH LOWER EXTREMITIES: ICD-10-CM

## 2020-01-16 DIAGNOSIS — E11.59 HYPERTENSION ASSOCIATED WITH DIABETES: ICD-10-CM

## 2020-01-16 DIAGNOSIS — I77.1 TORTUOUS AORTA: ICD-10-CM

## 2020-01-16 DIAGNOSIS — M10.00 IDIOPATHIC GOUT, UNSPECIFIED CHRONICITY, UNSPECIFIED SITE: ICD-10-CM

## 2020-01-16 DIAGNOSIS — I50.32 CHRONIC DIASTOLIC HEART FAILURE: ICD-10-CM

## 2020-01-16 DIAGNOSIS — Z79.4 TYPE 2 DIABETES MELLITUS WITH DIABETIC POLYNEUROPATHY, WITH LONG-TERM CURRENT USE OF INSULIN: ICD-10-CM

## 2020-01-16 DIAGNOSIS — L97.919 VENOUS ULCER OF RIGHT LEG: ICD-10-CM

## 2020-01-16 DIAGNOSIS — I15.2 HYPERTENSION ASSOCIATED WITH DIABETES: ICD-10-CM

## 2020-01-16 DIAGNOSIS — D69.2 SENILE PURPURA: ICD-10-CM

## 2020-01-16 DIAGNOSIS — J44.9 CHRONIC OBSTRUCTIVE PULMONARY DISEASE, UNSPECIFIED COPD TYPE: Primary | ICD-10-CM

## 2020-01-16 DIAGNOSIS — E66.01 MORBID OBESITY WITH BMI OF 50.0-59.9, ADULT: ICD-10-CM

## 2020-01-16 DIAGNOSIS — E11.42 TYPE 2 DIABETES MELLITUS WITH DIABETIC POLYNEUROPATHY, WITH LONG-TERM CURRENT USE OF INSULIN: ICD-10-CM

## 2020-01-16 DIAGNOSIS — E55.9 VITAMIN D DEFICIENCY DISEASE: ICD-10-CM

## 2020-01-16 DIAGNOSIS — N18.30 CKD (CHRONIC KIDNEY DISEASE) STAGE 3, GFR 30-59 ML/MIN: ICD-10-CM

## 2020-01-16 DIAGNOSIS — I83.019 VENOUS ULCER OF RIGHT LEG: ICD-10-CM

## 2020-01-16 LAB
ALBUMIN SERPL BCP-MCNC: 3.3 G/DL (ref 3.5–5.2)
ANION GAP SERPL CALC-SCNC: 9 MMOL/L (ref 8–16)
BUN SERPL-MCNC: 20 MG/DL (ref 8–23)
CALCIUM SERPL-MCNC: 9.7 MG/DL (ref 8.7–10.5)
CHLORIDE SERPL-SCNC: 105 MMOL/L (ref 95–110)
CHOLEST SERPL-MCNC: 178 MG/DL (ref 120–199)
CHOLEST/HDLC SERPL: 3.5 {RATIO} (ref 2–5)
CO2 SERPL-SCNC: 26 MMOL/L (ref 23–29)
CREAT SERPL-MCNC: 1.1 MG/DL (ref 0.5–1.4)
EST. GFR  (AFRICAN AMERICAN): 56 ML/MIN/1.73 M^2
EST. GFR  (NON AFRICAN AMERICAN): 48.5 ML/MIN/1.73 M^2
GLUCOSE SERPL-MCNC: 68 MG/DL (ref 70–110)
HDLC SERPL-MCNC: 51 MG/DL (ref 40–75)
HDLC SERPL: 28.7 % (ref 20–50)
LDLC SERPL CALC-MCNC: 115.4 MG/DL (ref 63–159)
NONHDLC SERPL-MCNC: 127 MG/DL
PHOSPHATE SERPL-MCNC: 3.9 MG/DL (ref 2.7–4.5)
POTASSIUM SERPL-SCNC: 4.7 MMOL/L (ref 3.5–5.1)
SODIUM SERPL-SCNC: 140 MMOL/L (ref 136–145)
TRIGL SERPL-MCNC: 58 MG/DL (ref 30–150)
TSH SERPL DL<=0.005 MIU/L-ACNC: 0.8 UIU/ML (ref 0.4–4)

## 2020-01-16 PROCEDURE — 80069 RENAL FUNCTION PANEL: CPT

## 2020-01-16 PROCEDURE — 83036 HEMOGLOBIN GLYCOSYLATED A1C: CPT

## 2020-01-16 PROCEDURE — 99215 PR OFFICE/OUTPT VISIT, EST, LEVL V, 40-54 MIN: ICD-10-PCS | Mod: S$GLB,,, | Performed by: INTERNAL MEDICINE

## 2020-01-16 PROCEDURE — 1126F PR PAIN SEVERITY QUANTIFIED, NO PAIN PRESENT: ICD-10-PCS | Mod: S$GLB,,, | Performed by: INTERNAL MEDICINE

## 2020-01-16 PROCEDURE — 84443 ASSAY THYROID STIM HORMONE: CPT

## 2020-01-16 PROCEDURE — 1159F PR MEDICATION LIST DOCUMENTED IN MEDICAL RECORD: ICD-10-PCS | Mod: S$GLB,,, | Performed by: INTERNAL MEDICINE

## 2020-01-16 PROCEDURE — 80061 LIPID PANEL: CPT

## 2020-01-16 PROCEDURE — 1159F MED LIST DOCD IN RCRD: CPT | Mod: S$GLB,,, | Performed by: INTERNAL MEDICINE

## 2020-01-16 PROCEDURE — 1126F AMNT PAIN NOTED NONE PRSNT: CPT | Mod: S$GLB,,, | Performed by: INTERNAL MEDICINE

## 2020-01-16 PROCEDURE — 36415 COLL VENOUS BLD VENIPUNCTURE: CPT

## 2020-01-16 PROCEDURE — 99215 OFFICE O/P EST HI 40 MIN: CPT | Mod: S$GLB,,, | Performed by: INTERNAL MEDICINE

## 2020-01-16 NOTE — ASSESSMENT & PLAN NOTE
Previously compliant with CPAP only 4 hours per night, now noncompliant for about the last year.   · Advised to continue using machine  · Has sleep medicine follow-up 2/18/20  · Advised to lose weight

## 2020-01-16 NOTE — PROGRESS NOTES
Primary Care Provider Appointment  SHARED NOTE: Dr Barbara Farrell (PGY-2), Dr Schmidt (Attending)    Subjective:      Patient ID: Duran Giordano is a 79 y.o. female. With DM, HLD, HTN, gout    Chief Complaint: Follow-up; Shortness of Breath; Hip Pain; and Joint Swelling      SOB that has been going on for the last 9 months. Uses inhaler and nebulizer more and notices a marked improvement after nebulizer treatments. Reports tickle in throat and productive white cough. Denies post nasal drip      Checks her BG twice a day. Usually 6am & 6pm. Reports readings of 114, 106, 120. Excited to see her A1c readings. Uses 52U tresiba. And 18U humalog in evening and morning.     Reports good BP at home. Uses Demadex daily in the AM and has good UOP with it.     Has not been using CPAP for about the last year. Was suppose to get a new one, but the doctor left before she could get a new one. Would like to be seen by someone so she can get her new one.     Noticed 10# wt gain over last several months. Uses walker, walking cane and rollator at home she uses.     Has been doing RUSH study to prevent alzheimer or dementia. Help with memory exercises and balances. Also help with medications. Has been in it for about 19 years.     Gout well controlled on Colchicine. No issues. Has f//u with Rheum, 2/13/20.     Sleep would be better if she would be able to turn the TV off. Watches [employer] and other [employer] networks.     Has podiatry appointment in March 26, 2020    Past Surgical History:   Procedure Laterality Date    HYSTERECTOMY  1982    secondary uterine fibroids    JOINT REPLACEMENT      Right total knee replacement         Past Medical History:   Diagnosis Date    Adrenal mass- adenoma stable since 2004 (1.8 cm and 8/13/12 (2 cm); stable 2016 2.4 cm     Adrenal mass- adenoma stable since 2004 (1.8 cm and 8/13/12 (2 cm); stable 2016 2.4 cm    Asthma in adult without complication 6/25/2015    Bilateral carotid artery disease  7/21/2017    Bilateral sciatica 2/7/2017    Cellulitis of right leg 08/16/2017    Cerebral infarction 1/29/2016    Multiple areas of lacunar infarction. Stroke risk factors include HTN, DM2, Dyslipidemia.  Continue ASA 81mg/ Statin therapy I have encouraged 30 minutes of physical activity daily for 5 days a week. She has access to a pool so this should not be so jarring to her joints.     Cervical radiculopathy 3/18/2015    Chronic knee pain     Chronic rhinitis 4/9/2013    CKD (chronic kidney disease) stage 3, GFR 30-59 ml/min 1/29/2013    Coronary artery disease due to calcified coronary lesion 6/25/2015    Diastolic dysfunction 10/10/2013    Essential hypertension 6/25/2015    Gastroesophageal reflux disease without esophagitis 8/13/2012    Gout     Hives 10/1/2013    Mixed hyperlipidemia 8/13/2012    Morbid obesity with BMI of 50.0-59.9, adult 2/11/2014    Obstructive sleep apnea syndrome 8/13/2012    Senile cataracts of both eyes 1/22/2015    Traumatic open wound of right lower leg 8/25/2017    Trigeminal neuralgia of right side of face 5/23/2017    For years now Previously on Gabapentin, but caused constipation Dissipating over time Not related to intracranial abnormalities Possibly related to dental procedure    Type 2 diabetes mellitus with diabetic polyneuropathy, with long-term current use of insulin 1/29/2013    Venous stasis dermatitis of both lower extremities 8/21/2017    Vitamin D deficiency disease 8/13/2012       Social History     Socioeconomic History    Marital status: Single     Spouse name: Not on file    Number of children: Not on file    Years of education: Not on file    Highest education level: Not on file   Occupational History    Not on file   Social Needs    Financial resource strain: Not hard at all    Food insecurity:     Worry: Never true     Inability: Never true    Transportation needs:     Medical: No     Non-medical: No   Tobacco Use    Smoking  "status: Never Smoker    Smokeless tobacco: Never Used   Substance and Sexual Activity    Alcohol use: No    Drug use: No    Sexual activity: Never   Lifestyle    Physical activity:     Days per week: Not on file     Minutes per session: Not on file    Stress: Not on file   Relationships    Social connections:     Talks on phone: Not on file     Gets together: Not on file     Attends Yarsanism service: Not on file     Active member of club or organization: Not on file     Attends meetings of clubs or organizations: Not on file     Relationship status: Not on file   Other Topics Concern    Are you pregnant or think you may be? Not Asked    Breast-feeding Not Asked   Social History Narrative    Single with no children.        Review of Systems   Constitutional: Positive for activity change. Negative for appetite change and unexpected weight change.   Respiratory: Negative for shortness of breath and wheezing.    Cardiovascular: Positive for leg swelling.   Gastrointestinal: Negative for constipation and diarrhea.   Genitourinary: Negative for genital sores and vaginal discharge.   Skin: Negative for color change, rash and wound.   Hematological: Does not bruise/bleed easily.   Psychiatric/Behavioral: Negative for behavioral problems, decreased concentration and sleep disturbance.       Objective:   /80   Pulse 74   Ht 4' 11" (1.499 m)   Wt 131.1 kg (289 lb 0.4 oz)   SpO2 97%   BMI 58.38 kg/m²     Physical Exam   Constitutional: She appears well-developed.   Severe obesity   Eyes: EOM are normal.   Neck: Normal range of motion. Neck supple. No JVD present. No thyromegaly present.   Cardiovascular: Normal rate.   Pulmonary/Chest: She has wheezes.   Increase work of breathing  Nasal flaring    Abdominal: Soft. Bowel sounds are normal.   Obese abdomen   Musculoskeletal: She exhibits no edema, tenderness or deformity.   Neurological: She is alert.   Skin: Skin is warm and dry.   Improved venous stasis "   Psychiatric: She has a normal mood and affect.   Improving insight into disease   Vitals reviewed.          Lab Results   Component Value Date    WBC 7.50 09/12/2019    HGB 12.8 09/12/2019    HCT 43.3 09/12/2019     09/12/2019    CHOL 142 01/30/2019    TRIG 68 01/30/2019    HDL 37 (L) 01/30/2019    ALT 15 09/12/2019    AST 23 09/12/2019     09/12/2019    K 3.9 09/12/2019     09/12/2019    CREATININE 1.3 09/12/2019    BUN 33 (H) 09/12/2019    CO2 28 09/12/2019    TSH 0.746 05/21/2018    INR 1.1 04/17/2005    HGBA1C 6.7 (H) 10/16/2019       Current Outpatient Medications on File Prior to Visit   Medication Sig Dispense Refill    acetaminophen (TYLENOL) 500 MG tablet Take 2 tablets (1,000 mg total) by mouth 2 (two) times daily as needed for Pain. 360 tablet 3    albuterol (PROVENTIL/VENTOLIN HFA) 90 mcg/actuation inhaler Inhale 2 puffs into the lungs every 4 (four) hours as needed for Wheezing or Shortness of Breath. Rescue 3 Inhaler 3    albuterol-ipratropium (DUO-NEB) 2.5 mg-0.5 mg/3 mL nebulizer solution Take 3 mLs by nebulization every 6 (six) hours as needed for Wheezing. Rescue 3 Box 3    allopurinol (ZYLOPRIM) 100 MG tablet Take 1 tablet (100 mg total) by mouth once daily. 30 tablet 6    ammonium lactate 12 % Crea Apply topically every morning.       aspirin (ECOTRIN) 81 MG EC tablet Take 1 tablet (81 mg total) by mouth once daily. 90 tablet 3    atorvastatin (LIPITOR) 80 MG tablet Take 1 tablet (80 mg total) by mouth once daily. 90 tablet 3    blood sugar diagnostic Strp BG monitoring 4 times a day. Pt needs test strips for Embrace Talking meter. (Patient taking differently: BG monitoring 2 times a day. Pt needs test strips for Embrace Talking meter.) 450 strip prn    cane tips Misc 1 application by Misc.(Non-Drug; Combo Route) route once daily. 4 each 0    cholecalciferol, vitamin D3, (VITAMIN D3) 1,000 unit capsule Take 1 capsule (1,000 Units total) by mouth once daily. 90  "capsule 3    clotrimazole (LOTRIMIN) 1 % cream Apply topically 2 (two) times daily. 113 g 3    clotrimazole-betamethasone 1-0.05% (LOTRISONE) cream Apply to affected area 2 times daily 15 g 1    colchicine (COLCRYS) 0.6 mg tablet Take one tablet every other day 45 tablet 3    diclofenac sodium 1 % Gel APPLY 2 GRAMS TOPICALLY DAILY 100 g 2    econazole nitrate 1 % cream Apply topically once daily. Apply to feet daily as directed. 85 g 1    fluticasone (FLONASE) 50 mcg/actuation nasal spray 2 sprays (100 mcg total) by Each Nare route once daily. 1 Bottle 3    fluticasone-salmeterol diskus inhaler 500-50 mcg INHALE 1 PUFF TWICE DAILY 60 each 11    guaiFENesin (MUCINEX) 600 mg 12 hr tablet Take 2 tablets (1,200 mg total) by mouth 2 (two) times daily. 60 tablet 0    insulin degludec (TRESIBA FLEXTOUCH U-200) 200 unit/mL (3 mL) InPn Inject 45 units daily. 3 Syringe 6    insulin lispro (HUMALOG KWIKPEN INSULIN) 100 unit/mL pen Inject 15 units w/ breakfast and dinner, 5 units at lunch, plus scale 150-200+2, 201-250+4, 251-300+6, 301-350+8, >350+10. 2 Box 6    lancets Misc Test daily (Patient taking differently: Test twice  daily) 100 each 12    losartan (COZAAR) 100 MG tablet Take 1 tablet (100 mg total) by mouth once daily. 90 tablet 3    miconazole nitrate (LOTRIMIN AF POWDER) 2 % AerP Apply 1 application topically 2 (two) times daily. 130 g 3    nebulizer and compressor (COMP-AIR ELITE COMP NEB SYSTEM) Berna use as directed 1 each 0    pen needle, diabetic (PEN NEEDLE) 29 gauge x 1/2" Ndle Uses 5 times a day. 200 each 11    predniSONE (DELTASONE) 20 MG tablet 20 mg twice daily for 5 days and later on use it only if she flares 60 tablet 2    sod chlor-bicarb-squeez bottle (NEILMED SINUS RINSE COMPLETE) pkdv Use as directed 1 each 5    torsemide (DEMADEX) 20 MG Tab Take 1 tablet (20 mg total) by mouth once daily. 90 tablet 3    trolamine salicylate (ASPERCREME) 10 % cream Apply topically as needed.      " cetirizine (ZYRTEC) 10 MG tablet Take 1 tablet (10 mg total) by mouth once daily. 30 tablet 2     No current facility-administered medications on file prior to visit.        Improved from last visit.       Assessment:   79 y.o. female with multiple co-morbid illnesses here to establish care with new PCP and continue work-up of chronic issues notably DM, HLD, HTN, gout.     Plan:     Problem List Items Addressed This Visit        Derm    Venous ulcer of right leg     LE swelling bilaterally with poor wound healing, routine circaids with wound care  · Continue circaids boot, per wound care  · Treat cellulitis PRN, but is usually not the diagnosis  · Monitor CBC  · Discontinued amlodipine            Pulmonary    Chronic obstructive pulmonary disease - Primary     Chronic COPD, symptoms controlled on inhalers  · Continue inhalers            Cardiac/Vascular    Chronic diastolic heart failure     Diastolic heart failure, edema treated with torsemide  · Continue torsemide  · Increase to BID for next week  · Check labs today         Hypertension associated with diabetes     BP controlled ibesartan 300mg, torsemide; carvedilol discontinued by pulm due to SOB, amlodipine discontinued by PCP due to leg swelling  · Discontinued hydralazine due to overtreatment of HTN  · Changed losartan to ibesartan 300mg  · Consider Digital HTN program in future  · Diabetes controlled with 18-18-52 insulin         Relevant Orders    Ambulatory Referral to Optometry    Tortuous aorta     Seen on XR in 6/2018  · Continue statin, APT  · Monitor for cardiac events  · Advised lifestyle changes            Renal/    CKD (chronic kidney disease) stage 3, GFR 30-59 ml/min     Increase diuretic due to worsening edema  · Monitor BMP            Hematology    Senile purpura     Ecchymoses and purpura on UE bilaterally  · monitor            Endocrine    Vitamin D deficiency disease     Level 30 in 5/2017  · Continue daily supplementation  · Monitor  level         Adrenal mass- adenoma stable since 2004 (1.8 cm and 8/13/12 (2 cm); stable 2016 2.4 cm     Adrenal mass- adenoma stable since 2004 (1.8 cm and 8/13/12 (2 cm); stable 2016 2.4 cm  · Monitor         Type 2 diabetes mellitus with diabetic polyneuropathy, with long-term current use of insulin     Abnormal foot exam, followed by podiatry q2 mos  · Continue routine care  · Advised to soak feet and moisturize         Relevant Orders    Ambulatory Referral to Optometry    Morbid obesity with BMI of 50.0-59.9, adult     BMI >50, eats excessively, DM, HTN, REJI  · Counseled on restricting caloric intake  · No desserts, reduced carbs  · Continue low-salt, calorie restricted meals            Orthopedic    Gout     R wrist swelling and warmth, has metabolic syndrome  · Rheum following  · Start colchicine and allopurinol  · Next pill pack will contain them  · Hand Surgery performed wrist injection with improvement            Other    REJI on CPAP     Previously compliant with CPAP only 4 hours per night, now noncompliant for about the last year.   · Advised to continue using machine  · Has sleep medicine follow-up 2/18/20  · Advised to lose weight           Lymphedema of both lower extremities     LE wounds resolved, daily circaid application by Sister Ana Arevalo or Doreen Ribera. Last seen by lymphedema clinic in 2014  · Continue circaid application daily  · Refuses stocking application at night  · Needs to elevate legs more at night               Health Maintenance       Date Due Completion Date    TETANUS VACCINE 03/08/1958 ---    Shingles Vaccine (2 of 3) 02/10/2015 12/16/2014    Foot Exam 11/27/2019 - Done 12/31/19 by Dr. Anand 11/27/2018 (Done)- ALREADY DONE    Override on 11/27/2018: Done (Dr Anand)    Override on 1/19/2018: Done (Dr anand)    Override on 5/31/2017: Done    Override on 7/20/2016: Done    Eye Exam 01/10/2020 1/10/2019- TODAY    Override on 2/17/2016: Done    Override on  1/22/2015: Done    Override on 8/16/2012: Done    Lipid Panel 01/30/2020 1/30/2019    Mammogram 04/09/2020 4/9/2019    Hemoglobin A1c 04/16/2020 10/16/2019    Override on 7/13/2016: Done    Aspirin/Antiplatelet Therapy 12/31/2020 12/31/2019    DEXA SCAN 02/22/2021 2/22/2018    Override on 12/13/2011: Done          Follow up in about 6 weeks (around 2/27/2020). Total face-to-face time was 60 min, >50% of this was spent on counseling and coordination of care. The following issues were discussed: DM, HLD, HTN, gout    Barbara Farrell PGY-2  Internal Medicine    Tami Schmidt MD/MPH  Internal Medicine  [facility]  [phone] [facility]

## 2020-01-16 NOTE — TELEPHONE ENCOUNTER
Ms Nieves,  Can you call Sister Xenia Giordano and enroll her in your health coaching. She is attempting to lose weight but struggles with snacking on unhealthy foods (cracklins, chips, butterfingers, candy).    Could you help her with choosing healthier snacks? She eats with her [employer] sisters (menu below), but snacks excessively when she is alone. I would prefer if you only help her with reducing the unhealthy snacks. Her meals are set at the [facility], and she wants to eat them socially. I think if she changes her snacks this would be enough to reduce her weight for now.    Thanks,  Tami Schmidt MD/MPH  Internal Medicine  [facility]  [phone] [facility]

## 2020-01-16 NOTE — ASSESSMENT & PLAN NOTE
BP controlled ibesartan 300mg, torsemide; carvedilol discontinued by pulm due to SOB, amlodipine discontinued by PCP due to leg swelling  · Discontinued hydralazine due to overtreatment of HTN  · Changed losartan to ibesartan 300mg  · Consider Digital HTN program in future  · Diabetes controlled with 18-18-52 insulin

## 2020-01-17 LAB
ESTIMATED AVG GLUCOSE: 131 MG/DL (ref 68–131)
HBA1C MFR BLD HPLC: 6.2 % (ref 4–5.6)

## 2020-01-23 ENCOUNTER — OFFICE VISIT (OUTPATIENT)
Dept: INTERNAL MEDICINE | Facility: CLINIC | Age: 80
End: 2020-01-23
Payer: MEDICARE

## 2020-01-23 VITALS
WEIGHT: 280 LBS | DIASTOLIC BLOOD PRESSURE: 76 MMHG | BODY MASS INDEX: 56.45 KG/M2 | HEIGHT: 59 IN | SYSTOLIC BLOOD PRESSURE: 122 MMHG

## 2020-01-23 DIAGNOSIS — I50.32 CHRONIC DIASTOLIC HEART FAILURE: ICD-10-CM

## 2020-01-23 DIAGNOSIS — Z79.4 TYPE 2 DIABETES MELLITUS WITH DIABETIC POLYNEUROPATHY, WITH LONG-TERM CURRENT USE OF INSULIN: ICD-10-CM

## 2020-01-23 DIAGNOSIS — J44.9 CHRONIC OBSTRUCTIVE PULMONARY DISEASE, UNSPECIFIED COPD TYPE: ICD-10-CM

## 2020-01-23 DIAGNOSIS — E66.01 MORBID OBESITY WITH BMI OF 50.0-59.9, ADULT: ICD-10-CM

## 2020-01-23 DIAGNOSIS — E11.59 HYPERTENSION ASSOCIATED WITH DIABETES: ICD-10-CM

## 2020-01-23 DIAGNOSIS — M17.0 PRIMARY OSTEOARTHRITIS OF BOTH KNEES: ICD-10-CM

## 2020-01-23 DIAGNOSIS — I25.10 CORONARY ARTERY DISEASE DUE TO CALCIFIED CORONARY LESION: Chronic | ICD-10-CM

## 2020-01-23 DIAGNOSIS — E11.42 TYPE 2 DIABETES MELLITUS WITH DIABETIC POLYNEUROPATHY, WITH LONG-TERM CURRENT USE OF INSULIN: ICD-10-CM

## 2020-01-23 DIAGNOSIS — E55.9 VITAMIN D DEFICIENCY DISEASE: ICD-10-CM

## 2020-01-23 DIAGNOSIS — G47.33 OSA ON CPAP: ICD-10-CM

## 2020-01-23 DIAGNOSIS — N18.30 CKD (CHRONIC KIDNEY DISEASE) STAGE 3, GFR 30-59 ML/MIN: ICD-10-CM

## 2020-01-23 DIAGNOSIS — I15.2 HYPERTENSION ASSOCIATED WITH DIABETES: ICD-10-CM

## 2020-01-23 DIAGNOSIS — I89.0 LYMPHEDEMA OF BOTH LOWER EXTREMITIES: ICD-10-CM

## 2020-01-23 DIAGNOSIS — I25.84 CORONARY ARTERY DISEASE DUE TO CALCIFIED CORONARY LESION: Chronic | ICD-10-CM

## 2020-01-23 PROCEDURE — 1159F PR MEDICATION LIST DOCUMENTED IN MEDICAL RECORD: ICD-10-PCS | Mod: ,,, | Performed by: NURSE PRACTITIONER

## 2020-01-23 PROCEDURE — 99999 PR PBB SHADOW E&M-EST. PATIENT-LVL II: CPT | Mod: PBBFAC,,, | Performed by: NURSE PRACTITIONER

## 2020-01-23 PROCEDURE — 99999 PR PBB SHADOW E&M-EST. PATIENT-LVL II: ICD-10-PCS | Mod: PBBFAC,,, | Performed by: NURSE PRACTITIONER

## 2020-01-23 PROCEDURE — 99212 OFFICE O/P EST SF 10 MIN: CPT | Mod: PBBFAC | Performed by: NURSE PRACTITIONER

## 2020-01-23 PROCEDURE — 99214 PR OFFICE/OUTPT VISIT, EST, LEVL IV, 30-39 MIN: ICD-10-PCS | Mod: S$PBB,,, | Performed by: NURSE PRACTITIONER

## 2020-01-23 PROCEDURE — 1159F MED LIST DOCD IN RCRD: CPT | Mod: ,,, | Performed by: NURSE PRACTITIONER

## 2020-01-23 PROCEDURE — 99214 OFFICE O/P EST MOD 30 MIN: CPT | Mod: S$PBB,,, | Performed by: NURSE PRACTITIONER

## 2020-01-23 RX ORDER — INSULIN DEGLUDEC 200 U/ML
INJECTION, SOLUTION SUBCUTANEOUS
Qty: 3 SYRINGE | Refills: 6
Start: 2020-01-23 | End: 2020-03-17

## 2020-01-23 RX ORDER — INSULIN LISPRO 100 [IU]/ML
INJECTION, SOLUTION INTRAVENOUS; SUBCUTANEOUS
Qty: 2 BOX | Refills: 6
Start: 2020-01-23 | End: 2020-07-06

## 2020-01-23 NOTE — PATIENT INSTRUCTIONS
Snacks can be an important part of a balanced, healthy meal plan. They allow you to eat more frequently, feeling full and satisfied throughout the day. Also, they allow you to spread carbohydrates evenly, which may stabilize blood sugars.  Plus, snacks are enjoyable!     The amount of carbohydrate needed at snacks varies. Generally, about 15-30 grams of carbohydrate per snack is recommended.  Below you will find some tasty treats.       0-5 gm carb   Crystal Light   Vitamin Water Zero   Herbal tea, unsweetened   2 tsp peanut butter on celery   1./2 cup sugar-free jell-o   1 sugar-free popsicle   ¼ cup blueberries   8oz Blue Aniyah unsweetened almond milk   5 baby carrots & celery sticks, cucumbers, bell peppers dipped in ¼ cup salsa, 2Tbsp light ranch dressing or 2Tbsp plain Greek yogurt   10 Goldfish crackers   ½ oz low-fat cheese or string cheese   1 closed handful of nuts, unsalted   1 Tbsp of sunflower seeds, unsalted   1 cup Smart Pop popcorn   1 whole grain brown rice cake        15 gm carb   1 small piece of fruit or ½ banana or 1/2 cup lite canned fruit   3 ramon cracker squares   3 cups Smart Pop popcorn, top spray butter, York lite salt or cinnamon and Truvia   5 Vanilla Wafers   ½ cup low fat, no added sugar ice cream or frozen yogurt (Blue bell, Blue Bunny, Weight Watchers, Skinny Cow)   ½ turkey, ham, or chicken sandwich   ½ c fruit with ½ c Cottage cheese   4-6 unsalted wheat crackers with 1 oz low fat cheese or 1 tbsp peanut butter    30-45 goldfish crackers (depending on flavor)    7-8 Congregational mini brown rice cakes (caramel, apple cinnamon, chocolate)    12 Congregational mini brown rice cakes (cheddar, bbq, ranch)    1/3 cup hummus dip with raw veg   1/2 whole wheat jessica, 1Tbsp hummus   Mini Pizza (1/2 whole wheat English muffin, low-fat  cheese, tomato sauce)   100 calorie snack pack (Oreo, Chips Ahoy, Ritz Mix, Baked Cheetos)   4-6 oz. light or Greek Style yogurt  (Flores, Shila, Placido, [facility])   ½ cup sugar-free pudding     6 in. wheat tortilla or jessica oven toasted chips (topped with spray butter flavoring, cinnamon, Truvia OR spray butter, garlic powder, chili powder)    18 BBQ Popchips (available at [employer], Whole Foods, Fresh Market)                   Diabetes Support Group Meetings         Date: Topic:   February 13 Eat Fit SIGRID/Health Promotion   March 12 Taking Care of Your Smile   April 9 Spring into Healthy Eating/Cooking Demo   May 14 Ease Your Mind with Diabetes   Carmen 11 Summer Treats/Cooking Demo   July 9 Eat Fit SIGRID/Grocery Tour   August 13 Taking Care of Your Eyes and Feet   Sept 10 Chair Exercises/ADA updates   October 8 Recipes & Treats/Cooking Demo   November 12 Heart Health/Pump it up!   December 10 Year-End Close Out Party!        Meetings are held in the [facility] (A) of the [facility] located at [address] [place] [address]. Please call [phone] for additional information.    Free service, offered every 2nd Thursday of every month! Family members and/or friends are welcome as well!  Support group is for patients with type 1 or type 2 diabetes.    From 3:30p to 4:30p
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
## 2020-02-03 NOTE — TELEPHONE ENCOUNTER
Spoke with home health nurse and she informed me that patient informed her that she has been moving around a lot lately and nurse was concerned about both of her legs being red and worried about blisters forming . Patient informed her that when her legs normally get like this antibiotics get her legs back to normal.    Per Dr. Schmidt , patient is to keep her legs elevated and to remain off of her feet.      *Patient has been informed about keeping her legs elevated and staying off of her feet , patient has also been informed that antibiotic are not necessary for her to take at this time .  Patient verbalized great understanding of instructions.   Eucrisa Counseling: Patient may experience a mild burning sensation during topical application. Eucrisa is not approved in children less than 2 years of age.

## 2020-02-04 ENCOUNTER — PATIENT OUTREACH (OUTPATIENT)
Dept: ADMINISTRATIVE | Facility: OTHER | Age: 80
End: 2020-02-04

## 2020-02-04 NOTE — PROGRESS NOTES
Chart reviewed.   Immunizations: Updated   Orders placed: None  Upcoming appts: EYE EXAM 2/7/2020

## 2020-02-07 ENCOUNTER — LAB VISIT (OUTPATIENT)
Dept: LAB | Facility: HOSPITAL | Age: 80
End: 2020-02-07
Attending: INTERNAL MEDICINE
Payer: MEDICARE

## 2020-02-07 ENCOUNTER — OFFICE VISIT (OUTPATIENT)
Dept: OPTOMETRY | Facility: CLINIC | Age: 80
End: 2020-02-07
Payer: MEDICARE

## 2020-02-07 ENCOUNTER — TELEPHONE (OUTPATIENT)
Dept: RHEUMATOLOGY | Facility: CLINIC | Age: 80
End: 2020-02-07

## 2020-02-07 ENCOUNTER — OFFICE VISIT (OUTPATIENT)
Dept: PRIMARY CARE CLINIC | Facility: CLINIC | Age: 80
End: 2020-02-07
Payer: MEDICARE

## 2020-02-07 VITALS
BODY MASS INDEX: 57.6 KG/M2 | SYSTOLIC BLOOD PRESSURE: 124 MMHG | DIASTOLIC BLOOD PRESSURE: 64 MMHG | HEIGHT: 59 IN | OXYGEN SATURATION: 96 % | WEIGHT: 285.69 LBS | HEART RATE: 90 BPM

## 2020-02-07 DIAGNOSIS — M1A.09X0 IDIOPATHIC CHRONIC GOUT OF MULTIPLE SITES WITHOUT TOPHUS: ICD-10-CM

## 2020-02-07 DIAGNOSIS — E11.9 TYPE 2 DIABETES MELLITUS WITHOUT RETINOPATHY: Primary | ICD-10-CM

## 2020-02-07 DIAGNOSIS — G47.33 OSA ON CPAP: ICD-10-CM

## 2020-02-07 DIAGNOSIS — H52.4 PRESBYOPIA: ICD-10-CM

## 2020-02-07 DIAGNOSIS — I50.32 CHRONIC DIASTOLIC HEART FAILURE: ICD-10-CM

## 2020-02-07 DIAGNOSIS — H40.013 OAG (OPEN ANGLE GLAUCOMA) SUSPECT, LOW RISK, BILATERAL: ICD-10-CM

## 2020-02-07 DIAGNOSIS — H25.13 NUCLEAR SCLEROSIS OF BOTH EYES: ICD-10-CM

## 2020-02-07 LAB
ALBUMIN SERPL BCP-MCNC: 3.4 G/DL (ref 3.5–5.2)
ALP SERPL-CCNC: 99 U/L (ref 55–135)
ALT SERPL W/O P-5'-P-CCNC: 15 U/L (ref 10–44)
ANION GAP SERPL CALC-SCNC: 9 MMOL/L (ref 8–16)
AST SERPL-CCNC: 20 U/L (ref 10–40)
BASOPHILS # BLD AUTO: 0.03 K/UL (ref 0–0.2)
BASOPHILS NFR BLD: 0.4 % (ref 0–1.9)
BILIRUB SERPL-MCNC: 0.3 MG/DL (ref 0.1–1)
BUN SERPL-MCNC: 18 MG/DL (ref 8–23)
CALCIUM SERPL-MCNC: 9.7 MG/DL (ref 8.7–10.5)
CHLORIDE SERPL-SCNC: 106 MMOL/L (ref 95–110)
CO2 SERPL-SCNC: 25 MMOL/L (ref 23–29)
CREAT SERPL-MCNC: 1.1 MG/DL (ref 0.5–1.4)
DIFFERENTIAL METHOD: ABNORMAL
EOSINOPHIL # BLD AUTO: 0.3 K/UL (ref 0–0.5)
EOSINOPHIL NFR BLD: 4.2 % (ref 0–8)
ERYTHROCYTE [DISTWIDTH] IN BLOOD BY AUTOMATED COUNT: 15.1 % (ref 11.5–14.5)
EST. GFR  (AFRICAN AMERICAN): 56 ML/MIN/1.73 M^2
EST. GFR  (NON AFRICAN AMERICAN): 48.5 ML/MIN/1.73 M^2
GLUCOSE SERPL-MCNC: 56 MG/DL (ref 70–110)
HCT VFR BLD AUTO: 46 % (ref 37–48.5)
HGB BLD-MCNC: 13.3 G/DL (ref 12–16)
IMM GRANULOCYTES # BLD AUTO: 0.01 K/UL (ref 0–0.04)
IMM GRANULOCYTES NFR BLD AUTO: 0.1 % (ref 0–0.5)
LYMPHOCYTES # BLD AUTO: 1.6 K/UL (ref 1–4.8)
LYMPHOCYTES NFR BLD: 21.2 % (ref 18–48)
MCH RBC QN AUTO: 27.9 PG (ref 27–31)
MCHC RBC AUTO-ENTMCNC: 28.9 G/DL (ref 32–36)
MCV RBC AUTO: 96 FL (ref 82–98)
MONOCYTES # BLD AUTO: 0.5 K/UL (ref 0.3–1)
MONOCYTES NFR BLD: 6.5 % (ref 4–15)
NEUTROPHILS # BLD AUTO: 5 K/UL (ref 1.8–7.7)
NEUTROPHILS NFR BLD: 67.6 % (ref 38–73)
NRBC BLD-RTO: 0 /100 WBC
PLATELET # BLD AUTO: 144 K/UL (ref 150–350)
PMV BLD AUTO: 13.5 FL (ref 9.2–12.9)
POTASSIUM SERPL-SCNC: 4.6 MMOL/L (ref 3.5–5.1)
PROT SERPL-MCNC: 8.9 G/DL (ref 6–8.4)
RBC # BLD AUTO: 4.77 M/UL (ref 4–5.4)
SODIUM SERPL-SCNC: 140 MMOL/L (ref 136–145)
URATE SERPL-MCNC: 6.7 MG/DL (ref 2.4–5.7)
WBC # BLD AUTO: 7.4 K/UL (ref 3.9–12.7)

## 2020-02-07 PROCEDURE — 84550 ASSAY OF BLOOD/URIC ACID: CPT

## 2020-02-07 PROCEDURE — 99215 PR OFFICE/OUTPT VISIT, EST, LEVL V, 40-54 MIN: ICD-10-PCS | Mod: S$GLB,,, | Performed by: INTERNAL MEDICINE

## 2020-02-07 PROCEDURE — 92014 COMPRE OPH EXAM EST PT 1/>: CPT | Mod: S$PBB,,, | Performed by: OPTOMETRIST

## 2020-02-07 PROCEDURE — 99999 PR PBB SHADOW E&M-EST. PATIENT-LVL II: CPT | Mod: PBBFAC,,, | Performed by: OPTOMETRIST

## 2020-02-07 PROCEDURE — 85025 COMPLETE CBC W/AUTO DIFF WBC: CPT

## 2020-02-07 PROCEDURE — 99212 OFFICE O/P EST SF 10 MIN: CPT | Mod: PBBFAC | Performed by: OPTOMETRIST

## 2020-02-07 PROCEDURE — 92014 PR EYE EXAM, EST PATIENT,COMPREHESV: ICD-10-PCS | Mod: S$PBB,,, | Performed by: OPTOMETRIST

## 2020-02-07 PROCEDURE — 80053 COMPREHEN METABOLIC PANEL: CPT

## 2020-02-07 PROCEDURE — 99215 OFFICE O/P EST HI 40 MIN: CPT | Mod: S$GLB,,, | Performed by: INTERNAL MEDICINE

## 2020-02-07 PROCEDURE — 36415 COLL VENOUS BLD VENIPUNCTURE: CPT

## 2020-02-07 PROCEDURE — 99999 PR PBB SHADOW E&M-EST. PATIENT-LVL II: ICD-10-PCS | Mod: PBBFAC,,, | Performed by: OPTOMETRIST

## 2020-02-07 NOTE — TELEPHONE ENCOUNTER
----- Message from Tami Schmidt MD sent at 2020 12:27 PM CST -----  Can you postpone patient's appt on  due to a  she needs to attend on that day.    Thanks,  BARB (PCP)

## 2020-02-07 NOTE — LETTER
February 7, 2020      Tami Schmidt MD  [address]  [facility] [address]           [facility]  [address]  [facility] [address]  Phone: [phone]          Patient: Duran Giordano   MR Number: [id number]   YOB: 1940   Date of Visit: 2/7/2020       Dear Dr. Tami Schmidt:    Thank you for referring Duran Giordano to me for evaluation. Attached you will find relevant portions of my assessment and plan of care.    If you have questions, please do not hesitate to call me. I look forward to following Duran Giordano along with you.    Sincerely,    Bridgette Orellana, OD    Enclosure  CC:  No Recipients    If you would like to receive this communication electronically, please contact [email or web address] or [phone] to request more information on [employer] access.    For providers and/or their staff who would like to refer a patient to [facility], please contact us through our one-stop-shop provider referral line, Clare Cisneros, at [phone].    If you feel you have received this communication in error or would no longer like to receive these types of communications, please e-mail [email or web address]

## 2020-02-07 NOTE — PROGRESS NOTES
HPI     80 y/o female here for diabetic eye exam  DLS 2/17/16  Pt states she uses glasses for reading only. Happy with distance vision  No flashes or floaters   Eye have been burning, and watering a little over the past week  No eye surgeries  Using AT prn seldom    Prefers the old reading glasses I listed rx in current eye glass rx      Hemoglobin A1C       Date                     Value               Ref Range             Status                01/16/2020               6.2 (H)             4.0 - 5.6 %           Final                  10/16/2019               6.7 (H)             4.0 - 5.6 %           Final                  06/11/2019               7.7 (H)             4.0 - 5.6 %           Final                 Last edited by Bridgette Orellana, OD on 2/7/2020 10:37 AM. (History)            Assessment /Plan     For exam results, see Encounter Report.    Type 2 diabetes mellitus without retinopathy    OAG (open angle glaucoma) suspect, low risk, bilateral  -     OCT, Optic Nerve - OU - Both Eyes; Future    Nuclear sclerosis of both eyes    Presbyopia      1. No retinopathy noted, OU. Continue proper BS control and annual DM eye exams. Monitor yearly.     2. Large c/d ratio OU. No testing done in the past. (-)FHx. WNL IOP. Recommend baseline RNFL OCT. Pt to report to 10th floor [facility] in 2-3 weeks for f/u testing. Will call patient with results.   Determine f/u after testing performed.     3. Moderate NS OU. Not visually significant. No need for removal at this time. Monitor yearly.     4. Continue use of reading glasses prn. Pt not interested in new refraction today. Monitor yearly.       RTC for OCT, determine f/u after testing performed.

## 2020-02-07 NOTE — PROGRESS NOTES
Primary Care Provider Appointment  SHARED NOTE: Mary Arnett (MS-4), Dr Schmidt (Attending)    Subjective:      Patient ID: Duran Giordano is a 79 y.o. female with DM, HLD, HTN, gout.    Chief Complaint: Follow-up    She reports continued SOB. It is about the same as at her last visit on 01/23/20. She has been using her nebulizer when she needs. She uses her albuterol inhaler and her Wixela. She uses the Wixela twice a day and she uses the albuterol whenever she needs.   She is short of breath during the interview as she had ambulate a lot. She had her appointment with optometry this morning. She is going across the street to have her glasses fixed.    She acknowledges that she needs to loose weight. She has stopped drinking sodas. She now is only drinking water, and has been limiting her splenda. She is trying to control her portions and make healthier food choices.    She has her BP checked by a nurse at the [facility] house that she lives at. Her BP is usually around 125-130/60-50.    She checks her sugars twice a day. She was 113 this morning. She is using 52U tresiba once a day and 18U humalog in the morning and evening.  Her last A1c was 6.7 on 10/16/19, this is improved from 7.7 on 06/11/19.      Her gout is well controlled, she reports significant improvement.    She is using circaids. She has elevating her legs at night time.     She sleeps on a recliner. She has been sleeping in a recliner for the last 3 years. She denies any paroxysmal nocturnal dyspnea. She sleeps in the chair because her left nostril feels blocked when she sleeps in the bed. She reports having poor sleep, she sleeps about 5 hours during the day. She watches a lot of TV at night time. She has poor sleep hygiene. She has been encouraged to adopt good sleep hygiene such as sleeping at the same time, limiting food intake before bed and trying regular exercise.   She needs to get a CPAP machine, she has an appointment with sleep  medicine on 02/18/2020.    She lives at a mother house. She recently had a sister pass away two days ago.     She takes all her medications. She is compliant with her pill packs. She strong not need any refills.  Adherence packed for 56 days using Dispill:      Morning card:   Allopurinol 100 mg   Torsemide 20 mg      Evening card:   Atorvastatin 80 mg   Losartan 100 mg   Vitamin D3 1,000 IU     Past Surgical History:   Procedure Laterality Date    HYSTERECTOMY  1982    secondary uterine fibroids    JOINT REPLACEMENT      Right total knee replacement         Past Medical History:   Diagnosis Date    Adrenal mass- adenoma stable since 2004 (1.8 cm and 8/13/12 (2 cm); stable 2016 2.4 cm     Adrenal mass- adenoma stable since 2004 (1.8 cm and 8/13/12 (2 cm); stable 2016 2.4 cm    Arthritis     Asthma in adult without complication 6/25/2015    Bilateral carotid artery disease 7/21/2017    Bilateral sciatica 2/7/2017    Cellulitis of right leg 08/16/2017    Cerebral infarction 1/29/2016    Multiple areas of lacunar infarction. Stroke risk factors include HTN, DM2, Dyslipidemia.  Continue ASA 81mg/ Statin therapy I have encouraged 30 minutes of physical activity daily for 5 days a week. She has access to a pool so this should not be so jarring to her joints.     Cervical radiculopathy 3/18/2015    Chronic knee pain     Chronic rhinitis 4/9/2013    CKD (chronic kidney disease) stage 3, GFR 30-59 ml/min 1/29/2013    Coronary artery disease due to calcified coronary lesion 6/25/2015    Diastolic dysfunction 10/10/2013    Essential hypertension 6/25/2015    Gastroesophageal reflux disease without esophagitis 8/13/2012    Gout     Hives 10/1/2013    Mixed hyperlipidemia 8/13/2012    Morbid obesity with BMI of 50.0-59.9, adult 2/11/2014    Obstructive sleep apnea syndrome 8/13/2012    Senile cataracts of both eyes 1/22/2015    Traumatic open wound of right lower leg 8/25/2017    Trigeminal neuralgia of  right side of face 5/23/2017    For years now Previously on Gabapentin, but caused constipation Dissipating over time Not related to intracranial abnormalities Possibly related to dental procedure    Type 2 diabetes mellitus with diabetic polyneuropathy, with long-term current use of insulin 1/29/2013    Venous stasis dermatitis of both lower extremities 8/21/2017    Vitamin D deficiency disease 8/13/2012       Social History     Socioeconomic History    Marital status: Single     Spouse name: Not on file    Number of children: Not on file    Years of education: Not on file    Highest education level: Not on file   Occupational History    Not on file   Social Needs    Financial resource strain: Not hard at all    Food insecurity:     Worry: Never true     Inability: Never true    Transportation needs:     Medical: No     Non-medical: No   Tobacco Use    Smoking status: Never Smoker    Smokeless tobacco: Never Used   Substance and Sexual Activity    Alcohol use: No    Drug use: No    Sexual activity: Never   Lifestyle    Physical activity:     Days per week: Not on file     Minutes per session: Not on file    Stress: Not on file   Relationships    Social connections:     Talks on phone: Not on file     Gets together: Not on file     Attends [employer] service: Not on file     Active member of club or organization: Not on file     Attends meetings of clubs or organizations: Not on file     Relationship status: Not on file   Other Topics Concern    Are you pregnant or think you may be? Not Asked    Breast-feeding Not Asked   Social History Narrative    Single with no children.        Review of Systems   Constitutional: Negative for appetite change, fever and unexpected weight change.   HENT: Negative for congestion, rhinorrhea, sinus pain and sore throat.    Respiratory: Positive for shortness of breath and wheezing.    Cardiovascular: Positive for leg swelling. Negative for chest pain and  "palpitations.   Gastrointestinal: Negative for abdominal pain, blood in stool, constipation and diarrhea.   Genitourinary: Negative for dysuria and hematuria.   Skin: Negative for color change, rash and wound.   Neurological: Negative for dizziness, light-headedness and headaches.   Hematological: Does not bruise/bleed easily.   Psychiatric/Behavioral: Negative for behavioral problems, decreased concentration and sleep disturbance. Dysphoric mood: a month, related to procrastination and increased feelings of tiredness.        Objective:   /64   Pulse 90   Ht 4' 11" (1.499 m)   Wt 129.6 kg (285 lb 11.5 oz)   SpO2 96%   BMI 57.71 kg/m²     Physical Exam   Constitutional: She is oriented to person, place, and time. She appears well-developed.   Severe obesity   HENT:   Head: Normocephalic and atraumatic.   Eyes: EOM are normal.   Neck: Normal range of motion. Neck supple.   Cardiovascular: Normal rate and regular rhythm.   Distant heart sounds   Pulmonary/Chest: She has wheezes.   Increase work of breathing  Nasal flaring    Abdominal: Soft. Bowel sounds are normal.   Obese abdomen   Musculoskeletal: She exhibits edema. She exhibits no tenderness or deformity.   BL venous stasis.  L. Leg erythema and inflammation   Neurological: She is alert and oriented to person, place, and time.   Skin: Skin is warm and dry.   Improved venous stasis   Psychiatric: She has a normal mood and affect.   Improving insight into disease   Vitals reviewed.       Lab Results   Component Value Date    WBC 7.40 02/07/2020    HGB 13.3 02/07/2020    HCT 46.0 02/07/2020     (L) 02/07/2020    CHOL 178 01/16/2020    TRIG 58 01/16/2020    HDL 51 01/16/2020    ALT 15 02/07/2020    AST 20 02/07/2020     02/07/2020    K 4.6 02/07/2020     02/07/2020    CREATININE 1.1 02/07/2020    BUN 18 02/07/2020    CO2 25 02/07/2020    TSH 0.799 01/16/2020    INR 1.1 04/17/2005    HGBA1C 6.2 (H) 01/16/2020       Current Outpatient " Medications on File Prior to Visit   Medication Sig Dispense Refill    acetaminophen (TYLENOL) 500 MG tablet Take 2 tablets (1,000 mg total) by mouth 2 (two) times daily as needed for Pain. 360 tablet 3    albuterol (PROVENTIL/VENTOLIN HFA) 90 mcg/actuation inhaler Inhale 2 puffs into the lungs every 4 (four) hours as needed for Wheezing or Shortness of Breath. Rescue 3 Inhaler 3    allopurinol (ZYLOPRIM) 100 MG tablet Take 1 tablet (100 mg total) by mouth once daily. 30 tablet 6    ammonium lactate 12 % Crea Apply topically every morning.       aspirin (ECOTRIN) 81 MG EC tablet Take 1 tablet (81 mg total) by mouth once daily. 90 tablet 3    atorvastatin (LIPITOR) 80 MG tablet Take 1 tablet (80 mg total) by mouth once daily. 90 tablet 3    blood sugar diagnostic Strp BG monitoring 4 times a day. Pt needs test strips for Embrace Talking meter. (Patient taking differently: BG monitoring 2 times a day. Pt needs test strips for Embrace Talking meter.) 450 strip prn    cane tips Misc 1 application by Misc.(Non-Drug; Combo Route) route once daily. 4 each 0    cholecalciferol, vitamin D3, (VITAMIN D3) 1,000 unit capsule Take 1 capsule (1,000 Units total) by mouth once daily. 90 capsule 3    clotrimazole (LOTRIMIN) 1 % cream Apply topically 2 (two) times daily. 113 g 3    clotrimazole-betamethasone 1-0.05% (LOTRISONE) cream Apply to affected area 2 times daily 15 g 1    colchicine (COLCRYS) 0.6 mg tablet Take one tablet every other day 45 tablet 3    diclofenac sodium 1 % Gel APPLY 2 GRAMS TOPICALLY DAILY 100 g 2    econazole nitrate 1 % cream Apply topically once daily. Apply to feet daily as directed. 85 g 1    fluticasone (FLONASE) 50 mcg/actuation nasal spray 2 sprays (100 mcg total) by Each Nare route once daily. 1 Bottle 3    fluticasone-salmeterol diskus inhaler 500-50 mcg INHALE 1 PUFF TWICE DAILY 60 each 11    guaiFENesin (MUCINEX) 600 mg 12 hr tablet Take 2 tablets (1,200 mg total) by mouth 2  "(two) times daily. 60 tablet 0    insulin degludec (TRESIBA FLEXTOUCH U-200) 200 unit/mL (3 mL) InPn Inject 46 units daily. 3 Syringe 6    insulin lispro (HUMALOG KWIKPEN INSULIN) 100 unit/mL pen Inject 15 units w/ breakfast and dinner plus scale 150-200+2, 201-250+4, 251-300+6, 301-350+8, >350+10. 2 Box 6    lancets Misc Test daily (Patient taking differently: Test twice  daily) 100 each 12    losartan (COZAAR) 100 MG tablet Take 1 tablet (100 mg total) by mouth once daily. 90 tablet 3    miconazole nitrate (LOTRIMIN AF POWDER) 2 % AerP Apply 1 application topically 2 (two) times daily. 130 g 3    nebulizer and compressor (COMP-AIR ELITE COMP NEB SYSTEM) Berna use as directed 1 each 0    pen needle, diabetic (PEN NEEDLE) 29 gauge x 1/2" Ndle Uses 5 times a day. 200 each 11    predniSONE (DELTASONE) 20 MG tablet 20 mg twice daily for 5 days and later on use it only if she flares 60 tablet 2    sod chlor-bicarb-squeez bottle (NEILMED SINUS RINSE COMPLETE) pkdv Use as directed 1 each 5    torsemide (DEMADEX) 20 MG Tab Take 1 tablet (20 mg total) by mouth once daily. 90 tablet 3    trolamine salicylate (ASPERCREME) 10 % cream Apply topically as needed.      albuterol-ipratropium (DUO-NEB) 2.5 mg-0.5 mg/3 mL nebulizer solution Take 3 mLs by nebulization every 6 (six) hours as needed for Wheezing. Rescue 3 Box 3    cetirizine (ZYRTEC) 10 MG tablet Take 1 tablet (10 mg total) by mouth once daily. 30 tablet 2     No current facility-administered medications on file prior to visit.          Assessment:   79 y.o. female with multiple co-morbid illnesses here to establish care with new PCP and continue work-up of chronic issues notably DM, HLD, HTN, gout.     Plan:     Problem List Items Addressed This Visit        Cardiac/Vascular    Chronic diastolic heart failure     Diastolic heart failure, edema treated with torsemide  · Continue torsemide  · Monitor clinically            Other    REJI on CPAP     Previously " compliant with CPAP only 4 hours per night, now noncompliant for about the last year.   · Advised to continue using her old machine  · Has sleep medicine follow-up 2/18/20  · Advised to lose weight                 Health Maintenance       Date Due Completion Date    TETANUS VACCINE 03/08/1958 ---    Shingles Vaccine (2 of 3) 02/10/2015 12/16/2014    Eye Exam 01/10/2020 1/10/2019    Override on 2/17/2016: Done    Override on 1/22/2015: Done    Override on 8/16/2012: Done    Mammogram 04/09/2020 4/9/2019    Hemoglobin A1c 07/16/2020 1/16/2020    Override on 7/13/2016: Done    Foot Exam 12/31/2020 12/31/2019 (Done)    Override on 12/31/2019: Done (Done by Dr. Anand)    Override on 11/27/2018: Done (Dr Anand)    Override on 1/19/2018: Done (Dr anand)    Override on 5/31/2017: Done    Override on 7/20/2016: Done    Lipid Panel 01/16/2021 1/16/2020    Aspirin/Antiplatelet Therapy 02/07/2021 2/7/2020    DEXA SCAN 02/22/2021 2/22/2018    Override on 12/13/2011: Done          Follow up in about 4 weeks (around 3/6/2020). Total face-to-face time was 60 min, >50% of this was spent on counseling and coordination of care. The following issues were discussed: DM, HLD, HTN, gout     Mary Arnett  MS-4    Tami Schmidt MD/MPH  Internal Medicine  [facility]  [phone] [facility]

## 2020-02-07 NOTE — PATIENT INSTRUCTIONS
TODAY:  - use your old CPAP until you get your new CPAP  - continue to use your circaids  - take a break from all of your work responsibilities to take care of yourself!  - PCP messaged Dr KEATING to change your appointment due to the   - attend your sleep medicine appointment on     NEXT:  - shingrix   - mammogram

## 2020-02-10 ENCOUNTER — PATIENT OUTREACH (OUTPATIENT)
Dept: ADMINISTRATIVE | Facility: OTHER | Age: 80
End: 2020-02-10

## 2020-02-10 NOTE — ASSESSMENT & PLAN NOTE
Previously compliant with CPAP only 4 hours per night, now noncompliant for about the last year.   · Advised to continue using her old machine  · Has sleep medicine follow-up 2/18/20  · Advised to lose weight

## 2020-02-17 ENCOUNTER — PATIENT OUTREACH (OUTPATIENT)
Dept: ADMINISTRATIVE | Facility: OTHER | Age: 80
End: 2020-02-17

## 2020-02-18 ENCOUNTER — OFFICE VISIT (OUTPATIENT)
Dept: SLEEP MEDICINE | Facility: CLINIC | Age: 80
End: 2020-02-18
Payer: MEDICARE

## 2020-02-18 VITALS
WEIGHT: 278.75 LBS | DIASTOLIC BLOOD PRESSURE: 86 MMHG | HEART RATE: 85 BPM | HEIGHT: 59 IN | BODY MASS INDEX: 56.2 KG/M2 | SYSTOLIC BLOOD PRESSURE: 165 MMHG

## 2020-02-18 DIAGNOSIS — G47.33 OSA (OBSTRUCTIVE SLEEP APNEA): Primary | ICD-10-CM

## 2020-02-18 PROCEDURE — 99213 PR OFFICE/OUTPT VISIT, EST, LEVL III, 20-29 MIN: ICD-10-PCS | Mod: S$PBB,,, | Performed by: PSYCHIATRY & NEUROLOGY

## 2020-02-18 PROCEDURE — 99999 PR PBB SHADOW E&M-EST. PATIENT-LVL III: ICD-10-PCS | Mod: PBBFAC,,, | Performed by: PSYCHIATRY & NEUROLOGY

## 2020-02-18 PROCEDURE — 99213 OFFICE O/P EST LOW 20 MIN: CPT | Mod: S$PBB,,, | Performed by: PSYCHIATRY & NEUROLOGY

## 2020-02-18 PROCEDURE — 99213 OFFICE O/P EST LOW 20 MIN: CPT | Mod: PBBFAC | Performed by: PSYCHIATRY & NEUROLOGY

## 2020-02-18 PROCEDURE — 99999 PR PBB SHADOW E&M-EST. PATIENT-LVL III: CPT | Mod: PBBFAC,,, | Performed by: PSYCHIATRY & NEUROLOGY

## 2020-02-18 NOTE — PROGRESS NOTES
INTERVAL HISTORY:    02/18/2020.  The patient has not presented any new complaints since the previous visit. Benefiting from CPAP use in terms of sleep continuity and daytime sleepiness.   The patient reports improved sleep continuity and daytime sleepiness on PAP. ESS today is 10/24.  Denies break through snoring.     She has been doing well at 13 cm H2O  Old F&p - needs replacement.  13 cm H2O - verified by manometry  5/5 hrs  1080 hrs total.  It took me 3 attempts today to turn it on.   The machine is old, malfunctioning and needs replacement.     Likes her nasal pillows mask  Used to use [employer].        EPWORTH SLEEPINESS SCALE 2/18/2020   Sitting and reading 0   Watching TV 2   Sitting, inactive in a public place (e.g. a theatre or a meeting) 1   As a passenger in a car for an hour without a break 1   Lying down to rest in the afternoon when circumstances permit 2   Sitting and talking to someone 1   Sitting quietly after a lunch without alcohol 2   In a car, while stopped for a few minutes in traffic 1   Total score 10       PHQ9 2/6/2019   Little interest or pleasure in doing things: More than half the days   Feeling down, depressed or hopeless: Not at all   Trouble falling asleep, staying asleep, or sleeping too much: Not at all   Feeling tired or having little energy: Several days   Poor appetite or overeating: Several days   Feeling bad about yourself- or that you are a failure or have let yourself or family down Not at all   Trouble concentrating on things, such as reading the newspaper or watching television: Not at all   Moving or speaking so slowly that other people could have noticed. Or the opposite- being so fidgety or restless that you have been moving around a lot more than usual: Not at all   Thoughts that you would be better off dead or hurting yourself in some way: Not at all   If you indicated you have experienced any of the aforementioned problems, how difficult have these problems made  it for you to do your work, take care of things at home or get along with other people? Not difficult at all   Total Score 4     No flowsheet data found.\        2/6/2018    Pt is following up in regards to the REJI and has hx of mild REJI AHI 14 as per Dr Chairez last note . She has stopped using the PAP therapy for the past 4-5 months and feels she was not feeling fresh and stopped. She sleeps in a chair. She is snoring and unclear about witnessed apnea. She does not drive. She feels sleepy in the late afternoon.       EPWORTH SLEEPINESS SCALE 2/6/2019   Sitting and reading 2   Watching TV 2   Sitting, inactive in a public place (e.g. a theatre or a meeting) 1   As a passenger in a car for an hour without a break 1   Lying down to rest in the afternoon when circumstances permit 3   Sitting and talking to someone 2   Sitting quietly after a lunch without alcohol 2   In a car, while stopped for a few minutes in traffic 2   Total score 15         07/18/2018  Sister Pasquale returns to clinic today regarding the management of obstructive sleep apnea and CPAP equipment check.    She is using CPAP by her sleeping chair  She has been sleeping in a chair (also using a wedge pillow in bed).  Her pressure was set to 13 cm (based on titration)    She states this pressure increase is fine. Now using it next to her sleeping chair  Using the ramp set at 4 cm  Humidity set at 4  Her mouth sometimes feels dry.  She has a nasal pillows mask, which she likes. Martin FX nasal pillows small size mask  She states that she really likes CPAP and it helps her feel better after a night of use.    Still sleepy during the day, but better recently after increasing CPAP use    Interrogation SleepStyle 200: CPAP at 13 cm; 1089 hours total; Machine good condition (F&P model), sleep style machine; 200 series. 4.8 hr/night.       EPWORTH SLEEPINESS SCALE 7/18/2018   Sitting and reading 2   Watching TV 2   Sitting, inactive in a public place (e.g. a  theatre or a meeting) 3   As a passenger in a car for an hour without a break 2   Lying down to rest in the afternoon when circumstances permit 2   Sitting and talking to someone 0   Sitting quietly after a lunch without alcohol 2   In a car, while stopped for a few minutes in traffic 2   Total score 15       Recent titration at weight 281 lbs revealed she needed pressure increase from 10 to 13 cm    BASELINE PSG 12/23/11: + REJI, AHI 14.2, low O2 79%, wt 281 lbs. (Patient had sleep study in 2003, at which time she was titrated to 11 cm, wt 248 lbs)   TITRATION 5/9/16: Titrated to 13 cm; wt 281 lbs    DME = Medicare ([employer])      Past Medical History:   Diagnosis Date    Adrenal mass- adenoma stable since 2004 (1.8 cm and 8/13/12 (2 cm); stable 2016 2.4 cm     Adrenal mass- adenoma stable since 2004 (1.8 cm and 8/13/12 (2 cm); stable 2016 2.4 cm    Arthritis     Asthma in adult without complication 6/25/2015    Bilateral carotid artery disease 7/21/2017    Bilateral sciatica 2/7/2017    Cellulitis of right leg 08/16/2017    Cerebral infarction 1/29/2016    Multiple areas of lacunar infarction. Stroke risk factors include HTN, DM2, Dyslipidemia.  Continue ASA 81mg/ Statin therapy I have encouraged 30 minutes of physical activity daily for 5 days a week. She has access to a pool so this should not be so jarring to her joints.     Cervical radiculopathy 3/18/2015    Chronic knee pain     Chronic rhinitis 4/9/2013    CKD (chronic kidney disease) stage 3, GFR 30-59 ml/min 1/29/2013    Coronary artery disease due to calcified coronary lesion 6/25/2015    Diastolic dysfunction 10/10/2013    Essential hypertension 6/25/2015    Gastroesophageal reflux disease without esophagitis 8/13/2012    Gout     Hives 10/1/2013    Mixed hyperlipidemia 8/13/2012    Morbid obesity with BMI of 50.0-59.9, adult 2/11/2014    Obstructive sleep apnea syndrome 8/13/2012    Senile cataracts of both eyes 1/22/2015     Traumatic open wound of right lower leg 8/25/2017    Trigeminal neuralgia of right side of face 5/23/2017    For years now Previously on Gabapentin, but caused constipation Dissipating over time Not related to intracranial abnormalities Possibly related to dental procedure    Type 2 diabetes mellitus with diabetic polyneuropathy, with long-term current use of insulin 1/29/2013    Venous stasis dermatitis of both lower extremities 8/21/2017    Vitamin D deficiency disease 8/13/2012       Past Surgical History:   Procedure Laterality Date    HYSTERECTOMY  1982    secondary uterine fibroids    JOINT REPLACEMENT      Right total knee replacement         Family History   Problem Relation Age of Onset    Cancer Mother 69        cancer of jaw    Hypertension Father     Cancer Sister         half sister - unknown dx    No Known Problems Brother     No Known Problems Maternal Aunt     No Known Problems Maternal Uncle     No Known Problems Paternal Aunt     No Known Problems Paternal Uncle     No Known Problems Maternal Grandmother     No Known Problems Maternal Grandfather     No Known Problems Paternal Grandmother     No Known Problems Paternal Grandfather     Glaucoma Neg Hx     Breast cancer Neg Hx     Colon cancer Neg Hx     Ovarian cancer Neg Hx     Stroke Neg Hx     Allergic rhinitis Neg Hx     Allergies Neg Hx     Angioedema Neg Hx     Asthma Neg Hx     Atopy Neg Hx     Eczema Neg Hx     Immunodeficiency Neg Hx     Rhinitis Neg Hx     Urticaria Neg Hx     Amblyopia Neg Hx     Blindness Neg Hx     Cataracts Neg Hx     Diabetes Neg Hx     Macular degeneration Neg Hx     Retinal detachment Neg Hx     Strabismus Neg Hx     Thyroid disease Neg Hx     Psoriasis Neg Hx        Social History     Tobacco Use    Smoking status: Never Smoker    Smokeless tobacco: Never Used   Substance Use Topics    Alcohol use: No    Drug use: No       ALLERGIES: Reviewed in EPIC    CURRENT  "MEDICATIONS: Reviewed in EPIC    ROS:   As per HPI.      PHYSICAL EXAM:  Prior weight is 266 lbs.  BP (!) 165/86   Pulse 85   Ht 4' 11" (1.499 m)   Wt 126.5 kg (278 lb 12.4 oz)   BMI 56.31 kg/m²   GENERAL: morbid obese body habitus, well groomed       ASSESSMENT:     Obstructive sleep apnea,    Has been using CPAP 13 cm compliantly 5 hrs per night with improvement in excessive daytime sleepiness and sleep continuity. Her machine is old, malfunctioning and needs replacement.      PLAN:     Treatment: prescription  for CPAP 13-18 cm with the mask of a patient's choice was given to the patient. Nasal Pillows mask.    Education: During our discussion today, we talked about the etiology of obstructive sleep apnea as well as the potential ramifications of untreated sleep apnea, which could include daytime sleepiness, hypertension, heart disease and/or stroke.      We discussed potential treatment options, which could include weight loss, body positioning, continuous positive airway pressure (CPAP), or referral for surgical consideration. The patient preferred CPAP option.     Discussed purpose of PAP therapy, health benefits of CPAP, as well as the potential ramifications of untreated sleep apnea, which could include daytime sleepiness, hypertension, heart disease and/or stroke. An AHI of 15 is associated with increased risk CVD.     The patient should avoid ETOH and sedatives at night, as it tends to aggravate REJI. Regular replacement of CPAP mask, tubing and filter was recommended.    Precautions: The patient was advised to abstain from driving should he feel sleepy or drowsy.    Follow up: MD/NP after 1 month of PAP use - Dr. Urias is taking over Dr. David's patients.    Thank you for allowing me the opportunity to participate in the care of your patient.            "

## 2020-02-18 NOTE — PROGRESS NOTES
Chart reviewed.   Immunizations: Triggered Imm Registry     Orders placed: n/a  Upcoming appts to satisfy ANUJA topics: n/a

## 2020-02-18 NOTE — PATIENT INSTRUCTIONS
Please upload Dream  aylin on your phone in advance - and ask a person who gives you the machine to pair it with your phone.    DME (Durable Medical Equipment) [facility]:          Please call [phone] ->option 1->option 2

## 2020-02-26 NOTE — TELEPHONE ENCOUNTER
Called patient left message, intro self, referred by Dr. Schmidt.   Request call back at direct number [phone].  Zena Nieves RN HC

## 2020-03-05 ENCOUNTER — OFFICE VISIT (OUTPATIENT)
Dept: PRIMARY CARE CLINIC | Facility: CLINIC | Age: 80
End: 2020-03-05
Payer: MEDICARE

## 2020-03-05 VITALS
HEIGHT: 59 IN | SYSTOLIC BLOOD PRESSURE: 142 MMHG | BODY MASS INDEX: 57.29 KG/M2 | WEIGHT: 284.19 LBS | DIASTOLIC BLOOD PRESSURE: 68 MMHG | HEART RATE: 66 BPM

## 2020-03-05 DIAGNOSIS — E11.59 HYPERTENSION ASSOCIATED WITH DIABETES: ICD-10-CM

## 2020-03-05 DIAGNOSIS — I83.892 VENOUS STASIS ULCER OF LEFT LOWER LEG WITH EDEMA OF LEFT LOWER LEG: ICD-10-CM

## 2020-03-05 DIAGNOSIS — L97.919 VENOUS ULCER OF RIGHT LEG: ICD-10-CM

## 2020-03-05 DIAGNOSIS — I89.0 LYMPHEDEMA OF BOTH LOWER EXTREMITIES: ICD-10-CM

## 2020-03-05 DIAGNOSIS — I87.2 VENOUS STASIS DERMATITIS OF BOTH LOWER EXTREMITIES: ICD-10-CM

## 2020-03-05 DIAGNOSIS — L97.929 VENOUS STASIS ULCER OF LEFT LOWER LEG WITH EDEMA OF LEFT LOWER LEG: ICD-10-CM

## 2020-03-05 DIAGNOSIS — G47.33 OSA ON CPAP: ICD-10-CM

## 2020-03-05 DIAGNOSIS — I50.32 CHRONIC DIASTOLIC HEART FAILURE: Primary | ICD-10-CM

## 2020-03-05 DIAGNOSIS — R60.0 VENOUS STASIS ULCER OF LEFT LOWER LEG WITH EDEMA OF LEFT LOWER LEG: ICD-10-CM

## 2020-03-05 DIAGNOSIS — I15.2 HYPERTENSION ASSOCIATED WITH DIABETES: ICD-10-CM

## 2020-03-05 DIAGNOSIS — I83.019 VENOUS ULCER OF RIGHT LEG: ICD-10-CM

## 2020-03-05 DIAGNOSIS — I83.029 VENOUS STASIS ULCER OF LEFT LOWER LEG WITH EDEMA OF LEFT LOWER LEG: ICD-10-CM

## 2020-03-05 PROCEDURE — 99215 PR OFFICE/OUTPT VISIT, EST, LEVL V, 40-54 MIN: ICD-10-PCS | Mod: S$GLB,,, | Performed by: INTERNAL MEDICINE

## 2020-03-05 PROCEDURE — 99215 OFFICE O/P EST HI 40 MIN: CPT | Mod: S$GLB,,, | Performed by: INTERNAL MEDICINE

## 2020-03-05 RX ORDER — TORSEMIDE 20 MG/1
20 TABLET ORAL
Qty: 30 TABLET | Refills: 11 | Status: SHIPPED | OUTPATIENT
Start: 2020-03-05 | End: 2020-09-29 | Stop reason: DRUGHIGH

## 2020-03-05 NOTE — PROGRESS NOTES
"Primary Care Provider Appointment   SHARED NOTE: Justin Thompson (MS4), Dr Schmidt (Attending)    Subjective:      Patient ID: Duran Giordano is a 79 y.o. female with DM, HLD, HTN, gout, venous stasis ulcers    Chief Complaint: Follow-up; Wound Check; and Congestive Heart Failure    Pt reports that she has had a "pimple" on her L leg that is concerning for infection. The pt reports subjective fevers and increased need for sleep for the last two weeks, but is otherwise asymptomatic. There is no nausea, vomiting, tenderness, increased redness, and skin breakdown in the area. The pimple has been present for the last two weeks and has grown slightly. Pt has been using neosporin and zinc oxide, goldbond lotion, ceravie, and euserin to keep the area moist and free of infection. Pt continues to use circaids.    She has 6# weight gain since her last appointment.    Pt has SOB "every now and then". The albuterol and Wixela help with the symptoms.    The patient's blood pressure has been checked every other day and has been running higher recently usually in the 120-170/60s. Pt reports no headaches, vision changes, or increased SOB.    Pt is seeing her sleep doctor tomorrow. She has not been using her CPAP machine in a long time, but is excited to start again.    Pt's diabetes is well controlled, and reports that it is checked in the morning and evening. Pt's diabetic doctor, Dr. Munson, recently changed her medication. Her treciba was reduced to 46 from 52 and lispro insulin was reduced to 15 from 18 two weeks ago.     Gout is controlled. Pt had a "bit of tingling" in her L ankle a week ago after a large meal with shrimp, but has not had any recent acute flares. Controlled with 100mg allopurinol daily and colchicine .8mg every other day.     Pt continues to sleep in a recliner due to difficulty breathing. She believes that her new CPAP machine will improve this.    Pill packs as follows:  Adherence packed for 56 days using " Dispill:      Morning card:   Allopurinol 100 mg   Torsemide 20 mg      Evening card:   Atorvastatin 80 mg   Losartan 100 mg   Vitamin D3 1,000 IU          Electronically signed by Rachel Aguirre, PharmD at 12/12/2019  1:13 PM        Past Surgical History:   Procedure Laterality Date    HYSTERECTOMY  1982    secondary uterine fibroids    JOINT REPLACEMENT      Right total knee replacement         Past Medical History:   Diagnosis Date    Adrenal mass- adenoma stable since 2004 (1.8 cm and 8/13/12 (2 cm); stable 2016 2.4 cm     Adrenal mass- adenoma stable since 2004 (1.8 cm and 8/13/12 (2 cm); stable 2016 2.4 cm    Arthritis     Asthma in adult without complication 6/25/2015    Bilateral carotid artery disease 7/21/2017    Bilateral sciatica 2/7/2017    Cellulitis of right leg 08/16/2017    Cerebral infarction 1/29/2016    Multiple areas of lacunar infarction. Stroke risk factors include HTN, DM2, Dyslipidemia.  Continue ASA 81mg/ Statin therapy I have encouraged 30 minutes of physical activity daily for 5 days a week. She has access to a pool so this should not be so jarring to her joints.     Cervical radiculopathy 3/18/2015    Chronic knee pain     Chronic rhinitis 4/9/2013    CKD (chronic kidney disease) stage 3, GFR 30-59 ml/min 1/29/2013    Coronary artery disease due to calcified coronary lesion 6/25/2015    Diastolic dysfunction 10/10/2013    Essential hypertension 6/25/2015    Gastroesophageal reflux disease without esophagitis 8/13/2012    Gout     Hives 10/1/2013    Mixed hyperlipidemia 8/13/2012    Morbid obesity with BMI of 50.0-59.9, adult 2/11/2014    Obstructive sleep apnea syndrome 8/13/2012    Senile cataracts of both eyes 1/22/2015    Traumatic open wound of right lower leg 8/25/2017    Trigeminal neuralgia of right side of face 5/23/2017    For years now Previously on Gabapentin, but caused constipation Dissipating over time Not related to intracranial abnormalities  Possibly related to dental procedure    Type 2 diabetes mellitus with diabetic polyneuropathy, with long-term current use of insulin 1/29/2013    Venous stasis dermatitis of both lower extremities 8/21/2017    Vitamin D deficiency disease 8/13/2012       Social History     Socioeconomic History    Marital status: Single     Spouse name: Not on file    Number of children: Not on file    Years of education: Not on file    Highest education level: Not on file   Occupational History    Not on file   Social Needs    Financial resource strain: Not hard at all    Food insecurity:     Worry: Never true     Inability: Never true    Transportation needs:     Medical: No     Non-medical: No   Tobacco Use    Smoking status: Never Smoker    Smokeless tobacco: Never Used   Substance and Sexual Activity    Alcohol use: No    Drug use: No    Sexual activity: Never   Lifestyle    Physical activity:     Days per week: Not on file     Minutes per session: Not on file    Stress: Not on file   Relationships    Social connections:     Talks on phone: Not on file     Gets together: Not on file     Attends [employer] service: Not on file     Active member of club or organization: Not on file     Attends meetings of clubs or organizations: Not on file     Relationship status: Not on file   Other Topics Concern    Are you pregnant or think you may be? Not Asked    Breast-feeding Not Asked   Social History Narrative    Single with no children.        Review of Systems   Constitutional: Negative for appetite change, fever and unexpected weight change.   HENT: Negative for congestion, nosebleeds, postnasal drip, rhinorrhea, sinus pain and sore throat.    Eyes: Negative for visual disturbance.   Respiratory: Positive for cough, shortness of breath and wheezing.         Increased non productive sputum in the morning.   Cardiovascular: Positive for leg swelling. Negative for chest pain and palpitations.   Gastrointestinal:  "Negative for abdominal pain, blood in stool, constipation and diarrhea.   Genitourinary: Negative for dysuria and hematuria.   Skin: Negative for color change, rash and wound.   Neurological: Negative for dizziness, light-headedness and headaches.   Hematological: Does not bruise/bleed easily.   Psychiatric/Behavioral: Negative for behavioral problems, decreased concentration, dysphoric mood (a month, related to procrastination and increased feelings of tiredness.) and sleep disturbance. The patient is nervous/anxious.        Objective:   BP (!) 142/68   Pulse 66   Ht 4' 11" (1.499 m)   Wt 128.9 kg (284 lb 2.8 oz)   BMI 57.40 kg/m²     Physical Exam   Constitutional: She is oriented to person, place, and time. She appears well-developed.   Severe obesity   HENT:   Head: Normocephalic and atraumatic.   Eyes: EOM are normal.   Neck: Normal range of motion. Neck supple.   Cardiovascular: Normal rate and regular rhythm.   Distant heart sounds   Pulmonary/Chest: She has wheezes.   Increase work of breathing  Nasal flaring    Abdominal: Soft. Bowel sounds are normal.   Obese abdomen   Musculoskeletal: She exhibits edema. She exhibits no tenderness or deformity.   BL venous stasis.  BL Leg erythema and inflammation  Small bullae present on L leg, nontender and fluctuant.   Neurological: She is alert and oriented to person, place, and time.   Skin: Skin is warm and dry.   Improved venous stasis   Psychiatric: She has a normal mood and affect.   Improving insight into disease   Vitals reviewed.      Lab Results   Component Value Date    WBC 7.40 02/07/2020    HGB 13.3 02/07/2020    HCT 46.0 02/07/2020     (L) 02/07/2020    CHOL 178 01/16/2020    TRIG 58 01/16/2020    HDL 51 01/16/2020    ALT 15 02/07/2020    AST 20 02/07/2020     02/07/2020    K 4.6 02/07/2020     02/07/2020    CREATININE 1.1 02/07/2020    BUN 18 02/07/2020    CO2 25 02/07/2020    TSH 0.799 01/16/2020    INR 1.1 04/17/2005    HGBA1C " 6.2 (H) 01/16/2020       Current Outpatient Medications on File Prior to Visit   Medication Sig Dispense Refill    acetaminophen (TYLENOL) 500 MG tablet Take 2 tablets (1,000 mg total) by mouth 2 (two) times daily as needed for Pain. 360 tablet 3    albuterol (PROVENTIL/VENTOLIN HFA) 90 mcg/actuation inhaler Inhale 2 puffs into the lungs every 4 (four) hours as needed for Wheezing or Shortness of Breath. Rescue 3 Inhaler 3    allopurinol (ZYLOPRIM) 100 MG tablet Take 1 tablet (100 mg total) by mouth once daily. 30 tablet 6    ammonium lactate 12 % Crea Apply topically every morning.       aspirin (ECOTRIN) 81 MG EC tablet Take 1 tablet (81 mg total) by mouth once daily. 90 tablet 3    atorvastatin (LIPITOR) 80 MG tablet Take 1 tablet (80 mg total) by mouth once daily. 90 tablet 3    blood sugar diagnostic Strp BG monitoring 4 times a day. Pt needs test strips for Em[employer] Talking meter. (Patient taking differently: BG monitoring 2 times a day. Pt needs test strips for Em[employer] Talking meter.) 450 strip prn    cane tips Misc 1 application by Misc.(Non-Drug; Combo Route) route once daily. 4 each 0    cholecalciferol, vitamin D3, (VITAMIN D3) 1,000 unit capsule Take 1 capsule (1,000 Units total) by mouth once daily. 90 capsule 3    clotrimazole (LOTRIMIN) 1 % cream Apply topically 2 (two) times daily. 113 g 3    clotrimazole-betamethasone 1-0.05% (LOTRISONE) cream Apply to affected area 2 times daily 15 g 1    colchicine (COLCRYS) 0.6 mg tablet Take one tablet every other day 45 tablet 3    diclofenac sodium 1 % Gel APPLY 2 GRAMS TOPICALLY DAILY 100 g 2    econazole nitrate 1 % cream Apply topically once daily. Apply to feet daily as directed. 85 g 1    fluticasone (FLONASE) 50 mcg/actuation nasal spray 2 sprays (100 mcg total) by Each Nare route once daily. 1 Bottle 3    fluticasone-salmeterol diskus inhaler 500-50 mcg INHALE 1 PUFF TWICE DAILY 60 each 11    guaiFENesin (MUCINEX) 600 mg 12 hr tablet  "Take 2 tablets (1,200 mg total) by mouth 2 (two) times daily. 60 tablet 0    insulin degludec (TRESIBA FLEXTOUCH U-200) 200 unit/mL (3 mL) InPn Inject 46 units daily. 3 Syringe 6    insulin lispro (HUMALOG KWIKPEN INSULIN) 100 unit/mL pen Inject 15 units w/ breakfast and dinner plus scale 150-200+2, 201-250+4, 251-300+6, 301-350+8, >350+10. 2 Box 6    lancets Misc Test daily (Patient taking differently: Test twice  daily) 100 each 12    losartan (COZAAR) 100 MG tablet Take 1 tablet (100 mg total) by mouth once daily. 90 tablet 3    miconazole nitrate (LOTRIMIN AF POWDER) 2 % AerP Apply 1 application topically 2 (two) times daily. 130 g 3    nebulizer and compressor (COMP-AIR ELITE COMP NEB SYSTEM) Berna use as directed 1 each 0    pen needle, diabetic (PEN NEEDLE) 29 gauge x 1/2" Ndle Uses 5 times a day. 200 each 11    predniSONE (DELTASONE) 20 MG tablet 20 mg twice daily for 5 days and later on use it only if she flares 60 tablet 2    sod chlor-bicarb-squeez bottle (NEILMED SINUS RINSE COMPLETE) pkdv Use as directed 1 each 5    torsemide (DEMADEX) 20 MG Tab Take 1 tablet (20 mg total) by mouth once daily. 90 tablet 3    trolamine salicylate (ASPERCREME) 10 % cream Apply topically as needed.      albuterol-ipratropium (DUO-NEB) 2.5 mg-0.5 mg/3 mL nebulizer solution Take 3 mLs by nebulization every 6 (six) hours as needed for Wheezing. Rescue 3 Box 3    cetirizine (ZYRTEC) 10 MG tablet Take 1 tablet (10 mg total) by mouth once daily. 30 tablet 2     No current facility-administered medications on file prior to visit.                  Assessment:   79 y.o. female with multiple co-morbid illnesses here to follow-up with PCP and continue work-up of chronic issues notably DM, HLD, HTN, gout.     Plan:     Problem List Items Addressed This Visit        Derm    Venous ulcer of right leg    Relevant Medications    torsemide (DEMADEX) 20 MG Tab    Other Relevant Orders    Ambulatory referral/consult to Wound " Clinic    Ambulatory referral/consult to Home Health       Cardiac/Vascular    Chronic diastolic heart failure - Primary    Relevant Medications    torsemide (DEMADEX) 20 MG Tab    Other Relevant Orders    Ambulatory referral/consult to Wound Clinic    Ambulatory referral/consult to Home Health    Hypertension associated with diabetes    Venous stasis dermatitis of both lower extremities     LE swelling bilaterally with poor wound healing, routine circaids with wound care but wounds worsening  · Continue circaids boot, per wound care  · Wraps in clinic today  · Referred to wound care and  wound care  · Treat cellulitis PRN, but is usually not the diagnosis  · Monitor CBC  · Discontinued amlodipine previously         Relevant Orders    Ambulatory referral/consult to Wound Clinic    Ambulatory referral/consult to Home Health       Other    REJI on CPAP     Previously compliant with CPAP only 4 hours per night, now noncompliant for about the last year.   · Advised to continue using her old machine  · Has sleep medicine follow-up tomorrow  · Advised to lose weight           Lymphedema of both lower extremities    Relevant Medications    torsemide (DEMADEX) 20 MG Tab    Other Relevant Orders    Ambulatory referral/consult to Wound Clinic    Ambulatory referral/consult to Home Health    Venous stasis ulcer of left lower leg with edema of left lower leg     LE ulcer of L leg with new blisters and high risk of infection  · Wound care with PCP today  · Refer to outpatient wound care  ·  wound care for 3X weekly  · Increase diuresis with extra torsemide dose added for afternoon         Relevant Orders    Ambulatory referral/consult to Home Health          Health Maintenance       Date Due Completion Date    TETANUS VACCINE 03/08/1958 ---    Shingles Vaccine (2 of 3) 02/10/2015 12/16/2014    Mammogram 04/09/2020 4/9/2019    Hemoglobin A1c 07/16/2020 1/16/2020    Override on 7/13/2016: Done    Foot Exam 12/31/2020 12/31/2019  (Done)    Override on 12/31/2019: Done (Done by Dr. Anand)    Override on 11/27/2018: Done (Dr Anand)    Override on 1/19/2018: Done (Dr anand)    Override on 5/31/2017: Done    Override on 7/20/2016: Done    Lipid Panel 01/16/2021 1/16/2020    Eye Exam 02/07/2021 2/7/2020    Override on 2/7/2020: Done    Override on 2/17/2016: Done    Override on 1/22/2015: Done    Override on 8/16/2012: Done    Aspirin/Antiplatelet Therapy 02/10/2021 2/10/2020    DEXA SCAN 02/22/2021 2/22/2018    Override on 12/13/2011: Done          Follow up in about 1 week (around 3/12/2020). Total face-to-face time was 60 min, >50% of this was spent on counseling and coordination of care. The following issues were discussed: with DM, HLD, HTN, gout, venous stasis ulcers    Justin Thompson (MS4)    Tami Schmidt MD/MPH  Internal Medicine  [facility]  [phone] spectralink

## 2020-03-05 NOTE — ASSESSMENT & PLAN NOTE
Previously compliant with CPAP only 4 hours per night, now noncompliant for about the last year.   · Advised to continue using her old machine  · Has sleep medicine follow-up tomorrow  · Advised to lose weight

## 2020-03-05 NOTE — PROGRESS NOTES
Adherence packed for 49 days using Dispill:     Morning card:   Allopurinol 100 mg   Torsemide 20 mg     Evening card:   Atorvastatin 80 mg   Losartan 100 mg   Vitamin D3 1,000 IU

## 2020-03-05 NOTE — ASSESSMENT & PLAN NOTE
LE ulcer of L leg with new blisters and high risk of infection  · Wound care with PCP today  · Refer to outpatient wound care  ·  wound care for 3X weekly  · Increase diuresis with extra torsemide dose added for afternoon

## 2020-03-05 NOTE — PATIENT INSTRUCTIONS
TODAY:  - wound care in clinic with PCP today  - refer to wound care clinic  - home health wound care  - add an extra dose of torsemide after lunch   +  at our pharmacy today

## 2020-03-05 NOTE — ASSESSMENT & PLAN NOTE
LE swelling bilaterally with poor wound healing, routine circaids with wound care but wounds worsening  · Continue circaids boot, per wound care  · Wraps in clinic today  · Referred to wound care and  wound care  · Treat cellulitis PRN, but is usually not the diagnosis  · Monitor CBC  · Discontinued amlodipine previously

## 2020-03-06 ENCOUNTER — TELEPHONE (OUTPATIENT)
Dept: NEPHROLOGY | Facility: CLINIC | Age: 80
End: 2020-03-06

## 2020-03-07 PROCEDURE — G0180 MD CERTIFICATION HHA PATIENT: HCPCS | Mod: ,,, | Performed by: INTERNAL MEDICINE

## 2020-03-07 PROCEDURE — G0180 PR HOME HEALTH MD CERTIFICATION: ICD-10-PCS | Mod: ,,, | Performed by: INTERNAL MEDICINE

## 2020-03-10 ENCOUNTER — PATIENT OUTREACH (OUTPATIENT)
Dept: ADMINISTRATIVE | Facility: OTHER | Age: 80
End: 2020-03-10

## 2020-03-11 ENCOUNTER — OFFICE VISIT (OUTPATIENT)
Dept: WOUND CARE | Facility: CLINIC | Age: 80
End: 2020-03-11
Payer: MEDICARE

## 2020-03-11 ENCOUNTER — PATIENT OUTREACH (OUTPATIENT)
Dept: ADMINISTRATIVE | Facility: HOSPITAL | Age: 80
End: 2020-03-11

## 2020-03-11 VITALS
HEART RATE: 88 BPM | WEIGHT: 286.63 LBS | DIASTOLIC BLOOD PRESSURE: 66 MMHG | SYSTOLIC BLOOD PRESSURE: 176 MMHG | BODY MASS INDEX: 66.33 KG/M2 | TEMPERATURE: 97 F | HEIGHT: 55 IN

## 2020-03-11 DIAGNOSIS — L97.921 ULCER OF LEFT LOWER EXTREMITY, LIMITED TO BREAKDOWN OF SKIN: Primary | ICD-10-CM

## 2020-03-11 DIAGNOSIS — I83.029 VENOUS STASIS ULCER OF LEFT LOWER LEG WITH EDEMA OF LEFT LOWER LEG: ICD-10-CM

## 2020-03-11 DIAGNOSIS — I83.892 VENOUS STASIS ULCER OF LEFT LOWER LEG WITH EDEMA OF LEFT LOWER LEG: ICD-10-CM

## 2020-03-11 DIAGNOSIS — I89.0 LYMPHEDEMA OF BOTH LOWER EXTREMITIES: ICD-10-CM

## 2020-03-11 DIAGNOSIS — R60.0 VENOUS STASIS ULCER OF LEFT LOWER LEG WITH EDEMA OF LEFT LOWER LEG: ICD-10-CM

## 2020-03-11 DIAGNOSIS — L97.929 VENOUS STASIS ULCER OF LEFT LOWER LEG WITH EDEMA OF LEFT LOWER LEG: ICD-10-CM

## 2020-03-11 PROBLEM — L97.901 ULCER OF LOWER EXTREMITY, LIMITED TO BREAKDOWN OF SKIN: Status: ACTIVE | Noted: 2020-03-11

## 2020-03-11 PROBLEM — I83.019 VENOUS ULCER OF RIGHT LEG: Status: RESOLVED | Noted: 2018-12-17 | Resolved: 2020-03-11

## 2020-03-11 PROBLEM — L97.919 VENOUS ULCER OF RIGHT LEG: Status: RESOLVED | Noted: 2018-12-17 | Resolved: 2020-03-11

## 2020-03-11 PROCEDURE — 11042 DBRDMT SUBQ TIS 1ST 20SQCM/<: CPT | Mod: S$PBB,,, | Performed by: NURSE PRACTITIONER

## 2020-03-11 PROCEDURE — 99213 PR OFFICE/OUTPT VISIT, EST, LEVL III, 20-29 MIN: ICD-10-PCS | Mod: 25,S$PBB,, | Performed by: NURSE PRACTITIONER

## 2020-03-11 PROCEDURE — 99999 PR PBB SHADOW E&M-EST. PATIENT-LVL V: CPT | Mod: PBBFAC,,, | Performed by: NURSE PRACTITIONER

## 2020-03-11 PROCEDURE — 99215 OFFICE O/P EST HI 40 MIN: CPT | Mod: PBBFAC | Performed by: NURSE PRACTITIONER

## 2020-03-11 PROCEDURE — 11042 PR DEBRIDEMENT, SKIN, SUB-Q TISSUE,=<20 SQ CM: ICD-10-PCS | Mod: S$PBB,,, | Performed by: NURSE PRACTITIONER

## 2020-03-11 PROCEDURE — 99999 PR PBB SHADOW E&M-EST. PATIENT-LVL V: ICD-10-PCS | Mod: PBBFAC,,, | Performed by: NURSE PRACTITIONER

## 2020-03-11 PROCEDURE — 29580 STRAPPING UNNA BOOT: CPT | Mod: PBBFAC,LT | Performed by: NURSE PRACTITIONER

## 2020-03-11 PROCEDURE — 99213 OFFICE O/P EST LOW 20 MIN: CPT | Mod: 25,S$PBB,, | Performed by: NURSE PRACTITIONER

## 2020-03-11 PROCEDURE — 11042 DBRDMT SUBQ TIS 1ST 20SQCM/<: CPT | Mod: PBBFAC | Performed by: NURSE PRACTITIONER

## 2020-03-11 NOTE — PROGRESS NOTES
Subjective:       Patient ID: Duran Giordano is a 80 y.o. female.    Chief Complaint: Wound Check      This 78 y.o.  female with multiple chronic medical conditions includingType 2 DM, CKD, CHF, Lymphedema, Morbid obesity, HTN, and Anemias. She is well known to my service. She is seen today for evaluation and management of a wound to the left lower leg.  It has been present for a few weeks. She has a xeroform dressing on the wound but is using no form of compression.  The wound is not healing. She is afebrile. She denies increased redness, swelling or purulent drainage.  She does not complain of pain.      Review of Systems    Unchanged from prior visit.  Objective:      Physical Exam   Constitutional: She is oriented to person, place, and time. She appears well-developed and well-nourished. No distress.   HENT:   Head: Normocephalic and atraumatic.   Neck: Normal range of motion.   Pulmonary/Chest: Effort normal. No respiratory distress.   Musculoskeletal: Normal range of motion. She exhibits edema. She exhibits no tenderness.   Neurological: She is alert and oriented to person, place, and time.   Skin: Skin is warm and dry. No rash noted. She is not diaphoretic. No cyanosis or erythema. Nails show no clubbing.   Psychiatric: She has a normal mood and affect. Her behavior is normal. Judgment and thought content normal.   Nursing note and vitals reviewed.        Left shin ulcer post debridement      Procedure: Duran was seen in the clinic room and placed in the supine position on the treatment table. Attention was directed to the wound which was located on the left lower leg, where an wound measuring 2.9x4.8 cm is noted. The wound was covered with 100% non-viable tissue. No acute signs of infection were noted. The wound was non-tender. The wound was prepped with alcohol. Utilizing a scissors and pickups, I debrided the wound full-thickness through skin and subcutaneous tissue to excise viable and  nonviable tissue inside the wound margins. The patient tolerated well. Because of a lack of sensation, anesthesia was not necessary. The wound was flushed with wound . There was minimal bleeding with debridement which was well controlled with direct pressure. The wound was then dressed with hydrofiber. The patient tolerated the procedure well.    The left leg was cleansed with Easi-clense sponges and dried thoroughly.  A hydrofiber dressing was applied to the wound.  An ABD pad was applied to the ankle. Eucerin cream was applied to the lower legs.  The patient's foot was positioned at a 90 degree angle.  A zinc oxide wrap, followed by kerlix roll gauze and coban were applied using a spiral technique avoiding creases or folds.  The wrap was started behind the first metatarsal and ended below the tibial tubercle of the knee.  There was overlap of each turn half the width of the previous turn.  The compression wrap will be changed every 2-3 days.    Assessment:       1. Ulcer of left lower extremity, limited to breakdown of skin    2. Venous stasis ulcer of left lower leg with edema of left lower leg    3. Lymphedema of both lower extremities        Plan:           Unna boot left lower leg as detailed above.  Patient was warned not to get the dressings wet and to use cast covers for showering.  Should the dressing become wet, she is to remove it, place a wet-to-dry dressing over the wound, cover with gauze and roll gauze and use ace wraps for compression and to secure bandages.  She should then notify Home health as soon as possible to have a new dressing applied.  Return to clinic in one month.  [facility] notified of orders via Epic fax.  We will ask them to see the patient three times weekly.    Home Health Orders:  Cleanse wound with wound cleanser.  Apply hydrofiber to left leg ulcer.  Pad ankle with ABD pad.  Unna boot (zinc oxide wrap, kerlix and coban) left lower leg.  Circaid stocking right lower  leg.  Change dressing three times weekly.  Skilled nurse visit-three times weekly    HPI

## 2020-03-16 ENCOUNTER — OFFICE VISIT (OUTPATIENT)
Dept: PRIMARY CARE CLINIC | Facility: CLINIC | Age: 80
End: 2020-03-16
Payer: MEDICARE

## 2020-03-16 VITALS
WEIGHT: 284.31 LBS | BODY MASS INDEX: 57.32 KG/M2 | HEIGHT: 59 IN | DIASTOLIC BLOOD PRESSURE: 70 MMHG | OXYGEN SATURATION: 96 % | HEART RATE: 73 BPM | SYSTOLIC BLOOD PRESSURE: 124 MMHG

## 2020-03-16 DIAGNOSIS — L97.929 VENOUS STASIS ULCER OF LEFT LOWER LEG WITH EDEMA OF LEFT LOWER LEG: ICD-10-CM

## 2020-03-16 DIAGNOSIS — I83.892 VENOUS STASIS ULCER OF LEFT LOWER LEG WITH EDEMA OF LEFT LOWER LEG: ICD-10-CM

## 2020-03-16 DIAGNOSIS — I83.029 VENOUS STASIS ULCER OF LEFT LOWER LEG WITH EDEMA OF LEFT LOWER LEG: ICD-10-CM

## 2020-03-16 DIAGNOSIS — G47.33 OSA ON CPAP: ICD-10-CM

## 2020-03-16 DIAGNOSIS — R60.0 VENOUS STASIS ULCER OF LEFT LOWER LEG WITH EDEMA OF LEFT LOWER LEG: ICD-10-CM

## 2020-03-16 DIAGNOSIS — Z66 DNR (DO NOT RESUSCITATE): ICD-10-CM

## 2020-03-16 PROCEDURE — 99215 OFFICE O/P EST HI 40 MIN: CPT | Mod: S$GLB,,, | Performed by: INTERNAL MEDICINE

## 2020-03-16 PROCEDURE — 99215 PR OFFICE/OUTPT VISIT, EST, LEVL V, 40-54 MIN: ICD-10-PCS | Mod: S$GLB,,, | Performed by: INTERNAL MEDICINE

## 2020-03-16 NOTE — ASSESSMENT & PLAN NOTE
LE swelling bilaterally with poor wound healing, routine circaids with wound care but wounds worsening  · Continue circaids boot, per wound care  · Needs wound wrap changes with HH wound care  · Treat cellulitis PRN, but is usually not the diagnosis  · Monitor CBC  · Discontinued amlodipine previously

## 2020-03-16 NOTE — PATIENT INSTRUCTIONS
TODAY:  - continue using cercaid wrap on right leg  - JOSE boot with wound care on left leg  - set up your CPAP machine, please!  - email Adv Directives to [email or web address]  - continue eating a healthy diet  - stay away from big crowds, stay at home!

## 2020-03-16 NOTE — Clinical Note
Please call patient tomorrow to see if:1) she set up her CPAP2) has emailed me her adv directives3) wound care nurse came with the new suppliesThanks!KJ

## 2020-03-16 NOTE — ASSESSMENT & PLAN NOTE
DNR status, [place] completed. Will email Adv Dir and PoA  · Will forward adv dir to [email or web address] once received

## 2020-03-16 NOTE — ASSESSMENT & PLAN NOTE
Previously compliant with CPAP only 4 hours per night, now noncompliant for about the last year.   · Advised to continue set up her new machine  · Has sleep medicine in 1 mo  · Advised to lose weight and use her new machine!

## 2020-03-16 NOTE — PROGRESS NOTES
Primary Care Provider Appointment   SHARED NOTE: Jovita Becerril (MS4), Dr Schmidt (Attending)    Subjective:      Patient ID: Duran Giordano is a 80 y.o. female with DM, HLD, HTN, gout, venous stasis ulcers    Chief Complaint: Follow-up    She went to wound care after last visit for L LE ulcer debrided on 3/11/2020.  Recommended Unna boot left lower leg and keeping dry with frequent dressing changes. She has been following with Home Health who has been changing her bandages.  Most recently they did not have the proper bandages and she has had this wrapping on for 5 days now.  Home Health is going to visit her later today for a change. Denies fever, chills or night sweats.  She has a follow up with Wound Care on April 8th. She     She continues to c/o of increased need to sleep.  She naps for 20 minutes 3 times a week. Pt watches TV before bed.  Her new CPAP machine has come, however she has not set it up yet. She has not used CPAP the last 3 days.     She has her advanced care planning documents at home, is willing to do the LaPost today with me, which was completed. Prefers DNR status.    Past Surgical History:   Procedure Laterality Date    HYSTERECTOMY  1982    secondary uterine fibroids    JOINT REPLACEMENT      Right total knee replacement         Past Medical History:   Diagnosis Date    Adrenal mass- adenoma stable since 2004 (1.8 cm and 8/13/12 (2 cm); stable 2016 2.4 cm     Adrenal mass- adenoma stable since 2004 (1.8 cm and 8/13/12 (2 cm); stable 2016 2.4 cm    Arthritis     Asthma in adult without complication 6/25/2015    Bilateral carotid artery disease 7/21/2017    Bilateral sciatica 2/7/2017    Cellulitis of right leg 08/16/2017    Cerebral infarction 1/29/2016    Multiple areas of lacunar infarction. Stroke risk factors include HTN, DM2, Dyslipidemia.  Continue ASA 81mg/ Statin therapy I have encouraged 30 minutes of physical activity daily for 5 days a week. She has access to a pool  so this should not be so jarring to her joints.     Cervical radiculopathy 3/18/2015    Chronic knee pain     Chronic rhinitis 4/9/2013    CKD (chronic kidney disease) stage 3, GFR 30-59 ml/min 1/29/2013    Coronary artery disease due to calcified coronary lesion 6/25/2015    Diastolic dysfunction 10/10/2013    Essential hypertension 6/25/2015    Gastroesophageal reflux disease without esophagitis 8/13/2012    Gout     Hives 10/1/2013    Mixed hyperlipidemia 8/13/2012    Morbid obesity with BMI of 50.0-59.9, adult 2/11/2014    Obstructive sleep apnea syndrome 8/13/2012    Senile cataracts of both eyes 1/22/2015    Traumatic open wound of right lower leg 8/25/2017    Trigeminal neuralgia of right side of face 5/23/2017    For years now Previously on Gabapentin, but caused constipation Dissipating over time Not related to intracranial abnormalities Possibly related to dental procedure    Type 2 diabetes mellitus with diabetic polyneuropathy, with long-term current use of insulin 1/29/2013    Venous stasis dermatitis of both lower extremities 8/21/2017    Vitamin D deficiency disease 8/13/2012       Social History     Socioeconomic History    Marital status: Single     Spouse name: Not on file    Number of children: Not on file    Years of education: Not on file    Highest education level: Not on file   Occupational History    Not on file   Social Needs    Financial resource strain: Not hard at all    Food insecurity:     Worry: Never true     Inability: Never true    Transportation needs:     Medical: No     Non-medical: No   Tobacco Use    Smoking status: Never Smoker    Smokeless tobacco: Never Used   Substance and Sexual Activity    Alcohol use: No    Drug use: No    Sexual activity: Never   Lifestyle    Physical activity:     Days per week: Not on file     Minutes per session: Not on file    Stress: Not on file   Relationships    Social connections:     Talks on phone: Not on  "file     Gets together: Not on file     Attends Yarsanism service: Not on file     Active member of club or organization: Not on file     Attends meetings of clubs or organizations: Not on file     Relationship status: Not on file   Other Topics Concern    Are you pregnant or think you may be? Not Asked    Breast-feeding Not Asked   Social History Narrative    Single with no children.        Review of Systems   Constitutional: Negative for appetite change, fever and unexpected weight change.   HENT: Negative for congestion, nosebleeds, postnasal drip, rhinorrhea, sinus pain and sore throat.    Eyes: Negative for visual disturbance.   Respiratory: Positive for wheezing. Negative for cough and shortness of breath.         Increased non productive sputum in the morning.   Cardiovascular: Positive for leg swelling. Negative for chest pain and palpitations.   Gastrointestinal: Negative for abdominal pain, blood in stool, constipation and diarrhea.   Genitourinary: Negative for dysuria and hematuria.   Skin: Negative for color change, rash and wound.   Neurological: Negative for dizziness, light-headedness and headaches.   Hematological: Does not bruise/bleed easily.   Psychiatric/Behavioral: Negative for behavioral problems, decreased concentration, dysphoric mood (a month, related to procrastination and increased feelings of tiredness.) and sleep disturbance. The patient is not nervous/anxious.        Objective:   /70   Pulse 73   Ht 4' 11" (1.499 m)   Wt 128.9 kg (284 lb 4.5 oz)   SpO2 96%   BMI 57.42 kg/m²     Physical Exam   Constitutional: She is oriented to person, place, and time. She appears well-developed.   Severe obesity   HENT:   Head: Normocephalic and atraumatic.   Eyes: EOM are normal.   Neck: Normal range of motion. Neck supple.   Cardiovascular: Normal rate and regular rhythm.   Distant heart sounds   Pulmonary/Chest: Effort normal and breath sounds normal.   Abdominal: Soft. Bowel sounds " are normal.   Obese abdomen   Musculoskeletal: She exhibits edema and deformity. She exhibits no tenderness.   BL venous stasis.  BL Leg erythema and inflammation   Neurological: She is alert and oriented to person, place, and time.   Skin: Skin is warm and dry.   Numerous venous stasis ulcers   Psychiatric: She has a normal mood and affect.   Improving insight into disease   Vitals reviewed.          Lab Results   Component Value Date    WBC 7.40 02/07/2020    HGB 13.3 02/07/2020    HCT 46.0 02/07/2020     (L) 02/07/2020    CHOL 178 01/16/2020    TRIG 58 01/16/2020    HDL 51 01/16/2020    ALT 15 02/07/2020    AST 20 02/07/2020     02/07/2020    K 4.6 02/07/2020     02/07/2020    CREATININE 1.1 02/07/2020    BUN 18 02/07/2020    CO2 25 02/07/2020    TSH 0.799 01/16/2020    INR 1.1 04/17/2005    HGBA1C 6.2 (H) 01/16/2020       Current Outpatient Medications on File Prior to Visit   Medication Sig Dispense Refill    acetaminophen (TYLENOL) 500 MG tablet Take 2 tablets (1,000 mg total) by mouth 2 (two) times daily as needed for Pain. 360 tablet 3    albuterol (PROVENTIL/VENTOLIN HFA) 90 mcg/actuation inhaler Inhale 2 puffs into the lungs every 4 (four) hours as needed for Wheezing or Shortness of Breath. Rescue 3 Inhaler 3    allopurinoL (ZYLOPRIM) 100 MG tablet Take 1 tablet (100 mg total) by mouth once daily. 30 tablet 6    ammonium lactate 12 % Crea Apply topically every morning.       aspirin (ECOTRIN) 81 MG EC tablet Take 1 tablet (81 mg total) by mouth once daily. 90 tablet 3    atorvastatin (LIPITOR) 80 MG tablet Take 1 tablet (80 mg total) by mouth once daily. 90 tablet 3    blood sugar diagnostic Strp BG monitoring 4 times a day. Pt needs test strips for Embrace Talking meter. (Patient taking differently: BG monitoring 2 times a day. Pt needs test strips for Embrace Talking meter.) 450 strip prn    cane tips Misc 1 application by Misc.(Non-Drug; Combo Route) route once daily. 4 each  "0    cholecalciferol, vitamin D3, (VITAMIN D3) 1,000 unit capsule Take 1 capsule (1,000 Units total) by mouth once daily. 90 capsule 3    clotrimazole (LOTRIMIN) 1 % cream Apply topically 2 (two) times daily. 113 g 3    clotrimazole-betamethasone 1-0.05% (LOTRISONE) cream Apply to affected area 2 times daily 15 g 1    colchicine (COLCRYS) 0.6 mg tablet Take one tablet every other day 45 tablet 3    diclofenac sodium 1 % Gel APPLY 2 GRAMS TOPICALLY DAILY 100 g 2    econazole nitrate 1 % cream Apply topically once daily. Apply to feet daily as directed. 85 g 1    fluticasone (FLONASE) 50 mcg/actuation nasal spray 2 sprays (100 mcg total) by Each Nare route once daily. 1 Bottle 3    fluticasone-salmeterol diskus inhaler 500-50 mcg INHALE 1 PUFF TWICE DAILY 60 each 11    guaiFENesin (MUCINEX) 600 mg 12 hr tablet Take 2 tablets (1,200 mg total) by mouth 2 (two) times daily. 60 tablet 0    insulin degludec (TRESIBA FLEXTOUCH U-200) 200 unit/mL (3 mL) InPn Inject 46 units daily. 3 Syringe 6    insulin lispro (HUMALOG KWIKPEN INSULIN) 100 unit/mL pen Inject 15 units w/ breakfast and dinner plus scale 150-200+2, 201-250+4, 251-300+6, 301-350+8, >350+10. 2 Box 6    lancets Misc Test daily (Patient taking differently: Test twice  daily) 100 each 12    losartan (COZAAR) 100 MG tablet Take 1 tablet (100 mg total) by mouth once daily. 90 tablet 3    miconazole nitrate (LOTRIMIN AF POWDER) 2 % AerP Apply 1 application topically 2 (two) times daily. 130 g 3    nebulizer and compressor (COMP-AIR ELITE COMP NEB SYSTEM) Berna use as directed 1 each 0    pen needle, diabetic (PEN NEEDLE) 29 gauge x 1/2" Ndle Uses 5 times a day. 200 each 11    predniSONE (DELTASONE) 20 MG tablet 20 mg twice daily for 5 days and later on use it only if she flares 60 tablet 2    sod chlor-bicarb-squeez bottle (NEILMED SINUS RINSE COMPLETE) pkdv Use as directed 1 each 5    torsemide (DEMADEX) 20 MG Tab Take 1 tablet (20 mg total) by mouth " once daily. 90 tablet 3    torsemide (DEMADEX) 20 MG Tab Take 1 tablet (20 mg total) by mouth after lunch. 30 tablet 11    trolamine salicylate (ASPERCREME) 10 % cream Apply topically as needed.      albuterol-ipratropium (DUO-NEB) 2.5 mg-0.5 mg/3 mL nebulizer solution Take 3 mLs by nebulization every 6 (six) hours as needed for Wheezing. Rescue 3 Box 3    cetirizine (ZYRTEC) 10 MG tablet Take 1 tablet (10 mg total) by mouth once daily. 30 tablet 2     No current facility-administered medications on file prior to visit.          Assessment:   80 y.o. female with multiple co-morbid illnesses here to follow-up with PCP and continue work-up of chronic issues notably DM, HLD, HTN, gout, venous stasis ulcers.     Plan:     Problem List Items Addressed This Visit        Palliative Care    DNR (do not resuscitate)     DNR status, LaPost completed. Will email Adv Dir and PoA  · Will forward adv dir to [email or web address] once received            Other    REJI on CPAP     Previously compliant with CPAP only 4 hours per night, now noncompliant for about the last year.   · Advised to continue set up her new machine  · Has sleep medicine in 1 mo  · Advised to lose weight and use her new machine!           Venous stasis ulcer of left lower leg with edema of left lower leg     LE swelling bilaterally with poor wound healing, routine circaids with wound care but wounds worsening  · Continue circaids boot, per wound care  · Needs wound wrap changes with  wound care  · Treat cellulitis PRN, but is usually not the diagnosis  · Monitor CBC  · Discontinued amlodipine previously               Health Maintenance       Date Due Completion Date    TETANUS VACCINE 03/08/1960 ---    Shingles Vaccine (2 of 3) 02/10/2015 12/16/2014    Mammogram 04/09/2020 4/9/2019- POSTPONE    Hemoglobin A1c 07/16/2020 1/16/2020    Override on 7/13/2016: Done    Foot Exam 12/31/2020 12/31/2019 (Done)    Override on 12/31/2019: Done (Done by Dr. Anand)     Override on 11/27/2018: Done (Dr Anand)    Override on 1/19/2018: Done (Dr anand)    Override on 5/31/2017: Done    Override on 7/20/2016: Done    Lipid Panel 01/16/2021 1/16/2020    Eye Exam 02/07/2021 2/7/2020    Override on 2/7/2020: Done    Override on 2/17/2016: Done    Override on 1/22/2015: Done    Override on 8/16/2012: Done    Aspirin/Antiplatelet Therapy 03/16/2021 3/16/2020    DEXA SCAN 02/22/2022 2/22/2018    Override on 12/13/2011: Done        Follow up in about 2 weeks (around 3/30/2020). Total face-to-face time was 90 min, >50% of this was spent on counseling and coordination of care. 30min spent on advanced care planning, patient has adv directive and PoA at the mother house, will scan and email to this office. The following issues were discussed: DM, HLD, HTN, gout, venous stasis ulcers.    Jovita Becerril MS4    Tami Schmidt MD/MPH  Internal Medicine  [facility]  [phone] [facility]

## 2020-03-17 ENCOUNTER — TELEPHONE (OUTPATIENT)
Dept: HOME HEALTH SERVICES | Facility: HOSPITAL | Age: 80
End: 2020-03-17

## 2020-03-17 RX ORDER — INSULIN DEGLUDEC 200 U/ML
INJECTION, SOLUTION SUBCUTANEOUS
Qty: 9 ML | Refills: 2 | Status: SHIPPED | OUTPATIENT
Start: 2020-03-17 | End: 2020-05-21

## 2020-03-17 NOTE — TELEPHONE ENCOUNTER
----- Message from Jovita Becerril sent at 3/17/2020 11:13 AM CDT -----  Contact: [phone]  1. She plans to set up CPAP tonight.  Will call 11AM tomorrow to see how she did.  2. She is heading to office now to email Adv Dir.  3. Wound care was able to re-wrap leg.    Best phone #: [phone].     ----- Message -----  From: Tami Schmidt MD  Sent: 3/16/2020   2:15 PM CDT  To: Jovita Becerril    Please call patient tomorrow to see if:  1) she set up her CPAP  2) has emailed me her adv directives  3) wound care nurse came with the new supplies    Thanks!  KJ

## 2020-03-19 ENCOUNTER — TELEPHONE (OUTPATIENT)
Dept: PRIMARY CARE CLINIC | Facility: CLINIC | Age: 80
End: 2020-03-19

## 2020-03-19 NOTE — TELEPHONE ENCOUNTER
Patient is requesting a phone call visit on her next scheduled appt with Dr. DAY on 3/26/2020. Put that notation into the notes.

## 2020-03-19 NOTE — TELEPHONE ENCOUNTER
----- Message from Mary Arnett sent at 3/19/2020 11:24 AM CDT -----  Contact: Medical Student  PLEASE CONVERT UPCOMING VISIT TO PHONE VISIT.    Was able to reach patient directly on phone.     Adv Dir status: Advance Directive on file  Tanya status: Scheduled call back to set up [facility]  Telemedicine: Will call back to train patient in telemedicine  Pill packs/medications: Pt does not need pill pack refill and Pt has a 2-3 month supply of medications  Upcoming appointments: Pt would like to CONVERT UPCOMING VISIT TO PHONE CALL    Pt is coping well with isolation precautions.  Pt has has access to food and housing; is able to access transportation if needed.    Discussed medication compliance and chronic conditions with pertinent points:    - She is now using CPAP for 7 hours of the night    -------------------------------------------------------------------------------------------------------------------------  Assessed for symptoms of covid.  Discussed symptoms to look out for and to call clinic FIRST with any concerns or if pt develops symptoms: [facility] [phone] or [phone], or COVID HOTLINE [phone].    Advised to avoid going to ED for testing.  Counseled on staying at home, washing hands frequently, avoiding touching face and covering cough or sneeze.     Resources for patients: https://ready.[place].gov/incident/coronavirus/resources/

## 2020-03-23 DIAGNOSIS — J45.40 MODERATE PERSISTENT ASTHMA WITHOUT COMPLICATION: ICD-10-CM

## 2020-03-23 RX ORDER — ALBUTEROL SULFATE 90 UG/1
2 AEROSOL, METERED RESPIRATORY (INHALATION) EVERY 4 HOURS PRN
Qty: 54 G | Refills: 3 | Status: SHIPPED | OUTPATIENT
Start: 2020-03-23 | End: 2021-04-26 | Stop reason: SDUPTHER

## 2020-03-23 NOTE — TELEPHONE ENCOUNTER
----- Message from Marcie Moya sent at 3/23/2020  4:29 PM CDT -----  Contact: Dawn with [facility]   [phone]  Requesting an RX refill or new RX.  Is this a refill or new RX:  New  RX name and strength: albuterol (PROVENTIL/VENTOLIN HFA) 90 mcg/actuation inhaler  Directions (copy/paste from chart):   Inhale 2 puffs into the lungs every 4 (four) hours as needed for Wheezing or Shortness of Breath. Rescue   Is this a 30 day or 90 day RX:    Local pharmacy or mail order pharmacy:  local  Pharmacy name and phone # (copy/paste from chart):   [facility] [place] [facility] [address] [facility] [phone] (Phone)  [phone] (Fax)      Comments:

## 2020-03-25 ENCOUNTER — PATIENT OUTREACH (OUTPATIENT)
Dept: ADMINISTRATIVE | Facility: OTHER | Age: 80
End: 2020-03-25

## 2020-03-26 ENCOUNTER — TELEPHONE (OUTPATIENT)
Dept: PRIMARY CARE CLINIC | Facility: CLINIC | Age: 80
End: 2020-03-26

## 2020-03-26 ENCOUNTER — TELEMEDICINE (OUTPATIENT)
Dept: RHEUMATOLOGY | Facility: CLINIC | Age: 80
End: 2020-03-26

## 2020-03-26 PROBLEM — Z71.89 ADVANCED CARE PLANNING/COUNSELING DISCUSSION: Status: ACTIVE | Noted: 2020-03-26

## 2020-03-26 NOTE — TELEPHONE ENCOUNTER
"[facility] During COVID Crisis      The patient location is:  Patient Home   The chief complaint leading to consultation is: Adv Directive, DM, wound care  Total time spent with patient: 30min    Visit type: Telephone call with synchronous audio only     Subjective:      Patient ID: Duran Giordano is a 80 y.o. female with dCHF, chronic wounds, DM    Chief Complaint: Advanced Care Planning, chronic wounds    Patient's risk score is 55%. Patient is high risk for poor outcomes with COVID due to the following chronic conditions severe obesity, DM, chronic wounds, dCHF.    Patient is unable to participate in a virtual visit due to numerous barriers to care including only having a flip phone, not having a computer.    She takes her fluid pill more often due to leg swelling.    Her heel is now hurting when she sits down. So she is now taking colchicine every other day. She is doing this in addition to the pill pack. She's eating "things I'm not supposed to." She is eating a lot of snacks.    Her leg ulcer is improving. She had the blister burst by outpatient wound care, now is getting HH wound care with JOSE boot. The wound is improving with home health wound care.    Advance Care Planning  performed. Patient has emailed the PoA and Living Will, and LAPost was completed in clinic.    Objective:     Lab Results   Component Value Date    WBC 7.40 02/07/2020    HGB 13.3 02/07/2020    HCT 46.0 02/07/2020     (L) 02/07/2020    CHOL 178 01/16/2020    TRIG 58 01/16/2020    HDL 51 01/16/2020    ALT 15 02/07/2020    AST 20 02/07/2020     02/07/2020    K 4.6 02/07/2020     02/07/2020    CREATININE 1.1 02/07/2020    BUN 18 02/07/2020    CO2 25 02/07/2020    TSH 0.799 01/16/2020    INR 1.1 04/17/2005    HGBA1C 6.2 (H) 01/16/2020       Assessment:   80 y.o. female with multiple co-morbid illnesses here to continue work-up of chronic issues notably with dCHF, chronic wounds, DM. "     Plan:     Problem List Items Addressed This Visit        Pulmonary    Chronic obstructive pulmonary disease     Chronic COPD, symptoms controlled on inhalers  · Continue inhalers  · Advised of poor outcomes if exposed to COVID            Cardiac/Vascular    Chronic diastolic heart failure     Diastolic heart failure, edema treated with torsemide  · Continue torsemide  · Monitor clinically            Palliative Care    DNR (do not resuscitate)     DNR status, LaPost completed, PoA  · All documents forwarded [email or web address]          Advanced care planning/counseling discussion     DNR/DNI with PoA of Sister Isabel Harding  · Adv Dir in chart           Other Visit Diagnoses     Type 2 diabetes mellitus with renal manifestations not at goal              Health Maintenance       Date Due Completion Date    TETANUS VACCINE 03/08/1960 ---    Shingles Vaccine (2 of 3) 02/10/2015 12/16/2014    Mammogram 04/09/2020 4/9/2019    Hemoglobin A1c 07/16/2020 1/16/2020    Override on 7/13/2016: Done    Foot Exam 12/31/2020 12/31/2019 (Done)    Override on 12/31/2019: Done (Done by Dr. Head)    Override on 11/27/2018: Done (Dr Head)    Override on 1/19/2018: Done (Dr head)    Override on 5/31/2017: Done    Override on 7/20/2016: Done    Lipid Panel 01/16/2021 1/16/2020    Eye Exam 02/07/2021 2/7/2020    Override on 2/7/2020: Done    Override on 2/17/2016: Done    Override on 1/22/2015: Done    Override on 8/16/2012: Done    Aspirin/Antiplatelet Therapy 03/16/2021 3/16/2020    DEXA SCAN 02/22/2022 2/22/2018    Override on 12/13/2011: Done          Follow-up in 4 weeks by telephone. Thirty minutes spent with this patient today, including addressing advanced care planning.    Tami Schmidt MD/MPH  Internal Medicine  [facility]  [phone]

## 2020-03-26 NOTE — TELEPHONE ENCOUNTER
----- Message from Natasha Mott sent at 3/26/2020  4:22 PM CDT -----  Contact: Pt self Home [phone] or Mobile [phone]  Patient is calling in regards to her saying that you wanted to know what vitamin she is taking? Patient said that she's taking Airborne.

## 2020-03-26 NOTE — PROGRESS NOTES
Called her atleast 5 times today in the morning and now again  She does not answer her phone  Thalia called and she said she will talk to me but when she called again she did not  the phone  She has to call the clinic and reschedule an appointment

## 2020-03-31 ENCOUNTER — EXTERNAL HOME HEALTH (OUTPATIENT)
Dept: HOME HEALTH SERVICES | Facility: HOSPITAL | Age: 80
End: 2020-03-31
Payer: MEDICARE

## 2020-04-06 ENCOUNTER — TELEPHONE (OUTPATIENT)
Dept: SLEEP MEDICINE | Facility: CLINIC | Age: 80
End: 2020-04-06

## 2020-04-06 NOTE — TELEPHONE ENCOUNTER
lvm asking pt to contact the office to change appt for 4/14/20 to a virtual visit or reschedule to a later date

## 2020-04-06 NOTE — TELEPHONE ENCOUNTER
----- Message from Mee Ward sent at 4/6/2020  4:31 PM CDT -----  Contact: AKANKSHA WOODS [[id number]]  Type:  Patient Returning Call    Who Called: AKANKSHA WOODS [[id number]]    Who Left Message for Patient: urszula    Does the patient know what this is regarding?:    Best Call Back Number:[phone]    Additional Information:

## 2020-04-07 ENCOUNTER — TELEPHONE (OUTPATIENT)
Dept: SLEEP MEDICINE | Facility: CLINIC | Age: 80
End: 2020-04-07

## 2020-04-07 DIAGNOSIS — G47.33 OSA (OBSTRUCTIVE SLEEP APNEA): Primary | ICD-10-CM

## 2020-04-07 NOTE — TELEPHONE ENCOUNTER
Patient Calls     Patric York MA routed conversation to You 42 minutes ago (4:32 PM)      Patric York MA 42 minutes ago (4:32 PM)         Pt called and stated that she is in need of new supply's for her CPAP machine. Please advise.         Documentation       Patric York MA 43 minutes ago (4:31 PM)         ----- Message from Lorenza Linder sent at 4/7/2020  3:07 PM CDT -----  Contact: AKANKSHA WOODS [[id number]]   Name of Who is Calling:      What is the request in detail:  Patient request call back in reference  New sleep supplies she has  Please contact to further discuss and advise       Can the clinic reply by [facility]: no     What Number to Call Back if not in [facility]:  [phone]            Documentation

## 2020-04-07 NOTE — TELEPHONE ENCOUNTER
----- Message from Lorenza Linder sent at 4/7/2020  3:07 PM CDT -----  Contact: AKANKSHA WOODS [[id number]]   Name of Who is Calling:     What is the request in detail:  Patient request call back in reference  New sleep supplies she has  Please contact to further discuss and advise      Can the clinic reply by [facility]: no    What Number to Call Back if not in [facility]:  [phone]

## 2020-04-08 ENCOUNTER — TELEPHONE (OUTPATIENT)
Dept: PRIMARY CARE CLINIC | Facility: CLINIC | Age: 80
End: 2020-04-08

## 2020-04-08 NOTE — TELEPHONE ENCOUNTER
----- Message from Justin Thompson sent at 4/8/2020 11:15 AM CDT -----  Contact: Justin Thompson  PT WOULD LIKE TO CANCEL SLEEP CLINIC APT ON 4/14, lab on 4/22, and [facility] on 4/29. KEEP UPCOMING APT on 4/16 BUT MAKE A PHONE CALL VISIT. Pt does not want [facility]in and not good with technology.    Pt COVID-19 Risk: High (6+)    Talked to patient directly on phone.  Discussed infectious risk, importance of isolation precaution, and measures to take to reduce risk. Assessed for symptoms of covid.      Discussed symptoms to look out for and to call clinic FIRST with any concerns or if pt develops symptoms: [facility] [phone] or [phone], or COVID HOTLINE [phone]. Advised to NOT go to ED for testing.  Counseled on staying at home, washing hands frequently, avoiding touching face and covering cough or sneeze.     Discussed pertinent chronic medical conditions. Pt says she has had a slight cold or allergies. Denies SOB, cough,fever. Her eyes have been itchy and irritated. Pt has not been using her CPAP machine lately due to the pt staying up late and working on projects for her sisters. Pt uses about 2-4 times a week.    Pt is coping well with isolation precautions.  Pt has has access to food and housing; is able to access transportation if needed.    Pill packs/medications: Pt does not need pill pack refill and Pt has a 2-3 month supply of medications  Upcoming appointments: Pt would like to CONVERT UPCOMING VISIT TO VIRTUAL VISIT  Adv Dir status: Advance Directive ON FILE.   Tanya status: Pt denies [facility]  Telemedicine: Patient denied    -------------------------------------------------------------------------------------------------------------------------  Resources for patients: https://ready.[place].gov/incident/coronavirus/resources/    Tanya help: [phone]

## 2020-04-13 ENCOUNTER — TELEPHONE (OUTPATIENT)
Dept: WOUND CARE | Facility: CLINIC | Age: 80
End: 2020-04-13

## 2020-04-13 NOTE — TELEPHONE ENCOUNTER
----- Message from Gisele Cates sent at 4/13/2020  3:03 PM CDT -----  Contact: pt  Pt is calling to speak with the nurse about her boot pt said she thinks its time for the boot to come off can you please call pt at [phone] or home number [phone].    JOHNATHAN

## 2020-04-14 ENCOUNTER — TELEPHONE (OUTPATIENT)
Dept: WOUND CARE | Facility: CLINIC | Age: 80
End: 2020-04-14

## 2020-04-14 ENCOUNTER — TELEPHONE (OUTPATIENT)
Dept: PRIMARY CARE CLINIC | Facility: CLINIC | Age: 80
End: 2020-04-14

## 2020-04-14 NOTE — TELEPHONE ENCOUNTER
----- Message from tSan Parikh sent at 4/14/2020 10:30 AM CDT -----  Contact: [facility]/ Nu [phone]  The home health nurse wants to speak to you about the patient's wound being healed so that they can discontinue the [place] Boot.    Thank you

## 2020-04-14 NOTE — TELEPHONE ENCOUNTER
Returned call and spoke with Sister Duran Logan.  Sister advised that home health was coming out today for 10am.  Advised Sister Duran Logan that Thalia is out of the office today and the home health nurse can call with an update on the wounds that I will forward to Thalia for a determination of care.  Also advised  Duran Doreen that Thalia will be in the office on tomorrow.

## 2020-04-15 ENCOUNTER — TELEPHONE (OUTPATIENT)
Dept: PRIMARY CARE CLINIC | Facility: CLINIC | Age: 80
End: 2020-04-15

## 2020-04-15 ENCOUNTER — TELEPHONE (OUTPATIENT)
Dept: WOUND CARE | Facility: CLINIC | Age: 80
End: 2020-04-15

## 2020-04-15 NOTE — TELEPHONE ENCOUNTER
Pt states she the marlene boot needs to be off it is healed and well also spoke w/ Nu the  nurse can you discharge her

## 2020-04-15 NOTE — TELEPHONE ENCOUNTER
----- Message from Shakira Portillo sent at 4/15/2020  2:56 PM CDT -----  Contact: Patient [phone]  Patient stated that she missed a call from you.    Please call and advise.    Thank You

## 2020-04-15 NOTE — TELEPHONE ENCOUNTER
----- Message from Anita Malik sent at 4/15/2020  3:21 PM CDT -----  Contact: self    is requesting that a nurse give her a call back as soon as the nurse is free. [phone]

## 2020-04-15 NOTE — TELEPHONE ENCOUNTER
Returned call and spoke with patient who need a clarification regarding being discharged.  In reviewing her chart, I advised Sister Xenia that Dr. Schmidt spoke with home health and gave verbal to discharge patient.  I also advised the patient that she has a audio virtual visit with Dr. Schmidt for tomorrow 4/16/2020 at 10am. The patient was aware of this phone visit.  I instructed the patient to address all of her concerns regarding her discharged with Dr. Schmidt tomorrow during their phone visit.

## 2020-04-16 ENCOUNTER — DOCUMENT SCAN (OUTPATIENT)
Dept: HOME HEALTH SERVICES | Facility: HOSPITAL | Age: 80
End: 2020-04-16
Payer: COMMERCIAL

## 2020-04-16 ENCOUNTER — OFFICE VISIT (OUTPATIENT)
Dept: PRIMARY CARE CLINIC | Facility: CLINIC | Age: 80
End: 2020-04-16
Payer: MEDICARE

## 2020-04-16 DIAGNOSIS — I83.892 VENOUS STASIS ULCER OF LEFT LOWER LEG WITH EDEMA OF LEFT LOWER LEG: ICD-10-CM

## 2020-04-16 DIAGNOSIS — G47.33 OSA ON CPAP: ICD-10-CM

## 2020-04-16 DIAGNOSIS — R60.0 VENOUS STASIS ULCER OF LEFT LOWER LEG WITH EDEMA OF LEFT LOWER LEG: ICD-10-CM

## 2020-04-16 DIAGNOSIS — I83.029 VENOUS STASIS ULCER OF LEFT LOWER LEG WITH EDEMA OF LEFT LOWER LEG: ICD-10-CM

## 2020-04-16 DIAGNOSIS — L97.929 VENOUS STASIS ULCER OF LEFT LOWER LEG WITH EDEMA OF LEFT LOWER LEG: ICD-10-CM

## 2020-04-16 PROCEDURE — 99443 PR PHYSICIAN TELEPHONE EVALUATION 21-30 MIN: CPT | Mod: 95,,, | Performed by: INTERNAL MEDICINE

## 2020-04-16 PROCEDURE — 99443 PR PHYSICIAN TELEPHONE EVALUATION 21-30 MIN: ICD-10-PCS | Mod: 95,,, | Performed by: INTERNAL MEDICINE

## 2020-04-16 NOTE — PROGRESS NOTES
"Telephone Check-In Call During COVID Crisis with [facility] Provider       The patient location is:  Patient Home   The chief complaint leading to consultation is: routine care  Total time spent with patient: 30m    Visit type: Telephone call with synchronous audio only     This visit was completed via telephone due to the restrictions of the COVID-19 pandemic. All issues as below were discussed and addressed but no physical exam was performed. If it was felt that the patient should be evaluated in clinic then they were directed there. The patient verbally consented to visit.      Subjective:      Patient ID: Duran Giordano is a 80 y.o. female.    Patient's risk score is 55%. Patient is high risk for poor outcomes with COVID due to the following chronic conditions advanced age, chronic lung disease, CHF, HTN, wounds, DM    Patient is unable to participate in a virtual visit due to numerous barriers to care including low health literacy.    Her wound is resolved with  wound care. Per  "Leniese will be there Friday to take the boot off and discharge her."    Her [facility] is coping with the social distancing changes.    Objective:     Lab Results   Component Value Date    WBC 7.40 02/07/2020    HGB 13.3 02/07/2020    HCT 46.0 02/07/2020     (L) 02/07/2020    CHOL 178 01/16/2020    TRIG 58 01/16/2020    HDL 51 01/16/2020    ALT 15 02/07/2020    AST 20 02/07/2020     02/07/2020    K 4.6 02/07/2020     02/07/2020    CREATININE 1.1 02/07/2020    BUN 18 02/07/2020    CO2 25 02/07/2020    TSH 0.799 01/16/2020    INR 1.1 04/17/2005    HGBA1C 6.2 (H) 01/16/2020         Assessment:   80 y.o. female with multiple co-morbid illnesses here to continue work-up of chronic issues.     Plan:     Problem List Items Addressed This Visit        Other    REJI on CPAP     Previously compliant with CPAP for 4 hours per night, then noncompliant for about the last year. Now using 6 hours per night  · Advised to " continue set up her new machine  · Advised to lose weight and use her new machine!           Venous stasis ulcer of left lower leg with edema of left lower leg     LE swelling bilaterally with poor wound healing, routine circaids with wound care but wounds worsening  · Continue circaids boot, per wound care  · Completed JOSE boot and HH wound care with Suraj  · JOSE boot to be removed on Fri  · Treat cellulitis PRN, but is usually not the diagnosis  · Monitor CBC  · Discontinued amlodipine previously               Health Maintenance       Date Due Completion Date    TETANUS VACCINE 03/08/1960 ---    Shingles Vaccine (2 of 3) 02/10/2015 12/16/2014    Mammogram 04/09/2020 4/9/2019    Hemoglobin A1c 07/16/2020 1/16/2020    Override on 7/13/2016: Done    Foot Exam 12/31/2020 12/31/2019 (Done)    Override on 12/31/2019: Done (Done by Dr. Anand)    Override on 11/27/2018: Done (Dr Anand)    Override on 1/19/2018: Done (Dr anand)    Override on 5/31/2017: Done    Override on 7/20/2016: Done    Lipid Panel 01/16/2021 1/16/2020    Eye Exam 02/07/2021 2/7/2020    Override on 2/7/2020: Done    Override on 2/17/2016: Done    Override on 1/22/2015: Done    Override on 8/16/2012: Done    Aspirin/Antiplatelet Therapy 03/16/2021 3/16/2020    DEXA SCAN 02/22/2022 2/22/2018    Override on 12/13/2011: Done          Follow up in about 4 weeks (around 5/14/2020). Thirty minutes (10:00 AM CDT - 10:41AM CDT) spent with this patient today, including addressing advanced care planning.    Tami Schmidt MD/MPH  Internal Medicine  [facility] [phone]

## 2020-04-16 NOTE — ASSESSMENT & PLAN NOTE
Previously compliant with CPAP for 4 hours per night, then noncompliant for about the last year. Now using 6 hours per night  · Advised to continue set up her new machine  · Advised to lose weight and use her new machine!

## 2020-04-16 NOTE — ASSESSMENT & PLAN NOTE
LE swelling bilaterally with poor wound healing, routine circaids with wound care but wounds worsening  · Continue circaids boot, per wound care  · Completed JOSE boot and HH wound care with Malone  · JOSE boot to be removed on Fri  · Treat cellulitis PRN, but is usually not the diagnosis  · Monitor CBC  · Discontinued amlodipine previously

## 2020-05-19 ENCOUNTER — TELEPHONE (OUTPATIENT)
Dept: PRIMARY CARE CLINIC | Facility: CLINIC | Age: 80
End: 2020-05-19

## 2020-05-19 NOTE — TELEPHONE ENCOUNTER
----- Message from [place] Corado sent at 5/19/2020  1:46 PM CDT -----  Contact: Florida Corado, MS4  Called pt about appt on Thursday 8 am, pt is aware. Nurse also called her earlier.

## 2020-05-19 NOTE — TELEPHONE ENCOUNTER
----- Message from Jovita Becerril sent at 5/19/2020 12:51 PM CDT -----  Talked to patient directly on phone.  Discussed   - resolution of leg wound although some stable, residual swelling and erythema. Continuing to wear compression stocking.  -She complains of bilateral eye dryness with mild pain in the morning that improves with eye lubrication drops.   -Negative covid-19 testing 5/12/2020.     Pill packs/medications: Pt does not need pill pack refill and Pt has a 2-3 month supply of medications  Upcoming appointments: Pt would like to keep audio visit this Thursday.   Adv Dir status: Advance Directive ON FILE.   Tanya status: Pt denies [facility]  Telemedicine: Patient unable to setup telemedicine    Pt is coping well with isolation precautions.  Pt has has access to food and housing; is able to access transportation if needed.    -------------------------------------------------------------------------------------------------------------------------  Assessed for symptoms of covid.  Discussed symptoms to look out for and to call clinic FIRST with any concerns or if pt develops symptoms: [facility] [phone] or [phone], or COVID HOTLINE [phone].    Advised to NOT go to ED for testing.  Counseled on staying at home, washing hands frequently, avoiding touching face and covering cough or sneeze.     Resources for patients:     Testing options:  In-home testing  Drive thru testing  Clinic testing, but this is not preferred    Telemed training/video: [email or web address]    Housing and food: [email or web address]://[email or web address]/[email or web address]    Tanya help: [phone]

## 2020-05-21 ENCOUNTER — OFFICE VISIT (OUTPATIENT)
Dept: PRIMARY CARE CLINIC | Facility: CLINIC | Age: 80
End: 2020-05-21
Payer: COMMERCIAL

## 2020-05-21 ENCOUNTER — TELEPHONE (OUTPATIENT)
Dept: PODIATRY | Facility: CLINIC | Age: 80
End: 2020-05-21

## 2020-05-21 DIAGNOSIS — I89.0 LYMPHEDEMA OF BOTH LOWER EXTREMITIES: ICD-10-CM

## 2020-05-21 DIAGNOSIS — G47.33 OSA ON CPAP: ICD-10-CM

## 2020-05-21 DIAGNOSIS — I50.32 CHRONIC DIASTOLIC HEART FAILURE: ICD-10-CM

## 2020-05-21 DIAGNOSIS — E11.29 TYPE 2 DIABETES MELLITUS WITH RENAL MANIFESTATIONS NOT AT GOAL: ICD-10-CM

## 2020-05-21 PROCEDURE — 99443 PR PHYSICIAN TELEPHONE EVALUATION 21-30 MIN: ICD-10-PCS | Mod: 95,,, | Performed by: INTERNAL MEDICINE

## 2020-05-21 PROCEDURE — 99443 PR PHYSICIAN TELEPHONE EVALUATION 21-30 MIN: CPT | Mod: 95,,, | Performed by: INTERNAL MEDICINE

## 2020-05-21 RX ORDER — INSULIN DEGLUDEC 200 U/ML
48 INJECTION, SOLUTION SUBCUTANEOUS DAILY
Qty: 9 ML | Refills: 2
Start: 2020-05-21 | End: 2020-07-02 | Stop reason: SDUPTHER

## 2020-05-21 RX ORDER — ATORVASTATIN CALCIUM 80 MG/1
80 TABLET, FILM COATED ORAL DAILY
Qty: 90 TABLET | Refills: 3 | Status: SHIPPED | OUTPATIENT
Start: 2020-05-21 | End: 2021-05-26 | Stop reason: SDUPTHER

## 2020-05-21 NOTE — ASSESSMENT & PLAN NOTE
Previously compliant with CPAP for 4 hours per night, then noncompliant for about the last year. Now noncompliant  · Advised to use her new machine!

## 2020-05-21 NOTE — Clinical Note
Leny- can you schedule a call for this patient, she is managing her toenail trims, has no new wounds. I don't want her coming to clinic due to not having any urgent issues and her high risk of poor outcomes with COVID.Could you do a phone assessment for both her wounds and foot care? They are both stable by my conversation today.Thanks,BARB

## 2020-05-21 NOTE — TELEPHONE ENCOUNTER
----- Message from Tami Schmidt MD sent at 5/21/2020 10:05 AM CDT -----  Leny- can you schedule a call for this patient, she is managing her toenail trims, has no new wounds. I don't want her coming to clinic due to not having any urgent issues and her high risk of poor outcomes with COVID.    Could you do a phone assessment for both her wounds and foot care? They are both stable by my conversation today.    Thanks,  KJ

## 2020-05-21 NOTE — ASSESSMENT & PLAN NOTE
A1c 6.4 (improved from 10 previously), GFR improving. Injecting 15-15-15-48  · Continue to control DM  · Avoid NSAIDs  · Monitor A1c  · Followed by Endocrinology  · Message sent to Donell Contreras to schedule phone call  · Advised to avoid labs if not essential  · Current CBG are stable

## 2020-05-21 NOTE — LETTER
Instructions for Patients with Confirmed or Suspected COVID-19    If you are awaiting your test result, you will either be called or it will be released to the patient portal.  If you have any questions about your test, please visit [email or web address] or call our COVID-19 information line at [phone].       Stay home and stay away from family members and friends. The CDC says, you can leave home after these three things have happened: 1) You have had no fever for at least 72 hours (that is three full days of no fever without the use of medicine that reduces fevers) 2) AND other symptoms have improved (for example, when your cough or shortness of breath have improved) 3) AND at least 7 days have passed since your symptoms first appeared.   Separate yourself from other people and animals in your home.   Call ahead before visiting your doctor.   Wear a facemask.   Cover your coughs and sneezes.   Wash your hands often with soap and water; hand  can be used, too.   Avoid sharing personal household items.   Wipe down surfaces used daily.   Monitor your symptoms. Seek prompt medical attention if your illness is worsening (e.g., difficulty breathing).    Before seeking care, call your healthcare provider.   If you have a medical emergency and need to call 911, notify the dispatch personnel that you have, or are being evaluated for COVID-19. If possible, put on a facemask before emergency medical services arrive.        Recommended precautions for household members, intimate partners, and caregivers in a home setting of a patient with symptomatic laboratory-confirmed COVID-19 or a patient under investigation.  Household members, intimate partners, and caregivers in the home setting awaiting tests results have close contact with a person with symptomatic, laboratory-confirmed COVID-19 or a person under investigation. Close contacts should monitor their health; they should call their  provider right away if they develop symptoms suggestive of COVID-19 (e.g., fever, cough, shortness of breath).    Close contacts should also follow these recommendations:   Make sure that you understand and can help the patient follow their provider's instructions for medication(s) and care. You should help the patient with basic needs in the home and provide support for getting groceries, prescriptions, and other personal needs.   Monitor the patient's symptoms. If the patient is getting sicker, call his or her healthcare provider and tell them that the patient has laboratory-confirmed COVID-19. If the patient has a medical emergency and you need to call 911, notify the dispatch personnel that the patient has, or is being evaluated for COVID-19.   Household members should stay in another room or be  from the patient. Household members should use a separate bedroom and bathroom, if available.   Prohibit visitors.   Household members should care for any pets in the home.   Make sure that shared spaces in the home have good air flow, such as by an air conditioner or an opened window, weather permitting.   Perform hand hygiene frequently. Wash your hands often with soap and water for at least 20 seconds or use an alcohol-based hand  (that contains > 60% alcohol) covering all surfaces of your hands and rubbing them together until they feel dry. Soap and water should be used preferentially.   Avoid touching your eyes, nose, and mouth.   The patient should wear a facemask. If the patient is not able to wear a facemask (for example, because it causes trouble breathing), caregivers should wear a mask when they are in the same room as the patient.   Wear a disposable facemask and gloves when you touch or have contact with the patient's blood, stool, or body fluids, such as saliva, sputum, nasal mucus, vomit, urine.  o Throw out disposable facemasks and gloves after using them. Do not  reuse.  o When removing personal protective equipment, first remove and dispose of gloves. Then, immediately clean your hands with soap and water or alcohol-based hand . Next, remove and dispose of facemask, and immediately clean your hands again with soap and water or alcohol-based hand .   You should not share dishes, drinking glasses, cups, eating utensils, towels, bedding, or other items with the patient. After the patient uses these items, you should wash them thoroughly (see below Wash laundry thoroughly).   Clean all high-touch surfaces, such as counters, tabletops, doorknobs, bathroom fixtures, toilets, phones, keyboards, tablets, and bedside tables, every day. Also, clean any surfaces that may have blood, stool, or body fluids on them.   Use a household cleaning spray or wipe, according to the label instructions. Labels contain instructions for safe and effective use of the cleaning product including precautions you should take when applying the product, such as wearing gloves and making sure you have good ventilation during use of the product.   Wash laundry thoroughly.  o Immediately remove and wash clothes or bedding that have blood, stool, or body fluids on them.  o Wear disposable gloves while handling soiled items and keep soiled items away from your body. Clean your hands (with soap and water or an alcohol-based hand ) immediately after removing your gloves.  o Read and follow directions on labels of laundry or clothing items and detergent. In general, using a normal laundry detergent according to washing machine instructions and dry thoroughly using the warmest temperatures recommended on the clothing label.   Place all used disposable gloves, facemasks, and other contaminated items in a lined container before disposing of them with other household waste. Clean your hands (with soap and water or an alcohol-based hand ) immediately after handling these  items. Soap and water should be used preferentially if hands are visibly dirty.   Discuss any additional questions with your state or local health department or healthcare provider. Check available hours when contacting your local health department.    For more information see CDC link below.      https://www.cdc.gov/coronavirus/2019-ncov/hcp/guidance-prevent-spread.html#precautions        Sources:  [facility], [facility] and [facility]

## 2020-05-21 NOTE — ASSESSMENT & PLAN NOTE
LE wounds resolved, daily circaid application by Sister Ana Arevalo or Doreen Ribera. Last seen by lymphedema clinic in 2014  · Continue circaid application daily  · Refuses stocking application at night  · Needs to elevate legs more at night  · Advised to avoid appointments if her wound or feet are stable  · Messages sent to Leny Martin about phone call follow-up for wounds and foot care

## 2020-05-21 NOTE — PATIENT INSTRUCTIONS
- 2 week phone call on thurs 6/4  - mail the COVID information  - cancel wound care and podiatry appts  - PCP messaged Yogi Contreras (DM), and Leny Cespedes (wound care and podiatry) to schedule phone calls

## 2020-05-21 NOTE — PROGRESS NOTES
Established Patient - Audio Only Telehealth Visit with [facility] Provider     The patient location is: HOME  The chief complaint leading to consultation is: routine care  Visit type: Virtual visit with audio only (telephone)     The reason for the audio only service rather than synchronous audio and video virtual visit was related to technical difficulties or patient preference/necessity.     Each patient to whom I provide medical services by telemedicine is:  (1) informed of the relationship between the physician and patient and the respective role of any other health care provider with respect to management of the patient; and (2) notified that they may decline to receive medical services by telemedicine and may withdraw from such care at any time. Patient verbally consented to receive this service via voice-only telephone call.       Subjective:      Patient ID: Duran Giordano is a 80 y.o. female.    Patient's risk score of poor outcomes is 8. Patient is high risk for poor outcomes with COVID due to the following chronic conditions advanced age, CHF, HTN, MDD, DM    DMT2 - Blood glucose was 117 this am. Has been around this the past year. Has been trying to change her diet, lost some weight over the last month.  Labs - [facility] on [address]. Recently was tested for COVID and it was negative.    REJI - Hasn't been using her CPAP regularly.  Rheum - has phone call upcoming  Podiatry - last exam was 2 mos ago. Has been maintaining toenails to prevent ingrowns. Feels comfortable postponing her podiatry appt  DM- her glucose readings are in the low 100s. She injects 15qac, 48U tresiba. No recorded lows.    Pt is coping well with isolation precautions.  Pt has has access to food and housing; is able to access transportation if needed. She wants information on how to implement contact precautions at her [facility].    Pill packs/medications: Pt has a 2-3 month supply of medications   Adv Dir  status: Advance Directive ON FILE.        Objective:     Lab Results   Component Value Date    WBC 7.40 02/07/2020    HGB 13.3 02/07/2020    HCT 46.0 02/07/2020     (L) 02/07/2020    CHOL 178 01/16/2020    TRIG 58 01/16/2020    HDL 51 01/16/2020    ALT 15 02/07/2020    AST 20 02/07/2020     02/07/2020    K 4.6 02/07/2020     02/07/2020    CREATININE 1.1 02/07/2020    BUN 18 02/07/2020    CO2 25 02/07/2020    TSH 0.799 01/16/2020    INR 1.1 04/17/2005    HGBA1C 6.2 (H) 01/16/2020       Assessment:   80 y.o. female with multiple co-morbid illnesses here to continue work-up of chronic issues.     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 Injecting 15-15-15-48  · Continue to control DM  · Avoid NSAIDs  · Monitor A1c  · Followed by Endocrinology  · Message sent to Donell Contreras to schedule phone call  · Advised to avoid labs if not essential  · Current CBG are stable         Relevant Medications    insulin degludec (TRESIBA FLEXTOUCH U-200) 200 unit/mL (3 mL) InPn       Other    REJI on CPAP     Previously compliant with CPAP for 4 hours per night, then noncompliant for about the last year. Now noncompliant  · Advised to use her new machine!         Lymphedema of both lower extremities     LE wounds resolved, daily circaid application by Sister Ana Arevalo or Doreen Ribera. 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 Anand)    Override on 11/27/2018: Done (Dr Anand)    Override on 1/19/2018: Done (Dr anand)    Override on 5/31/2017: Done    Override on 7/20/2016: Done    Lipid Panel 01/16/2021 1/16/2020    Eye Exam 02/07/2021 2/7/2020    Override on 2/7/2020: Done    Override on 2/17/2016: Done    Override on 1/22/2015: Done    Override on 8/16/2012: Done    Aspirin/Antiplatelet Therapy 03/16/2021 3/16/2020    DEXA SCAN 02/22/2022 2/22/2018    Override on 12/13/2011: Done          Follow up in about 2 weeks (around 6/4/2020). Thirty minutes spent with this patient today, including addressing advanced care planning.    Tami Schmidt MD/MPH  Internal Medicine  [facility]  Spectra [phone]    Marcos Mckinley  [facility] MS4      This service was not originating from a related E/M service provided within the previous 7 days nor will  to an E/M service or procedure within the next 24 hours or my soonest available appointment.  Prevailing standard of care was able to be met in this audio-only visit.

## 2020-05-21 NOTE — Clinical Note
Can you perform a diabetes management appt by phone? Her glucose is controlled. She she is high risk for poor outcomes with COVID and lives in a [facility]. It would be ideal if you could guide her management with a phone call and CBG readings.Thanks,BARB

## 2020-05-22 ENCOUNTER — TELEPHONE (OUTPATIENT)
Dept: PRIMARY CARE CLINIC | Facility: CLINIC | Age: 80
End: 2020-05-22

## 2020-05-22 NOTE — TELEPHONE ENCOUNTER
Spoke with pt, she wants to go to mass and do communion.   She wants to about receiving communion.   She has a meeting tonight and wants to relay the correct message to the sister.    I can call her, just let me know what to tell her.

## 2020-05-22 NOTE — TELEPHONE ENCOUNTER
Discussed guidelines with pt. She verbalized understanding.   Explained this is all online under cdc website.  Pt verbalized understanding.

## 2020-05-22 NOTE — TELEPHONE ENCOUNTER
"Please give her these general instructions, and also advise her that we are still "phase 1" for opening group activities. From what I understand that means that churches can operate with a 25% capacity limit.     Details in this article  ([email or web address]#utm_source=email&utm_campaign=[employer]&utm_medium=newsletter&utm_content=headline&vax=[email or web address])    Instructions from CDC:  Recommended precautions for household members, intimate partners, and caregivers in a home setting of a patient with symptomatic laboratory-confirmed COVID-19 or a patient under investigation.  Household members, intimate partners, and caregivers in the home setting awaiting tests results have close contact with a person with symptomatic, laboratory-confirmed COVID-19 or a person under investigation. Close contacts should monitor their health; they should call their provider right away if they develop symptoms suggestive of COVID-19 (e.g., fever, cough, shortness of breath).    Close contacts should also follow these recommendations:   Make sure that you understand and can help the patient follow their provider's instructions for medication(s) and care. You should help the patient with basic needs in the home and provide support for getting groceries, prescriptions, and other personal needs.   Monitor the patient's symptoms. If the patient is getting sicker, call his or her healthcare provider and tell them that the patient has laboratory-confirmed COVID-19. If the patient has a medical emergency and you need to call 911, notify the dispatch personnel that the patient has, or is being evaluated for COVID-19.   Household members should stay in another room or be  from the patient. Household members should use a separate bedroom and bathroom, if available.   Prohibit visitors.   Household members should care for any pets in the " home.   Make sure that shared spaces in the home have good air flow, such as by an air conditioner or an opened window, weather permitting.   Perform hand hygiene frequently. Wash your hands often with soap and water for at least 20 seconds or use an alcohol-based hand  (that contains > 60% alcohol) covering all surfaces of your hands and rubbing them together until they feel dry. Soap and water should be used preferentially.   Avoid touching your eyes, nose, and mouth.   The patient should wear a facemask. If the patient is not able to wear a facemask (for example, because it causes trouble breathing), caregivers should wear a mask when they are in the same room as the patient.   Wear a disposable facemask and gloves when you touch or have contact with the patient's blood, stool, or body fluids, such as saliva, sputum, nasal mucus, vomit, urine.  o Throw out disposable facemasks and gloves after using them. Do not reuse.  o When removing personal protective equipment, first remove and dispose of gloves. Then, immediately clean your hands with soap and water or alcohol-based hand . Next, remove and dispose of facemask, and immediately clean your hands again with soap and water or alcohol-based hand .   You should not share dishes, drinking glasses, cups, eating utensils, towels, bedding, or other items with the patient. After the patient uses these items, you should wash them thoroughly (see below Wash laundry thoroughly).   Clean all high-touch surfaces, such as counters, tabletops, doorknobs, bathroom fixtures, toilets, phones, keyboards, tablets, and bedside tables, every day. Also, clean any surfaces that may have blood, stool, or body fluids on them.   Use a household cleaning spray or wipe, according to the label instructions. Labels contain instructions for safe and effective use of the cleaning product including precautions you should take when applying the product,  such as wearing gloves and making sure you have good ventilation during use of the product.   Wash laundry thoroughly.  o Immediately remove and wash clothes or bedding that have blood, stool, or body fluids on them.  o Wear disposable gloves while handling soiled items and keep soiled items away from your body. Clean your hands (with soap and water or an alcohol-based hand ) immediately after removing your gloves.  o Read and follow directions on labels of laundry or clothing items and detergent. In general, using a normal laundry detergent according to washing machine instructions and dry thoroughly using the warmest temperatures recommended on the clothing label.   Place all used disposable gloves, facemasks, and other contaminated items in a lined container before disposing of them with other household waste. Clean your hands (with soap and water or an alcohol-based hand ) immediately after handling these items. Soap and water should be used preferentially if hands are visibly dirty.   Discuss any additional questions with your state or local health department or healthcare provider. Check available hours when contacting your local health department.    For more information see CDC link below.      https://www.cdc.gov/coronavirus/2019-ncov/hcp/guidance-prevent-spread.html#precautions        Sources:  [facility], [facility] and [facility]

## 2020-05-22 NOTE — TELEPHONE ENCOUNTER
----- Message from Marleny Louie sent at 5/22/2020 11:59 AM CDT -----  Contact: Self [phone]  She said she was speaking to someone yesterday and has another question to discuss with you. No other information was provided.

## 2020-06-04 ENCOUNTER — OFFICE VISIT (OUTPATIENT)
Dept: PRIMARY CARE CLINIC | Facility: CLINIC | Age: 80
End: 2020-06-04
Payer: COMMERCIAL

## 2020-06-04 ENCOUNTER — TELEPHONE (OUTPATIENT)
Dept: PRIMARY CARE CLINIC | Facility: CLINIC | Age: 80
End: 2020-06-04

## 2020-06-04 DIAGNOSIS — R06.02 SOB (SHORTNESS OF BREATH): ICD-10-CM

## 2020-06-04 DIAGNOSIS — R09.81 NASAL CONGESTION: ICD-10-CM

## 2020-06-04 PROCEDURE — 99441 PR PHYSICIAN TELEPHONE EVALUATION 5-10 MIN: ICD-10-PCS | Mod: 95,,, | Performed by: INTERNAL MEDICINE

## 2020-06-04 PROCEDURE — 99441 PR PHYSICIAN TELEPHONE EVALUATION 5-10 MIN: CPT | Mod: 95,,, | Performed by: INTERNAL MEDICINE

## 2020-06-04 RX ORDER — LORATADINE 10 MG/1
10 TABLET ORAL DAILY
Qty: 30 TABLET | Refills: 3 | Status: ON HOLD | OUTPATIENT
Start: 2020-06-04 | End: 2021-10-26

## 2020-06-04 NOTE — PATIENT INSTRUCTIONS
- COVID testing in the home today with [facility]   - start antihistamine  - continue CPAP and flonase  - increase nebulizer treatments to every 2-4 hours until you feel better

## 2020-06-04 NOTE — ASSESSMENT & PLAN NOTE
Numerous chronic lung issues, with acute SOB, diffl: COVID, CHF, asthma  · COVID in the home testing with [facility]  · Patient given number for scheduling

## 2020-06-04 NOTE — TELEPHONE ENCOUNTER
----- Message from Eri Eng sent at 6/4/2020 12:13 PM CDT -----  Contact: [phone]  Patient is returning a phone call.  Who left a message for the patient: temitope  Does patient know what this is regarding:  temitope  Comments:

## 2020-06-04 NOTE — PROGRESS NOTES
"Established Patient - [facility]     The patient location is: HOME  The chief complaint leading to consultation is: routine care  Visit type: Virtual visit with audio only (telephone)     The reason for the audio only service rather than synchronous audio and video virtual visit was related to technical difficulties or patient preference/necessity.     Each patient to whom I provide medical services by telemedicine is:  (1) informed of the relationship between the physician and patient and the respective role of any other health care provider with respect to management of the patient; and (2) notified that they may decline to receive medical services by telemedicine and may withdraw from such care at any time. Patient verbally consented to receive this service via voice-only telephone call.       Subjective:      Patient ID: Duran Giordano is a 80 y.o. female.    Patient's risk score is . Patient is high risk for poor outcomes with COVID due to the following chronic conditions advanced age, CHF, HTN, MDD, DM.    Patient states that she is "breathing hard." She has been wheezing with eye pruritis. Last COVID test was negative 3 weeks ago. She is only using her duoneb twice daily, is compliant with controller inhaler.    Objective:     Lab Results   Component Value Date    WBC 7.40 02/07/2020    HGB 13.3 02/07/2020    HCT 46.0 02/07/2020     (L) 02/07/2020    CHOL 178 01/16/2020    TRIG 58 01/16/2020    HDL 51 01/16/2020    ALT 15 02/07/2020    AST 20 02/07/2020     02/07/2020    K 4.6 02/07/2020     02/07/2020    CREATININE 1.1 02/07/2020    BUN 18 02/07/2020    CO2 25 02/07/2020    TSH 0.799 01/16/2020    INR 1.1 04/17/2005    HGBA1C 6.2 (H) 01/16/2020         Assessment:   80 y.o. female with multiple co-morbid illnesses here to continue work-up of chronic issues.     Plan:     Problem List Items Addressed This Visit        ENT    Nasal congestion     " Likely seasonal allergies  · Continue flonase  · Start antihistamine            Other    SOB (shortness of breath)     Numerous chronic lung issues, with acute SOB, diffl: COVID, CHF, asthma  · COVID in the home testing with [facility]  · Patient given number for scheduling         Relevant Medications    loratadine (CLARITIN) 10 mg tablet    Other Relevant Orders    COVID-19 Routine Screening          Health Maintenance       Date Due Completion Date    TETANUS VACCINE 03/08/1958 ---    Shingles Vaccine (2 of 3) 02/10/2015 12/16/2014    Mammogram 04/09/2020 4/9/2019    Hemoglobin A1c 07/16/2020 1/16/2020    Override on 7/13/2016: Done    Foot Exam 12/31/2020 12/31/2019 (Done)    Override on 12/31/2019: Done (Done by Dr. Anand)    Override on 11/27/2018: Done (Dr Anand)    Override on 1/19/2018: Done (Dr anand)    Override on 5/31/2017: Done    Override on 7/20/2016: Done    Lipid Panel 01/16/2021 1/16/2020    Eye Exam 02/07/2021 2/7/2020    Override on 2/7/2020: Done    Override on 2/17/2016: Done    Override on 1/22/2015: Done    Override on 8/16/2012: Done    Aspirin/Antiplatelet Therapy 03/16/2021 3/16/2020    DEXA SCAN 02/22/2022 2/22/2018    Override on 12/13/2011: Done          Follow up in about 1 day (around 6/5/2020).  Thirty minutes spent with this patient today, including addressing SOB.    Tami Schmidt MD/MPH  Internal Medicine  [facility]  Spectra [phone]    This service was not originating from a related E/M service provided within the previous 7 days nor will  to an E/M service or procedure within the next 24 hours or my soonest available appointment.  Prevailing standard of care was able to be met in this audio-only visit.

## 2020-06-05 ENCOUNTER — OFFICE VISIT (OUTPATIENT)
Dept: PRIMARY CARE CLINIC | Facility: CLINIC | Age: 80
End: 2020-06-05
Payer: MEDICARE

## 2020-06-05 DIAGNOSIS — J45.20 MILD INTERMITTENT REACTIVE AIRWAY DISEASE WITHOUT COMPLICATION: ICD-10-CM

## 2020-06-05 PROCEDURE — 99443 PR PHYSICIAN TELEPHONE EVALUATION 21-30 MIN: ICD-10-PCS | Mod: 95,,, | Performed by: INTERNAL MEDICINE

## 2020-06-05 PROCEDURE — 99443 PR PHYSICIAN TELEPHONE EVALUATION 21-30 MIN: CPT | Mod: 95,,, | Performed by: INTERNAL MEDICINE

## 2020-06-05 NOTE — PROGRESS NOTES
Established Patient - Audio Only Telehealth Visit     The patient location is: home  The chief complaint leading to consultation is: SOB  Visit type: Virtual visit with audio only (telephone)  Total time spent with patient: 30m       The reason for the audio only service rather than synchronous audio and video virtual visit was related to technical difficulties or patient preference/necessity.     Each patient to whom I provide medical services by telemedicine is:  (1) informed of the relationship between the physician and patient and the respective role of any other health care provider with respect to management of the patient; and (2) notified that they may decline to receive medical services by telemedicine and may withdraw from such care at any time. Patient verbally consented to receive this service via voice-only telephone call.       HPI: Patient is having shortness of breath, with no reported symptoms of CHF. She is improved today. She started an antihistamine yesterday and increased her duonebs to q2-4 hrs. Had COVID testing this AM.      Assessment and plan:    1) SOB:  - follow-up with COVID testing results   + performed today  - continue duonebs q2-4  - continue antihistamine  - monitor for symptoms of CHF    Tami Schmidt MD/MPH  [facility]  [phone] [employer]     This service was not originating from a related E/M service provided within the previous 7 days nor will  to an E/M service or procedure within the next 24 hours or my soonest available appointment.  Prevailing standard of care was able to be met in this audio-only visit.

## 2020-06-05 NOTE — ASSESSMENT & PLAN NOTE
Continue advair and albuterol prn, increase duoneb treatment.   · continue CPAP  · Continue mucinex twice daily  · Continue antihistamine  · Use nebulizer 2 to 4 times daily

## 2020-06-08 ENCOUNTER — TELEPHONE (OUTPATIENT)
Dept: PRIMARY CARE CLINIC | Facility: CLINIC | Age: 80
End: 2020-06-08

## 2020-06-23 ENCOUNTER — TELEPHONE (OUTPATIENT)
Dept: PODIATRY | Facility: CLINIC | Age: 80
End: 2020-06-23

## 2020-06-23 NOTE — TELEPHONE ENCOUNTER
Nurse spoke to pt regarding appt request. Pt states she is ok with waiting because she only wants to see arthur. Pt scheduled for next available and notice mailed out.          ----- Message from Neva Peoples sent at 6/23/2020  9:41 AM CDT -----  Contact: pt  Please call pt at [phone] or [phone]    Patient is requesting a sooner appt for diabetic nail care    Thank you

## 2020-07-02 DIAGNOSIS — E11.29 TYPE 2 DIABETES MELLITUS WITH RENAL MANIFESTATIONS NOT AT GOAL: ICD-10-CM

## 2020-07-02 RX ORDER — INSULIN DEGLUDEC 200 U/ML
INJECTION, SOLUTION SUBCUTANEOUS
Qty: 9 ML | Refills: 6 | Status: SHIPPED | OUTPATIENT
Start: 2020-07-02 | End: 2020-08-06

## 2020-07-02 RX ORDER — INSULIN DEGLUDEC 200 U/ML
INJECTION, SOLUTION SUBCUTANEOUS
Qty: 9 ML | Refills: 0 | Status: SHIPPED | OUTPATIENT
Start: 2020-07-02 | End: 2020-07-02 | Stop reason: SDUPTHER

## 2020-07-14 ENCOUNTER — LAB VISIT (OUTPATIENT)
Dept: LAB | Facility: HOSPITAL | Age: 80
End: 2020-07-14
Attending: INTERNAL MEDICINE
Payer: COMMERCIAL

## 2020-07-14 ENCOUNTER — OFFICE VISIT (OUTPATIENT)
Dept: RHEUMATOLOGY | Facility: CLINIC | Age: 80
End: 2020-07-14
Payer: MEDICARE

## 2020-07-14 VITALS
HEART RATE: 87 BPM | DIASTOLIC BLOOD PRESSURE: 67 MMHG | WEIGHT: 284 LBS | BODY MASS INDEX: 57.25 KG/M2 | HEIGHT: 59 IN | SYSTOLIC BLOOD PRESSURE: 156 MMHG

## 2020-07-14 DIAGNOSIS — M10.9 GOUT, UNSPECIFIED CAUSE, UNSPECIFIED CHRONICITY, UNSPECIFIED SITE: ICD-10-CM

## 2020-07-14 LAB
ALBUMIN SERPL BCP-MCNC: 3 G/DL (ref 3.5–5.2)
ALP SERPL-CCNC: 91 U/L (ref 55–135)
ALT SERPL W/O P-5'-P-CCNC: 12 U/L (ref 10–44)
ANION GAP SERPL CALC-SCNC: 7 MMOL/L (ref 8–16)
AST SERPL-CCNC: 15 U/L (ref 10–40)
BASOPHILS # BLD AUTO: 0.04 K/UL (ref 0–0.2)
BASOPHILS NFR BLD: 0.5 % (ref 0–1.9)
BILIRUB SERPL-MCNC: 0.3 MG/DL (ref 0.1–1)
BUN SERPL-MCNC: 24 MG/DL (ref 8–23)
CALCIUM SERPL-MCNC: 9 MG/DL (ref 8.7–10.5)
CHLORIDE SERPL-SCNC: 105 MMOL/L (ref 95–110)
CO2 SERPL-SCNC: 27 MMOL/L (ref 23–29)
CREAT SERPL-MCNC: 1.2 MG/DL (ref 0.5–1.4)
DIFFERENTIAL METHOD: ABNORMAL
EOSINOPHIL # BLD AUTO: 0.3 K/UL (ref 0–0.5)
EOSINOPHIL NFR BLD: 3.5 % (ref 0–8)
ERYTHROCYTE [DISTWIDTH] IN BLOOD BY AUTOMATED COUNT: 15.7 % (ref 11.5–14.5)
EST. GFR  (AFRICAN AMERICAN): 50 ML/MIN/1.73 M^2
EST. GFR  (NON AFRICAN AMERICAN): 43.4 ML/MIN/1.73 M^2
GLUCOSE SERPL-MCNC: 74 MG/DL (ref 70–110)
HCT VFR BLD AUTO: 39.5 % (ref 37–48.5)
HGB BLD-MCNC: 11.8 G/DL (ref 12–16)
IMM GRANULOCYTES # BLD AUTO: 0.03 K/UL (ref 0–0.04)
IMM GRANULOCYTES NFR BLD AUTO: 0.4 % (ref 0–0.5)
LYMPHOCYTES # BLD AUTO: 1.5 K/UL (ref 1–4.8)
LYMPHOCYTES NFR BLD: 19 % (ref 18–48)
MCH RBC QN AUTO: 27.8 PG (ref 27–31)
MCHC RBC AUTO-ENTMCNC: 29.9 G/DL (ref 32–36)
MCV RBC AUTO: 93 FL (ref 82–98)
MONOCYTES # BLD AUTO: 0.7 K/UL (ref 0.3–1)
MONOCYTES NFR BLD: 8.5 % (ref 4–15)
NEUTROPHILS # BLD AUTO: 5.3 K/UL (ref 1.8–7.7)
NEUTROPHILS NFR BLD: 68.1 % (ref 38–73)
NRBC BLD-RTO: 0 /100 WBC
PLATELET # BLD AUTO: 167 K/UL (ref 150–350)
PMV BLD AUTO: 12 FL (ref 9.2–12.9)
POTASSIUM SERPL-SCNC: 4.4 MMOL/L (ref 3.5–5.1)
PROT SERPL-MCNC: 8.6 G/DL (ref 6–8.4)
RBC # BLD AUTO: 4.24 M/UL (ref 4–5.4)
SODIUM SERPL-SCNC: 139 MMOL/L (ref 136–145)
URATE SERPL-MCNC: 7.8 MG/DL (ref 2.4–5.7)
WBC # BLD AUTO: 7.8 K/UL (ref 3.9–12.7)

## 2020-07-14 PROCEDURE — 84550 ASSAY OF BLOOD/URIC ACID: CPT

## 2020-07-14 PROCEDURE — 99213 OFFICE O/P EST LOW 20 MIN: CPT | Mod: PBBFAC | Performed by: INTERNAL MEDICINE

## 2020-07-14 PROCEDURE — 36415 COLL VENOUS BLD VENIPUNCTURE: CPT

## 2020-07-14 PROCEDURE — 80053 COMPREHEN METABOLIC PANEL: CPT

## 2020-07-14 PROCEDURE — 85025 COMPLETE CBC W/AUTO DIFF WBC: CPT

## 2020-07-14 PROCEDURE — 99999 PR PBB SHADOW E&M-EST. PATIENT-LVL III: ICD-10-PCS | Mod: PBBFAC,,, | Performed by: INTERNAL MEDICINE

## 2020-07-14 PROCEDURE — 99214 OFFICE O/P EST MOD 30 MIN: CPT | Mod: S$PBB,,, | Performed by: INTERNAL MEDICINE

## 2020-07-14 PROCEDURE — 99214 PR OFFICE/OUTPT VISIT, EST, LEVL IV, 30-39 MIN: ICD-10-PCS | Mod: S$PBB,,, | Performed by: INTERNAL MEDICINE

## 2020-07-14 PROCEDURE — 99999 PR PBB SHADOW E&M-EST. PATIENT-LVL III: CPT | Mod: PBBFAC,,, | Performed by: INTERNAL MEDICINE

## 2020-07-14 RX ORDER — COLCHICINE 0.6 MG/1
TABLET ORAL
Qty: 45 TABLET | Refills: 3 | Status: SHIPPED | OUTPATIENT
Start: 2020-07-14 | End: 2020-08-18 | Stop reason: SDUPTHER

## 2020-07-14 RX ORDER — ALLOPURINOL 100 MG/1
150 TABLET ORAL DAILY
Qty: 45 TABLET | Refills: 4 | Status: SHIPPED | OUTPATIENT
Start: 2020-07-14 | End: 2020-08-13

## 2020-07-14 ASSESSMENT — ROUTINE ASSESSMENT OF PATIENT INDEX DATA (RAPID3)
PAIN SCORE: 5
PATIENT GLOBAL ASSESSMENT SCORE: 6
MDHAQ FUNCTION SCORE: 0.6
PSYCHOLOGICAL DISTRESS SCORE: 1.1
AM STIFFNESS SCORE: 1, YES
FATIGUE SCORE: 7
TOTAL RAPID3 SCORE: 4.33
WHEN YOU AWAKENED IN THE MORNING OVER THE LAST WEEK, PLEASE INDICATE THE AMOUNT OF TIME IT TAKES UNTIL YOU ARE AS LIMBER AS YOU WILL BE FOR THE DAY: 1HR

## 2020-07-14 NOTE — PROGRESS NOTES
Chief Complaint   Patient presents with    Follow-up     gout       Patient with chronic gout for a follow up    History of presenting illness    80 year old black female comes in with chronic gout for 10 years    Usually she has had     Acute onset pain in the :    Ankles,feet : lasting for a week  Severe pain,swelling in the joint  The joint would turn red and blue  Precipitated by sea food  Cannot bear weight  Would get steroids,anti inflammatories    She was advised not to eat sea food    She did fine for a while     She snacks a lot and also has gained weight      Labs     Uric acid down to 6.7 in 2/2020  GFR 56  LFTs nml  plts 144  nml white count and h/h     Last year    APOLONIA neg  RF neg    Feet xrays  Marked swelling of ankle and feet soft tissues, pes planus, DJD tarsal joints particularly on left, bilateral hallux valgus deformity, DJD 1st MTP joints.  Focal cortical thickening medial aspects left 2nd digit shaft meta tarsal.    Hand xrays  Tiny remote calcification lateral margin base proximal phalanx 3rd MCP joint.  Prominent DJD changes, some with erosion, 1st metacarpal-carpal and radiocarpal joint and radioulnar joints especially on left.  Enlargement lunate, overlying triquetral, pisiform not seen best advantage.  No typical gout erosive disease or tophi.  Flattening distal ulnar without ulnar styloid, narrowing of ulnar wrist joint with apparent absence of tri angulated fibrocartilage.    She now takes allopurinol 100 mg daily  Colchicine 0.3 mg daily    Her uric acid is 8.4  GFR 45.7  CBC nml    She had mentioned chronic right wrist pain  We injected the right CMC   We did MRI right wrist    Ligaments: There is extensive tear of the TFCC, which appears markedly diminutive.  Scapholunate ligament not well seen.    Tendons: There is tendinosis and tenosynovitis of the adductor pollicis longus and extensor pollicis brevis.  There is interstitial tear of the adductor pollicis brevis.  There is mild  tenosynovitis of the extensor carpi radialis brevis and longus as well as the extensor carpi ulnaris.  The ECU is in the appropriate position.  Remaining extensor tendons and flexor tendons are unremarkable.    Bones: No fracture or infiltrative process.  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    Joints: There are severe degenerative changes at the radiocarpal joint with subchondral edema and cystic change predominantly within the distal radius and lunate.  Severe degenerative changes with subcortical cystic change in bulky osteophytes also noted at the base of thumb joint.  There is a small effusion at the wrist.    Miscellaneous: Carpal tunnel and Guyon's canal are unremarkable.      Impression       1. 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  2. First compartment tendinosis and tenosynovitis with interstitial tear of the adductor pollicis longus.  Mild tenosynovitis of the 2nd and 6th compartments.  3. Lunotriquetral coalition.  4. Severe base of thumb osteoarthritis.     We sent her to hand ortho  They injected her right hand de quervains  She has done well  She did OT and that has helped     Past history    HTN,venous stasis LE,b/l carotid artery disease,CAD,asthma,cataracts,CKD,diabetes  GERD,HLD,REJI   ,chronic diastolic HF  LS spine arthritis/sciatica     Family history  Mom cancer    Social history  Not a smoker or alcoholic      Review of Systems   Constitutional: Negative for activity change, appetite change, chills, diaphoresis, fatigue, fever and unexpected weight change.   HENT: Negative for congestion, dental problem, drooling, ear discharge, ear pain, facial swelling, hearing loss, mouth sores, nosebleeds, postnasal drip, rhinorrhea, sinus pressure, sinus pain, sneezing, sore throat, tinnitus, trouble swallowing and voice change.    Eyes: Negative for photophobia, pain, discharge, redness, itching and visual disturbance.   Respiratory: Negative for apnea, cough, choking, chest tightness,  shortness of breath, wheezing and stridor.    Cardiovascular: Negative for chest pain, palpitations and leg swelling.   Gastrointestinal: Negative for abdominal distention, abdominal pain, anal bleeding, blood in stool, constipation, diarrhea, nausea, rectal pain and vomiting.   Endocrine: Negative for cold intolerance, heat intolerance, polydipsia, polyphagia and polyuria.   Genitourinary: Negative for decreased urine volume, difficulty urinating, dysuria, enuresis, flank pain, frequency, genital sores, hematuria and urgency.   Musculoskeletal: Positive for arthralgias. Negative for back pain, gait problem, joint swelling, myalgias, neck pain and neck stiffness.   Skin: Negative for color change, pallor, rash and wound.   Allergic/Immunologic: Negative for environmental allergies, food allergies and immunocompromised state.   Neurological: Negative for dizziness, tremors, seizures, syncope, facial asymmetry, speech difficulty, weakness, light-headedness, numbness and headaches.   Hematological: Negative for adenopathy. Does not bruise/bleed easily.   Psychiatric/Behavioral: Negative for agitation, behavioral problems, confusion, decreased concentration, dysphoric mood, hallucinations, self-injury, sleep disturbance and suicidal ideas. The patient is not nervous/anxious and is not hyperactive.      Physical Exam     MATTHEWS-28 tender joint count: 0  MATTHEWS-28 swollen joint count: 0    Physical Exam   Constitutional: She is oriented to person, place, and time and well-developed, well-nourished, and in no distress. No distress.   HENT:   Head: Normocephalic.   Mouth/Throat: Oropharynx is clear and moist.   Eyes: Conjunctivae are normal. Pupils are equal, round, and reactive to light. Right eye exhibits no discharge. Left eye exhibits no discharge. No scleral icterus.   Neck: Normal range of motion. No thyromegaly present.   Cardiovascular: Normal rate, regular rhythm, normal heart sounds and intact distal pulses.     Pulmonary/Chest: Effort normal and breath sounds normal. No stridor.   Abdominal: Soft. Bowel sounds are normal.   Lymphadenopathy:     She has no cervical adenopathy.   Neurological: She is alert and oriented to person, place, and time.   Skin: Skin is warm. No rash noted. She is not diaphoretic.     Psychiatric: Affect and judgment normal.   Musculoskeletal: Normal range of motion.           Assessment       80 year old black female comes in with chronic gout for 10 years    Initial attacks were in the ankles and feet : acute onset,pain,swelling and redness,triggered by sea food intake,responding to steroids and anti inflammatories    She presented with acute onset pain in the     Right wrist  Right MCP  Bottom of the right foot    Labs     GFR 50   Uric acid 8.5,down to 7.9 and then went upto 8.5   ESR 72  H/H 11.5/38.2    CBC,CMP stays stable    RF,CCP neg    Last year  APOLONIA neg  RF neg    She has   HTN,venous stasis LE,b/l carotid artery disease,CAD,asthma,cataracts,CKD,diabetes  GERD,HLD,REJI   ,chronic diastolic HF  LS spine arthritis/sciatica     No more gout attacks    She also had right wrist pain but when we injected the CMC and dequervains tendon she did great  She also did OT  There is lot of arthritis in the radiocarpal and CMC joints    Uric acid needs to be lesser than 6   Last uric acid 6.7      1. Gout, unspecified cause, unspecified chronicity, unspecified site          F/u  problem     Plan    Continue hand splint and OT    ULT : allopurinol should never be stopped  Increase allopurinol to 150 mg daily   If well tolerated stay on the dose  Will follow CBC,CMP periodically  Cautious use of antibiotics like amoxicillin and ampicillin advised  If GI upset will split the dose of allopurinol     Colchicine 0.6 mg alternate daily suggested : for acute attack prophylaxis   Renal function is an issue,so will adjust dose of colchicine   Mild renal insufficiency noted  No liver disease  Not on  cyclosporine,tacrolimus,clarithromycin,erythromycin,verapamil,diltiazem,ketoconazole  Need to assess CBC,CMP discussed  Will stop colchicine 3 months after the target uric acid of less than 6 is achieved,and he has no flares    Dietary modifications discussed : gave a hand out of all foods to avoid : avoid organ meat,red meat,seafood,shell fish,anchovies,sardines,alcohol particularly beer,fructose containing soft drinks  Its ok to consume moderate wine,diet drinks,dairy proteins,coffee  Eat cherries  Eat purine rich vegetables    Avoid beta blockers  Avoid diuretics  If he develops hyperlipidemia : fenofibrate helps since it lowers uric acid level     Blood pressure monitoring  Weight loss suggested  Diabetes monitoring    Labs today and in 3 months     Duran was seen today for follow-up.    Diagnoses and all orders for this visit:    Gout, unspecified cause, unspecified chronicity, unspecified site  -     colchicine (COLCRYS) 0.6 mg tablet; Take one tablet every other day  -     CBC auto differential; Future  -     Comprehensive metabolic panel; Future  -     Uric acid; Future  -     CBC auto differential; Future  -     Comprehensive metabolic panel; Future  -     Uric acid; Future    Other orders  -     allopurinoL (ZYLOPRIM) 100 MG tablet; Take 1.5 tablets (150 mg total) by mouth once daily.

## 2020-07-28 ENCOUNTER — TELEPHONE (OUTPATIENT)
Dept: PRIMARY CARE CLINIC | Facility: CLINIC | Age: 80
End: 2020-07-28

## 2020-07-28 NOTE — TELEPHONE ENCOUNTER
COVID test was neg at [facility] on 5/13/20, image copied below. Has she had another test since then?

## 2020-07-28 NOTE — TELEPHONE ENCOUNTER
Spoke with Ms. Lynn,   [facility] and would like to see if we can put the result on the portal for pt to see.    Please advise.
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
## 2020-08-17 ENCOUNTER — PATIENT OUTREACH (OUTPATIENT)
Dept: ADMINISTRATIVE | Facility: OTHER | Age: 80
End: 2020-08-17

## 2020-08-17 DIAGNOSIS — Z12.31 BREAST CANCER SCREENING BY MAMMOGRAM: Primary | ICD-10-CM

## 2020-08-17 NOTE — PROGRESS NOTES
LINKS immunization registry updated  Care Everywhere updated  Health Maintenance updated  DIS/Chart reviewed for overdue Proactive [facility] Encounters (ANUJA) health maintenance testing (CRS, Breast Ca, Diabetic Eye Exam)   Orders entered: screening mammogram  Portal message sent to patient with scheduling link for mammogram

## 2020-08-18 ENCOUNTER — OFFICE VISIT (OUTPATIENT)
Dept: PODIATRY | Facility: CLINIC | Age: 80
End: 2020-08-18
Payer: MEDICARE

## 2020-08-18 ENCOUNTER — HOSPITAL ENCOUNTER (OUTPATIENT)
Dept: RADIOLOGY | Facility: HOSPITAL | Age: 80
Discharge: HOME | End: 2020-08-18
Attending: PODIATRIST
Payer: MEDICARE

## 2020-08-18 VITALS
WEIGHT: 291 LBS | HEART RATE: 65 BPM | BODY MASS INDEX: 58.67 KG/M2 | SYSTOLIC BLOOD PRESSURE: 170 MMHG | RESPIRATION RATE: 19 BRPM | HEIGHT: 59 IN | DIASTOLIC BLOOD PRESSURE: 68 MMHG

## 2020-08-18 DIAGNOSIS — M79.672 FOOT PAIN, LEFT: Primary | ICD-10-CM

## 2020-08-18 DIAGNOSIS — M79.672 FOOT PAIN, LEFT: ICD-10-CM

## 2020-08-18 DIAGNOSIS — B35.1 ONYCHOMYCOSIS DUE TO DERMATOPHYTE: ICD-10-CM

## 2020-08-18 DIAGNOSIS — E11.51 TYPE II DIABETES MELLITUS WITH PERIPHERAL CIRCULATORY DISORDER: ICD-10-CM

## 2020-08-18 PROCEDURE — 73630 X-RAY EXAM OF FOOT: CPT | Mod: TC,LT

## 2020-08-18 PROCEDURE — 96372 THER/PROPH/DIAG INJ SC/IM: CPT | Mod: PBBFAC | Performed by: PODIATRIST

## 2020-08-18 PROCEDURE — 11721 PR DEBRIDEMENT OF NAILS, 6 OR MORE: ICD-10-PCS | Mod: Q9,S$PBB,ICN, | Performed by: PODIATRIST

## 2020-08-18 PROCEDURE — 11721 DEBRIDE NAIL 6 OR MORE: CPT | Mod: Q9,S$PBB,ICN, | Performed by: PODIATRIST

## 2020-08-18 PROCEDURE — 99213 PR OFFICE/OUTPT VISIT, EST, LEVL III, 20-29 MIN: ICD-10-PCS | Mod: 25,S$PBB,ICN, | Performed by: PODIATRIST

## 2020-08-18 PROCEDURE — 99215 OFFICE O/P EST HI 40 MIN: CPT | Mod: PBBFAC,25 | Performed by: PODIATRIST

## 2020-08-18 PROCEDURE — 73630 X-RAY EXAM OF FOOT: CPT | Mod: 26,LT,, | Performed by: RADIOLOGY

## 2020-08-18 PROCEDURE — 99213 OFFICE O/P EST LOW 20 MIN: CPT | Mod: 25,S$PBB,ICN, | Performed by: PODIATRIST

## 2020-08-18 PROCEDURE — 11721 DEBRIDE NAIL 6 OR MORE: CPT | Mod: Q9,PBBFAC | Performed by: PODIATRIST

## 2020-08-18 PROCEDURE — 99999 PR PBB SHADOW E&M-EST. PATIENT-LVL V: CPT | Mod: PBBFAC,,, | Performed by: PODIATRIST

## 2020-08-18 PROCEDURE — 99999 PR PBB SHADOW E&M-EST. PATIENT-LVL V: ICD-10-PCS | Mod: PBBFAC,,, | Performed by: PODIATRIST

## 2020-08-18 PROCEDURE — 73630 XR FOOT COMPLETE 3 VIEW LEFT: ICD-10-PCS | Mod: 26,LT,, | Performed by: RADIOLOGY

## 2020-08-18 RX ORDER — COLCHICINE 0.6 MG/1
TABLET ORAL
COMMUNITY
Start: 2020-07-14 | End: 2020-10-19

## 2020-08-18 RX ORDER — DEXAMETHASONE SODIUM PHOSPHATE 4 MG/ML
4 INJECTION, SOLUTION INTRA-ARTICULAR; INTRALESIONAL; INTRAMUSCULAR; INTRAVENOUS; SOFT TISSUE ONCE
Status: COMPLETED | OUTPATIENT
Start: 2020-08-18 | End: 2020-08-18

## 2020-08-18 RX ADMIN — DEXAMETHASONE SODIUM PHOSPHATE 4 MG: 4 INJECTION, SOLUTION INTRAMUSCULAR; INTRAVENOUS at 12:08

## 2020-08-18 NOTE — LETTER
August 18, 2020      Taim Schmidt MD  [address]  [facility] [address]           [id number]  [address]  [facility] [address]  Phone: [phone]          Patient: Duran Giordano   MR Number: [id number]   YOB: 1940   Date of Visit: 8/18/2020       Dear Dr. Tami Schmidt:    Thank you for referring Duran Giordano to me for evaluation. Attached you will find relevant portions of my assessment and plan of care.    If you have questions, please do not hesitate to call me. I look forward to following Duran Giordano along with you.    Sincerely,    Rhonda Anand, EHSAN    Enclosure  CC:  No Recipients    If you would like to receive this communication electronically, please contact [email or web address] or [phone] to request more information on [email or web address] Link access.    For providers and/or their staff who would like to refer a patient to [facility], please contact us through our one-stop-shop provider referral line, [facility] , at [phone].    If you feel you have received this communication in error or would no longer like to receive these types of communications, please e-mail [email or web address]

## 2020-08-18 NOTE — PROGRESS NOTES
Subjective:      Patient ID: Duran Giordano is a 80 y.o. female.    Chief Complaint: PCP (Yogi Contreras 1/23/20), Diabetic Foot Exam, Nail Care, and Foot Problem (left foot Gout )    Duran is a 80 y.o. female who presents to the clinic for evaluation and treatment of high risk feet. Duran has a past medical history of Adrenal mass- adenoma stable since 2004 (1.8 cm and 8/13/12 (2 cm); stable 2016 2.4 cm, Arthritis, Asthma in adult without complication (6/25/2015), Bilateral carotid artery disease (7/21/2017), Bilateral sciatica (2/7/2017), Cellulitis of right leg (08/16/2017), Cerebral infarction (1/29/2016), Cervical radiculopathy (3/18/2015), Chronic knee pain, Chronic rhinitis (4/9/2013), CKD (chronic kidney disease) stage 3, GFR 30-59 ml/min (1/29/2013), Coronary artery disease due to calcified coronary lesion (6/25/2015), Diastolic dysfunction (10/10/2013), Essential hypertension (6/25/2015), Gastroesophageal reflux disease without esophagitis (8/13/2012), Gout, Hives (10/1/2013), Mixed hyperlipidemia (8/13/2012), Morbid obesity with BMI of 50.0-59.9, adult (2/11/2014), Obstructive sleep apnea syndrome (8/13/2012), Senile cataracts of both eyes (1/22/2015), Traumatic open wound of right lower leg (8/25/2017), Trigeminal neuralgia of right side of face (5/23/2017), Type 2 diabetes mellitus with diabetic polyneuropathy, with long-term current use of insulin (1/29/2013), Venous stasis dermatitis of both lower extremities (8/21/2017), and Vitamin D deficiency disease (8/13/2012). The patient's chief complaint is  HRFC and L heel pain. Reports hx of gout. Takes colchicine to help w/ pain  This patient has documented high risk feet requiring routine maintenance secondary to diabetes mellitis and those secondary complications of diabetes, as mentioned.    PCP: Tami Schmidt MD    Date Last Seen by PCP:   Chief Complaint   Patient presents with    PCP     Yogi Contreras 1/23/20    Diabetic Foot Exam  "   Nail Care    Foot Problem     left foot Gout         Current shoe gear:  Affected Foot: Casual shoes     Unaffected Foot: Casual shoes    Hemoglobin A1C   Date Value Ref Range Status   01/16/2020 6.2 (H) 4.0 - 5.6 % Final     Comment:     ADA Screening Guidelines:  5.7-6.4%  Consistent with prediabetes  >or=6.5%  Consistent with diabetes  High levels of fetal hemoglobin interfere with the HbA1C  assay. Heterozygous hemoglobin variants (HbS, HgC, etc)do  not significantly interfere with this assay.   However, presence of multiple variants may affect accuracy.     10/16/2019 6.7 (H) 4.0 - 5.6 % Final     Comment:     ADA Screening Guidelines:  5.7-6.4%  Consistent with prediabetes  >or=6.5%  Consistent with diabetes  High levels of fetal hemoglobin interfere with the HbA1C  assay. Heterozygous hemoglobin variants (HbS, HgC, etc)do  not significantly interfere with this assay.   However, presence of multiple variants may affect accuracy.     06/11/2019 7.7 (H) 4.0 - 5.6 % Final     Comment:     ADA Screening Guidelines:  5.7-6.4%  Consistent with prediabetes  >or=6.5%  Consistent with diabetes  High levels of fetal hemoglobin interfere with the HbA1C  assay. Heterozygous hemoglobin variants (HbS, HgC, etc)do  not significantly interfere with this assay.   However, presence of multiple variants may affect accuracy.         Review of Systems   Cardiovascular: Positive for leg swelling.   Skin: Positive for dry skin and nail changes. Negative for flushing, itching, rash and skin cancer.   Musculoskeletal: Positive for gout.   Neurological: Positive for numbness. Negative for paresthesias.           Objective:       Vitals:    08/18/20 0946   BP: (!) 170/68   Pulse: 65   Resp: 19   Weight: 132 kg (291 lb)   Height: 4' 11" (1.499 m)   PainSc: 0-No pain        Physical Exam  Vitals signs and nursing note reviewed.   Constitutional:       Appearance: She is well-developed.   Cardiovascular:      Pulses:           " Dorsalis pedis pulses are 2+ on the right side and 2+ on the left side.        Posterior tibial pulses are 2+ on the right side and 2+ on the left side.      Comments: Dorsalis pedis and posterior tibial pulses are palpable bilaterally. Toes are cool to touch. Feet are warm proximally.There is decreased digital hair bilateral. Skin is atrophic, hyperpigmented, and moderately edematous.    Musculoskeletal:         General: No tenderness.      Right ankle: Normal.      Left ankle: Normal.      Right foot: No swelling, crepitus or deformity.      Left foot: No swelling, crepitus or deformity.      Comments: Adequate joint range of motion without pain, limitation, nor crepitation Bilateral feet and ankle joints. Muscle strength is 5/5 in all groups bilaterally.      TTP w/ redness and increased temp plantar L foot no signs of break in skin no ulceration. Pain to plantar medical tubercle of calcaneus . No flucutance   Lymphadenopathy:      Comments: B/l lymph edema    Skin:     General: Skin is warm and dry.      Coloration: Skin is not pale.      Findings: No abrasion, bruising, burn, erythema, lesion or rash.      Nails: There is no clubbing.        Comments: Nails x10 are elongated by  4-5mm's, thickened by 2-3 mm's, dystrophic, and are darkened in  coloration . Xerosis Bilaterally. No open lesions noted.     Neurological:      Mental Status: She is alert and oriented to person, place, and time.      Comments: Decreased sharp/dull sensation bilateral feet.   Psychiatric:         Behavior: Behavior normal.               Assessment:       Encounter Diagnoses   Name Primary?    Foot pain, left Yes    Type II diabetes mellitus with peripheral circulatory disorder     Onychomycosis due to dermatophyte          Plan:       Duran was seen today for pcp, diabetic foot exam, nail care and foot problem.    Diagnoses and all orders for this visit:    Foot pain, left  -     X-Ray Foot Complete Left; Future    Type II diabetes  mellitus with peripheral circulatory disorder    Onychomycosis due to dermatophyte    Other orders  -     dexamethasone injection 4 mg    - Patient was given written and verbal instructions regarding foot condition.  I counseled the patient on her conditions, their implications and medical management.    Shoe inspection. Diabetic Foot Education. Patient reminded of the importance of good nutrition and blood sugar control to help prevent podiatric complications of diabetes. Patient instructed on proper foot hygeine. We discussed wearing proper shoe gear, daily foot inspections, never walking without protective shoe gear, never putting sharp instruments to feet    - With patient's permission, nails were aggressively reduced and debrided x 10 to their soft tissue attachment mechanically and with electric , removing all offending nail and debris. Patient relates relief following the procedure. She will continue to monitor the areas daily, inspect her feet, wear protective shoe gear when ambulatory, moisturizer to maintain skin integrity and follow in this office in approximately 2-3 months, sooner p.r.n.    - hx of gout, reports colchicne helps heel pain. Not a location clearly consistent w/ gout. She does have symptoms of P.Fascitis. recommend steroid injection , xray    Discussed possible side effects from injection including but NOT limited to discoloration of skin, erosion of soft tissue, and increased likelihood of rupture of a soft tissue structure (ie. Plantar fascia, muscle, tendon, ligament, or capsule in the area of injection). Patient indicates understanding of my explanation, and patient gives verbal consent for injection of affected area. A time out was performed to verify the  correct  patient and site, prior to initiation of procedure.     After sterilizing the area with an alcohol prep, the affected area of the L foot was injected as per MAR  The patient tolerated the injection well and reported  comfort to the area

## 2020-08-19 ENCOUNTER — OFFICE VISIT (OUTPATIENT)
Dept: PRIMARY CARE CLINIC | Facility: CLINIC | Age: 80
End: 2020-08-19
Payer: MEDICARE

## 2020-08-19 VITALS — SYSTOLIC BLOOD PRESSURE: 138 MMHG | DIASTOLIC BLOOD PRESSURE: 70 MMHG | BODY MASS INDEX: 58.98 KG/M2 | WEIGHT: 292 LBS

## 2020-08-19 DIAGNOSIS — I15.2 HYPERTENSION ASSOCIATED WITH DIABETES: ICD-10-CM

## 2020-08-19 DIAGNOSIS — L97.921 ULCER OF LEFT LOWER EXTREMITY, LIMITED TO BREAKDOWN OF SKIN: Primary | ICD-10-CM

## 2020-08-19 DIAGNOSIS — Z12.39 BREAST CANCER SCREENING: ICD-10-CM

## 2020-08-19 DIAGNOSIS — E11.59 HYPERTENSION ASSOCIATED WITH DIABETES: ICD-10-CM

## 2020-08-19 PROCEDURE — 99443 PR PHYSICIAN TELEPHONE EVALUATION 21-30 MIN: ICD-10-PCS | Mod: 95,,, | Performed by: INTERNAL MEDICINE

## 2020-08-19 PROCEDURE — 99443 PR PHYSICIAN TELEPHONE EVALUATION 21-30 MIN: CPT | Mod: 95,,, | Performed by: INTERNAL MEDICINE

## 2020-08-19 RX ORDER — AMMONIUM LACTATE 12 G/100G
CREAM TOPICAL
Qty: 385 G | Refills: 11 | Status: SHIPPED | OUTPATIENT
Start: 2020-08-19 | End: 2022-05-25

## 2020-08-19 RX ORDER — MUPIROCIN CALCIUM 20 MG/G
CREAM TOPICAL 3 TIMES DAILY
Qty: 30 G | Refills: 11 | Status: ON HOLD | OUTPATIENT
Start: 2020-08-19 | End: 2021-10-26

## 2020-08-19 NOTE — PROGRESS NOTES
"Established Patient - [facility]     The patient location is: HOME  The chief complaint leading to consultation is: routine care  Visit type: Virtual visit with audio only (telephone)     The reason for the audio only service rather than synchronous audio and video virtual visit was related to technical difficulties or patient preference/necessity.     Each patient to whom I provide medical services by telemedicine is:  (1) informed of the relationship between the physician and patient and the respective role of any other health care provider with respect to management of the patient; and (2) notified that they may decline to receive medical services by telemedicine and may withdraw from such care at any time. Patient verbally consented to receive this service via voice-only telephone call.       Subjective:      Patient ID: Duran Giordano is a 80 y.o. female.    Patient's risk score is 8. Patient is high risk for poor outcomes with COVID due to the following chronic conditions advanced age, CAD, CHF, COPD, DM, HTN    Sister Pasquale has been having pain in her L heel similar to previous gout episodes. She states she has been taking Colchicine every other day as instructed. Pain is worse when sitting, described as pins and needles. She saw podiatry yesterday with L foot xray showing "DJD, hallux valgus and bunion formation.  Pes planus.  No acute fracture or bone destruction identified.  Soft tissue swelling noted above the dorsum of the foot similar to the previous study"    Pt states she has gained 10 lbs in the last few months. She has been trying to lose weight by eating more salads and oatmeal. She states that the other sisters are helping her stay away from less healthy foods.    Sister Pasquale as notes that she feels SOB, intermittently, when walking around. She uses her inhaler (wexcelle), with relief. She also uses albuterol inhaler occasionally as needed, with " "relief.      She says that she has been taking her DM medications. She notes that this morning her blood sugar was 131, she believes this is secondary to a steroid shot at podiatry yesterday. Yesterday HA1C 6.3    She reports her blood pressure is taken every morning or evening and has been around 125-127 SBP    Needs medication refill "blue white ointment" for cellulitis         Objective:     Lab Results   Component Value Date    WBC 7.80 07/14/2020    HGB 11.8 (L) 07/14/2020    HCT 39.5 07/14/2020     07/14/2020    CHOL 178 01/16/2020    TRIG 58 01/16/2020    HDL 51 01/16/2020    ALT 12 07/14/2020    AST 15 07/14/2020     07/14/2020    K 4.4 07/14/2020     07/14/2020    CREATININE 1.2 07/14/2020    BUN 24 (H) 07/14/2020    CO2 27 07/14/2020    TSH 0.799 01/16/2020    INR 1.1 04/17/2005    HGBA1C 6.3 (H) 08/18/2020         Assessment:   80 y.o. female with multiple co-morbid illnesses here to continue work-up of chronic issues.     Plan:     Problem List Items Addressed This Visit        Derm    Ulcer of lower extremity, limited to breakdown of skin - Primary    Relevant Medications    ammonium lactate 12 % Crea    mupirocin calcium 2% (BACTROBAN) 2 % cream       Cardiac/Vascular    Hypertension associated with diabetes     BP controlled ibesartan 300mg, torsemide; carvedilol discontinued by pulm due to SOB, amlodipine discontinued by PCP due to leg swelling  · Discontinued hydralazine due to overtreatment of HTN  · Changed losartan to ibesartan 300mg  · Consider Digital HTN program in future  · Diabetes controlled with 18-18-52 insulin            Renal/    Breast cancer screening     Annual mammograms, never abnormal  · Mammogram planned for 10/2020         Relevant Orders    Mammo Digital Screening Bilat          Health Maintenance       Date Due Completion Date    Hepatitis C Screening 1940 ---    TETANUS VACCINE 03/08/1960 ---    Shingles Vaccine (2 of 3) 02/10/2015 12/16/2014    " Mammogram 04/09/2020 4/9/2019- TODAY    Influenza Vaccine (1) 09/01/2020 11/6/2019 (Done)    Override on 11/6/2019: Done ([facility]    Override on 10/7/2015: Done    Override on 10/3/2014: Done    Override on 10/10/2013: Done    Override on 9/13/2011: Done    Foot Exam 12/31/2020 12/31/2019 (Done)    Override on 12/31/2019: Done (Done by Dr. Anand)    Override on 11/27/2018: Done (Dr Anand)    Override on 1/19/2018: Done (Dr anand)    Override on 5/31/2017: Done    Override on 7/20/2016: Done    Lipid Panel 01/16/2021 1/16/2020    Eye Exam 02/07/2021 2/7/2020 (Done)    Override on 2/7/2020: Done    Override on 2/17/2016: Done    Override on 1/22/2015: Done    Override on 8/16/2012: Done    Hemoglobin A1c 02/18/2021 8/18/2020    Override on 7/13/2016: Done    Aspirin/Antiplatelet Therapy 08/18/2021 8/18/2020    DEXA SCAN 02/22/2022 2/22/2018    Override on 12/13/2011: Done          Follow up in about 2 months (around 10/19/2020). . Thirty minutes spent with this patient today, including addressing advanced care planning.    Tami Schmidt MD/MPH  Internal Medicine  [facility] [phone]    This service was not originating from a related E/M service provided within the previous 7 days nor will  to an E/M service or procedure within the next 24 hours or my soonest available appointment.  Prevailing standard of care was able to be met in this audio-only visit.

## 2020-08-19 NOTE — PATIENT INSTRUCTIONS
TODAY:  - cut back on sugary and high-carbohydrate foods  - let Ms Lynn know about the virutal visit  - message sent to pharmacy

## 2020-09-03 ENCOUNTER — TELEPHONE (OUTPATIENT)
Dept: INTERNAL MEDICINE | Facility: CLINIC | Age: 80
End: 2020-09-03

## 2020-09-03 ENCOUNTER — TELEPHONE (OUTPATIENT)
Dept: PRIMARY CARE CLINIC | Facility: CLINIC | Age: 80
End: 2020-09-03

## 2020-09-03 NOTE — TELEPHONE ENCOUNTER
Message left on voicemail. Message left with  Staff member to return for scheduling appointment on 09/25/2020

## 2020-09-03 NOTE — TELEPHONE ENCOUNTER
----- Message from Zena Contreras sent at 9/3/2020  2:27 PM CDT -----  Regarding: appointment  Contact: Patient [phone]  Sister  would like to schedule an appointment. Please call

## 2020-09-03 NOTE — TELEPHONE ENCOUNTER
----- Message from [place] sent at 9/3/2020 11:05 AM CDT -----  Regarding: needs appt  Contact: pt  Pt would like to be called back regarding rescheduling   no  show in August    Epic has nothing      Please call Sister Duran Logan  at [phone]     If you cannot reach her  the [phone]  can page her     Thanks

## 2020-09-03 NOTE — TELEPHONE ENCOUNTER
----- Message from Cheyenne Akers sent at 9/3/2020  3:34 PM CDT -----  Regarding: Appointment  Contact: Patient @ [phone]  Patient is returning a phone call.  Who left a message for the patient: Leeanna Yuen MA   Does patient know what this is regarding:  Appointment  Comments: Sister will go into pray at 5 pm

## 2020-09-28 ENCOUNTER — TELEPHONE (OUTPATIENT)
Dept: PRIMARY CARE CLINIC | Facility: CLINIC | Age: 80
End: 2020-09-28

## 2020-09-28 ENCOUNTER — TELEPHONE (OUTPATIENT)
Dept: INTERNAL MEDICINE | Facility: CLINIC | Age: 80
End: 2020-09-28

## 2020-09-28 DIAGNOSIS — L97.919 VENOUS ULCER OF RIGHT LEG: ICD-10-CM

## 2020-09-28 DIAGNOSIS — I89.0 LYMPHEDEMA OF BOTH LOWER EXTREMITIES: ICD-10-CM

## 2020-09-28 DIAGNOSIS — I50.32 CHRONIC DIASTOLIC HEART FAILURE: ICD-10-CM

## 2020-09-28 DIAGNOSIS — I83.019 VENOUS ULCER OF RIGHT LEG: ICD-10-CM

## 2020-09-28 NOTE — TELEPHONE ENCOUNTER
SPOKE W/ PT SHE STATED THAT SHE HAD A WATER BLSTER ON HER LEFT LEG SHE HAVE BEEN PUTTING SOME CREAM ON THE BLISTER SHE STATED IT IS BETTER BUT SHE HAVE SOMEONE MORE CONCERNS AND WOULD LIKE TO SPEAK WITH YOU ABOUT THEM

## 2020-09-28 NOTE — TELEPHONE ENCOUNTER
----- Message from Sarah Beth Zarco sent at 9/28/2020  8:14 AM CDT -----  Contact: self/[phone]  .1 Patient would like to get medical advice.  Symptoms (please be specific): (cellulitis) big bump that burst on her feet yesterday  How long has patient had these symptoms: 09/27/20  Pharmacy name and phone#:"[facility] [employer] - [place], [place] - [address] [place] [address] [facility] [phone] (Phone)  [phone] (Fax)  Any drug allergies: on file  Comments: Patient would like to get medical advice. Patient stated that she took care of it, but would like to talk with PCP. Thank you

## 2020-09-28 NOTE — TELEPHONE ENCOUNTER
She definitely has to increase her demadex to twice daily for now to decrease her leg swelling and hopefully improve her SOB.    Where does she want this script sent?    Thanks,  KJ

## 2020-09-28 NOTE — TELEPHONE ENCOUNTER
This is likely related to fluid in her legs. Is she gaining weight?    She needs to continue her leg compressions and cut back on salt. Is she doing that?    Please advise her that she can increase her demadex to twice daily to get rid of the extra fluid.    THIS IS NOT AN INFECTION!    THanks,  KJ

## 2020-09-29 ENCOUNTER — PATIENT MESSAGE (OUTPATIENT)
Dept: OTHER | Facility: OTHER | Age: 80
End: 2020-09-29

## 2020-09-29 RX ORDER — TORSEMIDE 20 MG/1
20 TABLET ORAL
Qty: 30 TABLET | Refills: 11 | Status: SHIPPED | OUTPATIENT
Start: 2020-09-29 | End: 2021-09-29

## 2020-10-06 NOTE — TELEPHONE ENCOUNTER
Please check on this patient. How is the leg swelling? Is she taking the demadex twice daily?    Thanks,  BARB

## 2020-10-06 NOTE — TELEPHONE ENCOUNTER
Spoke w/ pt she stated the swelling is pretty good, it went down a lot she is taking the Demadex twice daily

## 2020-10-07 ENCOUNTER — HOSPITAL ENCOUNTER (OUTPATIENT)
Dept: RADIOLOGY | Facility: HOSPITAL | Age: 80
Discharge: HOME | End: 2020-10-07
Attending: INTERNAL MEDICINE
Payer: MEDICARE

## 2020-10-07 ENCOUNTER — OFFICE VISIT (OUTPATIENT)
Dept: INTERNAL MEDICINE | Facility: CLINIC | Age: 80
End: 2020-10-07
Payer: MEDICARE

## 2020-10-07 VITALS — HEART RATE: 74 BPM | DIASTOLIC BLOOD PRESSURE: 66 MMHG | OXYGEN SATURATION: 99 % | SYSTOLIC BLOOD PRESSURE: 191 MMHG

## 2020-10-07 DIAGNOSIS — I15.2 HYPERTENSION ASSOCIATED WITH DIABETES: ICD-10-CM

## 2020-10-07 DIAGNOSIS — N18.31 STAGE 3A CHRONIC KIDNEY DISEASE: ICD-10-CM

## 2020-10-07 DIAGNOSIS — I25.84 CORONARY ARTERY DISEASE DUE TO CALCIFIED CORONARY LESION: Chronic | ICD-10-CM

## 2020-10-07 DIAGNOSIS — Z12.31 BREAST CANCER SCREENING BY MAMMOGRAM: ICD-10-CM

## 2020-10-07 DIAGNOSIS — E66.01 MORBID OBESITY WITH BMI OF 50.0-59.9, ADULT: ICD-10-CM

## 2020-10-07 DIAGNOSIS — I89.0 LYMPHEDEMA OF BOTH LOWER EXTREMITIES: ICD-10-CM

## 2020-10-07 DIAGNOSIS — I87.2 VENOUS STASIS DERMATITIS OF BOTH LOWER EXTREMITIES: ICD-10-CM

## 2020-10-07 DIAGNOSIS — Z79.4 TYPE 2 DIABETES MELLITUS WITH DIABETIC POLYNEUROPATHY, WITH LONG-TERM CURRENT USE OF INSULIN: Primary | ICD-10-CM

## 2020-10-07 DIAGNOSIS — E11.42 TYPE 2 DIABETES MELLITUS WITH DIABETIC POLYNEUROPATHY, WITH LONG-TERM CURRENT USE OF INSULIN: Primary | ICD-10-CM

## 2020-10-07 DIAGNOSIS — I25.10 CORONARY ARTERY DISEASE DUE TO CALCIFIED CORONARY LESION: Chronic | ICD-10-CM

## 2020-10-07 DIAGNOSIS — Z12.39 BREAST CANCER SCREENING: ICD-10-CM

## 2020-10-07 DIAGNOSIS — E11.59 HYPERTENSION ASSOCIATED WITH DIABETES: ICD-10-CM

## 2020-10-07 PROCEDURE — 99214 OFFICE O/P EST MOD 30 MIN: CPT | Mod: S$PBB,,, | Performed by: NURSE PRACTITIONER

## 2020-10-07 PROCEDURE — 99999 PR PBB SHADOW E&M-EST. PATIENT-LVL V: CPT | Mod: PBBFAC,,, | Performed by: NURSE PRACTITIONER

## 2020-10-07 PROCEDURE — 77063 BREAST TOMOSYNTHESIS BI: CPT | Mod: 26,,, | Performed by: RADIOLOGY

## 2020-10-07 PROCEDURE — 99215 OFFICE O/P EST HI 40 MIN: CPT | Mod: PBBFAC | Performed by: NURSE PRACTITIONER

## 2020-10-07 PROCEDURE — 77067 MAMMO DIGITAL SCREENING BILAT WITH TOMOSYNTHESIS_CAD: ICD-10-PCS | Mod: 26,,, | Performed by: RADIOLOGY

## 2020-10-07 PROCEDURE — 77063 MAMMO DIGITAL SCREENING BILAT WITH TOMOSYNTHESIS_CAD: ICD-10-PCS | Mod: 26,,, | Performed by: RADIOLOGY

## 2020-10-07 PROCEDURE — 77067 SCR MAMMO BI INCL CAD: CPT | Mod: 26,,, | Performed by: RADIOLOGY

## 2020-10-07 PROCEDURE — 99999 PR PBB SHADOW E&M-EST. PATIENT-LVL V: ICD-10-PCS | Mod: PBBFAC,,, | Performed by: NURSE PRACTITIONER

## 2020-10-07 PROCEDURE — 77067 SCR MAMMO BI INCL CAD: CPT | Mod: TC

## 2020-10-07 PROCEDURE — 99214 PR OFFICE/OUTPT VISIT, EST, LEVL IV, 30-39 MIN: ICD-10-PCS | Mod: S$PBB,,, | Performed by: NURSE PRACTITIONER
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
## 2020-10-07 NOTE — PATIENT INSTRUCTIONS
Snacks can be an important part of a balanced, healthy meal plan. They allow you to eat more frequently, feeling full and satisfied throughout the day. Also, they allow you to spread carbohydrates evenly, which may stabilize blood sugars.  Plus, snacks are enjoyable!     The amount of carbohydrate needed at snacks varies. Generally, about 15-30 grams of carbohydrate per snack is recommended.  Below you will find some tasty treats.       0-5 gm carb   Crystal Light   Vitamin Water Zero   Herbal tea, unsweetened   2 tsp peanut butter on celery   1./2 cup sugar-free jell-o   1 sugar-free popsicle   ¼ cup blueberries   8oz Blue Aniyah unsweetened almond milk   5 baby carrots & celery sticks, cucumbers, bell peppers dipped in ¼ cup salsa, 2Tbsp light ranch dressing or 2Tbsp plain Greek yogurt   10 Goldfish crackers   ½ oz low-fat cheese or string cheese   1 closed handful of nuts, unsalted   1 Tbsp of sunflower seeds, unsalted   1 cup Smart Pop popcorn   1 whole grain brown rice cake        15 gm carb   1 small piece of fruit or ½ banana or 1/2 cup lite canned fruit   3 ramon cracker squares   3 cups Smart Pop popcorn, top spray butter, York lite salt or cinnamon and Truvia   5 Vanilla Wafers   ½ cup low fat, no added sugar ice cream or frozen yogurt (Blue bell, Blue Bunny, Weight Watchers, Skinny Cow)   ½ turkey, ham, or chicken sandwich   ½ c fruit with ½ c Cottage cheese   4-6 unsalted wheat crackers with 1 oz low fat cheese or 1 tbsp peanut butter    30-45 goldfish crackers (depending on flavor)    7-8 Jehovah's witness mini brown rice cakes (caramel, apple cinnamon, chocolate)    12 Jehovah's witness mini brown rice cakes (cheddar, bbq, ranch)    1/3 cup hummus dip with raw veg   1/2 whole wheat jessica, 1Tbsp hummus   Mini Pizza (1/2 whole wheat English muffin, low-fat  cheese, tomato sauce)   100 calorie snack pack (Oreo, Chips Ahoy, Ritz Mix, Baked Cheetos)   4-6 oz. light or Greek Style yogurt  (Flores, Shila, Placido, [facility])   ½ cup sugar-free pudding     6 in. wheat tortilla or jessica oven toasted chips (topped with spray butter flavoring, cinnamon, Truvia OR spray butter, garlic powder, chili powder)    18 BBQ Popchips (available at Target, Whole Foods, Fresh Market)                   Managing Diabetes: The A1C Test       Healthy red blood cells have some glucose stuck to them. A high A1C means that unhealthy amounts of glucose are stuck to the cells.   What is the A1C test?  Using your meter helps you track your blood sugar every day. But your glucose meter tells you the value at the time of testing only. You also need to know if your treatment plan is keeping you healthy over time. The hemoglobin A1C (or glycated hemoglobin) test can help. This test measures your average blood sugar level over a few months. A higher A1C result means that you have a higher risk of developing complications.  The A1C test  The A1C is a blood test done by your healthcare provider. You will likely have an A1C test every 3 to 6 months.  Your blood glucose goal  A1C has been shown as a percentage. But it can also be shown as a number representing the estimated Average Glucose (eAG). Unlike the A1C percentage, eAG is a number similar to the numbers listed on your daily glucose monitor. Both A1C and eAG measure the amount of glucose stuck to a protein called hemoglobin in red blood cells. Your healthcare provider will help you figure out what your ideal A1C or eAG should be. Your target number will depend on your age, general health, and other factors. If your current number is too high, your treatment plan may need changes, such as different medicines.  Sample results  Most people aim for an A1c lower than 7%. Thats an eAG less than 154 mg/dL. Or, your healthcare provider may want you to aim for an A1C of 6%. Thats an eAG of 126 mg/dL.     Glucose  calculator  Visit http://[email or web address].diabetes.org/diapro/glucose_calc for a chart that helps convert your A1C percentages into eAG numbers.   Date Last Reviewed: 6/1/2016 © 2000-2017 [employer]. [address] [place] [address], [place] [address]. All rights reserved. This information is not intended as a substitute for professional medical care. Always follow your healthcare professional's instructions.        Hypoglycemia (Low Blood Sugar)     Fast-acting sugar includes a cup of nonfat milk.     Too little sugar (glucose) in your blood is called hypoglycemia or low blood sugar. Low blood sugar usually means anything lower than 70 mg/dL. Talk with your healthcare provider about your target range and what level is too low for you. Diabetes itself doesnt cause low blood sugar. But some of the treatments for diabetes, such as pills or insulin, may raise your risk for it. Low blood sugar may cause you to pass out or have a seizure. So always treat low blood sugar right away, but don't overeat.  Special note: Always carry a source of fast-acting sugar and a snack in case of hypoglycemia.   What you may notice  If you have low blood sugar, you may have one or more of these symptoms:  · Shakiness or dizziness  · Cold, clammy skin or sweating  · Feelings of hunger  · Headache  · Nervousness  · A hard, fast heartbeat  · Weakness  · Confusion or irritability  · Blurred vision  · Having nightmares or waking up confused or sweating  · Numbness or tingling in the lips or tongue  What you should do  Here are tips to follow if you have hypoglycemia:   · First check your blood sugar. If it is too low (out of your target range), eat or drink 15 to 20 grams of fast-acting sugar. This may be 3 to 4 glucose tablets, 4 ounces (half a cup) of fruit juice or regular (nondiet) soda, 8 ounces (1 cup) of fat-free milk, or 1 tablespoon of honey. Dont take more than this, or your blood sugar may go too high.  · Wait 15 minutes. Then  recheck your blood sugar if you can.  · If your blood sugar is still too low, repeat the steps above and check your blood sugar again. If your blood sugar still has not returned to your target range, contact your healthcare provider or seek emergency care.  · Once your blood sugar returns to target range, eat a snack or meal.  Preventing low blood sugar  Things you can do include the following:   · If your condition needs a strict treatment plan, eat your meals and snacks at the same times each day. Dont skip meals!  · If your treatment plan lets you change when you eat and what you eat, learn how to change the time and dose of your rapid-acting insulin to match this.   · Ask your healthcare provider if it is safe for you to drink alcohol. Never drink on an empty stomach.  · Take your medicine at the prescribed times.  · Always carry a source of fast-acting sugar and a snack when youre away from home.  Other things to do  Additional tips include the following:  · Carry a medical ID card, a compact USB drive, or wear a medical alert bracelet or necklace. It should say that you have diabetes. It should also say what to do if you pass out or have a seizure.  · Make sure your family, friends, and coworkers know the signs of low blood sugar. Tell them what to do if your blood sugar falls very low and you cant treat yourself.  · Keep a glucagon emergency kit handy. Be sure your family, friends, and coworkers know how and when to use it. Check it regularly and replace the glucagon before it expires.  · Talk with your health care team about other things you can do to prevent low blood sugar.     If you have unexplained hypoglycemia or hypoglycemia several times, call your healthcare provider.   Date Last Reviewed: 5/1/2016  © 1468-3634 [employer]. [address] [place] [address], [place] [address]. All rights reserved. This information is not intended as a substitute for professional medical care. Always follow  your healthcare professional's instructions.        Exercise: Adding Intensity  You have been exercising for 30 minutes most days of the week. Now you can move on to the next stage: increasing the intensity. This means doing your activity in one or more of these ways:  · Longer. Exercise for 30 minutes or more without a break.  · Faster. Hike, run, or skate fast enough to raise your heart rate moderately--as if you had walked fast to catch a bus.  · More often. Do your activity 4 to 6 times a week instead of 1 to 3 times.    Not just gym class  Be creative. You can reach your health and fitness goals in many ways. Try some of these activities:  · Team sports, like basketball or soccer  · Social or recreational activities, like hiking or dancing  · Individual exercise, like cycling, swimming, or skating  · Group fitness classes, like aerobic classes or weight training  Safety first  Whatever activity you choose, think about safety:  · Wear the right safety gear and shoes for your activity.  · Drink plenty of water during and after workouts.  · Wear light-colored clothing if youre out when its dark.  · Make time to warm up before you exercise and cool down after.  · Carry ID (identification) with you if youre out alone. And be sure someone knows where youre going.  · If youre on foot, travel against traffic (except on blind corners). If youre on a bike, go with traffic. Obey the rules of the road.   Tips for sticking with it  · Find a workout partner or sports club. If you know someone is expecting you, youll be less likely to skip your workout.  · Pack a workout bag with everything you need. Then its ready when you are.  · Choose a few different activities so youll stay interested. Make it fun!  What will help you to stick with it?  1.   2.   3.   Date Last Reviewed: 8/13/2015  © 1949-0475 [employer]. [address] [place] [address], [place] [address]. All rights reserved. This information is not  intended as a substitute for professional medical care. Always follow your healthcare professional's instructions.        Diabetes: Sick-Day Plan  Infections, the flu, and even a cold, can cause your blood sugar to rise. And, eating less, nausea, and vomiting may cause your blood glucose to fall (hypoglycemia). Ask your healthcare provider to help you develop a sick-day plan. The following information can help.    Donts  Don'ts include the following:  · Diabetes medicines. Dont stop taking your diabetes medicine.  · Other medicines. Dont take other medicines, such as those for colds or the flu, without checking with your healthcare provider.  Dos  Do's include the following:  · Eating. Stick to your meal plan. If you cant eat, try fruit juice, regular gelatin, or frozen juice bars as directed by your healthcare provider.  · Drinking. Drink at least 1 glass of liquid every hour. If youre eating, these liquids should be sugar-free.  · Blood glucose. Check your blood sugar as often as directed by your healthcare provider. You may need to check it more often than usual.  · Ketones. Check your blood or urine for ketones. Ketones are the waste from burning fat instead of glucose for energy. Ketones are a warning sign of ketoacidosis. Ketoacidosis is a medical emergency. Ketoacidosis can happen to anyone with diabetes, but it's very rare in type 2 diabetes. It is usually only an issue if you have type 1 diabetes.  · Diabetes medicines.  ¨ Adjust your insulin according to your sick-day plan. Don't skip insulin. You need insulin even if you can't eat your normal meals.  ¨ If you take pills for diabetes (oral medicines), take your normal dose unless your healthcare provider tells you something different.  · Sugar-free medicines. Look for sugar-free cough drops and other medicines. Ask your healthcare provider if its OK for you to take these.  · Getting help. If you're alone, ask someone to check on you several times a  day.  Try to get all these supplies together before you need them.  Call your healthcare provider  Call your healthcare provider if you have any of the following:  · You vomit or have diarrhea for more than 6 hours.  · Your blood glucose level is higher than usual or more than 250 mg/dL after you have taken extra insulin (if recommended in your sick-day plan).  · You take oral medicine for diabetes, and your blood sugar is higher than usual or over 250 mg/dL, before a meal and stays that high for more than 24 hours.  · Your blood glucose is lower than usual or less than 70 mg/dL  · You have moderate to large amounts of ketones in your blood or urine.  · You arent better after 2 days.  Date Last Reviewed: 6/1/2016  © 0842-3473 [employer]. [address] [place] [address], [place] [address]. All rights reserved. This information is not intended as a substitute for professional medical care. Always follow your healthcare professional's instructions.

## 2020-10-08 NOTE — PROGRESS NOTES
I was on call and the lab called me last night (10/7/2020) at 6:10 pm with panic value of glucose = 41.   Lab was drawn approx 10:30 am  I called patient who was feeling fine and getting ready to eat supper. She recalled no sx of hypoglycemia this am but noted that she had mammogram yesterday morning and did not follow her usual routine.     Brant Rivas MD  Rheumatology  Mobile: [phone]

## 2020-10-12 ENCOUNTER — TELEPHONE (OUTPATIENT)
Dept: INTERNAL MEDICINE | Facility: CLINIC | Age: 80
End: 2020-10-12

## 2020-10-12 ENCOUNTER — TELEPHONE (OUTPATIENT)
Dept: PRIMARY CARE CLINIC | Facility: CLINIC | Age: 80
End: 2020-10-12

## 2020-10-12 NOTE — TELEPHONE ENCOUNTER
----- Message from Toina Mckeon sent at 10/12/2020 12:18 PM CDT -----  Contact: Self  Type:  Needs Medical Advice    Who Called: Self    Would the patient rather a call back or a response via [facility]? Call back     Best Call Back Number: [phone]    Additional Information: Self [phone]-----returning a missed call. The pt is requesting a call back

## 2020-10-12 NOTE — TELEPHONE ENCOUNTER
Please evaluate whether she is using her CPAP and whether she is short of breath.    It's hard to say whether she needs an antibiotic from this history. It sounds like she's volume overloaded given that her symptoms are worse in the morning and night.    Does she want to come in?? Does she want a mobile CXR??    Thanks,  KJ

## 2020-10-12 NOTE — TELEPHONE ENCOUNTER
She stated she told someone that she has been wheezing and did not have a fluid problem and she was wondering can she have an antibiotic  for that, she states its happens in the morning and the evening around 6-7.     Please advise!

## 2020-10-12 NOTE — TELEPHONE ENCOUNTER
The pt stated that she is wheezing x3days, she used her nebulizer last night and it did help her feel better. She's not having any other symptoms and would like to know if she can have an antibiotic sent to [facility].

## 2020-10-12 NOTE — TELEPHONE ENCOUNTER
----- Message from Eri Eng sent at 10/12/2020 10:11 AM CDT -----  Contact: [phone]  Patient is requesting a call back from the nurse in regards to a personal matter. Please call and advise

## 2020-10-12 NOTE — TELEPHONE ENCOUNTER
Spoke with pt, she stated last night she was congested and wheezing last night  She used nebulizer, she had a hot-totty, and she is doing much better.    She said she called because she was wheezing not from swelling.  She said she wheezes every fall.   Pt stated the wheezing has stopped.    During day she said she is fine, it's in evening at 1800.     If anything changes, she will call. If she needs to be seen, she will call.     She said she is taking robitussin, she will take mucinex to break up phlegm. She uses her inhaler and her nebs.   She said at night is when the wheezing starts.

## 2020-10-12 NOTE — TELEPHONE ENCOUNTER
Patient may be volume overloaded, and an antibiotic will not help with that.     Can she come to clinic to be assessed?    Thanks,  KJ

## 2020-10-18 ENCOUNTER — PATIENT OUTREACH (OUTPATIENT)
Dept: ADMINISTRATIVE | Facility: OTHER | Age: 80
End: 2020-10-18

## 2020-10-19 ENCOUNTER — OFFICE VISIT (OUTPATIENT)
Dept: RHEUMATOLOGY | Facility: CLINIC | Age: 80
End: 2020-10-19
Payer: MEDICARE

## 2020-10-19 VITALS
HEIGHT: 59 IN | HEART RATE: 87 BPM | SYSTOLIC BLOOD PRESSURE: 189 MMHG | DIASTOLIC BLOOD PRESSURE: 84 MMHG | BODY MASS INDEX: 58.98 KG/M2

## 2020-10-19 DIAGNOSIS — M1A.09X0 IDIOPATHIC CHRONIC GOUT OF MULTIPLE SITES WITHOUT TOPHUS: Primary | ICD-10-CM

## 2020-10-19 PROCEDURE — 99214 PR OFFICE/OUTPT VISIT, EST, LEVL IV, 30-39 MIN: ICD-10-PCS | Mod: S$PBB,,, | Performed by: INTERNAL MEDICINE

## 2020-10-19 PROCEDURE — 99214 OFFICE O/P EST MOD 30 MIN: CPT | Mod: PBBFAC | Performed by: INTERNAL MEDICINE

## 2020-10-19 PROCEDURE — 99999 PR PBB SHADOW E&M-EST. PATIENT-LVL IV: ICD-10-PCS | Mod: PBBFAC,,, | Performed by: INTERNAL MEDICINE

## 2020-10-19 PROCEDURE — 99999 PR PBB SHADOW E&M-EST. PATIENT-LVL IV: CPT | Mod: PBBFAC,,, | Performed by: INTERNAL MEDICINE

## 2020-10-19 PROCEDURE — 99214 OFFICE O/P EST MOD 30 MIN: CPT | Mod: S$PBB,,, | Performed by: INTERNAL MEDICINE

## 2020-10-19 RX ORDER — COLCHICINE 0.6 MG/1
TABLET ORAL
Qty: 15 TABLET | Refills: 5 | Status: SHIPPED | OUTPATIENT
Start: 2020-10-19 | End: 2021-02-23

## 2020-10-19 RX ORDER — MUPIROCIN 20 MG/G
OINTMENT TOPICAL
Status: ON HOLD | COMMUNITY
Start: 2020-08-19 | End: 2021-10-26

## 2020-10-19 RX ORDER — ALLOPURINOL 100 MG/1
200 TABLET ORAL DAILY
Qty: 60 TABLET | Refills: 6 | Status: SHIPPED | OUTPATIENT
Start: 2020-10-19 | End: 2020-11-18

## 2020-10-19 NOTE — PROGRESS NOTES
Chief Complaint   Patient presents with    Disease Management       Patient with chronic gout for a follow up    History of presenting illness    80 year old black female comes in with chronic gout for 10 years    Usually she has had     Acute onset pain in the :    Ankles,feet : lasting for a week  Severe pain,swelling in the joint  The joint would turn red and blue  Precipitated by sea food  Cannot bear weight  Would get steroids,anti inflammatories    She was advised not to eat sea food    She did fine for a while     She snacks a lot and also has gained weight    Last year    APOLONIA neg  RF neg    Feet xrays  Marked swelling of ankle and feet soft tissues, pes planus, DJD tarsal joints particularly on left, bilateral hallux valgus deformity, DJD 1st MTP joints.  Focal cortical thickening medial aspects left 2nd digit shaft meta tarsal.    Hand xrays  Tiny remote calcification lateral margin base proximal phalanx 3rd MCP joint.  Prominent DJD changes, some with erosion, 1st metacarpal-carpal and radiocarpal joint and radioulnar joints especially on left.  Enlargement lunate, overlying triquetral, pisiform not seen best advantage.  No typical gout erosive disease or tophi.  Flattening distal ulnar without ulnar styloid, narrowing of ulnar wrist joint with apparent absence of tri angulated fibrocartilage.    She now takes allopurinol 150 mg daily  Colchicine 0.3 mg daily    She had mentioned chronic right wrist pain  We injected the right CMC   We did MRI right wrist    Ligaments: There is extensive tear of the TFCC, which appears markedly diminutive.  Scapholunate ligament not well seen.    Tendons: There is tendinosis and tenosynovitis of the adductor pollicis longus and extensor pollicis brevis.  There is interstitial tear of the adductor pollicis brevis.  There is mild tenosynovitis of the extensor carpi radialis brevis and longus as well as the extensor carpi ulnaris.  The ECU is in the appropriate position.   Remaining extensor tendons and flexor tendons are unremarkable.    Bones: No fracture or infiltrative process.  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    Joints: There are severe degenerative changes at the radiocarpal joint with subchondral edema and cystic change predominantly within the distal radius and lunate.  Severe degenerative changes with subcortical cystic change in bulky osteophytes also noted at the base of thumb joint.  There is a small effusion at the wrist.    Miscellaneous: Carpal tunnel and Guyon's canal are unremarkable.      Impression       1. 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  2. First compartment tendinosis and tenosynovitis with interstitial tear of the adductor pollicis longus.  Mild tenosynovitis of the 2nd and 6th compartments.  3. Lunotriquetral coalition.  4. Severe base of thumb osteoarthritis.     We sent her to hand ortho  They injected her right hand de quervains  She has done well  She did OT and that has helped     Past history    HTN,venous stasis LE,b/l carotid artery disease,CAD,asthma,cataracts,CKD,diabetes  GERD,HLD,REJI   ,chronic diastolic HF  LS spine arthritis/sciatica     Family history  Mom cancer    Social history  Not a smoker or alcoholic      Review of Systems   Constitutional: Negative for activity change, appetite change, chills, diaphoresis, fatigue, fever and unexpected weight change.   HENT: Negative for congestion, dental problem, drooling, ear discharge, ear pain, facial swelling, hearing loss, mouth sores, nosebleeds, postnasal drip, rhinorrhea, sinus pressure, sinus pain, sneezing, sore throat, tinnitus, trouble swallowing and voice change.    Eyes: Negative for photophobia, pain, discharge, redness, itching and visual disturbance.   Respiratory: Negative for apnea, cough, choking, chest tightness, shortness of breath, wheezing and stridor.    Cardiovascular: Negative for chest pain, palpitations and leg swelling.   Gastrointestinal:  Negative for abdominal distention, abdominal pain, anal bleeding, blood in stool, constipation, diarrhea, nausea, rectal pain and vomiting.   Endocrine: Negative for cold intolerance, heat intolerance, polydipsia, polyphagia and polyuria.   Genitourinary: Negative for decreased urine volume, difficulty urinating, dysuria, enuresis, flank pain, frequency, genital sores, hematuria and urgency.   Musculoskeletal: Positive for arthralgias. Negative for back pain, gait problem, joint swelling, myalgias, neck pain and neck stiffness.   Skin: Negative for color change, pallor, rash and wound.   Allergic/Immunologic: Negative for environmental allergies, food allergies and immunocompromised state.   Neurological: Negative for dizziness, tremors, seizures, syncope, facial asymmetry, speech difficulty, weakness, light-headedness, numbness and headaches.   Hematological: Negative for adenopathy. Does not bruise/bleed easily.   Psychiatric/Behavioral: Negative for agitation, behavioral problems, confusion, decreased concentration, dysphoric mood, hallucinations, self-injury, sleep disturbance and suicidal ideas. The patient is not nervous/anxious and is not hyperactive.      Physical Exam     MATTHEWS-28 tender joint count: 0  MATTHEWS-28 swollen joint count: 0    Physical Exam   Constitutional: She is oriented to person, place, and time and well-developed, well-nourished, and in no distress. No distress.   HENT:   Head: Normocephalic.   Mouth/Throat: Oropharynx is clear and moist.   Eyes: Conjunctivae are normal. Pupils are equal, round, and reactive to light. Right eye exhibits no discharge. Left eye exhibits no discharge. No scleral icterus.   Neck: Normal range of motion. No thyromegaly present.   Cardiovascular: Normal rate, regular rhythm, normal heart sounds and intact distal pulses.    Pulmonary/Chest: Effort normal and breath sounds normal. No stridor.   Abdominal: Soft. Bowel sounds are normal.   Lymphadenopathy:     She has no  cervical adenopathy.   Neurological: She is alert and oriented to person, place, and time.   Skin: Skin is warm. No rash noted. She is not diaphoretic.     Psychiatric: Affect and judgment normal.   Musculoskeletal: Normal range of motion.           Assessment       80 year old black female comes in with chronic gout for 10 years    Initial attacks were in the ankles and feet : acute onset,pain,swelling and redness,triggered by sea food intake,responding to steroids and anti inflammatories    She presented with acute onset pain in the     Right wrist  Right MCP  Bottom of the right foot    Labs     GFR 50   Uric acid 8.5,down to 7.9 and then went upto 8.5 and then trending down to 6.7/7/8/7.4  ESR 72  H/H 11.5/38.2    CBC,CMP stays stable    RF,CCP neg    Last year  APOLONIA neg  RF neg    She has   HTN,venous stasis LE,b/l carotid artery disease,CAD,asthma,cataracts,CKD,diabetes  GERD,HLD,REJI   ,chronic diastolic HF  LS spine arthritis/sciatica     No more gout attacks    She also had right wrist pain but when we injected the CMC and dequervains tendon she did great  She also did OT  There is lot of arthritis in the radiocarpal and CMC joints    Uric acid needs to be lesser than 6   Last uric acid 7.4    GFR 50 to 55  H/h nml  LFTs nml    1. Idiopathic chronic gout of multiple sites without tophus          F/u  problem     Plan    Continue hand splint and OT    ULT : allopurinol should never be stopped  Increase allopurinol to 200 mg daily   If well tolerated stay on the dose  Will follow CBC,CMP periodically  Cautious use of antibiotics like amoxicillin and ampicillin advised  If GI upset will split the dose of allopurinol     Colchicine 0.6 mg alternate daily suggested : for acute attack prophylaxis   Renal function is an issue,so will adjust dose of colchicine   Mild renal insufficiency noted  No liver disease  Not on cyclosporine,tacrolimus,clarithromycin,erythromycin,verapamil,diltiazem,ketoconazole  Need to assess  CBC,CMP discussed  Will stop colchicine 3 months after the target uric acid of less than 6 is achieved,and he has no flares    Dietary modifications discussed : gave a hand out of all foods to avoid : avoid organ meat,red meat,seafood,shell fish,anchovies,sardines,alcohol particularly beer,fructose containing soft drinks  Its ok to consume moderate wine,diet drinks,dairy proteins,coffee  Eat cherries  Eat purine rich vegetables    Avoid beta blockers  Avoid diuretics  If he develops hyperlipidemia : fenofibrate helps since it lowers uric acid level     Blood pressure monitoring  Weight loss suggested  Diabetes monitoring    Labs  in 3 months     Duran was seen today for disease management.    Diagnoses and all orders for this visit:    Idiopathic chronic gout of multiple sites without tophus  -     CBC auto differential; Future  -     Comprehensive Metabolic Panel; Future  -     Uric Acid; Future    Other orders  -     allopurinoL (ZYLOPRIM) 100 MG tablet; Take 2 tablets (200 mg total) by mouth once daily.  -     colchicine (COLCRYS) 0.6 mg tablet; 0.6 mg every other day

## 2020-10-26 ENCOUNTER — OFFICE VISIT (OUTPATIENT)
Dept: PRIMARY CARE CLINIC | Facility: CLINIC | Age: 80
End: 2020-10-26
Payer: COMMERCIAL

## 2020-10-26 DIAGNOSIS — R06.02 SOB (SHORTNESS OF BREATH): ICD-10-CM

## 2020-10-26 DIAGNOSIS — I89.0 LYMPHEDEMA OF BOTH LOWER EXTREMITIES: ICD-10-CM

## 2020-10-26 PROCEDURE — 99443 PR PHYSICIAN TELEPHONE EVALUATION 21-30 MIN: CPT | Mod: 95,,, | Performed by: INTERNAL MEDICINE

## 2020-10-26 PROCEDURE — 99443 PR PHYSICIAN TELEPHONE EVALUATION 21-30 MIN: ICD-10-PCS | Mod: 95,,, | Performed by: INTERNAL MEDICINE

## 2020-10-26 NOTE — PROGRESS NOTES
Established Patient - [facility]     The patient location is: HOME  The chief complaint leading to consultation is: routine care  Visit type: Virtual visit with audio only (telephone)     The reason for the audio only service rather than synchronous audio and video virtual visit was related to technical difficulties or patient preference/necessity.     Each patient to whom I provide medical services by telemedicine is:  (1) informed of the relationship between the physician and patient and the respective role of any other health care provider with respect to management of the patient; and (2) notified that they may decline to receive medical services by telemedicine and may withdraw from such care at any time. Patient verbally consented to receive this service via voice-only telephone call.       Subjective:      Patient ID: Duran Giordano is a 80 y.o. female with HTN, T2DM (18-18-52 Insulin regimen), D-CHF, CAD COPD, Gout, venous stasis ulcer of LLE and BLE lymphedema, OA of hands.     Patient's risk score is 8. Patient is high risk for poor outcomes with COVID due to the following chronic conditions advanced age, CAD, CHF, COPD, DM, HTN     Chief Complaint: shortness of breath    Prior to this visit, patient's last encounter with PCP was 8/19/2020, where her T2DM (diet primarily, weight gain, A1C 6.3 1/16/20 --> 6.2 8/18/20), HTN, CHF, COPD were discussed. Saw [facility] for her gout and hand OA 10/19, allopurinol was increased 150 --> 200 BID, colchicine raised 0.6 mg every other day from 0.3 (with mind to her CKD).     Checks Bps in AM noted higher values 170/88 (last week) --> 135/50s  Weight gain of 10lbs noted from last visit and says her and her sisters have been trying to keep her away from unhealthy foods. Compliant with T2DM meds.    LAYNE is multifactoral, likely from her COPD/CHF and obesity hypoventilation syndrome. Is somewhat improved by her inhalers,  wheezing for last 4d (says gets 3wk episodes of this every year, 3rd year this time), says she did not go to [facility] yesterday, using robitussin/tylenol, lemon and honey. Compliant with her CPAP. Coricidin-HBP used.     Adherence packed for 28 days using Dispill: Has 2wk supply  Morning card:   Allopurinol 100 mg   Torsemide 20 mg      Evening card:   Atorvastatin 80 mg   Losartan 100 mg   Vitamin D3 1,000 IU     Objective:     Lab Results   Component Value Date    WBC 9.55 10/07/2020    HGB 12.2 10/07/2020    HCT 41.6 10/07/2020     10/07/2020    CHOL 178 01/16/2020    TRIG 58 01/16/2020    HDL 51 01/16/2020    ALT 13 10/07/2020    AST 17 10/07/2020     10/07/2020    K 4.3 10/07/2020     10/07/2020    CREATININE 1.1 10/07/2020    BUN 19 10/07/2020    CO2 28 10/07/2020    TSH 0.799 01/16/2020    INR 1.1 04/17/2005    HGBA1C 6.3 (H) 08/18/2020         Assessment:   80 y.o. female with multiple co-morbid illnesses here to continue work-up of chronic issues, HTN, T2DM (18-18-52 Insulin regimen), D-CHF, CAD COPD, Gout, venous stasis ulcer of LLE and BLE lymphedema, OA of hands.      Plan:     Problem List Items Addressed This Visit        Other    SOB (shortness of breath)     Multifactoral, numerous chronic lung issues, with acute SOB, diffl: COVID, CHF, asthma, obesity hypoventilation syndrome  · Needs in-person assessment for her chronic issues         Lymphedema of both lower extremities     LE wounds resolved, daily circaid application by Sister Ana Arevalo or Doreen Ribera. Last seen by lymphedema clinic in 2014  · Continue circaid application daily  · Refuses stocking application at night  · Needs to elevate legs more at night  · Advised to avoid appointments if her wound or feet are stable                 Health Maintenance       Date Due Completion Date    Hepatitis C Screening 1940 ---    TETANUS VACCINE 03/08/1960 ---    Shingles Vaccine (2 of 3) 02/10/2015 12/16/2014    Foot Exam  12/31/2020 12/31/2019 (Done)    Override on 12/31/2019: Done (Done by Dr. Anand)    Override on 11/27/2018: Done (Dr Anand)    Override on 1/19/2018: Done (Dr anand)    Override on 5/31/2017: Done    Override on 7/20/2016: Done    Lipid Panel 01/16/2021 1/16/2020    Eye Exam 02/07/2021 2/7/2020 (Done)    Override on 2/7/2020: Done    Override on 2/17/2016: Done    Override on 1/22/2015: Done    Override on 8/16/2012: Done    Hemoglobin A1c 02/18/2021 8/18/2020    Override on 7/13/2016: Done    Aspirin/Antiplatelet Therapy 10/07/2021 10/7/2020    Mammogram 10/07/2021 10/7/2020    DEXA SCAN 02/22/2022 2/22/2018    Override on 12/13/2011: Done          Follow up in about 1 week (around 11/2/2020). . Thirty minutes spent with this patient today,  Including HTN, T2DM, D-CHF, CAD COPD, Gout, venous stasis ulcer of LLE and BLE lymphedema, OA of hands.      Tami Schmidt MD/MPH  Internal Medicine  [facility]  Spectra [phone]    This service was not originating from a related E/M service provided within the previous 7 days nor will  to an E/M service or procedure within the next 24 hours or my soonest available appointment.  Prevailing standard of care was able to be met in this audio-only visit.

## 2020-10-27 ENCOUNTER — TELEPHONE (OUTPATIENT)
Dept: PRIMARY CARE CLINIC | Facility: CLINIC | Age: 80
End: 2020-10-27

## 2020-10-27 NOTE — ASSESSMENT & PLAN NOTE
Multifactoral, numerous chronic lung issues, with acute SOB, diffl: COVID, CHF, asthma, obesity hypoventilation syndrome  · Needs in-person assessment for her chronic issues

## 2020-10-27 NOTE — TELEPHONE ENCOUNTER
----- Message from Dayana Talley sent at 10/27/2020 10:06 AM CDT -----  Regarding: appt  Contact: Sister Pasquale @[phone]  Needs Advice    Reason for call:      Appt that was schedule for 10/26. Pt states that no one called her for her audio appt and would like a call back to speak with the provider.    Communication Preference: Sister Pasquale @[phone]    Additional Information:    Please call pt to advise.

## 2020-10-27 NOTE — ASSESSMENT & PLAN NOTE
LE wounds resolved, daily circaid application by Sister Ana Arevalo or Doreen Ribera. Last seen by lymphedema clinic in 2014  · Continue circaid application daily  · Refuses stocking application at night  · Needs to elevate legs more at night  · Advised to avoid appointments if her wound or feet are stable

## 2020-11-02 NOTE — TELEPHONE ENCOUNTER
Spoke with pt, she said she is doing fine. She has appt tomorrow.   Pt is using her CPAP, she is out of electricity. They have a generator.     Pt is looking for rollator walker.

## 2020-11-05 ENCOUNTER — OFFICE VISIT (OUTPATIENT)
Dept: PRIMARY CARE CLINIC | Facility: CLINIC | Age: 80
End: 2020-11-05
Payer: MEDICARE

## 2020-11-05 VITALS
HEART RATE: 95 BPM | OXYGEN SATURATION: 99 % | BODY MASS INDEX: 58.58 KG/M2 | DIASTOLIC BLOOD PRESSURE: 60 MMHG | SYSTOLIC BLOOD PRESSURE: 130 MMHG | HEIGHT: 59 IN | WEIGHT: 290.56 LBS

## 2020-11-05 DIAGNOSIS — R60.0 VENOUS STASIS ULCER OF LEFT LOWER LEG WITH EDEMA OF LEFT LOWER LEG: ICD-10-CM

## 2020-11-05 DIAGNOSIS — I83.892 VENOUS STASIS ULCER OF LEFT LOWER LEG WITH EDEMA OF LEFT LOWER LEG: ICD-10-CM

## 2020-11-05 DIAGNOSIS — L97.919 VENOUS ULCER OF RIGHT LEG: ICD-10-CM

## 2020-11-05 DIAGNOSIS — I89.0 LYMPHEDEMA OF BOTH LOWER EXTREMITIES: ICD-10-CM

## 2020-11-05 DIAGNOSIS — L97.929 VENOUS STASIS ULCER OF LEFT LOWER LEG WITH EDEMA OF LEFT LOWER LEG: ICD-10-CM

## 2020-11-05 DIAGNOSIS — I83.019 VENOUS ULCER OF RIGHT LEG: ICD-10-CM

## 2020-11-05 DIAGNOSIS — R29.898 WEAKNESS OF LEFT LOWER EXTREMITY: ICD-10-CM

## 2020-11-05 DIAGNOSIS — E11.42 TYPE 2 DIABETES MELLITUS WITH DIABETIC POLYNEUROPATHY, WITH LONG-TERM CURRENT USE OF INSULIN: Primary | ICD-10-CM

## 2020-11-05 DIAGNOSIS — Z79.4 TYPE 2 DIABETES MELLITUS WITH DIABETIC POLYNEUROPATHY, WITH LONG-TERM CURRENT USE OF INSULIN: Primary | ICD-10-CM

## 2020-11-05 DIAGNOSIS — I83.029 VENOUS STASIS ULCER OF LEFT LOWER LEG WITH EDEMA OF LEFT LOWER LEG: ICD-10-CM

## 2020-11-05 DIAGNOSIS — E11.59 HYPERTENSION ASSOCIATED WITH DIABETES: ICD-10-CM

## 2020-11-05 DIAGNOSIS — I15.2 HYPERTENSION ASSOCIATED WITH DIABETES: ICD-10-CM

## 2020-11-05 DIAGNOSIS — I50.32 CHRONIC DIASTOLIC HEART FAILURE: ICD-10-CM

## 2020-11-05 PROCEDURE — 99215 OFFICE O/P EST HI 40 MIN: CPT | Mod: S$GLB,,, | Performed by: INTERNAL MEDICINE

## 2020-11-05 PROCEDURE — 99215 PR OFFICE/OUTPT VISIT, EST, LEVL V, 40-54 MIN: ICD-10-PCS | Mod: S$GLB,,, | Performed by: INTERNAL MEDICINE

## 2020-11-05 RX ORDER — TORSEMIDE 20 MG/1
20 TABLET ORAL
Qty: 30 TABLET | Refills: 11 | Status: CANCELLED | OUTPATIENT
Start: 2020-11-05 | End: 2021-11-05

## 2020-11-05 RX ORDER — TORSEMIDE 20 MG/1
20 TABLET ORAL DAILY
Qty: 90 TABLET | Refills: 3 | Status: ON HOLD | OUTPATIENT
Start: 2020-11-05 | End: 2021-12-08 | Stop reason: HOSPADM

## 2020-11-05 NOTE — Clinical Note
Can you email me this patients eye glass prescription or tell me what she needs? Does she only need reading glasses? It;s not clear to me.    She wants to get new glasses today and we don't have access to the eye glass prescriptions in epic, and its not clear if only reading glasses are needed for her.    Thanks,  BARB

## 2020-11-05 NOTE — PATIENT INSTRUCTIONS
TODAY:  - Glenis ordered, and sent to [email or web address]  - optometry appointment   +  reading glasses at your preferred location  - sleep medicine appointment for mask adjustment   + go to the clinic for this, you don't need an appointment  - new cercaids   + go to [email or web address] to purchase these out of pocket  - refill pill packs today    Instructions for Sister Duran Giordano and Sister Bacilio:     1. Wash legs with gentle soap such as Dove or Cetaphil (see below)  2. Cover legs with Vaseline. Vaseline can be used in wounds but you can cover with gauze on top also.   3. Wrap legs with gauze rolls.   4. Wrap legs with ACE wrap.   5. Put on Tubular Net Bandages.   6. Put on compression stockings.   7. Change every 2 days (supplies can be washed and reused)

## 2020-11-05 NOTE — ASSESSMENT & PLAN NOTE
LE swelling bilaterally with poor wound healing, routine circaids with wound care but wounds worsening  · Continue circaids boot, per wound care  · Wraps in clinic today  · Treat cellulitis PRN, but is usually not the diagnosis  · Discontinued amlodipine previously  · Advised to avoid home creams and soap as they are drying the skin

## 2020-11-05 NOTE — PROGRESS NOTES
"Primary Care Provider Appointment - [facility]  SHARED NOTE: Haroon Llamas (MS4), Dr Schmidt (Attending)    Subjective:      Patient ID: Duran Giordano is a 80 y.o. female with HTN, T2DM, D-CHF, CAD, COPD, Gout, venous stasis ulcer of LLE and BLE lymphedema, and OA of hands.    Chief Complaint: Follow-up and Leg Swelling    Prior to this visit, patient's last encounter with PCP was 10/26/2020.    Patient is mainly here for slow healing wounds on bilateral legs. She said she gets these ulcers a lot but they usually heal in 3-4 days, but this time they have not healed in over a week. They are still weeping a bit and but may be forming a scab. They're not painful. She's been using all her topical ointments which help, but would also like a referral to wound care. She has been elevating her legs each night as she sleeps in her recliner. She also has patches of "rough" skin in her calves. She asked if there's anything she could do to get that off. She uses compression stockings sometimes and they help, but she couldn't use them since her wounds are weeping. She asked if there were any surgeries available to reduce the "extra fat" and lymphedema. She'd like a rollator walker.    She's been managing her diabetes well, taking all her medications as prescribed. Her fasting sugar this morning 110 and her sugars have been in the 110s-120s. Last HbA1c 6.3 on 8/18/20.  She's been trying to cut back on snacking but sometimes struggles during "supper" when they celebrate birthdays and such.    She's been checking bp twice a week at home. She said it's been very high in the 160s-180s.Today in clinic it was 130/60.    She saw her rheumatologist on 10/19 who doubled her allopurinol to 200mg. She says her symptoms are improving.    Her SOB has improved. She's using CPAP 3x a week and sleeping in a chair. She's been sleeping well. She experienced wheezing x 1 week 3 weeks ago during the change of seasons but it's since resolved with " some OTC medications.     She's been feeling down in the past few months. She thinks it's just the stress with everything going on in the world. She denied anhedonia, changes in appetite, energy levels, sleep, and concentration, and SI. She said she has good coping skills and support from other sisters. She has a positive attitude.    She'd like a optometry referral for an eye exam and new glasses.        She's doing well with her pill packs (did not take them this morning). She has 9 days left and would like some refills.     Adherence packed for 28 days using Dispill:      Morning card:   Allopurinol 100 mg   Torsemide 20 mg      Evening card:   Atorvastatin 80 mg   Losartan 100 mg   Vitamin D3 1,000 IU      Electronically signed by Vy CamaraD at 8/24/2020  4:13 PM        Past Surgical History:   Procedure Laterality Date    HYSTERECTOMY  1982    secondary uterine fibroids    JOINT REPLACEMENT      Right total knee replacement         Past Medical History:   Diagnosis Date    Adrenal mass- adenoma stable since 2004 (1.8 cm and 8/13/12 (2 cm); stable 2016 2.4 cm     Adrenal mass- adenoma stable since 2004 (1.8 cm and 8/13/12 (2 cm); stable 2016 2.4 cm    Arthritis     Asthma in adult without complication 6/25/2015    Bilateral carotid artery disease 7/21/2017    Bilateral sciatica 2/7/2017    Cellulitis of right leg 08/16/2017    Cerebral infarction 1/29/2016    Multiple areas of lacunar infarction. Stroke risk factors include HTN, DM2, Dyslipidemia.  Continue ASA 81mg/ Statin therapy I have encouraged 30 minutes of physical activity daily for 5 days a week. She has access to a pool so this should not be so jarring to her joints.     Cervical radiculopathy 3/18/2015    Chronic knee pain     Chronic rhinitis 4/9/2013    CKD (chronic kidney disease) stage 3, GFR 30-59 ml/min 1/29/2013    Coronary artery disease due to calcified coronary lesion 6/25/2015    Diastolic dysfunction  10/10/2013    Essential hypertension 6/25/2015    Gastroesophageal reflux disease without esophagitis 8/13/2012    Gout     Hives 10/1/2013    Mixed hyperlipidemia 8/13/2012    Morbid obesity with BMI of 50.0-59.9, adult 2/11/2014    Obstructive sleep apnea syndrome 8/13/2012    Senile cataracts of both eyes 1/22/2015    Traumatic open wound of right lower leg 8/25/2017    Trigeminal neuralgia of right side of face 5/23/2017    For years now Previously on Gabapentin, but caused constipation Dissipating over time Not related to intracranial abnormalities Possibly related to dental procedure    Type 2 diabetes mellitus with diabetic polyneuropathy, with long-term current use of insulin 1/29/2013    Venous stasis dermatitis of both lower extremities 8/21/2017    Vitamin D deficiency disease 8/13/2012       Social History     Socioeconomic History    Marital status: Single     Spouse name: Not on file    Number of children: Not on file    Years of education: Not on file    Highest education level: Not on file   Occupational History    Not on file   Social Needs    Financial resource strain: Not hard at all    Food insecurity     Worry: Never true     Inability: Never true    Transportation needs     Medical: No     Non-medical: No   Tobacco Use    Smoking status: Never Smoker    Smokeless tobacco: Never Used   Substance and Sexual Activity    Alcohol use: No    Drug use: No    Sexual activity: Never   Lifestyle    Physical activity     Days per week: Not on file     Minutes per session: Not on file    Stress: Not on file   Relationships    Social connections     Talks on phone: Not on file     Gets together: Not on file     Attends [employer] service: Not on file     Active member of club or organization: Not on file     Attends meetings of clubs or organizations: Not on file     Relationship status: Not on file   Other Topics Concern    Are you pregnant or think you may be? Not Asked     "Breast-feeding Not Asked   Social History Narrative    Single with no children.        Review of Systems   Constitutional: Negative for activity change, appetite change, chills, diaphoresis and fever.   HENT: Positive for congestion. Negative for postnasal drip, rhinorrhea, sinus pressure, sinus pain, sneezing and sore throat.    Eyes: Positive for itching. Negative for pain, redness and visual disturbance.   Respiratory: Positive for wheezing (resolved from 3 weeks ago). Negative for cough, choking, chest tightness and shortness of breath.    Cardiovascular: Positive for leg swelling. Negative for chest pain and palpitations.   Gastrointestinal: Negative for abdominal pain, blood in stool, constipation, diarrhea and nausea.   Genitourinary: Positive for frequency (due to diuretic). Negative for dysuria, hematuria and pelvic pain.   Musculoskeletal: Negative for arthralgias, back pain, joint swelling and myalgias.   Skin: Positive for wound (bilateral legs). Negative for color change and rash.   Neurological: Negative for dizziness, syncope, light-headedness and headaches.   Psychiatric/Behavioral: Negative for agitation, confusion, decreased concentration, dysphoric mood, sleep disturbance and suicidal ideas. The patient is nervous/anxious.        Objective:   /60   Pulse 95   Ht 4' 11" (1.499 m)   Wt 131.8 kg (290 lb 9.1 oz)   SpO2 99%   BMI 58.69 kg/m²     Physical Exam  Vitals signs reviewed.   Cardiovascular:      Rate and Rhythm: Normal rate and regular rhythm.      Heart sounds: Normal heart sounds, S1 normal and S2 normal.   Pulmonary:      Effort: Pulmonary effort is normal.      Breath sounds: Normal breath sounds and air entry.   Abdominal:      General: Bowel sounds are normal.      Palpations: Abdomen is soft.      Tenderness: There is no abdominal tenderness.   Skin:                 Neurological:      Mental Status: She is alert.             Lab Results   Component Value Date    WBC 9.55 " 10/07/2020    HGB 12.2 10/07/2020    HCT 41.6 10/07/2020     10/07/2020    CHOL 178 01/16/2020    TRIG 58 01/16/2020    HDL 51 01/16/2020    ALT 13 10/07/2020    AST 17 10/07/2020     10/07/2020    K 4.3 10/07/2020     10/07/2020    CREATININE 1.1 10/07/2020    BUN 19 10/07/2020    CO2 28 10/07/2020    TSH 0.799 01/16/2020    INR 1.1 04/17/2005    HGBA1C 6.3 (H) 08/18/2020       Current Outpatient Medications on File Prior to Visit   Medication Sig Dispense Refill    acetaminophen (TYLENOL) 500 MG tablet Take 2 tablets (1,000 mg total) by mouth 2 (two) times daily as needed for Pain. 360 tablet 3    albuterol (PROVENTIL/VENTOLIN HFA) 90 mcg/actuation inhaler Inhale 2 puffs into the lungs every 4 (four) hours as needed for Wheezing or Shortness of Breath. Rescue 54 g 3    albuterol-ipratropium (DUO-NEB) 2.5 mg-0.5 mg/3 mL nebulizer solution USE 1 VIAL PER NEBULIZER EVERY 6 HOURS AS NEEDED FOR WHEEZING/RESCUE 90 mL 0    allopurinoL (ZYLOPRIM) 100 MG tablet Take 2 tablets (200 mg total) by mouth once daily. 60 tablet 6    ammonium lactate 12 % Crea Apply lotion twice daily to affected area 385 g 11    aspirin (ECOTRIN) 81 MG EC tablet Take 1 tablet (81 mg total) by mouth once daily. 90 tablet 3    atorvastatin (LIPITOR) 80 MG tablet Take 1 tablet (80 mg total) by mouth once daily. 90 tablet 3    blood sugar diagnostic Strp BG monitoring 4 times a day. Pt needs test strips for Embrace Talking meter. (Patient taking differently: BG monitoring 2 times a day. Pt needs test strips for Embrace Talking meter.) 450 strip prn    cane tips Misc 1 application by Misc.(Non-Drug; Combo Route) route once daily. 4 each 0    cholecalciferol, vitamin D3, (VITAMIN D3) 1,000 unit capsule Take 1 capsule (1,000 Units total) by mouth once daily. 90 capsule 3    clotrimazole (LOTRIMIN) 1 % cream Apply topically 2 (two) times daily. 113 g 3    colchicine (COLCRYS) 0.6 mg tablet 0.6 mg every other day 15 tablet 5  "   diclofenac sodium (VOLTAREN) 1 % Gel APPLY 2 GRAMS TOPICALLY DAILY 100 g 0    econazole nitrate 1 % cream Apply topically once daily. Apply to feet daily as directed. 85 g 1    fluticasone (FLONASE) 50 mcg/actuation nasal spray 2 sprays (100 mcg total) by Each Nare route once daily. 1 Bottle 3    fluticasone-salmeterol diskus inhaler 500-50 mcg INHALE 1 PUFF TWICE DAILY 60 each 11    guaiFENesin (MUCINEX) 600 mg 12 hr tablet Take 2 tablets (1,200 mg total) by mouth 2 (two) times daily. 60 tablet 0    insulin degludec (TRESIBA FLEXTOUCH U-200) 200 unit/mL (3 mL) InPn INJECT 52 UNITS UNDER SKIN DAILY 9 mL 6    insulin lispro 100 unit/mL pen INJECT 18 UNITS UNDER SKIN WITH BREAKFAST AND DINNER AND 6 UNITS AT LUNCH PLUS SCALE 150-200+2, 201-250+4, 251-300+6, 301-350+8, >350 +10 >3 30 mL 6    lancets Misc Test daily (Patient taking differently: Test twice  daily) 100 each 12    loratadine (CLARITIN) 10 mg tablet Take 1 tablet (10 mg total) by mouth once daily. 30 tablet 3    losartan (COZAAR) 100 MG tablet Take 1 tablet (100 mg total) by mouth once daily. 90 tablet 3    miconazole nitrate (LOTRIMIN AF POWDER) 2 % AerP Apply 1 application topically 2 (two) times daily. 130 g 3    mupirocin (BACTROBAN) 2 % ointment       mupirocin calcium 2% (BACTROBAN) 2 % cream Apply topically 3 (three) times daily. 30 g 11    nebulizer and compressor (COMP-AIR ELITE COMP NEB SYSTEM) Berna use as directed 1 each 0    pen needle, diabetic (PEN NEEDLE) 29 gauge x 1/2" Ndle TEST 5 TIMES A DAY WITH  each 6    sod chlor-bicarb-squeez bottle (NEILMED SINUS RINSE COMPLETE) pkdv Use as directed 1 each 5    torsemide (DEMADEX) 20 MG Tab Take 1 tablet (20 mg total) by mouth after lunch. 30 tablet 11    trolamine salicylate (ASPERCREME) 10 % cream Apply topically as needed.      [DISCONTINUED] torsemide (DEMADEX) 20 MG Tab Take 1 tablet (20 mg total) by mouth once daily. 90 tablet 3    cetirizine (ZYRTEC) 10 MG tablet " Take 1 tablet (10 mg total) by mouth once daily. 30 tablet 2    [DISCONTINUED] insulin degludec (TRESIBA FLEXTOUCH U-200) 200 unit/mL (3 mL) InPn Inject 48 Units into the skin once daily. 9 mL 2     No current facility-administered medications on file prior to visit.        Assessment:   80 y.o. female with multiple co-morbid illnesses here to follow-up with PCP and continue work-up of chronic issues notably with HTN, T2DM, D-CHF, CAD, COPD, Gout, venous stasis ulcer of LLE and BLE lymphedema, and OA of hands..     Plan:     Problem List Items Addressed This Visit        Cardiac/Vascular    Hypertension associated with diabetes    Relevant Medications    torsemide (DEMADEX) 20 MG Tab       Endocrine    Type 2 diabetes mellitus with diabetic polyneuropathy, with long-term current use of insulin - Primary    Relevant Orders    Ambulatory referral/consult to Optometry       Orthopedic    Weakness of left lower extremity    Relevant Orders    WALKER FOR HOME USE       Other    Lymphedema of both lower extremities     LE wounds resolved, daily circaid application by Sister Ana Arevalo or Doreen Ribera. Last seen by lymphedema clinic in 2014  · Continue circaid application daily  · Needs new set  · Refuses stocking application at night  · Advised to do this with circaids  · Legs werapped today  · Needs to elevate legs more at night  · Instructions given for her to share with her caretaker           Venous stasis ulcer of left lower leg with edema of left lower leg     LE swelling bilaterally with poor wound healing, routine circaids with wound care but wounds worsening  · Continue circaids boot, per wound care  · Wraps in clinic today  · Treat cellulitis PRN, but is usually not the diagnosis  · Discontinued amlodipine previously  · Advised to avoid home creams and soap as they are drying the skin               Health Maintenance       Date Due Completion Date    Hepatitis C Screening 1940 ---    TETANUS VACCINE  03/08/1960 ---    Shingles Vaccine (2 of 3) 02/10/2015 12/16/2014    Foot Exam 12/31/2020 12/31/2019 (Done)    Override on 12/31/2019: Done (Done by Dr. Anand)    Override on 11/27/2018: Done (Dr Anand)    Override on 1/19/2018: Done (Dr anand)    Override on 5/31/2017: Done    Override on 7/20/2016: Done    Lipid Panel 01/16/2021 1/16/2020    Eye Exam 02/07/2021 2/7/2020 (Done)    Override on 2/7/2020: Done    Override on 2/17/2016: Done    Override on 1/22/2015: Done    Override on 8/16/2012: Done    Hemoglobin A1c 02/18/2021 8/18/2020    Override on 7/13/2016: Done    Aspirin/Antiplatelet Therapy 10/07/2021 10/7/2020    Mammogram 10/07/2021 10/7/2020    DEXA SCAN 02/22/2022 2/22/2018    Override on 12/13/2011: Done          Follow up in about 1 week (around 11/12/2020). Total face-to-face time was 60 min, >50% of this was spent on counseling and coordination of care. The following issues were discussed: with HTN, T2DM, D-CHF, CAD, COPD, Gout, venous stasis ulcer of LLE and BLE lymphedema, and OA of hands.       Tami Schmidt MD/MPH  [facility]  [phone] [facility]

## 2020-11-05 NOTE — ASSESSMENT & PLAN NOTE
LE wounds resolved, daily circaid application by Sister Ana Arevalo or Doreen Ribera. Last seen by lymphedema clinic in 2014  · Continue circaid application daily  · Needs new set  · Refuses stocking application at night  · Advised to do this with circaids  · Legs werapped today  · Needs to elevate legs more at night  · Instructions given for her to share with her caretaker

## 2020-11-09 ENCOUNTER — TELEPHONE (OUTPATIENT)
Dept: INTERNAL MEDICINE | Facility: CLINIC | Age: 80
End: 2020-11-09

## 2020-11-09 NOTE — TELEPHONE ENCOUNTER
Pt would like to know did you throw her allopurinol she is only taking 100MG that is in her pill pack she is suppose to take 200mg

## 2020-11-09 NOTE — TELEPHONE ENCOUNTER
Kimi- I did not change the allopurinol. It should still be 200mg. Let her know that we will figure it out.    Ryan- do the pill packs have 200mg allopurinol? The dose was increased, but script sent to [facility] pharmacy by Rheum. I will update the FYI tab now.    Thanks,  BARB

## 2020-11-10 ENCOUNTER — OFFICE VISIT (OUTPATIENT)
Dept: PODIATRY | Facility: CLINIC | Age: 80
End: 2020-11-10
Payer: MEDICARE

## 2020-11-10 VITALS
DIASTOLIC BLOOD PRESSURE: 86 MMHG | SYSTOLIC BLOOD PRESSURE: 183 MMHG | BODY MASS INDEX: 58.46 KG/M2 | RESPIRATION RATE: 18 BRPM | HEART RATE: 95 BPM | HEIGHT: 59 IN | WEIGHT: 290 LBS

## 2020-11-10 DIAGNOSIS — B35.1 ONYCHOMYCOSIS DUE TO DERMATOPHYTE: ICD-10-CM

## 2020-11-10 DIAGNOSIS — M79.672 FOOT PAIN, LEFT: ICD-10-CM

## 2020-11-10 DIAGNOSIS — E11.51 TYPE II DIABETES MELLITUS WITH PERIPHERAL CIRCULATORY DISORDER: Primary | ICD-10-CM

## 2020-11-10 PROCEDURE — 99499 UNLISTED E&M SERVICE: CPT | Mod: S$PBB,,, | Performed by: PODIATRIST

## 2020-11-10 PROCEDURE — 11721 DEBRIDE NAIL 6 OR MORE: CPT | Mod: Q9,S$PBB,, | Performed by: PODIATRIST

## 2020-11-10 PROCEDURE — 99215 OFFICE O/P EST HI 40 MIN: CPT | Mod: PBBFAC | Performed by: PODIATRIST

## 2020-11-10 PROCEDURE — 11721 PR DEBRIDEMENT OF NAILS, 6 OR MORE: ICD-10-PCS | Mod: Q9,S$PBB,, | Performed by: PODIATRIST

## 2020-11-10 PROCEDURE — 99999 PR PBB SHADOW E&M-EST. PATIENT-LVL V: ICD-10-PCS | Mod: PBBFAC,,, | Performed by: PODIATRIST

## 2020-11-10 PROCEDURE — 11721 DEBRIDE NAIL 6 OR MORE: CPT | Mod: Q9,PBBFAC | Performed by: PODIATRIST

## 2020-11-10 PROCEDURE — 99499 NO LOS: ICD-10-PCS | Mod: S$PBB,,, | Performed by: PODIATRIST

## 2020-11-10 PROCEDURE — 99999 PR PBB SHADOW E&M-EST. PATIENT-LVL V: CPT | Mod: PBBFAC,,, | Performed by: PODIATRIST

## 2020-11-11 ENCOUNTER — TELEPHONE (OUTPATIENT)
Dept: PRIMARY CARE CLINIC | Facility: CLINIC | Age: 80
End: 2020-11-11

## 2020-11-11 ENCOUNTER — TELEPHONE (OUTPATIENT)
Dept: OPTOMETRY | Facility: CLINIC | Age: 80
End: 2020-11-11

## 2020-11-11 ENCOUNTER — TELEPHONE (OUTPATIENT)
Dept: PHARMACY | Facility: CLINIC | Age: 80
End: 2020-11-11

## 2020-11-11 NOTE — TELEPHONE ENCOUNTER
Pt. Was not checked for glasses. She will f/u in 2/2021 for annual diabetic exam and glaucoma suspect. She is going to get a refraction outside of [facility]. We are unable to accommodate her time frame

## 2020-11-11 NOTE — TELEPHONE ENCOUNTER
Sister AKANKSHA WOODS  [address] [place] [address]      Morning card:   INCREASE to Allopurinol 200 mg   Torsemide 20 mg      Evening card:   Atorvastatin 80 mg   Losartan 100 mg   Vitamin D3 1,000 IU     Patient received pillpacks prior to INCREASE of Allopurinol to 200mg po QD. She asked that [facility] send 28 tablets of allopurinol 100mg to add to her morning packs.  [facility] to [facility] (address [facility]) today. TTN

## 2020-11-11 NOTE — TELEPHONE ENCOUNTER
Meghna Schmidt MD             She was not given a new rx for glasses. I spoke with pt. She would like to get a refraction soon. She has decided to try a retail office for a new rx. She will f/u in Feb. 2021 with Dr Orellana for her annual exam.   Meghna

## 2020-11-16 NOTE — PROGRESS NOTES
Subjective:      Patient ID: Duran Giordano is a 80 y.o. female.    Chief Complaint: PCP (LIBRA De Leon, CASEYP 10/07/20), Diabetic Foot Exam (HEEL PAIN ), and Nail Care    Duran is a 80 y.o. female who presents to the clinic for evaluation and treatment of high risk feet. Duran has a past medical history of Adrenal mass- adenoma stable since 2004 (1.8 cm and 8/13/12 (2 cm); stable 2016 2.4 cm, Arthritis, Asthma in adult without complication (6/25/2015), Bilateral carotid artery disease (7/21/2017), Bilateral sciatica (2/7/2017), Cellulitis of right leg (08/16/2017), Cerebral infarction (1/29/2016), Cervical radiculopathy (3/18/2015), Chronic knee pain, Chronic rhinitis (4/9/2013), CKD (chronic kidney disease) stage 3, GFR 30-59 ml/min (1/29/2013), Coronary artery disease due to calcified coronary lesion (6/25/2015), Diastolic dysfunction (10/10/2013), Essential hypertension (6/25/2015), Gastroesophageal reflux disease without esophagitis (8/13/2012), Gout, Hives (10/1/2013), Mixed hyperlipidemia (8/13/2012), Morbid obesity with BMI of 50.0-59.9, adult (2/11/2014), Obstructive sleep apnea syndrome (8/13/2012), Senile cataracts of both eyes (1/22/2015), Traumatic open wound of right lower leg (8/25/2017), Trigeminal neuralgia of right side of face (5/23/2017), Type 2 diabetes mellitus with diabetic polyneuropathy, with long-term current use of insulin (1/29/2013), Venous stasis dermatitis of both lower extremities (8/21/2017), and Vitamin D deficiency disease (8/13/2012). The patient's chief complaint is  HRFC and continued  L heel pain. Previous steroid injection helped slightly  This patient has documented high risk feet requiring routine maintenance secondary to diabetes mellitis and those secondary complications of diabetes, as mentioned.    PCP: Taim Schmidt MD    Date Last Seen by PCP:   Chief Complaint   Patient presents with    PCP     LIBRA De Leon, FNP 10/07/20    Diabetic  "Foot Exam     HEEL PAIN     Nail Care        Current shoe gear:  Affected Foot: Casual shoes     Unaffected Foot: Casual shoes    Hemoglobin A1C   Date Value Ref Range Status   08/18/2020 6.3 (H) 4.0 - 5.6 % Final     Comment:     ADA Screening Guidelines:  5.7-6.4%  Consistent with prediabetes  >or=6.5%  Consistent with diabetes  High levels of fetal hemoglobin interfere with the HbA1C  assay. Heterozygous hemoglobin variants (HbS, HgC, etc)do  not significantly interfere with this assay.   However, presence of multiple variants may affect accuracy.     01/16/2020 6.2 (H) 4.0 - 5.6 % Final     Comment:     ADA Screening Guidelines:  5.7-6.4%  Consistent with prediabetes  >or=6.5%  Consistent with diabetes  High levels of fetal hemoglobin interfere with the HbA1C  assay. Heterozygous hemoglobin variants (HbS, HgC, etc)do  not significantly interfere with this assay.   However, presence of multiple variants may affect accuracy.     10/16/2019 6.7 (H) 4.0 - 5.6 % Final     Comment:     ADA Screening Guidelines:  5.7-6.4%  Consistent with prediabetes  >or=6.5%  Consistent with diabetes  High levels of fetal hemoglobin interfere with the HbA1C  assay. Heterozygous hemoglobin variants (HbS, HgC, etc)do  not significantly interfere with this assay.   However, presence of multiple variants may affect accuracy.         Review of Systems   Cardiovascular: Positive for leg swelling.   Skin: Positive for dry skin and nail changes. Negative for flushing, itching, rash and skin cancer.   Musculoskeletal: Positive for arthritis and gout. Negative for back pain.   Neurological: Positive for numbness. Negative for paresthesias.           Objective:       Vitals:    11/10/20 1112   BP: (!) 183/86   Pulse: 95   Resp: 18   Weight: 131.5 kg (290 lb)   Height: 4' 11" (1.499 m)   PainSc:   7   PainLoc: Foot        Physical Exam  Vitals signs and nursing note reviewed.   Constitutional:       Appearance: She is well-developed. "   Cardiovascular:      Pulses:           Dorsalis pedis pulses are 2+ on the right side and 2+ on the left side.        Posterior tibial pulses are 2+ on the right side and 2+ on the left side.      Comments: Dorsalis pedis and posterior tibial pulses are palpable bilaterally. Toes are cool to touch. Feet are warm proximally.There is decreased digital hair bilateral. Skin is atrophic, hyperpigmented, and moderately edematous.    Musculoskeletal:         General: No tenderness.      Right ankle: Normal.      Left ankle: Normal.      Right foot: No swelling, crepitus or deformity.      Left foot: No swelling, crepitus or deformity.      Comments: Adequate joint range of motion without pain, limitation, nor crepitation Bilateral feet and ankle joints. Muscle strength is 5/5 in all groups bilaterally.      TTP w/ redness and plantar L foot no signs of break in skin no ulceration. Pain to plantar medical tubercle of calcaneus . No flucutance   Lymphadenopathy:      Comments: B/l lymph edema    Skin:     General: Skin is warm and dry.      Coloration: Skin is not pale.      Findings: No abrasion, bruising, burn, erythema, lesion or rash.      Nails: There is no clubbing.        Comments: Nails x10 are elongated by  4-5mm's, thickened by 2-3 mm's, dystrophic, and are darkened in  coloration . Xerosis Bilaterally. No open lesions noted.     Neurological:      Mental Status: She is alert and oriented to person, place, and time.      Comments: Decreased sharp/dull sensation bilateral feet.   Psychiatric:         Behavior: Behavior normal.               Assessment:       Encounter Diagnoses   Name Primary?    Type II diabetes mellitus with peripheral circulatory disorder Yes    Onychomycosis due to dermatophyte     Foot pain, left          Plan:       Duran was seen today for pcp, diabetic foot exam and nail care.    Diagnoses and all orders for this visit:    Type II diabetes mellitus with peripheral circulatory  disorder    Onychomycosis due to dermatophyte    Foot pain, left    - Patient was given written and verbal instructions regarding foot condition.  I counseled the patient on her conditions, their implications and medical management.    Shoe inspection. Diabetic Foot Education. Patient reminded of the importance of good nutrition and blood sugar control to help prevent podiatric complications of diabetes. Patient instructed on proper foot hygeine. We discussed wearing proper shoe gear, daily foot inspections, never walking without protective shoe gear, never putting sharp instruments to feet    - With patient's permission, nails were aggressively reduced and debrided x 10 to their soft tissue attachment mechanically and with electric , removing all offending nail and debris. Patient relates relief following the procedure. She will continue to monitor the areas daily, inspect her feet, wear protective shoe gear when ambulatory, moisturizer to maintain skin integrity and follow in this office in approximately 2-3 months, sooner p.r.n.  Continued pain to heel. Xray -. Pain not c/ infection . + fat pad atrophy + obesity ans lymphedema .   Patient dispensed foam heel lifts and instructed on usage

## 2020-12-02 DIAGNOSIS — I15.2 HYPERTENSION ASSOCIATED WITH DIABETES: ICD-10-CM

## 2020-12-02 DIAGNOSIS — E11.59 HYPERTENSION ASSOCIATED WITH DIABETES: ICD-10-CM

## 2020-12-02 DIAGNOSIS — E55.9 VITAMIN D DEFICIENCY DISEASE: ICD-10-CM

## 2020-12-02 RX ORDER — LOSARTAN POTASSIUM 100 MG/1
100 TABLET ORAL DAILY
Qty: 90 TABLET | Refills: 3 | Status: ON HOLD | OUTPATIENT
Start: 2020-12-02 | End: 2021-12-08 | Stop reason: HOSPADM

## 2020-12-02 RX ORDER — ALLOPURINOL 100 MG/1
100 TABLET ORAL DAILY
Qty: 30 TABLET | Refills: 6 | Status: SHIPPED | OUTPATIENT
Start: 2020-12-02 | End: 2021-02-23

## 2020-12-02 RX ORDER — VIT C/E/ZN/COPPR/LUTEIN/ZEAXAN 250MG-90MG
1000 CAPSULE ORAL DAILY
Qty: 90 CAPSULE | Refills: 3 | Status: SHIPPED | OUTPATIENT
Start: 2020-12-02

## 2020-12-07 NOTE — PROGRESS NOTES
Adherence packed for 28 days using Dispill:      Morning card:   Allopurinol 100 mg (2 tablets)  Torsemide 20 mg      Evening card:   Atorvastatin 80 mg   Losartan 100 mg   Vitamin D3 1,000 IU

## 2020-12-11 ENCOUNTER — PATIENT MESSAGE (OUTPATIENT)
Dept: OTHER | Facility: OTHER | Age: 80
End: 2020-12-11

## 2020-12-11 ENCOUNTER — PATIENT OUTREACH (OUTPATIENT)
Dept: ADMINISTRATIVE | Facility: OTHER | Age: 80
End: 2020-12-11

## 2020-12-12 NOTE — PROGRESS NOTES
Requested updates within Care Everywhere.  Patient's chart was reviewed for overdue ANUJA topics.  Immunizations reconciled.

## 2020-12-15 ENCOUNTER — LAB VISIT (OUTPATIENT)
Dept: LAB | Facility: HOSPITAL | Age: 80
End: 2020-12-15
Attending: INTERNAL MEDICINE
Payer: MEDICARE

## 2020-12-15 ENCOUNTER — LAB VISIT (OUTPATIENT)
Dept: INTERNAL MEDICINE | Facility: CLINIC | Age: 80
End: 2020-12-15
Payer: MEDICARE

## 2020-12-15 ENCOUNTER — OFFICE VISIT (OUTPATIENT)
Dept: PRIMARY CARE CLINIC | Facility: CLINIC | Age: 80
End: 2020-12-15
Payer: MEDICARE

## 2020-12-15 ENCOUNTER — PATIENT MESSAGE (OUTPATIENT)
Dept: OTHER | Facility: OTHER | Age: 80
End: 2020-12-15

## 2020-12-15 VITALS
RESPIRATION RATE: 20 BRPM | OXYGEN SATURATION: 99 % | DIASTOLIC BLOOD PRESSURE: 72 MMHG | SYSTOLIC BLOOD PRESSURE: 122 MMHG | WEIGHT: 293 LBS | HEIGHT: 59 IN | HEART RATE: 72 BPM | TEMPERATURE: 97 F | BODY MASS INDEX: 59.07 KG/M2

## 2020-12-15 DIAGNOSIS — Z79.4 TYPE 2 DIABETES MELLITUS WITH DIABETIC POLYNEUROPATHY, WITH LONG-TERM CURRENT USE OF INSULIN: ICD-10-CM

## 2020-12-15 DIAGNOSIS — I87.2 VENOUS STASIS ULCER OF RIGHT CALF WITH FAT LAYER EXPOSED WITHOUT VARICOSE VEINS: Primary | ICD-10-CM

## 2020-12-15 DIAGNOSIS — I15.2 HYPERTENSION ASSOCIATED WITH DIABETES: ICD-10-CM

## 2020-12-15 DIAGNOSIS — R06.02 SOB (SHORTNESS OF BREATH): ICD-10-CM

## 2020-12-15 DIAGNOSIS — M1A.09X0 IDIOPATHIC CHRONIC GOUT OF MULTIPLE SITES WITHOUT TOPHUS: ICD-10-CM

## 2020-12-15 DIAGNOSIS — E11.42 TYPE 2 DIABETES MELLITUS WITH DIABETIC POLYNEUROPATHY, WITH LONG-TERM CURRENT USE OF INSULIN: ICD-10-CM

## 2020-12-15 DIAGNOSIS — E66.2 OBESITY HYPOVENTILATION SYNDROME: ICD-10-CM

## 2020-12-15 DIAGNOSIS — L97.212 VENOUS STASIS ULCER OF RIGHT CALF WITH FAT LAYER EXPOSED WITHOUT VARICOSE VEINS: Primary | ICD-10-CM

## 2020-12-15 DIAGNOSIS — E11.649 HYPOGLYCEMIA ASSOCIATED WITH TYPE 2 DIABETES MELLITUS: ICD-10-CM

## 2020-12-15 DIAGNOSIS — E11.59 HYPERTENSION ASSOCIATED WITH DIABETES: ICD-10-CM

## 2020-12-15 PROBLEM — M06.9 RHEUMATOID ARTHRITIS: Status: ACTIVE | Noted: 2020-12-15

## 2020-12-15 LAB
ALBUMIN SERPL BCP-MCNC: 3 G/DL (ref 3.5–5.2)
ALP SERPL-CCNC: 87 U/L (ref 55–135)
ALT SERPL W/O P-5'-P-CCNC: 14 U/L (ref 10–44)
ANION GAP SERPL CALC-SCNC: 9 MMOL/L (ref 8–16)
AST SERPL-CCNC: 19 U/L (ref 10–40)
BASOPHILS # BLD AUTO: 0.03 K/UL (ref 0–0.2)
BASOPHILS NFR BLD: 0.4 % (ref 0–1.9)
BILIRUB SERPL-MCNC: 0.3 MG/DL (ref 0.1–1)
BUN SERPL-MCNC: 23 MG/DL (ref 8–23)
CALCIUM SERPL-MCNC: 8.9 MG/DL (ref 8.7–10.5)
CHLORIDE SERPL-SCNC: 105 MMOL/L (ref 95–110)
CHOLEST SERPL-MCNC: 150 MG/DL (ref 120–199)
CHOLEST/HDLC SERPL: 3.7 {RATIO} (ref 2–5)
CO2 SERPL-SCNC: 25 MMOL/L (ref 23–29)
CREAT SERPL-MCNC: 1.1 MG/DL (ref 0.5–1.4)
DIFFERENTIAL METHOD: ABNORMAL
EOSINOPHIL # BLD AUTO: 0.3 K/UL (ref 0–0.5)
EOSINOPHIL NFR BLD: 3.1 % (ref 0–8)
ERYTHROCYTE [DISTWIDTH] IN BLOOD BY AUTOMATED COUNT: 15.7 % (ref 11.5–14.5)
EST. GFR  (AFRICAN AMERICAN): 55.6 ML/MIN/1.73 M^2
EST. GFR  (NON AFRICAN AMERICAN): 48.2 ML/MIN/1.73 M^2
ESTIMATED AVG GLUCOSE: 137 MG/DL (ref 68–131)
GLUCOSE SERPL-MCNC: 101 MG/DL (ref 70–110)
GLUCOSE SERPL-MCNC: 36 MG/DL (ref 70–110)
GLUCOSE SERPL-MCNC: 53 MG/DL (ref 70–110)
GLUCOSE SERPL-MCNC: 81 MG/DL (ref 70–110)
HBA1C MFR BLD HPLC: 6.4 % (ref 4–5.6)
HCT VFR BLD AUTO: 39.3 % (ref 37–48.5)
HDLC SERPL-MCNC: 41 MG/DL (ref 40–75)
HDLC SERPL: 27.3 % (ref 20–50)
HGB BLD-MCNC: 11.5 G/DL (ref 12–16)
IMM GRANULOCYTES # BLD AUTO: 0.02 K/UL (ref 0–0.04)
IMM GRANULOCYTES NFR BLD AUTO: 0.2 % (ref 0–0.5)
LDLC SERPL CALC-MCNC: 92.4 MG/DL (ref 63–159)
LYMPHOCYTES # BLD AUTO: 1.4 K/UL (ref 1–4.8)
LYMPHOCYTES NFR BLD: 17.1 % (ref 18–48)
MCH RBC QN AUTO: 27.6 PG (ref 27–31)
MCHC RBC AUTO-ENTMCNC: 29.3 G/DL (ref 32–36)
MCV RBC AUTO: 94 FL (ref 82–98)
MONOCYTES # BLD AUTO: 0.6 K/UL (ref 0.3–1)
MONOCYTES NFR BLD: 7.7 % (ref 4–15)
NEUTROPHILS # BLD AUTO: 5.8 K/UL (ref 1.8–7.7)
NEUTROPHILS NFR BLD: 71.5 % (ref 38–73)
NONHDLC SERPL-MCNC: 109 MG/DL
NRBC BLD-RTO: 0 /100 WBC
PLATELET # BLD AUTO: 147 K/UL (ref 150–350)
PMV BLD AUTO: 13.4 FL (ref 9.2–12.9)
POTASSIUM SERPL-SCNC: 4.7 MMOL/L (ref 3.5–5.1)
PROT SERPL-MCNC: 8.5 G/DL (ref 6–8.4)
RBC # BLD AUTO: 4.17 M/UL (ref 4–5.4)
SODIUM SERPL-SCNC: 139 MMOL/L (ref 136–145)
TRIGL SERPL-MCNC: 83 MG/DL (ref 30–150)
URATE SERPL-MCNC: 7.7 MG/DL (ref 2.4–5.7)
WBC # BLD AUTO: 8.15 K/UL (ref 3.9–12.7)

## 2020-12-15 PROCEDURE — 82962 GLUCOSE BLOOD TEST: CPT | Mod: ,,, | Performed by: INTERNAL MEDICINE

## 2020-12-15 PROCEDURE — 99215 PR OFFICE/OUTPT VISIT, EST, LEVL V, 40-54 MIN: ICD-10-PCS | Mod: S$GLB,,, | Performed by: INTERNAL MEDICINE

## 2020-12-15 PROCEDURE — U0003 INFECTIOUS AGENT DETECTION BY NUCLEIC ACID (DNA OR RNA); SEVERE ACUTE RESPIRATORY SYNDROME CORONAVIRUS 2 (SARS-COV-2) (CORONAVIRUS DISEASE [COVID-19]), AMPLIFIED PROBE TECHNIQUE, MAKING USE OF HIGH THROUGHPUT TECHNOLOGIES AS DESCRIBED BY CMS-2020-01-R: HCPCS

## 2020-12-15 PROCEDURE — 80053 COMPREHEN METABOLIC PANEL: CPT

## 2020-12-15 PROCEDURE — 82962 POCT GLUCOSE, HAND-HELD DEVICE: ICD-10-PCS | Mod: ,,, | Performed by: INTERNAL MEDICINE

## 2020-12-15 PROCEDURE — 82043 UR ALBUMIN QUANTITATIVE: CPT

## 2020-12-15 PROCEDURE — 83036 HEMOGLOBIN GLYCOSYLATED A1C: CPT

## 2020-12-15 PROCEDURE — 84550 ASSAY OF BLOOD/URIC ACID: CPT

## 2020-12-15 PROCEDURE — 99215 OFFICE O/P EST HI 40 MIN: CPT | Mod: S$GLB,,, | Performed by: INTERNAL MEDICINE

## 2020-12-15 PROCEDURE — 80061 LIPID PANEL: CPT

## 2020-12-15 PROCEDURE — 36415 COLL VENOUS BLD VENIPUNCTURE: CPT

## 2020-12-15 PROCEDURE — 85025 COMPLETE CBC W/AUTO DIFF WBC: CPT

## 2020-12-15 RX ORDER — INSULIN LISPRO 100 [IU]/ML
INJECTION, SOLUTION INTRAVENOUS; SUBCUTANEOUS
Qty: 30 ML | Refills: 6
Start: 2020-12-15 | End: 2021-01-19

## 2020-12-15 NOTE — ASSESSMENT & PLAN NOTE
Injecting 15-0-15-52. Low reading today   · Monitor A1c  · Decrease short acting insulin due to life threatening lows today  · HOLD 52U longacting until F/U tomorrow

## 2020-12-15 NOTE — ASSESSMENT & PLAN NOTE
Patient with extremely low glucose readings today.   · Nurse Froilan [facility]  · Every 2 hour glucose checks in the home  · Given urgent glucose tablets

## 2020-12-15 NOTE — PATIENT INSTRUCTIONS
TODAY:  - labs  - home health for leg wound care and PT/OT  - use your CPAP every night  - try to eat smaller portions  - follow-up Friday for leg wraps  -meet with DM nurse educator  -stop orange juice  -keep checking sugar  -SOB - albuterol resolves symptoms. If needing more than once a week, tell us.  - Need to avoid Hypoglycemic episodes. Decrease short acting insulin from 18 units to 10 units (INJECT 10 UNITS UNDER SKIN WITH BREAKFAST AND DINNER plus scale 150-200+2, 201-250+4, 251-300+6, 301-350+8, >350 +10 >3)  - Bring a BG log to the next appointment

## 2020-12-15 NOTE — PROGRESS NOTES
Primary Care Provider Appointment - [facility]  SHARED NOTE: *** (***), Dr Schmidt (Attending)    Subjective:      Patient ID: Duran Giordano is a 80 y.o. female with ***      Chief Complaint: Follow-up    Prior to this visit, patient's last encounter with PCP was 11/5/2020.      Past Surgical History:   Procedure Laterality Date    HYSTERECTOMY  1982    secondary uterine fibroids    JOINT REPLACEMENT      Right total knee replacement         Past Medical History:   Diagnosis Date    Adrenal mass- adenoma stable since 2004 (1.8 cm and 8/13/12 (2 cm); stable 2016 2.4 cm     Adrenal mass- adenoma stable since 2004 (1.8 cm and 8/13/12 (2 cm); stable 2016 2.4 cm    Arthritis     Asthma in adult without complication 6/25/2015    Bilateral carotid artery disease 7/21/2017    Bilateral sciatica 2/7/2017    Cellulitis of right leg 08/16/2017    Cerebral infarction 1/29/2016    Multiple areas of lacunar infarction. Stroke risk factors include HTN, DM2, Dyslipidemia.  Continue ASA 81mg/ Statin therapy I have encouraged 30 minutes of physical activity daily for 5 days a week. She has access to a pool so this should not be so jarring to her joints.     Cervical radiculopathy 3/18/2015    Chronic knee pain     Chronic rhinitis 4/9/2013    CKD (chronic kidney disease) stage 3, GFR 30-59 ml/min 1/29/2013    Coronary artery disease due to calcified coronary lesion 6/25/2015    Diastolic dysfunction 10/10/2013    Essential hypertension 6/25/2015    Gastroesophageal reflux disease without esophagitis 8/13/2012    Gout     Hives 10/1/2013    Mixed hyperlipidemia 8/13/2012    Morbid obesity with BMI of 50.0-59.9, adult 2/11/2014    Obstructive sleep apnea syndrome 8/13/2012    Senile cataracts of both eyes 1/22/2015    Traumatic open wound of right lower leg 8/25/2017    Trigeminal neuralgia of right side of face 5/23/2017    For years now Previously on Gabapentin, but caused constipation Dissipating  over time Not related to intracranial abnormalities Possibly related to dental procedure    Type 2 diabetes mellitus with diabetic polyneuropathy, with long-term current use of insulin 1/29/2013    Venous stasis dermatitis of both lower extremities 8/21/2017    Vitamin D deficiency disease 8/13/2012       Social History     Socioeconomic History    Marital status: Single     Spouse name: Not on file    Number of children: Not on file    Years of education: Not on file    Highest education level: Not on file   Occupational History    Not on file   Social Needs    Financial resource strain: Not hard at all    Food insecurity     Worry: Never true     Inability: Never true    Transportation needs     Medical: No     Non-medical: No   Tobacco Use    Smoking status: Never Smoker    Smokeless tobacco: Never Used   Substance and Sexual Activity    Alcohol use: No    Drug use: No    Sexual activity: Never   Lifestyle    Physical activity     Days per week: Not on file     Minutes per session: Not on file    Stress: Not on file   Relationships    Social connections     Talks on phone: Not on file     Gets together: Not on file     Attends [employer] service: Not on file     Active member of club or organization: Not on file     Attends meetings of clubs or organizations: Not on file     Relationship status: Not on file   Other Topics Concern    Are you pregnant or think you may be? Not Asked    Breast-feeding Not Asked   Social History Narrative    Single with no children.        Review of Systems    Objective:   There were no vitals taken for this visit.    Physical Exam        Lab Results   Component Value Date    WBC 9.55 10/07/2020    HGB 12.2 10/07/2020    HCT 41.6 10/07/2020     10/07/2020    CHOL 178 01/16/2020    TRIG 58 01/16/2020    HDL 51 01/16/2020    ALT 13 10/07/2020    AST 17 10/07/2020     10/07/2020    K 4.3 10/07/2020     10/07/2020    CREATININE 1.1 10/07/2020    BUN  19 10/07/2020    CO2 28 10/07/2020    TSH 0.799 01/16/2020    INR 1.1 04/17/2005    HGBA1C 6.3 (H) 08/18/2020       Current Outpatient Medications on File Prior to Visit   Medication Sig Dispense Refill    acetaminophen (TYLENOL) 500 MG tablet Take 2 tablets (1,000 mg total) by mouth 2 (two) times daily as needed for Pain. 360 tablet 3    albuterol (PROVENTIL/VENTOLIN HFA) 90 mcg/actuation inhaler Inhale 2 puffs into the lungs every 4 (four) hours as needed for Wheezing or Shortness of Breath. Rescue 54 g 3    albuterol-ipratropium (DUO-NEB) 2.5 mg-0.5 mg/3 mL nebulizer solution USE 1 VIAL PER NEBULIZER EVERY 6 HOURS AS NEEDED FOR WHEEZING/RESCUE 90 mL 0    allopurinoL (ZYLOPRIM) 100 MG tablet Take 1 tablet (100 mg total) by mouth once daily. 30 tablet 6    ammonium lactate 12 % Crea Apply lotion twice daily to affected area 385 g 11    aspirin (ECOTRIN) 81 MG EC tablet Take 1 tablet (81 mg total) by mouth once daily. 90 tablet 3    atorvastatin (LIPITOR) 80 MG tablet Take 1 tablet (80 mg total) by mouth once daily. 90 tablet 3    blood sugar diagnostic Strp BG monitoring 4 times a day. Pt needs test strips for Em[employer] Talking meter. (Patient taking differently: BG monitoring 2 times a day. Pt needs test strips for Em[employer] Talking meter.) 450 strip prn    cane tips Misc 1 application by Misc.(Non-Drug; Combo Route) route once daily. 4 each 0    cholecalciferol, vitamin D3, (VITAMIN D3) 25 mcg (1,000 unit) capsule Take 1 capsule (1,000 Units total) by mouth once daily. 90 capsule 3    clotrimazole (LOTRIMIN) 1 % cream Apply topically 2 (two) times daily. 113 g 3    colchicine (COLCRYS) 0.6 mg tablet 0.6 mg every other day 15 tablet 5    diclofenac sodium (VOLTAREN) 1 % Gel APPLY 2 GRAMS TOPICALLY DAILY 100 g 0    econazole nitrate 1 % cream Apply topically once daily. Apply to feet daily as directed. 85 g 1    fluticasone (FLONASE) 50 mcg/actuation nasal spray 2 sprays (100 mcg total) by Each Nare  "route once daily. 1 Bottle 3    fluticasone-salmeterol diskus inhaler 500-50 mcg INHALE 1 PUFF TWICE DAILY 60 each 11    guaiFENesin (MUCINEX) 600 mg 12 hr tablet Take 2 tablets (1,200 mg total) by mouth 2 (two) times daily. 60 tablet 0    insulin degludec (TRESIBA FLEXTOUCH U-200) 200 unit/mL (3 mL) InPn INJECT 52 UNITS UNDER SKIN DAILY 9 mL 6    insulin lispro 100 unit/mL pen INJECT 18 UNITS UNDER SKIN WITH BREAKFAST AND DINNER AND 6 UNITS AT LUNCH PLUS SCALE 150-200+2, 201-250+4, 251-300+6, 301-350+8, >350 +10 >3 30 mL 6    lancets Misc Test daily (Patient taking differently: Test twice  daily) 100 each 12    loratadine (CLARITIN) 10 mg tablet Take 1 tablet (10 mg total) by mouth once daily. 30 tablet 3    losartan (COZAAR) 100 MG tablet Take 1 tablet (100 mg total) by mouth once daily. 90 tablet 3    miconazole nitrate (LOTRIMIN AF POWDER) 2 % AerP Apply 1 application topically 2 (two) times daily. 130 g 3    mupirocin (BACTROBAN) 2 % ointment       mupirocin calcium 2% (BACTROBAN) 2 % cream Apply topically 3 (three) times daily. 30 g 11    nebulizer and compressor (COMP-AIR ELITE COMP NEB SYSTEM) Berna use as directed 1 each 0    pen needle, diabetic (PEN NEEDLE) 29 gauge x 1/2" Ndle TEST 5 TIMES A DAY WITH  each 6    sod chlor-bicarb-squeez bottle (NEILMED SINUS RINSE COMPLETE) pkdv Use as directed 1 each 5    torsemide (DEMADEX) 20 MG Tab Take 1 tablet (20 mg total) by mouth after lunch. 30 tablet 11    torsemide (DEMADEX) 20 MG Tab Take 1 tablet (20 mg total) by mouth once daily. 90 tablet 3    trolamine salicylate (ASPERCREME) 10 % cream Apply topically as needed.      cetirizine (ZYRTEC) 10 MG tablet Take 1 tablet (10 mg total) by mouth once daily. 30 tablet 2    [DISCONTINUED] insulin degludec (TRESIBA FLEXTOUCH U-200) 200 unit/mL (3 mL) InPn Inject 48 Units into the skin once daily. 9 mL 2     No current facility-administered medications on file prior to visit.  "         Assessment:   80 y.o. female with multiple co-morbid illnesses here to follow-up with PCP and continue work-up of chronic issues notably ***.     Plan:     Problem List Items Addressed This Visit     None          Health Maintenance       Date Due Completion Date    Hepatitis C Screening 1940 ---    TETANUS VACCINE 03/08/1960 ---    Shingles Vaccine (2 of 3) 02/10/2015 12/16/2014    Lipid Panel 01/16/2021 1/16/2020    Eye Exam 02/07/2021 2/7/2020 (Done)    Override on 2/7/2020: Done    Override on 2/17/2016: Done    Override on 1/22/2015: Done    Override on 8/16/2012: Done    Hemoglobin A1c 02/18/2021 8/18/2020    Override on 7/13/2016: Done    Mammogram 10/07/2021 10/7/2020    Foot Exam 11/10/2021 11/10/2020    Override on 12/31/2019: Done (Done by Dr. Anand)    Override on 11/27/2018: Done (Dr Anand)    Override on 1/19/2018: Done (Dr anand)    Override on 5/31/2017: Done    Override on 7/20/2016: Done    Aspirin/Antiplatelet Therapy 11/16/2021 11/16/2020    DEXA SCAN 02/22/2022 2/22/2018    Override on 12/13/2011: Done              No follow-ups on file. Total face-to-face time was 60 min, >50% of this was spent on counseling and coordination of care. The following issues were discussed: ***    *** (student or resident)    Tami Schmidt MD/MPH  [facility]  [phone] [facility]

## 2020-12-15 NOTE — ASSESSMENT & PLAN NOTE
REJI, frequently noncompliant with CPAP, severe morbid obesity (BMI 59.5)  · Strongly advised to use CPAP  · Advised to cut back on food

## 2020-12-15 NOTE — PROGRESS NOTES
"Primary Care Provider Appointment - [facility]  SHARED NOTE: Irene Marks (NP), Dr Schmidt (Attending)    Subjective:      Patient ID: Duran Giordano is a 80 y.o. female with severe venous stasis, CHF, DM, HTN, HLD, severe obesity    Chief Complaint: Follow-up    Prior to this visit, patient's last encounter with PCP was 11/5/2020.    SOB- Today she states "I'm feeling kind of yucky." She complains of shortness of breath. She is unaware of exposure to COVID.  She's using CPAP 3x a week and sleeping in a chair. She has noticed LAYNE over the past 6 months. She has albuterol (inhaler and nebulizer). Typically uses about once a week. When she uses the albuterol, she feels that the SOB is resolved. No nighttime SOB. She's been sleeping well. She is not aware of being exposed to COVID.    Leg wounds - She is using vaseline on her legs. No pain. Has not seen wound care. They're not painful. She has been elevating her legs each night as she sleeps in her recliner. She asked if there's anything she could do to get that off. Has not been using compression stockings or Circaids. Has new wounds today.    Diabetes - she checks her sugar every morning. She reports her sugars have  been fasting 105, 115, 127, 130 in the morning. For the past week, she has been drinking a lot of orange juice. At least 2 cups per day. She was not having any juice prior to this past week. But for the past 3 days, her sugar has been more elevated. Lowest number she has seen is 200. She reports 2 days ago, and last night her sugar was 402. This is very high for her. She injected 24U short-acting insulin last night.    Her sugar this morning was 266. This morning she has had a boiled egg, a piece of raisin bread, half a glass of milk, half a glass of apple juice. She took Tresiba 52u and Lispro 20u (at 730 this morning).     Last HbA1c 6.3 on 8/18/20. She has 20# weight gain since Feb.      POCT glucose was 34 today. This only recovered to 74 " then 54, with a sandwich, popcorn, nuts and cheese. Recovered to 101 with multiple juices. Patient continues to feel woozy, but refuses to go to ED. She agrees to stay home and not attend a social event with the sisters of the holy family tonight.    Rollating walking - got her walker. She is very happy with it. It is wide and she loves it.     HTN - she has not been checking her pressure at home.     Gout - Allopurinol to 200mg. She says her symptoms are resolved. When symptoms do flare, it is her left heel.     Mood - Same as last visit. Stress from current events but denies anhedonia, changes in appetite, energy levels, sleep, and concentration, and SI. She said she has good coping skills and support from other sisters. She has a positive attitude.    Optometry - rescheduled        Past Surgical History:   Procedure Laterality Date    HYSTERECTOMY  1982    secondary uterine fibroids    JOINT REPLACEMENT      Right total knee replacement         Past Medical History:   Diagnosis Date    Adrenal mass- adenoma stable since 2004 (1.8 cm and 8/13/12 (2 cm); stable 2016 2.4 cm     Adrenal mass- adenoma stable since 2004 (1.8 cm and 8/13/12 (2 cm); stable 2016 2.4 cm    Arthritis     Asthma in adult without complication 6/25/2015    Bilateral carotid artery disease 7/21/2017    Bilateral sciatica 2/7/2017    Cellulitis of right leg 08/16/2017    Cerebral infarction 1/29/2016    Multiple areas of lacunar infarction. Stroke risk factors include HTN, DM2, Dyslipidemia.  Continue ASA 81mg/ Statin therapy I have encouraged 30 minutes of physical activity daily for 5 days a week. She has access to a pool so this should not be so jarring to her joints.     Cervical radiculopathy 3/18/2015    Chronic knee pain     Chronic rhinitis 4/9/2013    CKD (chronic kidney disease) stage 3, GFR 30-59 ml/min 1/29/2013    Coronary artery disease due to calcified coronary lesion 6/25/2015    Diastolic dysfunction 10/10/2013     Essential hypertension 6/25/2015    Gastroesophageal reflux disease without esophagitis 8/13/2012    Gout     Hives 10/1/2013    Mixed hyperlipidemia 8/13/2012    Morbid obesity with BMI of 50.0-59.9, adult 2/11/2014    Obstructive sleep apnea syndrome 8/13/2012    Senile cataracts of both eyes 1/22/2015    Traumatic open wound of right lower leg 8/25/2017    Trigeminal neuralgia of right side of face 5/23/2017    For years now Previously on Gabapentin, but caused constipation Dissipating over time Not related to intracranial abnormalities Possibly related to dental procedure    Type 2 diabetes mellitus with diabetic polyneuropathy, with long-term current use of insulin 1/29/2013    Venous stasis dermatitis of both lower extremities 8/21/2017    Vitamin D deficiency disease 8/13/2012       Social History     Socioeconomic History    Marital status: Single     Spouse name: Not on file    Number of children: Not on file    Years of education: Not on file    Highest education level: Not on file   Occupational History    Not on file   Social Needs    Financial resource strain: Not hard at all    Food insecurity     Worry: Never true     Inability: Never true    Transportation needs     Medical: No     Non-medical: No   Tobacco Use    Smoking status: Never Smoker    Smokeless tobacco: Never Used   Substance and Sexual Activity    Alcohol use: No    Drug use: No    Sexual activity: Never   Lifestyle    Physical activity     Days per week: Not on file     Minutes per session: Not on file    Stress: Not on file   Relationships    Social connections     Talks on phone: Not on file     Gets together: Not on file     Attends Worship service: Not on file     Active member of club or organization: Not on file     Attends meetings of clubs or organizations: Not on file     Relationship status: Not on file   Other Topics Concern    Are you pregnant or think you may be? Not Asked     "Breast-feeding Not Asked   Social History Narrative    Single with no children.        Review of Systems   Constitutional: Negative for fatigue.   Respiratory: Positive for shortness of breath.    Genitourinary: Negative for dysuria and frequency.   Skin: Positive for rash.   Psychiatric/Behavioral: Negative for dysphoric mood.       Objective:   /72 (BP Location: Left arm, Patient Position: Sitting, BP Method: Large (Manual))   Pulse 72   Temp 97.3 °F (36.3 °C)   Resp 20   Ht 4' 11" (1.499 m)   Wt 133.6 kg (294 lb 8.6 oz)   SpO2 99%   BMI 59.49 kg/m²     Physical Exam  Constitutional:       Appearance: She is obese. She is ill-appearing.   Pulmonary:      Effort: Pulmonary effort is normal.   Skin:     Comments: Wounds on LE bilaterally R>L         Lab Results   Component Value Date    WBC 9.55 10/07/2020    HGB 12.2 10/07/2020    HCT 41.6 10/07/2020     10/07/2020    CHOL 178 01/16/2020    TRIG 58 01/16/2020    HDL 51 01/16/2020    ALT 13 10/07/2020    AST 17 10/07/2020     10/07/2020    K 4.3 10/07/2020     10/07/2020    CREATININE 1.1 10/07/2020    BUN 19 10/07/2020    CO2 28 10/07/2020    TSH 0.799 01/16/2020    INR 1.1 04/17/2005    HGBA1C 6.3 (H) 08/18/2020       Current Outpatient Medications on File Prior to Visit   Medication Sig Dispense Refill    acetaminophen (TYLENOL) 500 MG tablet Take 2 tablets (1,000 mg total) by mouth 2 (two) times daily as needed for Pain. 360 tablet 3    albuterol (PROVENTIL/VENTOLIN HFA) 90 mcg/actuation inhaler Inhale 2 puffs into the lungs every 4 (four) hours as needed for Wheezing or Shortness of Breath. Rescue 54 g 3    albuterol-ipratropium (DUO-NEB) 2.5 mg-0.5 mg/3 mL nebulizer solution USE 1 VIAL PER NEBULIZER EVERY 6 HOURS AS NEEDED FOR WHEEZING/RESCUE 90 mL 0    allopurinoL (ZYLOPRIM) 100 MG tablet Take 1 tablet (100 mg total) by mouth once daily. 30 tablet 6    ammonium lactate 12 % Crea Apply lotion twice daily to affected area " 385 g 11    aspirin (ECOTRIN) 81 MG EC tablet Take 1 tablet (81 mg total) by mouth once daily. 90 tablet 3    atorvastatin (LIPITOR) 80 MG tablet Take 1 tablet (80 mg total) by mouth once daily. 90 tablet 3    blood sugar diagnostic Strp BG monitoring 4 times a day. Pt needs test strips for Embrace Talking meter. (Patient taking differently: BG monitoring 2 times a day. Pt needs test strips for Embrace Talking meter.) 450 strip prn    cane tips Misc 1 application by Misc.(Non-Drug; Combo Route) route once daily. 4 each 0    cholecalciferol, vitamin D3, (VITAMIN D3) 25 mcg (1,000 unit) capsule Take 1 capsule (1,000 Units total) by mouth once daily. 90 capsule 3    clotrimazole (LOTRIMIN) 1 % cream Apply topically 2 (two) times daily. 113 g 3    colchicine (COLCRYS) 0.6 mg tablet 0.6 mg every other day 15 tablet 5    diclofenac sodium (VOLTAREN) 1 % Gel APPLY 2 GRAMS TOPICALLY DAILY 100 g 0    econazole nitrate 1 % cream Apply topically once daily. Apply to feet daily as directed. 85 g 1    fluticasone (FLONASE) 50 mcg/actuation nasal spray 2 sprays (100 mcg total) by Each Nare route once daily. 1 Bottle 3    fluticasone-salmeterol diskus inhaler 500-50 mcg INHALE 1 PUFF TWICE DAILY 60 each 11    insulin degludec (TRESIBA FLEXTOUCH U-200) 200 unit/mL (3 mL) InPn INJECT 52 UNITS UNDER SKIN DAILY 9 mL 6    lancets Misc Test daily (Patient taking differently: Test twice  daily) 100 each 12    loratadine (CLARITIN) 10 mg tablet Take 1 tablet (10 mg total) by mouth once daily. 30 tablet 3    losartan (COZAAR) 100 MG tablet Take 1 tablet (100 mg total) by mouth once daily. 90 tablet 3    miconazole nitrate (LOTRIMIN AF POWDER) 2 % AerP Apply 1 application topically 2 (two) times daily. 130 g 3    mupirocin (BACTROBAN) 2 % ointment       mupirocin calcium 2% (BACTROBAN) 2 % cream Apply topically 3 (three) times daily. 30 g 11    nebulizer and compressor (COMP-AIR ELITE COMP NEB SYSTEM) Berna use as directed  "1 each 0    pen needle, diabetic (PEN NEEDLE) 29 gauge x 1/2" Ndle TEST 5 TIMES A DAY WITH  each 6    sod chlor-bicarb-squeez bottle (NEILMED SINUS RINSE COMPLETE) pkdv Use as directed 1 each 5    torsemide (DEMADEX) 20 MG Tab Take 1 tablet (20 mg total) by mouth after lunch. 30 tablet 11    torsemide (DEMADEX) 20 MG Tab Take 1 tablet (20 mg total) by mouth once daily. 90 tablet 3    trolamine salicylate (ASPERCREME) 10 % cream Apply topically as needed.      [DISCONTINUED] guaiFENesin (MUCINEX) 600 mg 12 hr tablet Take 2 tablets (1,200 mg total) by mouth 2 (two) times daily. 60 tablet 0    [DISCONTINUED] insulin lispro 100 unit/mL pen INJECT 18 UNITS UNDER SKIN WITH BREAKFAST AND DINNER AND 6 UNITS AT LUNCH PLUS SCALE 150-200+2, 201-250+4, 251-300+6, 301-350+8, >350 +10 >3 30 mL 6    cetirizine (ZYRTEC) 10 MG tablet Take 1 tablet (10 mg total) by mouth once daily. 30 tablet 2    [DISCONTINUED] insulin degludec (TRESIBA FLEXTOUCH U-200) 200 unit/mL (3 mL) InPn Inject 48 Units into the skin once daily. 9 mL 2     No current facility-administered medications on file prior to visit.          Assessment:   80 y.o. female with multiple co-morbid illnesses here to follow-up with PCP and continue work-up of chronic issues notably with severe venous stasis, CHF, DM, HTN, HLD, severe obesity.     Plan:     Problem List Items Addressed This Visit        Derm    Venous stasis ulcer of right calf with fat layer exposed without varicose veins - Primary     RLE venous stasis ulcer  · Leg wraps in clinic today  · HH ordered to continue leg wrap         Relevant Orders    Ambulatory referral/consult to Home Health       Cardiac/Vascular    Hypertension associated with diabetes    Relevant Medications    insulin lispro 100 unit/mL pen    Other Relevant Orders    POCT Glucose, Hand-Held Device (Completed)       Endocrine    Type 2 diabetes mellitus with diabetic polyneuropathy, with long-term current use of insulin    "  Patient with brittle diabetes, and variable readings  · Decrease short-acting insulin to 10U before meals  · HOLD all insulin until follow-up tomorrow         Relevant Medications    insulin lispro 100 unit/mL pen    Other Relevant Orders    POCT Glucose, Hand-Held Device (Completed)    POCT Glucose, Hand-Held Device (Completed)    Microalbumin/Creatinine Ratio, Urine    Hemoglobin A1C    Lipid Panel    Uncontrolled secondary diabetes mellitus with stage 3 CKD (GFR 30-59)     Injecting 15-0-15-52. Low reading today   · Monitor A1c  · Decrease short acting insulin due to life threatening lows today  · HOLD 52U longacting until F/U tomorrow         Relevant Medications    insulin lispro 100 unit/mL pen    Hypoglycemia associated with type 2 diabetes mellitus     Patient with extremely low glucose readings today.   · [facility]  · Every 2 hour glucose checks in the home  · Given urgent glucose tablets         Relevant Medications    insulin lispro 100 unit/mL pen    Other Relevant Orders    POCT Glucose, Hand-Held Device       Other    SOB (shortness of breath)    Relevant Orders    COVID-19 Routine Screening    Obesity hypoventilation syndrome     REJI, frequently noncompliant with CPAP, severe morbid obesity (BMI 59.5)  · Strongly advised to use CPAP  · Advised to cut back on food               Health Maintenance       Date Due Completion Date    Hepatitis C Screening 1940 ---    TETANUS VACCINE 03/08/1960 ---    Shingles Vaccine (2 of 3) 02/10/2015 12/16/2014    Lipid Panel 01/16/2021 1/16/2020    Eye Exam 02/07/2021 2/7/2020 (Done)    Override on 2/7/2020: Done    Override on 2/17/2016: Done    Override on 1/22/2015: Done    Override on 8/16/2012: Done    Hemoglobin A1c 02/18/2021 8/18/2020    Override on 7/13/2016: Done    Mammogram 10/07/2021 10/7/2020    Foot Exam 11/10/2021 11/10/2020    Override on 12/31/2019: Done (Done by Dr. Anand)    Override on  11/27/2018: Done (Dr Anand)    Override on 1/19/2018: Done (Dr anand)    Override on 5/31/2017: Done    Override on 7/20/2016: Done    Aspirin/Antiplatelet Therapy 11/16/2021 11/16/2020    DEXA SCAN 02/22/2022 2/22/2018    Override on 12/13/2011: Done          Follow up in about 3 days (around 12/18/2020). Total face-to-face time was 60 min, >50% of this was spent on counseling and coordination of care. The following issues were discussed: with severe venous stasis, CHF, DM, HTN, HLD, severe obesity    Tami Schmidt MD/MPH  [facility]  [phone] [facility]

## 2020-12-15 NOTE — ASSESSMENT & PLAN NOTE
Patient with brittle diabetes, and variable readings  · Decrease short-acting insulin to 10U before meals  · HOLD all insulin until follow-up tomorrow

## 2020-12-16 ENCOUNTER — TELEPHONE (OUTPATIENT)
Dept: PRIMARY CARE CLINIC | Facility: CLINIC | Age: 80
End: 2020-12-16

## 2020-12-16 ENCOUNTER — OFFICE VISIT (OUTPATIENT)
Dept: PRIMARY CARE CLINIC | Facility: CLINIC | Age: 80
End: 2020-12-16
Payer: COMMERCIAL

## 2020-12-16 VITALS
RESPIRATION RATE: 20 BRPM | TEMPERATURE: 97 F | HEART RATE: 90 BPM | BODY MASS INDEX: 59.07 KG/M2 | SYSTOLIC BLOOD PRESSURE: 141 MMHG | HEIGHT: 59 IN | DIASTOLIC BLOOD PRESSURE: 71 MMHG | WEIGHT: 293 LBS | OXYGEN SATURATION: 99 %

## 2020-12-16 DIAGNOSIS — Z79.4 TYPE 2 DIABETES MELLITUS WITH DIABETIC POLYNEUROPATHY, WITH LONG-TERM CURRENT USE OF INSULIN: Primary | ICD-10-CM

## 2020-12-16 DIAGNOSIS — E11.42 TYPE 2 DIABETES MELLITUS WITH DIABETIC POLYNEUROPATHY, WITH LONG-TERM CURRENT USE OF INSULIN: Primary | ICD-10-CM

## 2020-12-16 LAB
ALBUMIN/CREAT UR: 197.1 UG/MG (ref 0–30)
CREAT UR-MCNC: 68 MG/DL (ref 15–325)
GLUCOSE SERPL-MCNC: 96 MG/DL (ref 70–110)
MICROALBUMIN UR DL<=1MG/L-MCNC: 134 UG/ML
SARS-COV-2 RNA RESP QL NAA+PROBE: NOT DETECTED

## 2020-12-16 PROCEDURE — 99215 PR OFFICE/OUTPT VISIT, EST, LEVL V, 40-54 MIN: ICD-10-PCS | Mod: S$GLB,,, | Performed by: INTERNAL MEDICINE

## 2020-12-16 PROCEDURE — 82962 POCT GLUCOSE, HAND-HELD DEVICE: ICD-10-PCS | Mod: ,,, | Performed by: INTERNAL MEDICINE

## 2020-12-16 PROCEDURE — 82962 GLUCOSE BLOOD TEST: CPT | Mod: ,,, | Performed by: INTERNAL MEDICINE

## 2020-12-16 PROCEDURE — 99215 OFFICE O/P EST HI 40 MIN: CPT | Mod: S$GLB,,, | Performed by: INTERNAL MEDICINE

## 2020-12-16 NOTE — PATIENT INSTRUCTIONS
TODAY:  - do not inject insulin until we talk to you tomorrow   + need more glucose readings  - use your new glucometer  - continue using your circaids EVERY DAY  - use your CPAP EVERY NIGHT  - come back on Friday  - Audio phone call tomorrow       DIET/ MEAL PATTERN: 3 meals a day, light meal at lunch time -see below     B: oatmeal, apple or eggs with a toast  Noon: vegetable, meat, starch  Dinner: varies-well balanced   No sodas, juices

## 2020-12-16 NOTE — PROGRESS NOTES
Primary Care Provider Appointment - [facility]  SHARED NOTE: Anne Hawthorne MD (Resident), Dr Schmidt (Attending)    Subjective:      Patient ID: Duran Giordano is a 80 y.o. female with a PMhx of severe venous stasis, CHF, DM, HTN, HLD, severe obesity    Chief Complaint: Follow-up    Prior to this visit, patient's last encounter with PCP was 12/15/2020. She is here for a follow up. She was found to be Hypoglycemic during her regular office visit on 12/15/2020 (BG 34 , 50,101).     She was symptomatic throughout the visit (dizzy, lightheaded, nauseous, SOB, diaphoretic). She has been having these symptoms a lot more throughout the last few weeks as per her. She reported taking 52 of tresiba in the morning and 18 units of short acting insulin with breakfast and dinner. As per her, her BG at home has been between 100-150's. Patient was told to hold off all insulin for 24 hours. She is back with us today, BG 96 today and was 106 in the morning before breakfast. As per chart review patient was told to decrease her long acting by 15% to 44 units in October when she visited diabetes clinic but she has not been doing that. This morning the nurse who was helping her take her BG also noticed her old glucometer was showing higher readings (300's) and the one the nurse had showed 106. She now has a new glucometer and strips.      Her most recent A1C was 6.4 (12/15/2020). Renal function intact. Patient reported feeling much improved this am compared to how she felt the day before.       Past Surgical History:   Procedure Laterality Date    HYSTERECTOMY  1982    secondary uterine fibroids    JOINT REPLACEMENT      Right total knee replacement         Past Medical History:   Diagnosis Date    Adrenal mass- adenoma stable since 2004 (1.8 cm and 8/13/12 (2 cm); stable 2016 2.4 cm     Adrenal mass- adenoma stable since 2004 (1.8 cm and 8/13/12 (2 cm); stable 2016 2.4 cm    Arthritis     Asthma in adult without complication  6/25/2015    Bilateral carotid artery disease 7/21/2017    Bilateral sciatica 2/7/2017    Cellulitis of right leg 08/16/2017    Cerebral infarction 1/29/2016    Multiple areas of lacunar infarction. Stroke risk factors include HTN, DM2, Dyslipidemia.  Continue ASA 81mg/ Statin therapy I have encouraged 30 minutes of physical activity daily for 5 days a week. She has access to a pool so this should not be so jarring to her joints.     Cervical radiculopathy 3/18/2015    Chronic knee pain     Chronic rhinitis 4/9/2013    CKD (chronic kidney disease) stage 3, GFR 30-59 ml/min 1/29/2013    Coronary artery disease due to calcified coronary lesion 6/25/2015    Diastolic dysfunction 10/10/2013    Essential hypertension 6/25/2015    Gastroesophageal reflux disease without esophagitis 8/13/2012    Gout     Hives 10/1/2013    Mixed hyperlipidemia 8/13/2012    Morbid obesity with BMI of 50.0-59.9, adult 2/11/2014    Obstructive sleep apnea syndrome 8/13/2012    Senile cataracts of both eyes 1/22/2015    Traumatic open wound of right lower leg 8/25/2017    Trigeminal neuralgia of right side of face 5/23/2017    For years now Previously on Gabapentin, but caused constipation Dissipating over time Not related to intracranial abnormalities Possibly related to dental procedure    Type 2 diabetes mellitus with diabetic polyneuropathy, with long-term current use of insulin 1/29/2013    Venous stasis dermatitis of both lower extremities 8/21/2017    Vitamin D deficiency disease 8/13/2012       Social History     Socioeconomic History    Marital status: Single     Spouse name: Not on file    Number of children: Not on file    Years of education: Not on file    Highest education level: Not on file   Occupational History    Not on file   Social Needs    Financial resource strain: Not hard at all    Food insecurity     Worry: Never true     Inability: Never true    Transportation needs     Medical: No      "Non-medical: No   Tobacco Use    Smoking status: Never Smoker    Smokeless tobacco: Never Used   Substance and Sexual Activity    Alcohol use: No    Drug use: No    Sexual activity: Never   Lifestyle    Physical activity     Days per week: Not on file     Minutes per session: Not on file    Stress: Not on file   Relationships    Social connections     Talks on phone: Not on file     Gets together: Not on file     Attends L[employer] day service: Not on file     Active member of club or organization: Not on file     Attends meetings of clubs or organizations: Not on file     Relationship status: Not on file   Other Topics Concern    Are you pregnant or think you may be? Not Asked    Breast-feeding Not Asked   Social History Narrative    Single with no children.        Review of Systems   Constitutional: Negative for fatigue.   Respiratory: Positive for shortness of breath.    Genitourinary: Negative for dysuria and frequency.   Skin: Positive for rash.   Psychiatric/Behavioral: Negative for dysphoric mood.       Objective:   BP (!) 150/73 (BP Location: Left arm, Patient Position: Sitting, BP Method: Large (Automatic))   Pulse 90   Temp 97.3 °F (36.3 °C)   Resp 20   Ht 4' 11" (1.499 m)   Wt 133.6 kg (294 lb 8.6 oz)   SpO2 99%   BMI 59.49 kg/m²     Physical Exam  Constitutional:       Appearance: She is obese. She is not ill-appearing.   HENT:      Head: Normocephalic and atraumatic.   Cardiovascular:      Rate and Rhythm: Normal rate.   Pulmonary:      Effort: Pulmonary effort is normal.   Musculoskeletal:      Comments: On a wheelchair      Skin:     Comments: Wounds on LE bilaterally R>L  Both legs wrapped and compression stockings on      Neurological:      General: No focal deficit present.      Mental Status: She is oriented to person, place, and time.             Lab Results   Component Value Date    WBC 8.15 12/15/2020    HGB 11.5 (L) 12/15/2020    HCT 39.3 12/15/2020     (L) 12/15/2020    " CHOL 150 12/15/2020    TRIG 83 12/15/2020    HDL 41 12/15/2020    ALT 14 12/15/2020    AST 19 12/15/2020     12/15/2020    K 4.7 12/15/2020     12/15/2020    CREATININE 1.1 12/15/2020    BUN 23 12/15/2020    CO2 25 12/15/2020    TSH 0.799 01/16/2020    INR 1.1 04/17/2005    HGBA1C 6.4 (H) 12/15/2020       Current Outpatient Medications on File Prior to Visit   Medication Sig Dispense Refill    acetaminophen (TYLENOL) 500 MG tablet Take 2 tablets (1,000 mg total) by mouth 2 (two) times daily as needed for Pain. 360 tablet 3    albuterol (PROVENTIL/VENTOLIN HFA) 90 mcg/actuation inhaler Inhale 2 puffs into the lungs every 4 (four) hours as needed for Wheezing or Shortness of Breath. Rescue 54 g 3    albuterol-ipratropium (DUO-NEB) 2.5 mg-0.5 mg/3 mL nebulizer solution USE 1 VIAL PER NEBULIZER EVERY 6 HOURS AS NEEDED FOR WHEEZING/RESCUE 90 mL 0    allopurinoL (ZYLOPRIM) 100 MG tablet Take 1 tablet (100 mg total) by mouth once daily. 30 tablet 6    ammonium lactate 12 % Crea Apply lotion twice daily to affected area 385 g 11    aspirin (ECOTRIN) 81 MG EC tablet Take 1 tablet (81 mg total) by mouth once daily. 90 tablet 3    atorvastatin (LIPITOR) 80 MG tablet Take 1 tablet (80 mg total) by mouth once daily. 90 tablet 3    blood sugar diagnostic Strp BG monitoring 4 times a day. Pt needs test strips for [employer] Talking meter. (Patient taking differently: BG monitoring 2 times a day. Pt needs test strips for [employer] Talking meter.) 450 strip prn    cane tips Misc 1 application by Misc.(Non-Drug; Combo Route) route once daily. 4 each 0    cholecalciferol, vitamin D3, (VITAMIN D3) 25 mcg (1,000 unit) capsule Take 1 capsule (1,000 Units total) by mouth once daily. 90 capsule 3    clotrimazole (LOTRIMIN) 1 % cream Apply topically 2 (two) times daily. 113 g 3    colchicine (COLCRYS) 0.6 mg tablet 0.6 mg every other day 15 tablet 5    diclofenac sodium (VOLTAREN) 1 % Gel APPLY 2 GRAMS TOPICALLY DAILY  "100 g 0    econazole nitrate 1 % cream Apply topically once daily. Apply to feet daily as directed. 85 g 1    fluticasone (FLONASE) 50 mcg/actuation nasal spray 2 sprays (100 mcg total) by Each Nare route once daily. 1 Bottle 3    fluticasone-salmeterol diskus inhaler 500-50 mcg INHALE 1 PUFF TWICE DAILY 60 each 11    insulin degludec (TRESIBA FLEXTOUCH U-200) 200 unit/mL (3 mL) InPn INJECT 52 UNITS UNDER SKIN DAILY 9 mL 6    insulin lispro 100 unit/mL pen INJECT 10 UNITS UNDER SKIN WITH BREAKFAST AND DINNER AND 6 UNITS AT LUNCH PLUS SCALE 150-200+2, 201-250+4, 251-300+6, 301-350+8, >350 +10 >3 30 mL 6    lancets Misc Test daily (Patient taking differently: Test twice  daily) 100 each 12    loratadine (CLARITIN) 10 mg tablet Take 1 tablet (10 mg total) by mouth once daily. 30 tablet 3    losartan (COZAAR) 100 MG tablet Take 1 tablet (100 mg total) by mouth once daily. 90 tablet 3    miconazole nitrate (LOTRIMIN AF POWDER) 2 % AerP Apply 1 application topically 2 (two) times daily. 130 g 3    mupirocin (BACTROBAN) 2 % ointment       mupirocin calcium 2% (BACTROBAN) 2 % cream Apply topically 3 (three) times daily. 30 g 11    nebulizer and compressor (COMP-AIR ELITE COMP NEB SYSTEM) Berna use as directed 1 each 0    pen needle, diabetic (PEN NEEDLE) 29 gauge x 1/2" Ndle TEST 5 TIMES A DAY WITH  each 6    sod chlor-bicarb-squeez bottle (NEILMED SINUS RINSE COMPLETE) pkdv Use as directed 1 each 5    torsemide (DEMADEX) 20 MG Tab Take 1 tablet (20 mg total) by mouth after lunch. 30 tablet 11    torsemide (DEMADEX) 20 MG Tab Take 1 tablet (20 mg total) by mouth once daily. 90 tablet 3    trolamine salicylate (ASPERCREME) 10 % cream Apply topically as needed.      cetirizine (ZYRTEC) 10 MG tablet Take 1 tablet (10 mg total) by mouth once daily. 30 tablet 2    [DISCONTINUED] insulin degludec (TRESIBA FLEXTOUCH U-200) 200 unit/mL (3 mL) InPn Inject 48 Units into the skin once daily. 9 mL 2     No " current facility-administered medications on file prior to visit.          Assessment:   80 y.o. female with multiple co-morbid illnesses here to follow-up with PCP and continue work-up of chronic issues notably .     Plan:     Problem List Items Addressed This Visit        Endocrine    Type 2 diabetes mellitus with diabetic polyneuropathy, with long-term current use of insulin - Primary        - Continue holding Insulin until the virtual audio call on 12/17/2020  - Given the several hypoglycemic episodes and well controlled diabetes , patient likely needs to have her basal and bolus Insulin decreased by 20%  - Plan to decrease Insulin dosage has been discussed with patient's diabetes management clinic ( [facility]) and they have agreed with the change   - New glucometer and strips already given  - Keep a BG log and bring it to all appointments  - Follow the diet listed below and as discussed  - POCT Glucose, Hand-Held Device (Completed)  - Follow up face to face on Friday 12/18/2020     DIET/ MEAL PATTERN: 3 meals a day, light meal at lunch time -see below     B: oatmeal, apple or eggs with toast   Noon: vegetable, meat, starch  Dinner: varies-well balanced   No sodas, juices          Health Maintenance       Date Due Completion Date    Hepatitis C Screening 1940 ---    TETANUS VACCINE 03/08/1960 ---    Shingles Vaccine (2 of 3) 02/10/2015 12/16/2014    Eye Exam 02/07/2021 2/7/2020 (Done)    Override on 2/7/2020: Done    Override on 2/17/2016: Done    Override on 1/22/2015: Done    Override on 8/16/2012: Done    Hemoglobin A1c 06/15/2021 12/15/2020    Override on 7/13/2016: Done    Mammogram 10/07/2021 10/7/2020    Foot Exam 11/10/2021 11/10/2020    Override on 12/31/2019: Done (Done by Dr. Anand)    Override on 11/27/2018: Done (Dr Anand)    Override on 1/19/2018: Done (Dr anand)    Override on 5/31/2017: Done    Override on 7/20/2016: Done    Aspirin/Antiplatelet Therapy  12/15/2021 12/15/2020    Lipid Panel 12/15/2021 12/15/2020    DEXA SCAN 02/22/2022 2/22/2018    Override on 12/13/2011: Done              Follow up in about 2 days (around 12/18/2020). Total face-to-face time was 60 min, >50% of this was spent on counseling and coordination of care. The following issues were discussed: T2DM    Anne Hawthorne MD   PGY-3      Tami Schmidt MD/MPH  [facility]  [phone] [employer]

## 2020-12-16 NOTE — TELEPHONE ENCOUNTER
Patient notified of her test results. Patient said she has not taken her blood sugars today because Dr Schmitt instructed her not to take it this am or this evening. Patient states she will call tomorrow with a blood sugar reading.

## 2020-12-17 ENCOUNTER — TELEPHONE (OUTPATIENT)
Dept: PRIMARY CARE CLINIC | Facility: CLINIC | Age: 80
End: 2020-12-17

## 2020-12-17 ENCOUNTER — OFFICE VISIT (OUTPATIENT)
Dept: PRIMARY CARE CLINIC | Facility: CLINIC | Age: 80
End: 2020-12-17
Payer: COMMERCIAL

## 2020-12-17 DIAGNOSIS — E11.42 TYPE 2 DIABETES MELLITUS WITH DIABETIC POLYNEUROPATHY, WITH LONG-TERM CURRENT USE OF INSULIN: Primary | ICD-10-CM

## 2020-12-17 DIAGNOSIS — Z79.4 TYPE 2 DIABETES MELLITUS WITH DIABETIC POLYNEUROPATHY, WITH LONG-TERM CURRENT USE OF INSULIN: Primary | ICD-10-CM

## 2020-12-17 DIAGNOSIS — E11.649 HYPOGLYCEMIA ASSOCIATED WITH TYPE 2 DIABETES MELLITUS: ICD-10-CM

## 2020-12-17 PROCEDURE — 99499 NO LOS: ICD-10-PCS | Mod: 95,,, | Performed by: INTERNAL MEDICINE

## 2020-12-17 PROCEDURE — 99499 UNLISTED E&M SERVICE: CPT | Mod: 95,,, | Performed by: INTERNAL MEDICINE

## 2020-12-17 NOTE — TELEPHONE ENCOUNTER
----- Message from Tami Schmidt MD sent at 12/17/2020  2:53 PM CST -----  Please let patient know that she should NOT INJECT ANY INSULIN. She should come in tomorrow morning so that we can check her sugar and decide when the insulin can safely administeredBARB Hicks

## 2020-12-17 NOTE — PATIENT INSTRUCTIONS
Please let patient know that she should NOT INJECT ANY INSULIN. She should come in tomorrow morning so that we can check her sugar and decide when the insulin can safely administered

## 2020-12-17 NOTE — ASSESSMENT & PLAN NOTE
Patient with extremely low glucose readings today.   · Nurse Froilan stayed [facility]  · Patient using  glucometer and test strips  · accurate readings with new supplies  · Hold insulin until face to face appointment tomorrow

## 2020-12-17 NOTE — PROGRESS NOTES
Established Patient - Audio Only Telehealth Visit with [facility] Provider     The patient location is: HOME  The chief complaint leading to consultation is: routine care  Visit type: Virtual visit with audio only (telephone)     The reason for the audio only service rather than synchronous audio and video virtual visit was related to technical difficulties or patient preference/necessity.     Each patient to whom I provide medical services by telemedicine is:  (1) informed of the relationship between the physician and patient and the respective role of any other health care provider with respect to management of the patient; and (2) notified that they may decline to receive medical services by telemedicine and may withdraw from such care at any time. Patient verbally consented to receive this service via voice-only telephone call.       Subjective:      Patient ID: Duran Giordano is a 80 y.o. female with DM, hypOglycemia, obesity, LE ulcers    Patient has had low glucose readings for 2 days. Likely related to overuse of insulin. Her [facility] nurse reports that she was using an outdated glucometer, which read as false high readings.     Today her home glucose is low 100s. She has not started her insulin yet.    Objective:     Lab Results   Component Value Date    WBC 8.15 12/15/2020    HGB 11.5 (L) 12/15/2020    HCT 39.3 12/15/2020     (L) 12/15/2020    CHOL 150 12/15/2020    TRIG 83 12/15/2020    HDL 41 12/15/2020    ALT 14 12/15/2020    AST 19 12/15/2020     12/15/2020    K 4.7 12/15/2020     12/15/2020    CREATININE 1.1 12/15/2020    BUN 23 12/15/2020    CO2 25 12/15/2020    TSH 0.799 01/16/2020    INR 1.1 04/17/2005    HGBA1C 6.4 (H) 12/15/2020         Assessment:   80 y.o. female with multiple co-morbid illnesses here to continue work-up of chronic issues.     Plan:     Problem List Items Addressed This Visit        Endocrine    Type 2 diabetes mellitus with diabetic polyneuropathy, with  long-term current use of insulin - Primary    Hypoglycemia associated with type 2 diabetes mellitus     Patient with extremely low glucose readings today.   · [facility] Froilan [facility]  · Patient using  glucometer and test strips  · accurate readings with new supplies  · Hold insulin until face to face appointment tomorrow                Health Maintenance       Date Due Completion Date    Hepatitis C Screening 1940 ---    TETANUS VACCINE 1960 ---    Shingles Vaccine (2 of 3) 02/10/2015 2014    Eye Exam 2021 (Done)    Override on 2020: Done    Override on 2016: Done    Override on 2015: Done    Override on 2012: Done    Hemoglobin A1c 06/15/2021 12/15/2020    Override on 2016: Done    Mammogram 10/07/2021 10/7/2020    Foot Exam 11/10/2021 11/10/2020    Override on 2019: Done (Done by Dr. Anand)    Override on 2018: Done (Dr Anand)    Override on 2018: Done (Dr anand)    Override on 2017: Done    Override on 2016: Done    Lipid Panel 12/15/2021 12/15/2020    Aspirin/Antiplatelet Therapy 2021    DEXA SCAN 2022    Override on 2011: Done          Follow up in about 1 day (around 2020). Thirty minutes spent with this patient today, including addressing advanced care planning.    Tami Schmidt MD/MPH  Internal Medicine  [facility]  [employer] [phone]    This service was not originating from a related E/M service provided within the previous 7 days nor will  to an E/M service or procedure within the next 24 hours or my soonest available appointment.  Prevailing standard of care was able to be met in this audio-only visit.

## 2020-12-17 NOTE — TELEPHONE ENCOUNTER
Spoke to pt informed her DO NOT inject ANY insulin today/tonight we will see her tomorrow in the clinic and make a decision on her Insulin

## 2020-12-17 NOTE — Clinical Note
Please let patient know that she should NOT INJECT ANY INSULIN. She should come in tomorrow morning so that we can check her sugar and decide when the insulin can safely administered    ThanksBARB

## 2020-12-18 ENCOUNTER — OFFICE VISIT (OUTPATIENT)
Dept: PRIMARY CARE CLINIC | Facility: CLINIC | Age: 80
End: 2020-12-18
Payer: MEDICARE

## 2020-12-18 VITALS
HEIGHT: 59 IN | RESPIRATION RATE: 20 BRPM | BODY MASS INDEX: 59.49 KG/M2 | OXYGEN SATURATION: 99 % | DIASTOLIC BLOOD PRESSURE: 78 MMHG | HEART RATE: 52 BPM | SYSTOLIC BLOOD PRESSURE: 135 MMHG | TEMPERATURE: 99 F

## 2020-12-18 DIAGNOSIS — L97.212 VENOUS STASIS ULCER OF RIGHT CALF WITH FAT LAYER EXPOSED WITHOUT VARICOSE VEINS: ICD-10-CM

## 2020-12-18 DIAGNOSIS — Z48.00 ENCOUNTER FOR CHANGE OR REMOVAL OF NONSURGICAL WOUND DRESSING: ICD-10-CM

## 2020-12-18 DIAGNOSIS — E11.42 TYPE 2 DIABETES MELLITUS WITH DIABETIC POLYNEUROPATHY, WITH LONG-TERM CURRENT USE OF INSULIN: Primary | ICD-10-CM

## 2020-12-18 DIAGNOSIS — I87.2 VENOUS STASIS ULCER OF RIGHT CALF WITH FAT LAYER EXPOSED WITHOUT VARICOSE VEINS: ICD-10-CM

## 2020-12-18 DIAGNOSIS — Z79.4 TYPE 2 DIABETES MELLITUS WITH DIABETIC POLYNEUROPATHY, WITH LONG-TERM CURRENT USE OF INSULIN: Primary | ICD-10-CM

## 2020-12-18 PROCEDURE — 99215 OFFICE O/P EST HI 40 MIN: CPT | Mod: S$GLB,,, | Performed by: INTERNAL MEDICINE

## 2020-12-18 PROCEDURE — 99215 PR OFFICE/OUTPT VISIT, EST, LEVL V, 40-54 MIN: ICD-10-PCS | Mod: S$GLB,,, | Performed by: INTERNAL MEDICINE

## 2020-12-18 NOTE — PROGRESS NOTES
[facility]  SHARED NOTE: Anne Hawthorne MD (Resident), Dr Schmidt (Attending)    Subjective:      Patient ID: Duran Giordano is a 80 y.o. female with a PMHx of DM, hypOglycemia, obesity, LE venous stasis ulcers      Chief Complaint: Dressing Change (bandage removal on bilateral lower legs. -done by MD in clinic. BG was 106 on today.)    Prior to this visit, patient's last encounter with PCP was 12/17/2020.    Patient here for a BG check and has been holding BG for 3 days now given the recurrent hypoglycemic episodes. Patient's BG was 106 in clinic today. She did bring a BG log and averaging between 100-150's without insulin use. She denied any dizziness, headaches, urinary changes, nausea, vomiting, fever, chills. She Also will be getting her bilateral lower extremities wound dressing changed.           Past Surgical History:   Procedure Laterality Date    HYSTERECTOMY  1982    secondary uterine fibroids    JOINT REPLACEMENT      Right total knee replacement         Past Medical History:   Diagnosis Date    Adrenal mass- adenoma stable since 2004 (1.8 cm and 8/13/12 (2 cm); stable 2016 2.4 cm     Adrenal mass- adenoma stable since 2004 (1.8 cm and 8/13/12 (2 cm); stable 2016 2.4 cm    Arthritis     Asthma in adult without complication 6/25/2015    Bilateral carotid artery disease 7/21/2017    Bilateral sciatica 2/7/2017    Cellulitis of right leg 08/16/2017    Cerebral infarction 1/29/2016    Multiple areas of lacunar infarction. Stroke risk factors include HTN, DM2, Dyslipidemia.  Continue ASA 81mg/ Statin therapy I have encouraged 30 minutes of physical activity daily for 5 days a week. She has access to a pool so this should not be so jarring to her joints.     Cervical radiculopathy 3/18/2015    Chronic knee pain     Chronic rhinitis 4/9/2013    CKD (chronic kidney disease) stage 3, GFR 30-59 ml/min 1/29/2013    Coronary artery disease due to calcified  coronary lesion 6/25/2015    Diastolic dysfunction 10/10/2013    Essential hypertension 6/25/2015    Gastroesophageal reflux disease without esophagitis 8/13/2012    Gout     Hives 10/1/2013    Mixed hyperlipidemia 8/13/2012    Morbid obesity with BMI of 50.0-59.9, adult 2/11/2014    Obstructive sleep apnea syndrome 8/13/2012    Senile cataracts of both eyes 1/22/2015    Traumatic open wound of right lower leg 8/25/2017    Trigeminal neuralgia of right side of face 5/23/2017    For years now Previously on Gabapentin, but caused constipation Dissipating over time Not related to intracranial abnormalities Possibly related to dental procedure    Type 2 diabetes mellitus with diabetic polyneuropathy, with long-term current use of insulin 1/29/2013    Venous stasis dermatitis of both lower extremities 8/21/2017    Vitamin D deficiency disease 8/13/2012       Social History     Socioeconomic History    Marital status: Single     Spouse name: Not on file    Number of children: Not on file    Years of education: Not on file    Highest education level: Not on file   Occupational History    Not on file   Social Needs    Financial resource strain: Not hard at all    Food insecurity     Worry: Never true     Inability: Never true    Transportation needs     Medical: No     Non-medical: No   Tobacco Use    Smoking status: Never Smoker    Smokeless tobacco: Never Used   Substance and Sexual Activity    Alcohol use: No    Drug use: No    Sexual activity: Never   Lifestyle    Physical activity     Days per week: Not on file     Minutes per session: Not on file    Stress: Not on file   Relationships    Social connections     Talks on phone: Not on file     Gets together: Not on file     Attends L[employer] service: Not on file     Active member of club or organization: Not on file     Attends meetings of clubs or organizations: Not on file     Relationship status: Not on file   Other Topics Concern     "Are you pregnant or think you may be? Not Asked    Breast-feeding Not Asked   Social History Narrative    Single with no children.        Review of Systems   Constitutional: Negative for fatigue.   Respiratory: Negative for shortness of breath.    Genitourinary: Negative for dysuria and frequency.   Skin: Positive for wound. Negative for rash.   Psychiatric/Behavioral: Negative for dysphoric mood.       Objective:   /78 (BP Location: Left arm, Patient Position: Sitting, BP Method: Large (Manual))   Pulse (!) 52   Temp 98.7 °F (37.1 °C) (Oral)   Resp 20   Ht 4' 11" (1.499 m)   SpO2 99%   BMI 59.49 kg/m²     Physical Exam  Constitutional:       Appearance: She is obese. She is not ill-appearing.   HENT:      Head: Normocephalic and atraumatic.   Cardiovascular:      Rate and Rhythm: Normal rate.   Pulmonary:      Effort: Pulmonary effort is normal.   Musculoskeletal:      Comments: On a wheelchair      Skin:     Comments: Wounds on LE bilaterally R>L  Both legs wrapped and compression stockings on      Neurological:      General: No focal deficit present.      Mental Status: She is oriented to person, place, and time.             Lab Results   Component Value Date    WBC 8.15 12/15/2020    HGB 11.5 (L) 12/15/2020    HCT 39.3 12/15/2020     (L) 12/15/2020    CHOL 150 12/15/2020    TRIG 83 12/15/2020    HDL 41 12/15/2020    ALT 14 12/15/2020    AST 19 12/15/2020     12/15/2020    K 4.7 12/15/2020     12/15/2020    CREATININE 1.1 12/15/2020    BUN 23 12/15/2020    CO2 25 12/15/2020    TSH 0.799 01/16/2020    INR 1.1 04/17/2005    HGBA1C 6.4 (H) 12/15/2020       Current Outpatient Medications on File Prior to Visit   Medication Sig Dispense Refill    acetaminophen (TYLENOL) 500 MG tablet Take 2 tablets (1,000 mg total) by mouth 2 (two) times daily as needed for Pain. 360 tablet 3    albuterol (PROVENTIL/VENTOLIN HFA) 90 mcg/actuation inhaler Inhale 2 puffs into the lungs every 4 (four) " hours as needed for Wheezing or Shortness of Breath. Rescue 54 g 3    albuterol-ipratropium (DUO-NEB) 2.5 mg-0.5 mg/3 mL nebulizer solution USE 1 VIAL PER NEBULIZER EVERY 6 HOURS AS NEEDED FOR WHEEZING/RESCUE 90 mL 0    allopurinoL (ZYLOPRIM) 100 MG tablet Take 1 tablet (100 mg total) by mouth once daily. 30 tablet 6    ammonium lactate 12 % Crea Apply lotion twice daily to affected area 385 g 11    aspirin (ECOTRIN) 81 MG EC tablet Take 1 tablet (81 mg total) by mouth once daily. 90 tablet 3    atorvastatin (LIPITOR) 80 MG tablet Take 1 tablet (80 mg total) by mouth once daily. 90 tablet 3    blood sugar diagnostic Strp BG monitoring 4 times a day. Pt needs test strips for [employer] Talking meter. (Patient taking differently: BG monitoring 2 times a day. Pt needs test strips for [employer] Talking meter.) 450 strip prn    cane tips Misc 1 application by Misc.(Non-Drug; Combo Route) route once daily. 4 each 0    cholecalciferol, vitamin D3, (VITAMIN D3) 25 mcg (1,000 unit) capsule Take 1 capsule (1,000 Units total) by mouth once daily. 90 capsule 3    clotrimazole (LOTRIMIN) 1 % cream Apply topically 2 (two) times daily. 113 g 3    colchicine (COLCRYS) 0.6 mg tablet 0.6 mg every other day 15 tablet 5    diclofenac sodium (VOLTAREN) 1 % Gel APPLY 2 GRAMS TOPICALLY DAILY 100 g 0    econazole nitrate 1 % cream Apply topically once daily. Apply to feet daily as directed. 85 g 1    fluticasone (FLONASE) 50 mcg/actuation nasal spray 2 sprays (100 mcg total) by Each Nare route once daily. 1 Bottle 3    fluticasone-salmeterol diskus inhaler 500-50 mcg INHALE 1 PUFF TWICE DAILY 60 each 11    insulin degludec (TRESIBA FLEXTOUCH U-200) 200 unit/mL (3 mL) InPn INJECT 52 UNITS UNDER SKIN DAILY 9 mL 6    insulin lispro 100 unit/mL pen INJECT 10 UNITS UNDER SKIN WITH BREAKFAST AND DINNER AND 6 UNITS AT LUNCH PLUS SCALE 150-200+2, 201-250+4, 251-300+6, 301-350+8, >350 +10 >3 30 mL 6    lancets Misc Test daily (Patient  "taking differently: Test twice  daily) 100 each 12    loratadine (CLARITIN) 10 mg tablet Take 1 tablet (10 mg total) by mouth once daily. 30 tablet 3    losartan (COZAAR) 100 MG tablet Take 1 tablet (100 mg total) by mouth once daily. 90 tablet 3    miconazole nitrate (LOTRIMIN AF POWDER) 2 % AerP Apply 1 application topically 2 (two) times daily. 130 g 3    mupirocin (BACTROBAN) 2 % ointment       mupirocin calcium 2% (BACTROBAN) 2 % cream Apply topically 3 (three) times daily. 30 g 11    nebulizer and compressor (COMP-AIR ELITE COMP NEB SYSTEM) Berna use as directed 1 each 0    pen needle, diabetic (PEN NEEDLE) 29 gauge x 1/2" Ndle TEST 5 TIMES A DAY WITH  each 6    sod chlor-bicarb-squeez bottle (NEILMED SINUS RINSE COMPLETE) pkdv Use as directed 1 each 5    torsemide (DEMADEX) 20 MG Tab Take 1 tablet (20 mg total) by mouth after lunch. 30 tablet 11    torsemide (DEMADEX) 20 MG Tab Take 1 tablet (20 mg total) by mouth once daily. 90 tablet 3    trolamine salicylate (ASPERCREME) 10 % cream Apply topically as needed.      cetirizine (ZYRTEC) 10 MG tablet Take 1 tablet (10 mg total) by mouth once daily. 30 tablet 2    [DISCONTINUED] insulin degludec (TRESIBA FLEXTOUCH U-200) 200 unit/mL (3 mL) InPn Inject 48 Units into the skin once daily. 9 mL 2     No current facility-administered medications on file prior to visit.          Assessment:   80 y.o. female with multiple co-morbid illnesses here to follow-up with PCP and continue work-up of chronic issues notably .      Plan:     Problem List Items Addressed This Visit        Derm    Venous stasis ulcer of right calf with fat layer exposed without varicose veins     RLE venous stasis ulcer  · Bilateral Legs wrapped in clinic today   · HH ordered to continue leg wrap            Endocrine    Type 2 diabetes mellitus with diabetic polyneuropathy, with long-term current use of insulin - Primary     Patient with brittle diabetes, several hypoglycemic " episodes nd variable readings  · Continue HOLDING all insulin until follow-up next week  · Continue carbohydrate consistent diabetic diet as discussed  · BG logs reviewed (100-140's), keep a BG log and bring it to all appointments             Other Visit Diagnoses     Encounter for change or removal of nonsurgical wound dressing              Health Maintenance       Date Due Completion Date    Hepatitis C Screening 1940 ---    TETANUS VACCINE 03/08/1960 ---    Shingles Vaccine (2 of 3) 02/10/2015 12/16/2014    Eye Exam 02/07/2021 2/7/2020 (Done)    Override on 2/7/2020: Done    Override on 2/17/2016: Done    Override on 1/22/2015: Done    Override on 8/16/2012: Done    Hemoglobin A1c 06/15/2021 12/15/2020    Override on 7/13/2016: Done    Mammogram 10/07/2021 10/7/2020    Foot Exam 11/10/2021 11/10/2020    Override on 12/31/2019: Done (Done by Dr. Anand)    Override on 11/27/2018: Done (Dr Anand)    Override on 1/19/2018: Done (Dr anand)    Override on 5/31/2017: Done    Override on 7/20/2016: Done    Lipid Panel 12/15/2021 12/15/2020    Aspirin/Antiplatelet Therapy 12/16/2021 12/16/2020    DEXA SCAN 02/22/2022 2/22/2018    Override on 12/13/2011: Done              Follow up in about 1 week (around 12/25/2020). Total face-to-face time was 60 min, >50% of this was spent on counseling and coordination of care. The following issues were discussed: T2DM and Venous stasis ulcers.     Anne Hawthorne MD  Resident    Tami Schmidt MD/MPH  [facility]  [phone] [facility]

## 2020-12-18 NOTE — ASSESSMENT & PLAN NOTE
RLE venous stasis ulcer  · Bilateral Legs wrapped in clinic today   · HH ordered to continue leg wrap

## 2020-12-18 NOTE — ASSESSMENT & PLAN NOTE
Patient with brittle diabetes, several hypoglycemic episodes nd variable readings  · Continue HOLDING all insulin until follow-up next week  · Continue carbohydrate consistent diabetic diet as discussed  · BG logs reviewed (100-140's), keep a BG log and bring it to all appointments

## 2020-12-28 ENCOUNTER — OFFICE VISIT (OUTPATIENT)
Dept: PRIMARY CARE CLINIC | Facility: CLINIC | Age: 80
End: 2020-12-28
Payer: MEDICARE

## 2020-12-28 VITALS
WEIGHT: 293 LBS | BODY MASS INDEX: 59.07 KG/M2 | TEMPERATURE: 98 F | OXYGEN SATURATION: 99 % | RESPIRATION RATE: 20 BRPM | SYSTOLIC BLOOD PRESSURE: 140 MMHG | HEIGHT: 59 IN | HEART RATE: 71 BPM | DIASTOLIC BLOOD PRESSURE: 78 MMHG

## 2020-12-28 DIAGNOSIS — I87.2 VENOUS STASIS ULCER OF RIGHT CALF WITH FAT LAYER EXPOSED WITHOUT VARICOSE VEINS: ICD-10-CM

## 2020-12-28 DIAGNOSIS — E66.01 MORBID OBESITY WITH BMI OF 50.0-59.9, ADULT: ICD-10-CM

## 2020-12-28 DIAGNOSIS — G47.33 OSA ON CPAP: ICD-10-CM

## 2020-12-28 DIAGNOSIS — E11.649 HYPOGLYCEMIA ASSOCIATED WITH TYPE 2 DIABETES MELLITUS: ICD-10-CM

## 2020-12-28 DIAGNOSIS — L97.212 VENOUS STASIS ULCER OF RIGHT CALF WITH FAT LAYER EXPOSED WITHOUT VARICOSE VEINS: ICD-10-CM

## 2020-12-28 PROCEDURE — 99215 OFFICE O/P EST HI 40 MIN: CPT | Mod: S$GLB,,, | Performed by: INTERNAL MEDICINE

## 2020-12-28 PROCEDURE — 99215 PR OFFICE/OUTPT VISIT, EST, LEVL V, 40-54 MIN: ICD-10-PCS | Mod: S$GLB,,, | Performed by: INTERNAL MEDICINE

## 2020-12-28 NOTE — PATIENT INSTRUCTIONS
TODAY:  - continue to NOT TAKE insulin  - do leg wraps every day   + vaseline gauze   + gauze wraps   + net bandage   + circaids  - continue pill packs

## 2020-12-28 NOTE — ASSESSMENT & PLAN NOTE
Patient with extremely low glucose readings over past 2 weeks, better today with NO INSULIN   · [facility] Froilan [facility]  ·  Continue to hold insulin

## 2020-12-28 NOTE — PROGRESS NOTES
[facility]    Subjective:      Patient ID: Duran Giordano is a 80 y.o. female with venous stasis ulcers, HTN, HLD, DM, severe obesity    Chief Complaint: Diabetes    Sister Doreen Ribera is performing leg wraps in the home for the patient, then applying circaids. Her legs are improving with this regimen. She wants pics taken with her personal phone so that she can use them for proof should her founding mother be nominated for a saint.    Her insulin was stopped at a previous appointment and glucose readings are better controlled today (log in note).    Pill packs as follows, patient complaint:  Morning card:   Allopurinol 100 mg (2 tablets)  Torsemide 20 mg      Evening card:   Atorvastatin 80 mg   Losartan 100 mg   Vitamin D3 1,000 IU          Electronically signed by Rachel Aguirre PharmD at 12/7/2020 10:43 AM    She has many stories about the other sisters having memory loss and confusion at [place].    Past Surgical History:   Procedure Laterality Date    HYSTERECTOMY  1982    secondary uterine fibroids    JOINT REPLACEMENT      Right total knee replacement         Past Medical History:   Diagnosis Date    Adrenal mass- adenoma stable since 2004 (1.8 cm and 8/13/12 (2 cm); stable 2016 2.4 cm     Adrenal mass- adenoma stable since 2004 (1.8 cm and 8/13/12 (2 cm); stable 2016 2.4 cm    Arthritis     Asthma in adult without complication 6/25/2015    Bilateral carotid artery disease 7/21/2017    Bilateral sciatica 2/7/2017    Cellulitis of right leg 08/16/2017    Cerebral infarction 1/29/2016    Multiple areas of lacunar infarction. Stroke risk factors include HTN, DM2, Dyslipidemia.  Continue ASA 81mg/ Statin therapy I have encouraged 30 minutes of physical activity daily for 5 days a week. She has access to a pool so this should not be so jarring to her joints.     Cervical radiculopathy 3/18/2015    Chronic knee pain     Chronic rhinitis 4/9/2013    CKD  (chronic kidney disease) stage 3, GFR 30-59 ml/min 1/29/2013    Coronary artery disease due to calcified coronary lesion 6/25/2015    Diastolic dysfunction 10/10/2013    Essential hypertension 6/25/2015    Gastroesophageal reflux disease without esophagitis 8/13/2012    Gout     Hives 10/1/2013    Mixed hyperlipidemia 8/13/2012    Morbid obesity with BMI of 50.0-59.9, adult 2/11/2014    Obstructive sleep apnea syndrome 8/13/2012    Senile cataracts of both eyes 1/22/2015    Traumatic open wound of right lower leg 8/25/2017    Trigeminal neuralgia of right side of face 5/23/2017    For years now Previously on Gabapentin, but caused constipation Dissipating over time Not related to intracranial abnormalities Possibly related to dental procedure    Type 2 diabetes mellitus with diabetic polyneuropathy, with long-term current use of insulin 1/29/2013    Venous stasis dermatitis of both lower extremities 8/21/2017    Vitamin D deficiency disease 8/13/2012       Social History     Socioeconomic History    Marital status: Single     Spouse name: Not on file    Number of children: Not on file    Years of education: Not on file    Highest education level: Not on file   Occupational History    Not on file   Social Needs    Financial resource strain: Not hard at all    Food insecurity     Worry: Never true     Inability: Never true    Transportation needs     Medical: No     Non-medical: No   Tobacco Use    Smoking status: Never Smoker    Smokeless tobacco: Never Used   Substance and Sexual Activity    Alcohol use: No    Drug use: No    Sexual activity: Never   Lifestyle    Physical activity     Days per week: Not on file     Minutes per session: Not on file    Stress: Not on file   Relationships    Social connections     Talks on phone: Not on file     Gets together: Not on file     Attends Congregation service: Not on file     Active member of club or organization: Not on file     Attends meetings  "of clubs or organizations: Not on file     Relationship status: Not on file   Other Topics Concern    Are you pregnant or think you may be? Not Asked    Breast-feeding Not Asked   Social History Narrative    Single with no children.        Review of Systems   Constitutional: Positive for activity change and appetite change.   Skin: Positive for wound.   Hematological: Bruises/bleeds easily.       Objective:   BP (!) 148/60 (BP Location: Left arm, Patient Position: Sitting, BP Method: Large (Manual))   Pulse 71   Temp 97.7 °F (36.5 °C)   Resp 20   Ht 4' 11" (1.499 m)   Wt 134 kg (295 lb 6 oz)   SpO2 99%   BMI 59.66 kg/m²     Physical Exam  Constitutional:       Appearance: She is obese.   Skin:     Findings: Erythema and lesion present.      Comments: Lateral leg ulcers   Neurological:      Mental Status: She is alert.   Psychiatric:         Mood and Affect: Mood normal.         Lab Results   Component Value Date    WBC 8.15 12/15/2020    HGB 11.5 (L) 12/15/2020    HCT 39.3 12/15/2020     (L) 12/15/2020    CHOL 150 12/15/2020    TRIG 83 12/15/2020    HDL 41 12/15/2020    ALT 14 12/15/2020    AST 19 12/15/2020     12/15/2020    K 4.7 12/15/2020     12/15/2020    CREATININE 1.1 12/15/2020    BUN 23 12/15/2020    CO2 25 12/15/2020    TSH 0.799 01/16/2020    INR 1.1 04/17/2005    HGBA1C 6.4 (H) 12/15/2020           RESULTS: Reviewed labs and images today    Assessment:   80 y.o. female with multiple co-morbid illnesses here to continue work-up of chronic issues notably with venous stasis ulcers, HTN, HLD, DM, severe obesity.     Plan:     Problem List Items Addressed This Visit        Derm    Venous stasis ulcer of right calf with fat layer exposed without varicose veins     RLE venous stasis ulcer  · Bilateral Legs wraps performed in the home with Sister Doreen Ribera   · Patient does not want HH ordered to continue leg wraps due to risk of COVID            Endocrine    Morbid obesity with BMI " of 50.0-59.9, adult     BMI >50, eats excessively, DM, HTN, REJI  · Counseled on restricting caloric intake  · No desserts, reduced carbs  · Continue low-salt, calorie restricted meals         Hypoglycemia associated with type 2 diabetes mellitus     Patient with extremely low glucose readings over past 2 weeks, better today with NO INSULIN   · [facility] Fro[facility]an [facility]  ·  Continue to hold insulin             Other    REJI on CPAP     REJI, frequently noncompliant with CPAP, severe morbid obesity (BMI 59.5)  · Strongly advised to use CPAP  · Advised to cut back on food               Health Maintenance       Date Due Completion Date    Hepatitis C Screening 1940 ---    TETANUS VACCINE 03/08/1960 ---    Shingles Vaccine (2 of 3) 02/10/2015 12/16/2014    Eye Exam 02/07/2021 2/7/2020 (Done)    Override on 2/7/2020: Done    Override on 2/17/2016: Done    Override on 1/22/2015: Done    Override on 8/16/2012: Done    Hemoglobin A1c 06/15/2021 12/15/2020    Override on 7/13/2016: Done    Mammogram 10/07/2021 10/7/2020    Foot Exam 11/10/2021 11/10/2020    Override on 12/31/2019: Done (Done by Dr. Anand)    Override on 11/27/2018: Done (Dr Anand)    Override on 1/19/2018: Done (Dr anand)    Override on 5/31/2017: Done    Override on 7/20/2016: Done    Lipid Panel 12/15/2021 12/15/2020    Aspirin/Antiplatelet Therapy 12/16/2021 12/16/2020    DEXA SCAN 02/22/2022 2/22/2018    Override on 12/13/2011: Done          Follow up in about 2 weeks (around 1/11/2021).  Total face-to-face time was 60 min, >50% of this was spent on counseling and coordination of care. The following issues were discussed: with venous stasis ulcers, HTN, HLD, DM, severe obesity    Tami Schmidt MD/MPH  Internal Medicine  [facility]  [phone]

## 2021-01-19 ENCOUNTER — OFFICE VISIT (OUTPATIENT)
Dept: PRIMARY CARE CLINIC | Facility: CLINIC | Age: 81
End: 2021-01-19
Payer: MEDICARE

## 2021-01-19 ENCOUNTER — PATIENT OUTREACH (OUTPATIENT)
Dept: ADMINISTRATIVE | Facility: OTHER | Age: 81
End: 2021-01-19

## 2021-01-19 VITALS
DIASTOLIC BLOOD PRESSURE: 78 MMHG | TEMPERATURE: 96 F | SYSTOLIC BLOOD PRESSURE: 122 MMHG | BODY MASS INDEX: 57.22 KG/M2 | OXYGEN SATURATION: 99 % | WEIGHT: 283.31 LBS

## 2021-01-19 DIAGNOSIS — E11.42 TYPE 2 DIABETES MELLITUS WITH DIABETIC POLYNEUROPATHY, WITH LONG-TERM CURRENT USE OF INSULIN: Primary | ICD-10-CM

## 2021-01-19 DIAGNOSIS — I87.2 VENOUS STASIS ULCER OF RIGHT CALF WITH FAT LAYER EXPOSED WITHOUT VARICOSE VEINS: ICD-10-CM

## 2021-01-19 DIAGNOSIS — L97.212 VENOUS STASIS ULCER OF RIGHT CALF WITH FAT LAYER EXPOSED WITHOUT VARICOSE VEINS: ICD-10-CM

## 2021-01-19 DIAGNOSIS — I50.32 CHRONIC DIASTOLIC HEART FAILURE: ICD-10-CM

## 2021-01-19 DIAGNOSIS — Z79.4 TYPE 2 DIABETES MELLITUS WITH DIABETIC POLYNEUROPATHY, WITH LONG-TERM CURRENT USE OF INSULIN: Primary | ICD-10-CM

## 2021-01-19 DIAGNOSIS — B35.1 ONYCHOMYCOSIS DUE TO DERMATOPHYTE: ICD-10-CM

## 2021-01-19 PROCEDURE — 99215 PR OFFICE/OUTPT VISIT, EST, LEVL V, 40-54 MIN: ICD-10-PCS | Mod: S$GLB,,, | Performed by: NURSE PRACTITIONER

## 2021-01-19 PROCEDURE — 99215 OFFICE O/P EST HI 40 MIN: CPT | Mod: S$GLB,,, | Performed by: NURSE PRACTITIONER

## 2021-01-21 ENCOUNTER — PATIENT MESSAGE (OUTPATIENT)
Dept: PHARMACY | Facility: CLINIC | Age: 81
End: 2021-01-21

## 2021-01-21 ENCOUNTER — OFFICE VISIT (OUTPATIENT)
Dept: WOUND CARE | Facility: CLINIC | Age: 81
End: 2021-01-21
Payer: MEDICARE

## 2021-01-21 VITALS
TEMPERATURE: 98 F | DIASTOLIC BLOOD PRESSURE: 78 MMHG | SYSTOLIC BLOOD PRESSURE: 178 MMHG | HEIGHT: 59 IN | BODY MASS INDEX: 56.22 KG/M2 | WEIGHT: 278.88 LBS | HEART RATE: 86 BPM

## 2021-01-21 DIAGNOSIS — I87.2 VENOUS INSUFFICIENCY OF BOTH LOWER EXTREMITIES: ICD-10-CM

## 2021-01-21 DIAGNOSIS — I89.0 LYMPHEDEMA OF BOTH LOWER EXTREMITIES: Primary | ICD-10-CM

## 2021-01-21 PROCEDURE — 99999 PR PBB SHADOW E&M-EST. PATIENT-LVL V: CPT | Mod: PBBFAC,,, | Performed by: NURSE PRACTITIONER

## 2021-01-21 PROCEDURE — 99213 OFFICE O/P EST LOW 20 MIN: CPT | Mod: S$PBB,,, | Performed by: NURSE PRACTITIONER

## 2021-01-21 PROCEDURE — 99213 PR OFFICE/OUTPT VISIT, EST, LEVL III, 20-29 MIN: ICD-10-PCS | Mod: S$PBB,,, | Performed by: NURSE PRACTITIONER

## 2021-01-21 PROCEDURE — 99215 OFFICE O/P EST HI 40 MIN: CPT | Mod: PBBFAC | Performed by: NURSE PRACTITIONER

## 2021-01-21 PROCEDURE — 99999 PR PBB SHADOW E&M-EST. PATIENT-LVL V: ICD-10-PCS | Mod: PBBFAC,,, | Performed by: NURSE PRACTITIONER

## 2021-01-22 ENCOUNTER — OFFICE VISIT (OUTPATIENT)
Dept: URGENT CARE | Facility: CLINIC | Age: 81
End: 2021-01-22
Payer: MEDICARE

## 2021-01-22 VITALS
WEIGHT: 284 LBS | HEIGHT: 59 IN | OXYGEN SATURATION: 95 % | DIASTOLIC BLOOD PRESSURE: 86 MMHG | RESPIRATION RATE: 18 BRPM | BODY MASS INDEX: 57.25 KG/M2 | HEART RATE: 84 BPM | TEMPERATURE: 97 F | SYSTOLIC BLOOD PRESSURE: 198 MMHG

## 2021-01-22 DIAGNOSIS — R53.83 FATIGUE, UNSPECIFIED TYPE: Primary | ICD-10-CM

## 2021-01-22 DIAGNOSIS — Z20.822 EXPOSURE TO COVID-19 VIRUS: ICD-10-CM

## 2021-01-22 LAB
CTP QC/QA: YES
SARS-COV-2 RDRP RESP QL NAA+PROBE: NEGATIVE

## 2021-01-22 PROCEDURE — 99213 OFFICE O/P EST LOW 20 MIN: CPT | Mod: S$GLB,CS,, | Performed by: NURSE PRACTITIONER

## 2021-01-22 PROCEDURE — 99213 PR OFFICE/OUTPT VISIT, EST, LEVL III, 20-29 MIN: ICD-10-PCS | Mod: S$GLB,CS,, | Performed by: NURSE PRACTITIONER

## 2021-01-22 PROCEDURE — U0002: ICD-10-PCS | Mod: QW,CR,S$GLB, | Performed by: NURSE PRACTITIONER

## 2021-01-22 PROCEDURE — U0002 COVID-19 LAB TEST NON-CDC: HCPCS | Mod: QW,CR,S$GLB, | Performed by: NURSE PRACTITIONER

## 2021-01-25 ENCOUNTER — TELEPHONE (OUTPATIENT)
Dept: PRIMARY CARE CLINIC | Facility: CLINIC | Age: 81
End: 2021-01-25

## 2021-01-28 ENCOUNTER — TELEPHONE (OUTPATIENT)
Dept: PRIMARY CARE CLINIC | Facility: CLINIC | Age: 81
End: 2021-01-28

## 2021-02-03 ENCOUNTER — OFFICE VISIT (OUTPATIENT)
Dept: OPTOMETRY | Facility: CLINIC | Age: 81
End: 2021-02-03
Payer: MEDICARE

## 2021-02-03 DIAGNOSIS — H52.203 HYPEROPIA WITH ASTIGMATISM AND PRESBYOPIA, BILATERAL: ICD-10-CM

## 2021-02-03 DIAGNOSIS — Z79.4 TYPE 2 DIABETES MELLITUS WITH DIABETIC POLYNEUROPATHY, WITH LONG-TERM CURRENT USE OF INSULIN: ICD-10-CM

## 2021-02-03 DIAGNOSIS — H40.013 OAG (OPEN ANGLE GLAUCOMA) SUSPECT, LOW RISK, BILATERAL: ICD-10-CM

## 2021-02-03 DIAGNOSIS — E11.42 TYPE 2 DIABETES MELLITUS WITH DIABETIC POLYNEUROPATHY, WITH LONG-TERM CURRENT USE OF INSULIN: ICD-10-CM

## 2021-02-03 DIAGNOSIS — H52.03 HYPEROPIA WITH ASTIGMATISM AND PRESBYOPIA, BILATERAL: ICD-10-CM

## 2021-02-03 DIAGNOSIS — H25.13 NUCLEAR SCLEROSIS OF BOTH EYES: ICD-10-CM

## 2021-02-03 DIAGNOSIS — E11.9 TYPE 2 DIABETES MELLITUS WITHOUT RETINOPATHY: Primary | ICD-10-CM

## 2021-02-03 DIAGNOSIS — H52.4 HYPEROPIA WITH ASTIGMATISM AND PRESBYOPIA, BILATERAL: ICD-10-CM

## 2021-02-03 PROCEDURE — 99999 PR PBB SHADOW E&M-EST. PATIENT-LVL IV: ICD-10-PCS | Mod: PBBFAC,,, | Performed by: OPTOMETRIST

## 2021-02-03 PROCEDURE — 92014 PR EYE EXAM, EST PATIENT,COMPREHESV: ICD-10-PCS | Mod: S$PBB,,, | Performed by: OPTOMETRIST

## 2021-02-03 PROCEDURE — 99214 OFFICE O/P EST MOD 30 MIN: CPT | Mod: PBBFAC | Performed by: OPTOMETRIST

## 2021-02-03 PROCEDURE — 99999 PR PBB SHADOW E&M-EST. PATIENT-LVL IV: CPT | Mod: PBBFAC,,, | Performed by: OPTOMETRIST

## 2021-02-03 PROCEDURE — 92015 DETERMINE REFRACTIVE STATE: CPT | Mod: ,,, | Performed by: OPTOMETRIST

## 2021-02-03 PROCEDURE — 92015 PR REFRACTION: ICD-10-PCS | Mod: ,,, | Performed by: OPTOMETRIST

## 2021-02-03 PROCEDURE — 92014 COMPRE OPH EXAM EST PT 1/>: CPT | Mod: S$PBB,,, | Performed by: OPTOMETRIST

## 2021-02-10 ENCOUNTER — TELEPHONE (OUTPATIENT)
Dept: PRIMARY CARE CLINIC | Facility: CLINIC | Age: 81
End: 2021-02-10

## 2021-02-17 ENCOUNTER — TELEPHONE (OUTPATIENT)
Dept: PRIMARY CARE CLINIC | Facility: CLINIC | Age: 81
End: 2021-02-17

## 2021-02-17 ENCOUNTER — OFFICE VISIT (OUTPATIENT)
Dept: PODIATRY | Facility: CLINIC | Age: 81
End: 2021-02-17
Payer: MEDICARE

## 2021-02-17 ENCOUNTER — OFFICE VISIT (OUTPATIENT)
Dept: PRIMARY CARE CLINIC | Facility: CLINIC | Age: 81
End: 2021-02-17
Payer: COMMERCIAL

## 2021-02-17 VITALS
BODY MASS INDEX: 57.36 KG/M2 | HEIGHT: 59 IN | DIASTOLIC BLOOD PRESSURE: 76 MMHG | HEART RATE: 74 BPM | OXYGEN SATURATION: 97 % | RESPIRATION RATE: 16 BRPM | SYSTOLIC BLOOD PRESSURE: 142 MMHG | TEMPERATURE: 98 F

## 2021-02-17 VITALS
DIASTOLIC BLOOD PRESSURE: 81 MMHG | HEART RATE: 80 BPM | WEIGHT: 284 LBS | RESPIRATION RATE: 18 BRPM | HEIGHT: 59 IN | SYSTOLIC BLOOD PRESSURE: 198 MMHG | BODY MASS INDEX: 57.25 KG/M2

## 2021-02-17 DIAGNOSIS — E27.8 ADRENAL MASS: ICD-10-CM

## 2021-02-17 DIAGNOSIS — I87.2 VENOUS STASIS ULCER OF RIGHT CALF WITH FAT LAYER EXPOSED WITHOUT VARICOSE VEINS: ICD-10-CM

## 2021-02-17 DIAGNOSIS — E11.649 HYPOGLYCEMIA ASSOCIATED WITH TYPE 2 DIABETES MELLITUS: ICD-10-CM

## 2021-02-17 DIAGNOSIS — L84 CORN OR CALLUS: ICD-10-CM

## 2021-02-17 DIAGNOSIS — I83.029 VENOUS STASIS ULCER OF LEFT LOWER LEG WITH EDEMA OF LEFT LOWER LEG: ICD-10-CM

## 2021-02-17 DIAGNOSIS — R60.0 VENOUS STASIS ULCER OF LEFT LOWER LEG WITH EDEMA OF LEFT LOWER LEG: ICD-10-CM

## 2021-02-17 DIAGNOSIS — E66.01 MORBID OBESITY WITH BMI OF 50.0-59.9, ADULT: ICD-10-CM

## 2021-02-17 DIAGNOSIS — I83.892 VENOUS STASIS ULCER OF LEFT LOWER LEG WITH EDEMA OF LEFT LOWER LEG: ICD-10-CM

## 2021-02-17 DIAGNOSIS — D69.2 SENILE PURPURA: ICD-10-CM

## 2021-02-17 DIAGNOSIS — J44.9 CHRONIC OBSTRUCTIVE PULMONARY DISEASE, UNSPECIFIED COPD TYPE: ICD-10-CM

## 2021-02-17 DIAGNOSIS — L97.929 VENOUS STASIS ULCER OF LEFT LOWER LEG WITH EDEMA OF LEFT LOWER LEG: ICD-10-CM

## 2021-02-17 DIAGNOSIS — E11.51 TYPE II DIABETES MELLITUS WITH PERIPHERAL CIRCULATORY DISORDER: Primary | ICD-10-CM

## 2021-02-17 DIAGNOSIS — B35.1 ONYCHOMYCOSIS DUE TO DERMATOPHYTE: ICD-10-CM

## 2021-02-17 DIAGNOSIS — L97.212 VENOUS STASIS ULCER OF RIGHT CALF WITH FAT LAYER EXPOSED WITHOUT VARICOSE VEINS: ICD-10-CM

## 2021-02-17 PROBLEM — M06.9 RHEUMATOID ARTHRITIS: Status: RESOLVED | Noted: 2020-12-15 | Resolved: 2021-02-17

## 2021-02-17 PROCEDURE — 11056 PR TRIM BENIGN HYPERKERATOTIC SKIN LESION,2-4: ICD-10-PCS | Mod: Q9,S$PBB,, | Performed by: PODIATRIST

## 2021-02-17 PROCEDURE — 99215 OFFICE O/P EST HI 40 MIN: CPT | Mod: PBBFAC | Performed by: PODIATRIST

## 2021-02-17 PROCEDURE — 99215 PR OFFICE/OUTPT VISIT, EST, LEVL V, 40-54 MIN: ICD-10-PCS | Mod: S$GLB,,, | Performed by: INTERNAL MEDICINE

## 2021-02-17 PROCEDURE — 11056 PARNG/CUTG B9 HYPRKR LES 2-4: CPT | Mod: Q9,PBBFAC | Performed by: PODIATRIST

## 2021-02-17 PROCEDURE — 99215 OFFICE O/P EST HI 40 MIN: CPT | Mod: S$GLB,,, | Performed by: INTERNAL MEDICINE

## 2021-02-17 PROCEDURE — 99499 UNLISTED E&M SERVICE: CPT | Mod: S$PBB,,, | Performed by: PODIATRIST

## 2021-02-17 PROCEDURE — 99999 PR PBB SHADOW E&M-EST. PATIENT-LVL V: ICD-10-PCS | Mod: PBBFAC,,, | Performed by: PODIATRIST

## 2021-02-17 PROCEDURE — 11721 DEBRIDE NAIL 6 OR MORE: CPT | Mod: Q9,59,S$PBB, | Performed by: PODIATRIST

## 2021-02-17 PROCEDURE — 99999 PR PBB SHADOW E&M-EST. PATIENT-LVL V: CPT | Mod: PBBFAC,,, | Performed by: PODIATRIST

## 2021-02-17 PROCEDURE — 99499 NO LOS: ICD-10-PCS | Mod: S$PBB,,, | Performed by: PODIATRIST

## 2021-02-17 PROCEDURE — 11721 DEBRIDE NAIL 6 OR MORE: CPT | Mod: Q9,59,PBBFAC | Performed by: PODIATRIST

## 2021-02-17 PROCEDURE — 11056 PARNG/CUTG B9 HYPRKR LES 2-4: CPT | Mod: Q9,S$PBB,, | Performed by: PODIATRIST

## 2021-02-17 PROCEDURE — 11721 PR DEBRIDEMENT OF NAILS, 6 OR MORE: ICD-10-PCS | Mod: Q9,59,S$PBB, | Performed by: PODIATRIST

## 2021-02-18 ENCOUNTER — OFFICE VISIT (OUTPATIENT)
Dept: PRIMARY CARE CLINIC | Facility: CLINIC | Age: 81
End: 2021-02-18
Payer: MEDICARE

## 2021-02-18 ENCOUNTER — CLINICAL SUPPORT (OUTPATIENT)
Dept: OPHTHALMOLOGY | Facility: CLINIC | Age: 81
End: 2021-02-18
Payer: MEDICARE

## 2021-02-18 VITALS
WEIGHT: 281.06 LBS | DIASTOLIC BLOOD PRESSURE: 70 MMHG | SYSTOLIC BLOOD PRESSURE: 165 MMHG | BODY MASS INDEX: 56.77 KG/M2 | HEART RATE: 88 BPM

## 2021-02-18 DIAGNOSIS — R06.02 SOB (SHORTNESS OF BREATH): ICD-10-CM

## 2021-02-18 DIAGNOSIS — Z79.4 TYPE 2 DIABETES MELLITUS WITH DIABETIC POLYNEUROPATHY, WITH LONG-TERM CURRENT USE OF INSULIN: Primary | ICD-10-CM

## 2021-02-18 DIAGNOSIS — I87.2 VENOUS STASIS DERMATITIS OF BOTH LOWER EXTREMITIES: ICD-10-CM

## 2021-02-18 DIAGNOSIS — H40.013 OAG (OPEN ANGLE GLAUCOMA) SUSPECT, LOW RISK, BILATERAL: ICD-10-CM

## 2021-02-18 DIAGNOSIS — E11.42 TYPE 2 DIABETES MELLITUS WITH DIABETIC POLYNEUROPATHY, WITH LONG-TERM CURRENT USE OF INSULIN: Primary | ICD-10-CM

## 2021-02-18 DIAGNOSIS — E27.8 ADRENAL MASS: ICD-10-CM

## 2021-02-18 DIAGNOSIS — E66.01 MORBID OBESITY WITH BMI OF 50.0-59.9, ADULT: ICD-10-CM

## 2021-02-18 PROCEDURE — 99214 PR OFFICE/OUTPT VISIT, EST, LEVL IV, 30-39 MIN: ICD-10-PCS | Mod: S$GLB,,, | Performed by: INTERNAL MEDICINE

## 2021-02-18 PROCEDURE — 99214 OFFICE O/P EST MOD 30 MIN: CPT | Mod: S$GLB,,, | Performed by: INTERNAL MEDICINE

## 2021-02-23 ENCOUNTER — OFFICE VISIT (OUTPATIENT)
Dept: RHEUMATOLOGY | Facility: CLINIC | Age: 81
End: 2021-02-23
Payer: MEDICARE

## 2021-02-23 ENCOUNTER — PATIENT MESSAGE (OUTPATIENT)
Dept: RHEUMATOLOGY | Facility: CLINIC | Age: 81
End: 2021-02-23

## 2021-02-23 ENCOUNTER — PATIENT OUTREACH (OUTPATIENT)
Dept: ADMINISTRATIVE | Facility: OTHER | Age: 81
End: 2021-02-23

## 2021-02-23 VITALS — HEART RATE: 70 BPM | DIASTOLIC BLOOD PRESSURE: 71 MMHG | SYSTOLIC BLOOD PRESSURE: 172 MMHG

## 2021-02-23 DIAGNOSIS — M1A.09X0 IDIOPATHIC CHRONIC GOUT OF MULTIPLE SITES WITHOUT TOPHUS: Primary | ICD-10-CM

## 2021-02-23 PROCEDURE — 99999 PR PBB SHADOW E&M-EST. PATIENT-LVL III: ICD-10-PCS | Mod: PBBFAC,,, | Performed by: INTERNAL MEDICINE

## 2021-02-23 PROCEDURE — 99214 PR OFFICE/OUTPT VISIT, EST, LEVL IV, 30-39 MIN: ICD-10-PCS | Mod: S$PBB,,, | Performed by: INTERNAL MEDICINE

## 2021-02-23 PROCEDURE — 99999 PR PBB SHADOW E&M-EST. PATIENT-LVL III: CPT | Mod: PBBFAC,,, | Performed by: INTERNAL MEDICINE

## 2021-02-23 PROCEDURE — 99213 OFFICE O/P EST LOW 20 MIN: CPT | Mod: PBBFAC | Performed by: INTERNAL MEDICINE

## 2021-02-23 PROCEDURE — 99214 OFFICE O/P EST MOD 30 MIN: CPT | Mod: S$PBB,,, | Performed by: INTERNAL MEDICINE

## 2021-02-23 RX ORDER — COLCHICINE 0.6 MG/1
TABLET ORAL
Qty: 15 TABLET | Refills: 5 | Status: SHIPPED | OUTPATIENT
Start: 2021-02-23 | End: 2021-05-26 | Stop reason: SDUPTHER

## 2021-02-23 RX ORDER — ALLOPURINOL 300 MG/1
300 TABLET ORAL DAILY
Qty: 30 TABLET | Refills: 6 | Status: SHIPPED | OUTPATIENT
Start: 2021-02-23 | End: 2021-05-26 | Stop reason: SDUPTHER

## 2021-02-23 RX ORDER — ALLOPURINOL 300 MG/1
300 TABLET ORAL DAILY
Qty: 30 TABLET | Refills: 6 | Status: SHIPPED | OUTPATIENT
Start: 2021-02-23 | End: 2021-02-23

## 2021-02-23 RX ORDER — COLCHICINE 0.6 MG/1
TABLET ORAL
Qty: 15 TABLET | Refills: 5 | Status: SHIPPED | OUTPATIENT
Start: 2021-02-23 | End: 2021-02-23

## 2021-02-23 ASSESSMENT — ROUTINE ASSESSMENT OF PATIENT INDEX DATA (RAPID3)
MDHAQ FUNCTION SCORE: 0.7
PSYCHOLOGICAL DISTRESS SCORE: 0
PATIENT GLOBAL ASSESSMENT SCORE: 4
PAIN SCORE: 3
FATIGUE SCORE: 3.5
AM STIFFNESS SCORE: 1, YES
TOTAL RAPID3 SCORE: 3.11

## 2021-03-01 ENCOUNTER — OFFICE VISIT (OUTPATIENT)
Dept: PRIMARY CARE CLINIC | Facility: CLINIC | Age: 81
End: 2021-03-01
Payer: MEDICARE

## 2021-03-01 VITALS
TEMPERATURE: 98 F | SYSTOLIC BLOOD PRESSURE: 158 MMHG | BODY MASS INDEX: 56.66 KG/M2 | DIASTOLIC BLOOD PRESSURE: 62 MMHG | HEIGHT: 59 IN | OXYGEN SATURATION: 96 % | WEIGHT: 281.06 LBS | RESPIRATION RATE: 16 BRPM | HEART RATE: 96 BPM

## 2021-03-01 DIAGNOSIS — E11.649 HYPOGLYCEMIA ASSOCIATED WITH TYPE 2 DIABETES MELLITUS: ICD-10-CM

## 2021-03-01 DIAGNOSIS — M10.00 IDIOPATHIC GOUT, UNSPECIFIED CHRONICITY, UNSPECIFIED SITE: ICD-10-CM

## 2021-03-01 PROCEDURE — 99215 PR OFFICE/OUTPT VISIT, EST, LEVL V, 40-54 MIN: ICD-10-PCS | Mod: S$GLB,,, | Performed by: INTERNAL MEDICINE

## 2021-03-01 PROCEDURE — 99215 OFFICE O/P EST HI 40 MIN: CPT | Mod: S$GLB,,, | Performed by: INTERNAL MEDICINE

## 2021-03-04 ENCOUNTER — LAB VISIT (OUTPATIENT)
Dept: LAB | Facility: HOSPITAL | Age: 81
End: 2021-03-04
Attending: INTERNAL MEDICINE
Payer: MEDICARE

## 2021-03-04 DIAGNOSIS — E11.59 HYPERTENSION ASSOCIATED WITH DIABETES: ICD-10-CM

## 2021-03-04 DIAGNOSIS — Z79.4 TYPE 2 DIABETES MELLITUS WITH DIABETIC POLYNEUROPATHY, WITH LONG-TERM CURRENT USE OF INSULIN: ICD-10-CM

## 2021-03-04 DIAGNOSIS — I15.2 HYPERTENSION ASSOCIATED WITH DIABETES: ICD-10-CM

## 2021-03-04 DIAGNOSIS — E11.42 TYPE 2 DIABETES MELLITUS WITH DIABETIC POLYNEUROPATHY, WITH LONG-TERM CURRENT USE OF INSULIN: ICD-10-CM

## 2021-03-04 LAB
ALBUMIN/CREAT UR: 121.5 UG/MG (ref 0–30)
CREAT UR-MCNC: 191 MG/DL (ref 15–325)
MICROALBUMIN UR DL<=1MG/L-MCNC: 232 UG/ML

## 2021-03-04 PROCEDURE — 82043 UR ALBUMIN QUANTITATIVE: CPT | Performed by: NURSE PRACTITIONER

## 2021-03-04 PROCEDURE — 82570 ASSAY OF URINE CREATININE: CPT | Performed by: NURSE PRACTITIONER

## 2021-03-08 ENCOUNTER — OFFICE VISIT (OUTPATIENT)
Dept: INTERNAL MEDICINE | Facility: CLINIC | Age: 81
End: 2021-03-08
Payer: MEDICARE

## 2021-03-08 VITALS
DIASTOLIC BLOOD PRESSURE: 79 MMHG | SYSTOLIC BLOOD PRESSURE: 138 MMHG | HEIGHT: 59 IN | HEART RATE: 87 BPM | OXYGEN SATURATION: 100 % | BODY MASS INDEX: 56.66 KG/M2 | WEIGHT: 281.06 LBS

## 2021-03-08 DIAGNOSIS — G47.33 OSA ON CPAP: ICD-10-CM

## 2021-03-08 DIAGNOSIS — I25.84 CORONARY ARTERY DISEASE DUE TO CALCIFIED CORONARY LESION: Chronic | ICD-10-CM

## 2021-03-08 DIAGNOSIS — I87.2 VENOUS STASIS DERMATITIS OF BOTH LOWER EXTREMITIES: ICD-10-CM

## 2021-03-08 DIAGNOSIS — I25.10 CORONARY ARTERY DISEASE DUE TO CALCIFIED CORONARY LESION: Chronic | ICD-10-CM

## 2021-03-08 DIAGNOSIS — I50.32 CHRONIC DIASTOLIC HEART FAILURE: ICD-10-CM

## 2021-03-08 DIAGNOSIS — E66.01 MORBID OBESITY WITH BMI OF 50.0-59.9, ADULT: ICD-10-CM

## 2021-03-08 DIAGNOSIS — J44.9 CHRONIC OBSTRUCTIVE PULMONARY DISEASE, UNSPECIFIED COPD TYPE: ICD-10-CM

## 2021-03-08 DIAGNOSIS — N18.31 STAGE 3A CHRONIC KIDNEY DISEASE: ICD-10-CM

## 2021-03-08 DIAGNOSIS — Z79.4 TYPE 2 DIABETES MELLITUS WITH DIABETIC POLYNEUROPATHY, WITH LONG-TERM CURRENT USE OF INSULIN: Primary | ICD-10-CM

## 2021-03-08 DIAGNOSIS — I89.0 LYMPHEDEMA OF BOTH LOWER EXTREMITIES: ICD-10-CM

## 2021-03-08 DIAGNOSIS — E11.59 HYPERTENSION ASSOCIATED WITH DIABETES: ICD-10-CM

## 2021-03-08 DIAGNOSIS — E11.42 TYPE 2 DIABETES MELLITUS WITH DIABETIC POLYNEUROPATHY, WITH LONG-TERM CURRENT USE OF INSULIN: Primary | ICD-10-CM

## 2021-03-08 DIAGNOSIS — I15.2 HYPERTENSION ASSOCIATED WITH DIABETES: ICD-10-CM

## 2021-03-08 PROBLEM — E11.649 HYPOGLYCEMIA ASSOCIATED WITH TYPE 2 DIABETES MELLITUS: Status: RESOLVED | Noted: 2020-12-15 | Resolved: 2021-03-08

## 2021-03-08 PROCEDURE — 99999 PR PBB SHADOW E&M-EST. PATIENT-LVL V: CPT | Mod: PBBFAC,,, | Performed by: NURSE PRACTITIONER

## 2021-03-08 PROCEDURE — 99214 OFFICE O/P EST MOD 30 MIN: CPT | Mod: S$PBB,,, | Performed by: NURSE PRACTITIONER

## 2021-03-08 PROCEDURE — 99214 PR OFFICE/OUTPT VISIT, EST, LEVL IV, 30-39 MIN: ICD-10-PCS | Mod: S$PBB,,, | Performed by: NURSE PRACTITIONER

## 2021-03-08 PROCEDURE — 99215 OFFICE O/P EST HI 40 MIN: CPT | Mod: PBBFAC | Performed by: NURSE PRACTITIONER

## 2021-03-08 PROCEDURE — 99999 PR PBB SHADOW E&M-EST. PATIENT-LVL V: ICD-10-PCS | Mod: PBBFAC,,, | Performed by: NURSE PRACTITIONER

## 2021-03-08 RX ORDER — SEMAGLUTIDE 1.34 MG/ML
INJECTION, SOLUTION SUBCUTANEOUS
Qty: 1 SYRINGE | Refills: 4 | Status: SHIPPED | OUTPATIENT
Start: 2021-03-08 | End: 2021-07-12

## 2021-04-08 ENCOUNTER — PES CALL (OUTPATIENT)
Dept: ADMINISTRATIVE | Facility: CLINIC | Age: 81
End: 2021-04-08

## 2021-04-21 ENCOUNTER — TELEPHONE (OUTPATIENT)
Dept: PRIMARY CARE CLINIC | Facility: CLINIC | Age: 81
End: 2021-04-21

## 2021-04-22 ENCOUNTER — TELEPHONE (OUTPATIENT)
Dept: PRIMARY CARE CLINIC | Facility: CLINIC | Age: 81
End: 2021-04-22

## 2021-04-22 ENCOUNTER — OFFICE VISIT (OUTPATIENT)
Dept: PRIMARY CARE CLINIC | Facility: CLINIC | Age: 81
End: 2021-04-22
Payer: MEDICARE

## 2021-04-22 ENCOUNTER — HOSPITAL ENCOUNTER (OUTPATIENT)
Dept: RADIOLOGY | Facility: HOSPITAL | Age: 81
Discharge: HOME | End: 2021-04-22
Attending: NURSE PRACTITIONER
Payer: MEDICARE

## 2021-04-22 VITALS
HEIGHT: 59 IN | HEART RATE: 74 BPM | BODY MASS INDEX: 55.44 KG/M2 | SYSTOLIC BLOOD PRESSURE: 163 MMHG | WEIGHT: 275 LBS | DIASTOLIC BLOOD PRESSURE: 85 MMHG | OXYGEN SATURATION: 95 %

## 2021-04-22 DIAGNOSIS — J45.901 EXACERBATION OF ASTHMA, UNSPECIFIED ASTHMA SEVERITY, UNSPECIFIED WHETHER PERSISTENT: ICD-10-CM

## 2021-04-22 DIAGNOSIS — Z79.4 TYPE 2 DIABETES MELLITUS WITH DIABETIC POLYNEUROPATHY, WITH LONG-TERM CURRENT USE OF INSULIN: ICD-10-CM

## 2021-04-22 DIAGNOSIS — R05.9 COUGH: Primary | ICD-10-CM

## 2021-04-22 DIAGNOSIS — R05.9 COUGH: ICD-10-CM

## 2021-04-22 DIAGNOSIS — E11.42 TYPE 2 DIABETES MELLITUS WITH DIABETIC POLYNEUROPATHY, WITH LONG-TERM CURRENT USE OF INSULIN: ICD-10-CM

## 2021-04-22 DIAGNOSIS — J44.1 COPD WITH ACUTE EXACERBATION: ICD-10-CM

## 2021-04-22 LAB — GLUCOSE SERPL-MCNC: 162 MG/DL (ref 70–110)

## 2021-04-22 PROCEDURE — 71046 XR CHEST PA AND LATERAL: ICD-10-PCS | Mod: 26,,, | Performed by: RADIOLOGY

## 2021-04-22 PROCEDURE — 99214 PR OFFICE/OUTPT VISIT, EST, LEVL IV, 30-39 MIN: ICD-10-PCS | Mod: 25,S$GLB,, | Performed by: NURSE PRACTITIONER

## 2021-04-22 PROCEDURE — 82962 POCT GLUCOSE, HAND-HELD DEVICE: ICD-10-PCS | Mod: ,,, | Performed by: NURSE PRACTITIONER

## 2021-04-22 PROCEDURE — 82962 GLUCOSE BLOOD TEST: CPT | Mod: ,,, | Performed by: NURSE PRACTITIONER

## 2021-04-22 PROCEDURE — 99214 OFFICE O/P EST MOD 30 MIN: CPT | Mod: 25,S$GLB,, | Performed by: NURSE PRACTITIONER

## 2021-04-22 PROCEDURE — 94640 AIRWAY INHALATION TREATMENT: CPT | Mod: S$GLB,,, | Performed by: NURSE PRACTITIONER

## 2021-04-22 PROCEDURE — 71046 X-RAY EXAM CHEST 2 VIEWS: CPT | Mod: TC

## 2021-04-22 PROCEDURE — 71046 X-RAY EXAM CHEST 2 VIEWS: CPT | Mod: 26,,, | Performed by: RADIOLOGY

## 2021-04-22 PROCEDURE — 94640 PR INHAL RX, AIRWAY OBST/DX SPUTUM INDUCT: ICD-10-PCS | Mod: 59,S$GLB,, | Performed by: NURSE PRACTITIONER

## 2021-04-22 RX ORDER — PREDNISONE 20 MG/1
40 TABLET ORAL DAILY
Qty: 10 TABLET | Refills: 0 | Status: SHIPPED | OUTPATIENT
Start: 2021-04-22 | End: 2021-04-27

## 2021-04-22 RX ORDER — IPRATROPIUM BROMIDE AND ALBUTEROL SULFATE 2.5; .5 MG/3ML; MG/3ML
3 SOLUTION RESPIRATORY (INHALATION)
Status: COMPLETED | OUTPATIENT
Start: 2021-04-22 | End: 2021-04-22

## 2021-04-22 RX ADMIN — IPRATROPIUM BROMIDE AND ALBUTEROL SULFATE 3 ML: 2.5; .5 SOLUTION RESPIRATORY (INHALATION) at 11:04

## 2021-04-22 RX ADMIN — IPRATROPIUM BROMIDE AND ALBUTEROL SULFATE 3 ML: 2.5; .5 SOLUTION RESPIRATORY (INHALATION) at 12:04

## 2021-04-26 ENCOUNTER — TELEPHONE (OUTPATIENT)
Dept: PRIMARY CARE CLINIC | Facility: CLINIC | Age: 81
End: 2021-04-26

## 2021-04-26 ENCOUNTER — OFFICE VISIT (OUTPATIENT)
Dept: INTERNAL MEDICINE | Facility: CLINIC | Age: 81
End: 2021-04-26
Payer: MEDICARE

## 2021-04-26 ENCOUNTER — OFFICE VISIT (OUTPATIENT)
Dept: PRIMARY CARE CLINIC | Facility: CLINIC | Age: 81
End: 2021-04-26
Payer: MEDICARE

## 2021-04-26 VITALS
DIASTOLIC BLOOD PRESSURE: 90 MMHG | HEIGHT: 59 IN | BODY MASS INDEX: 55.06 KG/M2 | WEIGHT: 273.13 LBS | HEART RATE: 86 BPM | SYSTOLIC BLOOD PRESSURE: 125 MMHG | RESPIRATION RATE: 18 BRPM | TEMPERATURE: 98 F | OXYGEN SATURATION: 96 %

## 2021-04-26 VITALS
DIASTOLIC BLOOD PRESSURE: 80 MMHG | WEIGHT: 273.13 LBS | SYSTOLIC BLOOD PRESSURE: 138 MMHG | BODY MASS INDEX: 55.17 KG/M2 | HEART RATE: 82 BPM

## 2021-04-26 DIAGNOSIS — J45.40 MODERATE PERSISTENT ASTHMA WITHOUT COMPLICATION: ICD-10-CM

## 2021-04-26 DIAGNOSIS — Z00.00 ENCOUNTER FOR PREVENTATIVE ADULT HEALTH CARE EXAMINATION: Primary | ICD-10-CM

## 2021-04-26 DIAGNOSIS — J45.909 REACTIVE AIRWAY DISEASE WITHOUT COMPLICATION, UNSPECIFIED ASTHMA SEVERITY, UNSPECIFIED WHETHER PERSISTENT: ICD-10-CM

## 2021-04-26 DIAGNOSIS — D64.9 ANEMIA, UNSPECIFIED TYPE: ICD-10-CM

## 2021-04-26 DIAGNOSIS — K21.9 GASTROESOPHAGEAL REFLUX DISEASE WITHOUT ESOPHAGITIS: ICD-10-CM

## 2021-04-26 DIAGNOSIS — E11.59 HYPERTENSION ASSOCIATED WITH DIABETES: ICD-10-CM

## 2021-04-26 DIAGNOSIS — J44.9 CHRONIC OBSTRUCTIVE PULMONARY DISEASE, UNSPECIFIED COPD TYPE: ICD-10-CM

## 2021-04-26 DIAGNOSIS — I77.1 TORTUOUS AORTA: ICD-10-CM

## 2021-04-26 DIAGNOSIS — E55.9 VITAMIN D DEFICIENCY DISEASE: ICD-10-CM

## 2021-04-26 DIAGNOSIS — I25.10 CORONARY ARTERY DISEASE DUE TO CALCIFIED CORONARY LESION: Chronic | ICD-10-CM

## 2021-04-26 DIAGNOSIS — J45.40 MODERATE PERSISTENT REACTIVE AIRWAY DISEASE WITHOUT COMPLICATION: ICD-10-CM

## 2021-04-26 DIAGNOSIS — I87.2 VENOUS STASIS DERMATITIS OF BOTH LOWER EXTREMITIES: ICD-10-CM

## 2021-04-26 DIAGNOSIS — E66.01 MORBID OBESITY WITH BMI OF 50.0-59.9, ADULT: ICD-10-CM

## 2021-04-26 DIAGNOSIS — M24.28 LIGAMENTUM FLAVUM HYPERTROPHY: ICD-10-CM

## 2021-04-26 DIAGNOSIS — Z79.4 TYPE 2 DIABETES MELLITUS WITH DIABETIC POLYNEUROPATHY, WITH LONG-TERM CURRENT USE OF INSULIN: ICD-10-CM

## 2021-04-26 DIAGNOSIS — I27.81 COR PULMONALE, CHRONIC: ICD-10-CM

## 2021-04-26 DIAGNOSIS — E27.8 ADRENAL MASS: ICD-10-CM

## 2021-04-26 DIAGNOSIS — E11.42 TYPE 2 DIABETES MELLITUS WITH DIABETIC POLYNEUROPATHY, WITH LONG-TERM CURRENT USE OF INSULIN: ICD-10-CM

## 2021-04-26 DIAGNOSIS — I15.2 HYPERTENSION ASSOCIATED WITH DIABETES: ICD-10-CM

## 2021-04-26 DIAGNOSIS — N18.31 STAGE 3A CHRONIC KIDNEY DISEASE: ICD-10-CM

## 2021-04-26 DIAGNOSIS — D69.2 SENILE PURPURA: ICD-10-CM

## 2021-04-26 DIAGNOSIS — I50.32 CHRONIC DIASTOLIC HEART FAILURE: ICD-10-CM

## 2021-04-26 DIAGNOSIS — J45.909 UNCOMPLICATED ASTHMA, UNSPECIFIED ASTHMA SEVERITY, UNSPECIFIED WHETHER PERSISTENT: ICD-10-CM

## 2021-04-26 DIAGNOSIS — I25.84 CORONARY ARTERY DISEASE DUE TO CALCIFIED CORONARY LESION: Chronic | ICD-10-CM

## 2021-04-26 DIAGNOSIS — N28.1 KIDNEY CYSTS: ICD-10-CM

## 2021-04-26 DIAGNOSIS — Z79.4 TYPE 2 DIABETES MELLITUS WITH DIABETIC POLYNEUROPATHY, WITH LONG-TERM CURRENT USE OF INSULIN: Primary | ICD-10-CM

## 2021-04-26 DIAGNOSIS — I27.20 PULMONARY HYPERTENSION: ICD-10-CM

## 2021-04-26 DIAGNOSIS — M10.00 IDIOPATHIC GOUT, UNSPECIFIED CHRONICITY, UNSPECIFIED SITE: ICD-10-CM

## 2021-04-26 DIAGNOSIS — M47.816 LUMBAR FACET ARTHROPATHY: ICD-10-CM

## 2021-04-26 DIAGNOSIS — E11.42 TYPE 2 DIABETES MELLITUS WITH DIABETIC POLYNEUROPATHY, WITH LONG-TERM CURRENT USE OF INSULIN: Primary | ICD-10-CM

## 2021-04-26 PROBLEM — Z12.39 BREAST CANCER SCREENING: Status: RESOLVED | Noted: 2019-02-19 | Resolved: 2021-04-26

## 2021-04-26 PROBLEM — L97.901 ULCER OF LOWER EXTREMITY, LIMITED TO BREAKDOWN OF SKIN: Status: RESOLVED | Noted: 2020-03-11 | Resolved: 2021-04-26

## 2021-04-26 PROBLEM — N76.0 ACUTE VAGINITIS: Status: RESOLVED | Noted: 2019-07-24 | Resolved: 2021-04-26

## 2021-04-26 PROBLEM — L97.212 VENOUS STASIS ULCER OF RIGHT CALF WITH FAT LAYER EXPOSED WITHOUT VARICOSE VEINS: Status: RESOLVED | Noted: 2020-12-15 | Resolved: 2021-04-26

## 2021-04-26 LAB — GLUCOSE SERPL-MCNC: 360 MG/DL (ref 70–110)

## 2021-04-26 PROCEDURE — 99213 OFFICE O/P EST LOW 20 MIN: CPT | Mod: PBBFAC | Performed by: NURSE PRACTITIONER

## 2021-04-26 PROCEDURE — 99215 OFFICE O/P EST HI 40 MIN: CPT | Mod: S$GLB,,, | Performed by: INTERNAL MEDICINE

## 2021-04-26 PROCEDURE — 99999 PR PBB SHADOW E&M-EST. PATIENT-LVL III: CPT | Mod: PBBFAC,,, | Performed by: NURSE PRACTITIONER

## 2021-04-26 PROCEDURE — G0439 PR MEDICARE ANNUAL WELLNESS SUBSEQUENT VISIT: ICD-10-PCS | Mod: ,,, | Performed by: NURSE PRACTITIONER

## 2021-04-26 PROCEDURE — 82962 POCT GLUCOSE, HAND-HELD DEVICE: ICD-10-PCS | Mod: ,,, | Performed by: INTERNAL MEDICINE

## 2021-04-26 PROCEDURE — G0439 PPPS, SUBSEQ VISIT: HCPCS | Mod: ,,, | Performed by: NURSE PRACTITIONER

## 2021-04-26 PROCEDURE — 99215 PR OFFICE/OUTPT VISIT, EST, LEVL V, 40-54 MIN: ICD-10-PCS | Mod: S$GLB,,, | Performed by: INTERNAL MEDICINE

## 2021-04-26 PROCEDURE — 82962 GLUCOSE BLOOD TEST: CPT | Mod: ,,, | Performed by: INTERNAL MEDICINE

## 2021-04-26 PROCEDURE — 99999 PR PBB SHADOW E&M-EST. PATIENT-LVL III: ICD-10-PCS | Mod: PBBFAC,,, | Performed by: NURSE PRACTITIONER

## 2021-04-26 RX ORDER — IPRATROPIUM BROMIDE AND ALBUTEROL SULFATE 2.5; .5 MG/3ML; MG/3ML
SOLUTION RESPIRATORY (INHALATION)
Qty: 90 ML | Refills: 11 | Status: SHIPPED | OUTPATIENT
Start: 2021-04-26

## 2021-04-26 RX ORDER — ALBUTEROL SULFATE 90 UG/1
2 AEROSOL, METERED RESPIRATORY (INHALATION) EVERY 4 HOURS PRN
Qty: 54 G | Refills: 3 | Status: SHIPPED | OUTPATIENT
Start: 2021-04-26 | End: 2024-03-13 | Stop reason: SDUPTHER

## 2021-04-26 RX ORDER — FLUTICASONE PROPIONATE AND SALMETEROL 500; 50 UG/1; UG/1
POWDER RESPIRATORY (INHALATION)
Qty: 60 EACH | Refills: 11 | Status: SHIPPED | OUTPATIENT
Start: 2021-04-26 | End: 2023-10-19

## 2021-04-26 RX ORDER — INSULIN ASPART 100 [IU]/ML
10 INJECTION, SOLUTION INTRAVENOUS; SUBCUTANEOUS
Status: COMPLETED | OUTPATIENT
Start: 2021-04-26 | End: 2021-04-26

## 2021-04-26 RX ADMIN — INSULIN ASPART 10 UNITS: 100 INJECTION, SOLUTION INTRAVENOUS; SUBCUTANEOUS at 03:04

## 2021-04-27 PROBLEM — I27.20 PULMONARY HYPERTENSION: Status: ACTIVE | Noted: 2021-04-27

## 2021-04-27 PROBLEM — I27.81 COR PULMONALE, CHRONIC: Status: ACTIVE | Noted: 2021-04-27

## 2021-04-27 PROBLEM — R06.02 SOB (SHORTNESS OF BREATH): Status: RESOLVED | Noted: 2017-08-23 | Resolved: 2021-04-27

## 2021-05-04 ENCOUNTER — TELEPHONE (OUTPATIENT)
Dept: ADMINISTRATIVE | Facility: CLINIC | Age: 81
End: 2021-05-04

## 2021-05-06 ENCOUNTER — TELEPHONE (OUTPATIENT)
Dept: PRIMARY CARE CLINIC | Facility: CLINIC | Age: 81
End: 2021-05-06

## 2021-05-26 ENCOUNTER — OFFICE VISIT (OUTPATIENT)
Dept: PRIMARY CARE CLINIC | Facility: CLINIC | Age: 81
End: 2021-05-26
Attending: INTERNAL MEDICINE
Payer: MEDICARE

## 2021-05-26 ENCOUNTER — OFFICE VISIT (OUTPATIENT)
Dept: PODIATRY | Facility: CLINIC | Age: 81
End: 2021-05-26
Payer: MEDICARE

## 2021-05-26 ENCOUNTER — LAB VISIT (OUTPATIENT)
Dept: LAB | Facility: HOSPITAL | Age: 81
End: 2021-05-26
Attending: INTERNAL MEDICINE
Payer: MEDICARE

## 2021-05-26 VITALS
WEIGHT: 273.13 LBS | DIASTOLIC BLOOD PRESSURE: 82 MMHG | BODY MASS INDEX: 55.06 KG/M2 | HEART RATE: 83 BPM | SYSTOLIC BLOOD PRESSURE: 184 MMHG | HEIGHT: 59 IN

## 2021-05-26 VITALS
SYSTOLIC BLOOD PRESSURE: 144 MMHG | HEART RATE: 74 BPM | RESPIRATION RATE: 14 BRPM | BODY MASS INDEX: 55.08 KG/M2 | OXYGEN SATURATION: 95 % | WEIGHT: 273.25 LBS | DIASTOLIC BLOOD PRESSURE: 66 MMHG | HEIGHT: 59 IN | TEMPERATURE: 98 F

## 2021-05-26 DIAGNOSIS — M10.00 IDIOPATHIC GOUT, UNSPECIFIED CHRONICITY, UNSPECIFIED SITE: ICD-10-CM

## 2021-05-26 DIAGNOSIS — B35.1 ONYCHOMYCOSIS DUE TO DERMATOPHYTE: ICD-10-CM

## 2021-05-26 DIAGNOSIS — M1A.09X0 IDIOPATHIC CHRONIC GOUT OF MULTIPLE SITES WITHOUT TOPHUS: ICD-10-CM

## 2021-05-26 DIAGNOSIS — E11.51 TYPE II DIABETES MELLITUS WITH PERIPHERAL CIRCULATORY DISORDER: Primary | ICD-10-CM

## 2021-05-26 DIAGNOSIS — L84 CORN OR CALLUS: ICD-10-CM

## 2021-05-26 DIAGNOSIS — I89.0 LYMPHEDEMA OF BOTH LOWER EXTREMITIES: ICD-10-CM

## 2021-05-26 DIAGNOSIS — E11.42 TYPE 2 DIABETES MELLITUS WITH DIABETIC POLYNEUROPATHY, WITH LONG-TERM CURRENT USE OF INSULIN: Primary | ICD-10-CM

## 2021-05-26 DIAGNOSIS — Z79.4 TYPE 2 DIABETES MELLITUS WITH DIABETIC POLYNEUROPATHY, WITH LONG-TERM CURRENT USE OF INSULIN: Primary | ICD-10-CM

## 2021-05-26 DIAGNOSIS — E11.29 TYPE 2 DIABETES MELLITUS WITH RENAL MANIFESTATIONS NOT AT GOAL: ICD-10-CM

## 2021-05-26 LAB
ALBUMIN SERPL BCP-MCNC: 3 G/DL (ref 3.5–5.2)
ALP SERPL-CCNC: 84 U/L (ref 55–135)
ALT SERPL W/O P-5'-P-CCNC: 13 U/L (ref 10–44)
ANION GAP SERPL CALC-SCNC: 7 MMOL/L (ref 8–16)
AST SERPL-CCNC: 13 U/L (ref 10–40)
BASOPHILS # BLD AUTO: 0.04 K/UL (ref 0–0.2)
BASOPHILS NFR BLD: 0.6 % (ref 0–1.9)
BILIRUB SERPL-MCNC: 0.4 MG/DL (ref 0.1–1)
BUN SERPL-MCNC: 23 MG/DL (ref 8–23)
CALCIUM SERPL-MCNC: 9.4 MG/DL (ref 8.7–10.5)
CHLORIDE SERPL-SCNC: 105 MMOL/L (ref 95–110)
CO2 SERPL-SCNC: 25 MMOL/L (ref 23–29)
CREAT SERPL-MCNC: 1.4 MG/DL (ref 0.5–1.4)
DIFFERENTIAL METHOD: ABNORMAL
EOSINOPHIL # BLD AUTO: 0.2 K/UL (ref 0–0.5)
EOSINOPHIL NFR BLD: 2.2 % (ref 0–8)
ERYTHROCYTE [DISTWIDTH] IN BLOOD BY AUTOMATED COUNT: 15.6 % (ref 11.5–14.5)
EST. GFR  (AFRICAN AMERICAN): 41.2 ML/MIN/1.73 M^2
EST. GFR  (NON AFRICAN AMERICAN): 35.8 ML/MIN/1.73 M^2
GLUCOSE SERPL-MCNC: 151 MG/DL (ref 70–110)
GLUCOSE SERPL-MCNC: 159 MG/DL (ref 70–110)
HCT VFR BLD AUTO: 39.5 % (ref 37–48.5)
HGB BLD-MCNC: 11.6 G/DL (ref 12–16)
IMM GRANULOCYTES # BLD AUTO: 0.02 K/UL (ref 0–0.04)
IMM GRANULOCYTES NFR BLD AUTO: 0.3 % (ref 0–0.5)
LYMPHOCYTES # BLD AUTO: 1.5 K/UL (ref 1–4.8)
LYMPHOCYTES NFR BLD: 20.5 % (ref 18–48)
MCH RBC QN AUTO: 28.2 PG (ref 27–31)
MCHC RBC AUTO-ENTMCNC: 29.4 G/DL (ref 32–36)
MCV RBC AUTO: 96 FL (ref 82–98)
MONOCYTES # BLD AUTO: 0.6 K/UL (ref 0.3–1)
MONOCYTES NFR BLD: 7.6 % (ref 4–15)
NEUTROPHILS # BLD AUTO: 5 K/UL (ref 1.8–7.7)
NEUTROPHILS NFR BLD: 68.8 % (ref 38–73)
NRBC BLD-RTO: 0 /100 WBC
PLATELET # BLD AUTO: 187 K/UL (ref 150–450)
PMV BLD AUTO: 11.5 FL (ref 9.2–12.9)
POTASSIUM SERPL-SCNC: 4.5 MMOL/L (ref 3.5–5.1)
PROT SERPL-MCNC: 8 G/DL (ref 6–8.4)
RBC # BLD AUTO: 4.12 M/UL (ref 4–5.4)
SODIUM SERPL-SCNC: 137 MMOL/L (ref 136–145)
URATE SERPL-MCNC: 6.5 MG/DL (ref 2.4–5.7)
WBC # BLD AUTO: 7.22 K/UL (ref 3.9–12.7)

## 2021-05-26 PROCEDURE — 99215 OFFICE O/P EST HI 40 MIN: CPT | Mod: PBBFAC,25 | Performed by: PODIATRIST

## 2021-05-26 PROCEDURE — 99999 PR PBB SHADOW E&M-EST. PATIENT-LVL V: CPT | Mod: PBBFAC,,, | Performed by: PODIATRIST

## 2021-05-26 PROCEDURE — 36415 COLL VENOUS BLD VENIPUNCTURE: CPT | Performed by: INTERNAL MEDICINE

## 2021-05-26 PROCEDURE — 82962 GLUCOSE BLOOD TEST: CPT | Mod: ,,, | Performed by: INTERNAL MEDICINE

## 2021-05-26 PROCEDURE — 84550 ASSAY OF BLOOD/URIC ACID: CPT | Performed by: INTERNAL MEDICINE

## 2021-05-26 PROCEDURE — 11056 PARNG/CUTG B9 HYPRKR LES 2-4: CPT | Mod: Q9,PBBFAC | Performed by: PODIATRIST

## 2021-05-26 PROCEDURE — 80053 COMPREHEN METABOLIC PANEL: CPT | Performed by: INTERNAL MEDICINE

## 2021-05-26 PROCEDURE — 11721 DEBRIDE NAIL 6 OR MORE: CPT | Mod: Q9,59,PBBFAC | Performed by: PODIATRIST

## 2021-05-26 PROCEDURE — 11056 PARNG/CUTG B9 HYPRKR LES 2-4: CPT | Mod: Q9,S$PBB,, | Performed by: PODIATRIST

## 2021-05-26 PROCEDURE — 11056 PR TRIM BENIGN HYPERKERATOTIC SKIN LESION,2-4: ICD-10-PCS | Mod: Q9,S$PBB,, | Performed by: PODIATRIST

## 2021-05-26 PROCEDURE — 99215 PR OFFICE/OUTPT VISIT, EST, LEVL V, 40-54 MIN: ICD-10-PCS | Mod: S$GLB,,, | Performed by: INTERNAL MEDICINE

## 2021-05-26 PROCEDURE — 99213 PR OFFICE/OUTPT VISIT, EST, LEVL III, 20-29 MIN: ICD-10-PCS | Mod: 25,S$PBB,, | Performed by: PODIATRIST

## 2021-05-26 PROCEDURE — 85025 COMPLETE CBC W/AUTO DIFF WBC: CPT | Performed by: INTERNAL MEDICINE

## 2021-05-26 PROCEDURE — 99999 PR PBB SHADOW E&M-EST. PATIENT-LVL V: ICD-10-PCS | Mod: PBBFAC,,, | Performed by: PODIATRIST

## 2021-05-26 PROCEDURE — 99215 OFFICE O/P EST HI 40 MIN: CPT | Mod: S$GLB,,, | Performed by: INTERNAL MEDICINE

## 2021-05-26 PROCEDURE — 11721 PR DEBRIDEMENT OF NAILS, 6 OR MORE: ICD-10-PCS | Mod: Q9,59,S$PBB, | Performed by: PODIATRIST

## 2021-05-26 PROCEDURE — 11721 DEBRIDE NAIL 6 OR MORE: CPT | Mod: Q9,59,S$PBB, | Performed by: PODIATRIST

## 2021-05-26 PROCEDURE — 82962 POCT GLUCOSE, HAND-HELD DEVICE: ICD-10-PCS | Mod: ,,, | Performed by: INTERNAL MEDICINE

## 2021-05-26 PROCEDURE — 99213 OFFICE O/P EST LOW 20 MIN: CPT | Mod: 25,S$PBB,, | Performed by: PODIATRIST

## 2021-05-26 RX ORDER — COLCHICINE 0.6 MG/1
TABLET ORAL
Qty: 15 TABLET | Refills: 5 | Status: SHIPPED | OUTPATIENT
Start: 2021-05-26 | End: 2021-06-16

## 2021-05-26 RX ORDER — ATORVASTATIN CALCIUM 80 MG/1
80 TABLET, FILM COATED ORAL DAILY
Qty: 90 TABLET | Refills: 3 | Status: SHIPPED | OUTPATIENT
Start: 2021-05-26 | End: 2022-07-27 | Stop reason: SDUPTHER

## 2021-05-26 RX ORDER — ALLOPURINOL 300 MG/1
300 TABLET ORAL DAILY
Qty: 30 TABLET | Refills: 6 | Status: SHIPPED | OUTPATIENT
Start: 2021-05-26 | End: 2021-06-16

## 2021-06-14 ENCOUNTER — TELEPHONE (OUTPATIENT)
Dept: RHEUMATOLOGY | Facility: CLINIC | Age: 81
End: 2021-06-14

## 2021-06-15 ENCOUNTER — PATIENT OUTREACH (OUTPATIENT)
Dept: ADMINISTRATIVE | Facility: OTHER | Age: 81
End: 2021-06-15

## 2021-06-16 ENCOUNTER — OFFICE VISIT (OUTPATIENT)
Dept: RHEUMATOLOGY | Facility: CLINIC | Age: 81
End: 2021-06-16
Payer: MEDICARE

## 2021-06-16 VITALS
HEART RATE: 91 BPM | WEIGHT: 273.13 LBS | SYSTOLIC BLOOD PRESSURE: 199 MMHG | BODY MASS INDEX: 55.17 KG/M2 | DIASTOLIC BLOOD PRESSURE: 88 MMHG

## 2021-06-16 DIAGNOSIS — E11.42 TYPE 2 DIABETES MELLITUS WITH DIABETIC POLYNEUROPATHY, WITH LONG-TERM CURRENT USE OF INSULIN: ICD-10-CM

## 2021-06-16 DIAGNOSIS — M10.00 IDIOPATHIC GOUT, UNSPECIFIED CHRONICITY, UNSPECIFIED SITE: ICD-10-CM

## 2021-06-16 DIAGNOSIS — Z79.4 TYPE 2 DIABETES MELLITUS WITH DIABETIC POLYNEUROPATHY, WITH LONG-TERM CURRENT USE OF INSULIN: ICD-10-CM

## 2021-06-16 PROCEDURE — 99999 PR PBB SHADOW E&M-EST. PATIENT-LVL IV: CPT | Mod: PBBFAC,,, | Performed by: INTERNAL MEDICINE

## 2021-06-16 PROCEDURE — 99214 PR OFFICE/OUTPT VISIT, EST, LEVL IV, 30-39 MIN: ICD-10-PCS | Mod: S$PBB,,, | Performed by: INTERNAL MEDICINE

## 2021-06-16 PROCEDURE — 99214 OFFICE O/P EST MOD 30 MIN: CPT | Mod: PBBFAC | Performed by: INTERNAL MEDICINE

## 2021-06-16 PROCEDURE — 99999 PR PBB SHADOW E&M-EST. PATIENT-LVL IV: ICD-10-PCS | Mod: PBBFAC,,, | Performed by: INTERNAL MEDICINE

## 2021-06-16 PROCEDURE — 99214 OFFICE O/P EST MOD 30 MIN: CPT | Mod: S$PBB,,, | Performed by: INTERNAL MEDICINE

## 2021-06-16 RX ORDER — ALLOPURINOL 300 MG/1
300 TABLET ORAL DAILY
Qty: 30 TABLET | Refills: 6 | Status: SHIPPED | OUTPATIENT
Start: 2021-06-16 | End: 2021-07-16

## 2021-06-16 RX ORDER — ALLOPURINOL 100 MG/1
TABLET ORAL
Qty: 15 TABLET | Refills: 5 | Status: SHIPPED | OUTPATIENT
Start: 2021-06-16 | End: 2021-08-24

## 2021-06-16 RX ORDER — COLCHICINE 0.6 MG/1
TABLET ORAL
Qty: 15 TABLET | Refills: 5 | Status: SHIPPED | OUTPATIENT
Start: 2021-06-16 | End: 2021-08-24

## 2021-06-16 RX ORDER — ALLOPURINOL 100 MG/1
200 TABLET ORAL DAILY
COMMUNITY
Start: 2021-02-23 | End: 2021-06-16

## 2021-07-02 ENCOUNTER — TELEPHONE (OUTPATIENT)
Dept: PRIMARY CARE CLINIC | Facility: CLINIC | Age: 81
End: 2021-07-02

## 2021-07-06 ENCOUNTER — TELEPHONE (OUTPATIENT)
Dept: PRIMARY CARE CLINIC | Facility: CLINIC | Age: 81
End: 2021-07-06

## 2021-07-08 ENCOUNTER — LAB VISIT (OUTPATIENT)
Dept: LAB | Facility: HOSPITAL | Age: 81
End: 2021-07-08
Payer: MEDICARE

## 2021-07-08 ENCOUNTER — TELEPHONE (OUTPATIENT)
Dept: PRIMARY CARE CLINIC | Facility: CLINIC | Age: 81
End: 2021-07-08

## 2021-07-08 DIAGNOSIS — Z79.4 TYPE 2 DIABETES MELLITUS WITH DIABETIC POLYNEUROPATHY, WITH LONG-TERM CURRENT USE OF INSULIN: ICD-10-CM

## 2021-07-08 DIAGNOSIS — E11.42 TYPE 2 DIABETES MELLITUS WITH DIABETIC POLYNEUROPATHY, WITH LONG-TERM CURRENT USE OF INSULIN: ICD-10-CM

## 2021-07-08 LAB
ESTIMATED AVG GLUCOSE: 220 MG/DL (ref 68–131)
HBA1C MFR BLD: 9.3 % (ref 4–5.6)

## 2021-07-08 PROCEDURE — 36415 COLL VENOUS BLD VENIPUNCTURE: CPT | Performed by: NURSE PRACTITIONER

## 2021-07-08 PROCEDURE — 83036 HEMOGLOBIN GLYCOSYLATED A1C: CPT | Performed by: NURSE PRACTITIONER

## 2021-07-12 ENCOUNTER — OFFICE VISIT (OUTPATIENT)
Dept: INTERNAL MEDICINE | Facility: CLINIC | Age: 81
End: 2021-07-12
Payer: MEDICARE

## 2021-07-12 ENCOUNTER — PATIENT MESSAGE (OUTPATIENT)
Dept: PHARMACY | Facility: CLINIC | Age: 81
End: 2021-07-12

## 2021-07-12 VITALS
WEIGHT: 267 LBS | BODY MASS INDEX: 53.83 KG/M2 | HEIGHT: 59 IN | SYSTOLIC BLOOD PRESSURE: 138 MMHG | OXYGEN SATURATION: 98 % | HEART RATE: 84 BPM | DIASTOLIC BLOOD PRESSURE: 68 MMHG

## 2021-07-12 DIAGNOSIS — E11.59 HYPERTENSION ASSOCIATED WITH DIABETES: ICD-10-CM

## 2021-07-12 DIAGNOSIS — G47.33 OSA ON CPAP: ICD-10-CM

## 2021-07-12 DIAGNOSIS — I87.2 VENOUS STASIS DERMATITIS OF BOTH LOWER EXTREMITIES: ICD-10-CM

## 2021-07-12 DIAGNOSIS — I25.10 CORONARY ARTERY DISEASE DUE TO CALCIFIED CORONARY LESION: Chronic | ICD-10-CM

## 2021-07-12 DIAGNOSIS — E11.42 TYPE 2 DIABETES MELLITUS WITH DIABETIC POLYNEUROPATHY, WITH LONG-TERM CURRENT USE OF INSULIN: Primary | ICD-10-CM

## 2021-07-12 DIAGNOSIS — J44.9 CHRONIC OBSTRUCTIVE PULMONARY DISEASE, UNSPECIFIED COPD TYPE: ICD-10-CM

## 2021-07-12 DIAGNOSIS — E66.01 MORBID OBESITY WITH BMI OF 50.0-59.9, ADULT: ICD-10-CM

## 2021-07-12 DIAGNOSIS — I50.32 CHRONIC DIASTOLIC HEART FAILURE: ICD-10-CM

## 2021-07-12 DIAGNOSIS — I27.81 COR PULMONALE, CHRONIC: ICD-10-CM

## 2021-07-12 DIAGNOSIS — Z79.4 TYPE 2 DIABETES MELLITUS WITH DIABETIC POLYNEUROPATHY, WITH LONG-TERM CURRENT USE OF INSULIN: Primary | ICD-10-CM

## 2021-07-12 DIAGNOSIS — I89.0 LYMPHEDEMA OF BOTH LOWER EXTREMITIES: ICD-10-CM

## 2021-07-12 DIAGNOSIS — I25.84 CORONARY ARTERY DISEASE DUE TO CALCIFIED CORONARY LESION: Chronic | ICD-10-CM

## 2021-07-12 DIAGNOSIS — N18.31 STAGE 3A CHRONIC KIDNEY DISEASE: ICD-10-CM

## 2021-07-12 DIAGNOSIS — I15.2 HYPERTENSION ASSOCIATED WITH DIABETES: ICD-10-CM

## 2021-07-12 PROCEDURE — 99214 OFFICE O/P EST MOD 30 MIN: CPT | Mod: S$PBB,,, | Performed by: NURSE PRACTITIONER

## 2021-07-12 PROCEDURE — 99999 PR PBB SHADOW E&M-EST. PATIENT-LVL III: ICD-10-PCS | Mod: PBBFAC,,, | Performed by: NURSE PRACTITIONER

## 2021-07-12 PROCEDURE — 99999 PR PBB SHADOW E&M-EST. PATIENT-LVL III: CPT | Mod: PBBFAC,,, | Performed by: NURSE PRACTITIONER

## 2021-07-12 PROCEDURE — 99214 PR OFFICE/OUTPT VISIT, EST, LEVL IV, 30-39 MIN: ICD-10-PCS | Mod: S$PBB,,, | Performed by: NURSE PRACTITIONER

## 2021-07-12 PROCEDURE — 99213 OFFICE O/P EST LOW 20 MIN: CPT | Mod: PBBFAC | Performed by: NURSE PRACTITIONER

## 2021-07-12 RX ORDER — PEN NEEDLE, DIABETIC 29 G X1/2"
NEEDLE, DISPOSABLE MISCELLANEOUS
Qty: 100 EACH | Refills: 3 | Status: SHIPPED | OUTPATIENT
Start: 2021-07-12 | End: 2022-07-14

## 2021-07-12 RX ORDER — INSULIN DEGLUDEC 100 U/ML
INJECTION, SOLUTION SUBCUTANEOUS
Qty: 3 PEN | Refills: 3 | Status: SHIPPED | OUTPATIENT
Start: 2021-07-12 | End: 2022-08-10

## 2021-07-12 RX ORDER — SEMAGLUTIDE 1.34 MG/ML
INJECTION, SOLUTION SUBCUTANEOUS
Qty: 3 PEN | Refills: 3 | Status: SHIPPED | OUTPATIENT
Start: 2021-07-12 | End: 2022-08-05 | Stop reason: SDUPTHER

## 2021-08-24 ENCOUNTER — OFFICE VISIT (OUTPATIENT)
Dept: RHEUMATOLOGY | Facility: CLINIC | Age: 81
End: 2021-08-24
Payer: MEDICARE

## 2021-08-24 ENCOUNTER — HOSPITAL ENCOUNTER (OUTPATIENT)
Dept: RADIOLOGY | Facility: HOSPITAL | Age: 81
Discharge: HOME | End: 2021-08-24
Attending: NURSE PRACTITIONER
Payer: MEDICARE

## 2021-08-24 ENCOUNTER — OFFICE VISIT (OUTPATIENT)
Dept: ORTHOPEDICS | Facility: CLINIC | Age: 81
End: 2021-08-24
Payer: MEDICARE

## 2021-08-24 ENCOUNTER — TELEPHONE (OUTPATIENT)
Dept: ORTHOPEDICS | Facility: CLINIC | Age: 81
End: 2021-08-24

## 2021-08-24 VITALS
DIASTOLIC BLOOD PRESSURE: 67 MMHG | HEART RATE: 83 BPM | HEIGHT: 59 IN | WEIGHT: 265.63 LBS | SYSTOLIC BLOOD PRESSURE: 168 MMHG | BODY MASS INDEX: 53.55 KG/M2

## 2021-08-24 VITALS — BODY MASS INDEX: 53.66 KG/M2 | WEIGHT: 265.63 LBS

## 2021-08-24 DIAGNOSIS — G89.29 CHRONIC PAIN OF BOTH KNEES: Primary | ICD-10-CM

## 2021-08-24 DIAGNOSIS — M25.561 ARTHRALGIA OF RIGHT KNEE: Primary | ICD-10-CM

## 2021-08-24 DIAGNOSIS — M17.0 PRIMARY OSTEOARTHRITIS OF BOTH KNEES: ICD-10-CM

## 2021-08-24 DIAGNOSIS — G89.29 CHRONIC PAIN OF BOTH KNEES: ICD-10-CM

## 2021-08-24 DIAGNOSIS — M25.561 ARTHRALGIA OF BOTH KNEES: Primary | ICD-10-CM

## 2021-08-24 DIAGNOSIS — M25.562 ARTHRALGIA OF BOTH KNEES: Primary | ICD-10-CM

## 2021-08-24 DIAGNOSIS — M25.561 CHRONIC PAIN OF BOTH KNEES: Primary | ICD-10-CM

## 2021-08-24 DIAGNOSIS — M25.562 CHRONIC PAIN OF BOTH KNEES: Primary | ICD-10-CM

## 2021-08-24 DIAGNOSIS — M10.00 IDIOPATHIC GOUT, UNSPECIFIED CHRONICITY, UNSPECIFIED SITE: ICD-10-CM

## 2021-08-24 DIAGNOSIS — M25.561 CHRONIC PAIN OF BOTH KNEES: ICD-10-CM

## 2021-08-24 DIAGNOSIS — M25.562 CHRONIC PAIN OF BOTH KNEES: ICD-10-CM

## 2021-08-24 PROCEDURE — 99213 OFFICE O/P EST LOW 20 MIN: CPT | Mod: S$PBB,ICN,, | Performed by: NURSE PRACTITIONER

## 2021-08-24 PROCEDURE — 99214 OFFICE O/P EST MOD 30 MIN: CPT | Mod: S$PBB,,, | Performed by: INTERNAL MEDICINE

## 2021-08-24 PROCEDURE — 99213 PR OFFICE/OUTPT VISIT, EST, LEVL III, 20-29 MIN: ICD-10-PCS | Mod: S$PBB,ICN,, | Performed by: NURSE PRACTITIONER

## 2021-08-24 PROCEDURE — 99999 PR PBB SHADOW E&M-EST. PATIENT-LVL V: CPT | Mod: PBBFAC,,, | Performed by: INTERNAL MEDICINE

## 2021-08-24 PROCEDURE — 99214 PR OFFICE/OUTPT VISIT, EST, LEVL IV, 30-39 MIN: ICD-10-PCS | Mod: S$PBB,,, | Performed by: INTERNAL MEDICINE

## 2021-08-24 PROCEDURE — 99215 OFFICE O/P EST HI 40 MIN: CPT | Mod: PBBFAC,27 | Performed by: NURSE PRACTITIONER

## 2021-08-24 PROCEDURE — 73562 XR KNEE ORTHO BILAT: ICD-10-PCS | Mod: 26,50,, | Performed by: RADIOLOGY

## 2021-08-24 PROCEDURE — 99215 OFFICE O/P EST HI 40 MIN: CPT | Mod: PBBFAC | Performed by: INTERNAL MEDICINE

## 2021-08-24 PROCEDURE — 73562 X-RAY EXAM OF KNEE 3: CPT | Mod: 26,50,, | Performed by: RADIOLOGY

## 2021-08-24 PROCEDURE — 73562 X-RAY EXAM OF KNEE 3: CPT | Mod: TC,50

## 2021-08-24 PROCEDURE — 99999 PR PBB SHADOW E&M-EST. PATIENT-LVL V: ICD-10-PCS | Mod: PBBFAC,,, | Performed by: INTERNAL MEDICINE

## 2021-08-24 PROCEDURE — 99999 PR PBB SHADOW E&M-EST. PATIENT-LVL V: ICD-10-PCS | Mod: PBBFAC,,, | Performed by: NURSE PRACTITIONER

## 2021-08-24 PROCEDURE — 99999 PR PBB SHADOW E&M-EST. PATIENT-LVL V: CPT | Mod: PBBFAC,,, | Performed by: NURSE PRACTITIONER

## 2021-08-24 RX ORDER — COLCHICINE 0.6 MG/1
TABLET ORAL
Qty: 15 TABLET | Refills: 5 | Status: ON HOLD | OUTPATIENT
Start: 2021-08-24 | End: 2021-12-08 | Stop reason: HOSPADM

## 2021-08-24 RX ORDER — CEPHALEXIN 500 MG/1
500 CAPSULE ORAL EVERY 12 HOURS
Qty: 20 CAPSULE | Refills: 0 | Status: SHIPPED | OUTPATIENT
Start: 2021-08-24 | End: 2021-09-03

## 2021-08-24 RX ORDER — ALLOPURINOL 100 MG/1
TABLET ORAL
Qty: 30 TABLET | Refills: 5 | Status: ON HOLD | OUTPATIENT
Start: 2021-08-24 | End: 2021-12-08 | Stop reason: HOSPADM

## 2021-08-24 RX ORDER — HYDROCODONE BITARTRATE AND ACETAMINOPHEN 5; 325 MG/1; MG/1
TABLET ORAL
Qty: 14 TABLET | Refills: 0 | Status: ON HOLD | OUTPATIENT
Start: 2021-08-24 | End: 2021-10-26

## 2021-08-24 RX ORDER — NALOXONE HYDROCHLORIDE 4 MG/.1ML
SPRAY NASAL
Qty: 1 EACH | Refills: 11 | Status: SHIPPED | OUTPATIENT
Start: 2021-08-24 | End: 2022-05-25

## 2021-08-24 RX ORDER — ALLOPURINOL 100 MG/1
TABLET ORAL
Qty: 30 TABLET | Refills: 5 | Status: SHIPPED | OUTPATIENT
Start: 2021-08-24 | End: 2021-08-24

## 2021-08-24 RX ORDER — ALLOPURINOL 300 MG/1
300 TABLET ORAL DAILY
Qty: 30 TABLET | Refills: 5 | Status: ON HOLD | OUTPATIENT
Start: 2021-08-24 | End: 2021-12-08

## 2021-08-25 ENCOUNTER — HOSPITAL ENCOUNTER (OUTPATIENT)
Dept: RADIOLOGY | Facility: HOSPITAL | Age: 81
Discharge: HOME | End: 2021-08-25
Attending: NURSE PRACTITIONER
Payer: MEDICARE

## 2021-08-25 ENCOUNTER — TELEPHONE (OUTPATIENT)
Dept: INTERNAL MEDICINE | Facility: CLINIC | Age: 81
End: 2021-08-25

## 2021-08-25 DIAGNOSIS — M25.561 ARTHRALGIA OF RIGHT KNEE: ICD-10-CM

## 2021-08-25 PROCEDURE — 73700 CT LOWER EXTREMITY W/O DYE: CPT | Mod: TC,RT

## 2021-08-25 PROCEDURE — 73700 CT KNEE WITHOUT CONTRAST RIGHT: ICD-10-PCS | Mod: 26,RT,, | Performed by: RADIOLOGY

## 2021-08-25 PROCEDURE — 73700 CT LOWER EXTREMITY W/O DYE: CPT | Mod: 26,RT,, | Performed by: RADIOLOGY

## 2021-08-27 ENCOUNTER — TELEPHONE (OUTPATIENT)
Dept: ORTHOPEDICS | Facility: CLINIC | Age: 81
End: 2021-08-27

## 2021-09-16 ENCOUNTER — LAB VISIT (OUTPATIENT)
Dept: LAB | Facility: HOSPITAL | Age: 81
End: 2021-09-16
Attending: INTERNAL MEDICINE
Payer: MEDICARE

## 2021-09-16 DIAGNOSIS — M10.00 IDIOPATHIC GOUT, UNSPECIFIED CHRONICITY, UNSPECIFIED SITE: ICD-10-CM

## 2021-09-16 LAB
ALBUMIN SERPL BCP-MCNC: 3 G/DL (ref 3.5–5.2)
ALP SERPL-CCNC: 87 U/L (ref 55–135)
ALT SERPL W/O P-5'-P-CCNC: 11 U/L (ref 10–44)
ANION GAP SERPL CALC-SCNC: 7 MMOL/L (ref 8–16)
AST SERPL-CCNC: 14 U/L (ref 10–40)
BASOPHILS # BLD AUTO: 0.04 K/UL (ref 0–0.2)
BASOPHILS NFR BLD: 0.6 % (ref 0–1.9)
BILIRUB SERPL-MCNC: 0.4 MG/DL (ref 0.1–1)
BUN SERPL-MCNC: 27 MG/DL (ref 8–23)
CALCIUM SERPL-MCNC: 9.3 MG/DL (ref 8.7–10.5)
CHLORIDE SERPL-SCNC: 109 MMOL/L (ref 95–110)
CO2 SERPL-SCNC: 23 MMOL/L (ref 23–29)
CREAT SERPL-MCNC: 1.1 MG/DL (ref 0.5–1.4)
DIFFERENTIAL METHOD: ABNORMAL
EOSINOPHIL # BLD AUTO: 0.3 K/UL (ref 0–0.5)
EOSINOPHIL NFR BLD: 4.5 % (ref 0–8)
ERYTHROCYTE [DISTWIDTH] IN BLOOD BY AUTOMATED COUNT: 15.3 % (ref 11.5–14.5)
EST. GFR  (AFRICAN AMERICAN): 55.2 ML/MIN/1.73 M^2
EST. GFR  (NON AFRICAN AMERICAN): 47.9 ML/MIN/1.73 M^2
GLUCOSE SERPL-MCNC: 137 MG/DL (ref 70–110)
HCT VFR BLD AUTO: 40.4 % (ref 37–48.5)
HGB BLD-MCNC: 12.1 G/DL (ref 12–16)
IMM GRANULOCYTES # BLD AUTO: 0.02 K/UL (ref 0–0.04)
IMM GRANULOCYTES NFR BLD AUTO: 0.3 % (ref 0–0.5)
LYMPHOCYTES # BLD AUTO: 1.5 K/UL (ref 1–4.8)
LYMPHOCYTES NFR BLD: 23.8 % (ref 18–48)
MCH RBC QN AUTO: 28.9 PG (ref 27–31)
MCHC RBC AUTO-ENTMCNC: 30 G/DL (ref 32–36)
MCV RBC AUTO: 97 FL (ref 82–98)
MONOCYTES # BLD AUTO: 0.4 K/UL (ref 0.3–1)
MONOCYTES NFR BLD: 6.8 % (ref 4–15)
NEUTROPHILS # BLD AUTO: 4.1 K/UL (ref 1.8–7.7)
NEUTROPHILS NFR BLD: 64 % (ref 38–73)
NRBC BLD-RTO: 0 /100 WBC
PLATELET # BLD AUTO: 190 K/UL (ref 150–450)
PMV BLD AUTO: 11.4 FL (ref 9.2–12.9)
POTASSIUM SERPL-SCNC: 4.5 MMOL/L (ref 3.5–5.1)
PROT SERPL-MCNC: 8.2 G/DL (ref 6–8.4)
RBC # BLD AUTO: 4.18 M/UL (ref 4–5.4)
SODIUM SERPL-SCNC: 139 MMOL/L (ref 136–145)
URATE SERPL-MCNC: 5.4 MG/DL (ref 2.4–5.7)
WBC # BLD AUTO: 6.44 K/UL (ref 3.9–12.7)

## 2021-09-16 PROCEDURE — 84550 ASSAY OF BLOOD/URIC ACID: CPT | Performed by: INTERNAL MEDICINE

## 2021-09-16 PROCEDURE — 85025 COMPLETE CBC W/AUTO DIFF WBC: CPT | Performed by: INTERNAL MEDICINE

## 2021-09-16 PROCEDURE — 36415 COLL VENOUS BLD VENIPUNCTURE: CPT | Performed by: INTERNAL MEDICINE

## 2021-09-16 PROCEDURE — 80053 COMPREHEN METABOLIC PANEL: CPT | Performed by: INTERNAL MEDICINE

## 2021-09-17 ENCOUNTER — OFFICE VISIT (OUTPATIENT)
Dept: WOUND CARE | Facility: CLINIC | Age: 81
End: 2021-09-17
Payer: MEDICARE

## 2021-09-17 ENCOUNTER — TELEPHONE (OUTPATIENT)
Dept: PRIMARY CARE CLINIC | Facility: CLINIC | Age: 81
End: 2021-09-17

## 2021-09-17 VITALS
SYSTOLIC BLOOD PRESSURE: 166 MMHG | DIASTOLIC BLOOD PRESSURE: 75 MMHG | TEMPERATURE: 98 F | BODY MASS INDEX: 53.55 KG/M2 | WEIGHT: 265.63 LBS | HEART RATE: 86 BPM | HEIGHT: 59 IN

## 2021-09-17 DIAGNOSIS — L97.929 VENOUS STASIS ULCER OF LEFT LOWER LEG WITH EDEMA OF LEFT LOWER LEG: Primary | ICD-10-CM

## 2021-09-17 DIAGNOSIS — L97.919 VENOUS ULCER OF RIGHT LEG: ICD-10-CM

## 2021-09-17 DIAGNOSIS — I89.0 LYMPHEDEMA OF BOTH LOWER EXTREMITIES: ICD-10-CM

## 2021-09-17 DIAGNOSIS — I87.2 VENOUS STASIS DERMATITIS OF BOTH LOWER EXTREMITIES: Primary | ICD-10-CM

## 2021-09-17 DIAGNOSIS — I83.029 VENOUS STASIS ULCER OF LEFT LOWER LEG WITH EDEMA OF LEFT LOWER LEG: ICD-10-CM

## 2021-09-17 DIAGNOSIS — I83.892 VENOUS STASIS ULCER OF LEFT LOWER LEG WITH EDEMA OF LEFT LOWER LEG: Primary | ICD-10-CM

## 2021-09-17 DIAGNOSIS — I83.892 VENOUS STASIS ULCER OF LEFT LOWER LEG WITH EDEMA OF LEFT LOWER LEG: ICD-10-CM

## 2021-09-17 DIAGNOSIS — I83.029 VENOUS STASIS ULCER OF LEFT LOWER LEG WITH EDEMA OF LEFT LOWER LEG: Primary | ICD-10-CM

## 2021-09-17 DIAGNOSIS — I83.019 VENOUS ULCER OF RIGHT LEG: ICD-10-CM

## 2021-09-17 DIAGNOSIS — L97.929 VENOUS STASIS ULCER OF LEFT LOWER LEG WITH EDEMA OF LEFT LOWER LEG: ICD-10-CM

## 2021-09-17 DIAGNOSIS — R60.0 VENOUS STASIS ULCER OF LEFT LOWER LEG WITH EDEMA OF LEFT LOWER LEG: ICD-10-CM

## 2021-09-17 DIAGNOSIS — R60.0 VENOUS STASIS ULCER OF LEFT LOWER LEG WITH EDEMA OF LEFT LOWER LEG: Primary | ICD-10-CM

## 2021-09-17 PROCEDURE — 29580 STRAPPING UNNA BOOT: CPT | Mod: 50,S$PBB,, | Performed by: NURSE PRACTITIONER

## 2021-09-17 PROCEDURE — 99213 OFFICE O/P EST LOW 20 MIN: CPT | Mod: 25,S$PBB,, | Performed by: NURSE PRACTITIONER

## 2021-09-17 PROCEDURE — 29580 STRAPPING UNNA BOOT: CPT | Mod: 50,PBBFAC | Performed by: NURSE PRACTITIONER

## 2021-09-17 PROCEDURE — 99999 PR PBB SHADOW E&M-EST. PATIENT-LVL V: ICD-10-PCS | Mod: PBBFAC,,, | Performed by: NURSE PRACTITIONER

## 2021-09-17 PROCEDURE — 29580 PR STRAPPING UNNA BOOT: ICD-10-PCS | Mod: 50,S$PBB,, | Performed by: NURSE PRACTITIONER

## 2021-09-17 PROCEDURE — 99215 OFFICE O/P EST HI 40 MIN: CPT | Mod: PBBFAC,25 | Performed by: NURSE PRACTITIONER

## 2021-09-17 PROCEDURE — 99213 PR OFFICE/OUTPT VISIT, EST, LEVL III, 20-29 MIN: ICD-10-PCS | Mod: 25,S$PBB,, | Performed by: NURSE PRACTITIONER

## 2021-09-17 PROCEDURE — 99999 PR PBB SHADOW E&M-EST. PATIENT-LVL V: CPT | Mod: PBBFAC,,, | Performed by: NURSE PRACTITIONER

## 2021-09-21 PROCEDURE — G0179 PR HOME HEALTH MD RECERTIFICATION: ICD-10-PCS | Mod: ,,, | Performed by: INTERNAL MEDICINE

## 2021-09-21 PROCEDURE — G0179 MD RECERTIFICATION HHA PT: HCPCS | Mod: ,,, | Performed by: INTERNAL MEDICINE

## 2021-09-22 ENCOUNTER — LAB VISIT (OUTPATIENT)
Dept: LAB | Facility: HOSPITAL | Age: 81
End: 2021-09-22
Attending: ORTHOPAEDIC SURGERY
Payer: MEDICARE

## 2021-09-22 ENCOUNTER — OFFICE VISIT (OUTPATIENT)
Dept: ORTHOPEDICS | Facility: CLINIC | Age: 81
End: 2021-09-22
Payer: MEDICARE

## 2021-09-22 VITALS — BODY MASS INDEX: 52.98 KG/M2 | HEIGHT: 59 IN | WEIGHT: 262.81 LBS

## 2021-09-22 DIAGNOSIS — T84.012A FAILED TOTAL KNEE, RIGHT, INITIAL ENCOUNTER: ICD-10-CM

## 2021-09-22 DIAGNOSIS — Z96.651 HISTORY OF TOTAL RIGHT KNEE REPLACEMENT: ICD-10-CM

## 2021-09-22 DIAGNOSIS — T84.012A FAILED TOTAL KNEE, RIGHT, INITIAL ENCOUNTER: Primary | ICD-10-CM

## 2021-09-22 LAB
CRP SERPL-MCNC: 2.4 MG/L (ref 0–8.2)
ERYTHROCYTE [SEDIMENTATION RATE] IN BLOOD BY WESTERGREN METHOD: 114 MM/HR (ref 0–36)

## 2021-09-22 PROCEDURE — 85652 RBC SED RATE AUTOMATED: CPT | Performed by: ORTHOPAEDIC SURGERY

## 2021-09-22 PROCEDURE — 99214 OFFICE O/P EST MOD 30 MIN: CPT | Mod: PBBFAC | Performed by: ORTHOPAEDIC SURGERY

## 2021-09-22 PROCEDURE — 99999 PR PBB SHADOW E&M-EST. PATIENT-LVL IV: ICD-10-PCS | Mod: PBBFAC,,, | Performed by: ORTHOPAEDIC SURGERY

## 2021-09-22 PROCEDURE — 99214 PR OFFICE/OUTPT VISIT, EST, LEVL IV, 30-39 MIN: ICD-10-PCS | Mod: S$PBB,,, | Performed by: ORTHOPAEDIC SURGERY

## 2021-09-22 PROCEDURE — 36415 COLL VENOUS BLD VENIPUNCTURE: CPT | Performed by: ORTHOPAEDIC SURGERY

## 2021-09-22 PROCEDURE — 99999 PR PBB SHADOW E&M-EST. PATIENT-LVL IV: CPT | Mod: PBBFAC,,, | Performed by: ORTHOPAEDIC SURGERY

## 2021-09-22 PROCEDURE — 99214 OFFICE O/P EST MOD 30 MIN: CPT | Mod: S$PBB,,, | Performed by: ORTHOPAEDIC SURGERY

## 2021-09-22 PROCEDURE — 86140 C-REACTIVE PROTEIN: CPT | Performed by: ORTHOPAEDIC SURGERY

## 2021-09-23 ENCOUNTER — IMMUNIZATION (OUTPATIENT)
Dept: PRIMARY CARE CLINIC | Facility: CLINIC | Age: 81
End: 2021-09-23
Payer: MEDICARE

## 2021-09-23 ENCOUNTER — TELEPHONE (OUTPATIENT)
Dept: ORTHOPEDICS | Facility: CLINIC | Age: 81
End: 2021-09-23

## 2021-09-23 DIAGNOSIS — Z23 NEED FOR VACCINATION: Primary | ICD-10-CM

## 2021-09-23 PROCEDURE — 0013A COVID-19, MRNA, LNP-S, PF, 100 MCG/0.5 ML DOSE VACCINE: CPT | Mod: PBBFAC | Performed by: INTERNAL MEDICINE

## 2021-09-23 PROCEDURE — 91301 COVID-19, MRNA, LNP-S, PF, 100 MCG/0.5 ML DOSE VACCINE: CPT | Mod: PBBFAC | Performed by: INTERNAL MEDICINE

## 2021-09-24 ENCOUNTER — OFFICE VISIT (OUTPATIENT)
Dept: ORTHOPEDICS | Facility: CLINIC | Age: 81
End: 2021-09-24
Payer: MEDICARE

## 2021-09-24 ENCOUNTER — TELEPHONE (OUTPATIENT)
Dept: PRIMARY CARE CLINIC | Facility: CLINIC | Age: 81
End: 2021-09-24

## 2021-09-24 VITALS — WEIGHT: 262.81 LBS | HEIGHT: 59 IN | BODY MASS INDEX: 52.98 KG/M2

## 2021-09-24 DIAGNOSIS — M25.561 RIGHT KNEE PAIN, UNSPECIFIED CHRONICITY: ICD-10-CM

## 2021-09-24 DIAGNOSIS — Z96.651 HISTORY OF TOTAL RIGHT KNEE REPLACEMENT: Primary | ICD-10-CM

## 2021-09-24 PROCEDURE — 99999 PR PBB SHADOW E&M-EST. PATIENT-LVL IV: ICD-10-PCS | Mod: PBBFAC,,, | Performed by: PHYSICIAN ASSISTANT

## 2021-09-24 PROCEDURE — 20610 DRAIN/INJ JOINT/BURSA W/O US: CPT | Mod: PBBFAC | Performed by: PHYSICIAN ASSISTANT

## 2021-09-24 PROCEDURE — 99499 NO LOS: ICD-10-PCS | Mod: S$PBB,,, | Performed by: PHYSICIAN ASSISTANT

## 2021-09-24 PROCEDURE — 99499 UNLISTED E&M SERVICE: CPT | Mod: S$PBB,,, | Performed by: PHYSICIAN ASSISTANT

## 2021-09-24 PROCEDURE — 20610 DRAIN/INJ JOINT/BURSA W/O US: CPT | Mod: S$PBB,RT,, | Performed by: PHYSICIAN ASSISTANT

## 2021-09-24 PROCEDURE — 87070 CULTURE OTHR SPECIMN AEROBIC: CPT | Performed by: PHYSICIAN ASSISTANT

## 2021-09-24 PROCEDURE — 99214 OFFICE O/P EST MOD 30 MIN: CPT | Mod: PBBFAC,25 | Performed by: PHYSICIAN ASSISTANT

## 2021-09-24 PROCEDURE — 89051 BODY FLUID CELL COUNT: CPT | Performed by: PHYSICIAN ASSISTANT

## 2021-09-24 PROCEDURE — 20610 PR DRAIN/INJECT LARGE JOINT/BURSA: ICD-10-PCS | Mod: S$PBB,RT,, | Performed by: PHYSICIAN ASSISTANT

## 2021-09-24 PROCEDURE — 99999 PR PBB SHADOW E&M-EST. PATIENT-LVL IV: CPT | Mod: PBBFAC,,, | Performed by: PHYSICIAN ASSISTANT

## 2021-09-24 NOTE — TELEPHONE ENCOUNTER
Wound care note from 9/17/21 will be signed by MD and faxed to [facility].    Tami Schmidt MD/MPH  [facility]  [phone] [facility]

## 2021-09-25 LAB
APPEARANCE FLD: NORMAL
BODY FLD TYPE: NORMAL
COLOR FLD: NORMAL
LYMPHOCYTES NFR FLD MANUAL: 84 %
MONOS+MACROS NFR FLD MANUAL: 8 %
NEUTROPHILS NFR FLD MANUAL: 8 %
WBC # FLD: 2165 /CU MM

## 2021-09-30 LAB — BACTERIA FLD CULT: NORMAL

## 2021-10-01 ENCOUNTER — TELEPHONE (OUTPATIENT)
Dept: PRIMARY CARE CLINIC | Facility: CLINIC | Age: 81
End: 2021-10-01

## 2021-10-01 ENCOUNTER — TELEPHONE (OUTPATIENT)
Dept: ORTHOPEDICS | Facility: CLINIC | Age: 81
End: 2021-10-01

## 2021-10-04 ENCOUNTER — TELEPHONE (OUTPATIENT)
Dept: ORTHOPEDICS | Facility: CLINIC | Age: 81
End: 2021-10-04

## 2021-10-05 ENCOUNTER — EXTERNAL HOME HEALTH (OUTPATIENT)
Dept: HOME HEALTH SERVICES | Facility: HOSPITAL | Age: 81
End: 2021-10-05
Payer: MEDICARE

## 2021-10-06 ENCOUNTER — OFFICE VISIT (OUTPATIENT)
Dept: WOUND CARE | Facility: CLINIC | Age: 81
End: 2021-10-06
Payer: MEDICARE

## 2021-10-06 ENCOUNTER — TELEPHONE (OUTPATIENT)
Dept: PRIMARY CARE CLINIC | Facility: CLINIC | Age: 81
End: 2021-10-06

## 2021-10-06 ENCOUNTER — OFFICE VISIT (OUTPATIENT)
Dept: ORTHOPEDICS | Facility: CLINIC | Age: 81
End: 2021-10-06
Payer: MEDICARE

## 2021-10-06 VITALS — WEIGHT: 262 LBS | BODY MASS INDEX: 52.82 KG/M2 | HEIGHT: 59 IN

## 2021-10-06 VITALS
HEART RATE: 94 BPM | WEIGHT: 262 LBS | HEIGHT: 59 IN | BODY MASS INDEX: 52.82 KG/M2 | DIASTOLIC BLOOD PRESSURE: 72 MMHG | TEMPERATURE: 97 F | SYSTOLIC BLOOD PRESSURE: 158 MMHG

## 2021-10-06 DIAGNOSIS — E66.01 MORBID OBESITY WITH BMI OF 50.0-59.9, ADULT: ICD-10-CM

## 2021-10-06 DIAGNOSIS — I87.2 VENOUS STASIS DERMATITIS OF BOTH LOWER EXTREMITIES: Primary | ICD-10-CM

## 2021-10-06 DIAGNOSIS — I89.0 LYMPHEDEMA OF BOTH LOWER EXTREMITIES: ICD-10-CM

## 2021-10-06 DIAGNOSIS — T84.012A FAILED TOTAL KNEE, RIGHT, INITIAL ENCOUNTER: Primary | ICD-10-CM

## 2021-10-06 PROBLEM — R60.0 VENOUS STASIS ULCER OF LEFT LOWER LEG WITH EDEMA OF LEFT LOWER LEG: Status: RESOLVED | Noted: 2020-03-05 | Resolved: 2021-10-06

## 2021-10-06 PROBLEM — I83.892 VENOUS STASIS ULCER OF LEFT LOWER LEG WITH EDEMA OF LEFT LOWER LEG: Status: RESOLVED | Noted: 2020-03-05 | Resolved: 2021-10-06

## 2021-10-06 PROBLEM — I83.019 VENOUS ULCER OF RIGHT LEG: Status: RESOLVED | Noted: 2021-09-17 | Resolved: 2021-10-06

## 2021-10-06 PROBLEM — I83.029 VENOUS STASIS ULCER OF LEFT LOWER LEG WITH EDEMA OF LEFT LOWER LEG: Status: RESOLVED | Noted: 2020-03-05 | Resolved: 2021-10-06

## 2021-10-06 PROBLEM — L97.919 VENOUS ULCER OF RIGHT LEG: Status: RESOLVED | Noted: 2021-09-17 | Resolved: 2021-10-06

## 2021-10-06 PROBLEM — L97.929 VENOUS STASIS ULCER OF LEFT LOWER LEG WITH EDEMA OF LEFT LOWER LEG: Status: RESOLVED | Noted: 2020-03-05 | Resolved: 2021-10-06

## 2021-10-06 PROCEDURE — 99213 PR OFFICE/OUTPT VISIT, EST, LEVL III, 20-29 MIN: ICD-10-PCS | Mod: S$PBB,,, | Performed by: NURSE PRACTITIONER

## 2021-10-06 PROCEDURE — 99999 PR PBB SHADOW E&M-EST. PATIENT-LVL V: CPT | Mod: PBBFAC,,, | Performed by: NURSE PRACTITIONER

## 2021-10-06 PROCEDURE — 99213 OFFICE O/P EST LOW 20 MIN: CPT | Mod: S$PBB,,, | Performed by: NURSE PRACTITIONER

## 2021-10-06 PROCEDURE — 99214 OFFICE O/P EST MOD 30 MIN: CPT | Mod: S$PBB,,, | Performed by: ORTHOPAEDIC SURGERY

## 2021-10-06 PROCEDURE — 99999 PR PBB SHADOW E&M-EST. PATIENT-LVL V: ICD-10-PCS | Mod: PBBFAC,,, | Performed by: NURSE PRACTITIONER

## 2021-10-06 PROCEDURE — 99999 PR PBB SHADOW E&M-EST. PATIENT-LVL V: ICD-10-PCS | Mod: PBBFAC,,, | Performed by: ORTHOPAEDIC SURGERY

## 2021-10-06 PROCEDURE — 99215 OFFICE O/P EST HI 40 MIN: CPT | Mod: PBBFAC,27 | Performed by: ORTHOPAEDIC SURGERY

## 2021-10-06 PROCEDURE — 99215 OFFICE O/P EST HI 40 MIN: CPT | Mod: PBBFAC | Performed by: NURSE PRACTITIONER

## 2021-10-06 PROCEDURE — 99214 PR OFFICE/OUTPT VISIT, EST, LEVL IV, 30-39 MIN: ICD-10-PCS | Mod: S$PBB,,, | Performed by: ORTHOPAEDIC SURGERY

## 2021-10-06 PROCEDURE — 99999 PR PBB SHADOW E&M-EST. PATIENT-LVL V: CPT | Mod: PBBFAC,,, | Performed by: ORTHOPAEDIC SURGERY

## 2021-10-06 RX ORDER — TRIAMCINOLONE ACETONIDE 1 MG/G
CREAM TOPICAL 2 TIMES DAILY
Qty: 80 G | Refills: 1 | Status: ON HOLD | OUTPATIENT
Start: 2021-10-06 | End: 2021-10-26

## 2021-10-08 ENCOUNTER — OFFICE VISIT (OUTPATIENT)
Dept: PRIMARY CARE CLINIC | Facility: CLINIC | Age: 81
End: 2021-10-08
Payer: MEDICARE

## 2021-10-08 DIAGNOSIS — E66.01 MORBID OBESITY WITH BMI OF 50.0-59.9, ADULT: ICD-10-CM

## 2021-10-08 DIAGNOSIS — I87.2 VENOUS STASIS DERMATITIS OF BOTH LOWER EXTREMITIES: ICD-10-CM

## 2021-10-08 DIAGNOSIS — Z79.4 TYPE 2 DIABETES MELLITUS WITH DIABETIC POLYNEUROPATHY, WITH LONG-TERM CURRENT USE OF INSULIN: Primary | ICD-10-CM

## 2021-10-08 DIAGNOSIS — E11.42 TYPE 2 DIABETES MELLITUS WITH DIABETIC POLYNEUROPATHY, WITH LONG-TERM CURRENT USE OF INSULIN: Primary | ICD-10-CM

## 2021-10-08 DIAGNOSIS — I89.0 LYMPHEDEMA OF BOTH LOWER EXTREMITIES: ICD-10-CM

## 2021-10-08 DIAGNOSIS — T84.018D FAILURE OF TOTAL KNEE REPLACEMENT, SUBSEQUENT ENCOUNTER: ICD-10-CM

## 2021-10-08 DIAGNOSIS — Z96.659 FAILURE OF TOTAL KNEE REPLACEMENT, SUBSEQUENT ENCOUNTER: ICD-10-CM

## 2021-10-08 PROBLEM — T84.018A FAILED TOTAL KNEE ARTHROPLASTY: Status: ACTIVE | Noted: 2021-10-08

## 2021-10-08 PROCEDURE — 99443 PR PHYSICIAN TELEPHONE EVALUATION 21-30 MIN: CPT | Mod: 95,,, | Performed by: INTERNAL MEDICINE

## 2021-10-08 PROCEDURE — 99443 PR PHYSICIAN TELEPHONE EVALUATION 21-30 MIN: ICD-10-PCS | Mod: 95,,, | Performed by: INTERNAL MEDICINE

## 2021-10-11 ENCOUNTER — OFFICE VISIT (OUTPATIENT)
Dept: PAIN MEDICINE | Facility: CLINIC | Age: 81
End: 2021-10-11
Payer: MEDICARE

## 2021-10-11 VITALS
OXYGEN SATURATION: 100 % | WEIGHT: 261.94 LBS | BODY MASS INDEX: 52.8 KG/M2 | DIASTOLIC BLOOD PRESSURE: 81 MMHG | HEIGHT: 59 IN | HEART RATE: 85 BPM | TEMPERATURE: 98 F | SYSTOLIC BLOOD PRESSURE: 185 MMHG

## 2021-10-11 DIAGNOSIS — G89.29 CHRONIC PAIN OF RIGHT KNEE: Primary | ICD-10-CM

## 2021-10-11 DIAGNOSIS — E66.01 MORBID OBESITY WITH BMI OF 50.0-59.9, ADULT: ICD-10-CM

## 2021-10-11 DIAGNOSIS — T84.012A FAILED TOTAL KNEE, RIGHT, INITIAL ENCOUNTER: ICD-10-CM

## 2021-10-11 DIAGNOSIS — M25.561 CHRONIC PAIN OF RIGHT KNEE: Primary | ICD-10-CM

## 2021-10-11 PROCEDURE — 99215 OFFICE O/P EST HI 40 MIN: CPT | Mod: PBBFAC | Performed by: ANESTHESIOLOGY

## 2021-10-11 PROCEDURE — 99204 OFFICE O/P NEW MOD 45 MIN: CPT | Mod: S$PBB,,, | Performed by: ANESTHESIOLOGY

## 2021-10-11 PROCEDURE — 99204 PR OFFICE/OUTPT VISIT, NEW, LEVL IV, 45-59 MIN: ICD-10-PCS | Mod: S$PBB,,, | Performed by: ANESTHESIOLOGY

## 2021-10-11 PROCEDURE — 99999 PR PBB SHADOW E&M-EST. PATIENT-LVL V: ICD-10-PCS | Mod: PBBFAC,,, | Performed by: ANESTHESIOLOGY

## 2021-10-11 PROCEDURE — 99999 PR PBB SHADOW E&M-EST. PATIENT-LVL V: CPT | Mod: PBBFAC,,, | Performed by: ANESTHESIOLOGY

## 2021-10-12 ENCOUNTER — TELEPHONE (OUTPATIENT)
Dept: WOUND CARE | Facility: CLINIC | Age: 81
End: 2021-10-12

## 2021-10-14 DIAGNOSIS — M25.561 CHRONIC PAIN OF RIGHT KNEE: Primary | ICD-10-CM

## 2021-10-14 DIAGNOSIS — G89.29 CHRONIC PAIN OF RIGHT KNEE: Primary | ICD-10-CM

## 2021-10-15 ENCOUNTER — OFFICE VISIT (OUTPATIENT)
Dept: WOUND CARE | Facility: CLINIC | Age: 81
End: 2021-10-15
Payer: MEDICARE

## 2021-10-15 VITALS
BODY MASS INDEX: 53.66 KG/M2 | DIASTOLIC BLOOD PRESSURE: 83 MMHG | HEIGHT: 59 IN | TEMPERATURE: 98 F | WEIGHT: 266.19 LBS | SYSTOLIC BLOOD PRESSURE: 171 MMHG | HEART RATE: 91 BPM

## 2021-10-15 DIAGNOSIS — I89.0 LYMPHEDEMA OF BOTH LOWER EXTREMITIES: Primary | ICD-10-CM

## 2021-10-15 PROCEDURE — 99213 OFFICE O/P EST LOW 20 MIN: CPT | Mod: S$PBB,,, | Performed by: NURSE PRACTITIONER

## 2021-10-15 PROCEDURE — 99999 PR PBB SHADOW E&M-EST. PATIENT-LVL V: ICD-10-PCS | Mod: PBBFAC,,, | Performed by: NURSE PRACTITIONER

## 2021-10-15 PROCEDURE — 99215 OFFICE O/P EST HI 40 MIN: CPT | Mod: PBBFAC | Performed by: NURSE PRACTITIONER

## 2021-10-15 PROCEDURE — 99213 PR OFFICE/OUTPT VISIT, EST, LEVL III, 20-29 MIN: ICD-10-PCS | Mod: S$PBB,,, | Performed by: NURSE PRACTITIONER

## 2021-10-15 PROCEDURE — 99999 PR PBB SHADOW E&M-EST. PATIENT-LVL V: CPT | Mod: PBBFAC,,, | Performed by: NURSE PRACTITIONER

## 2021-10-19 ENCOUNTER — TELEPHONE (OUTPATIENT)
Dept: WOUND CARE | Facility: CLINIC | Age: 81
End: 2021-10-19

## 2021-10-21 ENCOUNTER — HOSPITAL ENCOUNTER (OUTPATIENT)
Facility: OTHER | Age: 81
Discharge: HOME | End: 2021-10-21
Attending: ANESTHESIOLOGY | Admitting: ANESTHESIOLOGY
Payer: MEDICARE

## 2021-10-21 VITALS
OXYGEN SATURATION: 100 % | WEIGHT: 262 LBS | TEMPERATURE: 98 F | DIASTOLIC BLOOD PRESSURE: 82 MMHG | SYSTOLIC BLOOD PRESSURE: 182 MMHG | HEART RATE: 87 BPM | HEIGHT: 59 IN | RESPIRATION RATE: 18 BRPM | BODY MASS INDEX: 52.82 KG/M2

## 2021-10-21 DIAGNOSIS — G89.29 CHRONIC PAIN: ICD-10-CM

## 2021-10-21 LAB — POCT GLUCOSE: 188 MG/DL (ref 70–110)

## 2021-10-21 PROCEDURE — 64454 NJX AA&/STRD GNCLR NRV BRNCH: CPT | Mod: RT,,, | Performed by: ANESTHESIOLOGY

## 2021-10-21 PROCEDURE — 64454 PR NERVE BLOCK INJ, ANES/STEROID, GENICULAR NERVE, W/IMG: ICD-10-PCS | Mod: RT,,, | Performed by: ANESTHESIOLOGY

## 2021-10-21 PROCEDURE — 25000003 PHARM REV CODE 250: Performed by: ANESTHESIOLOGY

## 2021-10-21 PROCEDURE — 64454 NJX AA&/STRD GNCLR NRV BRNCH: CPT | Performed by: ANESTHESIOLOGY

## 2021-10-21 RX ORDER — SODIUM BICARBONATE 1 MEQ/ML
SYRINGE (ML) INTRAVENOUS
Status: DISCONTINUED | OUTPATIENT
Start: 2021-10-21 | End: 2021-10-21 | Stop reason: HOSPADM

## 2021-10-21 RX ORDER — BUPIVACAINE HYDROCHLORIDE 2.5 MG/ML
INJECTION, SOLUTION EPIDURAL; INFILTRATION; INTRACAUDAL
Status: DISCONTINUED | OUTPATIENT
Start: 2021-10-21 | End: 2021-10-21 | Stop reason: HOSPADM

## 2021-10-21 RX ORDER — SODIUM CHLORIDE 9 MG/ML
INJECTION, SOLUTION INTRAVENOUS CONTINUOUS
Status: CANCELLED | OUTPATIENT
Start: 2021-10-21

## 2021-10-21 RX ORDER — LIDOCAINE HYDROCHLORIDE 20 MG/ML
INJECTION, SOLUTION INFILTRATION; PERINEURAL
Status: DISCONTINUED | OUTPATIENT
Start: 2021-10-21 | End: 2021-10-21 | Stop reason: HOSPADM

## 2021-10-22 ENCOUNTER — TELEPHONE (OUTPATIENT)
Dept: PAIN MEDICINE | Facility: CLINIC | Age: 81
End: 2021-10-22

## 2021-10-22 DIAGNOSIS — G89.29 CHRONIC PAIN OF RIGHT KNEE: Primary | ICD-10-CM

## 2021-10-22 DIAGNOSIS — M25.561 CHRONIC PAIN OF RIGHT KNEE: Primary | ICD-10-CM

## 2021-10-25 ENCOUNTER — HOSPITAL ENCOUNTER (INPATIENT)
Facility: HOSPITAL | Age: 81
LOS: 7 days | Discharge: NURSING, REHAB OR LONG-TERM CARE | DRG: 467 | End: 2021-11-01
Attending: EMERGENCY MEDICINE | Admitting: EMERGENCY MEDICINE
Payer: MEDICARE

## 2021-10-25 DIAGNOSIS — J44.1 COPD WITH ACUTE EXACERBATION: ICD-10-CM

## 2021-10-25 DIAGNOSIS — T84.018D FAILURE OF TOTAL KNEE REPLACEMENT, SUBSEQUENT ENCOUNTER: Primary | ICD-10-CM

## 2021-10-25 DIAGNOSIS — Z96.659 FAILURE OF TOTAL KNEE REPLACEMENT, INITIAL ENCOUNTER: ICD-10-CM

## 2021-10-25 DIAGNOSIS — T84.018A FAILURE OF TOTAL KNEE REPLACEMENT, INITIAL ENCOUNTER: ICD-10-CM

## 2021-10-25 DIAGNOSIS — M25.569 KNEE PAIN: ICD-10-CM

## 2021-10-25 DIAGNOSIS — T84.018A FAILED TOTAL KNEE ARTHROPLASTY: ICD-10-CM

## 2021-10-25 DIAGNOSIS — Z96.659 FAILURE OF TOTAL KNEE REPLACEMENT, SUBSEQUENT ENCOUNTER: Primary | ICD-10-CM

## 2021-10-25 DIAGNOSIS — T84.53XA INFECTION OF PROSTHETIC RIGHT KNEE JOINT: ICD-10-CM

## 2021-10-25 DIAGNOSIS — Z71.89 ADVANCED CARE PLANNING/COUNSELING DISCUSSION: ICD-10-CM

## 2021-10-25 DIAGNOSIS — T84.50XA PROSTHETIC JOINT INFECTION: ICD-10-CM

## 2021-10-25 DIAGNOSIS — Z96.659 FAILED TOTAL KNEE ARTHROPLASTY: ICD-10-CM

## 2021-10-25 DIAGNOSIS — T84.53XA INFECTION ASSOCIATED WITH INTERNAL RIGHT KNEE PROSTHESIS, INITIAL ENCOUNTER: ICD-10-CM

## 2021-10-25 DIAGNOSIS — J44.9 CHRONIC OBSTRUCTIVE PULMONARY DISEASE, UNSPECIFIED COPD TYPE: ICD-10-CM

## 2021-10-25 DIAGNOSIS — I27.20 PULMONARY HYPERTENSION: ICD-10-CM

## 2021-10-25 LAB
ALBUMIN SERPL BCP-MCNC: 2.3 G/DL (ref 3.5–5.2)
ALP SERPL-CCNC: 99 U/L (ref 55–135)
ALT SERPL W/O P-5'-P-CCNC: 18 U/L (ref 10–44)
ANION GAP SERPL CALC-SCNC: 12 MMOL/L (ref 8–16)
APPEARANCE FLD: NORMAL
AST SERPL-CCNC: 18 U/L (ref 10–40)
BASOPHILS # BLD AUTO: 0.03 K/UL (ref 0–0.2)
BASOPHILS NFR BLD: 0.2 % (ref 0–1.9)
BILIRUB SERPL-MCNC: 0.8 MG/DL (ref 0.1–1)
BODY FLD TYPE: NORMAL
BUN SERPL-MCNC: 18 MG/DL (ref 8–23)
CALCIUM SERPL-MCNC: 9.7 MG/DL (ref 8.7–10.5)
CHLORIDE SERPL-SCNC: 102 MMOL/L (ref 95–110)
CO2 SERPL-SCNC: 22 MMOL/L (ref 23–29)
COLOR FLD: NORMAL
CREAT SERPL-MCNC: 1.1 MG/DL (ref 0.5–1.4)
CRP SERPL-MCNC: 280.4 MG/L (ref 0–8.2)
DIFFERENTIAL METHOD: ABNORMAL
EOSINOPHIL # BLD AUTO: 0 K/UL (ref 0–0.5)
EOSINOPHIL NFR BLD: 0 % (ref 0–8)
ERYTHROCYTE [DISTWIDTH] IN BLOOD BY AUTOMATED COUNT: 15.4 % (ref 11.5–14.5)
ERYTHROCYTE [SEDIMENTATION RATE] IN BLOOD BY WESTERGREN METHOD: >120 MM/HR (ref 0–36)
EST. GFR  (AFRICAN AMERICAN): 55.2 ML/MIN/1.73 M^2
EST. GFR  (NON AFRICAN AMERICAN): 47.9 ML/MIN/1.73 M^2
GLUCOSE SERPL-MCNC: 231 MG/DL (ref 70–110)
GRAM STN SPEC: NORMAL
GRAM STN SPEC: NORMAL
HCT VFR BLD AUTO: 38.1 % (ref 37–48.5)
HGB BLD-MCNC: 12 G/DL (ref 12–16)
IMM GRANULOCYTES # BLD AUTO: 0.1 K/UL (ref 0–0.04)
IMM GRANULOCYTES NFR BLD AUTO: 0.6 % (ref 0–0.5)
LYMPHOCYTES # BLD AUTO: 0.9 K/UL (ref 1–4.8)
LYMPHOCYTES NFR BLD: 5.1 % (ref 18–48)
LYMPHOCYTES NFR FLD MANUAL: 11 %
MCH RBC QN AUTO: 29.1 PG (ref 27–31)
MCHC RBC AUTO-ENTMCNC: 31.5 G/DL (ref 32–36)
MCV RBC AUTO: 93 FL (ref 82–98)
MONOCYTES # BLD AUTO: 1.1 K/UL (ref 0.3–1)
MONOCYTES NFR BLD: 6.5 % (ref 4–15)
MONOS+MACROS NFR FLD MANUAL: 3 %
NEUTROPHILS # BLD AUTO: 15 K/UL (ref 1.8–7.7)
NEUTROPHILS NFR BLD: 87.6 % (ref 38–73)
NEUTROPHILS NFR FLD MANUAL: 86 %
NRBC BLD-RTO: 0 /100 WBC
PLATELET # BLD AUTO: 179 K/UL (ref 150–450)
PMV BLD AUTO: 11.5 FL (ref 9.2–12.9)
POCT GLUCOSE: 107 MG/DL (ref 70–110)
POCT GLUCOSE: 218 MG/DL (ref 70–110)
POCT GLUCOSE: 225 MG/DL (ref 70–110)
POCT GLUCOSE: 278 MG/DL (ref 70–110)
POTASSIUM SERPL-SCNC: 3.9 MMOL/L (ref 3.5–5.1)
PROT SERPL-MCNC: 8.2 G/DL (ref 6–8.4)
RBC # BLD AUTO: 4.12 M/UL (ref 4–5.4)
SODIUM SERPL-SCNC: 136 MMOL/L (ref 136–145)
URATE SERPL-MCNC: 3.9 MG/DL (ref 2.4–5.7)
WBC # BLD AUTO: 17.16 K/UL (ref 3.9–12.7)
WBC # FLD: NORMAL /CU MM

## 2021-10-25 PROCEDURE — 82962 GLUCOSE BLOOD TEST: CPT

## 2021-10-25 PROCEDURE — 87075 CULTR BACTERIA EXCEPT BLOOD: CPT | Performed by: STUDENT IN AN ORGANIZED HEALTH CARE EDUCATION/TRAINING PROGRAM

## 2021-10-25 PROCEDURE — 80053 COMPREHEN METABOLIC PANEL: CPT | Performed by: EMERGENCY MEDICINE

## 2021-10-25 PROCEDURE — 87116 MYCOBACTERIA CULTURE: CPT | Performed by: STUDENT IN AN ORGANIZED HEALTH CARE EDUCATION/TRAINING PROGRAM

## 2021-10-25 PROCEDURE — 89051 BODY FLUID CELL COUNT: CPT | Performed by: STUDENT IN AN ORGANIZED HEALTH CARE EDUCATION/TRAINING PROGRAM

## 2021-10-25 PROCEDURE — 87070 CULTURE OTHR SPECIMN AEROBIC: CPT | Performed by: STUDENT IN AN ORGANIZED HEALTH CARE EDUCATION/TRAINING PROGRAM

## 2021-10-25 PROCEDURE — 87077 CULTURE AEROBIC IDENTIFY: CPT | Performed by: STUDENT IN AN ORGANIZED HEALTH CARE EDUCATION/TRAINING PROGRAM

## 2021-10-25 PROCEDURE — 87102 FUNGUS ISOLATION CULTURE: CPT | Performed by: STUDENT IN AN ORGANIZED HEALTH CARE EDUCATION/TRAINING PROGRAM

## 2021-10-25 PROCEDURE — 11000001 HC ACUTE MED/SURG PRIVATE ROOM

## 2021-10-25 PROCEDURE — 89060 EXAM SYNOVIAL FLUID CRYSTALS: CPT | Performed by: STUDENT IN AN ORGANIZED HEALTH CARE EDUCATION/TRAINING PROGRAM

## 2021-10-25 PROCEDURE — 87206 SMEAR FLUORESCENT/ACID STAI: CPT | Performed by: STUDENT IN AN ORGANIZED HEALTH CARE EDUCATION/TRAINING PROGRAM

## 2021-10-25 PROCEDURE — 99285 PR EMERGENCY DEPT VISIT,LEVEL V: ICD-10-PCS | Mod: ,,, | Performed by: EMERGENCY MEDICINE

## 2021-10-25 PROCEDURE — 85025 COMPLETE CBC W/AUTO DIFF WBC: CPT | Performed by: EMERGENCY MEDICINE

## 2021-10-25 PROCEDURE — 96374 THER/PROPH/DIAG INJ IV PUSH: CPT

## 2021-10-25 PROCEDURE — 94640 AIRWAY INHALATION TREATMENT: CPT

## 2021-10-25 PROCEDURE — 25000242 PHARM REV CODE 250 ALT 637 W/ HCPCS: Performed by: HOSPITALIST

## 2021-10-25 PROCEDURE — 99285 EMERGENCY DEPT VISIT HI MDM: CPT | Mod: ,,, | Performed by: EMERGENCY MEDICINE

## 2021-10-25 PROCEDURE — 99285 EMERGENCY DEPT VISIT HI MDM: CPT | Mod: 25

## 2021-10-25 PROCEDURE — 87205 SMEAR GRAM STAIN: CPT | Performed by: STUDENT IN AN ORGANIZED HEALTH CARE EDUCATION/TRAINING PROGRAM

## 2021-10-25 PROCEDURE — 85652 RBC SED RATE AUTOMATED: CPT | Performed by: EMERGENCY MEDICINE

## 2021-10-25 PROCEDURE — 86140 C-REACTIVE PROTEIN: CPT | Performed by: EMERGENCY MEDICINE

## 2021-10-25 PROCEDURE — 87186 SC STD MICRODIL/AGAR DIL: CPT | Performed by: STUDENT IN AN ORGANIZED HEALTH CARE EDUCATION/TRAINING PROGRAM

## 2021-10-25 PROCEDURE — 63600175 PHARM REV CODE 636 W HCPCS: Performed by: HOSPITALIST

## 2021-10-25 PROCEDURE — 87040 BLOOD CULTURE FOR BACTERIA: CPT | Performed by: HOSPITALIST

## 2021-10-25 PROCEDURE — 84550 ASSAY OF BLOOD/URIC ACID: CPT | Performed by: EMERGENCY MEDICINE

## 2021-10-25 PROCEDURE — 25000003 PHARM REV CODE 250: Performed by: EMERGENCY MEDICINE

## 2021-10-25 PROCEDURE — 25000003 PHARM REV CODE 250: Performed by: HOSPITALIST

## 2021-10-25 RX ORDER — OXYCODONE HYDROCHLORIDE 5 MG/1
5 TABLET ORAL EVERY 4 HOURS PRN
Status: DISCONTINUED | OUTPATIENT
Start: 2021-10-25 | End: 2021-10-27

## 2021-10-25 RX ORDER — ACETAMINOPHEN 325 MG/1
650 TABLET ORAL EVERY 4 HOURS PRN
Status: DISCONTINUED | OUTPATIENT
Start: 2021-10-25 | End: 2021-10-28

## 2021-10-25 RX ORDER — ENOXAPARIN SODIUM 100 MG/ML
40 INJECTION SUBCUTANEOUS EVERY 24 HOURS
Status: DISCONTINUED | OUTPATIENT
Start: 2021-10-25 | End: 2021-10-26

## 2021-10-25 RX ORDER — IBUPROFEN 200 MG
24 TABLET ORAL
Status: DISCONTINUED | OUTPATIENT
Start: 2021-10-25 | End: 2021-11-01 | Stop reason: HOSPADM

## 2021-10-25 RX ORDER — LINEZOLID 2 MG/ML
600 INJECTION, SOLUTION INTRAVENOUS
Status: DISCONTINUED | OUTPATIENT
Start: 2021-10-25 | End: 2021-10-26

## 2021-10-25 RX ORDER — PROCHLORPERAZINE EDISYLATE 5 MG/ML
5 INJECTION INTRAMUSCULAR; INTRAVENOUS EVERY 6 HOURS PRN
Status: DISCONTINUED | OUTPATIENT
Start: 2021-10-25 | End: 2021-11-01 | Stop reason: HOSPADM

## 2021-10-25 RX ORDER — HYDROCODONE BITARTRATE AND ACETAMINOPHEN 5; 325 MG/1; MG/1
1 TABLET ORAL
Status: COMPLETED | OUTPATIENT
Start: 2021-10-25 | End: 2021-10-25

## 2021-10-25 RX ORDER — IBUPROFEN 200 MG
16 TABLET ORAL
Status: DISCONTINUED | OUTPATIENT
Start: 2021-10-25 | End: 2021-11-01 | Stop reason: HOSPADM

## 2021-10-25 RX ORDER — GLUCAGON 1 MG
1 KIT INJECTION
Status: DISCONTINUED | OUTPATIENT
Start: 2021-10-25 | End: 2021-11-01 | Stop reason: HOSPADM

## 2021-10-25 RX ORDER — SODIUM CHLORIDE 0.9 % (FLUSH) 0.9 %
10 SYRINGE (ML) INJECTION
Status: DISCONTINUED | OUTPATIENT
Start: 2021-10-25 | End: 2021-11-01 | Stop reason: HOSPADM

## 2021-10-25 RX ORDER — TORSEMIDE 20 MG/1
20 TABLET ORAL DAILY
Status: DISCONTINUED | OUTPATIENT
Start: 2021-10-26 | End: 2021-10-27

## 2021-10-25 RX ORDER — HYDROMORPHONE HYDROCHLORIDE 1 MG/ML
1 INJECTION, SOLUTION INTRAMUSCULAR; INTRAVENOUS; SUBCUTANEOUS EVERY 4 HOURS PRN
Status: DISCONTINUED | OUTPATIENT
Start: 2021-10-25 | End: 2021-10-27

## 2021-10-25 RX ORDER — LABETALOL HCL 20 MG/4 ML
10 SYRINGE (ML) INTRAVENOUS
Status: COMPLETED | OUTPATIENT
Start: 2021-10-25 | End: 2021-10-25

## 2021-10-25 RX ORDER — ALLOPURINOL 100 MG/1
100 TABLET ORAL DAILY
Status: DISCONTINUED | OUTPATIENT
Start: 2021-10-26 | End: 2021-11-01 | Stop reason: HOSPADM

## 2021-10-25 RX ORDER — TALC
6 POWDER (GRAM) TOPICAL NIGHTLY PRN
Status: DISCONTINUED | OUTPATIENT
Start: 2021-10-25 | End: 2021-11-01 | Stop reason: HOSPADM

## 2021-10-25 RX ORDER — INSULIN ASPART 100 [IU]/ML
0-5 INJECTION, SOLUTION INTRAVENOUS; SUBCUTANEOUS
Status: DISCONTINUED | OUTPATIENT
Start: 2021-10-25 | End: 2021-11-01 | Stop reason: HOSPADM

## 2021-10-25 RX ORDER — ATORVASTATIN CALCIUM 20 MG/1
80 TABLET, FILM COATED ORAL DAILY
Status: DISCONTINUED | OUTPATIENT
Start: 2021-10-26 | End: 2021-10-30

## 2021-10-25 RX ORDER — IPRATROPIUM BROMIDE AND ALBUTEROL SULFATE 2.5; .5 MG/3ML; MG/3ML
3 SOLUTION RESPIRATORY (INHALATION) EVERY 6 HOURS PRN
Status: DISCONTINUED | OUTPATIENT
Start: 2021-10-25 | End: 2021-11-01 | Stop reason: HOSPADM

## 2021-10-25 RX ORDER — ONDANSETRON 8 MG/1
8 TABLET, ORALLY DISINTEGRATING ORAL EVERY 8 HOURS PRN
Status: DISCONTINUED | OUTPATIENT
Start: 2021-10-25 | End: 2021-11-01 | Stop reason: HOSPADM

## 2021-10-25 RX ORDER — POLYETHYLENE GLYCOL 3350 17 G/17G
17 POWDER, FOR SOLUTION ORAL DAILY
Status: DISCONTINUED | OUTPATIENT
Start: 2021-10-26 | End: 2021-10-27

## 2021-10-25 RX ORDER — FLUTICASONE FUROATE AND VILANTEROL 200; 25 UG/1; UG/1
1 POWDER RESPIRATORY (INHALATION) DAILY
Status: DISCONTINUED | OUTPATIENT
Start: 2021-10-26 | End: 2021-11-01 | Stop reason: HOSPADM

## 2021-10-25 RX ORDER — LOSARTAN POTASSIUM 50 MG/1
100 TABLET ORAL DAILY
Status: DISCONTINUED | OUTPATIENT
Start: 2021-10-26 | End: 2021-11-01 | Stop reason: HOSPADM

## 2021-10-25 RX ADMIN — HYDROCODONE BITARTRATE AND ACETAMINOPHEN 1 TABLET: 5; 325 TABLET ORAL at 03:10

## 2021-10-25 RX ADMIN — IPRATROPIUM BROMIDE AND ALBUTEROL SULFATE 3 ML: 2.5; .5 SOLUTION RESPIRATORY (INHALATION) at 06:10

## 2021-10-25 RX ADMIN — OXYCODONE 5 MG: 5 TABLET ORAL at 08:10

## 2021-10-25 RX ADMIN — ENOXAPARIN SODIUM 40 MG: 40 INJECTION, SOLUTION INTRAVENOUS; SUBCUTANEOUS at 05:10

## 2021-10-25 RX ADMIN — HYDROCODONE BITARTRATE AND ACETAMINOPHEN 1 TABLET: 5; 325 TABLET ORAL at 10:10

## 2021-10-25 RX ADMIN — LABETALOL HYDROCHLORIDE 10 MG: 5 INJECTION, SOLUTION INTRAVENOUS at 03:10

## 2021-10-25 RX ADMIN — LINEZOLID 600 MG: 600 INJECTION, SOLUTION INTRAVENOUS at 08:10

## 2021-10-25 RX ADMIN — PIPERACILLIN AND TAZOBACTAM 4.5 G: 4; .5 INJECTION, POWDER, LYOPHILIZED, FOR SOLUTION INTRAVENOUS; PARENTERAL at 05:10

## 2021-10-26 ENCOUNTER — DOCUMENT SCAN (OUTPATIENT)
Dept: HOME HEALTH SERVICES | Facility: HOSPITAL | Age: 81
End: 2021-10-26
Payer: MEDICARE

## 2021-10-26 LAB
ABO + RH BLD: NORMAL
ALBUMIN SERPL BCP-MCNC: 1.9 G/DL (ref 3.5–5.2)
ALP SERPL-CCNC: 90 U/L (ref 55–135)
ALT SERPL W/O P-5'-P-CCNC: 15 U/L (ref 10–44)
ANION GAP SERPL CALC-SCNC: 8 MMOL/L (ref 8–16)
AST SERPL-CCNC: 16 U/L (ref 10–40)
BACTERIA #/AREA URNS AUTO: ABNORMAL /HPF
BASOPHILS # BLD AUTO: 0.03 K/UL (ref 0–0.2)
BASOPHILS NFR BLD: 0.2 % (ref 0–1.9)
BILIRUB SERPL-MCNC: 0.8 MG/DL (ref 0.1–1)
BILIRUB UR QL STRIP: NEGATIVE
BLD GP AB SCN CELLS X3 SERPL QL: NORMAL
BODY FLD TYPE: NORMAL
BUN SERPL-MCNC: 20 MG/DL (ref 8–23)
CALCIUM SERPL-MCNC: 9.5 MG/DL (ref 8.7–10.5)
CHLORIDE SERPL-SCNC: 101 MMOL/L (ref 95–110)
CK SERPL-CCNC: 12 U/L (ref 20–180)
CLARITY UR REFRACT.AUTO: CLEAR
CO2 SERPL-SCNC: 25 MMOL/L (ref 23–29)
COLOR UR AUTO: YELLOW
CREAT SERPL-MCNC: 1 MG/DL (ref 0.5–1.4)
CRYSTALS FLD MICRO: NEGATIVE
DIFFERENTIAL METHOD: ABNORMAL
EOSINOPHIL # BLD AUTO: 0.1 K/UL (ref 0–0.5)
EOSINOPHIL NFR BLD: 0.6 % (ref 0–8)
ERYTHROCYTE [DISTWIDTH] IN BLOOD BY AUTOMATED COUNT: 15.2 % (ref 11.5–14.5)
EST. GFR  (AFRICAN AMERICAN): >60 ML/MIN/1.73 M^2
EST. GFR  (NON AFRICAN AMERICAN): 53.7 ML/MIN/1.73 M^2
ESTIMATED AVG GLUCOSE: 186 MG/DL (ref 68–131)
GLUCOSE SERPL-MCNC: 233 MG/DL (ref 70–110)
GLUCOSE UR QL STRIP: NEGATIVE
HBA1C MFR BLD: 8.1 % (ref 4–5.6)
HCT VFR BLD AUTO: 30.7 % (ref 37–48.5)
HGB BLD-MCNC: 9.8 G/DL (ref 12–16)
HGB UR QL STRIP: NEGATIVE
HYALINE CASTS UR QL AUTO: 0 /LPF
IMM GRANULOCYTES # BLD AUTO: 0.07 K/UL (ref 0–0.04)
IMM GRANULOCYTES NFR BLD AUTO: 0.5 % (ref 0–0.5)
KETONES UR QL STRIP: NEGATIVE
LEUKOCYTE ESTERASE UR QL STRIP: NEGATIVE
LYMPHOCYTES # BLD AUTO: 1.4 K/UL (ref 1–4.8)
LYMPHOCYTES NFR BLD: 10.6 % (ref 18–48)
MAGNESIUM SERPL-MCNC: 2 MG/DL (ref 1.6–2.6)
MCH RBC QN AUTO: 29 PG (ref 27–31)
MCHC RBC AUTO-ENTMCNC: 31.9 G/DL (ref 32–36)
MCV RBC AUTO: 91 FL (ref 82–98)
MICROSCOPIC COMMENT: ABNORMAL
MONOCYTES # BLD AUTO: 1.1 K/UL (ref 0.3–1)
MONOCYTES NFR BLD: 8.1 % (ref 4–15)
NEUTROPHILS # BLD AUTO: 10.6 K/UL (ref 1.8–7.7)
NEUTROPHILS NFR BLD: 80 % (ref 38–73)
NITRITE UR QL STRIP: NEGATIVE
NRBC BLD-RTO: 0 /100 WBC
PATH INTERP FLD-IMP: NORMAL
PH UR STRIP: 5 [PH] (ref 5–8)
PLATELET # BLD AUTO: 155 K/UL (ref 150–450)
PMV BLD AUTO: 12.9 FL (ref 9.2–12.9)
POCT GLUCOSE: 201 MG/DL (ref 70–110)
POCT GLUCOSE: 226 MG/DL (ref 70–110)
POCT GLUCOSE: 292 MG/DL (ref 70–110)
POCT GLUCOSE: 310 MG/DL (ref 70–110)
POTASSIUM SERPL-SCNC: 4 MMOL/L (ref 3.5–5.1)
PROT SERPL-MCNC: 7 G/DL (ref 6–8.4)
PROT UR QL STRIP: ABNORMAL
RBC # BLD AUTO: 3.38 M/UL (ref 4–5.4)
RBC #/AREA URNS AUTO: 2 /HPF (ref 0–4)
SARS-COV-2 RDRP RESP QL NAA+PROBE: NEGATIVE
SARS-COV-2 RNA RESP QL NAA+PROBE: NOT DETECTED
SODIUM SERPL-SCNC: 134 MMOL/L (ref 136–145)
SP GR UR STRIP: 1.02 (ref 1–1.03)
SQUAMOUS #/AREA URNS AUTO: 0 /HPF
URN SPEC COLLECT METH UR: ABNORMAL
WBC # BLD AUTO: 13.27 K/UL (ref 3.9–12.7)
WBC #/AREA URNS AUTO: 1 /HPF (ref 0–5)

## 2021-10-26 PROCEDURE — 85025 COMPLETE CBC W/AUTO DIFF WBC: CPT | Performed by: HOSPITALIST

## 2021-10-26 PROCEDURE — 86900 BLOOD TYPING SEROLOGIC ABO: CPT | Performed by: STUDENT IN AN ORGANIZED HEALTH CARE EDUCATION/TRAINING PROGRAM

## 2021-10-26 PROCEDURE — 36415 COLL VENOUS BLD VENIPUNCTURE: CPT | Performed by: STUDENT IN AN ORGANIZED HEALTH CARE EDUCATION/TRAINING PROGRAM

## 2021-10-26 PROCEDURE — 83735 ASSAY OF MAGNESIUM: CPT | Performed by: HOSPITALIST

## 2021-10-26 PROCEDURE — 80053 COMPREHEN METABOLIC PANEL: CPT | Performed by: HOSPITALIST

## 2021-10-26 PROCEDURE — 97162 PT EVAL MOD COMPLEX 30 MIN: CPT

## 2021-10-26 PROCEDURE — 97535 SELF CARE MNGMENT TRAINING: CPT

## 2021-10-26 PROCEDURE — 82550 ASSAY OF CK (CPK): CPT | Performed by: HOSPITALIST

## 2021-10-26 PROCEDURE — 25000003 PHARM REV CODE 250: Performed by: HOSPITALIST

## 2021-10-26 PROCEDURE — C9399 UNCLASSIFIED DRUGS OR BIOLOG: HCPCS | Performed by: HOSPITALIST

## 2021-10-26 PROCEDURE — 97166 OT EVAL MOD COMPLEX 45 MIN: CPT

## 2021-10-26 PROCEDURE — 63600175 PHARM REV CODE 636 W HCPCS: Performed by: HOSPITALIST

## 2021-10-26 PROCEDURE — 25000003 PHARM REV CODE 250: Performed by: PHYSICIAN ASSISTANT

## 2021-10-26 PROCEDURE — 97116 GAIT TRAINING THERAPY: CPT

## 2021-10-26 PROCEDURE — U0005 INFEC AGEN DETEC AMPLI PROBE: HCPCS | Performed by: HOSPITALIST

## 2021-10-26 PROCEDURE — 97530 THERAPEUTIC ACTIVITIES: CPT

## 2021-10-26 PROCEDURE — 11000001 HC ACUTE MED/SURG PRIVATE ROOM

## 2021-10-26 PROCEDURE — 25000242 PHARM REV CODE 250 ALT 637 W/ HCPCS: Performed by: HOSPITALIST

## 2021-10-26 PROCEDURE — U0003 INFECTIOUS AGENT DETECTION BY NUCLEIC ACID (DNA OR RNA); SEVERE ACUTE RESPIRATORY SYNDROME CORONAVIRUS 2 (SARS-COV-2) (CORONAVIRUS DISEASE [COVID-19]), AMPLIFIED PROBE TECHNIQUE, MAKING USE OF HIGH THROUGHPUT TECHNOLOGIES AS DESCRIBED BY CMS-2020-01-R: HCPCS | Performed by: HOSPITALIST

## 2021-10-26 PROCEDURE — 94760 N-INVAS EAR/PLS OXIMETRY 1: CPT

## 2021-10-26 PROCEDURE — 36415 COLL VENOUS BLD VENIPUNCTURE: CPT | Performed by: HOSPITALIST

## 2021-10-26 PROCEDURE — 99223 1ST HOSP IP/OBS HIGH 75: CPT | Mod: ,,, | Performed by: INTERNAL MEDICINE

## 2021-10-26 PROCEDURE — 63600175 PHARM REV CODE 636 W HCPCS: Performed by: PHYSICIAN ASSISTANT

## 2021-10-26 PROCEDURE — 81001 URINALYSIS AUTO W/SCOPE: CPT | Performed by: HOSPITALIST

## 2021-10-26 PROCEDURE — 94640 AIRWAY INHALATION TREATMENT: CPT

## 2021-10-26 PROCEDURE — U0002 COVID-19 LAB TEST NON-CDC: HCPCS | Performed by: HOSPITALIST

## 2021-10-26 PROCEDURE — 99223 PR INITIAL HOSPITAL CARE,LEVL III: ICD-10-PCS | Mod: ,,, | Performed by: INTERNAL MEDICINE

## 2021-10-26 PROCEDURE — 99222 PR INITIAL HOSPITAL CARE,LEVL II: ICD-10-PCS | Mod: ,,, | Performed by: HOSPITALIST

## 2021-10-26 PROCEDURE — 99222 1ST HOSP IP/OBS MODERATE 55: CPT | Mod: ,,, | Performed by: HOSPITALIST

## 2021-10-26 PROCEDURE — 86920 COMPATIBILITY TEST SPIN: CPT | Performed by: STUDENT IN AN ORGANIZED HEALTH CARE EDUCATION/TRAINING PROGRAM

## 2021-10-26 PROCEDURE — 83036 HEMOGLOBIN GLYCOSYLATED A1C: CPT | Performed by: STUDENT IN AN ORGANIZED HEALTH CARE EDUCATION/TRAINING PROGRAM

## 2021-10-26 RX ORDER — ENOXAPARIN SODIUM 100 MG/ML
40 INJECTION SUBCUTANEOUS EVERY 12 HOURS
Status: DISCONTINUED | OUTPATIENT
Start: 2021-10-26 | End: 2021-10-27

## 2021-10-26 RX ORDER — INSULIN ASPART 100 [IU]/ML
3 INJECTION, SOLUTION INTRAVENOUS; SUBCUTANEOUS
Status: DISCONTINUED | OUTPATIENT
Start: 2021-10-26 | End: 2021-10-29

## 2021-10-26 RX ORDER — HYDROCODONE BITARTRATE AND ACETAMINOPHEN 500; 5 MG/1; MG/1
TABLET ORAL
Status: DISCONTINUED | OUTPATIENT
Start: 2021-10-26 | End: 2021-11-01 | Stop reason: HOSPADM

## 2021-10-26 RX ORDER — ALUMINUM HYDROXIDE, MAGNESIUM HYDROXIDE, AND SIMETHICONE 2400; 240; 2400 MG/30ML; MG/30ML; MG/30ML
30 SUSPENSION ORAL EVERY 6 HOURS PRN
Status: DISCONTINUED | OUTPATIENT
Start: 2021-10-26 | End: 2021-11-01 | Stop reason: HOSPADM

## 2021-10-26 RX ORDER — AMLODIPINE BESYLATE 5 MG/1
5 TABLET ORAL DAILY
Status: DISCONTINUED | OUTPATIENT
Start: 2021-10-26 | End: 2021-10-27

## 2021-10-26 RX ADMIN — FLUTICASONE FUROATE AND VILANTEROL TRIFENATATE 1 PUFF: 200; 25 POWDER RESPIRATORY (INHALATION) at 10:10

## 2021-10-26 RX ADMIN — HYDROMORPHONE HYDROCHLORIDE 1 MG: 1 INJECTION, SOLUTION INTRAMUSCULAR; INTRAVENOUS; SUBCUTANEOUS at 06:10

## 2021-10-26 RX ADMIN — INSULIN ASPART 3 UNITS: 100 INJECTION, SOLUTION INTRAVENOUS; SUBCUTANEOUS at 06:10

## 2021-10-26 RX ADMIN — OXYCODONE 5 MG: 5 TABLET ORAL at 08:10

## 2021-10-26 RX ADMIN — AMLODIPINE BESYLATE 5 MG: 5 TABLET ORAL at 03:10

## 2021-10-26 RX ADMIN — INSULIN ASPART 3 UNITS: 100 INJECTION, SOLUTION INTRAVENOUS; SUBCUTANEOUS at 01:10

## 2021-10-26 RX ADMIN — TORSEMIDE 20 MG: 20 TABLET ORAL at 10:10

## 2021-10-26 RX ADMIN — IPRATROPIUM BROMIDE AND ALBUTEROL SULFATE 3 ML: 2.5; .5 SOLUTION RESPIRATORY (INHALATION) at 10:10

## 2021-10-26 RX ADMIN — ENOXAPARIN SODIUM 40 MG: 40 INJECTION, SOLUTION INTRAVENOUS; SUBCUTANEOUS at 10:10

## 2021-10-26 RX ADMIN — PIPERACILLIN AND TAZOBACTAM 4.5 G: 4; .5 INJECTION, POWDER, LYOPHILIZED, FOR SOLUTION INTRAVENOUS; PARENTERAL at 01:10

## 2021-10-26 RX ADMIN — CEFTRIAXONE 2 G: 2 INJECTION, SOLUTION INTRAVENOUS at 06:10

## 2021-10-26 RX ADMIN — ONDANSETRON 8 MG: 8 TABLET, ORALLY DISINTEGRATING ORAL at 06:10

## 2021-10-26 RX ADMIN — DAPTOMYCIN 980 MG: 350 INJECTION, POWDER, LYOPHILIZED, FOR SOLUTION INTRAVENOUS at 06:10

## 2021-10-26 RX ADMIN — INSULIN DETEMIR 10 UNITS: 100 INJECTION, SOLUTION SUBCUTANEOUS at 10:10

## 2021-10-26 RX ADMIN — PIPERACILLIN AND TAZOBACTAM 4.5 G: 4; .5 INJECTION, POWDER, LYOPHILIZED, FOR SOLUTION INTRAVENOUS; PARENTERAL at 09:10

## 2021-10-26 RX ADMIN — INSULIN ASPART 2 UNITS: 100 INJECTION, SOLUTION INTRAVENOUS; SUBCUTANEOUS at 06:10

## 2021-10-26 RX ADMIN — INSULIN ASPART 2 UNITS: 100 INJECTION, SOLUTION INTRAVENOUS; SUBCUTANEOUS at 10:10

## 2021-10-26 RX ADMIN — ATORVASTATIN CALCIUM 80 MG: 20 TABLET, FILM COATED ORAL at 10:10

## 2021-10-26 RX ADMIN — Medication 6 MG: at 08:10

## 2021-10-26 RX ADMIN — LINEZOLID 600 MG: 600 INJECTION, SOLUTION INTRAVENOUS at 06:10

## 2021-10-26 RX ADMIN — LOSARTAN POTASSIUM 100 MG: 50 TABLET, FILM COATED ORAL at 10:10

## 2021-10-26 RX ADMIN — ENOXAPARIN SODIUM 40 MG: 40 INJECTION, SOLUTION INTRAVENOUS; SUBCUTANEOUS at 07:10

## 2021-10-26 RX ADMIN — POLYETHYLENE GLYCOL 3350 17 G: 17 POWDER, FOR SOLUTION ORAL at 10:10

## 2021-10-26 RX ADMIN — ALLOPURINOL 100 MG: 100 TABLET ORAL at 10:10

## 2021-10-26 RX ADMIN — INSULIN ASPART 2 UNITS: 100 INJECTION, SOLUTION INTRAVENOUS; SUBCUTANEOUS at 09:10

## 2021-10-27 ENCOUNTER — ANESTHESIA EVENT (OUTPATIENT)
Dept: SURGERY | Facility: HOSPITAL | Age: 81
DRG: 467 | End: 2021-10-27
Payer: MEDICARE

## 2021-10-27 LAB
ALBUMIN SERPL BCP-MCNC: 1.8 G/DL (ref 3.5–5.2)
ALP SERPL-CCNC: 111 U/L (ref 55–135)
ALT SERPL W/O P-5'-P-CCNC: 23 U/L (ref 10–44)
ANION GAP SERPL CALC-SCNC: 10 MMOL/L (ref 8–16)
AST SERPL-CCNC: 26 U/L (ref 10–40)
BASOPHILS # BLD AUTO: 0.03 K/UL (ref 0–0.2)
BASOPHILS NFR BLD: 0.3 % (ref 0–1.9)
BILIRUB SERPL-MCNC: 0.7 MG/DL (ref 0.1–1)
BUN SERPL-MCNC: 17 MG/DL (ref 8–23)
CALCIUM SERPL-MCNC: 9.3 MG/DL (ref 8.7–10.5)
CHLORIDE SERPL-SCNC: 99 MMOL/L (ref 95–110)
CO2 SERPL-SCNC: 25 MMOL/L (ref 23–29)
CREAT SERPL-MCNC: 1 MG/DL (ref 0.5–1.4)
DIFFERENTIAL METHOD: ABNORMAL
EOSINOPHIL # BLD AUTO: 0.1 K/UL (ref 0–0.5)
EOSINOPHIL NFR BLD: 0.8 % (ref 0–8)
ERYTHROCYTE [DISTWIDTH] IN BLOOD BY AUTOMATED COUNT: 14.9 % (ref 11.5–14.5)
EST. GFR  (AFRICAN AMERICAN): >60 ML/MIN/1.73 M^2
EST. GFR  (NON AFRICAN AMERICAN): 53.7 ML/MIN/1.73 M^2
GLUCOSE SERPL-MCNC: 196 MG/DL (ref 70–110)
HCT VFR BLD AUTO: 30.7 % (ref 37–48.5)
HGB BLD-MCNC: 10.1 G/DL (ref 12–16)
IMM GRANULOCYTES # BLD AUTO: 0.06 K/UL (ref 0–0.04)
IMM GRANULOCYTES NFR BLD AUTO: 0.5 % (ref 0–0.5)
LYMPHOCYTES # BLD AUTO: 1.4 K/UL (ref 1–4.8)
LYMPHOCYTES NFR BLD: 12.3 % (ref 18–48)
MAGNESIUM SERPL-MCNC: 1.6 MG/DL (ref 1.6–2.6)
MCH RBC QN AUTO: 29.4 PG (ref 27–31)
MCHC RBC AUTO-ENTMCNC: 32.9 G/DL (ref 32–36)
MCV RBC AUTO: 89 FL (ref 82–98)
MONOCYTES # BLD AUTO: 1.3 K/UL (ref 0.3–1)
MONOCYTES NFR BLD: 10.9 % (ref 4–15)
NEUTROPHILS # BLD AUTO: 8.8 K/UL (ref 1.8–7.7)
NEUTROPHILS NFR BLD: 75.2 % (ref 38–73)
NRBC BLD-RTO: 0 /100 WBC
PLATELET # BLD AUTO: 175 K/UL (ref 150–450)
PMV BLD AUTO: 12.3 FL (ref 9.2–12.9)
POCT GLUCOSE: 208 MG/DL (ref 70–110)
POCT GLUCOSE: 213 MG/DL (ref 70–110)
POCT GLUCOSE: 232 MG/DL (ref 70–110)
POCT GLUCOSE: 307 MG/DL (ref 70–110)
POTASSIUM SERPL-SCNC: 3.5 MMOL/L (ref 3.5–5.1)
PROT SERPL-MCNC: 7 G/DL (ref 6–8.4)
RBC # BLD AUTO: 3.44 M/UL (ref 4–5.4)
SODIUM SERPL-SCNC: 134 MMOL/L (ref 136–145)
WBC # BLD AUTO: 11.69 K/UL (ref 3.9–12.7)

## 2021-10-27 PROCEDURE — 36415 COLL VENOUS BLD VENIPUNCTURE: CPT | Performed by: HOSPITALIST

## 2021-10-27 PROCEDURE — 25000003 PHARM REV CODE 250: Performed by: HOSPITALIST

## 2021-10-27 PROCEDURE — 86580 TB INTRADERMAL TEST: CPT | Performed by: HOSPITALIST

## 2021-10-27 PROCEDURE — 11000001 HC ACUTE MED/SURG PRIVATE ROOM

## 2021-10-27 PROCEDURE — 99233 SBSQ HOSP IP/OBS HIGH 50: CPT | Mod: ,,, | Performed by: PHYSICIAN ASSISTANT

## 2021-10-27 PROCEDURE — 30200315 PPD INTRADERMAL TEST REV CODE 302: Performed by: HOSPITALIST

## 2021-10-27 PROCEDURE — 94640 AIRWAY INHALATION TREATMENT: CPT

## 2021-10-27 PROCEDURE — 94761 N-INVAS EAR/PLS OXIMETRY MLT: CPT

## 2021-10-27 PROCEDURE — 85025 COMPLETE CBC W/AUTO DIFF WBC: CPT | Performed by: HOSPITALIST

## 2021-10-27 PROCEDURE — 80053 COMPREHEN METABOLIC PANEL: CPT | Performed by: HOSPITALIST

## 2021-10-27 PROCEDURE — 63600175 PHARM REV CODE 636 W HCPCS: Performed by: PHYSICIAN ASSISTANT

## 2021-10-27 PROCEDURE — 99233 PR SUBSEQUENT HOSPITAL CARE,LEVL III: ICD-10-PCS | Mod: ,,, | Performed by: PHYSICIAN ASSISTANT

## 2021-10-27 PROCEDURE — 83735 ASSAY OF MAGNESIUM: CPT | Performed by: HOSPITALIST

## 2021-10-27 PROCEDURE — 25000003 PHARM REV CODE 250: Performed by: PHYSICIAN ASSISTANT

## 2021-10-27 RX ORDER — AMLODIPINE BESYLATE 10 MG/1
10 TABLET ORAL DAILY
Status: DISCONTINUED | OUTPATIENT
Start: 2021-10-28 | End: 2021-11-01 | Stop reason: HOSPADM

## 2021-10-27 RX ORDER — POLYETHYLENE GLYCOL 3350 17 G/17G
17 POWDER, FOR SOLUTION ORAL 2 TIMES DAILY PRN
Status: DISCONTINUED | OUTPATIENT
Start: 2021-10-27 | End: 2021-11-01 | Stop reason: HOSPADM

## 2021-10-27 RX ORDER — OXYCODONE HYDROCHLORIDE 5 MG/1
15 TABLET ORAL EVERY 4 HOURS PRN
Status: DISCONTINUED | OUTPATIENT
Start: 2021-10-27 | End: 2021-10-28

## 2021-10-27 RX ORDER — ACETAMINOPHEN 500 MG
1000 TABLET ORAL EVERY 8 HOURS
Status: DISCONTINUED | OUTPATIENT
Start: 2021-10-27 | End: 2021-10-28

## 2021-10-27 RX ORDER — HYDROMORPHONE HYDROCHLORIDE 1 MG/ML
1.5 INJECTION, SOLUTION INTRAMUSCULAR; INTRAVENOUS; SUBCUTANEOUS EVERY 4 HOURS PRN
Status: DISCONTINUED | OUTPATIENT
Start: 2021-10-27 | End: 2021-10-28

## 2021-10-27 RX ORDER — HYDROXYZINE PAMOATE 25 MG/1
50 CAPSULE ORAL ONCE AS NEEDED
Status: COMPLETED | OUTPATIENT
Start: 2021-10-27 | End: 2021-10-27

## 2021-10-27 RX ADMIN — LOSARTAN POTASSIUM 100 MG: 50 TABLET, FILM COATED ORAL at 09:10

## 2021-10-27 RX ADMIN — TORSEMIDE 20 MG: 20 TABLET ORAL at 09:10

## 2021-10-27 RX ADMIN — INSULIN ASPART 2 UNITS: 100 INJECTION, SOLUTION INTRAVENOUS; SUBCUTANEOUS at 09:10

## 2021-10-27 RX ADMIN — ATORVASTATIN CALCIUM 80 MG: 20 TABLET, FILM COATED ORAL at 09:10

## 2021-10-27 RX ADMIN — TUBERCULIN PURIFIED PROTEIN DERIVATIVE 5 UNITS: 5 INJECTION, SOLUTION INTRADERMAL at 09:10

## 2021-10-27 RX ADMIN — INSULIN ASPART 1 UNITS: 100 INJECTION, SOLUTION INTRAVENOUS; SUBCUTANEOUS at 09:10

## 2021-10-27 RX ADMIN — DAPTOMYCIN 980 MG: 350 INJECTION, POWDER, LYOPHILIZED, FOR SOLUTION INTRAVENOUS at 06:10

## 2021-10-27 RX ADMIN — Medication 6 MG: at 09:10

## 2021-10-27 RX ADMIN — INSULIN ASPART 3 UNITS: 100 INJECTION, SOLUTION INTRAVENOUS; SUBCUTANEOUS at 02:10

## 2021-10-27 RX ADMIN — HYDROXYZINE PAMOATE 50 MG: 25 CAPSULE ORAL at 09:10

## 2021-10-27 RX ADMIN — INSULIN ASPART 4 UNITS: 100 INJECTION, SOLUTION INTRAVENOUS; SUBCUTANEOUS at 02:10

## 2021-10-27 RX ADMIN — FLUTICASONE FUROATE AND VILANTEROL TRIFENATATE 1 PUFF: 200; 25 POWDER RESPIRATORY (INHALATION) at 08:10

## 2021-10-27 RX ADMIN — AMLODIPINE BESYLATE 5 MG: 5 TABLET ORAL at 09:10

## 2021-10-27 RX ADMIN — ALLOPURINOL 100 MG: 100 TABLET ORAL at 09:10

## 2021-10-27 RX ADMIN — INSULIN ASPART 2 UNITS: 100 INJECTION, SOLUTION INTRAVENOUS; SUBCUTANEOUS at 05:10

## 2021-10-27 RX ADMIN — POLYETHYLENE GLYCOL 3350 17 G: 17 POWDER, FOR SOLUTION ORAL at 09:10

## 2021-10-27 RX ADMIN — INSULIN DETEMIR 10 UNITS: 100 INJECTION, SOLUTION SUBCUTANEOUS at 09:10

## 2021-10-27 RX ADMIN — INSULIN ASPART 3 UNITS: 100 INJECTION, SOLUTION INTRAVENOUS; SUBCUTANEOUS at 05:10

## 2021-10-27 RX ADMIN — INSULIN ASPART 3 UNITS: 100 INJECTION, SOLUTION INTRAVENOUS; SUBCUTANEOUS at 09:10

## 2021-10-27 RX ADMIN — ACETAMINOPHEN 1000 MG: 500 TABLET ORAL at 09:10

## 2021-10-27 RX ADMIN — CEFTRIAXONE 2 G: 2 INJECTION, SOLUTION INTRAVENOUS at 05:10

## 2021-10-28 ENCOUNTER — ANESTHESIA (OUTPATIENT)
Dept: SURGERY | Facility: HOSPITAL | Age: 81
DRG: 467 | End: 2021-10-28
Payer: MEDICARE

## 2021-10-28 ENCOUNTER — ANESTHESIA (OUTPATIENT)
Dept: SURGERY | Facility: HOSPITAL | Age: 81
End: 2021-10-28

## 2021-10-28 ENCOUNTER — ANESTHESIA EVENT (OUTPATIENT)
Dept: SURGERY | Facility: HOSPITAL | Age: 81
End: 2021-10-28

## 2021-10-28 LAB
ALBUMIN SERPL BCP-MCNC: 1.9 G/DL (ref 3.5–5.2)
ALP SERPL-CCNC: 125 U/L (ref 55–135)
ALT SERPL W/O P-5'-P-CCNC: 27 U/L (ref 10–44)
ANION GAP SERPL CALC-SCNC: 12 MMOL/L (ref 8–16)
AST SERPL-CCNC: 27 U/L (ref 10–40)
BASOPHILS # BLD AUTO: 0.04 K/UL (ref 0–0.2)
BASOPHILS NFR BLD: 0.3 % (ref 0–1.9)
BILIRUB SERPL-MCNC: 0.6 MG/DL (ref 0.1–1)
BUN SERPL-MCNC: 16 MG/DL (ref 8–23)
CALCIUM SERPL-MCNC: 9.8 MG/DL (ref 8.7–10.5)
CHLORIDE SERPL-SCNC: 97 MMOL/L (ref 95–110)
CO2 SERPL-SCNC: 29 MMOL/L (ref 23–29)
CREAT SERPL-MCNC: 1 MG/DL (ref 0.5–1.4)
DIFFERENTIAL METHOD: ABNORMAL
EOSINOPHIL # BLD AUTO: 0.1 K/UL (ref 0–0.5)
EOSINOPHIL NFR BLD: 1.1 % (ref 0–8)
ERYTHROCYTE [DISTWIDTH] IN BLOOD BY AUTOMATED COUNT: 14.9 % (ref 11.5–14.5)
EST. GFR  (AFRICAN AMERICAN): >60 ML/MIN/1.73 M^2
EST. GFR  (NON AFRICAN AMERICAN): 53.7 ML/MIN/1.73 M^2
GLUCOSE SERPL-MCNC: 167 MG/DL (ref 70–110)
HCT VFR BLD AUTO: 33.9 % (ref 37–48.5)
HGB BLD-MCNC: 11.3 G/DL (ref 12–16)
IMM GRANULOCYTES # BLD AUTO: 0.08 K/UL (ref 0–0.04)
IMM GRANULOCYTES NFR BLD AUTO: 0.6 % (ref 0–0.5)
INR PPP: 1.1 (ref 0.8–1.2)
LYMPHOCYTES # BLD AUTO: 1.2 K/UL (ref 1–4.8)
LYMPHOCYTES NFR BLD: 9.5 % (ref 18–48)
MAGNESIUM SERPL-MCNC: 1.5 MG/DL (ref 1.6–2.6)
MCH RBC QN AUTO: 29 PG (ref 27–31)
MCHC RBC AUTO-ENTMCNC: 33.3 G/DL (ref 32–36)
MCV RBC AUTO: 87 FL (ref 82–98)
MONOCYTES # BLD AUTO: 1.3 K/UL (ref 0.3–1)
MONOCYTES NFR BLD: 10.1 % (ref 4–15)
NEUTROPHILS # BLD AUTO: 10.1 K/UL (ref 1.8–7.7)
NEUTROPHILS NFR BLD: 78.4 % (ref 38–73)
NRBC BLD-RTO: 0 /100 WBC
PLATELET # BLD AUTO: 189 K/UL (ref 150–450)
PMV BLD AUTO: 11.4 FL (ref 9.2–12.9)
POCT GLUCOSE: 191 MG/DL (ref 70–110)
POCT GLUCOSE: 200 MG/DL (ref 70–110)
POCT GLUCOSE: 203 MG/DL (ref 70–110)
POCT GLUCOSE: 211 MG/DL (ref 70–110)
POTASSIUM SERPL-SCNC: 3.5 MMOL/L (ref 3.5–5.1)
PROT SERPL-MCNC: 7.9 G/DL (ref 6–8.4)
PROTHROMBIN TIME: 11.7 SEC (ref 9–12.5)
RBC # BLD AUTO: 3.89 M/UL (ref 4–5.4)
SODIUM SERPL-SCNC: 138 MMOL/L (ref 136–145)
WBC # BLD AUTO: 12.92 K/UL (ref 3.9–12.7)

## 2021-10-28 PROCEDURE — 37000008 HC ANESTHESIA 1ST 15 MINUTES: Performed by: ORTHOPAEDIC SURGERY

## 2021-10-28 PROCEDURE — 99232 SBSQ HOSP IP/OBS MODERATE 35: CPT | Mod: ,,, | Performed by: HOSPITALIST

## 2021-10-28 PROCEDURE — 71000015 HC POSTOP RECOV 1ST HR: Performed by: ORTHOPAEDIC SURGERY

## 2021-10-28 PROCEDURE — 99232 PR SUBSEQUENT HOSPITAL CARE,LEVL II: ICD-10-PCS | Mod: ,,, | Performed by: HOSPITALIST

## 2021-10-28 PROCEDURE — 63600175 PHARM REV CODE 636 W HCPCS: Performed by: STUDENT IN AN ORGANIZED HEALTH CARE EDUCATION/TRAINING PROGRAM

## 2021-10-28 PROCEDURE — 87206 SMEAR FLUORESCENT/ACID STAI: CPT | Mod: 91 | Performed by: ORTHOPAEDIC SURGERY

## 2021-10-28 PROCEDURE — 27488 PR REMOVAL OF KNEE PROSTHESIS: ICD-10-PCS | Mod: 22,RT,GC, | Performed by: ORTHOPAEDIC SURGERY

## 2021-10-28 PROCEDURE — 87075 CULTR BACTERIA EXCEPT BLOOD: CPT | Mod: 59 | Performed by: ORTHOPAEDIC SURGERY

## 2021-10-28 PROCEDURE — 76942 ECHO GUIDE FOR BIOPSY: CPT | Mod: 26,,, | Performed by: ANESTHESIOLOGY

## 2021-10-28 PROCEDURE — 71000016 HC POSTOP RECOV ADDL HR: Performed by: ORTHOPAEDIC SURGERY

## 2021-10-28 PROCEDURE — 25000003 PHARM REV CODE 250

## 2021-10-28 PROCEDURE — 87176 TISSUE HOMOGENIZATION CULTR: CPT | Mod: 91 | Performed by: HOSPITALIST

## 2021-10-28 PROCEDURE — 25000003 PHARM REV CODE 250: Performed by: ANESTHESIOLOGY

## 2021-10-28 PROCEDURE — 25000003 PHARM REV CODE 250: Performed by: NURSE ANESTHETIST, CERTIFIED REGISTERED

## 2021-10-28 PROCEDURE — D9220A PRA ANESTHESIA: Mod: ANES,,, | Performed by: STUDENT IN AN ORGANIZED HEALTH CARE EDUCATION/TRAINING PROGRAM

## 2021-10-28 PROCEDURE — 11000001 HC ACUTE MED/SURG PRIVATE ROOM

## 2021-10-28 PROCEDURE — 76942 ADDUCTOR CANAL SINGLE INJECTION BLOCK: ICD-10-PCS | Mod: 26,,, | Performed by: ANESTHESIOLOGY

## 2021-10-28 PROCEDURE — 85025 COMPLETE CBC W/AUTO DIFF WBC: CPT | Performed by: HOSPITALIST

## 2021-10-28 PROCEDURE — 88311 PR  DECALCIFY TISSUE: ICD-10-PCS | Mod: 26,,, | Performed by: STUDENT IN AN ORGANIZED HEALTH CARE EDUCATION/TRAINING PROGRAM

## 2021-10-28 PROCEDURE — 87077 CULTURE AEROBIC IDENTIFY: CPT | Mod: 59 | Performed by: HOSPITALIST

## 2021-10-28 PROCEDURE — 87075 CULTR BACTERIA EXCEPT BLOOD: CPT | Mod: 59 | Performed by: HOSPITALIST

## 2021-10-28 PROCEDURE — 63600175 PHARM REV CODE 636 W HCPCS: Performed by: ANESTHESIOLOGY

## 2021-10-28 PROCEDURE — D9220A PRA ANESTHESIA: Mod: CRNA,,, | Performed by: NURSE ANESTHETIST, CERTIFIED REGISTERED

## 2021-10-28 PROCEDURE — 87070 CULTURE OTHR SPECIMN AEROBIC: CPT | Mod: 59 | Performed by: ORTHOPAEDIC SURGERY

## 2021-10-28 PROCEDURE — 87205 SMEAR GRAM STAIN: CPT | Performed by: ORTHOPAEDIC SURGERY

## 2021-10-28 PROCEDURE — 87102 FUNGUS ISOLATION CULTURE: CPT | Mod: 59 | Performed by: HOSPITALIST

## 2021-10-28 PROCEDURE — C1713 ANCHOR/SCREW BN/BN,TIS/BN: HCPCS | Performed by: ORTHOPAEDIC SURGERY

## 2021-10-28 PROCEDURE — 63600175 PHARM REV CODE 636 W HCPCS

## 2021-10-28 PROCEDURE — 88300 SURGICAL PATH GROSS: CPT | Mod: 26,59,, | Performed by: STUDENT IN AN ORGANIZED HEALTH CARE EDUCATION/TRAINING PROGRAM

## 2021-10-28 PROCEDURE — 87116 MYCOBACTERIA CULTURE: CPT | Mod: 59 | Performed by: HOSPITALIST

## 2021-10-28 PROCEDURE — 87176 TISSUE HOMOGENIZATION CULTR: CPT | Performed by: ORTHOPAEDIC SURGERY

## 2021-10-28 PROCEDURE — 25000003 PHARM REV CODE 250: Performed by: STUDENT IN AN ORGANIZED HEALTH CARE EDUCATION/TRAINING PROGRAM

## 2021-10-28 PROCEDURE — 80053 COMPREHEN METABOLIC PANEL: CPT | Performed by: HOSPITALIST

## 2021-10-28 PROCEDURE — 27201423 OPTIME MED/SURG SUP & DEVICES STERILE SUPPLY: Performed by: ORTHOPAEDIC SURGERY

## 2021-10-28 PROCEDURE — 63600175 PHARM REV CODE 636 W HCPCS: Performed by: HOSPITALIST

## 2021-10-28 PROCEDURE — 36415 COLL VENOUS BLD VENIPUNCTURE: CPT | Performed by: STUDENT IN AN ORGANIZED HEALTH CARE EDUCATION/TRAINING PROGRAM

## 2021-10-28 PROCEDURE — 76942 ECHO GUIDE FOR BIOPSY: CPT | Performed by: STUDENT IN AN ORGANIZED HEALTH CARE EDUCATION/TRAINING PROGRAM

## 2021-10-28 PROCEDURE — 37000009 HC ANESTHESIA EA ADD 15 MINS: Performed by: ORTHOPAEDIC SURGERY

## 2021-10-28 PROCEDURE — 36000711: Performed by: ORTHOPAEDIC SURGERY

## 2021-10-28 PROCEDURE — 87116 MYCOBACTERIA CULTURE: CPT | Mod: 59 | Performed by: ORTHOPAEDIC SURGERY

## 2021-10-28 PROCEDURE — 25000003 PHARM REV CODE 250: Performed by: HOSPITALIST

## 2021-10-28 PROCEDURE — D9220A PRA ANESTHESIA: ICD-10-PCS | Mod: ANES,,, | Performed by: STUDENT IN AN ORGANIZED HEALTH CARE EDUCATION/TRAINING PROGRAM

## 2021-10-28 PROCEDURE — D9220A PRA ANESTHESIA: ICD-10-PCS | Mod: CRNA,,, | Performed by: NURSE ANESTHETIST, CERTIFIED REGISTERED

## 2021-10-28 PROCEDURE — 94761 N-INVAS EAR/PLS OXIMETRY MLT: CPT

## 2021-10-28 PROCEDURE — 87186 SC STD MICRODIL/AGAR DIL: CPT | Performed by: ORTHOPAEDIC SURGERY

## 2021-10-28 PROCEDURE — 71000033 HC RECOVERY, INTIAL HOUR: Performed by: ORTHOPAEDIC SURGERY

## 2021-10-28 PROCEDURE — 88311 DECALCIFY TISSUE: CPT | Performed by: STUDENT IN AN ORGANIZED HEALTH CARE EDUCATION/TRAINING PROGRAM

## 2021-10-28 PROCEDURE — 85610 PROTHROMBIN TIME: CPT | Performed by: STUDENT IN AN ORGANIZED HEALTH CARE EDUCATION/TRAINING PROGRAM

## 2021-10-28 PROCEDURE — 36415 COLL VENOUS BLD VENIPUNCTURE: CPT | Performed by: HOSPITALIST

## 2021-10-28 PROCEDURE — 88305 TISSUE EXAM BY PATHOLOGIST: CPT | Mod: 26,,, | Performed by: STUDENT IN AN ORGANIZED HEALTH CARE EDUCATION/TRAINING PROGRAM

## 2021-10-28 PROCEDURE — 87102 FUNGUS ISOLATION CULTURE: CPT | Mod: 59 | Performed by: ORTHOPAEDIC SURGERY

## 2021-10-28 PROCEDURE — 87206 SMEAR FLUORESCENT/ACID STAI: CPT | Mod: 91 | Performed by: HOSPITALIST

## 2021-10-28 PROCEDURE — 82962 GLUCOSE BLOOD TEST: CPT | Performed by: ORTHOPAEDIC SURGERY

## 2021-10-28 PROCEDURE — 88311 DECALCIFY TISSUE: CPT | Mod: 26,,, | Performed by: STUDENT IN AN ORGANIZED HEALTH CARE EDUCATION/TRAINING PROGRAM

## 2021-10-28 PROCEDURE — 88300 SURGICAL PATH GROSS: CPT | Performed by: STUDENT IN AN ORGANIZED HEALTH CARE EDUCATION/TRAINING PROGRAM

## 2021-10-28 PROCEDURE — 64447 ADDUCTOR CANAL SINGLE INJECTION BLOCK: ICD-10-PCS | Mod: 59,RT,, | Performed by: ANESTHESIOLOGY

## 2021-10-28 PROCEDURE — 88300 PR  SURG PATH,GROSS,LEVEL I: ICD-10-PCS | Mod: 26,59,, | Performed by: STUDENT IN AN ORGANIZED HEALTH CARE EDUCATION/TRAINING PROGRAM

## 2021-10-28 PROCEDURE — 63600175 PHARM REV CODE 636 W HCPCS: Performed by: ORTHOPAEDIC SURGERY

## 2021-10-28 PROCEDURE — 36000710: Performed by: ORTHOPAEDIC SURGERY

## 2021-10-28 PROCEDURE — 63600175 PHARM REV CODE 636 W HCPCS: Performed by: NURSE ANESTHETIST, CERTIFIED REGISTERED

## 2021-10-28 PROCEDURE — 87077 CULTURE AEROBIC IDENTIFY: CPT | Mod: 59 | Performed by: ORTHOPAEDIC SURGERY

## 2021-10-28 PROCEDURE — 87186 SC STD MICRODIL/AGAR DIL: CPT | Mod: 59 | Performed by: HOSPITALIST

## 2021-10-28 PROCEDURE — C1776 JOINT DEVICE (IMPLANTABLE): HCPCS | Performed by: ORTHOPAEDIC SURGERY

## 2021-10-28 PROCEDURE — 83735 ASSAY OF MAGNESIUM: CPT | Performed by: HOSPITALIST

## 2021-10-28 PROCEDURE — 64447 NJX AA&/STRD FEMORAL NRV IMG: CPT | Mod: 59,RT,, | Performed by: ANESTHESIOLOGY

## 2021-10-28 PROCEDURE — 88305 TISSUE EXAM BY PATHOLOGIST: ICD-10-PCS | Mod: 26,,, | Performed by: STUDENT IN AN ORGANIZED HEALTH CARE EDUCATION/TRAINING PROGRAM

## 2021-10-28 PROCEDURE — 87070 CULTURE OTHR SPECIMN AEROBIC: CPT | Mod: 59 | Performed by: HOSPITALIST

## 2021-10-28 PROCEDURE — 88305 TISSUE EXAM BY PATHOLOGIST: CPT | Mod: 59 | Performed by: STUDENT IN AN ORGANIZED HEALTH CARE EDUCATION/TRAINING PROGRAM

## 2021-10-28 PROCEDURE — 27488 REMOVAL OF KNEE PROSTHESIS: CPT | Mod: 22,RT,GC, | Performed by: ORTHOPAEDIC SURGERY

## 2021-10-28 DEVICE — CEMENT BONE RDPQ 40G PDR 20ML: Type: IMPLANTABLE DEVICE | Site: KNEE | Status: FUNCTIONAL

## 2021-10-28 DEVICE — IMPLANTABLE DEVICE: Type: IMPLANTABLE DEVICE | Site: KNEE | Status: FUNCTIONAL

## 2021-10-28 RX ORDER — HYDRALAZINE HYDROCHLORIDE 20 MG/ML
5 INJECTION INTRAMUSCULAR; INTRAVENOUS EVERY 6 HOURS PRN
Status: DISCONTINUED | OUTPATIENT
Start: 2021-10-28 | End: 2021-10-28 | Stop reason: HOSPADM

## 2021-10-28 RX ORDER — LABETALOL HCL 20 MG/4 ML
SYRINGE (ML) INTRAVENOUS
Status: COMPLETED
Start: 2021-10-28 | End: 2021-10-28

## 2021-10-28 RX ORDER — TOBRAMYCIN 1.2 G/30ML
INJECTION, POWDER, LYOPHILIZED, FOR SOLUTION INTRAVENOUS
Status: DISCONTINUED | OUTPATIENT
Start: 2021-10-28 | End: 2021-10-28 | Stop reason: HOSPADM

## 2021-10-28 RX ORDER — LIDOCAINE HCL/EPINEPHRINE/PF 2%-1:200K
VIAL (ML) INJECTION
Status: DISCONTINUED | OUTPATIENT
Start: 2021-10-28 | End: 2021-10-28

## 2021-10-28 RX ORDER — FENTANYL CITRATE 50 UG/ML
25 INJECTION, SOLUTION INTRAMUSCULAR; INTRAVENOUS EVERY 5 MIN PRN
Status: COMPLETED | OUTPATIENT
Start: 2021-10-28 | End: 2021-10-28

## 2021-10-28 RX ORDER — OXYCODONE HYDROCHLORIDE 5 MG/1
5 TABLET ORAL EVERY 4 HOURS PRN
Status: DISCONTINUED | OUTPATIENT
Start: 2021-10-28 | End: 2021-10-28

## 2021-10-28 RX ORDER — FENTANYL CITRATE 50 UG/ML
25-200 INJECTION, SOLUTION INTRAMUSCULAR; INTRAVENOUS EVERY 5 MIN PRN
Status: DISCONTINUED | OUTPATIENT
Start: 2021-10-28 | End: 2021-10-28 | Stop reason: HOSPADM

## 2021-10-28 RX ORDER — OXYCODONE HYDROCHLORIDE 10 MG/1
10 TABLET ORAL EVERY 4 HOURS PRN
Status: DISCONTINUED | OUTPATIENT
Start: 2021-10-28 | End: 2021-11-01 | Stop reason: HOSPADM

## 2021-10-28 RX ORDER — ROPIVACAINE HYDROCHLORIDE 5 MG/ML
INJECTION, SOLUTION EPIDURAL; INFILTRATION; PERINEURAL
Status: COMPLETED | OUTPATIENT
Start: 2021-10-28 | End: 2021-10-28

## 2021-10-28 RX ORDER — ACETAMINOPHEN 500 MG
1000 TABLET ORAL EVERY 6 HOURS
Status: DISPENSED | OUTPATIENT
Start: 2021-10-28 | End: 2021-10-30

## 2021-10-28 RX ORDER — ONDANSETRON 2 MG/ML
4 INJECTION INTRAMUSCULAR; INTRAVENOUS ONCE AS NEEDED
Status: DISCONTINUED | OUTPATIENT
Start: 2021-10-28 | End: 2021-10-28 | Stop reason: HOSPADM

## 2021-10-28 RX ORDER — HYDROMORPHONE HYDROCHLORIDE 1 MG/ML
INJECTION, SOLUTION INTRAMUSCULAR; INTRAVENOUS; SUBCUTANEOUS
Status: COMPLETED
Start: 2021-10-28 | End: 2021-10-28

## 2021-10-28 RX ORDER — METHOCARBAMOL 500 MG/1
500 TABLET, FILM COATED ORAL 4 TIMES DAILY
Status: DISCONTINUED | OUTPATIENT
Start: 2021-10-28 | End: 2021-10-28

## 2021-10-28 RX ORDER — TRANEXAMIC ACID 100 MG/ML
INJECTION, SOLUTION INTRAVENOUS
Status: DISCONTINUED | OUTPATIENT
Start: 2021-10-28 | End: 2021-10-28

## 2021-10-28 RX ORDER — MUPIROCIN 20 MG/G
OINTMENT TOPICAL
Status: DISCONTINUED | OUTPATIENT
Start: 2021-10-28 | End: 2021-10-28 | Stop reason: HOSPADM

## 2021-10-28 RX ORDER — VANCOMYCIN HYDROCHLORIDE 1 G/20ML
INJECTION, POWDER, LYOPHILIZED, FOR SOLUTION INTRAVENOUS
Status: DISCONTINUED | OUTPATIENT
Start: 2021-10-28 | End: 2021-10-28 | Stop reason: HOSPADM

## 2021-10-28 RX ORDER — ONDANSETRON 2 MG/ML
INJECTION INTRAMUSCULAR; INTRAVENOUS
Status: DISCONTINUED | OUTPATIENT
Start: 2021-10-28 | End: 2021-10-28

## 2021-10-28 RX ORDER — HYDROMORPHONE HYDROCHLORIDE 1 MG/ML
0.2 INJECTION, SOLUTION INTRAMUSCULAR; INTRAVENOUS; SUBCUTANEOUS EVERY 5 MIN PRN
Status: COMPLETED | OUTPATIENT
Start: 2021-10-28 | End: 2021-10-28

## 2021-10-28 RX ORDER — PROPOFOL 10 MG/ML
VIAL (ML) INTRAVENOUS CONTINUOUS PRN
Status: DISCONTINUED | OUTPATIENT
Start: 2021-10-28 | End: 2021-10-28

## 2021-10-28 RX ORDER — KETAMINE HCL IN 0.9 % NACL 50 MG/5 ML
SYRINGE (ML) INTRAVENOUS
Status: DISCONTINUED | OUTPATIENT
Start: 2021-10-28 | End: 2021-10-28

## 2021-10-28 RX ORDER — LABETALOL HCL 20 MG/4 ML
10 SYRINGE (ML) INTRAVENOUS
Status: COMPLETED | OUTPATIENT
Start: 2021-10-28 | End: 2021-10-28

## 2021-10-28 RX ORDER — SODIUM CHLORIDE 0.9 % (FLUSH) 0.9 %
10 SYRINGE (ML) INJECTION
Status: DISCONTINUED | OUTPATIENT
Start: 2021-10-28 | End: 2021-11-01 | Stop reason: HOSPADM

## 2021-10-28 RX ORDER — PROPOFOL 10 MG/ML
VIAL (ML) INTRAVENOUS
Status: DISCONTINUED | OUTPATIENT
Start: 2021-10-28 | End: 2021-10-28

## 2021-10-28 RX ORDER — MIDAZOLAM HYDROCHLORIDE 1 MG/ML
.5-4 INJECTION INTRAMUSCULAR; INTRAVENOUS
Status: DISCONTINUED | OUTPATIENT
Start: 2021-10-28 | End: 2021-10-28 | Stop reason: HOSPADM

## 2021-10-28 RX ORDER — METHOCARBAMOL 750 MG/1
750 TABLET, FILM COATED ORAL 3 TIMES DAILY
Status: DISCONTINUED | OUTPATIENT
Start: 2021-10-28 | End: 2021-11-01 | Stop reason: HOSPADM

## 2021-10-28 RX ORDER — OXYCODONE HYDROCHLORIDE 5 MG/1
5 TABLET ORAL EVERY 4 HOURS PRN
Status: DISCONTINUED | OUTPATIENT
Start: 2021-10-28 | End: 2021-11-01 | Stop reason: HOSPADM

## 2021-10-28 RX ORDER — CEFAZOLIN SODIUM 1 G/3ML
2 INJECTION, POWDER, FOR SOLUTION INTRAMUSCULAR; INTRAVENOUS
Status: COMPLETED | OUTPATIENT
Start: 2021-10-28 | End: 2021-10-28

## 2021-10-28 RX ORDER — TALC
6 POWDER (GRAM) TOPICAL NIGHTLY PRN
Status: DISCONTINUED | OUTPATIENT
Start: 2021-10-28 | End: 2021-10-28

## 2021-10-28 RX ORDER — FENTANYL CITRATE 50 UG/ML
INJECTION, SOLUTION INTRAMUSCULAR; INTRAVENOUS
Status: DISCONTINUED | OUTPATIENT
Start: 2021-10-28 | End: 2021-10-28

## 2021-10-28 RX ORDER — SODIUM CHLORIDE 0.9 % (FLUSH) 0.9 %
10 SYRINGE (ML) INJECTION
Status: DISCONTINUED | OUTPATIENT
Start: 2021-10-28 | End: 2021-10-28 | Stop reason: HOSPADM

## 2021-10-28 RX ADMIN — ACETAMINOPHEN 1000 MG: 500 TABLET ORAL at 03:10

## 2021-10-28 RX ADMIN — LIDOCAINE HYDROCHLORIDE AND EPINEPHRINE 3 ML: 20; 5 INJECTION, SOLUTION EPIDURAL; INFILTRATION; INTRACAUDAL; PERINEURAL at 12:10

## 2021-10-28 RX ADMIN — FENTANYL CITRATE 25 MCG: 50 INJECTION, SOLUTION INTRAMUSCULAR; INTRAVENOUS at 01:10

## 2021-10-28 RX ADMIN — Medication 10 MG: at 12:10

## 2021-10-28 RX ADMIN — SODIUM CHLORIDE, SODIUM GLUCONATE, SODIUM ACETATE, POTASSIUM CHLORIDE, MAGNESIUM CHLORIDE, SODIUM PHOSPHATE, DIBASIC, AND POTASSIUM PHOSPHATE: .53; .5; .37; .037; .03; .012; .00082 INJECTION, SOLUTION INTRAVENOUS at 02:10

## 2021-10-28 RX ADMIN — ONDANSETRON 4 MG: 2 INJECTION INTRAMUSCULAR; INTRAVENOUS at 12:10

## 2021-10-28 RX ADMIN — Medication 30 MG: at 12:10

## 2021-10-28 RX ADMIN — LIDOCAINE HYDROCHLORIDE AND EPINEPHRINE 2 ML: 15; 5 INJECTION, SOLUTION EPIDURAL at 01:10

## 2021-10-28 RX ADMIN — INSULIN ASPART 3 UNITS: 100 INJECTION, SOLUTION INTRAVENOUS; SUBCUTANEOUS at 08:10

## 2021-10-28 RX ADMIN — HYDROMORPHONE HYDROCHLORIDE 0.2 MG: 1 INJECTION, SOLUTION INTRAMUSCULAR; INTRAVENOUS; SUBCUTANEOUS at 03:10

## 2021-10-28 RX ADMIN — HYDROMORPHONE HYDROCHLORIDE 0.2 MG: 1 INJECTION, SOLUTION INTRAMUSCULAR; INTRAVENOUS; SUBCUTANEOUS at 04:10

## 2021-10-28 RX ADMIN — ROPIVACAINE HYDROCHLORIDE 7.5 ML: 5 INJECTION, SOLUTION EPIDURAL; INFILTRATION; PERINEURAL at 09:10

## 2021-10-28 RX ADMIN — LABETALOL HYDROCHLORIDE 10 MG: 5 INJECTION, SOLUTION INTRAVENOUS at 03:10

## 2021-10-28 RX ADMIN — FENTANYL CITRATE 25 MCG: 50 INJECTION INTRAMUSCULAR; INTRAVENOUS at 04:10

## 2021-10-28 RX ADMIN — ACETAMINOPHEN 1000 MG: 500 TABLET ORAL at 05:10

## 2021-10-28 RX ADMIN — PROPOFOL 100 MCG/KG/MIN: 10 INJECTION, EMULSION INTRAVENOUS at 01:10

## 2021-10-28 RX ADMIN — INSULIN ASPART 1 UNITS: 100 INJECTION, SOLUTION INTRAVENOUS; SUBCUTANEOUS at 10:10

## 2021-10-28 RX ADMIN — METHOCARBAMOL 750 MG: 750 TABLET ORAL at 10:10

## 2021-10-28 RX ADMIN — FENTANYL CITRATE 50 MCG: 50 INJECTION INTRAMUSCULAR; INTRAVENOUS at 09:10

## 2021-10-28 RX ADMIN — OXYCODONE HYDROCHLORIDE 10 MG: 10 TABLET ORAL at 03:10

## 2021-10-28 RX ADMIN — CEFAZOLIN 3 G: 330 INJECTION, POWDER, FOR SOLUTION INTRAMUSCULAR; INTRAVENOUS at 11:10

## 2021-10-28 RX ADMIN — HYDROMORPHONE HYDROCHLORIDE 0.2 MG: 1 INJECTION, SOLUTION INTRAMUSCULAR; INTRAVENOUS; SUBCUTANEOUS at 05:10

## 2021-10-28 RX ADMIN — APIXABAN 2.5 MG: 2.5 TABLET, FILM COATED ORAL at 10:10

## 2021-10-28 RX ADMIN — FENTANYL CITRATE 25 MCG: 50 INJECTION, SOLUTION INTRAMUSCULAR; INTRAVENOUS at 12:10

## 2021-10-28 RX ADMIN — Medication 6 MG: at 10:10

## 2021-10-28 RX ADMIN — HYDRALAZINE HYDROCHLORIDE 5 MG: 20 INJECTION, SOLUTION INTRAMUSCULAR; INTRAVENOUS at 04:10

## 2021-10-28 RX ADMIN — TRANEXAMIC ACID 1000 MG: 100 INJECTION, SOLUTION INTRAVENOUS at 12:10

## 2021-10-28 RX ADMIN — TRANEXAMIC ACID 1000 MG: 100 INJECTION, SOLUTION INTRAVENOUS at 02:10

## 2021-10-28 RX ADMIN — SODIUM CHLORIDE: 0.9 INJECTION, SOLUTION INTRAVENOUS at 11:10

## 2021-10-28 RX ADMIN — PROPOFOL 50 MCG/KG/MIN: 10 INJECTION, EMULSION INTRAVENOUS at 11:10

## 2021-10-28 RX ADMIN — VANCOMYCIN HYDROCHLORIDE 1000 MG: 1 INJECTION, POWDER, LYOPHILIZED, FOR SOLUTION INTRAVENOUS at 11:10

## 2021-10-28 RX ADMIN — GLYCOPYRROLATE 0.2 MG: 0.2 INJECTION, SOLUTION INTRAMUSCULAR; INTRAVITREAL at 12:10

## 2021-10-28 RX ADMIN — LIDOCAINE HYDROCHLORIDE AND EPINEPHRINE 12 ML: 20; 5 INJECTION, SOLUTION EPIDURAL; INFILTRATION; INTRACAUDAL; PERINEURAL at 11:10

## 2021-10-29 LAB
ALBUMIN SERPL BCP-MCNC: 1.7 G/DL (ref 3.5–5.2)
ALP SERPL-CCNC: 118 U/L (ref 55–135)
ALT SERPL W/O P-5'-P-CCNC: 28 U/L (ref 10–44)
ANION GAP SERPL CALC-SCNC: 12 MMOL/L (ref 8–16)
AST SERPL-CCNC: 34 U/L (ref 10–40)
BACTERIA SPEC ANAEROBE CULT: NORMAL
BASOPHILS # BLD AUTO: 0.02 K/UL (ref 0–0.2)
BASOPHILS NFR BLD: 0.1 % (ref 0–1.9)
BILIRUB SERPL-MCNC: 0.4 MG/DL (ref 0.1–1)
BUN SERPL-MCNC: 22 MG/DL (ref 8–23)
CALCIUM SERPL-MCNC: 9.2 MG/DL (ref 8.7–10.5)
CHLORIDE SERPL-SCNC: 96 MMOL/L (ref 95–110)
CO2 SERPL-SCNC: 27 MMOL/L (ref 23–29)
CREAT SERPL-MCNC: 1 MG/DL (ref 0.5–1.4)
DIFFERENTIAL METHOD: ABNORMAL
EOSINOPHIL # BLD AUTO: 0 K/UL (ref 0–0.5)
EOSINOPHIL NFR BLD: 0.2 % (ref 0–8)
ERYTHROCYTE [DISTWIDTH] IN BLOOD BY AUTOMATED COUNT: 14.8 % (ref 11.5–14.5)
EST. GFR  (AFRICAN AMERICAN): >60 ML/MIN/1.73 M^2
EST. GFR  (NON AFRICAN AMERICAN): 53.7 ML/MIN/1.73 M^2
GLUCOSE SERPL-MCNC: 318 MG/DL (ref 70–110)
GRAM STN SPEC: NORMAL
GRAM STN SPEC: NORMAL
HCT VFR BLD AUTO: 35.4 % (ref 37–48.5)
HGB BLD-MCNC: 11.8 G/DL (ref 12–16)
IMM GRANULOCYTES # BLD AUTO: 0.15 K/UL (ref 0–0.04)
IMM GRANULOCYTES NFR BLD AUTO: 1 % (ref 0–0.5)
LYMPHOCYTES # BLD AUTO: 0.6 K/UL (ref 1–4.8)
LYMPHOCYTES NFR BLD: 3.7 % (ref 18–48)
MAGNESIUM SERPL-MCNC: 1.8 MG/DL (ref 1.6–2.6)
MCH RBC QN AUTO: 29.5 PG (ref 27–31)
MCHC RBC AUTO-ENTMCNC: 33.3 G/DL (ref 32–36)
MCV RBC AUTO: 89 FL (ref 82–98)
MONOCYTES # BLD AUTO: 1.1 K/UL (ref 0.3–1)
MONOCYTES NFR BLD: 7 % (ref 4–15)
NEUTROPHILS # BLD AUTO: 13.6 K/UL (ref 1.8–7.7)
NEUTROPHILS NFR BLD: 88 % (ref 38–73)
NRBC BLD-RTO: 0 /100 WBC
PLATELET # BLD AUTO: 197 K/UL (ref 150–450)
PMV BLD AUTO: 11.8 FL (ref 9.2–12.9)
POCT GLUCOSE: 313 MG/DL (ref 70–110)
POCT GLUCOSE: 346 MG/DL (ref 70–110)
POCT GLUCOSE: 356 MG/DL (ref 70–110)
POCT GLUCOSE: 361 MG/DL (ref 70–110)
POCT GLUCOSE: 407 MG/DL (ref 70–110)
POTASSIUM SERPL-SCNC: 4.1 MMOL/L (ref 3.5–5.1)
PROT SERPL-MCNC: 7.3 G/DL (ref 6–8.4)
RBC # BLD AUTO: 4 M/UL (ref 4–5.4)
SODIUM SERPL-SCNC: 135 MMOL/L (ref 136–145)
TB INDURATION 48 - 72 HR READ: 0 MM
WBC # BLD AUTO: 15.47 K/UL (ref 3.9–12.7)

## 2021-10-29 PROCEDURE — 94761 N-INVAS EAR/PLS OXIMETRY MLT: CPT

## 2021-10-29 PROCEDURE — 97535 SELF CARE MNGMENT TRAINING: CPT

## 2021-10-29 PROCEDURE — 83735 ASSAY OF MAGNESIUM: CPT | Performed by: HOSPITALIST

## 2021-10-29 PROCEDURE — 11000001 HC ACUTE MED/SURG PRIVATE ROOM

## 2021-10-29 PROCEDURE — 25000003 PHARM REV CODE 250: Performed by: STUDENT IN AN ORGANIZED HEALTH CARE EDUCATION/TRAINING PROGRAM

## 2021-10-29 PROCEDURE — 25000003 PHARM REV CODE 250: Performed by: ANESTHESIOLOGY

## 2021-10-29 PROCEDURE — 99231 SBSQ HOSP IP/OBS SF/LOW 25: CPT | Mod: ICN,CMP,, | Performed by: PHYSICIAN ASSISTANT

## 2021-10-29 PROCEDURE — 97168 OT RE-EVAL EST PLAN CARE: CPT

## 2021-10-29 PROCEDURE — A4216 STERILE WATER/SALINE, 10 ML: HCPCS | Performed by: HOSPITALIST

## 2021-10-29 PROCEDURE — 63600175 PHARM REV CODE 636 W HCPCS: Performed by: HOSPITALIST

## 2021-10-29 PROCEDURE — 36415 COLL VENOUS BLD VENIPUNCTURE: CPT | Performed by: HOSPITALIST

## 2021-10-29 PROCEDURE — 76937 US GUIDE VASCULAR ACCESS: CPT

## 2021-10-29 PROCEDURE — 80053 COMPREHEN METABOLIC PANEL: CPT | Performed by: HOSPITALIST

## 2021-10-29 PROCEDURE — 36573 INSJ PICC RS&I 5 YR+: CPT

## 2021-10-29 PROCEDURE — 27000221 HC OXYGEN, UP TO 24 HOURS

## 2021-10-29 PROCEDURE — C1751 CATH, INF, PER/CENT/MIDLINE: HCPCS

## 2021-10-29 PROCEDURE — 25000003 PHARM REV CODE 250: Performed by: PHYSICIAN ASSISTANT

## 2021-10-29 PROCEDURE — 25000003 PHARM REV CODE 250: Performed by: HOSPITALIST

## 2021-10-29 PROCEDURE — 97530 THERAPEUTIC ACTIVITIES: CPT

## 2021-10-29 PROCEDURE — 85025 COMPLETE CBC W/AUTO DIFF WBC: CPT | Performed by: HOSPITALIST

## 2021-10-29 PROCEDURE — C9399 UNCLASSIFIED DRUGS OR BIOLOG: HCPCS | Performed by: HOSPITALIST

## 2021-10-29 PROCEDURE — 63600175 PHARM REV CODE 636 W HCPCS: Performed by: PHYSICIAN ASSISTANT

## 2021-10-29 PROCEDURE — 99900035 HC TECH TIME PER 15 MIN (STAT)

## 2021-10-29 PROCEDURE — 99231 PR SUBSEQUENT HOSPITAL CARE,LEVL I: ICD-10-PCS | Mod: ICN,CMP,, | Performed by: PHYSICIAN ASSISTANT

## 2021-10-29 PROCEDURE — 25000242 PHARM REV CODE 250 ALT 637 W/ HCPCS: Performed by: HOSPITALIST

## 2021-10-29 PROCEDURE — 97164 PT RE-EVAL EST PLAN CARE: CPT

## 2021-10-29 RX ORDER — SODIUM CHLORIDE 0.9 % (FLUSH) 0.9 %
10 SYRINGE (ML) INJECTION EVERY 6 HOURS
Status: DISCONTINUED | OUTPATIENT
Start: 2021-10-29 | End: 2021-11-01 | Stop reason: HOSPADM

## 2021-10-29 RX ORDER — SODIUM CHLORIDE 0.9 % (FLUSH) 0.9 %
10 SYRINGE (ML) INJECTION
Status: DISCONTINUED | OUTPATIENT
Start: 2021-10-29 | End: 2021-11-01 | Stop reason: HOSPADM

## 2021-10-29 RX ORDER — INSULIN ASPART 100 [IU]/ML
5 INJECTION, SOLUTION INTRAVENOUS; SUBCUTANEOUS
Status: DISCONTINUED | OUTPATIENT
Start: 2021-10-29 | End: 2021-10-30

## 2021-10-29 RX ORDER — TORSEMIDE 20 MG/1
20 TABLET ORAL DAILY
Status: DISCONTINUED | OUTPATIENT
Start: 2021-10-29 | End: 2021-11-01 | Stop reason: HOSPADM

## 2021-10-29 RX ADMIN — ALLOPURINOL 100 MG: 100 TABLET ORAL at 08:10

## 2021-10-29 RX ADMIN — Medication 10 ML: at 06:10

## 2021-10-29 RX ADMIN — INSULIN ASPART 2 UNITS: 100 INJECTION, SOLUTION INTRAVENOUS; SUBCUTANEOUS at 08:10

## 2021-10-29 RX ADMIN — ACETAMINOPHEN 1000 MG: 500 TABLET ORAL at 12:10

## 2021-10-29 RX ADMIN — AMLODIPINE BESYLATE 10 MG: 10 TABLET ORAL at 09:10

## 2021-10-29 RX ADMIN — INSULIN ASPART 5 UNITS: 100 INJECTION, SOLUTION INTRAVENOUS; SUBCUTANEOUS at 09:10

## 2021-10-29 RX ADMIN — INSULIN ASPART 5 UNITS: 100 INJECTION, SOLUTION INTRAVENOUS; SUBCUTANEOUS at 05:10

## 2021-10-29 RX ADMIN — METHOCARBAMOL 750 MG: 750 TABLET ORAL at 08:10

## 2021-10-29 RX ADMIN — METHOCARBAMOL 750 MG: 750 TABLET ORAL at 03:10

## 2021-10-29 RX ADMIN — TORSEMIDE 20 MG: 20 TABLET ORAL at 09:10

## 2021-10-29 RX ADMIN — ATORVASTATIN CALCIUM 80 MG: 20 TABLET, FILM COATED ORAL at 08:10

## 2021-10-29 RX ADMIN — APIXABAN 2.5 MG: 2.5 TABLET, FILM COATED ORAL at 08:10

## 2021-10-29 RX ADMIN — DAPTOMYCIN 980 MG: 350 INJECTION, POWDER, LYOPHILIZED, FOR SOLUTION INTRAVENOUS at 06:10

## 2021-10-29 RX ADMIN — ACETAMINOPHEN 1000 MG: 500 TABLET ORAL at 05:10

## 2021-10-29 RX ADMIN — INSULIN ASPART 5 UNITS: 100 INJECTION, SOLUTION INTRAVENOUS; SUBCUTANEOUS at 12:10

## 2021-10-29 RX ADMIN — CEFTRIAXONE 2 G: 2 INJECTION, SOLUTION INTRAVENOUS at 04:10

## 2021-10-29 RX ADMIN — INSULIN ASPART 5 UNITS: 100 INJECTION, SOLUTION INTRAVENOUS; SUBCUTANEOUS at 01:10

## 2021-10-29 RX ADMIN — FLUTICASONE FUROATE AND VILANTEROL TRIFENATATE 1 PUFF: 200; 25 POWDER RESPIRATORY (INHALATION) at 10:10

## 2021-10-29 RX ADMIN — Medication 10 ML: at 02:10

## 2021-10-29 RX ADMIN — LOSARTAN POTASSIUM 100 MG: 50 TABLET, FILM COATED ORAL at 08:10

## 2021-10-29 RX ADMIN — INSULIN DETEMIR 10 UNITS: 100 INJECTION, SOLUTION SUBCUTANEOUS at 09:10

## 2021-10-29 RX ADMIN — OXYCODONE 5 MG: 5 TABLET ORAL at 12:10

## 2021-10-30 LAB
ALBUMIN SERPL BCP-MCNC: 1.7 G/DL (ref 3.5–5.2)
ALP SERPL-CCNC: 121 U/L (ref 55–135)
ALT SERPL W/O P-5'-P-CCNC: 22 U/L (ref 10–44)
ANION GAP SERPL CALC-SCNC: 9 MMOL/L (ref 8–16)
AST SERPL-CCNC: 37 U/L (ref 10–40)
BACTERIA BLD CULT: NORMAL
BASOPHILS # BLD AUTO: 0.03 K/UL (ref 0–0.2)
BASOPHILS NFR BLD: 0.2 % (ref 0–1.9)
BILIRUB SERPL-MCNC: 0.3 MG/DL (ref 0.1–1)
BLD PROD TYP BPU: NORMAL
BLD PROD TYP BPU: NORMAL
BLOOD UNIT EXPIRATION DATE: NORMAL
BLOOD UNIT EXPIRATION DATE: NORMAL
BLOOD UNIT TYPE CODE: 6200
BLOOD UNIT TYPE CODE: 6200
BLOOD UNIT TYPE: NORMAL
BLOOD UNIT TYPE: NORMAL
BUN SERPL-MCNC: 21 MG/DL (ref 8–23)
CALCIUM SERPL-MCNC: 9.5 MG/DL (ref 8.7–10.5)
CHLORIDE SERPL-SCNC: 96 MMOL/L (ref 95–110)
CO2 SERPL-SCNC: 31 MMOL/L (ref 23–29)
CODING SYSTEM: NORMAL
CODING SYSTEM: NORMAL
CREAT SERPL-MCNC: 1 MG/DL (ref 0.5–1.4)
DIFFERENTIAL METHOD: ABNORMAL
DISPENSE STATUS: NORMAL
DISPENSE STATUS: NORMAL
EOSINOPHIL # BLD AUTO: 0.2 K/UL (ref 0–0.5)
EOSINOPHIL NFR BLD: 1.5 % (ref 0–8)
ERYTHROCYTE [DISTWIDTH] IN BLOOD BY AUTOMATED COUNT: 14.8 % (ref 11.5–14.5)
EST. GFR  (AFRICAN AMERICAN): >60 ML/MIN/1.73 M^2
EST. GFR  (NON AFRICAN AMERICAN): 53.7 ML/MIN/1.73 M^2
GLUCOSE SERPL-MCNC: 213 MG/DL (ref 70–110)
HCT VFR BLD AUTO: 33.8 % (ref 37–48.5)
HGB BLD-MCNC: 10.9 G/DL (ref 12–16)
IMM GRANULOCYTES # BLD AUTO: 0.15 K/UL (ref 0–0.04)
IMM GRANULOCYTES NFR BLD AUTO: 1 % (ref 0–0.5)
LYMPHOCYTES # BLD AUTO: 1 K/UL (ref 1–4.8)
LYMPHOCYTES NFR BLD: 7 % (ref 18–48)
MAGNESIUM SERPL-MCNC: 1.8 MG/DL (ref 1.6–2.6)
MCH RBC QN AUTO: 29.1 PG (ref 27–31)
MCHC RBC AUTO-ENTMCNC: 32.2 G/DL (ref 32–36)
MCV RBC AUTO: 90 FL (ref 82–98)
MONOCYTES # BLD AUTO: 1.5 K/UL (ref 0.3–1)
MONOCYTES NFR BLD: 10.1 % (ref 4–15)
NEUTROPHILS # BLD AUTO: 11.5 K/UL (ref 1.8–7.7)
NEUTROPHILS NFR BLD: 80.2 % (ref 38–73)
NRBC BLD-RTO: 0 /100 WBC
PLATELET # BLD AUTO: 210 K/UL (ref 150–450)
PMV BLD AUTO: 11.3 FL (ref 9.2–12.9)
POCT GLUCOSE: 180 MG/DL (ref 70–110)
POCT GLUCOSE: 231 MG/DL (ref 70–110)
POCT GLUCOSE: 263 MG/DL (ref 70–110)
POCT GLUCOSE: 365 MG/DL (ref 70–110)
POTASSIUM SERPL-SCNC: 4.1 MMOL/L (ref 3.5–5.1)
PROT SERPL-MCNC: 7.6 G/DL (ref 6–8.4)
RBC # BLD AUTO: 3.74 M/UL (ref 4–5.4)
SODIUM SERPL-SCNC: 136 MMOL/L (ref 136–145)
TRANS ERYTHROCYTES VOL PATIENT: NORMAL ML
TRANS ERYTHROCYTES VOL PATIENT: NORMAL ML
WBC # BLD AUTO: 14.35 K/UL (ref 3.9–12.7)

## 2021-10-30 PROCEDURE — 85025 COMPLETE CBC W/AUTO DIFF WBC: CPT | Performed by: HOSPITALIST

## 2021-10-30 PROCEDURE — 83735 ASSAY OF MAGNESIUM: CPT | Performed by: HOSPITALIST

## 2021-10-30 PROCEDURE — 25000003 PHARM REV CODE 250: Performed by: STUDENT IN AN ORGANIZED HEALTH CARE EDUCATION/TRAINING PROGRAM

## 2021-10-30 PROCEDURE — 99233 SBSQ HOSP IP/OBS HIGH 50: CPT | Mod: 95,,, | Performed by: INTERNAL MEDICINE

## 2021-10-30 PROCEDURE — A4216 STERILE WATER/SALINE, 10 ML: HCPCS | Performed by: HOSPITALIST

## 2021-10-30 PROCEDURE — 97530 THERAPEUTIC ACTIVITIES: CPT | Mod: CQ

## 2021-10-30 PROCEDURE — 25000003 PHARM REV CODE 250: Performed by: ANESTHESIOLOGY

## 2021-10-30 PROCEDURE — 63600175 PHARM REV CODE 636 W HCPCS: Performed by: INTERNAL MEDICINE

## 2021-10-30 PROCEDURE — 63600175 PHARM REV CODE 636 W HCPCS: Performed by: HOSPITALIST

## 2021-10-30 PROCEDURE — 11000001 HC ACUTE MED/SURG PRIVATE ROOM

## 2021-10-30 PROCEDURE — 25000003 PHARM REV CODE 250: Performed by: PHYSICIAN ASSISTANT

## 2021-10-30 PROCEDURE — 63600175 PHARM REV CODE 636 W HCPCS: Performed by: PHYSICIAN ASSISTANT

## 2021-10-30 PROCEDURE — 99233 PR SUBSEQUENT HOSPITAL CARE,LEVL III: ICD-10-PCS | Mod: 95,,, | Performed by: INTERNAL MEDICINE

## 2021-10-30 PROCEDURE — 36415 COLL VENOUS BLD VENIPUNCTURE: CPT | Performed by: HOSPITALIST

## 2021-10-30 PROCEDURE — 80053 COMPREHEN METABOLIC PANEL: CPT | Performed by: HOSPITALIST

## 2021-10-30 PROCEDURE — 97112 NEUROMUSCULAR REEDUCATION: CPT | Mod: CQ

## 2021-10-30 PROCEDURE — 25000003 PHARM REV CODE 250: Performed by: HOSPITALIST

## 2021-10-30 PROCEDURE — 99233 SBSQ HOSP IP/OBS HIGH 50: CPT | Mod: ICN,CMP,, | Performed by: PHYSICIAN ASSISTANT

## 2021-10-30 PROCEDURE — 99233 PR SUBSEQUENT HOSPITAL CARE,LEVL III: ICD-10-PCS | Mod: ICN,CMP,, | Performed by: PHYSICIAN ASSISTANT

## 2021-10-30 RX ORDER — HYDRALAZINE HYDROCHLORIDE 25 MG/1
25 TABLET, FILM COATED ORAL 3 TIMES DAILY PRN
Status: DISCONTINUED | OUTPATIENT
Start: 2021-10-30 | End: 2021-11-01

## 2021-10-30 RX ORDER — INSULIN ASPART 100 [IU]/ML
6 INJECTION, SOLUTION INTRAVENOUS; SUBCUTANEOUS
Status: DISCONTINUED | OUTPATIENT
Start: 2021-10-30 | End: 2021-11-01 | Stop reason: HOSPADM

## 2021-10-30 RX ADMIN — AMLODIPINE BESYLATE 10 MG: 10 TABLET ORAL at 09:10

## 2021-10-30 RX ADMIN — Medication 10 ML: at 12:10

## 2021-10-30 RX ADMIN — DAPTOMYCIN 980 MG: 350 INJECTION, POWDER, LYOPHILIZED, FOR SOLUTION INTRAVENOUS at 05:10

## 2021-10-30 RX ADMIN — Medication 10 ML: at 06:10

## 2021-10-30 RX ADMIN — ACETAMINOPHEN 1000 MG: 500 TABLET ORAL at 06:10

## 2021-10-30 RX ADMIN — Medication 10 ML: at 04:10

## 2021-10-30 RX ADMIN — INSULIN ASPART 5 UNITS: 100 INJECTION, SOLUTION INTRAVENOUS; SUBCUTANEOUS at 05:10

## 2021-10-30 RX ADMIN — METHOCARBAMOL 750 MG: 750 TABLET ORAL at 04:10

## 2021-10-30 RX ADMIN — ATORVASTATIN CALCIUM 80 MG: 20 TABLET, FILM COATED ORAL at 09:10

## 2021-10-30 RX ADMIN — INSULIN ASPART 2 UNITS: 100 INJECTION, SOLUTION INTRAVENOUS; SUBCUTANEOUS at 12:10

## 2021-10-30 RX ADMIN — ALLOPURINOL 100 MG: 100 TABLET ORAL at 09:10

## 2021-10-30 RX ADMIN — TORSEMIDE 20 MG: 20 TABLET ORAL at 09:10

## 2021-10-30 RX ADMIN — INSULIN DETEMIR 10 UNITS: 100 INJECTION, SOLUTION SUBCUTANEOUS at 09:10

## 2021-10-30 RX ADMIN — LOSARTAN POTASSIUM 100 MG: 50 TABLET, FILM COATED ORAL at 09:10

## 2021-10-30 RX ADMIN — CEFTRIAXONE 2 G: 2 INJECTION, SOLUTION INTRAVENOUS at 04:10

## 2021-10-30 RX ADMIN — INSULIN ASPART 5 UNITS: 100 INJECTION, SOLUTION INTRAVENOUS; SUBCUTANEOUS at 09:10

## 2021-10-30 RX ADMIN — METHOCARBAMOL 750 MG: 750 TABLET ORAL at 09:10

## 2021-10-30 RX ADMIN — APIXABAN 2.5 MG: 2.5 TABLET, FILM COATED ORAL at 09:10

## 2021-10-30 RX ADMIN — INSULIN ASPART 1 UNITS: 100 INJECTION, SOLUTION INTRAVENOUS; SUBCUTANEOUS at 09:10

## 2021-10-30 RX ADMIN — INSULIN ASPART 5 UNITS: 100 INJECTION, SOLUTION INTRAVENOUS; SUBCUTANEOUS at 12:10

## 2021-10-30 RX ADMIN — INSULIN ASPART 6 UNITS: 100 INJECTION, SOLUTION INTRAVENOUS; SUBCUTANEOUS at 05:10

## 2021-10-30 RX ADMIN — FLUTICASONE FUROATE AND VILANTEROL TRIFENATATE 1 PUFF: 200; 25 POWDER RESPIRATORY (INHALATION) at 09:10

## 2021-10-31 LAB
BACTERIA SPEC AEROBE CULT: ABNORMAL
POCT GLUCOSE: 171 MG/DL (ref 70–110)
POCT GLUCOSE: 190 MG/DL (ref 70–110)
POCT GLUCOSE: 202 MG/DL (ref 70–110)
POCT GLUCOSE: 296 MG/DL (ref 70–110)

## 2021-10-31 PROCEDURE — 99233 SBSQ HOSP IP/OBS HIGH 50: CPT | Mod: 95,,, | Performed by: INTERNAL MEDICINE

## 2021-10-31 PROCEDURE — 36415 COLL VENOUS BLD VENIPUNCTURE: CPT | Performed by: HOSPITALIST

## 2021-10-31 PROCEDURE — 94640 AIRWAY INHALATION TREATMENT: CPT

## 2021-10-31 PROCEDURE — A4216 STERILE WATER/SALINE, 10 ML: HCPCS | Performed by: HOSPITALIST

## 2021-10-31 PROCEDURE — 11000001 HC ACUTE MED/SURG PRIVATE ROOM

## 2021-10-31 PROCEDURE — 99233 PR SUBSEQUENT HOSPITAL CARE,LEVL III: ICD-10-PCS | Mod: 95,,, | Performed by: INTERNAL MEDICINE

## 2021-10-31 PROCEDURE — C9399 UNCLASSIFIED DRUGS OR BIOLOG: HCPCS | Performed by: INTERNAL MEDICINE

## 2021-10-31 PROCEDURE — 25000003 PHARM REV CODE 250: Performed by: STUDENT IN AN ORGANIZED HEALTH CARE EDUCATION/TRAINING PROGRAM

## 2021-10-31 PROCEDURE — 25000003 PHARM REV CODE 250: Performed by: INTERNAL MEDICINE

## 2021-10-31 PROCEDURE — 25000003 PHARM REV CODE 250: Performed by: PHYSICIAN ASSISTANT

## 2021-10-31 PROCEDURE — 63600175 PHARM REV CODE 636 W HCPCS: Performed by: PHYSICIAN ASSISTANT

## 2021-10-31 PROCEDURE — 25000003 PHARM REV CODE 250: Performed by: HOSPITALIST

## 2021-10-31 RX ORDER — HYDRALAZINE HYDROCHLORIDE 25 MG/1
25 TABLET, FILM COATED ORAL ONCE
Status: DISCONTINUED | OUTPATIENT
Start: 2021-10-31 | End: 2021-11-01 | Stop reason: HOSPADM

## 2021-10-31 RX ORDER — LOPERAMIDE HYDROCHLORIDE 2 MG/1
2 CAPSULE ORAL 4 TIMES DAILY PRN
Status: DISCONTINUED | OUTPATIENT
Start: 2021-10-31 | End: 2021-10-31

## 2021-10-31 RX ADMIN — APIXABAN 2.5 MG: 2.5 TABLET, FILM COATED ORAL at 09:10

## 2021-10-31 RX ADMIN — INSULIN ASPART 6 UNITS: 100 INJECTION, SOLUTION INTRAVENOUS; SUBCUTANEOUS at 12:10

## 2021-10-31 RX ADMIN — Medication 10 ML: at 12:10

## 2021-10-31 RX ADMIN — AMLODIPINE BESYLATE 10 MG: 10 TABLET ORAL at 08:10

## 2021-10-31 RX ADMIN — HYDRALAZINE HYDROCHLORIDE 25 MG: 25 TABLET ORAL at 03:10

## 2021-10-31 RX ADMIN — OXYCODONE 5 MG: 5 TABLET ORAL at 05:10

## 2021-10-31 RX ADMIN — INSULIN ASPART 6 UNITS: 100 INJECTION, SOLUTION INTRAVENOUS; SUBCUTANEOUS at 08:10

## 2021-10-31 RX ADMIN — Medication 10 ML: at 05:10

## 2021-10-31 RX ADMIN — APIXABAN 2.5 MG: 2.5 TABLET, FILM COATED ORAL at 08:10

## 2021-10-31 RX ADMIN — ALLOPURINOL 100 MG: 100 TABLET ORAL at 08:10

## 2021-10-31 RX ADMIN — INSULIN DETEMIR 12 UNITS: 100 INJECTION, SOLUTION SUBCUTANEOUS at 08:10

## 2021-10-31 RX ADMIN — HYDRALAZINE HYDROCHLORIDE 25 MG: 25 TABLET ORAL at 12:10

## 2021-10-31 RX ADMIN — DAPTOMYCIN 980 MG: 350 INJECTION, POWDER, LYOPHILIZED, FOR SOLUTION INTRAVENOUS at 05:10

## 2021-10-31 RX ADMIN — Medication 10 ML: at 06:10

## 2021-10-31 RX ADMIN — HYDRALAZINE HYDROCHLORIDE 25 MG: 25 TABLET ORAL at 05:10

## 2021-10-31 RX ADMIN — FLUTICASONE FUROATE AND VILANTEROL TRIFENATATE 1 PUFF: 200; 25 POWDER RESPIRATORY (INHALATION) at 08:10

## 2021-10-31 RX ADMIN — INSULIN ASPART 6 UNITS: 100 INJECTION, SOLUTION INTRAVENOUS; SUBCUTANEOUS at 05:10

## 2021-10-31 RX ADMIN — INSULIN ASPART 3 UNITS: 100 INJECTION, SOLUTION INTRAVENOUS; SUBCUTANEOUS at 05:10

## 2021-10-31 RX ADMIN — INSULIN ASPART 2 UNITS: 100 INJECTION, SOLUTION INTRAVENOUS; SUBCUTANEOUS at 12:10

## 2021-10-31 RX ADMIN — LOSARTAN POTASSIUM 100 MG: 50 TABLET, FILM COATED ORAL at 08:10

## 2021-10-31 RX ADMIN — LOPERAMIDE HYDROCHLORIDE 2 MG: 2 CAPSULE ORAL at 08:10

## 2021-10-31 RX ADMIN — TORSEMIDE 20 MG: 20 TABLET ORAL at 08:10

## 2021-10-31 RX ADMIN — METHOCARBAMOL 750 MG: 750 TABLET ORAL at 05:10

## 2021-10-31 RX ADMIN — METHOCARBAMOL 750 MG: 750 TABLET ORAL at 09:10

## 2021-10-31 RX ADMIN — OXYCODONE 5 MG: 5 TABLET ORAL at 08:10

## 2021-10-31 RX ADMIN — METHOCARBAMOL 750 MG: 750 TABLET ORAL at 08:10

## 2021-11-01 ENCOUNTER — HOSPITAL ENCOUNTER (INPATIENT)
Facility: HOSPITAL | Age: 81
LOS: 39 days | Discharge: NURSING, REHAB OR LONG-TERM CARE | DRG: 949 | End: 2021-12-10
Attending: HOSPITALIST | Admitting: INTERNAL MEDICINE
Payer: MEDICARE

## 2021-11-01 VITALS
OXYGEN SATURATION: 91 % | RESPIRATION RATE: 18 BRPM | WEIGHT: 269 LBS | SYSTOLIC BLOOD PRESSURE: 152 MMHG | DIASTOLIC BLOOD PRESSURE: 64 MMHG | BODY MASS INDEX: 54.23 KG/M2 | HEIGHT: 59 IN | TEMPERATURE: 98 F | HEART RATE: 88 BPM

## 2021-11-01 DIAGNOSIS — E11.59 HYPERTENSION ASSOCIATED WITH DIABETES: ICD-10-CM

## 2021-11-01 DIAGNOSIS — M25.569 KNEE PAIN: ICD-10-CM

## 2021-11-01 DIAGNOSIS — T84.018D FAILURE OF TOTAL KNEE REPLACEMENT, SUBSEQUENT ENCOUNTER: ICD-10-CM

## 2021-11-01 DIAGNOSIS — Z96.659 FAILURE OF TOTAL KNEE REPLACEMENT, SUBSEQUENT ENCOUNTER: ICD-10-CM

## 2021-11-01 DIAGNOSIS — I27.20 PULMONARY HYPERTENSION: ICD-10-CM

## 2021-11-01 DIAGNOSIS — Z71.89 ADVANCED CARE PLANNING/COUNSELING DISCUSSION: ICD-10-CM

## 2021-11-01 DIAGNOSIS — I15.2 HYPERTENSION ASSOCIATED WITH DIABETES: ICD-10-CM

## 2021-11-01 LAB
ALBUMIN SERPL BCP-MCNC: 1.6 G/DL (ref 3.5–5.2)
ALP SERPL-CCNC: 106 U/L (ref 55–135)
ALT SERPL W/O P-5'-P-CCNC: 28 U/L (ref 10–44)
ANION GAP SERPL CALC-SCNC: 10 MMOL/L (ref 8–16)
ASCENDING AORTA: 2.63 CM
AST SERPL-CCNC: 41 U/L (ref 10–40)
AV INDEX (PROSTH): 0.49
AV MEAN GRADIENT: 8 MMHG
AV PEAK GRADIENT: 16 MMHG
AV VALVE AREA: 1.64 CM2
AV VELOCITY RATIO: 0.47
BASOPHILS # BLD AUTO: 0.07 K/UL (ref 0–0.2)
BASOPHILS NFR BLD: 0.6 % (ref 0–1.9)
BILIRUB SERPL-MCNC: 0.4 MG/DL (ref 0.1–1)
BSA FOR ECHO PROCEDURE: 2.25 M2
BUN SERPL-MCNC: 18 MG/DL (ref 8–23)
CALCIUM SERPL-MCNC: 9.4 MG/DL (ref 8.7–10.5)
CHLORIDE SERPL-SCNC: 98 MMOL/L (ref 95–110)
CK SERPL-CCNC: 23 U/L (ref 20–180)
CK SERPL-CCNC: 27 U/L (ref 20–180)
CO2 SERPL-SCNC: 29 MMOL/L (ref 23–29)
CREAT SERPL-MCNC: 0.8 MG/DL (ref 0.5–1.4)
CRP SERPL-MCNC: 199.1 MG/L (ref 0–8.2)
CV ECHO LV RWT: 0.51 CM
DIFFERENTIAL METHOD: ABNORMAL
DOP CALC AO PEAK VEL: 1.98 M/S
DOP CALC AO VTI: 33.01 CM
DOP CALC LVOT AREA: 3.3 CM2
DOP CALC LVOT DIAMETER: 2.06 CM
DOP CALC LVOT PEAK VEL: 0.93 M/S
DOP CALC LVOT STROKE VOLUME: 54.23 CM3
DOP CALCLVOT PEAK VEL VTI: 16.28 CM
E WAVE DECELERATION TIME: 264.33 MSEC
E/A RATIO: 0.7
E/E' RATIO: 10.59 M/S
ECHO LV POSTERIOR WALL: 1.11 CM (ref 0.6–1.1)
EJECTION FRACTION: 65 %
EOSINOPHIL # BLD AUTO: 0.4 K/UL (ref 0–0.5)
EOSINOPHIL NFR BLD: 3.3 % (ref 0–8)
ERYTHROCYTE [DISTWIDTH] IN BLOOD BY AUTOMATED COUNT: 14.8 % (ref 11.5–14.5)
EST. GFR  (AFRICAN AMERICAN): >60 ML/MIN/1.73 M^2
EST. GFR  (NON AFRICAN AMERICAN): >60 ML/MIN/1.73 M^2
FRACTIONAL SHORTENING: 33 % (ref 28–44)
GLUCOSE SERPL-MCNC: 139 MG/DL (ref 70–110)
HCT VFR BLD AUTO: 32.2 % (ref 37–48.5)
HGB BLD-MCNC: 10.1 G/DL (ref 12–16)
IMM GRANULOCYTES # BLD AUTO: 0.18 K/UL (ref 0–0.04)
IMM GRANULOCYTES NFR BLD AUTO: 1.6 % (ref 0–0.5)
INTERVENTRICULAR SEPTUM: 1.29 CM (ref 0.6–1.1)
LA MAJOR: 6.11 CM
LA MINOR: 5.43 CM
LA WIDTH: 4.1 CM
LEFT ATRIUM SIZE: 3.34 CM
LEFT ATRIUM VOLUME INDEX: 32 ML/M2
LEFT ATRIUM VOLUME: 66.93 CM3
LEFT INTERNAL DIMENSION IN SYSTOLE: 2.89 CM (ref 2.1–4)
LEFT VENTRICLE DIASTOLIC VOLUME INDEX: 40.7 ML/M2
LEFT VENTRICLE DIASTOLIC VOLUME: 85.06 ML
LEFT VENTRICLE MASS INDEX: 90 G/M2
LEFT VENTRICLE SYSTOLIC VOLUME INDEX: 15.3 ML/M2
LEFT VENTRICLE SYSTOLIC VOLUME: 32.07 ML
LEFT VENTRICULAR INTERNAL DIMENSION IN DIASTOLE: 4.34 CM (ref 3.5–6)
LEFT VENTRICULAR MASS: 187.33 G
LV LATERAL E/E' RATIO: 7.5 M/S
LV SEPTAL E/E' RATIO: 18 M/S
LYMPHOCYTES # BLD AUTO: 1.5 K/UL (ref 1–4.8)
LYMPHOCYTES NFR BLD: 12.8 % (ref 18–48)
MAGNESIUM SERPL-MCNC: 1.7 MG/DL (ref 1.6–2.6)
MCH RBC QN AUTO: 28.9 PG (ref 27–31)
MCHC RBC AUTO-ENTMCNC: 31.4 G/DL (ref 32–36)
MCV RBC AUTO: 92 FL (ref 82–98)
MONOCYTES # BLD AUTO: 1.1 K/UL (ref 0.3–1)
MONOCYTES NFR BLD: 9.4 % (ref 4–15)
MV PEAK A VEL: 1.29 M/S
MV PEAK E VEL: 0.9 M/S
MV STENOSIS PRESSURE HALF TIME: 76.65 MS
MV VALVE AREA P 1/2 METHOD: 2.87 CM2
NEUTROPHILS # BLD AUTO: 8.2 K/UL (ref 1.8–7.7)
NEUTROPHILS NFR BLD: 72.3 % (ref 38–73)
NRBC BLD-RTO: 0 /100 WBC
PISA TR MAX VEL: 3.38 M/S
PLATELET # BLD AUTO: 243 K/UL (ref 150–450)
PMV BLD AUTO: 10.8 FL (ref 9.2–12.9)
POCT GLUCOSE: 166 MG/DL (ref 70–110)
POCT GLUCOSE: 233 MG/DL (ref 70–110)
POCT GLUCOSE: 240 MG/DL (ref 70–110)
POCT GLUCOSE: 281 MG/DL (ref 70–110)
POTASSIUM SERPL-SCNC: 4 MMOL/L (ref 3.5–5.1)
PROT SERPL-MCNC: 6.8 G/DL (ref 6–8.4)
RA MAJOR: 4.37 CM
RA PRESSURE: 3 MMHG
RA WIDTH: 3.43 CM
RBC # BLD AUTO: 3.49 M/UL (ref 4–5.4)
RIGHT VENTRICULAR END-DIASTOLIC DIMENSION: 3.45 CM
SARS-COV-2 RNA RESP QL NAA+PROBE: NOT DETECTED
SINUS: 2.4 CM
SODIUM SERPL-SCNC: 137 MMOL/L (ref 136–145)
STJ: 2.4 CM
TDI LATERAL: 0.12 M/S
TDI SEPTAL: 0.05 M/S
TDI: 0.09 M/S
TR MAX PG: 46 MMHG
TRICUSPID ANNULAR PLANE SYSTOLIC EXCURSION: 2.38 CM
TV REST PULMONARY ARTERY PRESSURE: 49 MMHG
WBC # BLD AUTO: 11.33 K/UL (ref 3.9–12.7)

## 2021-11-01 PROCEDURE — 25000003 PHARM REV CODE 250: Performed by: HOSPITALIST

## 2021-11-01 PROCEDURE — A4216 STERILE WATER/SALINE, 10 ML: HCPCS | Performed by: HOSPITALIST

## 2021-11-01 PROCEDURE — 80053 COMPREHEN METABOLIC PANEL: CPT | Performed by: INTERNAL MEDICINE

## 2021-11-01 PROCEDURE — 85025 COMPLETE CBC W/AUTO DIFF WBC: CPT | Performed by: INTERNAL MEDICINE

## 2021-11-01 PROCEDURE — 11000004 HC SNF PRIVATE

## 2021-11-01 PROCEDURE — 25000003 PHARM REV CODE 250: Performed by: STUDENT IN AN ORGANIZED HEALTH CARE EDUCATION/TRAINING PROGRAM

## 2021-11-01 PROCEDURE — 63600175 PHARM REV CODE 636 W HCPCS: Performed by: PHYSICIAN ASSISTANT

## 2021-11-01 PROCEDURE — 25000003 PHARM REV CODE 250: Performed by: INTERNAL MEDICINE

## 2021-11-01 PROCEDURE — 83735 ASSAY OF MAGNESIUM: CPT | Performed by: INTERNAL MEDICINE

## 2021-11-01 PROCEDURE — U0005 INFEC AGEN DETEC AMPLI PROBE: HCPCS | Performed by: INTERNAL MEDICINE

## 2021-11-01 PROCEDURE — 99239 PR HOSPITAL DISCHARGE DAY,>30 MIN: ICD-10-PCS | Mod: 95,,, | Performed by: INTERNAL MEDICINE

## 2021-11-01 PROCEDURE — 36415 COLL VENOUS BLD VENIPUNCTURE: CPT | Performed by: INTERNAL MEDICINE

## 2021-11-01 PROCEDURE — 63600175 PHARM REV CODE 636 W HCPCS: Performed by: INTERNAL MEDICINE

## 2021-11-01 PROCEDURE — U0003 INFECTIOUS AGENT DETECTION BY NUCLEIC ACID (DNA OR RNA); SEVERE ACUTE RESPIRATORY SYNDROME CORONAVIRUS 2 (SARS-COV-2) (CORONAVIRUS DISEASE [COVID-19]), AMPLIFIED PROBE TECHNIQUE, MAKING USE OF HIGH THROUGHPUT TECHNOLOGIES AS DESCRIBED BY CMS-2020-01-R: HCPCS | Performed by: INTERNAL MEDICINE

## 2021-11-01 PROCEDURE — 86140 C-REACTIVE PROTEIN: CPT | Performed by: INTERNAL MEDICINE

## 2021-11-01 PROCEDURE — 99239 HOSP IP/OBS DSCHRG MGMT >30: CPT | Mod: 95,,, | Performed by: INTERNAL MEDICINE

## 2021-11-01 PROCEDURE — 25000003 PHARM REV CODE 250: Performed by: PHYSICIAN ASSISTANT

## 2021-11-01 PROCEDURE — A4216 STERILE WATER/SALINE, 10 ML: HCPCS | Performed by: INTERNAL MEDICINE

## 2021-11-01 PROCEDURE — 82550 ASSAY OF CK (CPK): CPT | Mod: 91 | Performed by: INTERNAL MEDICINE

## 2021-11-01 RX ORDER — ALLOPURINOL 100 MG/1
100 TABLET ORAL DAILY
Status: DISCONTINUED | OUTPATIENT
Start: 2021-11-02 | End: 2021-12-10 | Stop reason: HOSPADM

## 2021-11-01 RX ORDER — GLUCAGON 1 MG
1 KIT INJECTION
Status: DISCONTINUED | OUTPATIENT
Start: 2021-11-01 | End: 2021-12-10 | Stop reason: HOSPADM

## 2021-11-01 RX ORDER — HYDRALAZINE HYDROCHLORIDE 50 MG/1
50 TABLET, FILM COATED ORAL 3 TIMES DAILY PRN
Status: DISCONTINUED | OUTPATIENT
Start: 2021-11-01 | End: 2021-12-10 | Stop reason: HOSPADM

## 2021-11-01 RX ORDER — HYDRALAZINE HYDROCHLORIDE 50 MG/1
50 TABLET, FILM COATED ORAL 3 TIMES DAILY PRN
Status: CANCELLED | OUTPATIENT
Start: 2021-11-01

## 2021-11-01 RX ORDER — SODIUM CHLORIDE 0.9 % (FLUSH) 0.9 %
10 SYRINGE (ML) INJECTION EVERY 6 HOURS
Status: DISCONTINUED | OUTPATIENT
Start: 2021-11-01 | End: 2021-12-10 | Stop reason: HOSPADM

## 2021-11-01 RX ORDER — AMLODIPINE BESYLATE 10 MG/1
10 TABLET ORAL DAILY
Status: CANCELLED | OUTPATIENT
Start: 2021-11-02

## 2021-11-01 RX ORDER — METHOCARBAMOL 750 MG/1
750 TABLET, FILM COATED ORAL 3 TIMES DAILY
Status: CANCELLED | OUTPATIENT
Start: 2021-11-01

## 2021-11-01 RX ORDER — IPRATROPIUM BROMIDE AND ALBUTEROL SULFATE 2.5; .5 MG/3ML; MG/3ML
3 SOLUTION RESPIRATORY (INHALATION) EVERY 6 HOURS PRN
Status: CANCELLED | OUTPATIENT
Start: 2021-11-01

## 2021-11-01 RX ORDER — LOSARTAN POTASSIUM 50 MG/1
100 TABLET ORAL DAILY
Status: CANCELLED | OUTPATIENT
Start: 2021-11-02

## 2021-11-01 RX ORDER — INSULIN ASPART 100 [IU]/ML
0-5 INJECTION, SOLUTION INTRAVENOUS; SUBCUTANEOUS
Status: CANCELLED | OUTPATIENT
Start: 2021-11-01

## 2021-11-01 RX ORDER — LOSARTAN POTASSIUM 50 MG/1
100 TABLET ORAL DAILY
Status: DISCONTINUED | OUTPATIENT
Start: 2021-11-02 | End: 2021-11-18

## 2021-11-01 RX ORDER — TALC
6 POWDER (GRAM) TOPICAL NIGHTLY PRN
Status: CANCELLED | OUTPATIENT
Start: 2021-11-01

## 2021-11-01 RX ORDER — OXYCODONE HYDROCHLORIDE 5 MG/1
5 TABLET ORAL EVERY 4 HOURS PRN
Status: CANCELLED | OUTPATIENT
Start: 2021-11-01

## 2021-11-01 RX ORDER — ONDANSETRON 8 MG/1
8 TABLET, ORALLY DISINTEGRATING ORAL EVERY 8 HOURS PRN
Status: CANCELLED | OUTPATIENT
Start: 2021-11-01

## 2021-11-01 RX ORDER — HYDRALAZINE HYDROCHLORIDE 50 MG/1
50 TABLET, FILM COATED ORAL 3 TIMES DAILY PRN
Status: DISCONTINUED | OUTPATIENT
Start: 2021-11-01 | End: 2021-11-01 | Stop reason: HOSPADM

## 2021-11-01 RX ORDER — ONDANSETRON 8 MG/1
8 TABLET, ORALLY DISINTEGRATING ORAL EVERY 8 HOURS PRN
Status: DISCONTINUED | OUTPATIENT
Start: 2021-11-01 | End: 2021-12-10 | Stop reason: HOSPADM

## 2021-11-01 RX ORDER — OXYCODONE HYDROCHLORIDE 10 MG/1
10 TABLET ORAL EVERY 4 HOURS PRN
Status: DISCONTINUED | OUTPATIENT
Start: 2021-11-01 | End: 2021-12-10 | Stop reason: HOSPADM

## 2021-11-01 RX ORDER — IPRATROPIUM BROMIDE AND ALBUTEROL SULFATE 2.5; .5 MG/3ML; MG/3ML
3 SOLUTION RESPIRATORY (INHALATION) EVERY 6 HOURS PRN
Status: DISCONTINUED | OUTPATIENT
Start: 2021-11-01 | End: 2021-12-10 | Stop reason: HOSPADM

## 2021-11-01 RX ORDER — SODIUM CHLORIDE 0.9 % (FLUSH) 0.9 %
10 SYRINGE (ML) INJECTION
Status: CANCELLED | OUTPATIENT
Start: 2021-11-01

## 2021-11-01 RX ORDER — POLYETHYLENE GLYCOL 3350 17 G/17G
17 POWDER, FOR SOLUTION ORAL 2 TIMES DAILY PRN
Status: CANCELLED | OUTPATIENT
Start: 2021-11-01

## 2021-11-01 RX ORDER — CALCIUM CARBONATE 200(500)MG
500 TABLET,CHEWABLE ORAL 2 TIMES DAILY PRN
Status: DISCONTINUED | OUTPATIENT
Start: 2021-11-01 | End: 2021-12-10 | Stop reason: HOSPADM

## 2021-11-01 RX ORDER — IBUPROFEN 200 MG
24 TABLET ORAL
Status: DISCONTINUED | OUTPATIENT
Start: 2021-11-01 | End: 2021-12-10 | Stop reason: HOSPADM

## 2021-11-01 RX ORDER — FLUTICASONE FUROATE AND VILANTEROL 200; 25 UG/1; UG/1
1 POWDER RESPIRATORY (INHALATION) DAILY
Status: DISCONTINUED | OUTPATIENT
Start: 2021-11-02 | End: 2021-12-10 | Stop reason: HOSPADM

## 2021-11-01 RX ORDER — SODIUM CHLORIDE 0.9 % (FLUSH) 0.9 %
10 SYRINGE (ML) INJECTION
Status: DISCONTINUED | OUTPATIENT
Start: 2021-11-01 | End: 2021-12-10 | Stop reason: HOSPADM

## 2021-11-01 RX ORDER — OXYCODONE HYDROCHLORIDE 5 MG/1
5 TABLET ORAL EVERY 4 HOURS PRN
Status: DISCONTINUED | OUTPATIENT
Start: 2021-11-01 | End: 2021-12-10 | Stop reason: HOSPADM

## 2021-11-01 RX ORDER — ACETAMINOPHEN 325 MG/1
650 TABLET ORAL EVERY 6 HOURS PRN
Status: DISCONTINUED | OUTPATIENT
Start: 2021-11-01 | End: 2021-12-10 | Stop reason: HOSPADM

## 2021-11-01 RX ORDER — TORSEMIDE 20 MG/1
20 TABLET ORAL DAILY
Status: CANCELLED | OUTPATIENT
Start: 2021-11-02

## 2021-11-01 RX ORDER — TORSEMIDE 20 MG/1
20 TABLET ORAL DAILY
Status: DISCONTINUED | OUTPATIENT
Start: 2021-11-02 | End: 2021-11-11

## 2021-11-01 RX ORDER — ALLOPURINOL 100 MG/1
100 TABLET ORAL DAILY
Status: CANCELLED | OUTPATIENT
Start: 2021-11-02

## 2021-11-01 RX ORDER — OXYCODONE HYDROCHLORIDE 10 MG/1
10 TABLET ORAL EVERY 4 HOURS PRN
Status: CANCELLED | OUTPATIENT
Start: 2021-11-01

## 2021-11-01 RX ORDER — GLUCAGON 1 MG
1 KIT INJECTION
Status: CANCELLED | OUTPATIENT
Start: 2021-11-01

## 2021-11-01 RX ORDER — AMLODIPINE BESYLATE 10 MG/1
10 TABLET ORAL DAILY
Status: DISCONTINUED | OUTPATIENT
Start: 2021-11-02 | End: 2021-12-10 | Stop reason: HOSPADM

## 2021-11-01 RX ORDER — POLYETHYLENE GLYCOL 3350 17 G/17G
17 POWDER, FOR SOLUTION ORAL 2 TIMES DAILY PRN
Status: DISCONTINUED | OUTPATIENT
Start: 2021-11-01 | End: 2021-12-10 | Stop reason: HOSPADM

## 2021-11-01 RX ORDER — TALC
6 POWDER (GRAM) TOPICAL NIGHTLY PRN
Status: DISCONTINUED | OUTPATIENT
Start: 2021-11-01 | End: 2021-12-10 | Stop reason: HOSPADM

## 2021-11-01 RX ORDER — ALUMINUM HYDROXIDE, MAGNESIUM HYDROXIDE, AND SIMETHICONE 2400; 240; 2400 MG/30ML; MG/30ML; MG/30ML
30 SUSPENSION ORAL EVERY 6 HOURS PRN
Status: CANCELLED | OUTPATIENT
Start: 2021-11-01

## 2021-11-01 RX ORDER — INSULIN ASPART 100 [IU]/ML
6 INJECTION, SOLUTION INTRAVENOUS; SUBCUTANEOUS
Status: CANCELLED | OUTPATIENT
Start: 2021-11-01

## 2021-11-01 RX ORDER — FLUTICASONE FUROATE AND VILANTEROL 200; 25 UG/1; UG/1
1 POWDER RESPIRATORY (INHALATION) DAILY
Status: CANCELLED | OUTPATIENT
Start: 2021-11-02

## 2021-11-01 RX ORDER — ACETAMINOPHEN 325 MG/1
650 TABLET ORAL EVERY 6 HOURS PRN
Status: CANCELLED | OUTPATIENT
Start: 2021-11-01

## 2021-11-01 RX ORDER — IBUPROFEN 200 MG
16 TABLET ORAL
Status: CANCELLED | OUTPATIENT
Start: 2021-11-01

## 2021-11-01 RX ORDER — IBUPROFEN 200 MG
16 TABLET ORAL
Status: DISCONTINUED | OUTPATIENT
Start: 2021-11-01 | End: 2021-12-10 | Stop reason: HOSPADM

## 2021-11-01 RX ORDER — METHOCARBAMOL 750 MG/1
750 TABLET, FILM COATED ORAL 3 TIMES DAILY
Status: DISCONTINUED | OUTPATIENT
Start: 2021-11-01 | End: 2021-11-18

## 2021-11-01 RX ORDER — INSULIN ASPART 100 [IU]/ML
6 INJECTION, SOLUTION INTRAVENOUS; SUBCUTANEOUS
Status: DISCONTINUED | OUTPATIENT
Start: 2021-11-02 | End: 2021-11-15

## 2021-11-01 RX ORDER — CALCIUM CARBONATE 200(500)MG
500 TABLET,CHEWABLE ORAL 2 TIMES DAILY PRN
Status: CANCELLED | OUTPATIENT
Start: 2021-11-01

## 2021-11-01 RX ORDER — INSULIN ASPART 100 [IU]/ML
0-5 INJECTION, SOLUTION INTRAVENOUS; SUBCUTANEOUS
Status: DISCONTINUED | OUTPATIENT
Start: 2021-11-01 | End: 2021-12-10 | Stop reason: HOSPADM

## 2021-11-01 RX ORDER — IBUPROFEN 200 MG
24 TABLET ORAL
Status: CANCELLED | OUTPATIENT
Start: 2021-11-01

## 2021-11-01 RX ORDER — SODIUM CHLORIDE 0.9 % (FLUSH) 0.9 %
10 SYRINGE (ML) INJECTION EVERY 6 HOURS
Status: CANCELLED | OUTPATIENT
Start: 2021-11-01

## 2021-11-01 RX ORDER — ALUMINUM HYDROXIDE, MAGNESIUM HYDROXIDE, AND SIMETHICONE 2400; 240; 2400 MG/30ML; MG/30ML; MG/30ML
30 SUSPENSION ORAL EVERY 6 HOURS PRN
Status: DISCONTINUED | OUTPATIENT
Start: 2021-11-01 | End: 2021-12-10 | Stop reason: HOSPADM

## 2021-11-01 RX ADMIN — INSULIN ASPART 3 UNITS: 100 INJECTION, SOLUTION INTRAVENOUS; SUBCUTANEOUS at 04:11

## 2021-11-01 RX ADMIN — DAPTOMYCIN 980 MG: 350 INJECTION, POWDER, LYOPHILIZED, FOR SOLUTION INTRAVENOUS at 02:11

## 2021-11-01 RX ADMIN — INSULIN ASPART 6 UNITS: 100 INJECTION, SOLUTION INTRAVENOUS; SUBCUTANEOUS at 07:11

## 2021-11-01 RX ADMIN — TORSEMIDE 20 MG: 20 TABLET ORAL at 09:11

## 2021-11-01 RX ADMIN — OXYCODONE HYDROCHLORIDE 10 MG: 10 TABLET ORAL at 02:11

## 2021-11-01 RX ADMIN — INSULIN ASPART 1 UNITS: 100 INJECTION, SOLUTION INTRAVENOUS; SUBCUTANEOUS at 09:11

## 2021-11-01 RX ADMIN — Medication 10 ML: at 09:11

## 2021-11-01 RX ADMIN — METHOCARBAMOL 750 MG: 750 TABLET ORAL at 03:11

## 2021-11-01 RX ADMIN — ALLOPURINOL 100 MG: 100 TABLET ORAL at 09:11

## 2021-11-01 RX ADMIN — LOSARTAN POTASSIUM 100 MG: 50 TABLET, FILM COATED ORAL at 09:11

## 2021-11-01 RX ADMIN — APIXABAN 2.5 MG: 2.5 TABLET, FILM COATED ORAL at 09:11

## 2021-11-01 RX ADMIN — METHOCARBAMOL 750 MG: 750 TABLET ORAL at 09:11

## 2021-11-01 RX ADMIN — INSULIN ASPART 6 UNITS: 100 INJECTION, SOLUTION INTRAVENOUS; SUBCUTANEOUS at 04:11

## 2021-11-01 RX ADMIN — Medication 10 ML: at 12:11

## 2021-11-01 RX ADMIN — AMLODIPINE BESYLATE 10 MG: 10 TABLET ORAL at 09:11

## 2021-11-01 RX ADMIN — INSULIN ASPART 2 UNITS: 100 INJECTION, SOLUTION INTRAVENOUS; SUBCUTANEOUS at 12:11

## 2021-11-01 RX ADMIN — INSULIN ASPART 6 UNITS: 100 INJECTION, SOLUTION INTRAVENOUS; SUBCUTANEOUS at 11:11

## 2021-11-01 RX ADMIN — METHOCARBAMOL 750 MG: 750 TABLET ORAL at 08:11

## 2021-11-01 RX ADMIN — INSULIN DETEMIR 12 UNITS: 100 INJECTION, SOLUTION SUBCUTANEOUS at 09:11

## 2021-11-01 RX ADMIN — OXYCODONE HYDROCHLORIDE 10 MG: 10 TABLET ORAL at 07:11

## 2021-11-01 RX ADMIN — APIXABAN 2.5 MG: 2.5 TABLET, FILM COATED ORAL at 08:11

## 2021-11-01 RX ADMIN — OXYCODONE HYDROCHLORIDE 10 MG: 10 TABLET ORAL at 08:11

## 2021-11-01 RX ADMIN — FLUTICASONE FUROATE AND VILANTEROL TRIFENATATE 1 PUFF: 200; 25 POWDER RESPIRATORY (INHALATION) at 09:11

## 2021-11-02 ENCOUNTER — LAB VISIT (OUTPATIENT)
Dept: LAB | Facility: OTHER | Age: 81
End: 2021-11-02
Payer: MEDICARE

## 2021-11-02 ENCOUNTER — PATIENT OUTREACH (OUTPATIENT)
Dept: ADMINISTRATIVE | Facility: CLINIC | Age: 81
End: 2021-11-02
Payer: MEDICARE

## 2021-11-02 DIAGNOSIS — Z20.822 ENCOUNTER FOR LABORATORY TESTING FOR COVID-19 VIRUS: ICD-10-CM

## 2021-11-02 LAB
BACTERIA SPEC AEROBE CULT: ABNORMAL
BACTERIA SPEC ANAEROBE CULT: NORMAL
POCT GLUCOSE: 174 MG/DL (ref 70–110)
POCT GLUCOSE: 241 MG/DL (ref 70–110)
POCT GLUCOSE: 243 MG/DL (ref 70–110)
POCT GLUCOSE: 260 MG/DL (ref 70–110)

## 2021-11-02 PROCEDURE — 25000242 PHARM REV CODE 250 ALT 637 W/ HCPCS: Performed by: INTERNAL MEDICINE

## 2021-11-02 PROCEDURE — 99900035 HC TECH TIME PER 15 MIN (STAT)

## 2021-11-02 PROCEDURE — A4216 STERILE WATER/SALINE, 10 ML: HCPCS | Performed by: INTERNAL MEDICINE

## 2021-11-02 PROCEDURE — U0003 INFECTIOUS AGENT DETECTION BY NUCLEIC ACID (DNA OR RNA); SEVERE ACUTE RESPIRATORY SYNDROME CORONAVIRUS 2 (SARS-COV-2) (CORONAVIRUS DISEASE [COVID-19]), AMPLIFIED PROBE TECHNIQUE, MAKING USE OF HIGH THROUGHPUT TECHNOLOGIES AS DESCRIBED BY CMS-2020-01-R: HCPCS | Performed by: NURSE PRACTITIONER

## 2021-11-02 PROCEDURE — 63600175 PHARM REV CODE 636 W HCPCS: Performed by: INTERNAL MEDICINE

## 2021-11-02 PROCEDURE — 94761 N-INVAS EAR/PLS OXIMETRY MLT: CPT

## 2021-11-02 PROCEDURE — 97166 OT EVAL MOD COMPLEX 45 MIN: CPT

## 2021-11-02 PROCEDURE — 27000221 HC OXYGEN, UP TO 24 HOURS

## 2021-11-02 PROCEDURE — 97535 SELF CARE MNGMENT TRAINING: CPT

## 2021-11-02 PROCEDURE — C9399 UNCLASSIFIED DRUGS OR BIOLOG: HCPCS | Performed by: INTERNAL MEDICINE

## 2021-11-02 PROCEDURE — 11000004 HC SNF PRIVATE

## 2021-11-02 PROCEDURE — 25000003 PHARM REV CODE 250: Performed by: NURSE PRACTITIONER

## 2021-11-02 PROCEDURE — 63600175 PHARM REV CODE 636 W HCPCS: Performed by: NURSE PRACTITIONER

## 2021-11-02 PROCEDURE — 25000003 PHARM REV CODE 250: Performed by: INTERNAL MEDICINE

## 2021-11-02 PROCEDURE — 97163 PT EVAL HIGH COMPLEX 45 MIN: CPT

## 2021-11-02 RX ADMIN — Medication 10 ML: at 05:11

## 2021-11-02 RX ADMIN — OXYCODONE HYDROCHLORIDE 10 MG: 10 TABLET ORAL at 10:11

## 2021-11-02 RX ADMIN — ALLOPURINOL 100 MG: 100 TABLET ORAL at 08:11

## 2021-11-02 RX ADMIN — DAPTOMYCIN 980 MG: 350 INJECTION, POWDER, LYOPHILIZED, FOR SOLUTION INTRAVENOUS at 07:11

## 2021-11-02 RX ADMIN — Medication 10 ML: at 11:11

## 2021-11-02 RX ADMIN — FLUTICASONE FUROATE AND VILANTEROL TRIFENATATE 1 PUFF: 200; 25 POWDER RESPIRATORY (INHALATION) at 08:11

## 2021-11-02 RX ADMIN — INSULIN ASPART 1 UNITS: 100 INJECTION, SOLUTION INTRAVENOUS; SUBCUTANEOUS at 10:11

## 2021-11-02 RX ADMIN — INSULIN DETEMIR 12 UNITS: 100 INJECTION, SOLUTION SUBCUTANEOUS at 08:11

## 2021-11-02 RX ADMIN — METHOCARBAMOL 750 MG: 750 TABLET ORAL at 09:11

## 2021-11-02 RX ADMIN — METHOCARBAMOL 750 MG: 750 TABLET ORAL at 08:11

## 2021-11-02 RX ADMIN — INSULIN ASPART 6 UNITS: 100 INJECTION, SOLUTION INTRAVENOUS; SUBCUTANEOUS at 08:11

## 2021-11-02 RX ADMIN — APIXABAN 2.5 MG: 2.5 TABLET, FILM COATED ORAL at 08:11

## 2021-11-02 RX ADMIN — OXYCODONE HYDROCHLORIDE 10 MG: 10 TABLET ORAL at 05:11

## 2021-11-02 RX ADMIN — METHOCARBAMOL 750 MG: 750 TABLET ORAL at 03:11

## 2021-11-02 RX ADMIN — Medication 10 ML: at 07:11

## 2021-11-02 RX ADMIN — OXYCODONE HYDROCHLORIDE 10 MG: 10 TABLET ORAL at 09:11

## 2021-11-02 RX ADMIN — APIXABAN 2.5 MG: 2.5 TABLET, FILM COATED ORAL at 09:11

## 2021-11-02 RX ADMIN — INSULIN ASPART 2 UNITS: 100 INJECTION, SOLUTION INTRAVENOUS; SUBCUTANEOUS at 05:11

## 2021-11-02 RX ADMIN — INSULIN ASPART 6 UNITS: 100 INJECTION, SOLUTION INTRAVENOUS; SUBCUTANEOUS at 05:11

## 2021-11-02 RX ADMIN — INSULIN ASPART 6 UNITS: 100 INJECTION, SOLUTION INTRAVENOUS; SUBCUTANEOUS at 12:11

## 2021-11-02 RX ADMIN — AMLODIPINE BESYLATE 10 MG: 10 TABLET ORAL at 08:11

## 2021-11-02 RX ADMIN — INSULIN ASPART 3 UNITS: 100 INJECTION, SOLUTION INTRAVENOUS; SUBCUTANEOUS at 12:11

## 2021-11-02 RX ADMIN — Medication 10 ML: at 12:11

## 2021-11-02 RX ADMIN — LOSARTAN POTASSIUM 100 MG: 50 TABLET, FILM COATED ORAL at 08:11

## 2021-11-02 RX ADMIN — TORSEMIDE 20 MG: 20 TABLET ORAL at 08:11

## 2021-11-03 LAB
POCT GLUCOSE: 214 MG/DL (ref 70–110)
POCT GLUCOSE: 222 MG/DL (ref 70–110)
POCT GLUCOSE: 273 MG/DL (ref 70–110)
POCT GLUCOSE: 279 MG/DL (ref 70–110)
SARS-COV-2 RNA RESP QL NAA+PROBE: NOT DETECTED
SARS-COV-2- CYCLE NUMBER: NORMAL

## 2021-11-03 PROCEDURE — A4216 STERILE WATER/SALINE, 10 ML: HCPCS | Performed by: INTERNAL MEDICINE

## 2021-11-03 PROCEDURE — 94761 N-INVAS EAR/PLS OXIMETRY MLT: CPT

## 2021-11-03 PROCEDURE — 97110 THERAPEUTIC EXERCISES: CPT

## 2021-11-03 PROCEDURE — 97530 THERAPEUTIC ACTIVITIES: CPT

## 2021-11-03 PROCEDURE — 25000003 PHARM REV CODE 250: Performed by: INTERNAL MEDICINE

## 2021-11-03 PROCEDURE — 25000003 PHARM REV CODE 250: Performed by: NURSE PRACTITIONER

## 2021-11-03 PROCEDURE — 99900035 HC TECH TIME PER 15 MIN (STAT)

## 2021-11-03 PROCEDURE — 27000221 HC OXYGEN, UP TO 24 HOURS

## 2021-11-03 PROCEDURE — 11000004 HC SNF PRIVATE

## 2021-11-03 PROCEDURE — 63600175 PHARM REV CODE 636 W HCPCS: Performed by: NURSE PRACTITIONER

## 2021-11-03 RX ORDER — AMOXICILLIN 250 MG
1 CAPSULE ORAL 2 TIMES DAILY
Status: DISCONTINUED | OUTPATIENT
Start: 2021-11-03 | End: 2021-11-03

## 2021-11-03 RX ORDER — LACTULOSE 10 G/15ML
20 SOLUTION ORAL EVERY 6 HOURS PRN
Status: DISCONTINUED | OUTPATIENT
Start: 2021-11-03 | End: 2021-12-10 | Stop reason: HOSPADM

## 2021-11-03 RX ORDER — AMOXICILLIN 250 MG
2 CAPSULE ORAL 2 TIMES DAILY
Status: DISCONTINUED | OUTPATIENT
Start: 2021-11-03 | End: 2021-12-10 | Stop reason: HOSPADM

## 2021-11-03 RX ADMIN — INSULIN ASPART 6 UNITS: 100 INJECTION, SOLUTION INTRAVENOUS; SUBCUTANEOUS at 08:11

## 2021-11-03 RX ADMIN — FLUTICASONE FUROATE AND VILANTEROL TRIFENATATE 1 PUFF: 200; 25 POWDER RESPIRATORY (INHALATION) at 08:11

## 2021-11-03 RX ADMIN — METHOCARBAMOL 750 MG: 750 TABLET ORAL at 08:11

## 2021-11-03 RX ADMIN — TORSEMIDE 20 MG: 20 TABLET ORAL at 08:11

## 2021-11-03 RX ADMIN — INSULIN ASPART 2 UNITS: 100 INJECTION, SOLUTION INTRAVENOUS; SUBCUTANEOUS at 05:11

## 2021-11-03 RX ADMIN — INSULIN ASPART 6 UNITS: 100 INJECTION, SOLUTION INTRAVENOUS; SUBCUTANEOUS at 05:11

## 2021-11-03 RX ADMIN — INSULIN DETEMIR 12 UNITS: 100 INJECTION, SOLUTION SUBCUTANEOUS at 08:11

## 2021-11-03 RX ADMIN — OXYCODONE HYDROCHLORIDE 10 MG: 10 TABLET ORAL at 03:11

## 2021-11-03 RX ADMIN — INSULIN ASPART 1 UNITS: 100 INJECTION, SOLUTION INTRAVENOUS; SUBCUTANEOUS at 10:11

## 2021-11-03 RX ADMIN — APIXABAN 2.5 MG: 2.5 TABLET, FILM COATED ORAL at 08:11

## 2021-11-03 RX ADMIN — INSULIN ASPART 2 UNITS: 100 INJECTION, SOLUTION INTRAVENOUS; SUBCUTANEOUS at 08:11

## 2021-11-03 RX ADMIN — AMLODIPINE BESYLATE 10 MG: 10 TABLET ORAL at 08:11

## 2021-11-03 RX ADMIN — INSULIN ASPART 3 UNITS: 100 INJECTION, SOLUTION INTRAVENOUS; SUBCUTANEOUS at 12:11

## 2021-11-03 RX ADMIN — ALTEPLASE 2 MG: 2.2 INJECTION, POWDER, LYOPHILIZED, FOR SOLUTION INTRAVENOUS at 04:11

## 2021-11-03 RX ADMIN — INSULIN ASPART 6 UNITS: 100 INJECTION, SOLUTION INTRAVENOUS; SUBCUTANEOUS at 12:11

## 2021-11-03 RX ADMIN — ALLOPURINOL 100 MG: 100 TABLET ORAL at 08:11

## 2021-11-03 RX ADMIN — OXYCODONE 5 MG: 5 TABLET ORAL at 12:11

## 2021-11-03 RX ADMIN — METHOCARBAMOL 750 MG: 750 TABLET ORAL at 03:11

## 2021-11-03 RX ADMIN — DAPTOMYCIN 980 MG: 350 INJECTION, POWDER, LYOPHILIZED, FOR SOLUTION INTRAVENOUS at 07:11

## 2021-11-03 RX ADMIN — DOCUSATE SODIUM 50 MG AND SENNOSIDES 8.6 MG 1 TABLET: 8.6; 5 TABLET, FILM COATED ORAL at 08:11

## 2021-11-03 RX ADMIN — POLYETHYLENE GLYCOL 3350 17 G: 17 POWDER, FOR SOLUTION ORAL at 12:11

## 2021-11-03 RX ADMIN — Medication 10 ML: at 12:11

## 2021-11-03 RX ADMIN — LOSARTAN POTASSIUM 100 MG: 50 TABLET, FILM COATED ORAL at 08:11

## 2021-11-03 RX ADMIN — OXYCODONE HYDROCHLORIDE 10 MG: 10 TABLET ORAL at 08:11

## 2021-11-04 ENCOUNTER — LAB VISIT (OUTPATIENT)
Dept: LAB | Facility: OTHER | Age: 81
End: 2021-11-04
Payer: MEDICARE

## 2021-11-04 DIAGNOSIS — Z20.822 ENCOUNTER FOR LABORATORY TESTING FOR COVID-19 VIRUS: ICD-10-CM

## 2021-11-04 LAB
ANION GAP SERPL CALC-SCNC: 8 MMOL/L (ref 8–16)
BASOPHILS # BLD AUTO: 0.04 K/UL (ref 0–0.2)
BASOPHILS # BLD AUTO: 0.05 K/UL (ref 0–0.2)
BASOPHILS NFR BLD: 0.3 % (ref 0–1.9)
BASOPHILS NFR BLD: 0.4 % (ref 0–1.9)
BUN SERPL-MCNC: 34 MG/DL (ref 8–23)
CALCIUM SERPL-MCNC: 8.3 MG/DL (ref 8.7–10.5)
CHLORIDE SERPL-SCNC: 96 MMOL/L (ref 95–110)
CO2 SERPL-SCNC: 33 MMOL/L (ref 23–29)
CREAT SERPL-MCNC: 1.1 MG/DL (ref 0.5–1.4)
DIFFERENTIAL METHOD: ABNORMAL
DIFFERENTIAL METHOD: ABNORMAL
EOSINOPHIL # BLD AUTO: 0.2 K/UL (ref 0–0.5)
EOSINOPHIL # BLD AUTO: 0.4 K/UL (ref 0–0.5)
EOSINOPHIL NFR BLD: 1.7 % (ref 0–8)
EOSINOPHIL NFR BLD: 2.7 % (ref 0–8)
ERYTHROCYTE [DISTWIDTH] IN BLOOD BY AUTOMATED COUNT: 15 % (ref 11.5–14.5)
ERYTHROCYTE [DISTWIDTH] IN BLOOD BY AUTOMATED COUNT: 15 % (ref 11.5–14.5)
EST. GFR  (AFRICAN AMERICAN): 55.2 ML/MIN/1.73 M^2
EST. GFR  (NON AFRICAN AMERICAN): 47.9 ML/MIN/1.73 M^2
GLUCOSE SERPL-MCNC: 153 MG/DL (ref 70–110)
HCT VFR BLD AUTO: 22.7 % (ref 37–48.5)
HCT VFR BLD AUTO: 33.1 % (ref 37–48.5)
HGB BLD-MCNC: 10.4 G/DL (ref 12–16)
HGB BLD-MCNC: 7 G/DL (ref 12–16)
IMM GRANULOCYTES # BLD AUTO: 0.1 K/UL (ref 0–0.04)
IMM GRANULOCYTES # BLD AUTO: 0.18 K/UL (ref 0–0.04)
IMM GRANULOCYTES NFR BLD AUTO: 0.8 % (ref 0–0.5)
IMM GRANULOCYTES NFR BLD AUTO: 1.2 % (ref 0–0.5)
LYMPHOCYTES # BLD AUTO: 1.5 K/UL (ref 1–4.8)
LYMPHOCYTES # BLD AUTO: 2.3 K/UL (ref 1–4.8)
LYMPHOCYTES NFR BLD: 11.2 % (ref 18–48)
LYMPHOCYTES NFR BLD: 14.7 % (ref 18–48)
MAGNESIUM SERPL-MCNC: 1.9 MG/DL (ref 1.6–2.6)
MCH RBC QN AUTO: 28.6 PG (ref 27–31)
MCH RBC QN AUTO: 29 PG (ref 27–31)
MCHC RBC AUTO-ENTMCNC: 30.8 G/DL (ref 32–36)
MCHC RBC AUTO-ENTMCNC: 31.4 G/DL (ref 32–36)
MCV RBC AUTO: 92 FL (ref 82–98)
MCV RBC AUTO: 93 FL (ref 82–98)
MONOCYTES # BLD AUTO: 0.7 K/UL (ref 0.3–1)
MONOCYTES # BLD AUTO: 1.1 K/UL (ref 0.3–1)
MONOCYTES NFR BLD: 5.1 % (ref 4–15)
MONOCYTES NFR BLD: 6.9 % (ref 4–15)
NEUTROPHILS # BLD AUTO: 10.7 K/UL (ref 1.8–7.7)
NEUTROPHILS # BLD AUTO: 11.6 K/UL (ref 1.8–7.7)
NEUTROPHILS NFR BLD: 74.2 % (ref 38–73)
NEUTROPHILS NFR BLD: 80.8 % (ref 38–73)
NRBC BLD-RTO: 0 /100 WBC
NRBC BLD-RTO: 0 /100 WBC
PHOSPHATE SERPL-MCNC: 3.6 MG/DL (ref 2.7–4.5)
PLATELET # BLD AUTO: 274 K/UL (ref 150–450)
PLATELET # BLD AUTO: 281 K/UL (ref 150–450)
PMV BLD AUTO: 11.4 FL (ref 9.2–12.9)
PMV BLD AUTO: 12.2 FL (ref 9.2–12.9)
POCT GLUCOSE: 181 MG/DL (ref 70–110)
POCT GLUCOSE: 298 MG/DL (ref 70–110)
POCT GLUCOSE: 298 MG/DL (ref 70–110)
POTASSIUM SERPL-SCNC: 5 MMOL/L (ref 3.5–5.1)
RBC # BLD AUTO: 2.45 M/UL (ref 4–5.4)
RBC # BLD AUTO: 3.59 M/UL (ref 4–5.4)
SARS-COV-2 RNA RESP QL NAA+PROBE: NOT DETECTED
SARS-COV-2- CYCLE NUMBER: NORMAL
SODIUM SERPL-SCNC: 137 MMOL/L (ref 136–145)
WBC # BLD AUTO: 13.23 K/UL (ref 3.9–12.7)
WBC # BLD AUTO: 15.56 K/UL (ref 3.9–12.7)

## 2021-11-04 PROCEDURE — 36415 COLL VENOUS BLD VENIPUNCTURE: CPT | Performed by: HOSPITALIST

## 2021-11-04 PROCEDURE — 25000003 PHARM REV CODE 250: Performed by: INTERNAL MEDICINE

## 2021-11-04 PROCEDURE — 11000004 HC SNF PRIVATE

## 2021-11-04 PROCEDURE — 84100 ASSAY OF PHOSPHORUS: CPT | Performed by: INTERNAL MEDICINE

## 2021-11-04 PROCEDURE — 85025 COMPLETE CBC W/AUTO DIFF WBC: CPT | Mod: 91 | Performed by: HOSPITALIST

## 2021-11-04 PROCEDURE — 97535 SELF CARE MNGMENT TRAINING: CPT | Mod: CO

## 2021-11-04 PROCEDURE — 94761 N-INVAS EAR/PLS OXIMETRY MLT: CPT

## 2021-11-04 PROCEDURE — 97110 THERAPEUTIC EXERCISES: CPT | Mod: CO

## 2021-11-04 PROCEDURE — 85025 COMPLETE CBC W/AUTO DIFF WBC: CPT | Performed by: INTERNAL MEDICINE

## 2021-11-04 PROCEDURE — 25000003 PHARM REV CODE 250: Performed by: NURSE PRACTITIONER

## 2021-11-04 PROCEDURE — 63600175 PHARM REV CODE 636 W HCPCS: Performed by: NURSE PRACTITIONER

## 2021-11-04 PROCEDURE — 83735 ASSAY OF MAGNESIUM: CPT | Performed by: INTERNAL MEDICINE

## 2021-11-04 PROCEDURE — U0003 INFECTIOUS AGENT DETECTION BY NUCLEIC ACID (DNA OR RNA); SEVERE ACUTE RESPIRATORY SYNDROME CORONAVIRUS 2 (SARS-COV-2) (CORONAVIRUS DISEASE [COVID-19]), AMPLIFIED PROBE TECHNIQUE, MAKING USE OF HIGH THROUGHPUT TECHNOLOGIES AS DESCRIBED BY CMS-2020-01-R: HCPCS | Performed by: NURSE PRACTITIONER

## 2021-11-04 PROCEDURE — 99900035 HC TECH TIME PER 15 MIN (STAT)

## 2021-11-04 PROCEDURE — 80048 BASIC METABOLIC PNL TOTAL CA: CPT | Performed by: INTERNAL MEDICINE

## 2021-11-04 PROCEDURE — A4216 STERILE WATER/SALINE, 10 ML: HCPCS | Performed by: INTERNAL MEDICINE

## 2021-11-04 PROCEDURE — 27000221 HC OXYGEN, UP TO 24 HOURS

## 2021-11-04 RX ORDER — LACTULOSE 10 G/15ML
20 SOLUTION ORAL ONCE
Status: DISCONTINUED | OUTPATIENT
Start: 2021-11-04 | End: 2021-11-04

## 2021-11-04 RX ADMIN — MELATONIN TAB 3 MG 6 MG: 3 TAB at 08:11

## 2021-11-04 RX ADMIN — AMLODIPINE BESYLATE 10 MG: 10 TABLET ORAL at 10:11

## 2021-11-04 RX ADMIN — METHOCARBAMOL 750 MG: 750 TABLET ORAL at 02:11

## 2021-11-04 RX ADMIN — TORSEMIDE 20 MG: 20 TABLET ORAL at 10:11

## 2021-11-04 RX ADMIN — METHOCARBAMOL 750 MG: 750 TABLET ORAL at 08:11

## 2021-11-04 RX ADMIN — Medication 10 ML: at 12:11

## 2021-11-04 RX ADMIN — INSULIN DETEMIR 12 UNITS: 100 INJECTION, SOLUTION SUBCUTANEOUS at 08:11

## 2021-11-04 RX ADMIN — INSULIN ASPART 6 UNITS: 100 INJECTION, SOLUTION INTRAVENOUS; SUBCUTANEOUS at 05:11

## 2021-11-04 RX ADMIN — LACTULOSE 20 G: 10 SOLUTION ORAL at 10:11

## 2021-11-04 RX ADMIN — SENNOSIDES AND DOCUSATE SODIUM 2 TABLET: 50; 8.6 TABLET ORAL at 08:11

## 2021-11-04 RX ADMIN — Medication 1 CAPSULE: at 10:11

## 2021-11-04 RX ADMIN — GUAIFENESIN AND DEXTROMETHORPHAN HYDROBROMIDE 1 TABLET: 600; 30 TABLET, EXTENDED RELEASE ORAL at 08:11

## 2021-11-04 RX ADMIN — INSULIN ASPART 6 UNITS: 100 INJECTION, SOLUTION INTRAVENOUS; SUBCUTANEOUS at 12:11

## 2021-11-04 RX ADMIN — OXYCODONE 5 MG: 5 TABLET ORAL at 10:11

## 2021-11-04 RX ADMIN — APIXABAN 2.5 MG: 2.5 TABLET, FILM COATED ORAL at 10:11

## 2021-11-04 RX ADMIN — Medication 10 ML: at 06:11

## 2021-11-04 RX ADMIN — LOSARTAN POTASSIUM 100 MG: 50 TABLET, FILM COATED ORAL at 10:11

## 2021-11-04 RX ADMIN — APIXABAN 2.5 MG: 2.5 TABLET, FILM COATED ORAL at 08:11

## 2021-11-04 RX ADMIN — SENNOSIDES AND DOCUSATE SODIUM 2 TABLET: 50; 8.6 TABLET ORAL at 10:11

## 2021-11-04 RX ADMIN — METHOCARBAMOL 750 MG: 750 TABLET ORAL at 10:11

## 2021-11-04 RX ADMIN — INSULIN ASPART 6 UNITS: 100 INJECTION, SOLUTION INTRAVENOUS; SUBCUTANEOUS at 07:11

## 2021-11-04 RX ADMIN — ALLOPURINOL 100 MG: 100 TABLET ORAL at 09:11

## 2021-11-04 RX ADMIN — DAPTOMYCIN 980 MG: 350 INJECTION, POWDER, LYOPHILIZED, FOR SOLUTION INTRAVENOUS at 07:11

## 2021-11-04 RX ADMIN — FLUTICASONE FUROATE AND VILANTEROL TRIFENATATE 1 PUFF: 200; 25 POWDER RESPIRATORY (INHALATION) at 10:11

## 2021-11-05 LAB
POCT GLUCOSE: 210 MG/DL (ref 70–110)
POCT GLUCOSE: 214 MG/DL (ref 70–110)
POCT GLUCOSE: 261 MG/DL (ref 70–110)
POCT GLUCOSE: 324 MG/DL (ref 70–110)

## 2021-11-05 PROCEDURE — 97530 THERAPEUTIC ACTIVITIES: CPT | Mod: CQ

## 2021-11-05 PROCEDURE — 97535 SELF CARE MNGMENT TRAINING: CPT | Mod: CO

## 2021-11-05 PROCEDURE — C9399 UNCLASSIFIED DRUGS OR BIOLOG: HCPCS | Performed by: INTERNAL MEDICINE

## 2021-11-05 PROCEDURE — A4216 STERILE WATER/SALINE, 10 ML: HCPCS | Performed by: INTERNAL MEDICINE

## 2021-11-05 PROCEDURE — 11000004 HC SNF PRIVATE

## 2021-11-05 PROCEDURE — 63600175 PHARM REV CODE 636 W HCPCS: Performed by: NURSE PRACTITIONER

## 2021-11-05 PROCEDURE — 25000003 PHARM REV CODE 250: Performed by: INTERNAL MEDICINE

## 2021-11-05 PROCEDURE — 25000003 PHARM REV CODE 250: Performed by: NURSE PRACTITIONER

## 2021-11-05 RX ADMIN — INSULIN ASPART 6 UNITS: 100 INJECTION, SOLUTION INTRAVENOUS; SUBCUTANEOUS at 05:11

## 2021-11-05 RX ADMIN — Medication 10 ML: at 06:11

## 2021-11-05 RX ADMIN — ALLOPURINOL 100 MG: 100 TABLET ORAL at 08:11

## 2021-11-05 RX ADMIN — Medication 10 ML: at 12:11

## 2021-11-05 RX ADMIN — DAPTOMYCIN 980 MG: 350 INJECTION, POWDER, LYOPHILIZED, FOR SOLUTION INTRAVENOUS at 09:11

## 2021-11-05 RX ADMIN — INSULIN ASPART 2 UNITS: 100 INJECTION, SOLUTION INTRAVENOUS; SUBCUTANEOUS at 08:11

## 2021-11-05 RX ADMIN — INSULIN ASPART 6 UNITS: 100 INJECTION, SOLUTION INTRAVENOUS; SUBCUTANEOUS at 11:11

## 2021-11-05 RX ADMIN — AMLODIPINE BESYLATE 10 MG: 10 TABLET ORAL at 08:11

## 2021-11-05 RX ADMIN — INSULIN ASPART 1 UNITS: 100 INJECTION, SOLUTION INTRAVENOUS; SUBCUTANEOUS at 09:11

## 2021-11-05 RX ADMIN — INSULIN ASPART 3 UNITS: 100 INJECTION, SOLUTION INTRAVENOUS; SUBCUTANEOUS at 05:11

## 2021-11-05 RX ADMIN — FLUTICASONE FUROATE AND VILANTEROL TRIFENATATE 1 PUFF: 200; 25 POWDER RESPIRATORY (INHALATION) at 08:11

## 2021-11-05 RX ADMIN — TORSEMIDE 20 MG: 20 TABLET ORAL at 08:11

## 2021-11-05 RX ADMIN — LACTULOSE 20 G: 10 SOLUTION ORAL at 08:11

## 2021-11-05 RX ADMIN — Medication 1 CAPSULE: at 08:11

## 2021-11-05 RX ADMIN — SENNOSIDES AND DOCUSATE SODIUM 2 TABLET: 50; 8.6 TABLET ORAL at 08:11

## 2021-11-05 RX ADMIN — GUAIFENESIN AND DEXTROMETHORPHAN HYDROBROMIDE 1 TABLET: 600; 30 TABLET, EXTENDED RELEASE ORAL at 08:11

## 2021-11-05 RX ADMIN — APIXABAN 2.5 MG: 2.5 TABLET, FILM COATED ORAL at 08:11

## 2021-11-05 RX ADMIN — INSULIN ASPART 4 UNITS: 100 INJECTION, SOLUTION INTRAVENOUS; SUBCUTANEOUS at 11:11

## 2021-11-05 RX ADMIN — LOSARTAN POTASSIUM 100 MG: 50 TABLET, FILM COATED ORAL at 08:11

## 2021-11-05 RX ADMIN — APIXABAN 2.5 MG: 2.5 TABLET, FILM COATED ORAL at 09:11

## 2021-11-05 RX ADMIN — METHOCARBAMOL 750 MG: 750 TABLET ORAL at 09:11

## 2021-11-05 RX ADMIN — METHOCARBAMOL 750 MG: 750 TABLET ORAL at 02:11

## 2021-11-05 RX ADMIN — METHOCARBAMOL 750 MG: 750 TABLET ORAL at 08:11

## 2021-11-05 RX ADMIN — INSULIN ASPART 6 UNITS: 100 INJECTION, SOLUTION INTRAVENOUS; SUBCUTANEOUS at 08:11

## 2021-11-05 RX ADMIN — INSULIN DETEMIR 12 UNITS: 100 INJECTION, SOLUTION SUBCUTANEOUS at 09:11

## 2021-11-05 RX ADMIN — SENNOSIDES AND DOCUSATE SODIUM 2 TABLET: 50; 8.6 TABLET ORAL at 09:11

## 2021-11-05 RX ADMIN — GUAIFENESIN AND DEXTROMETHORPHAN HYDROBROMIDE 1 TABLET: 600; 30 TABLET, EXTENDED RELEASE ORAL at 09:11

## 2021-11-06 LAB
POCT GLUCOSE: 134 MG/DL (ref 70–110)
POCT GLUCOSE: 195 MG/DL (ref 70–110)
POCT GLUCOSE: 282 MG/DL (ref 70–110)
POCT GLUCOSE: 311 MG/DL (ref 70–110)

## 2021-11-06 PROCEDURE — 99306 1ST NF CARE HIGH MDM 50: CPT | Mod: ,,, | Performed by: HOSPITALIST

## 2021-11-06 PROCEDURE — 25000003 PHARM REV CODE 250: Performed by: INTERNAL MEDICINE

## 2021-11-06 PROCEDURE — 99306 PR NURSING FACILITY CARE, INIT, HIGH SEVERITY: ICD-10-PCS | Mod: ,,, | Performed by: HOSPITALIST

## 2021-11-06 PROCEDURE — 25000003 PHARM REV CODE 250: Performed by: NURSE PRACTITIONER

## 2021-11-06 PROCEDURE — A4216 STERILE WATER/SALINE, 10 ML: HCPCS | Performed by: INTERNAL MEDICINE

## 2021-11-06 PROCEDURE — 11000004 HC SNF PRIVATE

## 2021-11-06 RX ADMIN — SENNOSIDES AND DOCUSATE SODIUM 2 TABLET: 50; 8.6 TABLET ORAL at 09:11

## 2021-11-06 RX ADMIN — ALLOPURINOL 100 MG: 100 TABLET ORAL at 10:11

## 2021-11-06 RX ADMIN — APIXABAN 2.5 MG: 2.5 TABLET, FILM COATED ORAL at 09:11

## 2021-11-06 RX ADMIN — MELATONIN TAB 3 MG 6 MG: 3 TAB at 09:11

## 2021-11-06 RX ADMIN — INSULIN ASPART 6 UNITS: 100 INJECTION, SOLUTION INTRAVENOUS; SUBCUTANEOUS at 05:11

## 2021-11-06 RX ADMIN — GUAIFENESIN AND DEXTROMETHORPHAN HYDROBROMIDE 1 TABLET: 600; 30 TABLET, EXTENDED RELEASE ORAL at 09:11

## 2021-11-06 RX ADMIN — INSULIN ASPART 6 UNITS: 100 INJECTION, SOLUTION INTRAVENOUS; SUBCUTANEOUS at 07:11

## 2021-11-06 RX ADMIN — Medication 1 CAPSULE: at 10:11

## 2021-11-06 RX ADMIN — FLUTICASONE FUROATE AND VILANTEROL TRIFENATATE 1 PUFF: 200; 25 POWDER RESPIRATORY (INHALATION) at 11:11

## 2021-11-06 RX ADMIN — Medication 10 ML: at 12:11

## 2021-11-06 RX ADMIN — TORSEMIDE 20 MG: 20 TABLET ORAL at 09:11

## 2021-11-06 RX ADMIN — METHOCARBAMOL 750 MG: 750 TABLET ORAL at 09:11

## 2021-11-06 RX ADMIN — Medication 10 ML: at 05:11

## 2021-11-06 RX ADMIN — INSULIN DETEMIR 12 UNITS: 100 INJECTION, SOLUTION SUBCUTANEOUS at 09:11

## 2021-11-06 RX ADMIN — INSULIN ASPART 4 UNITS: 100 INJECTION, SOLUTION INTRAVENOUS; SUBCUTANEOUS at 06:11

## 2021-11-06 RX ADMIN — APIXABAN 2.5 MG: 2.5 TABLET, FILM COATED ORAL at 10:11

## 2021-11-06 RX ADMIN — AMLODIPINE BESYLATE 10 MG: 10 TABLET ORAL at 09:11

## 2021-11-06 RX ADMIN — LOSARTAN POTASSIUM 100 MG: 50 TABLET, FILM COATED ORAL at 09:11

## 2021-11-06 RX ADMIN — INSULIN ASPART 1 UNITS: 100 INJECTION, SOLUTION INTRAVENOUS; SUBCUTANEOUS at 09:11

## 2021-11-06 RX ADMIN — INSULIN ASPART 6 UNITS: 100 INJECTION, SOLUTION INTRAVENOUS; SUBCUTANEOUS at 12:11

## 2021-11-06 RX ADMIN — Medication 10 ML: at 06:11

## 2021-11-06 RX ADMIN — METHOCARBAMOL 750 MG: 750 TABLET ORAL at 03:11

## 2021-11-07 LAB
POCT GLUCOSE: 145 MG/DL (ref 70–110)
POCT GLUCOSE: 181 MG/DL (ref 70–110)
POCT GLUCOSE: 190 MG/DL (ref 70–110)
POCT GLUCOSE: 217 MG/DL (ref 70–110)

## 2021-11-07 PROCEDURE — 97530 THERAPEUTIC ACTIVITIES: CPT | Mod: CO

## 2021-11-07 PROCEDURE — 25000003 PHARM REV CODE 250: Performed by: NURSE PRACTITIONER

## 2021-11-07 PROCEDURE — 11000004 HC SNF PRIVATE

## 2021-11-07 PROCEDURE — 97535 SELF CARE MNGMENT TRAINING: CPT | Mod: CO

## 2021-11-07 PROCEDURE — 97530 THERAPEUTIC ACTIVITIES: CPT | Mod: CQ

## 2021-11-07 PROCEDURE — 97110 THERAPEUTIC EXERCISES: CPT | Mod: CQ

## 2021-11-07 PROCEDURE — A4216 STERILE WATER/SALINE, 10 ML: HCPCS | Performed by: INTERNAL MEDICINE

## 2021-11-07 PROCEDURE — 25000003 PHARM REV CODE 250: Performed by: INTERNAL MEDICINE

## 2021-11-07 PROCEDURE — 63600175 PHARM REV CODE 636 W HCPCS: Performed by: NURSE PRACTITIONER

## 2021-11-07 RX ADMIN — ALLOPURINOL 100 MG: 100 TABLET ORAL at 09:11

## 2021-11-07 RX ADMIN — SENNOSIDES AND DOCUSATE SODIUM 2 TABLET: 50; 8.6 TABLET ORAL at 09:11

## 2021-11-07 RX ADMIN — APIXABAN 2.5 MG: 2.5 TABLET, FILM COATED ORAL at 09:11

## 2021-11-07 RX ADMIN — Medication 10 ML: at 12:11

## 2021-11-07 RX ADMIN — GUAIFENESIN AND DEXTROMETHORPHAN HYDROBROMIDE 1 TABLET: 600; 30 TABLET, EXTENDED RELEASE ORAL at 09:11

## 2021-11-07 RX ADMIN — FLUTICASONE FUROATE AND VILANTEROL TRIFENATATE 1 PUFF: 200; 25 POWDER RESPIRATORY (INHALATION) at 09:11

## 2021-11-07 RX ADMIN — METHOCARBAMOL 750 MG: 750 TABLET ORAL at 03:11

## 2021-11-07 RX ADMIN — INSULIN ASPART 2 UNITS: 100 INJECTION, SOLUTION INTRAVENOUS; SUBCUTANEOUS at 05:11

## 2021-11-07 RX ADMIN — DAPTOMYCIN 980 MG: 350 INJECTION, POWDER, LYOPHILIZED, FOR SOLUTION INTRAVENOUS at 01:11

## 2021-11-07 RX ADMIN — INSULIN DETEMIR 12 UNITS: 100 INJECTION, SOLUTION SUBCUTANEOUS at 09:11

## 2021-11-07 RX ADMIN — MELATONIN TAB 3 MG 6 MG: 3 TAB at 09:11

## 2021-11-07 RX ADMIN — Medication 1 CAPSULE: at 09:11

## 2021-11-07 RX ADMIN — INSULIN ASPART 6 UNITS: 100 INJECTION, SOLUTION INTRAVENOUS; SUBCUTANEOUS at 05:11

## 2021-11-07 RX ADMIN — Medication 10 ML: at 01:11

## 2021-11-07 RX ADMIN — METHOCARBAMOL 750 MG: 750 TABLET ORAL at 09:11

## 2021-11-07 RX ADMIN — LOSARTAN POTASSIUM 100 MG: 50 TABLET, FILM COATED ORAL at 09:11

## 2021-11-07 RX ADMIN — Medication 10 ML: at 09:11

## 2021-11-07 RX ADMIN — AMLODIPINE BESYLATE 10 MG: 10 TABLET ORAL at 09:11

## 2021-11-07 RX ADMIN — INSULIN ASPART 6 UNITS: 100 INJECTION, SOLUTION INTRAVENOUS; SUBCUTANEOUS at 12:11

## 2021-11-07 RX ADMIN — INSULIN ASPART 6 UNITS: 100 INJECTION, SOLUTION INTRAVENOUS; SUBCUTANEOUS at 09:11

## 2021-11-07 RX ADMIN — TORSEMIDE 20 MG: 20 TABLET ORAL at 09:11

## 2021-11-08 ENCOUNTER — OFFICE VISIT (OUTPATIENT)
Dept: ORTHOPEDICS | Facility: CLINIC | Age: 81
End: 2021-11-08
Payer: MEDICARE

## 2021-11-08 VITALS — HEIGHT: 59 IN | BODY MASS INDEX: 48.79 KG/M2 | WEIGHT: 242 LBS

## 2021-11-08 DIAGNOSIS — T84.53XS INFECTION OF TOTAL RIGHT KNEE REPLACEMENT, SEQUELA: Primary | ICD-10-CM

## 2021-11-08 DIAGNOSIS — Z20.822 ENCOUNTER FOR LABORATORY TESTING FOR COVID-19 VIRUS: ICD-10-CM

## 2021-11-08 LAB
ANION GAP SERPL CALC-SCNC: 10 MMOL/L (ref 8–16)
BACTERIA SPEC ANAEROBE CULT: NORMAL
BASOPHILS # BLD AUTO: 0.05 K/UL (ref 0–0.2)
BASOPHILS NFR BLD: 0.5 % (ref 0–1.9)
BUN SERPL-MCNC: 40 MG/DL (ref 8–23)
CALCIUM SERPL-MCNC: 9.2 MG/DL (ref 8.7–10.5)
CHLORIDE SERPL-SCNC: 98 MMOL/L (ref 95–110)
CK SERPL-CCNC: 41 U/L (ref 20–180)
CO2 SERPL-SCNC: 28 MMOL/L (ref 23–29)
COMMENT: NORMAL
CREAT SERPL-MCNC: 1.4 MG/DL (ref 0.5–1.4)
CRP SERPL-MCNC: 38.9 MG/L (ref 0–8.2)
DIFFERENTIAL METHOD: ABNORMAL
EOSINOPHIL # BLD AUTO: 0.3 K/UL (ref 0–0.5)
EOSINOPHIL NFR BLD: 3.3 % (ref 0–8)
ERYTHROCYTE [DISTWIDTH] IN BLOOD BY AUTOMATED COUNT: 15 % (ref 11.5–14.5)
ERYTHROCYTE [SEDIMENTATION RATE] IN BLOOD BY WESTERGREN METHOD: >120 MM/HR (ref 0–36)
EST. GFR  (AFRICAN AMERICAN): 41.2 ML/MIN/1.73 M^2
EST. GFR  (NON AFRICAN AMERICAN): 35.8 ML/MIN/1.73 M^2
FINAL PATHOLOGIC DIAGNOSIS: NORMAL
GLUCOSE SERPL-MCNC: 118 MG/DL (ref 70–110)
GROSS: NORMAL
HCT VFR BLD AUTO: 31.9 % (ref 37–48.5)
HGB BLD-MCNC: 9.8 G/DL (ref 12–16)
IMM GRANULOCYTES # BLD AUTO: 0.12 K/UL (ref 0–0.04)
IMM GRANULOCYTES NFR BLD AUTO: 1.2 % (ref 0–0.5)
LYMPHOCYTES # BLD AUTO: 1.8 K/UL (ref 1–4.8)
LYMPHOCYTES NFR BLD: 17.6 % (ref 18–48)
Lab: NORMAL
MAGNESIUM SERPL-MCNC: 1.9 MG/DL (ref 1.6–2.6)
MCH RBC QN AUTO: 28.4 PG (ref 27–31)
MCHC RBC AUTO-ENTMCNC: 30.7 G/DL (ref 32–36)
MCV RBC AUTO: 93 FL (ref 82–98)
MONOCYTES # BLD AUTO: 0.7 K/UL (ref 0.3–1)
MONOCYTES NFR BLD: 6.7 % (ref 4–15)
NEUTROPHILS # BLD AUTO: 7.1 K/UL (ref 1.8–7.7)
NEUTROPHILS NFR BLD: 70.7 % (ref 38–73)
NRBC BLD-RTO: 0 /100 WBC
PHOSPHATE SERPL-MCNC: 4.4 MG/DL (ref 2.7–4.5)
PLATELET # BLD AUTO: 370 K/UL (ref 150–450)
PMV BLD AUTO: 11.2 FL (ref 9.2–12.9)
POCT GLUCOSE: 166 MG/DL (ref 70–110)
POCT GLUCOSE: 190 MG/DL (ref 70–110)
POCT GLUCOSE: 264 MG/DL (ref 70–110)
POTASSIUM SERPL-SCNC: 4.6 MMOL/L (ref 3.5–5.1)
RBC # BLD AUTO: 3.45 M/UL (ref 4–5.4)
SODIUM SERPL-SCNC: 136 MMOL/L (ref 136–145)
WBC # BLD AUTO: 10.03 K/UL (ref 3.9–12.7)

## 2021-11-08 PROCEDURE — 99999 PR PBB SHADOW E&M-EST. PATIENT-LVL IV: ICD-10-PCS | Mod: PBBFAC,,, | Performed by: PHYSICIAN ASSISTANT

## 2021-11-08 PROCEDURE — 86140 C-REACTIVE PROTEIN: CPT | Performed by: INTERNAL MEDICINE

## 2021-11-08 PROCEDURE — A4216 STERILE WATER/SALINE, 10 ML: HCPCS | Performed by: INTERNAL MEDICINE

## 2021-11-08 PROCEDURE — 85652 RBC SED RATE AUTOMATED: CPT | Performed by: NURSE PRACTITIONER

## 2021-11-08 PROCEDURE — 83735 ASSAY OF MAGNESIUM: CPT | Performed by: INTERNAL MEDICINE

## 2021-11-08 PROCEDURE — 84100 ASSAY OF PHOSPHORUS: CPT | Performed by: INTERNAL MEDICINE

## 2021-11-08 PROCEDURE — 99999 PR PBB SHADOW E&M-EST. PATIENT-LVL IV: CPT | Mod: PBBFAC,,, | Performed by: PHYSICIAN ASSISTANT

## 2021-11-08 PROCEDURE — 99024 PR POST-OP FOLLOW-UP VISIT: ICD-10-PCS | Mod: POP,,, | Performed by: PHYSICIAN ASSISTANT

## 2021-11-08 PROCEDURE — 97530 THERAPEUTIC ACTIVITIES: CPT | Mod: CO

## 2021-11-08 PROCEDURE — 99024 POSTOP FOLLOW-UP VISIT: CPT | Mod: POP,,, | Performed by: PHYSICIAN ASSISTANT

## 2021-11-08 PROCEDURE — 97530 THERAPEUTIC ACTIVITIES: CPT | Mod: CQ

## 2021-11-08 PROCEDURE — 25000003 PHARM REV CODE 250: Performed by: INTERNAL MEDICINE

## 2021-11-08 PROCEDURE — 82550 ASSAY OF CK (CPK): CPT | Performed by: NURSE PRACTITIONER

## 2021-11-08 PROCEDURE — 85025 COMPLETE CBC W/AUTO DIFF WBC: CPT | Performed by: INTERNAL MEDICINE

## 2021-11-08 PROCEDURE — 97110 THERAPEUTIC EXERCISES: CPT | Mod: CO

## 2021-11-08 PROCEDURE — 36415 COLL VENOUS BLD VENIPUNCTURE: CPT | Performed by: INTERNAL MEDICINE

## 2021-11-08 PROCEDURE — 25000003 PHARM REV CODE 250: Performed by: NURSE PRACTITIONER

## 2021-11-08 PROCEDURE — 99214 OFFICE O/P EST MOD 30 MIN: CPT | Mod: PBBFAC | Performed by: PHYSICIAN ASSISTANT

## 2021-11-08 PROCEDURE — 63600175 PHARM REV CODE 636 W HCPCS: Performed by: NURSE PRACTITIONER

## 2021-11-08 PROCEDURE — 11000004 HC SNF PRIVATE

## 2021-11-08 PROCEDURE — 80048 BASIC METABOLIC PNL TOTAL CA: CPT | Performed by: INTERNAL MEDICINE

## 2021-11-08 RX ADMIN — INSULIN ASPART 3 UNITS: 100 INJECTION, SOLUTION INTRAVENOUS; SUBCUTANEOUS at 12:11

## 2021-11-08 RX ADMIN — Medication 10 ML: at 11:11

## 2021-11-08 RX ADMIN — SENNOSIDES AND DOCUSATE SODIUM 2 TABLET: 50; 8.6 TABLET ORAL at 10:11

## 2021-11-08 RX ADMIN — Medication 10 ML: at 01:11

## 2021-11-08 RX ADMIN — APIXABAN 2.5 MG: 2.5 TABLET, FILM COATED ORAL at 10:11

## 2021-11-08 RX ADMIN — METHOCARBAMOL 750 MG: 750 TABLET ORAL at 03:11

## 2021-11-08 RX ADMIN — APIXABAN 2.5 MG: 2.5 TABLET, FILM COATED ORAL at 09:11

## 2021-11-08 RX ADMIN — Medication 10 ML: at 02:11

## 2021-11-08 RX ADMIN — OXYCODONE 5 MG: 5 TABLET ORAL at 06:11

## 2021-11-08 RX ADMIN — Medication 1 CAPSULE: at 10:11

## 2021-11-08 RX ADMIN — INSULIN DETEMIR 12 UNITS: 100 INJECTION, SOLUTION SUBCUTANEOUS at 10:11

## 2021-11-08 RX ADMIN — TORSEMIDE 20 MG: 20 TABLET ORAL at 10:11

## 2021-11-08 RX ADMIN — MELATONIN TAB 3 MG 6 MG: 3 TAB at 09:11

## 2021-11-08 RX ADMIN — Medication 10 ML: at 12:11

## 2021-11-08 RX ADMIN — DAPTOMYCIN 980 MG: 350 INJECTION, POWDER, LYOPHILIZED, FOR SOLUTION INTRAVENOUS at 01:11

## 2021-11-08 RX ADMIN — FLUTICASONE FUROATE AND VILANTEROL TRIFENATATE 1 PUFF: 200; 25 POWDER RESPIRATORY (INHALATION) at 10:11

## 2021-11-08 RX ADMIN — AMLODIPINE BESYLATE 10 MG: 10 TABLET ORAL at 10:11

## 2021-11-08 RX ADMIN — METHOCARBAMOL 750 MG: 750 TABLET ORAL at 09:11

## 2021-11-08 RX ADMIN — Medication 10 ML: at 06:11

## 2021-11-08 RX ADMIN — GUAIFENESIN AND DEXTROMETHORPHAN HYDROBROMIDE 1 TABLET: 600; 30 TABLET, EXTENDED RELEASE ORAL at 09:11

## 2021-11-08 RX ADMIN — ALLOPURINOL 100 MG: 100 TABLET ORAL at 10:11

## 2021-11-08 RX ADMIN — GUAIFENESIN AND DEXTROMETHORPHAN HYDROBROMIDE 1 TABLET: 600; 30 TABLET, EXTENDED RELEASE ORAL at 10:11

## 2021-11-08 RX ADMIN — INSULIN ASPART 6 UNITS: 100 INJECTION, SOLUTION INTRAVENOUS; SUBCUTANEOUS at 07:11

## 2021-11-08 RX ADMIN — LOSARTAN POTASSIUM 100 MG: 50 TABLET, FILM COATED ORAL at 10:11

## 2021-11-08 RX ADMIN — METHOCARBAMOL 750 MG: 750 TABLET ORAL at 10:11

## 2021-11-08 RX ADMIN — SENNOSIDES AND DOCUSATE SODIUM 1 TABLET: 50; 8.6 TABLET ORAL at 09:11

## 2021-11-08 RX ADMIN — INSULIN ASPART 6 UNITS: 100 INJECTION, SOLUTION INTRAVENOUS; SUBCUTANEOUS at 04:11

## 2021-11-08 RX ADMIN — OXYCODONE HYDROCHLORIDE 10 MG: 10 TABLET ORAL at 01:11

## 2021-11-08 RX ADMIN — INSULIN ASPART 6 UNITS: 100 INJECTION, SOLUTION INTRAVENOUS; SUBCUTANEOUS at 12:11

## 2021-11-09 LAB
POCT GLUCOSE: 138 MG/DL (ref 70–110)
POCT GLUCOSE: 179 MG/DL (ref 70–110)
POCT GLUCOSE: 211 MG/DL (ref 70–110)
POCT GLUCOSE: 235 MG/DL (ref 70–110)

## 2021-11-09 PROCEDURE — A4216 STERILE WATER/SALINE, 10 ML: HCPCS | Performed by: INTERNAL MEDICINE

## 2021-11-09 PROCEDURE — 97530 THERAPEUTIC ACTIVITIES: CPT

## 2021-11-09 PROCEDURE — 11000004 HC SNF PRIVATE

## 2021-11-09 PROCEDURE — 63600175 PHARM REV CODE 636 W HCPCS: Performed by: NURSE PRACTITIONER

## 2021-11-09 PROCEDURE — 97110 THERAPEUTIC EXERCISES: CPT

## 2021-11-09 PROCEDURE — 25000003 PHARM REV CODE 250: Performed by: INTERNAL MEDICINE

## 2021-11-09 PROCEDURE — 97110 THERAPEUTIC EXERCISES: CPT | Mod: CO

## 2021-11-09 PROCEDURE — 25000003 PHARM REV CODE 250: Performed by: NURSE PRACTITIONER

## 2021-11-09 PROCEDURE — 97535 SELF CARE MNGMENT TRAINING: CPT | Mod: CO

## 2021-11-09 RX ADMIN — Medication 1 CAPSULE: at 08:11

## 2021-11-09 RX ADMIN — ALLOPURINOL 100 MG: 100 TABLET ORAL at 08:11

## 2021-11-09 RX ADMIN — APIXABAN 2.5 MG: 2.5 TABLET, FILM COATED ORAL at 10:11

## 2021-11-09 RX ADMIN — METHOCARBAMOL 750 MG: 750 TABLET ORAL at 10:11

## 2021-11-09 RX ADMIN — METHOCARBAMOL 750 MG: 750 TABLET ORAL at 02:11

## 2021-11-09 RX ADMIN — Medication 10 ML: at 05:11

## 2021-11-09 RX ADMIN — SENNOSIDES AND DOCUSATE SODIUM 2 TABLET: 50; 8.6 TABLET ORAL at 10:11

## 2021-11-09 RX ADMIN — INSULIN DETEMIR 12 UNITS: 100 INJECTION, SOLUTION SUBCUTANEOUS at 08:11

## 2021-11-09 RX ADMIN — OXYCODONE 5 MG: 5 TABLET ORAL at 10:11

## 2021-11-09 RX ADMIN — INSULIN ASPART 6 UNITS: 100 INJECTION, SOLUTION INTRAVENOUS; SUBCUTANEOUS at 05:11

## 2021-11-09 RX ADMIN — DAPTOMYCIN 980 MG: 350 INJECTION, POWDER, LYOPHILIZED, FOR SOLUTION INTRAVENOUS at 04:11

## 2021-11-09 RX ADMIN — APIXABAN 2.5 MG: 2.5 TABLET, FILM COATED ORAL at 08:11

## 2021-11-09 RX ADMIN — FLUTICASONE FUROATE AND VILANTEROL TRIFENATATE 1 PUFF: 200; 25 POWDER RESPIRATORY (INHALATION) at 08:11

## 2021-11-09 RX ADMIN — Medication 10 ML: at 04:11

## 2021-11-09 RX ADMIN — INSULIN ASPART 2 UNITS: 100 INJECTION, SOLUTION INTRAVENOUS; SUBCUTANEOUS at 05:11

## 2021-11-09 RX ADMIN — INSULIN ASPART 6 UNITS: 100 INJECTION, SOLUTION INTRAVENOUS; SUBCUTANEOUS at 12:11

## 2021-11-09 RX ADMIN — Medication 10 ML: at 12:11

## 2021-11-09 RX ADMIN — SENNOSIDES AND DOCUSATE SODIUM 2 TABLET: 50; 8.6 TABLET ORAL at 08:11

## 2021-11-09 RX ADMIN — AMLODIPINE BESYLATE 10 MG: 10 TABLET ORAL at 08:11

## 2021-11-09 RX ADMIN — GUAIFENESIN AND DEXTROMETHORPHAN HYDROBROMIDE 1 TABLET: 600; 30 TABLET, EXTENDED RELEASE ORAL at 08:11

## 2021-11-09 RX ADMIN — MELATONIN TAB 3 MG 6 MG: 3 TAB at 10:11

## 2021-11-09 RX ADMIN — INSULIN ASPART 1 UNITS: 100 INJECTION, SOLUTION INTRAVENOUS; SUBCUTANEOUS at 10:11

## 2021-11-09 RX ADMIN — LOSARTAN POTASSIUM 100 MG: 50 TABLET, FILM COATED ORAL at 08:11

## 2021-11-09 RX ADMIN — GUAIFENESIN AND DEXTROMETHORPHAN HYDROBROMIDE 1 TABLET: 600; 30 TABLET, EXTENDED RELEASE ORAL at 10:11

## 2021-11-09 RX ADMIN — TORSEMIDE 20 MG: 20 TABLET ORAL at 08:11

## 2021-11-09 RX ADMIN — INSULIN ASPART 6 UNITS: 100 INJECTION, SOLUTION INTRAVENOUS; SUBCUTANEOUS at 08:11

## 2021-11-09 RX ADMIN — METHOCARBAMOL 750 MG: 750 TABLET ORAL at 08:11

## 2021-11-10 LAB
POCT GLUCOSE: 156 MG/DL (ref 70–110)
POCT GLUCOSE: 167 MG/DL (ref 70–110)
POCT GLUCOSE: 193 MG/DL (ref 70–110)
POCT GLUCOSE: 224 MG/DL (ref 70–110)

## 2021-11-10 PROCEDURE — 97110 THERAPEUTIC EXERCISES: CPT | Mod: CQ

## 2021-11-10 PROCEDURE — 25000003 PHARM REV CODE 250: Performed by: NURSE PRACTITIONER

## 2021-11-10 PROCEDURE — 25000003 PHARM REV CODE 250: Performed by: INTERNAL MEDICINE

## 2021-11-10 PROCEDURE — 63600175 PHARM REV CODE 636 W HCPCS: Performed by: NURSE PRACTITIONER

## 2021-11-10 PROCEDURE — 97530 THERAPEUTIC ACTIVITIES: CPT

## 2021-11-10 PROCEDURE — 97530 THERAPEUTIC ACTIVITIES: CPT | Mod: CQ

## 2021-11-10 PROCEDURE — 11000004 HC SNF PRIVATE

## 2021-11-10 PROCEDURE — A4216 STERILE WATER/SALINE, 10 ML: HCPCS | Performed by: INTERNAL MEDICINE

## 2021-11-10 RX ADMIN — GUAIFENESIN AND DEXTROMETHORPHAN HYDROBROMIDE 1 TABLET: 600; 30 TABLET, EXTENDED RELEASE ORAL at 09:11

## 2021-11-10 RX ADMIN — Medication 10 ML: at 12:11

## 2021-11-10 RX ADMIN — SENNOSIDES AND DOCUSATE SODIUM 2 TABLET: 50; 8.6 TABLET ORAL at 09:11

## 2021-11-10 RX ADMIN — Medication 10 ML: at 06:11

## 2021-11-10 RX ADMIN — INSULIN ASPART 6 UNITS: 100 INJECTION, SOLUTION INTRAVENOUS; SUBCUTANEOUS at 05:11

## 2021-11-10 RX ADMIN — INSULIN ASPART 6 UNITS: 100 INJECTION, SOLUTION INTRAVENOUS; SUBCUTANEOUS at 08:11

## 2021-11-10 RX ADMIN — LOSARTAN POTASSIUM 100 MG: 50 TABLET, FILM COATED ORAL at 08:11

## 2021-11-10 RX ADMIN — APIXABAN 2.5 MG: 2.5 TABLET, FILM COATED ORAL at 09:11

## 2021-11-10 RX ADMIN — MELATONIN TAB 3 MG 6 MG: 3 TAB at 09:11

## 2021-11-10 RX ADMIN — METHOCARBAMOL 750 MG: 750 TABLET ORAL at 08:11

## 2021-11-10 RX ADMIN — GUAIFENESIN AND DEXTROMETHORPHAN HYDROBROMIDE 1 TABLET: 600; 30 TABLET, EXTENDED RELEASE ORAL at 08:11

## 2021-11-10 RX ADMIN — METHOCARBAMOL 750 MG: 750 TABLET ORAL at 09:11

## 2021-11-10 RX ADMIN — OXYCODONE 5 MG: 5 TABLET ORAL at 08:11

## 2021-11-10 RX ADMIN — TORSEMIDE 20 MG: 20 TABLET ORAL at 08:11

## 2021-11-10 RX ADMIN — AMLODIPINE BESYLATE 10 MG: 10 TABLET ORAL at 08:11

## 2021-11-10 RX ADMIN — FLUTICASONE FUROATE AND VILANTEROL TRIFENATATE 1 PUFF: 200; 25 POWDER RESPIRATORY (INHALATION) at 08:11

## 2021-11-10 RX ADMIN — INSULIN ASPART 2 UNITS: 100 INJECTION, SOLUTION INTRAVENOUS; SUBCUTANEOUS at 12:11

## 2021-11-10 RX ADMIN — Medication 1 CAPSULE: at 08:11

## 2021-11-10 RX ADMIN — DAPTOMYCIN 980 MG: 350 INJECTION, POWDER, LYOPHILIZED, FOR SOLUTION INTRAVENOUS at 05:11

## 2021-11-10 RX ADMIN — ALLOPURINOL 100 MG: 100 TABLET ORAL at 08:11

## 2021-11-10 RX ADMIN — INSULIN ASPART 6 UNITS: 100 INJECTION, SOLUTION INTRAVENOUS; SUBCUTANEOUS at 12:11

## 2021-11-10 RX ADMIN — METHOCARBAMOL 750 MG: 750 TABLET ORAL at 03:11

## 2021-11-10 RX ADMIN — INSULIN DETEMIR 12 UNITS: 100 INJECTION, SOLUTION SUBCUTANEOUS at 08:11

## 2021-11-10 RX ADMIN — SENNOSIDES AND DOCUSATE SODIUM 2 TABLET: 50; 8.6 TABLET ORAL at 08:11

## 2021-11-10 RX ADMIN — OXYCODONE HYDROCHLORIDE 10 MG: 10 TABLET ORAL at 09:11

## 2021-11-10 RX ADMIN — APIXABAN 2.5 MG: 2.5 TABLET, FILM COATED ORAL at 08:11

## 2021-11-11 ENCOUNTER — LAB VISIT (OUTPATIENT)
Dept: LAB | Facility: OTHER | Age: 81
End: 2021-11-11
Payer: MEDICARE

## 2021-11-11 DIAGNOSIS — Z20.822 ENCOUNTER FOR LABORATORY TESTING FOR COVID-19 VIRUS: ICD-10-CM

## 2021-11-11 LAB
ANION GAP SERPL CALC-SCNC: 10 MMOL/L (ref 8–16)
BASOPHILS # BLD AUTO: 0.04 K/UL (ref 0–0.2)
BASOPHILS NFR BLD: 0.4 % (ref 0–1.9)
BUN SERPL-MCNC: 33 MG/DL (ref 8–23)
CALCIUM SERPL-MCNC: 8.7 MG/DL (ref 8.7–10.5)
CHLORIDE SERPL-SCNC: 100 MMOL/L (ref 95–110)
CO2 SERPL-SCNC: 27 MMOL/L (ref 23–29)
CREAT SERPL-MCNC: 1.6 MG/DL (ref 0.5–1.4)
DIFFERENTIAL METHOD: ABNORMAL
EOSINOPHIL # BLD AUTO: 0.4 K/UL (ref 0–0.5)
EOSINOPHIL NFR BLD: 3.9 % (ref 0–8)
ERYTHROCYTE [DISTWIDTH] IN BLOOD BY AUTOMATED COUNT: 15 % (ref 11.5–14.5)
EST. GFR  (AFRICAN AMERICAN): 35.1 ML/MIN/1.73 M^2
EST. GFR  (NON AFRICAN AMERICAN): 30.4 ML/MIN/1.73 M^2
GLUCOSE SERPL-MCNC: 126 MG/DL (ref 70–110)
HCT VFR BLD AUTO: 30.8 % (ref 37–48.5)
HGB BLD-MCNC: 9.5 G/DL (ref 12–16)
IMM GRANULOCYTES # BLD AUTO: 0.06 K/UL (ref 0–0.04)
IMM GRANULOCYTES NFR BLD AUTO: 0.7 % (ref 0–0.5)
LYMPHOCYTES # BLD AUTO: 1.8 K/UL (ref 1–4.8)
LYMPHOCYTES NFR BLD: 19.8 % (ref 18–48)
MAGNESIUM SERPL-MCNC: 2.1 MG/DL (ref 1.6–2.6)
MCH RBC QN AUTO: 28 PG (ref 27–31)
MCHC RBC AUTO-ENTMCNC: 30.8 G/DL (ref 32–36)
MCV RBC AUTO: 91 FL (ref 82–98)
MONOCYTES # BLD AUTO: 0.7 K/UL (ref 0.3–1)
MONOCYTES NFR BLD: 7.9 % (ref 4–15)
NEUTROPHILS # BLD AUTO: 6.2 K/UL (ref 1.8–7.7)
NEUTROPHILS NFR BLD: 67.3 % (ref 38–73)
NRBC BLD-RTO: 0 /100 WBC
PHOSPHATE SERPL-MCNC: 4.7 MG/DL (ref 2.7–4.5)
PLATELET # BLD AUTO: 313 K/UL (ref 150–450)
PMV BLD AUTO: 11.7 FL (ref 9.2–12.9)
POCT GLUCOSE: 144 MG/DL (ref 70–110)
POCT GLUCOSE: 148 MG/DL (ref 70–110)
POCT GLUCOSE: 181 MG/DL (ref 70–110)
POCT GLUCOSE: 232 MG/DL (ref 70–110)
POTASSIUM SERPL-SCNC: 4.7 MMOL/L (ref 3.5–5.1)
RBC # BLD AUTO: 3.39 M/UL (ref 4–5.4)
SARS-COV-2 RNA RESP QL NAA+PROBE: NOT DETECTED
SARS-COV-2- CYCLE NUMBER: NORMAL
SODIUM SERPL-SCNC: 137 MMOL/L (ref 136–145)
WBC # BLD AUTO: 9.17 K/UL (ref 3.9–12.7)

## 2021-11-11 PROCEDURE — 84100 ASSAY OF PHOSPHORUS: CPT | Performed by: NURSE PRACTITIONER

## 2021-11-11 PROCEDURE — U0003 INFECTIOUS AGENT DETECTION BY NUCLEIC ACID (DNA OR RNA); SEVERE ACUTE RESPIRATORY SYNDROME CORONAVIRUS 2 (SARS-COV-2) (CORONAVIRUS DISEASE [COVID-19]), AMPLIFIED PROBE TECHNIQUE, MAKING USE OF HIGH THROUGHPUT TECHNOLOGIES AS DESCRIBED BY CMS-2020-01-R: HCPCS | Performed by: NURSE PRACTITIONER

## 2021-11-11 PROCEDURE — 63600175 PHARM REV CODE 636 W HCPCS: Performed by: NURSE PRACTITIONER

## 2021-11-11 PROCEDURE — 85025 COMPLETE CBC W/AUTO DIFF WBC: CPT | Performed by: NURSE PRACTITIONER

## 2021-11-11 PROCEDURE — 97110 THERAPEUTIC EXERCISES: CPT | Mod: CQ

## 2021-11-11 PROCEDURE — 36415 COLL VENOUS BLD VENIPUNCTURE: CPT | Performed by: NURSE PRACTITIONER

## 2021-11-11 PROCEDURE — 97535 SELF CARE MNGMENT TRAINING: CPT | Mod: CO

## 2021-11-11 PROCEDURE — 11000004 HC SNF PRIVATE

## 2021-11-11 PROCEDURE — 97110 THERAPEUTIC EXERCISES: CPT | Mod: CO

## 2021-11-11 PROCEDURE — 83735 ASSAY OF MAGNESIUM: CPT | Performed by: NURSE PRACTITIONER

## 2021-11-11 PROCEDURE — 25000003 PHARM REV CODE 250: Performed by: NURSE PRACTITIONER

## 2021-11-11 PROCEDURE — 80048 BASIC METABOLIC PNL TOTAL CA: CPT | Performed by: NURSE PRACTITIONER

## 2021-11-11 PROCEDURE — 25000003 PHARM REV CODE 250: Performed by: INTERNAL MEDICINE

## 2021-11-11 PROCEDURE — 97530 THERAPEUTIC ACTIVITIES: CPT | Mod: CQ

## 2021-11-11 PROCEDURE — A4216 STERILE WATER/SALINE, 10 ML: HCPCS | Performed by: INTERNAL MEDICINE

## 2021-11-11 RX ORDER — TORSEMIDE 20 MG/1
20 TABLET ORAL DAILY
Status: DISCONTINUED | OUTPATIENT
Start: 2021-11-14 | End: 2021-11-18

## 2021-11-11 RX ADMIN — GUAIFENESIN AND DEXTROMETHORPHAN HYDROBROMIDE 1 TABLET: 600; 30 TABLET, EXTENDED RELEASE ORAL at 09:11

## 2021-11-11 RX ADMIN — Medication 10 ML: at 12:11

## 2021-11-11 RX ADMIN — INSULIN ASPART 6 UNITS: 100 INJECTION, SOLUTION INTRAVENOUS; SUBCUTANEOUS at 08:11

## 2021-11-11 RX ADMIN — AMLODIPINE BESYLATE 10 MG: 10 TABLET ORAL at 08:11

## 2021-11-11 RX ADMIN — METHOCARBAMOL 750 MG: 750 TABLET ORAL at 09:11

## 2021-11-11 RX ADMIN — INSULIN ASPART 6 UNITS: 100 INJECTION, SOLUTION INTRAVENOUS; SUBCUTANEOUS at 05:11

## 2021-11-11 RX ADMIN — Medication 10 ML: at 05:11

## 2021-11-11 RX ADMIN — APIXABAN 2.5 MG: 2.5 TABLET, FILM COATED ORAL at 09:11

## 2021-11-11 RX ADMIN — DAPTOMYCIN 980 MG: 350 INJECTION, POWDER, LYOPHILIZED, FOR SOLUTION INTRAVENOUS at 05:11

## 2021-11-11 RX ADMIN — INSULIN ASPART 6 UNITS: 100 INJECTION, SOLUTION INTRAVENOUS; SUBCUTANEOUS at 12:11

## 2021-11-11 RX ADMIN — Medication 1 CAPSULE: at 08:11

## 2021-11-11 RX ADMIN — APIXABAN 2.5 MG: 2.5 TABLET, FILM COATED ORAL at 08:11

## 2021-11-11 RX ADMIN — INSULIN DETEMIR 12 UNITS: 100 INJECTION, SOLUTION SUBCUTANEOUS at 08:11

## 2021-11-11 RX ADMIN — METHOCARBAMOL 750 MG: 750 TABLET ORAL at 02:11

## 2021-11-11 RX ADMIN — ALLOPURINOL 100 MG: 100 TABLET ORAL at 08:11

## 2021-11-11 RX ADMIN — FLUTICASONE FUROATE AND VILANTEROL TRIFENATATE 1 PUFF: 200; 25 POWDER RESPIRATORY (INHALATION) at 08:11

## 2021-11-11 RX ADMIN — SENNOSIDES AND DOCUSATE SODIUM 2 TABLET: 50; 8.6 TABLET ORAL at 09:11

## 2021-11-11 RX ADMIN — OXYCODONE 5 MG: 5 TABLET ORAL at 09:11

## 2021-11-11 RX ADMIN — GUAIFENESIN AND DEXTROMETHORPHAN HYDROBROMIDE 1 TABLET: 600; 30 TABLET, EXTENDED RELEASE ORAL at 08:11

## 2021-11-11 RX ADMIN — INSULIN ASPART 2 UNITS: 100 INJECTION, SOLUTION INTRAVENOUS; SUBCUTANEOUS at 12:11

## 2021-11-11 RX ADMIN — LOSARTAN POTASSIUM 100 MG: 50 TABLET, FILM COATED ORAL at 08:11

## 2021-11-11 RX ADMIN — SENNOSIDES AND DOCUSATE SODIUM 2 TABLET: 50; 8.6 TABLET ORAL at 08:11

## 2021-11-11 RX ADMIN — MELATONIN TAB 3 MG 6 MG: 3 TAB at 09:11

## 2021-11-11 RX ADMIN — METHOCARBAMOL 750 MG: 750 TABLET ORAL at 08:11

## 2021-11-11 RX ADMIN — TORSEMIDE 20 MG: 20 TABLET ORAL at 08:11

## 2021-11-12 ENCOUNTER — TELEPHONE (OUTPATIENT)
Dept: PRIMARY CARE CLINIC | Facility: CLINIC | Age: 81
End: 2021-11-12
Payer: MEDICARE

## 2021-11-12 ENCOUNTER — TELEPHONE (OUTPATIENT)
Dept: INTERNAL MEDICINE | Facility: CLINIC | Age: 81
End: 2021-11-12
Payer: MEDICARE

## 2021-11-12 LAB
POCT GLUCOSE: 119 MG/DL (ref 70–110)
POCT GLUCOSE: 169 MG/DL (ref 70–110)
POCT GLUCOSE: 175 MG/DL (ref 70–110)
POCT GLUCOSE: 232 MG/DL (ref 70–110)

## 2021-11-12 PROCEDURE — 11000004 HC SNF PRIVATE

## 2021-11-12 PROCEDURE — A4216 STERILE WATER/SALINE, 10 ML: HCPCS | Performed by: INTERNAL MEDICINE

## 2021-11-12 PROCEDURE — 97530 THERAPEUTIC ACTIVITIES: CPT | Mod: CQ

## 2021-11-12 PROCEDURE — 97110 THERAPEUTIC EXERCISES: CPT | Mod: CQ

## 2021-11-12 PROCEDURE — 25000003 PHARM REV CODE 250: Performed by: NURSE PRACTITIONER

## 2021-11-12 PROCEDURE — 25000003 PHARM REV CODE 250: Performed by: INTERNAL MEDICINE

## 2021-11-12 PROCEDURE — 63600175 PHARM REV CODE 636 W HCPCS: Performed by: NURSE PRACTITIONER

## 2021-11-12 PROCEDURE — 97535 SELF CARE MNGMENT TRAINING: CPT | Mod: CO

## 2021-11-12 RX ADMIN — INSULIN ASPART 6 UNITS: 100 INJECTION, SOLUTION INTRAVENOUS; SUBCUTANEOUS at 04:11

## 2021-11-12 RX ADMIN — Medication 10 ML: at 12:11

## 2021-11-12 RX ADMIN — LOSARTAN POTASSIUM 100 MG: 50 TABLET, FILM COATED ORAL at 09:11

## 2021-11-12 RX ADMIN — GUAIFENESIN AND DEXTROMETHORPHAN HYDROBROMIDE 1 TABLET: 600; 30 TABLET, EXTENDED RELEASE ORAL at 08:11

## 2021-11-12 RX ADMIN — SENNOSIDES AND DOCUSATE SODIUM 1 TABLET: 50; 8.6 TABLET ORAL at 08:11

## 2021-11-12 RX ADMIN — SENNOSIDES AND DOCUSATE SODIUM 2 TABLET: 50; 8.6 TABLET ORAL at 09:11

## 2021-11-12 RX ADMIN — MELATONIN TAB 3 MG 6 MG: 3 TAB at 08:11

## 2021-11-12 RX ADMIN — APIXABAN 2.5 MG: 2.5 TABLET, FILM COATED ORAL at 08:11

## 2021-11-12 RX ADMIN — INSULIN ASPART 6 UNITS: 100 INJECTION, SOLUTION INTRAVENOUS; SUBCUTANEOUS at 12:11

## 2021-11-12 RX ADMIN — DAPTOMYCIN 980 MG: 350 INJECTION, POWDER, LYOPHILIZED, FOR SOLUTION INTRAVENOUS at 05:11

## 2021-11-12 RX ADMIN — INSULIN ASPART 1 UNITS: 100 INJECTION, SOLUTION INTRAVENOUS; SUBCUTANEOUS at 09:11

## 2021-11-12 RX ADMIN — AMLODIPINE BESYLATE 10 MG: 10 TABLET ORAL at 09:11

## 2021-11-12 RX ADMIN — FLUTICASONE FUROATE AND VILANTEROL TRIFENATATE 1 PUFF: 200; 25 POWDER RESPIRATORY (INHALATION) at 09:11

## 2021-11-12 RX ADMIN — OXYCODONE 5 MG: 5 TABLET ORAL at 03:11

## 2021-11-12 RX ADMIN — APIXABAN 2.5 MG: 2.5 TABLET, FILM COATED ORAL at 09:11

## 2021-11-12 RX ADMIN — METHOCARBAMOL 750 MG: 750 TABLET ORAL at 08:11

## 2021-11-12 RX ADMIN — Medication 10 ML: at 05:11

## 2021-11-12 RX ADMIN — GUAIFENESIN AND DEXTROMETHORPHAN HYDROBROMIDE 1 TABLET: 600; 30 TABLET, EXTENDED RELEASE ORAL at 09:11

## 2021-11-12 RX ADMIN — Medication 1 CAPSULE: at 09:11

## 2021-11-12 RX ADMIN — ALLOPURINOL 100 MG: 100 TABLET ORAL at 09:11

## 2021-11-12 RX ADMIN — METHOCARBAMOL 750 MG: 750 TABLET ORAL at 09:11

## 2021-11-12 RX ADMIN — INSULIN ASPART 6 UNITS: 100 INJECTION, SOLUTION INTRAVENOUS; SUBCUTANEOUS at 09:11

## 2021-11-12 RX ADMIN — METHOCARBAMOL 750 MG: 750 TABLET ORAL at 03:11

## 2021-11-12 RX ADMIN — INSULIN DETEMIR 14 UNITS: 100 INJECTION, SOLUTION SUBCUTANEOUS at 09:11

## 2021-11-12 RX ADMIN — Medication 10 ML: at 10:11

## 2021-11-13 LAB
POCT GLUCOSE: 133 MG/DL (ref 70–110)
POCT GLUCOSE: 134 MG/DL (ref 70–110)
POCT GLUCOSE: 171 MG/DL (ref 70–110)
POCT GLUCOSE: 187 MG/DL (ref 70–110)

## 2021-11-13 PROCEDURE — 11000004 HC SNF PRIVATE

## 2021-11-13 PROCEDURE — 97110 THERAPEUTIC EXERCISES: CPT

## 2021-11-13 PROCEDURE — 25000003 PHARM REV CODE 250: Performed by: INTERNAL MEDICINE

## 2021-11-13 PROCEDURE — 25000003 PHARM REV CODE 250: Performed by: NURSE PRACTITIONER

## 2021-11-13 PROCEDURE — 63600175 PHARM REV CODE 636 W HCPCS: Performed by: NURSE PRACTITIONER

## 2021-11-13 PROCEDURE — A4216 STERILE WATER/SALINE, 10 ML: HCPCS | Performed by: INTERNAL MEDICINE

## 2021-11-13 PROCEDURE — 97530 THERAPEUTIC ACTIVITIES: CPT

## 2021-11-13 RX ADMIN — SENNOSIDES AND DOCUSATE SODIUM 2 TABLET: 50; 8.6 TABLET ORAL at 10:11

## 2021-11-13 RX ADMIN — LOSARTAN POTASSIUM 100 MG: 50 TABLET, FILM COATED ORAL at 08:11

## 2021-11-13 RX ADMIN — SENNOSIDES AND DOCUSATE SODIUM 2 TABLET: 50; 8.6 TABLET ORAL at 08:11

## 2021-11-13 RX ADMIN — Medication 10 ML: at 11:11

## 2021-11-13 RX ADMIN — INSULIN ASPART 6 UNITS: 100 INJECTION, SOLUTION INTRAVENOUS; SUBCUTANEOUS at 11:11

## 2021-11-13 RX ADMIN — GUAIFENESIN AND DEXTROMETHORPHAN HYDROBROMIDE 1 TABLET: 600; 30 TABLET, EXTENDED RELEASE ORAL at 08:11

## 2021-11-13 RX ADMIN — AMLODIPINE BESYLATE 10 MG: 10 TABLET ORAL at 08:11

## 2021-11-13 RX ADMIN — METHOCARBAMOL 750 MG: 750 TABLET ORAL at 08:11

## 2021-11-13 RX ADMIN — INSULIN ASPART 6 UNITS: 100 INJECTION, SOLUTION INTRAVENOUS; SUBCUTANEOUS at 04:11

## 2021-11-13 RX ADMIN — INSULIN DETEMIR 14 UNITS: 100 INJECTION, SOLUTION SUBCUTANEOUS at 08:11

## 2021-11-13 RX ADMIN — ALLOPURINOL 100 MG: 100 TABLET ORAL at 08:11

## 2021-11-13 RX ADMIN — APIXABAN 2.5 MG: 2.5 TABLET, FILM COATED ORAL at 10:11

## 2021-11-13 RX ADMIN — Medication 10 ML: at 05:11

## 2021-11-13 RX ADMIN — Medication 10 ML: at 04:11

## 2021-11-13 RX ADMIN — INSULIN ASPART 6 UNITS: 100 INJECTION, SOLUTION INTRAVENOUS; SUBCUTANEOUS at 08:11

## 2021-11-13 RX ADMIN — Medication 1 CAPSULE: at 08:11

## 2021-11-13 RX ADMIN — APIXABAN 2.5 MG: 2.5 TABLET, FILM COATED ORAL at 08:11

## 2021-11-13 RX ADMIN — METHOCARBAMOL 750 MG: 750 TABLET ORAL at 10:11

## 2021-11-13 RX ADMIN — METHOCARBAMOL 750 MG: 750 TABLET ORAL at 02:11

## 2021-11-13 RX ADMIN — MELATONIN TAB 3 MG 6 MG: 3 TAB at 10:11

## 2021-11-13 RX ADMIN — FLUTICASONE FUROATE AND VILANTEROL TRIFENATATE 1 PUFF: 200; 25 POWDER RESPIRATORY (INHALATION) at 08:11

## 2021-11-13 RX ADMIN — DAPTOMYCIN 980 MG: 350 INJECTION, POWDER, LYOPHILIZED, FOR SOLUTION INTRAVENOUS at 04:11

## 2021-11-13 RX ADMIN — Medication 10 ML: at 06:11

## 2021-11-14 LAB
POCT GLUCOSE: 137 MG/DL (ref 70–110)
POCT GLUCOSE: 214 MG/DL (ref 70–110)
POCT GLUCOSE: 221 MG/DL (ref 70–110)
POCT GLUCOSE: 296 MG/DL (ref 70–110)

## 2021-11-14 PROCEDURE — 97535 SELF CARE MNGMENT TRAINING: CPT

## 2021-11-14 PROCEDURE — 11000004 HC SNF PRIVATE

## 2021-11-14 PROCEDURE — 97110 THERAPEUTIC EXERCISES: CPT

## 2021-11-14 PROCEDURE — A4216 STERILE WATER/SALINE, 10 ML: HCPCS | Performed by: INTERNAL MEDICINE

## 2021-11-14 PROCEDURE — 25000003 PHARM REV CODE 250: Performed by: INTERNAL MEDICINE

## 2021-11-14 PROCEDURE — 63600175 PHARM REV CODE 636 W HCPCS: Performed by: NURSE PRACTITIONER

## 2021-11-14 PROCEDURE — 25000003 PHARM REV CODE 250: Performed by: NURSE PRACTITIONER

## 2021-11-14 RX ADMIN — INSULIN ASPART 2 UNITS: 100 INJECTION, SOLUTION INTRAVENOUS; SUBCUTANEOUS at 11:11

## 2021-11-14 RX ADMIN — APIXABAN 2.5 MG: 2.5 TABLET, FILM COATED ORAL at 09:11

## 2021-11-14 RX ADMIN — TORSEMIDE 20 MG: 20 TABLET ORAL at 09:11

## 2021-11-14 RX ADMIN — GUAIFENESIN AND DEXTROMETHORPHAN HYDROBROMIDE 1 TABLET: 600; 30 TABLET, EXTENDED RELEASE ORAL at 08:11

## 2021-11-14 RX ADMIN — INSULIN DETEMIR 14 UNITS: 100 INJECTION, SOLUTION SUBCUTANEOUS at 09:11

## 2021-11-14 RX ADMIN — Medication 10 ML: at 05:11

## 2021-11-14 RX ADMIN — INSULIN ASPART 6 UNITS: 100 INJECTION, SOLUTION INTRAVENOUS; SUBCUTANEOUS at 04:11

## 2021-11-14 RX ADMIN — ALUMINUM HYDROXIDE, MAGNESIUM HYDROXIDE, AND DIMETHICONE 30 ML: 400; 400; 40 SUSPENSION ORAL at 07:11

## 2021-11-14 RX ADMIN — DAPTOMYCIN 980 MG: 350 INJECTION, POWDER, LYOPHILIZED, FOR SOLUTION INTRAVENOUS at 05:11

## 2021-11-14 RX ADMIN — Medication 10 ML: at 11:11

## 2021-11-14 RX ADMIN — LOSARTAN POTASSIUM 100 MG: 50 TABLET, FILM COATED ORAL at 09:11

## 2021-11-14 RX ADMIN — METHOCARBAMOL 750 MG: 750 TABLET ORAL at 03:11

## 2021-11-14 RX ADMIN — AMLODIPINE BESYLATE 10 MG: 10 TABLET ORAL at 09:11

## 2021-11-14 RX ADMIN — CALCIUM CARBONATE (ANTACID) CHEW TAB 500 MG 500 MG: 500 CHEW TAB at 07:11

## 2021-11-14 RX ADMIN — METHOCARBAMOL 750 MG: 750 TABLET ORAL at 09:11

## 2021-11-14 RX ADMIN — ALLOPURINOL 100 MG: 100 TABLET ORAL at 09:11

## 2021-11-14 RX ADMIN — MELATONIN TAB 3 MG 6 MG: 3 TAB at 08:11

## 2021-11-14 RX ADMIN — Medication 1 CAPSULE: at 09:11

## 2021-11-14 RX ADMIN — METHOCARBAMOL 750 MG: 750 TABLET ORAL at 08:11

## 2021-11-14 RX ADMIN — INSULIN ASPART 6 UNITS: 100 INJECTION, SOLUTION INTRAVENOUS; SUBCUTANEOUS at 09:11

## 2021-11-14 RX ADMIN — APIXABAN 2.5 MG: 2.5 TABLET, FILM COATED ORAL at 08:11

## 2021-11-14 RX ADMIN — INSULIN ASPART 6 UNITS: 100 INJECTION, SOLUTION INTRAVENOUS; SUBCUTANEOUS at 11:11

## 2021-11-14 RX ADMIN — Medication 10 ML: at 06:11

## 2021-11-14 RX ADMIN — FLUTICASONE FUROATE AND VILANTEROL TRIFENATATE 1 PUFF: 200; 25 POWDER RESPIRATORY (INHALATION) at 09:11

## 2021-11-14 RX ADMIN — INSULIN ASPART 2 UNITS: 100 INJECTION, SOLUTION INTRAVENOUS; SUBCUTANEOUS at 04:11

## 2021-11-14 RX ADMIN — GUAIFENESIN AND DEXTROMETHORPHAN HYDROBROMIDE 1 TABLET: 600; 30 TABLET, EXTENDED RELEASE ORAL at 09:11

## 2021-11-15 ENCOUNTER — OFFICE VISIT (OUTPATIENT)
Dept: ORTHOPEDICS | Facility: CLINIC | Age: 81
End: 2021-11-15
Payer: MEDICARE

## 2021-11-15 ENCOUNTER — TELEPHONE (OUTPATIENT)
Dept: PAIN MEDICINE | Facility: CLINIC | Age: 81
End: 2021-11-15
Payer: MEDICARE

## 2021-11-15 VITALS — BODY MASS INDEX: 48.79 KG/M2 | HEIGHT: 59 IN | WEIGHT: 242 LBS

## 2021-11-15 DIAGNOSIS — Z20.822 ENCOUNTER FOR LABORATORY TESTING FOR COVID-19 VIRUS: ICD-10-CM

## 2021-11-15 DIAGNOSIS — T84.53XS INFECTION OF TOTAL RIGHT KNEE REPLACEMENT, SEQUELA: Primary | ICD-10-CM

## 2021-11-15 LAB
ANION GAP SERPL CALC-SCNC: 10 MMOL/L (ref 8–16)
BASOPHILS # BLD AUTO: 0.03 K/UL (ref 0–0.2)
BASOPHILS NFR BLD: 0.3 % (ref 0–1.9)
BUN SERPL-MCNC: 34 MG/DL (ref 8–23)
CALCIUM SERPL-MCNC: 9.4 MG/DL (ref 8.7–10.5)
CHLORIDE SERPL-SCNC: 104 MMOL/L (ref 95–110)
CK SERPL-CCNC: 181 U/L (ref 20–180)
CO2 SERPL-SCNC: 26 MMOL/L (ref 23–29)
CREAT SERPL-MCNC: 1.7 MG/DL (ref 0.5–1.4)
CRP SERPL-MCNC: 66.1 MG/L (ref 0–8.2)
DIFFERENTIAL METHOD: ABNORMAL
EOSINOPHIL # BLD AUTO: 0.3 K/UL (ref 0–0.5)
EOSINOPHIL NFR BLD: 3.2 % (ref 0–8)
ERYTHROCYTE [DISTWIDTH] IN BLOOD BY AUTOMATED COUNT: 15.1 % (ref 11.5–14.5)
ERYTHROCYTE [SEDIMENTATION RATE] IN BLOOD BY WESTERGREN METHOD: >120 MM/HR (ref 0–36)
EST. GFR  (AFRICAN AMERICAN): 32.6 ML/MIN/1.73 M^2
EST. GFR  (NON AFRICAN AMERICAN): 28.3 ML/MIN/1.73 M^2
GLUCOSE SERPL-MCNC: 102 MG/DL (ref 70–110)
HCT VFR BLD AUTO: 32.9 % (ref 37–48.5)
HGB BLD-MCNC: 9.9 G/DL (ref 12–16)
IMM GRANULOCYTES # BLD AUTO: 0.04 K/UL (ref 0–0.04)
IMM GRANULOCYTES NFR BLD AUTO: 0.4 % (ref 0–0.5)
LYMPHOCYTES # BLD AUTO: 1.8 K/UL (ref 1–4.8)
LYMPHOCYTES NFR BLD: 19.9 % (ref 18–48)
MAGNESIUM SERPL-MCNC: 2.2 MG/DL (ref 1.6–2.6)
MCH RBC QN AUTO: 28.1 PG (ref 27–31)
MCHC RBC AUTO-ENTMCNC: 30.1 G/DL (ref 32–36)
MCV RBC AUTO: 94 FL (ref 82–98)
MONOCYTES # BLD AUTO: 0.7 K/UL (ref 0.3–1)
MONOCYTES NFR BLD: 7.8 % (ref 4–15)
NEUTROPHILS # BLD AUTO: 6.3 K/UL (ref 1.8–7.7)
NEUTROPHILS NFR BLD: 68.4 % (ref 38–73)
NRBC BLD-RTO: 0 /100 WBC
PHOSPHATE SERPL-MCNC: 4.2 MG/DL (ref 2.7–4.5)
PLATELET # BLD AUTO: 277 K/UL (ref 150–450)
PMV BLD AUTO: 12 FL (ref 9.2–12.9)
POCT GLUCOSE: 119 MG/DL (ref 70–110)
POCT GLUCOSE: 166 MG/DL (ref 70–110)
POCT GLUCOSE: 168 MG/DL (ref 70–110)
POCT GLUCOSE: 241 MG/DL (ref 70–110)
POTASSIUM SERPL-SCNC: 4.5 MMOL/L (ref 3.5–5.1)
RBC # BLD AUTO: 3.52 M/UL (ref 4–5.4)
SODIUM SERPL-SCNC: 140 MMOL/L (ref 136–145)
WBC # BLD AUTO: 9.15 K/UL (ref 3.9–12.7)

## 2021-11-15 PROCEDURE — 97530 THERAPEUTIC ACTIVITIES: CPT | Mod: CQ

## 2021-11-15 PROCEDURE — 86140 C-REACTIVE PROTEIN: CPT | Performed by: INTERNAL MEDICINE

## 2021-11-15 PROCEDURE — 99024 PR POST-OP FOLLOW-UP VISIT: ICD-10-PCS | Mod: POP,,, | Performed by: PHYSICIAN ASSISTANT

## 2021-11-15 PROCEDURE — 85025 COMPLETE CBC W/AUTO DIFF WBC: CPT | Performed by: NURSE PRACTITIONER

## 2021-11-15 PROCEDURE — 85652 RBC SED RATE AUTOMATED: CPT | Performed by: NURSE PRACTITIONER

## 2021-11-15 PROCEDURE — 97110 THERAPEUTIC EXERCISES: CPT | Mod: CQ

## 2021-11-15 PROCEDURE — 99999 PR PBB SHADOW E&M-EST. PATIENT-LVL IV: ICD-10-PCS | Mod: PBBFAC,,, | Performed by: PHYSICIAN ASSISTANT

## 2021-11-15 PROCEDURE — 11000004 HC SNF PRIVATE

## 2021-11-15 PROCEDURE — 36415 COLL VENOUS BLD VENIPUNCTURE: CPT | Performed by: INTERNAL MEDICINE

## 2021-11-15 PROCEDURE — 97535 SELF CARE MNGMENT TRAINING: CPT | Mod: CO

## 2021-11-15 PROCEDURE — 82550 ASSAY OF CK (CPK): CPT | Performed by: NURSE PRACTITIONER

## 2021-11-15 PROCEDURE — 80048 BASIC METABOLIC PNL TOTAL CA: CPT | Performed by: NURSE PRACTITIONER

## 2021-11-15 PROCEDURE — A4216 STERILE WATER/SALINE, 10 ML: HCPCS | Performed by: INTERNAL MEDICINE

## 2021-11-15 PROCEDURE — 25000003 PHARM REV CODE 250: Performed by: INTERNAL MEDICINE

## 2021-11-15 PROCEDURE — 99214 OFFICE O/P EST MOD 30 MIN: CPT | Mod: PBBFAC | Performed by: PHYSICIAN ASSISTANT

## 2021-11-15 PROCEDURE — 25000003 PHARM REV CODE 250: Performed by: NURSE PRACTITIONER

## 2021-11-15 PROCEDURE — 83735 ASSAY OF MAGNESIUM: CPT | Performed by: NURSE PRACTITIONER

## 2021-11-15 PROCEDURE — 84100 ASSAY OF PHOSPHORUS: CPT | Performed by: NURSE PRACTITIONER

## 2021-11-15 PROCEDURE — 99024 POSTOP FOLLOW-UP VISIT: CPT | Mod: POP,,, | Performed by: PHYSICIAN ASSISTANT

## 2021-11-15 PROCEDURE — 63600175 PHARM REV CODE 636 W HCPCS: Performed by: NURSE PRACTITIONER

## 2021-11-15 PROCEDURE — 99999 PR PBB SHADOW E&M-EST. PATIENT-LVL IV: CPT | Mod: PBBFAC,,, | Performed by: PHYSICIAN ASSISTANT

## 2021-11-15 PROCEDURE — 25000003 PHARM REV CODE 250: Performed by: HOSPITALIST

## 2021-11-15 RX ORDER — MUPIROCIN 20 MG/G
OINTMENT TOPICAL 2 TIMES DAILY
Status: COMPLETED | OUTPATIENT
Start: 2021-11-15 | End: 2021-11-20

## 2021-11-15 RX ORDER — INSULIN ASPART 100 [IU]/ML
8 INJECTION, SOLUTION INTRAVENOUS; SUBCUTANEOUS
Status: DISCONTINUED | OUTPATIENT
Start: 2021-11-15 | End: 2021-12-02

## 2021-11-15 RX ADMIN — DAPTOMYCIN 980 MG: 350 INJECTION, POWDER, LYOPHILIZED, FOR SOLUTION INTRAVENOUS at 04:11

## 2021-11-15 RX ADMIN — ALLOPURINOL 100 MG: 100 TABLET ORAL at 08:11

## 2021-11-15 RX ADMIN — LOSARTAN POTASSIUM 100 MG: 50 TABLET, FILM COATED ORAL at 08:11

## 2021-11-15 RX ADMIN — FLUTICASONE FUROATE AND VILANTEROL TRIFENATATE 1 PUFF: 200; 25 POWDER RESPIRATORY (INHALATION) at 08:11

## 2021-11-15 RX ADMIN — INSULIN ASPART 6 UNITS: 100 INJECTION, SOLUTION INTRAVENOUS; SUBCUTANEOUS at 08:11

## 2021-11-15 RX ADMIN — SENNOSIDES AND DOCUSATE SODIUM 2 TABLET: 50; 8.6 TABLET ORAL at 08:11

## 2021-11-15 RX ADMIN — APIXABAN 2.5 MG: 2.5 TABLET, FILM COATED ORAL at 08:11

## 2021-11-15 RX ADMIN — Medication 1 CAPSULE: at 08:11

## 2021-11-15 RX ADMIN — GUAIFENESIN AND DEXTROMETHORPHAN HYDROBROMIDE 1 TABLET: 600; 30 TABLET, EXTENDED RELEASE ORAL at 08:11

## 2021-11-15 RX ADMIN — Medication 10 ML: at 06:11

## 2021-11-15 RX ADMIN — MUPIROCIN: 20 OINTMENT TOPICAL at 08:11

## 2021-11-15 RX ADMIN — INSULIN ASPART 2 UNITS: 100 INJECTION, SOLUTION INTRAVENOUS; SUBCUTANEOUS at 12:11

## 2021-11-15 RX ADMIN — Medication 10 ML: at 12:11

## 2021-11-15 RX ADMIN — METHOCARBAMOL 750 MG: 750 TABLET ORAL at 04:11

## 2021-11-15 RX ADMIN — Medication 10 ML: at 10:11

## 2021-11-15 RX ADMIN — INSULIN ASPART 8 UNITS: 100 INJECTION, SOLUTION INTRAVENOUS; SUBCUTANEOUS at 05:11

## 2021-11-15 RX ADMIN — TORSEMIDE 20 MG: 20 TABLET ORAL at 08:11

## 2021-11-15 RX ADMIN — METHOCARBAMOL 750 MG: 750 TABLET ORAL at 08:11

## 2021-11-15 RX ADMIN — INSULIN DETEMIR 14 UNITS: 100 INJECTION, SOLUTION SUBCUTANEOUS at 08:11

## 2021-11-15 RX ADMIN — MELATONIN TAB 3 MG 6 MG: 3 TAB at 08:11

## 2021-11-15 RX ADMIN — AMLODIPINE BESYLATE 10 MG: 10 TABLET ORAL at 08:11

## 2021-11-15 RX ADMIN — OXYCODONE HYDROCHLORIDE 10 MG: 10 TABLET ORAL at 07:11

## 2021-11-15 RX ADMIN — INSULIN ASPART 6 UNITS: 100 INJECTION, SOLUTION INTRAVENOUS; SUBCUTANEOUS at 12:11

## 2021-11-16 LAB
POCT GLUCOSE: 141 MG/DL (ref 70–110)
POCT GLUCOSE: 150 MG/DL (ref 70–110)
POCT GLUCOSE: 193 MG/DL (ref 70–110)
POCT GLUCOSE: 196 MG/DL (ref 70–110)

## 2021-11-16 PROCEDURE — 25000003 PHARM REV CODE 250: Performed by: NURSE PRACTITIONER

## 2021-11-16 PROCEDURE — A4216 STERILE WATER/SALINE, 10 ML: HCPCS | Performed by: INTERNAL MEDICINE

## 2021-11-16 PROCEDURE — 63600175 PHARM REV CODE 636 W HCPCS: Performed by: NURSE PRACTITIONER

## 2021-11-16 PROCEDURE — 97530 THERAPEUTIC ACTIVITIES: CPT | Mod: CQ

## 2021-11-16 PROCEDURE — 11000004 HC SNF PRIVATE

## 2021-11-16 PROCEDURE — 25000003 PHARM REV CODE 250: Performed by: INTERNAL MEDICINE

## 2021-11-16 PROCEDURE — 97110 THERAPEUTIC EXERCISES: CPT

## 2021-11-16 PROCEDURE — 97110 THERAPEUTIC EXERCISES: CPT | Mod: CQ

## 2021-11-16 PROCEDURE — 97530 THERAPEUTIC ACTIVITIES: CPT

## 2021-11-16 RX ORDER — POLYETHYLENE GLYCOL 3350 17 G/17G
17 POWDER, FOR SOLUTION ORAL EVERY OTHER DAY
Status: DISCONTINUED | OUTPATIENT
Start: 2021-11-17 | End: 2021-11-18

## 2021-11-16 RX ADMIN — Medication 10 ML: at 11:11

## 2021-11-16 RX ADMIN — MUPIROCIN: 20 OINTMENT TOPICAL at 09:11

## 2021-11-16 RX ADMIN — AMLODIPINE BESYLATE 10 MG: 10 TABLET ORAL at 08:11

## 2021-11-16 RX ADMIN — FLUTICASONE FUROATE AND VILANTEROL TRIFENATATE 1 PUFF: 200; 25 POWDER RESPIRATORY (INHALATION) at 08:11

## 2021-11-16 RX ADMIN — SENNOSIDES AND DOCUSATE SODIUM 2 TABLET: 50; 8.6 TABLET ORAL at 08:11

## 2021-11-16 RX ADMIN — ALLOPURINOL 100 MG: 100 TABLET ORAL at 08:11

## 2021-11-16 RX ADMIN — MELATONIN TAB 3 MG 6 MG: 3 TAB at 08:11

## 2021-11-16 RX ADMIN — POLYETHYLENE GLYCOL 3350 17 G: 17 POWDER, FOR SOLUTION ORAL at 02:11

## 2021-11-16 RX ADMIN — Medication 10 ML: at 04:11

## 2021-11-16 RX ADMIN — INSULIN ASPART 8 UNITS: 100 INJECTION, SOLUTION INTRAVENOUS; SUBCUTANEOUS at 08:11

## 2021-11-16 RX ADMIN — INSULIN DETEMIR 14 UNITS: 100 INJECTION, SOLUTION SUBCUTANEOUS at 08:11

## 2021-11-16 RX ADMIN — OXYCODONE HYDROCHLORIDE 10 MG: 10 TABLET ORAL at 08:11

## 2021-11-16 RX ADMIN — GUAIFENESIN AND DEXTROMETHORPHAN HYDROBROMIDE 1 TABLET: 600; 30 TABLET, EXTENDED RELEASE ORAL at 08:11

## 2021-11-16 RX ADMIN — INSULIN ASPART 8 UNITS: 100 INJECTION, SOLUTION INTRAVENOUS; SUBCUTANEOUS at 11:11

## 2021-11-16 RX ADMIN — TORSEMIDE 20 MG: 20 TABLET ORAL at 08:11

## 2021-11-16 RX ADMIN — METHOCARBAMOL 750 MG: 750 TABLET ORAL at 08:11

## 2021-11-16 RX ADMIN — LOSARTAN POTASSIUM 100 MG: 50 TABLET, FILM COATED ORAL at 08:11

## 2021-11-16 RX ADMIN — METHOCARBAMOL 750 MG: 750 TABLET ORAL at 02:11

## 2021-11-16 RX ADMIN — MUPIROCIN: 20 OINTMENT TOPICAL at 08:11

## 2021-11-16 RX ADMIN — DAPTOMYCIN 980 MG: 350 INJECTION, POWDER, LYOPHILIZED, FOR SOLUTION INTRAVENOUS at 04:11

## 2021-11-16 RX ADMIN — Medication 1 CAPSULE: at 08:11

## 2021-11-16 RX ADMIN — APIXABAN 2.5 MG: 2.5 TABLET, FILM COATED ORAL at 08:11

## 2021-11-16 RX ADMIN — Medication 10 ML: at 09:11

## 2021-11-16 RX ADMIN — INSULIN ASPART 8 UNITS: 100 INJECTION, SOLUTION INTRAVENOUS; SUBCUTANEOUS at 05:11

## 2021-11-16 RX ADMIN — Medication 10 ML: at 05:11

## 2021-11-17 LAB
POCT GLUCOSE: 121 MG/DL (ref 70–110)
POCT GLUCOSE: 128 MG/DL (ref 70–110)
POCT GLUCOSE: 129 MG/DL (ref 70–110)
POCT GLUCOSE: 171 MG/DL (ref 70–110)

## 2021-11-17 PROCEDURE — 25000003 PHARM REV CODE 250: Performed by: INTERNAL MEDICINE

## 2021-11-17 PROCEDURE — 97535 SELF CARE MNGMENT TRAINING: CPT

## 2021-11-17 PROCEDURE — 97110 THERAPEUTIC EXERCISES: CPT

## 2021-11-17 PROCEDURE — 97530 THERAPEUTIC ACTIVITIES: CPT

## 2021-11-17 PROCEDURE — 25000003 PHARM REV CODE 250: Performed by: NURSE PRACTITIONER

## 2021-11-17 PROCEDURE — 11000004 HC SNF PRIVATE

## 2021-11-17 PROCEDURE — A4216 STERILE WATER/SALINE, 10 ML: HCPCS | Performed by: INTERNAL MEDICINE

## 2021-11-17 PROCEDURE — 63600175 PHARM REV CODE 636 W HCPCS: Performed by: NURSE PRACTITIONER

## 2021-11-17 RX ADMIN — INSULIN ASPART 8 UNITS: 100 INJECTION, SOLUTION INTRAVENOUS; SUBCUTANEOUS at 08:11

## 2021-11-17 RX ADMIN — METHOCARBAMOL 750 MG: 750 TABLET ORAL at 10:11

## 2021-11-17 RX ADMIN — OXYCODONE HYDROCHLORIDE 10 MG: 10 TABLET ORAL at 11:11

## 2021-11-17 RX ADMIN — Medication 10 ML: at 04:11

## 2021-11-17 RX ADMIN — DAPTOMYCIN 980 MG: 350 INJECTION, POWDER, LYOPHILIZED, FOR SOLUTION INTRAVENOUS at 04:11

## 2021-11-17 RX ADMIN — METHOCARBAMOL 750 MG: 750 TABLET ORAL at 08:11

## 2021-11-17 RX ADMIN — SENNOSIDES AND DOCUSATE SODIUM 1 TABLET: 50; 8.6 TABLET ORAL at 08:11

## 2021-11-17 RX ADMIN — MUPIROCIN: 20 OINTMENT TOPICAL at 10:11

## 2021-11-17 RX ADMIN — Medication 10 ML: at 05:11

## 2021-11-17 RX ADMIN — TORSEMIDE 20 MG: 20 TABLET ORAL at 08:11

## 2021-11-17 RX ADMIN — POLYETHYLENE GLYCOL 3350 17 G: 17 POWDER, FOR SOLUTION ORAL at 08:11

## 2021-11-17 RX ADMIN — ALLOPURINOL 100 MG: 100 TABLET ORAL at 08:11

## 2021-11-17 RX ADMIN — SENNOSIDES AND DOCUSATE SODIUM 2 TABLET: 50; 8.6 TABLET ORAL at 10:11

## 2021-11-17 RX ADMIN — Medication 10 ML: at 06:11

## 2021-11-17 RX ADMIN — APIXABAN 2.5 MG: 2.5 TABLET, FILM COATED ORAL at 08:11

## 2021-11-17 RX ADMIN — Medication 10 ML: at 12:11

## 2021-11-17 RX ADMIN — MUPIROCIN: 20 OINTMENT TOPICAL at 08:11

## 2021-11-17 RX ADMIN — METHOCARBAMOL 750 MG: 750 TABLET ORAL at 02:11

## 2021-11-17 RX ADMIN — AMLODIPINE BESYLATE 10 MG: 10 TABLET ORAL at 08:11

## 2021-11-17 RX ADMIN — INSULIN ASPART 8 UNITS: 100 INJECTION, SOLUTION INTRAVENOUS; SUBCUTANEOUS at 05:11

## 2021-11-17 RX ADMIN — MELATONIN TAB 3 MG 6 MG: 3 TAB at 10:11

## 2021-11-17 RX ADMIN — LOSARTAN POTASSIUM 100 MG: 50 TABLET, FILM COATED ORAL at 08:11

## 2021-11-17 RX ADMIN — INSULIN DETEMIR 14 UNITS: 100 INJECTION, SOLUTION SUBCUTANEOUS at 08:11

## 2021-11-17 RX ADMIN — INSULIN ASPART 8 UNITS: 100 INJECTION, SOLUTION INTRAVENOUS; SUBCUTANEOUS at 12:11

## 2021-11-17 RX ADMIN — Medication 1 CAPSULE: at 08:11

## 2021-11-17 RX ADMIN — FLUTICASONE FUROATE AND VILANTEROL TRIFENATATE 1 PUFF: 200; 25 POWDER RESPIRATORY (INHALATION) at 08:11

## 2021-11-17 RX ADMIN — APIXABAN 2.5 MG: 2.5 TABLET, FILM COATED ORAL at 10:11

## 2021-11-18 ENCOUNTER — LAB VISIT (OUTPATIENT)
Dept: LAB | Facility: OTHER | Age: 81
End: 2021-11-18
Payer: MEDICARE

## 2021-11-18 DIAGNOSIS — Z20.822 ENCOUNTER FOR LABORATORY TESTING FOR COVID-19 VIRUS: ICD-10-CM

## 2021-11-18 LAB
ANION GAP SERPL CALC-SCNC: 9 MMOL/L (ref 8–16)
BASOPHILS # BLD AUTO: 0.04 K/UL (ref 0–0.2)
BASOPHILS NFR BLD: 0.5 % (ref 0–1.9)
BUN SERPL-MCNC: 40 MG/DL (ref 8–23)
CALCIUM SERPL-MCNC: 9 MG/DL (ref 8.7–10.5)
CHLORIDE SERPL-SCNC: 99 MMOL/L (ref 95–110)
CO2 SERPL-SCNC: 24 MMOL/L (ref 23–29)
CREAT SERPL-MCNC: 1.9 MG/DL (ref 0.5–1.4)
DIFFERENTIAL METHOD: ABNORMAL
EOSINOPHIL # BLD AUTO: 0.6 K/UL (ref 0–0.5)
EOSINOPHIL NFR BLD: 7.3 % (ref 0–8)
ERYTHROCYTE [DISTWIDTH] IN BLOOD BY AUTOMATED COUNT: 15 % (ref 11.5–14.5)
EST. GFR  (AFRICAN AMERICAN): 28.5 ML/MIN/1.73 M^2
EST. GFR  (NON AFRICAN AMERICAN): 24.7 ML/MIN/1.73 M^2
GLUCOSE SERPL-MCNC: 82 MG/DL (ref 70–110)
HCT VFR BLD AUTO: 29.9 % (ref 37–48.5)
HGB BLD-MCNC: 9.1 G/DL (ref 12–16)
IMM GRANULOCYTES # BLD AUTO: 0.03 K/UL (ref 0–0.04)
IMM GRANULOCYTES NFR BLD AUTO: 0.4 % (ref 0–0.5)
LYMPHOCYTES # BLD AUTO: 1.9 K/UL (ref 1–4.8)
LYMPHOCYTES NFR BLD: 23.9 % (ref 18–48)
MAGNESIUM SERPL-MCNC: 2.2 MG/DL (ref 1.6–2.6)
MCH RBC QN AUTO: 28.3 PG (ref 27–31)
MCHC RBC AUTO-ENTMCNC: 30.4 G/DL (ref 32–36)
MCV RBC AUTO: 93 FL (ref 82–98)
MONOCYTES # BLD AUTO: 0.7 K/UL (ref 0.3–1)
MONOCYTES NFR BLD: 9.3 % (ref 4–15)
NEUTROPHILS # BLD AUTO: 4.6 K/UL (ref 1.8–7.7)
NEUTROPHILS NFR BLD: 58.6 % (ref 38–73)
NRBC BLD-RTO: 0 /100 WBC
PHOSPHATE SERPL-MCNC: 4.8 MG/DL (ref 2.7–4.5)
PLATELET # BLD AUTO: 234 K/UL (ref 150–450)
PMV BLD AUTO: 12.8 FL (ref 9.2–12.9)
POCT GLUCOSE: 119 MG/DL (ref 70–110)
POCT GLUCOSE: 126 MG/DL (ref 70–110)
POCT GLUCOSE: 129 MG/DL (ref 70–110)
POCT GLUCOSE: 138 MG/DL (ref 70–110)
POCT GLUCOSE: 186 MG/DL (ref 70–110)
POTASSIUM SERPL-SCNC: 4.2 MMOL/L (ref 3.5–5.1)
RBC # BLD AUTO: 3.22 M/UL (ref 4–5.4)
SARS-COV-2 RNA RESP QL NAA+PROBE: NOT DETECTED
SARS-COV-2- CYCLE NUMBER: NORMAL
SODIUM SERPL-SCNC: 132 MMOL/L (ref 136–145)
WBC # BLD AUTO: 7.83 K/UL (ref 3.9–12.7)

## 2021-11-18 PROCEDURE — 63600175 PHARM REV CODE 636 W HCPCS: Performed by: NURSE PRACTITIONER

## 2021-11-18 PROCEDURE — 25000003 PHARM REV CODE 250: Performed by: INTERNAL MEDICINE

## 2021-11-18 PROCEDURE — U0003 INFECTIOUS AGENT DETECTION BY NUCLEIC ACID (DNA OR RNA); SEVERE ACUTE RESPIRATORY SYNDROME CORONAVIRUS 2 (SARS-COV-2) (CORONAVIRUS DISEASE [COVID-19]), AMPLIFIED PROBE TECHNIQUE, MAKING USE OF HIGH THROUGHPUT TECHNOLOGIES AS DESCRIBED BY CMS-2020-01-R: HCPCS | Performed by: NURSE PRACTITIONER

## 2021-11-18 PROCEDURE — 25000003 PHARM REV CODE 250: Performed by: NURSE PRACTITIONER

## 2021-11-18 PROCEDURE — 80048 BASIC METABOLIC PNL TOTAL CA: CPT | Performed by: NURSE PRACTITIONER

## 2021-11-18 PROCEDURE — 83735 ASSAY OF MAGNESIUM: CPT | Performed by: NURSE PRACTITIONER

## 2021-11-18 PROCEDURE — 84100 ASSAY OF PHOSPHORUS: CPT | Performed by: NURSE PRACTITIONER

## 2021-11-18 PROCEDURE — A4216 STERILE WATER/SALINE, 10 ML: HCPCS | Performed by: INTERNAL MEDICINE

## 2021-11-18 PROCEDURE — 97110 THERAPEUTIC EXERCISES: CPT

## 2021-11-18 PROCEDURE — 85025 COMPLETE CBC W/AUTO DIFF WBC: CPT | Performed by: NURSE PRACTITIONER

## 2021-11-18 PROCEDURE — 11000004 HC SNF PRIVATE

## 2021-11-18 PROCEDURE — 97530 THERAPEUTIC ACTIVITIES: CPT

## 2021-11-18 PROCEDURE — 36415 COLL VENOUS BLD VENIPUNCTURE: CPT | Performed by: NURSE PRACTITIONER

## 2021-11-18 RX ORDER — TORSEMIDE 10 MG/1
10 TABLET ORAL DAILY
Status: DISCONTINUED | OUTPATIENT
Start: 2021-11-20 | End: 2021-12-02

## 2021-11-18 RX ORDER — POLYETHYLENE GLYCOL 3350 17 G/17G
17 POWDER, FOR SOLUTION ORAL DAILY
Status: DISCONTINUED | OUTPATIENT
Start: 2021-11-18 | End: 2021-12-02

## 2021-11-18 RX ORDER — METHOCARBAMOL 500 MG/1
500 TABLET, FILM COATED ORAL 3 TIMES DAILY
Status: DISCONTINUED | OUTPATIENT
Start: 2021-11-18 | End: 2021-12-10 | Stop reason: HOSPADM

## 2021-11-18 RX ADMIN — DAPTOMYCIN 980 MG: 350 INJECTION, POWDER, LYOPHILIZED, FOR SOLUTION INTRAVENOUS at 05:11

## 2021-11-18 RX ADMIN — INSULIN ASPART 8 UNITS: 100 INJECTION, SOLUTION INTRAVENOUS; SUBCUTANEOUS at 05:11

## 2021-11-18 RX ADMIN — SENNOSIDES AND DOCUSATE SODIUM 2 TABLET: 50; 8.6 TABLET ORAL at 09:11

## 2021-11-18 RX ADMIN — METHOCARBAMOL 500 MG: 500 TABLET ORAL at 10:11

## 2021-11-18 RX ADMIN — ALLOPURINOL 100 MG: 100 TABLET ORAL at 09:11

## 2021-11-18 RX ADMIN — APIXABAN 2.5 MG: 2.5 TABLET, FILM COATED ORAL at 10:11

## 2021-11-18 RX ADMIN — APIXABAN 2.5 MG: 2.5 TABLET, FILM COATED ORAL at 09:11

## 2021-11-18 RX ADMIN — MUPIROCIN: 20 OINTMENT TOPICAL at 10:11

## 2021-11-18 RX ADMIN — INSULIN ASPART 8 UNITS: 100 INJECTION, SOLUTION INTRAVENOUS; SUBCUTANEOUS at 08:11

## 2021-11-18 RX ADMIN — INSULIN ASPART 8 UNITS: 100 INJECTION, SOLUTION INTRAVENOUS; SUBCUTANEOUS at 11:11

## 2021-11-18 RX ADMIN — LOSARTAN POTASSIUM 100 MG: 50 TABLET, FILM COATED ORAL at 09:11

## 2021-11-18 RX ADMIN — Medication 10 ML: at 12:11

## 2021-11-18 RX ADMIN — Medication 10 ML: at 05:11

## 2021-11-18 RX ADMIN — SENNOSIDES AND DOCUSATE SODIUM 2 TABLET: 50; 8.6 TABLET ORAL at 10:11

## 2021-11-18 RX ADMIN — AMLODIPINE BESYLATE 10 MG: 10 TABLET ORAL at 09:11

## 2021-11-18 RX ADMIN — MELATONIN TAB 3 MG 6 MG: 3 TAB at 10:11

## 2021-11-18 RX ADMIN — INSULIN DETEMIR 14 UNITS: 100 INJECTION, SOLUTION SUBCUTANEOUS at 08:11

## 2021-11-18 RX ADMIN — METHOCARBAMOL 500 MG: 500 TABLET ORAL at 02:11

## 2021-11-18 RX ADMIN — Medication 10 ML: at 06:11

## 2021-11-18 RX ADMIN — FLUTICASONE FUROATE AND VILANTEROL TRIFENATATE 1 PUFF: 200; 25 POWDER RESPIRATORY (INHALATION) at 09:11

## 2021-11-18 RX ADMIN — Medication 1 CAPSULE: at 09:11

## 2021-11-18 RX ADMIN — MUPIROCIN: 20 OINTMENT TOPICAL at 09:11

## 2021-11-18 RX ADMIN — TORSEMIDE 20 MG: 20 TABLET ORAL at 09:11

## 2021-11-18 RX ADMIN — METHOCARBAMOL 750 MG: 750 TABLET ORAL at 09:11

## 2021-11-18 RX ADMIN — POLYETHYLENE GLYCOL 3350 17 G: 17 POWDER, FOR SOLUTION ORAL at 05:11

## 2021-11-19 LAB
POCT GLUCOSE: 110 MG/DL (ref 70–110)
POCT GLUCOSE: 124 MG/DL (ref 70–110)
POCT GLUCOSE: 179 MG/DL (ref 70–110)
POCT GLUCOSE: 202 MG/DL (ref 70–110)

## 2021-11-19 PROCEDURE — A4216 STERILE WATER/SALINE, 10 ML: HCPCS | Performed by: INTERNAL MEDICINE

## 2021-11-19 PROCEDURE — 25000003 PHARM REV CODE 250: Performed by: INTERNAL MEDICINE

## 2021-11-19 PROCEDURE — 97530 THERAPEUTIC ACTIVITIES: CPT | Mod: CQ

## 2021-11-19 PROCEDURE — 97110 THERAPEUTIC EXERCISES: CPT | Mod: CQ

## 2021-11-19 PROCEDURE — 97530 THERAPEUTIC ACTIVITIES: CPT | Mod: CO

## 2021-11-19 PROCEDURE — 11000004 HC SNF PRIVATE

## 2021-11-19 PROCEDURE — 25000003 PHARM REV CODE 250: Performed by: NURSE PRACTITIONER

## 2021-11-19 RX ADMIN — METHOCARBAMOL 500 MG: 500 TABLET ORAL at 10:11

## 2021-11-19 RX ADMIN — ALLOPURINOL 100 MG: 100 TABLET ORAL at 08:11

## 2021-11-19 RX ADMIN — METHOCARBAMOL 500 MG: 500 TABLET ORAL at 08:11

## 2021-11-19 RX ADMIN — METHOCARBAMOL 500 MG: 500 TABLET ORAL at 03:11

## 2021-11-19 RX ADMIN — Medication 1 CAPSULE: at 08:11

## 2021-11-19 RX ADMIN — APIXABAN 2.5 MG: 2.5 TABLET, FILM COATED ORAL at 10:11

## 2021-11-19 RX ADMIN — Medication 10 ML: at 06:11

## 2021-11-19 RX ADMIN — SENNOSIDES AND DOCUSATE SODIUM 2 TABLET: 50; 8.6 TABLET ORAL at 10:11

## 2021-11-19 RX ADMIN — SENNOSIDES AND DOCUSATE SODIUM 2 TABLET: 50; 8.6 TABLET ORAL at 08:11

## 2021-11-19 RX ADMIN — AMLODIPINE BESYLATE 10 MG: 10 TABLET ORAL at 08:11

## 2021-11-19 RX ADMIN — INSULIN ASPART 1 UNITS: 100 INJECTION, SOLUTION INTRAVENOUS; SUBCUTANEOUS at 10:11

## 2021-11-19 RX ADMIN — POLYETHYLENE GLYCOL 3350 17 G: 17 POWDER, FOR SOLUTION ORAL at 08:11

## 2021-11-19 RX ADMIN — INSULIN ASPART 8 UNITS: 100 INJECTION, SOLUTION INTRAVENOUS; SUBCUTANEOUS at 12:11

## 2021-11-19 RX ADMIN — MELATONIN TAB 3 MG 6 MG: 3 TAB at 10:11

## 2021-11-19 RX ADMIN — FLUTICASONE FUROATE AND VILANTEROL TRIFENATATE 1 PUFF: 200; 25 POWDER RESPIRATORY (INHALATION) at 09:11

## 2021-11-19 RX ADMIN — INSULIN DETEMIR 14 UNITS: 100 INJECTION, SOLUTION SUBCUTANEOUS at 08:11

## 2021-11-19 RX ADMIN — APIXABAN 2.5 MG: 2.5 TABLET, FILM COATED ORAL at 08:11

## 2021-11-19 RX ADMIN — Medication 10 ML: at 11:11

## 2021-11-19 RX ADMIN — INSULIN ASPART 8 UNITS: 100 INJECTION, SOLUTION INTRAVENOUS; SUBCUTANEOUS at 07:11

## 2021-11-19 RX ADMIN — INSULIN ASPART 8 UNITS: 100 INJECTION, SOLUTION INTRAVENOUS; SUBCUTANEOUS at 04:11

## 2021-11-19 RX ADMIN — Medication 10 ML: at 12:11

## 2021-11-19 RX ADMIN — MUPIROCIN: 20 OINTMENT TOPICAL at 10:11

## 2021-11-19 RX ADMIN — MUPIROCIN: 20 OINTMENT TOPICAL at 08:11

## 2021-11-20 LAB
POCT GLUCOSE: 108 MG/DL (ref 70–110)
POCT GLUCOSE: 142 MG/DL (ref 70–110)
POCT GLUCOSE: 145 MG/DL (ref 70–110)
POCT GLUCOSE: 210 MG/DL (ref 70–110)

## 2021-11-20 PROCEDURE — A4216 STERILE WATER/SALINE, 10 ML: HCPCS | Performed by: INTERNAL MEDICINE

## 2021-11-20 PROCEDURE — 97530 THERAPEUTIC ACTIVITIES: CPT | Mod: CQ

## 2021-11-20 PROCEDURE — 25000003 PHARM REV CODE 250: Performed by: INTERNAL MEDICINE

## 2021-11-20 PROCEDURE — 25000003 PHARM REV CODE 250: Performed by: NURSE PRACTITIONER

## 2021-11-20 PROCEDURE — 97110 THERAPEUTIC EXERCISES: CPT | Mod: CQ

## 2021-11-20 PROCEDURE — 11000004 HC SNF PRIVATE

## 2021-11-20 PROCEDURE — 63600175 PHARM REV CODE 636 W HCPCS: Performed by: NURSE PRACTITIONER

## 2021-11-20 RX ADMIN — Medication 1 CAPSULE: at 08:11

## 2021-11-20 RX ADMIN — SENNOSIDES AND DOCUSATE SODIUM 2 TABLET: 50; 8.6 TABLET ORAL at 08:11

## 2021-11-20 RX ADMIN — Medication 10 ML: at 06:11

## 2021-11-20 RX ADMIN — INSULIN ASPART 8 UNITS: 100 INJECTION, SOLUTION INTRAVENOUS; SUBCUTANEOUS at 12:11

## 2021-11-20 RX ADMIN — INSULIN ASPART 8 UNITS: 100 INJECTION, SOLUTION INTRAVENOUS; SUBCUTANEOUS at 08:11

## 2021-11-20 RX ADMIN — HYDRALAZINE HYDROCHLORIDE 50 MG: 50 TABLET ORAL at 08:11

## 2021-11-20 RX ADMIN — INSULIN DETEMIR 14 UNITS: 100 INJECTION, SOLUTION SUBCUTANEOUS at 08:11

## 2021-11-20 RX ADMIN — ALLOPURINOL 100 MG: 100 TABLET ORAL at 08:11

## 2021-11-20 RX ADMIN — AMLODIPINE BESYLATE 10 MG: 10 TABLET ORAL at 08:11

## 2021-11-20 RX ADMIN — POLYETHYLENE GLYCOL 3350 17 G: 17 POWDER, FOR SOLUTION ORAL at 12:11

## 2021-11-20 RX ADMIN — METHOCARBAMOL 500 MG: 500 TABLET ORAL at 08:11

## 2021-11-20 RX ADMIN — DAPTOMYCIN 980 MG: 350 INJECTION, POWDER, LYOPHILIZED, FOR SOLUTION INTRAVENOUS at 11:11

## 2021-11-20 RX ADMIN — Medication 10 ML: at 11:11

## 2021-11-20 RX ADMIN — MELATONIN TAB 3 MG 6 MG: 3 TAB at 08:11

## 2021-11-20 RX ADMIN — Medication 10 ML: at 12:11

## 2021-11-20 RX ADMIN — TORSEMIDE 10 MG: 10 TABLET ORAL at 08:11

## 2021-11-20 RX ADMIN — INSULIN ASPART 2 UNITS: 100 INJECTION, SOLUTION INTRAVENOUS; SUBCUTANEOUS at 12:11

## 2021-11-20 RX ADMIN — APIXABAN 2.5 MG: 2.5 TABLET, FILM COATED ORAL at 08:11

## 2021-11-20 RX ADMIN — Medication 10 ML: at 05:11

## 2021-11-20 RX ADMIN — OXYCODONE 5 MG: 5 TABLET ORAL at 08:11

## 2021-11-20 RX ADMIN — METHOCARBAMOL 500 MG: 500 TABLET ORAL at 02:11

## 2021-11-20 RX ADMIN — MUPIROCIN: 20 OINTMENT TOPICAL at 08:11

## 2021-11-20 RX ADMIN — INSULIN ASPART 8 UNITS: 100 INJECTION, SOLUTION INTRAVENOUS; SUBCUTANEOUS at 05:11

## 2021-11-20 RX ADMIN — FLUTICASONE FUROATE AND VILANTEROL TRIFENATATE 1 PUFF: 200; 25 POWDER RESPIRATORY (INHALATION) at 08:11

## 2021-11-21 LAB
POCT GLUCOSE: 120 MG/DL (ref 70–110)
POCT GLUCOSE: 161 MG/DL (ref 70–110)
POCT GLUCOSE: 162 MG/DL (ref 70–110)
POCT GLUCOSE: 173 MG/DL (ref 70–110)

## 2021-11-21 PROCEDURE — 97530 THERAPEUTIC ACTIVITIES: CPT | Mod: CO

## 2021-11-21 PROCEDURE — A4216 STERILE WATER/SALINE, 10 ML: HCPCS | Performed by: INTERNAL MEDICINE

## 2021-11-21 PROCEDURE — 97535 SELF CARE MNGMENT TRAINING: CPT | Mod: CO

## 2021-11-21 PROCEDURE — 25000003 PHARM REV CODE 250: Performed by: NURSE PRACTITIONER

## 2021-11-21 PROCEDURE — 11000004 HC SNF PRIVATE

## 2021-11-21 PROCEDURE — 25000003 PHARM REV CODE 250: Performed by: INTERNAL MEDICINE

## 2021-11-21 PROCEDURE — 97110 THERAPEUTIC EXERCISES: CPT | Mod: CO

## 2021-11-21 RX ADMIN — FLUTICASONE FUROATE AND VILANTEROL TRIFENATATE 1 PUFF: 200; 25 POWDER RESPIRATORY (INHALATION) at 08:11

## 2021-11-21 RX ADMIN — SENNOSIDES AND DOCUSATE SODIUM 2 TABLET: 50; 8.6 TABLET ORAL at 08:11

## 2021-11-21 RX ADMIN — Medication 10 ML: at 06:11

## 2021-11-21 RX ADMIN — APIXABAN 2.5 MG: 2.5 TABLET, FILM COATED ORAL at 09:11

## 2021-11-21 RX ADMIN — METHOCARBAMOL 500 MG: 500 TABLET ORAL at 03:11

## 2021-11-21 RX ADMIN — TORSEMIDE 10 MG: 10 TABLET ORAL at 08:11

## 2021-11-21 RX ADMIN — Medication 10 ML: at 12:11

## 2021-11-21 RX ADMIN — METHOCARBAMOL 500 MG: 500 TABLET ORAL at 08:11

## 2021-11-21 RX ADMIN — SENNOSIDES AND DOCUSATE SODIUM 2 TABLET: 50; 8.6 TABLET ORAL at 09:11

## 2021-11-21 RX ADMIN — Medication 1 CAPSULE: at 08:11

## 2021-11-21 RX ADMIN — INSULIN ASPART 8 UNITS: 100 INJECTION, SOLUTION INTRAVENOUS; SUBCUTANEOUS at 05:11

## 2021-11-21 RX ADMIN — APIXABAN 2.5 MG: 2.5 TABLET, FILM COATED ORAL at 08:11

## 2021-11-21 RX ADMIN — MELATONIN TAB 3 MG 6 MG: 3 TAB at 09:11

## 2021-11-21 RX ADMIN — Medication 10 ML: at 11:11

## 2021-11-21 RX ADMIN — INSULIN DETEMIR 14 UNITS: 100 INJECTION, SOLUTION SUBCUTANEOUS at 08:11

## 2021-11-21 RX ADMIN — INSULIN ASPART 8 UNITS: 100 INJECTION, SOLUTION INTRAVENOUS; SUBCUTANEOUS at 08:11

## 2021-11-21 RX ADMIN — POLYETHYLENE GLYCOL 3350 17 G: 17 POWDER, FOR SOLUTION ORAL at 08:11

## 2021-11-21 RX ADMIN — ALLOPURINOL 100 MG: 100 TABLET ORAL at 08:11

## 2021-11-21 RX ADMIN — INSULIN ASPART 8 UNITS: 100 INJECTION, SOLUTION INTRAVENOUS; SUBCUTANEOUS at 12:11

## 2021-11-21 RX ADMIN — METHOCARBAMOL 500 MG: 500 TABLET ORAL at 09:11

## 2021-11-21 RX ADMIN — AMLODIPINE BESYLATE 10 MG: 10 TABLET ORAL at 08:11

## 2021-11-22 LAB
ANION GAP SERPL CALC-SCNC: 10 MMOL/L (ref 8–16)
BASOPHILS # BLD AUTO: 0.03 K/UL (ref 0–0.2)
BASOPHILS NFR BLD: 0.4 % (ref 0–1.9)
BUN SERPL-MCNC: 30 MG/DL (ref 8–23)
CALCIUM SERPL-MCNC: 8.6 MG/DL (ref 8.7–10.5)
CHLORIDE SERPL-SCNC: 104 MMOL/L (ref 95–110)
CK SERPL-CCNC: 99 U/L (ref 20–180)
CO2 SERPL-SCNC: 23 MMOL/L (ref 23–29)
CREAT SERPL-MCNC: 1.5 MG/DL (ref 0.5–1.4)
CRP SERPL-MCNC: 19.2 MG/L (ref 0–8.2)
DIFFERENTIAL METHOD: ABNORMAL
EOSINOPHIL # BLD AUTO: 0.4 K/UL (ref 0–0.5)
EOSINOPHIL NFR BLD: 5.4 % (ref 0–8)
ERYTHROCYTE [DISTWIDTH] IN BLOOD BY AUTOMATED COUNT: 15.4 % (ref 11.5–14.5)
ERYTHROCYTE [SEDIMENTATION RATE] IN BLOOD BY WESTERGREN METHOD: >120 MM/HR (ref 0–36)
EST. GFR  (AFRICAN AMERICAN): 37.9 ML/MIN/1.73 M^2
EST. GFR  (NON AFRICAN AMERICAN): 32.9 ML/MIN/1.73 M^2
GLUCOSE SERPL-MCNC: 90 MG/DL (ref 70–110)
HCT VFR BLD AUTO: 28.8 % (ref 37–48.5)
HGB BLD-MCNC: 8.9 G/DL (ref 12–16)
IMM GRANULOCYTES # BLD AUTO: 0.05 K/UL (ref 0–0.04)
IMM GRANULOCYTES NFR BLD AUTO: 0.7 % (ref 0–0.5)
LYMPHOCYTES # BLD AUTO: 1.9 K/UL (ref 1–4.8)
LYMPHOCYTES NFR BLD: 26 % (ref 18–48)
MAGNESIUM SERPL-MCNC: 2.1 MG/DL (ref 1.6–2.6)
MCH RBC QN AUTO: 28.1 PG (ref 27–31)
MCHC RBC AUTO-ENTMCNC: 30.9 G/DL (ref 32–36)
MCV RBC AUTO: 91 FL (ref 82–98)
MONOCYTES # BLD AUTO: 0.7 K/UL (ref 0.3–1)
MONOCYTES NFR BLD: 9.1 % (ref 4–15)
NEUTROPHILS # BLD AUTO: 4.4 K/UL (ref 1.8–7.7)
NEUTROPHILS NFR BLD: 58.4 % (ref 38–73)
NRBC BLD-RTO: 0 /100 WBC
PHOSPHATE SERPL-MCNC: 4.3 MG/DL (ref 2.7–4.5)
PLATELET # BLD AUTO: 166 K/UL (ref 150–450)
PMV BLD AUTO: 13.8 FL (ref 9.2–12.9)
POCT GLUCOSE: 121 MG/DL (ref 70–110)
POCT GLUCOSE: 157 MG/DL (ref 70–110)
POCT GLUCOSE: 205 MG/DL (ref 70–110)
POCT GLUCOSE: 258 MG/DL (ref 70–110)
POTASSIUM SERPL-SCNC: 4.6 MMOL/L (ref 3.5–5.1)
RBC # BLD AUTO: 3.17 M/UL (ref 4–5.4)
SODIUM SERPL-SCNC: 137 MMOL/L (ref 136–145)
WBC # BLD AUTO: 7.45 K/UL (ref 3.9–12.7)

## 2021-11-22 PROCEDURE — 11000004 HC SNF PRIVATE

## 2021-11-22 PROCEDURE — 97110 THERAPEUTIC EXERCISES: CPT

## 2021-11-22 PROCEDURE — 63600175 PHARM REV CODE 636 W HCPCS: Performed by: NURSE PRACTITIONER

## 2021-11-22 PROCEDURE — 97530 THERAPEUTIC ACTIVITIES: CPT | Mod: CQ

## 2021-11-22 PROCEDURE — 97535 SELF CARE MNGMENT TRAINING: CPT

## 2021-11-22 PROCEDURE — 85025 COMPLETE CBC W/AUTO DIFF WBC: CPT | Performed by: NURSE PRACTITIONER

## 2021-11-22 PROCEDURE — 25000003 PHARM REV CODE 250: Performed by: NURSE PRACTITIONER

## 2021-11-22 PROCEDURE — 82550 ASSAY OF CK (CPK): CPT | Performed by: NURSE PRACTITIONER

## 2021-11-22 PROCEDURE — 97110 THERAPEUTIC EXERCISES: CPT | Mod: CQ

## 2021-11-22 PROCEDURE — 85652 RBC SED RATE AUTOMATED: CPT | Performed by: NURSE PRACTITIONER

## 2021-11-22 PROCEDURE — 86140 C-REACTIVE PROTEIN: CPT | Performed by: INTERNAL MEDICINE

## 2021-11-22 PROCEDURE — A4216 STERILE WATER/SALINE, 10 ML: HCPCS | Performed by: INTERNAL MEDICINE

## 2021-11-22 PROCEDURE — 25000003 PHARM REV CODE 250: Performed by: INTERNAL MEDICINE

## 2021-11-22 PROCEDURE — 84100 ASSAY OF PHOSPHORUS: CPT | Performed by: NURSE PRACTITIONER

## 2021-11-22 PROCEDURE — 36415 COLL VENOUS BLD VENIPUNCTURE: CPT | Performed by: INTERNAL MEDICINE

## 2021-11-22 PROCEDURE — 83735 ASSAY OF MAGNESIUM: CPT | Performed by: NURSE PRACTITIONER

## 2021-11-22 PROCEDURE — 80048 BASIC METABOLIC PNL TOTAL CA: CPT | Performed by: NURSE PRACTITIONER

## 2021-11-22 RX ADMIN — INSULIN DETEMIR 14 UNITS: 100 INJECTION, SOLUTION SUBCUTANEOUS at 08:11

## 2021-11-22 RX ADMIN — AMLODIPINE BESYLATE 10 MG: 10 TABLET ORAL at 08:11

## 2021-11-22 RX ADMIN — Medication 10 ML: at 06:11

## 2021-11-22 RX ADMIN — METHOCARBAMOL 500 MG: 500 TABLET ORAL at 08:11

## 2021-11-22 RX ADMIN — INSULIN ASPART 1 UNITS: 100 INJECTION, SOLUTION INTRAVENOUS; SUBCUTANEOUS at 10:11

## 2021-11-22 RX ADMIN — INSULIN ASPART 3 UNITS: 100 INJECTION, SOLUTION INTRAVENOUS; SUBCUTANEOUS at 12:11

## 2021-11-22 RX ADMIN — OXYCODONE 5 MG: 5 TABLET ORAL at 06:11

## 2021-11-22 RX ADMIN — POLYETHYLENE GLYCOL 3350 17 G: 17 POWDER, FOR SOLUTION ORAL at 08:11

## 2021-11-22 RX ADMIN — MELATONIN TAB 3 MG 6 MG: 3 TAB at 08:11

## 2021-11-22 RX ADMIN — APIXABAN 2.5 MG: 2.5 TABLET, FILM COATED ORAL at 08:11

## 2021-11-22 RX ADMIN — INSULIN ASPART 8 UNITS: 100 INJECTION, SOLUTION INTRAVENOUS; SUBCUTANEOUS at 12:11

## 2021-11-22 RX ADMIN — FLUTICASONE FUROATE AND VILANTEROL TRIFENATATE 1 PUFF: 200; 25 POWDER RESPIRATORY (INHALATION) at 08:11

## 2021-11-22 RX ADMIN — Medication 10 ML: at 11:11

## 2021-11-22 RX ADMIN — METHOCARBAMOL 500 MG: 500 TABLET ORAL at 03:11

## 2021-11-22 RX ADMIN — Medication 10 ML: at 05:11

## 2021-11-22 RX ADMIN — INSULIN ASPART 8 UNITS: 100 INJECTION, SOLUTION INTRAVENOUS; SUBCUTANEOUS at 08:11

## 2021-11-22 RX ADMIN — SENNOSIDES AND DOCUSATE SODIUM 2 TABLET: 50; 8.6 TABLET ORAL at 08:11

## 2021-11-22 RX ADMIN — INSULIN ASPART 8 UNITS: 100 INJECTION, SOLUTION INTRAVENOUS; SUBCUTANEOUS at 05:11

## 2021-11-22 RX ADMIN — ALLOPURINOL 100 MG: 100 TABLET ORAL at 08:11

## 2021-11-22 RX ADMIN — Medication 1 CAPSULE: at 08:11

## 2021-11-22 RX ADMIN — DAPTOMYCIN 885 MG: 350 INJECTION, POWDER, LYOPHILIZED, FOR SOLUTION INTRAVENOUS at 07:11

## 2021-11-22 RX ADMIN — TORSEMIDE 10 MG: 10 TABLET ORAL at 08:11

## 2021-11-23 LAB
POCT GLUCOSE: 117 MG/DL (ref 70–110)
POCT GLUCOSE: 145 MG/DL (ref 70–110)
POCT GLUCOSE: 149 MG/DL (ref 70–110)
POCT GLUCOSE: 153 MG/DL (ref 70–110)

## 2021-11-23 PROCEDURE — 25000003 PHARM REV CODE 250: Performed by: NURSE PRACTITIONER

## 2021-11-23 PROCEDURE — 11000004 HC SNF PRIVATE

## 2021-11-23 PROCEDURE — 97110 THERAPEUTIC EXERCISES: CPT | Mod: CO

## 2021-11-23 PROCEDURE — 63600175 PHARM REV CODE 636 W HCPCS: Performed by: NURSE PRACTITIONER

## 2021-11-23 PROCEDURE — 97110 THERAPEUTIC EXERCISES: CPT

## 2021-11-23 PROCEDURE — A4216 STERILE WATER/SALINE, 10 ML: HCPCS | Performed by: INTERNAL MEDICINE

## 2021-11-23 PROCEDURE — 97535 SELF CARE MNGMENT TRAINING: CPT | Mod: CO

## 2021-11-23 PROCEDURE — 25000003 PHARM REV CODE 250: Performed by: INTERNAL MEDICINE

## 2021-11-23 RX ADMIN — INSULIN ASPART 8 UNITS: 100 INJECTION, SOLUTION INTRAVENOUS; SUBCUTANEOUS at 12:11

## 2021-11-23 RX ADMIN — ALLOPURINOL 100 MG: 100 TABLET ORAL at 08:11

## 2021-11-23 RX ADMIN — INSULIN ASPART 8 UNITS: 100 INJECTION, SOLUTION INTRAVENOUS; SUBCUTANEOUS at 05:11

## 2021-11-23 RX ADMIN — METHOCARBAMOL 500 MG: 500 TABLET ORAL at 02:11

## 2021-11-23 RX ADMIN — AMLODIPINE BESYLATE 10 MG: 10 TABLET ORAL at 08:11

## 2021-11-23 RX ADMIN — METHOCARBAMOL 500 MG: 500 TABLET ORAL at 09:11

## 2021-11-23 RX ADMIN — DAPTOMYCIN 885 MG: 350 INJECTION, POWDER, LYOPHILIZED, FOR SOLUTION INTRAVENOUS at 06:11

## 2021-11-23 RX ADMIN — TORSEMIDE 10 MG: 10 TABLET ORAL at 08:11

## 2021-11-23 RX ADMIN — Medication 10 ML: at 12:11

## 2021-11-23 RX ADMIN — METHOCARBAMOL 500 MG: 500 TABLET ORAL at 08:11

## 2021-11-23 RX ADMIN — FLUTICASONE FUROATE AND VILANTEROL TRIFENATATE 1 PUFF: 200; 25 POWDER RESPIRATORY (INHALATION) at 09:11

## 2021-11-23 RX ADMIN — INSULIN DETEMIR 14 UNITS: 100 INJECTION, SOLUTION SUBCUTANEOUS at 08:11

## 2021-11-23 RX ADMIN — INSULIN ASPART 8 UNITS: 100 INJECTION, SOLUTION INTRAVENOUS; SUBCUTANEOUS at 08:11

## 2021-11-23 RX ADMIN — Medication 1 CAPSULE: at 08:11

## 2021-11-23 RX ADMIN — APIXABAN 2.5 MG: 2.5 TABLET, FILM COATED ORAL at 08:11

## 2021-11-23 RX ADMIN — Medication 10 ML: at 06:11

## 2021-11-23 RX ADMIN — POLYETHYLENE GLYCOL 3350 17 G: 17 POWDER, FOR SOLUTION ORAL at 08:11

## 2021-11-23 RX ADMIN — APIXABAN 2.5 MG: 2.5 TABLET, FILM COATED ORAL at 09:11

## 2021-11-23 RX ADMIN — SENNOSIDES AND DOCUSATE SODIUM 2 TABLET: 50; 8.6 TABLET ORAL at 09:11

## 2021-11-23 RX ADMIN — SENNOSIDES AND DOCUSATE SODIUM 2 TABLET: 50; 8.6 TABLET ORAL at 08:11

## 2021-11-24 LAB
POCT GLUCOSE: 114 MG/DL (ref 70–110)
POCT GLUCOSE: 150 MG/DL (ref 70–110)
POCT GLUCOSE: 174 MG/DL (ref 70–110)
POCT GLUCOSE: 247 MG/DL (ref 70–110)

## 2021-11-24 PROCEDURE — C9399 UNCLASSIFIED DRUGS OR BIOLOG: HCPCS | Performed by: NURSE PRACTITIONER

## 2021-11-24 PROCEDURE — 63600175 PHARM REV CODE 636 W HCPCS: Performed by: NURSE PRACTITIONER

## 2021-11-24 PROCEDURE — 25000003 PHARM REV CODE 250: Performed by: INTERNAL MEDICINE

## 2021-11-24 PROCEDURE — 25000003 PHARM REV CODE 250: Performed by: NURSE PRACTITIONER

## 2021-11-24 PROCEDURE — A4216 STERILE WATER/SALINE, 10 ML: HCPCS | Performed by: INTERNAL MEDICINE

## 2021-11-24 PROCEDURE — 97530 THERAPEUTIC ACTIVITIES: CPT | Mod: CQ

## 2021-11-24 PROCEDURE — 63600175 PHARM REV CODE 636 W HCPCS: Performed by: INTERNAL MEDICINE

## 2021-11-24 PROCEDURE — 11000004 HC SNF PRIVATE

## 2021-11-24 RX ADMIN — Medication 1 CAPSULE: at 09:11

## 2021-11-24 RX ADMIN — INSULIN ASPART 8 UNITS: 100 INJECTION, SOLUTION INTRAVENOUS; SUBCUTANEOUS at 08:11

## 2021-11-24 RX ADMIN — POLYETHYLENE GLYCOL 3350 17 G: 17 POWDER, FOR SOLUTION ORAL at 09:11

## 2021-11-24 RX ADMIN — INSULIN ASPART 2 UNITS: 100 INJECTION, SOLUTION INTRAVENOUS; SUBCUTANEOUS at 04:11

## 2021-11-24 RX ADMIN — TORSEMIDE 10 MG: 10 TABLET ORAL at 09:11

## 2021-11-24 RX ADMIN — DAPTOMYCIN 885 MG: 350 INJECTION, POWDER, LYOPHILIZED, FOR SOLUTION INTRAVENOUS at 06:11

## 2021-11-24 RX ADMIN — INSULIN ASPART 8 UNITS: 100 INJECTION, SOLUTION INTRAVENOUS; SUBCUTANEOUS at 04:11

## 2021-11-24 RX ADMIN — Medication 10 ML: at 12:11

## 2021-11-24 RX ADMIN — AMLODIPINE BESYLATE 10 MG: 10 TABLET ORAL at 09:11

## 2021-11-24 RX ADMIN — INSULIN ASPART 8 UNITS: 100 INJECTION, SOLUTION INTRAVENOUS; SUBCUTANEOUS at 12:11

## 2021-11-24 RX ADMIN — SENNOSIDES AND DOCUSATE SODIUM 2 TABLET: 50; 8.6 TABLET ORAL at 08:11

## 2021-11-24 RX ADMIN — METHOCARBAMOL 500 MG: 500 TABLET ORAL at 08:11

## 2021-11-24 RX ADMIN — Medication 10 ML: at 06:11

## 2021-11-24 RX ADMIN — SENNOSIDES AND DOCUSATE SODIUM 2 TABLET: 50; 8.6 TABLET ORAL at 09:11

## 2021-11-24 RX ADMIN — APIXABAN 2.5 MG: 2.5 TABLET, FILM COATED ORAL at 08:11

## 2021-11-24 RX ADMIN — MELATONIN TAB 3 MG 6 MG: 3 TAB at 08:11

## 2021-11-24 RX ADMIN — INSULIN DETEMIR 14 UNITS: 100 INJECTION, SOLUTION SUBCUTANEOUS at 09:11

## 2021-11-24 RX ADMIN — METHOCARBAMOL 500 MG: 500 TABLET ORAL at 09:11

## 2021-11-24 RX ADMIN — APIXABAN 2.5 MG: 2.5 TABLET, FILM COATED ORAL at 09:11

## 2021-11-24 RX ADMIN — FLUTICASONE FUROATE AND VILANTEROL TRIFENATATE 1 PUFF: 200; 25 POWDER RESPIRATORY (INHALATION) at 09:11

## 2021-11-24 RX ADMIN — METHOCARBAMOL 500 MG: 500 TABLET ORAL at 03:11

## 2021-11-24 RX ADMIN — ALLOPURINOL 100 MG: 100 TABLET ORAL at 09:11

## 2021-11-25 LAB
POCT GLUCOSE: 112 MG/DL (ref 70–110)
POCT GLUCOSE: 138 MG/DL (ref 70–110)
POCT GLUCOSE: 170 MG/DL (ref 70–110)
POCT GLUCOSE: 180 MG/DL (ref 70–110)

## 2021-11-25 PROCEDURE — 25000003 PHARM REV CODE 250: Performed by: INTERNAL MEDICINE

## 2021-11-25 PROCEDURE — A4216 STERILE WATER/SALINE, 10 ML: HCPCS | Performed by: INTERNAL MEDICINE

## 2021-11-25 PROCEDURE — 11000004 HC SNF PRIVATE

## 2021-11-25 PROCEDURE — 25000003 PHARM REV CODE 250: Performed by: NURSE PRACTITIONER

## 2021-11-25 PROCEDURE — 63600175 PHARM REV CODE 636 W HCPCS: Performed by: NURSE PRACTITIONER

## 2021-11-25 RX ADMIN — APIXABAN 2.5 MG: 2.5 TABLET, FILM COATED ORAL at 09:11

## 2021-11-25 RX ADMIN — Medication 10 ML: at 12:11

## 2021-11-25 RX ADMIN — SENNOSIDES AND DOCUSATE SODIUM 2 TABLET: 50; 8.6 TABLET ORAL at 09:11

## 2021-11-25 RX ADMIN — TORSEMIDE 10 MG: 10 TABLET ORAL at 09:11

## 2021-11-25 RX ADMIN — Medication 10 ML: at 06:11

## 2021-11-25 RX ADMIN — DAPTOMYCIN 885 MG: 350 INJECTION, POWDER, LYOPHILIZED, FOR SOLUTION INTRAVENOUS at 06:11

## 2021-11-25 RX ADMIN — INSULIN ASPART 8 UNITS: 100 INJECTION, SOLUTION INTRAVENOUS; SUBCUTANEOUS at 12:11

## 2021-11-25 RX ADMIN — FLUTICASONE FUROATE AND VILANTEROL TRIFENATATE 1 PUFF: 200; 25 POWDER RESPIRATORY (INHALATION) at 09:11

## 2021-11-25 RX ADMIN — AMLODIPINE BESYLATE 10 MG: 10 TABLET ORAL at 09:11

## 2021-11-25 RX ADMIN — INSULIN DETEMIR 14 UNITS: 100 INJECTION, SOLUTION SUBCUTANEOUS at 09:11

## 2021-11-25 RX ADMIN — MELATONIN TAB 3 MG 6 MG: 3 TAB at 09:11

## 2021-11-25 RX ADMIN — Medication 10 ML: at 05:11

## 2021-11-25 RX ADMIN — METHOCARBAMOL 500 MG: 500 TABLET ORAL at 09:11

## 2021-11-25 RX ADMIN — INSULIN ASPART 8 UNITS: 100 INJECTION, SOLUTION INTRAVENOUS; SUBCUTANEOUS at 09:11

## 2021-11-25 RX ADMIN — INSULIN ASPART 8 UNITS: 100 INJECTION, SOLUTION INTRAVENOUS; SUBCUTANEOUS at 05:11

## 2021-11-25 RX ADMIN — Medication 1 CAPSULE: at 09:11

## 2021-11-25 RX ADMIN — ALLOPURINOL 100 MG: 100 TABLET ORAL at 09:11

## 2021-11-25 RX ADMIN — POLYETHYLENE GLYCOL 3350 17 G: 17 POWDER, FOR SOLUTION ORAL at 09:11

## 2021-11-25 RX ADMIN — APIXABAN 2.5 MG: 2.5 TABLET, FILM COATED ORAL at 10:11

## 2021-11-25 RX ADMIN — METHOCARBAMOL 500 MG: 500 TABLET ORAL at 05:11

## 2021-11-26 LAB
FUNGUS SPEC CULT: NORMAL
POCT GLUCOSE: 168 MG/DL (ref 70–110)
POCT GLUCOSE: 180 MG/DL (ref 70–110)
POCT GLUCOSE: 204 MG/DL (ref 70–110)
POCT GLUCOSE: 98 MG/DL (ref 70–110)

## 2021-11-26 PROCEDURE — 97110 THERAPEUTIC EXERCISES: CPT | Mod: CO

## 2021-11-26 PROCEDURE — 63600175 PHARM REV CODE 636 W HCPCS: Performed by: NURSE PRACTITIONER

## 2021-11-26 PROCEDURE — 97530 THERAPEUTIC ACTIVITIES: CPT | Mod: CQ

## 2021-11-26 PROCEDURE — 25000003 PHARM REV CODE 250: Performed by: NURSE PRACTITIONER

## 2021-11-26 PROCEDURE — 97110 THERAPEUTIC EXERCISES: CPT | Mod: CQ

## 2021-11-26 PROCEDURE — C9399 UNCLASSIFIED DRUGS OR BIOLOG: HCPCS | Performed by: NURSE PRACTITIONER

## 2021-11-26 PROCEDURE — 25000003 PHARM REV CODE 250: Performed by: INTERNAL MEDICINE

## 2021-11-26 PROCEDURE — 11000004 HC SNF PRIVATE

## 2021-11-26 PROCEDURE — 97530 THERAPEUTIC ACTIVITIES: CPT | Mod: CO

## 2021-11-26 PROCEDURE — A4216 STERILE WATER/SALINE, 10 ML: HCPCS | Performed by: INTERNAL MEDICINE

## 2021-11-26 RX ADMIN — ALLOPURINOL 100 MG: 100 TABLET ORAL at 08:11

## 2021-11-26 RX ADMIN — Medication 10 ML: at 06:11

## 2021-11-26 RX ADMIN — SENNOSIDES AND DOCUSATE SODIUM 2 TABLET: 50; 8.6 TABLET ORAL at 08:11

## 2021-11-26 RX ADMIN — METHOCARBAMOL 500 MG: 500 TABLET ORAL at 03:11

## 2021-11-26 RX ADMIN — AMLODIPINE BESYLATE 10 MG: 10 TABLET ORAL at 08:11

## 2021-11-26 RX ADMIN — INSULIN ASPART 8 UNITS: 100 INJECTION, SOLUTION INTRAVENOUS; SUBCUTANEOUS at 08:11

## 2021-11-26 RX ADMIN — SENNOSIDES AND DOCUSATE SODIUM 2 TABLET: 50; 8.6 TABLET ORAL at 09:11

## 2021-11-26 RX ADMIN — DAPTOMYCIN 885 MG: 350 INJECTION, POWDER, LYOPHILIZED, FOR SOLUTION INTRAVENOUS at 06:11

## 2021-11-26 RX ADMIN — APIXABAN 2.5 MG: 2.5 TABLET, FILM COATED ORAL at 08:11

## 2021-11-26 RX ADMIN — POLYETHYLENE GLYCOL 3350 17 G: 17 POWDER, FOR SOLUTION ORAL at 08:11

## 2021-11-26 RX ADMIN — Medication 1 CAPSULE: at 08:11

## 2021-11-26 RX ADMIN — INSULIN ASPART 8 UNITS: 100 INJECTION, SOLUTION INTRAVENOUS; SUBCUTANEOUS at 12:11

## 2021-11-26 RX ADMIN — Medication 10 ML: at 12:11

## 2021-11-26 RX ADMIN — INSULIN ASPART 1 UNITS: 100 INJECTION, SOLUTION INTRAVENOUS; SUBCUTANEOUS at 09:11

## 2021-11-26 RX ADMIN — TORSEMIDE 10 MG: 10 TABLET ORAL at 08:11

## 2021-11-26 RX ADMIN — INSULIN ASPART 8 UNITS: 100 INJECTION, SOLUTION INTRAVENOUS; SUBCUTANEOUS at 04:11

## 2021-11-26 RX ADMIN — APIXABAN 2.5 MG: 2.5 TABLET, FILM COATED ORAL at 09:11

## 2021-11-26 RX ADMIN — METHOCARBAMOL 500 MG: 500 TABLET ORAL at 08:11

## 2021-11-26 RX ADMIN — INSULIN DETEMIR 14 UNITS: 100 INJECTION, SOLUTION SUBCUTANEOUS at 08:11

## 2021-11-26 RX ADMIN — METHOCARBAMOL 500 MG: 500 TABLET ORAL at 09:11

## 2021-11-26 RX ADMIN — FLUTICASONE FUROATE AND VILANTEROL TRIFENATATE 1 PUFF: 200; 25 POWDER RESPIRATORY (INHALATION) at 08:11

## 2021-11-27 LAB
POCT GLUCOSE: 105 MG/DL (ref 70–110)
POCT GLUCOSE: 140 MG/DL (ref 70–110)
POCT GLUCOSE: 170 MG/DL (ref 70–110)
POCT GLUCOSE: 184 MG/DL (ref 70–110)

## 2021-11-27 PROCEDURE — 25000003 PHARM REV CODE 250: Performed by: NURSE PRACTITIONER

## 2021-11-27 PROCEDURE — A4216 STERILE WATER/SALINE, 10 ML: HCPCS | Performed by: INTERNAL MEDICINE

## 2021-11-27 PROCEDURE — 25000003 PHARM REV CODE 250: Performed by: INTERNAL MEDICINE

## 2021-11-27 PROCEDURE — 63600175 PHARM REV CODE 636 W HCPCS: Performed by: NURSE PRACTITIONER

## 2021-11-27 PROCEDURE — 11000004 HC SNF PRIVATE

## 2021-11-27 RX ADMIN — METHOCARBAMOL 500 MG: 500 TABLET ORAL at 10:11

## 2021-11-27 RX ADMIN — AMLODIPINE BESYLATE 10 MG: 10 TABLET ORAL at 10:11

## 2021-11-27 RX ADMIN — Medication 10 ML: at 12:11

## 2021-11-27 RX ADMIN — Medication 10 ML: at 06:11

## 2021-11-27 RX ADMIN — FLUTICASONE FUROATE AND VILANTEROL TRIFENATATE 1 PUFF: 200; 25 POWDER RESPIRATORY (INHALATION) at 10:11

## 2021-11-27 RX ADMIN — SENNOSIDES AND DOCUSATE SODIUM 2 TABLET: 50; 8.6 TABLET ORAL at 09:11

## 2021-11-27 RX ADMIN — APIXABAN 2.5 MG: 2.5 TABLET, FILM COATED ORAL at 09:11

## 2021-11-27 RX ADMIN — INSULIN DETEMIR 14 UNITS: 100 INJECTION, SOLUTION SUBCUTANEOUS at 09:11

## 2021-11-27 RX ADMIN — Medication 1 CAPSULE: at 09:11

## 2021-11-27 RX ADMIN — SENNOSIDES AND DOCUSATE SODIUM 2 TABLET: 50; 8.6 TABLET ORAL at 10:11

## 2021-11-27 RX ADMIN — INSULIN ASPART 8 UNITS: 100 INJECTION, SOLUTION INTRAVENOUS; SUBCUTANEOUS at 11:11

## 2021-11-27 RX ADMIN — METHOCARBAMOL 500 MG: 500 TABLET ORAL at 03:11

## 2021-11-27 RX ADMIN — APIXABAN 2.5 MG: 2.5 TABLET, FILM COATED ORAL at 10:11

## 2021-11-27 RX ADMIN — INSULIN ASPART 8 UNITS: 100 INJECTION, SOLUTION INTRAVENOUS; SUBCUTANEOUS at 04:11

## 2021-11-27 RX ADMIN — ALLOPURINOL 100 MG: 100 TABLET ORAL at 10:11

## 2021-11-27 RX ADMIN — MELATONIN TAB 3 MG 6 MG: 3 TAB at 09:11

## 2021-11-27 RX ADMIN — TORSEMIDE 10 MG: 10 TABLET ORAL at 10:11

## 2021-11-27 RX ADMIN — Medication 10 ML: at 05:11

## 2021-11-27 RX ADMIN — METHOCARBAMOL 500 MG: 500 TABLET ORAL at 09:11

## 2021-11-27 RX ADMIN — INSULIN ASPART 8 UNITS: 100 INJECTION, SOLUTION INTRAVENOUS; SUBCUTANEOUS at 07:11

## 2021-11-27 RX ADMIN — DAPTOMYCIN 885 MG: 350 INJECTION, POWDER, LYOPHILIZED, FOR SOLUTION INTRAVENOUS at 09:11

## 2021-11-28 LAB
POCT GLUCOSE: 108 MG/DL (ref 70–110)
POCT GLUCOSE: 138 MG/DL (ref 70–110)
POCT GLUCOSE: 154 MG/DL (ref 70–110)
POCT GLUCOSE: 157 MG/DL (ref 70–110)

## 2021-11-28 PROCEDURE — 97530 THERAPEUTIC ACTIVITIES: CPT | Mod: CQ

## 2021-11-28 PROCEDURE — 25000003 PHARM REV CODE 250: Performed by: INTERNAL MEDICINE

## 2021-11-28 PROCEDURE — 11000004 HC SNF PRIVATE

## 2021-11-28 PROCEDURE — A4216 STERILE WATER/SALINE, 10 ML: HCPCS | Performed by: INTERNAL MEDICINE

## 2021-11-28 PROCEDURE — 25000003 PHARM REV CODE 250: Performed by: NURSE PRACTITIONER

## 2021-11-28 PROCEDURE — 63600175 PHARM REV CODE 636 W HCPCS: Performed by: NURSE PRACTITIONER

## 2021-11-28 PROCEDURE — 97535 SELF CARE MNGMENT TRAINING: CPT

## 2021-11-28 PROCEDURE — 97110 THERAPEUTIC EXERCISES: CPT | Mod: CQ

## 2021-11-28 RX ADMIN — ALLOPURINOL 100 MG: 100 TABLET ORAL at 09:11

## 2021-11-28 RX ADMIN — METHOCARBAMOL 500 MG: 500 TABLET ORAL at 03:11

## 2021-11-28 RX ADMIN — INSULIN ASPART 8 UNITS: 100 INJECTION, SOLUTION INTRAVENOUS; SUBCUTANEOUS at 04:11

## 2021-11-28 RX ADMIN — FLUTICASONE FUROATE AND VILANTEROL TRIFENATATE 1 PUFF: 200; 25 POWDER RESPIRATORY (INHALATION) at 09:11

## 2021-11-28 RX ADMIN — METHOCARBAMOL 500 MG: 500 TABLET ORAL at 09:11

## 2021-11-28 RX ADMIN — POLYETHYLENE GLYCOL 3350 17 G: 17 POWDER, FOR SOLUTION ORAL at 09:11

## 2021-11-28 RX ADMIN — Medication 10 ML: at 05:11

## 2021-11-28 RX ADMIN — INSULIN DETEMIR 14 UNITS: 100 INJECTION, SOLUTION SUBCUTANEOUS at 08:11

## 2021-11-28 RX ADMIN — APIXABAN 2.5 MG: 2.5 TABLET, FILM COATED ORAL at 09:11

## 2021-11-28 RX ADMIN — Medication 10 ML: at 12:11

## 2021-11-28 RX ADMIN — AMLODIPINE BESYLATE 10 MG: 10 TABLET ORAL at 09:11

## 2021-11-28 RX ADMIN — Medication 10 ML: at 11:11

## 2021-11-28 RX ADMIN — INSULIN ASPART 8 UNITS: 100 INJECTION, SOLUTION INTRAVENOUS; SUBCUTANEOUS at 11:11

## 2021-11-28 RX ADMIN — DAPTOMYCIN 885 MG: 350 INJECTION, POWDER, LYOPHILIZED, FOR SOLUTION INTRAVENOUS at 07:11

## 2021-11-28 RX ADMIN — Medication 1 CAPSULE: at 09:11

## 2021-11-28 RX ADMIN — MELATONIN TAB 3 MG 6 MG: 3 TAB at 09:11

## 2021-11-28 RX ADMIN — INSULIN ASPART 8 UNITS: 100 INJECTION, SOLUTION INTRAVENOUS; SUBCUTANEOUS at 08:11

## 2021-11-28 RX ADMIN — SENNOSIDES AND DOCUSATE SODIUM 2 TABLET: 50; 8.6 TABLET ORAL at 09:11

## 2021-11-28 RX ADMIN — TORSEMIDE 10 MG: 10 TABLET ORAL at 09:11

## 2021-11-29 ENCOUNTER — TELEPHONE (OUTPATIENT)
Dept: PRIMARY CARE CLINIC | Facility: CLINIC | Age: 81
End: 2021-11-29
Payer: MEDICARE

## 2021-11-29 LAB
ANION GAP SERPL CALC-SCNC: 8 MMOL/L (ref 8–16)
BASOPHILS # BLD AUTO: 0.03 K/UL (ref 0–0.2)
BASOPHILS NFR BLD: 0.4 % (ref 0–1.9)
BUN SERPL-MCNC: 25 MG/DL (ref 8–23)
CALCIUM SERPL-MCNC: 9.1 MG/DL (ref 8.7–10.5)
CHLORIDE SERPL-SCNC: 104 MMOL/L (ref 95–110)
CK SERPL-CCNC: 47 U/L (ref 20–180)
CO2 SERPL-SCNC: 24 MMOL/L (ref 23–29)
CREAT SERPL-MCNC: 1.4 MG/DL (ref 0.5–1.4)
CRP SERPL-MCNC: 17 MG/L (ref 0–8.2)
DIFFERENTIAL METHOD: ABNORMAL
EOSINOPHIL # BLD AUTO: 0.4 K/UL (ref 0–0.5)
EOSINOPHIL NFR BLD: 4.8 % (ref 0–8)
ERYTHROCYTE [DISTWIDTH] IN BLOOD BY AUTOMATED COUNT: 15.4 % (ref 11.5–14.5)
ERYTHROCYTE [SEDIMENTATION RATE] IN BLOOD BY WESTERGREN METHOD: >120 MM/HR (ref 0–36)
EST. GFR  (AFRICAN AMERICAN): 41.2 ML/MIN/1.73 M^2
EST. GFR  (NON AFRICAN AMERICAN): 35.8 ML/MIN/1.73 M^2
GLUCOSE SERPL-MCNC: 81 MG/DL (ref 70–110)
HCT VFR BLD AUTO: 31.7 % (ref 37–48.5)
HGB BLD-MCNC: 9.7 G/DL (ref 12–16)
IMM GRANULOCYTES # BLD AUTO: 0.02 K/UL (ref 0–0.04)
IMM GRANULOCYTES NFR BLD AUTO: 0.3 % (ref 0–0.5)
LYMPHOCYTES # BLD AUTO: 1.8 K/UL (ref 1–4.8)
LYMPHOCYTES NFR BLD: 24.3 % (ref 18–48)
MAGNESIUM SERPL-MCNC: 1.8 MG/DL (ref 1.6–2.6)
MCH RBC QN AUTO: 28.2 PG (ref 27–31)
MCHC RBC AUTO-ENTMCNC: 30.6 G/DL (ref 32–36)
MCV RBC AUTO: 92 FL (ref 82–98)
MONOCYTES # BLD AUTO: 0.6 K/UL (ref 0.3–1)
MONOCYTES NFR BLD: 7.4 % (ref 4–15)
NEUTROPHILS # BLD AUTO: 4.7 K/UL (ref 1.8–7.7)
NEUTROPHILS NFR BLD: 62.8 % (ref 38–73)
NRBC BLD-RTO: 0 /100 WBC
PHOSPHATE SERPL-MCNC: 4.2 MG/DL (ref 2.7–4.5)
PLATELET # BLD AUTO: 138 K/UL (ref 150–450)
PMV BLD AUTO: 13.3 FL (ref 9.2–12.9)
POCT GLUCOSE: 102 MG/DL (ref 70–110)
POCT GLUCOSE: 138 MG/DL (ref 70–110)
POCT GLUCOSE: 179 MG/DL (ref 70–110)
POCT GLUCOSE: 187 MG/DL (ref 70–110)
POTASSIUM SERPL-SCNC: 4.1 MMOL/L (ref 3.5–5.1)
RBC # BLD AUTO: 3.44 M/UL (ref 4–5.4)
SODIUM SERPL-SCNC: 136 MMOL/L (ref 136–145)
WBC # BLD AUTO: 7.45 K/UL (ref 3.9–12.7)

## 2021-11-29 PROCEDURE — 97110 THERAPEUTIC EXERCISES: CPT | Mod: CO

## 2021-11-29 PROCEDURE — 97530 THERAPEUTIC ACTIVITIES: CPT

## 2021-11-29 PROCEDURE — 11000004 HC SNF PRIVATE

## 2021-11-29 PROCEDURE — 85652 RBC SED RATE AUTOMATED: CPT | Performed by: NURSE PRACTITIONER

## 2021-11-29 PROCEDURE — 36415 COLL VENOUS BLD VENIPUNCTURE: CPT | Performed by: INTERNAL MEDICINE

## 2021-11-29 PROCEDURE — 25000003 PHARM REV CODE 250: Performed by: NURSE PRACTITIONER

## 2021-11-29 PROCEDURE — 25000003 PHARM REV CODE 250: Performed by: INTERNAL MEDICINE

## 2021-11-29 PROCEDURE — 63600175 PHARM REV CODE 636 W HCPCS: Performed by: NURSE PRACTITIONER

## 2021-11-29 PROCEDURE — 97110 THERAPEUTIC EXERCISES: CPT

## 2021-11-29 PROCEDURE — 84100 ASSAY OF PHOSPHORUS: CPT | Performed by: NURSE PRACTITIONER

## 2021-11-29 PROCEDURE — 83735 ASSAY OF MAGNESIUM: CPT | Performed by: NURSE PRACTITIONER

## 2021-11-29 PROCEDURE — 82550 ASSAY OF CK (CPK): CPT | Performed by: NURSE PRACTITIONER

## 2021-11-29 PROCEDURE — A4216 STERILE WATER/SALINE, 10 ML: HCPCS | Performed by: INTERNAL MEDICINE

## 2021-11-29 PROCEDURE — 86140 C-REACTIVE PROTEIN: CPT | Performed by: INTERNAL MEDICINE

## 2021-11-29 PROCEDURE — 97535 SELF CARE MNGMENT TRAINING: CPT | Mod: CO

## 2021-11-29 PROCEDURE — 85025 COMPLETE CBC W/AUTO DIFF WBC: CPT | Performed by: NURSE PRACTITIONER

## 2021-11-29 PROCEDURE — 80048 BASIC METABOLIC PNL TOTAL CA: CPT | Performed by: NURSE PRACTITIONER

## 2021-11-29 RX ADMIN — Medication 10 ML: at 12:11

## 2021-11-29 RX ADMIN — DAPTOMYCIN 885 MG: 350 INJECTION, POWDER, LYOPHILIZED, FOR SOLUTION INTRAVENOUS at 07:11

## 2021-11-29 RX ADMIN — INSULIN ASPART 8 UNITS: 100 INJECTION, SOLUTION INTRAVENOUS; SUBCUTANEOUS at 11:11

## 2021-11-29 RX ADMIN — METHOCARBAMOL 500 MG: 500 TABLET ORAL at 02:11

## 2021-11-29 RX ADMIN — APIXABAN 2.5 MG: 2.5 TABLET, FILM COATED ORAL at 08:11

## 2021-11-29 RX ADMIN — METHOCARBAMOL 500 MG: 500 TABLET ORAL at 08:11

## 2021-11-29 RX ADMIN — Medication 10 ML: at 11:11

## 2021-11-29 RX ADMIN — INSULIN ASPART 8 UNITS: 100 INJECTION, SOLUTION INTRAVENOUS; SUBCUTANEOUS at 08:11

## 2021-11-29 RX ADMIN — SENNOSIDES AND DOCUSATE SODIUM 2 TABLET: 50; 8.6 TABLET ORAL at 08:11

## 2021-11-29 RX ADMIN — OXYCODONE HYDROCHLORIDE 10 MG: 10 TABLET ORAL at 08:11

## 2021-11-29 RX ADMIN — FLUTICASONE FUROATE AND VILANTEROL TRIFENATATE 1 PUFF: 200; 25 POWDER RESPIRATORY (INHALATION) at 08:11

## 2021-11-29 RX ADMIN — POLYETHYLENE GLYCOL 3350 17 G: 17 POWDER, FOR SOLUTION ORAL at 08:11

## 2021-11-29 RX ADMIN — Medication 10 ML: at 05:11

## 2021-11-29 RX ADMIN — AMLODIPINE BESYLATE 10 MG: 10 TABLET ORAL at 08:11

## 2021-11-29 RX ADMIN — ALLOPURINOL 100 MG: 100 TABLET ORAL at 08:11

## 2021-11-29 RX ADMIN — MELATONIN TAB 3 MG 6 MG: 3 TAB at 09:11

## 2021-11-29 RX ADMIN — Medication 1 CAPSULE: at 08:11

## 2021-11-29 RX ADMIN — INSULIN DETEMIR 14 UNITS: 100 INJECTION, SOLUTION SUBCUTANEOUS at 08:11

## 2021-11-29 RX ADMIN — TORSEMIDE 10 MG: 10 TABLET ORAL at 08:11

## 2021-11-29 RX ADMIN — INSULIN ASPART 8 UNITS: 100 INJECTION, SOLUTION INTRAVENOUS; SUBCUTANEOUS at 05:11

## 2021-11-30 ENCOUNTER — OFFICE VISIT (OUTPATIENT)
Dept: PODIATRY | Facility: CLINIC | Age: 81
DRG: 949 | End: 2021-11-30
Attending: HOSPITALIST
Payer: MEDICARE

## 2021-11-30 VITALS
SYSTOLIC BLOOD PRESSURE: 161 MMHG | BODY MASS INDEX: 48.31 KG/M2 | WEIGHT: 239.63 LBS | HEART RATE: 81 BPM | HEIGHT: 59 IN | DIASTOLIC BLOOD PRESSURE: 70 MMHG

## 2021-11-30 DIAGNOSIS — L84 CORN OR CALLUS: ICD-10-CM

## 2021-11-30 DIAGNOSIS — B35.1 ONYCHOMYCOSIS DUE TO DERMATOPHYTE: ICD-10-CM

## 2021-11-30 DIAGNOSIS — E11.51 TYPE II DIABETES MELLITUS WITH PERIPHERAL CIRCULATORY DISORDER: Primary | ICD-10-CM

## 2021-11-30 LAB
POCT GLUCOSE: 131 MG/DL (ref 70–110)
POCT GLUCOSE: 173 MG/DL (ref 70–110)
POCT GLUCOSE: 212 MG/DL (ref 70–110)
POCT GLUCOSE: 97 MG/DL (ref 70–110)

## 2021-11-30 PROCEDURE — 99214 OFFICE O/P EST MOD 30 MIN: CPT | Mod: PBBFAC | Performed by: PODIATRIST

## 2021-11-30 PROCEDURE — 25000003 PHARM REV CODE 250: Performed by: INTERNAL MEDICINE

## 2021-11-30 PROCEDURE — 99999 PR PBB SHADOW E&M-EST. PATIENT-LVL IV: ICD-10-PCS | Mod: PBBFAC,,, | Performed by: PODIATRIST

## 2021-11-30 PROCEDURE — 97110 THERAPEUTIC EXERCISES: CPT | Mod: CQ

## 2021-11-30 PROCEDURE — 11056 PR TRIM BENIGN HYPERKERATOTIC SKIN LESION,2-4: ICD-10-PCS | Mod: Q8,S$PBB,, | Performed by: PODIATRIST

## 2021-11-30 PROCEDURE — 97110 THERAPEUTIC EXERCISES: CPT | Mod: CO

## 2021-11-30 PROCEDURE — 11721 DEBRIDE NAIL 6 OR MORE: CPT | Mod: 59,Q8,S$PBB, | Performed by: PODIATRIST

## 2021-11-30 PROCEDURE — 63600175 PHARM REV CODE 636 W HCPCS: Performed by: NURSE PRACTITIONER

## 2021-11-30 PROCEDURE — 25000003 PHARM REV CODE 250: Performed by: NURSE PRACTITIONER

## 2021-11-30 PROCEDURE — 99999 PR PBB SHADOW E&M-EST. PATIENT-LVL IV: CPT | Mod: PBBFAC,,, | Performed by: PODIATRIST

## 2021-11-30 PROCEDURE — 97535 SELF CARE MNGMENT TRAINING: CPT | Mod: CO

## 2021-11-30 PROCEDURE — 97530 THERAPEUTIC ACTIVITIES: CPT | Mod: CQ

## 2021-11-30 PROCEDURE — A4216 STERILE WATER/SALINE, 10 ML: HCPCS | Performed by: INTERNAL MEDICINE

## 2021-11-30 PROCEDURE — 99499 UNLISTED E&M SERVICE: CPT | Mod: S$PBB,,, | Performed by: PODIATRIST

## 2021-11-30 PROCEDURE — 11721 PR DEBRIDEMENT OF NAILS, 6 OR MORE: ICD-10-PCS | Mod: 59,Q8,S$PBB, | Performed by: PODIATRIST

## 2021-11-30 PROCEDURE — 11056 PARNG/CUTG B9 HYPRKR LES 2-4: CPT | Mod: Q8,S$PBB,, | Performed by: PODIATRIST

## 2021-11-30 PROCEDURE — 11721 DEBRIDE NAIL 6 OR MORE: CPT | Mod: Q8,PBBFAC | Performed by: PODIATRIST

## 2021-11-30 PROCEDURE — 99499 NO LOS: ICD-10-PCS | Mod: S$PBB,,, | Performed by: PODIATRIST

## 2021-11-30 PROCEDURE — 11056 PARNG/CUTG B9 HYPRKR LES 2-4: CPT | Mod: Q8,59,PBBFAC | Performed by: PODIATRIST

## 2021-11-30 PROCEDURE — 11000004 HC SNF PRIVATE

## 2021-11-30 RX ADMIN — Medication 1 CAPSULE: at 08:11

## 2021-11-30 RX ADMIN — AMLODIPINE BESYLATE 10 MG: 10 TABLET ORAL at 08:11

## 2021-11-30 RX ADMIN — INSULIN ASPART 8 UNITS: 100 INJECTION, SOLUTION INTRAVENOUS; SUBCUTANEOUS at 08:11

## 2021-11-30 RX ADMIN — Medication 10 ML: at 07:11

## 2021-11-30 RX ADMIN — INSULIN DETEMIR 14 UNITS: 100 INJECTION, SOLUTION SUBCUTANEOUS at 08:11

## 2021-11-30 RX ADMIN — Medication 10 ML: at 06:11

## 2021-11-30 RX ADMIN — APIXABAN 2.5 MG: 2.5 TABLET, FILM COATED ORAL at 08:11

## 2021-11-30 RX ADMIN — MELATONIN TAB 3 MG 6 MG: 3 TAB at 08:11

## 2021-11-30 RX ADMIN — FLUTICASONE FUROATE AND VILANTEROL TRIFENATATE 1 PUFF: 200; 25 POWDER RESPIRATORY (INHALATION) at 08:11

## 2021-11-30 RX ADMIN — METHOCARBAMOL 500 MG: 500 TABLET ORAL at 09:11

## 2021-11-30 RX ADMIN — SENNOSIDES AND DOCUSATE SODIUM 2 TABLET: 50; 8.6 TABLET ORAL at 08:11

## 2021-11-30 RX ADMIN — METHOCARBAMOL 500 MG: 500 TABLET ORAL at 08:11

## 2021-11-30 RX ADMIN — INSULIN ASPART 2 UNITS: 100 INJECTION, SOLUTION INTRAVENOUS; SUBCUTANEOUS at 06:11

## 2021-11-30 RX ADMIN — Medication 10 ML: at 12:11

## 2021-11-30 RX ADMIN — INSULIN ASPART 8 UNITS: 100 INJECTION, SOLUTION INTRAVENOUS; SUBCUTANEOUS at 06:11

## 2021-11-30 RX ADMIN — DAPTOMYCIN 885 MG: 350 INJECTION, POWDER, LYOPHILIZED, FOR SOLUTION INTRAVENOUS at 07:11

## 2021-11-30 RX ADMIN — TORSEMIDE 10 MG: 10 TABLET ORAL at 08:11

## 2021-11-30 RX ADMIN — ALLOPURINOL 100 MG: 100 TABLET ORAL at 08:11

## 2021-12-01 LAB
FUNGUS SPEC CULT: NORMAL
POCT GLUCOSE: 100 MG/DL (ref 70–110)
POCT GLUCOSE: 119 MG/DL (ref 70–110)
POCT GLUCOSE: 120 MG/DL (ref 70–110)
POCT GLUCOSE: 170 MG/DL (ref 70–110)

## 2021-12-01 PROCEDURE — 97530 THERAPEUTIC ACTIVITIES: CPT | Mod: CQ

## 2021-12-01 PROCEDURE — 97110 THERAPEUTIC EXERCISES: CPT | Mod: CO

## 2021-12-01 PROCEDURE — 97535 SELF CARE MNGMENT TRAINING: CPT | Mod: CO

## 2021-12-01 PROCEDURE — A4216 STERILE WATER/SALINE, 10 ML: HCPCS | Performed by: INTERNAL MEDICINE

## 2021-12-01 PROCEDURE — 97110 THERAPEUTIC EXERCISES: CPT | Mod: CQ

## 2021-12-01 PROCEDURE — 25000003 PHARM REV CODE 250: Performed by: NURSE PRACTITIONER

## 2021-12-01 PROCEDURE — 25000003 PHARM REV CODE 250: Performed by: INTERNAL MEDICINE

## 2021-12-01 PROCEDURE — 63600175 PHARM REV CODE 636 W HCPCS: Performed by: NURSE PRACTITIONER

## 2021-12-01 PROCEDURE — 11000004 HC SNF PRIVATE

## 2021-12-01 RX ADMIN — Medication 10 ML: at 05:12

## 2021-12-01 RX ADMIN — Medication 1 CAPSULE: at 10:12

## 2021-12-01 RX ADMIN — SENNOSIDES AND DOCUSATE SODIUM 2 TABLET: 50; 8.6 TABLET ORAL at 10:12

## 2021-12-01 RX ADMIN — INSULIN ASPART 8 UNITS: 100 INJECTION, SOLUTION INTRAVENOUS; SUBCUTANEOUS at 09:12

## 2021-12-01 RX ADMIN — Medication 10 ML: at 12:12

## 2021-12-01 RX ADMIN — METHOCARBAMOL 500 MG: 500 TABLET ORAL at 03:12

## 2021-12-01 RX ADMIN — METHOCARBAMOL 500 MG: 500 TABLET ORAL at 09:12

## 2021-12-01 RX ADMIN — METHOCARBAMOL 500 MG: 500 TABLET ORAL at 10:12

## 2021-12-01 RX ADMIN — SENNOSIDES AND DOCUSATE SODIUM 2 TABLET: 50; 8.6 TABLET ORAL at 09:12

## 2021-12-01 RX ADMIN — AMLODIPINE BESYLATE 10 MG: 10 TABLET ORAL at 10:12

## 2021-12-01 RX ADMIN — Medication 10 ML: at 06:12

## 2021-12-01 RX ADMIN — INSULIN DETEMIR 14 UNITS: 100 INJECTION, SOLUTION SUBCUTANEOUS at 09:12

## 2021-12-01 RX ADMIN — INSULIN ASPART 8 UNITS: 100 INJECTION, SOLUTION INTRAVENOUS; SUBCUTANEOUS at 05:12

## 2021-12-01 RX ADMIN — APIXABAN 2.5 MG: 2.5 TABLET, FILM COATED ORAL at 10:12

## 2021-12-01 RX ADMIN — DAPTOMYCIN 885 MG: 350 INJECTION, POWDER, LYOPHILIZED, FOR SOLUTION INTRAVENOUS at 09:12

## 2021-12-01 RX ADMIN — TORSEMIDE 10 MG: 10 TABLET ORAL at 10:12

## 2021-12-01 RX ADMIN — Medication 10 ML: at 01:12

## 2021-12-01 RX ADMIN — INSULIN ASPART 8 UNITS: 100 INJECTION, SOLUTION INTRAVENOUS; SUBCUTANEOUS at 01:12

## 2021-12-01 RX ADMIN — MELATONIN TAB 3 MG 6 MG: 3 TAB at 09:12

## 2021-12-01 RX ADMIN — ALLOPURINOL 100 MG: 100 TABLET ORAL at 10:12

## 2021-12-01 RX ADMIN — APIXABAN 2.5 MG: 2.5 TABLET, FILM COATED ORAL at 09:12

## 2021-12-01 RX ADMIN — FLUTICASONE FUROATE AND VILANTEROL TRIFENATATE 1 PUFF: 200; 25 POWDER RESPIRATORY (INHALATION) at 10:12

## 2021-12-02 ENCOUNTER — TELEPHONE (OUTPATIENT)
Dept: PRIMARY CARE CLINIC | Facility: CLINIC | Age: 81
End: 2021-12-02
Payer: MEDICARE

## 2021-12-02 LAB
ANION GAP SERPL CALC-SCNC: 10 MMOL/L (ref 8–16)
BASOPHILS # BLD AUTO: 0.04 K/UL (ref 0–0.2)
BASOPHILS NFR BLD: 0.5 % (ref 0–1.9)
BUN SERPL-MCNC: 28 MG/DL (ref 8–23)
CALCIUM SERPL-MCNC: 9.6 MG/DL (ref 8.7–10.5)
CHLORIDE SERPL-SCNC: 105 MMOL/L (ref 95–110)
CO2 SERPL-SCNC: 22 MMOL/L (ref 23–29)
CREAT SERPL-MCNC: 1.8 MG/DL (ref 0.5–1.4)
DIFFERENTIAL METHOD: ABNORMAL
EOSINOPHIL # BLD AUTO: 0.4 K/UL (ref 0–0.5)
EOSINOPHIL NFR BLD: 4.6 % (ref 0–8)
ERYTHROCYTE [DISTWIDTH] IN BLOOD BY AUTOMATED COUNT: 15.5 % (ref 11.5–14.5)
EST. GFR  (AFRICAN AMERICAN): 30.4 ML/MIN/1.73 M^2
EST. GFR  (NON AFRICAN AMERICAN): 26.4 ML/MIN/1.73 M^2
GLUCOSE SERPL-MCNC: 85 MG/DL (ref 70–110)
HCT VFR BLD AUTO: 33.3 % (ref 37–48.5)
HGB BLD-MCNC: 10.2 G/DL (ref 12–16)
IMM GRANULOCYTES # BLD AUTO: 0.02 K/UL (ref 0–0.04)
IMM GRANULOCYTES NFR BLD AUTO: 0.2 % (ref 0–0.5)
LYMPHOCYTES # BLD AUTO: 2.6 K/UL (ref 1–4.8)
LYMPHOCYTES NFR BLD: 30.2 % (ref 18–48)
MAGNESIUM SERPL-MCNC: 2 MG/DL (ref 1.6–2.6)
MCH RBC QN AUTO: 28.7 PG (ref 27–31)
MCHC RBC AUTO-ENTMCNC: 30.6 G/DL (ref 32–36)
MCV RBC AUTO: 94 FL (ref 82–98)
MONOCYTES # BLD AUTO: 0.6 K/UL (ref 0.3–1)
MONOCYTES NFR BLD: 6.5 % (ref 4–15)
NEUTROPHILS # BLD AUTO: 5 K/UL (ref 1.8–7.7)
NEUTROPHILS NFR BLD: 58 % (ref 38–73)
NRBC BLD-RTO: 0 /100 WBC
PHOSPHATE SERPL-MCNC: 4.1 MG/DL (ref 2.7–4.5)
PLATELET # BLD AUTO: 174 K/UL (ref 150–450)
PMV BLD AUTO: ABNORMAL FL (ref 9.2–12.9)
POCT GLUCOSE: 100 MG/DL (ref 70–110)
POCT GLUCOSE: 127 MG/DL (ref 70–110)
POCT GLUCOSE: 171 MG/DL (ref 70–110)
POCT GLUCOSE: 217 MG/DL (ref 70–110)
POTASSIUM SERPL-SCNC: 4.3 MMOL/L (ref 3.5–5.1)
RBC # BLD AUTO: 3.55 M/UL (ref 4–5.4)
SODIUM SERPL-SCNC: 137 MMOL/L (ref 136–145)
WBC # BLD AUTO: 8.63 K/UL (ref 3.9–12.7)

## 2021-12-02 PROCEDURE — 11000004 HC SNF PRIVATE

## 2021-12-02 PROCEDURE — 97110 THERAPEUTIC EXERCISES: CPT | Mod: CQ

## 2021-12-02 PROCEDURE — 80048 BASIC METABOLIC PNL TOTAL CA: CPT | Performed by: NURSE PRACTITIONER

## 2021-12-02 PROCEDURE — 97110 THERAPEUTIC EXERCISES: CPT

## 2021-12-02 PROCEDURE — 97530 THERAPEUTIC ACTIVITIES: CPT

## 2021-12-02 PROCEDURE — 36415 COLL VENOUS BLD VENIPUNCTURE: CPT | Performed by: NURSE PRACTITIONER

## 2021-12-02 PROCEDURE — 84100 ASSAY OF PHOSPHORUS: CPT | Performed by: NURSE PRACTITIONER

## 2021-12-02 PROCEDURE — 83735 ASSAY OF MAGNESIUM: CPT | Performed by: NURSE PRACTITIONER

## 2021-12-02 PROCEDURE — 25000003 PHARM REV CODE 250: Performed by: INTERNAL MEDICINE

## 2021-12-02 PROCEDURE — 25000003 PHARM REV CODE 250: Performed by: NURSE PRACTITIONER

## 2021-12-02 PROCEDURE — 85025 COMPLETE CBC W/AUTO DIFF WBC: CPT | Performed by: NURSE PRACTITIONER

## 2021-12-02 PROCEDURE — A4216 STERILE WATER/SALINE, 10 ML: HCPCS | Performed by: INTERNAL MEDICINE

## 2021-12-02 PROCEDURE — 97535 SELF CARE MNGMENT TRAINING: CPT

## 2021-12-02 PROCEDURE — 97530 THERAPEUTIC ACTIVITIES: CPT | Mod: CQ

## 2021-12-02 RX ORDER — INSULIN ASPART 100 [IU]/ML
6 INJECTION, SOLUTION INTRAVENOUS; SUBCUTANEOUS
Status: DISCONTINUED | OUTPATIENT
Start: 2021-12-02 | End: 2021-12-10 | Stop reason: HOSPADM

## 2021-12-02 RX ORDER — POLYETHYLENE GLYCOL 3350 17 G/17G
17 POWDER, FOR SOLUTION ORAL EVERY OTHER DAY
Status: DISCONTINUED | OUTPATIENT
Start: 2021-12-04 | End: 2021-12-10 | Stop reason: HOSPADM

## 2021-12-02 RX ORDER — TORSEMIDE 10 MG/1
10 TABLET ORAL DAILY
Status: DISCONTINUED | OUTPATIENT
Start: 2021-12-05 | End: 2021-12-10 | Stop reason: HOSPADM

## 2021-12-02 RX ORDER — SODIUM BICARBONATE 650 MG/1
650 TABLET ORAL ONCE
Status: COMPLETED | OUTPATIENT
Start: 2021-12-02 | End: 2021-12-02

## 2021-12-02 RX ADMIN — APIXABAN 2.5 MG: 2.5 TABLET, FILM COATED ORAL at 09:12

## 2021-12-02 RX ADMIN — Medication 10 ML: at 12:12

## 2021-12-02 RX ADMIN — METHOCARBAMOL 500 MG: 500 TABLET ORAL at 02:12

## 2021-12-02 RX ADMIN — SENNOSIDES AND DOCUSATE SODIUM 2 TABLET: 50; 8.6 TABLET ORAL at 09:12

## 2021-12-02 RX ADMIN — SODIUM BICARBONATE 650 MG TABLET 650 MG: at 01:12

## 2021-12-02 RX ADMIN — Medication 10 ML: at 06:12

## 2021-12-02 RX ADMIN — METHOCARBAMOL 500 MG: 500 TABLET ORAL at 08:12

## 2021-12-02 RX ADMIN — POLYETHYLENE GLYCOL 3350 17 G: 17 POWDER, FOR SOLUTION ORAL at 08:12

## 2021-12-02 RX ADMIN — INSULIN DETEMIR 14 UNITS: 100 INJECTION, SOLUTION SUBCUTANEOUS at 08:12

## 2021-12-02 RX ADMIN — TORSEMIDE 10 MG: 10 TABLET ORAL at 08:12

## 2021-12-02 RX ADMIN — FLUTICASONE FUROATE AND VILANTEROL TRIFENATATE 1 PUFF: 200; 25 POWDER RESPIRATORY (INHALATION) at 08:12

## 2021-12-02 RX ADMIN — METHOCARBAMOL 500 MG: 500 TABLET ORAL at 09:12

## 2021-12-02 RX ADMIN — INSULIN ASPART 8 UNITS: 100 INJECTION, SOLUTION INTRAVENOUS; SUBCUTANEOUS at 08:12

## 2021-12-02 RX ADMIN — APIXABAN 2.5 MG: 2.5 TABLET, FILM COATED ORAL at 08:12

## 2021-12-02 RX ADMIN — MELATONIN TAB 3 MG 6 MG: 3 TAB at 09:12

## 2021-12-02 RX ADMIN — ALLOPURINOL 100 MG: 100 TABLET ORAL at 08:12

## 2021-12-02 RX ADMIN — INSULIN ASPART 6 UNITS: 100 INJECTION, SOLUTION INTRAVENOUS; SUBCUTANEOUS at 04:12

## 2021-12-02 RX ADMIN — SENNOSIDES AND DOCUSATE SODIUM 2 TABLET: 50; 8.6 TABLET ORAL at 08:12

## 2021-12-02 RX ADMIN — Medication 10 ML: at 11:12

## 2021-12-02 RX ADMIN — INSULIN ASPART 8 UNITS: 100 INJECTION, SOLUTION INTRAVENOUS; SUBCUTANEOUS at 11:12

## 2021-12-02 RX ADMIN — Medication 1 CAPSULE: at 08:12

## 2021-12-02 RX ADMIN — AMLODIPINE BESYLATE 10 MG: 10 TABLET ORAL at 08:12

## 2021-12-03 ENCOUNTER — TELEPHONE (OUTPATIENT)
Dept: PRIMARY CARE CLINIC | Facility: CLINIC | Age: 81
End: 2021-12-03
Payer: MEDICARE

## 2021-12-03 ENCOUNTER — TELEPHONE (OUTPATIENT)
Dept: INTERNAL MEDICINE | Facility: CLINIC | Age: 81
End: 2021-12-03
Payer: MEDICARE

## 2021-12-03 LAB
POCT GLUCOSE: 103 MG/DL (ref 70–110)
POCT GLUCOSE: 123 MG/DL (ref 70–110)
POCT GLUCOSE: 245 MG/DL (ref 70–110)
POCT GLUCOSE: 90 MG/DL (ref 70–110)

## 2021-12-03 PROCEDURE — 25000003 PHARM REV CODE 250: Performed by: NURSE PRACTITIONER

## 2021-12-03 PROCEDURE — 97110 THERAPEUTIC EXERCISES: CPT | Mod: CO

## 2021-12-03 PROCEDURE — A4216 STERILE WATER/SALINE, 10 ML: HCPCS | Performed by: INTERNAL MEDICINE

## 2021-12-03 PROCEDURE — 11000004 HC SNF PRIVATE

## 2021-12-03 PROCEDURE — 63600175 PHARM REV CODE 636 W HCPCS: Performed by: NURSE PRACTITIONER

## 2021-12-03 PROCEDURE — 97110 THERAPEUTIC EXERCISES: CPT | Mod: CQ

## 2021-12-03 PROCEDURE — 25000003 PHARM REV CODE 250: Performed by: INTERNAL MEDICINE

## 2021-12-03 RX ADMIN — METHOCARBAMOL 500 MG: 500 TABLET ORAL at 02:12

## 2021-12-03 RX ADMIN — INSULIN ASPART 6 UNITS: 100 INJECTION, SOLUTION INTRAVENOUS; SUBCUTANEOUS at 08:12

## 2021-12-03 RX ADMIN — AMLODIPINE BESYLATE 10 MG: 10 TABLET ORAL at 08:12

## 2021-12-03 RX ADMIN — SENNOSIDES AND DOCUSATE SODIUM 2 TABLET: 50; 8.6 TABLET ORAL at 08:12

## 2021-12-03 RX ADMIN — APIXABAN 2.5 MG: 2.5 TABLET, FILM COATED ORAL at 09:12

## 2021-12-03 RX ADMIN — ALLOPURINOL 100 MG: 100 TABLET ORAL at 08:12

## 2021-12-03 RX ADMIN — Medication 10 ML: at 11:12

## 2021-12-03 RX ADMIN — FLUTICASONE FUROATE AND VILANTEROL TRIFENATATE 1 PUFF: 200; 25 POWDER RESPIRATORY (INHALATION) at 08:12

## 2021-12-03 RX ADMIN — Medication 1 CAPSULE: at 08:12

## 2021-12-03 RX ADMIN — INSULIN ASPART 6 UNITS: 100 INJECTION, SOLUTION INTRAVENOUS; SUBCUTANEOUS at 05:12

## 2021-12-03 RX ADMIN — DAPTOMYCIN 885 MG: 350 INJECTION, POWDER, LYOPHILIZED, FOR SOLUTION INTRAVENOUS at 09:12

## 2021-12-03 RX ADMIN — METHOCARBAMOL 500 MG: 500 TABLET ORAL at 08:12

## 2021-12-03 RX ADMIN — Medication 10 ML: at 05:12

## 2021-12-03 RX ADMIN — APIXABAN 2.5 MG: 2.5 TABLET, FILM COATED ORAL at 08:12

## 2021-12-03 RX ADMIN — INSULIN DETEMIR 14 UNITS: 100 INJECTION, SOLUTION SUBCUTANEOUS at 08:12

## 2021-12-03 RX ADMIN — INSULIN ASPART 6 UNITS: 100 INJECTION, SOLUTION INTRAVENOUS; SUBCUTANEOUS at 11:12

## 2021-12-03 RX ADMIN — MELATONIN TAB 3 MG 6 MG: 3 TAB at 08:12

## 2021-12-04 LAB
POCT GLUCOSE: 115 MG/DL (ref 70–110)
POCT GLUCOSE: 159 MG/DL (ref 70–110)
POCT GLUCOSE: 159 MG/DL (ref 70–110)
POCT GLUCOSE: 98 MG/DL (ref 70–110)

## 2021-12-04 PROCEDURE — 11000004 HC SNF PRIVATE

## 2021-12-04 PROCEDURE — 25000003 PHARM REV CODE 250: Performed by: INTERNAL MEDICINE

## 2021-12-04 PROCEDURE — 25000003 PHARM REV CODE 250: Performed by: NURSE PRACTITIONER

## 2021-12-04 PROCEDURE — A4216 STERILE WATER/SALINE, 10 ML: HCPCS | Performed by: INTERNAL MEDICINE

## 2021-12-04 RX ADMIN — Medication 10 ML: at 06:12

## 2021-12-04 RX ADMIN — INSULIN DETEMIR 14 UNITS: 100 INJECTION, SOLUTION SUBCUTANEOUS at 10:12

## 2021-12-04 RX ADMIN — SENNOSIDES AND DOCUSATE SODIUM 2 TABLET: 50; 8.6 TABLET ORAL at 09:12

## 2021-12-04 RX ADMIN — INSULIN ASPART 6 UNITS: 100 INJECTION, SOLUTION INTRAVENOUS; SUBCUTANEOUS at 05:12

## 2021-12-04 RX ADMIN — INSULIN ASPART 6 UNITS: 100 INJECTION, SOLUTION INTRAVENOUS; SUBCUTANEOUS at 12:12

## 2021-12-04 RX ADMIN — APIXABAN 2.5 MG: 2.5 TABLET, FILM COATED ORAL at 09:12

## 2021-12-04 RX ADMIN — METHOCARBAMOL 500 MG: 500 TABLET ORAL at 10:12

## 2021-12-04 RX ADMIN — APIXABAN 2.5 MG: 2.5 TABLET, FILM COATED ORAL at 10:12

## 2021-12-04 RX ADMIN — METHOCARBAMOL 500 MG: 500 TABLET ORAL at 09:12

## 2021-12-04 RX ADMIN — ALLOPURINOL 100 MG: 100 TABLET ORAL at 10:12

## 2021-12-04 RX ADMIN — Medication 10 ML: at 05:12

## 2021-12-04 RX ADMIN — Medication 10 ML: at 12:12

## 2021-12-04 RX ADMIN — INSULIN ASPART 6 UNITS: 100 INJECTION, SOLUTION INTRAVENOUS; SUBCUTANEOUS at 10:12

## 2021-12-04 RX ADMIN — METHOCARBAMOL 500 MG: 500 TABLET ORAL at 02:12

## 2021-12-04 RX ADMIN — OXYCODONE HYDROCHLORIDE 10 MG: 10 TABLET ORAL at 07:12

## 2021-12-04 RX ADMIN — POLYETHYLENE GLYCOL 3350 17 G: 17 POWDER, FOR SOLUTION ORAL at 10:12

## 2021-12-04 RX ADMIN — AMLODIPINE BESYLATE 10 MG: 10 TABLET ORAL at 10:12

## 2021-12-04 RX ADMIN — Medication 1 CAPSULE: at 10:12

## 2021-12-04 RX ADMIN — MELATONIN TAB 3 MG 6 MG: 3 TAB at 09:12

## 2021-12-04 RX ADMIN — FLUTICASONE FUROATE AND VILANTEROL TRIFENATATE 1 PUFF: 200; 25 POWDER RESPIRATORY (INHALATION) at 10:12

## 2021-12-05 LAB
POCT GLUCOSE: 124 MG/DL (ref 70–110)
POCT GLUCOSE: 168 MG/DL (ref 70–110)
POCT GLUCOSE: 184 MG/DL (ref 70–110)
POCT GLUCOSE: 89 MG/DL (ref 70–110)

## 2021-12-05 PROCEDURE — 63600175 PHARM REV CODE 636 W HCPCS: Performed by: NURSE PRACTITIONER

## 2021-12-05 PROCEDURE — 25000003 PHARM REV CODE 250: Performed by: NURSE PRACTITIONER

## 2021-12-05 PROCEDURE — 11000004 HC SNF PRIVATE

## 2021-12-05 PROCEDURE — A4216 STERILE WATER/SALINE, 10 ML: HCPCS | Performed by: INTERNAL MEDICINE

## 2021-12-05 PROCEDURE — 25000003 PHARM REV CODE 250: Performed by: INTERNAL MEDICINE

## 2021-12-05 RX ADMIN — FLUTICASONE FUROATE AND VILANTEROL TRIFENATATE 1 PUFF: 200; 25 POWDER RESPIRATORY (INHALATION) at 09:12

## 2021-12-05 RX ADMIN — AMLODIPINE BESYLATE 10 MG: 10 TABLET ORAL at 09:12

## 2021-12-05 RX ADMIN — APIXABAN 2.5 MG: 2.5 TABLET, FILM COATED ORAL at 08:12

## 2021-12-05 RX ADMIN — MELATONIN TAB 3 MG 6 MG: 3 TAB at 09:12

## 2021-12-05 RX ADMIN — METHOCARBAMOL 500 MG: 500 TABLET ORAL at 03:12

## 2021-12-05 RX ADMIN — METHOCARBAMOL 500 MG: 500 TABLET ORAL at 08:12

## 2021-12-05 RX ADMIN — SENNOSIDES AND DOCUSATE SODIUM 2 TABLET: 50; 8.6 TABLET ORAL at 08:12

## 2021-12-05 RX ADMIN — ALLOPURINOL 100 MG: 100 TABLET ORAL at 09:12

## 2021-12-05 RX ADMIN — Medication 10 ML: at 12:12

## 2021-12-05 RX ADMIN — INSULIN ASPART 6 UNITS: 100 INJECTION, SOLUTION INTRAVENOUS; SUBCUTANEOUS at 09:12

## 2021-12-05 RX ADMIN — INSULIN ASPART 6 UNITS: 100 INJECTION, SOLUTION INTRAVENOUS; SUBCUTANEOUS at 05:12

## 2021-12-05 RX ADMIN — Medication 10 ML: at 06:12

## 2021-12-05 RX ADMIN — DAPTOMYCIN 885 MG: 350 INJECTION, POWDER, LYOPHILIZED, FOR SOLUTION INTRAVENOUS at 08:12

## 2021-12-05 RX ADMIN — TORSEMIDE 10 MG: 10 TABLET ORAL at 09:12

## 2021-12-05 RX ADMIN — Medication 10 ML: at 05:12

## 2021-12-05 RX ADMIN — SENNOSIDES AND DOCUSATE SODIUM 2 TABLET: 50; 8.6 TABLET ORAL at 09:12

## 2021-12-05 RX ADMIN — APIXABAN 2.5 MG: 2.5 TABLET, FILM COATED ORAL at 09:12

## 2021-12-05 RX ADMIN — POLYETHYLENE GLYCOL 3350 17 G: 17 POWDER, FOR SOLUTION ORAL at 03:12

## 2021-12-05 RX ADMIN — INSULIN DETEMIR 14 UNITS: 100 INJECTION, SOLUTION SUBCUTANEOUS at 09:12

## 2021-12-05 RX ADMIN — INSULIN ASPART 6 UNITS: 100 INJECTION, SOLUTION INTRAVENOUS; SUBCUTANEOUS at 12:12

## 2021-12-05 RX ADMIN — METHOCARBAMOL 500 MG: 500 TABLET ORAL at 09:12

## 2021-12-06 LAB
ANION GAP SERPL CALC-SCNC: 10 MMOL/L (ref 8–16)
BASOPHILS # BLD AUTO: 0.04 K/UL (ref 0–0.2)
BASOPHILS NFR BLD: 0.6 % (ref 0–1.9)
BUN SERPL-MCNC: 25 MG/DL (ref 8–23)
CALCIUM SERPL-MCNC: 9.6 MG/DL (ref 8.7–10.5)
CHLORIDE SERPL-SCNC: 106 MMOL/L (ref 95–110)
CK SERPL-CCNC: 36 U/L (ref 20–180)
CO2 SERPL-SCNC: 23 MMOL/L (ref 23–29)
CREAT SERPL-MCNC: 1.5 MG/DL (ref 0.5–1.4)
CRP SERPL-MCNC: 11.9 MG/L (ref 0–8.2)
DIFFERENTIAL METHOD: ABNORMAL
EOSINOPHIL # BLD AUTO: 0.3 K/UL (ref 0–0.5)
EOSINOPHIL NFR BLD: 4 % (ref 0–8)
ERYTHROCYTE [DISTWIDTH] IN BLOOD BY AUTOMATED COUNT: 16.2 % (ref 11.5–14.5)
ERYTHROCYTE [SEDIMENTATION RATE] IN BLOOD BY WESTERGREN METHOD: >120 MM/HR (ref 0–36)
EST. GFR  (AFRICAN AMERICAN): 37.9 ML/MIN/1.73 M^2
EST. GFR  (NON AFRICAN AMERICAN): 32.9 ML/MIN/1.73 M^2
GLUCOSE SERPL-MCNC: 123 MG/DL (ref 70–110)
HCT VFR BLD AUTO: 35.7 % (ref 37–48.5)
HGB BLD-MCNC: 10.7 G/DL (ref 12–16)
IMM GRANULOCYTES # BLD AUTO: 0.02 K/UL (ref 0–0.04)
IMM GRANULOCYTES NFR BLD AUTO: 0.3 % (ref 0–0.5)
LYMPHOCYTES # BLD AUTO: 1.5 K/UL (ref 1–4.8)
LYMPHOCYTES NFR BLD: 21.5 % (ref 18–48)
MAGNESIUM SERPL-MCNC: 2 MG/DL (ref 1.6–2.6)
MCH RBC QN AUTO: 28.8 PG (ref 27–31)
MCHC RBC AUTO-ENTMCNC: 30 G/DL (ref 32–36)
MCV RBC AUTO: 96 FL (ref 82–98)
MONOCYTES # BLD AUTO: 0.4 K/UL (ref 0.3–1)
MONOCYTES NFR BLD: 5.9 % (ref 4–15)
NEUTROPHILS # BLD AUTO: 4.8 K/UL (ref 1.8–7.7)
NEUTROPHILS NFR BLD: 67.7 % (ref 38–73)
NRBC BLD-RTO: 0 /100 WBC
PHOSPHATE SERPL-MCNC: 4 MG/DL (ref 2.7–4.5)
PLATELET # BLD AUTO: 195 K/UL (ref 150–450)
PMV BLD AUTO: 10.6 FL (ref 9.2–12.9)
POCT GLUCOSE: 107 MG/DL (ref 70–110)
POCT GLUCOSE: 144 MG/DL (ref 70–110)
POCT GLUCOSE: 194 MG/DL (ref 70–110)
POCT GLUCOSE: 208 MG/DL (ref 70–110)
POTASSIUM SERPL-SCNC: 4.1 MMOL/L (ref 3.5–5.1)
RBC # BLD AUTO: 3.72 M/UL (ref 4–5.4)
SODIUM SERPL-SCNC: 139 MMOL/L (ref 136–145)
WBC # BLD AUTO: 7.07 K/UL (ref 3.9–12.7)

## 2021-12-06 PROCEDURE — 86140 C-REACTIVE PROTEIN: CPT | Performed by: INTERNAL MEDICINE

## 2021-12-06 PROCEDURE — 97535 SELF CARE MNGMENT TRAINING: CPT | Mod: CO

## 2021-12-06 PROCEDURE — 63600175 PHARM REV CODE 636 W HCPCS: Performed by: NURSE PRACTITIONER

## 2021-12-06 PROCEDURE — 80048 BASIC METABOLIC PNL TOTAL CA: CPT | Performed by: NURSE PRACTITIONER

## 2021-12-06 PROCEDURE — 97110 THERAPEUTIC EXERCISES: CPT | Mod: CO

## 2021-12-06 PROCEDURE — 97116 GAIT TRAINING THERAPY: CPT

## 2021-12-06 PROCEDURE — 85652 RBC SED RATE AUTOMATED: CPT | Performed by: NURSE PRACTITIONER

## 2021-12-06 PROCEDURE — 97530 THERAPEUTIC ACTIVITIES: CPT

## 2021-12-06 PROCEDURE — 25000003 PHARM REV CODE 250: Performed by: INTERNAL MEDICINE

## 2021-12-06 PROCEDURE — 82550 ASSAY OF CK (CPK): CPT | Performed by: NURSE PRACTITIONER

## 2021-12-06 PROCEDURE — 25000003 PHARM REV CODE 250: Performed by: NURSE PRACTITIONER

## 2021-12-06 PROCEDURE — 11000004 HC SNF PRIVATE

## 2021-12-06 PROCEDURE — 97110 THERAPEUTIC EXERCISES: CPT

## 2021-12-06 PROCEDURE — 85025 COMPLETE CBC W/AUTO DIFF WBC: CPT | Performed by: NURSE PRACTITIONER

## 2021-12-06 PROCEDURE — C9399 UNCLASSIFIED DRUGS OR BIOLOG: HCPCS | Performed by: NURSE PRACTITIONER

## 2021-12-06 PROCEDURE — A4216 STERILE WATER/SALINE, 10 ML: HCPCS | Performed by: INTERNAL MEDICINE

## 2021-12-06 PROCEDURE — 84100 ASSAY OF PHOSPHORUS: CPT | Performed by: NURSE PRACTITIONER

## 2021-12-06 PROCEDURE — 36415 COLL VENOUS BLD VENIPUNCTURE: CPT | Performed by: INTERNAL MEDICINE

## 2021-12-06 PROCEDURE — 83735 ASSAY OF MAGNESIUM: CPT | Performed by: NURSE PRACTITIONER

## 2021-12-06 RX ADMIN — METHOCARBAMOL 500 MG: 500 TABLET ORAL at 03:12

## 2021-12-06 RX ADMIN — SENNOSIDES AND DOCUSATE SODIUM 2 TABLET: 50; 8.6 TABLET ORAL at 08:12

## 2021-12-06 RX ADMIN — METHOCARBAMOL 500 MG: 500 TABLET ORAL at 09:12

## 2021-12-06 RX ADMIN — ALLOPURINOL 100 MG: 100 TABLET ORAL at 08:12

## 2021-12-06 RX ADMIN — POLYETHYLENE GLYCOL 3350 17 G: 17 POWDER, FOR SOLUTION ORAL at 08:12

## 2021-12-06 RX ADMIN — Medication 10 ML: at 06:12

## 2021-12-06 RX ADMIN — SENNOSIDES AND DOCUSATE SODIUM 2 TABLET: 50; 8.6 TABLET ORAL at 09:12

## 2021-12-06 RX ADMIN — Medication 10 ML: at 11:12

## 2021-12-06 RX ADMIN — INSULIN ASPART 6 UNITS: 100 INJECTION, SOLUTION INTRAVENOUS; SUBCUTANEOUS at 05:12

## 2021-12-06 RX ADMIN — APIXABAN 2.5 MG: 2.5 TABLET, FILM COATED ORAL at 08:12

## 2021-12-06 RX ADMIN — INSULIN DETEMIR 14 UNITS: 100 INJECTION, SOLUTION SUBCUTANEOUS at 08:12

## 2021-12-06 RX ADMIN — METHOCARBAMOL 500 MG: 500 TABLET ORAL at 08:12

## 2021-12-06 RX ADMIN — INSULIN ASPART 6 UNITS: 100 INJECTION, SOLUTION INTRAVENOUS; SUBCUTANEOUS at 11:12

## 2021-12-06 RX ADMIN — AMLODIPINE BESYLATE 10 MG: 10 TABLET ORAL at 08:12

## 2021-12-06 RX ADMIN — APIXABAN 2.5 MG: 2.5 TABLET, FILM COATED ORAL at 09:12

## 2021-12-06 RX ADMIN — MELATONIN TAB 3 MG 6 MG: 3 TAB at 09:12

## 2021-12-06 RX ADMIN — OXYCODONE HYDROCHLORIDE 10 MG: 10 TABLET ORAL at 11:12

## 2021-12-06 RX ADMIN — FLUTICASONE FUROATE AND VILANTEROL TRIFENATATE 1 PUFF: 200; 25 POWDER RESPIRATORY (INHALATION) at 09:12

## 2021-12-06 RX ADMIN — TORSEMIDE 10 MG: 10 TABLET ORAL at 08:12

## 2021-12-06 RX ADMIN — Medication 10 ML: at 12:12

## 2021-12-06 RX ADMIN — Medication 1 CAPSULE: at 08:12

## 2021-12-06 RX ADMIN — INSULIN ASPART 6 UNITS: 100 INJECTION, SOLUTION INTRAVENOUS; SUBCUTANEOUS at 08:12

## 2021-12-07 LAB
POCT GLUCOSE: 104 MG/DL (ref 70–110)
POCT GLUCOSE: 158 MG/DL (ref 70–110)
POCT GLUCOSE: 185 MG/DL (ref 70–110)
POCT GLUCOSE: 87 MG/DL (ref 70–110)

## 2021-12-07 PROCEDURE — 11000004 HC SNF PRIVATE

## 2021-12-07 PROCEDURE — 97110 THERAPEUTIC EXERCISES: CPT | Mod: CO

## 2021-12-07 PROCEDURE — 97530 THERAPEUTIC ACTIVITIES: CPT | Mod: CQ

## 2021-12-07 PROCEDURE — 63600175 PHARM REV CODE 636 W HCPCS: Performed by: HOSPITALIST

## 2021-12-07 PROCEDURE — 97535 SELF CARE MNGMENT TRAINING: CPT | Mod: CO

## 2021-12-07 PROCEDURE — 97110 THERAPEUTIC EXERCISES: CPT | Mod: CQ

## 2021-12-07 PROCEDURE — 97530 THERAPEUTIC ACTIVITIES: CPT | Mod: CO

## 2021-12-07 PROCEDURE — 25000003 PHARM REV CODE 250: Performed by: NURSE PRACTITIONER

## 2021-12-07 PROCEDURE — A4216 STERILE WATER/SALINE, 10 ML: HCPCS | Performed by: INTERNAL MEDICINE

## 2021-12-07 PROCEDURE — 25000003 PHARM REV CODE 250: Performed by: HOSPITALIST

## 2021-12-07 PROCEDURE — 25000003 PHARM REV CODE 250: Performed by: INTERNAL MEDICINE

## 2021-12-07 RX ADMIN — DAPTOMYCIN 885 MG: 350 INJECTION, POWDER, LYOPHILIZED, FOR SOLUTION INTRAVENOUS at 11:12

## 2021-12-07 RX ADMIN — APIXABAN 2.5 MG: 2.5 TABLET, FILM COATED ORAL at 10:12

## 2021-12-07 RX ADMIN — MELATONIN TAB 3 MG 6 MG: 3 TAB at 10:12

## 2021-12-07 RX ADMIN — Medication 1 CAPSULE: at 08:12

## 2021-12-07 RX ADMIN — OXYCODONE HYDROCHLORIDE 10 MG: 10 TABLET ORAL at 05:12

## 2021-12-07 RX ADMIN — INSULIN ASPART 6 UNITS: 100 INJECTION, SOLUTION INTRAVENOUS; SUBCUTANEOUS at 08:12

## 2021-12-07 RX ADMIN — METHOCARBAMOL 500 MG: 500 TABLET ORAL at 03:12

## 2021-12-07 RX ADMIN — INSULIN ASPART 6 UNITS: 100 INJECTION, SOLUTION INTRAVENOUS; SUBCUTANEOUS at 11:12

## 2021-12-07 RX ADMIN — METHOCARBAMOL 500 MG: 500 TABLET ORAL at 10:12

## 2021-12-07 RX ADMIN — Medication 10 ML: at 12:12

## 2021-12-07 RX ADMIN — Medication 10 ML: at 06:12

## 2021-12-07 RX ADMIN — METHOCARBAMOL 500 MG: 500 TABLET ORAL at 08:12

## 2021-12-07 RX ADMIN — FLUTICASONE FUROATE AND VILANTEROL TRIFENATATE 1 PUFF: 200; 25 POWDER RESPIRATORY (INHALATION) at 09:12

## 2021-12-07 RX ADMIN — ALLOPURINOL 100 MG: 100 TABLET ORAL at 09:12

## 2021-12-07 RX ADMIN — INSULIN DETEMIR 14 UNITS: 100 INJECTION, SOLUTION SUBCUTANEOUS at 08:12

## 2021-12-07 RX ADMIN — SENNOSIDES AND DOCUSATE SODIUM 2 TABLET: 50; 8.6 TABLET ORAL at 10:12

## 2021-12-07 RX ADMIN — TORSEMIDE 10 MG: 10 TABLET ORAL at 08:12

## 2021-12-07 RX ADMIN — SENNOSIDES AND DOCUSATE SODIUM 2 TABLET: 50; 8.6 TABLET ORAL at 08:12

## 2021-12-07 RX ADMIN — APIXABAN 2.5 MG: 2.5 TABLET, FILM COATED ORAL at 08:12

## 2021-12-07 RX ADMIN — AMLODIPINE BESYLATE 10 MG: 10 TABLET ORAL at 08:12

## 2021-12-07 RX ADMIN — INSULIN ASPART 6 UNITS: 100 INJECTION, SOLUTION INTRAVENOUS; SUBCUTANEOUS at 04:12

## 2021-12-08 LAB
POCT GLUCOSE: 104 MG/DL (ref 70–110)
POCT GLUCOSE: 141 MG/DL (ref 70–110)
POCT GLUCOSE: 162 MG/DL (ref 70–110)
POCT GLUCOSE: 97 MG/DL (ref 70–110)

## 2021-12-08 PROCEDURE — 63600175 PHARM REV CODE 636 W HCPCS: Performed by: NURSE PRACTITIONER

## 2021-12-08 PROCEDURE — 25000003 PHARM REV CODE 250: Performed by: NURSE PRACTITIONER

## 2021-12-08 PROCEDURE — 11000004 HC SNF PRIVATE

## 2021-12-08 PROCEDURE — 97110 THERAPEUTIC EXERCISES: CPT | Mod: CQ

## 2021-12-08 PROCEDURE — 25000003 PHARM REV CODE 250: Performed by: INTERNAL MEDICINE

## 2021-12-08 PROCEDURE — A4216 STERILE WATER/SALINE, 10 ML: HCPCS | Performed by: INTERNAL MEDICINE

## 2021-12-08 PROCEDURE — 97110 THERAPEUTIC EXERCISES: CPT | Mod: CO

## 2021-12-08 PROCEDURE — 97535 SELF CARE MNGMENT TRAINING: CPT | Mod: CO

## 2021-12-08 RX ORDER — TORSEMIDE 10 MG/1
10 TABLET ORAL DAILY
Qty: 30 TABLET | Refills: 11
Start: 2021-12-09 | End: 2022-07-27 | Stop reason: SDUPTHER

## 2021-12-08 RX ORDER — POLYETHYLENE GLYCOL 3350 17 G/17G
17 POWDER, FOR SOLUTION ORAL DAILY
Refills: 0
Start: 2021-12-08 | End: 2023-10-12 | Stop reason: SDUPTHER

## 2021-12-08 RX ORDER — AMLODIPINE BESYLATE 10 MG/1
10 TABLET ORAL DAILY
Qty: 30 TABLET | Refills: 11
Start: 2021-12-09 | End: 2022-07-29 | Stop reason: DRUGHIGH

## 2021-12-08 RX ORDER — AMOXICILLIN 250 MG
2 CAPSULE ORAL DAILY
Start: 2021-12-08 | End: 2022-05-25

## 2021-12-08 RX ORDER — ALLOPURINOL 300 MG/1
300 TABLET ORAL DAILY
Qty: 30 TABLET | Refills: 5
Start: 2021-12-08 | End: 2022-01-07

## 2021-12-08 RX ORDER — OXYCODONE HYDROCHLORIDE 5 MG/1
5 TABLET ORAL EVERY 4 HOURS PRN
Qty: 30 TABLET | Refills: 0 | Status: SHIPPED | OUTPATIENT
Start: 2021-12-08 | End: 2022-05-25

## 2021-12-08 RX ADMIN — POLYETHYLENE GLYCOL 3350 17 G: 17 POWDER, FOR SOLUTION ORAL at 08:12

## 2021-12-08 RX ADMIN — Medication 10 ML: at 12:12

## 2021-12-08 RX ADMIN — METHOCARBAMOL 500 MG: 500 TABLET ORAL at 08:12

## 2021-12-08 RX ADMIN — ALLOPURINOL 100 MG: 100 TABLET ORAL at 08:12

## 2021-12-08 RX ADMIN — APIXABAN 2.5 MG: 2.5 TABLET, FILM COATED ORAL at 08:12

## 2021-12-08 RX ADMIN — INSULIN ASPART 6 UNITS: 100 INJECTION, SOLUTION INTRAVENOUS; SUBCUTANEOUS at 08:12

## 2021-12-08 RX ADMIN — MELATONIN TAB 3 MG 6 MG: 3 TAB at 10:12

## 2021-12-08 RX ADMIN — INSULIN ASPART 6 UNITS: 100 INJECTION, SOLUTION INTRAVENOUS; SUBCUTANEOUS at 05:12

## 2021-12-08 RX ADMIN — Medication 1 CAPSULE: at 08:12

## 2021-12-08 RX ADMIN — METHOCARBAMOL 500 MG: 500 TABLET ORAL at 09:12

## 2021-12-08 RX ADMIN — INSULIN DETEMIR 14 UNITS: 100 INJECTION, SOLUTION SUBCUTANEOUS at 08:12

## 2021-12-08 RX ADMIN — Medication 10 ML: at 08:12

## 2021-12-08 RX ADMIN — SENNOSIDES AND DOCUSATE SODIUM 2 TABLET: 50; 8.6 TABLET ORAL at 08:12

## 2021-12-08 RX ADMIN — AMLODIPINE BESYLATE 10 MG: 10 TABLET ORAL at 08:12

## 2021-12-08 RX ADMIN — Medication 10 ML: at 06:12

## 2021-12-08 RX ADMIN — DAPTOMYCIN 885 MG: 350 INJECTION, POWDER, LYOPHILIZED, FOR SOLUTION INTRAVENOUS at 10:12

## 2021-12-08 RX ADMIN — INSULIN ASPART 6 UNITS: 100 INJECTION, SOLUTION INTRAVENOUS; SUBCUTANEOUS at 12:12

## 2021-12-08 RX ADMIN — APIXABAN 2.5 MG: 2.5 TABLET, FILM COATED ORAL at 09:12

## 2021-12-08 RX ADMIN — METHOCARBAMOL 500 MG: 500 TABLET ORAL at 02:12

## 2021-12-08 RX ADMIN — SENNOSIDES AND DOCUSATE SODIUM 2 TABLET: 50; 8.6 TABLET ORAL at 09:12

## 2021-12-08 RX ADMIN — TORSEMIDE 10 MG: 10 TABLET ORAL at 08:12

## 2021-12-09 LAB
ANION GAP SERPL CALC-SCNC: 13 MMOL/L (ref 8–16)
BASOPHILS # BLD AUTO: 0.04 K/UL (ref 0–0.2)
BASOPHILS NFR BLD: 0.6 % (ref 0–1.9)
BUN SERPL-MCNC: 26 MG/DL (ref 8–23)
CALCIUM SERPL-MCNC: 9.4 MG/DL (ref 8.7–10.5)
CHLORIDE SERPL-SCNC: 107 MMOL/L (ref 95–110)
CO2 SERPL-SCNC: 20 MMOL/L (ref 23–29)
CREAT SERPL-MCNC: 1.6 MG/DL (ref 0.5–1.4)
DIFFERENTIAL METHOD: ABNORMAL
EOSINOPHIL # BLD AUTO: 0.3 K/UL (ref 0–0.5)
EOSINOPHIL NFR BLD: 4.3 % (ref 0–8)
ERYTHROCYTE [DISTWIDTH] IN BLOOD BY AUTOMATED COUNT: 15.6 % (ref 11.5–14.5)
EST. GFR  (AFRICAN AMERICAN): 35.1 ML/MIN/1.73 M^2
EST. GFR  (NON AFRICAN AMERICAN): 30.4 ML/MIN/1.73 M^2
GLUCOSE SERPL-MCNC: 97 MG/DL (ref 70–110)
HCT VFR BLD AUTO: 32.2 % (ref 37–48.5)
HGB BLD-MCNC: 9.8 G/DL (ref 12–16)
IMM GRANULOCYTES # BLD AUTO: 0.01 K/UL (ref 0–0.04)
IMM GRANULOCYTES NFR BLD AUTO: 0.1 % (ref 0–0.5)
LYMPHOCYTES # BLD AUTO: 2.1 K/UL (ref 1–4.8)
LYMPHOCYTES NFR BLD: 29.6 % (ref 18–48)
MAGNESIUM SERPL-MCNC: 1.9 MG/DL (ref 1.6–2.6)
MCH RBC QN AUTO: 28.7 PG (ref 27–31)
MCHC RBC AUTO-ENTMCNC: 30.4 G/DL (ref 32–36)
MCV RBC AUTO: 94 FL (ref 82–98)
MONOCYTES # BLD AUTO: 0.6 K/UL (ref 0.3–1)
MONOCYTES NFR BLD: 7.7 % (ref 4–15)
NEUTROPHILS # BLD AUTO: 4.2 K/UL (ref 1.8–7.7)
NEUTROPHILS NFR BLD: 57.7 % (ref 38–73)
NRBC BLD-RTO: 0 /100 WBC
PHOSPHATE SERPL-MCNC: 4 MG/DL (ref 2.7–4.5)
PLATELET # BLD AUTO: 178 K/UL (ref 150–450)
PMV BLD AUTO: 14.1 FL (ref 9.2–12.9)
POCT GLUCOSE: 118 MG/DL (ref 70–110)
POCT GLUCOSE: 125 MG/DL (ref 70–110)
POCT GLUCOSE: 157 MG/DL (ref 70–110)
POCT GLUCOSE: 169 MG/DL (ref 70–110)
POTASSIUM SERPL-SCNC: 4 MMOL/L (ref 3.5–5.1)
RBC # BLD AUTO: 3.42 M/UL (ref 4–5.4)
SARS-COV-2 RNA RESP QL NAA+PROBE: NOT DETECTED
SODIUM SERPL-SCNC: 140 MMOL/L (ref 136–145)
WBC # BLD AUTO: 7.24 K/UL (ref 3.9–12.7)

## 2021-12-09 PROCEDURE — 63600175 PHARM REV CODE 636 W HCPCS: Performed by: NURSE PRACTITIONER

## 2021-12-09 PROCEDURE — 97535 SELF CARE MNGMENT TRAINING: CPT

## 2021-12-09 PROCEDURE — 83735 ASSAY OF MAGNESIUM: CPT | Performed by: NURSE PRACTITIONER

## 2021-12-09 PROCEDURE — 97110 THERAPEUTIC EXERCISES: CPT

## 2021-12-09 PROCEDURE — 80048 BASIC METABOLIC PNL TOTAL CA: CPT | Performed by: NURSE PRACTITIONER

## 2021-12-09 PROCEDURE — A4216 STERILE WATER/SALINE, 10 ML: HCPCS | Performed by: INTERNAL MEDICINE

## 2021-12-09 PROCEDURE — 86580 TB INTRADERMAL TEST: CPT | Performed by: NURSE PRACTITIONER

## 2021-12-09 PROCEDURE — 25000242 PHARM REV CODE 250 ALT 637 W/ HCPCS: Performed by: INTERNAL MEDICINE

## 2021-12-09 PROCEDURE — 36415 COLL VENOUS BLD VENIPUNCTURE: CPT | Performed by: NURSE PRACTITIONER

## 2021-12-09 PROCEDURE — 25000003 PHARM REV CODE 250: Performed by: NURSE PRACTITIONER

## 2021-12-09 PROCEDURE — U0005 INFEC AGEN DETEC AMPLI PROBE: HCPCS | Performed by: NURSE PRACTITIONER

## 2021-12-09 PROCEDURE — 30200315 PPD INTRADERMAL TEST REV CODE 302: Performed by: NURSE PRACTITIONER

## 2021-12-09 PROCEDURE — 11000004 HC SNF PRIVATE

## 2021-12-09 PROCEDURE — 97530 THERAPEUTIC ACTIVITIES: CPT

## 2021-12-09 PROCEDURE — U0003 INFECTIOUS AGENT DETECTION BY NUCLEIC ACID (DNA OR RNA); SEVERE ACUTE RESPIRATORY SYNDROME CORONAVIRUS 2 (SARS-COV-2) (CORONAVIRUS DISEASE [COVID-19]), AMPLIFIED PROBE TECHNIQUE, MAKING USE OF HIGH THROUGHPUT TECHNOLOGIES AS DESCRIBED BY CMS-2020-01-R: HCPCS | Performed by: NURSE PRACTITIONER

## 2021-12-09 PROCEDURE — 25000003 PHARM REV CODE 250: Performed by: INTERNAL MEDICINE

## 2021-12-09 PROCEDURE — 84100 ASSAY OF PHOSPHORUS: CPT | Performed by: NURSE PRACTITIONER

## 2021-12-09 PROCEDURE — 85025 COMPLETE CBC W/AUTO DIFF WBC: CPT | Performed by: NURSE PRACTITIONER

## 2021-12-09 RX ORDER — SODIUM BICARBONATE 650 MG/1
650 TABLET ORAL ONCE
Status: COMPLETED | OUTPATIENT
Start: 2021-12-09 | End: 2021-12-09

## 2021-12-09 RX ADMIN — ALLOPURINOL 100 MG: 100 TABLET ORAL at 08:12

## 2021-12-09 RX ADMIN — DAPTOMYCIN 885 MG: 350 INJECTION, POWDER, LYOPHILIZED, FOR SOLUTION INTRAVENOUS at 09:12

## 2021-12-09 RX ADMIN — Medication 10 ML: at 12:12

## 2021-12-09 RX ADMIN — SODIUM BICARBONATE 650 MG TABLET 650 MG: at 02:12

## 2021-12-09 RX ADMIN — METHOCARBAMOL 500 MG: 500 TABLET ORAL at 02:12

## 2021-12-09 RX ADMIN — SENNOSIDES AND DOCUSATE SODIUM 2 TABLET: 50; 8.6 TABLET ORAL at 09:12

## 2021-12-09 RX ADMIN — Medication 1 CAPSULE: at 08:12

## 2021-12-09 RX ADMIN — MELATONIN TAB 3 MG 6 MG: 3 TAB at 09:12

## 2021-12-09 RX ADMIN — AMLODIPINE BESYLATE 10 MG: 10 TABLET ORAL at 08:12

## 2021-12-09 RX ADMIN — Medication 10 ML: at 05:12

## 2021-12-09 RX ADMIN — TORSEMIDE 10 MG: 10 TABLET ORAL at 08:12

## 2021-12-09 RX ADMIN — INSULIN ASPART 6 UNITS: 100 INJECTION, SOLUTION INTRAVENOUS; SUBCUTANEOUS at 12:12

## 2021-12-09 RX ADMIN — INSULIN DETEMIR 14 UNITS: 100 INJECTION, SOLUTION SUBCUTANEOUS at 08:12

## 2021-12-09 RX ADMIN — APIXABAN 2.5 MG: 2.5 TABLET, FILM COATED ORAL at 08:12

## 2021-12-09 RX ADMIN — FLUTICASONE FUROATE AND VILANTEROL TRIFENATATE 1 PUFF: 200; 25 POWDER RESPIRATORY (INHALATION) at 08:12

## 2021-12-09 RX ADMIN — APIXABAN 2.5 MG: 2.5 TABLET, FILM COATED ORAL at 09:12

## 2021-12-09 RX ADMIN — SENNOSIDES AND DOCUSATE SODIUM 2 TABLET: 50; 8.6 TABLET ORAL at 08:12

## 2021-12-09 RX ADMIN — METHOCARBAMOL 500 MG: 500 TABLET ORAL at 09:12

## 2021-12-09 RX ADMIN — INSULIN ASPART 6 UNITS: 100 INJECTION, SOLUTION INTRAVENOUS; SUBCUTANEOUS at 08:12

## 2021-12-09 RX ADMIN — INSULIN ASPART 6 UNITS: 100 INJECTION, SOLUTION INTRAVENOUS; SUBCUTANEOUS at 05:12

## 2021-12-09 RX ADMIN — TUBERCULIN PURIFIED PROTEIN DERIVATIVE 5 UNITS: 5 INJECTION, SOLUTION INTRADERMAL at 09:12

## 2021-12-09 RX ADMIN — METHOCARBAMOL 500 MG: 500 TABLET ORAL at 08:12

## 2021-12-10 VITALS
DIASTOLIC BLOOD PRESSURE: 68 MMHG | RESPIRATION RATE: 19 BRPM | HEART RATE: 69 BPM | HEIGHT: 59 IN | OXYGEN SATURATION: 97 % | WEIGHT: 225.75 LBS | TEMPERATURE: 98 F | SYSTOLIC BLOOD PRESSURE: 126 MMHG | BODY MASS INDEX: 45.51 KG/M2

## 2021-12-10 LAB
POCT GLUCOSE: 105 MG/DL (ref 70–110)
POCT GLUCOSE: 257 MG/DL (ref 70–110)
POCT GLUCOSE: 97 MG/DL (ref 70–110)

## 2021-12-10 PROCEDURE — 25000242 PHARM REV CODE 250 ALT 637 W/ HCPCS: Performed by: INTERNAL MEDICINE

## 2021-12-10 PROCEDURE — A4216 STERILE WATER/SALINE, 10 ML: HCPCS | Performed by: INTERNAL MEDICINE

## 2021-12-10 PROCEDURE — 25000003 PHARM REV CODE 250: Performed by: NURSE PRACTITIONER

## 2021-12-10 PROCEDURE — 25000003 PHARM REV CODE 250: Performed by: INTERNAL MEDICINE

## 2021-12-10 RX ADMIN — SENNOSIDES AND DOCUSATE SODIUM 2 TABLET: 50; 8.6 TABLET ORAL at 09:12

## 2021-12-10 RX ADMIN — INSULIN ASPART 3 UNITS: 100 INJECTION, SOLUTION INTRAVENOUS; SUBCUTANEOUS at 12:12

## 2021-12-10 RX ADMIN — METHOCARBAMOL 500 MG: 500 TABLET ORAL at 09:12

## 2021-12-10 RX ADMIN — AMLODIPINE BESYLATE 10 MG: 10 TABLET ORAL at 09:12

## 2021-12-10 RX ADMIN — Medication 10 ML: at 06:12

## 2021-12-10 RX ADMIN — ALLOPURINOL 100 MG: 100 TABLET ORAL at 09:12

## 2021-12-10 RX ADMIN — POLYETHYLENE GLYCOL 3350 17 G: 17 POWDER, FOR SOLUTION ORAL at 09:12

## 2021-12-10 RX ADMIN — METHOCARBAMOL 500 MG: 500 TABLET ORAL at 03:12

## 2021-12-10 RX ADMIN — FLUTICASONE FUROATE AND VILANTEROL TRIFENATATE 1 PUFF: 200; 25 POWDER RESPIRATORY (INHALATION) at 09:12

## 2021-12-10 RX ADMIN — INSULIN DETEMIR 14 UNITS: 100 INJECTION, SOLUTION SUBCUTANEOUS at 09:12

## 2021-12-10 RX ADMIN — TORSEMIDE 10 MG: 10 TABLET ORAL at 09:12

## 2021-12-10 RX ADMIN — INSULIN ASPART 6 UNITS: 100 INJECTION, SOLUTION INTRAVENOUS; SUBCUTANEOUS at 09:12

## 2021-12-10 RX ADMIN — Medication 1 CAPSULE: at 09:12

## 2021-12-10 RX ADMIN — INSULIN ASPART 6 UNITS: 100 INJECTION, SOLUTION INTRAVENOUS; SUBCUTANEOUS at 04:12

## 2021-12-10 RX ADMIN — Medication 10 ML: at 12:12

## 2021-12-10 RX ADMIN — OXYCODONE HYDROCHLORIDE 10 MG: 10 TABLET ORAL at 01:12

## 2021-12-10 RX ADMIN — APIXABAN 2.5 MG: 2.5 TABLET, FILM COATED ORAL at 09:12

## 2021-12-10 RX ADMIN — INSULIN ASPART 6 UNITS: 100 INJECTION, SOLUTION INTRAVENOUS; SUBCUTANEOUS at 12:12

## 2021-12-13 LAB
ACID FAST MOD KINY STN SPEC: NORMAL
MYCOBACTERIUM SPEC QL CULT: NORMAL

## 2021-12-14 NOTE — TELEPHONE ENCOUNTER
Please let patient know that her labs show she does NOT have an infection. She should continue all her meds, with the increased diuretic (taken in the afternoon) for the next 3 days.    She also needs to elevate her legs, and use her CPAP as much as possible on days she is SOB or has extra leg swelling.    How is she doing?    Thanks,  BARB   negative... 58 year old female with no significant PMH who is S/P Left peroneal tendon repair 08/06/21, with infection and non closure of surgical site referred from wound care center for admission, Abx treatment and wound debridement    Cont pt on vancomycin/zosyn, plan for OR repair by Podiatry in next 1-2 days.  MRI shows signs suggestive of osteomyelitis.  Pt has had multiple recurring infections after the tendon repair in August.  Started on probiotic while on abx.  ID consulted, will cont to f/u on recs.  Will f/u cultures.    Gabino Javed, Attending Physician

## 2021-12-15 ENCOUNTER — TELEPHONE (OUTPATIENT)
Dept: ORTHOPEDICS | Facility: CLINIC | Age: 81
End: 2021-12-15
Payer: MEDICARE

## 2021-12-16 LAB
ACID FAST MOD KINY STN SPEC: NORMAL
MYCOBACTERIUM SPEC QL CULT: NORMAL

## 2021-12-20 ENCOUNTER — OFFICE VISIT (OUTPATIENT)
Dept: ORTHOPEDICS | Facility: CLINIC | Age: 81
End: 2021-12-20
Payer: MEDICARE

## 2021-12-20 VITALS — HEIGHT: 59 IN | WEIGHT: 225 LBS | BODY MASS INDEX: 45.36 KG/M2

## 2021-12-20 DIAGNOSIS — T84.53XS INFECTION OF TOTAL RIGHT KNEE REPLACEMENT, SEQUELA: Primary | ICD-10-CM

## 2021-12-20 PROCEDURE — 99024 POSTOP FOLLOW-UP VISIT: CPT | Mod: POP,,, | Performed by: ORTHOPAEDIC SURGERY

## 2021-12-20 PROCEDURE — 99024 PR POST-OP FOLLOW-UP VISIT: ICD-10-PCS | Mod: POP,,, | Performed by: ORTHOPAEDIC SURGERY

## 2021-12-20 PROCEDURE — 99999 PR PBB SHADOW E&M-EST. PATIENT-LVL II: ICD-10-PCS | Mod: PBBFAC,,, | Performed by: ORTHOPAEDIC SURGERY

## 2021-12-20 PROCEDURE — 99999 PR PBB SHADOW E&M-EST. PATIENT-LVL II: CPT | Mod: PBBFAC,,, | Performed by: ORTHOPAEDIC SURGERY

## 2021-12-20 PROCEDURE — 99212 OFFICE O/P EST SF 10 MIN: CPT | Mod: PBBFAC | Performed by: ORTHOPAEDIC SURGERY

## 2021-12-21 ENCOUNTER — DOCUMENT SCAN (OUTPATIENT)
Dept: HOME HEALTH SERVICES | Facility: HOSPITAL | Age: 81
End: 2021-12-21
Payer: MEDICARE

## 2021-12-27 ENCOUNTER — TELEPHONE (OUTPATIENT)
Dept: INFECTIOUS DISEASES | Facility: CLINIC | Age: 81
End: 2021-12-27
Payer: MEDICARE

## 2021-12-29 ENCOUNTER — PATIENT OUTREACH (OUTPATIENT)
Dept: ADMINISTRATIVE | Facility: OTHER | Age: 81
End: 2021-12-29
Payer: MEDICARE

## 2021-12-29 DIAGNOSIS — T84.53XS INFECTION OF TOTAL RIGHT KNEE REPLACEMENT, SEQUELA: Primary | ICD-10-CM

## 2022-01-03 ENCOUNTER — OFFICE VISIT (OUTPATIENT)
Dept: ORTHOPEDICS | Facility: CLINIC | Age: 82
End: 2022-01-03
Payer: MEDICARE

## 2022-01-03 ENCOUNTER — OFFICE VISIT (OUTPATIENT)
Dept: INFECTIOUS DISEASES | Facility: CLINIC | Age: 82
End: 2022-01-03
Payer: MEDICARE

## 2022-01-03 ENCOUNTER — PATIENT MESSAGE (OUTPATIENT)
Dept: ADMINISTRATIVE | Facility: OTHER | Age: 82
End: 2022-01-03
Payer: MEDICARE

## 2022-01-03 ENCOUNTER — HOSPITAL ENCOUNTER (OUTPATIENT)
Dept: RADIOLOGY | Facility: HOSPITAL | Age: 82
Discharge: HOME | End: 2022-01-03
Attending: PHYSICIAN ASSISTANT
Payer: MEDICARE

## 2022-01-03 VITALS — WEIGHT: 225 LBS | HEIGHT: 59 IN | BODY MASS INDEX: 45.36 KG/M2

## 2022-01-03 DIAGNOSIS — T84.012A FAILED TOTAL KNEE, RIGHT, INITIAL ENCOUNTER: Primary | ICD-10-CM

## 2022-01-03 DIAGNOSIS — T84.53XS INFECTION OF TOTAL RIGHT KNEE REPLACEMENT, SEQUELA: ICD-10-CM

## 2022-01-03 DIAGNOSIS — L03.115 CELLULITIS OF RIGHT LOWER EXTREMITY: ICD-10-CM

## 2022-01-03 DIAGNOSIS — M79.601 PAIN IN RIGHT ARM: ICD-10-CM

## 2022-01-03 DIAGNOSIS — M79.89 SWELLING OF RIGHT UPPER EXTREMITY: ICD-10-CM

## 2022-01-03 DIAGNOSIS — L03.115 CELLULITIS OF RIGHT LOWER EXTREMITY: Primary | ICD-10-CM

## 2022-01-03 DIAGNOSIS — T84.012A FAILED TOTAL KNEE, RIGHT, INITIAL ENCOUNTER: ICD-10-CM

## 2022-01-03 PROCEDURE — 99214 OFFICE O/P EST MOD 30 MIN: CPT | Mod: S$PBB,,, | Performed by: STUDENT IN AN ORGANIZED HEALTH CARE EDUCATION/TRAINING PROGRAM

## 2022-01-03 PROCEDURE — 99213 OFFICE O/P EST LOW 20 MIN: CPT | Mod: PBBFAC,25 | Performed by: PHYSICIAN ASSISTANT

## 2022-01-03 PROCEDURE — 99214 PR OFFICE/OUTPT VISIT, EST, LEVL IV, 30-39 MIN: ICD-10-PCS | Mod: S$PBB,,, | Performed by: STUDENT IN AN ORGANIZED HEALTH CARE EDUCATION/TRAINING PROGRAM

## 2022-01-03 PROCEDURE — 99999 PR PBB SHADOW E&M-EST. PATIENT-LVL I: ICD-10-PCS | Mod: PBBFAC,,, | Performed by: STUDENT IN AN ORGANIZED HEALTH CARE EDUCATION/TRAINING PROGRAM

## 2022-01-03 PROCEDURE — 99024 PR POST-OP FOLLOW-UP VISIT: ICD-10-PCS | Mod: POP,,, | Performed by: PHYSICIAN ASSISTANT

## 2022-01-03 PROCEDURE — 99999 PR PBB SHADOW E&M-EST. PATIENT-LVL III: CPT | Mod: PBBFAC,,, | Performed by: PHYSICIAN ASSISTANT

## 2022-01-03 PROCEDURE — 99999 PR PBB SHADOW E&M-EST. PATIENT-LVL I: CPT | Mod: PBBFAC,,, | Performed by: STUDENT IN AN ORGANIZED HEALTH CARE EDUCATION/TRAINING PROGRAM

## 2022-01-03 PROCEDURE — 99024 POSTOP FOLLOW-UP VISIT: CPT | Mod: POP,,, | Performed by: PHYSICIAN ASSISTANT

## 2022-01-03 PROCEDURE — 99211 OFF/OP EST MAY X REQ PHY/QHP: CPT | Mod: PBBFAC | Performed by: STUDENT IN AN ORGANIZED HEALTH CARE EDUCATION/TRAINING PROGRAM

## 2022-01-03 PROCEDURE — 99999 PR PBB SHADOW E&M-EST. PATIENT-LVL III: ICD-10-PCS | Mod: PBBFAC,,, | Performed by: PHYSICIAN ASSISTANT

## 2022-01-03 RX ORDER — DOXYCYCLINE HYCLATE 100 MG
100 TABLET ORAL 2 TIMES DAILY
Qty: 28 TABLET | Refills: 0 | Status: SHIPPED | OUTPATIENT
Start: 2022-01-03 | End: 2022-01-17

## 2022-01-03 NOTE — PROGRESS NOTES
INFECTIOUS DISEASE CLINIC  01/03/2022     Subjective:      Chief Complaint:   Chief Complaint   Patient presents with    Erythema       History of Present Illness:    This is a 81 y.o. female with chronic bilateral lymphedema, R TKA 2013 c/b R PJI s/p I&D, hardware removal and abx spacer placement 10/28 - cx with Slug, s/p 6w of daptomycin IV (completed 12/9)  who is referred to my clinic for RLE cellulitis.  Patient is new to me. Patient known to ID, please see prior notes for full details.     Pt reported 2-3 d history or worsening blister of RLE with associated pain, warm, and worsening erythema. Pt reports her legs have always been swollen, but noticed that it worsened recently. Denies fevers or chills. Started keflex at her NH ([facility]) yesterday. Also reports 1d hx of RUE swelling. Prior PICC removed at [facility] last month.     Review of Systems   Constitutional: Negative for chills and fever.   Skin: Positive for rash.   Musculoskeletal: Positive for myalgias.   All other systems reviewed and are negative.        Past Medical History:   Diagnosis Date    Adrenal mass- adenoma stable since 2004 (1.8 cm and 8/13/12 (2 cm); stable 2016 2.4 cm     Adrenal mass- adenoma stable since 2004 (1.8 cm and 8/13/12 (2 cm); stable 2016 2.4 cm    Arthritis     Asthma in adult without complication 6/25/2015    Bilateral carotid artery disease 7/21/2017    Bilateral sciatica 2/7/2017    Cellulitis of right leg 08/16/2017    Cerebral infarction 1/29/2016    Multiple areas of lacunar infarction. Stroke risk factors include HTN, DM2, Dyslipidemia.  Continue ASA 81mg/ Statin therapy I have encouraged 30 minutes of physical activity daily for 5 days a week. She has access to a pool so this should not be so jarring to her joints.     Cervical radiculopathy 3/18/2015    Chronic knee pain     Chronic rhinitis 4/9/2013    CKD (chronic kidney disease) stage 3, GFR 30-59 ml/min 1/29/2013    Coronary artery disease due to  calcified coronary lesion 6/25/2015    Diastolic dysfunction 10/10/2013    Essential hypertension 6/25/2015    Gastroesophageal reflux disease without esophagitis 8/13/2012    Gout     Hives 10/1/2013    Mixed hyperlipidemia 8/13/2012    Morbid obesity with BMI of 50.0-59.9, adult 2/11/2014    Obstructive sleep apnea syndrome 8/13/2012    Senile cataracts of both eyes 1/22/2015    Traumatic open wound of right lower leg 8/25/2017    Trigeminal neuralgia of right side of face 5/23/2017    For years now Previously on Gabapentin, but caused constipation Dissipating over time Not related to intracranial abnormalities Possibly related to dental procedure    Type 2 diabetes mellitus with diabetic polyneuropathy, with long-term current use of insulin 1/29/2013    Venous stasis dermatitis of both lower extremities 8/21/2017    Vitamin D deficiency disease 8/13/2012     Past Surgical History:   Procedure Laterality Date    HYSTERECTOMY  1982    secondary uterine fibroids    INJECTION OF ANESTHETIC AGENT AROUND NERVE Right 10/21/2021    Procedure: BLOCK, NERVE GENICULAR RIGHT NEEDS CONSENT;  Surgeon: Senia Kilpatrick MD;  Location: [facility] PAIN [facility]GT;  Service: Pain Management;  Laterality: Right;    INSERTION OF ANTIBIOTIC SPACER Right 10/28/2021    Procedure: INSERTION, ANTIBIOTIC SPACER;  Surgeon: Alfredo Lozoya MD;  Location: [facility] OR [address] [place] [address];  Service: Orthopedics;  Laterality: Right;    JOINT REPLACEMENT      REVISION OF KNEE ARTHROPLASTY Right 10/28/2021    Procedure: REVISION, ARTHROPLASTY, KNEE-DEPUY ANTIBIOTIC SPACER;  Surgeon: Alfredo Lozoya MD;  [facility];  Service: Orthopedics;  Laterality: Right;    Right total knee replacement       Family History   Problem Relation Age of Onset    Cancer Mother 69        cancer of jaw    Hypertension Father     Cancer Sister         half sister - unknown dx    No Known Problems Brother     No Known Problems Maternal Aunt     No  Known Problems Maternal Uncle     No Known Problems Paternal Aunt     No Known Problems Paternal Uncle     No Known Problems Maternal Grandmother     No Known Problems Maternal Grandfather     No Known Problems Paternal Grandmother     No Known Problems Paternal Grandfather     Glaucoma Neg Hx     Breast cancer Neg Hx     Colon cancer Neg Hx     Ovarian cancer Neg Hx     Stroke Neg Hx     Allergic rhinitis Neg Hx     Allergies Neg Hx     Angioedema Neg Hx     Asthma Neg Hx     Atopy Neg Hx     Eczema Neg Hx     Immunodeficiency Neg Hx     Rhinitis Neg Hx     Urticaria Neg Hx     Amblyopia Neg Hx     Blindness Neg Hx     Cataracts Neg Hx     Diabetes Neg Hx     Macular degeneration Neg Hx     Retinal detachment Neg Hx     Strabismus Neg Hx     Thyroid disease Neg Hx     Psoriasis Neg Hx      Social History     Tobacco Use    Smoking status: Never Smoker    Smokeless tobacco: Never Used   Substance Use Topics    Alcohol use: No    Drug use: No       Review of patient's allergies indicates:   Allergen Reactions    Bactrim [sulfamethoxazole-trimethoprim]      Other reaction(s): up set stomach rash/hives    Azithromycin     Erythromycin Other (See Comments)     Other reaction(s): Unknown  Other reaction(s): Stomach upset    Gentamicin      Other reaction(s): Unknown    Levofloxacin Hives     Other reaction(s): nervousness    Shellfish containing products     Vancomycin Itching     Other reaction(s): Itching         Objective:       Physical Exam  Constitutional:       General: She is not in acute distress.     Appearance: She is obese. She is not ill-appearing.   Eyes:      General: No scleral icterus.        Right eye: No discharge.         Left eye: No discharge.   Pulmonary:      Effort: Pulmonary effort is normal. No respiratory distress.   Musculoskeletal:         General: Swelling (RUE) and tenderness (RLE) present.      Right lower leg: Edema present.      Left lower leg:  Edema present.   Skin:     General: Skin is warm and dry.      Findings: Erythema present.      Comments: Onychomycosis of toes  Prior PICC site - well healed, no erythema noted on RUE  R knee surgical site - well healed   Neurological:      Mental Status: She is oriented to person, place, and time. Mental status is at baseline.      Comments: In wheelchair   Psychiatric:         Mood and Affect: Mood normal.               Immunization History   Administered Date(s) Administered    COVID-19, MRNA, LN-S, PF (MODERNA FULL 0.5 ML DOSE) 01/12/2021, 01/26/2021, 02/23/2021, 09/23/2021    Influenza 12/10/2012, 10/07/2015, 09/02/2017    Influenza - High Dose - PF (65 years and older) 10/10/2013, 10/03/2014, 10/28/2016, 09/12/2018    Influenza - Quadrivalent 10/11/2017    Influenza - Quadrivalent - High Dose - PF (65 years and older) 09/15/2020, 10/15/2021    Influenza - Trivalent - PF (ADULT) 12/10/2012    PPD Test 10/27/2021, 12/09/2021    Pneumococcal Conjugate - 13 Valent 06/25/2015    Pneumococcal Polysaccharide - 23 Valent 03/27/2013    Zoster 12/16/2014    Zoster Recombinant 07/12/2021         Assessment:     1. Cellulitis of right lower extremity  - doxycycline (VIBRA-TABS) 100 MG tablet; Take 1 tablet (100 mg total) by mouth 2 (two) times daily. for 14 days  Dispense: 28 tablet; Refill: 0        81 y.o. female with chronic bilateral lymphedema, R TKA 2013 c/b R PJI s/p I&D, hardware removal and abx spacer placement 10/28 - cx with Slug, s/p 6w of daptomycin IV (completed 12/9)  who is referred to my clinic for RLE cellulitis. Pt reported worsening erythema, pain, and warmth along RLE. Suspect chronic lymphedema may be contributing to her symptoms. Completed 6w course of PJI last month.     Plan:     -doxycycline x 14d - prescription sent [facility] to [facility], stop keflex  -keep legs elevated  -follow up with wound care/may need referral at lymphedema clinic  -agree with ortho - U/S of RUE to evaluate for  clot, no evidence of cellulitic changes on arm  -surgical disposition on hold until RLE cellulitis improves    These recommendations have been sent to and/or discussed with the following providers:   - Ortho    Follow up in 2 weeks    Management of cellulitis/lymphedema was discussed with patient. Patient was given ample time for questions, all questions answered. Strict return precautions given to patient.       37 minutes of total time spent on the encounter, which includes face to face time and non-face to face time preparing to see the patient (eg, review of tests), Obtaining and/or reviewing separately obtained history, documenting clinical information in the electronic or other health record, independently interpreting results (not separately reported) and communicating results to the patient/family/caregiver, or care coordination (not separately reported).           Amrita Crawford MD  Infectious Disease

## 2022-01-03 NOTE — PROGRESS NOTES
Duran Giordano is 81 y.o. female here for R knee eval, aspiration prior to reimplantation of R TKA by Dr. Lozoya tentatively planned for 1/20/2022. Pt is staying at [facility]. She reports having blistering redness and pain of RLE starting 3 days ago. She was given Keflex and is currently taking. She also c/o right UE swelling that started last night. She denies chest pain, shortness of breath, fevers, chills. Last CRP and ESR in epic were 12/6 crp 11.9, esr >120.     Right LE: lymphedema, blisters and erythema of LE. Photo added in media. Incision clean and dry with no erythema. No large knee effusion  Right UE: significant swelling as compared to left, diffuse swelling, picc site without redness     A/P:  Pt is on antibiotics. Will not aspirate knee today.  Labs today: ESR, CRP, CBC  US RUE to r/o clot   ID Dr. Crawford came to see patient today in clinic. Stopping Keflex. Starting doxy x 2 weeks. Continue elevation, lymphedema control.   Pt will f/u after completing two week course of doxy and being off of antibiotics for 2 weeks. If labs improved and cellulitis resolved will aspirate knee and plan reimplantation.

## 2022-01-04 ENCOUNTER — HOSPITAL ENCOUNTER (OUTPATIENT)
Dept: RADIOLOGY | Facility: HOSPITAL | Age: 82
Discharge: HOME | End: 2022-01-04
Attending: PHYSICIAN ASSISTANT
Payer: MEDICARE

## 2022-01-04 DIAGNOSIS — M79.89 SWELLING OF RIGHT UPPER EXTREMITY: ICD-10-CM

## 2022-01-04 DIAGNOSIS — M79.601 PAIN IN RIGHT ARM: ICD-10-CM

## 2022-01-04 PROCEDURE — 93971 EXTREMITY STUDY: CPT | Mod: TC,RT

## 2022-01-04 PROCEDURE — 93971 EXTREMITY STUDY: CPT | Mod: 26,RT,, | Performed by: RADIOLOGY

## 2022-01-04 PROCEDURE — 93971 US UPPER EXTREMITY VEINS RIGHT: ICD-10-PCS | Mod: 26,RT,, | Performed by: RADIOLOGY

## 2022-01-05 ENCOUNTER — TELEPHONE (OUTPATIENT)
Dept: PRIMARY CARE CLINIC | Facility: CLINIC | Age: 82
End: 2022-01-05
Payer: MEDICARE

## 2022-01-05 NOTE — TELEPHONE ENCOUNTER
Please call patient and get her a phone call appt today. She went to ED last night and was diagnosed with a DVT and started on eliquis.    Please call her so that we can get an update where she is living and her current needs.    Thanks  KJ

## 2022-01-06 ENCOUNTER — TELEPHONE (OUTPATIENT)
Dept: PRIMARY CARE CLINIC | Facility: CLINIC | Age: 82
End: 2022-01-06
Payer: MEDICARE

## 2022-01-06 NOTE — TELEPHONE ENCOUNTER
Patient called, she is in rehab at [facility] and will be there for the next 3 weeks, she developed a right blood clot in the right arm and on Eliquis for this, also taking antibiotics for cellulitis, and she will be having surgery soon. She would like to talk to you to touch base with you about all of this like she has with her other physicians. Can you please call patient. [phone]

## 2022-01-06 NOTE — TELEPHONE ENCOUNTER
Pt is at [facility], she is supposed to have knee surgery on 1/20/2022.  She will be at [facility]'s until after surgery.

## 2022-01-07 ENCOUNTER — LAB VISIT (OUTPATIENT)
Dept: LAB | Facility: OTHER | Age: 82
End: 2022-01-07
Payer: MEDICARE

## 2022-01-07 DIAGNOSIS — Z20.822 ENCOUNTER FOR LABORATORY TESTING FOR COVID-19 VIRUS: ICD-10-CM

## 2022-01-07 DIAGNOSIS — Z01.818 PRE-OP TESTING: Primary | ICD-10-CM

## 2022-01-07 PROCEDURE — U0003 INFECTIOUS AGENT DETECTION BY NUCLEIC ACID (DNA OR RNA); SEVERE ACUTE RESPIRATORY SYNDROME CORONAVIRUS 2 (SARS-COV-2) (CORONAVIRUS DISEASE [COVID-19]), AMPLIFIED PROBE TECHNIQUE, MAKING USE OF HIGH THROUGHPUT TECHNOLOGIES AS DESCRIBED BY CMS-2020-01-R: HCPCS | Performed by: NURSE PRACTITIONER

## 2022-01-09 NOTE — PROGRESS NOTES
Subjective:      Patient ID: Duran Giordano is a 81 y.o. female.    Chief Complaint: Diabetes Mellitus (PCP 10/8/21) and Diabetic Foot Exam    Duran Logan is a 81 y.o. female who presents to the clinic for evaluation and treatment of high risk feet. Duran Logan has a past medical history of Adrenal mass- adenoma stable since 2004 (1.8 cm and 8/13/12 (2 cm); stable 2016 2.4 cm, Arthritis, Asthma in adult without complication (6/25/2015), Bilateral carotid artery disease (7/21/2017), Bilateral sciatica (2/7/2017), Cellulitis of right leg (08/16/2017), Cerebral infarction (1/29/2016), Cervical radiculopathy (3/18/2015), Chronic knee pain, Chronic rhinitis (4/9/2013), CKD (chronic kidney disease) stage 3, GFR 30-59 ml/min (1/29/2013), Coronary artery disease due to calcified coronary lesion (6/25/2015), Diastolic dysfunction (10/10/2013), Essential hypertension (6/25/2015), Gastroesophageal reflux disease without esophagitis (8/13/2012), Gout, Hives (10/1/2013), Mixed hyperlipidemia (8/13/2012), Morbid obesity with BMI of 50.0-59.9, adult (2/11/2014), Obstructive sleep apnea syndrome (8/13/2012), Senile cataracts of both eyes (1/22/2015), Traumatic open wound of right lower leg (8/25/2017), Trigeminal neuralgia of right side of face (5/23/2017), Type 2 diabetes mellitus with diabetic polyneuropathy, with long-term current use of insulin (1/29/2013), Venous stasis dermatitis of both lower extremities (8/21/2017), and Vitamin D deficiency disease (8/13/2012). The patient's chief complaint is  HRFC This patient has documented high risk feet requiring routine maintenance secondary to diabetes mellitis and those secondary complications of diabetes, as mentioned.    PCP: Tami Schmidt MD    Date Last Seen by PCP:   Chief Complaint   Patient presents with    Diabetes Mellitus     PCP 10/8/21    Diabetic Foot Exam        Current shoe gear:  Affected Foot: Casual shoes     Unaffected Foot: Casual  "shoes    Hemoglobin A1C   Date Value Ref Range Status   10/26/2021 8.1 (H) 4.0 - 5.6 % Final     Comment:     ADA Screening Guidelines:  5.7-6.4%  Consistent with prediabetes  >or=6.5%  Consistent with diabetes    High levels of fetal hemoglobin interfere with the HbA1C  assay. Heterozygous hemoglobin variants (HbS, HgC, etc)do  not significantly interfere with this assay.   However, presence of multiple variants may affect accuracy.     08/24/2021 8.2 (H) 4.0 - 5.6 % Final     Comment:     ADA Screening Guidelines:  5.7-6.4%  Consistent with prediabetes  >or=6.5%  Consistent with diabetes    High levels of fetal hemoglobin interfere with the HbA1C  assay. Heterozygous hemoglobin variants (HbS, HgC, etc)do  not significantly interfere with this assay.   However, presence of multiple variants may affect accuracy.     07/08/2021 9.3 (H) 4.0 - 5.6 % Final     Comment:     ADA Screening Guidelines:  5.7-6.4%  Consistent with prediabetes  >or=6.5%  Consistent with diabetes    High levels of fetal hemoglobin interfere with the HbA1C  assay. Heterozygous hemoglobin variants (HbS, HgC, etc)do  not significantly interfere with this assay.   However, presence of multiple variants may affect accuracy.         Review of Systems   Cardiovascular: Positive for leg swelling.   Skin: Positive for color change, dry skin and nail changes. Negative for flushing, itching, rash and skin cancer.   Musculoskeletal: Positive for arthritis and stiffness. Negative for back pain and falls.   Neurological: Positive for numbness. Negative for paresthesias.           Objective:       Vitals:    11/30/21 1327   BP: (!) 161/70   Pulse: 81   Weight: 108.7 kg (239 lb 10.2 oz)   Height: 4' 11" (1.499 m)   PainSc: 0-No pain        Physical Exam  Vitals and nursing note reviewed.   Constitutional:       Appearance: She is well-developed.   Cardiovascular:      Pulses:           Dorsalis pedis pulses are 2+ on the right side and 2+ on the left side.     "    Posterior tibial pulses are 2+ on the right side and 2+ on the left side.      Comments: Dorsalis pedis and posterior tibial pulses are palpable bilaterally. Toes are cool to touch. Feet are warm proximally.There is decreased digital hair bilateral. Skin is atrophic, hyperpigmented, and moderately edematous.    Musculoskeletal:         General: No tenderness.      Right ankle: Normal.      Left ankle: Normal.      Right foot: No swelling, deformity or crepitus.      Left foot: No swelling, deformity or crepitus.      Comments: Adequate joint range of motion without pain, limitation, nor crepitation Bilateral feet and ankle joints. Muscle strength is 5/5 in all groups bilaterally.      TTP w/ redness and plantar L foot no signs of break in skin no ulceration. Pain to plantar medical tubercle of calcaneus . No flucutance   Lymphadenopathy:      Comments: B/l lymphedema    Skin:     General: Skin is warm and dry.      Coloration: Skin is not pale.      Findings: No abrasion, bruising, burn, erythema, lesion or rash.      Nails: There is no clubbing.      Comments: Nails x10 are elongated by  4-6mm's, thickened by 2-3 mm's, dystrophic, and are darkened in  coloration . Xerosis Bilaterally. No open lesions noted.    Hyperkeratotic tissue noted to distal hallux b/l      Neurological:      Mental Status: She is alert and oriented to person, place, and time.      Comments: Decreased sharp/dull sensation bilateral feet.   Psychiatric:         Behavior: Behavior normal.               Assessment:       Encounter Diagnoses   Name Primary?    Type II diabetes mellitus with peripheral circulatory disorder Yes    Onychomycosis due to dermatophyte     Corn or callus          Plan:       Duran Logan was seen today for diabetes mellitus and diabetic foot exam.    Diagnoses and all orders for this visit:    Type II diabetes mellitus with peripheral circulatory disorder    Onychomycosis due to dermatophyte    Corn or callus    -  Patient was given written and verbal instructions regarding foot condition.  I counseled the patient on her conditions, their implications and medical management.    Shoe inspection. Diabetic Foot Education. Patient reminded of the importance of good nutrition and blood cummings gar control to help prevent podiatric complications of diabetes. Patient instructed on proper foot hygeine. We discussed wearing proper shoe gear, daily foot inspections, never walking without protective shoe gear, never putting sharp instruments to feet    - With patient's permission, nails were aggressively reduced and debrided x 10 to their soft tissue attachment mechanically and with electric , removing all offending nail and debris. Patient relates relief following the procedure. She will continue to monitor the areas daily, inspect her feet, wear protective shoe gear when ambulatory, moisturizer to maintain skin integrity and follow in this office in approximately 2-3 months, sooner p.r.n.    - After cleansing the  area w/ alcohol prep pad the above mentioned hyperkeratosis was trimmed utilizing No 15 scapel, to a smooth base with out incident. Patient tolerated this  well and reported comfort to the area x2    - discussed lymphedema treatments, referral to lymphedema clinic placed

## 2022-01-10 ENCOUNTER — TELEPHONE (OUTPATIENT)
Dept: PREADMISSION TESTING | Facility: HOSPITAL | Age: 82
End: 2022-01-10
Payer: MEDICARE

## 2022-01-10 LAB
SARS-COV-2 RNA RESP QL NAA+PROBE: NORMAL
TEST PERFORMANCE INFO SPEC: NORMAL

## 2022-01-10 NOTE — TELEPHONE ENCOUNTER
----- Message from Karla Brandt, RN sent at 1/7/2022  3:24 PM CST -----  ORTHO SURGERY: 1/20/22    PLEASE SCHEDULE: LAB (T&S), EKG, DR MALDONADO  (pt does not need POC-only Dr Maldonado)    Thanks,  Ann

## 2022-01-11 ENCOUNTER — TELEPHONE (OUTPATIENT)
Dept: ORTHOPEDICS | Facility: CLINIC | Age: 82
End: 2022-01-11
Payer: MEDICARE

## 2022-01-12 NOTE — TELEPHONE ENCOUNTER
----- Message from Abril Colvin MA sent at 1/11/2022  1:16 PM CST -----  Contact: Pt    ----- Message -----  From: Chasidy Bean  Sent: 1/11/2022  12:36 PM CST  To: Darell STEELE Staff    Pt's returning call to [facility].    Confirmed patient's contact info below:  Contact Name: Duran Giordano  Phone Number: [phone]               
Left message for pt to return call, provided name and number, awaiting call back.    
Spoke to pt, informed that her surgery needs to be postponed due the blood clot on her arm and the cellulitis she has developed. Pt disappointed but understands. Pt would like to know when she can come out of the brace as it is giving her great discomfort. Informed pt will ask about the brace and when the next f/u should be scheduled. Will call pt back tomorrow to notify. Pt pleased and verbalized understanding.  
Spo2 96% on room air, HR 96. Reevaluated patient at bedside.  Patient feeling much improved.  Discussed the results of all diagnostic testing in ED and copies of all reports given.   An opportunity to ask questions was given.  Discussed the importance of prompt, close medical follow-up.  Patient will return with any changes, concerns or persistent / worsening symptoms.  Understanding of all instructions verbalized.

## 2022-01-13 ENCOUNTER — TELEPHONE (OUTPATIENT)
Dept: ORTHOPEDICS | Facility: CLINIC | Age: 82
End: 2022-01-13
Payer: MEDICARE

## 2022-01-13 NOTE — TELEPHONE ENCOUNTER
----- Message from Abril Colvin MA sent at 1/13/2022 10:27 AM CST -----  Contact: @ [phone]    ----- Message -----  From: Agapito Alexander  Sent: 1/13/2022  10:12 AM CST  To: Darell STEELE Staff    Patient calling to get a surgery update, pls advise

## 2022-01-14 ENCOUNTER — TELEPHONE (OUTPATIENT)
Dept: ORTHOPEDICS | Facility: CLINIC | Age: 82
End: 2022-01-14
Payer: MEDICARE

## 2022-01-14 ENCOUNTER — LAB VISIT (OUTPATIENT)
Dept: LAB | Facility: OTHER | Age: 82
End: 2022-01-14
Payer: MEDICARE

## 2022-01-14 DIAGNOSIS — Z20.822 ENCOUNTER FOR LABORATORY TESTING FOR COVID-19 VIRUS: ICD-10-CM

## 2022-01-14 PROCEDURE — U0003 INFECTIOUS AGENT DETECTION BY NUCLEIC ACID (DNA OR RNA); SEVERE ACUTE RESPIRATORY SYNDROME CORONAVIRUS 2 (SARS-COV-2) (CORONAVIRUS DISEASE [COVID-19]), AMPLIFIED PROBE TECHNIQUE, MAKING USE OF HIGH THROUGHPUT TECHNOLOGIES AS DESCRIBED BY CMS-2020-01-R: HCPCS | Performed by: NURSE PRACTITIONER

## 2022-01-14 NOTE — TELEPHONE ENCOUNTER
Pt called direct line, informed that Dr. Lozoya does want her to remain in the brace, she may be put in 90 degree flexion and do protected weightbearing with the walker. Informed pt that I will call her PT Justin to inform of Dr. Lozoya's instructions. Pt will keep appt with Nahomy on 1/31 and possibly get a surgery date at that time. Pt pleased and verbalized understanding.

## 2022-01-14 NOTE — TELEPHONE ENCOUNTER
----- Message from Nahomy Rockwell NP sent at 1/14/2022  8:59 AM CST -----  Regarding: FW: Pt Inquiry  Akiko, this one was sent to me.  ----- Message -----  From: Jami Pham  Sent: 1/14/2022   8:46 AM CST  To: Moiz Corea Staff  Subject: Pt Inquiry                                       Pt returning missed call from [facility], requesting a call back.      [phone] ([place])

## 2022-01-15 LAB
SARS-COV-2 RNA RESP QL NAA+PROBE: NOT DETECTED
SARS-COV-2- CYCLE NUMBER: NORMAL

## 2022-01-18 ENCOUNTER — TELEPHONE (OUTPATIENT)
Dept: ORTHOPEDICS | Facility: CLINIC | Age: 82
End: 2022-01-18
Payer: MEDICARE

## 2022-01-18 NOTE — TELEPHONE ENCOUNTER
Spoke to Justin PT with [facility]. Informed the pts surgery has been postponed and she asked about the brace. Informed per Dr. Lozoya the pt can be set to give 90 degrees flexion and protected WB with walker. Justin states she is not able to walk much with the walker and he will keep her locked out due to high risk for complications. He states she is leaving his facility today as she has maxed out her potential with skilled and needs to save days for after the surgery. He will notify pt that we spoke. Understanding verbalized.

## 2022-01-24 ENCOUNTER — TELEPHONE (OUTPATIENT)
Dept: ORTHOPEDICS | Facility: CLINIC | Age: 82
End: 2022-01-24
Payer: MEDICARE

## 2022-01-24 NOTE — TELEPHONE ENCOUNTER
Pt transferred to direct line, pt states she is now at the [facility], confirmed appt on 1/31. Pt states she is eager to get her new surgery date when she comes in on 1/31. Informed her that it is up to Dr. Lozoay and hopefully he will know when she comes in to see Nahomy SANTAMARIA. Pt pleased and verbalized understanding.

## 2022-01-31 ENCOUNTER — OFFICE VISIT (OUTPATIENT)
Dept: ORTHOPEDICS | Facility: CLINIC | Age: 82
End: 2022-01-31
Payer: MEDICARE

## 2022-01-31 ENCOUNTER — TELEPHONE (OUTPATIENT)
Dept: PRIMARY CARE CLINIC | Facility: CLINIC | Age: 82
End: 2022-01-31
Payer: MEDICARE

## 2022-01-31 DIAGNOSIS — T84.50XD INFECTION OF PROSTHETIC JOINT, SUBSEQUENT ENCOUNTER: Primary | ICD-10-CM

## 2022-01-31 PROCEDURE — 99999 PR PBB SHADOW E&M-EST. PATIENT-LVL I: CPT | Mod: PBBFAC,,, | Performed by: NURSE PRACTITIONER

## 2022-01-31 PROCEDURE — 99024 POSTOP FOLLOW-UP VISIT: CPT | Mod: POP,,, | Performed by: NURSE PRACTITIONER

## 2022-01-31 PROCEDURE — 99999 PR PBB SHADOW E&M-EST. PATIENT-LVL I: ICD-10-PCS | Mod: PBBFAC,,, | Performed by: NURSE PRACTITIONER

## 2022-01-31 PROCEDURE — 99024 PR POST-OP FOLLOW-UP VISIT: ICD-10-PCS | Mod: POP,,, | Performed by: NURSE PRACTITIONER

## 2022-01-31 PROCEDURE — 99211 OFF/OP EST MAY X REQ PHY/QHP: CPT | Mod: PBBFAC | Performed by: NURSE PRACTITIONER

## 2022-01-31 NOTE — TELEPHONE ENCOUNTER
Please get patient an appointment with me asap.  She needs a preop appointment and discussion about her goals of care.    Thanks,  BARB

## 2022-01-31 NOTE — TELEPHONE ENCOUNTER
----- Message from Nahomy Rockwell NP sent at 1/31/2022  3:05 PM CST -----  Hi Dr. Roxana Lozoya saw Sr. Giordano in clinic today. 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 Her cellulitis looks much better and he gave her a tentative date of 2/17 to have the joint removed, but he wanted to have her see you prior to that date if possible. She had a RUE DVT on ultrasound 1/4 and has been treated with eliquis since. That's scheduled to end on 2/3, but Dr. Lozoya wants to be sure that is a complete treatment from your professional opinion. Please let us know if you're not able to see her, so we can try to make alternate arrangements. Unfortunately, if we can't do the surgery 2/17, she'll have to wait until 3/17 since she really isn't a candidate for the [facility] and will need to have her surgery at main campus. Thanks so much for your time!  Nahomy

## 2022-02-01 NOTE — TELEPHONE ENCOUNTER
Called patient back.  Appointment made for 2/14.2022 at 8:30 AM.    Called [facility] and spoke with Ms. Hensley, in scheduling department to set up patient's transportation to appointment

## 2022-02-02 NOTE — PROGRESS NOTES
Pt returns for reevaluation of her right arm dvt and right leg cellulitis. Her leg erythema has resolved and the swelling is much improved. Dr. Lozoya came in to assess her leg as well. She is anxious to have the surgery to remove the infected prosthesis from the right knee, but she has only completed one month of eliquis so far for the dvt. Dr. Lozoya gave her a tentative date of 2/17 for the 1st stage of her 2 stage revision, but he told her that she will need clearance from Dr. Schmidt in regards to treatment of her dvt first. She reports no pain or swelling in the right arm. Will see her after her appt with primary care to sign consents.

## 2022-02-04 ENCOUNTER — OFFICE VISIT (OUTPATIENT)
Dept: PRIMARY CARE CLINIC | Facility: CLINIC | Age: 82
End: 2022-02-04
Payer: MEDICARE

## 2022-02-04 ENCOUNTER — TELEPHONE (OUTPATIENT)
Dept: PRIMARY CARE CLINIC | Facility: CLINIC | Age: 82
End: 2022-02-04
Payer: MEDICARE

## 2022-02-04 DIAGNOSIS — Z01.818 PREOP EXAM FOR INTERNAL MEDICINE: ICD-10-CM

## 2022-02-04 PROCEDURE — 99441 PR PHYSICIAN TELEPHONE EVALUATION 5-10 MIN: ICD-10-PCS | Mod: 95,,, | Performed by: INTERNAL MEDICINE

## 2022-02-04 PROCEDURE — 99441 PR PHYSICIAN TELEPHONE EVALUATION 5-10 MIN: CPT | Mod: 95,,, | Performed by: INTERNAL MEDICINE

## 2022-02-04 NOTE — TELEPHONE ENCOUNTER
Pt would like to scheduled  audio apt today anytime after 12 pt states she really needs to talk about the surgery,  because the other doctor wanted to know if she able to handle all the surgery she will need in the near future         ----- Message from Mercedes Guillermo sent at 2/4/2022  8:18 AM CST -----  Contact: Self   Pt is requesting a call regarding her upcoming surgery. Please advise

## 2022-02-04 NOTE — PROGRESS NOTES
Established Patient - Audio Only Telehealth Visit with [facility] Provider     The patient location is: HOME  The chief complaint leading to consultation is: routine care  Visit type: Virtual visit with audio only (telephone)     The reason for the audio only service rather than synchronous audio and video virtual visit was related to technical difficulties or patient preference/necessity.     Each patient to whom I provide medical services by telemedicine is:  (1) informed of the relationship between the physician and patient and the respective role of any other health care provider with respect to management of the patient; and (2) notified that they may decline to receive medical services by telemedicine and may withdraw from such care at any time. Patient verbally consented to receive this service via voice-only telephone call.       Subjective:      Patient ID: Duran Giordano is a 81 y.o. female.    Patient has surgery for Feb 17th. Pain developed in right knee about 3 months ago and infected prosthetic was removed. A replacement has been scheduled and a pre-op evaluation has been recommended by the surgeon from the patient's surgeon.    Patient has preop appointment scheduled for the 14th and wanted to confirm the time. It seems the medical review she is requesting and in need of will take place that day.    Prior to this visit, patient's last encounter with PCP was 10/8/2021.      Current Outpatient Medications on File Prior to Visit   Medication Sig Dispense Refill    acetaminophen (TYLENOL) 500 MG tablet Take 2 tablets (1,000 mg total) by mouth 2 (two) times daily as needed for Pain. 360 tablet 3    albuterol (PROVENTIL/VENTOLIN HFA) 90 mcg/actuation inhaler Inhale 2 puffs into the lungs every 4 (four) hours as needed for Wheezing or Shortness of Breath. Rescue 54 g 3    albuterol-ipratropium (DUO-NEB) 2.5 mg-0.5 mg/3 mL nebulizer solution USE 1 VIAL PER NEBULIZER EVERY 6 HOURS AS NEEDED FOR  WHEEZING/RESCUE 90 mL 11    amLODIPine (NORVASC) 10 MG tablet Take 1 tablet (10 mg total) by mouth once daily. 30 tablet 11    ammonium lactate 12 % Crea Apply lotion twice daily to affected area 385 g 11    [] apixaban (ELIQUIS) 5 mg Tab Take 2 tablets (10 mg total) by mouth 2 (two) times daily. Initially take 10 mg twice daily for 7 days followed by 5 mg twice daily. 120 tablet 0    atorvastatin (LIPITOR) 80 MG tablet Take 1 tablet (80 mg total) by mouth once daily. 90 tablet 3    BIOFREEZE, MENTHOL, TOP Apply topically daily as needed (PAIN).      blood sugar diagnostic Strp BG monitoring 4 times a day. Pt needs test strips for Embrace Talking meter. (Patient taking differently: BG monitoring 2 times a day. Pt needs test strips for Embrace Talking meter.) 450 strip prn    cane tips Misc 1 application by Misc.(Non-Drug; Combo Route) route once daily. 4 each 0    cetirizine (ZYRTEC) 10 MG tablet Take 1 tablet (10 mg total) by mouth once daily. (Patient taking differently: Take 10 mg by mouth daily as needed for Allergies or Rhinitis.) 30 tablet 2    cholecalciferol, vitamin D3, (VITAMIN D3) 25 mcg (1,000 unit) capsule Take 1 capsule (1,000 Units total) by mouth once daily. 90 capsule 3    clotrimazole (LOTRIMIN) 1 % cream Apply topically 2 (two) times daily. 113 g 3    diclofenac sodium (VOLTAREN) 1 % Gel APPLY 2 GRAMS TOPICALLY DAILY 100 g 0    fluticasone (FLONASE) 50 mcg/actuation nasal spray 2 sprays (100 mcg total) by Each Nare route once daily. 1 Bottle 3    fluticasone-salmeterol diskus inhaler 500-50 mcg INHALE 1 PUFF TWICE DAILY (Patient taking differently: INHALE 1 PUFF TWICE DAILY AS NEEDED) 60 each 11    insulin degludec (TRESIBA FLEXTOUCH U-100) 100 unit/mL (3 mL) insulin pen Inject 16 units at night. 3 pen 3    lancets Misc Test daily (Patient taking differently: Test twice  daily) 100 each 12    naloxone (NARCAN) 4 mg/actuation Spry 4mg by nasal route as needed for opioid  "overdose; may repeat every 2-3 minutes in alternating nostrils until medical help arrives. Call 911 1 each 11    nebulizer and compressor (COMP-AIR ELITE COMP NEB SYSTEM) Berna use as directed 1 each 0    oxyCODONE (ROXICODONE) 5 MG immediate release tablet Take 1 tablet (5 mg total) by mouth every 4 (four) hours as needed for Pain. 30 tablet 0    pen needle, diabetic (PEN NEEDLE) 29 gauge x 1/2" Ndle Use 1 x a day. 100 each 3    polyethylene glycol (GLYCOLAX) 17 gram PwPk Take 17 g by mouth once daily.  0    semaglutide (OZEMPIC) 0.25 mg or 0.5 mg(2 mg/1.5 mL) pen injector Inject 0.5 mg weekly (Patient taking differently: Inject 0.5 mg into the skin every Saturday.) 3 pen 3    senna-docusate 8.6-50 mg (PERICOLACE) 8.6-50 mg per tablet Take 2 tablets by mouth once daily.      torsemide (DEMADEX) 10 MG Tab Take 1 tablet (10 mg total) by mouth once daily. 30 tablet 11    trolamine salicylate (ASPERCREME) 10 % cream Apply topically as needed.      UNABLE TO FIND TWO OLD GOATS- USE AS NEEDED FOR PAIN       No current facility-administered medications on file prior to visit.       Objective:     Lab Results   Component Value Date    WBC 10.29 01/03/2022    HGB 11.0 (L) 01/03/2022    HCT 36.5 (L) 01/03/2022     01/03/2022    CHOL 160 03/04/2021    TRIG 88 03/04/2021    HDL 34 (L) 03/04/2021    ALT 28 11/01/2021    AST 41 (H) 11/01/2021     12/09/2021    K 4.0 12/09/2021     12/09/2021    CREATININE 1.6 (H) 12/09/2021    BUN 26 (H) 12/09/2021    CO2 20 (L) 12/09/2021    TSH 0.799 01/16/2020    INR 1.1 10/28/2021    HGBA1C 8.1 (H) 10/26/2021         Assessment:   81 y.o. female with multiple co-morbid illnesses here to continue work-up of chronic issues.     Plan:     Problem List Items Addressed This Visit        Other    Preop exam for internal medicine     Patient needs in-person appt for pre-op  · Scheduled for face to face               Health Maintenance       Date Due Completion Date    " Hepatitis C Screening Never done ---    TETANUS VACCINE Never done ---    Shingles Vaccine (3 of 3) 09/06/2021 7/12/2021    Mammogram 10/07/2021 10/7/2020    Eye Exam 02/03/2022 2/3/2021    Override on 2/7/2020: Done    Override on 2/17/2016: Done    Override on 1/22/2015: Done    Override on 8/16/2012: Done    DEXA SCAN 02/22/2022 2/22/2018    Override on 12/13/2011: Done    Diabetes Urine Screening 03/04/2022 3/4/2021    Lipid Panel 03/04/2022 3/4/2021    Hemoglobin A1c 04/26/2022 10/26/2021    Override on 7/13/2016: Done    Foot Exam 11/30/2022 11/30/2021    Override on 12/31/2019: Done (Done by Dr. Anand)    Override on 11/27/2018: Done (Dr Anand)    Override on 1/19/2018: Done (Dr anand)    Override on 5/31/2017: Done    Override on 7/20/2016: Done          Follow up in about 4 weeks (around 3/4/2022). Ten minutes spent with this patient today    Tami Schmidt MD/MPH  Internal Medicine  [facility]  Spectra [phone]    This service was not originating from a related E/M service provided within the previous 7 days nor will  to an E/M service or procedure within the next 24 hours or my soonest available appointment.  Prevailing standard of care was able to be met in this audio-only visit.

## 2022-02-07 NOTE — TELEPHONE ENCOUNTER
We attempted to call patient on Friday, but could not get her to have a conversation about the surgery. Does she want me to call her again?    Thanks,  KJ

## 2022-02-14 ENCOUNTER — OFFICE VISIT (OUTPATIENT)
Dept: PRIMARY CARE CLINIC | Facility: CLINIC | Age: 82
End: 2022-02-14
Payer: MEDICARE

## 2022-02-14 VITALS
DIASTOLIC BLOOD PRESSURE: 71 MMHG | HEART RATE: 80 BPM | HEIGHT: 59 IN | SYSTOLIC BLOOD PRESSURE: 140 MMHG | TEMPERATURE: 98 F | BODY MASS INDEX: 48.27 KG/M2 | OXYGEN SATURATION: 99 %

## 2022-02-14 DIAGNOSIS — E27.8 ADRENAL MASS: ICD-10-CM

## 2022-02-14 DIAGNOSIS — I50.32 CHRONIC DIASTOLIC HEART FAILURE: ICD-10-CM

## 2022-02-14 DIAGNOSIS — D69.2 SENILE PURPURA: ICD-10-CM

## 2022-02-14 DIAGNOSIS — E11.42 TYPE 2 DIABETES MELLITUS WITH DIABETIC POLYNEUROPATHY, WITH LONG-TERM CURRENT USE OF INSULIN: Primary | ICD-10-CM

## 2022-02-14 DIAGNOSIS — M46.00 SPINAL ENTHESOPATHY: ICD-10-CM

## 2022-02-14 DIAGNOSIS — I77.1 TORTUOUS AORTA: ICD-10-CM

## 2022-02-14 DIAGNOSIS — R53.2 COMPLETE IMMOBILITY DUE TO SEVERE PHYSICAL DISABILITY OR FRAILTY: ICD-10-CM

## 2022-02-14 DIAGNOSIS — I87.2 VENOUS STASIS DERMATITIS OF BOTH LOWER EXTREMITIES: ICD-10-CM

## 2022-02-14 DIAGNOSIS — J41.1 MUCOPURULENT CHRONIC BRONCHITIS: ICD-10-CM

## 2022-02-14 DIAGNOSIS — Z79.4 TYPE 2 DIABETES MELLITUS WITH DIABETIC POLYNEUROPATHY, WITH LONG-TERM CURRENT USE OF INSULIN: Primary | ICD-10-CM

## 2022-02-14 DIAGNOSIS — N18.31 STAGE 3A CHRONIC KIDNEY DISEASE: ICD-10-CM

## 2022-02-14 DIAGNOSIS — E66.2 OBESITY HYPOVENTILATION SYNDROME: ICD-10-CM

## 2022-02-14 DIAGNOSIS — Z01.818 PREOP EXAM FOR INTERNAL MEDICINE: ICD-10-CM

## 2022-02-14 PROCEDURE — 99215 OFFICE O/P EST HI 40 MIN: CPT | Mod: S$GLB,,, | Performed by: INTERNAL MEDICINE

## 2022-02-14 PROCEDURE — 99215 PR OFFICE/OUTPT VISIT, EST, LEVL V, 40-54 MIN: ICD-10-PCS | Mod: S$GLB,,, | Performed by: INTERNAL MEDICINE

## 2022-02-14 NOTE — Clinical Note
"Dear Ortho team, Sr Duran Logan had her in-person appt for pre-op today with me (her IM PCP) and I did not clear her for surgery. We had an extensive hour-long conversation on how she should consider accepting her current state as a "new norm" and additional procedures and surgeries in her lower extremities will likely cause more harm, immobility and pain than good.  Patient requested group meeting with ortho team, PCP and her con[facility] leader (Sr Castro). She requests clarification on whether the spacer remains in her knee permanently.  Thank you, Tami Schmidt MD/MPH [facility] [phone] [employer]  "

## 2022-02-14 NOTE — ASSESSMENT & PLAN NOTE
Continue torsemide for edema. Patient with continued LE swelling and lymphedema  · Advised realistic goals of care

## 2022-02-14 NOTE — ASSESSMENT & PLAN NOTE
LLE venous stasis ulcers. Difficulty with wound healing  · Lengthy conversation about poor prognosis with any surgery due to LE tissue damage related to lymphedema and venous stasis

## 2022-02-14 NOTE — ASSESSMENT & PLAN NOTE
"Chair bound, spinal strain due to obesity. 8/16 CT: "thickening of the ligamentum flavum leading to spinal canal impingement at L4-L5, and to a lesser degree at L5-S1."  · Advised conservative goals of care  "

## 2022-02-14 NOTE — PROGRESS NOTES
"Primary Care Provider Appointment - [facility]  SHARED NOTE: Delaney Vargas (MS4), Dr Schmidt (Attending)    Subjective:      Patient ID: Duran Giordano is a 81 y.o. female with venous stasis dermatitis, DM, extreme obesity, knee pain, OA, complications from nerve block.    Chief Complaint: Pre-op Exam    Prior to this visit, patient's last encounter with PCP was 2/4/2022 as a check-in phone call. [place]. She is upset that she has to depend on people who are "not nice" to help her perform her ADLs. She wants to be more independent.     Patient is here for preop appointment for surgery on Feb 17th. Pain developed in right knee about 3 months ago following a nerve block. 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 A replacement for her prosthetic joint has been scheduled and a pre-op evaluation has been recommended by the surgeon. Patient is here for this today.     Patient does not report any pain but has not put weight on her leg. She is able to pivot on the leg. Patient reports that she uses her walker to move around but requires help when she gets into. Patient also reports some numbness and tingling in her toes on both feet, however it is worse on the right.     Patient has had recurring cellulitis in the R leg for 35 years. Patient reports that it is episodic. She does not have it now; reports last episode 6 months ago. Patient goes to wound care when she has an episode.     Recent DVT in her R arm and has been on Eliquis. Patient reports that she is still taking her Eliquis and has no problems, although the chart reports that eliquis was stopped on 2/3.      DM: Most recent A1C was 8.1.  Ozempic once a week was added to her medications. Patient has not followed up with endocrinologist yet. BG today was 104. Needs labs.    Pill packs as follows:  Adherence packed for 28 days using Dispill: "      Morning card:   Allopurinol 100 mg  Allopurinol 300 mg  Colchicine 0.6 mg (every other day)   Torsemide 20 mg      Evening card:   Atorvastatin 80 mg   Losartan 100 mg   Vitamin D3 1,000 IU         *Sent out via same day .      Past Surgical History:   Procedure Laterality Date    HYSTERECTOMY  1982    secondary uterine fibroids    INJECTION OF ANESTHETIC AGENT AROUND NERVE Right 10/21/2021    Procedure: BLOCK, NERVE GENICULAR RIGHT NEEDS CONSENT;  Surgeon: Senia Kilpatrick MD;  Location: [facility];  Service: Pain Management;  Laterality: Right;    INSERTION OF ANTIBIOTIC SPACER Right 10/28/2021    Procedure: INSERTION, ANTIBIOTIC SPACER;  Surgeon: Alfredo Lozoya MD;  Location: [facility] OR [address] [place] [address];  Service: Orthopedics;  Laterality: Right;    JOINT REPLACEMENT      REVISION OF KNEE ARTHROPLASTY Right 10/28/2021    Procedure: REVISION, ARTHROPLASTY, KNEE-DEPUY ANTIBIOTIC SPACER;  Surgeon: Alfredo Lozoya MD;  Location: [facility] OR [address] [place] [address];  Service: Orthopedics;  Laterality: Right;    Right total knee replacement         Past Medical History:   Diagnosis Date    Adrenal mass- adenoma stable since 2004 (1.8 cm and 8/13/12 (2 cm); stable 2016 2.4 cm     Adrenal mass- adenoma stable since 2004 (1.8 cm and 8/13/12 (2 cm); stable 2016 2.4 cm    Arthritis     Asthma in adult without complication 6/25/2015    Bilateral carotid artery disease 7/21/2017    Bilateral sciatica 2/7/2017    Cellulitis of right leg 08/16/2017    Cerebral infarction 1/29/2016    Multiple areas of lacunar infarction. Stroke risk factors include HTN, DM2, Dyslipidemia.  Continue ASA 81mg/ Statin therapy I have encouraged 30 minutes of physical activity daily for 5 days a week. She has access to a pool so this should not be so jarring to her joints.     Cervical radiculopathy 3/18/2015    Chronic knee pain     Chronic rhinitis 4/9/2013    CKD (chronic kidney disease) stage 3, GFR 30-59 ml/min 1/29/2013    Coronary  "artery disease due to calcified coronary lesion 6/25/2015    Diastolic dysfunction 10/10/2013    Essential hypertension 6/25/2015    Gastroesophageal reflux disease without esophagitis 8/13/2012    Gout     Hives 10/1/2013    Mixed hyperlipidemia 8/13/2012    Morbid obesity with BMI of 50.0-59.9, adult 2/11/2014    Obstructive sleep apnea syndrome 8/13/2012    Senile cataracts of both eyes 1/22/2015    Traumatic open wound of right lower leg 8/25/2017    Trigeminal neuralgia of right side of face 5/23/2017    For years now Previously on Gabapentin, but caused constipation Dissipating over time Not related to intracranial abnormalities Possibly related to dental procedure    Type 2 diabetes mellitus with diabetic polyneuropathy, with long-term current use of insulin 1/29/2013    Venous stasis dermatitis of both lower extremities 8/21/2017    Vitamin D deficiency disease 8/13/2012       Social History     Socioeconomic History    Marital status: Single   Tobacco Use    Smoking status: Never Smoker    Smokeless tobacco: Never Used   Substance and Sexual Activity    Alcohol use: No    Drug use: No    Sexual activity: Never   Social History Narrative    Single with no children.        Review of Systems   Constitutional: Negative.    HENT: Negative.    Eyes: Negative.    Respiratory: Negative.    Cardiovascular: Positive for leg swelling.   Gastrointestinal: Negative.    Endocrine: Negative.    Genitourinary: Negative.    Musculoskeletal: Positive for joint swelling and myalgias.   Skin: Negative.    Allergic/Immunologic: Negative.    Neurological: Negative.        Objective:   BP (!) 140/71   Pulse 80   Temp 98.3 °F (36.8 °C) (Oral)   Ht 4' 11" (1.499 m)   SpO2 99%   BMI 48.27 kg/m²     Physical Exam  Constitutional:       Appearance: She is obese.      Comments: Wheelchair bound   HENT:      Head: Normocephalic.      Nose: Nose normal.   Cardiovascular:      Rate and Rhythm: Normal rate. "   Pulmonary:      Effort: Pulmonary effort is normal.   Musculoskeletal:      Cervical back: Normal range of motion.   Skin:     Findings: Erythema, lesion and rash present.      Comments: Thickened skin evidence of lymphedema and venous stasis   Neurological:      Mental Status: She is alert and oriented to person, place, and time.         Lab Results   Component Value Date    WBC 10.29 01/03/2022    HGB 11.0 (L) 01/03/2022    HCT 36.5 (L) 01/03/2022     01/03/2022    CHOL 160 03/04/2021    TRIG 88 03/04/2021    HDL 34 (L) 03/04/2021    ALT 28 11/01/2021    AST 41 (H) 11/01/2021     12/09/2021    K 4.0 12/09/2021     12/09/2021    CREATININE 1.6 (H) 12/09/2021    BUN 26 (H) 12/09/2021    CO2 20 (L) 12/09/2021    TSH 0.799 01/16/2020    INR 1.1 10/28/2021    HGBA1C 8.1 (H) 10/26/2021       Current Outpatient Medications on File Prior to Visit   Medication Sig Dispense Refill    acetaminophen (TYLENOL) 500 MG tablet Take 2 tablets (1,000 mg total) by mouth 2 (two) times daily as needed for Pain. 360 tablet 3    albuterol (PROVENTIL/VENTOLIN HFA) 90 mcg/actuation inhaler Inhale 2 puffs into the lungs every 4 (four) hours as needed for Wheezing or Shortness of Breath. Rescue 54 g 3    albuterol-ipratropium (DUO-NEB) 2.5 mg-0.5 mg/3 mL nebulizer solution USE 1 VIAL PER NEBULIZER EVERY 6 HOURS AS NEEDED FOR WHEEZING/RESCUE 90 mL 11    amLODIPine (NORVASC) 10 MG tablet Take 1 tablet (10 mg total) by mouth once daily. 30 tablet 11    ammonium lactate 12 % Crea Apply lotion twice daily to affected area 385 g 11    atorvastatin (LIPITOR) 80 MG tablet Take 1 tablet (80 mg total) by mouth once daily. 90 tablet 3    BIOFREEZE, MENTHOL, TOP Apply topically daily as needed (PAIN).      blood sugar diagnostic Strp BG monitoring 4 times a day. Pt needs test strips for Embrace Talking meter. (Patient taking differently: BG monitoring 2 times a day. Pt needs test strips for Embrace Talking meter.) 450 strip  "prn    cholecalciferol, vitamin D3, (VITAMIN D3) 25 mcg (1,000 unit) capsule Take 1 capsule (1,000 Units total) by mouth once daily. 90 capsule 3    clotrimazole (LOTRIMIN) 1 % cream Apply topically 2 (two) times daily. 113 g 3    diclofenac sodium (VOLTAREN) 1 % Gel APPLY 2 GRAMS TOPICALLY DAILY 100 g 0    fluticasone (FLONASE) 50 mcg/actuation nasal spray 2 sprays (100 mcg total) by Each Nare route once daily. 1 Bottle 3    fluticasone-salmeterol diskus inhaler 500-50 mcg INHALE 1 PUFF TWICE DAILY (Patient taking differently: INHALE 1 PUFF TWICE DAILY AS NEEDED) 60 each 11    insulin degludec (TRESIBA FLEXTOUCH U-100) 100 unit/mL (3 mL) insulin pen Inject 16 units at night. 3 pen 3    lancets Misc Test daily (Patient taking differently: Test twice  daily) 100 each 12    naloxone (NARCAN) 4 mg/actuation Spry 4mg by nasal route as needed for opioid overdose; may repeat every 2-3 minutes in alternating nostrils until medical help arrives. Call 911 1 each 11    nebulizer and compressor (COMP-AIR ELITE COMP NEB SYSTEM) Berna use as directed 1 each 0    oxyCODONE (ROXICODONE) 5 MG immediate release tablet Take 1 tablet (5 mg total) by mouth every 4 (four) hours as needed for Pain. 30 tablet 0    pen needle, diabetic (PEN NEEDLE) 29 gauge x 1/2" Ndle Use 1 x a day. 100 each 3    polyethylene glycol (GLYCOLAX) 17 gram PwPk Take 17 g by mouth once daily.  0    semaglutide (OZEMPIC) 0.25 mg or 0.5 mg(2 mg/1.5 mL) pen injector Inject 0.5 mg weekly (Patient taking differently: Inject 0.5 mg into the skin every Saturday.) 3 pen 3    senna-docusate 8.6-50 mg (PERICOLACE) 8.6-50 mg per tablet Take 2 tablets by mouth once daily.      torsemide (DEMADEX) 10 MG Tab Take 1 tablet (10 mg total) by mouth once daily. 30 tablet 11    trolamine salicylate (ASPERCREME) 10 % cream Apply topically as needed.      UNABLE TO FIND TWO OLD GOATS- USE AS NEEDED FOR PAIN      cane tips Misc 1 application by Misc.(Non-Drug; Combo " "Route) route once daily. 4 each 0    cetirizine (ZYRTEC) 10 MG tablet Take 1 tablet (10 mg total) by mouth once daily. (Patient taking differently: Take 10 mg by mouth daily as needed for Allergies or Rhinitis.) 30 tablet 2     No current facility-administered medications on file prior to visit.         Assessment:   81 y.o. female with multiple co-morbid illnesses here to follow-up with PCP and continue work-up of chronic issues    Plan:     Problem List Items Addressed This Visit        Pulmonary    Chronic obstructive pulmonary disease     Chronic COPD, currently controlled on inhalers  · Continue current therapies            Cardiac/Vascular    Chronic diastolic heart failure     Continue torsemide for edema. Patient with continued LE swelling and lymphedema  · Advised realistic goals of care         Venous stasis dermatitis of both lower extremities     LLE venous stasis ulcers. Difficulty with wound healing  · Lengthy conversation about poor prognosis with any surgery due to LE tissue damage related to lymphedema and venous stasis         Tortuous aorta     Seen on XR in 6/2018  · Continue statin, APT  · Monitor for cardiac events  · Advised lifestyle changes            Renal/    Stage 3a chronic kidney disease     CKD related to longstanding DM and diuretic use  · monitor            Hematology    Senile purpura     Ecchymoses and purpura on UE bilaterally  · monitor            Endocrine    Adrenal mass- adenoma stable since 2004 (1.8 cm and 8/13/12 (2 cm); stable 2016 2.4 cm     Adrenal mass- adenoma stable since 2004 (1.8 cm and 8/13/12 (2 cm); stable 2016 2.4 cm  · Monitor         Type 2 diabetes mellitus with diabetic polyneuropathy, with long-term current use of insulin - Primary    Relevant Orders    POCT Glucose, Hand-Held Device       Orthopedic    Spinal enthesopathy     Chair bound, spinal strain due to obesity. 8/16 CT: "thickening of the ligamentum flavum leading to spinal canal impingement at " "L4-L5, and to a lesser degree at L5-S1."  · Advised conservative goals of care            Other    Obesity hypoventilation syndrome     BMI >50, eats excessively, DM, HTN, REJI  · Counseled on restricting caloric intake  · No desserts, reduced carbs  · Continue low-salt, calorie restricted meals         Preop exam for internal medicine     In-person appt for pre-op today for R knee replacement surgery.   · Patient NOT cleared for elective orthopedic surgery by IM PCP due to:  · High risk for infection and poor wound healing due to uncontrolled chronic conditions:  · Diffuse soft tissue and vascular damage of lower extremities due to lymphedema and venous stasis will cause poor wound healing  · A1c elevated at 8.1  · Extreme obesity and heart strain during this prolonged surgery  · Patient goals of increased mobility will not be met, she will likely need a mary jo lift following the surgery for an undetermined time  · Extensive hour-long conversation on how she should consider accepting her current state as a "new norm"  · Ortho team informed  · Patient requesting group meeting with ortho team, PCP and her con[facility] leader  · Needs clarification on whether the spacer remains in her knee permanently         Complete immobility due to severe physical disability or frailty     Wheelchair bound, low functional status, severe obesity and frailty  · monitor               Health Maintenance       Date Due Completion Date    Hepatitis C Screening Never done ---    TETANUS VACCINE Never done ---    Shingles Vaccine (3 of 3) 09/06/2021 7/12/2021    Mammogram 10/07/2021 10/7/2020    Eye Exam 02/03/2022 2/3/2021    Override on 2/7/2020: Done    Override on 2/17/2016: Done    Override on 1/22/2015: Done    Override on 8/16/2012: Done    DEXA SCAN 02/22/2022 2/22/2018    Override on 12/13/2011: Done    Diabetes Urine Screening 03/04/2022 3/4/2021    Lipid Panel 03/04/2022 3/4/2021    Hemoglobin A1c 04/26/2022 10/26/2021    Override on " 7/13/2016: Done    Foot Exam 11/30/2022 11/30/2021    Override on 12/31/2019: Done (Done by Dr. Anand)    Override on 11/27/2018: Done (Dr Anand)    Override on 1/19/2018: Done (Dr anand)    Override on 5/31/2017: Done    Override on 7/20/2016: Done          Future Appointments   Date Time Provider Department Center   3/2/2022 10:00 AM Sonia Willard DPM [facility] POD [facility]   3/14/2022 10:30 AM Tami Schmidt MD [facility] MED CLN [facility]         Follow up in about 3 months (around 5/14/2022). Total clinical care time was 60 min, issues addressed include pre-op evaluation    Delaney Vargas, MS4 (student)    Tami Schmidt MD/MPH  [facility]  [phone] spectralink

## 2022-02-14 NOTE — ASSESSMENT & PLAN NOTE
"In-person appt for pre-op today for R knee replacement surgery.   · Patient NOT cleared for elective orthopedic surgery by IM PCP due to:  · High risk for infection and poor wound healing due to uncontrolled chronic conditions:  · Diffuse soft tissue and vascular damage of lower extremities due to lymphedema and venous stasis will cause poor wound healing  · A1c elevated at 8.1  · Extreme obesity and heart strain during this prolonged surgery  · Patient goals of increased mobility will not be met, she will likely need a mary jo lift following the surgery for an undetermined time  · Extensive hour-long conversation on how she should consider accepting her current state as a "new norm"  · Ortho team informed  · Patient requesting group meeting with ortho team, PCP and her convent leader  · Needs clarification on whether the spacer remains in her knee permanently  "

## 2022-02-15 ENCOUNTER — TELEPHONE (OUTPATIENT)
Dept: PRIMARY CARE CLINIC | Facility: CLINIC | Age: 82
End: 2022-02-15
Payer: MEDICARE

## 2022-02-15 NOTE — TELEPHONE ENCOUNTER
"MD Tami Gallo MD; Nahomy Rockwell NP  From my end, the DVT needs to be resolved and appropriately treated, all wounds/ulcers need to be healed, bilateral lower extremity edema needs to be improved, and BMI needs to be optimized as much as possible. If we could arrange some type of Zoom meeting before clinic on a Monday, that would be best.   GC             Previous Messages       ----- Message -----   From: Tami Schmidt MD   Sent: 2/14/2022   4:21 PM CST   To: Alfredo Lozoya MD, Nahomy Rokcwell NP     Dear Ortho team,   Sr Duran Logan had her in-person appt for pre-op today with me (her IM PCP) and I did not clear her for surgery. We had an extensive hour-long conversation on how she should consider accepting her current state as a "new norm" and additional procedures and surgeries in her lower extremities will likely cause more harm, immobility and pain than good.     Patient requested group meeting with ortho team, PCP and her convent leader (Sr Castro). She requests clarification on whether the spacer remains in her knee permanently.     Thank you,   Tami Schmidt MD/MPH   [facility]   [phone] spectralink      "

## 2022-02-16 ENCOUNTER — TELEPHONE (OUTPATIENT)
Dept: PRIMARY CARE CLINIC | Facility: CLINIC | Age: 82
End: 2022-02-16
Payer: MEDICARE

## 2022-02-16 ENCOUNTER — TELEPHONE (OUTPATIENT)
Dept: ORTHOPEDICS | Facility: CLINIC | Age: 82
End: 2022-02-16
Payer: MEDICARE

## 2022-02-16 NOTE — TELEPHONE ENCOUNTER
"MD Tami Gallo MD; Nahomy Rockwell NP  My Mondat mornings are booked until after Mardi [place]as. I can do March 14.             Previous Messages       ----- Message -----   From: Tami Schmidt MD   Sent: 2/15/2022   8:00 AM CST   To: Alfredo Lozoya MD, Nahomy Rockwell NP     What time is best for the zoom meeting? Can we do 2/21? Im not sure the sisters can navigate zoom, but they could certainly call in. I can also see if we can get support for them in the convent for the zoom if the face-to-face component is important.     Thank you,   KJ   ----- Message -----   From: Alfredo Lozoya MD   Sent: 2/15/2022   6:39 AM CST   To: Nahomy Rockwell NP, Tami Schmidt MD     From my end, the DVT needs to be resolved and appropriately treated, all wounds/ulcers need to be healed, bilateral lower extremity edema needs to be improved, and BMI needs to be optimized as much as possible. If we could arrange some type of Zoom meeting before clinic on a Monday, that would be best.   GC   ----- Message -----   From: Tami Schmidt MD   Sent: 2/14/2022   4:21 PM CST   To: Alfredo Lozoya MD, Nahomy Rockwell NP     Dear Ortho team,   Sr Duran Logan had her in-person appt for pre-op today with me (her IM PCP) and I did not clear her for surgery. We had an extensive hour-long conversation on how she should consider accepting her current state as a "new norm" and additional procedures and surgeries in her lower extremities will likely cause more harm, immobility and pain than good.     Patient requested group meeting with ortho team, PCP and her convent leader (Sr Castro). She requests clarification on whether the spacer remains in her knee permanently.     Thank you,   Tami Schmidt MD/MPH   [facility]   [phone] spectralink      "

## 2022-02-16 NOTE — TELEPHONE ENCOUNTER
Spoke with patient .  Informed of svheduled Zoom meeting.  Patient stated that she would like to attend.  Informed that email invite will be sent to her.  Verbalized understanding and appreciation.

## 2022-02-16 NOTE — TELEPHONE ENCOUNTER
Please call patient and let her know that Dr Lozoya and ROSALIO Rockwell can meet with us on 3/14 at 8AM    Here is the information for the meeting. I sent an invite to her email.    Please let me know who else she wants me to send the invite to.    Thanks,  KJ    Join Zoom Meeting  [email or web address]    Meeting ID: [id number]      Dial by your location  [phone]  ([place])

## 2022-02-16 NOTE — TELEPHONE ENCOUNTER
Spoke with the pts nurse, notified her that after she had a pre-op appt with her pcp they advised both the pt and Dr. Lozoya's team that moving forward with surgery would not be a good idea and that she is not clearing her for surgery. Informed the nurse that we are still working on picking a date and time to set up a meeting with the pt, Dr. Schmidt, Dr. Lozoya, and her convent leader Sr. Castro. The nurse verbalized understanding and had no further questions.    ----- Message from Jacque Watt sent at 2/16/2022 11:05 AM CST -----  Contact: [phone]  I have [facility] on the line calling about the pt's surgery time tomorrow but I see in the system it stated that it was cancelled.    [facility] would like a call back to see if she still has this surgery set up.    [phone]

## 2022-02-21 ENCOUNTER — TELEPHONE (OUTPATIENT)
Dept: ORTHOPEDICS | Facility: CLINIC | Age: 82
End: 2022-02-21
Payer: MEDICARE

## 2022-02-23 DIAGNOSIS — D84.9 IMMUNOSUPPRESSED STATUS: ICD-10-CM

## 2022-03-11 ENCOUNTER — TELEPHONE (OUTPATIENT)
Dept: PRIMARY CARE CLINIC | Facility: CLINIC | Age: 82
End: 2022-03-11
Payer: MEDICARE

## 2022-03-14 ENCOUNTER — TELEPHONE (OUTPATIENT)
Dept: PHYSICAL MEDICINE AND REHAB | Facility: CLINIC | Age: 82
End: 2022-03-14
Payer: MEDICARE

## 2022-03-14 ENCOUNTER — OFFICE VISIT (OUTPATIENT)
Dept: PRIMARY CARE CLINIC | Facility: CLINIC | Age: 82
End: 2022-03-14
Payer: MEDICARE

## 2022-03-14 DIAGNOSIS — Z96.659 FAILURE OF TOTAL KNEE REPLACEMENT, SUBSEQUENT ENCOUNTER: ICD-10-CM

## 2022-03-14 DIAGNOSIS — R53.2 COMPLETE IMMOBILITY DUE TO SEVERE PHYSICAL DISABILITY OR FRAILTY: Primary | ICD-10-CM

## 2022-03-14 DIAGNOSIS — T84.018D FAILURE OF TOTAL KNEE REPLACEMENT, SUBSEQUENT ENCOUNTER: ICD-10-CM

## 2022-03-14 DIAGNOSIS — R54 FRAILTY SYNDROME IN GERIATRIC PATIENT: ICD-10-CM

## 2022-03-14 PROCEDURE — 99443 PR PHYSICIAN TELEPHONE EVALUATION 21-30 MIN: ICD-10-PCS | Mod: 95,,, | Performed by: INTERNAL MEDICINE

## 2022-03-14 PROCEDURE — 99443 PR PHYSICIAN TELEPHONE EVALUATION 21-30 MIN: CPT | Mod: 95,,, | Performed by: INTERNAL MEDICINE
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
## 2022-03-14 NOTE — ASSESSMENT & PLAN NOTE
Age >75, low muscle mass, wheelchair bound, numerous LE acute on chronic conditions  · Advised that supportive care at this time is safer than aggressive therapies

## 2022-03-14 NOTE — Clinical Note
Could you please get Sr Duran Logan an appointment with Dr Grewal for a custom wheelchair evaluation?  Thanks! BARB (PCP)

## 2022-03-14 NOTE — ASSESSMENT & PLAN NOTE
Per conversation with Dr Lozoya on 3/14/22  · Referral to PMR   · Needs appt with Dr Grewal for custom wheelchair   · Needs further optimization for knee surgery  · 6 week follow-up with ROSALIO Leyva for pre-op assessment  · In order to safely consider the surgery, patient needs to have:  · the DVT treated, leg swelling resolved, all wounds healed, weight loss achieved (A1c improved)   · [facility] to move back into [facility], but its unclear what accommodations she needs  · Dr DAY needs to speak with Brian at [facility] regarding accommodations needed at the [facility]

## 2022-03-14 NOTE — TELEPHONE ENCOUNTER
I was able to schedule Sister  Pasquale our first available appointment.  I will place a  reminder letter in the mail for her..

## 2022-03-14 NOTE — Clinical Note
Hi ROSALIO Rockwell, Can you get Sr Duran Logan set up with a visit in 6 weeks? We had a productive conversation with Dr Lozoya this morning with some clear direction on what has to happen in order to safely proceed with her knee surgery.  Thanks! Tami Schmidt MD/MPH [facility] [phone] spectralink

## 2022-03-14 NOTE — PATIENT INSTRUCTIONS
TODAY:  - appt with Dr Grewal for custom wheelchair   - 6 week follow-up with ROSALIO Leyva for pre-op assessment  - Dr DAY needs to speak with Brian at [facility] regarding accommodations needed at the [facility]  - In order to consider the surgery, you will need to have the DVT treated, leg swelling resolved, all wounds healed, weight loss achieved (A1c improved)

## 2022-03-14 NOTE — TELEPHONE ENCOUNTER
----- Message from Tami Schmidt MD sent at 3/14/2022  1:39 PM CDT -----  Could you please get Sr Duran Logan an appointment with Dr Grewal for a custom wheelchair evaluation?    Thanks!  BARB (PCP)

## 2022-03-15 ENCOUNTER — TELEPHONE (OUTPATIENT)
Dept: PRIMARY CARE CLINIC | Facility: CLINIC | Age: 82
End: 2022-03-15
Payer: MEDICARE

## 2022-03-15 ENCOUNTER — TELEPHONE (OUTPATIENT)
Dept: ORTHOPEDICS | Facility: CLINIC | Age: 82
End: 2022-03-15
Payer: MEDICARE

## 2022-03-15 NOTE — TELEPHONE ENCOUNTER
Spoke with pt, scheduled her appt. She was pleased and had no further questions.    ----- Message from Nahomy Rockwell NP sent at 3/14/2022  1:48 PM CDT -----  Can you help to get this set up with Dr. Lozoya please and thank you  ----- Message -----  From: Tami Schmidt MD  Sent: 3/14/2022   1:45 PM CDT  To: Alfredo Lozoya MD, Nahomy Rockwell NP, #    Hi ROSALIO Rockwell,  Can you get  Duran Logan set up with a visit in 6 weeks? We had a productive conversation with Dr Lozoya this morning with some clear direction on what has to happen in order to safely proceed with her knee surgery.    Thanks!  Tami Schmidt MD/MPH  [facility]  [phone] [facility]

## 2022-03-15 NOTE — TELEPHONE ENCOUNTER
Message left for Brian, the physical therapist at [facility].  PCP attempting to reach him in order to make a transition of care plan for sisters on Doreen to go back to the [facility].  She will need accommodations for this transition and likely ongoing for the rest of her life.

## 2022-03-15 NOTE — TELEPHONE ENCOUNTER
----- Message from Agueda Cook sent at 3/14/2022  9:29 AM CDT -----    Talked to patient's therapist, Brian LOUISE on phone.  Discussed   - Patient's therapist, Brian ,called the office, received message that Dr DAY would like for him to discuss the topic of surgery from  this morning's Zoom call with the patient. He is requesting a call back from Dr DAY at [phone].    Acuity of needs for appt: routine

## 2022-03-21 ENCOUNTER — TELEPHONE (OUTPATIENT)
Dept: INTERNAL MEDICINE | Facility: CLINIC | Age: 82
End: 2022-03-21
Payer: MEDICARE

## 2022-03-21 NOTE — TELEPHONE ENCOUNTER
Spoke to patient, and she stated she only wants to talk to you personally, can you give her a call when you return tomorrow.

## 2022-03-21 NOTE — TELEPHONE ENCOUNTER
----- Message from Cheyenne Akers sent at 3/21/2022 11:34 AM CDT -----  Contact: [phone] @ Patient  Good Afternoon  Patient is in Rehab but need talk only with Yogi Contreras    Pleaser call and advise

## 2022-03-22 ENCOUNTER — TELEPHONE (OUTPATIENT)
Dept: INTERNAL MEDICINE | Facility: CLINIC | Age: 82
End: 2022-03-22
Payer: MEDICARE

## 2022-03-22 NOTE — TELEPHONE ENCOUNTER
----- Message from Della Mckeon sent at 3/22/2022  2:12 PM CDT -----  Contact: pt  Patient is returning a phone call.  Who left a message for the patient: LIBRA De Leon  Does patient know what this is regarding:    Would you like a call back, or a response through your [facility] portal?: call  Comments:

## 2022-03-29 ENCOUNTER — TELEPHONE (OUTPATIENT)
Dept: ORTHOPEDICS | Facility: CLINIC | Age: 82
End: 2022-03-29
Payer: MEDICARE

## 2022-03-29 NOTE — TELEPHONE ENCOUNTER
Spoke with pt, she just wanted to verify her 4/13 appt. Notified her that her appt is on 4/13 at 10:15. She was pleased and had no further questions.    ----- Message from Stacey Frias sent at 3/29/2022  3:35 PM CDT -----  Regarding: Pt Called  Name of Who is Calling:PEGGY AKANKSHA BARRON [[id number]]      What is the request in detail: Pt is calling asking to speak to Nurse in Dr. Lozoya's office. Pt states it is very important, did not want to provide more information.  Please advise.       Can the clinic reply by [facility]:      What Number to Call Back [facility] not [facility]: [phone]

## 2022-03-31 ENCOUNTER — OFFICE VISIT (OUTPATIENT)
Dept: INTERNAL MEDICINE | Facility: CLINIC | Age: 82
End: 2022-03-31
Payer: MEDICARE

## 2022-03-31 DIAGNOSIS — Z96.659 FAILURE OF TOTAL KNEE REPLACEMENT, SUBSEQUENT ENCOUNTER: ICD-10-CM

## 2022-03-31 DIAGNOSIS — G47.33 OSA ON CPAP: ICD-10-CM

## 2022-03-31 DIAGNOSIS — E11.59 HYPERTENSION ASSOCIATED WITH DIABETES: ICD-10-CM

## 2022-03-31 DIAGNOSIS — E11.42 TYPE 2 DIABETES MELLITUS WITH DIABETIC POLYNEUROPATHY, WITH LONG-TERM CURRENT USE OF INSULIN: Primary | ICD-10-CM

## 2022-03-31 DIAGNOSIS — Z79.4 TYPE 2 DIABETES MELLITUS WITH DIABETIC POLYNEUROPATHY, WITH LONG-TERM CURRENT USE OF INSULIN: Primary | ICD-10-CM

## 2022-03-31 DIAGNOSIS — I89.0 LYMPHEDEMA OF BOTH LOWER EXTREMITIES: ICD-10-CM

## 2022-03-31 DIAGNOSIS — T84.018D FAILURE OF TOTAL KNEE REPLACEMENT, SUBSEQUENT ENCOUNTER: ICD-10-CM

## 2022-03-31 DIAGNOSIS — I87.2 VENOUS STASIS DERMATITIS OF BOTH LOWER EXTREMITIES: ICD-10-CM

## 2022-03-31 DIAGNOSIS — E66.01 MORBID OBESITY WITH BMI OF 50.0-59.9, ADULT: ICD-10-CM

## 2022-03-31 DIAGNOSIS — I15.2 HYPERTENSION ASSOCIATED WITH DIABETES: ICD-10-CM

## 2022-03-31 PROCEDURE — 99442 PR PHYSICIAN TELEPHONE EVALUATION 11-20 MIN: CPT | Mod: 95,,, | Performed by: NURSE PRACTITIONER

## 2022-03-31 PROCEDURE — 99442 PR PHYSICIAN TELEPHONE EVALUATION 11-20 MIN: ICD-10-PCS | Mod: 95,,, | Performed by: NURSE PRACTITIONER

## 2022-03-31 NOTE — Clinical Note
External being sent -for a1c this week Reports losing ~50# F/u in 4 months- Sanjuanita please schedule labs same day -thanks

## 2022-03-31 NOTE — PATIENT INSTRUCTIONS
Follow up in 3.5-4 months w/Irielle  A1c next week- external  Same day for labs: a1c, urine mac in 3.5-4 months       Continue tresiba 16 units at night  Ozempic 0.5 mg weekly    Lab Results   Component Value Date    HGBA1C 8.1 (H) 10/26/2021     Goal sugar  no higher than 180

## 2022-03-31 NOTE — PROGRESS NOTES
Established Patient - Audio Only Telehealth Visit     The patient location is: rehab   The chief complaint leading to consultation is: type 2 dm   Visit type: Virtual visit with audio only (telephone)  Total time spent with patient: 20 mins     The reason for the audio only service rather than synchronous audio and video virtual visit was related to technical difficulties or patient preference/necessity.     Each patient to whom I provide medical services by telemedicine is:  (1) informed of the relationship between the physician and patient and the respective role of any other health care provider with respect to management of the patient; and (2) notified that they may decline to receive medical services by telemedicine and may withdraw from such care at any time. Patient verbally consented to receive this service via voice-only telephone call.       HPI: Type 2 DM  Current tresiba 16 units at night 9-10p   ozempic 0.5 mg weekly   Loss ~50#     Breakfast: 1 boiled egg, small meat chelsea  Smaller portions for lunch and dinner  Lite cranberry juice     Awaiting scooter    Facility:    Has been to 3 rehab placed in past 6 months  10/2021- sx spacer placed, hardware removed d/t knee being infected   Not placing a lot of weight on knee- bed- walker-w/c     Curious on a1c level   Will like labs and f/u in 3.5-4 months   External send this week      Assessment and plan:    1. Type 2 diabetes mellitus with diabetic polyneuropathy, with long-term current use of insulin  Hemoglobin A1C    Hemoglobin A1C    Hemoglobin A1C   2. Failure of total knee replacement, subsequent encounter     3. Hypertension associated with diabetes  Microalbumin/Creatinine Ratio, Urine   4. Lymphedema of both lower extremities     5. Morbid obesity with BMI of 50.0-59.9, adult     6. REJI on CPAP     7. Venous stasis dermatitis of both lower extremities                               This service was not originating from a related E/M  service provided within the previous 7 days nor will  to an E/M service or procedure within the next 24 hours or my soonest available appointment.  Prevailing standard of care was able to be met in this audio-only visit.

## 2022-04-01 ENCOUNTER — TELEPHONE (OUTPATIENT)
Dept: INTERNAL MEDICINE | Facility: CLINIC | Age: 82
End: 2022-04-01
Payer: MEDICARE

## 2022-04-01 NOTE — TELEPHONE ENCOUNTER
----- Message from LIBRA De Leon, RANULFO sent at 3/31/2022  5:21 PM CDT -----  External being sent -for a1c this week  Reports losing ~50#  F/u in 4 months- Sanjuanita please schedule labs same day -thanks

## 2022-04-05 ENCOUNTER — PATIENT OUTREACH (OUTPATIENT)
Dept: ADMINISTRATIVE | Facility: OTHER | Age: 82
End: 2022-04-05
Payer: MEDICARE

## 2022-04-05 NOTE — PROGRESS NOTES
Health Maintenance Due   Topic Date Due    TETANUS VACCINE  Never done    Shingles Vaccine (3 of 3) 09/06/2021    Eye Exam  02/03/2022    DEXA Scan  02/22/2022    Diabetes Urine Screening  03/04/2022     Updates were requested from care everywhere.  Chart was reviewed for overdue [facility]active [facility] (ANUJA) topics (CRS, Breast Cancer Screening, Eye exam)  Health Maintenance has been updated.  LINKS immunization registry triggered.  Immunizations were reconciled.

## 2022-04-06 ENCOUNTER — OFFICE VISIT (OUTPATIENT)
Dept: PHYSICAL MEDICINE AND REHAB | Facility: CLINIC | Age: 82
End: 2022-04-06
Payer: MEDICARE

## 2022-04-06 VITALS
HEART RATE: 69 BPM | DIASTOLIC BLOOD PRESSURE: 70 MMHG | HEIGHT: 59 IN | BODY MASS INDEX: 48.99 KG/M2 | WEIGHT: 243 LBS | SYSTOLIC BLOOD PRESSURE: 132 MMHG

## 2022-04-06 DIAGNOSIS — Z96.651 S/P REVISION OF TOTAL KNEE, RIGHT: ICD-10-CM

## 2022-04-06 DIAGNOSIS — R06.09 DOE (DYSPNEA ON EXERTION): ICD-10-CM

## 2022-04-06 DIAGNOSIS — Z79.4 TYPE 2 DIABETES MELLITUS WITH DIABETIC POLYNEUROPATHY, WITH LONG-TERM CURRENT USE OF INSULIN: ICD-10-CM

## 2022-04-06 DIAGNOSIS — I87.2 VENOUS STASIS DERMATITIS OF BOTH LOWER EXTREMITIES: ICD-10-CM

## 2022-04-06 DIAGNOSIS — I50.32 CHRONIC DIASTOLIC HEART FAILURE: ICD-10-CM

## 2022-04-06 DIAGNOSIS — Z78.9 IMPAIRED MOBILITY AND ADLS: ICD-10-CM

## 2022-04-06 DIAGNOSIS — I27.20 PULMONARY HYPERTENSION: ICD-10-CM

## 2022-04-06 DIAGNOSIS — Z74.09 IMPAIRED MOBILITY AND ADLS: ICD-10-CM

## 2022-04-06 DIAGNOSIS — E66.01 MORBID OBESITY WITH BMI OF 45.0-49.9, ADULT: ICD-10-CM

## 2022-04-06 DIAGNOSIS — E11.42 TYPE 2 DIABETES MELLITUS WITH DIABETIC POLYNEUROPATHY, WITH LONG-TERM CURRENT USE OF INSULIN: ICD-10-CM

## 2022-04-06 DIAGNOSIS — J44.9 CHRONIC OBSTRUCTIVE PULMONARY DISEASE, UNSPECIFIED COPD TYPE: ICD-10-CM

## 2022-04-06 DIAGNOSIS — R26.9 GAIT DISORDER: Primary | ICD-10-CM

## 2022-04-06 DIAGNOSIS — I89.0 LYMPHEDEMA OF BOTH LOWER EXTREMITIES: ICD-10-CM

## 2022-04-06 PROCEDURE — 99999 PR PBB SHADOW E&M-EST. PATIENT-LVL II: CPT | Mod: PBBFAC,,, | Performed by: PHYSICAL MEDICINE & REHABILITATION

## 2022-04-06 PROCEDURE — 99204 PR OFFICE/OUTPT VISIT, NEW, LEVL IV, 45-59 MIN: ICD-10-PCS | Mod: S$PBB,,, | Performed by: PHYSICAL MEDICINE & REHABILITATION

## 2022-04-06 PROCEDURE — 99204 OFFICE O/P NEW MOD 45 MIN: CPT | Mod: S$PBB,,, | Performed by: PHYSICAL MEDICINE & REHABILITATION

## 2022-04-06 PROCEDURE — 99212 OFFICE O/P EST SF 10 MIN: CPT | Mod: PBBFAC | Performed by: PHYSICAL MEDICINE & REHABILITATION

## 2022-04-06 PROCEDURE — 99999 PR PBB SHADOW E&M-EST. PATIENT-LVL II: ICD-10-PCS | Mod: PBBFAC,,, | Performed by: PHYSICAL MEDICINE & REHABILITATION

## 2022-04-06 NOTE — PROGRESS NOTES
Subjective:       Patient ID: Duran Giordano is a 82 y.o. female.    Chief Complaint: No chief complaint on file.      HPI    Sister Pasquale is an 82-year-old black female with multiple medical problems who is presenting to the Physical Medicine Clinic for evaluation for a power mobility device.  Her past medical history is significant for hypertension, diabetes mellitus with peripheral neuropathy, CAD, chronic diastolic congestive heart failure, COPD, lacunar infarcts, generalized osteoarthritis, bilateral lower extremity lymphedema, venous stasis ulcers, and morbid obesity (weight of 243 lb and BMI of 49.1 today).  The patient has severe OA of both knees status post right total knee arthroplasty about 13 years ago, with subsequent infection requiring revision in 10/2021 by Dr. Lozoya at [facility].  She has a knee spacer and is nonweightbearing on the right lower extremity.  There were plans for another revision of the prosthesis but these plans were put on hold due to multiple comorbidities and morbid obesity.    The patient currently lives in [facility] temporarily but usually resides in a [facility] with other nuns.  She stays on a 2nd floor apartment with elevator access.  She is independent with feeding herself.  She is independent with dressing and toileting but it takes her a long time.  She requires assistance, occasionally total assistance, for bathing with a walk-in shower and shower chair.  The patient is nonambulatory due to nonweightbearing on the right lower extremity.  She has unable to propel a manual wheelchair due to shortness of breath, bilateral shoulder pain, and morbid obesity.    Past Medical History:   Diagnosis Date    Adrenal mass- adenoma stable since 2004 (1.8 cm and 8/13/12 (2 cm); stable 2016 2.4 cm     Adrenal mass- adenoma stable since 2004 (1.8 cm and 8/13/12 (2 cm); stable 2016 2.4 cm    Arthritis     Asthma in adult without complication 6/25/2015     Bilateral carotid artery disease 7/21/2017    Bilateral sciatica 2/7/2017    Cellulitis of right leg 08/16/2017    Cerebral infarction 1/29/2016    Multiple areas of lacunar infarction. Stroke risk factors include HTN, DM2, Dyslipidemia.  Continue ASA 81mg/ Statin therapy I have encouraged 30 minutes of physical activity daily for 5 days a week. She has access to a pool so this should not be so jarring to her joints.     Cervical radiculopathy 3/18/2015    Chronic knee pain     Chronic rhinitis 4/9/2013    CKD (chronic kidney disease) stage 3, GFR 30-59 ml/min 1/29/2013    Coronary artery disease due to calcified coronary lesion 6/25/2015    Diastolic dysfunction 10/10/2013    Essential hypertension 6/25/2015    Gastroesophageal reflux disease without esophagitis 8/13/2012    Gout     Hives 10/1/2013    Mixed hyperlipidemia 8/13/2012    Morbid obesity with BMI of 50.0-59.9, adult 2/11/2014    Obstructive sleep apnea syndrome 8/13/2012    Senile cataracts of both eyes 1/22/2015    Traumatic open wound of right lower leg 8/25/2017    Trigeminal neuralgia of right side of face 5/23/2017    For years now Previously on Gabapentin, but caused constipation Dissipating over time Not related to intracranial abnormalities Possibly related to dental procedure    Type 2 diabetes mellitus with diabetic polyneuropathy, with long-term current use of insulin 1/29/2013    Venous stasis dermatitis of both lower extremities 8/21/2017    Vitamin D deficiency disease 8/13/2012        Review of patient's allergies indicates:   Allergen Reactions    Bactrim [sulfamethoxazole-trimethoprim]      Other reaction(s): up set stomach rash/hives    Azithromycin     Erythromycin Other (See Comments)     Other reaction(s): Unknown  Other reaction(s): Stomach upset    Gentamicin      Other reaction(s): Unknown    Levofloxacin Hives     Other reaction(s): nervousness    Shellfish containing products     Vancomycin  Itching     Other reaction(s): Itching        Review of Systems   Constitutional: Positive for fatigue. Negative for chills and fever.   Eyes: Negative for visual disturbance.   Respiratory: Positive for shortness of breath.    Cardiovascular: Negative for chest pain.   Gastrointestinal: Negative for nausea and vomiting.   Genitourinary: Negative for difficulty urinating.   Musculoskeletal: Positive for arthralgias and gait problem. Negative for back pain and neck pain.   Neurological: Negative for dizziness and headaches.   Psychiatric/Behavioral: Negative for behavioral problems.             Objective:      Physical Exam  Vitals reviewed.   Constitutional:       General: She is not in acute distress.     Appearance: She is well-developed.   HENT:      Head: Normocephalic and atraumatic.   Eyes:      Extraocular Movements: Extraocular movements intact.   Cardiovascular:      Rate and Rhythm: Normal rate and regular rhythm.      Heart sounds: Normal heart sounds.   Pulmonary:      Effort: Pulmonary effort is normal.      Breath sounds: Normal breath sounds.   Abdominal:      Palpations: Abdomen is soft.   Musculoskeletal:      Cervical back: Normal range of motion. No tenderness.      Comments: BUE:  ROM:   RUE: decreased at shoulder.   LUE: decreased at shoulder.  Strength:    RUE: 3/5 at shoulder abduction, 4 elbow flexion, 4 elbow extension, 4 hand .   LUE: 3/5 at shoulder abduction, 4 elbow flexion, 4 elbow extension, 4 hand .  Sensation to pinprick:   RUE: intact.   LUE: intact.      BLE:  ROM:   RLE:  Reduced due to girth and lymphedema.   LLE:  Reduced due to girth and lymphedema.  Healed right TKA scar.  Severe venous stasis, right> left.  Strength:    RLE: 1/5 at hip flexion, 3 knee extension, 4 ankle DF, 4 ankle PF.   LLE: 3/5 at hip flexion, 4 knee extension, 4 ankle DF,  4 ankle PF.  Sensation to pinprick:     RLE: decreased in stocking distribuion.     LLE: decreased in stocking  distribuion.    -ve tenderness over lumbar spine.     Skin:     General: Skin is warm.   Neurological:      General: No focal deficit present.      Mental Status: She is alert.   Psychiatric:         Mood and Affect: Mood normal.         Behavior: Behavior normal.         Assessment:       1. Gait disorder    2. Chronic obstructive pulmonary disease, unspecified COPD type    3. Pulmonary hypertension    4. LAYNE (dyspnea on exertion)    5. Type 2 diabetes mellitus with diabetic polyneuropathy, with long-term current use of insulin    6. Chronic diastolic heart failure    7. S/P revision of total knee, right    8. Lymphedema of both lower extremities    9. Venous stasis dermatitis of both lower extremities    10. Impaired mobility and ADLs    11. Morbid obesity with BMI of 45.0-49.9, adult        Assessment/Plan:         - The patient was seen today for mobility evaluation for a power mobility device due to significant impairment at home.  - The patient is non-ambulatory with or without assistive devices due to nonweightbearing on the right lower extremity because of failed total knee arthroplasty, as well as morbid obesity limiting her ability to use a walker even short distances.  - The patient is unable to use an optimally-configured manual wheelchair in the home in order to perform Mobility Related Activities of Daily Living, due to LAYNE because of COPD and diastolic CHF, bilateral shoulder pain due to OA, and morbid obesity with higher energy requirements for wheelchair propulsion.  - The patient has intact cognition and should be able to use a power mobility device well at home.  - A prescription for a power wheelchair was generated.  - Power leg elevation was prescribed (due to severe lower extremity edema).  - A scooter would not be appropriate due the patient's trouble clearing the ledge, difficulty controlling the scooter tiller due to bilateral shoulder pain, need for customization and to maneuverability  restrictions at her apartment.   - This will allow the patient to go safely to the kitchen, dining room or living room for feeding & socialization.  It should also help with energy conservation  - The patient is to return the Physical Medicine/Mobility clinic prn.        This note was partly generated with [employer] voice recognition software. I apologize for any possible typographical errors.

## 2022-04-07 ENCOUNTER — TELEPHONE (OUTPATIENT)
Dept: INTERNAL MEDICINE | Facility: CLINIC | Age: 82
End: 2022-04-07
Payer: MEDICARE

## 2022-04-07 NOTE — TELEPHONE ENCOUNTER
----- Message from Chelle Hong sent at 4/7/2022  3:25 PM CDT -----  Contact: Patient [phone]  Pt called in regards to getting a F/u aylin scheduled please call and advise

## 2022-04-08 ENCOUNTER — TELEPHONE (OUTPATIENT)
Dept: PRIMARY CARE CLINIC | Facility: CLINIC | Age: 82
End: 2022-04-08
Payer: MEDICARE

## 2022-04-08 NOTE — TELEPHONE ENCOUNTER
Called pt, no answer, left message for call back, explained that her appt on 4/12/2022 was a phone call. Left call back number.

## 2022-04-08 NOTE — TELEPHONE ENCOUNTER
----- Message from Rosario Lawrence MA sent at 4/8/2022  9:04 AM CDT -----  Contact: [phone]  PLEASE ADVISE     ----- Message -----  From: Anamaria Suresh  Sent: 4/8/2022   9:00 AM CDT  To: Roxana Grey Staff    Pt is calling for the nurse she states she is asking about the appt on 04/12 and she is asking if the virtual audio is just a phone call over the phone for her appt the appt is at 11

## 2022-04-11 ENCOUNTER — TELEPHONE (OUTPATIENT)
Dept: PRIMARY CARE CLINIC | Facility: CLINIC | Age: 82
End: 2022-04-11
Payer: MEDICARE

## 2022-04-12 ENCOUNTER — OFFICE VISIT (OUTPATIENT)
Dept: PRIMARY CARE CLINIC | Facility: CLINIC | Age: 82
End: 2022-04-12
Payer: MEDICARE

## 2022-04-12 DIAGNOSIS — Z96.659 FAILURE OF TOTAL KNEE REPLACEMENT, SUBSEQUENT ENCOUNTER: ICD-10-CM

## 2022-04-12 DIAGNOSIS — I87.2 VENOUS STASIS DERMATITIS OF BOTH LOWER EXTREMITIES: ICD-10-CM

## 2022-04-12 DIAGNOSIS — T84.018D FAILURE OF TOTAL KNEE REPLACEMENT, SUBSEQUENT ENCOUNTER: ICD-10-CM

## 2022-04-12 PROCEDURE — 99443 PR PHYSICIAN TELEPHONE EVALUATION 21-30 MIN: ICD-10-PCS | Mod: 95,,, | Performed by: INTERNAL MEDICINE

## 2022-04-12 PROCEDURE — 99443 PR PHYSICIAN TELEPHONE EVALUATION 21-30 MIN: CPT | Mod: 95,,, | Performed by: INTERNAL MEDICINE

## 2022-04-12 NOTE — ASSESSMENT & PLAN NOTE
Per conversation with Dr Lozoya on 3/14/22  · Referral to PMR   · Followed by Dr Grewal for custom wheelchair   · Needs further optimization for knee surgery  · follow-up soon for pre-op assessment  · In order to safely consider the surgery, patient needs to have:  · the DVT treated, leg swelling resolved, all wounds healed, weight loss achieved (A1c improved)   · [facility] to [facility] into [facility], but its unclear what accommodations she needs  · Dr DAY attempted to speak with Brian at [facility] regarding accommodations needed at the [facility]  · This has not happened due to missed connections

## 2022-04-12 NOTE — PROGRESS NOTES
"Established Patient - Audio Only Telehealth Visit     The patient location is: Home  The chief complaint leading to consultation is: f/u  Visit type: Virtual visit with audio only (telephone)  Total time spent with patient: 15 min.        The reason for the audio only service rather than synchronous audio and video virtual visit was related to technical difficulties or patient preference/necessity.     Each patient to whom I provide medical services by telemedicine is:  (1) informed of the relationship between the physician and patient and the respective role of any other health care provider with respect to management of the patient; and (2) notified that they may decline to receive medical services by telemedicine and may withdraw from such care at any time. Patient verbally consented to receive this service via voice-only telephone call.        Patient ID: Duran Giordano 83 y/o female with hx of HTN, DM with peripheral neuropathy, CAD, chronic diastolic congestive heart failure, COPD, lucunar infarcts, generalized osteoarthritis,  peter lower extremity lymphedema, venous stasis ulcers, morbid obesity, severe OA of peter knees s/p right knee arthroplasty. Last audio visit with Dr. Schmidt was 3/14/22. Last F2F was 2/14/22.     Mobility:  she is a resident of [facility] & sister [facility] is across the street. She was seen by Physical Medicine and Rehabilitation on 4/6/22 for eval to receive a power mobility device with power leg elevation r/t peter lower extremity edema.     Per their note,       - surgery was put on hold r/t risks outweighing the benefits (needs treatment for DVT, weight loss and improvement of A1c before completing)  - saw RANULFO Contreras on 3/31/22 with internal medicine, next visit in May & will order repeat labs  - has an appt with ortho tomorrow     - reports right leg is feeling much better, applying voltaren gel "its helping dry up the cellulitis", there is no redness, drainage or " wounds present at this time. No pain. She is now able to stand up on her own. She reports that she stopped eliquis 2-3 days ago r/t mild light red vag bleed. Bleeding now resolved. She c/o peter toe numbness while lying in bed at night. I advised that she speak with ortho tomorrow about eliquis regimen and complaints.  -reports losing weight recently (previously 288 lbs now 245lbs), has been on a diet, no more snacking or juices, increased her water intake  - was advised to go to a [employer] to get measured for power scooter for mobility & [facility] will set up transportation for that    Problem List Items Addressed This Visit        Cardiac/Vascular    Venous stasis dermatitis of both lower extremities     LLE venous stasis ulcers. Difficulty with wound healing  · Lengthy conversation about poor prognosis with any surgery due to LE tissue damage related to lymphedema and venous stasis  · Patient is losing weight, watching her diet  · Has Ortho follow-up soon              Orthopedic    Failed total knee arthroplasty     Per conversation with Dr Lozoya on 3/14/22  · Referral to PMR   · Followed by Dr Grewal for custom wheelchair   · Needs further optimization for knee surgery  · follow-up soon for pre-op assessment  · In order to safely consider the surgery, patient needs to have:  · the DVT treated, leg swelling resolved, all wounds healed, weight loss achieved (A1c improved)   · Patient wants to [facility] back into [facility], but its unclear what accommodations she needs  · Dr DAY attempted to speak with Brian at [facility] regarding accommodations needed at the [facility]  · This has not happened due to missed connections                   I notified patient to call us if she needs and we will follow up with her soon.     Caryl Zimmerman, RN, FNP-S    Tami Schmidt MD/MPH  [facility]  [phone] spectralink      This service was not originating from  a related E/M service provided within the previous 7 days nor will  to an E/M service or procedure within the next 24 hours or my soonest available appointment.  Prevailing standard of care was able to be met in this audio-only visit.

## 2022-04-12 NOTE — ASSESSMENT & PLAN NOTE
LLE venous stasis ulcers. Difficulty with wound healing  · Lengthy conversation about poor prognosis with any surgery due to LE tissue damage related to lymphedema and venous stasis  · Patient is losing weight, watching her diet  · Has Ortho follow-up soon

## 2022-04-26 ENCOUNTER — TELEPHONE (OUTPATIENT)
Dept: PRIMARY CARE CLINIC | Facility: CLINIC | Age: 82
End: 2022-04-26

## 2022-04-26 ENCOUNTER — NURSE TRIAGE (OUTPATIENT)
Dept: ADMINISTRATIVE | Facility: CLINIC | Age: 82
End: 2022-04-26
Payer: MEDICARE

## 2022-04-26 ENCOUNTER — OFFICE VISIT (OUTPATIENT)
Dept: PRIMARY CARE CLINIC | Facility: CLINIC | Age: 82
End: 2022-04-26
Payer: MEDICARE

## 2022-04-26 ENCOUNTER — TELEPHONE (OUTPATIENT)
Dept: ORTHOPEDICS | Facility: CLINIC | Age: 82
End: 2022-04-26
Payer: MEDICARE

## 2022-04-26 ENCOUNTER — TELEPHONE (OUTPATIENT)
Dept: PRIMARY CARE CLINIC | Facility: CLINIC | Age: 82
End: 2022-04-26
Payer: MEDICARE

## 2022-04-26 DIAGNOSIS — I82.621 DEEP VEIN THROMBOSIS (DVT) OF RIGHT UPPER EXTREMITY, UNSPECIFIED CHRONICITY, UNSPECIFIED VEIN: ICD-10-CM

## 2022-04-26 DIAGNOSIS — Z79.4 TYPE 2 DIABETES MELLITUS WITH DIABETIC POLYNEUROPATHY, WITH LONG-TERM CURRENT USE OF INSULIN: ICD-10-CM

## 2022-04-26 DIAGNOSIS — J44.9 CHRONIC OBSTRUCTIVE PULMONARY DISEASE, UNSPECIFIED COPD TYPE: ICD-10-CM

## 2022-04-26 DIAGNOSIS — E66.01 MORBID OBESITY WITH BMI OF 50.0-59.9, ADULT: ICD-10-CM

## 2022-04-26 DIAGNOSIS — I50.32 CHRONIC DIASTOLIC HEART FAILURE: ICD-10-CM

## 2022-04-26 DIAGNOSIS — G47.33 OSA ON CPAP: ICD-10-CM

## 2022-04-26 DIAGNOSIS — N18.31 STAGE 3A CHRONIC KIDNEY DISEASE: ICD-10-CM

## 2022-04-26 DIAGNOSIS — I87.332 CHRONIC VENOUS HYPERTENSION (IDIOPATHIC) WITH ULCER AND INFLAMMATION OF LEFT LOWER EXTREMITY (CODE): Primary | ICD-10-CM

## 2022-04-26 DIAGNOSIS — E11.42 TYPE 2 DIABETES MELLITUS WITH DIABETIC POLYNEUROPATHY, WITH LONG-TERM CURRENT USE OF INSULIN: ICD-10-CM

## 2022-04-26 DIAGNOSIS — M17.0 PRIMARY OSTEOARTHRITIS OF BOTH KNEES: ICD-10-CM

## 2022-04-26 PROBLEM — I82.629 DEEP VEIN THROMBOSIS (DVT) OF UPPER EXTREMITY: Status: ACTIVE | Noted: 2022-04-26

## 2022-04-26 PROCEDURE — 99443 PR PHYSICIAN TELEPHONE EVALUATION 21-30 MIN: ICD-10-PCS | Mod: 95,,, | Performed by: INTERNAL MEDICINE

## 2022-04-26 PROCEDURE — 99443 PR PHYSICIAN TELEPHONE EVALUATION 21-30 MIN: CPT | Mod: 95,,, | Performed by: INTERNAL MEDICINE

## 2022-04-26 NOTE — ASSESSMENT & PLAN NOTE
D/C eliquis as per request of the patient.  She has completed about 31/2 months of treatment with resolved symptoms.

## 2022-04-26 NOTE — TELEPHONE ENCOUNTER
Patient is calling because she was taking eliquis and started experiencing some vaginal bleeding. She stopped taking the eliquis temporarily and the vaginal bleeding stopped. When she restarted the eliquis the vaginal bleeding started again. Patient is unsure what to do.  Please advise.

## 2022-04-26 NOTE — PROGRESS NOTES
Established Patient [facility]  Shared Note: Dr. Anamaria Singleton, DNP  & Dr Tami Schmidt (Attending)     The patient location is: HOME  The chief complaint leading to consultation is: routine care  Visit type: Virtual visit with audio only (telephone)     The reason for the audio only service rather than synchronous audio and video virtual visit was related to technical difficulties or patient preference/necessity.     Each patient to whom I provide medical services by telemedicine is:  (1) informed of the relationship between the physician and patient and the respective role of any other health care provider with respect to management of the patient; and (2) notified that they may decline to receive medical services by telemedicine and may withdraw from such care at any time. Patient verbally consented to receive this service via voice-only telephone call.       Subjective:      Patient ID: Duran Giordano is a 82 y.o. female.    Prior to this visit, patient's last encounter with PCP was 4/12/2022.    HPI:  Sr. Giordano is an 81 y/o female who ronda a virtual visit to discuss her treatment of her DVT.  She was diagnosed with a rt upper arm DVT on January 4rth and started on Eliquis.  The ER doctor Sergey told the patient she only needed to be on eliquis for 3 months.  In addition, she developed vaginal bleeding which resolved when she stopped her eliquis  And returned when she started her eliquis again.  She feels great and the rt arm is back to normal if not smaller.  The patient states that she lost 46 lbs.      Objective:     Lab Results   Component Value Date    WBC 10.29 01/03/2022    HGB 11.0 (L) 01/03/2022    HCT 36.5 (L) 01/03/2022     01/03/2022    CHOL 160 03/04/2021    TRIG 88 03/04/2021    HDL 34 (L) 03/04/2021    ALT 28 11/01/2021    AST 41 (H) 11/01/2021     12/09/2021    K 4.0 12/09/2021     12/09/2021    CREATININE 1.6 (H)  "2021    BUN 26 (H) 2021    CO2 20 (L) 2021    TSH 0.799 2020    INR 1.1 10/28/2021    HGBA1C 8.1 (H) 10/26/2021     acetaminophen (TYLENOL) 500 MG tablet 1,000 mg, 2 times daily PRN albuterol (PROVENTIL/VENTOLIN HFA) 90 mcg/actuation inhaler 2 puff, Every 4 hours PRN albuterol-ipratropium (DUO-NEB) 2.5 mg-0.5 mg/3 mL nebulizer solution amLODIPine (NORVASC) 10 MG tablet 10 mg, Daily ammonium lactate 12 % Crea         Note (2022): PRN     atorvastatin (LIPITOR) 80 MG tablet 80 mg, Daily BIOFREEZE, MENTHOL, TOP Daily PRN blood sugar diagnostic Strp   Patient taking differently: BG monitoring 2 times a day. Pt needs test strips for FOODITY Talking meter., Reported on 2016   cane tips Misc 1 application, Daily cetirizine (ZYRTEC) 10 MG tablet () 10 mg, Daily   Patient taking differently: 10 mg Oral Daily PRN, Allergies, Rhinitis, Reported on 10/26/2021   cholecalciferol, vitamin D3, (VITAMIN D3) 25 mcg (1,000 unit) capsule 1,000 Units, Daily         Note (2022): Hold for 7 days before surgery       clotrimazole (LOTRIMIN) 1 % cream 2 times daily diclofenac sodium (VOLTAREN) 1 % Gel         Note (2022): Hold for 7 days before surgery       fluticasone (FLONASE) 50 mcg/actuation nasal spray 2 spray, Daily fluticasone-salmeterol diskus inhaler 500-50 mcg   Patient taking differently: INHALE 1 PUFF TWICE DAILY AS NEEDED, Reported on 10/26/2021   insulin degludec (TRESIBA FLEXTOUCH U-100) 100 unit/mL (3 mL) insulin pen lancets Misc   Patient taking differently: Test twice daily, Reported on 2017   naloxone (NARCAN) 4 mg/actuation Spry nebulizer and compressor (COMP-AIR ELITE COMP NEB SYSTEM) Berna oxyCODONE (ROXICODONE) 5 MG immediate release tablet 5 mg, Every 4 hours PRN         Note (2022): PRN     pen needle, diabetic (PEN NEEDLE) 29 gauge x 1/2" Ndle polyethylene glycol (GLYCOLAX) 17 gram PwPk 17 g, Daily semaglutide (OZEMPIC) 0.25 mg or 0.5 mg(2 mg/1.5 mL) pen " injector   Patient taking differently: 0.5 mg Subcutaneous Every Saturday, (No instructions reported), Reported on 10/26/2021   senna-docusate 8.6-50 mg (PERICOLACE) 8.6-50 mg per tablet 2 tablet, Daily torsemide (DEMADEX) 10 MG Tab 10 mg, Daily trolamine salicylate (ASPERCREME) 10 % cream As needed (PRN)      Assessment:   82 y.o. female with multiple co-morbid illnesses here to continue work-up of chronic issues notably:  COPD  DHF  CKD stage III  DVT of rt upper arm  DM  Obesity  Osteoarthritis  Sleep apnea    Plan:     Problem List Items Addressed This Visit        Pulmonary    Chronic obstructive pulmonary disease     Managed by pulmonary and controlled with medications, No change. Continue current inhaler.              Cardiac/Vascular    Chronic diastolic heart failure     2 gm low sodium foods and meal examples discussed along with balancing the 2L of fluid.           Relevant Orders    Ambulatory referral/consult to Outpatient Case Management       Renal/    Stage 3a chronic kidney disease     Stable disease at this time, no changes.  Monitor B/P and diuretic use.           Relevant Orders    Ambulatory referral/consult to Outpatient Case Management       Hematology    Deep vein thrombosis (DVT) of upper extremity     D/C eliquis as per request of the patient.  She has completed about 31/2 months of treatment with resolved symptoms.              Endocrine    Type 2 diabetes mellitus with diabetic polyneuropathy, with long-term current use of insulin     No complaint today regarding her polyneuropathy, her BS have been running 120-135.           Morbid obesity with BMI of 50.0-59.9, adult     The patient states she has lost approximately 46 lbs and feels great.           Relevant Orders    Ambulatory referral/consult to Outpatient Case Management       Orthopedic    Primary osteoarthritis of both knees     The patient has difficulty ambulating because of the obesity and arthritis.  Reviewed Low carb diet  as the patient cannot exercise.              Other    REJI on CPAP     Compliant with CPAP at this time.           Chronic venous hypertension (idiopathic) with ulcer and inflammation of left lower extremity (CODE) - Primary          Health Maintenance       Date Due Completion Date    TETANUS VACCINE Never done ---    Shingles Vaccine (3 of 3) 09/06/2021 7/12/2021    Eye Exam 02/03/2022 2/3/2021    Override on 2/7/2020: Done    Override on 2/17/2016: Done    Override on 1/22/2015: Done    Override on 8/16/2012: Done    DEXA Scan 02/22/2022 2/22/2018    Override on 12/13/2011: Done    Diabetes Urine Screening 03/04/2022 3/4/2021    Hemoglobin A1c 05/01/2022 2/1/2022    Override on 7/13/2016: Done    Foot Exam 11/30/2022 11/30/2021    Override on 12/31/2019: Done (Done by Dr. Anand)    Override on 11/27/2018: Done (Dr Anand)    Override on 1/19/2018: Done (Dr anand)    Override on 5/31/2017: Done    Override on 7/20/2016: Done    Lipid Panel 02/01/2023 2/1/2022          Follow up in about 1 month (around 5/26/2022). . Thirty minutes spent with this patient today.    Dr. Anamaria Singleton, DNP-Nurse Practitioner  Internal Medicine [facility]  [employer] [phone]         This service was not originating from a related E/M service provided within the previous 7 days nor will  to an E/M service or procedure within the next 24 hours or my soonest available appointment.  Prevailing standard of care was able to be met in this audio-only visit.

## 2022-04-26 NOTE — ASSESSMENT & PLAN NOTE
The patient has difficulty ambulating because of the obesity and arthritis.  Reviewed Low carb diet as the patient cannot exercise.

## 2022-04-26 NOTE — TELEPHONE ENCOUNTER
Spoke to pt, she states she was having some light pink vaginal bleeding, she stopped taking the Eliquis for a couple days and it resolved. She has recently resumed the Eliquis and the light vaginal bleeding has returned. Informed pt to call her PCP and we would send a message as well to notify PCP to advise patient.     Pt pleased and verbalized understanding.

## 2022-04-26 NOTE — TELEPHONE ENCOUNTER
Spoke patient states she has tried multiple times to get in contact with Dr. Lozoya's office with no success.  States she has been taking eliquis for 4 months.  She states last week she noticed pink like blood in her vaginal area.  She stopped taking eliquis for a few days and states the pinkish blood went away.  Patient restarted eliquis 2 days ago and is now taking it once a day vs twice a day.  States she has a upcoming procedure with Dr. Lozoya and she wants to know what she should do.  Patient is not currently having any vaginal bleeding.  Connected patient with Abril at Dr. Lozoya's office.  No further assistance needed from nurse on call line.     Reason for Disposition   Caller has URGENT medicine question about med that PCP or specialist prescribed and triager unable to answer question    Protocols used: MEDICATION QUESTION CALL-A-[place]

## 2022-04-26 NOTE — TELEPHONE ENCOUNTER
Spoke with pt, pt stated that the facility she is in, pt needs a letter stating that she is no longer on eliquis    [phone], faxed orders to [phone], attn: BJ Contreras.

## 2022-04-26 NOTE — TELEPHONE ENCOUNTER
----- Message from Nicolette Navarro RN sent at 4/26/2022  1:31 PM CDT -----  Regarding: Vaginal bleeding/Eliquis  Spoke to pt, she states she was having some light pink vaginal bleeding, she stopped taking the Eliquis for a couple days and it resolved. She has recently resumed the Eliquis and the light vaginal bleeding has returned. Informed pt to call her PCP and we would send a message as well to notify PCP to advise patient.     Thanks,    Nicolette

## 2022-04-26 NOTE — TELEPHONE ENCOUNTER
----- Message from Mercedes Guillermo sent at 4/26/2022  3:36 PM CDT -----  Contact: Self   Pt is requesting a call regarding information she would like to give to nurse. Please  advise

## 2022-04-27 ENCOUNTER — TELEPHONE (OUTPATIENT)
Dept: PRIMARY CARE CLINIC | Facility: CLINIC | Age: 82
End: 2022-04-27
Payer: MEDICARE

## 2022-04-27 NOTE — TELEPHONE ENCOUNTER
Call received from Florida Contreras LPN, at [facility].   Stated that she had not received fax from Flori Olmos RN that was supposed to have been faxed on yesterday to discontinue Eliquis.  Order refaxed...

## 2022-05-12 ENCOUNTER — OUTPATIENT CASE MANAGEMENT (OUTPATIENT)
Dept: ADMINISTRATIVE | Facility: OTHER | Age: 82
End: 2022-05-12
Payer: MEDICARE

## 2022-05-12 NOTE — PROGRESS NOTES
Call made to patient .  No answer. Left message.  Called alternate contact, Leah Mcgovern.  Stated that patient is still in nursing home.  Will defer admission assessment until patient is sdis charged from nursing home

## 2022-05-17 ENCOUNTER — OUTPATIENT CASE MANAGEMENT (OUTPATIENT)
Dept: ADMINISTRATIVE | Facility: OTHER | Age: 82
End: 2022-05-17
Payer: MEDICARE

## 2022-05-25 ENCOUNTER — OFFICE VISIT (OUTPATIENT)
Dept: PRIMARY CARE CLINIC | Facility: CLINIC | Age: 82
End: 2022-05-25
Payer: MEDICARE

## 2022-05-25 DIAGNOSIS — E66.01 MORBID OBESITY WITH BMI OF 50.0-59.9, ADULT: ICD-10-CM

## 2022-05-25 DIAGNOSIS — I82.621 DEEP VEIN THROMBOSIS (DVT) OF RIGHT UPPER EXTREMITY, UNSPECIFIED CHRONICITY, UNSPECIFIED VEIN: ICD-10-CM

## 2022-05-25 DIAGNOSIS — I87.2 VENOUS STASIS DERMATITIS OF BOTH LOWER EXTREMITIES: ICD-10-CM

## 2022-05-25 PROCEDURE — 99443 PR PHYSICIAN TELEPHONE EVALUATION 21-30 MIN: ICD-10-PCS | Mod: 95,,, | Performed by: INTERNAL MEDICINE

## 2022-05-25 PROCEDURE — 99443 PR PHYSICIAN TELEPHONE EVALUATION 21-30 MIN: CPT | Mod: 95,,, | Performed by: INTERNAL MEDICINE

## 2022-05-25 NOTE — PROGRESS NOTES
Established Patient - Audio Only Telehealth Visit with [facility] Provider  Shared Note: Beena Lucio (MS4) & Dr Tami Schmidt (Attending)     The patient location is: HOME  The chief complaint leading to consultation is: routine care  Visit type: Virtual visit with audio only (telephone)     The reason for the audio only service rather than synchronous audio and video virtual visit was related to technical difficulties or patient preference/necessity.     Each patient to whom I provide medical services by telemedicine is:  (1) informed of the relationship between the physician and patient and the respective role of any other health care provider with respect to management of the patient; and (2) notified that they may decline to receive medical services by telemedicine and may withdraw from such care at any time. Patient verbally consented to receive this service via voice-only telephone call.       Subjective:      Patient ID: Duran Giordano is a 82 y.o. female with PMHx of HTN, DM2 with peripheral neuropathy, CAD, CHF, COPD, bilateral knee OA s/p failed R knee arthroplasty, bilateral LE lymphedema, venous stasis ulcers, and morbid obesity (weight of 243 lb and BMI of 49.1 today).      Prior to this visit, patient's last encounter with PCP was 4/26/2022.        Objective:     Lab Results   Component Value Date    WBC 10.29 01/03/2022    HGB 11.0 (L) 01/03/2022    HCT 36.5 (L) 01/03/2022     01/03/2022    CHOL 160 03/04/2021    TRIG 88 03/04/2021    HDL 34 (L) 03/04/2021    ALT 28 11/01/2021    AST 41 (H) 11/01/2021     12/09/2021    K 4.0 12/09/2021     12/09/2021    CREATININE 1.6 (H) 12/09/2021    BUN 26 (H) 12/09/2021    CO2 20 (L) 12/09/2021    TSH 0.799 01/16/2020    INR 1.1 10/28/2021    HGBA1C 8.1 (H) 10/26/2021         Assessment:   82 y.o. female with multiple co-morbid illnesses here to continue work-up of chronic issues.     Plan:     Problem List Items Addressed This Visit     None         Health Maintenance       Date Due Completion Date    TETANUS VACCINE Never done ---    Shingles Vaccine (3 of 3) 09/06/2021 7/12/2021    COVID-19 Vaccine (5 - Booster) 01/23/2022 9/23/2021    Eye Exam 02/03/2022 2/3/2021    Override on 2/7/2020: Done    Override on 2/17/2016: Done    Override on 1/22/2015: Done    Override on 8/16/2012: Done    DEXA Scan 02/22/2022 2/22/2018    Override on 12/13/2011: Done    Diabetes Urine Screening 03/04/2022 3/4/2021    Hemoglobin A1c 05/01/2022 2/1/2022    Override on 7/13/2016: Done    Foot Exam 11/30/2022 11/30/2021    Override on 12/31/2019: Done (Done by Dr. Anand)    Override on 11/27/2018: Done (Dr Anand)    Override on 1/19/2018: Done (Dr anand)    Override on 5/31/2017: Done    Override on 7/20/2016: Done    Lipid Panel 02/01/2023 2/1/2022          No follow-ups on file. . Thirty minutes spent with this patient today.    Tami Schmidt MD/MPH  Internal Medicine  [facility] and [facility]  Spectra [phone]    This service was not originating from a related E/M service provided within the previous 7 days nor will  to an E/M service or procedure within the next 24 hours or my soonest available appointment.  Prevailing standard of care was able to be met in this audio-only visit.

## 2022-05-25 NOTE — PROGRESS NOTES
"Established Patient - Audio Only Telehealth Visit with [facility] Provider  Shared Note: Sonyleonard Levy (MS4) & Dr Tami Schmidt (Attending)     The patient location is: HOME  The chief complaint leading to consultation is: routine care  Visit type: Virtual visit with audio only (telephone)     The reason for the audio only service rather than synchronous audio and video virtual visit was related to technical difficulties or patient preference/necessity.     Each patient to whom I provide medical services by telemedicine is:  (1) informed of the relationship between the physician and patient and the respective role of any other health care provider with respect to management of the patient; and (2) notified that they may decline to receive medical services by telemedicine and may withdraw from such care at any time. Patient verbally consented to receive this service via voice-only telephone call.       Subjective:      Patient ID: Duran Giordano is a 82 y.o. female nursing home resident w/ HTN/HLD, hx of CVA 2016, IDDM2, CAD/carotid artery stenosis, gout, stable adrenal adenoma, recent DVT who presents via phone call for routine f/u.    Prior to this visit, patient's last encounter with PCP was 4/26/2022.    Pt currently resides at [facility]  [phone]    Would still like her mail sent to [facility] at [address] as it is delivered to her by her sisters.     Pt had her a1c taken yesterday through orders placed by the [facility] home doctor, she would like to know the result. Her blood sugars have been "beautiful" recently, taken every morning and pt reports that most readings have been in the low 100s. It was 105 this morning. Pt is also having her BP monitored at the nursing home, she has not had any outlier readings and BP this morning was 129/65.     She is not currently having any new pains or complaints, chronic pains are well managed with aspercreme " "and voltaren gel.    Pt's CPAP machine is broken, still at mother house. She does not believe she needs it anymore since she lost 60 pounds within the last 4 years and is continuing to lose weight at a manageable pace on her diet at the nursing home. She has not been told that she snores, is waking up feeling refreshed. She does not want follow-up with the sleep medicine group for this.    Pt has some chronic lower extremity edema for which the nursing home doctor was potentially interested in compression stockings, however her stockings have been lost and she would like to wait for her appointment with Dr. Lozoya to determine if this is apppropriate with her surgery pending. She states "my legs look good, I have no leakage."    Pt has an appointment with Dr. Lozoya her orthopedist on June 6 to determine if it is appropriate at this time to reinstall new knee hardware. She is looking forward to this appointment as being in a wheelchair and unable to walk has been a struggle for her.     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 Lozoya ortho, next appointment 6/6/22  -Hardware reinstall delayed by GARIMA DVT  -No current antibiotic use    Hypertension  -Likely essential HTN, pt obese in past but has since lost weight  BP Readings from Last 5 Encounters:   04/06/22 132/70   02/14/22 (!) 140/71   01/04/22 (!) 178/77   12/10/21 126/68   11/30/21 (!) 161/70   -Last echo Nov '21 w/ EF 65%, normal BV systolic fn, ind. LV diastolic fn, pHTN 49mmHg  -norvasc 10  -torsemide 10 for lower extremity edema    Type 2 diabetes w/ long term insulin use  -last a1c 8.1% 10/26/21, Last a1c per nurse at [facility] is 7.7% in April. A1c taken 5/24/22 is pending and will be faxed to office when available.   - on home monitoring this morning  -105 morning  -16u of degludec nightly  -0.5mg ozempic every Saturday    Recent DVT  -RUE DVT basilic vein, " s/p 3 months eliquis treatment  -Pt w/ complete resolution of symptoms    Cardiovascular disease/HLD  -20-40% bilateral carotid artery stenosis Feb '16, hx CVA '16  -resting coronary flow heterogeneity w/ no evidence of hemodynamically significant coronary stenosis on PET stress Oct '13  -Last , HDL 34 3/4/21  -atorvastatin 80    Objective:     Lab Results   Component Value Date    WBC 10.29 01/03/2022    HGB 11.0 (L) 01/03/2022    HCT 36.5 (L) 01/03/2022     01/03/2022    CHOL 160 03/04/2021    TRIG 88 03/04/2021    HDL 34 (L) 03/04/2021    ALT 28 11/01/2021    AST 41 (H) 11/01/2021     12/09/2021    K 4.0 12/09/2021     12/09/2021    CREATININE 1.6 (H) 12/09/2021    BUN 26 (H) 12/09/2021    CO2 20 (L) 12/09/2021    TSH 0.799 01/16/2020    INR 1.1 10/28/2021    HGBA1C 8.1 (H) 10/26/2021         Assessment:   82 y.o. female with multiple co-morbid illnesses here to continue work-up of chronic issues including T2DM and HTN.     Plan:     Problem List Items Addressed This Visit        Cardiac/Vascular    Venous stasis dermatitis of both lower extremities     LLE venous stasis ulcers. Difficulty with wound healing  · Lengthy conversation about poor prognosis with any surgery due to LE tissue damage related to lymphedema and venous stasis  · Patient is losing weight, watching her diet  · Has Ortho follow-up soon  · She lost her circaids, they are at the mother house  · She states her legs are stable at this time              Hematology    Deep vein thrombosis (DVT) of upper extremity     Completed 3.5 mos of treatment with NOAC for DVT  · Asymptomatic              Endocrine    Morbid obesity with BMI of 50.0-59.9, adult     The patient states she has lost approximately 45 lbs and feels great.  · She wants to continue ozempic  · She is making major dietary changes because her NH is not providing her with unhealthy foods                 Health Maintenance       Date Due Completion Date    TETANUS  VACCINE Never done ---    Shingles Vaccine (3 of 3) 09/06/2021 7/12/2021    COVID-19 Vaccine (5 - Booster) 01/23/2022 9/23/2021    Eye Exam 02/03/2022 2/3/2021    Override on 2/7/2020: Done    Override on 2/17/2016: Done    Override on 1/22/2015: Done    Override on 8/16/2012: Done    DEXA Scan 02/22/2022 2/22/2018    Override on 12/13/2011: Done    Diabetes Urine Screening 03/04/2022 3/4/2021    Hemoglobin A1c 05/01/2022 2/1/2022    Override on 7/13/2016: Done    Foot Exam 11/30/2022 11/30/2021    Override on 12/31/2019: Done (Done by Dr. Anand)    Override on 11/27/2018: Done (Dr Anand)    Override on 1/19/2018: Done (Dr anand)    Override on 5/31/2017: Done    Override on 7/20/2016: Done    Lipid Panel 02/01/2023 2/1/2022        Follow up in about 4 weeks (around 6/22/2022). Thirty minutes spent with this patient today.    Tami Schmidt MD/MPH  Internal Medicine  [facility]  Spectra [phone]    This service was not originating from a related E/M service provided within the previous 7 days nor will  to an E/M service or procedure within the next 24 hours or my soonest available appointment.  Prevailing standard of care was able to be met in this audio-only visit.

## 2022-05-25 NOTE — ASSESSMENT & PLAN NOTE
The patient states she has lost approximately 45 lbs and feels great.  · She wants to continue ozempic  · She is making major dietary changes because her NH is not providing her with unhealthy foods

## 2022-05-25 NOTE — ASSESSMENT & PLAN NOTE
LLE venous stasis ulcers. Difficulty with wound healing  · Lengthy conversation about poor prognosis with any surgery due to LE tissue damage related to lymphedema and venous stasis  · Patient is losing weight, watching her diet  · Has Ortho follow-up soon  · She lost her circaids, they are at the [facility] house  · She states her legs are stable at this time

## 2022-06-01 ENCOUNTER — TELEPHONE (OUTPATIENT)
Dept: PRIMARY CARE CLINIC | Facility: CLINIC | Age: 82
End: 2022-06-01
Payer: MEDICARE

## 2022-06-01 NOTE — TELEPHONE ENCOUNTER
Please fax letter to patient's nursing home with instructions on how to do leg wraps. She is at [facility]    Pt currently resides at [facility]  [phone]       ThanksBARB

## 2022-06-01 NOTE — LETTER
June 1, 2022    Duran Giordano  [address] [address] [place] [address]             [facility]  Primary Care  [address]  [place] [address]  Phone: [phone]  Fax: [phone]   June 1, 2022     Patient: Duran Giordano   YOB: 1940   Date of Visit: 6/1/2022       To Whom it May Concern:    Duran Giordano was seen in my clinic on this week. Could you please perform leg wraps on her? She has numerous ulcers on her lower extremities. She needs layered support for her compression wraps as follows:  · Apply wet wound dressing to wounds, then dry 4X4  · Wrap leg with kerlex  · Wrap leg with ACE bandage  · Apply tubular net bandage    If you have any questions or concerns, please don't hesitate to call.    Sincerely,           Tami Schmidt MD

## 2022-06-03 DIAGNOSIS — T84.53XS INFECTION OF TOTAL RIGHT KNEE REPLACEMENT, SEQUELA: Primary | ICD-10-CM

## 2022-06-06 ENCOUNTER — OFFICE VISIT (OUTPATIENT)
Dept: ORTHOPEDICS | Facility: CLINIC | Age: 82
End: 2022-06-06
Payer: MEDICARE

## 2022-06-06 ENCOUNTER — HOSPITAL ENCOUNTER (OUTPATIENT)
Dept: RADIOLOGY | Facility: HOSPITAL | Age: 82
Discharge: HOME | End: 2022-06-06
Attending: ORTHOPAEDIC SURGERY
Payer: MEDICARE

## 2022-06-06 VITALS — BODY MASS INDEX: 49.6 KG/M2 | HEIGHT: 59 IN | WEIGHT: 246.06 LBS

## 2022-06-06 DIAGNOSIS — Z79.4 TYPE 2 DIABETES MELLITUS WITH DIABETIC POLYNEUROPATHY, WITH LONG-TERM CURRENT USE OF INSULIN: Primary | ICD-10-CM

## 2022-06-06 DIAGNOSIS — E11.42 TYPE 2 DIABETES MELLITUS WITH DIABETIC POLYNEUROPATHY, WITH LONG-TERM CURRENT USE OF INSULIN: Primary | ICD-10-CM

## 2022-06-06 DIAGNOSIS — T84.53XS INFECTION OF TOTAL RIGHT KNEE REPLACEMENT, SEQUELA: Primary | ICD-10-CM

## 2022-06-06 DIAGNOSIS — T84.53XS INFECTION OF TOTAL RIGHT KNEE REPLACEMENT, SEQUELA: ICD-10-CM

## 2022-06-06 PROCEDURE — 73560 X-RAY EXAM OF KNEE 1 OR 2: CPT | Mod: TC,RT

## 2022-06-06 PROCEDURE — 99999 PR PBB SHADOW E&M-EST. PATIENT-LVL III: CPT | Mod: PBBFAC,,, | Performed by: ORTHOPAEDIC SURGERY

## 2022-06-06 PROCEDURE — 99214 OFFICE O/P EST MOD 30 MIN: CPT | Mod: S$PBB,,, | Performed by: ORTHOPAEDIC SURGERY

## 2022-06-06 PROCEDURE — 99213 OFFICE O/P EST LOW 20 MIN: CPT | Mod: PBBFAC | Performed by: ORTHOPAEDIC SURGERY

## 2022-06-06 PROCEDURE — 99214 PR OFFICE/OUTPT VISIT, EST, LEVL IV, 30-39 MIN: ICD-10-PCS | Mod: S$PBB,,, | Performed by: ORTHOPAEDIC SURGERY

## 2022-06-06 PROCEDURE — 99999 PR PBB SHADOW E&M-EST. PATIENT-LVL III: ICD-10-PCS | Mod: PBBFAC,,, | Performed by: ORTHOPAEDIC SURGERY

## 2022-06-06 PROCEDURE — 73560 X-RAY EXAM OF KNEE 1 OR 2: CPT | Mod: 26,RT,, | Performed by: RADIOLOGY

## 2022-06-06 PROCEDURE — 73560 XR KNEE 1 OR 2 VIEW RIGHT: ICD-10-PCS | Mod: 26,RT,, | Performed by: RADIOLOGY

## 2022-06-06 NOTE — PROGRESS NOTES
"Subjective:      Patient ID: Duran Giordano is a 82 y.o. female.    Chief Complaint: Follow-up and Pain of the Right Knee    HPI  Duran Giordano has right knee pain. She has lost 50 pounds and working on conditioning.  Her legs have improved  Her history, medications and problem list were reviewed.    Review of Systems   Constitutional: Negative for chills, fever and night sweats.   HENT: Negative for hearing loss.    Eyes: Negative for blurred vision and double vision.   Cardiovascular: Negative for chest pain, claudication and leg swelling.   Respiratory: Negative for shortness of breath.    Endocrine: Negative for polydipsia, polyphagia and polyuria.   Hematologic/Lymphatic: Negative for adenopathy and bleeding problem. Does not bruise/bleed easily.   Skin: Negative for poor wound healing.   Gastrointestinal: Negative for diarrhea and heartburn.   Genitourinary: Negative for bladder incontinence.   Neurological: Negative for focal weakness, headaches, numbness, paresthesias and sensory change.   Psychiatric/Behavioral: The patient is not nervous/anxious.    Allergic/Immunologic: Positive for persistent infections.         Objective:      Body mass index is 49.69 kg/m².  Vitals:    06/06/22 1448   Weight: 111.6 kg (246 lb 0.5 oz)   Height: 4' 11" (1.499 m)           General    Constitutional: She is oriented to person, place, and time.   obese   HENT:   Head: Normocephalic and atraumatic.   Eyes: EOM are normal.   Cardiovascular: Normal rate and regular rhythm.    Pulmonary/Chest: Effort normal.   Neurological: She is alert and oriented to person, place, and time.   Psychiatric: She has a normal mood and affect.     General Musculoskeletal Exam   Gait: abnormal and antalgic       Right Knee Exam     Inspection   Scars: present    Tenderness   The patient is experiencing no tenderness.     Range of Motion   Extension: 0   Flexion: 100     Tests   Ligament Examination Lachman: abnormal   MCL - Valgus: " abnormal  LCL - Varus: abnormal    Comments:  Skin is closed distally    Muscle Strength   Right Lower Extremity   Hip Abduction: 5/5   Quadriceps:  5/5   Hamstrin/5     Vascular Exam       Edema  Right Lower Leg: present              Assessment:       Encounter Diagnosis   Name Primary?    Infection of total right knee replacement, sequela Yes          Plan:       Duran Logan was seen today for follow-up and pain.    Diagnoses and all orders for this visit:    Infection of total right knee replacement, sequela        .Treatment options were discussed. The surgical process of management of infected right knee prostheses was discussed in detail with Duran Giordano   including a detailed discussion of the procedure itself including bearing options and prognosis. The typical perioperative and post-operative course was discussed and perioperative risks were discussed to the patient's satisfaction. The patient is aware this is a two stage procedure and carries increased risk.  Risks and complications discussed included but were not limited to the risks of anesthetic complications, continued infection, fracture, bleeding, wound healing complications, aseptic loosening, instability, limb length inequality, neurologic dysfunction including numbness and weakness, additional surgery,  DVT, pulmonary embolism, perioperative medical risks (cardiac, pulmonary, renal, neurologic), and death and the patient elects to proceed. The patient is aware of the increased risk of complications with revision surgery.  The patient should get medically cleared. Duran Giordano is aware that morbid obesity carries increased risks with joint replacement surgery.  These include problems with wound healing, alignment of the prosthesis, higher chance of infection and other medical complications, as well as the potential for early implant failure.  THis is stage 2.    Eliquis for three months    [place]     6 months oral antibiotics after  surgery        Will need aspiration     Will need HbA1c results.

## 2022-06-07 ENCOUNTER — TELEPHONE (OUTPATIENT)
Dept: PREADMISSION TESTING | Facility: HOSPITAL | Age: 82
End: 2022-06-07
Payer: MEDICAID

## 2022-06-07 ENCOUNTER — TELEPHONE (OUTPATIENT)
Dept: ORTHOPEDICS | Facility: CLINIC | Age: 82
End: 2022-06-07
Payer: MEDICAID

## 2022-06-07 DIAGNOSIS — T84.53XS INFECTION OF TOTAL RIGHT KNEE REPLACEMENT, SEQUELA: Primary | ICD-10-CM

## 2022-06-07 NOTE — TELEPHONE ENCOUNTER
Pt was left a vm that her results came back great and if she has any further questions or concerns she can give us a call here in office      ----- Message from Alferdo Lozoya MD sent at 6/7/2022  6:46 AM CDT -----  Please call her and tell her her HbA1c looks great!

## 2022-06-07 NOTE — TELEPHONE ENCOUNTER
Spoke with Ayden pts nurse, she said they need some information regarding her surgery. Notified her I would fax over his clinic note and a letter stating her surgery date location and estimated time of arrival. She was pleased and had no further questions.    ----- Message from Irma Benjamin sent at 6/7/2022 11:11 AM CDT -----  Contact: @ [phone]  Ayden from [facility] is calling she wants to know if she can get some information on her procedure that is schedule for 6/30 so that they can book transportation please call @ [phone] or fax [phone]

## 2022-06-07 NOTE — TELEPHONE ENCOUNTER
Pt call was returned and she was left a message with her nurse of her A1c levels the nurse assured me the message will be relayed and there was no further questions     ----- Message from Agapito Alexander sent at 6/7/2022  2:12 PM CDT -----  Contact: @ [phone]  Patient received her AIC is great, patient wants to know what great means, Please return call to discuss further

## 2022-06-14 ENCOUNTER — TELEPHONE (OUTPATIENT)
Dept: PRIMARY CARE CLINIC | Facility: CLINIC | Age: 82
End: 2022-06-14
Payer: MEDICAID

## 2022-06-14 DIAGNOSIS — Z01.818 PRE-OP TESTING: Primary | ICD-10-CM

## 2022-06-14 DIAGNOSIS — M79.604 PAIN OF RIGHT LOWER EXTREMITY: ICD-10-CM

## 2022-06-14 NOTE — ANESTHESIA PAT ROS NOTE
06/14/2022  Duran Giordano is a 82 y.o., female.      Pre-op Assessment          Review of Systems  Anesthesia Hx:  No problems with previous Anesthesia  History of prior surgery of interest to airway management or planning: Previous anesthesia: General 2007-KNEE SURGERY with general anesthesia.  Denies Family Hx of Anesthesia complications.   Denies Personal Hx of Anesthesia complications.   Social:  Non-Smoker, No Alcohol Use    Hematology/Oncology:  Hematology Normal   Oncology Normal     EENT/Dental:EENT/Dental Normal   Cardiovascular:   Hypertension CAD    Denies Angina. hyperlipidemia  Functional Capacity 3 METS  Congestive Heart Failure (CHF) , LV Diastolic HF Deep Venous Thrombosis (DVT), Hx of DVT RUE DUE TO PICC LINE      RLE CHRONIC CELLULITIS Carotoid Artery Disease, bilateral , Left stenosis is 20-39% , Right stenosis is  20-39%   Pulmonary:   COPD, mild Asthma mild Sleep Apnea NO CPAP CURRENTLY Pulmonary Hypertension Echocardiographic Estimated Pulmonary Artery Systolic Pressure 45 - 60    Renal/:  Kidney Function/Disease, Chronic Kidney Disease (CKD) , CKD Stage III (GFR 30-59)    Hepatic/GI:   GERD H/O VIRAL HEP A   Musculoskeletal:   S/P RIGHT TKA 2007 WITH HARDWARE REMOVAL AND SPACER PLACED 2013 Joint Disease:  Arthritis, Osteoarthritis, Gout  Cervical Spine Disorder, Cervical Disc Disease, Radiculopathy    Neurological:  Osteoarthritis  CVA - Cerebrovasular Accident , Most recent CVA was on 2016    Endocrine:  Diabetes, Type 2 Diabetes , controlled by diet. , most recent HgA1c value was 6.8 on 6/6/22.  Metabolic Disorders, Morbid Obesity / BMI > 40  Psych:  Psychiatric Normal           Physical Exam  General:  Morbid Obesity                   Anesthesia Assessment: Preoperative EQUATION    Planned Procedure: Procedure(s) (LRB):  REVISION, ARTHROPLASTY, KNEE-NAKIA- stage 2  (Right)  Requested Anesthesia Type:Regional  Surgeon: Alfredo Lozoya MD  Service: Orthopedics  Known or anticipated Date of Surgery:6/30/2022    Surgeon notes: reviewed    Previous anesthesia records: REVISION, ARTHROPLASTY, KNEE-DEPUY ANTIBIOTIC SPACER (Right Knee) 10/28/21 MAC, epidural, block       Last PCP note: within [facility] , Dr Schmidt telehealth vs 5/25/22  Subspecialty notes:   Physical Medicine & Rehab: 4/6/22 Dr Grewal  IM-DM2: 3/31/22 [facility]  ED: 1/4/22 for RUE swelling-US-->DVT RUE-->Eliquis started  ID: 1/3/22 Dr Crawford for cellulitis RLE--> Doxycycline started  Rheumatology: 8/24/21 Dr Olson for chronic gout f/u  (Nephrology 2018, Cardiology, neurology 2017)    Other important co-morbidities: Asthma, COPD, REJI (no CPAP-broken & had significant wt loss), PH (PASP 49), HTN, HLP, CAD (calcified coronary lesion), chronic diastolic HF (EF 65%), bilateral Carotid disease (20-39% ICS-bilat), CVA (2016), DVT RUE (off Eliquis, previous site of PICC line), chronic cellulitis of the RLE, CKD3 (Cr 1.6), DM2 (A1C 6.8), morbid obesity-recent wt loss of 50 lbs (BMI 49.69), GERD, s/p R TKA 2007 and hardware removed, spacer placed 2013, gout     Tests already available:  A1C (6.8) 6/6/22; US R UE 1/4/22; TTE 11/1/21; EKG 8/21/17 (will repeat per written guidelines); Carotid US 2/24/16; PET STRESS 11/4/13              Plan:    Testing:  BMP, EKG, Hematology Profile, PT/INR and T&S   Pre-anesthesia  visit       Visit focus: possible regional anesthesia and/or nerve block      Consultation: Perioperative Hospitalist     Patient  has previously scheduled Medical Appointment: Dr Deal 6/22/22; POC 6/22/22    Navigation: Tests Scheduled.              Consults scheduled.             Results will be tracked by Preop Clinic.      Addendum 6/22/22: pt optimized by Dr Deal-please refer to his notes for recommendations./bg

## 2022-06-14 NOTE — TELEPHONE ENCOUNTER
----- Message from Juanita Galicia sent at 6/13/2022  2:44 PM CDT -----  Contact: [phone]  Pt would like a call back she has some questions.

## 2022-06-14 NOTE — TELEPHONE ENCOUNTER
Spoke with pt. She stated she received a letter stating to wrap her legs. She just received this. She stated that her legs are fine at this time and her legs do not need to be wrapped.    She stated that she is going through with the surgery with Dr. Lozoya.    Pt stated you can call her if needed.

## 2022-06-15 ENCOUNTER — TELEPHONE (OUTPATIENT)
Dept: ORTHOPEDICS | Facility: CLINIC | Age: 82
End: 2022-06-15
Payer: MEDICAID

## 2022-06-15 NOTE — TELEPHONE ENCOUNTER
Spoke to rehab center, advised of appointments for pre-op anesthesia.     ----- Message from Dakota Ward sent at 6/15/2022  1:44 PM CDT -----  Regarding: Appt Schedule  Type: Patient Call Back    Who called:Patient     What is the request in detail: Patient is requesting a call back. She states she is at rehab center and she needs to discrepancy is schedule. Please call rehab center with appt list : [phone]    Can the clinic reply by [facility]? No    Would the patient rather a call back or a response via [facility]?Call    Best call back number: [phone]    Additional Information:    Thanks

## 2022-06-17 NOTE — TELEPHONE ENCOUNTER
Spoke with pt, she explained that she cannot have another doctor while in [facility].   She is having another surgery. She does plan to go back to the mother house.     She wants to speak with you on Friday, if possible

## 2022-06-21 NOTE — ASSESSMENT & PLAN NOTE
Weight related conditions     Known to have     HTN  Type 2 Diabetes   Hyperlipidemia   Sleep apnea   Acid reflux   Osteoarthritis    Not troubled with / Not known to have       Fatty liver       Encouraged weight loss    With increased mobility after the knee revision surgery surgery she will likely lose more weight

## 2022-06-21 NOTE — ASSESSMENT & PLAN NOTE
Never had chest pain    2013     There is no evidence of a discrete hemodynamically significant coronary stenosis.

## 2022-06-21 NOTE — ASSESSMENT & PLAN NOTE
DVT RUE  Secondary to PICC line for IV antibiotics  for right knee infection     Was was on treatment with apixaban for 3 and half months  Jan 4 th 2022    Occlusive thrombus involving the axillary and basilic veins as above    History of DVT  No PE  1 Episode  Associated with reduced mobility,no  long journeys, no cancer, prior surgery, Hospital stay, no HRT    IVC filter  - none     Towards the end of her time when she was taking anticoagulation she noticed a very mild vaginal bleeding.April 2022  She stopped taking the Eliquis that led to resolution of the vaginal bleeding and then when she tried it back on she had vaginal bleeding    Increased risk of thrombosis in the keo operative period    Noted plan for anticoagulation use after surgery  I have offered further evaluation of her vaginal bleeding but she currently deferred at this time

## 2022-06-21 NOTE — ASSESSMENT & PLAN NOTE
using Circaids  Stockings   Helping     She was mobile in the past  Increase mobility after her knee revision surgery will likely improve her edema          Edema- I suggested avoidance of added salt,avoidance of NSAID's, unless advised or ordered  and suggested Limb elevation and yossi hose use
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
## 2022-06-21 NOTE — ASSESSMENT & PLAN NOTE
Never smoker   Not troubled with breathing   Not needing Albuterol  or nebulizer treatment lately

## 2022-06-21 NOTE — ASSESSMENT & PLAN NOTE
She is doing well with acid reflux for the fast for years    GERD Symptoms -acid taste to the throat    Does not sound Cardiac   Tips to control reflux discussed

## 2022-06-21 NOTE — TELEPHONE ENCOUNTER
Please leave her on the schedule for Friday. I can call her then.    Also, I can still check on her with phone call appointments in the NH.     Thanks,  BARB

## 2022-06-21 NOTE — ASSESSMENT & PLAN NOTE
Creatinine 1.5     Stages of CKD discussed  Deleterious effects NSAID's , Beneficial effects of Hydration discussed   Tylenol as needed for pain     I  suggest monitoring renal function, in put and out put status keo-operatively. I  suggest avoiding nephrotoxic medication including NSAIDs, COX2 inhibitors, intravenous contrast agent,avoiding hypotension to prevent further renal impairment.     Suggested good diabetes and blood pressure control

## 2022-06-21 NOTE — ASSESSMENT & PLAN NOTE
No recent problem     Patient has history of heart failure that seems  compensated . I suggest continuation of the maintenance Diuretic regimen while monitoring the renal function and electrolytes  in the perioperative period . Please keep a record of the Input and Out put and weigh patient daily so that fluid overload can be detected and acted upon promptly . I suggest providing low  sodium diet   I suggest avoidance of the ordering of continuous IV fluids and if IV fluids are required ,to order for a specific duration of time and consider ordering lower IV fluid rate

## 2022-06-21 NOTE — ASSESSMENT & PLAN NOTE
She is taking amlodipine 10 mg by mouth once a day for hypertension    Home BP readings -130/80  Recent BP readings in the record-  Vitals - 1 value per visit 2/14/2022 4/6/2022 6/6/2022 6/6/2022 6/22/2022   SYSTOLIC 140 132      DIASTOLIC 71 70        Vitals - 1 value per visit 6/22/2022 6/22/2022   SYSTOLIC  140   DIASTOLIC  85     Hypertension-  Blood pressure is acceptable . I suggest continuation of -Amlodipine- during the entire perioperative period. I suggest  blood pressure,  monitoring .I suggest addressing pain control as uncontrolled pain can increased blood pressure     With reference to diabetes she is taking to  Insulin at night and was Ozempic  once a week on sat   She feels that her diabetes is doing very well since she went on Ozempic  Lost 44 #   She lost about 44 lb in the past 1 year.  She has been on Ozempic for the past 1 year  Type 2 Diabetes Mellitus  On treatment with oral agents, Insulin    Hemoglobin A1c- 6.8 June 2022    Capillary glucose check-  Pre meals 109  Bed time-117    Diabetes Complications     Microvascular     Not known to have   Diabetes affecting the eyes  CKD 3   numbness of rt toes- mild  No reported open areas on the feet   Feet care suggested     Macrovascular     ? TIA    No suggestion of  lower extremity claudication      Diabetes Mellitus-I suggest monitoring the glucose in the perioperative period ( Before meals and bed time,if the patient is on oral feeds or every 6 hourly ,if the patient is NPO )  Blood glucose target in hospitalized patients is 140-180. Oral Hypoglycemic agents are generally avoided during the hospital stay . If glucose is consistently elevated ,I suggest using basal ,prandial Insulin regimen to control the glucose , as elevated glucose can be associated with adverse surgical out comes. Please consider involving Hospital Medicine or Endocrinology ,if any help is needed with Glucose control. Patient will be instructed based on the pre op clinic  guidelines  about adjustment of diabetic treatment (If applicable )  considering the NPO status for Surgery      I had educated that uncontrolled DM can cause post op complications,risk of infection, wound healing problem,increased length of stay in hospital and its associated complications.I suggest exercise as much as possible and follow diabetic diet     She is working with Physical therapy  She is doing upper body exercises

## 2022-06-21 NOTE — ASSESSMENT & PLAN NOTE
Nov 2021    · The left ventricle is normal in size with concentric remodeling and normal systolic function. The estimated ejection fraction is 65%.  · Normal right ventricular size with normal right ventricular systolic function.  · Indeterminate left ventricular diastolic function.  · The estimated PA systolic pressure is 49 mmHg.  · Normal central venous pressure (3 mmHg    Pulmonary arterial hypertension       WHO functional classification     Class 1 - No limitation ( with reference to fatigue or dyspnea, chest pain, or heart syncope)      Clinical classification - based on etiology       Group 2  Group 3     [place] heart association functional class     Class 1         Planned Anesthesia-  Regional     Rt heart dysfunction -None    Increased risk of keo operative morbidity, mortality       Suggest avoidance of Intra vascular volume over load or reduced pre load     I suggest that the anaesthesiologist be aware of the Pulmonary artery hypertension , so that sedation,  anesthetic plans can be made with consideration of Pulmonary Hypertension  .

## 2022-06-22 ENCOUNTER — PATIENT MESSAGE (OUTPATIENT)
Dept: ADMINISTRATIVE | Facility: OTHER | Age: 82
End: 2022-06-22
Payer: MEDICAID

## 2022-06-22 ENCOUNTER — HOSPITAL ENCOUNTER (OUTPATIENT)
Dept: CARDIOLOGY | Facility: CLINIC | Age: 82
Discharge: HOME | End: 2022-06-22
Payer: MEDICARE

## 2022-06-22 ENCOUNTER — OFFICE VISIT (OUTPATIENT)
Dept: ORTHOPEDICS | Facility: CLINIC | Age: 82
End: 2022-06-22
Payer: MEDICARE

## 2022-06-22 ENCOUNTER — OFFICE VISIT (OUTPATIENT)
Dept: INTERNAL MEDICINE | Facility: CLINIC | Age: 82
End: 2022-06-22
Payer: MEDICARE

## 2022-06-22 VITALS — WEIGHT: 246.69 LBS | BODY MASS INDEX: 49.73 KG/M2 | HEIGHT: 59 IN

## 2022-06-22 VITALS
HEART RATE: 72 BPM | HEIGHT: 59 IN | WEIGHT: 244 LBS | DIASTOLIC BLOOD PRESSURE: 85 MMHG | RESPIRATION RATE: 18 BRPM | TEMPERATURE: 98 F | BODY MASS INDEX: 49.19 KG/M2 | OXYGEN SATURATION: 97 % | SYSTOLIC BLOOD PRESSURE: 140 MMHG

## 2022-06-22 DIAGNOSIS — G47.33 OSA ON CPAP: ICD-10-CM

## 2022-06-22 DIAGNOSIS — Z79.4 TYPE 2 DIABETES MELLITUS WITH DIABETIC POLYNEUROPATHY, WITH LONG-TERM CURRENT USE OF INSULIN: ICD-10-CM

## 2022-06-22 DIAGNOSIS — Z96.659 FAILURE OF TOTAL KNEE REPLACEMENT, SUBSEQUENT ENCOUNTER: ICD-10-CM

## 2022-06-22 DIAGNOSIS — M10.00 IDIOPATHIC GOUT, UNSPECIFIED CHRONICITY, UNSPECIFIED SITE: ICD-10-CM

## 2022-06-22 DIAGNOSIS — E27.8 ADRENAL MASS: ICD-10-CM

## 2022-06-22 DIAGNOSIS — I77.1 TORTUOUS AORTA: ICD-10-CM

## 2022-06-22 DIAGNOSIS — M17.31 POST-TRAUMATIC OSTEOARTHRITIS OF RIGHT KNEE: ICD-10-CM

## 2022-06-22 DIAGNOSIS — E66.01 MORBID OBESITY WITH BMI OF 50.0-59.9, ADULT: ICD-10-CM

## 2022-06-22 DIAGNOSIS — I25.84 CORONARY ARTERY DISEASE DUE TO CALCIFIED CORONARY LESION: Chronic | ICD-10-CM

## 2022-06-22 DIAGNOSIS — T84.018D FAILURE OF TOTAL KNEE REPLACEMENT, SUBSEQUENT ENCOUNTER: ICD-10-CM

## 2022-06-22 DIAGNOSIS — Z86.718 HISTORY OF THROMBOSIS: ICD-10-CM

## 2022-06-22 DIAGNOSIS — E11.59 HYPERTENSION ASSOCIATED WITH DIABETES: ICD-10-CM

## 2022-06-22 DIAGNOSIS — J45.909 UNCOMPLICATED ASTHMA, UNSPECIFIED ASTHMA SEVERITY, UNSPECIFIED WHETHER PERSISTENT: ICD-10-CM

## 2022-06-22 DIAGNOSIS — T84.50XD INFECTION OF PROSTHETIC JOINT, SUBSEQUENT ENCOUNTER: Primary | ICD-10-CM

## 2022-06-22 DIAGNOSIS — K21.9 GASTROESOPHAGEAL REFLUX DISEASE WITHOUT ESOPHAGITIS: ICD-10-CM

## 2022-06-22 DIAGNOSIS — R09.81 NASAL CONGESTION: ICD-10-CM

## 2022-06-22 DIAGNOSIS — I87.2 VENOUS STASIS DERMATITIS OF BOTH LOWER EXTREMITIES: ICD-10-CM

## 2022-06-22 DIAGNOSIS — Z78.9 DIFFICULT INTRAVENOUS ACCESS: ICD-10-CM

## 2022-06-22 DIAGNOSIS — M47.816 LUMBAR FACET ARTHROPATHY: ICD-10-CM

## 2022-06-22 DIAGNOSIS — E55.9 VITAMIN D DEFICIENCY DISEASE: ICD-10-CM

## 2022-06-22 DIAGNOSIS — E11.42 TYPE 2 DIABETES MELLITUS WITH DIABETIC POLYNEUROPATHY, WITH LONG-TERM CURRENT USE OF INSULIN: ICD-10-CM

## 2022-06-22 DIAGNOSIS — J44.9 CHRONIC OBSTRUCTIVE PULMONARY DISEASE, UNSPECIFIED COPD TYPE: ICD-10-CM

## 2022-06-22 DIAGNOSIS — I25.10 CORONARY ARTERY DISEASE DUE TO CALCIFIED CORONARY LESION: Chronic | ICD-10-CM

## 2022-06-22 DIAGNOSIS — T84.50XA PROSTHETIC JOINT INFECTION: ICD-10-CM

## 2022-06-22 DIAGNOSIS — Z86.19 HISTORY OF STAPH INFECTION: ICD-10-CM

## 2022-06-22 DIAGNOSIS — D64.9 ANEMIA, UNSPECIFIED TYPE: ICD-10-CM

## 2022-06-22 DIAGNOSIS — I27.20 PULMONARY HYPERTENSION: ICD-10-CM

## 2022-06-22 DIAGNOSIS — Z01.818 PRE-OP TESTING: ICD-10-CM

## 2022-06-22 DIAGNOSIS — I15.2 HYPERTENSION ASSOCIATED WITH DIABETES: ICD-10-CM

## 2022-06-22 DIAGNOSIS — I50.32 CHRONIC DIASTOLIC HEART FAILURE: ICD-10-CM

## 2022-06-22 DIAGNOSIS — N18.31 STAGE 3A CHRONIC KIDNEY DISEASE: ICD-10-CM

## 2022-06-22 PROBLEM — J44.1 COPD WITH ACUTE EXACERBATION: Status: RESOLVED | Noted: 2021-04-22 | Resolved: 2022-06-22

## 2022-06-22 PROCEDURE — 99999 PR PBB SHADOW E&M-EST. PATIENT-LVL IV: ICD-10-PCS | Mod: PBBFAC,,, | Performed by: NURSE PRACTITIONER

## 2022-06-22 PROCEDURE — 99214 OFFICE O/P EST MOD 30 MIN: CPT | Mod: PBBFAC,27 | Performed by: NURSE PRACTITIONER

## 2022-06-22 PROCEDURE — 99214 PR OFFICE/OUTPT VISIT, EST, LEVL IV, 30-39 MIN: ICD-10-PCS | Mod: S$PBB,,, | Performed by: HOSPITALIST

## 2022-06-22 PROCEDURE — 99213 OFFICE O/P EST LOW 20 MIN: CPT | Mod: PBBFAC | Performed by: HOSPITALIST

## 2022-06-22 PROCEDURE — 93010 ELECTROCARDIOGRAM REPORT: CPT | Mod: S$PBB,,, | Performed by: INTERNAL MEDICINE

## 2022-06-22 PROCEDURE — 99214 OFFICE O/P EST MOD 30 MIN: CPT | Mod: S$PBB,,, | Performed by: HOSPITALIST

## 2022-06-22 PROCEDURE — 93005 ELECTROCARDIOGRAM TRACING: CPT | Mod: PBBFAC | Performed by: INTERNAL MEDICINE

## 2022-06-22 PROCEDURE — 93010 EKG 12-LEAD: ICD-10-PCS | Mod: S$PBB,,, | Performed by: INTERNAL MEDICINE

## 2022-06-22 PROCEDURE — 99214 PR OFFICE/OUTPT VISIT, EST, LEVL IV, 30-39 MIN: ICD-10-PCS | Mod: S$PBB,,, | Performed by: NURSE PRACTITIONER

## 2022-06-22 PROCEDURE — 99214 OFFICE O/P EST MOD 30 MIN: CPT | Mod: S$PBB,,, | Performed by: NURSE PRACTITIONER

## 2022-06-22 PROCEDURE — 99999 PR PBB SHADOW E&M-EST. PATIENT-LVL IV: CPT | Mod: PBBFAC,,, | Performed by: NURSE PRACTITIONER

## 2022-06-22 PROCEDURE — 99999 PR PBB SHADOW E&M-EST. PATIENT-LVL III: CPT | Mod: PBBFAC,,, | Performed by: HOSPITALIST

## 2022-06-22 PROCEDURE — 99999 PR PBB SHADOW E&M-EST. PATIENT-LVL III: ICD-10-PCS | Mod: PBBFAC,,, | Performed by: HOSPITALIST

## 2022-06-22 RX ORDER — ACETAMINOPHEN 500 MG
1000 TABLET ORAL
Status: CANCELLED | OUTPATIENT
Start: 2022-06-22

## 2022-06-22 RX ORDER — MORPHINE SULFATE 2 MG/ML
2 INJECTION, SOLUTION INTRAMUSCULAR; INTRAVENOUS
Status: CANCELLED | OUTPATIENT
Start: 2022-06-22

## 2022-06-22 RX ORDER — ONDANSETRON 2 MG/ML
4 INJECTION INTRAMUSCULAR; INTRAVENOUS EVERY 8 HOURS PRN
Status: CANCELLED | OUTPATIENT
Start: 2022-06-22

## 2022-06-22 RX ORDER — CELECOXIB 200 MG/1
400 CAPSULE ORAL
Status: CANCELLED | OUTPATIENT
Start: 2022-06-22

## 2022-06-22 RX ORDER — SODIUM CHLORIDE 9 MG/ML
INJECTION, SOLUTION INTRAVENOUS
Status: CANCELLED | OUTPATIENT
Start: 2022-06-22

## 2022-06-22 RX ORDER — SODIUM CHLORIDE 9 MG/ML
INJECTION, SOLUTION INTRAVENOUS CONTINUOUS
Status: CANCELLED | OUTPATIENT
Start: 2022-06-22 | End: 2022-06-23

## 2022-06-22 RX ORDER — FENTANYL CITRATE 50 UG/ML
25 INJECTION, SOLUTION INTRAMUSCULAR; INTRAVENOUS EVERY 5 MIN PRN
Status: CANCELLED | OUTPATIENT
Start: 2022-06-22

## 2022-06-22 RX ORDER — SODIUM CHLORIDE 0.9 % (FLUSH) 0.9 %
10 SYRINGE (ML) INJECTION
Status: CANCELLED | OUTPATIENT
Start: 2022-06-22

## 2022-06-22 RX ORDER — NALOXONE HCL 0.4 MG/ML
0.02 VIAL (ML) INJECTION
Status: CANCELLED | OUTPATIENT
Start: 2022-06-22

## 2022-06-22 RX ORDER — ASPIRIN 81 MG/1
81 TABLET ORAL 2 TIMES DAILY
Status: CANCELLED | OUTPATIENT
Start: 2022-06-22

## 2022-06-22 RX ORDER — MUPIROCIN 20 MG/G
1 OINTMENT TOPICAL
Status: CANCELLED | OUTPATIENT
Start: 2022-06-22

## 2022-06-22 RX ORDER — ACETAMINOPHEN 500 MG
1000 TABLET ORAL EVERY 6 HOURS
Status: CANCELLED | OUTPATIENT
Start: 2022-06-22

## 2022-06-22 RX ORDER — MUPIROCIN 20 MG/G
OINTMENT TOPICAL 2 TIMES DAILY
COMMUNITY
Start: 2022-06-28 | End: 2022-07-02

## 2022-06-22 RX ORDER — PROCHLORPERAZINE EDISYLATE 5 MG/ML
5 INJECTION INTRAMUSCULAR; INTRAVENOUS EVERY 6 HOURS PRN
Status: CANCELLED | OUTPATIENT
Start: 2022-06-22

## 2022-06-22 RX ORDER — CELECOXIB 200 MG/1
200 CAPSULE ORAL DAILY
Status: CANCELLED | OUTPATIENT
Start: 2022-06-23

## 2022-06-22 RX ORDER — POLYETHYLENE GLYCOL 3350 17 G/17G
17 POWDER, FOR SOLUTION ORAL DAILY
Status: CANCELLED | OUTPATIENT
Start: 2022-06-23

## 2022-06-22 RX ORDER — OXYCODONE HYDROCHLORIDE 5 MG/1
5 TABLET ORAL
Status: CANCELLED | OUTPATIENT
Start: 2022-06-22

## 2022-06-22 RX ORDER — TALC
6 POWDER (GRAM) TOPICAL NIGHTLY PRN
Status: CANCELLED | OUTPATIENT
Start: 2022-06-22

## 2022-06-22 RX ORDER — MUPIROCIN 20 MG/G
1 OINTMENT TOPICAL 2 TIMES DAILY
Status: CANCELLED | OUTPATIENT
Start: 2022-06-22 | End: 2022-06-27

## 2022-06-22 RX ORDER — FAMOTIDINE 20 MG/1
20 TABLET, FILM COATED ORAL 2 TIMES DAILY
Status: CANCELLED | OUTPATIENT
Start: 2022-06-22

## 2022-06-22 RX ORDER — PREGABALIN 75 MG/1
75 CAPSULE ORAL
Status: CANCELLED | OUTPATIENT
Start: 2022-06-22

## 2022-06-22 RX ORDER — FENTANYL CITRATE 50 UG/ML
100 INJECTION, SOLUTION INTRAMUSCULAR; INTRAVENOUS
Status: CANCELLED | OUTPATIENT
Start: 2022-06-22 | End: 2022-06-23

## 2022-06-22 RX ORDER — OXYCODONE HYDROCHLORIDE 5 MG/1
10 TABLET ORAL
Status: CANCELLED | OUTPATIENT
Start: 2022-06-22

## 2022-06-22 RX ORDER — LIDOCAINE HYDROCHLORIDE 10 MG/ML
1 INJECTION, SOLUTION EPIDURAL; INFILTRATION; INTRACAUDAL; PERINEURAL
Status: CANCELLED | OUTPATIENT
Start: 2022-06-22

## 2022-06-22 RX ORDER — MIDAZOLAM HYDROCHLORIDE 1 MG/ML
4 INJECTION INTRAMUSCULAR; INTRAVENOUS
Status: CANCELLED | OUTPATIENT
Start: 2022-06-22 | End: 2022-06-23

## 2022-06-22 RX ORDER — AMOXICILLIN 250 MG
1 CAPSULE ORAL 2 TIMES DAILY
COMMUNITY
End: 2022-07-27 | Stop reason: SDUPTHER

## 2022-06-22 RX ORDER — BISACODYL 10 MG
10 SUPPOSITORY, RECTAL RECTAL EVERY 12 HOURS PRN
Status: CANCELLED | OUTPATIENT
Start: 2022-06-22

## 2022-06-22 RX ORDER — AMOXICILLIN 250 MG
1 CAPSULE ORAL 2 TIMES DAILY
Status: CANCELLED | OUTPATIENT
Start: 2022-06-22

## 2022-06-22 RX ORDER — PREGABALIN 75 MG/1
75 CAPSULE ORAL NIGHTLY
Status: CANCELLED | OUTPATIENT
Start: 2022-06-22

## 2022-06-22 RX ORDER — METHOCARBAMOL 750 MG/1
750 TABLET, FILM COATED ORAL 3 TIMES DAILY
Status: CANCELLED | OUTPATIENT
Start: 2022-06-22

## 2022-06-22 NOTE — HPI
History of present illness- I had the pleasure of meeting this pleasant 82 y.o. lady in the pre op clinic prior to her elective Orthopedic surgery. The patient is new to me . Sister  was accompanied by care taker Tae.    I have obtained the history by speaking to the patient and by reviewing the electronic health records.    Events leading up to surgery / History of presenting illness -    She had her right total knee replacement done in 2007 for osteoarthritis  She was well until about 8 months ago she started having pain on the right knee and further evaluation led to the diagnosis of infection of the right knee  She had the hardware removed and has a spacer  She is planning on getting revision surgery done on June 30th    She wonders if her the cellulitis that she had on legs may have contributed to the infection  Suggested working on trying to prevent cellulitis  She reports no open areas on her feet    Her pain improved after getting the hardware removal  She currently has some discomfort on the right knee for which she take Tylenol which helps the pain and cold weather increases the pain      Relevant health conditions of significance for the perioperative period/ History of presenting illness -    Health conditions of significance for the perioperative period        Pt currently resides at [facility]  She used to live in the convent/mother's house across the street prior to her knee trouble    HTN  Diabetes   COPD  REJI  CKD 3   CVA (2016),   DVT RUE (off Eliquis, previous site of PICC line)  Gout  Morbid obesity

## 2022-06-22 NOTE — H&P
CC:  Right knee pain    Duran Giordano is a 82 y.o. female with history of Right knee prosthetic joint infection. She was treated with hardware removal and abx spacer placement followed by 6 weeks of IV abx 10/28- 12/9. She had cellulitis that was treated with abx in January. She has basically been chair bound with walker for transfers since then. She is ready to proceed with reimplantation. Pain is worse with activity and weight bearing.  Patient has experienced interference of activities of daily living due to decreased range of motion and an increase in joint pain and swelling.    Duran Giordano currently ambulates using assistive device .     Relevant medical conditions of significance in perioperative period:  HTN- on medication managed by pcp  DM- on medication managed by pcp  HLD- on medication managed by pcp    Given documented medical comorbidities including those listed above and based off of CMS criteria patient would meet inpatient admission status guidelines. This case has been approved as inpatient.     Past Medical History:   Diagnosis Date    Adrenal mass- adenoma stable since 2004 (1.8 cm and 8/13/12 (2 cm); stable 2016 2.4 cm     Adrenal mass- adenoma stable since 2004 (1.8 cm and 8/13/12 (2 cm); stable 2016 2.4 cm    Arthritis     Asthma in adult without complication 6/25/2015    Bilateral carotid artery disease 7/21/2017    Bilateral sciatica 2/7/2017    Cellulitis of right leg 08/16/2017    Cerebral infarction 1/29/2016    Multiple areas of lacunar infarction. Stroke risk factors include HTN, DM2, Dyslipidemia.  Continue ASA 81mg/ Statin therapy I have encouraged 30 minutes of physical activity daily for 5 days a week. She has access to a pool so this should not be so jarring to her joints.     Cervical radiculopathy 3/18/2015    Chronic knee pain     Chronic rhinitis 4/9/2013    CKD (chronic kidney disease) stage 3, GFR 30-59 ml/min 1/29/2013    Coronary artery disease due to  calcified coronary lesion 6/25/2015    Deep vein thrombosis     Diastolic dysfunction 10/10/2013    Essential hypertension 6/25/2015    Gastroesophageal reflux disease without esophagitis 8/13/2012    Gout     Hives 10/1/2013    Mixed hyperlipidemia 8/13/2012    Morbid obesity with BMI of 50.0-59.9, adult 2/11/2014    Obstructive sleep apnea syndrome 8/13/2012    Senile cataracts of both eyes 1/22/2015    Traumatic open wound of right lower leg 8/25/2017    Trigeminal neuralgia of right side of face 5/23/2017    For years now Previously on Gabapentin, but caused constipation Dissipating over time Not related to intracranial abnormalities Possibly related to dental procedure    Type 2 diabetes mellitus with diabetic polyneuropathy, with long-term current use of insulin 1/29/2013    Venous stasis dermatitis of both lower extremities 8/21/2017    Vitamin D deficiency disease 8/13/2012       Past Surgical History:   Procedure Laterality Date    HYSTERECTOMY  1982    secondary uterine fibroids    INJECTION OF ANESTHETIC AGENT AROUND NERVE Right 10/21/2021    Procedure: BLOCK, NERVE GENICULAR RIGHT NEEDS CONSENT;  Surgeon: Senia Kilpatrick MD;  Location: [facility];  Service: Pain Management;  Laterality: Right;    INSERTION OF ANTIBIOTIC SPACER Right 10/28/2021    Procedure: INSERTION, ANTIBIOTIC SPACER;  Surgeon: Alfredo Lozoya MD;  Location: [address];  Service: Orthopedics;  Laterality: Right;    JOINT REPLACEMENT      REVISION OF KNEE ARTHROPLASTY Right 10/28/2021    Procedure: REVISION, ARTHROPLASTY, KNEE-DEPUY ANTIBIOTIC SPACER;  Surgeon: Alfredo Lozoya MD;  Location: [facility] OR [address] [place] [address];  Service: Orthopedics;  Laterality: Right;    Right total knee replacement         Family History   Problem Relation Age of Onset    Cancer Mother 69        cancer of jaw    Hypertension Father     Cancer Sister         half sister - unknown dx    No Known Problems Brother     No Known  Problems Maternal Aunt     No Known Problems Maternal Uncle     No Known Problems Paternal Aunt     No Known Problems Paternal Uncle     No Known Problems Maternal Grandmother     No Known Problems Maternal Grandfather     No Known Problems Paternal Grandmother     No Known Problems Paternal Grandfather     Glaucoma Neg Hx     Breast cancer Neg Hx     Colon cancer Neg Hx     Ovarian cancer Neg Hx     Stroke Neg Hx     Allergic rhinitis Neg Hx     Allergies Neg Hx     Angioedema Neg Hx     Asthma Neg Hx     Atopy Neg Hx     Eczema Neg Hx     Immunodeficiency Neg Hx     Rhinitis Neg Hx     Urticaria Neg Hx     Amblyopia Neg Hx     Blindness Neg Hx     Cataracts Neg Hx     Diabetes Neg Hx     Macular degeneration Neg Hx     Retinal detachment Neg Hx     Strabismus Neg Hx     Thyroid disease Neg Hx     Psoriasis Neg Hx        Review of patient's allergies indicates:   Allergen Reactions    Bactrim [sulfamethoxazole-trimethoprim]      Other reaction(s): up set stomach rash/hives    Azithromycin      Feels bad    Erythromycin Other (See Comments)     Other reaction(s): Unknown  Other reaction(s): Stomach upset    Gentamicin      Vaginal burning , itching     Levofloxacin Hives     Other reaction(s): nervousness    Shellfish containing products      Rash      Vancomycin Itching     Other reaction(s): Itching         Current Outpatient Medications:     acetaminophen (TYLENOL) 500 MG tablet, Take 2 tablets (1,000 mg total) by mouth 2 (two) times daily as needed for Pain., Disp: 360 tablet, Rfl: 3    albuterol (PROVENTIL/VENTOLIN HFA) 90 mcg/actuation inhaler, Inhale 2 puffs into the lungs every 4 (four) hours as needed for Wheezing or Shortness of Breath. Rescue, Disp: 54 g, Rfl: 3    albuterol-ipratropium (DUO-NEB) 2.5 mg-0.5 mg/3 mL nebulizer solution, USE 1 VIAL PER NEBULIZER EVERY 6 HOURS AS NEEDED FOR WHEEZING/RESCUE, Disp: 90 mL, Rfl: 11    amLODIPine (NORVASC) 10 MG tablet,  "Take 1 tablet (10 mg total) by mouth once daily., Disp: 30 tablet, Rfl: 11    blood sugar diagnostic Strp, BG monitoring 4 times a day. Pt needs test strips for Em[employer] Talking meter. (Patient taking differently: BG monitoring 2 times a day. Pt needs test strips for Em[employer] Talking meter.), Disp: 450 strip, Rfl: prn    cholecalciferol, vitamin D3, (VITAMIN D3) 25 mcg (1,000 unit) capsule, Take 1 capsule (1,000 Units total) by mouth once daily., Disp: 90 capsule, Rfl: 3    diclofenac sodium (VOLTAREN) 1 % Gel, APPLY 2 GRAMS TOPICALLY DAILY, Disp: 100 g, Rfl: 0    fluticasone (FLONASE) 50 mcg/actuation nasal spray, 2 sprays (100 mcg total) by Each Nare route once daily., Disp: 1 Bottle, Rfl: 3    fluticasone-salmeterol diskus inhaler 500-50 mcg, INHALE 1 PUFF TWICE DAILY (Patient not taking: Reported on 6/22/2022), Disp: 60 each, Rfl: 11    insulin degludec (TRESIBA FLEXTOUCH U-100) 100 unit/mL (3 mL) insulin pen, Inject 16 units at night., Disp: 3 pen, Rfl: 3    lancets Misc, Test daily (Patient taking differently: Test twice  daily), Disp: 100 each, Rfl: 12    nebulizer and compressor (COMP-AIR ELITE COMP NEB SYSTEM) Berna, use as directed, Disp: 1 each, Rfl: 0    pen needle, diabetic (PEN NEEDLE) 29 gauge x 1/2" Ndle, Use 1 x a day., Disp: 100 each, Rfl: 3    polyethylene glycol (GLYCOLAX) 17 gram PwPk, Take 17 g by mouth once daily., Disp: , Rfl: 0    semaglutide (OZEMPIC) 0.25 mg or 0.5 mg(2 mg/1.5 mL) pen injector, Inject 0.5 mg weekly (Patient taking differently: Inject 0.5 mg into the skin every Saturday.), Disp: 3 pen, Rfl: 3    torsemide (DEMADEX) 10 MG Tab, Take 1 tablet (10 mg total) by mouth once daily., Disp: 30 tablet, Rfl: 11    trolamine salicylate (ASPERCREME) 10 % cream, Apply topically as needed., Disp: , Rfl:     atorvastatin (LIPITOR) 80 MG tablet, Take 1 tablet (80 mg total) by mouth once daily., Disp: 90 tablet, Rfl: 3    [START ON 6/28/2022] mupirocin (BACTROBAN) 2 % ointment, by " "Nasal route 2 (two) times daily., Disp: , Rfl:     senna-docusate 8.6-50 mg (PERICOLACE) 8.6-50 mg per tablet, Take 1 tablet by mouth 2 (two) times a day., Disp: , Rfl:     Review of Systems:  Constitutional: no fever or chills  Eyes: no visual changes  ENT: no nasal congestion or sore throat  Respiratory: no cough or shortness of breath  Cardiovascular: no chest pain or palpitations  Gastrointestinal: no nausea or vomiting, tolerating diet  Genitourinary: no hematuria or dysuria  Integument/Breast: no rash or pruritis  Hematologic/Lymphatic: no easy bruising or lymphadenopathy  Musculoskeletal: positive for knee pain  Neurological: no seizures or tremors  Behavioral/Psych: no auditory or visual hallucinations  Endocrine: no heat or cold intolerance    PE:  Ht 4' 11" (1.499 m)   Wt 111.9 kg (246 lb 11.1 oz)   BMI 49.83 kg/m²   General: Pleasant, cooperative, NAD   Gait: antalgic  HEENT: NCAT, sclera nonicteric   Lungs: Respirations clear bilaterally; equal and unlabored.   CV: S1S2; 2+ bilateral upper and lower extremity pulses.   Skin: Intact throughout with no rashes, erythema, or lesions  Extremities: No LE edema,  no erythema or warmth of the skin in either lower extremity.    Right knee exam:  Knee Range of Motion:normal, pain with passive range of motion  Effusion:none  Condition of skin:intact  Location of tenderness:Medial joint line, Lateral joint line and Patella   Strength:limited by pain    Radiographs: Radiographs reveal cement spacer in place with good alignment    Diagnosis: Prosthetic joint infection, right knee    Plan: Right total knee revision arthroplasty    Due to the serious nature of total joint infection and the high prevalence of community acquired MRSA, vancomycin will be used perioperatively.            "
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
## 2022-06-22 NOTE — ASSESSMENT & PLAN NOTE
Gout -  I suggest continuation of the maintenance treatment for gout and to keep the patient adequately hydrated.  Please note that surgical stress ,dehydration can precipitate acute gout

## 2022-06-22 NOTE — H&P (VIEW-ONLY)
CC:  Right knee pain    Duran Giordano is a 82 y.o. female with history of Right knee prosthetic joint infection. She was treated with hardware removal and abx spacer placement followed by 6 weeks of IV abx 10/28- 12/9. She had cellulitis that was treated with abx in January. She has basically been chair bound with walker for transfers since then. She is ready to proceed with reimplantation. Pain is worse with activity and weight bearing.  Patient has experienced interference of activities of daily living due to decreased range of motion and an increase in joint pain and swelling.    Duran Giordano currently ambulates using assistive device .     Relevant medical conditions of significance in perioperative period:  HTN- on medication managed by pcp  DM- on medication managed by pcp  HLD- on medication managed by pcp    Given documented medical comorbidities including those listed above and based off of CMS criteria patient would meet inpatient admission status guidelines. This case has been approved as inpatient.     Past Medical History:   Diagnosis Date    Adrenal mass- adenoma stable since 2004 (1.8 cm and 8/13/12 (2 cm); stable 2016 2.4 cm     Adrenal mass- adenoma stable since 2004 (1.8 cm and 8/13/12 (2 cm); stable 2016 2.4 cm    Arthritis     Asthma in adult without complication 6/25/2015    Bilateral carotid artery disease 7/21/2017    Bilateral sciatica 2/7/2017    Cellulitis of right leg 08/16/2017    Cerebral infarction 1/29/2016    Multiple areas of lacunar infarction. Stroke risk factors include HTN, DM2, Dyslipidemia.  Continue ASA 81mg/ Statin therapy I have encouraged 30 minutes of physical activity daily for 5 days a week. She has access to a pool so this should not be so jarring to her joints.     Cervical radiculopathy 3/18/2015    Chronic knee pain     Chronic rhinitis 4/9/2013    CKD (chronic kidney disease) stage 3, GFR 30-59 ml/min 1/29/2013    Coronary artery disease due to  calcified coronary lesion 6/25/2015    Deep vein thrombosis     Diastolic dysfunction 10/10/2013    Essential hypertension 6/25/2015    Gastroesophageal reflux disease without esophagitis 8/13/2012    Gout     Hives 10/1/2013    Mixed hyperlipidemia 8/13/2012    Morbid obesity with BMI of 50.0-59.9, adult 2/11/2014    Obstructive sleep apnea syndrome 8/13/2012    Senile cataracts of both eyes 1/22/2015    Traumatic open wound of right lower leg 8/25/2017    Trigeminal neuralgia of right side of face 5/23/2017    For years now Previously on Gabapentin, but caused constipation Dissipating over time Not related to intracranial abnormalities Possibly related to dental procedure    Type 2 diabetes mellitus with diabetic polyneuropathy, with long-term current use of insulin 1/29/2013    Venous stasis dermatitis of both lower extremities 8/21/2017    Vitamin D deficiency disease 8/13/2012       Past Surgical History:   Procedure Laterality Date    HYSTERECTOMY  1982    secondary uterine fibroids    INJECTION OF ANESTHETIC AGENT AROUND NERVE Right 10/21/2021    Procedure: BLOCK, NERVE GENICULAR RIGHT NEEDS CONSENT;  Surgeon: Senia Kilpatrick MD;  Location: [facility];  Service: Pain Management;  Laterality: Right;    INSERTION OF ANTIBIOTIC SPACER Right 10/28/2021    Procedure: INSERTION, ANTIBIOTIC SPACER;  Surgeon: Alfredo Lozoya MD;  Location: [address];  Service: Orthopedics;  Laterality: Right;    JOINT REPLACEMENT      REVISION OF KNEE ARTHROPLASTY Right 10/28/2021    Procedure: REVISION, ARTHROPLASTY, KNEE-DEPUY ANTIBIOTIC SPACER;  Surgeon: Alfredo Lozoya MD;  Location: [facility] OR [address] [place] [address];  Service: Orthopedics;  Laterality: Right;    Right total knee replacement         Family History   Problem Relation Age of Onset    Cancer Mother 69        cancer of jaw    Hypertension Father     Cancer Sister         half sister - unknown dx    No Known Problems Brother     No Known  Problems Maternal Aunt     No Known Problems Maternal Uncle     No Known Problems Paternal Aunt     No Known Problems Paternal Uncle     No Known Problems Maternal Grandmother     No Known Problems Maternal Grandfather     No Known Problems Paternal Grandmother     No Known Problems Paternal Grandfather     Glaucoma Neg Hx     Breast cancer Neg Hx     Colon cancer Neg Hx     Ovarian cancer Neg Hx     Stroke Neg Hx     Allergic rhinitis Neg Hx     Allergies Neg Hx     Angioedema Neg Hx     Asthma Neg Hx     Atopy Neg Hx     Eczema Neg Hx     Immunodeficiency Neg Hx     Rhinitis Neg Hx     Urticaria Neg Hx     Amblyopia Neg Hx     Blindness Neg Hx     Cataracts Neg Hx     Diabetes Neg Hx     Macular degeneration Neg Hx     Retinal detachment Neg Hx     Strabismus Neg Hx     Thyroid disease Neg Hx     Psoriasis Neg Hx        Review of patient's allergies indicates:   Allergen Reactions    Bactrim [sulfamethoxazole-trimethoprim]      Other reaction(s): up set stomach rash/hives    Azithromycin      Feels bad    Erythromycin Other (See Comments)     Other reaction(s): Unknown  Other reaction(s): Stomach upset    Gentamicin      Vaginal burning , itching     Levofloxacin Hives     Other reaction(s): nervousness    Shellfish containing products      Rash      Vancomycin Itching     Other reaction(s): Itching         Current Outpatient Medications:     acetaminophen (TYLENOL) 500 MG tablet, Take 2 tablets (1,000 mg total) by mouth 2 (two) times daily as needed for Pain., Disp: 360 tablet, Rfl: 3    albuterol (PROVENTIL/VENTOLIN HFA) 90 mcg/actuation inhaler, Inhale 2 puffs into the lungs every 4 (four) hours as needed for Wheezing or Shortness of Breath. Rescue, Disp: 54 g, Rfl: 3    albuterol-ipratropium (DUO-NEB) 2.5 mg-0.5 mg/3 mL nebulizer solution, USE 1 VIAL PER NEBULIZER EVERY 6 HOURS AS NEEDED FOR WHEEZING/RESCUE, Disp: 90 mL, Rfl: 11    amLODIPine (NORVASC) 10 MG tablet,  "Take 1 tablet (10 mg total) by mouth once daily., Disp: 30 tablet, Rfl: 11    blood sugar diagnostic Strp, BG monitoring 4 times a day. Pt needs test strips for Embrace Talking meter. (Patient taking differently: BG monitoring 2 times a day. Pt needs test strips for Embrace Talking meter.), Disp: 450 strip, Rfl: prn    cholecalciferol, vitamin D3, (VITAMIN D3) 25 mcg (1,000 unit) capsule, Take 1 capsule (1,000 Units total) by mouth once daily., Disp: 90 capsule, Rfl: 3    diclofenac sodium (VOLTAREN) 1 % Gel, APPLY 2 GRAMS TOPICALLY DAILY, Disp: 100 g, Rfl: 0    fluticasone (FLONASE) 50 mcg/actuation nasal spray, 2 sprays (100 mcg total) by Each Nare route once daily., Disp: 1 Bottle, Rfl: 3    fluticasone-salmeterol diskus inhaler 500-50 mcg, INHALE 1 PUFF TWICE DAILY (Patient not taking: Reported on 6/22/2022), Disp: 60 each, Rfl: 11    insulin degludec (TRESIBA FLEXTOUCH U-100) 100 unit/mL (3 mL) insulin pen, Inject 16 units at night., Disp: 3 pen, Rfl: 3    lancets Misc, Test daily (Patient taking differently: Test twice  daily), Disp: 100 each, Rfl: 12    nebulizer and compressor (COMP-AIR ELITE COMP NEB SYSTEM) Berna, use as directed, Disp: 1 each, Rfl: 0    pen needle, diabetic (PEN NEEDLE) 29 gauge x 1/2" Ndle, Use 1 x a day., Disp: 100 each, Rfl: 3    polyethylene glycol (GLYCOLAX) 17 gram PwPk, Take 17 g by mouth once daily., Disp: , Rfl: 0    semaglutide (OZEMPIC) 0.25 mg or 0.5 mg(2 mg/1.5 mL) pen injector, Inject 0.5 mg weekly (Patient taking differently: Inject 0.5 mg into the skin every Saturday.), Disp: 3 pen, Rfl: 3    torsemide (DEMADEX) 10 MG Tab, Take 1 tablet (10 mg total) by mouth once daily., Disp: 30 tablet, Rfl: 11    trolamine salicylate (ASPERCREME) 10 % cream, Apply topically as needed., Disp: , Rfl:     atorvastatin (LIPITOR) 80 MG tablet, Take 1 tablet (80 mg total) by mouth once daily., Disp: 90 tablet, Rfl: 3    [START ON 6/28/2022] mupirocin (BACTROBAN) 2 % ointment, by " "Nasal route 2 (two) times daily., Disp: , Rfl:     senna-docusate 8.6-50 mg (PERICOLACE) 8.6-50 mg per tablet, Take 1 tablet by mouth 2 (two) times a day., Disp: , Rfl:     Review of Systems:  Constitutional: no fever or chills  Eyes: no visual changes  ENT: no nasal congestion or sore throat  Respiratory: no cough or shortness of breath  Cardiovascular: no chest pain or palpitations  Gastrointestinal: no nausea or vomiting, tolerating diet  Genitourinary: no hematuria or dysuria  Integument/Breast: no rash or pruritis  Hematologic/Lymphatic: no easy bruising or lymphadenopathy  Musculoskeletal: positive for knee pain  Neurological: no seizures or tremors  Behavioral/Psych: no auditory or visual hallucinations  Endocrine: no heat or cold intolerance    PE:  Ht 4' 11" (1.499 m)   Wt 111.9 kg (246 lb 11.1 oz)   BMI 49.83 kg/m²   General: Pleasant, cooperative, NAD   Gait: antalgic  HEENT: NCAT, sclera nonicteric   Lungs: Respirations clear bilaterally; equal and unlabored.   CV: S1S2; 2+ bilateral upper and lower extremity pulses.   Skin: Intact throughout with no rashes, erythema, or lesions  Extremities: No LE edema,  no erythema or warmth of the skin in either lower extremity.    Right knee exam:  Knee Range of Motion:normal, pain with passive range of motion  Effusion:none  Condition of skin:intact  Location of tenderness:Medial joint line, Lateral joint line and Patella   Strength:limited by pain    Radiographs: Radiographs reveal cement spacer in place with good alignment    Diagnosis: Prosthetic joint infection, right knee    Plan: Right total knee revision arthroplasty    Due to the serious nature of total joint infection and the high prevalence of community acquired MRSA, vancomycin will be used perioperatively.            "

## 2022-06-22 NOTE — PROGRESS NOTES
Duran Giordano is a 82 y.o. year old here today for pre surgery optimization visit  in preparation for a Right total knee revision arthroplasty to be performed by Dr. Lozoya on 6/30/2022.  she was last seen and treated in the clinic on 6/6/2022. she will be medically optimized by the pre op center. There has been no significant change in medical status since last visit. No fever, chills, malaise, or unexplained weight change.      Allergies, Medications, past medical and surgical history reviewed.    Focused examination performed.    Patient declined to see surgeon today. All questions answered. Patient encouraged to call with questions. Contact information given.     Pre, keo, and post operative procedures and expectations discussed. Goals of successful surgery reviewed and include manageable pain levels, surgical site free of infection, medication management, and ambulation with PT/OT assistance. Healthy weight management discussed with patient and caregiver who were receptive to eduction of healthy diet and activity. No other necessary lifestyle changes identified. Educated patient about signs and symptoms of infection, medication management, anticoagulation therapy, risk of tobacco and alcohol use, and self-care to promote healing. Surgical guide given for future reference. Hibiclens given to patient with instructions. All questions were answered.     Duran Giordano verbalized an understanding to the education and goals. Patient has displayed readiness to engage in care and is ready to proceed with surgery.  Patient reports her sisters are able and ready to provide assistance at home after discharge from SNF.    Surgical and blood consents signed.    Duran Giordano will contact us if there are any questions, concerns, or changes in medical status prior to surgery.       COVID-19 test date: vaccinated     Patient has discussed discharge planning with surgeon. Patient will be discharged to home  following surgery.   patient will be admitted to skilled nursing.    30 minutes of time was spent on patient education, review of records, templating, H&P, , appointment scheduling and optimizing patient for surgery.

## 2022-06-22 NOTE — ASSESSMENT & PLAN NOTE
Based on chart review / Patient has a reported history of Staph infection   In an attempt to prevent Surgical site infection , with the up coming surgery , suggest the following      Intra nasal Bactroban for 5 days ( 2 days pre op and 3  days post op )     Generic ointment or generic cream (30gram tube) - place a drop on a q tip , insert into to very tip of the nose only , then press on the nose gently to distribute.     We discussed about giving a prescriptionverus getting it through the nursing unit and we deferred at for the nursing unit to get it for her

## 2022-06-22 NOTE — OUTPATIENT SUBJECTIVE & OBJECTIVE
"Outpatient Subjective & Objective     Chief complaint-Preoperative evaluation, Perioperative Medical management, complication reduction plan     Active cardiac conditions- none    Revised cardiac risk index predictors- insulin requiring diabetes mellitus    Functional capacity -Examples of physical activity, was more active until 8 months ago, was able to take a flight of stairs slowly up until the knee problem started about 8 months ago, She can undertake all the above activities without  chest pain,chest tightness, Shortness of breath ,dizziness,lightheadedness making her exercise tolerance more,   than 4 Mets until recently    Review of Systems   Constitutional: Negative for chills and fever.   HENT:        Sleep apnea    Eyes:        No new visual changes   Respiratory:        No cough , phlegm    No Hemoptysis   Cardiovascular:        As noted   Gastrointestinal:        No overt GI/ blood losses  Bowel movements- Regular  Having bowel movements on the medication she is taking which is Senokot and Colace   Endocrine:        Prednisone use > 20 mg daily for 3 weeks- None   Genitourinary: Negative for dysuria.        No urinary hesitancy    Musculoskeletal:        As above      Neurological: Negative for syncope.        No unilateral weakness   Hematological:        Current use of Anticoagulants  None   Psychiatric/Behavioral:        No Depression,Anxiety       No vascular stenting           No anesthesia, bleeding, cardiac problems, PONV with previous surgeries/procedures.  Medications and Allergies reviewed in epic.     FH- No anesthesia,bleeding / venous thrombosis , Problems  in family     Physical Exam  Blood pressure (!) 140/85, pulse 72, temperature 98.3 °F (36.8 °C), temperature source Oral, resp. rate 18, height 4' 11" (1.499 m), weight 110.7 kg (244 lb), SpO2 97 %.      Physical Exam  Constitutional- Vitals - Body mass index is 49.28 kg/m².,   Vitals:    06/22/22 1054   BP: (!) 140/85   Pulse: 72 "   Resp: 18   Temp: 98.3 °F (36.8 °C)     General appearance-Conscious,Coherent  Eyes- No conjunctival icterus,pupils  round  and reactive to light   ENT-Oral cavity- moist  , Hearing grossly normal   Neck- No thyromegaly ,Trachea -central, No jugular venous distension,   No Carotid Bruit   Cardiovascular -Heart Sounds- Normal  and  no murmur   , No gallop rhythm   Respiratory - Normal Respiratory Effort, Normal breath sounds, crepitations bases,  no wheeze  and  no forced expiratory wheeze    Peripheral pitting pedal edema-- mild, no calf pain   Gastrointestinal -Soft abdomen, No palpable masses, Non Tender,Liver,Spleen not palpable. No-- free fluid and shifting dullness  Musculoskeletal- No finger Clubbing. Strength grossly normal   Lymphatic-No Palpable cervical, axillary,Inguinal lymphadenopathy   Psychiatric - normal effect,Orientation  Rt Dorsalis pedis pulses-palpable    Lt Dorsalis pedis pulses- palpable   Rt Posterior tibial pulses -palpable   Left posterior tibial pulses -palpable   Miscellaneous -  Surgical scarrt knee ,  no renal bruit and  examined on wheel chair     Investigations  Lab and Imaging have been reviewed in epic.    Review of Medicine tests      Review of clinical lab tests:  Lab Results   Component Value Date    CREATININE 1.5 (H) 06/22/2022    HGB 10.6 (L) 06/22/2022     06/22/2022 2016     There is 20 - 39% right Internal Carotid stenosis.   There is 20 - 39% left Internal Carotid stenosis.         Review of old records- Was done and information gathered regards to events leading to surgery and health conditions of significance in the perioperative period.    Outpatient Subjective & Objective

## 2022-06-24 ENCOUNTER — OFFICE VISIT (OUTPATIENT)
Dept: PRIMARY CARE CLINIC | Facility: CLINIC | Age: 82
End: 2022-06-24
Payer: MEDICARE

## 2022-06-24 DIAGNOSIS — Z09 FOLLOW UP: Primary | ICD-10-CM

## 2022-06-24 PROCEDURE — 99443 PR PHYSICIAN TELEPHONE EVALUATION 21-30 MIN: ICD-10-PCS | Mod: 95,,, | Performed by: NURSE PRACTITIONER

## 2022-06-24 PROCEDURE — 99443 PR PHYSICIAN TELEPHONE EVALUATION 21-30 MIN: CPT | Mod: 95,,, | Performed by: NURSE PRACTITIONER

## 2022-06-24 NOTE — PRE-PROCEDURE INSTRUCTIONS
PreOp Instructions given:    >>Medication information (what to hold and what to take)    NPO guidelines as follows: (or as per your Surgeon)  -- Stop ALL solid food, gum, candy (including vitamins) past midnight  -- If you are told to take medication on the morning of surgery, it may be taken with a sip of water.   -- Arrival place and directions given; time to be given the day before procedure by the Surgeon's Office   -- Bathing with antibacterial soap   -- Don't wear any jewelry or bring any valuables AM of surgery   -- No makeup or moisturizer to face   -- No perfume/cologne, powder, lotions or aftershave     Pt nurse verbalized understanding.

## 2022-06-24 NOTE — PROGRESS NOTES
Established Patient - Audio Only Telehealth Visit     The patient location is: [facility]  The chief complaint leading to consultation is: S/P surgery  Visit type: Virtual visit with audio only (telephone)  Total time spent with patient: 30 min       The reason for the audio only service rather than synchronous audio and video virtual visit was related to technical difficulties or patient preference/necessity.     Each patient to whom I provide medical services by telemedicine is:  (1) informed of the relationship between the physician and patient and the respective role of any other health care provider with respect to management of the patient; and (2) notified that they may decline to receive medical services by telemedicine and may withdraw from such care at any time. Patient verbally consented to receive this service via voice-only telephone call.       HPI: This is an 81 y/o female who scheduled an audio visit pre-surgery.  She is scheduled to have a rt total knee replacement on next Thursday.  She had the knee replaced in 2007 and the hardware was replaced with a spacer shortly after.  She is morbidly obese and has had cellulitis and DVT's in which she could not tolerate Eliquis as a result of vaginal bleeding. The patient is cognizant of the risk and chooses to have the procedure  Performed.  As per patient she will go to rehab for 3-4 weeks after the procedure and prior to returning back to [facility].    Past Medical History:  Adrenal mass- adenoma stable since 2004 (1.8 cm and 8/13/12 (2 cm);   stable 2016 2.4 cm      Comment:  Adrenal mass- adenoma stable since 2004 (1.8 cm and                8/13/12 (2 cm); stable 2016 2.4 cm  Arthritis  Asthma in adult without complication  Bilateral carotid artery disease  Bilateral sciatica  Cellulitis of right leg  Cerebral infarction      Comment:  Multiple areas of lacunar infarction. Stroke risk                factors include HTN, DM2, Dyslipidemia.  Continue  ASA                81mg/ Statin therapy I have encouraged 30 minutes of                physical activity daily for 5 days a week. She has access               to a pool so this should not be so jarring to her joints.  Cervical radiculopathy  Chronic knee pain  Chronic rhinitis  CKD (chronic kidney disease) stage 3, GFR 30-59 ml/min  Coronary artery disease due to calcified coronary lesion  Deep vein thrombosis  Diastolic dysfunction  Essential hypertension  Gastroesophageal reflux disease without esophagitis  Gout  Hives  Mixed hyperlipidemia  Morbid obesity with BMI of 50.0-59.9, adult  Obstructive sleep apnea syndrome  Senile cataracts of both eyes  Traumatic open wound of right lower leg  Trigeminal neuralgia of right side of face      Comment:  For years now Previously on Gabapentin, but caused                constipation Dissipating over time Not related to                intracranial abnormalities Possibly related to dental                procedure  Type 2 diabetes mellitus with diabetic polyneuropathy, with long-term   current use of insulin  Venous stasis dermatitis of both lower extremities  Vitamin D deficiency disease    Current Outpatient Medications on File Prior to Visit   Medication Sig Dispense Refill    acetaminophen (TYLENOL) 500 MG tablet Take 2 tablets (1,000 mg total) by mouth 2 (two) times daily as needed for Pain. 360 tablet 3    albuterol (PROVENTIL/VENTOLIN HFA) 90 mcg/actuation inhaler Inhale 2 puffs into the lungs every 4 (four) hours as needed for Wheezing or Shortness of Breath. Rescue 54 g 3    albuterol-ipratropium (DUO-NEB) 2.5 mg-0.5 mg/3 mL nebulizer solution USE 1 VIAL PER NEBULIZER EVERY 6 HOURS AS NEEDED FOR WHEEZING/RESCUE 90 mL 11    amLODIPine (NORVASC) 10 MG tablet Take 1 tablet (10 mg total) by mouth once daily. 30 tablet 11    atorvastatin (LIPITOR) 80 MG tablet Take 1 tablet (80 mg total) by mouth once daily. 90 tablet 3    blood sugar diagnostic Strp BG monitoring 4  "times a day. Pt needs test strips for Embrace Talking meter. (Patient taking differently: BG monitoring 2 times a day. Pt needs test strips for Embrace Talking meter.) 450 strip prn    cholecalciferol, vitamin D3, (VITAMIN D3) 25 mcg (1,000 unit) capsule Take 1 capsule (1,000 Units total) by mouth once daily. 90 capsule 3    diclofenac sodium (VOLTAREN) 1 % Gel APPLY 2 GRAMS TOPICALLY DAILY 100 g 0    fluticasone (FLONASE) 50 mcg/actuation nasal spray 2 sprays (100 mcg total) by Each Nare route once daily. 1 Bottle 3    fluticasone-salmeterol diskus inhaler 500-50 mcg INHALE 1 PUFF TWICE DAILY 60 each 11    insulin degludec (TRESIBA FLEXTOUCH U-100) 100 unit/mL (3 mL) insulin pen Inject 16 units at night. 3 pen 3    lancets Misc Test daily (Patient taking differently: Test twice  daily) 100 each 12    [START ON 6/28/2022] mupirocin (BACTROBAN) 2 % ointment by Nasal route 2 (two) times daily.      nebulizer and compressor (COMP-AIR ELITE COMP NEB SYSTEM) Berna use as directed 1 each 0    pen needle, diabetic (PEN NEEDLE) 29 gauge x 1/2" Ndle Use 1 x a day. 100 each 3    polyethylene glycol (GLYCOLAX) 17 gram PwPk Take 17 g by mouth once daily.  0    semaglutide (OZEMPIC) 0.25 mg or 0.5 mg(2 mg/1.5 mL) pen injector Inject 0.5 mg weekly (Patient taking differently: Inject 0.5 mg into the skin every Saturday.) 3 pen 3    senna-docusate 8.6-50 mg (PERICOLACE) 8.6-50 mg per tablet Take 1 tablet by mouth 2 (two) times a day.      torsemide (DEMADEX) 10 MG Tab Take 1 tablet (10 mg total) by mouth once daily. 30 tablet 11    trolamine salicylate (ASPERCREME) 10 % cream Apply topically as needed.       No current facility-administered medications on file prior to visit.         Assessment and plan:     COPD:  Continue Duo Neb and fluticasone inhalers as well as albuterol nebulizer              Treatments.  Stable disease as of today.    DHF:  No recent episode of HF exacerbation, continue torsemide.    HTN:  Last " b/p controlled on current regimen, continue amlodipine 10 mg daily.    CKD Stage IIIa: GFR 37.7 Creat 1.5, No change.    IDDM: Continue Ozempic and Tresiba, HBA1C 6.8    Hyperlipidemia:  Chol 160, , Continue high dose Atorvastatin.     Osteoarthritis:  Surgery pending on the rt knee on next week.          Dr. Anamaria Singleton, [facility]  [address] [place] [id number]        This service was not originating from a related E/M service provided within the previous 7 days nor will  to an E/M service or procedure within the next 24 hours or my soonest available appointment.  Prevailing standard of care was able to be met in this audio-only visit.

## 2022-06-29 ENCOUNTER — TELEPHONE (OUTPATIENT)
Dept: ORTHOPEDICS | Facility: CLINIC | Age: 82
End: 2022-06-29
Payer: MEDICAID

## 2022-06-29 NOTE — TELEPHONE ENCOUNTER
Called patient, NADIA advised of 830AM arrival time for surgery tomorrow 6/30 at [facility]. Advised nothing to eat or drink after midnight. Asked patient to return call to confirm she received message.

## 2022-06-29 NOTE — TELEPHONE ENCOUNTER
Spoke to Ms. Guerrier at the nursing home. Advised of arrival time for tomorrow 8:30AM at [facility]. Advised nothing to eat or drink after midnight. She advised she would let the nurse known and arrange transportation.     ----- Message from Duyen Nolasco sent at 6/29/2022 11:25 AM CDT -----  Name of Who is Calling: Pool (nursing home nurse)         What is the request in detail: \Pool is calling to receive the patient arrival time for her procedure. Please advise        Can the clinic reply by [facility]:no         What Number to Call Back if not in [facility]: [phone]

## 2022-06-30 ENCOUNTER — HOSPITAL ENCOUNTER (INPATIENT)
Facility: HOSPITAL | Age: 82
LOS: 3 days | Discharge: HOME HEALTH | DRG: 467 | End: 2022-07-03
Attending: ORTHOPAEDIC SURGERY | Admitting: ORTHOPAEDIC SURGERY
Payer: MEDICARE

## 2022-06-30 ENCOUNTER — ANESTHESIA EVENT (OUTPATIENT)
Dept: SURGERY | Facility: HOSPITAL | Age: 82
DRG: 467 | End: 2022-06-30
Payer: MEDICARE

## 2022-06-30 ENCOUNTER — ANESTHESIA (OUTPATIENT)
Dept: SURGERY | Facility: HOSPITAL | Age: 82
DRG: 467 | End: 2022-06-30
Payer: MEDICARE

## 2022-06-30 DIAGNOSIS — T84.50XA PROSTHETIC JOINT INFECTION: ICD-10-CM

## 2022-06-30 DIAGNOSIS — T84.50XD INFECTION OF PROSTHETIC JOINT, SUBSEQUENT ENCOUNTER: ICD-10-CM

## 2022-06-30 DIAGNOSIS — M17.31 POST-TRAUMATIC OSTEOARTHRITIS OF RIGHT KNEE: ICD-10-CM

## 2022-06-30 LAB
GRAM STN SPEC: NORMAL
POCT GLUCOSE: 156 MG/DL (ref 70–110)
POCT GLUCOSE: 179 MG/DL (ref 70–110)
POCT GLUCOSE: 71 MG/DL (ref 70–110)

## 2022-06-30 PROCEDURE — 87206 SMEAR FLUORESCENT/ACID STAI: CPT | Mod: 91 | Performed by: ORTHOPAEDIC SURGERY

## 2022-06-30 PROCEDURE — C1776 JOINT DEVICE (IMPLANTABLE): HCPCS | Performed by: ORTHOPAEDIC SURGERY

## 2022-06-30 PROCEDURE — D9220A PRA ANESTHESIA: ICD-10-PCS | Mod: CRNA,,, | Performed by: NURSE ANESTHETIST, CERTIFIED REGISTERED

## 2022-06-30 PROCEDURE — 76942 ECHO GUIDE FOR BIOPSY: CPT | Mod: 26,,, | Performed by: ANESTHESIOLOGY

## 2022-06-30 PROCEDURE — 63600175 PHARM REV CODE 636 W HCPCS: Performed by: ANESTHESIOLOGY

## 2022-06-30 PROCEDURE — 64448 NJX AA&/STRD FEM NRV NFS IMG: CPT | Mod: 59,RT,, | Performed by: ANESTHESIOLOGY

## 2022-06-30 PROCEDURE — 99900035 HC TECH TIME PER 15 MIN (STAT)

## 2022-06-30 PROCEDURE — 25000003 PHARM REV CODE 250: Performed by: NURSE PRACTITIONER

## 2022-06-30 PROCEDURE — 63600175 PHARM REV CODE 636 W HCPCS: Performed by: NURSE ANESTHETIST, CERTIFIED REGISTERED

## 2022-06-30 PROCEDURE — 94660 CPAP INITIATION&MGMT: CPT

## 2022-06-30 PROCEDURE — 64448 NJX AA&/STRD FEM NRV NFS IMG: CPT | Performed by: SURGERY

## 2022-06-30 PROCEDURE — D9220A PRA ANESTHESIA: ICD-10-PCS | Mod: ANES,,, | Performed by: ANESTHESIOLOGY

## 2022-06-30 PROCEDURE — 63600175 PHARM REV CODE 636 W HCPCS: Performed by: STUDENT IN AN ORGANIZED HEALTH CARE EDUCATION/TRAINING PROGRAM

## 2022-06-30 PROCEDURE — 27447 TOTAL KNEE ARTHROPLASTY: CPT | Mod: RT,GC,, | Performed by: ORTHOPAEDIC SURGERY

## 2022-06-30 PROCEDURE — 25000003 PHARM REV CODE 250: Performed by: NURSE ANESTHETIST, CERTIFIED REGISTERED

## 2022-06-30 PROCEDURE — 87176 TISSUE HOMOGENIZATION CULTR: CPT | Mod: 91 | Performed by: ORTHOPAEDIC SURGERY

## 2022-06-30 PROCEDURE — 37000009 HC ANESTHESIA EA ADD 15 MINS: Performed by: ORTHOPAEDIC SURGERY

## 2022-06-30 PROCEDURE — 63600175 PHARM REV CODE 636 W HCPCS

## 2022-06-30 PROCEDURE — 63600175 PHARM REV CODE 636 W HCPCS: Performed by: NURSE PRACTITIONER

## 2022-06-30 PROCEDURE — 71000015 HC POSTOP RECOV 1ST HR: Performed by: ORTHOPAEDIC SURGERY

## 2022-06-30 PROCEDURE — 76942 ADDUCTOR CANAL CATHETER: ICD-10-PCS | Mod: 26,,, | Performed by: ANESTHESIOLOGY

## 2022-06-30 PROCEDURE — C1713 ANCHOR/SCREW BN/BN,TIS/BN: HCPCS | Performed by: ORTHOPAEDIC SURGERY

## 2022-06-30 PROCEDURE — 25000003 PHARM REV CODE 250: Performed by: STUDENT IN AN ORGANIZED HEALTH CARE EDUCATION/TRAINING PROGRAM

## 2022-06-30 PROCEDURE — 71000016 HC POSTOP RECOV ADDL HR: Performed by: ORTHOPAEDIC SURGERY

## 2022-06-30 PROCEDURE — 27201423 OPTIME MED/SURG SUP & DEVICES STERILE SUPPLY: Performed by: ORTHOPAEDIC SURGERY

## 2022-06-30 PROCEDURE — 87102 FUNGUS ISOLATION CULTURE: CPT | Mod: 59 | Performed by: ORTHOPAEDIC SURGERY

## 2022-06-30 PROCEDURE — 87116 MYCOBACTERIA CULTURE: CPT | Performed by: ORTHOPAEDIC SURGERY

## 2022-06-30 PROCEDURE — 36000710: Performed by: ORTHOPAEDIC SURGERY

## 2022-06-30 PROCEDURE — 71000033 HC RECOVERY, INTIAL HOUR: Performed by: ORTHOPAEDIC SURGERY

## 2022-06-30 PROCEDURE — 87070 CULTURE OTHR SPECIMN AEROBIC: CPT | Mod: 59 | Performed by: ORTHOPAEDIC SURGERY

## 2022-06-30 PROCEDURE — 87075 CULTR BACTERIA EXCEPT BLOOD: CPT | Mod: 59 | Performed by: ORTHOPAEDIC SURGERY

## 2022-06-30 PROCEDURE — D9220A PRA ANESTHESIA: Mod: ANES,,, | Performed by: ANESTHESIOLOGY

## 2022-06-30 PROCEDURE — 94761 N-INVAS EAR/PLS OXIMETRY MLT: CPT

## 2022-06-30 PROCEDURE — 11000001 HC ACUTE MED/SURG PRIVATE ROOM

## 2022-06-30 PROCEDURE — 87205 SMEAR GRAM STAIN: CPT | Performed by: ORTHOPAEDIC SURGERY

## 2022-06-30 PROCEDURE — 82962 GLUCOSE BLOOD TEST: CPT | Performed by: ORTHOPAEDIC SURGERY

## 2022-06-30 PROCEDURE — 27000221 HC OXYGEN, UP TO 24 HOURS

## 2022-06-30 PROCEDURE — 64448 ADDUCTOR CANAL CATHETER: ICD-10-PCS | Mod: 59,RT,, | Performed by: ANESTHESIOLOGY

## 2022-06-30 PROCEDURE — D9220A PRA ANESTHESIA: Mod: CRNA,,, | Performed by: NURSE ANESTHETIST, CERTIFIED REGISTERED

## 2022-06-30 PROCEDURE — 27000190 HC CPAP FULL FACE MASK W/VALVE

## 2022-06-30 PROCEDURE — 37000008 HC ANESTHESIA 1ST 15 MINUTES: Performed by: ORTHOPAEDIC SURGERY

## 2022-06-30 PROCEDURE — 36000711: Performed by: ORTHOPAEDIC SURGERY

## 2022-06-30 PROCEDURE — 27447 PR TOTAL KNEE ARTHROPLASTY: ICD-10-PCS | Mod: RT,GC,, | Performed by: ORTHOPAEDIC SURGERY

## 2022-06-30 PROCEDURE — 25000003 PHARM REV CODE 250: Performed by: SURGERY

## 2022-06-30 PROCEDURE — 63600175 PHARM REV CODE 636 W HCPCS: Performed by: SURGERY

## 2022-06-30 DEVICE — IMPLANTABLE DEVICE: Type: IMPLANTABLE DEVICE | Site: KNEE | Status: FUNCTIONAL

## 2022-06-30 DEVICE — CEMENT BONE ANTIBIOTIC: Type: IMPLANTABLE DEVICE | Site: KNEE | Status: FUNCTIONAL

## 2022-06-30 RX ORDER — PHENYLEPHRINE HCL IN 0.9% NACL 1 MG/10 ML
SYRINGE (ML) INTRAVENOUS
Status: DISCONTINUED | OUTPATIENT
Start: 2022-06-30 | End: 2022-06-30

## 2022-06-30 RX ORDER — ATORVASTATIN CALCIUM 20 MG/1
80 TABLET, FILM COATED ORAL NIGHTLY
Status: DISCONTINUED | OUTPATIENT
Start: 2022-06-30 | End: 2022-07-03 | Stop reason: HOSPADM

## 2022-06-30 RX ORDER — PROPOFOL 10 MG/ML
VIAL (ML) INTRAVENOUS
Status: DISCONTINUED | OUTPATIENT
Start: 2022-06-30 | End: 2022-06-30

## 2022-06-30 RX ORDER — ACETAMINOPHEN 500 MG
1000 TABLET ORAL EVERY 6 HOURS SCHEDULED
Status: DISCONTINUED | OUTPATIENT
Start: 2022-06-30 | End: 2022-07-01

## 2022-06-30 RX ORDER — TORSEMIDE 10 MG/1
10 TABLET ORAL DAILY
Status: DISCONTINUED | OUTPATIENT
Start: 2022-07-01 | End: 2022-07-03 | Stop reason: HOSPADM

## 2022-06-30 RX ORDER — MUPIROCIN 20 MG/G
1 OINTMENT TOPICAL 2 TIMES DAILY
Status: DISCONTINUED | OUTPATIENT
Start: 2022-06-30 | End: 2022-07-03 | Stop reason: HOSPADM

## 2022-06-30 RX ORDER — ONDANSETRON 2 MG/ML
4 INJECTION INTRAMUSCULAR; INTRAVENOUS EVERY 12 HOURS PRN
Status: DISCONTINUED | OUTPATIENT
Start: 2022-06-30 | End: 2022-07-03 | Stop reason: HOSPADM

## 2022-06-30 RX ORDER — SODIUM CHLORIDE 9 MG/ML
INJECTION, SOLUTION INTRAVENOUS CONTINUOUS
Status: ACTIVE | OUTPATIENT
Start: 2022-06-30 | End: 2022-07-01

## 2022-06-30 RX ORDER — ONDANSETRON 2 MG/ML
4 INJECTION INTRAMUSCULAR; INTRAVENOUS EVERY 8 HOURS PRN
Status: DISCONTINUED | OUTPATIENT
Start: 2022-06-30 | End: 2022-07-03 | Stop reason: HOSPADM

## 2022-06-30 RX ORDER — LABETALOL HYDROCHLORIDE 5 MG/ML
INJECTION, SOLUTION INTRAVENOUS
Status: DISCONTINUED | OUTPATIENT
Start: 2022-06-30 | End: 2022-06-30

## 2022-06-30 RX ORDER — ONDANSETRON 2 MG/ML
INJECTION INTRAMUSCULAR; INTRAVENOUS
Status: DISCONTINUED | OUTPATIENT
Start: 2022-06-30 | End: 2022-06-30

## 2022-06-30 RX ORDER — DEXAMETHASONE SODIUM PHOSPHATE 4 MG/ML
INJECTION, SOLUTION INTRA-ARTICULAR; INTRALESIONAL; INTRAMUSCULAR; INTRAVENOUS; SOFT TISSUE
Status: DISCONTINUED | OUTPATIENT
Start: 2022-06-30 | End: 2022-06-30

## 2022-06-30 RX ORDER — DOXYCYCLINE 100 MG/1
100 CAPSULE ORAL 2 TIMES DAILY
Qty: 56 CAPSULE | Refills: 0 | Status: SHIPPED | OUTPATIENT
Start: 2022-06-30 | End: 2022-07-31

## 2022-06-30 RX ORDER — MIDAZOLAM HYDROCHLORIDE 1 MG/ML
4 INJECTION INTRAMUSCULAR; INTRAVENOUS
Status: DISCONTINUED | OUTPATIENT
Start: 2022-06-30 | End: 2022-06-30 | Stop reason: HOSPADM

## 2022-06-30 RX ORDER — POLYETHYLENE GLYCOL 3350 17 G/17G
17 POWDER, FOR SOLUTION ORAL DAILY
Status: DISCONTINUED | OUTPATIENT
Start: 2022-06-30 | End: 2022-07-03 | Stop reason: HOSPADM

## 2022-06-30 RX ORDER — MORPHINE SULFATE 2 MG/ML
2 INJECTION, SOLUTION INTRAMUSCULAR; INTRAVENOUS
Status: DISCONTINUED | OUTPATIENT
Start: 2022-06-30 | End: 2022-07-01

## 2022-06-30 RX ORDER — OXYCODONE HYDROCHLORIDE 10 MG/1
10 TABLET ORAL
Status: DISCONTINUED | OUTPATIENT
Start: 2022-06-30 | End: 2022-07-01

## 2022-06-30 RX ORDER — ASPIRIN 81 MG/1
81 TABLET ORAL 2 TIMES DAILY
Status: DISCONTINUED | OUTPATIENT
Start: 2022-06-30 | End: 2022-07-01

## 2022-06-30 RX ORDER — TALC
6 POWDER (GRAM) TOPICAL NIGHTLY PRN
Status: DISCONTINUED | OUTPATIENT
Start: 2022-06-30 | End: 2022-06-30 | Stop reason: HOSPADM

## 2022-06-30 RX ORDER — OXYCODONE HYDROCHLORIDE 5 MG/1
5 TABLET ORAL EVERY 6 HOURS PRN
Qty: 42 TABLET | Refills: 0 | Status: SHIPPED | OUTPATIENT
Start: 2022-06-30

## 2022-06-30 RX ORDER — TRANEXAMIC ACID 100 MG/ML
INJECTION, SOLUTION INTRAVENOUS
Status: DISCONTINUED | OUTPATIENT
Start: 2022-06-30 | End: 2022-06-30

## 2022-06-30 RX ORDER — FENTANYL CITRATE 50 UG/ML
100 INJECTION, SOLUTION INTRAMUSCULAR; INTRAVENOUS
Status: DISCONTINUED | OUTPATIENT
Start: 2022-06-30 | End: 2022-06-30 | Stop reason: HOSPADM

## 2022-06-30 RX ORDER — IBUPROFEN 200 MG
16 TABLET ORAL
Status: DISCONTINUED | OUTPATIENT
Start: 2022-06-30 | End: 2022-07-03 | Stop reason: HOSPADM

## 2022-06-30 RX ORDER — ACETAMINOPHEN 500 MG
1000 TABLET ORAL
Status: COMPLETED | OUTPATIENT
Start: 2022-06-30 | End: 2022-06-30

## 2022-06-30 RX ORDER — MUPIROCIN 20 MG/G
1 OINTMENT TOPICAL
Status: COMPLETED | OUTPATIENT
Start: 2022-06-30 | End: 2022-06-30

## 2022-06-30 RX ORDER — SODIUM CHLORIDE 0.9 % (FLUSH) 0.9 %
10 SYRINGE (ML) INJECTION
Status: DISCONTINUED | OUTPATIENT
Start: 2022-06-30 | End: 2022-06-30 | Stop reason: HOSPADM

## 2022-06-30 RX ORDER — AMLODIPINE BESYLATE 10 MG/1
10 TABLET ORAL DAILY
Status: DISCONTINUED | OUTPATIENT
Start: 2022-07-01 | End: 2022-07-03 | Stop reason: HOSPADM

## 2022-06-30 RX ORDER — PROCHLORPERAZINE EDISYLATE 5 MG/ML
5 INJECTION INTRAMUSCULAR; INTRAVENOUS EVERY 6 HOURS PRN
Status: DISCONTINUED | OUTPATIENT
Start: 2022-06-30 | End: 2022-07-03 | Stop reason: HOSPADM

## 2022-06-30 RX ORDER — NEOSTIGMINE METHYLSULFATE 0.5 MG/ML
INJECTION, SOLUTION INTRAVENOUS
Status: DISCONTINUED | OUTPATIENT
Start: 2022-06-30 | End: 2022-06-30

## 2022-06-30 RX ORDER — FLUTICASONE PROPIONATE 50 MCG
2 SPRAY, SUSPENSION (ML) NASAL DAILY
Status: DISCONTINUED | OUTPATIENT
Start: 2022-07-01 | End: 2022-07-03 | Stop reason: HOSPADM

## 2022-06-30 RX ORDER — ACETAMINOPHEN 500 MG
1000 TABLET ORAL EVERY 6 HOURS
Status: DISCONTINUED | OUTPATIENT
Start: 2022-06-30 | End: 2022-07-03 | Stop reason: HOSPADM

## 2022-06-30 RX ORDER — LIDOCAINE HYDROCHLORIDE 10 MG/ML
1 INJECTION, SOLUTION EPIDURAL; INFILTRATION; INTRACAUDAL; PERINEURAL
Status: DISCONTINUED | OUTPATIENT
Start: 2022-06-30 | End: 2022-06-30 | Stop reason: HOSPADM

## 2022-06-30 RX ORDER — FENTANYL CITRATE 50 UG/ML
25 INJECTION, SOLUTION INTRAMUSCULAR; INTRAVENOUS EVERY 5 MIN PRN
Status: DISCONTINUED | OUTPATIENT
Start: 2022-06-30 | End: 2022-06-30 | Stop reason: HOSPADM

## 2022-06-30 RX ORDER — FAMOTIDINE 20 MG/1
20 TABLET, FILM COATED ORAL DAILY
Status: DISCONTINUED | OUTPATIENT
Start: 2022-06-30 | End: 2022-07-03 | Stop reason: HOSPADM

## 2022-06-30 RX ORDER — GLUCAGON 1 MG
1 KIT INJECTION
Status: DISCONTINUED | OUTPATIENT
Start: 2022-06-30 | End: 2022-07-03 | Stop reason: HOSPADM

## 2022-06-30 RX ORDER — ROCURONIUM BROMIDE 10 MG/ML
INJECTION, SOLUTION INTRAVENOUS
Status: DISCONTINUED | OUTPATIENT
Start: 2022-06-30 | End: 2022-06-30

## 2022-06-30 RX ORDER — FLUTICASONE FUROATE AND VILANTEROL 200; 25 UG/1; UG/1
1 POWDER RESPIRATORY (INHALATION) DAILY
Refills: 11 | Status: DISCONTINUED | OUTPATIENT
Start: 2022-07-01 | End: 2022-07-03 | Stop reason: HOSPADM

## 2022-06-30 RX ORDER — METHOCARBAMOL 750 MG/1
750 TABLET, FILM COATED ORAL 3 TIMES DAILY PRN
Qty: 42 TABLET | Refills: 0 | Status: SHIPPED | OUTPATIENT
Start: 2022-06-30 | End: 2022-07-17

## 2022-06-30 RX ORDER — NALOXONE HCL 0.4 MG/ML
0.02 VIAL (ML) INJECTION
Status: DISCONTINUED | OUTPATIENT
Start: 2022-06-30 | End: 2022-07-03 | Stop reason: HOSPADM

## 2022-06-30 RX ORDER — AMOXICILLIN 250 MG
1 CAPSULE ORAL 2 TIMES DAILY
Status: DISCONTINUED | OUTPATIENT
Start: 2022-06-30 | End: 2022-07-03 | Stop reason: HOSPADM

## 2022-06-30 RX ORDER — PHENYLEPHRINE HYDROCHLORIDE 10 MG/ML
INJECTION INTRAVENOUS
Status: DISCONTINUED | OUTPATIENT
Start: 2022-06-30 | End: 2022-06-30

## 2022-06-30 RX ORDER — CEFAZOLIN SODIUM/D5W 2 G/100 ML
2 PLASTIC BAG, INJECTION (ML) INTRAVENOUS
Status: COMPLETED | OUTPATIENT
Start: 2022-06-30 | End: 2022-07-01

## 2022-06-30 RX ORDER — SODIUM CHLORIDE 0.9 % (FLUSH) 0.9 %
10 SYRINGE (ML) INJECTION
Status: DISCONTINUED | OUTPATIENT
Start: 2022-06-30 | End: 2022-07-03 | Stop reason: HOSPADM

## 2022-06-30 RX ORDER — ROPIVACAINE HYDROCHLORIDE 2 MG/ML
0.1 INJECTION, SOLUTION EPIDURAL; INFILTRATION; PERINEURAL CONTINUOUS
Status: DISCONTINUED | OUTPATIENT
Start: 2022-06-30 | End: 2022-07-03 | Stop reason: HOSPADM

## 2022-06-30 RX ORDER — SUCCINYLCHOLINE CHLORIDE 20 MG/ML
INJECTION INTRAMUSCULAR; INTRAVENOUS
Status: DISCONTINUED | OUTPATIENT
Start: 2022-06-30 | End: 2022-06-30

## 2022-06-30 RX ORDER — METHOCARBAMOL 750 MG/1
750 TABLET, FILM COATED ORAL 3 TIMES DAILY
Status: DISCONTINUED | OUTPATIENT
Start: 2022-06-30 | End: 2022-07-03 | Stop reason: HOSPADM

## 2022-06-30 RX ORDER — ROPIVACAINE HYDROCHLORIDE 5 MG/ML
INJECTION, SOLUTION EPIDURAL; INFILTRATION; PERINEURAL
Status: COMPLETED | OUTPATIENT
Start: 2022-06-30 | End: 2022-06-30

## 2022-06-30 RX ORDER — ALBUTEROL SULFATE 90 UG/1
2 AEROSOL, METERED RESPIRATORY (INHALATION) EVERY 4 HOURS PRN
Status: DISCONTINUED | OUTPATIENT
Start: 2022-06-30 | End: 2022-07-03 | Stop reason: HOSPADM

## 2022-06-30 RX ORDER — PREGABALIN 75 MG/1
75 CAPSULE ORAL NIGHTLY
Status: DISCONTINUED | OUTPATIENT
Start: 2022-06-30 | End: 2022-07-03 | Stop reason: HOSPADM

## 2022-06-30 RX ORDER — OXYCODONE HYDROCHLORIDE 5 MG/1
5 TABLET ORAL
Status: DISCONTINUED | OUTPATIENT
Start: 2022-06-30 | End: 2022-07-03 | Stop reason: HOSPADM

## 2022-06-30 RX ORDER — IBUPROFEN 200 MG
24 TABLET ORAL
Status: DISCONTINUED | OUTPATIENT
Start: 2022-06-30 | End: 2022-07-03 | Stop reason: HOSPADM

## 2022-06-30 RX ORDER — PREGABALIN 75 MG/1
75 CAPSULE ORAL
Status: COMPLETED | OUTPATIENT
Start: 2022-06-30 | End: 2022-06-30

## 2022-06-30 RX ORDER — DOCUSATE SODIUM 100 MG/1
100 CAPSULE, LIQUID FILLED ORAL 2 TIMES DAILY
Qty: 60 CAPSULE | Refills: 0 | Status: SHIPPED | OUTPATIENT
Start: 2022-06-30 | End: 2022-07-27 | Stop reason: SDUPTHER

## 2022-06-30 RX ORDER — GABAPENTIN 100 MG/1
100 CAPSULE ORAL 3 TIMES DAILY PRN
Qty: 45 CAPSULE | Refills: 0 | Status: SHIPPED | OUTPATIENT
Start: 2022-06-30 | End: 2023-02-16

## 2022-06-30 RX ORDER — IPRATROPIUM BROMIDE AND ALBUTEROL SULFATE 2.5; .5 MG/3ML; MG/3ML
3 SOLUTION RESPIRATORY (INHALATION) EVERY 4 HOURS PRN
Status: DISCONTINUED | OUTPATIENT
Start: 2022-06-30 | End: 2022-07-03 | Stop reason: HOSPADM

## 2022-06-30 RX ORDER — CELECOXIB 200 MG/1
200 CAPSULE ORAL DAILY
Status: DISCONTINUED | OUTPATIENT
Start: 2022-06-30 | End: 2022-06-30

## 2022-06-30 RX ORDER — SODIUM CHLORIDE 9 MG/ML
INJECTION, SOLUTION INTRAVENOUS
Status: COMPLETED | OUTPATIENT
Start: 2022-06-30 | End: 2022-06-30

## 2022-06-30 RX ORDER — INSULIN ASPART 100 [IU]/ML
1-10 INJECTION, SOLUTION INTRAVENOUS; SUBCUTANEOUS
Status: DISCONTINUED | OUTPATIENT
Start: 2022-06-30 | End: 2022-07-03 | Stop reason: HOSPADM

## 2022-06-30 RX ORDER — CEFAZOLIN SODIUM/WATER 2 G/20 ML
2 SYRINGE (ML) INTRAVENOUS
Status: COMPLETED | OUTPATIENT
Start: 2022-06-30 | End: 2022-06-30

## 2022-06-30 RX ORDER — EPHEDRINE SULFATE 50 MG/ML
INJECTION, SOLUTION INTRAVENOUS
Status: DISCONTINUED | OUTPATIENT
Start: 2022-06-30 | End: 2022-06-30

## 2022-06-30 RX ORDER — BISACODYL 10 MG
10 SUPPOSITORY, RECTAL RECTAL EVERY 12 HOURS PRN
Status: DISCONTINUED | OUTPATIENT
Start: 2022-06-30 | End: 2022-07-03 | Stop reason: HOSPADM

## 2022-06-30 RX ORDER — CELECOXIB 200 MG/1
400 CAPSULE ORAL
Status: COMPLETED | OUTPATIENT
Start: 2022-06-30 | End: 2022-06-30

## 2022-06-30 RX ORDER — FENTANYL CITRATE 50 UG/ML
INJECTION, SOLUTION INTRAMUSCULAR; INTRAVENOUS
Status: COMPLETED
Start: 2022-06-30 | End: 2022-06-30

## 2022-06-30 RX ORDER — ACETAMINOPHEN 500 MG
1000 TABLET ORAL EVERY 8 HOURS
Qty: 120 TABLET | Refills: 0 | Status: SHIPPED | OUTPATIENT
Start: 2022-06-30

## 2022-06-30 RX ORDER — CEFADROXIL 500 MG/1
500 CAPSULE ORAL EVERY 12 HOURS
Qty: 56 CAPSULE | Refills: 0 | Status: SHIPPED | OUTPATIENT
Start: 2022-06-30 | End: 2022-07-31

## 2022-06-30 RX ORDER — KETAMINE HCL IN 0.9 % NACL 50 MG/5 ML
SYRINGE (ML) INTRAVENOUS
Status: DISCONTINUED | OUTPATIENT
Start: 2022-06-30 | End: 2022-06-30

## 2022-06-30 RX ADMIN — ASPIRIN 81 MG: 81 TABLET, COATED ORAL at 09:06

## 2022-06-30 RX ADMIN — SUCCINYLCHOLINE CHLORIDE 120 MG: 20 INJECTION, SOLUTION INTRAMUSCULAR; INTRAVENOUS; PARENTERAL at 11:06

## 2022-06-30 RX ADMIN — Medication 5 MG: at 02:06

## 2022-06-30 RX ADMIN — FENTANYL CITRATE 50 MCG: 50 INJECTION INTRAMUSCULAR; INTRAVENOUS at 10:06

## 2022-06-30 RX ADMIN — SODIUM CHLORIDE: 0.9 INJECTION, SOLUTION INTRAVENOUS at 10:06

## 2022-06-30 RX ADMIN — FENTANYL CITRATE 25 MCG: 50 INJECTION INTRAMUSCULAR; INTRAVENOUS at 04:06

## 2022-06-30 RX ADMIN — PROPOFOL 50 MG: 10 INJECTION, EMULSION INTRAVENOUS at 11:06

## 2022-06-30 RX ADMIN — FENTANYL CITRATE 50 MCG: 50 INJECTION, SOLUTION INTRAMUSCULAR; INTRAVENOUS at 10:06

## 2022-06-30 RX ADMIN — TRANEXAMIC ACID 1000 MG: 100 INJECTION, SOLUTION INTRAVENOUS at 12:06

## 2022-06-30 RX ADMIN — ONDANSETRON 4 MG: 2 INJECTION INTRAMUSCULAR; INTRAVENOUS at 02:06

## 2022-06-30 RX ADMIN — GLYCOPYRROLATE 0.4 MG: 0.2 INJECTION, SOLUTION INTRAMUSCULAR; INTRAVENOUS at 02:06

## 2022-06-30 RX ADMIN — NEOSTIGMINE METHYLSULFATE 4 MG: 0.5 INJECTION INTRAVENOUS at 02:06

## 2022-06-30 RX ADMIN — EPHEDRINE SULFATE 20 MG: 50 INJECTION INTRAVENOUS at 12:06

## 2022-06-30 RX ADMIN — MEPIVACAINE HYDROCHLORIDE 3 ML: 15 INJECTION, SOLUTION EPIDURAL; INFILTRATION at 11:06

## 2022-06-30 RX ADMIN — SENNOSIDES AND DOCUSATE SODIUM 1 TABLET: 50; 8.6 TABLET ORAL at 09:06

## 2022-06-30 RX ADMIN — PHENYLEPHRINE HYDROCHLORIDE 100 MCG: 10 INJECTION INTRAVENOUS at 01:06

## 2022-06-30 RX ADMIN — ROPIVACAINE HYDROCHLORIDE 7.5 ML: 5 INJECTION, SOLUTION EPIDURAL; INFILTRATION; PERINEURAL at 10:06

## 2022-06-30 RX ADMIN — METHOCARBAMOL 750 MG: 750 TABLET ORAL at 09:06

## 2022-06-30 RX ADMIN — VANCOMYCIN HYDROCHLORIDE 1500 MG: 1.5 INJECTION, POWDER, LYOPHILIZED, FOR SOLUTION INTRAVENOUS at 10:06

## 2022-06-30 RX ADMIN — ROCURONIUM BROMIDE 20 MG: 10 INJECTION INTRAVENOUS at 12:06

## 2022-06-30 RX ADMIN — DEXAMETHASONE SODIUM PHOSPHATE 8 MG: 4 INJECTION INTRA-ARTICULAR; INTRALESIONAL; INTRAMUSCULAR; INTRAVENOUS; SOFT TISSUE at 12:06

## 2022-06-30 RX ADMIN — EPHEDRINE SULFATE 15 MG: 50 INJECTION INTRAVENOUS at 12:06

## 2022-06-30 RX ADMIN — Medication 200 MCG: at 12:06

## 2022-06-30 RX ADMIN — MIDAZOLAM 1 MG: 1 INJECTION INTRAMUSCULAR; INTRAVENOUS at 10:06

## 2022-06-30 RX ADMIN — ROPIVACAINE HYDROCHLORIDE 0.1 ML/HR: 2 INJECTION, SOLUTION EPIDURAL; INFILTRATION at 03:06

## 2022-06-30 RX ADMIN — ACETAMINOPHEN 1000 MG: 500 TABLET ORAL at 09:06

## 2022-06-30 RX ADMIN — METHOCARBAMOL 750 MG: 750 TABLET ORAL at 03:06

## 2022-06-30 RX ADMIN — Medication 2 G: at 12:06

## 2022-06-30 RX ADMIN — FENTANYL CITRATE 25 MCG: 50 INJECTION INTRAMUSCULAR; INTRAVENOUS at 03:06

## 2022-06-30 RX ADMIN — PREGABALIN 75 MG: 75 CAPSULE ORAL at 09:06

## 2022-06-30 RX ADMIN — SUCCINYLCHOLINE CHLORIDE 80 MG: 20 INJECTION, SOLUTION INTRAMUSCULAR; INTRAVENOUS; PARENTERAL at 11:06

## 2022-06-30 RX ADMIN — MUPIROCIN 1 G: 20 OINTMENT TOPICAL at 09:06

## 2022-06-30 RX ADMIN — Medication 25 MG: at 12:06

## 2022-06-30 RX ADMIN — FAMOTIDINE 20 MG: 20 TABLET ORAL at 09:06

## 2022-06-30 RX ADMIN — TRANEXAMIC ACID 1000 MG: 100 INJECTION, SOLUTION INTRAVENOUS at 02:06

## 2022-06-30 RX ADMIN — CELECOXIB 400 MG: 200 CAPSULE ORAL at 09:06

## 2022-06-30 RX ADMIN — MUPIROCIN 1 G: 20 OINTMENT TOPICAL at 10:06

## 2022-06-30 RX ADMIN — DEXTROSE 2 G: 50 INJECTION, SOLUTION INTRAVENOUS at 09:06

## 2022-06-30 RX ADMIN — OXYCODONE 10 MG: 5 TABLET ORAL at 04:06

## 2022-06-30 RX ADMIN — SODIUM CHLORIDE, SODIUM GLUCONATE, SODIUM ACETATE, POTASSIUM CHLORIDE, MAGNESIUM CHLORIDE, SODIUM PHOSPHATE, DIBASIC, AND POTASSIUM PHOSPHATE: .53; .5; .37; .037; .03; .012; .00082 INJECTION, SOLUTION INTRAVENOUS at 12:06

## 2022-06-30 RX ADMIN — ATORVASTATIN CALCIUM 80 MG: 20 TABLET, FILM COATED ORAL at 09:06

## 2022-06-30 RX ADMIN — SODIUM CHLORIDE: 0.9 INJECTION, SOLUTION INTRAVENOUS at 11:06

## 2022-06-30 NOTE — NURSING TRANSFER
Nursing Transfer Note      6/30/2022     Reason patient is being transferred: meets criteria for discharge    Transfer To: 505    Transfer via bed    Transfer with cardiac monitor    Transported by pcts    Medicines sent: ropivicaine, iv fluids    Any special needs or follow-up needed: none    Chart send with patient: Yes    Notified: friend    Patient reassessed at: 6/30

## 2022-06-30 NOTE — ASSESSMENT & PLAN NOTE
82 y.o. female s/p Stage 2 R TKA Revision for PJI on 6/30/22.    Pain control: multimodal per surgical home  PT/OT: WBAT RLE  DVT PPx: Eliquis 2.5mg BID, FCDs at all times when not ambulating  Abx: postop Ancef  Bulky dressing d/c this am  Intraop Cxs: NGTD    Dispo: f/u PT/OT

## 2022-06-30 NOTE — PT/OT/SLP PROGRESS
Physical Therapy      Patient Name:  Duran Giordano   MRN:  [id number]    Patient not seen today secondary to drowsiness and still resolving anaesthesia.  Unable to return later in PM.  Will see patient in AM on 7/1.

## 2022-06-30 NOTE — ANESTHESIA PROCEDURE NOTES
CSE    Patient location during procedure: OR  Start time: 6/30/2022 11:44 AM  Timeout: 6/30/2022 11:42 AM  End time: 6/30/2022 11:48 AM      Staffing  Authorizing Provider: Aruna Nolasco MD  Performing Provider: Aruna Nolasco MD    Preanesthetic Checklist  Completed: patient identified, IV checked, site marked, risks and benefits discussed, surgical consent, monitors and equipment checked, pre-op evaluation and timeout performed  CSE  Patient position: sitting  Prep: ChloraPrep  Patient monitoring: heart rate, continuous pulse ox and frequent blood pressure checks  Approach: midline  Spinal Needle  Needle type: Rachel   Needle gauge: 25 G  Needle length: 5 in  Epidural Needle  Injection technique: GENE saline  Needle type: Tuohy   Needle gauge: 17 G  Needle length: 3.5 in  Needle insertion depth: 8 cm  Location: L3-4  Needle localization: anatomical landmarks   Catheter  Catheter size: 19 G  Catheter at skin depth: 12 cm  Additional Documentation: no paresthesia on injection  Assessment  Intrathecal Medications:   administered: primary anesthetic mcg of    Additional Notes  See Q note regarding high spinal  Medications:    Medications: Intrathecal - mepivacaine 15 mg/mL (1.5 %) injection - Intrathecal   3 mL - 6/30/2022 11:46:00 AM

## 2022-06-30 NOTE — HPI
CC:  Right knee pain     Duran Giordano is a 82 y.o. female with history of Right knee prosthetic joint infection. She was treated with hardware removal and abx spacer placement followed by 6 weeks of IV abx 10/28- 12/9. She had cellulitis that was treated with abx in January. She has basically been chair bound with walker for transfers since then. She is ready to proceed with reimplantation. Pain is worse with activity and weight bearing.  Patient has experienced interference of activities of daily living due to decreased range of motion and an increase in joint pain and swelling.    Duran Giordano currently ambulates using assistive device .      Relevant medical conditions of significance in perioperative period:  HTN- on medication managed by pcp  DM- on medication managed by pcp  HLD- on medication managed by pcp     Given documented medical comorbidities including those listed above and based off of CMS criteria patient would meet inpatient admission status guidelines. This case has been approved as inpatient.

## 2022-06-30 NOTE — ANESTHESIA PROCEDURE NOTES
Intubation    Date/Time: 6/30/2022 12:00 PM  Performed by: Natalie Acosta CRNA  Authorized by: Aruna Nolasco MD     Intubation:     Induction:  Intravenous    Intubated:  Postinduction    Mask Ventilation:  Easy with oral airway    Attempts:  2 (noted on 1st attempt pt not fully relaxed)    Attempted By:  Staff anesthesiologist    Method of Intubation:  Video laryngoscopy    Blade:  Mccray 3    Laryngeal View Grade: Grade I - full view of cords      Attempted By (2nd Attempt):  Staff anesthesiologist    Method of Intubation (2nd Attempt):  Video laryngoscopy    Blade (2nd Attempt):  Mccray 3    Laryngeal View Grade (2nd Attempt): Grade I - full view of cords      Difficult Airway Encountered?: No      Complications:  None    Airway Device Size:  7.5    Style/Cuff Inflation:  Cuffed    Inflation Amount (mL):  6    Tube secured:  21    Placement Verified By:  Capnometry    Complicating Factors:  Obesity, short neck, poor neck/head extension and oropharyngeal edema or fat    Findings Post-Intubation:  BS equal bilateral and atraumatic/condition of teeth unchanged

## 2022-06-30 NOTE — TRANSFER OF CARE
"Anesthesia Transfer of Care Note    Patient: Duran Giordano    Procedure(s) Performed: Procedure(s) (LRB):  REVISION, ARTHROPLASTY, KNEE-NAKIA- stage 2 (Right)    Patient location: PACU    Anesthesia Type: general    Transport from OR: Transported from OR on 6-10 L/min O2 by face mask with adequate spontaneous ventilation    Post pain: adequate analgesia    Post assessment: no apparent anesthetic complications and tolerated procedure well    Post vital signs: stable    Level of consciousness: awake, alert and oriented    Nausea/Vomiting: no nausea/vomiting    Complications: none    Transfer of care protocol was followed      Last vitals:   Visit Vitals  /61 (BP Location: Right arm, Patient Position: Lying)   Pulse 69   Temp 36.6 °C (97.9 °F) (Temporal)   Resp 18   Ht 4' 11" (1.499 m)   Wt 109.8 kg (242 lb)   SpO2 100%   Breastfeeding No   BMI 48.88 kg/m²     "

## 2022-06-30 NOTE — INTERVAL H&P NOTE
Duran Giordano was interviewed, examined and the H and P reviewed.  There has been no interval change in her History and Physical.

## 2022-06-30 NOTE — OP NOTE
DATE OF PROCEDURE:  06/30/2022   PREOPERATIVE DIAGNOSIS:  Prosthetic joint infection right knee/morbid obesity body max index 48  POSTOPERATIVE DIAGNOSIS:  Same.   PROCEDURES PERFORMED:  Revision right total knee arthroplasty/Removal of antibiotic spacer/ application of negative pressure vacuum assisted closure device.  SURGEON: Alfredo Lozoya M.D.   ASSISTANT:  Rich rowe M.D. (RES).   ANESTHESIA:  General/spinal.   SPECIMEN:  Soft tissue  FINDINGS:  No persistent infection  FLUID:  2 L  BLOOD LOSS:  Less than 100 cc  COMPLICATIONS: None.   COUNTS: Correct.   DISPOSITION: Recovery Room, stable.   IMPLANTS:  [place] size 1 right total stabilizer femoral component with a 12 x 100 stem 5 mm medial and lateral posterior augments 15 mm medial and 10 mm lateral distal augments a size 2 tibia with a size a cone a 13 x 100 stem 10 mm augments medially and laterally and a size 2 x 22 insert with locking post  INDICATIONS FOR PROCEDURE:  Sister Pasquale is an 82-year-old female who had a prosthetic joint infection of her right knee she was being treated with a two-stage reimplantation.  She had the 1st stage of few months ago.  She had medical issues and need optimization between stages.  This was accomplished.  She was aware of reasonable treatment options and elected to undergo stage II of the procedure which was removal of the spacer and reimplantation of her knee replacement.  PROCEDURE IN DETAIL:  After appropriate consent was obtained the patient brought to the operating room where anesthesia was administered.  A spinal was placed but was considered to be high spinal and general endotracheal anesthesia was induced.  She received antibiotic prophylaxis.  She received tranexamic acid.  Cast padding and tourniquet were applied to the proximal right thigh and the right lower extremity was prepped and draped in the usual sterile fashion.  A time-out was called.  All team members participated.  The proper site side  and procedure were identified.  No concerns were expressed.  Team members were encouraged to express concerns if they arose during the case.  The knee was elevated and the tourniquet was inflated.  Her prior incision was utilized.  Incision was taken through the skin and the subcutaneous tissue.  There was edema distally.  A medial parapatellar arthrotomy followed by quadriceps snip was performed.  There was no undue tension on the extensor mechanism.  The medial lateral gutters were reestablished.  Thorough synovectomy was performed.  Tissue was sent for culture during the case.  The knee was flexed.  The femoral spacer and stem were removed.  Following this the posterior structures were protected and the tibial spacer and stem were removed.  Thorough debridement was performed.  We then reamed the tibia to accommodate a 13 x 100 stem.  The tibia was sized found be a size 2. There was significant tibial bone loss.  We reamed for size a cone.  We then prepared the tibia and inserted the size 2 trial.  We knew we would need augments from the bone loss and we placed 10 millimeter augments medially and laterally.  Attention was then turned to the femur.  We reamed the femur to accommodate a 12 x 100 stem.  We then made our distal cuts.  There was significant distal bone loss.  She had a 15 mm augment medially and a 10 mm augment laterally.  I marked trans epicondylar axis.  Sized the femur found to be a size 1 the made anterior posterior chamfer and box cuts.  She needed 5 mm augments posteriorly both medially and laterally.  Trial reduction was performed.  With a 22 mm insert she had good flexion extension gap balance.  She was slightly lax laterally so I decided to use a total stabilizer insert.  Her patella was quite then and fragile so I opted not to resurface it.  The patella tracked well.  The knee was brought through range of motion.  The femoral component tracked symmetrically in the tibial component.  There was  no instability at full extension 30° of flexion mid flexion or full flexion.  Therefore this point we were satisfied with knee can not component position sizing and alignment as well patella tracking.  Also satisfied with the stability.  Trial components removed.  Actual components were assembled on the back table.  The bone was prepared for cementing to pulsatile lavage and drying.  Components were cemented into place femur followed by tibia.  Meticulous care was taken to remove excess cement.  The tibial insert was firmly seated in the tibial tray.  The locking bolt was engaged.  The knee was reduced.  Was brought into extension.  We then irrigated again with a dilute Betadine and hydrogen peroxide sequence per protocol.  Once this was accomplished the incision was closed.  The arthrotomy and quadriceps snip were closed with 1. Vicryl.  The remaining incision was closed 0 Vicryl 2-0 Vicryl staples and an incisional wound VAC.  she was then awakened extubated brought to recovery room in stable condition.  She tolerated the procedure well and there were no known complications.  She may weight bear as tolerated.

## 2022-06-30 NOTE — ANESTHESIA PROCEDURE NOTES
Adductor Canal Catheter    Patient location during procedure: pre-op   Block not for primary anesthetic.  Reason for block: at surgeon's request and post-op pain management   Post-op Pain Location: Right knee   Start time: 6/30/2022 10:46 AM  Timeout: 6/30/2022 10:45 AM   End time: 6/30/2022 11:05 AM    Staffing  Authorizing Provider: Vale Hong MD  Performing Provider: Ellis Raya MD    Preanesthetic Checklist  Completed: patient identified, IV checked, site marked, risks and benefits discussed, surgical consent, monitors and equipment checked, pre-op evaluation and timeout performed  Peripheral Block  Patient position: supine  Prep: ChloraPrep and site prepped and draped  Patient monitoring: heart rate, cardiac monitor, continuous pulse ox, continuous capnometry and frequent blood pressure checks  Block type: adductor canal  Laterality: right  Injection technique: continuous  Needle  Needle type: Tuohy   Needle gauge: 17 G  Needle length: 3.5 in  Needle localization: anatomical landmarks and ultrasound guidance  Catheter type: spring wound  Catheter size: 19 G  Test dose: lidocaine 1.5% with Epi 1-to-200,000 and negative   -ultrasound image captured on disc.  Assessment  Injection assessment: negative aspiration, negative parasthesia and local visualized surrounding nerve  Paresthesia pain: none  Heart rate change: no  Slow fractionated injection: yes  Pain Tolerance: comfortable throughout block and no complaints  Medications:    Medications: ropivacaine (NAROPIN) injection 0.5% - Perineural   7.5 mL - 6/30/2022 10:50:00 AM    Additional Notes  VSS.  [facility] RN monitoring vitals throughout procedure.  Patient tolerated procedure well.  15 cc of 0.25% ropivacaine with epi 1:300k administered for the block.

## 2022-06-30 NOTE — HOSPITAL COURSE
On 6/30/22, the patient arrived to the [facility] for proper pre-operative management.  Upon completion of pre-operative preparation, the patient was taken back to the operative theatre. Stage 2 TKA revision for PJI was performed without complication and the patient was transported to the post anesthesia care unit in stable condition.  After appropriate recovery from the anaesthetic agents used during the surgery, the patient was then transported to the hospital inpatient floor.  The interim of the hospital stay from arrival on the floor up to discharge has been uncomplicated. The patient has tolerated regular diet.  The patient's pain has been controlled using a multimodal approach. Currently, the patient's pain is well controlled on an oral regimen.  The patient has been voiding without difficulty.  The patient began participation in physical therapy after surgery and has progressed throughout the entire hospital stay.  Currently, the patient's progress is sufficient to allow the them to be discharged to *** safely.  The patient agrees with this assessment and desires a discharge today.

## 2022-06-30 NOTE — NURSING
Patient arrived to unit. Patient resting comfortably. IV infusing. Ropivacaine plugged in, Wound vac operating correctly and polar ice has correct ratio of water to ice. Vitals taken.

## 2022-06-30 NOTE — PROGRESS NOTES
Patient meets requirements for next phase of care. No acute complaints of nausea, pain, or dyspnea. Surgical site clean, dry, and intact. Friend notified.

## 2022-06-30 NOTE — PT/OT/SLP PROGRESS
Occupational Therapy      Patient Name:  Duran Giordano   MRN:  [id number]    Patient not seen today secondary to Other (Comment) (Patient remains drowsy and still resolving.). Will follow-up 07/01/22.    6/30/2022

## 2022-06-30 NOTE — ANESTHESIA PREPROCEDURE EVALUATION
[facility]  Anesthesia Pre-Operative Evaluation     06/30/2022    Duran Giordano is a 82 y.o. female w/ a significant PMHx of morbid obesity, b/l carotid artery disease, CVA, CAD, asthma, HTN, CKD3, DM2, GERD, HLD, REJI, and CHF.     Patient Active Problem List   Diagnosis    Vitamin D deficiency disease    REJI on CPAP    Adrenal mass- adenoma stable since 2004 (1.8 cm and 8/13/12 (2 cm); stable 2016 2.4 cm    Gastroesophageal reflux disease without esophagitis    Type 2 diabetes mellitus with diabetic polyneuropathy, with long-term current use of insulin    Chronic diastolic heart failure    Hypertension associated with diabetes    Coronary artery disease due to calcified coronary lesion    Venous stasis dermatitis of both lower extremities    Primary osteoarthritis of both knees    Weakness of left lower extremity    Lumbar facet arthropathy    Viral hepatitis A without coma    Kidney cysts    Tortuous aorta    Onychomycosis due to dermatophyte    Ingrown nail    Reactive airway disease without complication    Nasal congestion    Morbid obesity with BMI of 50.0-59.9, adult    Lymphedema of both lower extremities    Uncomplicated asthma    Gout    Chronic obstructive pulmonary disease    Senile purpura    DNR (do not resuscitate)    Advanced care planning/counseling discussion    Obesity hypoventilation syndrome    Pulmonary hypertension    Cor pulmonale, chronic    Failed total knee arthroplasty    Knee pain    Infection of prosthetic right knee joint    Preop exam for internal medicine    Stage 3a chronic kidney disease    Spinal enthesopathy    Complete immobility due to severe physical disability or frailty    Frailty syndrome in geriatric patient    History of thrombosis    Chronic venous hypertension (idiopathic) with ulcer and inflammation of left lower extremity (CODE)    Difficult intravenous access    Anemia    History of staph infection        Review of patient's allergies indicates:   Allergen Reactions    Bactrim [sulfamethoxazole-trimethoprim]      Other reaction(s): up set stomach rash/hives    Azithromycin      Feels bad    Erythromycin Other (See Comments)     Other reaction(s): Unknown  Other reaction(s): Stomach upset    Gentamicin      Vaginal burning , itching     Levofloxacin Hives     Other reaction(s): nervousness    Shellfish containing products      Rash      Vancomycin Itching     Other reaction(s): Itching       Current Inpatient Medications:      No current facility-administered medications on file prior to encounter.     Current Outpatient Medications on File Prior to Encounter   Medication Sig Dispense Refill    albuterol (PROVENTIL/VENTOLIN HFA) 90 mcg/actuation inhaler Inhale 2 puffs into the lungs every 4 (four) hours as needed for Wheezing or Shortness of Breath. Rescue 54 g 3    amLODIPine (NORVASC) 10 MG tablet Take 1 tablet (10 mg total) by mouth once daily. 30 tablet 11    atorvastatin (LIPITOR) 80 MG tablet Take 1 tablet (80 mg total) by mouth once daily. 90 tablet 3    cholecalciferol, vitamin D3, (VITAMIN D3) 25 mcg (1,000 unit) capsule Take 1 capsule (1,000 Units total) by mouth once daily. 90 capsule 3    fluticasone (FLONASE) 50 mcg/actuation nasal spray 2 sprays (100 mcg total) by Each Nare route once daily. 1 Bottle 3    insulin degludec (TRESIBA FLEXTOUCH U-100) 100 unit/mL (3 mL) insulin pen Inject 16 units at night. 3 pen 3    torsemide (DEMADEX) 10 MG Tab Take 1 tablet (10 mg total) by mouth once daily. 30 tablet 11    acetaminophen (TYLENOL) 500 MG tablet Take 2 tablets (1,000 mg total) by mouth 2 (two) times daily as needed for Pain. 360 tablet 3    albuterol-ipratropium (DUO-NEB) 2.5 mg-0.5 mg/3 mL nebulizer solution USE 1 VIAL PER NEBULIZER EVERY 6 HOURS AS NEEDED FOR WHEEZING/RESCUE 90 mL 11    blood sugar diagnostic Strp BG monitoring 4 times a day. Pt needs test strips for [employer]  "Talking meter. (Patient taking differently: BG monitoring 2 times a day. Pt needs test strips for Em[employer] Talking meter.) 450 strip prn    diclofenac sodium (VOLTAREN) 1 % Gel APPLY 2 GRAMS TOPICALLY DAILY 100 g 0    fluticasone-salmeterol diskus inhaler 500-50 mcg INHALE 1 PUFF TWICE DAILY 60 each 11    lancets Misc Test daily (Patient taking differently: Test twice  daily) 100 each 12    nebulizer and compressor (COMP-AIR ELITE COMP NEB SYSTEM) Berna use as directed 1 each 0    pen needle, diabetic (PEN NEEDLE) 29 gauge x 1/2" Ndle Use 1 x a day. 100 each 3    polyethylene glycol (GLYCOLAX) 17 gram PwPk Take 17 g by mouth once daily.  0    semaglutide (OZEMPIC) 0.25 mg or 0.5 mg(2 mg/1.5 mL) pen injector Inject 0.5 mg weekly (Patient taking differently: Inject 0.5 mg into the skin every Saturday.) 3 pen 3    trolamine salicylate (ASPERCREME) 10 % cream Apply topically as needed.         Past Surgical History:   Procedure Laterality Date    HYSTERECTOMY  1982    secondary uterine fibroids    INJECTION OF ANESTHETIC AGENT AROUND NERVE Right 10/21/2021    Procedure: BLOCK, NERVE GENICULAR RIGHT NEEDS CONSENT;  Surgeon: Senia Kilpatrick MD;  Location: [facility];  Service: Pain Management;  Laterality: Right;    INSERTION OF ANTIBIOTIC SPACER Right 10/28/2021    Procedure: INSERTION, ANTIBIOTIC SPACER;  Surgeon: Alfredo Lozoya MD;  Location: [address];  Service: Orthopedics;  Laterality: Right;    JOINT REPLACEMENT      REVISION OF KNEE ARTHROPLASTY Right 10/28/2021    Procedure: REVISION, ARTHROPLASTY, KNEE-DEPUY ANTIBIOTIC SPACER;  Surgeon: Alfredo Lozoya MD;  Location: [address];  Service: Orthopedics;  Laterality: Right;    Right total knee replacement         Social History:  Tobacco Use: Low Risk     Smoking Tobacco Use: Never Smoker    Smokeless Tobacco Use: Never Used      Alcohol Use: Not on file        OBJECTIVE:     Vital Signs Range (Last 24H):  Temp:  [36.6 °C " (97.9 °F)]   Pulse:  [86]   Resp:  [20]   BP: (151)/(75)   SpO2:  [99 %]       Significant Labs:  Lab Results   Component Value Date    WBC 9.16 06/22/2022    HGB 10.6 (L) 06/22/2022    HCT 33.2 (L) 06/22/2022     06/22/2022    CHOL 160 03/04/2021    TRIG 88 03/04/2021    HDL 34 (L) 03/04/2021    ALT 28 11/01/2021    AST 41 (H) 11/01/2021     06/22/2022    K 4.8 06/22/2022     06/22/2022    CREATININE 1.5 (H) 06/22/2022    BUN 20 06/22/2022    CO2 31 (H) 06/22/2022    TSH 0.799 01/16/2020    INR 1.0 06/22/2022    HGBA1C 6.8 (H) 06/06/2022       Diagnostic Studies: No relevant studies.    EKG:   Results for orders placed or performed during the hospital encounter of 08/20/17   Repeat EKG 12-lead    Collection Time: 08/21/17  2:34 AM    Narrative    Test Reason : R07.9  Blood Pressure : **/** mmHG  Vent. Rate : 068 BPM     Atrial Rate : 068 BPM     P-R Int : 154 ms          QRS Dur : 112 ms      QT Int : 420 ms       P-R-T Axes : 058 -46 060 degrees     QTc Int : 446 ms    Normal sinus rhythm with sinus arrhythmia  Incomplete right bundle branch block  Left anterior fascicular block  Moderate voltage criteria for LVH, may be normal variant  Abnormal ECG  When compared with ECG of 21-JUL-2017 14:59,  No significant change was found  Confirmed by Rigo Penny MD ([id number]) on 8/21/2017 4:08:11 PM    Referred By: [email or web address]   SELF           Confirmed By:Rigo Penny MD       2D ECHO:  TTE (8/23/2017):    1 - Normal left ventricular systolic function (EF 60-65%).     2 - Concentric remodeling.     3 - No wall motion abnormalities.     4 - Biatrial enlargement.     5 - Impaired LV relaxation, elevated LAP (grade 2 diastolic dysfunction).     6 - Normal right ventricular systolic function .     7 - Trivial to mild mitral regurgitation.     8 - Trivial to mild pulmonic regurgitation.     9 - Trivial pericardial effusion.     10 - Pulmonary hypertension. The estimated PA systolic pressure is 41 mmHg.     CARISA:  No  results found. However, due to the size of the patient record, not all encounters were searched. Please check Results Review for a complete set of results.     PET Stress Test (11/4/2013):  1. The EKG portion of this study is negative for ischemia at a peak heart rate of 91 bpm (63% of predicted).   2. Blood pressure remained stable throughout the protocol  (Presenting BP: 156/73 Peak BP: 174/67).   3. No significant arrhythmias were present.   4. The patient reported chest pain at baseline which did not change during exercise or recovery.     1. There is no evidence of a discrete hemodynamically significant coronary stenosis.   2. Although no clinically significant stress defect is seen, there is resting flow heterogeneity that improves after Dipyridamole.   3. Resting wall motion is physiologic. Stress wall motion is physiologic.   4. LV function is normal at rest and stress.  (normal is >= 51%)   5. The ventricular volumes are normal at rest and stress.   6. The extracardiac distribution of radioactivity is normal.   7. There was no previous study available to compare.     ASSESSMENT/PLAN:         Pre-op Assessment    I have reviewed the Patient Summary Reports.    I have reviewed the Nursing Notes. I have reviewed the NPO Status.   I have reviewed the Medications.     Review of Systems  Anesthesia Hx:  Denies Hx of Anesthetic complications  History of prior surgery of interest to airway management or planning: Denies Family Hx of Anesthesia complications.   Denies Personal Hx of Anesthesia complications.   Social:  Non-Smoker    Cardiovascular:   Hypertension CAD   CHF hyperlipidemia    Pulmonary:   Asthma Sleep Apnea    Renal/:   Chronic Renal Disease, CRI    Hepatic/GI:   GERD Hepatitis, A    Musculoskeletal:   Arthritis     Neurological:  Neurology Normal    Endocrine:   Diabetes, type 2  Morbid Obesity / BMI > 40      Physical Exam  General:  Morbid Obesity      Airway/Jaw/Neck:  Airway Findings: Mouth  Opening: Normal   Tongue: Normal   General Airway Assessment: Adult, Average Mallampati: III  Improves to II with phonation.  TM Distance: Normal, at least 6 cm   Jaw/Neck Findings:  Neck ROM: Normal ROM       Dental:  Dental Findings: In tact     Chest/Lungs:  Chest/Lungs Findings: Clear to auscultation, Normal Respiratory Rate      Heart/Vascular:  Heart Findings: Rate: Normal  Rhythm: Regular Rhythm  Sounds: Normal        Mental Status:  Mental Status Findings:  Cooperative, Alert and Oriented         Anesthesia Plan  Type of Anesthesia, risks & benefits discussed:  Anesthesia Type:  general, epidural, regional, Gen ETT, MAC, Epidural, Spinal, CSE    Patient's Preference:   Plan Factors:          Intra-op Monitoring Plan: standard ASA monitors  Intra-op Monitoring Plan Comments:   Post Op Pain Control Plan: IV/PO Opioids PRN, multimodal analgesia, peripheral nerve block and epidural analgesia  Post Op Pain Control Plan Comments:     Induction:   IV  Beta Blocker:  Patient is not currently on a Beta-Blocker (No further documentation required).       Informed Consent: Informed consent signed with the Patient and all parties understand the risks and agree with anesthesia plan.  All questions answered.  Anesthesia consent signed with patient.  ASA Score: 3     Day of Surgery Review of History & Physical:    H&P Update referred to the surgeon/provider.          Ready For Surgery From Anesthesia Perspective.           Physical Exam  General: Morbid Obesity    Airway:  Mallampati: III / II  Mouth Opening: Normal  TM Distance: Normal, at least 6 cm  Tongue: Normal  Neck ROM: Normal ROM    Dental:  In tact    Chest/Lungs:  Clear to auscultation, Normal Respiratory Rate    Heart:  Rate: Normal  Rhythm: Regular Rhythm  Sounds: Normal          Anesthesia Plan  Type of Anesthesia, risks & benefits discussed:    Anesthesia Type: general, epidural, regional, Gen ETT, MAC, Epidural, Spinal, CSE  Intra-op Monitoring Plan:  standard ASA monitors  Post Op Pain Control Plan: IV/PO Opioids PRN, multimodal analgesia, peripheral nerve block and epidural analgesia  Induction:  IV  Informed Consent: Informed consent signed with the Patient and all parties understand the risks and agree with anesthesia plan.  All questions answered.   ASA Score: 3  Day of Surgery Review of History & Physical: H&P Update referred to the surgeon/provider.    Ready For Surgery From Anesthesia Perspective.       .

## 2022-07-01 LAB
ALBUMIN SERPL BCP-MCNC: 2.7 G/DL (ref 3.5–5.2)
ALP SERPL-CCNC: 95 U/L (ref 55–135)
ALT SERPL W/O P-5'-P-CCNC: 11 U/L (ref 10–44)
ANION GAP SERPL CALC-SCNC: 12 MMOL/L (ref 8–16)
AST SERPL-CCNC: 20 U/L (ref 10–40)
BILIRUB SERPL-MCNC: 0.3 MG/DL (ref 0.1–1)
BUN SERPL-MCNC: 26 MG/DL (ref 8–23)
CALCIUM SERPL-MCNC: 8.8 MG/DL (ref 8.7–10.5)
CHLORIDE SERPL-SCNC: 106 MMOL/L (ref 95–110)
CO2 SERPL-SCNC: 18 MMOL/L (ref 23–29)
CREAT SERPL-MCNC: 1.3 MG/DL (ref 0.5–1.4)
EST. GFR  (AFRICAN AMERICAN): 44.8 ML/MIN/1.73 M^2
EST. GFR  (NON AFRICAN AMERICAN): 38.8 ML/MIN/1.73 M^2
GLUCOSE SERPL-MCNC: 236 MG/DL (ref 70–110)
POCT GLUCOSE: 163 MG/DL (ref 70–110)
POCT GLUCOSE: 215 MG/DL (ref 70–110)
POCT GLUCOSE: 238 MG/DL (ref 70–110)
POCT GLUCOSE: 238 MG/DL (ref 70–110)
POCT GLUCOSE: 246 MG/DL (ref 70–110)
POTASSIUM SERPL-SCNC: 4.8 MMOL/L (ref 3.5–5.1)
PROT SERPL-MCNC: 7.4 G/DL (ref 6–8.4)
SODIUM SERPL-SCNC: 136 MMOL/L (ref 136–145)

## 2022-07-01 PROCEDURE — 25000003 PHARM REV CODE 250: Performed by: SURGERY

## 2022-07-01 PROCEDURE — 97161 PT EVAL LOW COMPLEX 20 MIN: CPT

## 2022-07-01 PROCEDURE — 25000003 PHARM REV CODE 250: Performed by: STUDENT IN AN ORGANIZED HEALTH CARE EDUCATION/TRAINING PROGRAM

## 2022-07-01 PROCEDURE — 80053 COMPREHEN METABOLIC PANEL: CPT | Performed by: STUDENT IN AN ORGANIZED HEALTH CARE EDUCATION/TRAINING PROGRAM

## 2022-07-01 PROCEDURE — 99900035 HC TECH TIME PER 15 MIN (STAT)

## 2022-07-01 PROCEDURE — 97116 GAIT TRAINING THERAPY: CPT

## 2022-07-01 PROCEDURE — 97535 SELF CARE MNGMENT TRAINING: CPT

## 2022-07-01 PROCEDURE — 94799 UNLISTED PULMONARY SVC/PX: CPT

## 2022-07-01 PROCEDURE — 94660 CPAP INITIATION&MGMT: CPT

## 2022-07-01 PROCEDURE — C9399 UNCLASSIFIED DRUGS OR BIOLOG: HCPCS | Performed by: ANESTHESIOLOGY

## 2022-07-01 PROCEDURE — 27000221 HC OXYGEN, UP TO 24 HOURS

## 2022-07-01 PROCEDURE — 97165 OT EVAL LOW COMPLEX 30 MIN: CPT

## 2022-07-01 PROCEDURE — 63600175 PHARM REV CODE 636 W HCPCS: Performed by: STUDENT IN AN ORGANIZED HEALTH CARE EDUCATION/TRAINING PROGRAM

## 2022-07-01 PROCEDURE — 25000003 PHARM REV CODE 250: Performed by: ANESTHESIOLOGY

## 2022-07-01 PROCEDURE — 99231 SBSQ HOSP IP/OBS SF/LOW 25: CPT | Mod: GC,,, | Performed by: ANESTHESIOLOGY

## 2022-07-01 PROCEDURE — 11000001 HC ACUTE MED/SURG PRIVATE ROOM

## 2022-07-01 PROCEDURE — 97530 THERAPEUTIC ACTIVITIES: CPT

## 2022-07-01 PROCEDURE — 36415 COLL VENOUS BLD VENIPUNCTURE: CPT | Performed by: STUDENT IN AN ORGANIZED HEALTH CARE EDUCATION/TRAINING PROGRAM

## 2022-07-01 PROCEDURE — 63600175 PHARM REV CODE 636 W HCPCS: Performed by: NURSE PRACTITIONER

## 2022-07-01 PROCEDURE — 25000242 PHARM REV CODE 250 ALT 637 W/ HCPCS: Performed by: STUDENT IN AN ORGANIZED HEALTH CARE EDUCATION/TRAINING PROGRAM

## 2022-07-01 PROCEDURE — 94761 N-INVAS EAR/PLS OXIMETRY MLT: CPT

## 2022-07-01 PROCEDURE — 99231 PR SUBSEQUENT HOSPITAL CARE,LEVL I: ICD-10-PCS | Mod: GC,,, | Performed by: ANESTHESIOLOGY

## 2022-07-01 PROCEDURE — 25000003 PHARM REV CODE 250: Performed by: NURSE PRACTITIONER

## 2022-07-01 RX ADMIN — INSULIN ASPART 4 UNITS: 100 INJECTION, SOLUTION INTRAVENOUS; SUBCUTANEOUS at 05:07

## 2022-07-01 RX ADMIN — ACETAMINOPHEN 1000 MG: 500 TABLET ORAL at 12:07

## 2022-07-01 RX ADMIN — AMLODIPINE BESYLATE 10 MG: 10 TABLET ORAL at 09:07

## 2022-07-01 RX ADMIN — MUPIROCIN 1 G: 20 OINTMENT TOPICAL at 09:07

## 2022-07-01 RX ADMIN — ATORVASTATIN CALCIUM 80 MG: 20 TABLET, FILM COATED ORAL at 11:07

## 2022-07-01 RX ADMIN — INSULIN ASPART 4 UNITS: 100 INJECTION, SOLUTION INTRAVENOUS; SUBCUTANEOUS at 09:07

## 2022-07-01 RX ADMIN — APIXABAN 2.5 MG: 2.5 TABLET, FILM COATED ORAL at 11:07

## 2022-07-01 RX ADMIN — INSULIN ASPART 4 UNITS: 100 INJECTION, SOLUTION INTRAVENOUS; SUBCUTANEOUS at 12:07

## 2022-07-01 RX ADMIN — TORSEMIDE 10 MG: 10 TABLET ORAL at 09:07

## 2022-07-01 RX ADMIN — FAMOTIDINE 20 MG: 20 TABLET ORAL at 11:07

## 2022-07-01 RX ADMIN — FLUTICASONE PROPIONATE 100 MCG: 50 SPRAY, METERED NASAL at 09:07

## 2022-07-01 RX ADMIN — INSULIN ASPART 1 UNITS: 100 INJECTION, SOLUTION INTRAVENOUS; SUBCUTANEOUS at 11:07

## 2022-07-01 RX ADMIN — ACETAMINOPHEN 1000 MG: 500 TABLET ORAL at 05:07

## 2022-07-01 RX ADMIN — POLYETHYLENE GLYCOL 3350 17 G: 17 POWDER, FOR SOLUTION ORAL at 09:07

## 2022-07-01 RX ADMIN — ACETAMINOPHEN 1000 MG: 500 TABLET ORAL at 11:07

## 2022-07-01 RX ADMIN — SENNOSIDES AND DOCUSATE SODIUM 1 TABLET: 50; 8.6 TABLET ORAL at 09:07

## 2022-07-01 RX ADMIN — METHOCARBAMOL 750 MG: 750 TABLET ORAL at 11:07

## 2022-07-01 RX ADMIN — APIXABAN 2.5 MG: 2.5 TABLET, FILM COATED ORAL at 09:07

## 2022-07-01 RX ADMIN — SENNOSIDES AND DOCUSATE SODIUM 1 TABLET: 50; 8.6 TABLET ORAL at 11:07

## 2022-07-01 RX ADMIN — ACETAMINOPHEN 1000 MG: 500 TABLET ORAL at 06:07

## 2022-07-01 RX ADMIN — DEXTROSE 2 G: 50 INJECTION, SOLUTION INTRAVENOUS at 06:07

## 2022-07-01 RX ADMIN — METHOCARBAMOL 750 MG: 750 TABLET ORAL at 09:07

## 2022-07-01 RX ADMIN — INSULIN DETEMIR 5 UNITS: 100 INJECTION, SOLUTION SUBCUTANEOUS at 11:07

## 2022-07-01 RX ADMIN — PREGABALIN 75 MG: 75 CAPSULE ORAL at 11:07

## 2022-07-01 NOTE — PLAN OF CARE
[facility] - Surgery  Initial Discharge Assessment       Primary Care Provider: Tami Schmidt MD    Admission Diagnosis: Infection of total right knee replacement, sequela [T84.53XS]  Prosthetic joint infection [T84.50XA]    Admission Date: 6/30/2022  Expected Discharge Date: 7/2/2022         Payor: MEDICARE / Plan: MEDICARE PART A & B / Product Type: Government /     Extended Emergency Contact Information  Primary Emergency Contact: Leah Mcgovern  Home Phone: [phone]  Relation: Relative    Discharge Plan A: Home with family, Home Health  Discharge Plan B: Home with family, Home Health      [facility] - [place], [place] - [address]  [place] [address]  Phone: [phone] Fax: [phone]    [facility] - [place] - [address]  [place] [address]  Phone: [phone] Fax: [phone]    [facility] - [place] - [address]  [place] [address]  Phone: [phone] Fax: [phone]    [facility]  [address] [place] [address]  Phone: [phone] Fax: [phone]      Initial Assessment (most recent)     Adult Discharge Assessment - 07/01/22 1000        Discharge Assessment    Assessment Type Discharge Planning Assessment     Confirmed/corrected address, phone number and insurance Yes     Confirmed Demographics Correct on Facesheet     Source of Information patient     Does patient/caregiver understand observation status Yes     Communicated JENNIFER with patient/caregiver Yes     Lives With other (see comments)     Facility [facility]     Do you expect to return to your current living situation? Yes     Do you have help at home or someone to help you manage your care at home? Yes     Who are your caregiver(s) and their phone number(s)? Lanny Payan () [phone]   "   Prior to hospitilization cognitive status: Alert/Oriented     Current cognitive status: Alert/Oriented     Walking or Climbing Stairs Difficulty ambulation difficulty, requires equipment     Dressing/Bathing Difficulty none     Home Layout Able to live on 1st floor     Equipment Currently Used at Home cane, straight;walker, rolling     Readmission within 30 days? No     Patient currently being followed by outpatient case management? No     Do you currently have service(s) that help you manage your care at home? No     Do you take prescription medications? Yes     Do you have prescription coverage? Yes     Do you have any problems affording any of your prescribed medications? No     Is the patient taking medications as prescribed? yes     Who is going to help you get home at discharge? HH Services     How do you get to doctors appointments? family or friend will provide     Are you on dialysis? No     Do you take coumadin? No     Discharge Plan A Home with family;Home Health     Discharge Plan B Home with family;Home Health               Spoke with patient at bedside to complete discharge planning assessment. Patient is a sister with [facility] and lives in the "[facility]" with the other sisters. Building is 3 stories and sister's room is located on the second floor with elevator access. Patient has a rolling walker and bedside commode. She reports her , Lanny Payan Is her Healthcare power of  and can make medical decisions on her behalf if needed. PT/OT tayler complete and recommending HH at discharge. SW to send referral to [facility]. Will continue to follow for needs.         "

## 2022-07-01 NOTE — PLAN OF CARE
Eval and POC in place, R LE WBAT, knee revision  Problem: Occupational Therapy  Goal: Occupational Therapy Goal  Description: Goals to be met by: 08/01/22    Patient will increase functional independence with ADLs by performing:    LE Dressing with Set-up Assistance.  Grooming while standing at sink with Set-up Assistance.  Toileting from toilet with Contact Guard Assistance for hygiene and clothing management.   Toilet transfer to toilet with Stand-by Assistance RW.  Functional mobility with RW supervision in room and blankenship with good safety.    Outcome: Ongoing, Progressing

## 2022-07-01 NOTE — PT/OT/SLP EVAL
"Occupational Therapy  Co Evaluation    Name: Duran Giordano  MRN: [id number]  Admitting Diagnosis:  Infection of prosthetic right knee joint  Recent Surgery: Procedure(s) (LRB):  REVISION, ARTHROPLASTY, KNEE-NAKIA- stage 2 (Right) 1 Day Post-Op    Recommendations:     Discharge Recommendations: home health OT  Discharge Equipment Recommendations:  none  Barriers to discharge:  None  Co-evaluation/treatment performed due to patient's multiple deficits requiring two skilled therapists to appropriately and safely assess patient's strength and endurance while facilitating functional tasks in addition to accommodating for patient's activity tolerance.     Assessment:     Duran Giordano is a 82 y.o. female with a medical diagnosis of Infection of prosthetic right knee joint.  She presents with good interaction through out session. Performance deficits affecting function: weakness, impaired functional mobilty, impaired cardiopulmonary response to activity, impaired endurance, gait instability, pain, impaired self care skills, impaired balance, orthopedic precautions.  Patient highly motivate with recovery. She complete supine to sit with min (A) to move RLE, EOB sit with no balance deficits. BLE edema. Sit to stand with CGA prolong time. Functional mobility with RW CGA in room and blankenship. She was max (A) to don socks.     Rehab Prognosis: Good; patient would benefit from acute skilled OT services to address these deficits and reach maximum level of function.       Plan:     Patient to be seen daily to address the above listed problems via self-care/home management, therapeutic activities, therapeutic exercises  · Plan of Care Expires: 08/01/22  · Plan of Care Reviewed with: patient    Subjective     Chief Complaint: "I have not walked in so long this feels good."   Patient/Family Comments/goals: return to home with Gnosticism    Occupational Profile:  Living Environment: lives in [facility] home on second floor, elevator " access. She has tub/shower combo. She reports using wc for last nine months with stand pivot transfers.   Equipment Used at Home:  walker, rolling, wheelchair, cane, straight, bath bench, bedside commode, rollator  Assistance upon Discharge: staff/Sisters    Pain/Comfort:  · Pain Rating 1: 3/10  · Location - Side 1: Right  · Location 1: knee  · Pain Rating Post-Intervention 1: 3/10    Patients cultural, spiritual, Zoroastrianism conflicts given the current situation: no    Objective:     Communicated with: RN prior to session.  Patient found sitting edge of bed with wound vac, PureWick, perineural catheter, peripheral IV, telemetry, oxygen, cryotherapy upon OT entry to room.    General Precautions: Standard, fall   Orthopedic Precautions:RLE weight bearing as tolerated   Braces: N/A  Respiratory Status: Nasal cannula, flow 2 L/min for comfort, room air with mobility 96% saturation    Occupational Performance:    Bed Mobility:    · Patient completed Supine to Sit with minimum assistance    Functional Mobility/Transfers:  · Patient completed Sit <> Stand Transfer with contact guard assistance  with  rolling walker   · Patient completed Bed <> Chair Transfer using Step Transfer technique with contact guard assistance with rolling walker  · Functional Mobility: in room and blankenship with RW CGA    Activities of Daily Living:  · Feeding:  independence    · Grooming: supervision set up at EOB  · Upper Body Dressing: stand by assistance with gown at EOB  · Lower Body Dressing: maximal assistance    · Toileting: maximal assistance suzan, provided a BSC    Cognitive/Visual Perceptual:  Cognitive/Psychosocial Skills:     -       Oriented to: Person, Place, Time and Situation   -       Follows Commands/attention:Follows multistep  commands  -       Memory: No Deficits noted  -       Safety awareness/insight to disability: intact     Physical Exam:  Upper Extremity Range of Motion:     -       Right Upper Extremity: WNL  -       Left  Upper Extremity: WNL  Upper Extremity Strength:    -       Right Upper Extremity: WFL  -       Left Upper Extremity: WFL    AMPAC 6 Click ADL:  AMPAC Total Score: 16    Treatment & Education:   Pt educated on role of OT, POC, and goals for therapy.     POC was dicussed with patient/caregiver, who was included in its development and is in agreement with the identified goals and treatment plan.    Patient and family aware of patient's deficits and therapy progression.    Time provided for therapeutic counseling and discussion of health disposition.    Educated on importance of EOB/OOB mobility, maintaining routine, sitting up in chair, and maximizing independence with ADLs during admission    Pt completed ADLs and functional mobility for treatment session as noted above    Pt/caregiver verbalized understanding and expressed no further concerns/questions.   Updated communication board with level of assist required CGA RW & educated RN/patient that pt is appropriate for Toilet or BSC with RN/PCT.     Education:    Patient left up in chair with all lines intact, call button in reach, RN notified and cryo and FCD    GOALS:   Multidisciplinary Problems     Occupational Therapy Goals        Problem: Occupational Therapy    Goal Priority Disciplines Outcome Interventions   Occupational Therapy Goal     OT, PT/OT Ongoing, Progressing    Description: Goals to be met by: 08/01/22    Patient will increase functional independence with ADLs by performing:    LE Dressing with Set-up Assistance.  Grooming while standing at sink with Set-up Assistance.  Toileting from toilet with Contact Guard Assistance for hygiene and clothing management.   Toilet transfer to toilet with Stand-by Assistance RW.  Functional mobility with RW supervision in room and blankenship with good safety.                     History:     Past Medical History:   Diagnosis Date    Adrenal mass- adenoma stable since 2004 (1.8 cm and 8/13/12 (2 cm); stable 2016  2.4 cm     Adrenal mass- adenoma stable since 2004 (1.8 cm and 8/13/12 (2 cm); stable 2016 2.4 cm    Arthritis     Asthma in adult without complication 6/25/2015    Bilateral carotid artery disease 7/21/2017    Bilateral sciatica 2/7/2017    Cellulitis of right leg 08/16/2017    Cerebral infarction 1/29/2016    Multiple areas of lacunar infarction. Stroke risk factors include HTN, DM2, Dyslipidemia.  Continue ASA 81mg/ Statin therapy I have encouraged 30 minutes of physical activity daily for 5 days a week. She has access to a pool so this should not be so jarring to her joints.     Cervical radiculopathy 3/18/2015    Chronic knee pain     Chronic rhinitis 4/9/2013    CKD (chronic kidney disease) stage 3, GFR 30-59 ml/min 1/29/2013    Coronary artery disease due to calcified coronary lesion 6/25/2015    Deep vein thrombosis     Diastolic dysfunction 10/10/2013    Essential hypertension 6/25/2015    Gastroesophageal reflux disease without esophagitis 8/13/2012    Gout     Hives 10/1/2013    Mixed hyperlipidemia 8/13/2012    Morbid obesity with BMI of 50.0-59.9, adult 2/11/2014    Obstructive sleep apnea syndrome 8/13/2012    Senile cataracts of both eyes 1/22/2015    Traumatic open wound of right lower leg 8/25/2017    Trigeminal neuralgia of right side of face 5/23/2017    For years now Previously on Gabapentin, but caused constipation Dissipating over time Not related to intracranial abnormalities Possibly related to dental procedure    Type 2 diabetes mellitus with diabetic polyneuropathy, with long-term current use of insulin 1/29/2013    Venous stasis dermatitis of both lower extremities 8/21/2017    Vitamin D deficiency disease 8/13/2012       Past Surgical History:   Procedure Laterality Date    HYSTERECTOMY  1982    secondary uterine fibroids    INJECTION OF ANESTHETIC AGENT AROUND NERVE Right 10/21/2021    Procedure: BLOCK, NERVE GENICULAR RIGHT NEEDS CONSENT;  Surgeon: Senia BARRERA  MD Finesse;  Location: [facility] PAIN [facility]GT;  Service: Pain Management;  Laterality: Right;    INSERTION OF ANTIBIOTIC SPACER Right 10/28/2021    Procedure: INSERTION, ANTIBIOTIC SPACER;  Surgeon: Alfredo Lozoya MD;  Location: [address];  Service: Orthopedics;  Laterality: Right;    JOINT REPLACEMENT      REVISION OF KNEE ARTHROPLASTY Right 10/28/2021    Procedure: REVISION, ARTHROPLASTY, KNEE-DEPUY ANTIBIOTIC SPACER;  Surgeon: Alfredo Lozoya MD;  Location: [address];  Service: Orthopedics;  Laterality: Right;    REVISION OF KNEE ARTHROPLASTY Right 6/30/2022    Procedure: REVISION, ARTHROPLASTY, KNEE-NAKIA- stage 2;  Surgeon: Alfredo Lozoya MD;  Location: [address];  Service: Orthopedics;  Laterality: Right;    Right total knee replacement         Time Tracking:     OT Date of Treatment: 07/01/22  OT Start Time: 0843  OT Stop Time: 0907  OT Total Time (min): 24 min    Billable Minutes:Evaluation 5  Self Care/Home Management 19    7/1/2022

## 2022-07-01 NOTE — PT/OT/SLP EVAL
Physical Therapy  Co-Evaluation with OT and Treatment     Patient Name:  Duran Giordano  MRN: [id number]    Admit Date: 6/30/2022  Admitting Diagnosis:  Infection of prosthetic right knee joint  Length of Stay: 1 days  Recent Surgery: Procedure(s) (LRB):  REVISION, ARTHROPLASTY, KNEE-NAKIA- stage 2 (Right) 1 Day Post-Op    Recommendations:     Discharge Recommendations: Home Health PT  Equipment recommendations: none  Barriers to discharge: None Identified     Assessment:     Duran Giordano is a 82 y.o. [place] [facility] with medical diagnosis of Infection of prosthetic right knee joint. Pt presents with weakness, impaired endurance, impaired self care skills, impaired functional mobilty, gait instability, impaired balance, pain, orthopedic precautions, edema. These deficits effect their roles and responsibilities in which they were able to complete prior to admit. PTA, pt states she was primarily w/c bound due to L knee pain (past 9 months). She was able to perform stand-pivot t/f (I). Pt did well during session, ambulating 50 ft CGA with RW. Duran Giordano would benefit from acute PT intervention to improve quality of life, focus on recovery of impairments, provide patient/caregiver education, reduce fall risk, and maximize (I) and safety with functional mobility. Once medically stable, recommending pt discharge to Home Health PT.      Rehab Prognosis: Good    Plan:     During this hospitalization, patient to be seen daily to address the identified rehab impairments via gait training, therapeutic activities, therapeutic exercises, neuromuscular re-education and progress towards stated goals.     Plan of Care Expires:  07/31/22  Plan of Care reviewed with: patient    This plan of care has been discussed with the patient/caregiver, who was included in its development and is in agreement with the identified goals and treatment plan.     Subjective     Communicated with RN prior to session.   "Patient found supine upon PT entry to room, agreeable to evaluation. Pt alone during session.    Chief Complaint: none stated      Patient/Family Comments/goals: "I have to get going because I have to get back to work"    Pain/Comfort:  · Pain Rating 1: 0/10  · Location - Side 1: Right  · Location 1: knee  · Pain Addressed 1: Reposition, Distraction, Cessation of Activity  · Pain Rating Post-Intervention 1:  (pt does not rate but endorses pain)    Patients cultural, spiritual, Shinto conflicts given the current situation: None identified     Patient History: information obtained from pt     Living Environment: Pt lives with [facility] in 3 story home  with elevator access (she lives on 2nd floor). Bathroom set-up: tub/shower combo currently but states she may be switching rooms and will have access to a WIS  Prior Level of Function: w/c bound for past 9 months due to pain; (I) with stand pivot t/f  DME owned: rolling walker, single point cane, bedside commode, transfer tub bench and wheelchair  Support Available/Caregiver Assistance: Sisters can assist; states there is a home health agency that can also provide assistance    Objective:   OT present for partial coeval due to pt's multiple medical comorbidities and functional/cognition deficits requiring two skilled therapists to appropriately progress pt's musculoskeletal strength, neuromuscular control, and endurance while taking into consideration medical acuity and pt safety.     Patient found with: wound vac, perineural catheter, peripheral IV, PureWick, telemetry, oxygen    Recent Surgery: Procedure(s) (LRB):  REVISION, ARTHROPLASTY, KNEE-NAKIA- stage 2 (Right) 1 Day Post-Op  General Precautions: Standard, fall   Orthopedic Precautions:RLE weight bearing as tolerated   Braces: N/A   Oxygen Device: nasal cannula      Exams:     Cognition:  · Alert  · Command following: Follows multistep verbal " commands  · Communication: clear/fluent     Sensation:   o Light touch sensation: Intact BLEs     Gross Motor Coordination: No deficits noted during functional mobility tasks      Edema/Skin Integrity: Severe (chronic) peter LE; Visible skin intact     Postural examination/scapula alignment: Rounded shoulder and Head forward     Lower Extremity Range of Motion:  o Right Lower Extremity: WFL  o Left Lower Extremity: WFL     Lower Extremity Strength:  o Right Lower Extremity: grossly 4/5  o Left Lower Extremity: grossly 4/5    Functional Mobility:    Bed Mobility:  · Supine > Sit with minimum assistance   · Assistance with trunk    Transfers:   · Sit <> Stand Transfer: Contact Guard Assistance x 1 trials from eob with RW AD              Gait:  · Distance: 50 ft  · Assistance level: Contact Guard Assistance  · Assistive Device: rolling walker  · Gait Assessment: decreased step length , decreased gait speed and antalgic gait pattern; step to pattern     Balance:  · Dynamic Sitting: FAIR+: Maintains balance through MINIMAL excursions of active trunk motion  · Standing:  · Static: FAIR+: Takes MINIMAL challenges from all directions   · Dynamic: FAIR: Needs CONTACT GUARD during gait    Outcome Measure: AM-PAC 6 CLICK MOBILITY  Total Score:17     Patient/Caregiver Education:     Therapist educated pt/caregiver regarding:    PT POC and goals for therapy    Safety with mobility and fall risk    Instruction on use of call button and importance of calling nursing staff for assistance with mobility    Time provided for therapeutic counseling and discussion of current health disposition. All questions answered to satisfaction, within scope of PT practice     Patient/caregiver able to verbalize understanding and expressed no further questions this visit; will follow-up with pt/caregiver during current admit for additional questions/concerns within scope of practice.     White board updated.     Patient left up in chair  with all lines intact, call button in reach, nsg notified and no FCD donned as no device in room.    GOALS:   Multidisciplinary Problems     Physical Therapy Goals        Problem: Physical Therapy    Goal Priority Disciplines Outcome Goal Variances Interventions   Physical Therapy Goal     PT, PT/OT Ongoing, Progressing     Description: Goals to be met by: 7/15/22     Patient will increase functional independence with mobility by performin. Supine to sit with Stand-by Assistance  2. Sit to stand transfer with Stand-by Assistance  3. Bed to chair transfer with Stand-by Assistance using LRAD  4. Gait  x 150 feet with Stand-by Assistance using Rolling Walker.   5. Ascend/Descend 6 inch curb step with Contact Guard Assistance using Rolling Walker.  6. Lower extremity exercise program x30 reps per handout, with independence                       History:     Past Medical History:   Diagnosis Date    Adrenal mass- adenoma stable since  (1.8 cm and 12 (2 cm); stable 2016 2.4 cm     Adrenal mass- adenoma stable since  (1.8 cm and 12 (2 cm); stable  2.4 cm    Arthritis     Asthma in adult without complication 2015    Bilateral carotid artery disease 2017    Bilateral sciatica 2017    Cellulitis of right leg 2017    Cerebral infarction 2016    Multiple areas of lacunar infarction. Stroke risk factors include HTN, DM2, Dyslipidemia.  Continue ASA 81mg/ Statin therapy I have encouraged 30 minutes of physical activity daily for 5 days a week. She has access to a pool so this should not be so jarring to her joints.     Cervical radiculopathy 3/18/2015    Chronic knee pain     Chronic rhinitis 2013    CKD (chronic kidney disease) stage 3, GFR 30-59 ml/min 2013    Coronary artery disease due to calcified coronary lesion 2015    Deep vein thrombosis     Diastolic dysfunction 10/10/2013    Essential hypertension 2015    Gastroesophageal reflux  disease without esophagitis 8/13/2012    Gout     Hives 10/1/2013    Mixed hyperlipidemia 8/13/2012    Morbid obesity with BMI of 50.0-59.9, adult 2/11/2014    Obstructive sleep apnea syndrome 8/13/2012    Senile cataracts of both eyes 1/22/2015    Traumatic open wound of right lower leg 8/25/2017    Trigeminal neuralgia of right side of face 5/23/2017    For years now Previously on Gabapentin, but caused constipation Dissipating over time Not related to intracranial abnormalities Possibly related to dental procedure    Type 2 diabetes mellitus with diabetic polyneuropathy, with long-term current use of insulin 1/29/2013    Venous stasis dermatitis of both lower extremities 8/21/2017    Vitamin D deficiency disease 8/13/2012       Past Surgical History:   Procedure Laterality Date    HYSTERECTOMY  1982    secondary uterine fibroids    INJECTION OF ANESTHETIC AGENT AROUND NERVE Right 10/21/2021    Procedure: BLOCK, NERVE GENICULAR RIGHT NEEDS CONSENT;  Surgeon: Senia Kilpatrick MD;  Location: [facility] PAIN [facility];  Service: Pain Management;  Laterality: Right;    INSERTION OF ANTIBIOTIC SPACER Right 10/28/2021    Procedure: INSERTION, ANTIBIOTIC SPACER;  Surgeon: Alfredo Lozoya MD;  Location: [address];  Service: Orthopedics;  Laterality: Right;    JOINT REPLACEMENT      REVISION OF KNEE ARTHROPLASTY Right 10/28/2021    Procedure: REVISION, ARTHROPLASTY, KNEE-DEPUY ANTIBIOTIC SPACER;  Surgeon: Alfredo Lozoya MD;  Location: [address];  Service: Orthopedics;  Laterality: Right;    REVISION OF KNEE ARTHROPLASTY Right 6/30/2022    Procedure: REVISION, ARTHROPLASTY, KNEE-NAKIA- stage 2;  Surgeon: Alfredo Lozoya MD;  Location: [address];  Service: Orthopedics;  Laterality: Right;    Right total knee replacement         Time Tracking:     PT Received On: 07/01/22  PT Start Time: 0823     PT Stop Time: 0907  PT Total Time (min): 44 min     Billable Minutes: Evaluation 8, Gait  Training 13 and Therapeutic Activity 23    07/01/2022

## 2022-07-01 NOTE — PLAN OF CARE
[facility] - [facility]    HOME HEALTH ORDERS  FACE TO FACE ENCOUNTER    Patient Name: Duran Giordano  YOB: 1940    PCP: Tami Schmidt MD   PCP Address: [address] / [place] [address]  PCP Phone Number: [phone]  PCP Fax: [phone]    Encounter Date: 07/01/2022    Admit to Home Health    Diagnoses:  Active Hospital Problems    Diagnosis  POA    *Infection of prosthetic right knee joint [T84.53XA]  Not Applicable      Resolved Hospital Problems   No resolved problems to display.       Future Appointments   Date Time Provider Department Center   7/1/2022  4:00 PM TELEMED NURSE, [facility]   7/15/2022  2:00 PM Nahomy Rockwell NP [facility]           I have seen and examined this patient face to face today. My clinical findings that support the need for the home health skilled services and home bound status are the following:  Weakness/numbness causing balance and gait disturbance due to Joint Replacement making it taxing to leave home.    Allergies:  Review of patient's allergies indicates:   Allergen Reactions    Bactrim [sulfamethoxazole-trimethoprim]      Other reaction(s): up set stomach rash/hives    Azithromycin      Feels bad    Erythromycin Other (See Comments)     Other reaction(s): Unknown  Other reaction(s): Stomach upset    Gentamicin      Vaginal burning , itching     Levofloxacin Hives     Other reaction(s): nervousness    Shellfish containing products      Rash      Vancomycin Itching     Other reaction(s): Itching       Diet: diabetic diet: 2000 calorie    Activities: WBAT RLE with walker    Nursing:   SN to complete comprehensive assessment including routine vital signs. Instruct on disease process and s/s of complications to report to MD. Follow specific home health arthoplasty protocol. Review/verify medication list sent home with the patient at time of discharge  and instruct patient/caregiver as needed. If coumadin  ordered, coumadin clinic to manage INR with INR draws 2x per week with a goal to maintain INR between 1.8 and 2.2. Frequency may be adjusted depending on start of care date.    Notify MD if SBP > 160 or < 90; DBP > 90 or < 50; HR > 120 or < 50; Temp > 101    Home Medical Equipment:  Walker, 3-1 bedside commode, transfer tub bench    CONSULTS:    Physical Therapy to evaluate and treat. Evaluate for home safety and equipment needs; Establish/upgrade home exercise program. Perform / instruct on therapeutic exercises, gait training, transfer training, and Range of Motion.    OTHER:  Occupational Therapy to evaluate and treat. Evaluate home environment for safety and equipment needs. Perform/Instruct on transfers, ADL training, ROM, and therapeutic exercises.   to evaluate for community resources/long-range planning.  Aide to provide assistance with personal care, ADLs, and vital signs.    MISCELLANEOUS CARE:  Routine Skin for Bedridden Patients: Instruct patient/caregiver to apply moisture barrier cream to all skin folds and wet areas in perineal area daily and after baths and all bowel movements.    WOUND CARE ORDERS  Please call Roberto Bagley at [phone] with [employer]Pre[employer]ia if incisional vac is malfunctioning or with questions about vac.    Medications: Review discharge medications with patient and family and provide education.      Current Discharge Medication List      START taking these medications    Details   apixaban (ELIQUIS) 5 mg Tab Take 1 tablet (5 mg total) by mouth 2 (two) times daily.  Qty: 60 tablet, Refills: 2    Comments: Bedside delivery      cefadroxil (DURICEF) 500 MG Cap Take 1 capsule (500 mg total) by mouth every 12 (twelve) hours.  Qty: 56 capsule, Refills: 0    Comments: Bedside delivery      docusate sodium (COLACE) 100 MG capsule Take 1 capsule (100 mg total) by mouth 2 (two) times daily.  Qty: 60 capsule, Refills: 0    Comments: Bedside delivery      doxycycline (MONODOX) 100 MG  capsule Take 1 capsule (100 mg total) by mouth 2 (two) times daily.  Qty: 56 capsule, Refills: 0    Comments: Bedside delivery      gabapentin (NEURONTIN) 100 MG capsule Take 1 capsule (100 mg total) by mouth 3 (three) times daily as needed (pain not relieved by scheduled meds).  Qty: 45 capsule, Refills: 0    Comments: Bedside delivery      methocarbamoL (ROBAXIN) 750 MG Tab Take 1 tablet (750 mg total) by mouth 3 (three) times daily as needed (spasms). Bedside delivery  Qty: 42 tablet, Refills: 0    Comments: Bedside delivery      oxyCODONE (ROXICODONE) 5 MG immediate release tablet Take 1 tablet (5 mg total) by mouth every 6 (six) hours as needed for Pain. May take 1-2 tablets every 6 hours as needed for pain  Qty: 42 tablet, Refills: 0    Comments: Bedside delivery.  Quantity prescribed more than 7 day supply? No         CONTINUE these medications which have CHANGED    Details   acetaminophen (TYLENOL) 500 MG tablet Take 2 tablets (1,000 mg total) by mouth every 8 (eight) hours. Bedside delivery  Qty: 120 tablet, Refills: 0    Comments: Bedside delivery         CONTINUE these medications which have NOT CHANGED    Details   albuterol (PROVENTIL/VENTOLIN HFA) 90 mcg/actuation inhaler Inhale 2 puffs into the lungs every 4 (four) hours as needed for Wheezing or Shortness of Breath. Rescue  Qty: 54 g, Refills: 3    Associated Diagnoses: Moderate persistent asthma without complication      amLODIPine (NORVASC) 10 MG tablet Take 1 tablet (10 mg total) by mouth once daily.  Qty: 30 tablet, Refills: 11    Comments: .      atorvastatin (LIPITOR) 80 MG tablet Take 1 tablet (80 mg total) by mouth once daily.  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  Qty: 90 capsule, Refills: 3    Associated Diagnoses: Vitamin D deficiency  "disease      fluticasone (FLONASE) 50 mcg/actuation nasal spray 2 sprays (100 mcg total) by Each Nare route once daily.  Qty: 1 Bottle, Refills: 3    Associated Diagnoses: Uncomplicated asthma      insulin degludec (TRESIBA FLEXTOUCH U-100) 100 unit/mL (3 mL) insulin pen Inject 16 units at night.  Qty: 3 pen, Refills: 3      senna-docusate 8.6-50 mg (PERICOLACE) 8.6-50 mg per tablet Take 1 tablet by mouth 2 (two) times a day.      torsemide (DEMADEX) 10 MG Tab Take 1 tablet (10 mg total) by mouth once daily.  Qty: 30 tablet, Refills: 11      albuterol-ipratropium (DUO-NEB) 2.5 mg-0.5 mg/3 mL nebulizer solution USE 1 VIAL PER NEBULIZER EVERY 6 HOURS AS NEEDED FOR WHEEZING/RESCUE  Qty: 90 mL, Refills: 11    Associated Diagnoses: Uncomplicated asthma, unspecified asthma severity, unspecified whether persistent      blood sugar diagnostic Strp BG monitoring 4 times a day. Pt needs test strips for [employer] Talking meter.  Qty: 450 strip, Refills: prn    Comments: 90 day supply  Associated Diagnoses: Type II or unspecified type diabetes mellitus with other specified manifestations, uncontrolled      diclofenac sodium (VOLTAREN) 1 % Gel APPLY 2 GRAMS TOPICALLY DAILY  Qty: 100 g, Refills: 0    Associated Diagnoses: Vitamin D deficiency disease      fluticasone-salmeterol diskus inhaler 500-50 mcg INHALE 1 PUFF TWICE DAILY  Qty: 60 each, Refills: 11    Comments: $  Associated Diagnoses: Moderate persistent reactive airway disease without complication; Chronic obstructive pulmonary disease, unspecified COPD type      lancets Misc Test daily  Qty: 100 each, Refills: 12    Associated Diagnoses: Type II or unspecified type diabetes mellitus with renal manifestations, uncontrolled(250.42)      mupirocin (BACTROBAN) 2 % ointment by Nasal route 2 (two) times daily.      nebulizer and compressor (COMP-AIR ELITE COMP NEB SYSTEM) Berna use as directed  Qty: 1 each, Refills: 0      pen needle, diabetic (PEN NEEDLE) 29 gauge x 1/2" Ndle " Use 1 x a day.  Qty: 100 each, Refills: 3      polyethylene glycol (GLYCOLAX) 17 gram PwPk Take 17 g by mouth once daily.  Refills: 0      semaglutide (OZEMPIC) 0.25 mg or 0.5 mg(2 mg/1.5 mL) pen injector Inject 0.5 mg weekly  Qty: 3 pen, Refills: 3      trolamine salicylate (ASPERCREME) 10 % cream Apply topically as needed.             I certify that this patient is confined to her home and needs physical therapy.

## 2022-07-01 NOTE — SUBJECTIVE & OBJECTIVE
Principal Problem:Infection of prosthetic right knee joint    Principal Orthopedic Problem: same    Interval History: Patient examined at the bedside. NAEON. Pain controlled. Tolerating PO w/out N/V and voiding appropriately.   Wasn't able to work w PT yesterday 2/2 drowsiness from OR. Hypertensive to 160s/70s this AM and asymptomatic - will administer home amlodipine earlier than scheduled and continue to monitor. Vac w/ minimal drainage and holding suction. Intraop Cxs NGTD.     Review of patient's allergies indicates:   Allergen Reactions    Bactrim [sulfamethoxazole-trimethoprim]      Other reaction(s): up set stomach rash/hives    Azithromycin      Feels bad    Erythromycin Other (See Comments)     Other reaction(s): Unknown  Other reaction(s): Stomach upset    Gentamicin      Vaginal burning , itching     Levofloxacin Hives     Other reaction(s): nervousness    Shellfish containing products      Rash      Vancomycin Itching     Other reaction(s): Itching       Current Facility-Administered Medications   Medication    0.9%  NaCl infusion    acetaminophen tablet 1,000 mg    acetaminophen tablet 1,000 mg    albuterol inhaler 2 puff    albuterol-ipratropium 2.5 mg-0.5 mg/3 mL nebulizer solution 3 mL    amLODIPine tablet 10 mg    apixaban tablet 2.5 mg    aspirin EC tablet 81 mg    atorvastatin tablet 80 mg    bisacodyL suppository 10 mg    ceFAZolin 2 gram in dextrose 5% 100 mL IVPB (premix)    dextrose 10% bolus 125 mL    dextrose 10% bolus 250 mL    famotidine tablet 20 mg    fluticasone furoate-vilanteroL 200-25 mcg/dose diskus inhaler 1 puff    fluticasone propionate 50 mcg/actuation nasal spray 100 mcg    glucagon (human recombinant) injection 1 mg    glucose chewable tablet 16 g    glucose chewable tablet 24 g    insulin aspart U-100 pen 1-10 Units    methocarbamoL tablet 750 mg    morphine injection 2 mg    mupirocin 2 % ointment 1 g    naloxone 0.4 mg/mL injection 0.02 mg    ondansetron injection 4 mg     "ondansetron injection 4 mg    oxyCODONE immediate release tablet 5 mg    oxyCODONE immediate release tablet Tab 10 mg    polyethylene glycol packet 17 g    pregabalin capsule 75 mg    prochlorperazine injection Soln 5 mg    ROPIvacaine (PF) 2 mg/ml (0.2%) solution    senna-docusate 8.6-50 mg per tablet 1 tablet    sodium chloride 0.9% flush 10 mL    torsemide tablet 10 mg     Objective:     Vital Signs (Most Recent):  Temp: 97.5 °F (36.4 °C) (07/01/22 0416)  Pulse: 92 (07/01/22 0416)  Resp: 16 (07/01/22 0416)  BP: (!) 162/70 (07/01/22 0416)  SpO2: 96 % (07/01/22 0416)   Vital Signs (24h Range):  Temp:  [97.2 °F (36.2 °C)-97.9 °F (36.6 °C)] 97.5 °F (36.4 °C)  Pulse:  [67-97] 92  Resp:  [10-20] 16  SpO2:  [92 %-100 %] 96 %  BP: ()/(51-75) 162/70     Weight: 109.8 kg (242 lb)  Height: 4' 11" (149.9 cm)  Body mass index is 48.88 kg/m².      Intake/Output Summary (Last 24 hours) at 7/1/2022 0526  Last data filed at 6/30/2022 1521  Gross per 24 hour   Intake 2000 ml   Output --   Net 2000 ml       Ortho/SPM Exam  AAOx4  NAD  Reg rate  No increased WOB    RLE  Dressing c/d/I  Prevena in place holding suction  Polar ice in place  SILT T/SP/DP/Palma/Sa  Motor intact T/SP/DP  Palpable DP pulse  FCDs in place and functioning    Significant Labs: All pertinent labs within the past 24 hours have been reviewed.    Significant Imaging: I have reviewed all pertinent imaging results/findings.  "

## 2022-07-01 NOTE — PROGRESS NOTES
[facility]  Orthopedics  Progress Note    Patient Name: Duran Giordano  MRN: [id number]  Admission Date: 6/30/2022  Hospital Length of Stay: 1 days  Attending Provider: Alfredo Lozoya MD  Primary Care Provider: Tami Schmidt MD  Follow-up For: Procedure(s) (LRB):  REVISION, ARTHROPLASTY, KNEE-NAKIA- stage 2 (Right)    Post-Operative Day: 1 Day Post-Op  Subjective:     Principal Problem:Infection of prosthetic right knee joint    Principal Orthopedic Problem: same    Interval History: Patient examined at the bedside. NAEON. Pain controlled. Tolerating PO w/out N/V and voiding appropriately.   Wasn't able to work w PT yesterday 2/2 drowsiness from OR. Hypertensive to 160s/70s this AM and asymptomatic - will administer home amlodipine earlier than scheduled and continue to monitor. Vac w/ minimal drainage and holding suction. Intraop Cxs NGTD.     Review of patient's allergies indicates:   Allergen Reactions    Bactrim [sulfamethoxazole-trimethoprim]      Other reaction(s): up set stomach rash/hives    Azithromycin      Feels bad    Erythromycin Other (See Comments)     Other reaction(s): Unknown  Other reaction(s): Stomach upset    Gentamicin      Vaginal burning , itching     Levofloxacin Hives     Other reaction(s): nervousness    Shellfish containing products      Rash      Vancomycin Itching     Other reaction(s): Itching       Current Facility-Administered Medications   Medication    0.9%  NaCl infusion    acetaminophen tablet 1,000 mg    acetaminophen tablet 1,000 mg    albuterol inhaler 2 puff    albuterol-ipratropium 2.5 mg-0.5 mg/3 mL nebulizer solution 3 mL    amLODIPine tablet 10 mg    apixaban tablet 2.5 mg    aspirin EC tablet 81 mg    atorvastatin tablet 80 mg    bisacodyL suppository 10 mg    ceFAZolin 2 gram in dextrose 5% 100 mL IVPB (premix)    dextrose 10% bolus 125 mL    dextrose 10% bolus 250 mL    famotidine tablet 20 mg    fluticasone  "furoate-vilanteroL 200-25 mcg/dose diskus inhaler 1 puff    fluticasone propionate 50 mcg/actuation nasal spray 100 mcg    glucagon (human recombinant) injection 1 mg    glucose chewable tablet 16 g    glucose chewable tablet 24 g    insulin aspart U-100 pen 1-10 Units    methocarbamoL tablet 750 mg    morphine injection 2 mg    mupirocin 2 % ointment 1 g    naloxone 0.4 mg/mL injection 0.02 mg    ondansetron injection 4 mg    ondansetron injection 4 mg    oxyCODONE immediate release tablet 5 mg    oxyCODONE immediate release tablet Tab 10 mg    polyethylene glycol packet 17 g    pregabalin capsule 75 mg    prochlorperazine injection Soln 5 mg    ROPIvacaine (PF) 2 mg/ml (0.2%) solution    senna-docusate 8.6-50 mg per tablet 1 tablet    sodium chloride 0.9% flush 10 mL    torsemide tablet 10 mg     Objective:     Vital Signs (Most Recent):  Temp: 97.5 °F (36.4 °C) (07/01/22 0416)  Pulse: 92 (07/01/22 0416)  Resp: 16 (07/01/22 0416)  BP: (!) 162/70 (07/01/22 0416)  SpO2: 96 % (07/01/22 0416)   Vital Signs (24h Range):  Temp:  [97.2 °F (36.2 °C)-97.9 °F (36.6 °C)] 97.5 °F (36.4 °C)  Pulse:  [67-97] 92  Resp:  [10-20] 16  SpO2:  [92 %-100 %] 96 %  BP: ()/(51-75) 162/70     Weight: 109.8 kg (242 lb)  Height: 4' 11" (149.9 cm)  Body mass index is 48.88 kg/m².      Intake/Output Summary (Last 24 hours) at 7/1/2022 0526  Last data filed at 6/30/2022 1521  Gross per 24 hour   Intake 2000 ml   Output --   Net 2000 ml       Ortho/SPM Exam  AAOx4  NAD  Reg rate  No increased WOB    RLE  Dressing c/d/I  Prevena in place holding suction  Polar ice in place  SILT T/SP/DP/Palma/Sa  Motor intact T/SP/DP  Palpable DP pulse  FCDs in place and functioning    Significant Labs: All pertinent labs within the past 24 hours have been reviewed.    Significant Imaging: I have reviewed all pertinent imaging results/findings.    Assessment/Plan:     * Infection of prosthetic right knee joint  82 y.o. female s/p Stage 2 R " TKA Revision for PJI on 6/30/22.    Pain control: multimodal per surgical home  PT/OT: WBAT RLE  DVT PPx: Eliquis 2.5mg BID, FCDs at all times when not ambulating  Abx: postop Ancef  Bulky dressing d/c this am  Intraop Cxs: NGTD    Dispo: f/u PT/OT          Rich Jaeger MD  Orthopedics  [facility] - Surgery

## 2022-07-01 NOTE — PLAN OF CARE
PT evaluation complete - see note for details. POC and goals established.    Problem: Physical Therapy  Goal: Physical Therapy Goal  Description: Goals to be met by: 7/15/22     Patient will increase functional independence with mobility by performin. Supine to sit with Stand-by Assistance  2. Sit to stand transfer with Stand-by Assistance  3. Bed to chair transfer with Stand-by Assistance using LRAD  4. Gait  x 150 feet with Stand-by Assistance using Rolling Walker.   5. Ascend/Descend 6 inch curb step with Contact Guard Assistance using Rolling Walker.  6. Lower extremity exercise program x30 reps per handout, with independence    Outcome: Ongoing, Progressing     2022

## 2022-07-01 NOTE — PLAN OF CARE
07/01/22 1011   Post-Acute Status   Post-Acute Authorization Home Health   Home Health Status Referrals Sent     SW faxed referral to [facility] via [facility] for review. SW will follow up as needed.    10:47 AM  [facility]- Accepted.      Jyothi Palma LMSW  Case Management   [facility]   Ext. [phone]

## 2022-07-01 NOTE — ANESTHESIA POST-OP PAIN MANAGEMENT
Acute Pain Service Progress Note    Duran Giordano is a 82 y.o., female, [id number].    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       Subjective:     General appearance of alert, oriented, no complaints   Pain with rest: 1    Numbers   Pain with movement: 1    Numbers   Side Effects    1. Pruritis No    2. Nausea No    3. Motor Blockade No, 0=Ability to raise lower extremities off bed    4. Sedation No, 1=awake and alert    Objective:     Catheter site clean, dry, intact      Vitals   Vitals:    07/01/22 1151   BP: 136/62   Pulse: 86   Resp: 20   Temp: 36.3 °C (97.3 °F)        Labs    Admission on 06/30/2022   Component Date Value Ref Range Status    POCT Glucose 06/30/2022 71  70 - 110 mg/dL Final    Anaerobic Culture 06/30/2022 Culture in progress   Preliminary    Aerobic Bacterial Culture 06/30/2022 No growth   Preliminary    Gram Stain Result 06/30/2022 Rare WBC's   Final    Gram Stain Result 06/30/2022 No organisms seen   Final    Anaerobic Culture 06/30/2022 Culture in progress   Preliminary    Gram Stain Result 06/30/2022 Rare WBC's   Final    Gram Stain Result 06/30/2022 No organisms seen   Final    Anaerobic Culture 06/30/2022 Culture in progress   Preliminary    Aerobic Bacterial Culture 06/30/2022 No growth   Preliminary    Gram Stain Result 06/30/2022 Rare WBC's   Final    Gram Stain Result 06/30/2022 No organisms seen   Final    Anaerobic Culture 06/30/2022 Culture in progress   Preliminary    Gram Stain Result 06/30/2022 Rare WBC's   Final    Gram Stain Result 06/30/2022 No organisms seen   Final     POCT Glucose 06/30/2022 156 (A) 70 - 110 mg/dL Final    POCT Glucose 06/30/2022 179 (A) 70 - 110 mg/dL Final    Sodium 07/01/2022 136  136 - 145 mmol/L Final    Potassium 07/01/2022 4.8  3.5 - 5.1 mmol/L Final    Chloride 07/01/2022 106  95 - 110 mmol/L Final    CO2 07/01/2022 18 (A) 23 - 29 mmol/L Final    Glucose 07/01/2022 236 (A) 70 - 110 mg/dL Final    BUN 07/01/2022 26 (A) 8 - 23 mg/dL Final    Creatinine 07/01/2022 1.3  0.5 - 1.4 mg/dL Final    Calcium 07/01/2022 8.8  8.7 - 10.5 mg/dL Final    Total Protein 07/01/2022 7.4  6.0 - 8.4 g/dL Final    Albumin 07/01/2022 2.7 (A) 3.5 - 5.2 g/dL Final    Total Bilirubin 07/01/2022 0.3  0.1 - 1.0 mg/dL Final    Alkaline Phosphatase 07/01/2022 95  55 - 135 U/L Final    AST 07/01/2022 20  10 - 40 U/L Final    ALT 07/01/2022 11  10 - 44 U/L Final    Anion Gap 07/01/2022 12  8 - 16 mmol/L Final    eGFR if  07/01/2022 44.8 (A) >60 mL/min/1.73 m^2 Final    eGFR if non African American 07/01/2022 38.8 (A) >60 mL/min/1.73 m^2 Final    POCT Glucose 07/01/2022 238 (A) 70 - 110 mg/dL Final    POCT Glucose 07/01/2022 238 (A) 70 - 110 mg/dL Final    POCT Glucose 07/01/2022 246 (A) 70 - 110 mg/dL Final        Meds   Current Facility-Administered Medications   Medication Dose Route Frequency Provider Last Rate Last Admin    0.9%  NaCl infusion   Intravenous Continuous Rich Jaeger MD        acetaminophen tablet 1,000 mg  1,000 mg Oral Q6H Nahomy Rockwell NP   1,000 mg at 07/01/22 1202    albuterol inhaler 2 puff  2 puff Inhalation Q4H PRN Rich Jaeger MD        albuterol-ipratropium 2.5 mg-0.5 mg/3 mL nebulizer solution 3 mL  3 mL Nebulization Q4H PRN Rich Jaeger MD        amLODIPine tablet 10 mg  10 mg Oral Daily Rich Jaeger MD   10 mg at 07/01/22 0927    apixaban tablet 2.5 mg  2.5 mg Oral BID Rich Jaeger MD   2.5 mg at 07/01/22 0927    atorvastatin tablet 80 mg  80 mg Oral QHS Rich Jaeger,  MD   80 mg at 06/30/22 2127    bisacodyL suppository 10 mg  10 mg Rectal Q12H PRN Nahomy Rockwell NP        dextrose 10% bolus 125 mL  12.5 g Intravenous PRN Rich Jaeger MD        dextrose 10% bolus 250 mL  25 g Intravenous PRN Rich Jaeger MD        famotidine tablet 20 mg  20 mg Oral Daily Nahomy Rockwell NP   20 mg at 06/30/22 2127    fluticasone furoate-vilanteroL 200-25 mcg/dose diskus inhaler 1 puff  1 puff Inhalation Daily Rich Jaeger MD        fluticasone propionate 50 mcg/actuation nasal spray 100 mcg  2 spray Each Nostril Daily Rich Jaeger MD   100 mcg at 07/01/22 0928    glucagon (human recombinant) injection 1 mg  1 mg Intramuscular PRN Rich Jaeger MD        glucose chewable tablet 16 g  16 g Oral PRN Rich Jaeger MD        glucose chewable tablet 24 g  24 g Oral PRN Rich Jaeger MD        insulin aspart U-100 pen 1-10 Units  1-10 Units Subcutaneous QID (AC + HS) PRN Rich Jaeger MD   4 Units at 07/01/22 1202    methocarbamoL tablet 750 mg  750 mg Oral TID Nahomy Rockwell NP   750 mg at 07/01/22 0926    morphine injection 2 mg  2 mg Intravenous Q3H PRN Nahomy Rockwell NP        mupirocin 2 % ointment 1 g  1 g Nasal BID Nahomy Rockwell NP   1 g at 06/30/22 2149    naloxone 0.4 mg/mL injection 0.02 mg  0.02 mg Intravenous PRN Nahomy Rockwell NP        ondansetron injection 4 mg  4 mg Intravenous Q12H PRN Ellis Raya MD        ondansetron injection 4 mg  4 mg Intravenous Q8H PRN Nahomy Rockwell NP        oxyCODONE immediate release tablet 5 mg  5 mg Oral Q3H PRN Nahomy Rockwell NP        oxyCODONE immediate release tablet Tab 10 mg  10 mg Oral Q3H PRN Nahomy Rockwell NP   10 mg at 06/30/22 1616    polyethylene glycol packet 17 g  17 g Oral Daily Nahomy Rockwell NP   17 g at 07/01/22 0928    pregabalin capsule 75 mg  75 mg Oral QHS Nahomy Rocwkell NP   75 mg at 06/30/22 2127    prochlorperazine  injection Soln 5 mg  5 mg Intravenous Q6H PRN Nahomy Rockwell NP        ROPIvacaine (PF) 2 mg/ml (0.2%) solution  0.1 mL/hr Perineural Continuous Ellis Raya MD 0.1 mL/hr at 06/30/22 1540 0.1 mL/hr at 06/30/22 1540    senna-docusate 8.6-50 mg per tablet 1 tablet  1 tablet Oral BID Nahomy Rockwell NP   1 tablet at 07/01/22 0927    sodium chloride 0.9% flush 10 mL  10 mL Intravenous PRN Aruna Nolasco MD        torsemide tablet 10 mg  10 mg Oral Daily Rich Jaeger MD   10 mg at 07/01/22 0927       Assessment:     Pain control adequate with current analgesic regimen.    Plan:     Patient doing well, continue present treatment.   - Continue Adductor Canal PNC    - Continue multimodal analgesic regimen:    - Tylenol 1000mg Q6H    - Methocarbamol 750mg Q8H    - Lyrica 75mg QHS   - No breakthrough medications required, will discontinue PRN IV hydromorphone and PO oxycodone 10mg      Case discussed with staff, Dr. Moore; final recommendations per attestation above.     Thank you for your consult and allowing us to participate in the care of this patient. We will continue to follow along. Please call the Acute Pain Service/Anesthesia if you have any questions or concerns.       Butch Dickey MD  Department of Anesthesiology  Acute Pain Service - [id number]  [facility]     [FreeTextEntry1] : Patient comes in with concerns about his obesity [de-identified] : \par Patient is a 36 year old M with PMH significant for obesity, EDWARD, seasonal allergies coming in for a CPE. \par \rebeca Patient states that he has had obesity for a long time. Pt has had significant weight gain over the past 3-4 months. He has been seeing an endocrinologists virtually from [facility].  Throughout his life, pt has tried various diets and interventions to loose weight with no success. Reports that his diet is not healthy, and does not eat enough vegetables. Pt is interested in weight management clinic. \par \par Pt states that in terms of his sleep apnea, was following pulmonology but has not followed up since April. Has not purchased CPAP. In terms of his sleep, reports that he continues to snore and disturb his wife. Denies feeling tired in the day, but continues to have occasional morning headaches. \rebeca lundy States that 4 years ago developed a discoloration on the skin under the L eye. Reports that it was itchy/ burning initially, but subsequently stopped. No bleeding or pain, but endorses that it has grown over the years. Has never been evaluated by derm. 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 Pt is sexually active with wife, endorses using condoms.

## 2022-07-02 LAB
POCT GLUCOSE: 124 MG/DL (ref 70–110)
POCT GLUCOSE: 129 MG/DL (ref 70–110)
POCT GLUCOSE: 139 MG/DL (ref 70–110)
POCT GLUCOSE: 146 MG/DL (ref 70–110)
POCT GLUCOSE: 211 MG/DL (ref 70–110)

## 2022-07-02 PROCEDURE — 25000003 PHARM REV CODE 250: Performed by: STUDENT IN AN ORGANIZED HEALTH CARE EDUCATION/TRAINING PROGRAM

## 2022-07-02 PROCEDURE — 25000242 PHARM REV CODE 250 ALT 637 W/ HCPCS: Performed by: STUDENT IN AN ORGANIZED HEALTH CARE EDUCATION/TRAINING PROGRAM

## 2022-07-02 PROCEDURE — 94660 CPAP INITIATION&MGMT: CPT

## 2022-07-02 PROCEDURE — 99231 SBSQ HOSP IP/OBS SF/LOW 25: CPT | Mod: ,,, | Performed by: ANESTHESIOLOGY

## 2022-07-02 PROCEDURE — 97530 THERAPEUTIC ACTIVITIES: CPT

## 2022-07-02 PROCEDURE — 25000003 PHARM REV CODE 250: Performed by: NURSE PRACTITIONER

## 2022-07-02 PROCEDURE — 97535 SELF CARE MNGMENT TRAINING: CPT

## 2022-07-02 PROCEDURE — 97116 GAIT TRAINING THERAPY: CPT | Mod: CQ

## 2022-07-02 PROCEDURE — 97530 THERAPEUTIC ACTIVITIES: CPT | Mod: CQ

## 2022-07-02 PROCEDURE — 11000001 HC ACUTE MED/SURG PRIVATE ROOM

## 2022-07-02 PROCEDURE — 99900035 HC TECH TIME PER 15 MIN (STAT)

## 2022-07-02 PROCEDURE — 99231 PR SUBSEQUENT HOSPITAL CARE,LEVL I: ICD-10-PCS | Mod: ,,, | Performed by: ANESTHESIOLOGY

## 2022-07-02 PROCEDURE — 94761 N-INVAS EAR/PLS OXIMETRY MLT: CPT

## 2022-07-02 RX ADMIN — FLUTICASONE PROPIONATE 100 MCG: 50 SPRAY, METERED NASAL at 09:07

## 2022-07-02 RX ADMIN — METHOCARBAMOL 750 MG: 750 TABLET ORAL at 09:07

## 2022-07-02 RX ADMIN — ACETAMINOPHEN 1000 MG: 500 TABLET ORAL at 11:07

## 2022-07-02 RX ADMIN — TORSEMIDE 10 MG: 10 TABLET ORAL at 09:07

## 2022-07-02 RX ADMIN — APIXABAN 2.5 MG: 2.5 TABLET, FILM COATED ORAL at 09:07

## 2022-07-02 RX ADMIN — SENNOSIDES AND DOCUSATE SODIUM 1 TABLET: 50; 8.6 TABLET ORAL at 09:07

## 2022-07-02 RX ADMIN — ACETAMINOPHEN 1000 MG: 500 TABLET ORAL at 06:07

## 2022-07-02 RX ADMIN — FLUTICASONE FUROATE AND VILANTEROL TRIFENATATE 1 PUFF: 200; 25 POWDER RESPIRATORY (INHALATION) at 09:07

## 2022-07-02 RX ADMIN — AMLODIPINE BESYLATE 10 MG: 10 TABLET ORAL at 09:07

## 2022-07-02 RX ADMIN — METHOCARBAMOL 750 MG: 750 TABLET ORAL at 03:07

## 2022-07-02 RX ADMIN — INSULIN DETEMIR 5 UNITS: 100 INJECTION, SOLUTION SUBCUTANEOUS at 10:07

## 2022-07-02 RX ADMIN — SENNOSIDES AND DOCUSATE SODIUM 1 TABLET: 50; 8.6 TABLET ORAL at 10:07

## 2022-07-02 RX ADMIN — POLYETHYLENE GLYCOL 3350 17 G: 17 POWDER, FOR SOLUTION ORAL at 09:07

## 2022-07-02 RX ADMIN — APIXABAN 2.5 MG: 2.5 TABLET, FILM COATED ORAL at 10:07

## 2022-07-02 RX ADMIN — PREGABALIN 75 MG: 75 CAPSULE ORAL at 10:07

## 2022-07-02 RX ADMIN — MUPIROCIN 1 G: 20 OINTMENT TOPICAL at 09:07

## 2022-07-02 RX ADMIN — FAMOTIDINE 20 MG: 20 TABLET ORAL at 10:07

## 2022-07-02 RX ADMIN — ATORVASTATIN CALCIUM 80 MG: 20 TABLET, FILM COATED ORAL at 10:07

## 2022-07-02 RX ADMIN — METHOCARBAMOL 750 MG: 750 TABLET ORAL at 10:07

## 2022-07-02 NOTE — ANESTHESIA POST-OP PAIN MANAGEMENT
Acute Pain Service Progress Note    Duran Giordano is a 82 y.o., female, [id number].    Surgery:  REVISION, ARTHROPLASTY, KNEE-NAKIA- stage 2 (Right Knee)    Post Op Day #: 2    Catheter type: perineural  Right Adductor Canal    Infusion type: Ropivacaine 0.2%  4cc/3hr basal + 5cc/30min patient demand bolus basal    Problem List:    There are no hospital problems to display for this patient.      Subjective:     General appearance of alert, oriented, no complaints   Pain with rest: 1    Numbers   Pain with movement: 1    Numbers   Side Effects    1. Pruritis No    2. Nausea No    3. Motor Blockade No, 0=Ability to raise lower extremities off bed    4. Sedation No, 1=awake and alert    Objective:     Catheter site clean, dry, intact      Vitals   There were no vitals filed for this visit.     Labs    Admission on 06/30/2022   Component Date Value Ref Range Status    POCT Glucose 06/30/2022 71  70 - 110 mg/dL Final    Anaerobic Culture 06/30/2022 Culture in progress   Preliminary    Aerobic Bacterial Culture 06/30/2022 No growth   Preliminary    Gram Stain Result 06/30/2022 Rare WBC's   Final    Gram Stain Result 06/30/2022 No organisms seen   Final    AFB Culture & Smear 06/30/2022 Culture in progress   Preliminary    AFB CULTURE STAIN 06/30/2022 No acid fast bacilli seen.   Final    Anaerobic Culture 06/30/2022 Culture in progress   Preliminary    Aerobic Bacterial Culture 06/30/2022 No growth   Preliminary    AFB Culture & Smear 06/30/2022 Culture in progress   Preliminary    AFB CULTURE STAIN 06/30/2022 No acid fast bacilli seen.   Final    Gram Stain Result 06/30/2022 Rare WBC's   Final    Gram Stain Result 06/30/2022 No organisms seen   Final    Anaerobic Culture 06/30/2022 Culture in progress   Preliminary    Aerobic Bacterial Culture 06/30/2022 No growth   Preliminary    AFB Culture & Smear 06/30/2022 Culture in progress   Preliminary    AFB CULTURE STAIN 06/30/2022 No acid fast bacilli  seen.   Final    Gram Stain Result 06/30/2022 Rare WBC's   Final    Gram Stain Result 06/30/2022 No organisms seen   Final    Anaerobic Culture 06/30/2022 Culture in progress   Preliminary    AFB Culture & Smear 06/30/2022 Culture in progress   Preliminary    AFB CULTURE STAIN 06/30/2022 No acid fast bacilli seen.   Final    Gram Stain Result 06/30/2022 Rare WBC's   Final    Gram Stain Result 06/30/2022 No organisms seen   Final    Aerobic Bacterial Culture 06/30/2022 No growth   Preliminary    POCT Glucose 06/30/2022 156 (A) 70 - 110 mg/dL Final    POCT Glucose 06/30/2022 179 (A) 70 - 110 mg/dL Final    Sodium 07/01/2022 136  136 - 145 mmol/L Final    Potassium 07/01/2022 4.8  3.5 - 5.1 mmol/L Final    Chloride 07/01/2022 106  95 - 110 mmol/L Final    CO2 07/01/2022 18 (A) 23 - 29 mmol/L Final    Glucose 07/01/2022 236 (A) 70 - 110 mg/dL Final    BUN 07/01/2022 26 (A) 8 - 23 mg/dL Final    Creatinine 07/01/2022 1.3  0.5 - 1.4 mg/dL Final    Calcium 07/01/2022 8.8  8.7 - 10.5 mg/dL Final    Total Protein 07/01/2022 7.4  6.0 - 8.4 g/dL Final    Albumin 07/01/2022 2.7 (A) 3.5 - 5.2 g/dL Final    Total Bilirubin 07/01/2022 0.3  0.1 - 1.0 mg/dL Final    Alkaline Phosphatase 07/01/2022 95  55 - 135 U/L Final    AST 07/01/2022 20  10 - 40 U/L Final    ALT 07/01/2022 11  10 - 44 U/L Final    Anion Gap 07/01/2022 12  8 - 16 mmol/L Final    eGFR if  07/01/2022 44.8 (A) >60 mL/min/1.73 m^2 Final    eGFR if non African American 07/01/2022 38.8 (A) >60 mL/min/1.73 m^2 Final    POCT Glucose 07/01/2022 238 (A) 70 - 110 mg/dL Final    POCT Glucose 07/01/2022 238 (A) 70 - 110 mg/dL Final    POCT Glucose 07/01/2022 246 (A) 70 - 110 mg/dL Final    POCT Glucose 07/01/2022 215 (A) 70 - 110 mg/dL Final    POCT Glucose 07/01/2022 163 (A) 70 - 110 mg/dL Final    POCT Glucose 07/02/2022 129 (A) 70 - 110 mg/dL Final        Meds   No current facility-administered medications for this visit.      Current Outpatient Medications   Medication Sig Dispense Refill    acetaminophen (TYLENOL) 500 MG tablet Take 2 tablets (1,000 mg total) by mouth every 8 (eight) hours. Bedside delivery 120 tablet 0    apixaban (ELIQUIS) 5 mg Tab Take 1 tablet (5 mg total) by mouth 2 (two) times daily. 60 tablet 2    cefadroxil (DURICEF) 500 MG Cap Take 1 capsule (500 mg total) by mouth every 12 (twelve) hours. 56 capsule 0    docusate sodium (COLACE) 100 MG capsule Take 1 capsule (100 mg total) by mouth 2 (two) times daily. 60 capsule 0    doxycycline (MONODOX) 100 MG capsule Take 1 capsule (100 mg total) by mouth 2 (two) times daily. 56 capsule 0    gabapentin (NEURONTIN) 100 MG capsule Take 1 capsule (100 mg total) by mouth 3 (three) times daily as needed (pain not relieved by scheduled meds). 45 capsule 0    methocarbamoL (ROBAXIN) 750 MG Tab Take 1 tablet (750 mg total) by mouth 3 (three) times daily as needed (spasms). Bedside delivery 42 tablet 0    oxyCODONE (ROXICODONE) 5 MG immediate release tablet Take 1 tablet (5 mg total) by mouth every 6 (six) hours as needed for Pain. May take 1-2 tablets every 6 hours as needed for pain 42 tablet 0     Facility-Administered Medications Ordered in Other Visits   Medication Dose Route Frequency Provider Last Rate Last Admin    acetaminophen tablet 1,000 mg  1,000 mg Oral Q6H Nahomy Rockwell NP   1,000 mg at 07/02/22 0638    albuterol inhaler 2 puff  2 puff Inhalation Q4H PRN Rich Jaeger MD        albuterol-ipratropium 2.5 mg-0.5 mg/3 mL nebulizer solution 3 mL  3 mL Nebulization Q4H PRN Rich Jaeger MD        amLODIPine tablet 10 mg  10 mg Oral Daily Rich Jaeger MD   10 mg at 07/02/22 0908    apixaban tablet 2.5 mg  2.5 mg Oral BID Rich Jaeger MD   2.5 mg at 07/02/22 0908    atorvastatin tablet 80 mg  80 mg Oral QHS Rich Jaeger MD   80 mg at 07/01/22 7195    bisacodyL suppository 10 mg  10 mg Rectal Q12H PRN Nahomy Rockwell  NP        dextrose 10% bolus 125 mL  12.5 g Intravenous PRN Rich Jaeger MD        dextrose 10% bolus 250 mL  25 g Intravenous PRN Rich Jaeger MD        famotidine tablet 20 mg  20 mg Oral Daily Nahomy Rockwell NP   20 mg at 07/01/22 2336    fluticasone furoate-vilanteroL 200-25 mcg/dose diskus inhaler 1 puff  1 puff Inhalation Daily Rich Jaeger MD   1 puff at 07/02/22 0909    fluticasone propionate 50 mcg/actuation nasal spray 100 mcg  2 spray Each Nostril Daily Rich Jaeger MD   100 mcg at 07/02/22 0908    glucagon (human recombinant) injection 1 mg  1 mg Intramuscular PRN Rich Jaeger MD        glucose chewable tablet 16 g  16 g Oral PRN Rich Jaeger MD        glucose chewable tablet 24 g  24 g Oral PRN Rich Jaeger MD        insulin aspart U-100 pen 1-10 Units  1-10 Units Subcutaneous QID (AC + HS) PRN Rich Jaeger MD   1 Units at 07/01/22 2354    insulin detemir U-100 pen 5 Units  5 Units Subcutaneous QHS Flori Moore MD   5 Units at 07/01/22 2352    methocarbamoL tablet 750 mg  750 mg Oral TID Nahomy Rockwell NP   750 mg at 07/02/22 0908    mupirocin 2 % ointment 1 g  1 g Nasal BID Nahomy Rockwell NP   1 g at 07/02/22 0915    naloxone 0.4 mg/mL injection 0.02 mg  0.02 mg Intravenous PRN Nahomy Rockwell NP        ondansetron injection 4 mg  4 mg Intravenous Q12H PRN Ellis Raya MD        ondansetron injection 4 mg  4 mg Intravenous Q8H PRN Nahomy Rockwell NP        oxyCODONE immediate release tablet 5 mg  5 mg Oral Q3H PRN Nahomy Rockwell NP        polyethylene glycol packet 17 g  17 g Oral Daily Nahomy Rockwell NP   17 g at 07/02/22 0908    pregabalin capsule 75 mg  75 mg Oral QHS Nahomy Rockwell NP   75 mg at 07/01/22 2336    prochlorperazine injection Soln 5 mg  5 mg Intravenous Q6H PRN Nahomy Rockwell NP        ROPIvacaine (PF) 2 mg/ml (0.2%) solution  0.1 mL/hr Perineural Continuous Ellis BONILLA  MD Elver 0.1 mL/hr at 06/30/22 1540 0.1 mL/hr at 06/30/22 1540    senna-docusate 8.6-50 mg per tablet 1 tablet  1 tablet Oral BID Nahomy Rockwell NP   1 tablet at 07/02/22 0908    sodium chloride 0.9% flush 10 mL  10 mL Intravenous PRN Aruna Nolasco MD        torsemide tablet 10 mg  10 mg Oral Daily Rich Jaeger MD   10 mg at 07/02/22 0908       Assessment:     Pain control adequate with current analgesic regimen.    Plan:     Patient doing well, continue present treatment.   - Continue Adductor Canal PNC    - Continue multimodal analgesic regimen:    - Tylenol 1000mg Q6H    - Methocarbamol 750mg Q8H    - Lyrica 75mg QHS   - No breakthrough medications required      Thank you for your consult and allowing us to participate in the care of this patient. We will continue to follow along. Please call the Acute Pain Service/Anesthesia if you have any questions or concerns.       Tyson Alvarez MD  Department of Anesthesiology  Acute Pain Service - [id number]  [facility]

## 2022-07-02 NOTE — PROGRESS NOTES
[facility] - Surgery  Orthopedics  Progress Note    Patient Name: Duran Giordano  MRN: [id number]  Admission Date: 6/30/2022  Hospital Length of Stay: 2 days  Attending Provider: Alfredo Lozoya MD  Primary Care Provider: Tami Schmidt MD  Follow-up For: Procedure(s) (LRB):  REVISION, ARTHROPLASTY, KNEE-NAKIA- stage 2 (Right)    Post-Operative Day: 2 Days Post-Op  Subjective:     Principal Problem:Infection of prosthetic right knee joint    Principal Orthopedic Problem: same    Interval History: Patient examined at the bedside. NAEON. Pain controlled. Tolerating PO w/out N/V and voiding appropriately.   Walked 50ft w PT yesterday and recommending HH PT.  Vac w/out drainage and holding suction. Intraop Cxs NGTD.     Review of patient's allergies indicates:   Allergen Reactions    Bactrim [sulfamethoxazole-trimethoprim]      Other reaction(s): up set stomach rash/hives    Azithromycin      Feels bad    Erythromycin Other (See Comments)     Other reaction(s): Unknown  Other reaction(s): Stomach upset    Gentamicin      Vaginal burning , itching     Levofloxacin Hives     Other reaction(s): nervousness    Shellfish containing products      Rash      Vancomycin Itching     Other reaction(s): Itching       Current Facility-Administered Medications   Medication    acetaminophen tablet 1,000 mg    albuterol inhaler 2 puff    albuterol-ipratropium 2.5 mg-0.5 mg/3 mL nebulizer solution 3 mL    amLODIPine tablet 10 mg    apixaban tablet 2.5 mg    atorvastatin tablet 80 mg    bisacodyL suppository 10 mg    dextrose 10% bolus 125 mL    dextrose 10% bolus 250 mL    famotidine tablet 20 mg    fluticasone furoate-vilanteroL 200-25 mcg/dose diskus inhaler 1 puff    fluticasone propionate 50 mcg/actuation nasal spray 100 mcg    glucagon (human recombinant) injection 1 mg    glucose chewable tablet 16 g    glucose chewable tablet 24 g    insulin aspart U-100 pen 1-10 Units    insulin detemir U-100  "pen 5 Units    methocarbamoL tablet 750 mg    mupirocin 2 % ointment 1 g    naloxone 0.4 mg/mL injection 0.02 mg    ondansetron injection 4 mg    ondansetron injection 4 mg    oxyCODONE immediate release tablet 5 mg    polyethylene glycol packet 17 g    pregabalin capsule 75 mg    prochlorperazine injection Soln 5 mg    ROPIvacaine (PF) 2 mg/ml (0.2%) solution    senna-docusate 8.6-50 mg per tablet 1 tablet    sodium chloride 0.9% flush 10 mL    torsemide tablet 10 mg     Objective:     Vital Signs (Most Recent):  Temp: 97.5 °F (36.4 °C) (07/02/22 0414)  Pulse: 75 (07/02/22 0414)  Resp: 16 (07/02/22 0414)  BP: (!) 142/64 (07/02/22 0414)  SpO2: 95 % (07/02/22 0414)   Vital Signs (24h Range):  Temp:  [97.3 °F (36.3 °C)-98.2 °F (36.8 °C)] 97.5 °F (36.4 °C)  Pulse:  [] 75  Resp:  [16-20] 16  SpO2:  [95 %-98 %] 95 %  BP: (129-146)/(58-64) 142/64     Weight: 109.8 kg (242 lb)  Height: 4' 11" (149.9 cm)  Body mass index is 48.88 kg/m².      Intake/Output Summary (Last 24 hours) at 7/2/2022 0731  Last data filed at 7/2/2022 0000  Gross per 24 hour   Intake --   Output 0 ml   Net 0 ml         Ortho/SPM Exam  AAOx4  NAD  Reg rate  No increased WOB    RLE  Dressing c/d/I  Prevena in place holding suction  Polar ice in place  SILT T/SP/DP/[place]ma/Sa  Motor intact T/SP/DP  Palpable DP pulse  FCDs in place and functioning    Significant Labs: All pertinent labs within the past 24 hours have been reviewed.    Significant Imaging: I have reviewed all pertinent imaging results/findings.    Assessment/Plan:     * Infection of prosthetic right knee joint  82 y.o. female s/p Stage 2 R TKA Revision for PJI on 6/30/22.    Pain control: multimodal per surgical home  PT/OT: WBAT RLE  DVT PPx: Eliquis 2.5mg BID, FCDs at all times when not ambulating  Abx: postop Ancef  Bulky dressing d/c this am  Intraop Cxs: NGTD    Dispo: CT home w  PT tomorrow          Rich Jaeger MD  Orthopedics  [facility] - Surgery  "

## 2022-07-02 NOTE — SUBJECTIVE & OBJECTIVE
Principal Problem:Infection of prosthetic right knee joint    Principal Orthopedic Problem: same    Interval History: Patient examined at the bedside. NAEON. Pain controlled. Tolerating PO w/out N/V and voiding appropriately.   Walked 50ft w PT yesterday and recommending  PT.  Vac w/out drainage and holding suction. Intraop Cxs NGTD.     Review of patient's allergies indicates:   Allergen Reactions    Bactrim [sulfamethoxazole-trimethoprim]      Other reaction(s): up set stomach rash/hives    Azithromycin      Feels bad    Erythromycin Other (See Comments)     Other reaction(s): Unknown  Other reaction(s): Stomach upset    Gentamicin      Vaginal burning , itching     Levofloxacin Hives     Other reaction(s): nervousness    Shellfish containing products      Rash      Vancomycin Itching     Other reaction(s): Itching       Current Facility-Administered Medications   Medication    acetaminophen tablet 1,000 mg    albuterol inhaler 2 puff    albuterol-ipratropium 2.5 mg-0.5 mg/3 mL nebulizer solution 3 mL    amLODIPine tablet 10 mg    apixaban tablet 2.5 mg    atorvastatin tablet 80 mg    bisacodyL suppository 10 mg    dextrose 10% bolus 125 mL    dextrose 10% bolus 250 mL    famotidine tablet 20 mg    fluticasone furoate-vilanteroL 200-25 mcg/dose diskus inhaler 1 puff    fluticasone propionate 50 mcg/actuation nasal spray 100 mcg    glucagon (human recombinant) injection 1 mg    glucose chewable tablet 16 g    glucose chewable tablet 24 g    insulin aspart U-100 pen 1-10 Units    insulin detemir U-100 pen 5 Units    methocarbamoL tablet 750 mg    mupirocin 2 % ointment 1 g    naloxone 0.4 mg/mL injection 0.02 mg    ondansetron injection 4 mg    ondansetron injection 4 mg    oxyCODONE immediate release tablet 5 mg    polyethylene glycol packet 17 g    pregabalin capsule 75 mg    prochlorperazine injection Soln 5 mg    ROPIvacaine (PF) 2 mg/ml (0.2%) solution    senna-docusate 8.6-50 mg per tablet 1 tablet    sodium  "chloride 0.9% flush 10 mL    torsemide tablet 10 mg     Objective:     Vital Signs (Most Recent):  Temp: 97.5 °F (36.4 °C) (07/02/22 0414)  Pulse: 75 (07/02/22 0414)  Resp: 16 (07/02/22 0414)  BP: (!) 142/64 (07/02/22 0414)  SpO2: 95 % (07/02/22 0414)   Vital Signs (24h Range):  Temp:  [97.3 °F (36.3 °C)-98.2 °F (36.8 °C)] 97.5 °F (36.4 °C)  Pulse:  [] 75  Resp:  [16-20] 16  SpO2:  [95 %-98 %] 95 %  BP: (129-146)/(58-64) 142/64     Weight: 109.8 kg (242 lb)  Height: 4' 11" (149.9 cm)  Body mass index is 48.88 kg/m².      Intake/Output Summary (Last 24 hours) at 7/2/2022 0731  Last data filed at 7/2/2022 0000  Gross per 24 hour   Intake --   Output 0 ml   Net 0 ml         Ortho/SPM Exam  AAOx4  NAD  Reg rate  No increased WOB    RLE  Dressing c/d/I  Prevena in place holding suction  Polar ice in place  SILT T/SP/DP/Palma/Sa  Motor intact T/SP/DP  Palpable DP pulse  FCDs in place and functioning    Significant Labs: All pertinent labs within the past 24 hours have been reviewed.    Significant Imaging: I have reviewed all pertinent imaging results/findings.  "

## 2022-07-02 NOTE — PT/OT/SLP PROGRESS
Occupational Therapy   Treatment    Name: Duran Giordano  MRN: [id number]  Admitting Diagnosis:  Infection of prosthetic right knee joint  2 Days Post-Op    Recommendations:     Discharge Recommendations: home with home health  Discharge Equipment Recommendations:  none  Barriers to discharge:  None    Assessment:     Duran Giordano is a 82 y.o. female with a medical diagnosis of Infection of prosthetic right knee joint.  She presents with impairments listed below. Pt did well tolerate and participate in the session. Pt is progressing towards goals at this time. Pt displayed global deconditioning requiring increased assist for ADLs and mobility at this time. Pt would benefit from skilled OT services to improve independence and overall occupational functioning.     Performance deficits affecting function are weakness, impaired endurance, impaired self care skills, impaired functional mobilty, gait instability, impaired balance, decreased lower extremity function, decreased ROM, impaired skin.     Rehab Prognosis:  Good; patient would benefit from acute skilled OT services to address these deficits and reach maximum level of function.       Plan:     Patient to be seen daily to address the above listed problems via self-care/home management, therapeutic activities, therapeutic exercises, neuromuscular re-education  · Plan of Care Expires: 08/01/22  · Plan of Care Reviewed with: patient    Subjective     Pain/Comfort:  · Pain Rating 1: 0/10  · Pain Rating Post-Intervention 1: 0/10    Objective:     Communicated with: RN prior to session.  Patient found up in chair with wound vac, telemetry, perineural catheter, cryotherapy upon OT entry to room.    General Precautions: Standard, fall   Orthopedic Precautions:RLE weight bearing as tolerated   Braces: N/A  Respiratory Status: Room air     Occupational Performance:       Functional Mobility/Transfers:  · Patient completed Sit <> Stand Transfer with contact guard  assistance  with  rolling walker   · Functional Mobility: Pt ambulated ~25 ft at [facility] w/ RW.     Activities of Daily Living:  · Grooming: stand by assistance oral hygiene while standing at the sink       [facility] 6 Click ADL: 18    Treatment & Education:  Sit<>Stand from bedside chair at [facility] w/ RW & sit<>Stand from EOB at [facility] w/ RW.   Pt educated on:     POC & overall coordination of care    D/C recs   Early mobility   Importance of oob activities   Importance of participating in therapy   Possible benefits of therapy    Patient left up in chair with all lines intact and call button in reachEducation:      GOALS:   Multidisciplinary Problems     Occupational Therapy Goals        Problem: Occupational Therapy    Goal Priority Disciplines Outcome Interventions   Occupational Therapy Goal     OT, PT/OT Ongoing, Progressing    Description: Goals to be met by: 08/01/22    Patient will increase functional independence with ADLs by performing:    LE Dressing with Set-up Assistance.  Grooming while standing at sink with Set-up Assistance.  Toileting from toilet with Contact Guard Assistance for hygiene and clothing management.   Toilet transfer to toilet with Stand-by Assistance RW.  Functional mobility with RW supervision in room and blankenship with good safety.                     Time Tracking:     OT Date of Treatment: 07/02/22  OT Start Time: 1236  OT Stop Time: 1303  OT Total Time (min): 27 min    Billable Minutes:Self Care/Home Management 10 minutes  Therapeutic Activity 17 minutes    OT/IDANIA: OT          7/2/2022

## 2022-07-02 NOTE — PT/OT/SLP PROGRESS
Physical Therapy Treatment    Patient Name:  Duran Giordano   MRN:  [id number]    Recommendations:     Discharge Recommendations:  home health PT   Discharge Equipment Recommendations: none   Barriers to discharge: None    Assessment:     Duran Giordano is a 82 y.o. female admitted with a medical diagnosis of Infection of prosthetic right knee joint.  She presents with the following impairments/functional limitations:  weakness, impaired endurance, impaired self care skills, impaired functional mobilty, gait instability, impaired balance, pain, decreased ROM, impaired skin, impaired cardiopulmonary response to activity, orthopedic precautions . Patient requires min assistance to transfer and cues to transfer safely. Patient ambulates with decreased christo and step length.    Rehab Prognosis: Good; patient would benefit from acute skilled PT services to address these deficits and reach maximum level of function.    Recent Surgery: Procedure(s) (LRB):  REVISION, ARTHROPLASTY, KNEE-NAKIA- stage 2 (Right) 2 Days Post-Op    Plan:     During this hospitalization, patient to be seen daily to address the identified rehab impairments via gait training, therapeutic activities, therapeutic exercises, neuromuscular re-education and progress toward the following goals:    · Plan of Care Expires:  07/31/22    Subjective     Chief Complaint: a little tired  Patient/Family Comments/goals: to heal well and to get stronger.  Pain/Comfort:  · Pain Rating 1: 0/10  · Location - Side 1: Right  · Location - Orientation 1: generalized  · Location 1: knee  · Pain Addressed 1: Pre-medicate for activity, Reposition  · Pain Rating Post-Intervention 1: 0/10      Objective:     Communicated with NSG prior to session.  Patient found up in chair with wound vac, telemetry, perineural catheter upon PT entry to room.     General Precautions: Standard, fall   Orthopedic Precautions:RLE weight bearing as tolerated   Braces: N/A  Respiratory  Status: Room air     Functional Mobility:  · Transfers:     · Sit to Stand:  minimum assistance with rolling walker  · Gait: 12 ft x 2 to the bathroom and then 40 ft with RW and CGA, with R Le WBAT and cues to correct posture.      AM-PAC 6 CLICK MOBILITY  Turning over in bed (including adjusting bedclothes, sheets and blankets)?: 3  Sitting down on and standing up from a chair with arms (e.g., wheelchair, bedside commode, etc.): 3  Moving from lying on back to sitting on the side of the bed?: 3  Moving to and from a bed to a chair (including a wheelchair)?: 3  Need to walk in hospital room?: 3  Climbing 3-5 steps with a railing?: 2  Basic Mobility Total Score: 17       Therapeutic Activities and Exercises:   Donned a second gown. Doffed/Donned R knee Cryotherapy. Assisted to the bathroom to do her needs. Notified NSG that alarm on perineural catheter was on.    Patient left up in chair with all lines intact, call button in reach and NSG notified..    GOALS:   Multidisciplinary Problems     Physical Therapy Goals        Problem: Physical Therapy    Goal Priority Disciplines Outcome Goal Variances Interventions   Physical Therapy Goal     PT, PT/OT Ongoing, Progressing     Description: Goals to be met by: 7/15/22     Patient will increase functional independence with mobility by performin. Supine to sit with Stand-by Assistance  2. Sit to stand transfer with Stand-by Assistance  3. Bed to chair transfer with Stand-by Assistance using LRAD  4. Gait  x 150 feet with Stand-by Assistance using Rolling Walker.   5. Ascend/Descend 6 inch curb step with Contact Guard Assistance using Rolling Walker.  6. Lower extremity exercise program x30 reps per handout, with independence                     Time Tracking:     PT Received On: 22  PT Start Time: 1035     PT Stop Time: 1109  PT Total Time (min): 34 min     Billable Minutes: Gait Training 10 and Therapeutic Activity 24    Treatment Type: Treatment  PT/PTA: PTA      hospitals Visit Number: 1 07/02/2022

## 2022-07-02 NOTE — PLAN OF CARE
Pt is AAOX4,VSS. Pt is progressing towards plan of care goals. Pain management has been assessed. PNC in place, pt uses demand bolus when in pain.  SCDs in place with frequent checks for skin breakdown. Wound vac in place,no output. Up to bedside commode. Fall precaution in place, no report of falls.Safety measures are in place bed in lowest position, wheels locked, call light within reach.

## 2022-07-02 NOTE — ASSESSMENT & PLAN NOTE
82 y.o. female s/p Stage 2 R TKA Revision for PJI on 6/30/22.    Pain control: multimodal per surgical home  PT/OT: WBAT RLE  DVT PPx: Eliquis 2.5mg BID, FCDs at all times when not ambulating  Abx: postop Ancef  Bulky dressing d/c this am  Intraop Cxs: NGTD    Dispo: dc home w  PT tomorrow

## 2022-07-03 VITALS
RESPIRATION RATE: 20 BRPM | HEART RATE: 97 BPM | SYSTOLIC BLOOD PRESSURE: 139 MMHG | BODY MASS INDEX: 48.79 KG/M2 | HEIGHT: 59 IN | TEMPERATURE: 98 F | WEIGHT: 242 LBS | OXYGEN SATURATION: 96 % | DIASTOLIC BLOOD PRESSURE: 63 MMHG

## 2022-07-03 LAB
POCT GLUCOSE: 125 MG/DL (ref 70–110)
POCT GLUCOSE: 132 MG/DL (ref 70–110)

## 2022-07-03 PROCEDURE — 97116 GAIT TRAINING THERAPY: CPT | Mod: CQ

## 2022-07-03 PROCEDURE — 99231 SBSQ HOSP IP/OBS SF/LOW 25: CPT | Mod: ,,, | Performed by: ANESTHESIOLOGY

## 2022-07-03 PROCEDURE — 99900035 HC TECH TIME PER 15 MIN (STAT)

## 2022-07-03 PROCEDURE — 94761 N-INVAS EAR/PLS OXIMETRY MLT: CPT

## 2022-07-03 PROCEDURE — 25000003 PHARM REV CODE 250: Performed by: NURSE PRACTITIONER

## 2022-07-03 PROCEDURE — 25000003 PHARM REV CODE 250: Performed by: STUDENT IN AN ORGANIZED HEALTH CARE EDUCATION/TRAINING PROGRAM

## 2022-07-03 PROCEDURE — 97530 THERAPEUTIC ACTIVITIES: CPT | Mod: CQ

## 2022-07-03 PROCEDURE — 99231 PR SUBSEQUENT HOSPITAL CARE,LEVL I: ICD-10-PCS | Mod: ,,, | Performed by: ANESTHESIOLOGY

## 2022-07-03 PROCEDURE — 94660 CPAP INITIATION&MGMT: CPT

## 2022-07-03 RX ADMIN — FLUTICASONE PROPIONATE 100 MCG: 50 SPRAY, METERED NASAL at 08:07

## 2022-07-03 RX ADMIN — METHOCARBAMOL 750 MG: 750 TABLET ORAL at 08:07

## 2022-07-03 RX ADMIN — POLYETHYLENE GLYCOL 3350 17 G: 17 POWDER, FOR SOLUTION ORAL at 08:07

## 2022-07-03 RX ADMIN — TORSEMIDE 10 MG: 10 TABLET ORAL at 08:07

## 2022-07-03 RX ADMIN — ACETAMINOPHEN 1000 MG: 500 TABLET ORAL at 12:07

## 2022-07-03 RX ADMIN — AMLODIPINE BESYLATE 10 MG: 10 TABLET ORAL at 08:07

## 2022-07-03 RX ADMIN — SENNOSIDES AND DOCUSATE SODIUM 1 TABLET: 50; 8.6 TABLET ORAL at 08:07

## 2022-07-03 NOTE — PT/OT/SLP PROGRESS
Physical Therapy Treatment    Patient Name:  Duran Giordano   MRN:  [id number]    Recommendations:     Discharge Recommendations:  home with home health   Discharge Equipment Recommendations: none   Barriers to discharge: None    Assessment:     Durna Giordano is a 82 y.o. female admitted with a medical diagnosis of Infection of prosthetic right knee joint.  She presents with the following impairments/functional limitations:  weakness, impaired endurance, impaired self care skills, impaired functional mobilty, gait instability, impaired balance, pain, decreased ROM, impaired skin, impaired cardiopulmonary response to activity, orthopedic precautions . Patient Patient's endurance continues to improve consistently, as evidence of walking range. Patient ambulates with decreased christo and step length. Pain level has increased with the removal of perineural catheter, but didn't limit mobility.    Rehab Prognosis: Good; patient would benefit from acute skilled PT services to address these deficits and reach maximum level of function.    Recent Surgery: Procedure(s) (LRB):  REVISION, ARTHROPLASTY, KNEE-NAKIA- stage 2 (Right) 3 Days Post-Op    Plan:     During this hospitalization, patient to be seen daily to address the identified rehab impairments via gait training, therapeutic activities, therapeutic exercises, neuromuscular re-education and progress toward the following goals:    · Plan of Care Expires:  07/31/22    Subjective     Chief Complaint: a little more pressure on her L knee and perineural catheter removal, but not bad.  Patient/Family Comments/goals: to go home  Pain/Comfort:  · Pain Rating 1: 5/10  · Location - Side 1: Right  · Location - Orientation 1: generalized  · Location 1: knee  · Pain Addressed 1: Pre-medicate for activity, Reposition, Distraction  · Pain Rating Post-Intervention 1: 5/10      Objective:     Communicated with NSG prior to session.  Patient found up in chair with wound vac,  telemetry upon PT entry to room.     General Precautions: Standard, fall   Orthopedic Precautions:RLE weight bearing as tolerated   Braces: N/A  Respiratory Status: Room air     Functional Mobility:  · Transfers:     · Sit to Stand:  minimum assistance with rolling walker  · Gait: 36 ft and 42 ft with RW and CGA, with R LE WBAT with chair follow and cues for proper RW management.      AM-PAC 6 CLICK MOBILITY  Turning over in bed (including adjusting bedclothes, sheets and blankets)?: 3  Sitting down on and standing up from a chair with arms (e.g., wheelchair, bedside commode, etc.): 3  Moving from lying on back to sitting on the side of the bed?: 3  Moving to and from a bed to a chair (including a wheelchair)?: 3  Need to walk in hospital room?: 3  Climbing 3-5 steps with a railing?: 2  Basic Mobility Total Score: 17       Therapeutic Activities and Exercises:   Managed medical lines prior to Gait training. Educated on posture and proper use of RW in order to improve posture and safety when ambulating.    Patient left up in chair with all lines intact and call button in reach..    GOALS:   Multidisciplinary Problems     Physical Therapy Goals        Problem: Physical Therapy    Goal Priority Disciplines Outcome Goal Variances Interventions   Physical Therapy Goal     PT, PT/OT Ongoing, Progressing     Description: Goals to be met by: 7/15/22     Patient will increase functional independence with mobility by performin. Supine to sit with Stand-by Assistance  2. Sit to stand transfer with Stand-by Assistance  3. Bed to chair transfer with Stand-by Assistance using LRAD  4. Gait  x 150 feet with Stand-by Assistance using Rolling Walker.   5. Ascend/Descend 6 inch curb step with Contact Guard Assistance using Rolling Walker.  6. Lower extremity exercise program x30 reps per handout, with independence                     Time Tracking:     PT Received On: 22  PT Start Time: 1114     PT Stop Time: 1143  PT  Total Time (min): 29 min     Billable Minutes: Gait Training 15 and Therapeutic Activity 14    Treatment Type: Treatment  PT/PTA: PTA     PTA Visit Number: 2 07/03/2022

## 2022-07-03 NOTE — SUBJECTIVE & OBJECTIVE
Principal Problem:Infection of prosthetic right knee joint    Principal Orthopedic Problem: same    Interval History: Patient examined at the bedside. NAEON. Pain controlled. Tolerating PO w/out N/V and voiding appropriately.   Ambulated 64 ft with PT yesterday. Cxs NGTD.    Review of patient's allergies indicates:   Allergen Reactions    Bactrim [sulfamethoxazole-trimethoprim]      Other reaction(s): up set stomach rash/hives    Azithromycin      Feels bad    Erythromycin Other (See Comments)     Other reaction(s): Unknown  Other reaction(s): Stomach upset    Gentamicin      Vaginal burning , itching     Levofloxacin Hives     Other reaction(s): nervousness    Shellfish containing products      Rash      Vancomycin Itching     Other reaction(s): Itching       Current Facility-Administered Medications   Medication    acetaminophen tablet 1,000 mg    albuterol inhaler 2 puff    albuterol-ipratropium 2.5 mg-0.5 mg/3 mL nebulizer solution 3 mL    amLODIPine tablet 10 mg    apixaban tablet 2.5 mg    atorvastatin tablet 80 mg    bisacodyL suppository 10 mg    dextrose 10% bolus 125 mL    dextrose 10% bolus 250 mL    famotidine tablet 20 mg    fluticasone furoate-vilanteroL 200-25 mcg/dose diskus inhaler 1 puff    fluticasone propionate 50 mcg/actuation nasal spray 100 mcg    glucagon (human recombinant) injection 1 mg    glucose chewable tablet 16 g    glucose chewable tablet 24 g    insulin aspart U-100 pen 1-10 Units    insulin detemir U-100 pen 5 Units    methocarbamoL tablet 750 mg    mupirocin 2 % ointment 1 g    naloxone 0.4 mg/mL injection 0.02 mg    ondansetron injection 4 mg    ondansetron injection 4 mg    oxyCODONE immediate release tablet 5 mg    polyethylene glycol packet 17 g    pregabalin capsule 75 mg    prochlorperazine injection Soln 5 mg    ROPIvacaine (PF) 2 mg/ml (0.2%) solution    senna-docusate 8.6-50 mg per tablet 1 tablet    sodium chloride 0.9% flush 10 mL    torsemide tablet 10 mg  "    Objective:     Vital Signs (Most Recent):  Temp: 97.6 °F (36.4 °C) (07/03/22 0419)  Pulse: 65 (07/03/22 0419)  Resp: 16 (07/03/22 0419)  BP: (!) 143/65 (07/03/22 0419)  SpO2: 97 % (07/03/22 0426)   Vital Signs (24h Range):  Temp:  [97.6 °F (36.4 °C)-97.9 °F (36.6 °C)] 97.6 °F (36.4 °C)  Pulse:  [65-94] 65  Resp:  [16-20] 16  SpO2:  [96 %-100 %] 97 %  BP: (140-146)/(63-71) 143/65     Weight: 109.8 kg (242 lb)  Height: 4' 11" (149.9 cm)  Body mass index is 48.88 kg/m².      Intake/Output Summary (Last 24 hours) at 7/3/2022 0708  Last data filed at 7/3/2022 0050  Gross per 24 hour   Intake 1600 ml   Output 0 ml   Net 1600 ml       Ortho/SPM Exam  AAOx4  NAD  Reg rate  No increased WOB    RLE  Dressing c/d/I  Prevena holding suction w/out drainage  Polar ice in place  SILT T/SP/DP/Palma/Sa  Motor intact T/SP/DP  Palpable DP pulse  FCDs in place and functioning    Significant Labs: All pertinent labs within the past 24 hours have been reviewed.    Significant Imaging: I have reviewed all pertinent imaging results/findings.  "

## 2022-07-03 NOTE — NURSING
Patient discharged via transportation services. Patient discharged with all belongings, medical equipment and prescriptions.

## 2022-07-03 NOTE — PROGRESS NOTES
[facility]  Orthopedics  Progress Note    Patient Name: Duran Giordano  MRN: [id number]  Admission Date: 6/30/2022  Hospital Length of Stay: 3 days  Attending Provider: Alfredo Lozoya MD  Primary Care Provider: Tami Schmidt MD  Follow-up For: Procedure(s) (LRB):  REVISION, ARTHROPLASTY, KNEE-NAKIA- stage 2 (Right)    Post-Operative Day: 3 Days Post-Op  Subjective:     Principal Problem:Infection of prosthetic right knee joint    Principal Orthopedic Problem: same    Interval History: Patient examined at the bedside. NAEON. Pain controlled. Tolerating PO w/out N/V and voiding appropriately.   Ambulated 64 ft with PT yesterday. Cxs NGTD.    Review of patient's allergies indicates:   Allergen Reactions    Bactrim [sulfamethoxazole-trimethoprim]      Other reaction(s): up set stomach rash/hives    Azithromycin      Feels bad    Erythromycin Other (See Comments)     Other reaction(s): Unknown  Other reaction(s): Stomach upset    Gentamicin      Vaginal burning , itching     Levofloxacin Hives     Other reaction(s): nervousness    Shellfish containing products      Rash      Vancomycin Itching     Other reaction(s): Itching       Current Facility-Administered Medications   Medication    acetaminophen tablet 1,000 mg    albuterol inhaler 2 puff    albuterol-ipratropium 2.5 mg-0.5 mg/3 mL nebulizer solution 3 mL    amLODIPine tablet 10 mg    apixaban tablet 2.5 mg    atorvastatin tablet 80 mg    bisacodyL suppository 10 mg    dextrose 10% bolus 125 mL    dextrose 10% bolus 250 mL    famotidine tablet 20 mg    fluticasone furoate-vilanteroL 200-25 mcg/dose diskus inhaler 1 puff    fluticasone propionate 50 mcg/actuation nasal spray 100 mcg    glucagon (human recombinant) injection 1 mg    glucose chewable tablet 16 g    glucose chewable tablet 24 g    insulin aspart U-100 pen 1-10 Units    insulin detemir U-100 pen 5 Units    methocarbamoL tablet 750 mg    mupirocin 2 %  "ointment 1 g    naloxone 0.4 mg/mL injection 0.02 mg    ondansetron injection 4 mg    ondansetron injection 4 mg    oxyCODONE immediate release tablet 5 mg    polyethylene glycol packet 17 g    pregabalin capsule 75 mg    prochlorperazine injection Soln 5 mg    ROPIvacaine (PF) 2 mg/ml (0.2%) solution    senna-docusate 8.6-50 mg per tablet 1 tablet    sodium chloride 0.9% flush 10 mL    torsemide tablet 10 mg     Objective:     Vital Signs (Most Recent):  Temp: 97.6 °F (36.4 °C) (07/03/22 0419)  Pulse: 65 (07/03/22 0419)  Resp: 16 (07/03/22 0419)  BP: (!) 143/65 (07/03/22 0419)  SpO2: 97 % (07/03/22 0426)   Vital Signs (24h Range):  Temp:  [97.6 °F (36.4 °C)-97.9 °F (36.6 °C)] 97.6 °F (36.4 °C)  Pulse:  [65-94] 65  Resp:  [16-20] 16  SpO2:  [96 %-100 %] 97 %  BP: (140-146)/(63-71) 143/65     Weight: 109.8 kg (242 lb)  Height: 4' 11" (149.9 cm)  Body mass index is 48.88 kg/m².      Intake/Output Summary (Last 24 hours) at 7/3/2022 0708  Last data filed at 7/3/2022 0050  Gross per 24 hour   Intake 1600 ml   Output 0 ml   Net 1600 ml       Ortho/SPM Exam  AAOx4  NAD  Reg rate  No increased WOB    RLE  Dressing c/d/I  Prevena holding suction w/out drainage  Polar ice in place  SILT T/SP/DP/Palam/Sa  Motor intact T/SP/DP  Palpable DP pulse  FCDs in place and functioning    Significant Labs: All pertinent labs within the past 24 hours have been reviewed.    Significant Imaging: I have reviewed all pertinent imaging results/findings.    Assessment/Plan:     * Infection of prosthetic right knee joint  82 y.o. female s/p Stage 2 R TKA Revision for PJI on 6/30/22.    Pain control: multimodal per surgical home  PT/OT: WBAT RLE  DVT PPx: Eliquis 2.5mg BID, FCDs at all times when not ambulating  Abx: postop Ancef  Bulky dressing d/c this am  Intraop Cxs: NGTD    Dispo: [place] home w  PT tomorrow          Rich Jaeger MD  Orthopedics  [facility] - Surgery  "

## 2022-07-03 NOTE — PLAN OF CARE
Problem: Adult Inpatient Plan of Care  Goal: Plan of Care Review  Outcome: Ongoing, Progressing  Goal: Patient-Specific Goal (Individualized)  Outcome: Ongoing, Progressing  Goal: Absence of Hospital-Acquired Illness or Injury  Outcome: Ongoing, Progressing  Goal: Optimal Comfort and Wellbeing  Outcome: Ongoing, Progressing  Goal: Readiness for Transition of Care  Outcome: Ongoing, Progressing     Problem: Diabetes Comorbidity  Goal: Blood Glucose Level Within Targeted Range  Outcome: Ongoing, Progressing     Problem: Bariatric Environmental Safety  Goal: Safety Maintained with Care  Outcome: Ongoing, Progressing     Problem: Skin Injury Risk Increased  Goal: Skin Health and Integrity  Outcome: Ongoing, Progressing     Problem: Pain Acute  Goal: Acceptable Pain Control and Functional Ability  Outcome: Ongoing, Progressing     Problem: Fall Injury Risk  Goal: Absence of Fall and Fall-Related Injury  Outcome: Ongoing, Progressing

## 2022-07-03 NOTE — ANESTHESIA POST-OP PAIN MANAGEMENT
Acute Pain Service Progress Note    Duran Giordano is a 82 y.o., female, [id number].    Surgery:  REVISION, ARTHROPLASTY, KNEE-NAKIA- stage 2 (Right Knee)    Post Op Day #: 3    Catheter type: perineural  Right Adductor Canal    Infusion type: Ropivacaine 0.2%  4cc/3hr basal + 5cc/30min patient demand bolus basal    Problem List:    There are no hospital problems to display for this patient.      Subjective:     General appearance of alert, oriented, no complaints   Pain with rest: 1    Numbers   Pain with movement: 1    Numbers   Side Effects    1. Pruritis No    2. Nausea No    3. Motor Blockade No, 0=Ability to raise lower extremities off bed    4. Sedation No, 1=awake and alert    Objective:     Catheter site clean, dry, intact      Vitals   There were no vitals filed for this visit.     Labs    Admission on 06/30/2022   Component Date Value Ref Range Status    POCT Glucose 06/30/2022 71  70 - 110 mg/dL Final    Anaerobic Culture 06/30/2022 Culture in progress   Preliminary    Aerobic Bacterial Culture 06/30/2022 No growth   Preliminary    Gram Stain Result 06/30/2022 Rare WBC's   Final    Gram Stain Result 06/30/2022 No organisms seen   Final    AFB Culture & Smear 06/30/2022 Culture in progress   Preliminary    AFB CULTURE STAIN 06/30/2022 No acid fast bacilli seen.   Final    Anaerobic Culture 06/30/2022 Culture in progress   Preliminary    Aerobic Bacterial Culture 06/30/2022 No growth   Preliminary    AFB Culture & Smear 06/30/2022 Culture in progress   Preliminary    AFB CULTURE STAIN 06/30/2022 No acid fast bacilli seen.   Final    Gram Stain Result 06/30/2022 Rare WBC's   Final    Gram Stain Result 06/30/2022 No organisms seen   Final    Anaerobic Culture 06/30/2022 Culture in progress   Preliminary    Aerobic Bacterial Culture 06/30/2022 No growth   Preliminary    AFB Culture & Smear 06/30/2022 Culture in progress   Preliminary    AFB CULTURE STAIN 06/30/2022 No acid fast bacilli  seen.   Final    Gram Stain Result 06/30/2022 Rare WBC's   Final    Gram Stain Result 06/30/2022 No organisms seen   Final    Anaerobic Culture 06/30/2022 Culture in progress   Preliminary    AFB Culture & Smear 06/30/2022 Culture in progress   Preliminary    AFB CULTURE STAIN 06/30/2022 No acid fast bacilli seen.   Final    Gram Stain Result 06/30/2022 Rare WBC's   Final    Gram Stain Result 06/30/2022 No organisms seen   Final    Aerobic Bacterial Culture 06/30/2022 No growth   Preliminary    POCT Glucose 06/30/2022 156 (A) 70 - 110 mg/dL Final    POCT Glucose 06/30/2022 179 (A) 70 - 110 mg/dL Final    Sodium 07/01/2022 136  136 - 145 mmol/L Final    Potassium 07/01/2022 4.8  3.5 - 5.1 mmol/L Final    Chloride 07/01/2022 106  95 - 110 mmol/L Final    CO2 07/01/2022 18 (A) 23 - 29 mmol/L Final    Glucose 07/01/2022 236 (A) 70 - 110 mg/dL Final    BUN 07/01/2022 26 (A) 8 - 23 mg/dL Final    Creatinine 07/01/2022 1.3  0.5 - 1.4 mg/dL Final    Calcium 07/01/2022 8.8  8.7 - 10.5 mg/dL Final    Total Protein 07/01/2022 7.4  6.0 - 8.4 g/dL Final    Albumin 07/01/2022 2.7 (A) 3.5 - 5.2 g/dL Final    Total Bilirubin 07/01/2022 0.3  0.1 - 1.0 mg/dL Final    Alkaline Phosphatase 07/01/2022 95  55 - 135 U/L Final    AST 07/01/2022 20  10 - 40 U/L Final    ALT 07/01/2022 11  10 - 44 U/L Final    Anion Gap 07/01/2022 12  8 - 16 mmol/L Final    eGFR if  07/01/2022 44.8 (A) >60 mL/min/1.73 m^2 Final    eGFR if non African American 07/01/2022 38.8 (A) >60 mL/min/1.73 m^2 Final    POCT Glucose 07/01/2022 238 (A) 70 - 110 mg/dL Final    POCT Glucose 07/01/2022 238 (A) 70 - 110 mg/dL Final    POCT Glucose 07/01/2022 246 (A) 70 - 110 mg/dL Final    POCT Glucose 07/01/2022 215 (A) 70 - 110 mg/dL Final    POCT Glucose 07/01/2022 163 (A) 70 - 110 mg/dL Final    POCT Glucose 07/02/2022 129 (A) 70 - 110 mg/dL Final    POCT Glucose 07/02/2022 139 (A) 70 - 110 mg/dL Final    POCT Glucose  07/02/2022 124 (A) 70 - 110 mg/dL Final    POCT Glucose 07/02/2022 146 (A) 70 - 110 mg/dL Final    POCT Glucose 07/02/2022 211 (A) 70 - 110 mg/dL Final    POCT Glucose 07/03/2022 132 (A) 70 - 110 mg/dL Final        Meds   No current facility-administered medications for this visit.     Current Outpatient Medications   Medication Sig Dispense Refill    acetaminophen (TYLENOL) 500 MG tablet Take 2 tablets (1,000 mg total) by mouth every 8 (eight) hours. Bedside delivery 120 tablet 0    apixaban (ELIQUIS) 5 mg Tab Take 1 tablet (5 mg total) by mouth 2 (two) times daily. 60 tablet 2    cefadroxil (DURICEF) 500 MG Cap Take 1 capsule (500 mg total) by mouth every 12 (twelve) hours. 56 capsule 0    docusate sodium (COLACE) 100 MG capsule Take 1 capsule (100 mg total) by mouth 2 (two) times daily. 60 capsule 0    doxycycline (MONODOX) 100 MG capsule Take 1 capsule (100 mg total) by mouth 2 (two) times daily. 56 capsule 0    gabapentin (NEURONTIN) 100 MG capsule Take 1 capsule (100 mg total) by mouth 3 (three) times daily as needed (pain not relieved by scheduled meds). 45 capsule 0    methocarbamoL (ROBAXIN) 750 MG Tab Take 1 tablet (750 mg total) by mouth 3 (three) times daily as needed (spasms). Bedside delivery 42 tablet 0    oxyCODONE (ROXICODONE) 5 MG immediate release tablet Take 1 tablet (5 mg total) by mouth every 6 (six) hours as needed for Pain. May take 1-2 tablets every 6 hours as needed for pain 42 tablet 0     Facility-Administered Medications Ordered in Other Visits   Medication Dose Route Frequency Provider Last Rate Last Admin    acetaminophen tablet 1,000 mg  1,000 mg Oral Q6H Nahomy Rockwell NP   1,000 mg at 07/03/22 0050    albuterol inhaler 2 puff  2 puff Inhalation Q4H PRN Rich Jaeger MD        albuterol-ipratropium 2.5 mg-0.5 mg/3 mL nebulizer solution 3 mL  3 mL Nebulization Q4H PRN Rich Jaeger MD        amLODIPine tablet 10 mg  10 mg Oral Daily Rich Jaeger MD    10 mg at 07/03/22 0802    apixaban tablet 2.5 mg  2.5 mg Oral BID Rich Jaeger MD   2.5 mg at 07/02/22 2221    atorvastatin tablet 80 mg  80 mg Oral QHS Rich Jaeger MD   80 mg at 07/02/22 2221    bisacodyL suppository 10 mg  10 mg Rectal Q12H PRN Naohmy Rockwell NP        dextrose 10% bolus 125 mL  12.5 g Intravenous PRN Rich Jaeger MD        dextrose 10% bolus 250 mL  25 g Intravenous PRN Rich Jaeger MD        famotidine tablet 20 mg  20 mg Oral Daily Nahomy Rockwell NP   20 mg at 07/02/22 2220    fluticasone furoate-vilanteroL 200-25 mcg/dose diskus inhaler 1 puff  1 puff Inhalation Daily Rich Jaeger MD   1 puff at 07/02/22 0909    fluticasone propionate 50 mcg/actuation nasal spray 100 mcg  2 spray Each Nostril Daily Rich Jaeger MD   100 mcg at 07/03/22 0803    glucagon (human recombinant) injection 1 mg  1 mg Intramuscular PRN Rich Jaeger MD        glucose chewable tablet 16 g  16 g Oral PRN Rich Jaeger MD        glucose chewable tablet 24 g  24 g Oral PRN Rich Jaeger MD        insulin aspart U-100 pen 1-10 Units  1-10 Units Subcutaneous QID (AC + HS) PRN Rich Jaeger MD   1 Units at 07/01/22 2354    insulin detemir U-100 pen 5 Units  5 Units Subcutaneous QHS Flori Moore MD   5 Units at 07/02/22 2230    methocarbamoL tablet 750 mg  750 mg Oral TID Nahomy Rockwell NP   750 mg at 07/03/22 0802    mupirocin 2 % ointment 1 g  1 g Nasal BID Nahomy Rockwell NP   1 g at 07/02/22 0915    naloxone 0.4 mg/mL injection 0.02 mg  0.02 mg Intravenous PRN Nahomy Rockwell NP        ondansetron injection 4 mg  4 mg Intravenous Q12H PRN Ellis Raya MD        ondansetron injection 4 mg  4 mg Intravenous Q8H PRN Nahomy Rockwell NP        oxyCODONE immediate release tablet 5 mg  5 mg Oral Q3H PRN Nahomy Rockwell NP        polyethylene glycol packet 17 g  17 g Oral Daily Nahomy Rockwell NP   17 g at 07/03/22  0803    pregabalin capsule 75 mg  75 mg Oral QHS Nahomy Rockwell NP   75 mg at 07/02/22 2222    prochlorperazine injection Soln 5 mg  5 mg Intravenous Q6H PRN Nahomy Rockwell NP        ROPIvacaine (PF) 2 mg/ml (0.2%) solution  0.1 mL/hr Perineural Continuous Ellis Raya MD 0.1 mL/hr at 06/30/22 1540 0.1 mL/hr at 06/30/22 1540    senna-docusate 8.6-50 mg per tablet 1 tablet  1 tablet Oral BID Nahomy Rockwell NP   1 tablet at 07/03/22 0802    sodium chloride 0.9% flush 10 mL  10 mL Intravenous PRN Aruna Nolasco MD        torsemide tablet 10 mg  10 mg Oral Daily Rich Jaeger MD   10 mg at 07/03/22 0802       Assessment:     Pain control adequate with current analgesic regimen.    Plan:     Patient doing well, continue present treatment.   - Discontinue Adductor Canal PNC    - Continue multimodal analgesic regimen:    - Tylenol 1000mg Q6H    - Methocarbamol 750mg Q8H    - Lyrica 75mg QHS   - No breakthrough medications required   - We will sign off      Tyson Alvarez MD  Department of Anesthesiology  Acute Pain Service - [id number]  [facility]

## 2022-07-03 NOTE — DISCHARGE SUMMARY
[facility]  Orthopedics  Discharge Summary      Patient Name: Duran Giordano  MRN: [id number]  Admission Date: 6/30/2022  Hospital Length of Stay: 3 days  Discharge Date and Time: 07/03/2022  Attending Physician: Alfredo Lozoya MD   Discharging Provider: Rich Jaeger MD  Primary Care Provider: Tami Schmidt MD    HPI:   CC:  Right knee pain     Duran Giordano is a 82 y.o. female with history of Right knee prosthetic joint infection. She was treated with hardware removal and abx spacer placement followed by 6 weeks of IV abx 10/28- 12/9. She had cellulitis that was treated with abx in January. She has basically been chair bound with walker for transfers since then. She is ready to proceed with reimplantation. Pain is worse with activity and weight bearing.  Patient has experienced interference of activities of daily living due to decreased range of motion and an increase in joint pain and swelling.    Duran Giordano currently ambulates using assistive device .      Relevant medical conditions of significance in perioperative period:  HTN- on medication managed by pcp  DM- on medication managed by pcp  HLD- on medication managed by pcp     Given documented medical comorbidities including those listed above and based off of CMS criteria patient would meet inpatient admission status guidelines. This case has been approved as inpatient.       Procedure(s) (LRB):  REVISION, ARTHROPLASTY, KNEE-NAKIA- stage 2 (Right)      Hospital Course:  On 6/30/22, the patient arrived to the [facility] for proper pre-operative management.  Upon completion of pre-operative preparation, the patient was taken back to the operative theatre. Stage 2 TKA revision for PJI was performed without complication and the patient was transported to the post anesthesia care unit in stable condition.  After appropriate recovery from the anaesthetic agents used during the surgery, the patient was then  transported to the hospital inpatient floor.  The interim of the hospital stay from arrival on the floor up to discharge has been uncomplicated. The patient has tolerated regular diet.  The patient's pain has been controlled using a multimodal approach. Currently, the patient's pain is well controlled on an oral regimen.  The patient has been voiding without difficulty.  The patient began participation in physical therapy after surgery and has progressed throughout the entire hospital stay.  Currently, the patient's progress is sufficient to allow the them to be discharged to home with home health safely.  The patient agrees with this assessment and desires a discharge today.      Goals of Care Treatment Preferences:  Code Status: Full Code    Living Will: Yes  LaPOST: Yes           Consults (From admission, onward)        Status Ordering Provider     Inpatient consult to Respiratory Care  Once        Provider:  (Not yet assigned)    Acknowledged EPIFANIO GARZA     Inpatient consult to Respiratory Care  Once        Provider:  (Not yet assigned)    EPIFANIO Corrigan     Inpatient consult to Social Work  Once        Provider:  (Not yet assigned)    EPIFANIO Corrigan     Inpatient consult to Pain Management  Once        Provider:  (Not yet assigned)    Acknowledged EPIFANIO GARZA     Inpatient consult to Respiratory Care  Once        Provider:  (Not yet assigned)    EPIFANIO Corrigan          Pending Diagnostic Studies:     None        Final Active Diagnoses:    Diagnosis Date Noted POA    PRINCIPAL PROBLEM:  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    Specialty: Home Health Services  Contact information:  [address] [place] [address]  [facility]  [place] [address]  [phone]                       Patient Instructions:   No discharge procedures on file.  Medications:  Reconciled Home Medications:      Medication List      START taking these medications    apixaban 5 mg Tab  Commonly known as: ELIQUIS  Take 1 tablet (5 mg total) by mouth 2 (two) times daily.     cefadroxil 500 MG Cap  Commonly known as: DURICEF  Take 1 capsule (500 mg total) by mouth every 12 (twelve) hours.     docusate sodium 100 MG capsule  Commonly known as: COLACE  Take 1 capsule (100 mg total) by mouth 2 (two) times daily.     doxycycline 100 MG capsule  Commonly known as: MONODOX  Take 1 capsule (100 mg total) by mouth 2 (two) times daily.     gabapentin 100 MG capsule  Commonly known as: NEURONTIN  Take 1 capsule (100 mg total) by mouth 3 (three) times daily as needed (pain not relieved by scheduled meds).     methocarbamoL 750 MG Tab  Commonly known as: ROBAXIN  Take 1 tablet (750 mg total) by mouth 3 (three) times daily as needed (spasms). Bedside delivery     oxyCODONE 5 MG immediate release tablet  Commonly known as: ROXICODONE  Take 1 tablet (5 mg total) by mouth every 6 (six) hours as needed for Pain. May take 1-2 tablets every 6 hours as needed for pain        CHANGE how you take these medications    acetaminophen 500 MG tablet  Commonly known as: TYLENOL  Take 2 tablets (1,000 mg total) by mouth every 8 (eight) hours. Bedside delivery  What changed:   · when to take this  · reasons to take this  · additional instructions     blood sugar diagnostic Strp  BG monitoring 4 times a day. Pt needs test strips for [employer] meter.  What changed: additional instructions     lancets Misc  Test daily  What changed: additional instructions     OZEMPIC 0.25 mg or 0.5 mg(2 mg/1.5 mL) pen injector  Generic drug: semaglutide  Inject 0.5 mg weekly  What changed:   · how much to take  · how to take this  · when to take this  · additional instructions        CONTINUE taking these  "medications    albuterol 90 mcg/actuation inhaler  Commonly known as: PROVENTIL/VENTOLIN HFA  Inhale 2 puffs into the lungs every 4 (four) hours as needed for Wheezing or Shortness of Breath. Rescue     albuterol-ipratropium 2.5 mg-0.5 mg/3 mL nebulizer solution  Commonly known as: DUO-NEB  USE 1 VIAL PER NEBULIZER EVERY 6 HOURS AS NEEDED FOR WHEEZING/RESCUE     amLODIPine 10 MG tablet  Commonly known as: NORVASC  Take 1 tablet (10 mg total) by mouth once daily.     atorvastatin 80 MG tablet  Commonly known as: LIPITOR  Take 1 tablet (80 mg total) by mouth once daily.     COMP-AIR ELITE COMP NEB SYSTEM Berna  Generic drug: nebulizer and compressor  use as directed     diclofenac sodium 1 % Gel  Commonly known as: VOLTAREN  APPLY 2 GRAMS TOPICALLY DAILY     fluticasone propionate 50 mcg/actuation nasal spray  Commonly known as: FLONASE  2 sprays (100 mcg total) by Each Nare route once daily.     fluticasone-salmeterol 500-50 mcg/dose 500-50 mcg/dose Dsdv diskus inhaler  Commonly known as: WIXELA INHUB  INHALE 1 PUFF TWICE DAILY     pen needle, diabetic 29 gauge x 1/2" Ndle  Commonly known as: PEN NEEDLE  Use 1 x a day.     polyethylene glycol 17 gram Pwpk  Commonly known as: GLYCOLAX  Take 17 g by mouth once daily.     senna-docusate 8.6-50 mg 8.6-50 mg per tablet  Commonly known as: PERICOLACE  Take 1 tablet by mouth 2 (two) times a day.     torsemide 10 MG Tab  Commonly known as: DEMADEX  Take 1 tablet (10 mg total) by mouth once daily.     TRESIBA FLEXTOUCH U-100 100 unit/mL (3 mL) insulin pen  Generic drug: insulin degludec  Inject 16 units at night.     trolamine salicylate 10 % cream  Commonly known as: ASPERCREME  Apply topically as needed.     VITAMIN D3 25 mcg (1,000 unit) capsule  Generic drug: cholecalciferol (vitamin D3)  Take 1 capsule (1,000 Units total) by mouth once daily.        ASK your doctor about these medications    mupirocin 2 % ointment  Commonly known as: BACTROBAN  by Nasal route 2 (two) " times daily.  Ask about: Should I take this medication?            Rich Jaeger MD  Orthopedics  [facility] - Surgery

## 2022-07-03 NOTE — PROGRESS NOTES
SW requested wheelchair van transport via [facility] for 1:00pm. Patient and nurse notified of transport details.

## 2022-07-04 LAB
BACTERIA SPEC AEROBE CULT: NO GROWTH

## 2022-07-04 PROCEDURE — G0180 MD CERTIFICATION HHA PATIENT: HCPCS | Mod: ,,, | Performed by: ORTHOPAEDIC SURGERY

## 2022-07-04 PROCEDURE — G0180 PR HOME HEALTH MD CERTIFICATION: ICD-10-PCS | Mod: ,,, | Performed by: ORTHOPAEDIC SURGERY

## 2022-07-05 ENCOUNTER — TELEPHONE (OUTPATIENT)
Dept: PRIMARY CARE CLINIC | Facility: CLINIC | Age: 82
End: 2022-07-05
Payer: MEDICAID

## 2022-07-05 ENCOUNTER — TELEPHONE (OUTPATIENT)
Dept: ORTHOPEDICS | Facility: CLINIC | Age: 82
End: 2022-07-05
Payer: MEDICAID

## 2022-07-05 NOTE — PLAN OF CARE
[facility]  Discharge Final Note    Primary Care Provider: Tami Schmidt MD    Expected Discharge Date: 7/3/2022    Final Discharge Note (most recent)     Final Note - 07/05/22 1419        Final Note    Assessment Type Final Discharge Note     Anticipated Discharge Disposition Home or Self Care     What phone number can be called within the next 1-3 days to see how you are doing after discharge? --   [phone]    Hospital Resources/Appts/Education Provided Appointments scheduled and added to S                 Important Message from Medicare             Contact Info     [facility]   Specialty: Home Health Services    [address]  [address]  [place] [address]   Phone: [phone]       Next Steps: Follow up          Future Appointments   Date Time Provider Department Center   7/11/2022 10:45 AM Nahomy Columbia, NP [facility] [address]   7/15/2022  2:00 PM Nahomy Columbia, NP [facility] [address]       Patient discharged home to care of [facility] on 7/3/22.

## 2022-07-05 NOTE — ANESTHESIA POSTPROCEDURE EVALUATION
Anesthesia Post Evaluation    Patient: Duran Giordano    Procedure(s) Performed: Procedure(s) (LRB):  REVISION, ARTHROPLASTY, KNEE-NAKIA- stage 2 (Right)    Final Anesthesia Type: general      Patient location during evaluation: PACU  Patient participation: Yes- Able to Participate  Level of consciousness: awake and alert  Post-procedure vital signs: reviewed and stable  Pain management: adequate  Airway patency: patent    PONV status at discharge: No PONV  Anesthetic complications: yes (high spinal)  Perioperative Events: other periop events (see comments)        Cardiovascular status: hemodynamically stable  Respiratory status: unassisted, spontaneous ventilation and room air  Hydration status: euvolemic  Follow-up not needed.          Vitals Value Taken Time   /63 07/03/22 1233   Temp 36.7 °C (98 °F) 07/03/22 1233   Pulse 97 07/03/22 1233   Resp 20 07/03/22 1233   SpO2 96 % 07/03/22 1233         Event Time   Out of Recovery 06/30/2022 16:00:00         Pain/Judith Score: No data recorded

## 2022-07-05 NOTE — ANESTHESIA RELEASE NOTE
"Anesthesia Release from PACU Note    Patient: Duran Giordano    Procedure(s) Performed: Procedure(s) (LRB):  REVISION, ARTHROPLASTY, KNEE-NAKIA- stage 2 (Right)    Anesthesia type: general    Post pain: Adequate analgesia    Post assessment: tolerated procedure well    Last Vitals:   Visit Vitals  /63 (BP Location: Right arm, Patient Position: Sitting)   Pulse 97   Temp 36.7 °C (98 °F) (Tympanic)   Resp 20   Ht 4' 11" (1.499 m)   Wt 109.8 kg (242 lb)   SpO2 96%   Breastfeeding No   BMI 48.88 kg/m²       Post vital signs: stable    Level of consciousness: awake and alert     Nausea/Vomiting: no nausea/no vomiting    Complications: high spinal    Airway Patency: patent    Respiratory: unassisted, spontaneous ventilation, room air    Cardiovascular: stable and blood pressure at baseline    Hydration: euvolemic  "

## 2022-07-05 NOTE — TELEPHONE ENCOUNTER
Called patient to see how she was doing after her surgery.  No answer to phone.  Left message.  Will call back later

## 2022-07-06 ENCOUNTER — TELEPHONE (OUTPATIENT)
Dept: ORTHOPEDICS | Facility: CLINIC | Age: 82
End: 2022-07-06
Payer: MEDICAID

## 2022-07-06 ENCOUNTER — PATIENT OUTREACH (OUTPATIENT)
Dept: ADMINISTRATIVE | Facility: CLINIC | Age: 82
End: 2022-07-06
Payer: MEDICAID

## 2022-07-06 NOTE — TELEPHONE ENCOUNTER
Spoke to pt, she states she is doing well. But she would like to go the [facility] across the street. Spoke to Dr. Lozoya and confirmed pt may go to [facility], [facility] will set it up. Called pt back and left message stating that she may go to [facility] with HH. Provided name and number to call back to confirm message received.

## 2022-07-08 LAB
BACTERIA SPEC ANAEROBE CULT: NORMAL

## 2022-07-11 ENCOUNTER — OFFICE VISIT (OUTPATIENT)
Dept: ORTHOPEDICS | Facility: CLINIC | Age: 82
End: 2022-07-11
Payer: MEDICARE

## 2022-07-11 VITALS — BODY MASS INDEX: 49.39 KG/M2 | HEIGHT: 59 IN | WEIGHT: 245 LBS

## 2022-07-11 DIAGNOSIS — Z51.89 VISIT FOR WOUND CHECK: Primary | ICD-10-CM

## 2022-07-11 PROCEDURE — 99999 PR PBB SHADOW E&M-EST. PATIENT-LVL IV: ICD-10-PCS | Mod: PBBFAC,,, | Performed by: NURSE PRACTITIONER

## 2022-07-11 PROCEDURE — 99214 OFFICE O/P EST MOD 30 MIN: CPT | Mod: PBBFAC | Performed by: NURSE PRACTITIONER

## 2022-07-11 PROCEDURE — 99024 PR POST-OP FOLLOW-UP VISIT: ICD-10-PCS | Mod: POP,,, | Performed by: NURSE PRACTITIONER

## 2022-07-11 PROCEDURE — 99999 PR PBB SHADOW E&M-EST. PATIENT-LVL IV: CPT | Mod: PBBFAC,,, | Performed by: NURSE PRACTITIONER

## 2022-07-11 PROCEDURE — 99024 POSTOP FOLLOW-UP VISIT: CPT | Mod: POP,,, | Performed by: NURSE PRACTITIONER

## 2022-07-11 NOTE — PROGRESS NOTES
Pt returns 1 week post right knee revision arthroplasty for wound vac removal. Incisional vac dressing removed without difficulty. No erythema or drainage. Staples intact. New bandage placed over incision and replacements sent with patient in case they need to be replaced, but she understands that ideally the dressing will remain in place until her scheduled f/u with me. She will f/u as scheduled or sooner as needed.

## 2022-07-14 ENCOUNTER — TELEPHONE (OUTPATIENT)
Dept: ORTHOPEDICS | Facility: CLINIC | Age: 82
End: 2022-07-14
Payer: MEDICAID

## 2022-07-14 DIAGNOSIS — Z96.659 STATUS POST KNEE REPLACEMENT, UNSPECIFIED LATERALITY: Primary | ICD-10-CM

## 2022-07-14 NOTE — TELEPHONE ENCOUNTER
----- Message from Charline Doll MA sent at 7/14/2022  3:47 PM CDT -----  Regarding: FW: walker  Contact: Deangelo    ----- Message -----  From: Irma Flores  Sent: 7/14/2022   3:45 PM CDT  To: Darell STEELE Staff  Subject: walker                                           PT  Deangelo calling for an order for a rolling walker for pt       Confirmed patient's contact info below:  Contact Name: Deangelo   Phone Number: [phone]

## 2022-07-15 ENCOUNTER — OFFICE VISIT (OUTPATIENT)
Dept: ORTHOPEDICS | Facility: CLINIC | Age: 82
End: 2022-07-15
Payer: MEDICARE

## 2022-07-15 VITALS — BODY MASS INDEX: 49.38 KG/M2 | HEIGHT: 59 IN | WEIGHT: 244.94 LBS

## 2022-07-15 DIAGNOSIS — T84.50XD INFECTION OF PROSTHETIC JOINT, SUBSEQUENT ENCOUNTER: Primary | ICD-10-CM

## 2022-07-15 PROCEDURE — 99024 PR POST-OP FOLLOW-UP VISIT: ICD-10-PCS | Mod: POP,,, | Performed by: NURSE PRACTITIONER

## 2022-07-15 PROCEDURE — 99024 POSTOP FOLLOW-UP VISIT: CPT | Mod: POP,,, | Performed by: NURSE PRACTITIONER

## 2022-07-15 PROCEDURE — 99999 PR PBB SHADOW E&M-EST. PATIENT-LVL IV: ICD-10-PCS | Mod: PBBFAC,,, | Performed by: NURSE PRACTITIONER

## 2022-07-15 PROCEDURE — 99999 PR PBB SHADOW E&M-EST. PATIENT-LVL IV: CPT | Mod: PBBFAC,,, | Performed by: NURSE PRACTITIONER

## 2022-07-15 PROCEDURE — 99214 OFFICE O/P EST MOD 30 MIN: CPT | Mod: PBBFAC | Performed by: NURSE PRACTITIONER

## 2022-07-15 NOTE — PROGRESS NOTES
"Duran Giordano presents for initial post-operative visit following a right total knee revision arthroplasty performed by Dr. Lozoya on 6/30/2022.      Exam:   Height 4' 11" (1.499 m), weight 111.1 kg (244 lb 14.9 oz).   Ambulating well with assistive device at home. Still using a wheelchair for long distances.  Incision is clean and dry without drainage or erythema. Staples removed without difficulty and dressing applied over incision to be removed tomorrow.   ROM:0-80    Initial post-operative radiographs reviewed today revealing a well fixed and aligned prosthesis.    A/P:  2 weeks s/p right total knee revision arthroplasty  - The patient was advised to keep the incision clean and dry for the next 24 hours after which she may wash the area with antibacterial soap in the shower. Will not submerge until the incision is completely healed.   - home health PT ongoing  - Continue eliquis for 3 months post op  - Pain medication refill not needed   - Follow up in 4 weeks with new x-rays. Pt will call clinic with problems/concerns.     "

## 2022-07-26 LAB
FUNGUS SPEC CULT: NORMAL

## 2022-07-27 DIAGNOSIS — N18.31 STAGE 3A CHRONIC KIDNEY DISEASE: ICD-10-CM

## 2022-07-27 DIAGNOSIS — E11.29 TYPE 2 DIABETES MELLITUS WITH RENAL MANIFESTATIONS NOT AT GOAL: ICD-10-CM

## 2022-07-27 DIAGNOSIS — I50.32 CHRONIC DIASTOLIC HEART FAILURE: ICD-10-CM

## 2022-07-27 DIAGNOSIS — K59.00 CONSTIPATION, UNSPECIFIED CONSTIPATION TYPE: Primary | ICD-10-CM

## 2022-07-27 RX ORDER — DOCUSATE SODIUM 100 MG/1
100 CAPSULE, LIQUID FILLED ORAL 2 TIMES DAILY
Qty: 180 CAPSULE | Refills: 3 | Status: SHIPPED | OUTPATIENT
Start: 2022-07-27 | End: 2022-08-26

## 2022-07-27 RX ORDER — TORSEMIDE 10 MG/1
10 TABLET ORAL DAILY
Qty: 90 TABLET | Refills: 3 | Status: SHIPPED | OUTPATIENT
Start: 2022-07-27 | End: 2023-02-16

## 2022-07-27 RX ORDER — ATORVASTATIN CALCIUM 80 MG/1
80 TABLET, FILM COATED ORAL DAILY
Qty: 90 TABLET | Refills: 3 | Status: SHIPPED | OUTPATIENT
Start: 2022-07-27 | End: 2024-02-02

## 2022-07-27 NOTE — TELEPHONE ENCOUNTER
----- Message from Anabel Cortes sent at 7/27/2022 11:05 AM CDT -----  Contact: [facility]/Jose/  Requesting an RX refill or new RX.  Is this a refill or new RX: New  RX name and strength :  atorvastatin (LIPITOR) 80 MG tablet  Is this a 30 day or 90 day RX: 90  [facility] - [place], [place] - [address]  [address]  [address] [facility] [place] [facility] [address]  Phone: [phone] Fax: [phone]    The doctors have asked that we provide their patients with the following 2 reminders -- prescription refills can take up to 72 hours, and a friendly reminder that in the future you can use your [facility] account to request refills:yes           Requesting an RX refill or new RX.  Is this a refill or new RX: New  RX name and strength:  docusate sodium (COLACE) 100 MG capsule  Is this a 30 day or 90 day RX: 90  [facility] - [place], [place] - [address]  [address]  [address] [facility] [place] [facility] [address]  Phone: [phone] Fax: [phone]    The doctors have asked that we provide their patients with the following 2 reminders -- prescription refills can take up to 72 hours, and a friendly reminder that in the future you can use your [facility] account to request refills: yes          Requesting an RX refill or new RX.  Is this a refill or new RX: New  RX name and strength   torsemide (DEMADEX) 10 MG Tab  Is this a 30 day or 90 day RX: 90  [facility] - [place], [place] - [address]  [address]  [address] [facility] [place] [facility] [address]  Phone: [phone] Fax: [phone]    The doctors have asked that we provide their patients with the following 2 reminders -- prescription refills can take up to 72 hours, and a friendly reminder that in the future you can use your [facility] account to request refills: yes          Requesting an RX refill or new RX.  Is this a refill or new RX: New  RX name and strength :  docusate sodium  (COLACE) 100 MG capsule  Is this a 30 day or 90 day RX: 90  [facility] - [place], [place] - [address]  [address]  [id number]  [place] [address]  Phone: [phone] Fax: [phone]      Jose stated that pt gets the pill georgie

## 2022-07-29 ENCOUNTER — TELEPHONE (OUTPATIENT)
Dept: PRIMARY CARE CLINIC | Facility: CLINIC | Age: 82
End: 2022-07-29
Payer: MEDICAID

## 2022-07-29 DIAGNOSIS — I15.2 HYPERTENSION ASSOCIATED WITH DIABETES: Primary | ICD-10-CM

## 2022-07-29 DIAGNOSIS — E11.59 HYPERTENSION ASSOCIATED WITH DIABETES: Primary | ICD-10-CM

## 2022-07-29 RX ORDER — AMLODIPINE BESYLATE 5 MG/1
5 TABLET ORAL DAILY
Qty: 90 TABLET | Refills: 3 | Status: SHIPPED | OUTPATIENT
Start: 2022-07-29 | End: 2023-02-16

## 2022-07-29 NOTE — TELEPHONE ENCOUNTER
Spoke to pt who advised that she will send over her blood pressure readings by fax. Pt stated that her blood pressure has been good. Highest have been 150/80 and the lowest have been 127/88. Pt stated that she will start elevating her legs but she is unable to wear the compressions due to recent knee surgery.

## 2022-07-29 NOTE — TELEPHONE ENCOUNTER
Please see if she has any recent BP readings? Lowering her dose of amlodipine would likely help with the leg swelling.    She also has to elevate her legs, is she able to do any compressions?    Thanks,  KJ

## 2022-08-08 ENCOUNTER — TELEPHONE (OUTPATIENT)
Dept: INTERNAL MEDICINE | Facility: CLINIC | Age: 82
End: 2022-08-08
Payer: MEDICAID

## 2022-08-08 ENCOUNTER — EXTERNAL HOME HEALTH (OUTPATIENT)
Dept: HOME HEALTH SERVICES | Facility: HOSPITAL | Age: 82
End: 2022-08-08
Payer: MEDICARE

## 2022-08-08 NOTE — TELEPHONE ENCOUNTER
----- Message from LIBRA De Leon, RANULFO sent at 8/5/2022  4:47 PM CDT -----  Regarding: ozempic- different pharmacy?  Check to see if pt will like to try another pharmacy   Thanks  Yogi   Reason: reporting script on backorder   semaglutide (OZEMPIC) 0.25 mg or 0.5 mg(2 mg/1.5 mL) pen injector 3 pen       [facility] - [facility] [address] [facility]   [address] [facility] [address]   Phone: [phone] Fax: [phone]

## 2022-08-08 NOTE — TELEPHONE ENCOUNTER
Spoke to patient and she states she received her ozempic, notified patient about her refills for her other medications

## 2022-08-18 LAB
ACID FAST MOD KINY STN SPEC: NORMAL
MYCOBACTERIUM SPEC QL CULT: NORMAL

## 2022-08-19 ENCOUNTER — TELEPHONE (OUTPATIENT)
Dept: ORTHOPEDICS | Facility: CLINIC | Age: 82
End: 2022-08-19
Payer: MEDICAID

## 2022-08-19 ENCOUNTER — TELEPHONE (OUTPATIENT)
Dept: PRIMARY CARE CLINIC | Facility: CLINIC | Age: 82
End: 2022-08-19
Payer: MEDICAID

## 2022-08-19 NOTE — TELEPHONE ENCOUNTER
Returned call to patient regarding continuing home health. Left her a message letting her know that I'm happy to put new orders in, but insurance may not cover it. Can order outpatient if she would like to pursue that.

## 2022-08-19 NOTE — TELEPHONE ENCOUNTER
----- Message from Riki Peterson sent at 8/19/2022  9:56 AM CDT -----  Regarding: THERAPY  Contact: Self  Pt stated she is not happy w/ her progress Pt is requesting additional therapy at home if possible Pt ask for a call to discuss       Contact info    [phone] (home) [phone] (work)

## 2022-08-19 NOTE — TELEPHONE ENCOUNTER
Spoke with pt, [facility] is [facility] and she is walking.   She stated she is in her same duty again!    She is taking milk of magnesia and has had a bowel movement.    Nothing is weeping or leaking from her legs.    She sounded great.

## 2022-08-19 NOTE — TELEPHONE ENCOUNTER
When was the patient's last BM? Please advise CAROLYN Lynn that she can purchase OTC miralax. I can also prescribe lactulose.    [facility]?    Thanks,  KJ

## 2022-08-22 ENCOUNTER — TELEPHONE (OUTPATIENT)
Dept: ORTHOPEDICS | Facility: CLINIC | Age: 82
End: 2022-08-22
Payer: MEDICAID

## 2022-08-22 DIAGNOSIS — Z96.659 STATUS POST KNEE REPLACEMENT, UNSPECIFIED LATERALITY: Primary | ICD-10-CM

## 2022-08-22 NOTE — TELEPHONE ENCOUNTER
Pt called hotline, she requests HHPT. Informed pt that insurance may not cover it. Pt would like to try. Message will be forwarded to Nahomy SANTAMARIA as she returned her call on Friday. Pt pleased and verbalized understanding

## 2022-08-22 NOTE — PROGRESS NOTES
Pt wants to continue with home health PT. She understands that it may not be covered by insurance, but she would like to at least try. Orders entered.

## 2022-08-23 RX ORDER — POLYETHYLENE GLYCOL 3350 17 G/17G
17 POWDER, FOR SOLUTION ORAL DAILY
Qty: 90 PACKET | Refills: 3 | Status: SHIPPED | OUTPATIENT
Start: 2022-08-23 | End: 2023-10-12 | Stop reason: SDUPTHER

## 2022-08-29 ENCOUNTER — PATIENT MESSAGE (OUTPATIENT)
Dept: ADMINISTRATIVE | Facility: OTHER | Age: 82
End: 2022-08-29
Payer: MEDICAID

## 2022-08-29 ENCOUNTER — OFFICE VISIT (OUTPATIENT)
Dept: ORTHOPEDICS | Facility: CLINIC | Age: 82
End: 2022-08-29
Payer: MEDICARE

## 2022-08-29 VITALS — BODY MASS INDEX: 49.04 KG/M2 | WEIGHT: 243.25 LBS | HEIGHT: 59 IN

## 2022-08-29 DIAGNOSIS — Z96.651 S/P REVISION OF TOTAL KNEE, RIGHT: Primary | ICD-10-CM

## 2022-08-29 DIAGNOSIS — R52 PAIN: Primary | ICD-10-CM

## 2022-08-29 PROCEDURE — 99024 PR POST-OP FOLLOW-UP VISIT: ICD-10-PCS | Mod: POP,,, | Performed by: ORTHOPAEDIC SURGERY

## 2022-08-29 PROCEDURE — 99214 OFFICE O/P EST MOD 30 MIN: CPT | Mod: PBBFAC | Performed by: ORTHOPAEDIC SURGERY

## 2022-08-29 PROCEDURE — 99024 POSTOP FOLLOW-UP VISIT: CPT | Mod: POP,,, | Performed by: ORTHOPAEDIC SURGERY

## 2022-08-29 PROCEDURE — 99999 PR PBB SHADOW E&M-EST. PATIENT-LVL IV: ICD-10-PCS | Mod: PBBFAC,,, | Performed by: ORTHOPAEDIC SURGERY

## 2022-08-29 PROCEDURE — 99999 PR PBB SHADOW E&M-EST. PATIENT-LVL IV: CPT | Mod: PBBFAC,,, | Performed by: ORTHOPAEDIC SURGERY

## 2022-08-29 NOTE — PROGRESS NOTES
Duran Giordano is in for 3 month follow up for a  right revisionTKA.  She is doing  well.  No pain in the knee.  She is going back to PT for sttrengtheing  Exam demonstrates  A well developed female in no distress.  Alert and oriented.  Mood and affect are appropriate.    Knee incision is well healed.  ROM is 0-120.  The patella tracks well and there is no instability. The extremity is neurovascularly intact.        PROMIS-10 Questionnaire Scores 8/29/2022 6/22/2022 1/3/2022   Global Physical Health 14 13 13   Global Mental health Score 13 11 12   6 weeks with xrays.    KOOS Jr. Questionnaire Score 8/29/2022 6/22/2022 1/3/2022   KOOS Jr Score 13 18 10       Oxford Knee Questionnaire Score 8/29/2022 6/22/2022 1/3/2022   Oxford Knee Score 25 26 23       Knee Society and Function Score 8/29/2022 6/22/2022   FINDINGS - KNEE SOCIETY SCORE - 36   FINDINGS - KNEE SOCIETY FUNCTION SCORE 30 -10       Forgotton Joint Score 6/22/2022   In which leg do you have an artificial knee? Right Knee   Forgotten Joint Score Left Knee Error   In bed at night? Seldom   When sitting on a chair for more than one hour? Seldom   When you are walking for more than 15 minutes? Seldom   When taking a bath or shower? Almost never   When traveling in a car? Seldom   When climbing stairs? Almost never   When walking on uneven ground? Almost never   When standing up from a low-sitting position? Almost never   When standing for long periods of time? Seldom   When doing housework or gardening? Seldom   When taking a walk or hiking? Seldom   When doing your favorite sport? Never   Forgotten Joint Score Right Knee 62.5       Imp:Progressing..    More PT    F/u in 6 weeks with xrays

## 2022-09-08 ENCOUNTER — TELEPHONE (OUTPATIENT)
Dept: PRIMARY CARE CLINIC | Facility: CLINIC | Age: 82
End: 2022-09-08
Payer: MEDICAID

## 2022-09-08 NOTE — TELEPHONE ENCOUNTER
----- Message from Zena Contreras sent at 8/31/2022  9:05 AM CDT -----  Regarding: advice  Contact: [facility] [phone]  Type: Home Health (orders, updates, clarifications, etc.)    Home Health Agency/Nurse:    Phone number:    Reason for call: Patient has cellulitis on right lower with 2 blisters.  Patient was instructed to wear leg wraps but patient is concerned about   using it with  the blisters    Comments: Patient needs appointment with Dr. Erica Graham for foot care

## 2022-09-09 ENCOUNTER — TELEPHONE (OUTPATIENT)
Dept: PRIMARY CARE CLINIC | Facility: CLINIC | Age: 82
End: 2022-09-09
Payer: MEDICAID

## 2022-09-09 NOTE — TELEPHONE ENCOUNTER
----- Message from Vale Lin sent at 9/9/2022  8:37 AM CDT -----  Regarding: Chandrika/  Home Health Nurse/  [phone]  Type: Patient Call Back    Who called:  Chandrika    What is the request in detail:  Patient is experiencing right lower leg cellulitis and has 2 form blisters right above her ankles.  Patient is wanting to know if she can wear her Circadia.  It was reported 9/1, but no call back was given.  Thank you    Would the patient rather a call back or a response via [facility]?  Call back    Best call back number:  [phone]        Thank you

## 2022-09-09 NOTE — TELEPHONE ENCOUNTER
Please educate patient that she does NOT have cellulitis, that this is her ongoing chronic venous stasis. She definitely needs to wear circaids.    Is she wrapping her legs at this time? Or using any compression?    Thanks,  KJ

## 2022-09-09 NOTE — TELEPHONE ENCOUNTER
Spoke with nurse, Leah Houser.  Informed her that patient does not have cellulitis.  She has chronic venous stasis.  She will need to use things which will aid in circulation.     Instructed that things which can help with circulation include use of compression stockings, leg wraps, avoiding long rides, elevate legs  when sitting for long periods.   Ms. Houser states that patient has leg wrap which were previously prescribed by the doctor.  States that they will wrap her legs..Verbalized understanding and willingness to comply.  Stated will speak with patient.

## 2022-09-13 ENCOUNTER — TELEPHONE (OUTPATIENT)
Dept: PRIMARY CARE CLINIC | Facility: CLINIC | Age: 82
End: 2022-09-13
Payer: MEDICAID

## 2022-09-13 ENCOUNTER — OFFICE VISIT (OUTPATIENT)
Dept: PRIMARY CARE CLINIC | Facility: CLINIC | Age: 82
End: 2022-09-13
Payer: MEDICARE

## 2022-09-13 DIAGNOSIS — I87.2 VENOUS STASIS DERMATITIS OF BOTH LOWER EXTREMITIES: ICD-10-CM

## 2022-09-13 PROCEDURE — 99443 PR PHYSICIAN TELEPHONE EVALUATION 21-30 MIN: ICD-10-PCS | Mod: 95,,, | Performed by: INTERNAL MEDICINE

## 2022-09-13 PROCEDURE — 99443 PR PHYSICIAN TELEPHONE EVALUATION 21-30 MIN: CPT | Mod: 95,,, | Performed by: INTERNAL MEDICINE

## 2022-09-13 NOTE — TELEPHONE ENCOUNTER
Pt calling to speak with Dr DAY regarding blisters on her leg. Pt did not give any other info due to her wanting to speak with Dr DAY herself. Pt stated that if she doesn't answer her cell phone to please give her a call on the home phone.        Home# [phone]

## 2022-09-13 NOTE — PROGRESS NOTES
"Established Patient - Audio Only Telehealth Visit with [facility] Provider  Dr Tami Schmidt     The patient location is: HOME  The chief complaint leading to consultation is: routine care  Visit type: Virtual visit with audio only (telephone)     The reason for the audio only service rather than synchronous audio and video virtual visit was related to technical difficulties or patient preference/necessity.     Each patient to whom I provide medical services by telemedicine is:  (1) informed of the relationship between the physician and patient and the respective role of any other health care provider with respect to management of the patient; and (2) notified that they may decline to receive medical services by telemedicine and may withdraw from such care at any time. Patient verbally consented to receive this service via voice-only telephone call.       Subjective:      Patient ID: Duran Giordano is a 82 y.o. female with knee complications, recent surgery, venous stasis ulcers    Prior to this visit, patient's last encounter with PCP was 6/24/2022. Patient called office on 9/13/22 wanting to speak only with PCP.     Patient has been in 3 rehab facilities, [facility] She is receiving PT HH until 10/17. She does not have a HH RN.    Patient complains of "two little bubbles". She accidentally burst one, and "the river was flowing."    HH PT is Chandrika [phone]          Objective:     Lab Results   Component Value Date    WBC 9.16 06/22/2022    HGB 10.6 (L) 06/22/2022    HCT 33.2 (L) 06/22/2022     06/22/2022    CHOL 160 03/04/2021    TRIG 88 03/04/2021    HDL 34 (L) 03/04/2021    ALT 11 07/01/2022    AST 20 07/01/2022     07/01/2022    K 4.8 07/01/2022     07/01/2022    CREATININE 1.3 07/01/2022    BUN 26 (H) 07/01/2022    CO2 18 (L) 07/01/2022    TSH 0.799 01/16/2020    INR 1.0 06/22/2022    HGBA1C 6.8 (H) 06/06/2022         Assessment:   82 y.o. female with multiple " co-morbid illnesses here to continue work-up of chronic issues.     Plan:     Problem List Items Addressed This Visit          Cardiac/Vascular    Venous stasis dermatitis of both lower extremities     LLE venous stasis ulcers. Difficulty with wound healing  Advised to restart her circaids  Needs wound care for the leg wounds  Needs compression wraps on top of wound care  PCP to call [facility] to add RN or PT to perform wound care and add leg wraps  HH PT is Chandrika [phone]         Relevant Orders    SUBSEQUENT HOME HEALTH ORDERS       Health Maintenance         Date Due Completion Date    TETANUS VACCINE Never done ---    Shingles Vaccine (3 of 3) 09/06/2021 7/12/2021    COVID-19 Vaccine (5 - Booster) 01/23/2022 9/23/2021    Eye Exam 02/03/2022 2/3/2021    Override on 2/7/2020: Done    Override on 2/17/2016: Done    Override on 1/22/2015: Done    Override on 8/16/2012: Done    DEXA Scan 02/22/2022 2/22/2018    Override on 12/13/2011: Done    Diabetes Urine Screening 03/04/2022 3/4/2021    Influenza Vaccine (1) 09/01/2022 10/15/2021    Override on 11/6/2019: Done ([facility]    Override on 10/7/2015: Done    Override on 10/3/2014: Done    Override on 10/10/2013: Done    Override on 9/13/2011: Done    Foot Exam 11/30/2022 11/30/2021    Override on 12/31/2019: Done (Done by Dr. Anand)    Override on 11/27/2018: Done (Dr Anand)    Override on 1/19/2018: Done (Dr anand)    Override on 5/31/2017: Done    Override on 7/20/2016: Done    Hemoglobin A1c 12/06/2022 6/6/2022    Override on 7/13/2016: Done    Lipid Panel 05/24/2023 5/24/2022            Follow up if symptoms worsen or fail to improve. Thirty minutes spent with this patient today.    Tami Schmidt MD/MPH  Internal Medicine  [facility]  [facility] [phone]    This service was not originating from a related E/M service provided within the previous 7 days nor will it  lead to an E/M service or procedure within the next 24 hours or my soonest available appointment.  Prevailing standard of care was able to be met in this audio-only visit.

## 2022-09-16 ENCOUNTER — TELEPHONE (OUTPATIENT)
Dept: PRIMARY CARE CLINIC | Facility: CLINIC | Age: 82
End: 2022-09-16

## 2022-09-19 ENCOUNTER — TELEPHONE (OUTPATIENT)
Dept: PRIMARY CARE CLINIC | Facility: CLINIC | Age: 82
End: 2022-09-19
Payer: MEDICAID

## 2022-09-19 NOTE — TELEPHONE ENCOUNTER
----- Message from Shakira Portillo sent at 9/19/2022 10:29 AM CDT -----  Contact: pt [phone]  Patient needs to schedule an appt     Blisters on right leg  Constipation    Also wants to know if she can wear Circaid with the blisters or does she need to be seen by wound care.     Please call and advise.    Thank You

## 2022-09-20 ENCOUNTER — OFFICE VISIT (OUTPATIENT)
Dept: PODIATRY | Facility: CLINIC | Age: 82
End: 2022-09-20
Payer: MEDICARE

## 2022-09-20 VITALS
HEIGHT: 59 IN | HEART RATE: 84 BPM | BODY MASS INDEX: 49.03 KG/M2 | DIASTOLIC BLOOD PRESSURE: 71 MMHG | WEIGHT: 243.19 LBS | SYSTOLIC BLOOD PRESSURE: 153 MMHG

## 2022-09-20 DIAGNOSIS — B35.1 ONYCHOMYCOSIS DUE TO DERMATOPHYTE: ICD-10-CM

## 2022-09-20 DIAGNOSIS — I89.0 LYMPHEDEMA: ICD-10-CM

## 2022-09-20 DIAGNOSIS — L84 CORN OR CALLUS: ICD-10-CM

## 2022-09-20 DIAGNOSIS — E11.51 TYPE II DIABETES MELLITUS WITH PERIPHERAL CIRCULATORY DISORDER: Primary | ICD-10-CM

## 2022-09-20 PROCEDURE — 11056 PARNG/CUTG B9 HYPRKR LES 2-4: CPT | Mod: Q8,S$PBB,, | Performed by: PODIATRIST

## 2022-09-20 PROCEDURE — 11056 PR TRIM BENIGN HYPERKERATOTIC SKIN LESION,2-4: ICD-10-PCS | Mod: Q8,S$PBB,, | Performed by: PODIATRIST

## 2022-09-20 PROCEDURE — 99499 NO LOS: ICD-10-PCS | Mod: S$PBB,,, | Performed by: PODIATRIST

## 2022-09-20 PROCEDURE — 99215 OFFICE O/P EST HI 40 MIN: CPT | Mod: PBBFAC | Performed by: PODIATRIST

## 2022-09-20 PROCEDURE — 11721 DEBRIDE NAIL 6 OR MORE: CPT | Mod: 59,Q8,S$PBB, | Performed by: PODIATRIST

## 2022-09-20 PROCEDURE — 99999 PR PBB SHADOW E&M-EST. PATIENT-LVL V: CPT | Mod: PBBFAC,,, | Performed by: PODIATRIST

## 2022-09-20 PROCEDURE — 99499 UNLISTED E&M SERVICE: CPT | Mod: S$PBB,,, | Performed by: PODIATRIST

## 2022-09-20 PROCEDURE — 11056 PARNG/CUTG B9 HYPRKR LES 2-4: CPT | Mod: Q8,59,PBBFAC | Performed by: PODIATRIST

## 2022-09-20 PROCEDURE — 11721 DEBRIDE NAIL 6 OR MORE: CPT | Mod: 59,Q8,PBBFAC | Performed by: PODIATRIST

## 2022-09-20 PROCEDURE — 11721 PR DEBRIDEMENT OF NAILS, 6 OR MORE: ICD-10-PCS | Mod: 59,Q8,S$PBB, | Performed by: PODIATRIST

## 2022-09-20 PROCEDURE — 99999 PR PBB SHADOW E&M-EST. PATIENT-LVL V: ICD-10-PCS | Mod: PBBFAC,,, | Performed by: PODIATRIST

## 2022-09-20 NOTE — PROGRESS NOTES
Subjective:      Patient ID: Duran Giordano is a 82 y.o. female.    Chief Complaint: Diabetes Mellitus (PCP - Tami Schmidt MD - 9/13/2022) and Nail Care    Durna Logan is a 82 y.o. female who presents to the clinic for evaluation and treatment of high risk feet. Duran Logan has a past medical history of Adrenal mass- adenoma stable since 2004 (1.8 cm and 8/13/12 (2 cm); stable 2016 2.4 cm, Arthritis, Asthma in adult without complication (6/25/2015), Bilateral carotid artery disease (7/21/2017), Bilateral sciatica (2/7/2017), Cellulitis of right leg (08/16/2017), Cerebral infarction (1/29/2016), Cervical radiculopathy (3/18/2015), Chronic knee pain, Chronic rhinitis (4/9/2013), CKD (chronic kidney disease) stage 3, GFR 30-59 ml/min (1/29/2013), Coronary artery disease due to calcified coronary lesion (6/25/2015), Deep vein thrombosis, Diastolic dysfunction (10/10/2013), Essential hypertension (6/25/2015), Gastroesophageal reflux disease without esophagitis (8/13/2012), Gout, Hives (10/1/2013), Mixed hyperlipidemia (8/13/2012), Morbid obesity with BMI of 50.0-59.9, adult (2/11/2014), Obstructive sleep apnea syndrome (8/13/2012), Senile cataracts of both eyes (1/22/2015), Traumatic open wound of right lower leg (8/25/2017), Trigeminal neuralgia of right side of face (5/23/2017), Type 2 diabetes mellitus with diabetic polyneuropathy, with long-term current use of insulin (1/29/2013), Venous stasis dermatitis of both lower extremities (8/21/2017), and Vitamin D deficiency disease (8/13/2012). The patient's chief complaint is  HRFC This patient has documented high risk feet requiring routine maintenance secondary to diabetes mellitis and those secondary complications of diabetes, as mentioned.    PCP: Tami Schmidt MD    Date Last Seen by PCP:   Chief Complaint   Patient presents with    Diabetes Mellitus     PCP - Tami Schmidt MD - 9/13/2022    Nail Care        Current shoe gear:  " Affected Foot: Casual shoes     Unaffected Foot: Casual shoes    Hemoglobin A1C   Date Value Ref Range Status   06/06/2022 6.8 (H) 4.0 - 5.6 % Final     Comment:     ADA Screening Guidelines:  5.7-6.4%  Consistent with prediabetes  >or=6.5%  Consistent with diabetes    High levels of fetal hemoglobin interfere with the HbA1C  assay. Heterozygous hemoglobin variants (HbS, HgC, etc)do  not significantly interfere with this assay.   However, presence of multiple variants may affect accuracy.     10/26/2021 8.1 (H) 4.0 - 5.6 % Final     Comment:     ADA Screening Guidelines:  5.7-6.4%  Consistent with prediabetes  >or=6.5%  Consistent with diabetes    High levels of fetal hemoglobin interfere with the HbA1C  assay. Heterozygous hemoglobin variants (HbS, HgC, etc)do  not significantly interfere with this assay.   However, presence of multiple variants may affect accuracy.     08/24/2021 8.2 (H) 4.0 - 5.6 % Final     Comment:     ADA Screening Guidelines:  5.7-6.4%  Consistent with prediabetes  >or=6.5%  Consistent with diabetes    High levels of fetal hemoglobin interfere with the HbA1C  assay. Heterozygous hemoglobin variants (HbS, HgC, etc)do  not significantly interfere with this assay.   However, presence of multiple variants may affect accuracy.         Review of Systems   Cardiovascular:  Positive for leg swelling.   Skin:  Positive for color change, dry skin, nail changes and unusual hair distribution. Negative for flushing, itching, rash and skin cancer.   Musculoskeletal:  Positive for arthritis and stiffness. Negative for back pain and falls.   Neurological:  Positive for numbness. Negative for paresthesias.         Objective:       Vitals:    09/20/22 0740   BP: (!) 153/71   Pulse: 84   Weight: 110.3 kg (243 lb 2.7 oz)   Height: 4' 11" (1.499 m)   PainSc: 0-No pain        Physical Exam  Vitals and nursing note reviewed.   Constitutional:       Appearance: She is well-developed.   Cardiovascular:      Pulses:  "          Dorsalis pedis pulses are 1+ on the right side and 1+ on the left side.        Posterior tibial pulses are 1+ on the right side and 1+ on the left side.      Comments: Dorsalis pedis and posterior tibial pulses are palpable bilaterally. Toes are cool to touch. Feet are warm proximally.There is decreased digital hair bilateral. Skin is atrophic, hyperpigmented, and moderately edematous.    Musculoskeletal:         General: No tenderness.      Right ankle: Normal.      Left ankle: Normal.      Right foot: No swelling, deformity or crepitus.      Left foot: No swelling, deformity or crepitus.      Comments: Adequate joint range of motion without pain, limitation, nor crepitation Bilateral feet and ankle joints. Muscle strength is 5/5 in all groups bilaterally.      TTP w/ redness and plantar L foot no signs of break in skin no ulceration. Pain to plantar medical tubercle of calcaneus . No flucutance   Lymphadenopathy:      Comments: B/l lymphedema    Skin:     General: Skin is warm and dry.      Coloration: Skin is not pale.      Findings: No abrasion, bruising, burn, erythema, lesion or rash.      Nails: There is no clubbing.      Comments: Nails x10 are elongated by  4-7mm's, thickened by 2-3 mm's, dystrophic, and are darkened in  coloration . Xerosis Bilaterally. No open lesions noted.    Hyperkeratotic tissue noted to distal hallux b/l       Skin thickened, leathery, svetlana blistering noted to legs.    Neurological:      Mental Status: She is alert and oriented to person, place, and time.      Comments: Decreased sharp/dull sensation bilateral feet.   Psychiatric:         Behavior: Behavior normal.             Assessment:       Encounter Diagnoses   Name Primary?    Type II diabetes mellitus with peripheral circulatory disorder Yes    Onychomycosis due to dermatophyte     Lymphedema     Corn or callus          Plan:       Duarn Logan was seen today for diabetes mellitus and nail care.    Diagnoses and all  orders for this visit:    Type II diabetes mellitus with peripheral circulatory disorder    Onychomycosis due to dermatophyte    Lymphedema  -     Ambulatory referral/consult to Physical/Occupational Therapy; Future    Corn or callus  - Patient was given written and verbal instructions regarding foot condition.  I counseled the patient on her conditions, their implications and medical management.    Shoe inspection. Diabetic Foot Education. Patient reminded of the importance of good nutrition and blood cummings gar control to help prevent podiatric complications of diabetes. Patient instructed on proper foot hygeine. We discussed wearing proper shoe gear, daily foot inspections, never walking without protective shoe gear, never putting sharp instruments to feet    - With patient's permission, nails were aggressively reduced and debrided x 10 to their soft tissue attachment mechanically and with electric , removing all offending nail and debris. Patient relates relief following the procedure. She will continue to monitor the areas daily, inspect her feet, wear protective shoe gear when ambulatory, moisturizer to maintain skin integrity and follow in this office in approximately 2-3 months, sooner p.r.n.    - After cleansing the  area w/ alcohol prep pad the above mentioned hyperkeratosis was trimmed utilizing No 15 scapel, to a smooth base with out incident. Patient tolerated this  well and reported comfort to the area x2    - discussed lymphedema treatments, referral to lymphedema clinic placed

## 2022-09-26 NOTE — ASSESSMENT & PLAN NOTE
LLE venous stasis ulcers. Difficulty with wound healing  · Advised to restart her circaids  · Needs wound care for the leg wounds  · Needs compression wraps on top of wound care  · PCP to call [facility] to add RN or PT to perform wound care and add leg wraps  ·  PT is Chandrika [phone]

## 2022-09-26 NOTE — PATIENT INSTRUCTIONS
TODAY:  - wound care and leg wraps need to get restarted  - we will communicate with HH to do that

## 2022-09-27 ENCOUNTER — EXTERNAL HOME HEALTH (OUTPATIENT)
Dept: HOME HEALTH SERVICES | Facility: HOSPITAL | Age: 82
End: 2022-09-27
Payer: MEDICARE

## 2022-10-01 ENCOUNTER — DOCUMENT SCAN (OUTPATIENT)
Dept: HOME HEALTH SERVICES | Facility: HOSPITAL | Age: 82
End: 2022-10-01
Payer: MEDICARE

## 2022-10-12 ENCOUNTER — OFFICE VISIT (OUTPATIENT)
Dept: ORTHOPEDICS | Facility: CLINIC | Age: 82
End: 2022-10-12
Payer: MEDICARE

## 2022-10-12 ENCOUNTER — HOSPITAL ENCOUNTER (OUTPATIENT)
Dept: RADIOLOGY | Facility: HOSPITAL | Age: 82
Discharge: HOME | End: 2022-10-12
Attending: ORTHOPAEDIC SURGERY
Payer: MEDICARE

## 2022-10-12 DIAGNOSIS — E66.01 MORBID OBESITY WITH BMI OF 45.0-49.9, ADULT: ICD-10-CM

## 2022-10-12 DIAGNOSIS — R52 PAIN: ICD-10-CM

## 2022-10-12 DIAGNOSIS — T84.50XS INFECTION OF PROSTHETIC JOINT, SEQUELA: Primary | ICD-10-CM

## 2022-10-12 PROCEDURE — 73562 XR KNEE ORTHO RIGHT: ICD-10-PCS | Mod: 26,RT,, | Performed by: RADIOLOGY

## 2022-10-12 PROCEDURE — 73562 X-RAY EXAM OF KNEE 3: CPT | Mod: 26,RT,, | Performed by: RADIOLOGY

## 2022-10-12 PROCEDURE — 99212 OFFICE O/P EST SF 10 MIN: CPT | Mod: S$PBB,,, | Performed by: ORTHOPAEDIC SURGERY

## 2022-10-12 PROCEDURE — 73560 XR KNEE ORTHO RIGHT: ICD-10-PCS | Mod: 26,LT,, | Performed by: RADIOLOGY

## 2022-10-12 PROCEDURE — 99213 OFFICE O/P EST LOW 20 MIN: CPT | Mod: PBBFAC | Performed by: ORTHOPAEDIC SURGERY

## 2022-10-12 PROCEDURE — 99999 PR PBB SHADOW E&M-EST. PATIENT-LVL III: ICD-10-PCS | Mod: PBBFAC,,, | Performed by: ORTHOPAEDIC SURGERY

## 2022-10-12 PROCEDURE — 99999 PR PBB SHADOW E&M-EST. PATIENT-LVL III: CPT | Mod: PBBFAC,,, | Performed by: ORTHOPAEDIC SURGERY

## 2022-10-12 PROCEDURE — 73560 X-RAY EXAM OF KNEE 1 OR 2: CPT | Mod: 26,LT,, | Performed by: RADIOLOGY

## 2022-10-12 PROCEDURE — 73560 X-RAY EXAM OF KNEE 1 OR 2: CPT | Mod: TC,59,LT

## 2022-10-12 PROCEDURE — 99212 PR OFFICE/OUTPT VISIT, EST, LEVL II, 10-19 MIN: ICD-10-PCS | Mod: S$PBB,,, | Performed by: ORTHOPAEDIC SURGERY

## 2022-10-12 NOTE — PROGRESS NOTES
Duran Giordano is in for 4 month follow up for a  right 2 stage revisionTKA.  She is doing  well.  No pain in the knee.   She is still getting home [facility] PT once a week  Exam demonstrates  A well developed female in no distress.  Alert and oriented.  Mood and affect are appropriate.    Knee incision is well healed.  ROM is 0-110.  The patella tracks well and there is no instability. The extremity is neurovascularly intact.    Xrays demonstrate a well fixed and positioned prosthesis.    PROMIS-10 Questionnaire Scores 8/29/2022 6/22/2022 1/3/2022   Global Physical Health 14 13 13   Global Mental health Score 13 11 12       KOOS Jr. Questionnaire Score 8/29/2022 6/22/2022 1/3/2022   KOOS Jr Score 13 18 10       Oxford Knee Questionnaire Score 8/29/2022 6/22/2022 1/3/2022   Oxford Knee Score 25 26 23       Knee Society and Function Score 8/29/2022 6/22/2022   FINDINGS - KNEE SOCIETY SCORE - 36   FINDINGS - KNEE SOCIETY FUNCTION SCORE 30 -10       Forgotton Joint Score 6/22/2022   In which leg do you have an artificial knee? Right Knee   Forgotten Joint Score Left Knee Error   In bed at night? Seldom   When sitting on a chair for more than one hour? Seldom   When you are walking for more than 15 minutes? Seldom   When taking a bath or shower? Almost never   When traveling in a car? Seldom   When climbing stairs? Almost never   When walking on uneven ground? Almost never   When standing up from a low-sitting position? Almost never   When standing for long periods of time? Seldom   When doing housework or gardening? Seldom   When taking a walk or hiking? Seldom   When doing your favorite sport? Never   Forgotten Joint Score Right Knee 62.5       Imp:Doing well from a right knee standpoint  Will check on status of oral antibiotics.  She has renal insufficiency.  F/u in 2 months with xrays

## 2022-10-17 ENCOUNTER — TELEPHONE (OUTPATIENT)
Dept: ORTHOPEDICS | Facility: CLINIC | Age: 82
End: 2022-10-17
Payer: MEDICARE

## 2022-10-17 DIAGNOSIS — Z96.659 STATUS POST KNEE REPLACEMENT, UNSPECIFIED LATERALITY: Primary | ICD-10-CM

## 2022-10-17 NOTE — TELEPHONE ENCOUNTER
----- Message from Charline Doll MA sent at 10/17/2022  2:13 PM CDT -----  Regarding: FW: REFERRAL/ORDERS  Contact: SHANNON Collazo/ [facility]    ----- Message -----  From: Riki Peterson  Sent: 10/17/2022   2:08 PM CDT  To: Darell STEELE Staff  Subject: REFERRAL/ORDERS                                  SHANNON Collazo/ [facility] stated need orders/referral for outpatient physical therapy Please send orders/referral to the following location:      [facility]  [address] [place] [address]  [address]  [place] [address]  Phone: [phone]    Pt is being discharged from [facility] today    Contact info   [phone] caller

## 2022-10-19 ENCOUNTER — TELEPHONE (OUTPATIENT)
Dept: ORTHOPEDICS | Facility: CLINIC | Age: 82
End: 2022-10-19
Payer: MEDICARE

## 2022-10-19 DIAGNOSIS — T84.50XD INFECTION OF PROSTHETIC JOINT, SUBSEQUENT ENCOUNTER: Primary | ICD-10-CM

## 2022-10-19 RX ORDER — DOXYCYCLINE HYCLATE 100 MG
100 TABLET ORAL 2 TIMES DAILY
Qty: 180 TABLET | Refills: 1 | Status: SHIPPED | OUTPATIENT
Start: 2022-10-19 | End: 2023-02-08 | Stop reason: SDUPTHER

## 2022-10-19 NOTE — TELEPHONE ENCOUNTER
----- Message from Jim No MA sent at 10/19/2022  1:40 PM CDT -----  Regarding: FW: Patient Call Back  Contact: Leah    ----- Message -----  From: Letha Del Angel, Patient Care Assistant  Sent: 10/19/2022   1:35 PM CDT  To: Darell STEELE Staff  Subject: Patient Call Back                                Leah rollins house nurse for pt is requesting a call back in regards to an antibiotic the pt states she was to continue taking. Leah is requesting to speak with staff in regards to this matter.    Pt @ [phone]

## 2022-10-19 NOTE — TELEPHONE ENCOUNTER
Returned call to patient. She says that she was supposed to be restarted on abx, but they aren't at the pharmacy. Discussed with Dr. Lozoya and Rx sent to pharmacy.

## 2022-10-27 ENCOUNTER — CLINICAL SUPPORT (OUTPATIENT)
Dept: REHABILITATION | Facility: HOSPITAL | Age: 82
End: 2022-10-27
Attending: PODIATRIST
Payer: MEDICARE

## 2022-10-27 DIAGNOSIS — I89.0 LYMPHEDEMA: ICD-10-CM

## 2022-10-27 DIAGNOSIS — I89.0 LYMPHEDEMA OF BOTH LOWER EXTREMITIES: Primary | ICD-10-CM

## 2022-10-27 PROCEDURE — 97166 OT EVAL MOD COMPLEX 45 MIN: CPT

## 2022-10-27 PROCEDURE — 97150 GROUP THERAPEUTIC PROCEDURES: CPT

## 2022-10-27 NOTE — PLAN OF CARE
[facility]  Occupational Therapy Initial Evaluation    Date: 10/27/2022  Name: Duran Giordano  Clinic Number: [id number]    Therapy Diagnosis:   Encounter Diagnoses   Name Primary?    Lymphedema     Lymphedema of both lower extremities Yes     Physician: Sonia Willard DPM    Physician Orders: OT Eval and Treat  Medical Diagnosis: lymphedema of both lower extremities  Evaluation Date: 10/27/2022  Insurance Authorization Period Expiration: 9/20/2022 - 9/20/2023  Plan of Care Certification Period: 10/27/2022-12/31/2022  Date of Return to MD: unknown  Visit # / Visits authorized: 1 / 1      Precautions:  Standard and Diabetes    Time In:9:30  Time Out: 10:30  Total Appointment Time (timed & untimed codes): 60 minutes    SUBJECTIVE     Date of Onset: 30 years ago  Prior Therapy: none reported    History of Current Condition/Mechanism of Injury: Duran Giordano is a 82 y.o. female who presents to [facility] for evaluation secondary to lymphedema. Patient was referred to therapy by Sonia Willard DPM , which is the patient's podiatrist. Patient reports 30 years of edema and many episodes of cellulitis requiring hospitilization, last recalled episode 2017. Patient has 2 little pimples that open up and ooze and turn into cellulitis on the RIGHT LOWER EXTREMITY. Patient has had wound care wrap the leg, and has had the unaboot.  Patient reports a Total Knee Replacement July 2022.Patient was accompanied to the evaluation by one of the young Sisters from her con[employer].   Patient states she has a circ-aid garment but it requires assistance to don and she has had it for about 4 years.    Falls: none reported    Occupation/Working presently: still an active member of the con[employer]    Functional Limitations/Social History:    Previous functional status includes: Independent with all ADLs.     Current Functional Status   Home/Living environment:  lives in a convent      Limitation of Functional Status as follows:   ADLs/IADLs:     - Feeding: independent    - Bathing: independent with set up    - Dressing/Grooming: requires assistance for socks and shoes    - Driving: n/a         Pain:  Functional Pain Scale Rating 0-10: Current 4/10, worst 5/10, best 2/10   Location: feet and lower legs  Description: Aching  Aggravating Factors: Standing  Easing Factors: lying down    Patient's Goals for Therapy: to get the legs a little smaller and get a velcro garment that she can don independently            Medical History:   Past Medical History:   Diagnosis Date    Adrenal mass- adenoma stable since 2004 (1.8 cm and 8/13/12 (2 cm); stable 2016 2.4 cm     Adrenal mass- adenoma stable since 2004 (1.8 cm and 8/13/12 (2 cm); stable 2016 2.4 cm    Arthritis     Asthma in adult without complication 6/25/2015    Bilateral carotid artery disease 7/21/2017    Bilateral sciatica 2/7/2017    Cellulitis of right leg 08/16/2017    Cerebral infarction 1/29/2016    Multiple areas of lacunar infarction. Stroke risk factors include HTN, DM2, Dyslipidemia.  Continue ASA 81mg/ Statin therapy I have encouraged 30 minutes of physical activity daily for 5 days a week. She has access to a pool so this should not be so jarring to her joints.     Cervical radiculopathy 3/18/2015    Chronic knee pain     Chronic rhinitis 4/9/2013    CKD (chronic kidney disease) stage 3, GFR 30-59 ml/min 1/29/2013    Coronary artery disease due to calcified coronary lesion 6/25/2015    Deep vein thrombosis     Diastolic dysfunction 10/10/2013    Essential hypertension 6/25/2015    Gastroesophageal reflux disease without esophagitis 8/13/2012    Gout     Hives 10/1/2013    Mixed hyperlipidemia 8/13/2012    Morbid obesity with BMI of 50.0-59.9, adult 2/11/2014    Obstructive sleep apnea syndrome 8/13/2012    Senile cataracts of both eyes 1/22/2015    Traumatic open wound of right lower leg 8/25/2017    Trigeminal  neuralgia of right side of face 5/23/2017    For years now Previously on Gabapentin, but caused constipation Dissipating over time Not related to intracranial abnormalities Possibly related to dental procedure    Type 2 diabetes mellitus with diabetic polyneuropathy, with long-term current use of insulin 1/29/2013    Venous stasis dermatitis of both lower extremities 8/21/2017    Vitamin D deficiency disease 8/13/2012       Surgical History:    has a past surgical history that includes Right total knee replacement; Joint replacement; Hysterectomy (1982); Injection of anesthetic agent around nerve (Right, 10/21/2021); Revision of knee arthroplasty (Right, 10/28/2021); Insertion of antibiotic spacer (Right, 10/28/2021); and Revision of knee arthroplasty (Right, 6/30/2022).    Medications:   has a current medication list which includes the following prescription(s): acetaminophen, albuterol, albuterol-ipratropium, allopurinol, amlodipine, atorvastatin, blood sugar diagnostic, cholecalciferol (vitamin d3), diclofenac sodium, doxycycline, fluticasone propionate, fluticasone-salmeterol 500-50 mcg/dose, gabapentin, tresiba flextouch u-100, lancets, nebulizer and compressor, oxycodone, pen needle, diabetic, polyethylene glycol, polyethylene glycol, ozempic, torsemide, and trolamine salicylate.    Allergies:   Review of patient's allergies indicates:   Allergen Reactions    Bactrim [sulfamethoxazole-trimethoprim]      Other reaction(s): up set stomach rash/hives    Azithromycin      Feels bad    Erythromycin Other (See Comments)     Other reaction(s): Unknown  Other reaction(s): Stomach upset    Gentamicin      Vaginal burning , itching     Levofloxacin Hives     Other reaction(s): nervousness    Shellfish containing products      Rash      Vancomycin Itching     Other reaction(s): Itching          OBJECTIVE     Patient arrived and required w/c to the back of the clinic. She ambulates 3 feet into the room with rolling  walker    Affected Areas: RLE and LLE  Refill: Day   Stemmer Sign: positive R and L  Shape: see media for photo  Tissue Texture: firm, pitting with prolonged pressure  Skin Integrity: closed wound, papillomas, and hyperkeratosis    ROM/Strength: functional for short distance walking and transfers    Girth Measurements (in centimeters)  LANDMARK LEFT LE  10/27/22 RIGHT LE  10/27/22 DIFF   at eval                           Floor+30cm 51.5 cm 52.5 cm +1.0 cm   Floor+20cm 50.0 cm 50.0 cm -- cm   Floor+10cm 37.0 cm 42.5 cm +5.5 cm   Pinky + 5cm 24.5 cm 26.5 cm +2.0 cm   Metatarsals 24.0 cm 24.5 cm +.5 cm       Treatment   Total Treatment time (time-based codes) separate from Evaluation: 15 minutes    Sister Duran Giordano received the treatments listed below:       Therapeutic activities to improve functional performance for 15  minutes, including:  Pt was educated in potential compression needs.  Demo of products including socks, garments, and Inelastic Velcro wraps.   Discussed cost/coverage and authorization per insurance with Durable Medical Equipment(DME) provider.  Compression require orders from referring provider and coverage or purchase of products from DME or self order.      Discussed wear schedule, don/doff, wash and management of products.  Size and compression class and AM/PM needs.      Product information provided.   Vendor list provided.    Informed insurance coverage of compression is per DME provider and typically Medicare and Medicare group plans may not cover cost beyond pair of standard sized knee high garments.   Commitment to attendance as well as commitment to securing compression needs is critical to edema management.        Patient Education and Home Exercises      Education provided:   1. Educated on definition of lymphedema.  2. Explained the Complete Decongestive Therapy protocol in depth  3. Educated on Phase 1 and 2 of protocol.  4. Reviewed treatment frequency and likely duration of  weeks  5.Contraindications for treatment.  6. Plan of care and goals.  7. Educated on home management protocols.     Home Exercises and Patient Education Provided  Education provided:   - Pt was educated in lymphedema etiology and management plans.  Pt was provided with written risk reductions and precautions for managing lymphedema.     Patient/Family Education: role of OT, goals for OT, scheduling/cancellations - pt verbalized understanding. Discussed insurance limitations with patient.      ASSESSMENT     Duran Giordano is a 82 y.o. female referred to outpatient occupational therapy and presents with a medical diagnosis of lymphedema secondary to venous insufficiency. Lymphedema, left untreated increases risk of infection, gait deviation causing ortho problems and poor body image. Patient presents with the following therapy deficits: lymphedema of bilateral lower limbs and demonstrates limitations as described in the chart below. Following medical record review it is determined that pt will benefit from complete decongestive therapy services for the treatment and management of this chronic condition. The following goals were discussed with the patient and patient is in agreement with them as to be addressed in the treatment plan. The patient's rehab potential is Fair.     Anticipated barriers to occupational therapy: 30 year history of the condition with no prior therapy, age  Pt has no cultural, educational or language barriers to learning provided.    Profile and History Assessment of Occupational Performance Level of Clinical Decision Making Complexity Score   Occupational Profile:   Duran Giordano is a 82 y.o. female who lives in the [place] and is currently employed Duran Giordano has difficulty with  ADLs and IADLs as listed previously, which  Affecting herdaily functional abilities.      Comorbidities:    has a past medical history of Adrenal mass- adenoma stable since 2004 (1.8 cm and 8/13/12 (2  cm); stable 2016 2.4 cm, Arthritis, Asthma in adult without complication, Bilateral carotid artery disease, Bilateral sciatica, Cellulitis of right leg, Cerebral infarction, Cervical radiculopathy, Chronic knee pain, Chronic rhinitis, CKD (chronic kidney disease) stage 3, GFR 30-59 ml/min, Coronary artery disease due to calcified coronary lesion, Deep vein thrombosis, Diastolic dysfunction, Essential hypertension, Gastroesophageal reflux disease without esophagitis, Gout, Hives, Mixed hyperlipidemia, Morbid obesity with BMI of 50.0-59.9, adult, Obstructive sleep apnea syndrome, Senile cataracts of both eyes, Traumatic open wound of right lower leg, Trigeminal neuralgia of right side of face, Type 2 diabetes mellitus with diabetic polyneuropathy, with long-term current use of insulin, Venous stasis dermatitis of both lower extremities, and Vitamin D deficiency disease.    Medical and Therapy History Review:   Expanded               Performance Deficits    Physical:  Muscle Power/Strength  Muscle Endurance  Edema    Cognitive:  No Deficits    Psychosocial:    No Deficits     Clinical Decision Making:  moderate    Assessment Process:  Detailed Assessments    Modification/Need for Assistance:  Not Necessary    Intervention Selection:  Several Treatment Options       moderate  Based on PMHX, co morbidities , data from assessments and functional level of assistance required with task and clinical presentation directly impacting function.       The following goals were discussed with the patient and patient is in agreement with them as to be addressed in the treatment plan.     Goals:   Short Term Goals for 3 weeks: (phase 1 of protocol)  Complete decongestion B LE- Ongoing 10/27/2022   Patient will be educated on lymphedema precautions and signs of infection. - Ongoing 10/27/2022   Patient will perform deep abdominal breathing TID- Ongoing 10/27/2022   Patient will tolerate daily activities with multilayered bandaging.-  Ongoing 10/27/2022   Appropriate compression garments to be ordered/delivered- Ongoing 10/27/2022      Long Term Goals for 6 weeks: (Phase 2 of goals)  Patient will be independent with home management of this chronic condition.  Patient to maria antonia/doff compression garment.   Patient will demonstrate compliance with all home management recommendations.  Patient will maintain reduction at monthly follow up appointments for 3 months     PLAN   Plan of Care Certification: 10/27/2022 to 12/31/22.     Outpatient Occupational Therapy 2 times weekly for 8 weeks to include the following interventions: Manual therapy/joint mobilizations, Edema Control, and assistance ordering long term compression garment.      Gislee Lora, SHERRY,LOTR      I CERTIFY THE NEED FOR THESE SERVICES FURNISHED UNDER THIS PLAN OF TREATMENT AND WHILE UNDER MY CARE  Physician's comments:      Physician's Signature: ___________________________________________________

## 2022-11-03 ENCOUNTER — CLINICAL SUPPORT (OUTPATIENT)
Dept: REHABILITATION | Facility: HOSPITAL | Age: 82
End: 2022-11-03
Attending: PODIATRIST
Payer: MEDICARE

## 2022-11-03 DIAGNOSIS — I89.0 LYMPHEDEMA OF BOTH LOWER EXTREMITIES: Primary | ICD-10-CM

## 2022-11-03 PROCEDURE — 97140 MANUAL THERAPY 1/> REGIONS: CPT

## 2022-11-03 NOTE — PROGRESS NOTES
[facility]  Occupational Therapy Treatment Note    Date: 11/3/2022  Name: Duran Giordano  Clinic Number: [id number]    Time In: 2:10  Time Out: 3:10  Total Billable Time: 60 minutes    Therapy Diagnosis:   Encounter Diagnosis   Name Primary?    Lymphedema of both lower extremities Yes     Physician: Sonia Willard DPM  Physician Orders: OT Eval and Treat    Evaluation Date: 10/27/2022  Insurance Authorization Period Expiration: 10/27/2023  Plan of Care Expiration: 12/31/22  Visit # / Visits authorized: 1 / 20    Precautions:  Standard and Diabetes    SUBJECTIVE     Pt reports: looking for and bringing all compression garments and socks she had at home with her.    Pain: 3/10  Location: bilateral lower legs     OBJECTIVE     Pt arrived wearing knee high sheer stockings.    Objective Measures updated at progress report unless specified.    Treatment     Sister Duran Giordano received the treatments listed below:     Manual therapy techniques were applied for 60 minutes, including:    MANUAL LYMPHATIC DRAINAGE (MLD):    While supine with LEs elevated stimulation at terminus, along GI region, B inguinal regions, drainage of entire R LE divina lower leg, ankle, and foot with return proximally,  Use of Aquaphor due to dryness. eucerin  Educated in self massage to abdominal areas, B inguinal areas, thigh, and remaining LE within reach.    MULTILAYERED BANDAGING:  issued supplies and bandaged R LE with cotton stockinette, komprex section dorsum of foot, 2 komprex wedges post malleoli, 2 komprex rolls ankle to knee, 1-8cm and 2- 10cm Durelast rolls foot to knee, to leave intact 12-24 hrs as tolerated, discontinue with any problems, return rolled bandages next session. Wash and wear schedules confirmed.      Patient Education and Home Exercises      Education provided:   1. Educated on cause of lymphedema.  2. Explained the Complete Decongestive Therapy protocol in depth  3. Educated on  Phase 1 and 2 of protocol.  4. Reviewed treatment frequency and likely duration of weeks  5. Precautions and contraindications for treatment.  6. Plan of care and goals.  7. Educated on home management protocols.        Assessment     Pt would continue to benefit from skilled OT.      Sister Duran Giordano is progressing well towards her goals and there are no updates to goals at this time. Pt prognosis is Good.     Pt will continue to benefit from skilled outpatient occupational therapy to address the deficits listed in the problem list on initial evaluation provide pt/family education and to maximize pt's level of independence in the home and community environment.     Pt's spiritual, cultural and educational needs considered and pt agreeable to plan of care and goals.    Anticipated barriers to occupational therapy: longevity of edema. immobility    Goals:   Short Term Goals for 3 weeks: (phase 1 of protocol)  1. Patient and/or caregiver will demonstrate understanding of lymphedema precautions to decrease the risk of infection and lymphedema exacerbation. - Ongoing 11/3/2022   2. Patient will tolerate daily activities wearing multilayered bandaging.- Ongoing 11/3/2022   3. Patient and/or caregiver will order/obtain appropriate compression garments- Ongoing 11/3/2022  4. Patient will experience a decrease in girth of affected areas of 2 cm- Ongoing 11/3/2022      Long Term Goals for 6 weeks: (Phase 2 of goals)  1. Patient and/or caregiver will be independent with donning and doffing of compression garments.  2. Patient and/or caregiver will be independent in self-bandaging and self MLD techniques.  3. Patient and/or caregiver will be independent with HEP and lymphedema management to help prevent edema relapse and reduce risk of infection.  4. Patient will experience a decreased in girth of affected areas of 4 cm      PLAN     Continue plan of care 2 times weekly for 8 weeks to include the following  interventions: Edema Control.    Gisele Lora OT, JOEL

## 2022-11-08 ENCOUNTER — CLINICAL SUPPORT (OUTPATIENT)
Dept: REHABILITATION | Facility: HOSPITAL | Age: 82
End: 2022-11-08
Attending: PODIATRIST
Payer: MEDICARE

## 2022-11-08 DIAGNOSIS — I89.0 LYMPHEDEMA OF BOTH LOWER EXTREMITIES: Primary | ICD-10-CM

## 2022-11-08 PROCEDURE — 97140 MANUAL THERAPY 1/> REGIONS: CPT

## 2022-11-08 NOTE — PROGRESS NOTES
[facility]  Occupational Therapy Treatment Note    Date: 11/8/2022  Name: Duran Giordano  [facility] Number: [id number]    Time In: 2:00  Time Out: 3:00  Total Billable Time: 60 minutes    Therapy Diagnosis:   Encounter Diagnosis   Name Primary?    Lymphedema of both lower extremities Yes     Physician: Sonia Willard DPM  Physician Orders: OT Eval and Treat    Evaluation Date: 10/27/2022  Insurance Authorization Period Expiration: 10/27/2023  Plan of Care Expiration: 12/31/22  Visit # / Visits authorized: 2 / 20    Precautions:  Standard and Diabetes    SUBJECTIVE     Pt reports: wearing circaid garments on both lower legs daily since last visit, and being surprised at how small the legs are looking.  Pain: 3/10  Location: bilateral lower legs     OBJECTIVE     Pt arrived wearing knee high sheer stockings over circaid compression garments.            Objective Measures updated at progress report unless specified.    Treatment     Sister Duran Giordano received the treatments listed below:     Manual therapy techniques were applied for 60 minutes, including:    MANUAL LYMPHATIC DRAINAGE (MLD):    While supine with LEs elevated stimulation at terminus, along GI region, B inguinal regions, drainage of entire R LE divina lower leg, ankle, and foot with return proximally.  There is firm pitting edema in the medial R thigh. Scooping and pumping techniques were performed.  The edema in the lower RIGHT LOWER EXTREMITY remains thick and pitting.  Deep thumb Chilkat techniques were used throughout the lower leg.   Use of Aquaphor due to dryness. Educated in self massage to abdominal areas, B inguinal areas, thigh, and remaining LE within reach.    MULTILAYERED BANDAGING:  Patient was compressed R LE with cotton stockinette, and circaid garment, to leave intact 12-24 hrs as tolerated, discontinue with any problems, return rolled bandages next session. Wash and wear schedules confirmed.      Patient, therapist and Sister who attended discussed adding Bioflect compression shorts to apply compression to the thighs, and circaid foot piece for edema over the dorsal foot while sleeping. Product information was shared and the Sisters will look into ordering.     Patient Education and Home Exercises      Education provided:   1. Educated on cause of lymphedema.  2. Explained the Complete Decongestive Therapy protocol in depth  3. Educated on Phase 1 and 2 of protocol.  4. Reviewed treatment frequency and likely duration of weeks  5. Precautions and contraindications for treatment.  6. Plan of care and goals.  7. Educated on home management protocols.        Assessment     Pt would continue to benefit from skilled OT.      Sister Duran Giordano is progressing well towards her goals and there are no updates to goals at this time. Pt prognosis is Good.     Pt will continue to benefit from skilled outpatient occupational therapy to address the deficits listed in the problem list on initial evaluation provide pt/family education and to maximize pt's level of independence in the home and community environment.     Pt's spiritual, cultural and educational needs considered and pt agreeable to plan of care and goals.    Anticipated barriers to occupational therapy: longevity of edema. immobility    Goals:   Short Term Goals for 3 weeks: (phase 1 of protocol)  1. Patient and/or caregiver will demonstrate understanding of lymphedema precautions to decrease the risk of infection and lymphedema exacerbation. - Ongoing 11/8/2022   2. Patient will tolerate daily activities wearing multilayered bandaging.- Ongoing 11/8/2022   3. Patient and/or caregiver will order/obtain appropriate compression garments- Ongoing 11/8/2022  4. Patient will experience a decrease in girth of affected areas of 2 cm- Ongoing 11/8/2022      Long Term Goals for 6 weeks: (Phase 2 of goals)  1. Patient and/or caregiver will be independent with  donning and doffing of compression garments.  2. Patient and/or caregiver will be independent in self-bandaging and self MLD techniques.  3. Patient and/or caregiver will be independent with HEP and lymphedema management to help prevent edema relapse and reduce risk of infection.  4. Patient will experience a decreased in girth of affected areas of 4 cm      PLAN     Continue plan of care 2 times weekly for 8 weeks to include the following interventions: Edema Control.    Gisele Lora OT, BROCKR

## 2022-11-10 ENCOUNTER — CLINICAL SUPPORT (OUTPATIENT)
Dept: REHABILITATION | Facility: HOSPITAL | Age: 82
End: 2022-11-10
Attending: PODIATRIST
Payer: MEDICARE

## 2022-11-10 DIAGNOSIS — I89.0 LYMPHEDEMA OF BOTH LOWER EXTREMITIES: Primary | ICD-10-CM

## 2022-11-10 PROCEDURE — 97140 MANUAL THERAPY 1/> REGIONS: CPT

## 2022-11-10 NOTE — PROGRESS NOTES
[facility]  Occupational Therapy Treatment Note    Date: 11/10/2022  Name: Duran Giordano  Clinic Number: [id number]    Time In: 2:10  Time Out: 3:10  Total Billable Time: 60 minutes    Therapy Diagnosis:   Encounter Diagnosis   Name Primary?    Lymphedema of both lower extremities Yes     Physician: Sonia Willard DPM  Physician Orders: OT Eval and Treat    Evaluation Date: 10/27/2022  Insurance Authorization Period Expiration: 10/27/2023  Plan of Care Expiration: 12/31/22  Visit # / Visits authorized:3/ 20    Precautions:  Standard and Diabetes    SUBJECTIVE     Pt reports: she donned the circaid garments herself this morning for the first time ever.  Pain: 3/10  Location: bilateral lower legs     OBJECTIVE     Pt arrived wearing knee high sheer stockings over circaid compression garments.      Objective Measures updated at progress report unless specified.    Treatment     Sister Duran Giordano received the treatments listed below:     Manual therapy techniques were applied for 60 minutes, including:    MANUAL LYMPHATIC DRAINAGE (MLD):    While supine with LEs elevated stimulation at terminus, along GI region, B inguinal regions, drainage of entire R LE divina lower leg, ankle, and foot with return proximally.  There is firm pitting edema in the medial R thigh. Scooping and pumping techniques were performed.  The edema in the lower RIGHT LOWER EXTREMITY remains thick and pitting.  Deep thumb [place] techniques were used throughout the lower leg.   Use of Aquaphor due to dryness. Educated in self massage to abdominal areas, B inguinal areas, thigh, and remaining LE within reach.    MULTILAYERED BANDAGING:  Patient was compressed R LE with cotton stockinette, and circaid garment, to leave intact 12-24 hrs as tolerated, discontinue with any problems.  Wash and wear schedules confirmed.     Patient, therapist  discussed ordering Bioflect compression shorts to apply compression to  the thighs, and circaid foot piece for edema over the dorsal foot while sleeping. Product information was shared and the Sisters will look into ordering.     Patient Education and Home Exercises      Education provided:   1. Educated on cause of lymphedema.  2. Explained the Complete Decongestive Therapy protocol in depth  3. Educated on Phase 1 and 2 of protocol.  4. Reviewed treatment frequency and likely duration of weeks  5. Precautions and contraindications for treatment.  6. Plan of care and goals.  7. Educated on home management protocols.        Assessment     Pt would continue to benefit from skilled OT.      Sister Duran Giordano is progressing well towards her goals and there are no updates to goals at this time. Pt prognosis is Good.     Pt will continue to benefit from skilled outpatient occupational therapy to address the deficits listed in the problem list on initial evaluation provide pt/family education and to maximize pt's level of independence in the home and community environment.     Pt's spiritual, cultural and educational needs considered and pt agreeable to plan of care and goals.    Anticipated barriers to occupational therapy: longevity of edema. immobility    Goals:   Short Term Goals for 3 weeks: (phase 1 of protocol)  1. Patient and/or caregiver will demonstrate understanding of lymphedema precautions to decrease the risk of infection and lymphedema exacerbation. - Ongoing 11/10/2022   2. Patient will tolerate daily activities wearing multilayered bandaging.- Ongoing 11/10/2022   3. Patient and/or caregiver will order/obtain appropriate compression garments- Ongoing 11/10/2022  4. Patient will experience a decrease in girth of affected areas of 2 cm- Ongoing 11/10/2022      Long Term Goals for 6 weeks: (Phase 2 of goals)  1. Patient and/or caregiver will be independent with donning and doffing of compression garments.  2. Patient and/or caregiver will be independent in self-bandaging  and self MLD techniques.  3. Patient and/or caregiver will be independent with HEP and lymphedema management to help prevent edema relapse and reduce risk of infection.  4. Patient will experience a decreased in girth of affected areas of 4 cm      PLAN     Continue plan of care 2 times weekly for 8 weeks to include the following interventions: Edema Control.    Gisele Lora OT, JOEL

## 2022-11-22 ENCOUNTER — CLINICAL SUPPORT (OUTPATIENT)
Dept: REHABILITATION | Facility: HOSPITAL | Age: 82
End: 2022-11-22
Attending: PODIATRIST
Payer: MEDICARE

## 2022-11-22 DIAGNOSIS — I89.0 LYMPHEDEMA OF BOTH LOWER EXTREMITIES: Primary | ICD-10-CM

## 2022-11-22 PROCEDURE — 97140 MANUAL THERAPY 1/> REGIONS: CPT

## 2022-11-22 NOTE — PROGRESS NOTES
[facility]  Occupational Therapy Treatment Note    Date: 11/22/2022  Name: Duran Giordano  Clinic Number: [id number]    Time In: 2:15  Time Out: 3:15  Total Billable Time: 60 minutes    Therapy Diagnosis:   Encounter Diagnosis   Name Primary?    Lymphedema of both lower extremities Yes     Physician: Sonia Willard DPM  Physician Orders: OT Eval and Treat    Evaluation Date: 10/27/2022  Insurance Authorization Period Expiration: 10/27/2023  Plan of Care Expiration: 12/31/22  Visit # / Visits authorized:4/ 20    Precautions:  Standard and Diabetes    SUBJECTIVE     Pt reports: she arrived at the wrong time last week and missed her appointment. Patient has been wearing the circaid garments, able to don herself .  Pain: 3/10  Location: bilateral lower legs     OBJECTIVE     Pt arrived wearing knee high sheer stockings.      Objective Measures updated at progress report unless specified.    Treatment     Sister Duran Giordano received the treatments listed below:     Manual therapy techniques were applied for 60 minutes, including:    MANUAL LYMPHATIC DRAINAGE (MLD):    While supine with LEs elevated stimulation at terminus, along GI region, B inguinal regions, drainage of entire R LE divina lower leg, ankle, and foot with return proximally.  There is firm pitting edema in the medial R thigh. Scooping and pumping techniques were performed.  The edema in the lower RIGHT LOWER EXTREMITY remains thick and pitting.  Deep thumb [place] techniques were used throughout the lower leg.   Use of Aquaphor due to dryness. Educated in self massage to abdominal areas, B inguinal areas, thigh, and remaining LE within reach.    MULTILAYERED BANDAGING:  Patient was compressed R LE with cotton stockinette, one roll of komprex and circaid garment, to leave intact 12-24 hrs as tolerated, discontinue with any problems.  The L LE also was bandaged in cotton stockinette, and circaid garment. Wash and wear  schedules confirmed.     Patient, therapist  discussed ordering Bioflect compression shorts to apply compression to the thighs, and circaid foot piece for edema over the dorsal foot while sleeping. Product information was shared and the Sisters will look into ordering.     Patient Education and Home Exercises      Education provided:   1. Educated on cause of lymphedema.  2. Explained the Complete Decongestive Therapy protocol in depth  3. Educated on Phase 1 and 2 of protocol.  4. Reviewed treatment frequency and likely duration of weeks  5. Precautions and contraindications for treatment.  6. Plan of care and goals.  7. Educated on home management protocols.        Assessment     Pt would continue to benefit from skilled OT.      Sister Duran Giordano is progressing well towards her goals and there are no updates to goals at this time. Pt prognosis is Good.     Pt will continue to benefit from skilled outpatient occupational therapy to address the deficits listed in the problem list on initial evaluation provide pt/family education and to maximize pt's level of independence in the home and community environment.     Pt's spiritual, cultural and educational needs considered and pt agreeable to plan of care and goals.    Anticipated barriers to occupational therapy: longevity of edema. immobility    Goals:   Short Term Goals for 3 weeks: (phase 1 of protocol)  1. Patient and/or caregiver will demonstrate understanding of lymphedema precautions to decrease the risk of infection and lymphedema exacerbation. - Ongoing 11/22/2022   2. Patient will tolerate daily activities wearing multilayered bandaging.- Ongoing 11/22/2022   3. Patient and/or caregiver will order/obtain appropriate compression garments- Ongoing 11/22/2022  4. Patient will experience a decrease in girth of affected areas of 2 cm- Ongoing 11/22/2022      Long Term Goals for 6 weeks: (Phase 2 of goals)  1. Patient and/or caregiver will be independent  with donning and doffing of compression garments.  2. Patient and/or caregiver will be independent in self-bandaging and self MLD techniques.  3. Patient and/or caregiver will be independent with HEP and lymphedema management to help prevent edema relapse and reduce risk of infection.  4. Patient will experience a decreased in girth of affected areas of 4 cm      PLAN     Continue plan of care 2 times weekly for 8 weeks to include the following interventions: Edema Control.    Gisele Lora OT, JOEL

## 2022-11-29 ENCOUNTER — CLINICAL SUPPORT (OUTPATIENT)
Dept: REHABILITATION | Facility: HOSPITAL | Age: 82
End: 2022-11-29
Attending: PODIATRIST
Payer: MEDICARE

## 2022-11-29 DIAGNOSIS — I89.0 LYMPHEDEMA OF BOTH LOWER EXTREMITIES: Primary | ICD-10-CM

## 2022-11-29 PROCEDURE — 97140 MANUAL THERAPY 1/> REGIONS: CPT

## 2022-11-29 NOTE — PROGRESS NOTES
[facility]  Occupational Therapy Treatment Note    Date: 11/29/2022  Name: Duran Giordano  Clinic Number: [id number]    Time In: 2:00  Time Out: 3:00  Total Billable Time: 60 minutes    Therapy Diagnosis:   Encounter Diagnosis   Name Primary?    Lymphedema of both lower extremities Yes     Physician: Sonia Willard DPM  Physician Orders: OT Eval and Treat    Evaluation Date: 10/27/2022  Insurance Authorization Period Expiration: 10/27/2023  Plan of Care Expiration: 12/31/22  Visit # / Visits authorized:5/ 20    Precautions:  Standard and Diabetes    SUBJECTIVE     Pt reports: Patient has been wearing the circaid garments, able to don herself .  Pain: 3/10  Location: bilateral lower legs     OBJECTIVE     Pt arrived wearing knee high sheer stockings over the circaid velcro garments.      Objective Measures updated at progress report unless specified.    Treatment     Sister Duran Giordano received the treatments listed below:     Manual therapy techniques were applied for 60 minutes, including:    MANUAL LYMPHATIC DRAINAGE (MLD):    While supine with LEs elevated stimulation at terminus, along GI region, B inguinal regions, drainage of entire R LE divina lower leg, ankle, and foot with return proximally.  There is firm pitting edema in the medial R thigh. Scooping and pumping techniques were performed.  The edema in the lower RIGHT LOWER EXTREMITY remains thick and pitting.  Deep thumb [place] techniques were used throughout the lower leg.   Use of Aquaphor due to dryness. Educated in self massage to abdominal areas, B inguinal areas, thigh, and remaining LE within reach.    MULTILAYERED BANDAGING:  Patient was compressed R LE with cotton stockinette,  and circaid garment. Wash and wear schedules confirmed.     Patient, therapist  discussed ordering Bioflect compression shorts to apply compression to the thighs, and circaid foot piece for edema over the dorsal foot while sleeping.  Product information was shared and the Sisters will look into ordering.     Patient Education and Home Exercises      Education provided:   1. Educated on cause of lymphedema.  2. Explained the Complete Decongestive Therapy protocol in depth  3. Educated on Phase 1 and 2 of protocol.  4. Reviewed treatment frequency and likely duration of weeks  5. Precautions and contraindications for treatment.  6. Plan of care and goals.  7. Educated on home management protocols.        Assessment     Pt would continue to benefit from skilled OT.      Sister Duran Giordano is progressing well towards her goals and there are no updates to goals at this time. Pt prognosis is Good.     Pt will continue to benefit from skilled outpatient occupational therapy to address the deficits listed in the problem list on initial evaluation provide pt/family education and to maximize pt's level of independence in the home and community environment.     Pt's spiritual, cultural and educational needs considered and pt agreeable to plan of care and goals.    Anticipated barriers to occupational therapy: longevity of edema. immobility    Goals:   Short Term Goals for 3 weeks: (phase 1 of protocol)  1. Patient and/or caregiver will demonstrate understanding of lymphedema precautions to decrease the risk of infection and lymphedema exacerbation. - Ongoing 11/29/2022   2. Patient will tolerate daily activities wearing multilayered bandaging.- Ongoing 11/29/2022   3. Patient and/or caregiver will order/obtain appropriate compression garments- Ongoing 11/29/2022  4. Patient will experience a decrease in girth of affected areas of 2 cm- Ongoing 11/29/2022      Long Term Goals for 6 weeks: (Phase 2 of goals)  1. Patient and/or caregiver will be independent with donning and doffing of compression garments.  2. Patient and/or caregiver will be independent in self-bandaging and self MLD techniques.  3. Patient and/or caregiver will be independent with HEP  and lymphedema management to help prevent edema relapse and reduce risk of infection.  4. Patient will experience a decreased in girth of affected areas of 4 cm      PLAN     Continue plan of care 2 times weekly for 8 weeks to include the following interventions: Edema Control.    Gisele Lora OT, BROCKR

## 2022-12-01 ENCOUNTER — CLINICAL SUPPORT (OUTPATIENT)
Dept: REHABILITATION | Facility: HOSPITAL | Age: 82
End: 2022-12-01
Attending: PODIATRIST
Payer: MEDICARE

## 2022-12-01 DIAGNOSIS — I89.0 LYMPHEDEMA OF BOTH LOWER EXTREMITIES: Primary | ICD-10-CM

## 2022-12-01 PROCEDURE — 97140 MANUAL THERAPY 1/> REGIONS: CPT

## 2022-12-01 NOTE — PROGRESS NOTES
[facility]  Occupational Therapy Treatment Note    Date: 12/1/2022  Name: Duran Giordano  [facility] Number: [id number]    Time In: 2:00  Time Out: 3:00  Total Billable Time: 60 minutes    Therapy Diagnosis:   Encounter Diagnosis   Name Primary?    Lymphedema of both lower extremities Yes     Physician: Sonia Willard DPM  Physician Orders: OT Eval and Treat    Evaluation Date: 10/27/2022  Insurance Authorization Period Expiration: 10/27/2023  Plan of Care Expiration: 12/31/22  Visit # / Visits authorized:5/ 20    Precautions:  Standard and Diabetes    SUBJECTIVE     Pt reports: Patient has been wearing the circaid garments, able to don herself .  Pain: 3/10  Location: bilateral lower legs     OBJECTIVE     Pt arrived wearing knee high sheer stockings over the circaid velcro garments.       Girth Measurements (in centimeters)  LANDMARK RIGHT LE  12/01/2022 RIGHT LE  10/27/22 DIFF  From eval                                          Floor+30cm 49.0 cm 52.5 cm -3.5cm   Floor+20cm 47.0 cm 50.0 cm -3.0cm   Floor+10cm 31.0 cm 42.5 cm -11.5cm   Pinky + 5cm 26.0 cm 26.5 cm -.5cm   Metatarsals 25.5 cm 24.5 cm +1.0 cm     The hard, raised red area on the distal R lower leg was measured.  The lateral raised area in 18cm high by 12cm wide. The medial raised area is 19 cm wide by 10 cm high.     Treatment     Sister Duran Giordano received the treatments listed below:     Manual therapy techniques were applied for 60 minutes, including:    MANUAL LYMPHATIC DRAINAGE (MLD):    While supine with LEs elevated stimulation at terminus, along GI region, B inguinal regions, drainage of entire R LE divina lower leg, ankle, and foot with return proximally.  There is notably less firm pitting edema in the medial R thigh. Scooping and pumping techniques were performed.  The edema in the lower RIGHT LOWER EXTREMITY remains thick and pitting.  Deep thumb [place] techniques were used throughout the lower leg.    Use of Aquaphor due to dryness.   MULTILAYERED BANDAGING:  Patient was compressed R LE with cotton stockinette,  and circaid garment. Wash and wear schedules confirmed.     Patient, therapist  discussed ordering Bioflect compression shorts to apply compression to the thighs, and circaid foot piece for edema over the dorsal foot while sleeping. Product information was shared and the Sisters will look into ordering.     Patient Education and Home Exercises      Education provided:   1. Educated on cause of lymphedema.  2. Explained the Complete Decongestive Therapy protocol in depth  3. Educated on Phase 1 and 2 of protocol.  4. Reviewed treatment frequency and likely duration of weeks  5. Precautions and contraindications for treatment.  6. Plan of care and goals.  7. Educated on home management protocols.        Assessment     Pt would continue to benefit from skilled OT.      Sister Duran Giordano is progressing well towards her goals and there are no updates to goals at this time. Pt prognosis is Good.     Pt will continue to benefit from skilled outpatient occupational therapy to address the deficits listed in the problem list on initial evaluation provide pt/family education and to maximize pt's level of independence in the home and community environment.     Pt's spiritual, cultural and educational needs considered and pt agreeable to plan of care and goals.    Anticipated barriers to occupational therapy: longevity of edema. immobility    Goals:   Short Term Goals for 3 weeks: (phase 1 of protocol)  1. Patient and/or caregiver will demonstrate understanding of lymphedema precautions to decrease the risk of infection and lymphedema exacerbation. - Met 12/1/2022   2. Patient will tolerate daily activities wearing multilayered bandaging/circaid garments- met 12/1/2022   3. Patient and/or caregiver will order/obtain appropriate compression garments- Ongoing 12/1/2022 awaiting ordering of compression leggings  for compression on the thighs.  4. Patient will experience a decrease in girth of affected areas of 2 cm- Ongoing 12/1/2022      Long Term Goals for 6 weeks: (Phase 2 of goals)  1. Patient and/or caregiver will be independent with donning and doffing of compression garments. Met 12/1/2022  2. Patient and/or caregiver will be independent in self-bandaging and self MLD techniques.  3. Patient and/or caregiver will be independent with HEP and lymphedema management to help prevent edema relapse and reduce risk of infection.  4. Patient will experience a decreased in girth of affected areas of 4 cm      PLAN     Continue plan of care 2 times weekly for 8 weeks to include the following interventions: Edema Control.    Gisele Lora OT, JOEL

## 2022-12-06 ENCOUNTER — CLINICAL SUPPORT (OUTPATIENT)
Dept: REHABILITATION | Facility: HOSPITAL | Age: 82
End: 2022-12-06
Attending: PODIATRIST
Payer: MEDICARE

## 2022-12-06 DIAGNOSIS — I89.0 LYMPHEDEMA OF BOTH LOWER EXTREMITIES: Primary | ICD-10-CM

## 2022-12-06 PROCEDURE — 97124 MASSAGE THERAPY: CPT

## 2022-12-06 NOTE — PROGRESS NOTES
[facility]  Occupational Therapy Treatment Note    Date: 12/6/2022  Name: Duran Giordano  Clinic Number: [id number]    Time In: 2:00  Time Out: 3:00  Total Billable Time: 60 minutes    Therapy Diagnosis:   Encounter Diagnosis   Name Primary?    Lymphedema of both lower extremities Yes     Physician: Sonia Willard DPM  Physician Orders: OT Eval and Treat    Evaluation Date: 10/27/2022  Insurance Authorization Period Expiration: 10/27/2023  Plan of Care Expiration: 12/31/22  Visit # / Visits authorized:6/ 20    Precautions:  Standard and Diabetes    SUBJECTIVE     Pt reports: Patient has been wearing the circaid garments, able to don herself, as well as tubigrip underlining.  Pain: 3/10  Location: bilateral lower legs     OBJECTIVE     Pt arrived wearing knee high sheer stockings over the circaid velcro garments.  No objective measurements taken this visit.  Treatment     Sister Duran Giordano received the treatments listed below:     Manual therapy techniques were applied for 60 minutes, including:    MANUAL LYMPHATIC DRAINAGE (MLD):    While supine with LEs elevated stimulation at terminus, along GI region, B inguinal regions, drainage of entire R LE divina lower leg, ankle, and foot with return proximally.  There is notably less firm pitting edema in the medial R thigh. Scooping and pumping techniques were performed.  The edema in the lower RIGHT LOWER EXTREMITY remains thick and pitting.  Deep thumb [place] techniques were used throughout the lower leg.   Use of Aquaphor due to dryness.   MULTILAYERED BANDAGING:  Patient was compressed R LE with cotton stockinette,  and circaid garment. Wash and wear schedules confirmed.     Patient, therapist  discussed ordering Bioflect compression shorts to apply compression to the thighs, and circaid foot piece for edema over the dorsal foot while sleeping. Product information was shared and the Sisters will look into ordering.     Patient  Education and Home Exercises      Education provided:   1. Educated on cause of lymphedema.  2. Explained the Complete Decongestive Therapy protocol in depth  3. Educated on Phase 1 and 2 of protocol.  4. Reviewed treatment frequency and likely duration of weeks  5. Precautions and contraindications for treatment.  6. Plan of care and goals.  7. Educated on home management protocols.        Assessment     Pt would continue to benefit from skilled OT.      Sister Duran Giordano is progressing well towards her goals and there are no updates to goals at this time. Pt prognosis is Good.     Pt will continue to benefit from skilled outpatient occupational therapy to address the deficits listed in the problem list on initial evaluation provide pt/family education and to maximize pt's level of independence in the home and community environment.     Pt's spiritual, cultural and educational needs considered and pt agreeable to plan of care and goals.    Anticipated barriers to occupational therapy: longevity of edema. immobility    Goals:   Short Term Goals for 3 weeks: (phase 1 of protocol)  1. Patient and/or caregiver will demonstrate understanding of lymphedema precautions to decrease the risk of infection and lymphedema exacerbation. - Met 12/6/2022   2. Patient will tolerate daily activities wearing multilayered bandaging/circaid garments- met 12/6/2022   3. Patient and/or caregiver will order/obtain appropriate compression garments- Ongoing 12/6/2022 awaiting ordering of compression leggings for compression on the thighs.  4. Patient will experience a decrease in girth of affected areas of 2 cm- Ongoing 12/6/2022      Long Term Goals for 6 weeks: (Phase 2 of goals)  1. Patient and/or caregiver will be independent with donning and doffing of compression garments. Met 12/1/2022  2. Patient and/or caregiver will be independent in self-bandaging and self MLD techniques.  3. Patient and/or caregiver will be independent  with HEP and lymphedema management to help prevent edema relapse and reduce risk of infection.  4. Patient will experience a decreased in girth of affected areas of 4 cm      PLAN     Continue plan of care 2 times weekly for 8 weeks to include the following interventions: Edema Control.    Gisele Lora OT, BROCKR

## 2022-12-13 ENCOUNTER — CLINICAL SUPPORT (OUTPATIENT)
Dept: REHABILITATION | Facility: HOSPITAL | Age: 82
End: 2022-12-13
Attending: PODIATRIST
Payer: MEDICARE

## 2022-12-13 DIAGNOSIS — I89.0 LYMPHEDEMA OF BOTH LOWER EXTREMITIES: Primary | ICD-10-CM

## 2022-12-13 PROCEDURE — 97140 MANUAL THERAPY 1/> REGIONS: CPT

## 2022-12-13 NOTE — PROGRESS NOTES
[facility]  Occupational Therapy Treatment Note    Date: 12/13/2022  Name: Duran Giordano  Clinic Number: [id number]    Time In: 2:00  Time Out: 3:00  Total Billable Time: 60 minutes    Therapy Diagnosis:   Encounter Diagnosis   Name Primary?    Lymphedema of both lower extremities Yes     Physician: Sonia Willard DPM  Physician Orders: OT Eval and Treat    Evaluation Date: 10/27/2022  Insurance Authorization Period Expiration: 10/27/2023  Plan of Care Expiration: 12/31/22  Visit # / Visits authorized:7/ 20    Precautions:  Standard and Diabetes    SUBJECTIVE     Pt reports: Patient has been wearing the circaid garments, able to don herself, as well as tubigrip underlining. Patient states she would love to get into her SAS shoes  Pain: 3/10  Location: bilateral lower legs     OBJECTIVE     Pt arrived wearing knee high sheer stockings over the circaid velcro garments.  No objective measurements taken this visit.  Treatment     Sister Duran Giordano received the treatments listed below:     Manual therapy techniques were applied for 60 minutes, including:    MANUAL LYMPHATIC DRAINAGE (MLD):    While supine with LEs elevated stimulation at terminus, along GI region, B inguinal regions, drainage of entire R LE divina lower leg, ankle, and foot with return proximally.  There is notably less firm pitting edema in the medial R thigh. Scooping and pumping techniques were performed.  The edema in the lower RIGHT LOWER EXTREMITY remains thick and pitting.  Deep thumb [place] techniques were used throughout the lower leg.   Use of Aquaphor due to dryness.   MULTILAYERED BANDAGING:  Patient was compressed R LE with cotton stockinette,  and circaid garment. Wash and wear schedules confirmed.   Patient's SAS shoes where then donned, using a shoe horn. The velcro closure strap does not reach to close, but the foot does fit in the shoes.    Patient, therapist  discussed ordering Bioflect  compression shorts to apply compression to the thighs, and circaid foot piece for edema over the dorsal foot while sleeping. Product information was shared and the Sisters will look into ordering.     Patient Education and Home Exercises      Education provided:   1. Educated on cause of lymphedema.  2. Explained the Complete Decongestive Therapy protocol in depth  3. Educated on Phase 1 and 2 of protocol.  4. Reviewed treatment frequency and likely duration of weeks  5. Precautions and contraindications for treatment.  6. Plan of care and goals.  7. Educated on home management protocols.        Assessment     Pt would continue to benefit from skilled OT.      Sister Duran Giordano is progressing well towards her goals and there are no updates to goals at this time. Pt prognosis is Good.     Pt will continue to benefit from skilled outpatient occupational therapy to address the deficits listed in the problem list on initial evaluation provide pt/family education and to maximize pt's level of independence in the home and community environment.     Pt's spiritual, cultural and educational needs considered and pt agreeable to plan of care and goals.    Anticipated barriers to occupational therapy: longevity of edema. immobility    Goals:   Short Term Goals for 3 weeks: (phase 1 of protocol)  1. Patient and/or caregiver will demonstrate understanding of lymphedema precautions to decrease the risk of infection and lymphedema exacerbation. - Met 12/13/2022   2. Patient will tolerate daily activities wearing multilayered bandaging/circaid garments- met 12/13/2022   3. Patient and/or caregiver will order/obtain appropriate compression garments- Ongoing 12/13/2022 awaiting ordering of compression leggings for compression on the thighs.  4. Patient will experience a decrease in girth of affected areas of 2 cm- Ongoing 12/13/2022      Long Term Goals for 6 weeks: (Phase 2 of goals)  1. Patient and/or caregiver will be  independent with donning and doffing of compression garments. Met 12/1/2022  2. Patient and/or caregiver will be independent in self-bandaging and self MLD techniques.  3. Patient and/or caregiver will be independent with HEP and lymphedema management to help prevent edema relapse and reduce risk of infection.  4. Patient will experience a decreased in girth of affected areas of 4 cm      PLAN     Continue plan of care for one final therapy visit  to include the following interventions: Edema Control and reassessment.    Gisele Lora OT, JOEL

## 2022-12-15 ENCOUNTER — CLINICAL SUPPORT (OUTPATIENT)
Dept: REHABILITATION | Facility: HOSPITAL | Age: 82
End: 2022-12-15
Attending: PODIATRIST
Payer: MEDICARE

## 2022-12-15 DIAGNOSIS — I89.0 LYMPHEDEMA OF BOTH LOWER EXTREMITIES: Primary | ICD-10-CM

## 2022-12-15 PROCEDURE — 97140 MANUAL THERAPY 1/> REGIONS: CPT

## 2022-12-15 NOTE — PROGRESS NOTES
[facility] OUTPATIENT THERAPY AND WELLNESS  Occupational Therapy Treatment Note/ Discharge Note    Date: 12/15/2022  Name: Duran Giordano  Clinic Number: [id number]    Time In: 2:00  Time Out: 3:00  Total Billable Time: 60 minutes    Therapy Diagnosis:   Encounter Diagnosis   Name Primary?    Lymphedema of both lower extremities Yes     Physician: Sonia Willard DPM  Physician Orders: OT Eval and Treat    Evaluation Date: 10/27/2022  Insurance Authorization Period Expiration: 10/27/2023  Plan of Care Expiration: 12/31/22  Visit # / Visits authorized:8/ 20    Precautions:  Standard and Diabetes    SUBJECTIVE     Pt reports: Patient has been wearing the circaid garments, able to don herself, every day until today. Patient states she had a hectic morning and was running late for prayers and didn't take the time to put them on.  Patient states she has not yet, but does plan to order a second set of circaid garments as well as a pair of Bioflect compression leggings to wear for home management.  Pain: 3/10  Location: bilateral lower legs     OBJECTIVE     Pt arrived wearing knee high sheer stockings over the circaid velcro garments.       Girth Measurements (in centimeters)  LANDMARK RIGHT LE  12/15/2022     change LEFT LE  12/15/2022     change                                       Floor+30cm 53.5 cm   +1.0cm 57.0cm  +5.5cm    Floor+20cm 51.5cm  +1.5cm 55.0 cm  +5.0cm    Floor+10cm 35.5cm  -7.0cm 38.5cm  +1.5 cm      Pinky + 5cm 27.0 cm  +.5cm 23.5 cm  -1.0cm    Metatarsals 25.5 cm   +1.0cm 25.5 cm  +1.5cm      The hard, raised red area on the distal R lower leg was measured.  The lateral raised area in 16cm high(-2.0cm) by 9cm wide.(-3.0cm) The medial raised area is 15 cm wide(-4.0cm) by 10 cm high.     Treatment     Sister Duran Giordano received the treatments listed below:     Manual therapy techniques were applied for 60 minutes, including:    MANUAL LYMPHATIC DRAINAGE (MLD):    While supine with LEs  elevated stimulation at terminus, along GI region, B inguinal regions, drainage of entire R LE divina lower leg, ankle, and foot with return proximally.  There is notably less firm pitting edema in the medial R thigh. Scooping and pumping techniques were performed.  The edema in the lower RIGHT LOWER EXTREMITY remains thick and pitting.  Deep thumb [place] techniques were used throughout the lower leg.   Use of Aquaphor due to dryness.   MULTILAYERED BANDAGING:  Patient did not bring her circaid garment to clinic this date. Her regular pantyhose were applied.   Patient, therapist  reviewed the benefit of ordering Bioflect compression shorts to apply compression to the thighs, and circaid foot piece for edema over the dorsal foot while sleeping. Product information was shared and the Sisters will look into ordering.     Patient Education and Home Exercises      Education provided:   1. Educated on cause of lymphedema.  2. Explained the Complete Decongestive Therapy protocol in depth  3. Educated on Phase 1 and 2 of protocol.  4. Reviewed treatment frequency and likely duration of weeks  5. Precautions and contraindications for treatment.  6. Plan of care and goals.  7. Educated on home management protocols.        Assessment     Pt would continue to benefit from skilled OT.      Sister Duran Giordano is progressing well towards her goals and there are no updates to goals at this time. Pt prognosis was good.    Pt  has completed 8 visits, She is independent in home self management and independent in donning circ aid compression garments.She did not wear her compression today, and the legs are measurably larger. This was discussed with patient and she verbalized understanding the need to wear her compression daily.   Pt's spiritual, cultural and educational needs considered and pt agreeable to plan of care and goals.    Anticipated barriers to occupational therapy: longevity of edema. immobility    Goals:   Short Term  Goals for 3 weeks: (phase 1 of protocol)  1. Patient and/or caregiver will demonstrate understanding of lymphedema precautions to decrease the risk of infection and lymphedema exacerbation. - Met 12/15/2022   2. Patient will tolerate daily activities wearing multilayered bandaging/circaid garments- met 12/15/2022   3. Patient and/or caregiver will order/obtain appropriate compression garments- Ongoing 12/15/2022 awaiting ordering of compression leggings for compression on the thighs.  4. Patient will experience a decrease in girth of affected areas of 2 cm- not met12/15/2022      Long Term Goals for 6 weeks: (Phase 2 of goals)  1. Patient and/or caregiver will be independent with donning and doffing of compression garments. Met 12/1/2022  2. Patient and/or caregiver will be independent in self-bandaging and self MLD techniques.N/A as compression garments are available.  3. Patient and/or caregiver will be independent with HEP and lymphedema management to help prevent edema relapse and reduce risk of infection. Met 12/15/2022  4. Patient will experience a decreased in girth of affected areas of 4 cm Met RIGHT LOWER EXTREMITY 10cm from floor only.      PLAN     Patient is going out of town this week for the holiday. Her plan of care expires prior to her return. Patient is independent in home management, and is discharged from outpatient therapy at this time.     Gisele Lora OT, JOEL

## 2022-12-20 ENCOUNTER — OFFICE VISIT (OUTPATIENT)
Dept: PODIATRY | Facility: CLINIC | Age: 82
End: 2022-12-20
Payer: MEDICARE

## 2022-12-20 VITALS — WEIGHT: 243 LBS | BODY MASS INDEX: 48.99 KG/M2 | HEIGHT: 59 IN

## 2022-12-20 DIAGNOSIS — B35.1 ONYCHOMYCOSIS DUE TO DERMATOPHYTE: ICD-10-CM

## 2022-12-20 DIAGNOSIS — E11.51 DIABETES MELLITUS WITH PERIPHERAL VASCULAR DISEASE: Primary | ICD-10-CM

## 2022-12-20 DIAGNOSIS — L84 CORN OR CALLUS: ICD-10-CM

## 2022-12-20 PROCEDURE — 99999 PR PBB SHADOW E&M-EST. PATIENT-LVL II: ICD-10-PCS | Mod: PBBFAC,,, | Performed by: PODIATRIST

## 2022-12-20 PROCEDURE — 11721 DEBRIDE NAIL 6 OR MORE: CPT | Mod: 59,Q8,S$PBB, | Performed by: PODIATRIST

## 2022-12-20 PROCEDURE — 11056 PARNG/CUTG B9 HYPRKR LES 2-4: CPT | Mod: Q8,S$PBB,, | Performed by: PODIATRIST

## 2022-12-20 PROCEDURE — 99999 PR PBB SHADOW E&M-EST. PATIENT-LVL II: CPT | Mod: PBBFAC,,, | Performed by: PODIATRIST

## 2022-12-20 PROCEDURE — 99212 OFFICE O/P EST SF 10 MIN: CPT | Mod: PBBFAC,25 | Performed by: PODIATRIST

## 2022-12-20 PROCEDURE — 11721 PR DEBRIDEMENT OF NAILS, 6 OR MORE: ICD-10-PCS | Mod: 59,Q8,S$PBB, | Performed by: PODIATRIST

## 2022-12-20 PROCEDURE — 99499 NO LOS: ICD-10-PCS | Mod: S$PBB,,, | Performed by: PODIATRIST

## 2022-12-20 PROCEDURE — 11056 PR TRIM BENIGN HYPERKERATOTIC SKIN LESION,2-4: ICD-10-PCS | Mod: Q8,S$PBB,, | Performed by: PODIATRIST

## 2022-12-20 PROCEDURE — 11721 DEBRIDE NAIL 6 OR MORE: CPT | Mod: Q8,PBBFAC | Performed by: PODIATRIST

## 2022-12-20 PROCEDURE — 99499 UNLISTED E&M SERVICE: CPT | Mod: S$PBB,,, | Performed by: PODIATRIST

## 2022-12-20 PROCEDURE — 11056 PARNG/CUTG B9 HYPRKR LES 2-4: CPT | Mod: Q8,59,PBBFAC | Performed by: PODIATRIST

## 2022-12-20 NOTE — PROGRESS NOTES
Subjective:      Patient ID: Duran Giordano is a 82 y.o. female.    Chief Complaint: Diabetes Mellitus and Nail Care      Duran Logan is a 82 y.o. female who presents to the clinic for evaluation and treatment of high risk feet. Duran Logan has a past medical history of Adrenal mass- adenoma stable since 2004 (1.8 cm and 8/13/12 (2 cm); stable 2016 2.4 cm, Arthritis, Asthma in adult without complication (6/25/2015), Bilateral carotid artery disease (7/21/2017), Bilateral sciatica (2/7/2017), Cellulitis of right leg (08/16/2017), Cerebral infarction (1/29/2016), Cervical radiculopathy (3/18/2015), Chronic knee pain, Chronic rhinitis (4/9/2013), CKD (chronic kidney disease) stage 3, GFR 30-59 ml/min (1/29/2013), Coronary artery disease due to calcified coronary lesion (6/25/2015), Deep vein thrombosis, Diastolic dysfunction (10/10/2013), Essential hypertension (6/25/2015), Gastroesophageal reflux disease without esophagitis (8/13/2012), Gout, Hives (10/1/2013), Mixed hyperlipidemia (8/13/2012), Morbid obesity with BMI of 50.0-59.9, adult (2/11/2014), Obstructive sleep apnea syndrome (8/13/2012), Senile cataracts of both eyes (1/22/2015), Traumatic open wound of right lower leg (8/25/2017), Trigeminal neuralgia of right side of face (5/23/2017), Type 2 diabetes mellitus with diabetic polyneuropathy, with long-term current use of insulin (1/29/2013), Venous stasis dermatitis of both lower extremities (8/21/2017), and Vitamin D deficiency disease (8/13/2012). The patient's chief complaint is  HRFC This patient has documented high risk feet requiring routine maintenance secondary to diabetes mellitis and those secondary complications of diabetes, as mentioned.    PCP: Tami Schmidt MD    Date Last Seen by PCP:   Chief Complaint   Patient presents with    Diabetes Mellitus    Nail Care        Current shoe gear:  Affected Foot: Casual shoes     Unaffected Foot: Casual shoes    Hemoglobin A1C   Date Value Ref  "Range Status   06/06/2022 6.8 (H) 4.0 - 5.6 % Final     Comment:     ADA Screening Guidelines:  5.7-6.4%  Consistent with prediabetes  >or=6.5%  Consistent with diabetes    High levels of fetal hemoglobin interfere with the HbA1C  assay. Heterozygous hemoglobin variants (HbS, HgC, etc)do  not significantly interfere with this assay.   However, presence of multiple variants may affect accuracy.     10/26/2021 8.1 (H) 4.0 - 5.6 % Final     Comment:     ADA Screening Guidelines:  5.7-6.4%  Consistent with prediabetes  >or=6.5%  Consistent with diabetes    High levels of fetal hemoglobin interfere with the HbA1C  assay. Heterozygous hemoglobin variants (HbS, HgC, etc)do  not significantly interfere with this assay.   However, presence of multiple variants may affect accuracy.     08/24/2021 8.2 (H) 4.0 - 5.6 % Final     Comment:     ADA Screening Guidelines:  5.7-6.4%  Consistent with prediabetes  >or=6.5%  Consistent with diabetes    High levels of fetal hemoglobin interfere with the HbA1C  assay. Heterozygous hemoglobin variants (HbS, HgC, etc)do  not significantly interfere with this assay.   However, presence of multiple variants may affect accuracy.         Review of Systems   Cardiovascular:  Positive for leg swelling.   Skin:  Positive for color change, dry skin, nail changes and unusual hair distribution. Negative for flushing, itching, rash and skin cancer.   Musculoskeletal:  Positive for arthritis and stiffness. Negative for back pain and falls.   Neurological:  Positive for numbness. Negative for paresthesias.   Allergic/Immunologic: Negative for hives.         Objective:       Vitals:    12/20/22 1040   Weight: 110.2 kg (243 lb)   Height: 4' 11" (1.499 m)   PainSc: 0-No pain        Physical Exam  Vitals and nursing note reviewed.   Constitutional:       Appearance: She is well-developed.   Cardiovascular:      Pulses:           Dorsalis pedis pulses are 1+ on the right side and 1+ on the left side.        " Posterior tibial pulses are 1+ on the right side and 1+ on the left side.      Comments: Dorsalis pedis and posterior tibial pulses are palpable bilaterally. Toes are cool to touch. Feet are warm proximally.There is decreased digital hair bilateral. Skin is atrophic, hyperpigmented, and moderately edematous.    Musculoskeletal:         General: No tenderness.      Right ankle: Normal.      Left ankle: Normal.      Right foot: No swelling, deformity or crepitus.      Left foot: No swelling, deformity or crepitus.      Comments: Adequate joint range of motion without pain, limitation, nor crepitation Bilateral feet and ankle joints. Muscle strength is 5/5 in all groups bilaterally.      TTP w/ redness and plantar L foot no signs of break in skin no ulceration. Pain to plantar medical tubercle of calcaneus . No flucutance   Lymphadenopathy:      Comments: B/l lymphedema    Skin:     General: Skin is warm and dry.      Coloration: Skin is not pale.      Findings: No abrasion, bruising, burn, erythema, lesion or rash.      Nails: There is no clubbing.      Comments: Nails x10 are elongated by  5-7mm's, thickened by 2-3 mm's, dystrophic, and are darkened in  coloration . Xerosis Bilaterally. No open lesions noted.    Hyperkeratotic tissue noted to distal hallux b/l       Skin thickened, leathery, svetlana blistering noted to legs.    Neurological:      Mental Status: She is alert and oriented to person, place, and time.      Comments: Decreased sharp/dull sensation bilateral feet.   Psychiatric:         Behavior: Behavior normal.             Assessment:       Encounter Diagnoses   Name Primary?    Diabetes mellitus with peripheral vascular disease Yes    Onychomycosis due to dermatophyte     Corn or callus          Plan:       Duran Logan was seen today for diabetes mellitus and nail care.    Diagnoses and all orders for this visit:    Diabetes mellitus with peripheral vascular disease    Onychomycosis due to  dermatophyte    Corn or callus  - Patient was given written and verbal instructions regarding foot condition.  I counseled the patient on her conditions, their implications and medical management.    Shoe inspection. Diabetic Foot Education. Patient reminded of the importance of good nutrition and blood cummings gar control to help prevent podiatric complications of diabetes. Patient instructed on proper foot hygeine. We discussed wearing proper shoe gear, daily foot inspections, never walking without protective shoe gear, never putting sharp instruments to feet    - With patient's permission, nails were aggressively reduced and debrided x 10 to their soft tissue attachment mechanically and with electric , removing all offending nail and debris. Patient relates relief following the procedure. She will continue to monitor the areas daily, inspect her feet, wear protective shoe gear when ambulatory, moisturizer to maintain skin integrity and follow in this office in approximately 2-3 months, sooner p.r.n.    - After cleansing the  area w/ alcohol prep pad the above mentioned hyperkeratosis was trimmed utilizing No 15 scapel, to a smooth base with out incident. Patient tolerated this  well and reported comfort to the area x2

## 2023-01-19 ENCOUNTER — TELEPHONE (OUTPATIENT)
Dept: RHEUMATOLOGY | Facility: CLINIC | Age: 83
End: 2023-01-19
Payer: MEDICARE

## 2023-01-19 DIAGNOSIS — M25.561 RIGHT KNEE PAIN, UNSPECIFIED CHRONICITY: Primary | ICD-10-CM

## 2023-01-19 NOTE — TELEPHONE ENCOUNTER
Called to inform patient of new appt 2/16 at 8am. Left voicemail requesting patient contact us with any questions regarding her new appt. Mailed new appointment to patient.     Thank you,   Jayde

## 2023-02-08 ENCOUNTER — HOSPITAL ENCOUNTER (OUTPATIENT)
Dept: RADIOLOGY | Facility: HOSPITAL | Age: 83
Discharge: HOME | End: 2023-02-08
Attending: ORTHOPAEDIC SURGERY
Payer: MEDICARE

## 2023-02-08 ENCOUNTER — OFFICE VISIT (OUTPATIENT)
Dept: ORTHOPEDICS | Facility: CLINIC | Age: 83
End: 2023-02-08
Payer: MEDICARE

## 2023-02-08 VITALS — WEIGHT: 253.5 LBS | HEIGHT: 59 IN | BODY MASS INDEX: 51.11 KG/M2

## 2023-02-08 DIAGNOSIS — E66.01 MORBID OBESITY WITH BODY MASS INDEX (BMI) GREATER THAN OR EQUAL TO 50: ICD-10-CM

## 2023-02-08 DIAGNOSIS — M17.12 PRIMARY OSTEOARTHRITIS OF LEFT KNEE: Primary | ICD-10-CM

## 2023-02-08 DIAGNOSIS — T84.50XD INFECTION OF PROSTHETIC JOINT, SUBSEQUENT ENCOUNTER: ICD-10-CM

## 2023-02-08 DIAGNOSIS — M25.561 RIGHT KNEE PAIN, UNSPECIFIED CHRONICITY: ICD-10-CM

## 2023-02-08 PROCEDURE — 73560 X-RAY EXAM OF KNEE 1 OR 2: CPT | Mod: 26,LT,, | Performed by: RADIOLOGY

## 2023-02-08 PROCEDURE — 99999 PR PBB SHADOW E&M-EST. PATIENT-LVL II: CPT | Mod: PBBFAC,,, | Performed by: ORTHOPAEDIC SURGERY

## 2023-02-08 PROCEDURE — 73562 X-RAY EXAM OF KNEE 3: CPT | Mod: 26,RT,, | Performed by: RADIOLOGY

## 2023-02-08 PROCEDURE — 99213 PR OFFICE/OUTPT VISIT, EST, LEVL III, 20-29 MIN: ICD-10-PCS | Mod: S$PBB,,, | Performed by: ORTHOPAEDIC SURGERY

## 2023-02-08 PROCEDURE — 73562 XR KNEE ORTHO RIGHT: ICD-10-PCS | Mod: 26,RT,, | Performed by: RADIOLOGY

## 2023-02-08 PROCEDURE — 99212 OFFICE O/P EST SF 10 MIN: CPT | Mod: PBBFAC | Performed by: ORTHOPAEDIC SURGERY

## 2023-02-08 PROCEDURE — 99999 PR PBB SHADOW E&M-EST. PATIENT-LVL II: ICD-10-PCS | Mod: PBBFAC,,, | Performed by: ORTHOPAEDIC SURGERY

## 2023-02-08 PROCEDURE — 99213 OFFICE O/P EST LOW 20 MIN: CPT | Mod: S$PBB,,, | Performed by: ORTHOPAEDIC SURGERY

## 2023-02-08 PROCEDURE — 73560 X-RAY EXAM OF KNEE 1 OR 2: CPT | Mod: TC,LT

## 2023-02-08 PROCEDURE — 73560 XR KNEE ORTHO RIGHT: ICD-10-PCS | Mod: 26,LT,, | Performed by: RADIOLOGY

## 2023-02-08 RX ORDER — DOXYCYCLINE HYCLATE 100 MG
100 TABLET ORAL 2 TIMES DAILY
Qty: 180 TABLET | Refills: 1 | Status: SHIPPED | OUTPATIENT
Start: 2023-02-08 | End: 2023-07-26

## 2023-02-08 NOTE — PROGRESS NOTES
"Subjective:      Patient ID: Duran Giordano is a 82 y.o. female.    Chief Complaint: Post-op Evaluation of the Right Knee and Pain of the Left Knee    HPI  Duran Giordano has minimal bilateral knee pain.  Doing well with PT.  Occasional aspercreme left knee.  She is not sure is she is taking her antibitotics.   Review of Systems   Constitutional: Negative for chills, fever and night sweats.   HENT:  Negative for hearing loss.    Eyes:  Negative for blurred vision and double vision.   Cardiovascular:  Negative for chest pain, claudication and leg swelling.   Respiratory:  Negative for shortness of breath.    Endocrine: Negative for polydipsia, polyphagia and polyuria.   Hematologic/Lymphatic: Negative for adenopathy and bleeding problem. Does not bruise/bleed easily.   Skin:  Negative for poor wound healing.   Musculoskeletal:  Positive for joint pain.   Gastrointestinal:  Negative for diarrhea and heartburn.   Genitourinary:  Negative for bladder incontinence.   Neurological:  Negative for focal weakness, headaches, numbness, paresthesias and sensory change.   Psychiatric/Behavioral:  The patient is not nervous/anxious.    Allergic/Immunologic: Negative for persistent infections.       Objective:      Body mass index is 51.21 kg/m².  Vitals:    02/08/23 1335   Weight: 115 kg (253 lb 8.5 oz)   Height: 4' 11" (1.499 m)           General    Constitutional: She is oriented to person, place, and time. She appears well-developed and well-nourished.   obese   HENT:   Head: Normocephalic and atraumatic.   Eyes: EOM are normal.   Cardiovascular:  Normal rate and regular rhythm.            Pulmonary/Chest: Effort normal.   Neurological: She is alert and oriented to person, place, and time.   Psychiatric: She has a normal mood and affect. Her behavior is normal.     General Musculoskeletal Exam   Gait: normal       Right Knee Exam     Inspection   Erythema: absent  Scars: present  Swelling: absent  Effusion: " absent  Deformity: absent  Bruising: absent    Tenderness   The patient is experiencing no tenderness.     Range of Motion   Extension:  0   Flexion:  120     Tests   Ligament Examination   Lachman: normal (-1 to 2mm)   MCL - Valgus: normal (0 to 2mm)  LCL - Varus: normal  Patella   Passive Patellar Tilt: neutral    Other   Sensation: normal    Left Knee Exam     Inspection   Erythema: absent  Scars: absent  Swelling: absent  Effusion: absent  Deformity: absent  Bruising: absent    Tenderness   The patient is experiencing no tenderness.     Range of Motion   Extension:  0   Flexion:  120     Tests   Stability   Lachman: normal (-1 to 2mm)   MCL - Valgus: normal (0 to 2mm)  LCL - Varus: normal (0 to 2mm)  Patella   Passive Patellar Tilt: neutral    Other   Sensation: normal    Muscle Strength   Right Lower Extremity   Hip Abduction: 5/5   Quadriceps:  5/5   Hamstrin/5   Left Lower Extremity   Hip Abduction: 5/5   Quadriceps:  5/5   Hamstrin/5     Vascular Exam     Right Pulses  Dorsalis Pedis:      2+          Left Pulses  Dorsalis Pedis:      2+          Edema  Right Lower Leg: absent  Left Lower Leg: absent    Radiographs taken today were reviewed by me.  There is a prosthetic replacement of the right knee(s).  The prosthesis is well positioned.  There is not evidence of bone loss, osteolysis, or loosening. There is not a fracture.      Radiographs taken today and reviewed by me demonstrate moderate arthritic change of the left KNEE(s).There  is not bone destruction.  There is not a fracture.     Assessment:       Encounter Diagnoses   Name Primary?    Infection of prosthetic joint, subsequent encounter     Primary osteoarthritis of left knee Yes    Morbid obesity with body mass index (BMI) greater than or equal to 50           Plan:       Duran Logan was seen today for post-op evaluation and pain.    Diagnoses and all orders for this visit:    Primary osteoarthritis of left knee    Infection of prosthetic  joint, subsequent encounter  -     doxycycline (VIBRA-TABS) 100 MG tablet; Take 1 tablet (100 mg total) by mouth 2 (two) times daily.    Morbid obesity with body mass index (BMI) greater than or equal to 50      I renewed her oral antibiotics and stressed the importance of her continuing to take them.  F/U in 6 months with xrays.

## 2023-02-10 ENCOUNTER — TELEPHONE (OUTPATIENT)
Dept: PRIMARY CARE CLINIC | Facility: CLINIC | Age: 83
End: 2023-02-10
Payer: MEDICARE

## 2023-02-10 NOTE — TELEPHONE ENCOUNTER
----- Message from Mary Stewart sent at 2/8/2023  4:16 PM CST -----  Regarding: Radha  Spoke with Nurse Lynn --  Duran Peoples is going to PT and is having no other health concerns

## 2023-02-10 NOTE — TELEPHONE ENCOUNTER
Please get patient a routine appt. She is due for an appt with me. It is not urgent, so please dont overbook.    Thanks,  KJ

## 2023-02-16 ENCOUNTER — OFFICE VISIT (OUTPATIENT)
Dept: RHEUMATOLOGY | Facility: CLINIC | Age: 83
End: 2023-02-16
Payer: MEDICARE

## 2023-02-16 ENCOUNTER — LAB VISIT (OUTPATIENT)
Dept: LAB | Facility: HOSPITAL | Age: 83
End: 2023-02-16
Attending: INTERNAL MEDICINE
Payer: MEDICARE

## 2023-02-16 VITALS
HEART RATE: 83 BPM | SYSTOLIC BLOOD PRESSURE: 161 MMHG | HEIGHT: 59 IN | DIASTOLIC BLOOD PRESSURE: 90 MMHG | BODY MASS INDEX: 52.44 KG/M2 | WEIGHT: 260.13 LBS

## 2023-02-16 DIAGNOSIS — I15.2 HYPERTENSION ASSOCIATED WITH DIABETES: ICD-10-CM

## 2023-02-16 DIAGNOSIS — I50.32 CHRONIC DIASTOLIC HEART FAILURE: ICD-10-CM

## 2023-02-16 DIAGNOSIS — E11.59 HYPERTENSION ASSOCIATED WITH DIABETES: ICD-10-CM

## 2023-02-16 DIAGNOSIS — M10.00 IDIOPATHIC GOUT, UNSPECIFIED CHRONICITY, UNSPECIFIED SITE: Primary | ICD-10-CM

## 2023-02-16 DIAGNOSIS — M10.00 IDIOPATHIC GOUT, UNSPECIFIED CHRONICITY, UNSPECIFIED SITE: ICD-10-CM

## 2023-02-16 LAB
ALBUMIN SERPL BCP-MCNC: 3.2 G/DL (ref 3.5–5.2)
ALP SERPL-CCNC: 103 U/L (ref 55–135)
ALT SERPL W/O P-5'-P-CCNC: 14 U/L (ref 10–44)
ANION GAP SERPL CALC-SCNC: 7 MMOL/L (ref 8–16)
AST SERPL-CCNC: 17 U/L (ref 10–40)
BASOPHILS # BLD AUTO: 0.04 K/UL (ref 0–0.2)
BASOPHILS NFR BLD: 0.5 % (ref 0–1.9)
BILIRUB SERPL-MCNC: 0.4 MG/DL (ref 0.1–1)
BUN SERPL-MCNC: 22 MG/DL (ref 8–23)
CALCIUM SERPL-MCNC: 9.3 MG/DL (ref 8.7–10.5)
CHLORIDE SERPL-SCNC: 106 MMOL/L (ref 95–110)
CO2 SERPL-SCNC: 28 MMOL/L (ref 23–29)
CREAT SERPL-MCNC: 1.1 MG/DL (ref 0.5–1.4)
DIFFERENTIAL METHOD: ABNORMAL
EOSINOPHIL # BLD AUTO: 0.3 K/UL (ref 0–0.5)
EOSINOPHIL NFR BLD: 3.4 % (ref 0–8)
ERYTHROCYTE [DISTWIDTH] IN BLOOD BY AUTOMATED COUNT: 15.9 % (ref 11.5–14.5)
EST. GFR  (NO RACE VARIABLE): 50.8 ML/MIN/1.73 M^2
GLUCOSE SERPL-MCNC: 82 MG/DL (ref 70–110)
HCT VFR BLD AUTO: 37.7 % (ref 37–48.5)
HGB BLD-MCNC: 11.2 G/DL (ref 12–16)
IMM GRANULOCYTES # BLD AUTO: 0.02 K/UL (ref 0–0.04)
IMM GRANULOCYTES NFR BLD AUTO: 0.3 % (ref 0–0.5)
LYMPHOCYTES # BLD AUTO: 1.7 K/UL (ref 1–4.8)
LYMPHOCYTES NFR BLD: 21.5 % (ref 18–48)
MCH RBC QN AUTO: 27.9 PG (ref 27–31)
MCHC RBC AUTO-ENTMCNC: 29.7 G/DL (ref 32–36)
MCV RBC AUTO: 94 FL (ref 82–98)
MONOCYTES # BLD AUTO: 0.5 K/UL (ref 0.3–1)
MONOCYTES NFR BLD: 6.6 % (ref 4–15)
NEUTROPHILS # BLD AUTO: 5.2 K/UL (ref 1.8–7.7)
NEUTROPHILS NFR BLD: 67.7 % (ref 38–73)
NRBC BLD-RTO: 0 /100 WBC
PLATELET # BLD AUTO: 178 K/UL (ref 150–450)
PMV BLD AUTO: 12.6 FL (ref 9.2–12.9)
POTASSIUM SERPL-SCNC: 4.4 MMOL/L (ref 3.5–5.1)
PROT SERPL-MCNC: 8.1 G/DL (ref 6–8.4)
RBC # BLD AUTO: 4.02 M/UL (ref 4–5.4)
SODIUM SERPL-SCNC: 141 MMOL/L (ref 136–145)
URATE SERPL-MCNC: 5.8 MG/DL (ref 2.4–5.7)
WBC # BLD AUTO: 7.72 K/UL (ref 3.9–12.7)

## 2023-02-16 PROCEDURE — 99214 OFFICE O/P EST MOD 30 MIN: CPT | Mod: PBBFAC | Performed by: INTERNAL MEDICINE

## 2023-02-16 PROCEDURE — 99214 PR OFFICE/OUTPT VISIT, EST, LEVL IV, 30-39 MIN: ICD-10-PCS | Mod: S$PBB,,, | Performed by: INTERNAL MEDICINE

## 2023-02-16 PROCEDURE — 99999 PR PBB SHADOW E&M-EST. PATIENT-LVL IV: CPT | Mod: PBBFAC,,, | Performed by: INTERNAL MEDICINE

## 2023-02-16 PROCEDURE — 80053 COMPREHEN METABOLIC PANEL: CPT | Performed by: INTERNAL MEDICINE

## 2023-02-16 PROCEDURE — 99214 OFFICE O/P EST MOD 30 MIN: CPT | Mod: S$PBB,,, | Performed by: INTERNAL MEDICINE

## 2023-02-16 PROCEDURE — 99999 PR PBB SHADOW E&M-EST. PATIENT-LVL IV: ICD-10-PCS | Mod: PBBFAC,,, | Performed by: INTERNAL MEDICINE

## 2023-02-16 PROCEDURE — 84550 ASSAY OF BLOOD/URIC ACID: CPT | Performed by: INTERNAL MEDICINE

## 2023-02-16 PROCEDURE — 36415 COLL VENOUS BLD VENIPUNCTURE: CPT | Performed by: INTERNAL MEDICINE

## 2023-02-16 PROCEDURE — 85025 COMPLETE CBC W/AUTO DIFF WBC: CPT | Performed by: INTERNAL MEDICINE

## 2023-02-16 RX ORDER — COLCHICINE 0.6 MG/1
TABLET ORAL
Qty: 45 TABLET | Refills: 1 | Status: SHIPPED | OUTPATIENT
Start: 2023-02-16 | End: 2023-08-30

## 2023-02-16 RX ORDER — ALLOPURINOL 100 MG/1
100 TABLET ORAL DAILY
Qty: 90 TABLET | Refills: 2 | Status: SHIPPED | OUTPATIENT
Start: 2023-02-16 | End: 2023-02-17

## 2023-02-16 RX ORDER — ALLOPURINOL 300 MG/1
300 TABLET ORAL DAILY
Qty: 90 TABLET | Refills: 2 | Status: SHIPPED | OUTPATIENT
Start: 2023-02-16 | End: 2023-05-17

## 2023-02-16 RX ORDER — AMLODIPINE BESYLATE 5 MG/1
5 TABLET ORAL DAILY
Qty: 90 TABLET | Refills: 3 | Status: SHIPPED | OUTPATIENT
Start: 2023-02-16 | End: 2023-08-18

## 2023-02-16 RX ORDER — TORSEMIDE 10 MG/1
TABLET ORAL
Qty: 45 TABLET | Refills: 1 | Status: SHIPPED | OUTPATIENT
Start: 2023-02-16 | End: 2023-09-27

## 2023-02-16 NOTE — PROGRESS NOTES
Chief Complaint   Patient presents with    Disease Management       Patient with chronic gout for a follow up    History of presenting illness    81 year old black female comes in with chronic gout for 10 years    Usually she has had     Acute onset pain in the :    Ankles,feet : lasting for a week  Severe pain,swelling in the joint  The joint would turn red and blue  Precipitated by sea food  Cannot bear weight  Would get steroids,anti inflammatories    She was advised not to eat sea food    She did fine for a while     She snacks a lot and also has gained weight    Last year    APOLONIA neg  RF neg    Feet xrays  Marked swelling of ankle and feet soft tissues, pes planus, DJD tarsal joints particularly on left, bilateral hallux valgus deformity, DJD 1st MTP joints.  Focal cortical thickening medial aspects left 2nd digit shaft meta tarsal.    Hand xrays  Tiny remote calcification lateral margin base proximal phalanx 3rd MCP joint.  Prominent DJD changes, some with erosion, 1st metacarpal-carpal and radiocarpal joint and radioulnar joints especially on left.  Enlargement lunate, overlying triquetral, pisiform not seen best advantage.  No typical gout erosive disease or tophi.  Flattening distal ulnar without ulnar styloid, narrowing of ulnar wrist joint with apparent absence of tri angulated fibrocartilage.    She now takes allopurinol 300 mg daily  Colchicine 0.3 mg daily    No more gout flares    She had mentioned chronic right wrist pain  We injected the right CMC   We did MRI right wrist    Ligaments: There is extensive tear of the TFCC, which appears markedly diminutive.  Scapholunate ligament not well seen.    Tendons: There is tendinosis and tenosynovitis of the adductor pollicis longus and extensor pollicis brevis.  There is interstitial tear of the adductor pollicis brevis.  There is mild tenosynovitis of the extensor carpi radialis brevis and longus as well as the extensor carpi ulnaris.  The ECU is in the  appropriate position.  Remaining extensor tendons and flexor tendons are unremarkable.    Bones: No fracture or infiltrative process.  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    Joints: There are severe degenerative changes at the radiocarpal joint with subchondral edema and cystic change predominantly within the distal radius and lunate.  Severe degenerative changes with subcortical cystic change in bulky osteophytes also noted at the base of thumb joint.  There is a small effusion at the wrist.    Miscellaneous: Carpal tunnel and Guyon's canal are unremarkable.      Impression       1. 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  2. First compartment tendinosis and tenosynovitis with interstitial tear of the adductor pollicis longus.  Mild tenosynovitis of the 2nd and 6th compartments.  3. Lunotriquetral coalition.  4. Severe base of thumb osteoarthritis.     We sent her to hand ortho  They injected her right hand de quervains  She has done well  She did OT and that has helped     She asks for something for OA pain      2/2023    Right knee replaced- got infected- replaced the second time-doing well now  No pain  Doing PT    Left knee has mild OA- follows with orthopedics    Gout- no attacks    She doesn't know what dose of allopurinol dose she is taking    Uric acid was 3.9 in 10/2021    On colchicine 0.6 mg every other day      Past history    HTN,venous stasis LE,b/l carotid artery disease,CAD,asthma,cataracts,CKD,diabetes  GERD,HLD,REJI   ,chronic diastolic HF  LS spine arthritis/sciatica     Family history  Mom cancer    Social history  Not a smoker or alcoholic      Review of Systems   Constitutional:  Negative for activity change, appetite change, chills, diaphoresis, fatigue, fever and unexpected weight change.   HENT:  Negative for congestion, dental problem, drooling, ear discharge, ear pain, facial swelling, hearing loss, mouth sores, nosebleeds, postnasal drip, rhinorrhea, sinus pressure, sinus  pain, sneezing, sore throat, tinnitus, trouble swallowing and voice change.    Eyes:  Negative for photophobia, pain, discharge, redness, itching and visual disturbance.   Respiratory:  Negative for apnea, cough, choking, chest tightness, shortness of breath, wheezing and stridor.    Cardiovascular:  Negative for chest pain, palpitations and leg swelling.   Gastrointestinal:  Negative for abdominal distention, abdominal pain, anal bleeding, blood in stool, constipation, diarrhea, nausea, rectal pain and vomiting.   Endocrine: Negative for cold intolerance, heat intolerance, polydipsia, polyphagia and polyuria.   Genitourinary:  Negative for decreased urine volume, difficulty urinating, dysuria, enuresis, flank pain, frequency, genital sores, hematuria and urgency.   Musculoskeletal:  Positive for arthralgias. Negative for back pain, gait problem, joint swelling, myalgias, neck pain and neck stiffness.   Skin:  Negative for color change, pallor, rash and wound.   Allergic/Immunologic: Negative for environmental allergies, food allergies and immunocompromised state.   Neurological:  Negative for dizziness, tremors, seizures, syncope, facial asymmetry, speech difficulty, weakness, light-headedness, numbness and headaches.   Hematological:  Negative for adenopathy. Does not bruise/bleed easily.   Psychiatric/Behavioral:  Negative for agitation, behavioral problems, confusion, decreased concentration, dysphoric mood, hallucinations, self-injury, sleep disturbance and suicidal ideas. The patient is not nervous/anxious and is not hyperactive.    Physical Exam     No synovitis    Physical Exam   Constitutional: She is oriented to person, place, and time. No distress.   HENT:   Head: Normocephalic.   Mouth/Throat: Oropharynx is clear and moist.   Eyes: Pupils are equal, round, and reactive to light. Conjunctivae are normal. Right eye exhibits no discharge. Left eye exhibits no discharge. No scleral icterus.   Neck: No  thyromegaly present.   Cardiovascular: Normal rate, regular rhythm and normal heart sounds.   Pulmonary/Chest: Effort normal and breath sounds normal. No stridor.   Abdominal: Soft. Bowel sounds are normal.   Musculoskeletal:         General: Normal range of motion.      Cervical back: Normal range of motion.   Lymphadenopathy:     She has no cervical adenopathy.   Neurological: She is alert and oriented to person, place, and time.   Skin: Skin is warm. No rash noted. She is not diaphoretic.   Psychiatric: Affect and judgment normal.       Assessment       81 year old black female comes in with chronic gout for 10 years    Initial attacks were in the ankles and feet : acute onset,pain,swelling and redness,triggered by sea food intake,responding to steroids and anti inflammatories    She presented with acute onset pain in the     Right wrist  Right MCP  Bottom of the right foot    Labs     GFR 50   Uric acid 8.5,down to 7.9 and then went upto 8.5 and then trending down to 6.7/7/8/7.4  ESR 72  H/H 11.5/38.2    CBC,CMP stays stable    RF,CCP neg    Last year  APOLONIA neg  RF neg    She has   HTN,venous stasis LE,b/l carotid artery disease,CAD,asthma,cataracts,CKD,diabetes  GERD,HLD,REJI   ,chronic diastolic HF  LS spine arthritis/sciatica     No more gout attacks    She also had right wrist pain but when we injected the CMC and dequervains tendon she did great  She also did OT  There is lot of arthritis in the radiocarpal and CMC joints    Uric acid needs to be lesser than 6   Last uric acid 6.5  GFR 41  H/h 11.6/39.5  LFTs nml  plts 187    hba1c 9.3    She had plantar fasciitis/needing injections    Now comes with knee pain   Right knee is replaced > 10 years ago and she thinks it is worn out  She has pain in the left knee as well  Need assessment     2/2023    Right knee replaced- got infected- replaced the second time-doing well now  No pain  Doing PT    Left knee has mild OA- follows with orthopedics    Gout- no  attacks    She doesn't know what dose of allopurinol dose she is taking    Uric acid was 3.9 in 10/2021    On colchicine 0.6 mg every other day      1. Idiopathic gout, unspecified chronicity, unspecified site    2. Chronic diastolic heart failure    3. Hypertension associated with diabetes            F/u  problem     Plan    Continue hand splint and OT    Make sure she has allopurinol 400 mg and colchicine 0.6 mg every other day    CBC,CMP,uric acid-today    Dietary modifications discussed : gave a hand out of all foods to avoid : avoid organ meat,red meat,seafood,shell fish,anchovies,sardines,alcohol particularly beer,fructose containing soft drinks  Its ok to consume moderate wine,diet drinks,dairy proteins,coffee  Eat cherries  Eat purine rich vegetables    She is urinating every 45 minutes since she started demadex   I suggested to taper the dose to demadex 10 mg every other day and talk to PCP in April 2023    Avoid beta blockers  Avoid diuretics  If he develops hyperlipidemia : fenofibrate helps since it lowers uric acid level     Blood pressure monitoring  Weight loss suggested  Diabetes monitoring    Labs  in 6 months     Tylenol arthritis 500 mg tablets upto 6 a day is ok  NSAIDs not ok    Duran Logan was seen today for disease management.    Diagnoses and all orders for this visit:    Idiopathic gout, unspecified chronicity, unspecified site  -     CBC Auto Differential; Future  -     Comprehensive Metabolic Panel; Future  -     Uric Acid; Future    Chronic diastolic heart failure  -     torsemide (DEMADEX) 10 MG Tab; 10 mg every other day    Hypertension associated with diabetes  -     amLODIPine (NORVASC) 5 MG tablet; Take 1 tablet (5 mg total) by mouth once daily.    Other orders  -     allopurinoL (ZYLOPRIM) 100 MG tablet; Take 1 tablet (100 mg total) by mouth once daily.  -     allopurinoL (ZYLOPRIM) 300 MG tablet; Take 1 tablet (300 mg total) by mouth once daily.  -     colchicine (COLCRYS) 0.6 mg  tablet; 0.6 mg every other day

## 2023-02-16 NOTE — PATIENT INSTRUCTIONS
Allopurinol 100 mg - one tablet daily  Allopurinol 400 mg -one tablet daily  Colchicine 0.6mg -one every other day  Demadex 10 mg -one every other day    Doxycycline 100 mg twice daily for now  Lipitor 80 mg daily  Norvac 5 mg daily     Ozempic 5 mg once a week  Insulin tresiba 16 units at night

## 2023-04-03 ENCOUNTER — TELEPHONE (OUTPATIENT)
Dept: PODIATRY | Facility: CLINIC | Age: 83
End: 2023-04-03
Payer: MEDICARE

## 2023-04-03 NOTE — TELEPHONE ENCOUNTER
I spoke with Mary Duncan asking her to please have Sister Duran Logan to call Dr. Willard office to reschedule.

## 2023-04-03 NOTE — TELEPHONE ENCOUNTER
Left voice message for patient to give our office a call back at [phone] to reschedule for Dr. Willard

## 2023-04-06 ENCOUNTER — OFFICE VISIT (OUTPATIENT)
Dept: PODIATRY | Facility: CLINIC | Age: 83
End: 2023-04-06
Payer: MEDICARE

## 2023-04-06 DIAGNOSIS — L84 CORN OR CALLUS: ICD-10-CM

## 2023-04-06 DIAGNOSIS — E11.51 DIABETES MELLITUS WITH PERIPHERAL VASCULAR DISEASE: Primary | ICD-10-CM

## 2023-04-06 DIAGNOSIS — I89.0 LYMPHEDEMA: ICD-10-CM

## 2023-04-06 DIAGNOSIS — B35.1 ONYCHOMYCOSIS DUE TO DERMATOPHYTE: ICD-10-CM

## 2023-04-06 PROCEDURE — 99999 PR PBB SHADOW E&M-EST. PATIENT-LVL III: CPT | Mod: PBBFAC,,, | Performed by: PODIATRIST

## 2023-04-06 PROCEDURE — 99999 PR PBB SHADOW E&M-EST. PATIENT-LVL III: ICD-10-PCS | Mod: PBBFAC,,, | Performed by: PODIATRIST

## 2023-04-06 PROCEDURE — 11056 PARNG/CUTG B9 HYPRKR LES 2-4: CPT | Mod: Q9,PBBFAC | Performed by: PODIATRIST

## 2023-04-06 PROCEDURE — 11721 PR DEBRIDEMENT OF NAILS, 6 OR MORE: ICD-10-PCS | Mod: Q9,59,S$PBB, | Performed by: PODIATRIST

## 2023-04-06 PROCEDURE — 99499 NO LOS: ICD-10-PCS | Mod: S$PBB,,, | Performed by: PODIATRIST

## 2023-04-06 PROCEDURE — 11721 DEBRIDE NAIL 6 OR MORE: CPT | Mod: Q9,59,S$PBB, | Performed by: PODIATRIST

## 2023-04-06 PROCEDURE — 99499 UNLISTED E&M SERVICE: CPT | Mod: S$PBB,,, | Performed by: PODIATRIST

## 2023-04-06 PROCEDURE — 11056 PR TRIM BENIGN HYPERKERATOTIC SKIN LESION,2-4: ICD-10-PCS | Mod: Q9,S$PBB,, | Performed by: PODIATRIST

## 2023-04-06 PROCEDURE — 99213 OFFICE O/P EST LOW 20 MIN: CPT | Mod: PBBFAC | Performed by: PODIATRIST

## 2023-04-06 PROCEDURE — 11056 PARNG/CUTG B9 HYPRKR LES 2-4: CPT | Mod: Q9,S$PBB,, | Performed by: PODIATRIST

## 2023-04-06 PROCEDURE — 11721 DEBRIDE NAIL 6 OR MORE: CPT | Mod: Q9,59,PBBFAC | Performed by: PODIATRIST

## 2023-04-06 RX ORDER — COVID-19 ANTIGEN TEST
KIT MISCELLANEOUS
COMMUNITY
Start: 2023-03-10

## 2023-04-06 RX ORDER — ASPIRIN 81 MG/1
1 TABLET ORAL DAILY
COMMUNITY
Start: 2022-08-03 | End: 2023-12-21 | Stop reason: DRUGHIGH

## 2023-04-06 RX ORDER — DOCUSATE SODIUM 100 MG/1
100 CAPSULE, LIQUID FILLED ORAL 2 TIMES DAILY
COMMUNITY
Start: 2023-02-22

## 2023-04-06 RX ORDER — LIDOCAINE HCL 4 G/100G
CREAM TOPICAL
COMMUNITY
Start: 2023-03-13

## 2023-04-06 RX ORDER — SENNOSIDES 8.6 MG
TABLET ORAL
COMMUNITY
Start: 2023-01-04

## 2023-04-17 ENCOUNTER — OFFICE VISIT (OUTPATIENT)
Dept: PHYSICAL MEDICINE AND REHAB | Facility: CLINIC | Age: 83
End: 2023-04-17
Payer: MEDICARE

## 2023-04-17 VITALS — SYSTOLIC BLOOD PRESSURE: 180 MMHG | HEART RATE: 87 BPM | DIASTOLIC BLOOD PRESSURE: 83 MMHG

## 2023-04-17 DIAGNOSIS — R06.09 DOE (DYSPNEA ON EXERTION): ICD-10-CM

## 2023-04-17 DIAGNOSIS — Z78.9 IMPAIRED MOBILITY AND ADLS: ICD-10-CM

## 2023-04-17 DIAGNOSIS — M25.511 CHRONIC PAIN OF BOTH SHOULDERS: ICD-10-CM

## 2023-04-17 DIAGNOSIS — Z96.651 CHRONIC KNEE PAIN AFTER TOTAL REPLACEMENT OF RIGHT KNEE JOINT: ICD-10-CM

## 2023-04-17 DIAGNOSIS — G89.29 CHRONIC KNEE PAIN AFTER TOTAL REPLACEMENT OF RIGHT KNEE JOINT: ICD-10-CM

## 2023-04-17 DIAGNOSIS — R26.9 GAIT DISORDER: Primary | ICD-10-CM

## 2023-04-17 DIAGNOSIS — Z96.651 S/P REVISION OF TOTAL KNEE, RIGHT: ICD-10-CM

## 2023-04-17 DIAGNOSIS — Z79.4 TYPE 2 DIABETES MELLITUS WITH DIABETIC POLYNEUROPATHY, WITH LONG-TERM CURRENT USE OF INSULIN: ICD-10-CM

## 2023-04-17 DIAGNOSIS — I27.20 PULMONARY HYPERTENSION: ICD-10-CM

## 2023-04-17 DIAGNOSIS — I50.32 CHRONIC DIASTOLIC HEART FAILURE: ICD-10-CM

## 2023-04-17 DIAGNOSIS — M25.512 CHRONIC PAIN OF BOTH SHOULDERS: ICD-10-CM

## 2023-04-17 DIAGNOSIS — G89.29 CHRONIC PAIN OF BOTH SHOULDERS: ICD-10-CM

## 2023-04-17 DIAGNOSIS — J44.9 CHRONIC OBSTRUCTIVE PULMONARY DISEASE, UNSPECIFIED COPD TYPE: ICD-10-CM

## 2023-04-17 DIAGNOSIS — M25.561 CHRONIC KNEE PAIN AFTER TOTAL REPLACEMENT OF RIGHT KNEE JOINT: ICD-10-CM

## 2023-04-17 DIAGNOSIS — I89.0 LYMPHEDEMA OF BOTH LOWER EXTREMITIES: ICD-10-CM

## 2023-04-17 DIAGNOSIS — E11.42 TYPE 2 DIABETES MELLITUS WITH DIABETIC POLYNEUROPATHY, WITH LONG-TERM CURRENT USE OF INSULIN: ICD-10-CM

## 2023-04-17 DIAGNOSIS — Z74.09 IMPAIRED MOBILITY AND ADLS: ICD-10-CM

## 2023-04-17 DIAGNOSIS — E66.01 MORBID OBESITY WITH BMI OF 50.0-59.9, ADULT: ICD-10-CM

## 2023-04-17 DIAGNOSIS — I87.2 VENOUS STASIS DERMATITIS OF BOTH LOWER EXTREMITIES: ICD-10-CM

## 2023-04-17 PROCEDURE — 99215 OFFICE O/P EST HI 40 MIN: CPT | Mod: S$PBB,,, | Performed by: PHYSICAL MEDICINE & REHABILITATION

## 2023-04-17 PROCEDURE — 99212 OFFICE O/P EST SF 10 MIN: CPT | Mod: PBBFAC | Performed by: PHYSICAL MEDICINE & REHABILITATION

## 2023-04-17 PROCEDURE — 99999 PR PBB SHADOW E&M-EST. PATIENT-LVL II: CPT | Mod: PBBFAC,,, | Performed by: PHYSICAL MEDICINE & REHABILITATION

## 2023-04-17 PROCEDURE — 99215 PR OFFICE/OUTPT VISIT, EST, LEVL V, 40-54 MIN: ICD-10-PCS | Mod: S$PBB,,, | Performed by: PHYSICAL MEDICINE & REHABILITATION

## 2023-04-17 PROCEDURE — 99999 PR PBB SHADOW E&M-EST. PATIENT-LVL II: ICD-10-PCS | Mod: PBBFAC,,, | Performed by: PHYSICAL MEDICINE & REHABILITATION

## 2023-04-17 NOTE — PROGRESS NOTES
Subjective:      Patient ID: Duran Giordano is a 83 y.o. female.    Chief Complaint: Diabetes Mellitus (Pcp - Tami Schmidt MD - ) and Peripheral Vascular Disease      Duran Logan is a 83 y.o. female who presents to the clinic for evaluation and treatment of high risk feet. Duran Logan has a past medical history of Adrenal mass- adenoma stable since 2004 (1.8 cm and 8/13/12 (2 cm); stable 2016 2.4 cm, Arthritis, Asthma in adult without complication (6/25/2015), Bilateral carotid artery disease (7/21/2017), Bilateral sciatica (2/7/2017), Cellulitis of right leg (08/16/2017), Cerebral infarction (1/29/2016), Cervical radiculopathy (3/18/2015), Chronic knee pain, Chronic rhinitis (4/9/2013), CKD (chronic kidney disease) stage 3, GFR 30-59 ml/min (1/29/2013), Coronary artery disease due to calcified coronary lesion (6/25/2015), Deep vein thrombosis, Diastolic dysfunction (10/10/2013), Essential hypertension (6/25/2015), Gastroesophageal reflux disease without esophagitis (8/13/2012), Gout, Hives (10/1/2013), Mixed hyperlipidemia (8/13/2012), Morbid obesity with BMI of 50.0-59.9, adult (2/11/2014), Obstructive sleep apnea syndrome (8/13/2012), Senile cataracts of both eyes (1/22/2015), Traumatic open wound of right lower leg (8/25/2017), Trigeminal neuralgia of right side of face (5/23/2017), Type 2 diabetes mellitus with diabetic polyneuropathy, with long-term current use of insulin (1/29/2013), Venous stasis dermatitis of both lower extremities (8/21/2017), and Vitamin D deficiency disease (8/13/2012). The patient's chief complaint is elongated toenails and lymphedema.  This patient has documented high risk feet requiring routine maintenance secondary to diabetes mellitis and those secondary complications of diabetes, as mentioned.    PCP: Tami Schmidt MD    Date Last Seen by PCP:   Chief Complaint   Patient presents with    Diabetes Mellitus     Pcp - Tami Schmidt MD -     Peripheral  Vascular Disease        Current shoe gear:  Affected Foot: Casual shoes     Unaffected Foot: Casual shoes    Hemoglobin A1C   Date Value Ref Range Status   06/06/2022 6.8 (H) 4.0 - 5.6 % Final     Comment:     ADA Screening Guidelines:  5.7-6.4%  Consistent with prediabetes  >or=6.5%  Consistent with diabetes    High levels of fetal hemoglobin interfere with the HbA1C  assay. Heterozygous hemoglobin variants (HbS, HgC, etc)do  not significantly interfere with this assay.   However, presence of multiple variants may affect accuracy.     10/26/2021 8.1 (H) 4.0 - 5.6 % Final     Comment:     ADA Screening Guidelines:  5.7-6.4%  Consistent with prediabetes  >or=6.5%  Consistent with diabetes    High levels of fetal hemoglobin interfere with the HbA1C  assay. Heterozygous hemoglobin variants (HbS, HgC, etc)do  not significantly interfere with this assay.   However, presence of multiple variants may affect accuracy.     08/24/2021 8.2 (H) 4.0 - 5.6 % Final     Comment:     ADA Screening Guidelines:  5.7-6.4%  Consistent with prediabetes  >or=6.5%  Consistent with diabetes    High levels of fetal hemoglobin interfere with the HbA1C  assay. Heterozygous hemoglobin variants (HbS, HgC, etc)do  not significantly interfere with this assay.   However, presence of multiple variants may affect accuracy.         Review of Systems   Cardiovascular:  Positive for leg swelling.   Skin:  Positive for color change, dry skin, nail changes and unusual hair distribution. Negative for flushing, itching, rash and skin cancer.   Musculoskeletal:  Positive for arthritis and stiffness. Negative for back pain and falls.   Neurological:  Positive for numbness. Negative for paresthesias.   Allergic/Immunologic: Negative for hives.         Objective:       There were no vitals filed for this visit.       Physical Exam  Vitals and nursing note reviewed.   Constitutional:       Appearance: She is well-developed.   Cardiovascular:      Pulses:            Dorsalis pedis pulses are 1+ on the right side and 1+ on the left side.        Posterior tibial pulses are 1+ on the right side and 1+ on the left side.      Comments: Dorsalis pedis and posterior tibial pulses are palpable bilaterally. Toes are cool to touch. Feet are warm proximally.There is decreased digital hair bilateral. Skin is atrophic, hyperpigmented, and moderately edematous.    Musculoskeletal:         General: No tenderness.      Right ankle: Normal.      Left ankle: Normal.      Right foot: No swelling, deformity or crepitus.      Left foot: No swelling, deformity or crepitus.      Comments: Adequate joint range of motion without pain, limitation, nor crepitation Bilateral feet and ankle joints. Muscle strength is 5/5 in all groups bilaterally.      TTP w/ redness and plantar L foot no signs of break in skin no ulceration. Pain to plantar medical tubercle of calcaneus . No flucutance   Lymphadenopathy:      Comments: B/l lymphedema    Skin:     General: Skin is warm and dry.      Coloration: Skin is not pale.      Findings: No abrasion, bruising, burn, erythema, lesion or rash.      Nails: There is no clubbing.      Comments: Nails x10 are elongated by  4-6mm's, thickened by 2-3 mm's, dystrophic, and are darkened in  coloration . Xerosis Bilaterally. No open lesions noted.    Hyperkeratotic tissue noted to distal hallux b/l       Skin thickened, leathery, svetlana blistering noted to legs.    Neurological:      Mental Status: She is alert and oriented to person, place, and time.      Comments: Decreased sharp/dull sensation bilateral feet.   Psychiatric:         Behavior: Behavior normal.             Assessment:       Encounter Diagnoses   Name Primary?    Diabetes mellitus with peripheral vascular disease Yes    Onychomycosis due to dermatophyte     Lymphedema     Corn or callus          Plan:       Duran Logan was seen today for diabetes mellitus and peripheral vascular disease.    Diagnoses and all orders  for this visit:    Diabetes mellitus with peripheral vascular disease    Onychomycosis due to dermatophyte    Lymphedema  -     Ambulatory referral/consult to Physical/Occupational Therapy; Future    Corn or callus    - Patient was given written and verbal instructions regarding foot condition.  I counseled the patient on her conditions, their implications and medical management.  Referral to physical therapy     Shoe inspection. Diabetic Foot Education. Patient reminded of the importance of good nutrition and blood cummings gar control to help prevent podiatric complications of diabetes. Patient instructed on proper foot hygeine. We discussed wearing proper shoe gear, daily foot inspections, never walking without protective shoe gear, never putting sharp instruments to feet    - With patient's permission, nails were aggressively reduced and debrided x 10 to their soft tissue attachment mechanically and with electric , removing all offending nail and debris. Patient relates relief following the procedure. She will continue to monitor the areas daily, inspect her feet, wear protective shoe gear when ambulatory, moisturizer to maintain skin integrity and follow in this office in approximately 2-3 months, sooner p.r.n.    - After cleansing the  area w/ alcohol prep pad the above mentioned hyperkeratosis was trimmed utilizing No 15 scapel, to a smooth base with out incident. Patient tolerated this  well and reported comfort to the area x2

## 2023-04-17 NOTE — PROGRESS NOTES
Subjective:       Patient ID: Duran Giordano is a 83 y.o. female.    Chief Complaint: new patient      RONNY Giordano is an 83-year-old black female with multiple medical problems who is presenting to the Physical Medicine Clinic for evaluation for a power mobility device.  Her past medical history is significant for hypertension, diabetes mellitus with peripheral neuropathy, CAD, chronic diastolic congestive heart failure, COPD, lacunar infarcts, generalized osteoarthritis, bilateral lower extremity lymphedema, venous stasis ulcers, and morbid obesity (weight of 261 lb and BMI of 52.5).  The patient has severe OA of both knees.  She is status post right total knee arthroplasty about 15 years ago.  She had a subsequent infection requiring revision with antibiotic spacer placement in 10/2021 by Dr. Lozoya at [facility].  She had a more recent infection in 06/2022.  On 06/30/2022 she underwent right TKA revision by Dr. Lozoya.  Her last follow-up with Dr. Lozoya on 02/08/2023 shows the prosthesis to be well positioned without evidence of fracture, osteolysis or loosening.    The patient currently lives in a room in a [facility].  She has to share a bathroom and shower about 100 ft away.  She is independent with feeding herself.  She is independent with dressing and toileting but it takes her a long time.  She requires assistance, occasionally total assistance, for bathing with a walk-in shower and shower chair.  She is ambulating with a rolling walker.  She can go about 20-30 feet and is restricted by right lower extremity weakness, right knee pain (up to 6/10), and shortness of breath.  She can not propel a manual wheelchair due to bilateral shoulder pain (5-6/10), morbid obesity and shortness of breath.        Past Medical History:   Diagnosis Date    Adrenal mass- adenoma stable since 2004 (1.8 cm and 8/13/12 (2 cm); stable 2016 2.4 cm     Adrenal mass- adenoma stable since 2004  (1.8 cm and 8/13/12 (2 cm); stable 2016 2.4 cm    Arthritis     Asthma in adult without complication 6/25/2015    Bilateral carotid artery disease 7/21/2017    Bilateral sciatica 2/7/2017    Cellulitis of right leg 08/16/2017    Cerebral infarction 1/29/2016    Multiple areas of lacunar infarction. Stroke risk factors include HTN, DM2, Dyslipidemia.  Continue ASA 81mg/ Statin therapy I have encouraged 30 minutes of physical activity daily for 5 days a week. She has access to a pool so this should not be so jarring to her joints.     Cervical radiculopathy 3/18/2015    Chronic knee pain     Chronic rhinitis 4/9/2013    CKD (chronic kidney disease) stage 3, GFR 30-59 ml/min 1/29/2013    Coronary artery disease due to calcified coronary lesion 6/25/2015    Deep vein thrombosis     Diastolic dysfunction 10/10/2013    Essential hypertension 6/25/2015    Gastroesophageal reflux disease without esophagitis 8/13/2012    Gout     Hives 10/1/2013    Mixed hyperlipidemia 8/13/2012    Morbid obesity with BMI of 50.0-59.9, adult 2/11/2014    Obstructive sleep apnea syndrome 8/13/2012    Senile cataracts of both eyes 1/22/2015    Traumatic open wound of right lower leg 8/25/2017    Trigeminal neuralgia of right side of face 5/23/2017    For years now Previously on Gabapentin, but caused constipation Dissipating over time Not related to intracranial abnormalities Possibly related to dental procedure    Type 2 diabetes mellitus with diabetic polyneuropathy, with long-term current use of insulin 1/29/2013    Venous stasis dermatitis of both lower extremities 8/21/2017    Vitamin D deficiency disease 8/13/2012        Review of patient's allergies indicates:   Allergen Reactions    Bactrim [sulfamethoxazole-trimethoprim]      Other reaction(s): up set stomach rash/hives    Azithromycin      Feels bad    Erythromycin Other (See Comments)     Other reaction(s): Unknown  Other reaction(s): Stomach upset    Gentamicin      Vaginal burning  , itching     Levofloxacin Hives     Other reaction(s): nervousness    Shellfish containing products      Rash      Vancomycin Itching     Other reaction(s): Itching        Review of Systems   Constitutional:  Positive for fatigue. Negative for chills and fever.   Eyes:  Negative for visual disturbance.   Respiratory:  Positive for shortness of breath.    Cardiovascular:  Negative for chest pain.   Gastrointestinal:  Negative for nausea and vomiting.   Genitourinary:  Positive for difficulty urinating.   Musculoskeletal:  Positive for arthralgias and gait problem. Negative for back pain and neck pain.   Neurological:  Negative for dizziness and headaches.   Psychiatric/Behavioral:  Negative for behavioral problems.            Objective:      Physical Exam  Vitals reviewed.   Constitutional:       General: She is not in acute distress.     Appearance: She is well-developed.      Comments: Coming to the clinic in a manual wheelchair propelled by my medical assistant    HENT:      Head: Normocephalic and atraumatic.   Eyes:      Extraocular Movements: Extraocular movements intact.   Cardiovascular:      Rate and Rhythm: Normal rate and regular rhythm.      Heart sounds: Normal heart sounds.   Pulmonary:      Effort: Pulmonary effort is normal.      Breath sounds: Normal breath sounds.   Abdominal:      Palpations: Abdomen is soft.   Musculoskeletal:      Cervical back: Normal range of motion. No tenderness.      Comments: BUE:  ROM:   RUE: full   LUE: full.  Strength:    RUE: 3+/5 at shoulder abduction, 4 elbow flexion, 4 elbow extension, 4+ hand .   LUE: 3+/5 at shoulder abduction, 4 elbow flexion, 4 elbow extension, 4+ hand .  Sensation to pinprick:   RUE: intact.   LUE: intact.      BLE:  ROM:   RLE:  Reduced due to girth and lymphedema.   LLE:  Reduced due to girth and lymphedema.  Healed right TKA scar.  Severe venous stasis, right> left.  Strength:    RLE: 1/5 at hip flexion, 3- knee extension, 4+ ankle  DF, 4+ ankle PF.   LLE: 4-/5 at hip flexion, 4+ knee extension, 4+ ankle DF,  4+ ankle PF.  Sensation to pinprick:     RLE: decreased in stocking distribuion.     LLE: decreased in stocking distribuion.    -ve tenderness over lumbar spine.     Skin:     General: Skin is warm.   Neurological:      General: No focal deficit present.      Mental Status: She is alert.   Psychiatric:         Mood and Affect: Mood normal.         Behavior: Behavior normal.       Assessment:       1. Gait disorder    2. Chronic obstructive pulmonary disease, unspecified COPD type    3. Pulmonary hypertension    4. LAYNE (dyspnea on exertion)    5. Type 2 diabetes mellitus with diabetic polyneuropathy, with long-term current use of insulin    6. Chronic diastolic heart failure    7. S/P revision of total knee, right    8. Lymphedema of both lower extremities    9. Venous stasis dermatitis of both lower extremities    10. Impaired mobility and ADLs    11. Morbid obesity with BMI of 50.0-59.9, adult        Assessment/Plan:         - The patient was seen today for mobility evaluation for a power mobility device due to significant impairment at home.  - The patient has multifactorial gait impairment.  - The patient is not able to ambulate safely at home.  - The patient is unable to use a walker functional distances due to bilateral lower extremity weakness, worse on the right, chronic right knee pain persistent after her knee surgeries, shortness of breath due to diastolic CHF, COPD and pulmonary hypertension.  - The patient is unable to use an optimally-configured manual wheelchair in the home in order to perform Mobility Related Activities of Daily Living, due to LAYNE because of COPD and diastolic CHF, bilateral shoulder pain due to OA, and morbid obesity with higher energy requirements for wheelchair propulsion.  - The patient has intact cognition and should be able to use a power mobility device well at home.  - A prescription for a power  wheelchair was generated.  C was prescribed (due to severe lower extremity lymphedema with venous stasis ulcers and inability to manually lift leg rests).  - A scooter would not be appropriate due the patient's trouble clearing the ledge, difficulty controlling the scooter tiller due to bilateral shoulder pain, need for customization and to maneuverability restrictions at her residence.    - This will allow the patient to go safely to the kitchen, dining room or living room for feeding & socialization.  It should also help with energy conservation.  - The patient is to return the Physical Medicine/Mobility clinic prn.        This note was partly generated with [employer] voice recognition software. I apologize for any possible typographical errors.

## 2023-04-24 ENCOUNTER — TELEPHONE (OUTPATIENT)
Dept: PRIMARY CARE CLINIC | Facility: CLINIC | Age: 83
End: 2023-04-24
Payer: MEDICARE

## 2023-04-24 NOTE — TELEPHONE ENCOUNTER
----- Message from Jae Zimmerman sent at 4/24/2023  3:57 PM CDT -----  Type:  Patient Returning Call    Who Called:pt  Who Left Message for Patient:  Does the patient know what this is regarding?:appt  Would the patient rather a call back or a response via [facility]? call  Best Call Back Number:[phone]  Additional Information: pt has questions about her appt on 04/28/2023

## 2023-04-25 ENCOUNTER — TELEPHONE (OUTPATIENT)
Dept: PHYSICAL MEDICINE AND REHAB | Facility: CLINIC | Age: 83
End: 2023-04-25
Payer: MEDICARE

## 2023-04-28 ENCOUNTER — IMMUNIZATION (OUTPATIENT)
Dept: PHARMACY | Facility: CLINIC | Age: 83
End: 2023-04-28
Payer: MEDICARE

## 2023-04-28 ENCOUNTER — OFFICE VISIT (OUTPATIENT)
Dept: PRIMARY CARE CLINIC | Facility: CLINIC | Age: 83
End: 2023-04-28
Payer: MEDICARE

## 2023-04-28 VITALS
OXYGEN SATURATION: 96 % | SYSTOLIC BLOOD PRESSURE: 148 MMHG | WEIGHT: 246.69 LBS | HEIGHT: 59 IN | TEMPERATURE: 99 F | DIASTOLIC BLOOD PRESSURE: 70 MMHG | HEART RATE: 86 BPM | BODY MASS INDEX: 49.73 KG/M2

## 2023-04-28 DIAGNOSIS — R53.2 COMPLETE IMMOBILITY DUE TO SEVERE PHYSICAL DISABILITY OR FRAILTY: ICD-10-CM

## 2023-04-28 DIAGNOSIS — N18.31 STAGE 3A CHRONIC KIDNEY DISEASE: ICD-10-CM

## 2023-04-28 DIAGNOSIS — Z13.820 SCREENING FOR OSTEOPOROSIS: ICD-10-CM

## 2023-04-28 DIAGNOSIS — M46.00 SPINAL ENTHESOPATHY: ICD-10-CM

## 2023-04-28 DIAGNOSIS — Z23 NEED FOR VACCINATION: Primary | ICD-10-CM

## 2023-04-28 DIAGNOSIS — R54 FRAILTY SYNDROME IN GERIATRIC PATIENT: ICD-10-CM

## 2023-04-28 DIAGNOSIS — E11.42 TYPE 2 DIABETES MELLITUS WITH DIABETIC POLYNEUROPATHY, WITH LONG-TERM CURRENT USE OF INSULIN: Primary | ICD-10-CM

## 2023-04-28 DIAGNOSIS — E28.319 ASYMPTOMATIC PREMATURE MENOPAUSE: ICD-10-CM

## 2023-04-28 DIAGNOSIS — I87.332 CHRONIC VENOUS HYPERTENSION (IDIOPATHIC) WITH ULCER AND INFLAMMATION OF LEFT LOWER EXTREMITY: ICD-10-CM

## 2023-04-28 DIAGNOSIS — D69.2 SENILE PURPURA: ICD-10-CM

## 2023-04-28 DIAGNOSIS — E27.8 ADRENAL MASS: ICD-10-CM

## 2023-04-28 DIAGNOSIS — Z79.4 TYPE 2 DIABETES MELLITUS WITH DIABETIC POLYNEUROPATHY, WITH LONG-TERM CURRENT USE OF INSULIN: Primary | ICD-10-CM

## 2023-04-28 LAB
ALBUMIN/CREAT UR: 116 UG/MG (ref 0–30)
CREAT UR-MCNC: 75 MG/DL (ref 15–325)
GLUCOSE SERPL-MCNC: 86 MG/DL (ref 70–110)
MICROALBUMIN UR DL<=1MG/L-MCNC: 87 UG/ML

## 2023-04-28 PROCEDURE — 82962 GLUCOSE BLOOD TEST: CPT | Mod: ,,, | Performed by: INTERNAL MEDICINE

## 2023-04-28 PROCEDURE — 82962 POCT GLUCOSE, HAND-HELD DEVICE: ICD-10-PCS | Mod: ,,, | Performed by: INTERNAL MEDICINE

## 2023-04-28 PROCEDURE — 99212 PR OFFICE/OUTPT VISIT, EST, LEVL II, 10-19 MIN: ICD-10-PCS | Mod: S$GLB,,, | Performed by: INTERNAL MEDICINE

## 2023-04-28 PROCEDURE — 82570 ASSAY OF URINE CREATININE: CPT | Performed by: INTERNAL MEDICINE

## 2023-04-28 PROCEDURE — 99212 OFFICE O/P EST SF 10 MIN: CPT | Mod: S$GLB,,, | Performed by: INTERNAL MEDICINE

## 2023-04-28 RX ORDER — INSULIN GLARGINE 100 [IU]/ML
12 INJECTION, SOLUTION SUBCUTANEOUS NIGHTLY
Qty: 10.8 ML | Refills: 3
Start: 2023-04-28 | End: 2023-10-19

## 2023-04-28 NOTE — PROGRESS NOTES
"Primary Care Provider Appointment - [facility]  SHARED NOTE: Bridgette Gutierrez (MS4), Dr Schmidt (Attending)    Subjective:      Patient ID: Duran Giordano is a 83 y.o. female with CAD, HTN, HLD, Gout, Morbid Obesity, Cervical Radiculopathy, Lacunar Infarcts, Sciatica, CKD3, T2DM, GERD, hx DVT, Severe OA bilateral knees, Gout    Chief Complaint: Follow-up    Prior to this visit, patient's last encounter with PCP was 9/13/22    Reports 1 week ago blood pressure taken at 200/90 then 180 systolic, gradually came down over a few hours. Saw nurse at [facility], does not report feeling agitated. Reports her blood pressure cuff at home is around the wrist and is unsure of accuracy. At that time, patient experienced increased blurry vision from baseline, mainly in Left eye, reports "dullness". Has been compliant with blood pressure medication.     She is living in [facility] for [facility], ambulating with rolling walker. Recent visit to physical medicine clinic for evaluation of power mobility device. Unable to safely ambulate at home, prescription for power wheelchair given, coming Wednesday to fit her. PT every Monday and Wednesday, patient very motivated to increase mobility.     Reports daytime sleepiness. She sleeps in recliner, used to use CPAP but discontinued about 3 years ago. Sisters reports she snores. Reports restful sleep.     DM  - Currently taking: Ozempic 0.25 or 0.5 mg weekly, Tresiba 16 U nightly decrease to 12 U nightly  - BGL today: 86 (nothing to eat since breakfast around 9)  - Last A1c on 6/6/22 was 6.6  - Diet consists of breakfast - oatmeal, lunch - meat vegetable, salad or rice, dinner - soup, potatoes - all prepared at mother house    HTN  - Currently taking: Amlodipine 5 mg, Torsemide 10 mg  - How often taking BP at home: when she feels ill then nurse will take BP  - Today, BP is: 148/70    HLD  - Currently taking: Atorvastatin 80 mg,   - Last lipid panel was on 3/4/21  The ASCVD Risk score (Melita VASQUEZ, " et al., 2019) failed to calculate for the following reasons:    The 2019 ASCVD risk score is only valid for ages 40 to 79      Care Gaps:  - Tetanus  - Shingles  - COVID- done today  - Eye Exam - ordered  - DEXA Scan - ordered  - Diabetic urine screen - done today  - Hba1c - done today    Medications: Does not have pill packs    Routine Follow Up      4Ms for Medical Decision-Making in Older Adults    Last Completed EAWV: 4/26/2021    MOBILITY:  Get Up and Go:  No flowsheet data found.  Activities of Daily Living:  Activities of Daily Living 4/26/2021   Ambulation Independent   Dressing Independent   Transfers Independent   Toileting Continent of bladder;Continent of bowel   Feeding Independent   Cleaning home/Chores Independent   Telephone use Independent   Shopping Independent   Paying bills Independent   Taking meds Independent     Whisper Test:  Whisper Test 4/26/2021   Whisper Test Normal     Disability Status:  Disability Status 4/26/2021   Are you deaf or do you have serious difficulty hearing? No   Are you blind or do you have serious difficulty seeing, even when wearing glasses? No   Because of a physical, mental, or emotional condition, do you have serious difficulty concentrating, remembering, or making decisions? No   Do you have serious difficulty walking or climbing stairs? Yes   Do you have difficulty dressing or bathing? Yes   Because of a physical, mental, or emotional condition, do you have difficulty doing errands alone such as visiting a doctor's office or shopping? No     Nutrition Screening:  Nutrition Screening 4/26/2021   Has food intake declined over the past three months due to loss of appetite, digestive problems, chewing or swallowing difficulties? No decrease in food intake   Involuntary weight loss during the last 3 months? No weight loss   Mobility? Goes out   Has the patient suffered psychological stress or acute disease in the past three months? No   Neuropsychological problems? No  psychological problems   Body Mass Index (BMI)?  BMI 23 or greater   Screening Score 14    Screening Score: 0-7 Malnourished, 8-11 At Risk, 12-14 Normal    MENTATION:   Depression Patient Health Questionnaire:  Depression Patient Health Questionnaire 4/28/2023   Over the last two weeks how often have you been bothered by little interest or pleasure in doing things Not at all   Over the last two weeks how often have you been bothered by feeling down, depressed or hopeless Not at all   PHQ-2 Total Score 0   PHQ-4 Total Score 0   PHQ-9 Total Score -     Has Dementia Dx: No    Cognitive Function Screening:  Cognitive Function Screening 4/26/2021   Clock Drawing Test 2   Mini-Cog 3 Minute Recall 3   Cognitive Function Screening 5     Cognitive Function Screening Total - Less than 4 = Abnormal,  Greater than or equal to 4 = Normal    MEDICATIONS:  High Risk Medications:  Total Active Medications: 1  oxyCODONE - 5 MG    WHAT MATTERS MOST:  Advance Care Planning   ACP Status:   Patient has had an ACP conversation  Living Will: Yes  Power of : No  [place]: Yes    What is most important right now is to focus on  walking without walker - increasing mobility    Accordingly, we have decided that the best plan to meet the patient's goals includes continuing with treatment      What matters most to patient today is: increasing mobility           PHQ-4 Score: 0     Social History     Socioeconomic History    Marital status: Single   Tobacco Use    Smoking status: Never    Smokeless tobacco: Never   Substance and Sexual Activity    Alcohol use: Yes     Comment: x mas time    Drug use: No    Sexual activity: Never   Social History Narrative    Single with no children.        Review of Systems   Constitutional:  Positive for fatigue. Negative for appetite change, chills, fever and unexpected weight change.   HENT:  Negative for congestion, dental problem and ear pain.    Eyes:  Positive for visual disturbance. Negative for pain  "and discharge.        Left eye very blurry   Respiratory:  Negative for cough, shortness of breath and wheezing.    Cardiovascular:  Positive for leg swelling. Negative for chest pain and palpitations.   Gastrointestinal:  Negative for abdominal pain, constipation, diarrhea, nausea and vomiting.   Genitourinary:  Negative for difficulty urinating, frequency and urgency.   Musculoskeletal:  Negative for arthralgias and joint swelling.   Neurological:  Positive for weakness. Negative for dizziness and light-headedness.   Psychiatric/Behavioral:  Negative for dysphoric mood.      Objective:   BP (!) 148/70 (BP Location: Left arm, Patient Position: Sitting, BP Method: Large (Manual))   Pulse 86   Temp 98.5 °F (36.9 °C) (Oral)   Ht 4' 11" (1.499 m)   Wt 111.9 kg (246 lb 11.1 oz)   SpO2 96%   BMI 49.83 kg/m²     Physical Exam  Constitutional:       Appearance: She is obese.      Comments: Wheelchair   HENT:      Head: Normocephalic and atraumatic.   Cardiovascular:      Rate and Rhythm: Normal rate and regular rhythm.      Pulses: Normal pulses.      Heart sounds: Normal heart sounds.   Pulmonary:      Effort: Pulmonary effort is normal.      Breath sounds: Normal breath sounds.   Musculoskeletal:      Right lower leg: Edema present.      Left lower leg: Edema present.      Comments: Bilateral circaids    Skin:     General: Skin is warm and dry.   Neurological:      General: No focal deficit present.      Mental Status: She is oriented to person, place, and time.           Lab Results   Component Value Date    WBC 7.72 02/16/2023    HGB 11.2 (L) 02/16/2023    HCT 37.7 02/16/2023     02/16/2023    CHOL 160 03/04/2021    TRIG 88 03/04/2021    HDL 34 (L) 03/04/2021    ALT 14 02/16/2023    AST 17 02/16/2023     02/16/2023    K 4.4 02/16/2023     02/16/2023    CREATININE 1.1 02/16/2023    BUN 22 02/16/2023    CO2 28 02/16/2023    TSH 0.799 01/16/2020    INR 1.0 06/22/2022    HGBA1C 6.8 (H) 04/28/2023 "       Current Outpatient Medications on File Prior to Visit   Medication Sig Dispense Refill    acetaminophen (TYLENOL) 500 MG tablet Take 2 tablets (1,000 mg total) by mouth every 8 (eight) hours. Bedside delivery 120 tablet 0    albuterol (PROVENTIL/VENTOLIN HFA) 90 mcg/actuation inhaler Inhale 2 puffs into the lungs every 4 (four) hours as needed for Wheezing or Shortness of Breath. Rescue 54 g 3    albuterol-ipratropium (DUO-NEB) 2.5 mg-0.5 mg/3 mL nebulizer solution USE 1 VIAL PER NEBULIZER EVERY 6 HOURS AS NEEDED FOR WHEEZING/RESCUE 90 mL 11    allopurinoL (ZYLOPRIM) 100 MG tablet TAKE 1 TABLET BY MOUTH EVERY DAY 30 tablet 0    allopurinoL (ZYLOPRIM) 300 MG tablet Take 1 tablet (300 mg total) by mouth once daily. 90 tablet 2    amLODIPine (NORVASC) 5 MG tablet Take 1 tablet (5 mg total) by mouth once daily. 90 tablet 3    ASPERCREME, LIDOCAINE HCL, 4 % Crea Apply topically.      aspirin (ECOTRIN) 81 MG EC tablet Take 1 tablet by mouth once daily.      atorvastatin (LIPITOR) 80 MG tablet Take 1 tablet (80 mg total) by mouth once daily. 90 tablet 3    blood sugar diagnostic Strp BG monitoring 4 times a day. Pt needs test strips for [employer] meter. (Patient taking differently: BG monitoring 2 times a day. Pt needs test strips for [employer] Talking meter.) 450 strip prn    cholecalciferol, vitamin D3, (VITAMIN D3) 25 mcg (1,000 unit) capsule Take 1 capsule (1,000 Units total) by mouth once daily. 90 capsule 3    colchicine (COLCRYS) 0.6 mg tablet 0.6 mg every other day 45 tablet 1    diclofenac sodium (VOLTAREN) 1 % Gel APPLY 2 GRAMS TOPICALLY DAILY 100 g 0    docusate sodium (COLACE) 100 MG capsule Take 100 mg by mouth 2 (two) times daily.      doxycycline (VIBRA-TABS) 100 MG tablet Take 1 tablet (100 mg total) by mouth 2 (two) times daily. 180 tablet 1    fluticasone (FLONASE) 50 mcg/actuation nasal spray 2 sprays (100 mcg total) by Each Nare route once daily. 1 Bottle 3    fluticasone-salmeterol diskus  "inhaler 500-50 mcg INHALE 1 PUFF TWICE DAILY 60 each 11    GENABIO COVID-19 RAPID AT-HOME Kit       lancets Misc Test daily (Patient taking differently: Test twice  daily) 100 each 12    nebulizer and compressor (COMP-AIR ELITE COMP NEB SYSTEM) Berna use as directed 1 each 0    oxyCODONE (ROXICODONE) 5 MG immediate release tablet Take 1 tablet (5 mg total) by mouth every 6 (six) hours as needed for Pain. May take 1-2 tablets every 6 hours as needed for pain 42 tablet 0    pen needle, diabetic (PEN NEEDLE) 29 gauge x 1/2" Ndle USE DAILY 100 each 3    polyethylene glycol (GLYCOLAX) 17 gram PwPk Take 17 g by mouth once daily.  0    polyethylene glycol (GLYCOLAX) 17 gram PwPk Take 17 g by mouth once daily. 90 packet 3    semaglutide (OZEMPIC) 0.25 mg or 0.5 mg(2 mg/1.5 mL) pen injector INJECT 0.5 MG SUBCUTANEOUSLY ONCE A WEEK 1.5 mL 5    SENNA 8.6 mg tablet Take by mouth.      torsemide (DEMADEX) 10 MG Tab 10 mg every other day 45 tablet 1    trolamine salicylate (ASPERCREME) 10 % cream Apply topically as needed.       No current facility-administered medications on file prior to visit.         Assessment:   83 y.o. female with multiple co-morbid illnesses here to follow-up with PCP and continue work-up of chronic issues    Plan:     Problem List Items Addressed This Visit          Renal/    Stage 3a chronic kidney disease     CKD related to longstanding DM and diuretic use  monitor            Hematology    Senile purpura     Ecchymoses and purpura on UE bilaterally  monitor            Endocrine    Adrenal mass- adenoma stable since 2004 (1.8 cm and 8/13/12 (2 cm); stable 2016 2.4 cm     Adrenal mass- adenoma stable since 2004 (1.8 cm and 8/13/12 (2 cm); stable 2016 2.4 cm  Monitor         Type 2 diabetes mellitus with diabetic polyneuropathy, with long-term current use of insulin - Primary    Relevant Medications    insulin (LANTUS SOLOSTAR U-100 INSULIN) glargine 100 units/mL SubQ pen    Other Relevant Orders    POCT " "Glucose, Hand-Held Device (Completed)    HEMOGLOBIN A1C (Completed)    Microalbumin/creatinine urine ratio (Completed)    Ambulatory referral/consult to Optometry    Ambulatory Referral/Consult to Primary Care Diabetic Management       Orthopedic    Spinal enthesopathy     Chair bound, spinal strain due to obesity. 8/16 CT: "thickening of the ligamentum flavum leading to spinal canal impingement at L4-L5, and to a lesser degree at L5-S1."  Advised conservative goals of care         Chronic venous hypertension (idiopathic) with ulcer and inflammation of left lower extremity (CODE)     LE ulcer with slow healing, extensive venous stasis  Advised to continue circaids            Other    Complete immobility due to severe physical disability or frailty     Wheelchair bound, low functional status, severe obesity and frailty  monitor         Frailty syndrome in geriatric patient     Age >75, low muscle mass, wheelchair bound, numerous LE acute on chronic conditions  Advised that supportive care at this time is safer than aggressive therapies          Other Visit Diagnoses       Screening for osteoporosis        Relevant Orders    DXA Bone Density Axial Skeleton 1 or more sites    Asymptomatic premature menopause        Relevant Orders    DXA Bone Density Axial Skeleton 1 or more sites          Health Maintenance         Date Due Completion Date    TETANUS VACCINE Never done ---    Shingles Vaccine (3 of 3) 09/06/2021 7/12/2021    COVID-19 Vaccine (5 - Booster) 11/18/2021 9/23/2021    Eye Exam 02/03/2022 2/3/2021    Override on 2/7/2020: Done    Override on 2/17/2016: Done    Override on 1/22/2015: Done    Override on 8/16/2012: Done    DEXA Scan 02/22/2022 2/22/2018    Override on 12/13/2011: Done    Diabetes Urine Screening 03/04/2022 3/4/2021    Hemoglobin A1c 12/06/2022 6/6/2022    Override on 7/13/2016: Done    Lipid Panel 05/24/2023 5/24/2022            Future Appointments   Date Time Provider Department Center "   6/1/2023  7:40 AM NOM, DEXA1 [facility] BMD [address]   6/19/2023 10:15 AM Sonia Willard DPM [facility] POD [address]   8/9/2023 10:15 AM Alfredo Lozoya MD [facility] ORTHO [address]   9/28/2023  1:20 PM Wm Linder OD [facility] OPTOM[facility]   10/24/2023 10:00 AM Tami Schmidt MD [facility] MED CLN [address]         Follow up ROUTINE F2F. Total clinical care time was 60 min, issues addressed include: venous stasis, DM, obesity, knee pain      Tami Schmidt MD/MPH  [facility]  [phone] [employer]

## 2023-04-28 NOTE — PATIENT INSTRUCTIONS
TODAY:  - reduce insulin to 12U nightly  - continue ozempic 0.5mg weekly  - DEXA scan for bone density  - eye doctor  - lab today  - Diabetes Management appt with ROSALIO Contreras

## 2023-05-05 NOTE — TELEPHONE ENCOUNTER
----- Message from Patricia Johnson sent at 9/5/2018  4:51 PM CDT -----  Contact: [facility]  .Pharmacy Calling    Reason for call: Status of PA  Pharmacy Name:  [facility]  Prescription Name: diclofenac sodium 1 % Gel  Phone Number: [phone] Fax: [phone]  Additional Information:           post operative instructions, medication to pharmacy and post surgical  fallow up appointment with MD

## 2023-06-01 ENCOUNTER — HOSPITAL ENCOUNTER (OUTPATIENT)
Dept: RADIOLOGY | Facility: CLINIC | Age: 83
Discharge: HOME | End: 2023-06-01
Attending: INTERNAL MEDICINE
Payer: MEDICARE

## 2023-06-01 DIAGNOSIS — Z13.820 SCREENING FOR OSTEOPOROSIS: ICD-10-CM

## 2023-06-01 DIAGNOSIS — E28.319 ASYMPTOMATIC PREMATURE MENOPAUSE: ICD-10-CM

## 2023-06-01 PROCEDURE — 77080 DXA BONE DENSITY AXIAL: CPT | Mod: 26,,, | Performed by: INTERNAL MEDICINE

## 2023-06-01 PROCEDURE — 77080 DXA BONE DENSITY AXIAL: CPT | Mod: TC

## 2023-06-01 PROCEDURE — 77080 DXA BONE DENSITY AXIAL SKELETON 1 OR MORE SITES: ICD-10-PCS | Mod: 26,,, | Performed by: INTERNAL MEDICINE

## 2023-06-06 ENCOUNTER — TELEPHONE (OUTPATIENT)
Dept: PRIMARY CARE CLINIC | Facility: CLINIC | Age: 83
End: 2023-06-06
Payer: MEDICARE

## 2023-06-06 NOTE — TELEPHONE ENCOUNTER
Please let patient know that her A1c was at goal. She should keep up the great work!    How is she doing?    Thanks,  Dr DAY

## 2023-06-13 ENCOUNTER — TELEPHONE (OUTPATIENT)
Dept: PRIMARY CARE CLINIC | Facility: CLINIC | Age: 83
End: 2023-06-13
Payer: MEDICARE

## 2023-06-13 NOTE — PROGRESS NOTES
Please let patient know that her bone density scan was normal. It did show some increased density in some areas, but this is not something I think we should get more imaging for.     She needs to take calcium with vitamin D supplements to keep her bones strong. Exercise will help her maintain her bone strength, especially activities that are weight bearing (walking, jogging, weight lifting).

## 2023-06-13 NOTE — TELEPHONE ENCOUNTER
----- Message from Tami Schmidt MD sent at 6/13/2023 12:57 PM CDT -----  Please let patient know that her bone density scan was normal. It did show some increased density in some areas, but this is not something I think we should get more imaging for.     She needs to take calcium with vitamin D supplements to keep her bones strong. Exercise will help her maintain her bone strength, especially activities that are weight bearing (walking, jogging, weight lifting).

## 2023-06-19 ENCOUNTER — TELEPHONE (OUTPATIENT)
Dept: PODIATRY | Facility: CLINIC | Age: 83
End: 2023-06-19
Payer: MEDICARE

## 2023-06-19 NOTE — TELEPHONE ENCOUNTER
Patient was called twice regarding the need to reschedule their 6/19 appointment with Dr. Willard due to provider not being available .     Patient was contacted via telephone .    Patient didn't answer. Patient was asked to call us back at [phone]. Message via the Patient Portal was not sent.    Appointment was cancelled.    Patient note:      Patient appointment was for nailcare

## 2023-06-26 NOTE — TELEPHONE ENCOUNTER
See if she can move up her appt with Thalia Blue (wound care). She is scheduled for 11/8/17.    Thanks,  BARB   needed assist

## 2023-06-29 ENCOUNTER — TELEPHONE (OUTPATIENT)
Dept: PRIMARY CARE CLINIC | Facility: CLINIC | Age: 83
End: 2023-06-29
Payer: MEDICARE

## 2023-06-29 NOTE — TELEPHONE ENCOUNTER
----- Message from Sarah Beth Zarco sent at 6/29/2023  2:26 PM CDT -----  Contact: Barbie with PCA agency  Baribe called to inform that will be faxing paper work today for pcp to fill it out.   Ph # [phone]. Thank you

## 2023-07-06 ENCOUNTER — TELEPHONE (OUTPATIENT)
Dept: PRIMARY CARE CLINIC | Facility: CLINIC | Age: 83
End: 2023-07-06
Payer: MEDICARE

## 2023-07-26 DIAGNOSIS — T84.50XD INFECTION OF PROSTHETIC JOINT, SUBSEQUENT ENCOUNTER: ICD-10-CM

## 2023-07-26 RX ORDER — DOXYCYCLINE HYCLATE 100 MG
100 TABLET ORAL 2 TIMES DAILY
Qty: 60 TABLET | Refills: 4 | Status: SHIPPED | OUTPATIENT
Start: 2023-07-26 | End: 2023-10-19

## 2023-08-07 DIAGNOSIS — T84.50XD INFECTION OF PROSTHETIC JOINT, SUBSEQUENT ENCOUNTER: ICD-10-CM

## 2023-08-07 DIAGNOSIS — M17.12 OSTEOARTHRITIS OF LEFT KNEE, UNSPECIFIED OSTEOARTHRITIS TYPE: ICD-10-CM

## 2023-08-09 ENCOUNTER — HOSPITAL ENCOUNTER (OUTPATIENT)
Dept: RADIOLOGY | Facility: HOSPITAL | Age: 83
Discharge: HOME | End: 2023-08-09
Attending: ORTHOPAEDIC SURGERY
Payer: MEDICARE

## 2023-08-09 ENCOUNTER — OFFICE VISIT (OUTPATIENT)
Dept: ORTHOPEDICS | Facility: CLINIC | Age: 83
End: 2023-08-09
Payer: MEDICARE

## 2023-08-09 VITALS — HEIGHT: 59 IN | BODY MASS INDEX: 51.51 KG/M2 | WEIGHT: 255.5 LBS

## 2023-08-09 DIAGNOSIS — M17.12 OSTEOARTHRITIS OF LEFT KNEE, UNSPECIFIED OSTEOARTHRITIS TYPE: ICD-10-CM

## 2023-08-09 DIAGNOSIS — M17.12 PRIMARY OSTEOARTHRITIS OF LEFT KNEE: ICD-10-CM

## 2023-08-09 DIAGNOSIS — T84.50XS INFECTION OF PROSTHETIC JOINT, SEQUELA: Primary | ICD-10-CM

## 2023-08-09 DIAGNOSIS — T84.50XD INFECTION OF PROSTHETIC JOINT, SUBSEQUENT ENCOUNTER: ICD-10-CM

## 2023-08-09 DIAGNOSIS — E66.01 MORBID OBESITY WITH BMI OF 50.0-59.9, ADULT: ICD-10-CM

## 2023-08-09 PROCEDURE — 73562 X-RAY EXAM OF KNEE 3: CPT | Mod: 26,LT,, | Performed by: RADIOLOGY

## 2023-08-09 PROCEDURE — 73562 X-RAY EXAM OF KNEE 3: CPT | Mod: TC,50

## 2023-08-09 PROCEDURE — 99213 PR OFFICE/OUTPT VISIT, EST, LEVL III, 20-29 MIN: ICD-10-PCS | Mod: S$PBB,,, | Performed by: ORTHOPAEDIC SURGERY

## 2023-08-09 PROCEDURE — 73562 PR  X-RAY KNEE 3 VIEW: ICD-10-PCS | Mod: 26,LT,, | Performed by: RADIOLOGY

## 2023-08-09 PROCEDURE — 99212 OFFICE O/P EST SF 10 MIN: CPT | Mod: PBBFAC | Performed by: ORTHOPAEDIC SURGERY

## 2023-08-09 PROCEDURE — 73562 X-RAY EXAM OF KNEE 3: CPT | Mod: 26,RT,, | Performed by: RADIOLOGY

## 2023-08-09 PROCEDURE — 99999 PR PBB SHADOW E&M-EST. PATIENT-LVL II: ICD-10-PCS | Mod: PBBFAC,,, | Performed by: ORTHOPAEDIC SURGERY

## 2023-08-09 PROCEDURE — 99213 OFFICE O/P EST LOW 20 MIN: CPT | Mod: S$PBB,,, | Performed by: ORTHOPAEDIC SURGERY

## 2023-08-09 PROCEDURE — 99999 PR PBB SHADOW E&M-EST. PATIENT-LVL II: CPT | Mod: PBBFAC,,, | Performed by: ORTHOPAEDIC SURGERY

## 2023-08-09 NOTE — PROGRESS NOTES
"Subjective:      Patient ID: Duran Giordano is a 83 y.o. female.    Chief Complaint: Pain of the Left Knee and Pain of the Right Knee    HPI  Duran Giordano has bilateral knee pain.  Knee pain is manageable on both sides.  The right is doing well.  She has had some slight increase in pain in the left knee.  Nothing too severe.  Her bigger issue is she feels her lymphedema is getting worse.  She is having difficulty wearing the compression stockings that she has problems getting them on and off.  Her history, medications and problem list were reviewed.          Objective:      Body mass index is 51.61 kg/m².  Vitals:    23 1036   Weight: 115.9 kg (255 lb 8.2 oz)   Height: 4' 11" (1.499 m)           General    Constitutional: She is oriented to person, place, and time.   obese   HENT:   Head: Normocephalic and atraumatic.   Eyes: EOM are normal.   Cardiovascular:  Normal rate and regular rhythm.            Pulmonary/Chest: Effort normal.   Neurological: She is alert and oriented to person, place, and time.   Psychiatric: She has a normal mood and affect.           Right Knee Exam     Inspection   Scars: present    Tenderness   The patient is experiencing no tenderness.     Range of Motion   Extension:  0   Flexion:  120     Tests   Ligament Examination   Lachman: normal (-1 to 2mm)   MCL - Valgus: normal (0 to 2mm)  LCL - Varus: normal    Left Knee Exam     Tenderness   The patient tender to palpation of the medial joint line.    Range of Motion   Extension:  0   Flexion:  120     Tests   Stability   Lachman: normal (-1 to 2mm)   MCL - Valgus: normal (0 to 2mm)  LCL - Varus: normal (0 to 2mm)    Muscle Strength   Right Lower Extremity   Hip Abduction: 5/5   Quadriceps:  5/5   Hamstrin/5   Left Lower Extremity   Hip Abduction: 5/5   Quadriceps:  5/5   Hamstrin/5     Vascular Exam       Edema  Right Lower Leg: absent  Left Lower Leg: absent    Radiographs taken today were reviewed by me.  There is " a prosthetic replacement of the right knee(s).  The prosthesis is well positioned.  There is not evidence of bone loss, osteolysis, or loosening. There is not a fracture.    Radiographs taken today and reviewed by me demonstrate moderate arthritic change of the left KNEE(s).There  is not bone destruction.  There is not a fracture. The medial compartment is most involved.  There is a varus deformity.  The changes are tricompartmental.    No change in either        Assessment:       Encounter Diagnoses   Name Primary?    Infection of prosthetic joint, sequela Yes    Primary osteoarthritis of left knee     Morbid obesity with BMI of 50.0-59.9, adult           Plan:       Duran Logan was seen today for pain and pain.    Diagnoses and all orders for this visit:    Infection of prosthetic joint, sequela    Primary osteoarthritis of left knee    Morbid obesity with BMI of 50.0-59.9, adult        I message Dr. Celestine almonte regarding her lymphedema.  The knees are status quo I will see her back in 6 months with new knee radiographs.  Sooner if necessary.

## 2023-08-11 ENCOUNTER — TELEPHONE (OUTPATIENT)
Dept: PRIMARY CARE CLINIC | Facility: CLINIC | Age: 83
End: 2023-08-11
Payer: MEDICARE

## 2023-08-11 DIAGNOSIS — I89.0 LYMPHEDEMA OF BOTH LOWER EXTREMITIES: ICD-10-CM

## 2023-08-11 DIAGNOSIS — I87.2 VENOUS STASIS DERMATITIS OF BOTH LOWER EXTREMITIES: Primary | ICD-10-CM

## 2023-08-11 NOTE — TELEPHONE ENCOUNTER
Please ask Sister Duran Logan if she is using her circaids. Does she need a new pair? Dr Lozoya reached out to me to see if she wants to use the wraps with zippers. Is she interested in that, has she ever used those?    I'm placing a referral to the lymphedema clinic to see if they can get her in again, and do measurements.    Thanks,  Dr DAY   Crescentic Advancement Flap Text: The defect edges were debeveled with a #15 scalpel blade.  Given the location of the defect and the proximity to free margins a crescentic advancement flap was deemed most appropriate.  Using a sterile surgical marker, the appropriate advancement flap was drawn incorporating the defect and placing the expected incisions within the relaxed skin tension lines where possible.    The area thus outlined was incised deep to adipose tissue with a #15 scalpel blade.  The skin margins were undermined to an appropriate distance in all directions utilizing iris scissors.

## 2023-08-11 NOTE — TELEPHONE ENCOUNTER
----- Message from Chelle Hong sent at 8/11/2023  9:51 AM CDT -----  Contact: Pt [phone]  Patient is returning a phone call.  Who left a message for the patient: Barbie  Does patient know what this is regarding:  yes  Would you like a call back, or a response through your [facility] portal?:   gabby  Comments:       bottom foot/Right:

## 2023-08-11 NOTE — TELEPHONE ENCOUNTER
----- Message from Alfredo Lozoya MD sent at 8/9/2023 10:56 AM CDT -----  Her lymphedema seems to be getting worse. She sates that she has difficulty putting her stockings on by herself.  DO you thnk it is worth trying  stockings with zippers?

## 2023-08-11 NOTE — TELEPHONE ENCOUNTER
Sw called the pt and she stated that the Circaids are too hard to use and yes she wants the zipper ones. She also stated that it would be great for her to get into the Lymphedema clinic too. So its a YES to the clinic.

## 2023-08-14 NOTE — TELEPHONE ENCOUNTER
Order already signed for lymphedema clinic. They will call her to schedule appt, she needs to answer the phone.    Please advise her to work with Nurse Lynn (or [facility]) on applying the circaids. She needs to use those until she sees the lymphedema clinic. Her insurance only covers a set number of stockings for lymphedema and theyre very expensive. I would prefer that she is measured for them by an expert than for me to order them blindly.    Until then, she needs to use something, or her legs will get blisters. I will message Nurse Lynn again to help her.    Thanks,  Dr DAY

## 2023-08-18 DIAGNOSIS — E11.59 HYPERTENSION ASSOCIATED WITH DIABETES: ICD-10-CM

## 2023-08-18 DIAGNOSIS — I15.2 HYPERTENSION ASSOCIATED WITH DIABETES: ICD-10-CM

## 2023-08-18 RX ORDER — AMLODIPINE BESYLATE 5 MG/1
5 TABLET ORAL
Qty: 90 TABLET | Refills: 3 | Status: SHIPPED | OUTPATIENT
Start: 2023-08-18

## 2023-08-21 ENCOUNTER — TELEPHONE (OUTPATIENT)
Dept: PRIMARY CARE CLINIC | Facility: CLINIC | Age: 83
End: 2023-08-21
Payer: MEDICARE

## 2023-08-21 DIAGNOSIS — H53.9 VISION CHANGES: Primary | ICD-10-CM

## 2023-08-21 RX ORDER — OLOPATADINE HYDROCHLORIDE 1 MG/ML
1 SOLUTION/ DROPS OPHTHALMIC 2 TIMES DAILY
Qty: 5 ML | Refills: 0 | Status: SHIPPED | OUTPATIENT
Start: 2023-08-21 | End: 2024-08-20

## 2023-08-21 NOTE — TELEPHONE ENCOUNTER
Spoke with pt, she stated her left eye has a haze, a shield, blurred vision. Pt is stopped up in her left nostril and her left ear.     This has been since a week.   Pt has been using eye drop and nothing is working.     Pt is not using any nose sprayer.   Suggested taking claritin for allergies.     Pt is wondering if she had a cataract that needs to be removed.   Pended referral for optometry.

## 2023-08-21 NOTE — TELEPHONE ENCOUNTER
Please let patient know that I sent antihistamine eyedrops to her pharmacy.  She should try these twice a day.      I also signed a referral to Optometry I am pretty sure that they are booked out for quite some time.  But please still try to get her an urgent appointment if possible.  You will have to call them.    Thanks,  Dr DAY

## 2023-08-21 NOTE — TELEPHONE ENCOUNTER
----- Message from Madelyn Mckeon sent at 8/21/2023 12:23 PM CDT -----  Contact: [phone]  1MEDICALADVICE     Patient is calling for Medical Advice regarding: pt is experiencing blurred vision in left eye. She states its really bad and her hearing in impaired and nose is stopped up. She is not sure if its her sinus or if she needs to see an optometrist. No sooner appts for provider or optometrist and pt would like to get in and be seen soon.     How long has patient had these symptoms: since last Thursday     Pharmacy name and phone#:   [facility] - [place], [place] - [address] [address]  [address] [address] [address]  Phone: [phone] Fax: [phone]    Would like response via "[employer]t:  no     Comments:

## 2023-08-22 NOTE — TELEPHONE ENCOUNTER
Spoke with pt and inform her that antihistamine eye drop was sent to pharmacy and pt has appointment sept 28, 2023

## 2023-08-23 ENCOUNTER — TELEPHONE (OUTPATIENT)
Dept: OPHTHALMOLOGY | Facility: CLINIC | Age: 83
End: 2023-08-23
Payer: MEDICARE

## 2023-08-23 ENCOUNTER — TELEPHONE (OUTPATIENT)
Dept: PRIMARY CARE CLINIC | Facility: CLINIC | Age: 83
End: 2023-08-23
Payer: MEDICARE

## 2023-08-23 ENCOUNTER — OFFICE VISIT (OUTPATIENT)
Dept: PRIMARY CARE CLINIC | Facility: CLINIC | Age: 83
End: 2023-08-23
Payer: MEDICARE

## 2023-08-23 VITALS
BODY MASS INDEX: 50.72 KG/M2 | HEART RATE: 97 BPM | SYSTOLIC BLOOD PRESSURE: 152 MMHG | HEIGHT: 59 IN | TEMPERATURE: 98 F | WEIGHT: 251.56 LBS | OXYGEN SATURATION: 98 % | DIASTOLIC BLOOD PRESSURE: 68 MMHG

## 2023-08-23 DIAGNOSIS — R09.81 NASAL CONGESTION: ICD-10-CM

## 2023-08-23 DIAGNOSIS — E11.42 TYPE 2 DIABETES MELLITUS WITH DIABETIC POLYNEUROPATHY, WITH LONG-TERM CURRENT USE OF INSULIN: Primary | ICD-10-CM

## 2023-08-23 DIAGNOSIS — I87.2 VENOUS STASIS DERMATITIS OF BOTH LOWER EXTREMITIES: ICD-10-CM

## 2023-08-23 DIAGNOSIS — H53.8 BLURRY VISION, LEFT EYE: ICD-10-CM

## 2023-08-23 DIAGNOSIS — Z79.4 TYPE 2 DIABETES MELLITUS WITH DIABETIC POLYNEUROPATHY, WITH LONG-TERM CURRENT USE OF INSULIN: Primary | ICD-10-CM

## 2023-08-23 PROBLEM — T84.53XA INFECTION OF PROSTHETIC RIGHT KNEE JOINT: Status: RESOLVED | Noted: 2021-10-25 | Resolved: 2023-08-23

## 2023-08-23 LAB — GLUCOSE SERPL-MCNC: 104 MG/DL (ref 70–110)

## 2023-08-23 PROCEDURE — 99211 OFF/OP EST MAY X REQ PHY/QHP: CPT | Mod: S$GLB,,, | Performed by: INTERNAL MEDICINE

## 2023-08-23 PROCEDURE — 99211 PR OFFICE/OUTPT VISIT, EST, LEVL I: ICD-10-PCS | Mod: S$GLB,,, | Performed by: INTERNAL MEDICINE

## 2023-08-23 PROCEDURE — 82962 GLUCOSE BLOOD TEST: CPT | Mod: ,,, | Performed by: INTERNAL MEDICINE

## 2023-08-23 PROCEDURE — 82962 POCT GLUCOSE, HAND-HELD DEVICE: ICD-10-PCS | Mod: ,,, | Performed by: INTERNAL MEDICINE

## 2023-08-23 NOTE — TELEPHONE ENCOUNTER
----- Message from Christina Collado sent at 8/23/2023 11:00 AM CDT -----  Consult/Advisory    Name Of Caller:Sister Doreen      Contact Preference:[phone]    Nature of call: Nursing home called regarding ptn left eye saying the eye is red and burning with tearing

## 2023-08-23 NOTE — TELEPHONE ENCOUNTER
Please get patient an appt this afternoon. She has many complaints. Nurse Leah states that she thinks her vision changes do need an urgent optometry appt, but she is also having sinus issues.    Thanks,  Dr DAY

## 2023-08-23 NOTE — TELEPHONE ENCOUNTER
Call made oph/optometry, triage nurse will reach out to patient. Advised to go to ER if they are not able to see her today. Pt understood.

## 2023-08-23 NOTE — ASSESSMENT & PLAN NOTE
She is unable to get circaids on by herself.  Referral for HH w/ lymphedema wound care placed.  Compression wraps applied to BLE in clinic today.

## 2023-08-23 NOTE — PROGRESS NOTES
Primary Care Provider Appointment - [facility]  SHARED NOTE: Sony Mccray MD   (MD shadowing), Dr Schmidt (Attending)    Subjective:      Patient ID: Duran Giordano is a 83 y.o. female with   Past Medical History:   Diagnosis Date    Adrenal mass- adenoma stable since 2004 (1.8 cm and 8/13/12 (2 cm); stable 2016 2.4 cm     Adrenal mass- adenoma stable since 2004 (1.8 cm and 8/13/12 (2 cm); stable 2016 2.4 cm    Arthritis     Asthma in adult without complication 6/25/2015    Bilateral carotid artery disease 7/21/2017    Bilateral sciatica 2/7/2017    Cellulitis of right leg 08/16/2017    Cerebral infarction 1/29/2016    Multiple areas of lacunar infarction. Stroke risk factors include HTN, DM2, Dyslipidemia.  Continue ASA 81mg/ Statin therapy I have encouraged 30 minutes of physical activity daily for 5 days a week. She has access to a pool so this should not be so jarring to her joints.     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    Mixed hyperlipidemia 8/13/2012    Morbid obesity with BMI of 50.0-59.9, adult 2/11/2014    Obstructive sleep apnea syndrome 8/13/2012    Senile cataracts of both eyes 1/22/2015    Traumatic open wound of right lower leg 8/25/2017    Trigeminal neuralgia of right side of face 5/23/2017    For years now Previously on Gabapentin, but caused constipation Dissipating over time Not related to intracranial abnormalities Possibly related to dental procedure    Type 2 diabetes mellitus with diabetic polyneuropathy, with long-term current use of insulin 1/29/2013     Venous stasis dermatitis of both lower extremities 2017    Viral hepatitis A without coma 1971    Hep B infection in [facility] in , was hospitalize for 2 weeks at that time, unknown treatment.    Vitamin D deficiency disease 2012          Chief Complaint: vision change    Prior to this visit, patient's last encounter with PCP was 2023.  C/o L eye blurriness x 3 wk, along with persistent congestion in L nare.  She reports some L ear fullness and decreased hearing over this period of time.  L eye has been more watery, and she'd wake up with crusting of that eyelid.  She reports feeling more fatigued over this time course and is having to take a nap in the early afternoon.     I4Ms for Medical Decision-Making in Older Adults    Last Completed EAWV: 2021    MOBILITY:  Get Up and Go:       No data to display              Activities of Daily Livin/26/2021     3:55 PM   Activities of Daily Living   Ambulation Independent   Dressing Independent   Transfers Independent   Toileting Continent of bladder;Continent of bowel   Feeding Independent   Cleaning home/Chores Independent   Telephone use Independent   Shopping Independent   Paying bills Independent   Taking meds Independent     Whisper Test:      2021     4:45 PM   Whisper Test   Whisper Test Normal     Disability Status:      2021     3:58 PM   Disability Status   Are you deaf or do you have serious difficulty hearing? N   Are you blind or do you have serious difficulty seeing, even when wearing glasses? N   Because of a physical, mental, or emotional condition, do you have serious difficulty concentrating, remembering, or making decisions? N   Do you have serious difficulty walking or climbing stairs? Y   Do you have difficulty dressing or bathing? Y   Because of a physical, mental, or emotional condition, do you have difficulty doing errands alone such as visiting a doctor's office or shopping? N     Nutrition  Screenin/26/2021     3:52 PM   Nutrition Screening   Has food intake declined over the past three months due to loss of appetite, digestive problems, chewing or swallowing difficulties? No decrease in food intake   Involuntary weight loss during the last 3 months? No weight loss   Mobility? Goes out   Has the patient suffered psychological stress or acute disease in the past three months? No   Neuropsychological problems? No psychological problems   Body Mass Index (BMI)?  BMI 23 or greater   Screening Score 14    Screening Score: 0-7 Malnourished, 8-11 At Risk, 12-14 Normal    MENTATION:   Depression Patient Health Questionnaire:      2023     3:08 PM   Depression Patient Health Questionnaire   PHQ-4 Total Score 0     Has Dementia Dx: No    Cognitive Function Screenin/26/2021     4:06 PM   Cognitive Function Screening   Clock Drawing Test 2   Mini-Cog 3 Minute Recall 3   Cognitive Function Screening 5     Cognitive Function Screening Total - Less than 4 = Abnormal,  Greater than or equal to 4 = Normal    MEDICATIONS:  High Risk Medications:  Total Active Medications: 1  oxyCODONE - 5 MG    WHAT MATTERS MOST:  Advance Care Planning   ACP Status:   Patient has had an ACP conversation  Living Will: Yes  Power of : No  [place]: Yes    What is most important right now is to focus on walking without walker - increasing mobility    Accordingly, we have decided that the best plan to meet the patient's goals includes continuing with treatment      What matters most to patient today is: getting her eye to stop watering                 Social History     Socioeconomic History    Marital status: Single   Tobacco Use    Smoking status: Never    Smokeless tobacco: Never   Substance and Sexual Activity    Alcohol use: Yes     Comment: x mas time    Drug use: No    Sexual activity: Never   Social History Narrative    Single with no children.      Social Determinants of Health     Financial Resource  "Strain: Low Risk  (2/19/2019)    Overall Financial Resource Strain (CARDIA)     Difficulty of Paying Living Expenses: Not hard at all   Food Insecurity: No Food Insecurity (2/19/2019)    Hunger Vital Sign     Worried About Running Out of Food in the Last Year: Never true     Ran Out of Food in the Last Year: Never true   Transportation Needs: No Transportation Needs (2/19/2019)    PRAPARE - Transportation     Lack of Transportation (Medical): No     Lack of Transportation (Non-Medical): No       Review of Systems   Constitutional:  Positive for activity change and fatigue. Negative for chills and fever.   HENT:  Positive for congestion, hearing loss, postnasal drip, sinus pressure and sore throat. Negative for rhinorrhea, sinus pain and sneezing.    Eyes:  Positive for discharge and visual disturbance. Negative for photophobia, pain and redness.   Respiratory:  Negative for cough and shortness of breath.    Cardiovascular:  Positive for leg swelling (chronic, unchanged).   Musculoskeletal:  Negative for arthralgias and myalgias.   Skin:  Negative for wound.       Objective:   BP (!) 152/68 (BP Location: Right arm, Patient Position: Sitting, BP Method: Large (Automatic))   Pulse 97   Temp 98.3 °F (36.8 °C) (Oral)   Ht 4' 11" (1.499 m)   Wt 114.1 kg (251 lb 8.7 oz)   SpO2 98%   BMI 50.81 kg/m²     Physical Exam  Vitals reviewed.   Constitutional:       General: She is not in acute distress.     Appearance: She is obese.   HENT:      Head: Normocephalic and atraumatic.      Right Ear: External ear normal. There is impacted cerumen.      Left Ear: External ear normal. No tenderness. A middle ear effusion (slight) is present.      Nose: Congestion present. No rhinorrhea.      Mouth/Throat:      Mouth: Mucous membranes are moist.      Pharynx: Oropharynx is clear. Posterior oropharyngeal erythema (posterior pharynx) present.   Eyes:      General: No scleral icterus.        Right eye: No discharge.         Left eye: " No discharge.      Extraocular Movements: Extraocular movements intact.      Conjunctiva/sclera: Conjunctivae normal.      Pupils: Pupils are equal, round, and reactive to light.      Comments: Ophthalmoscopy attempted but unsuccessful   Cardiovascular:      Rate and Rhythm: Normal rate and regular rhythm.   Pulmonary:      Effort: Pulmonary effort is normal. No respiratory distress.   Musculoskeletal:      Cervical back: Normal range of motion and neck supple.      Right lower leg: Edema (massive lymphedema) present.      Left lower leg: Edema (massive lymphedema) present.   Lymphadenopathy:      Cervical: Cervical adenopathy present.   Skin:     General: Skin is warm and dry.      Comments: TKA scar present over R knee.  Extensive venous stasis dermatitis BLE; R>L.  Plaque on R shin shows prior evidence of weeping, and serous blisters are present.  No abnormal rubor, calor, or dolor.   Neurological:      Mental Status: She is alert and oriented to person, place, and time. Mental status is at baseline.   Psychiatric:         Mood and Affect: Mood normal.         Behavior: Behavior normal.         Thought Content: Thought content normal.         Judgment: Judgment normal.             Lab Results   Component Value Date    WBC 7.72 02/16/2023    HGB 11.2 (L) 02/16/2023    HCT 37.7 02/16/2023     02/16/2023    CHOL 160 03/04/2021    TRIG 88 03/04/2021    HDL 34 (L) 03/04/2021    ALT 14 02/16/2023    AST 17 02/16/2023     02/16/2023    K 4.4 02/16/2023     02/16/2023    CREATININE 1.1 02/16/2023    BUN 22 02/16/2023    CO2 28 02/16/2023    TSH 0.799 01/16/2020    INR 1.0 06/22/2022    HGBA1C 6.8 (H) 04/28/2023       Current Outpatient Medications on File Prior to Visit   Medication Sig Dispense Refill    acetaminophen (TYLENOL) 500 MG tablet Take 2 tablets (1,000 mg total) by mouth every 8 (eight) hours. Bedside delivery 120 tablet 0    albuterol (PROVENTIL/VENTOLIN HFA) 90 mcg/actuation inhaler  Inhale 2 puffs into the lungs every 4 (four) hours as needed for Wheezing or Shortness of Breath. Rescue 54 g 3    albuterol-ipratropium (DUO-NEB) 2.5 mg-0.5 mg/3 mL nebulizer solution USE 1 VIAL PER NEBULIZER EVERY 6 HOURS AS NEEDED FOR WHEEZING/RESCUE 90 mL 11    allopurinoL (ZYLOPRIM) 100 MG tablet TAKE 1 TABLET BY MOUTH EVERY DAY 30 tablet 0    amLODIPine (NORVASC) 5 MG tablet TAKE ONE TABLET BY MOUTH DAILY 90 tablet 3    ASPERCREME, LIDOCAINE HCL, 4 % Crea Apply topically.      aspirin (ECOTRIN) 81 MG EC tablet Take 1 tablet by mouth once daily.      blood sugar diagnostic Strp BG monitoring 4 times a day. Pt needs test strips for [employer] Talking meter. (Patient taking differently: BG monitoring 2 times a day. Pt needs test strips for [employer] Talking meter.) 450 strip prn    cholecalciferol, vitamin D3, (VITAMIN D3) 25 mcg (1,000 unit) capsule Take 1 capsule (1,000 Units total) by mouth once daily. 90 capsule 3    colchicine (COLCRYS) 0.6 mg tablet 0.6 mg every other day 45 tablet 1    diclofenac sodium (VOLTAREN) 1 % Gel APPLY 2 GRAMS TOPICALLY DAILY 100 g 0    docusate sodium (COLACE) 100 MG capsule Take 100 mg by mouth 2 (two) times daily.      doxycycline (VIBRA-TABS) 100 MG tablet Take 1 tablet (100 mg total) by mouth 2 (two) times daily. 60 tablet 4    fluticasone (FLONASE) 50 mcg/actuation nasal spray 2 sprays (100 mcg total) by Each Nare route once daily. 1 Bottle 3    fluticasone-salmeterol diskus inhaler 500-50 mcg INHALE 1 PUFF TWICE DAILY 60 each 11    GENABIO COVID-19 RAPID AT-HOME Kit       insulin (LANTUS SOLOSTAR U-100 INSULIN) glargine 100 units/mL SubQ pen Inject 12 Units into the skin every evening. 10.8 mL 3    lancets Misc Test daily (Patient taking differently: Test twice  daily) 100 each 12    nebulizer and compressor (COMP-AIR ELITE COMP NEB SYSTEM) Berna use as directed 1 each 0    olopatadine (PATANOL) 0.1 % ophthalmic solution Place 1 drop into both eyes 2 (two) times daily. 5 mL 0     "oxyCODONE (ROXICODONE) 5 MG immediate release tablet Take 1 tablet (5 mg total) by mouth every 6 (six) hours as needed for Pain. May take 1-2 tablets every 6 hours as needed for pain 42 tablet 0    pen needle, diabetic (PEN NEEDLE) 29 gauge x 1/2" Ndle USE DAILY 100 each 3    polyethylene glycol (GLYCOLAX) 17 gram PwPk Take 17 g by mouth once daily.  0    polyethylene glycol (GLYCOLAX) 17 gram PwPk Take 17 g by mouth once daily. 90 packet 3    semaglutide (OZEMPIC) 0.25 mg or 0.5 mg(2 mg/1.5 mL) pen injector INJECT 0.5 MG SUBCUTANEOUSLY ONCE A WEEK 1.5 mL 5    SENNA 8.6 mg tablet Take by mouth.      torsemide (DEMADEX) 10 MG Tab 10 mg every other day 45 tablet 1    trolamine salicylate (ASPERCREME) 10 % cream Apply topically as needed.      atorvastatin (LIPITOR) 80 MG tablet Take 1 tablet (80 mg total) by mouth once daily. 90 tablet 3     No current facility-administered medications on file prior to visit.         Assessment:   83 y.o. female with multiple co-morbid illnesses here to follow-up with PCP and continue work-up of chronic issues:    Plan:     Problem List Items Addressed This Visit       Type 2 diabetes mellitus with diabetic polyneuropathy, with long-term current use of insulin - Primary    Relevant Orders    POCT Glucose, Hand-Held Device (Completed)    Venous stasis dermatitis of both lower extremities     She is unable to get circaids on by herself.  Referral for HH w/ lymphedema wound care placed.  Compression wraps applied to BLE in clinic today.         Relevant Orders    Ambulatory referral/consult to Home Health    Ambulatory referral/consult to Wound Clinic    Nasal congestion     Likely component of lacrimal duct drainage increasing congestion in L nare, but overall appears allergic in etiology.  Patient counseled to continue olopatadine, claritin, flonase for now.  Will f/u w/ phone call on Friday to see how she is doing.         Blurry vision, left eye     Present x 3wk, L eye only.  " Increased lacrimation of L eye and crusting of eyelid when getting up in the morning.  In context of L ear effusion and nasal turbinate swelling/erythema, suspect allergic etiology.  She only started claritin, flonase, olopatadine yesterday.  Due to persistent vision change will arrange for urgent optometry/ophthalmology appt.  Since it has been stable for past 3 weeks I do not think ED evaluation is necessary.            Health Maintenance         Date Due Completion Date    TETANUS VACCINE Never done ---    Shingles Vaccine (3 of 3) 09/06/2021 7/12/2021    Eye Exam 02/03/2022 2/3/2021    Override on 2/7/2020: Done    Override on 2/17/2016: Done    Override on 1/22/2015: Done    Override on 8/16/2012: Done    Lipid Panel 05/24/2023 5/24/2022    COVID-19 Vaccine (6 - Moderna risk series) 06/23/2023 4/28/2023    Influenza Vaccine (1) 09/01/2023 9/8/2022    Override on 11/6/2019: Done (will have it performed at [facility] on Friday)    Override on 10/7/2015: Done    Override on 10/3/2014: Done    Override on 10/10/2013: Done    Override on 9/13/2011: Done    Hemoglobin A1c 10/28/2023 4/28/2023    Override on 7/13/2016: Done    Diabetes Urine Screening 04/28/2024 4/28/2023    DEXA Scan 06/01/2027 6/1/2023    Override on 12/13/2011: Done            Health Maintenance Due   Topic Date Due    TETANUS VACCINE  Never done    Shingles Vaccine (3 of 3) 09/06/2021    Eye Exam  02/03/2022    Lipid Panel  05/24/2023    COVID-19 Vaccine (6 - Moderna risk series) 06/23/2023       Future Appointments   Date Time Provider Department Center   8/25/2023 11:15 AM NURSE, [facility]N [address] [facility]   9/28/2023  1:20 PM Wm Linder OD [facility]   10/24/2023 10:00 AM Tami Schmidt MD [facility]   2/12/2024 11:00 AM Alfredo Lozoya MD [facility] ORTHO [address]         Follow up for f/u phone call Friday 8/25. Total clinical care time was 40 min.  Sony Mccray MD        Tami Schmidt MD/MPH  [facility]  [phone] spectralink

## 2023-08-23 NOTE — TELEPHONE ENCOUNTER
Pt called and stated that you informed her to make an appt with you. Pt is stating that she can not see out of her left eye and it is an urgent situation. She also stated that she was trying to get an eye appt but they are pushed out until past October.

## 2023-08-23 NOTE — ASSESSMENT & PLAN NOTE
Present x 3wk, L eye only.  Increased lacrimation of L eye and crusting of eyelid when getting up in the morning.  In context of L ear effusion and nasal turbinate swelling/erythema, suspect allergic etiology.  She only started claritin, flonase, olopatadine yesterday.  Due to persistent vision change will arrange for urgent optometry/ophthalmology appt.  Since it has been stable for past 3 weeks I do not think ED evaluation is necessary.

## 2023-08-23 NOTE — ASSESSMENT & PLAN NOTE
Likely component of lacrimal duct drainage increasing congestion in L nare, but overall appears allergic in etiology.  Patient counseled to continue olopatadine, claritin, flonase for now.  Will f/u w/ phone call on Friday to see how she is doing.

## 2023-08-24 ENCOUNTER — TELEPHONE (OUTPATIENT)
Dept: PRIMARY CARE CLINIC | Facility: CLINIC | Age: 83
End: 2023-08-24
Payer: MEDICARE

## 2023-08-24 NOTE — TELEPHONE ENCOUNTER
Spoke with Koffi from the call  center  urgent  message was sent to Dr Linder office will call pt with appointment

## 2023-08-25 ENCOUNTER — CLINICAL SUPPORT (OUTPATIENT)
Dept: PRIMARY CARE CLINIC | Facility: CLINIC | Age: 83
End: 2023-08-25
Payer: MEDICARE

## 2023-08-25 ENCOUNTER — TELEPHONE (OUTPATIENT)
Dept: PRIMARY CARE CLINIC | Facility: CLINIC | Age: 83
End: 2023-08-25
Payer: MEDICARE

## 2023-08-25 DIAGNOSIS — I89.0 LYMPHEDEMA OF BOTH LOWER EXTREMITIES: Primary | ICD-10-CM

## 2023-08-25 PROCEDURE — G0180 MD CERTIFICATION HHA PATIENT: HCPCS | Mod: ,,, | Performed by: INTERNAL MEDICINE

## 2023-08-25 PROCEDURE — G0180 PR HOME HEALTH MD CERTIFICATION: ICD-10-PCS | Mod: ,,, | Performed by: INTERNAL MEDICINE

## 2023-08-25 NOTE — PROGRESS NOTES
"Established [facility]  Shared Note: Yonathan Moreno (MS4) & Dr Tami Schmidt (Attending)     The patient location is: HOME  The chief complaint leading to consultation is: routine care  Visit type: Virtual visit with audio only (telephone)     The reason for the audio only service rather than synchronous audio and video virtual visit was related to technical difficulties or patient preference/necessity.     Each patient to whom I provide medical services by telemedicine is:  (1) informed of the relationship between the physician and patient and the respective role of any other health care provider with respect to management of the patient; and (2) notified that they may decline to receive medical services by telemedicine and may withdraw from such care at any time. Patient verbally consented to receive this service via voice-only telephone call.       Subjective:      Patient ID: Duran Giordano is a 83 y.o. female.    Prior to this visit, patient's last encounter with PCP was 8/23/2023.    Pt reports for follow up from her prior visit this week, main complaint today is blurry vision in her left eye. Patient states that her vision has not improved since Wednesday's visit, and may be slightly more blurry now. This morning reports seeing a "flower" in her central vision, made worse by closing the unaffected (right) eye. The eye drops she was given Wednesday have not helped. Endorses eye crusts in the left eye, attributed to liquid discharge at night. Denied photophobia, eye redness, eye pain, eye pruritis, visual field loss, issues with eye movements.     Patient reports that she is still dealing with left nasal congestion and ear pressure, although they have improved since using the flonase and claritin. Endorses fatigue during this past week. Significantly denies fever, chills, diaphoresis, cough, SOB, wheezing, N/V, constipation/diarrhea.     Otherwise " has no complaints. Says she was seen by wound care since her last visit and got her legs rewrapped. Will see them next Monday. Is eating and sleeping well.       DM  - Currently taking: glargine 12 units qd, semaglutide weekly subcutaneous injection   - BGL: 104 on 23 visit   - Last A1c on 23 was 6.8  - Diet consists of vegetables, fruits     HTN  - Currently taking: amlodipine 5mg qd, torsemide 10 mg every other day   - BP was 152/68 on 23 visit     HLD  - Currently taking:    - Last lipid panel was on 3/4.  Cholesterol: 160  Triglycerides: 88  HDL: 34  LDL: 108.4  The ASCVD Risk score (Melita VASQUEZ, et al., 2019) failed to calculate for the following reasons:    The 2019 ASCVD risk score is only valid for ages 40 to 79    Care Gaps:  - tetanus vaccine   - singles vaccine 3 of 3  - eye exam   - Lipid panel   - Covid booster vaccine     Medications: Does not have pill packs    Amlodipine 5 mg ad   Doxycycline 100 mg bid   Glargine 12 units every evening   Semaglutide 0.25 or 0.5 mg subcutaneous injection once weekly   Allopurinol 100 mg qd   Colchicine 0.6 mg every other day   Torsemide 10 mg every other day   Senna   docusate   Aspirin 81 mg qd   Atorvastatin 80 mg qd  Albuterol     Also currently taking...  Flonase   Olopatadine ophthalamic eye drops   claritin      4Ms for Medical Decision-Making in Older Adults    Last Completed EAWV: 2021    MOBILITY:  Get Up and Go:       No data to display              Activities of Daily Livin/26/2021     3:55 PM   Activities of Daily Living   Ambulation Independent   Dressing Independent   Transfers Independent   Toileting Continent of bladder;Continent of bowel   Feeding Independent   Cleaning home/Chores Independent   Telephone use Independent   Shopping Independent   Paying bills Independent   Taking meds Independent     Whisper Test:      2021     4:45 PM   Whisper Test   Whisper Test Normal     Disability Status:      2021     3:58  PM   Disability Status   Are you deaf or do you have serious difficulty hearing? N   Are you blind or do you have serious difficulty seeing, even when wearing glasses? N   Because of a physical, mental, or emotional condition, do you have serious difficulty concentrating, remembering, or making decisions? N   Do you have serious difficulty walking or climbing stairs? Y   Do you have difficulty dressing or bathing? Y   Because of a physical, mental, or emotional condition, do you have difficulty doing errands alone such as visiting a doctor's office or shopping? N     Nutrition Screenin/26/2021     3:52 PM   Nutrition Screening   Has food intake declined over the past three months due to loss of appetite, digestive problems, chewing or swallowing difficulties? No decrease in food intake   Involuntary weight loss during the last 3 months? No weight loss   Mobility? Goes out   Has the patient suffered psychological stress or acute disease in the past three months? No   Neuropsychological problems? No psychological problems   Body Mass Index (BMI)?  BMI 23 or greater   Screening Score 14    Screening Score: 0-7 Malnourished, 8-11 At Risk, 12-14 Normal    MENTATION:   Depression Patient Health Questionnaire:      2023     3:08 PM   Depression Patient Health Questionnaire   PHQ-4 Total Score 0     Has Dementia Dx: No    Cognitive Function Screenin/26/2021     4:06 PM   Cognitive Function Screening   Clock Drawing Test 2   Mini-Cog 3 Minute Recall 3   Cognitive Function Screening 5     Cognitive Function Screening Total - Less than 4 = Abnormal,  Greater than or equal to 4 = Normal    MEDICATIONS:  High Risk Medications:  Total Active Medications: 1  oxyCODONE - 5 MG    WHAT MATTERS MOST:  Advance Care Planning   ACP Status:   Patient has had an ACP conversation  Living Will: Yes  Power of : No  [place]: Yes    What is most important right now is to focus on walking without walker - increasing  mobility    Accordingly, we have decided that the best plan to meet the patient's goals includes continuing with treatment      What matters most to patient today is: relief of eye symptoms                  Social History     Socioeconomic History    Marital status: Single   Tobacco Use    Smoking status: Never    Smokeless tobacco: Never   Substance and Sexual Activity    Alcohol use: Yes     Comment: x mas time    Drug use: No    Sexual activity: Never   Social History Narrative    Single with no children.      Social Determinants of Health     Financial Resource Strain: Low Risk  (2/19/2019)    Overall Financial Resource Strain (CARDIA)     Difficulty of Paying Living Expenses: Not hard at all   Food Insecurity: No Food Insecurity (2/19/2019)    Hunger Vital Sign     Worried About Running Out of Food in the Last Year: Never true     Ran Out of Food in the Last Year: Never true   Transportation Needs: No Transportation Needs (2/19/2019)    PRAPARE - Transportation     Lack of Transportation (Medical): No     Lack of Transportation (Non-Medical): No       Review of Systems   Constitutional:  Positive for fatigue. Negative for chills, diaphoresis and fever.   HENT:  Positive for congestion, postnasal drip, rhinorrhea, sinus pressure and sore throat.    Eyes:  Positive for discharge and visual disturbance. Negative for photophobia, pain, redness and itching.   Respiratory:  Negative for cough, shortness of breath and wheezing.    Cardiovascular:  Positive for leg swelling. Negative for chest pain and palpitations.   Gastrointestinal:  Negative for abdominal pain, constipation, diarrhea, nausea and vomiting.   Genitourinary:  Negative for difficulty urinating, dysuria, frequency and urgency.   Musculoskeletal:  Negative for arthralgias, back pain, myalgias and neck pain.   Neurological:  Negative for dizziness, weakness, light-headedness, numbness and headaches.       Objective:     Lab Results   Component Value Date     WBC 7.72 02/16/2023    HGB 11.2 (L) 02/16/2023    HCT 37.7 02/16/2023     02/16/2023    CHOL 160 03/04/2021    TRIG 88 03/04/2021    HDL 34 (L) 03/04/2021    ALT 14 02/16/2023    AST 17 02/16/2023     02/16/2023    K 4.4 02/16/2023     02/16/2023    CREATININE 1.1 02/16/2023    BUN 22 02/16/2023    CO2 28 02/16/2023    TSH 0.799 01/16/2020    INR 1.0 06/22/2022    HGBA1C 6.8 (H) 04/28/2023         Assessment:   83 y.o. female with multiple co-morbid illnesses here to continue work-up of chronic issues.     Plan:       Blurry vision left eye   Continue taking Olopatadine ophthalamic eye drops per prior instructions  Will follow up with urgent optometry/ophthalmology appt    Patient agreed to visit the emergency department if the symptoms continue to worsen, if they do not improve in the coming days, or if she felt her vision or life were threatened.     Nasal congestion  Continue taking claritin, flonase      Venous stasis   Mobile wound care ordered yesterday  Will come to home on Monday  HH to start next week      Health Maintenance         Date Due Completion Date    TETANUS VACCINE Never done ---    Shingles Vaccine (3 of 3) 09/06/2021 7/12/2021    Eye Exam 02/03/2022 2/3/2021    Override on 2/7/2020: Done    Override on 2/17/2016: Done    Override on 1/22/2015: Done    Override on 8/16/2012: Done    Lipid Panel 05/24/2023 5/24/2022    COVID-19 Vaccine (6 - Moderna risk series) 06/23/2023 4/28/2023    Influenza Vaccine (1) 09/01/2023 9/8/2022    Override on 11/6/2019: Done (will have it performed at [facility] on Friday)    Override on 10/7/2015: Done    Override on 10/3/2014: Done    Override on 10/10/2013: Done    Override on 9/13/2011: Done    Hemoglobin A1c 10/28/2023 4/28/2023    Override on 7/13/2016: Done    Diabetes Urine Screening 04/28/2024 4/28/2023    DEXA Scan 06/01/2027 6/1/2023    Override on 12/13/2011: Done            No follow-ups on file. . Thirty minutes spent with this  patient today in a non-face to face encounter.    Tami Schmidt MD/MPH  Internal Medicine  [facility] [phone]    This service was not originating from a related E/M service provided within the previous 7 days nor will  to an E/M service or procedure within the next 24 hours or my soonest available appointment.  Prevailing standard of care was able to be met in this audio-only visit.

## 2023-08-25 NOTE — TELEPHONE ENCOUNTER
OH so that ti would have to probably check back on Monday and speak with Intake. When did you do the order bc I dont remember sending on this week. Also Wound care have been trying to call the pt and all emergency contacts and nothing. If you can text Nurse Leah [phone] to call and confirm with the Wound care company, they will go out there on Tuesday Aug 29. no time yet but she needs to confirm with them

## 2023-08-25 NOTE — TELEPHONE ENCOUNTER
Sam, Monika Schmidt, Tami Grey MD; KEATON Grey Staff  Duran Giordano   [birth year]   [id number]     Good morning,     Thank you for the referral to [facility]. Is it okay for PT to eval for OT needs?     Thanks,   Monika Vargas LPN

## 2023-08-28 ENCOUNTER — TELEPHONE (OUTPATIENT)
Dept: OPHTHALMOLOGY | Facility: CLINIC | Age: 83
End: 2023-08-28
Payer: MEDICARE

## 2023-08-28 NOTE — TELEPHONE ENCOUNTER
----- Message from Zena Ca sent at 8/28/2023  9:25 AM CDT -----  Regarding: same day access requested  Contact: self @ [phone]  Pt spoke with Meghna last Wednesday and she was advised to start pataday.  She says she can not see from her left eye and needs to be seen today.  She says her eye seems to have a film over it.  Pt says she feels the need to be seen today.

## 2023-08-29 ENCOUNTER — OFFICE VISIT (OUTPATIENT)
Dept: OPHTHALMOLOGY | Facility: CLINIC | Age: 83
End: 2023-08-29
Payer: MEDICARE

## 2023-08-29 DIAGNOSIS — H34.8120 CENTRAL RETINAL VEIN OCCLUSION WITH MACULAR EDEMA OF LEFT EYE: Primary | ICD-10-CM

## 2023-08-29 DIAGNOSIS — H43.813 VITREOUS DEGENERATION OF BOTH EYES: ICD-10-CM

## 2023-08-29 DIAGNOSIS — H25.13 AGE-RELATED NUCLEAR CATARACT OF BOTH EYES: ICD-10-CM

## 2023-08-29 PROCEDURE — 99204 PR OFFICE/OUTPT VISIT, NEW, LEVL IV, 45-59 MIN: ICD-10-PCS | Mod: 25,S$PBB,, | Performed by: OPHTHALMOLOGY

## 2023-08-29 PROCEDURE — 92134 CPTRZ OPH DX IMG PST SGM RTA: CPT | Mod: PBBFAC | Performed by: OPHTHALMOLOGY

## 2023-08-29 PROCEDURE — 67028 INJECTION EYE DRUG: CPT | Mod: S$PBB,LT,, | Performed by: OPHTHALMOLOGY

## 2023-08-29 PROCEDURE — 92134 OCT, RETINA - OU - BOTH EYES: ICD-10-PCS | Mod: 26,S$PBB,, | Performed by: OPHTHALMOLOGY

## 2023-08-29 PROCEDURE — 99999 PR PBB SHADOW E&M-EST. PATIENT-LVL III: CPT | Mod: PBBFAC,,, | Performed by: OPHTHALMOLOGY

## 2023-08-29 PROCEDURE — 99204 OFFICE O/P NEW MOD 45 MIN: CPT | Mod: 25,S$PBB,, | Performed by: OPHTHALMOLOGY

## 2023-08-29 PROCEDURE — 67028 PR INJECT INTRAVITREAL PHARMCOLOGIC: ICD-10-PCS | Mod: S$PBB,LT,, | Performed by: OPHTHALMOLOGY

## 2023-08-29 PROCEDURE — 99999 PR PBB SHADOW E&M-EST. PATIENT-LVL III: ICD-10-PCS | Mod: PBBFAC,,, | Performed by: OPHTHALMOLOGY

## 2023-08-29 PROCEDURE — 99213 OFFICE O/P EST LOW 20 MIN: CPT | Mod: PBBFAC,25 | Performed by: OPHTHALMOLOGY

## 2023-08-29 PROCEDURE — 67028 INJECTION EYE DRUG: CPT | Mod: PBBFAC,LT | Performed by: OPHTHALMOLOGY

## 2023-08-29 RX ORDER — INSULIN DEGLUDEC 100 U/ML
INJECTION, SOLUTION SUBCUTANEOUS
COMMUNITY
Start: 2023-07-27 | End: 2023-09-18

## 2023-08-29 RX ADMIN — Medication 1.25 MG: at 01:08

## 2023-08-29 NOTE — PROGRESS NOTES
HPI    Triage pt  Patient states OS extreme blurry vision x 6 days ago,.  Using OTC readers  No flashes or floaters, but notice when she close and then open seeing a   red flower image.(Only OS)    Eye drops:OTC pataday daily OU  BS stable--don't check regularly   Last edited by Azeb Davis MA on 8/29/2023 11:40 AM.         A/P    ICD-10-CM ICD-9-CM   1. Central retinal vein occlusion with macular edema of left eye  H34.8120 362.35     362.83   2. Age-related nuclear cataract of both eyes  H25.13 366.16   3. Vitreous degeneration of both eyes  H43.813 379.21       1. Central retinal vein occlusion with macular edema of left eye  Referral from Dr. Lund today for urgent RVO check    Today VA 20/70, exam notable for CRVO with diffuse heme, significant IRF/SRF  Plan: given foveal IRF/SRF, recommend Avastin OS  Based on todays exam, diagnostic studies, and review of records, the determination was made for treatment today.  Schedule Avastin Injection today Left Eye Patient chooses to proceed with injection R/B/A discussed include infection retinal detachment and stroke    Patient identified.  Timeout performed.    Risks, benefits, and alternatives to treatment were discussed in detail with the patient, including bleeding/infection (endophthalmitis)/etc.  The patient voiced understanding and wished to proceed with the procedure.  See separate consent form.    Injection Procedure Note:  Diagnosis: CME Left Eye    Topical Proparacaine drop placed then topical 5% Betadine and then subconjunctival lidocaine 2% injection  Sterile gloves used, and sterile lid speculum placed.  5% Betadine placed at injection site again prior to injection.  Avastin 1.25mg in 0.05cc Injected inferotemporally 3.5-4mm posterior to the limbus.  Complications: None  Va at least CF at 5 feet post injection.  Retina, ONH, IOP normal after injection.    Followup as below.  Patient should return immediately PRN.  Retinal Detachment and  Endophthalmitis precautions given.     2. Age-related nuclear cataract of both eyes  Mild NS, NVS  Plan: Observation Update Mrx prn     3. Vitreous degeneration of both eyes  No RT/RD  Plan: Observation     RTC 1 mo DFE/OCTm OU, likely GLADYS OS      I saw and examined the patient and reviewed in detail the findings documented. The final examination findings, image interpretations, and plan as documented in the record represent my personal judgment and conclusions.    Ferdinand Rosas MD  Vitreoretinal Surgery   [facility]

## 2023-08-30 RX ORDER — SEMAGLUTIDE 0.68 MG/ML
INJECTION, SOLUTION SUBCUTANEOUS
Qty: 3 ML | Refills: 6 | Status: SHIPPED | OUTPATIENT
Start: 2023-08-30 | End: 2023-10-19

## 2023-08-30 RX ORDER — COLCHICINE 0.6 MG/1
TABLET ORAL
Qty: 15 TABLET | Refills: 4 | Status: SHIPPED | OUTPATIENT
Start: 2023-08-30 | End: 2023-10-30

## 2023-08-31 ENCOUNTER — TELEPHONE (OUTPATIENT)
Dept: WOUND CARE | Facility: CLINIC | Age: 83
End: 2023-08-31
Payer: MEDICARE

## 2023-08-31 NOTE — TELEPHONE ENCOUNTER
----- Message from Graciela Barry sent at 8/31/2023  4:31 PM CDT -----  Hello,    Patient is being referred for lymphedema. Referral is scanned in media mgr. Please contact patient to schedule.    Thanks

## 2023-08-31 NOTE — TELEPHONE ENCOUNTER
Called no answer, left voice message asking patient to return call to [phone] to discuss appointment for wound care

## 2023-09-06 DIAGNOSIS — I89.0 LYMPHEDEMA OF LOWER EXTREMITY: Primary | ICD-10-CM

## 2023-09-07 ENCOUNTER — TELEPHONE (OUTPATIENT)
Dept: WOUND CARE | Facility: CLINIC | Age: 83
End: 2023-09-07
Payer: MEDICARE

## 2023-09-07 NOTE — TELEPHONE ENCOUNTER
Called and spoke with patient regarding referral to wound care by Dr. Schmidt - patient states she is receiving care from the Wound  and it is more convenient because they come to her home - therefore patient has declined appointment

## 2023-09-18 RX ORDER — INSULIN DEGLUDEC 100 U/ML
INJECTION, SOLUTION SUBCUTANEOUS
Qty: 6 ML | Refills: 1 | Status: SHIPPED | OUTPATIENT
Start: 2023-09-18

## 2023-09-18 RX ORDER — INSULIN DEGLUDEC 100 U/ML
INJECTION, SOLUTION SUBCUTANEOUS
Qty: 6 ML | Refills: 1 | Status: SHIPPED | OUTPATIENT
Start: 2023-09-18 | End: 2023-09-18 | Stop reason: SDUPTHER

## 2023-09-18 NOTE — TELEPHONE ENCOUNTER
"Spoke to pt. Pt stated the pharmacist at [facility] stated her Tresiba has not been "signed" for. Pt stated she is out of her insulin and needs a Rx to be sent.   "

## 2023-09-18 NOTE — TELEPHONE ENCOUNTER
----- Message from Stephy Moreira sent at 9/18/2023  2:21 PM CDT -----  Contact: [phone]  Pt called to check the status of her medication TRESIBA FLEXTOUCH U-100 100 unit/mL (3 mL) insulin pen [Pharmacy Med Name: 1TRESIBA 100U/ML]. She would like to speak to the provider or nurse. Please Advise

## 2023-09-20 ENCOUNTER — TELEPHONE (OUTPATIENT)
Dept: INTERNAL MEDICINE | Facility: CLINIC | Age: 83
End: 2023-09-20
Payer: MEDICARE

## 2023-09-20 DIAGNOSIS — E11.42 TYPE 2 DIABETES MELLITUS WITH DIABETIC POLYNEUROPATHY, WITH LONG-TERM CURRENT USE OF INSULIN: Primary | ICD-10-CM

## 2023-09-20 DIAGNOSIS — Z79.4 TYPE 2 DIABETES MELLITUS WITH DIABETIC POLYNEUROPATHY, WITH LONG-TERM CURRENT USE OF INSULIN: Primary | ICD-10-CM

## 2023-09-20 NOTE — TELEPHONE ENCOUNTER
----- Message from Rissa Holden sent at 9/20/2023  2:29 PM CDT -----  Contact: patient  [phone]  Patient called requesting a call back from Yogi Contreras, to schedule an appt

## 2023-09-22 ENCOUNTER — EXTERNAL HOME HEALTH (OUTPATIENT)
Dept: HOME HEALTH SERVICES | Facility: HOSPITAL | Age: 83
End: 2023-09-22
Payer: MEDICARE

## 2023-09-25 ENCOUNTER — TELEPHONE (OUTPATIENT)
Dept: PRIMARY CARE CLINIC | Facility: CLINIC | Age: 83
End: 2023-09-25
Payer: MEDICARE

## 2023-09-25 NOTE — TELEPHONE ENCOUNTER
----- Message from Stephy Moreira sent at 9/25/2023  9:55 AM CDT -----  Contact: [phone]  Patient is returning a phone call.  Who left a message for the patient: nurse for provider   Does patient know what this is regarding:    Would you like a call back, or a response through your [facility] portal?:   call back  Comments:

## 2023-09-25 NOTE — TELEPHONE ENCOUNTER
"Patient contacted via phone call on 9/25 at 10:05 to ascertain health history information required for submission of "Statement of Medical Status." Patient confirmed history and "Statement of Medical Staus" was faxed to [facility] at [phone]. Statement was also sent by physical mail to the address provided.   "

## 2023-09-25 NOTE — TELEPHONE ENCOUNTER
SW faxed letter to [employer] (Statement of Medical Status) for the pt and also put the letter in the mail

## 2023-09-26 ENCOUNTER — OFFICE VISIT (OUTPATIENT)
Dept: OPHTHALMOLOGY | Facility: CLINIC | Age: 83
End: 2023-09-26
Payer: MEDICARE

## 2023-09-26 DIAGNOSIS — H43.813 VITREOUS DEGENERATION OF BOTH EYES: ICD-10-CM

## 2023-09-26 DIAGNOSIS — H34.8120 CENTRAL RETINAL VEIN OCCLUSION WITH MACULAR EDEMA OF LEFT EYE: Primary | ICD-10-CM

## 2023-09-26 DIAGNOSIS — H25.13 AGE-RELATED NUCLEAR CATARACT OF BOTH EYES: ICD-10-CM

## 2023-09-26 PROCEDURE — 92134 CPTRZ OPH DX IMG PST SGM RTA: CPT | Mod: PBBFAC | Performed by: OPHTHALMOLOGY

## 2023-09-26 PROCEDURE — 99213 PR OFFICE/OUTPT VISIT, EST, LEVL III, 20-29 MIN: ICD-10-PCS | Mod: 25,S$PBB,, | Performed by: OPHTHALMOLOGY

## 2023-09-26 PROCEDURE — 99213 OFFICE O/P EST LOW 20 MIN: CPT | Mod: 25,S$PBB,, | Performed by: OPHTHALMOLOGY

## 2023-09-26 PROCEDURE — 99999 PR PBB SHADOW E&M-EST. PATIENT-LVL III: CPT | Mod: PBBFAC,,, | Performed by: OPHTHALMOLOGY

## 2023-09-26 PROCEDURE — 67028 INJECTION EYE DRUG: CPT | Mod: S$PBB,LT,, | Performed by: OPHTHALMOLOGY

## 2023-09-26 PROCEDURE — 92134 OCT, RETINA - OU - BOTH EYES: ICD-10-PCS | Mod: 26,S$PBB,, | Performed by: OPHTHALMOLOGY

## 2023-09-26 PROCEDURE — 67028 PR INJECT INTRAVITREAL PHARMCOLOGIC: ICD-10-PCS | Mod: S$PBB,LT,, | Performed by: OPHTHALMOLOGY

## 2023-09-26 PROCEDURE — 99999 PR PBB SHADOW E&M-EST. PATIENT-LVL III: ICD-10-PCS | Mod: PBBFAC,,, | Performed by: OPHTHALMOLOGY

## 2023-09-26 PROCEDURE — 99213 OFFICE O/P EST LOW 20 MIN: CPT | Mod: PBBFAC | Performed by: OPHTHALMOLOGY

## 2023-09-26 PROCEDURE — 67028 INJECTION EYE DRUG: CPT | Mod: PBBFAC,LT | Performed by: OPHTHALMOLOGY

## 2023-09-26 RX ADMIN — Medication 1.25 MG: at 12:09

## 2023-09-26 NOTE — PROGRESS NOTES
HPI    Patient here for 1mo dfe/oct/lorenzo os     Pt feels like VA is slightly better  No pain or discomfort   Last edited by Dior Falcon on 9/26/2023 11:14 AM.         A/P    ICD-10-CM ICD-9-CM   1. Central retinal vein occlusion with macular edema of left eye  H34.8120 362.35     362.83   2. Age-related nuclear cataract of both eyes  H25.13 366.16   3. Vitreous degeneration of both eyes  H43.813 379.21       1. Central retinal vein occlusion with macular edema of left eye  Referral from Dr. Lund today for urgent RVO check    S/p GLADYS 8/29/23  Today VA 20/80 ph (was 20/70), exam notable for CRVO with diffuse heme, improved IRF, resolved SRF  Plan: given foveal IRF, recommend Avastin OS  Based on todays exam, diagnostic studies, and review of records, the determination was made for treatment today.  Schedule Avastin Injection today Left Eye Patient chooses to proceed with injection R/B/A discussed include infection retinal detachment and stroke    Patient identified.  Timeout performed.    Risks, benefits, and alternatives to treatment were discussed in detail with the patient, including bleeding/infection (endophthalmitis)/etc.  The patient voiced understanding and wished to proceed with the procedure.  See separate consent form.    Injection Procedure Note:  Diagnosis: CME Left Eye    Topical Proparacaine drop placed then topical 5% Betadine and then subconjunctival lidocaine 2% injection  Sterile gloves used, and sterile lid speculum placed.  5% Betadine placed at injection site again prior to injection.  Avastin 1.25mg in 0.05cc Injected inferotemporally 3.5-4mm posterior to the limbus.  Complications: None  Va at least CF at 5 feet post injection.  Retina, ONH, IOP normal after injection.    Followup as below.  Patient should return immediately PRN.  Retinal Detachment and Endophthalmitis precautions given.     2. Age-related nuclear cataract of both eyes  Mild NS, NVS  Plan: Observation Update Mrx prn     3.  Vitreous degeneration of both eyes  No RT/RD  Plan: Observation     RTC 1 mo DFE/OCTm OU, likely GLADYS OS      I saw and examined the patient and reviewed in detail the findings documented. The final examination findings, image interpretations, and plan as documented in the record represent my personal judgment and conclusions.    Ferdinand Rosas MD  Vitreoretinal Surgery   [facility]

## 2023-09-27 DIAGNOSIS — I50.32 CHRONIC DIASTOLIC HEART FAILURE: ICD-10-CM

## 2023-09-27 RX ORDER — TORSEMIDE 10 MG/1
TABLET ORAL
Qty: 15 TABLET | Refills: 4 | Status: SHIPPED | OUTPATIENT
Start: 2023-09-27 | End: 2023-10-10 | Stop reason: DRUGHIGH

## 2023-09-28 ENCOUNTER — OFFICE VISIT (OUTPATIENT)
Dept: OPTOMETRY | Facility: CLINIC | Age: 83
End: 2023-09-28
Payer: MEDICARE

## 2023-09-28 DIAGNOSIS — E11.42 TYPE 2 DIABETES MELLITUS WITH DIABETIC POLYNEUROPATHY, WITH LONG-TERM CURRENT USE OF INSULIN: Primary | ICD-10-CM

## 2023-09-28 DIAGNOSIS — Z79.4 TYPE 2 DIABETES MELLITUS WITH DIABETIC POLYNEUROPATHY, WITH LONG-TERM CURRENT USE OF INSULIN: Primary | ICD-10-CM

## 2023-09-28 DIAGNOSIS — H25.13 NUCLEAR SCLEROSIS, BILATERAL: ICD-10-CM

## 2023-09-28 DIAGNOSIS — H52.03 HYPEROPIA WITH PRESBYOPIA OF BOTH EYES: ICD-10-CM

## 2023-09-28 DIAGNOSIS — H52.4 HYPEROPIA WITH PRESBYOPIA OF BOTH EYES: ICD-10-CM

## 2023-09-28 PROCEDURE — 99999 PR PBB SHADOW E&M-EST. PATIENT-LVL II: CPT | Mod: PBBFAC,,, | Performed by: OPTOMETRIST

## 2023-09-28 PROCEDURE — 92015 DETERMINE REFRACTIVE STATE: CPT | Mod: ,,, | Performed by: OPTOMETRIST

## 2023-09-28 PROCEDURE — 92014 COMPRE OPH EXAM EST PT 1/>: CPT | Mod: S$PBB,,, | Performed by: OPTOMETRIST

## 2023-09-28 PROCEDURE — 92014 PR EYE EXAM, EST PATIENT,COMPREHESV: ICD-10-PCS | Mod: S$PBB,,, | Performed by: OPTOMETRIST

## 2023-09-28 PROCEDURE — 99999 PR PBB SHADOW E&M-EST. PATIENT-LVL II: ICD-10-PCS | Mod: PBBFAC,,, | Performed by: OPTOMETRIST

## 2023-09-28 PROCEDURE — 92015 PR REFRACTION: ICD-10-PCS | Mod: ,,, | Performed by: OPTOMETRIST

## 2023-09-28 PROCEDURE — 99212 OFFICE O/P EST SF 10 MIN: CPT | Mod: PBBFAC | Performed by: OPTOMETRIST

## 2023-09-28 NOTE — PROGRESS NOTES
HPI    Refractive eye exam   Dr Alison HO left eye, seen and dilated earlier this week  [facility] suspect Dr Orellana   Last edited by Wm Linder, OD on 9/28/2023  1:41 PM.            Assessment /Plan     For exam results, see Encounter Report.    Type 2 diabetes mellitus with diabetic polyneuropathy, with long-term current use of insulin  -     Ambulatory referral/consult to Optometry  -CRVO OS under retina Care, no diabetic retinopathy noted    Nuclear sclerosis, bilateral  -Educated patient on presence of cataracts at today's exam, monitor at annual dilated fundus exam. 2-4 years surgical estimate.    Hyperopia with presbyopia of both eyes  Eyeglass Final Rx       Eyeglass Final Rx         Sphere Cylinder Axis    Right +3.00 +0.50 090    Left +2.50 +0.50 010      Type: SVL - NVO    Expiration Date: 9/28/2024                      RTC 1 yr

## 2023-10-05 ENCOUNTER — DOCUMENT SCAN (OUTPATIENT)
Dept: HOME HEALTH SERVICES | Facility: HOSPITAL | Age: 83
End: 2023-10-05
Payer: MEDICARE

## 2023-10-06 ENCOUNTER — TELEPHONE (OUTPATIENT)
Dept: PRIMARY CARE CLINIC | Facility: CLINIC | Age: 83
End: 2023-10-06
Payer: MEDICARE

## 2023-10-06 NOTE — TELEPHONE ENCOUNTER
Pt is having redness flare up in the right leg, she is not having pain to it, no swelling, not warm to the touch, not weeping but she would like to get antibiotics before it gets any worse. Pt uses [facility] pharmacy. Please advise.

## 2023-10-06 NOTE — TELEPHONE ENCOUNTER
----- Message from Claudette Morales sent at 10/6/2023 10:24 AM CDT -----  Contact: Pt [phone]  [email or web address]     Patient is calling for Medical Advice regarding: Lymphadenia or cellulitis (redness)    How long has patient had these symptoms:2 days     Pharmacy name and phone#:   [facility] - [place], [place] - [address] [address]  [address] [address] [address]  Phone: [phone] Fax: [phone]      Would like response via [employer]: call Back     Comments:

## 2023-10-09 ENCOUNTER — TELEPHONE (OUTPATIENT)
Dept: PRIMARY CARE CLINIC | Facility: CLINIC | Age: 83
End: 2023-10-09
Payer: MEDICARE

## 2023-10-09 ENCOUNTER — OFFICE VISIT (OUTPATIENT)
Dept: PODIATRY | Facility: CLINIC | Age: 83
End: 2023-10-09
Payer: MEDICARE

## 2023-10-09 VITALS
HEIGHT: 59 IN | WEIGHT: 251.56 LBS | SYSTOLIC BLOOD PRESSURE: 183 MMHG | BODY MASS INDEX: 50.72 KG/M2 | DIASTOLIC BLOOD PRESSURE: 87 MMHG | HEART RATE: 96 BPM

## 2023-10-09 DIAGNOSIS — L84 CORN OR CALLUS: ICD-10-CM

## 2023-10-09 DIAGNOSIS — E11.51 DIABETES MELLITUS WITH PERIPHERAL VASCULAR DISEASE: Primary | ICD-10-CM

## 2023-10-09 DIAGNOSIS — B35.1 ONYCHOMYCOSIS DUE TO DERMATOPHYTE: ICD-10-CM

## 2023-10-09 PROCEDURE — 11721 DEBRIDE NAIL 6 OR MORE: CPT | Mod: Q8,59,S$PBB, | Performed by: PODIATRIST

## 2023-10-09 PROCEDURE — 11056 PR TRIM BENIGN HYPERKERATOTIC SKIN LESION,2-4: ICD-10-PCS | Mod: Q8,S$PBB,, | Performed by: PODIATRIST

## 2023-10-09 PROCEDURE — 11056 PARNG/CUTG B9 HYPRKR LES 2-4: CPT | Mod: Q8,59,PBBFAC | Performed by: PODIATRIST

## 2023-10-09 PROCEDURE — 99214 OFFICE O/P EST MOD 30 MIN: CPT | Mod: 25,PBBFAC | Performed by: PODIATRIST

## 2023-10-09 PROCEDURE — 99999 PR PBB SHADOW E&M-EST. PATIENT-LVL IV: CPT | Mod: PBBFAC,,, | Performed by: PODIATRIST

## 2023-10-09 PROCEDURE — 11721 PR DEBRIDEMENT OF NAILS, 6 OR MORE: ICD-10-PCS | Mod: Q8,59,S$PBB, | Performed by: PODIATRIST

## 2023-10-09 PROCEDURE — 99999 PR PBB SHADOW E&M-EST. PATIENT-LVL IV: ICD-10-PCS | Mod: PBBFAC,,, | Performed by: PODIATRIST

## 2023-10-09 PROCEDURE — 99499 UNLISTED E&M SERVICE: CPT | Mod: S$PBB,,, | Performed by: PODIATRIST

## 2023-10-09 PROCEDURE — 99499 NO LOS: ICD-10-PCS | Mod: S$PBB,,, | Performed by: PODIATRIST

## 2023-10-09 PROCEDURE — 11056 PARNG/CUTG B9 HYPRKR LES 2-4: CPT | Mod: Q8,S$PBB,, | Performed by: PODIATRIST

## 2023-10-09 PROCEDURE — 11721 DEBRIDE NAIL 6 OR MORE: CPT | Mod: Q8,PBBFAC | Performed by: PODIATRIST

## 2023-10-09 RX ORDER — PEN NEEDLE, DIABETIC 29 G X1/2"
NEEDLE, DISPOSABLE MISCELLANEOUS
Qty: 100 EACH | Refills: 4 | Status: SHIPPED | OUTPATIENT
Start: 2023-10-09

## 2023-10-09 NOTE — TELEPHONE ENCOUNTER
----- Message from Ronnie Trujillo sent at 10/9/2023 10:49 AM CDT -----  Contact: [phone] @ patient  Good morning patient would like a call back to see if the doc can call something in for her legs they are red and have some leaking due to her cellulitis.Patient says she called in on Friday as well. Please give patient a call back [phone]

## 2023-10-09 NOTE — PROGRESS NOTES
Subjective:      Patient ID: Duran Giordano is a 83 y.o. female.    Chief Complaint: Diabetic Foot Exam (Last PCP visit on 08/23/2023 Tami Schmidt MD)      Duran Logan is a 83 y.o. female who presents to the clinic for evaluation and treatment of high risk feet. Duran Logan has a past medical history of Adrenal mass- adenoma stable since 2004 (1.8 cm and 8/13/12 (2 cm); stable 2016 2.4 cm, Arthritis, Asthma in adult without complication (6/25/2015), Bilateral carotid artery disease (7/21/2017), Bilateral sciatica (2/7/2017), Cellulitis of right leg (08/16/2017), Cerebral infarction (1/29/2016), Cervical radiculopathy (3/18/2015), Chronic knee pain, Chronic rhinitis (4/9/2013), CKD (chronic kidney disease) stage 3, GFR 30-59 ml/min (1/29/2013), Coronary artery disease due to calcified coronary lesion (6/25/2015), Deep vein thrombosis, Diastolic dysfunction (10/10/2013), Essential hypertension (6/25/2015), Gastroesophageal reflux disease without esophagitis (8/13/2012), Gout, Hives (10/1/2013), Infection of prosthetic right knee joint (10/25/2021), Mixed hyperlipidemia (8/13/2012), Morbid obesity with BMI of 50.0-59.9, adult (2/11/2014), Obstructive sleep apnea syndrome (8/13/2012), Senile cataracts of both eyes (1/22/2015), Traumatic open wound of right lower leg (8/25/2017), Trigeminal neuralgia of right side of face (5/23/2017), Type 2 diabetes mellitus with diabetic polyneuropathy, with long-term current use of insulin (1/29/2013), Venous stasis dermatitis of both lower extremities (8/21/2017), Viral hepatitis A without coma (1/1/1971), and Vitamin D deficiency disease (8/13/2012). The patient's chief complaint is elongated toenails and lymphedema.  This patient has documented high risk feet requiring routine maintenance secondary to diabetes mellitis and those secondary complications of diabetes, as mentioned.    PCP: Tami Schmidt MD    Date Last Seen by PCP:   Chief Complaint   Patient  "presents with    Diabetic Foot Exam     Last PCP visit on 08/23/2023 Tami Schmidt MD        Current shoe gear:  Affected Foot: Casual shoes     Unaffected Foot: Casual shoes    Hemoglobin A1C   Date Value Ref Range Status   04/28/2023 6.8 (H) 4.0 - 5.6 % Final     Comment:     ADA Screening Guidelines:  5.7-6.4%  Consistent with prediabetes  >or=6.5%  Consistent with diabetes    High levels of fetal hemoglobin interfere with the HbA1C  assay. Heterozygous hemoglobin variants (HbS, HgC, etc)do  not significantly interfere with this assay.   However, presence of multiple variants may affect accuracy.     06/06/2022 6.8 (H) 4.0 - 5.6 % Final     Comment:     ADA Screening Guidelines:  5.7-6.4%  Consistent with prediabetes  >or=6.5%  Consistent with diabetes    High levels of fetal hemoglobin interfere with the HbA1C  assay. Heterozygous hemoglobin variants (HbS, HgC, etc)do  not significantly interfere with this assay.   However, presence of multiple variants may affect accuracy.     10/26/2021 8.1 (H) 4.0 - 5.6 % Final     Comment:     ADA Screening Guidelines:  5.7-6.4%  Consistent with prediabetes  >or=6.5%  Consistent with diabetes    High levels of fetal hemoglobin interfere with the HbA1C  assay. Heterozygous hemoglobin variants (HbS, HgC, etc)do  not significantly interfere with this assay.   However, presence of multiple variants may affect accuracy.         Review of Systems   Cardiovascular:  Positive for leg swelling.   Skin:  Positive for color change, dry skin, nail changes and unusual hair distribution. Negative for flushing, itching, rash and skin cancer.   Musculoskeletal:  Positive for arthritis and stiffness. Negative for back pain and falls.   Neurological:  Positive for numbness. Negative for paresthesias.   Allergic/Immunologic: Negative for hives.           Objective:       Vitals:    10/09/23 1432   BP: (!) 183/87   Pulse: 96   Weight: 114.1 kg (251 lb 8.7 oz)   Height: 4' 11" (1.499 m) "   PainSc: 0-No pain          Physical Exam  Vitals and nursing note reviewed.   Constitutional:       Appearance: She is well-developed.   Cardiovascular:      Pulses:           Dorsalis pedis pulses are 1+ on the right side and 1+ on the left side.        Posterior tibial pulses are 1+ on the right side and 1+ on the left side.      Comments: Dorsalis pedis and posterior tibial pulses are palpable bilaterally. Toes are cool to touch. Feet are warm proximally.There is decreased digital hair bilateral. Skin is atrophic, hyperpigmented, and moderately edematous.    Musculoskeletal:         General: No tenderness.      Right ankle: Normal.      Left ankle: Normal.      Right foot: No swelling, deformity or crepitus.      Left foot: No swelling, deformity or crepitus.      Comments: Adequate joint range of motion without pain, limitation, nor crepitation Bilateral feet and ankle joints. Muscle strength is 5/5 in all groups bilaterally.      TTP w/ redness and plantar L foot no signs of break in skin no ulceration. Pain to plantar medical tubercle of calcaneus . No flucutance   Lymphadenopathy:      Comments: B/l lymphedema    Skin:     General: Skin is warm and dry.      Coloration: Skin is not pale.      Findings: No abrasion, bruising, burn, erythema, lesion or rash.      Nails: There is no clubbing.      Comments: Nails x10 are elongated by  4-7mm's, thickened by 2-3 mm's, dystrophic, and are darkened in  coloration . Xerosis Bilaterally. No open lesions noted.    Hyperkeratotic tissue noted to distal hallux b/l       Skin thickened, leathery, svetlana blistering noted to legs.    Neurological:      Mental Status: She is alert and oriented to person, place, and time.      Comments: Decreased sharp/dull sensation bilateral feet.   Psychiatric:         Behavior: Behavior normal.               Assessment:       Encounter Diagnoses   Name Primary?    Diabetes mellitus with peripheral vascular disease Yes    Onychomycosis due  to dermatophyte     Corn or callus          Plan:       Duran Logan was seen today for diabetic foot exam.    Diagnoses and all orders for this visit:    Diabetes mellitus with peripheral vascular disease    Onychomycosis due to dermatophyte    Corn or callus    - Patient was given written and verbal instructions regarding foot condition.  I counseled the patient on her conditions, their implications and medical management.  Referral to physical therapy     Shoe inspection. Diabetic Foot Education. Patient reminded of the importance of good nutrition and blood cummings gar control to help prevent podiatric complications of diabetes. Patient instructed on proper foot hygeine. We discussed wearing proper shoe gear, daily foot inspections, never walking without protective shoe gear, never putting sharp instruments to feet    - With patient's permission, nails were aggressively reduced and debrided x 10 to their soft tissue attachment mechanically and with electric , removing all offending nail and debris. Patient relates relief following the procedure. She will continue to monitor the areas daily, inspect her feet, wear protective shoe gear when ambulatory, moisturizer to maintain skin integrity and follow in this office in approximately 2-3 months, sooner p.r.n.    - After cleansing the  area w/ alcohol prep pad the above mentioned hyperkeratosis was trimmed utilizing No 15 scapel, to a smooth base with out incident. Patient tolerated this  well and reported comfort to the area x2

## 2023-10-09 NOTE — TELEPHONE ENCOUNTER
Called and spoke to patient. Patient notes legs are red bilaterally since Thursday and has had some leakage from one of the legs as well. She notes that when she initially spoke with us Friday, her legs were not leaking any fluid, so she became more concerned today about her condition and is requesting antibiotics as she has had infections of her legs in the past.     Informed her that she will need to be evaluated before prescriptions can be given. Will schedule her with Dr. Mccray for Tuesday, October 10th at 1 PM. Patient amenable, requesting transportation to and from appointment.

## 2023-10-10 ENCOUNTER — OFFICE VISIT (OUTPATIENT)
Dept: PRIMARY CARE CLINIC | Facility: CLINIC | Age: 83
End: 2023-10-10
Payer: MEDICARE

## 2023-10-10 VITALS
TEMPERATURE: 99 F | BODY MASS INDEX: 50.81 KG/M2 | DIASTOLIC BLOOD PRESSURE: 73 MMHG | OXYGEN SATURATION: 98 % | HEIGHT: 59 IN | SYSTOLIC BLOOD PRESSURE: 176 MMHG

## 2023-10-10 DIAGNOSIS — I89.0 LYMPHEDEMA OF BOTH LOWER EXTREMITIES: ICD-10-CM

## 2023-10-10 DIAGNOSIS — I50.32 CHRONIC DIASTOLIC HEART FAILURE: ICD-10-CM

## 2023-10-10 DIAGNOSIS — E11.42 TYPE 2 DIABETES MELLITUS WITH DIABETIC POLYNEUROPATHY, WITH LONG-TERM CURRENT USE OF INSULIN: Primary | ICD-10-CM

## 2023-10-10 DIAGNOSIS — Z79.4 TYPE 2 DIABETES MELLITUS WITH DIABETIC POLYNEUROPATHY, WITH LONG-TERM CURRENT USE OF INSULIN: Primary | ICD-10-CM

## 2023-10-10 DIAGNOSIS — I87.2 VENOUS STASIS DERMATITIS OF BOTH LOWER EXTREMITIES: ICD-10-CM

## 2023-10-10 LAB — GLUCOSE SERPL-MCNC: 129 MG/DL (ref 70–110)

## 2023-10-10 PROCEDURE — 99211 PR OFFICE/OUTPT VISIT, EST, LEVL I: ICD-10-PCS | Mod: S$GLB,,, | Performed by: HOSPITALIST

## 2023-10-10 PROCEDURE — 82962 GLUCOSE BLOOD TEST: CPT | Mod: ,,, | Performed by: HOSPITALIST

## 2023-10-10 PROCEDURE — 99211 OFF/OP EST MAY X REQ PHY/QHP: CPT | Mod: S$GLB,,, | Performed by: HOSPITALIST

## 2023-10-10 PROCEDURE — 82962 POCT GLUCOSE, HAND-HELD DEVICE: ICD-10-PCS | Mod: ,,, | Performed by: HOSPITALIST

## 2023-10-10 RX ORDER — TORSEMIDE 10 MG/1
10 TABLET ORAL DAILY
Qty: 30 TABLET | Refills: 0 | Status: SHIPPED | OUTPATIENT
Start: 2023-10-10 | End: 2023-11-21 | Stop reason: SDUPTHER

## 2023-10-10 NOTE — ASSESSMENT & PLAN NOTE
Past several clinic BPs reviewed; showing progressive upward trend.  In concert w/ worsening lymphedema suggests insidious volume overload state.  She normally sleeps in a recliner so is unaware of orthopnea.  Will have pt take her torsemide daily instead of QOD x 30 days to see if these improve.  Pt already has appt coming up in 2wk, can reassess BP and edema and check BMP.

## 2023-10-10 NOTE — ASSESSMENT & PLAN NOTE
LE changes c/w venous stasis; no objective findings presently concerning for infection.  Counseled pt that abx will likely not be of any benefit presently; continued leg wraps and skin care are what is necessary to continue healing.  BLE wrapped today in clinic.

## 2023-10-10 NOTE — PROGRESS NOTES
Primary Care Provider Appointment - Juan Mccray MD      Subjective:      Patient ID: Duran Giordano is a 83 y.o. female with   Past Medical History:   Diagnosis Date    Adrenal mass- adenoma stable since 2004 (1.8 cm and 8/13/12 (2 cm); stable 2016 2.4 cm     Adrenal mass- adenoma stable since 2004 (1.8 cm and 8/13/12 (2 cm); stable 2016 2.4 cm    Arthritis     Asthma in adult without complication 6/25/2015    Bilateral carotid artery disease 7/21/2017    Bilateral sciatica 2/7/2017    Cellulitis of right leg 08/16/2017    Cerebral infarction 1/29/2016    Multiple areas of lacunar infarction. Stroke risk factors include HTN, DM2, Dyslipidemia.  Continue ASA 81mg/ Statin therapy I have encouraged 30 minutes of physical activity daily for 5 days a week. She has access to a pool so this should not be so jarring to her joints.     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    Mixed hyperlipidemia 8/13/2012    Morbid obesity with BMI of 50.0-59.9, adult 2/11/2014    Obstructive sleep apnea syndrome 8/13/2012    Senile cataracts of both eyes 1/22/2015    Traumatic open wound of right lower leg 8/25/2017    Trigeminal neuralgia of right side of face 5/23/2017    For years now Previously on Gabapentin, but caused constipation Dissipating over time Not related to intracranial abnormalities Possibly related to dental procedure    Type 2 diabetes mellitus with diabetic polyneuropathy, with long-term current use of insulin 1/29/2013    Venous stasis dermatitis of both lower extremities  2017    Viral hepatitis A without coma 1971    Hep B infection in [facility] in , was hospitalize for 2 weeks at that time, unknown treatment.    Vitamin D deficiency disease 2012           Chief Complaint: worsening lymphedema    Prior to this visit, patient's last encounter with PCP was 2023.    Pt reports increased swelling in BLE, R>L, w/ increased and expanding area of dermal congestion. Pt concerned about infection.  No CHF sx, but sleeps in a recliner.  Legs seen by Podiatry yesterday, and an abrasion to medial calf was bandaged.  Pt reports that her HH ended last wk and now has nobody to do her leg wraps.  She reports improved ambulation lately, and has been able to shower by covering wraps with a plastic stocking designed for that purpose.  She has SCDs coming and expects delivery any day now.  She has been on torsemide 10mg QOD for about a year; which was spaced out from QD by her cardiologist, due to OSCAR she thinks.  She is very frequently having to run to the bathroom on her diuretic days.          4Ms for Medical Decision-Making in Older Adults    Last Completed EAWV: 2021    MOBILITY:  Get Up and Go:       No data to display              Activities of Daily Livin/26/2021     3:55 PM   Activities of Daily Living   Ambulation Independent   Dressing Independent   Transfers Independent   Toileting Continent of bladder;Continent of bowel   Feeding Independent   Cleaning home/Chores Independent   Telephone use Independent   Shopping Independent   Paying bills Independent   Taking meds Independent     Whisper Test:      2021     4:45 PM   Whisper Test   Whisper Test Normal     Disability Status:      2021     3:58 PM   Disability Status   Are you deaf or do you have serious difficulty hearing? N   Are you blind or do you have serious difficulty seeing, even when wearing glasses? N   Because of a physical, mental, or emotional condition, do you have serious difficulty  concentrating, remembering, or making decisions? N   Do you have serious difficulty walking or climbing stairs? Y   Do you have difficulty dressing or bathing? Y   Because of a physical, mental, or emotional condition, do you have difficulty doing errands alone such as visiting a doctor's office or shopping? N     Nutrition Screenin/26/2021     3:52 PM   Nutrition Screening   Has food intake declined over the past three months due to loss of appetite, digestive problems, chewing or swallowing difficulties? No decrease in food intake   Involuntary weight loss during the last 3 months? No weight loss   Mobility? Goes out   Has the patient suffered psychological stress or acute disease in the past three months? No   Neuropsychological problems? No psychological problems   Body Mass Index (BMI)?  BMI 23 or greater   Screening Score 14    Screening Score: 0-7 Malnourished, 8-11 At Risk, 12-14 Normal    MENTATION:   Depression Patient Health Questionnaire:      2023     3:08 PM   Depression Patient Health Questionnaire   PHQ-4 Total Score 0     Has Dementia Dx: No    Cognitive Function Screenin/26/2021     4:06 PM   Cognitive Function Screening   Clock Drawing Test 2   Mini-Cog 3 Minute Recall 3   Cognitive Function Screening 5     Cognitive Function Screening Total - Less than 4 = Abnormal,  Greater than or equal to 4 = Normal    MEDICATIONS:  High Risk Medications:  Total Active Medications: 1  oxyCODONE - 5 MG    Social History     Socioeconomic History    Marital status: Single   Tobacco Use    Smoking status: Never    Smokeless tobacco: Never   Substance and Sexual Activity    Alcohol use: Yes     Comment: x mas time    Drug use: No    Sexual activity: Never   Social History Narrative    Single with no children.      Social Determinants of Health     Financial Resource Strain: Low Risk  (2019)    Overall Financial Resource Strain (CARDIA)     Difficulty of Paying Living Expenses: Not hard  at all   Food Insecurity: No Food Insecurity (2/19/2019)    Hunger Vital Sign     Worried About Running Out of Food in the Last Year: Never true     Ran Out of Food in the Last Year: Never true   Transportation Needs: No Transportation Needs (2/19/2019)    PRAPARE - Transportation     Lack of Transportation (Medical): No     Lack of Transportation (Non-Medical): No       Review of Systems   Constitutional:  Positive for activity change (increase in ambulation). Negative for fever.   HENT:  Negative for mouth dryness.    Respiratory:  Negative for cough, chest tightness and shortness of breath.    Cardiovascular:  Positive for leg swelling. Negative for chest pain.   Genitourinary:  Positive for frequency (on diuretic days) and urgency (on diuretic days).   Integumentary:  Positive for color change and wound.        Objective:   There were no vitals taken for this visit.    Physical Exam  Vitals reviewed.   Constitutional:       General: She is not in acute distress.     Appearance: She is obese.   HENT:      Head: Normocephalic.      Comments: Wearing habit     Mouth/Throat:      Mouth: Mucous membranes are moist.      Pharynx: Oropharynx is clear.   Eyes:      General: No scleral icterus.     Conjunctiva/sclera: Conjunctivae normal.   Pulmonary:      Effort: Pulmonary effort is normal. No respiratory distress.   Musculoskeletal:      Comments: Massive BLE lymphedema, R>L up to knee.   Skin:     General: Skin is warm and dry.      Comments: Venous stasis dermatitis BLE R>L, nearly circumferential on RLE with expanded area of dermal congestion.  Bandage on small abrasion medial R calf.  No increased warmth or sufficient erythema to suggest infection.  Edema is nontender to palpation.   Neurological:      Mental Status: She is alert and oriented to person, place, and time. Mental status is at baseline.        BLE wrapped with Kerlix and ACE wrap, then covered with tubular net stocking.        Lab Results   Component  Value Date    WBC 7.72 02/16/2023    HGB 11.2 (L) 02/16/2023    HCT 37.7 02/16/2023     02/16/2023    CHOL 160 03/04/2021    TRIG 88 03/04/2021    HDL 34 (L) 03/04/2021    ALT 14 02/16/2023    AST 17 02/16/2023     02/16/2023    K 4.4 02/16/2023     02/16/2023    CREATININE 1.1 02/16/2023    BUN 22 02/16/2023    CO2 28 02/16/2023    TSH 0.799 01/16/2020    INR 1.0 06/22/2022    HGBA1C 6.8 (H) 04/28/2023       Current Outpatient Medications on File Prior to Visit   Medication Sig Dispense Refill    acetaminophen (TYLENOL) 500 MG tablet Take 2 tablets (1,000 mg total) by mouth every 8 (eight) hours. Bedside delivery 120 tablet 0    albuterol (PROVENTIL/VENTOLIN HFA) 90 mcg/actuation inhaler Inhale 2 puffs into the lungs every 4 (four) hours as needed for Wheezing or Shortness of Breath. Rescue 54 g 3    albuterol-ipratropium (DUO-NEB) 2.5 mg-0.5 mg/3 mL nebulizer solution USE 1 VIAL PER NEBULIZER EVERY 6 HOURS AS NEEDED FOR WHEEZING/RESCUE 90 mL 11    allopurinoL (ZYLOPRIM) 100 MG tablet TAKE 1 TABLET BY MOUTH EVERY DAY 30 tablet 0    amLODIPine (NORVASC) 5 MG tablet TAKE ONE TABLET BY MOUTH DAILY 90 tablet 3    ASPERCREME, LIDOCAINE HCL, 4 % Crea Apply topically.      aspirin (ECOTRIN) 81 MG EC tablet Take 1 tablet by mouth once daily.      atorvastatin (LIPITOR) 80 MG tablet Take 1 tablet (80 mg total) by mouth once daily. 90 tablet 3    blood sugar diagnostic Strp BG monitoring 4 times a day. Pt needs test strips for [employer]alking meter. (Patient taking differently: BG monitoring 2 times a day. Pt needs test strips for [employer] Talking meter.) 450 strip prn    cholecalciferol, vitamin D3, (VITAMIN D3) 25 mcg (1,000 unit) capsule Take 1 capsule (1,000 Units total) by mouth once daily. 90 capsule 3    colchicine, gout, (COLCRYS) 0.6 mg tablet TAKE 1 TABLET (0.6 mg) every other day 15 tablet 4    diclofenac sodium (VOLTAREN) 1 % Gel APPLY 2 GRAMS TOPICALLY DAILY 100 g 0    docusate sodium (COLACE)  "100 MG capsule Take 100 mg by mouth 2 (two) times daily.      doxycycline (VIBRA-TABS) 100 MG tablet Take 1 tablet (100 mg total) by mouth 2 (two) times daily. 60 tablet 4    fluticasone (FLONASE) 50 mcg/actuation nasal spray 2 sprays (100 mcg total) by Each Nare route once daily. 1 Bottle 3    fluticasone-salmeterol diskus inhaler 500-50 mcg INHALE 1 PUFF TWICE DAILY 60 each 11    GENABIO COVID-19 RAPID AT-HOME Kit       insulin (LANTUS SOLOSTAR U-100 INSULIN) glargine 100 units/mL SubQ pen Inject 12 Units into the skin every evening. 10.8 mL 3    insulin degludec (TRESIBA FLEXTOUCH U-100) 100 unit/mL (3 mL) insulin pen Inject 10-18 units at night 6 mL 1    lancets Misc Test daily (Patient taking differently: Test twice  daily) 100 each 12    nebulizer and compressor ([employer] SYSTEM) Berna use as directed 1 each 0    olopatadine (PATANOL) 0.1 % ophthalmic solution Place 1 drop into both eyes 2 (two) times daily. 5 mL 0    oxyCODONE (ROXICODONE) 5 MG immediate release tablet Take 1 tablet (5 mg total) by mouth every 6 (six) hours as needed for Pain. May take 1-2 tablets every 6 hours as needed for pain 42 tablet 0    pen needle, diabetic (PEN NEEDLE) 29 gauge x 1/2" Ndle USE DAILY 100 each 4    polyethylene glycol (GLYCOLAX) 17 gram PwPk Take 17 g by mouth once daily.  0    polyethylene glycol (GLYCOLAX) 17 gram PwPk Take 17 g by mouth once daily. 90 packet 3    semaglutide (OZEMPIC) 0.25 mg or 0.5 mg (2 mg/3 mL) pen injector INJECT 0.5 MG SUBCUTANEOUSLY ONCE A WEEK 3 mL 6    semaglutide (OZEMPIC) 0.25 mg or 0.5 mg(2 mg/1.5 mL) pen injector INJECT 0.5 MG SUBCUTANEOUSLY ONCE A WEEK 1.5 mL 5    SENNA 8.6 mg tablet Take by mouth.      torsemide (DEMADEX) 10 MG Tab TAKE 1 TABLET (10 mg) every other day 15 tablet 4    trolamine salicylate (ASPERCREME) 10 % cream Apply topically as needed.       No current facility-administered medications on file prior to visit.         Assessment:   83 y.o. female with " multiple co-morbid illnesses here to: address acute concern.    Plan:     Problem List Items Addressed This Visit       Type 2 diabetes mellitus with diabetic polyneuropathy, with long-term current use of insulin - Primary     Well controlled on current regimen.         Relevant Orders    POCT Glucose, Hand-Held Device (Completed)    Chronic diastolic heart failure     Past several clinic BPs reviewed; showing progressive upward trend.  In concert w/ worsening lymphedema suggests insidious volume overload state.  She normally sleeps in a recliner so is unaware of orthopnea.  Will have pt take her torsemide daily instead of QOD x 30 days to see if these improve.  Pt already has appt coming up in 2wk, can reassess BP and edema and check BMP.         Relevant Medications    torsemide (DEMADEX) 10 MG Tab    Venous stasis dermatitis of both lower extremities     LE changes c/w venous stasis; no objective findings presently concerning for infection.  Counseled pt that abx will likely not be of any benefit presently; continued leg wraps and skin care are what is necessary to continue healing.  BLE wrapped today in clinic.           Lymphedema of both lower extremities     Pt had to cancel Lymphedema clinic appt made previously due to other obligations; needs to be rescheduled.  Pt needs continuous ongoing assistance with wrapping her legs; once HH visits end she is unable to do that herself and nobody else is around able to do that.  Subsequent HH order for continuous Wound Care or PCA visits 3x/wk for leg wraps placed, to continue indefinitely for time being.         Relevant Orders    SUBSEQUENT HOME HEALTH ORDERS       Health Maintenance         Date Due Completion Date    TETANUS VACCINE Never done ---    Shingles Vaccine (3 of 3) 09/06/2021 7/12/2021    Lipid Panel 05/24/2023 5/24/2022    Hemoglobin A1c 10/28/2023 4/28/2023    Override on 7/13/2016: Done    COVID-19 Vaccine (7 - 2023-24 season) 12/04/2023 10/9/2023     Diabetes Urine Screening 04/28/2024 4/28/2023    Eye Exam 09/28/2024 9/28/2023    Override on 2/7/2020: Done    Override on 2/17/2016: Done    Override on 1/22/2015: Done    Override on 8/16/2012: Done    Aspirin/Antiplatelet Therapy 10/09/2024 10/9/2023    DEXA Scan 06/01/2027 6/1/2023    Override on 12/13/2011: Done            Future Appointments   Date Time Provider Department Center   10/16/2023  8:30 AM LAB, APPOINTMENT [facility] LAB [facility]   10/19/2023 10:00 AM Yogi Contreras APRN, CASEYP [facility] IM [address] [facility]   10/24/2023 10:00 AM Tami Schmidt MD [facility] MED CLN [address] [facility]   10/30/2023 11:00 AM Ranjit Olson MD [facility] RHEUM [address] [facility]   10/31/2023 10:45 AM Ferdinand Rosas MD [facility] OPHTHAL [address]   12/12/2023 10:00 AM Sonia Willard DPM [facility] POD [address] [facility]   2/12/2024 11:00 AM Alfredo Lozoya MD [facility]         Follow up in about 2 weeks (around 10/24/2023) for leg recheck, BMP; appt already booked. Total clinical care time was 60 min.    Sony Mccray MD  [facility]

## 2023-10-10 NOTE — ASSESSMENT & PLAN NOTE
Pt had to cancel Lymphedema clinic appt made previously due to other obligations; needs to be rescheduled.  Pt needs continuous ongoing assistance with wrapping her legs; once HH visits end she is unable to do that herself and nobody else is around able to do that.  Subsequent HH order for continuous Wound Care or PCA visits 3x/wk for leg wraps placed, to continue indefinitely for time being.

## 2023-10-10 NOTE — TELEPHONE ENCOUNTER
Dr Mccray-  Duran Logan has severe venous stasis. If she does not use her leg wraps, her legs will weep. This is not a sign of infection. It is unlikely that she needs antibiotics, but instead needs to follow the instructions of using her leg wraps. We should wrap her in clinic too.    Thanks,  Dr DYA

## 2023-10-12 ENCOUNTER — TELEPHONE (OUTPATIENT)
Dept: PRIMARY CARE CLINIC | Facility: CLINIC | Age: 83
End: 2023-10-12
Payer: MEDICARE

## 2023-10-12 RX ORDER — POLYETHYLENE GLYCOL 3350 17 G/17G
POWDER, FOR SOLUTION ORAL
Qty: 510 G | Refills: 0 | Status: SHIPPED | OUTPATIENT
Start: 2023-10-12 | End: 2024-02-23

## 2023-10-12 NOTE — TELEPHONE ENCOUNTER
----- Message from Tami Schmidt MD sent at 10/11/2023 11:13 AM CDT -----  Did we fax these subsequent HH orders to her HH? Let me know if you need me to print them  ----- Message -----  From: Sony Mccray MD  Sent: 10/10/2023   4:18 PM CDT  To: Tami Schmidt MD       Operative Note    Patient: Murali Ly 48 year old male    MRN: [id number]    Surgeon(s): Liam Lorenzana MD  Phone Number: [phone]    Assistant(s): Dr. Maikol Rincon- Resident     Pre-Op Diagnosis:   Right heel gangrene, with right leg severe peripheral arterial disease      Post-Op Diagnosis:   Right heel gangrene, with right leg severe peripheral arterial disease      Procedure: Procedure(s):  1. Right  Anterior Tibial Artery Balloon Angioplasty    Anesthesia Type: MAC                                   Complications: None    Description:   48 year old diabetic male has right heel dry gangrene, I counseled the patient on right leg arterial angiogram with possible revascularization, he agreed all the risk and benefit of surgery, he signed the written informed consent.    Findings:   -Abdominal and bilateral common and external iliac arteries are patent, no hemodynamically significant stenosis  -Right common femoral artery, deep femoral artery, superficial femoral artery, popliteal artery, are all patent, with no hemodynamically significant stenosis  -Right anterior tibial artery is a dominant one-vessel runoff into the foot, there is a distal anterior tibial artery stenosis of 80% over approximately 1 cm  -Right peroneal artery is patent proximally mid and distal segment it is patent but thin, with poor outflow  -Right posterior tibial artery is patent in the proximal one third, then there is a complete occlusion in the mid to distal segment, there is an a small 2 cm reconstitution, and then after that there is again complete occlusion with no distal reconstitution  -Severe vascular disease, poor filling of the tarsal arteries and the plantar arch, station of the digital arteries    Specimens Removed: No specimens collected     Estimated Blood Loss:  10 cc    Dye: 60 cc     Assistant Tasks: Opening and closing     Implants:   Implant Name Type Inv. Item Serial No.  Lot No. LRB No. Used Action    DEVICE PRCLS PROSTYLE SUT MEDIATE REPR CLSR STRL LF DISP - SN/A Other Implant DEVICE PRCLS PROSTYLE SUT MEDIATE REPR CLSR STRL LF DISP N/A [employer] [id number] Left 1 Implanted     Surgery:     The patient was placed on the operating room table in the supine fashion.  The bilateral groins were clipped, prepped, and draped in the usual sterile fashion. Intravenous antibiotics were given prior to skin incision.  A timeout was performed. The  Left  common femoral artery was accessed with a 4-Russian micropuncture system under fluoroscopic guidance.  The microwire was advanced in a retrograde fashion followed by introduction of the 4-Bulgarian micro sheath.  An ipsilateral oblique angiogram was done to ensure appropriate puncture location in the common femoral artery which it was.  The wire was exchanged for an intermediate stiff wire followed by sheath exchange to a 5 Russian standard length.  A J-wire was advanced through the iliac system and easily passed into the aorta.  Hand-injection was used via a flush sos catheter in the abdominal aorta with runoff:  -See findings above    a sos catheter was used to gain access to the contralateral iliac system.  a glide wire was advanced, the catheter was exchanged to a 4 Bulgarian angled  catheter.      diagnostic angiogram was performed of  The right  leg:  -See findings above    Intravenous heparin was administered, the sheath was upsized to a 45 cm 6 Russian ansil sheath, placed over a 035 versacore wire.      014 command wire was used to cross the  Right  anterior tib artery stenosis, balloon angioplasty was performed with the 2.5 x 60 mm [place] balloon for 2 minutes, there was still a significant 50% stenosis, therefore I used a 3.0 x 40 mm [place] balloon for 2 minutes, to nominal which was profile.  After angioplasty the balloon was removed and completion arterial angiogram was performed, there is now less than 10% residual stenosis in the anterior tibial artery  distal stenosis, there is a triphasic dorsalis pedis on pencil Doppler.  Procedure now completed status post revascularization    Patient is to has severe microvascular disease and significant distal posterior tib artery occlusion with no reconstitution, with only one-vessel runoff into the foot he has high risk for gangrene progression and limb loss    The access site was closed with the proglide closure system.  The skin was closed with Steri-Strips and sterile dressing was applied. All lap and instrument counts were correct.  patient was transfered to recovery in flat position, in stable fashion.        I was present for the key portions of the procedure and was immediately available for the non-key portions

## 2023-10-13 NOTE — ASSESSMENT & PLAN NOTE
Noncompliant with CPAP  · Advised to use machine  · F/u sleep clinic today  · Advised to place machine next to recliner chair   Pt RTC

## 2023-10-16 ENCOUNTER — LAB VISIT (OUTPATIENT)
Dept: LAB | Facility: HOSPITAL | Age: 83
End: 2023-10-16
Attending: INTERNAL MEDICINE
Payer: MEDICARE

## 2023-10-16 DIAGNOSIS — E11.42 TYPE 2 DIABETES MELLITUS WITH DIABETIC POLYNEUROPATHY, WITH LONG-TERM CURRENT USE OF INSULIN: ICD-10-CM

## 2023-10-16 DIAGNOSIS — Z79.4 TYPE 2 DIABETES MELLITUS WITH DIABETIC POLYNEUROPATHY, WITH LONG-TERM CURRENT USE OF INSULIN: ICD-10-CM

## 2023-10-16 LAB
CHOLEST SERPL-MCNC: 128 MG/DL (ref 120–199)
CHOLEST/HDLC SERPL: 2.7 {RATIO} (ref 2–5)
ESTIMATED AVG GLUCOSE: 151 MG/DL (ref 68–131)
HBA1C MFR BLD: 6.9 % (ref 4–5.6)
HDLC SERPL-MCNC: 47 MG/DL (ref 40–75)
HDLC SERPL: 36.7 % (ref 20–50)
LDLC SERPL CALC-MCNC: 69.2 MG/DL (ref 63–159)
NONHDLC SERPL-MCNC: 81 MG/DL
TRIGL SERPL-MCNC: 59 MG/DL (ref 30–150)

## 2023-10-16 PROCEDURE — 36415 COLL VENOUS BLD VENIPUNCTURE: CPT | Mod: PN | Performed by: NURSE PRACTITIONER

## 2023-10-16 PROCEDURE — 80061 LIPID PANEL: CPT | Performed by: NURSE PRACTITIONER

## 2023-10-16 PROCEDURE — 83036 HEMOGLOBIN GLYCOSYLATED A1C: CPT | Performed by: NURSE PRACTITIONER

## 2023-10-18 ENCOUNTER — OFFICE VISIT (OUTPATIENT)
Dept: WOUND CARE | Facility: CLINIC | Age: 83
End: 2023-10-18
Payer: MEDICARE

## 2023-10-18 VITALS
HEART RATE: 93 BPM | SYSTOLIC BLOOD PRESSURE: 171 MMHG | WEIGHT: 255.75 LBS | BODY MASS INDEX: 51.56 KG/M2 | DIASTOLIC BLOOD PRESSURE: 76 MMHG | TEMPERATURE: 98 F | HEIGHT: 59 IN

## 2023-10-18 DIAGNOSIS — E11.59 HYPERTENSION ASSOCIATED WITH DIABETES: ICD-10-CM

## 2023-10-18 DIAGNOSIS — Z79.4 TYPE 2 DIABETES MELLITUS WITH DIABETIC POLYNEUROPATHY, WITH LONG-TERM CURRENT USE OF INSULIN: ICD-10-CM

## 2023-10-18 DIAGNOSIS — I89.0 LYMPHEDEMA OF BOTH LOWER EXTREMITIES: ICD-10-CM

## 2023-10-18 DIAGNOSIS — R23.8 SKIN BULLA: ICD-10-CM

## 2023-10-18 DIAGNOSIS — I50.32 CHRONIC DIASTOLIC HEART FAILURE: ICD-10-CM

## 2023-10-18 DIAGNOSIS — R60.0 BILATERAL LOWER EXTREMITY EDEMA: ICD-10-CM

## 2023-10-18 DIAGNOSIS — S81.802A MULTIPLE OPEN WOUNDS OF LEFT LOWER EXTREMITY, INITIAL ENCOUNTER: Primary | ICD-10-CM

## 2023-10-18 DIAGNOSIS — I87.2 VENOUS STASIS DERMATITIS OF BOTH LOWER EXTREMITIES: ICD-10-CM

## 2023-10-18 DIAGNOSIS — I15.2 HYPERTENSION ASSOCIATED WITH DIABETES: ICD-10-CM

## 2023-10-18 DIAGNOSIS — E11.42 TYPE 2 DIABETES MELLITUS WITH DIABETIC POLYNEUROPATHY, WITH LONG-TERM CURRENT USE OF INSULIN: ICD-10-CM

## 2023-10-18 DIAGNOSIS — N18.31 STAGE 3A CHRONIC KIDNEY DISEASE: ICD-10-CM

## 2023-10-18 PROCEDURE — 99214 PR OFFICE/OUTPT VISIT, EST, LEVL IV, 30-39 MIN: ICD-10-PCS | Mod: 25,S$PBB,,

## 2023-10-18 PROCEDURE — 11042 DBRDMT SUBQ TIS 1ST 20SQCM/<: CPT | Mod: S$PBB,,,

## 2023-10-18 PROCEDURE — 99999 PR PBB SHADOW E&M-EST. PATIENT-LVL V: ICD-10-PCS | Mod: PBBFAC,,,

## 2023-10-18 PROCEDURE — 99999 PR PBB SHADOW E&M-EST. PATIENT-LVL V: CPT | Mod: PBBFAC,,,

## 2023-10-18 PROCEDURE — 29580 STRAPPING UNNA BOOT: CPT | Mod: PBBFAC,RT

## 2023-10-18 PROCEDURE — 11042 DBRDMT SUBQ TIS 1ST 20SQCM/<: CPT | Mod: PBBFAC

## 2023-10-18 PROCEDURE — 99214 OFFICE O/P EST MOD 30 MIN: CPT | Mod: 25,S$PBB,,

## 2023-10-18 PROCEDURE — 11042 DEBRIDEMENT: ICD-10-PCS | Mod: S$PBB,,,

## 2023-10-18 PROCEDURE — 99215 OFFICE O/P EST HI 40 MIN: CPT | Mod: PBBFAC

## 2023-10-18 NOTE — PROCEDURES
"Debridement    Date/Time: 10/18/2023 10:00 AM    Performed by: Amrita Davis PA-C  Authorized by: Amrita Davis PA-C    Time out: Immediately prior to procedure a "time out" was called to verify the correct patient, procedure, equipment, support staff and site/side marked as required.    Consent Done?:  Yes (Written)  Local anesthesia used?: No      Wound Details:    Location:  Right leg (anterior medial)    Type of Debridement:  Excisional       Length (cm):  1       Area (sq cm):  0.3       Width (cm):  0.3       Percent Debrided (%):  100       Depth (cm):  0.1       Total Area Debrided (sq cm):  0.3    Depth of debridement:  Subcutaneous tissue    Tissue debrided:  Subcutaneous    Devitalized tissue debrided:  Biofilm, Exudate, Fibrin and Slough    Instruments:  Curette  Bleeding:  Minimal  Hemostasis Achieved: Yes  Method Used:  Pressure  Patient tolerance:  Patient tolerated the procedure well with no immediate complications    "

## 2023-10-18 NOTE — PROGRESS NOTES
Subjective:       Patient ID: Duran Giordano is a 83 y.o. female.    Chief Complaint: Wound Consult    Patient presents for an evaluation of multiple right lower extremity wounds and bilateral blisters. She reports a long history of leg swelling. Pt notices blisters form, they rupture, and she is left with open wounds. Pt previously followed with Mercedes Whittaker NP and Kellie Blue NP for her wounds.  Her wounds were treated with unna boots. Pt was evaluated by her PCP on 8/23/23. She was noted to have multiple open wounds at that time. Pt unable to get circaids on herself, so her PCP ordered home health and a referral for lymphedema clinic. She is awaiting her lymphedema pumps that were ordered for her. Pt has [facility]. They were dressing her wounds with medihoney and compression wraps. Denies fever, chills, erythema, warmth, purulent drainage, or pain.          Review of Systems   Constitutional:  Negative for activity change, chills, diaphoresis, fatigue and fever.   Respiratory:  Negative for apnea, chest tightness and shortness of breath.    Cardiovascular:  Positive for leg swelling. Negative for chest pain and palpitations.   Musculoskeletal:  Negative for gait problem and joint swelling.   Skin:  Positive for wound. Negative for pallor and rash.   Neurological:  Negative for syncope, weakness and numbness.   Psychiatric/Behavioral:  Negative for agitation. The patient is not nervous/anxious.    All other systems reviewed and are negative.      Objective:      Physical Exam  Vitals reviewed.   Constitutional:       General: She is not in acute distress.     Appearance: Normal appearance.   Cardiovascular:      Rate and Rhythm: Normal rate and regular rhythm.      Pulses: Normal pulses.   Pulmonary:      Effort: No respiratory distress.   Musculoskeletal:         General: No swelling.      Right lower leg: Edema present.      Left lower leg: Edema present.      Comments: Pt ambulates with walker    Skin:     General: Skin is warm and dry.      Capillary Refill: Capillary refill takes less than 2 seconds.      Findings: Wound present. No erythema.          Neurological:      General: No focal deficit present.      Mental Status: She is alert and oriented to person, place, and time.   Psychiatric:         Mood and Affect: Mood normal.         Behavior: Behavior normal.         Thought Content: Thought content normal.         Judgment: Judgment normal.           Assessment:       1. Multiple open wounds of left lower extremity, initial encounter    2. Chronic diastolic heart failure    3. Hypertension associated with diabetes    4. Venous stasis dermatitis of both lower extremities    5. Stage 3a chronic kidney disease    6. Type 2 diabetes mellitus with diabetic polyneuropathy, with long-term current use of insulin    7. Lymphedema of both lower extremities    8. Bilateral lower extremity edema    9. Skin bulla           Altered Skin Integrity Right anterior;lower;medial Leg (Active)       Altered Skin Integrity Present on Admission - Did Patient arrive to the hospital with altered skin?:    Side: Right   Orientation: anterior;lower;medial   Location: Leg   Wound Number:    Is this injury device related?:    Primary Wound Type:    Description of Altered Skin Integrity:    Ankle-Brachial Index:    Pulses:    Removal Indication and Assessment:    Wound Outcome:    (Retired) Wound Length (cm):    (Retired) Wound Width (cm):    (Retired) Depth (cm):    Wound Description (Comments):    Removal Indications:    Wound Image   10/18/23 1105   Dressing Appearance Open to air 10/18/23 1105   Yellow (%), Wound Tissue Color 100 % 10/18/23 1105   Periwound Area Intact;Edematous 10/18/23 1105   Wound Length (cm) 1 cm 10/18/23 1105   Wound Width (cm) 0.3 cm 10/18/23 1105   Wound Depth (cm) 0.1 cm 10/18/23 1105   Wound Volume (cm^3) 0.03 cm^3 10/18/23 1105   Wound Surface Area (cm^2) 0.3 cm^2 10/18/23 1105   Care Cleansed  with:;Soap and water 10/18/23 1105   Dressing Applied;Foam;Compression wrap 10/18/23 1105   Periwound Care Skin barrier film applied 10/18/23 1105   Compression Three layer compression;Unna's Boot 10/18/23 1105            Altered Skin Integrity Right lower;medial;posterior Leg (Active)       Altered Skin Integrity Present on Admission - Did Patient arrive to the hospital with altered skin?:    Side: Right   Orientation: lower;medial;posterior   Location: Leg   Wound Number:    Is this injury device related?:    Primary Wound Type:    Description of Altered Skin Integrity:    Ankle-Brachial Index:    Pulses:    Removal Indication and Assessment:    Wound Outcome:    (Retired) Wound Length (cm):    (Retired) Wound Width (cm):    (Retired) Depth (cm):    Wound Description (Comments):    Removal Indications:    Wound Image   10/18/23 1105   Dressing Appearance Dry;Intact;Clean;Moist drainage 10/18/23 1105   Drainage Amount Small 10/18/23 1105   Drainage Characteristics/Odor Serous 10/18/23 1105   Red (%), Wound Tissue Color 100 % 10/18/23 1105   Periwound Area Intact;Edematous;[place] 10/18/23 1105   Wound Length (cm) 1 cm 10/18/23 1105   Wound Width (cm) 0.4 cm 10/18/23 1105   Wound Depth (cm) 0.1 cm 10/18/23 1105   Wound Volume (cm^3) 0.04 cm^3 10/18/23 1105   Wound Surface Area (cm^2) 0.4 cm^2 10/18/23 1105   Care Cleansed with:;Soap and water 10/18/23 1105   Dressing Applied;Foam;Compression wrap 10/18/23 1105   Periwound Care Skin barrier film applied 10/18/23 1105   Compression Unna's Boot;Three layer compression 10/18/23 1105       Duran Logan was seen in the clinic room and placed in the supine position on the treatment table.  The dressing was removed and the area was cleansed with Easi-clense sponges and dried thoroughly. No odor, erythema, or warmth noted. Multiple skin bulla noted to bilateral lower extremities. Sharp debridement with 5 mm curette to remove non-viable tissue to right anterior medial lower  extremity wound. Pt tolerated procedure well.      Plan of Care: Hydrofera blue to open wounds and bilateral 3 layer calamine compression wraps. The patient's feet were positioned at a 90 degree angle.  The compression wraps were applied using a spiral technique avoiding creases or folds.  The wraps were started behind the first metatarsal and ended below the tibial tubercle of the knee.  There was overlap of each turn half the width of the previous turn.            Plan:       Bilateral lower extremities were dressed as detailed above.  Patient was instructed to not get the dressings wet and to use cast covers for showering.  Should the dressing become wet, she is to remove it, place a moist dressing over the wound, cover with gauze and roll gauze and to secure bandages.  She should then notify home health/this office as soon as possible to have a new dressing applied.  Instructed patient to remove wrap if she notices tingling, pain, swelling, or coldness to her bilateral lower extremities or if her toes become white, blue, or cold.    Discussed nutrition and the role of protein in wound healing with the patient. Instructed patient to optimize protein for wound healing. Gave handout illustrated healthy protein options.    Discussed bilateral lower extremity edema with patient. Instructed patient to elevate legs whenever sedentary, follow a low sodium diet, and to take demadex as prescribed.    Instructed patient to utilize lymphedema pumps once she obtains them.     Faxed home health orders.  HOME HEALTH WOUND CARE ORDERS  D/C previous orders  Wound care and nurse visits   1 X a week and PRN complications  Wound location- right lower extremity, skin bulla- bilateral lower extremities  1. Cleanse wound with saline or wound cleanser    ( pt may shower)  2.Apply- hydrofera blue to wound beds and 3 layer calamine compression wraps to bilateral lower extremities.  3.Cover with appropriate cover dressing and contact the  office for any substituions in dressings  Monitor for infection, teach patient/caregiver signs and symptoms, as well as how to do dressing changes        Written and verbal instructions given to patient  RTC in 4 weeks      Amrita Davis PA-C

## 2023-10-19 ENCOUNTER — OFFICE VISIT (OUTPATIENT)
Dept: INTERNAL MEDICINE | Facility: CLINIC | Age: 83
End: 2023-10-19
Payer: MEDICARE

## 2023-10-19 ENCOUNTER — TELEPHONE (OUTPATIENT)
Dept: INTERNAL MEDICINE | Facility: CLINIC | Age: 83
End: 2023-10-19

## 2023-10-19 VITALS — SYSTOLIC BLOOD PRESSURE: 144 MMHG | HEART RATE: 76 BPM | OXYGEN SATURATION: 100 % | DIASTOLIC BLOOD PRESSURE: 70 MMHG

## 2023-10-19 DIAGNOSIS — E11.42 TYPE 2 DIABETES MELLITUS WITH DIABETIC POLYNEUROPATHY, WITH LONG-TERM CURRENT USE OF INSULIN: Primary | ICD-10-CM

## 2023-10-19 DIAGNOSIS — M47.816 LUMBAR FACET ARTHROPATHY: ICD-10-CM

## 2023-10-19 DIAGNOSIS — I50.32 CHRONIC DIASTOLIC HEART FAILURE: ICD-10-CM

## 2023-10-19 DIAGNOSIS — Z12.31 ENCOUNTER FOR SCREENING MAMMOGRAM FOR MALIGNANT NEOPLASM OF BREAST: ICD-10-CM

## 2023-10-19 DIAGNOSIS — Z79.4 TYPE 2 DIABETES MELLITUS WITH DIABETIC POLYNEUROPATHY, WITH LONG-TERM CURRENT USE OF INSULIN: Primary | ICD-10-CM

## 2023-10-19 DIAGNOSIS — J44.9 CHRONIC OBSTRUCTIVE PULMONARY DISEASE, UNSPECIFIED COPD TYPE: ICD-10-CM

## 2023-10-19 DIAGNOSIS — E66.01 MORBID OBESITY WITH BMI OF 50.0-59.9, ADULT: ICD-10-CM

## 2023-10-19 DIAGNOSIS — N18.31 STAGE 3A CHRONIC KIDNEY DISEASE: ICD-10-CM

## 2023-10-19 DIAGNOSIS — I15.2 HYPERTENSION ASSOCIATED WITH DIABETES: ICD-10-CM

## 2023-10-19 DIAGNOSIS — I87.332 CHRONIC VENOUS HYPERTENSION (IDIOPATHIC) WITH ULCER AND INFLAMMATION OF LEFT LOWER EXTREMITY: ICD-10-CM

## 2023-10-19 DIAGNOSIS — I89.0 LYMPHEDEMA OF BOTH LOWER EXTREMITIES: ICD-10-CM

## 2023-10-19 DIAGNOSIS — G47.33 OSA ON CPAP: ICD-10-CM

## 2023-10-19 DIAGNOSIS — I87.2 VENOUS STASIS DERMATITIS OF BOTH LOWER EXTREMITIES: ICD-10-CM

## 2023-10-19 DIAGNOSIS — E11.59 HYPERTENSION ASSOCIATED WITH DIABETES: ICD-10-CM

## 2023-10-19 PROCEDURE — 99215 OFFICE O/P EST HI 40 MIN: CPT | Mod: PBBFAC | Performed by: NURSE PRACTITIONER

## 2023-10-19 PROCEDURE — 99214 OFFICE O/P EST MOD 30 MIN: CPT | Mod: S$PBB,,, | Performed by: NURSE PRACTITIONER

## 2023-10-19 PROCEDURE — 99999 PR PBB SHADOW E&M-EST. PATIENT-LVL V: CPT | Mod: PBBFAC,,, | Performed by: NURSE PRACTITIONER

## 2023-10-19 PROCEDURE — 99999 PR PBB SHADOW E&M-EST. PATIENT-LVL V: ICD-10-PCS | Mod: PBBFAC,,, | Performed by: NURSE PRACTITIONER

## 2023-10-19 PROCEDURE — 99214 PR OFFICE/OUTPT VISIT, EST, LEVL IV, 30-39 MIN: ICD-10-PCS | Mod: S$PBB,,, | Performed by: NURSE PRACTITIONER

## 2023-10-19 NOTE — PATIENT INSTRUCTIONS
Follow up in 4-6 months w/Irielle  A1c prior -4-6 months   Mammogram 2023  Lab Results   Component Value Date    HGBA1C 6.9 (H) 10/16/2023     Goal less than 7.5%    Tresiba 12 units at night   Ozempic 0.5 mg weekly     [email or web address]  Eat fit aylin  Browsaritypal aylin  [email or web address]    mySugr aylin

## 2023-10-24 ENCOUNTER — OFFICE VISIT (OUTPATIENT)
Dept: PRIMARY CARE CLINIC | Facility: CLINIC | Age: 83
End: 2023-10-24
Payer: MEDICARE

## 2023-10-24 ENCOUNTER — TELEPHONE (OUTPATIENT)
Dept: PRIMARY CARE CLINIC | Facility: CLINIC | Age: 83
End: 2023-10-24

## 2023-10-24 VITALS
HEIGHT: 59 IN | HEART RATE: 88 BPM | BODY MASS INDEX: 51.65 KG/M2 | SYSTOLIC BLOOD PRESSURE: 140 MMHG | TEMPERATURE: 99 F | OXYGEN SATURATION: 100 % | DIASTOLIC BLOOD PRESSURE: 61 MMHG

## 2023-10-24 DIAGNOSIS — E11.42 TYPE 2 DIABETES MELLITUS WITH DIABETIC POLYNEUROPATHY, WITH LONG-TERM CURRENT USE OF INSULIN: Primary | ICD-10-CM

## 2023-10-24 DIAGNOSIS — I50.32 CHRONIC DIASTOLIC HEART FAILURE: ICD-10-CM

## 2023-10-24 DIAGNOSIS — Z79.4 TYPE 2 DIABETES MELLITUS WITH DIABETIC POLYNEUROPATHY, WITH LONG-TERM CURRENT USE OF INSULIN: Primary | ICD-10-CM

## 2023-10-24 DIAGNOSIS — I87.2 VENOUS STASIS DERMATITIS OF BOTH LOWER EXTREMITIES: ICD-10-CM

## 2023-10-24 DIAGNOSIS — I89.0 LYMPHEDEMA OF BOTH LOWER EXTREMITIES: ICD-10-CM

## 2023-10-24 LAB — GLUCOSE SERPL-MCNC: 116 MG/DL (ref 70–110)

## 2023-10-24 PROCEDURE — G0179 PR HOME HEALTH MD RECERTIFICATION: ICD-10-PCS | Mod: ,,, | Performed by: INTERNAL MEDICINE

## 2023-10-24 PROCEDURE — 99213 OFFICE O/P EST LOW 20 MIN: CPT | Mod: S$GLB,,, | Performed by: INTERNAL MEDICINE

## 2023-10-24 PROCEDURE — G0179 MD RECERTIFICATION HHA PT: HCPCS | Mod: ,,, | Performed by: INTERNAL MEDICINE

## 2023-10-24 PROCEDURE — 82962 POCT GLUCOSE, HAND-HELD DEVICE: ICD-10-PCS | Mod: ,,, | Performed by: INTERNAL MEDICINE

## 2023-10-24 PROCEDURE — 82962 GLUCOSE BLOOD TEST: CPT | Mod: ,,, | Performed by: INTERNAL MEDICINE

## 2023-10-24 PROCEDURE — 99213 PR OFFICE/OUTPT VISIT, EST, LEVL III, 20-29 MIN: ICD-10-PCS | Mod: S$GLB,,, | Performed by: INTERNAL MEDICINE

## 2023-10-24 NOTE — PATIENT INSTRUCTIONS
TODAY:  - PCA longterm care paperwork completed  - we will fax and provided you with a paper copy  - script printed for compression stockings, please bring that to [facility]  - use your pumps for your legs when they arrive  - Shingrix vaccine #2
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
## 2023-10-30 ENCOUNTER — LAB VISIT (OUTPATIENT)
Dept: LAB | Facility: HOSPITAL | Age: 83
End: 2023-10-30
Attending: INTERNAL MEDICINE
Payer: MEDICARE

## 2023-10-30 ENCOUNTER — OFFICE VISIT (OUTPATIENT)
Dept: RHEUMATOLOGY | Facility: CLINIC | Age: 83
End: 2023-10-30
Payer: MEDICARE

## 2023-10-30 VITALS
BODY MASS INDEX: 51.41 KG/M2 | DIASTOLIC BLOOD PRESSURE: 70 MMHG | HEIGHT: 59 IN | WEIGHT: 255 LBS | SYSTOLIC BLOOD PRESSURE: 181 MMHG | HEART RATE: 93 BPM

## 2023-10-30 DIAGNOSIS — M10.00 IDIOPATHIC GOUT, UNSPECIFIED CHRONICITY, UNSPECIFIED SITE: Primary | ICD-10-CM

## 2023-10-30 DIAGNOSIS — M10.00 IDIOPATHIC GOUT, UNSPECIFIED CHRONICITY, UNSPECIFIED SITE: ICD-10-CM

## 2023-10-30 LAB
ALBUMIN SERPL BCP-MCNC: 2.9 G/DL (ref 3.5–5.2)
ALP SERPL-CCNC: 102 U/L (ref 55–135)
ALT SERPL W/O P-5'-P-CCNC: 14 U/L (ref 10–44)
ANION GAP SERPL CALC-SCNC: 7 MMOL/L (ref 8–16)
AST SERPL-CCNC: 21 U/L (ref 10–40)
BASOPHILS # BLD AUTO: 0.02 K/UL (ref 0–0.2)
BASOPHILS NFR BLD: 0.2 % (ref 0–1.9)
BILIRUB SERPL-MCNC: 0.3 MG/DL (ref 0.1–1)
BUN SERPL-MCNC: 33 MG/DL (ref 8–23)
CALCIUM SERPL-MCNC: 9.6 MG/DL (ref 8.7–10.5)
CHLORIDE SERPL-SCNC: 104 MMOL/L (ref 95–110)
CO2 SERPL-SCNC: 28 MMOL/L (ref 23–29)
CREAT SERPL-MCNC: 1.3 MG/DL (ref 0.5–1.4)
DIFFERENTIAL METHOD: ABNORMAL
EOSINOPHIL # BLD AUTO: 0.2 K/UL (ref 0–0.5)
EOSINOPHIL NFR BLD: 2.4 % (ref 0–8)
ERYTHROCYTE [DISTWIDTH] IN BLOOD BY AUTOMATED COUNT: 16.1 % (ref 11.5–14.5)
EST. GFR  (NO RACE VARIABLE): 41.3 ML/MIN/1.73 M^2
GLUCOSE SERPL-MCNC: 114 MG/DL (ref 70–110)
HCT VFR BLD AUTO: 36.8 % (ref 37–48.5)
HGB BLD-MCNC: 11.6 G/DL (ref 12–16)
IMM GRANULOCYTES # BLD AUTO: 0.09 K/UL (ref 0–0.04)
IMM GRANULOCYTES NFR BLD AUTO: 0.9 % (ref 0–0.5)
LYMPHOCYTES # BLD AUTO: 1.6 K/UL (ref 1–4.8)
LYMPHOCYTES NFR BLD: 15.7 % (ref 18–48)
MCH RBC QN AUTO: 29.2 PG (ref 27–31)
MCHC RBC AUTO-ENTMCNC: 31.5 G/DL (ref 32–36)
MCV RBC AUTO: 93 FL (ref 82–98)
MONOCYTES # BLD AUTO: 0.6 K/UL (ref 0.3–1)
MONOCYTES NFR BLD: 6.3 % (ref 4–15)
NEUTROPHILS # BLD AUTO: 7.4 K/UL (ref 1.8–7.7)
NEUTROPHILS NFR BLD: 74.5 % (ref 38–73)
NRBC BLD-RTO: 0 /100 WBC
PLATELET # BLD AUTO: 90 K/UL (ref 150–450)
PLATELET BLD QL SMEAR: ABNORMAL
PMV BLD AUTO: ABNORMAL FL (ref 9.2–12.9)
POTASSIUM SERPL-SCNC: 4.4 MMOL/L (ref 3.5–5.1)
PROT SERPL-MCNC: 8.1 G/DL (ref 6–8.4)
RBC # BLD AUTO: 3.97 M/UL (ref 4–5.4)
SODIUM SERPL-SCNC: 139 MMOL/L (ref 136–145)
URATE SERPL-MCNC: 3.7 MG/DL (ref 2.4–5.7)
WBC # BLD AUTO: 9.87 K/UL (ref 3.9–12.7)

## 2023-10-30 PROCEDURE — 99999 PR PBB SHADOW E&M-EST. PATIENT-LVL IV: CPT | Mod: PBBFAC,,, | Performed by: INTERNAL MEDICINE

## 2023-10-30 PROCEDURE — 85025 COMPLETE CBC W/AUTO DIFF WBC: CPT | Performed by: INTERNAL MEDICINE

## 2023-10-30 PROCEDURE — 84550 ASSAY OF BLOOD/URIC ACID: CPT | Performed by: INTERNAL MEDICINE

## 2023-10-30 PROCEDURE — 36415 COLL VENOUS BLD VENIPUNCTURE: CPT | Performed by: INTERNAL MEDICINE

## 2023-10-30 PROCEDURE — 99214 OFFICE O/P EST MOD 30 MIN: CPT | Mod: S$PBB,,, | Performed by: INTERNAL MEDICINE

## 2023-10-30 PROCEDURE — 80053 COMPREHEN METABOLIC PANEL: CPT | Performed by: INTERNAL MEDICINE

## 2023-10-30 PROCEDURE — 99999 PR PBB SHADOW E&M-EST. PATIENT-LVL IV: ICD-10-PCS | Mod: PBBFAC,,, | Performed by: INTERNAL MEDICINE

## 2023-10-30 PROCEDURE — 99214 OFFICE O/P EST MOD 30 MIN: CPT | Mod: PBBFAC | Performed by: INTERNAL MEDICINE

## 2023-10-30 PROCEDURE — 99214 PR OFFICE/OUTPT VISIT, EST, LEVL IV, 30-39 MIN: ICD-10-PCS | Mod: S$PBB,,, | Performed by: INTERNAL MEDICINE

## 2023-10-30 RX ORDER — ALLOPURINOL 100 MG/1
100 TABLET ORAL DAILY
Qty: 90 TABLET | Refills: 1 | Status: SHIPPED | OUTPATIENT
Start: 2023-10-30 | End: 2024-04-27

## 2023-10-30 RX ORDER — ALLOPURINOL 300 MG/1
300 TABLET ORAL DAILY
Qty: 90 TABLET | Refills: 1 | Status: SHIPPED | OUTPATIENT
Start: 2023-10-30 | End: 2024-04-27

## 2023-10-30 NOTE — PROGRESS NOTES
Chief Complaint   Patient presents with    Disease Management       Patient with chronic gout for a follow up    History of presenting illness    83 year old black female comes in with chronic gout for 10 years    Usually she has had     Acute onset pain in the :    Ankles,feet : lasting for a week  Severe pain,swelling in the joint  The joint would turn red and blue  Precipitated by sea food  Cannot bear weight  Would get steroids,anti inflammatories    She was advised not to eat sea food    She did fine for a while     She snacks a lot and also has gained weight    Last year    APOLONIA neg  RF neg    Feet xrays  Marked swelling of ankle and feet soft tissues, pes planus, DJD tarsal joints particularly on left, bilateral hallux valgus deformity, DJD 1st MTP joints.  Focal cortical thickening medial aspects left 2nd digit shaft meta tarsal.    Hand xrays  Tiny remote calcification lateral margin base proximal phalanx 3rd MCP joint.  Prominent DJD changes, some with erosion, 1st metacarpal-carpal and radiocarpal joint and radioulnar joints especially on left.  Enlargement lunate, overlying triquetral, pisiform not seen best advantage.  No typical gout erosive disease or tophi.  Flattening distal ulnar without ulnar styloid, narrowing of ulnar wrist joint with apparent absence of tri angulated fibrocartilage.    She now takes allopurinol 300 mg daily  Colchicine 0.3 mg daily    No more gout flares    She had mentioned chronic right wrist pain  We injected the right CMC   We did MRI right wrist    Ligaments: There is extensive tear of the TFCC, which appears markedly diminutive.  Scapholunate ligament not well seen.    Tendons: There is tendinosis and tenosynovitis of the adductor pollicis longus and extensor pollicis brevis.  There is interstitial tear of the adductor pollicis brevis.  There is mild tenosynovitis of the extensor carpi radialis brevis and longus as well as the extensor carpi ulnaris.  The ECU is in the  appropriate position.  Remaining extensor tendons and flexor tendons are unremarkable.    Bones: No fracture or infiltrative process.  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    Joints: There are severe degenerative changes at the radiocarpal joint with subchondral edema and cystic change predominantly within the distal radius and lunate.  Severe degenerative changes with subcortical cystic change in bulky osteophytes also noted at the base of thumb joint.  There is a small effusion at the wrist.    Miscellaneous: Carpal tunnel and Guyon's canal are unremarkable.      Impression       1. 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  2. First compartment tendinosis and tenosynovitis with interstitial tear of the adductor pollicis longus.  Mild tenosynovitis of the 2nd and 6th compartments.  3. Lunotriquetral coalition.  4. Severe base of thumb osteoarthritis.     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   - followed by orthopedics    Medications  Allopurinol 400 mg  Colchicine 0.6 mg every other day  Prn Tylenol - avoid NSAIDs    Labs (2/16/2023)  CBC Hgb 11.2, stable  CMP eGFR 50.8, otherwise wnl  uric acid 5.8      Past history    HTN,venous stasis LE,b/l carotid artery disease,CAD,asthma,cataracts,CKD,diabetes  GERD,HLD,REJI   ,chronic diastolic HF  LS spine arthritis/sciatica     Family history  Mom cancer    Social history  Not a smoker or alcoholic      Review of Systems    Constitutional:  Negative for activity change, appetite change, chills, diaphoresis, fatigue, fever and unexpected weight change.   HENT:  Negative for congestion, dental problem, drooling, ear discharge, ear pain, facial swelling, hearing loss, mouth sores, nosebleeds, postnasal drip, rhinorrhea, sinus pressure, sinus pain, sneezing, sore throat, tinnitus, trouble swallowing and voice change.    Eyes:  Negative for photophobia, pain, discharge, redness, itching and visual disturbance.   Respiratory:  Negative for apnea, cough, choking, chest tightness, shortness of breath, wheezing and stridor.    Cardiovascular:  Negative for chest pain, palpitations and leg swelling.   Gastrointestinal:  Negative for abdominal distention, abdominal pain, anal bleeding, blood in stool, constipation, diarrhea, nausea, rectal pain and vomiting.   Endocrine: Negative for cold intolerance, heat intolerance, polydipsia, polyphagia and polyuria.   Genitourinary:  Negative for decreased urine volume, difficulty urinating, dysuria, enuresis, flank pain, frequency, genital sores, hematuria and urgency.   Musculoskeletal:  Positive for arthralgias. Negative for back pain, gait problem, joint swelling, myalgias, neck pain and neck stiffness.   Skin:  Negative for color change, pallor, rash and wound.   Allergic/Immunologic: Negative for environmental allergies, food allergies and immunocompromised state.   Neurological:  Negative for dizziness, tremors, seizures, syncope, facial asymmetry, speech difficulty, weakness, light-headedness, numbness and headaches.   Hematological:  Negative for adenopathy. Does not bruise/bleed easily.   Psychiatric/Behavioral:  Negative for agitation, behavioral problems, confusion, decreased concentration, dysphoric mood, hallucinations, self-injury, sleep disturbance and suicidal ideas. The patient is not nervous/anxious and is not hyperactive.      Physical Exam     No synovitis    Physical Exam    Constitutional: She is oriented to person, place, and time. No distress.   HENT:   Head: Normocephalic.   Mouth/Throat: Oropharynx is clear and moist.   Eyes: Pupils are equal, round, and reactive to light. Conjunctivae are normal. Right eye exhibits no discharge. Left eye exhibits no discharge. No scleral icterus.   Neck: No thyromegaly present.   Cardiovascular: Normal rate, regular rhythm and normal heart sounds.   Pulmonary/Chest: Effort normal and breath sounds normal. No stridor.   Abdominal: Soft. Bowel sounds are normal.   Musculoskeletal:         General: Normal range of motion.      Cervical back: Normal range of motion.   Lymphadenopathy:     She has no cervical adenopathy.   Neurological: She is alert and oriented to person, place, and time.   Skin: Skin is warm. No rash noted. She is not diaphoretic.   Psychiatric: Affect and judgment normal.         Assessment       83 year old black female comes in with chronic gout for 10 years    Initial attacks were in the ankles and feet : acute onset,pain,swelling and redness,triggered by sea food intake,responding to steroids and anti inflammatories    She presented with acute onset pain in the     Right wrist  Right MCP  Bottom of the right foot    Labs     GFR 50   Uric acid 8.5,down to 7.9 and then went upto 8.5 and then trending down to 6.7/7/8/7.4  ESR 72  H/H 11.5/38.2    CBC,CMP stays stable    RF,CCP neg    Last year  APOLONIA neg  RF neg    She has   HTN,venous stasis LE,b/l carotid artery disease,CAD,asthma,cataracts,CKD,diabetes  GERD,HLD,REJI   ,chronic diastolic HF  LS spine arthritis/sciatica     No more gout attacks    She also had right wrist pain but when we injected the CMC and dequervains tendon she did great  She also did OT  There is lot of arthritis in the radiocarpal and CMC joints    Uric acid needs to be lesser than 6   Last uric acid 6.5  GFR 41  H/h 11.6/39.5  LFTs nml  plts 187    hba1c 9.3    She had plantar fasciitis/needing  injections    Now comes with knee pain   Right knee is replaced > 10 years ago and she thinks it is worn out  She has pain in the left knee as well  Need assessment     2/2023    Right knee replaced- got infected- replaced the second time-doing well now  No pain  Doing PT    Left knee has mild OA- follows with orthopedics    Gout- no attacks    She doesn't know what dose of allopurinol dose she is taking    Uric acid was 3.9 in 10/2021    On colchicine 0.6 mg every other day        10/2023    No Gout flares    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 Idiopathic gout, unspecified chronicity, unspecified site              F/u  problem     Plan    Continue hand splint and OT    Make sure she has allopurinol 400 mg and stop colchicine 0.6 mg every other day    CBC,CMP,uric acid-today    Dietary modifications discussed : gave a hand out of all foods to avoid : avoid organ meat,red meat,seafood,shell fish,anchovies,sardines,alcohol particularly beer,fructose containing soft drinks  Its ok to consume moderate wine,diet drinks,dairy proteins,coffee  Eat cherries  Eat purine rich vegetables    Avoid beta blockers  Avoid diuretics  If she develops hyperlipidemia : fenofibrate helps since it lowers uric acid level     Blood pressure monitoring  Weight loss suggested  Diabetes monitoring    Labs  today    Tylenol arthritis 500 mg tablets upto 6 a day is ok  NSAIDs not ok    Duran Logan was seen today for disease management.    Diagnoses and all orders for this visit:    Idiopathic gout, unspecified chronicity, unspecified site  -     CBC Auto Differential; Future  -     Comprehensive Metabolic Panel; Future  -     Uric Acid; Future    Other orders  -     allopurinoL (ZYLOPRIM) 100 MG  tablet; Take 1 tablet (100 mg total) by mouth once daily.  -     allopurinoL (ZYLOPRIM) 300 MG tablet; Take 1 tablet (300 mg total) by mouth once daily.

## 2023-10-31 ENCOUNTER — OFFICE VISIT (OUTPATIENT)
Dept: OPHTHALMOLOGY | Facility: CLINIC | Age: 83
End: 2023-10-31
Payer: MEDICARE

## 2023-10-31 DIAGNOSIS — H43.813 VITREOUS DEGENERATION OF BOTH EYES: ICD-10-CM

## 2023-10-31 DIAGNOSIS — H34.8120 CENTRAL RETINAL VEIN OCCLUSION WITH MACULAR EDEMA OF LEFT EYE: Primary | ICD-10-CM

## 2023-10-31 DIAGNOSIS — H35.033 HYPERTENSIVE RETINOPATHY OF BOTH EYES: ICD-10-CM

## 2023-10-31 DIAGNOSIS — H25.13 AGE-RELATED NUCLEAR CATARACT OF BOTH EYES: ICD-10-CM

## 2023-10-31 PROCEDURE — 67028 INJECTION EYE DRUG: CPT | Mod: S$PBB,LT,, | Performed by: OPHTHALMOLOGY

## 2023-10-31 PROCEDURE — 99214 PR OFFICE/OUTPT VISIT, EST, LEVL IV, 30-39 MIN: ICD-10-PCS | Mod: 25,S$PBB,, | Performed by: OPHTHALMOLOGY

## 2023-10-31 PROCEDURE — 99213 OFFICE O/P EST LOW 20 MIN: CPT | Mod: PBBFAC | Performed by: OPHTHALMOLOGY

## 2023-10-31 PROCEDURE — 99999 PR PBB SHADOW E&M-EST. PATIENT-LVL III: CPT | Mod: PBBFAC,,, | Performed by: OPHTHALMOLOGY

## 2023-10-31 PROCEDURE — 99214 OFFICE O/P EST MOD 30 MIN: CPT | Mod: 25,S$PBB,, | Performed by: OPHTHALMOLOGY

## 2023-10-31 PROCEDURE — 99999 PR PBB SHADOW E&M-EST. PATIENT-LVL III: ICD-10-PCS | Mod: PBBFAC,,, | Performed by: OPHTHALMOLOGY

## 2023-10-31 PROCEDURE — 92134 OCT, RETINA - OU - BOTH EYES: ICD-10-PCS | Mod: 26,S$PBB,, | Performed by: OPHTHALMOLOGY

## 2023-10-31 PROCEDURE — 67028 PR INJECT INTRAVITREAL PHARMCOLOGIC: ICD-10-PCS | Mod: S$PBB,LT,, | Performed by: OPHTHALMOLOGY

## 2023-10-31 PROCEDURE — 67145 PROPH RTA DTCHMNT PC: CPT | Mod: PBBFAC,LT | Performed by: OPHTHALMOLOGY

## 2023-10-31 PROCEDURE — 67028 INJECTION EYE DRUG: CPT | Mod: PBBFAC,LT | Performed by: OPHTHALMOLOGY

## 2023-10-31 PROCEDURE — 92134 CPTRZ OPH DX IMG PST SGM RTA: CPT | Mod: PBBFAC | Performed by: OPHTHALMOLOGY

## 2023-10-31 RX ADMIN — Medication 1.25 MG: at 12:10

## 2023-10-31 NOTE — PROGRESS NOTES
HPI    DSL- 9/26/2023 Dr. Rosas     84 y/o female present to clinic for 1 month DFE/OCT OU with poss GLADYS OS.   H/o of CRVO, left eye. She states she has an increased of epiphora with   twitching of LT eye. She states vision improved since last visit. She   denies floaters and flashes of lights.     Eyemeds  Pataday OU BID   Last edited by Jane Little on 10/31/2023 11:05 AM.         A/P    ICD-10-CM ICD-9-CM   1. Central retinal vein occlusion with macular edema of left eye  H34.8120 362.35     362.83   2. Age-related nuclear cataract of both eyes  H25.13 366.16   3. Vitreous degeneration of both eyes  H43.813 379.21   4. Hypertensive retinopathy of both eyes  H35.033 362.11         1. Central retinal vein occlusion with macular edema of left eye  Here for CRVO f/u    S/p GLADYS X2 9/26/23  Today VA 20/300 (was 20/60 PH), exam notable for CRVO with diffuse heme,resolved SRF, persistent IRF despite Avastin     Plan: given foveal IRF, recommend Avastin OS, however due to limited response, will get auth for Eylea OS    Based on todays exam, diagnostic studies, and review of records, the determination was made for treatment today.  Schedule Avastin Injection today Left Eye Patient chooses to proceed with injection R/B/A discussed include infection retinal detachment and stroke    Patient identified.  Timeout performed.    Risks, benefits, and alternatives to treatment were discussed in detail with the patient, including bleeding/infection (endophthalmitis)/etc.  The patient voiced understanding and wished to proceed with the procedure.  See separate consent form.    Injection Procedure Note:  Diagnosis: CME Left Eye    Topical Proparacaine drop placed then topical 5% Betadine and then subconjunctival lidocaine 2% injection  Sterile gloves used, and sterile lid speculum placed.  5% Betadine placed at injection site again prior to injection.  Avastin 1.25mg in 0.05cc Injected inferotemporally 3.5-4mm posterior to the  limbus.  Complications: None  Va at least CF at 5 feet post injection.  Retina, ONH, IOP normal after injection.    Followup as below.  Patient should return immediately PRN.  Retinal Detachment and Endophthalmitis precautions given.     2. Age-related nuclear cataract of both eyes  Mild NS, NVS  Plan: Observation Update Mrx prn     3. Vitreous degeneration of both eyes  No RT/RD  Plan: Observation     4. Hypertensive retinopathy of both eyes  Mod HTN changes, has CRVO OS  PCP Tami Schmidt MD - Recent notes reviewed  10/16/2023  6.9  A1C   Plan: Observation    Recommend good blood pressure control, tight blood glucose control, and good cholesterol control           RTC 1 mo DFE/OCTm OU, get auth Eylea OS       I saw and examined the patient and reviewed in detail the findings documented. The final examination findings, image interpretations, and plan as documented in the record represent my personal judgment and conclusions.    Ferdinand Rosas MD  Vitreoretinal Surgery   [facility]

## 2023-11-08 ENCOUNTER — TELEPHONE (OUTPATIENT)
Dept: PRIMARY CARE CLINIC | Facility: CLINIC | Age: 83
End: 2023-11-08
Payer: MEDICARE

## 2023-11-08 NOTE — TELEPHONE ENCOUNTER
----- Message from Jane Suarez sent at 11/8/2023  1:09 PM CST -----  Contact: [phone]  [email or web address]     Patient is calling for Medical Advice regarding: Urszula states the pt is complaining of chest pains     Would like response via ScalITt:  ##call back     Comments:

## 2023-11-13 ENCOUNTER — DOCUMENT SCAN (OUTPATIENT)
Dept: HOME HEALTH SERVICES | Facility: HOSPITAL | Age: 83
End: 2023-11-13
Payer: MEDICARE

## 2023-11-14 NOTE — ASSESSMENT & PLAN NOTE
Continue torsemide for edema. Patient with continued LE swelling and lymphedema  Advised realistic goals of care  Advised to continue wrapping her legs

## 2023-11-15 ENCOUNTER — EXTERNAL HOME HEALTH (OUTPATIENT)
Dept: HOME HEALTH SERVICES | Facility: HOSPITAL | Age: 83
End: 2023-11-15
Payer: MEDICARE

## 2023-11-15 ENCOUNTER — OFFICE VISIT (OUTPATIENT)
Dept: WOUND CARE | Facility: CLINIC | Age: 83
End: 2023-11-15
Payer: MEDICARE

## 2023-11-15 VITALS
BODY MASS INDEX: 51.38 KG/M2 | HEART RATE: 94 BPM | TEMPERATURE: 98 F | DIASTOLIC BLOOD PRESSURE: 81 MMHG | OXYGEN SATURATION: 99 % | WEIGHT: 254.88 LBS | HEIGHT: 59 IN | SYSTOLIC BLOOD PRESSURE: 187 MMHG

## 2023-11-15 DIAGNOSIS — I89.0 LYMPHEDEMA OF BOTH LOWER EXTREMITIES: Primary | ICD-10-CM

## 2023-11-15 DIAGNOSIS — Z87.828 HEALED WOUND: ICD-10-CM

## 2023-11-15 PROCEDURE — 99999 PR PBB SHADOW E&M-EST. PATIENT-LVL I: ICD-10-PCS | Mod: PBBFAC,,,

## 2023-11-15 PROCEDURE — 99214 OFFICE O/P EST MOD 30 MIN: CPT | Mod: S$PBB,,,

## 2023-11-15 PROCEDURE — 99211 OFF/OP EST MAY X REQ PHY/QHP: CPT | Mod: PBBFAC

## 2023-11-15 PROCEDURE — 99214 PR OFFICE/OUTPT VISIT, EST, LEVL IV, 30-39 MIN: ICD-10-PCS | Mod: S$PBB,,,

## 2023-11-15 PROCEDURE — 99999 PR PBB SHADOW E&M-EST. PATIENT-LVL I: CPT | Mod: PBBFAC,,,

## 2023-11-15 NOTE — PROGRESS NOTES
Subjective:       Patient ID: Duran Giordano is a 83 y.o. female.    Chief Complaint: Wound Check    Patient presents for a re-evaluation of multiple right lower extremity wounds and bilateral blisters. She reports a long history of leg swelling. Pt notices blisters form, they rupture, and she is left with open wounds. Pt previously followed with Mercedes Whittaker NP and Kellie Blue NP for her wounds.  Her wounds were treated with unna boots. Pt was evaluated by her PCP on 8/23/23. She was noted to have multiple open wounds at that time. Pt unable to get circaids on herself, so her PCP ordered home health and a referral for lymphedema clinic. She is awaiting her lymphedema pumps that were ordered for her. Pt has [facility]. No complaints at this time. Denies fever, chills, erythema, warmth, purulent drainage, or pain.          Review of Systems   Constitutional:  Negative for activity change, chills, diaphoresis, fatigue and fever.   Respiratory:  Negative for apnea, chest tightness and shortness of breath.    Cardiovascular:  Positive for leg swelling. Negative for chest pain and palpitations.   Musculoskeletal:  Negative for gait problem and joint swelling.   Skin:  Positive for wound. Negative for pallor and rash.   Neurological:  Negative for syncope, weakness and numbness.   Psychiatric/Behavioral:  Negative for agitation. The patient is not nervous/anxious.    All other systems reviewed and are negative.      Objective:      Physical Exam  Vitals reviewed.   Constitutional:       General: She is not in acute distress.     Appearance: Normal appearance.   Cardiovascular:      Rate and Rhythm: Normal rate and regular rhythm.      Pulses: Normal pulses.   Pulmonary:      Effort: No respiratory distress.   Musculoskeletal:         General: No swelling.      Right lower leg: Edema present.      Left lower leg: Edema present.      Comments: Pt ambulates with walker   Skin:     General: Skin is warm and dry.       Capillary Refill: Capillary refill takes less than 2 seconds.      Findings: No erythema or wound.          Neurological:      General: No focal deficit present.      Mental Status: She is alert and oriented to person, place, and time.   Psychiatric:         Mood and Affect: Mood normal.         Behavior: Behavior normal.         Thought Content: Thought content normal.         Judgment: Judgment normal.           Assessment:       1. Lymphedema of both lower extremities    2. Healed wound           Altered Skin Integrity Right anterior;lower;medial;posterior;lateral Leg (Active)       Altered Skin Integrity Present on Admission - Did Patient arrive to the hospital with altered skin?:    Side: Right   Orientation: anterior;lower;medial;posterior;lateral   Location: Leg   Wound Number:    Is this injury device related?:    Primary Wound Type:    Description of Altered Skin Integrity:    Ankle-Brachial Index:    Pulses:    Removal Indication and Assessment:    Wound Outcome:    (Retired) Wound Length (cm):    (Retired) Wound Width (cm):    (Retired) Depth (cm):    Wound Description (Comments):    Removal Indications:    Wound Image    11/15/23 1035   Dressing Appearance Dry;Intact;Clean 11/15/23 1035   Drainage Amount None 11/15/23 1035   Care Cleansed with:;Soap and water 11/15/23 1035            Altered Skin Integrity Left lower;medial;posterior;anterior;lateral Leg (Active)       Altered Skin Integrity Present on Admission - Did Patient arrive to the hospital with altered skin?:    Side: Left   Orientation: lower;medial;posterior;anterior;lateral   Location: Leg   Wound Number:    Is this injury device related?:    Primary Wound Type:    Description of Altered Skin Integrity:    Ankle-Brachial Index:    Pulses:    Removal Indication and Assessment:    Wound Outcome:    (Retired) Wound Length (cm):    (Retired) Wound Width (cm):    (Retired) Depth (cm):    Wound Description (Comments):    Removal Indications:     Wound Image   11/15/23 1035   Dressing Appearance Dry;Intact;Clean 11/15/23 1035   Drainage Amount None 11/15/23 1035   Care Cleansed with:;Soap and water 11/15/23 1035       Duran Logan was seen in the clinic room and placed in the supine position on the treatment table.  The dressing was removed and the area was cleansed with Easi-clense sponges and dried thoroughly. No odor, erythema, or warmth noted. No open wounds or skin bulla noted.      Plan of Care: Compression stockings/ circaids to bilateral lower extremities        Plan:       No open wounds noted.  Precautions given to prevent wounds from re-opening. Discussed how area is extremely fragile. Protect area from friction, wash area gently, and pat dry. If the wound should re-open, instructed patient to contact the office.    Discussed bilateral lower extremity edema. Instructed patient to utilize compression stockings to bilateral lower extremities. Place on first thing in the morning and remove before bed.     Instructed patient to utilize lymphedema pumps once she obtains them.     Faxed home health orders.  HOME HEALTH WOUND CARE ORDERS  D/C wound care home health      Written and verbal instructions given to patient  RTC PRN      Amrita Davis PA-C

## 2023-11-21 ENCOUNTER — TELEPHONE (OUTPATIENT)
Dept: PRIMARY CARE CLINIC | Facility: CLINIC | Age: 83
End: 2023-11-21
Payer: MEDICARE

## 2023-11-21 DIAGNOSIS — I50.32 CHRONIC DIASTOLIC HEART FAILURE: ICD-10-CM

## 2023-11-21 RX ORDER — TORSEMIDE 10 MG/1
10 TABLET ORAL
Qty: 45 TABLET | Refills: 3 | Status: SHIPPED | OUTPATIENT
Start: 2023-11-21 | End: 2023-12-01 | Stop reason: SDUPTHER

## 2023-11-21 NOTE — TELEPHONE ENCOUNTER
Torsemide changed to 10mg q2 days. Script sent to [facility] pharmacy    Please get her a one week phone call appt.    Thanks,  Dr DAY

## 2023-11-21 NOTE — LETTER
[facility]  [address]  [facility] [address]  Phone: [phone]  Fax: [phone] November 21, 2023    Duran Giordano  [address] [facility] [address]      To [facility]:    Please discontinue colchicine for Sr Duran Giordano.    If you have any questions or concerns, please feel free to call my office.    Sincerely,          Tami Schmidt MD

## 2023-11-21 NOTE — TELEPHONE ENCOUNTER
Nurse Leah called regarding patient's medications. Requesting order informing [facility] of Colchicine being discontinued as she wants this medication to no longer be in the patient's pill packs. Patient currently taking Allopurinol without issue. Will inform Dr. ANDREA

## 2023-11-21 NOTE — TELEPHONE ENCOUNTER
Dr Olson,  Sister Duran Logan has requested to restart diuretics due to her LE lymphedema.    Is it ok to restart torsemide q2 days? Or should she continue to avoid diuretics?    Thanks,  Dr DAY

## 2023-11-24 NOTE — TELEPHONE ENCOUNTER
Karthikeyan call and spoke with Nurse Lynn  and  inform  her that Dr Moreno   want  her to use diuretics to a bare minium inform nurse Lynn  that  Provider was out on today  but will call her with any other updates

## 2023-11-28 NOTE — TELEPHONE ENCOUNTER
Please advise nursing Leah and Sister Duran Logan, that the diuretics will worsen her gout.  Is she taking them every 2 days right now?  If she is able to not take the torsemide, then she should skip it and only use it as needed.    Thanks,  Dr DAY

## 2023-11-28 NOTE — TELEPHONE ENCOUNTER
Called and spoke to Nurse Lynn regarding plan to avoid Torsemide unless absolutely needed given Dr. Olson's recommendations regarding patient's gout management. Nurse states that since we last spoke with her, they have not been using it every other day, trying to avoid use unless needed by patient.

## 2023-11-29 ENCOUNTER — OFFICE VISIT (OUTPATIENT)
Dept: PRIMARY CARE CLINIC | Facility: CLINIC | Age: 83
End: 2023-11-29
Payer: MEDICARE

## 2023-11-29 DIAGNOSIS — I87.2 VENOUS STASIS DERMATITIS OF BOTH LOWER EXTREMITIES: ICD-10-CM

## 2023-11-29 DIAGNOSIS — R53.2 COMPLETE IMMOBILITY DUE TO SEVERE PHYSICAL DISABILITY OR FRAILTY: Primary | ICD-10-CM

## 2023-11-29 PROCEDURE — 99442 PR PHYSICIAN TELEPHONE EVALUATION 11-20 MIN: CPT | Mod: 95,,, | Performed by: INTERNAL MEDICINE

## 2023-11-29 PROCEDURE — 99442 PR PHYSICIAN TELEPHONE EVALUATION 11-20 MIN: ICD-10-PCS | Mod: 95,,, | Performed by: INTERNAL MEDICINE

## 2023-11-29 NOTE — PROGRESS NOTES
Established Patient - Audio Only Telehealth Visit with [facility] Provider  Shared Note: Cindi Coates MD (IM, PGY-3) & Dr Tami Schmidt (Attending)     The patient location is: HOME  The chief complaint leading to consultation is: routine care  Visit type: Virtual visit with audio only (telephone)     The reason for the audio only service rather than synchronous audio and video virtual visit was related to technical difficulties or patient preference/necessity.     Each patient to whom I provide medical services by telemedicine is:  (1) informed of the relationship between the physician and patient and the respective role of any other health care provider with respect to management of the patient; and (2) notified that they may decline to receive medical services by telemedicine and may withdraw from such care at any time. Patient verbally consented to receive this service via voice-only telephone call.       Subjective:      Patient ID: Duran Giordano is a 83 y.o. female with vitamin d deficiency, COPD/asthma, CKD 3, lymphedema of BLE, REJI, morbid obesity and OA.     Prior to this visit, patient's last encounter with PCP was 10/24/2023.    Pt reports well overall. Denies any acute complaints including HA, vision changes, CP, SOB, fever, chills, abdominal pain, urinary sxs or stool changes. No recent falls. Denies symptoms related to anxiety or depression. Last seen by wound care on 11/15 for chronic lymphedema of LE associated w/ recurrent wounds. No open wounds appreciated during that evaluation. Patient was instructed to utilize compression stockings and obtain lymphedema pumps once she obtains them. Home health wound care was ordered.       DM  - Currently taking: Ozempic .5 and Tresiba,  previously on humalog and humalin  - Last A1c on  10/16/23 was 6.9  - Diet consists of see previous note, it is unchanged   - Has boost shakes, will continue those    HTN  - Currently taking: amlodipine 5  - How  often taking BP at home: twice a week. 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 Olson on 10/30/2023  - On allopurinol 400mg. Colchicine stopped  - Counselled on diet modifications      Central retinal vein occlusion w/ macular edema of the L eye:  - last seen by optho on 10/31    Care Gaps:  - RSV vaccine    4Ms for Medical Decision-Making in Older Adults    Last Completed EAWV: 2021    MOBILITY:  Get Up and Go:       No data to display              Activities of Daily Livin/26/2021     3:55 PM   Activities of Daily Living   Ambulation Independent   Dressing Independent   Transfers Independent   Toileting Continent of bladder;Continent of bowel   Feeding Independent   Cleaning home/Chores Independent   Telephone use Independent   Shopping Independent   Paying bills Independent   Taking meds Independent     Whisper Test:      2021     4:45 PM   Whisper Test   Whisper Test Normal     Disability Status:      2021     3:58 PM   Disability Status   Are you deaf or do you have serious difficulty hearing? N   Are you blind or do you have serious difficulty seeing, even when wearing glasses? N   Because of a physical, mental, or emotional condition, do you have serious difficulty concentrating, remembering, or making decisions? N   Do you have serious difficulty walking or climbing stairs? Y   Do you have difficulty dressing or bathing? Y   Because of a physical,  mental, or emotional condition, do you have difficulty doing errands alone such as visiting a doctor's office or shopping? N     Nutrition Screenin/26/2021     3:52 PM   Nutrition Screening   Has food intake declined over the past three months due to loss of appetite, digestive problems, chewing or swallowing difficulties? No decrease in food intake   Involuntary weight loss during the last 3 months? No weight loss   Mobility? Goes out   Has the patient suffered psychological stress or acute disease in the past three months? No   Neuropsychological problems? No psychological problems   Body Mass Index (BMI)?  BMI 23 or greater   Screening Score 14    Screening Score: 0-7 Malnourished, 8-11 At Risk, 12-14 Normal    MENTATION:   Depression Patient Health Questionnaire:      10/24/2023    10:50 AM   Depression Patient Health Questionnaire   PHQ-4 Total Score 0     Has Dementia Dx: No    Cognitive Function Screenin/26/2021     4:06 PM   Cognitive Function Screening   Clock Drawing Test 2   Mini-Cog 3 Minute Recall 3   Cognitive Function Screening 5     Cognitive Function Screening Total - Less than 4 = Abnormal,  Greater than or equal to 4 = Normal    MEDICATIONS:  High Risk Medications:  Total Active Medications: 1  oxyCODONE - 5 MG    WHAT MATTERS MOST:  Advance Care Planning   ACP Status:   Patient has had an ACP conversation  Living Will: Yes  Power of : No  [place]: Yes    What is most important right now is to focus on walking without walker - increasing mobility    Accordingly, we have decided that the best plan to meet the patient's goals includes continuing with treatment      What matters most to patient today is: getting her leg swelling down                 Social History     Socioeconomic History    Marital status: Single   Tobacco Use    Smoking status: Never    Smokeless tobacco: Never   Substance and Sexual Activity    Alcohol use: Yes     Comment: x mas time    Drug use: No     Sexual activity: Never   Social History Narrative    Single with no children.      Social Determinants of Health     Financial Resource Strain: Low Risk  (2/19/2019)    Overall Financial Resource Strain (CARDIA)     Difficulty of Paying Living Expenses: Not hard at all   Food Insecurity: No Food Insecurity (2/19/2019)    Hunger Vital Sign     Worried About Running Out of Food in the Last Year: Never true     Ran Out of Food in the Last Year: Never true   Transportation Needs: No Transportation Needs (2/19/2019)    PRAPARE - Transportation     Lack of Transportation (Medical): No     Lack of Transportation (Non-Medical): No     Objective:     Lab Results   Component Value Date    WBC 9.87 10/30/2023    HGB 11.6 (L) 10/30/2023    HCT 36.8 (L) 10/30/2023    PLT 90 (L) 10/30/2023    CHOL 128 10/16/2023    TRIG 59 10/16/2023    HDL 47 10/16/2023    ALT 14 10/30/2023    AST 21 10/30/2023     10/30/2023    K 4.4 10/30/2023     10/30/2023    CREATININE 1.3 10/30/2023    BUN 33 (H) 10/30/2023    CO2 28 10/30/2023    TSH 0.799 01/16/2020    INR 1.0 06/22/2022    HGBA1C 6.9 (H) 10/16/2023         Assessment:   83 y.o. female with multiple co-morbid illnesses here to continue work-up of chronic issues.     Plan:     Problem List Items Addressed This Visit          Neuro    Complete immobility due to severe physical disability or frailty - Primary       Cardiac/Vascular    Venous stasis dermatitis of both lower extremities   - advised to follow instructions for leg compression  - reduce diuretic due to kidney disease    Health Maintenance         Date Due Completion Date    TETANUS VACCINE Never done ---    RSV Vaccine (Age 60+ and Pregnant patients) (1 - 1-dose 60+ series) Never done ---    COVID-19 Vaccine (11 - 2023-24 season) 12/04/2023 10/9/2023    Hemoglobin A1c 04/16/2024 10/16/2023    Override on 7/13/2016: Done    Diabetes Urine Screening 04/28/2024 4/28/2023    Eye Exam 09/28/2024 9/28/2023    Override on  2/7/2020: Done    Override on 2/17/2016: Done    Override on 1/22/2015: Done    Override on 8/16/2012: Done    Lipid Panel 10/16/2024 10/16/2023    DEXA Scan 06/01/2027 6/1/2023    Override on 12/13/2011: Done            No follow-ups on file. . Thirty minutes spent with this patient today in a non-face to face encounter.    Tami Schmidt MD/MPH  Internal Medicine  [facility] [phone]    This service was not originating from a related E/M service provided within the previous 7 days nor will  to an E/M service or procedure within the next 24 hours or my soonest available appointment.  Prevailing standard of care was able to be met in this audio-only visit.

## 2023-11-30 ENCOUNTER — TELEPHONE (OUTPATIENT)
Dept: PRIMARY CARE CLINIC | Facility: CLINIC | Age: 83
End: 2023-11-30
Payer: MEDICARE

## 2023-11-30 DIAGNOSIS — I50.32 CHRONIC DIASTOLIC HEART FAILURE: ICD-10-CM

## 2023-12-01 RX ORDER — TORSEMIDE 10 MG/1
10 TABLET ORAL
Qty: 45 TABLET | Refills: 3 | Status: SHIPPED | OUTPATIENT
Start: 2023-12-01 | End: 2024-11-25

## 2023-12-01 NOTE — TELEPHONE ENCOUNTER
Torsemide script changed to as needed. Please call [facility] and tell them to NOT place it in pill packs.    Also let Nurse Leah know that she can have it in a separate bottle for PRN use.    Thanks,  Dr DAY

## 2023-12-07 ENCOUNTER — TELEPHONE (OUTPATIENT)
Dept: PRIMARY CARE CLINIC | Facility: CLINIC | Age: 83
End: 2023-12-07
Payer: MEDICARE

## 2023-12-12 ENCOUNTER — OFFICE VISIT (OUTPATIENT)
Dept: PODIATRY | Facility: CLINIC | Age: 83
End: 2023-12-12
Payer: MEDICARE

## 2023-12-12 VITALS
DIASTOLIC BLOOD PRESSURE: 73 MMHG | SYSTOLIC BLOOD PRESSURE: 165 MMHG | HEART RATE: 86 BPM | HEIGHT: 59 IN | BODY MASS INDEX: 51.38 KG/M2 | WEIGHT: 254.88 LBS

## 2023-12-12 DIAGNOSIS — L84 CORN OR CALLUS: ICD-10-CM

## 2023-12-12 DIAGNOSIS — B35.1 ONYCHOMYCOSIS DUE TO DERMATOPHYTE: ICD-10-CM

## 2023-12-12 DIAGNOSIS — E11.51 DIABETES MELLITUS WITH PERIPHERAL VASCULAR DISEASE: Primary | ICD-10-CM

## 2023-12-12 PROCEDURE — 99214 OFFICE O/P EST MOD 30 MIN: CPT | Mod: PBBFAC | Performed by: PODIATRIST

## 2023-12-12 PROCEDURE — 99999 PR PBB SHADOW E&M-EST. PATIENT-LVL IV: ICD-10-PCS | Mod: PBBFAC,,, | Performed by: PODIATRIST

## 2023-12-12 PROCEDURE — 11056 PARNG/CUTG B9 HYPRKR LES 2-4: CPT | Mod: Q8,S$PBB,, | Performed by: PODIATRIST

## 2023-12-12 PROCEDURE — 11721 DEBRIDE NAIL 6 OR MORE: CPT | Mod: Q8,59,S$PBB, | Performed by: PODIATRIST

## 2023-12-12 PROCEDURE — 11721 PR DEBRIDEMENT OF NAILS, 6 OR MORE: ICD-10-PCS | Mod: Q8,59,S$PBB, | Performed by: PODIATRIST

## 2023-12-12 PROCEDURE — 11721 DEBRIDE NAIL 6 OR MORE: CPT | Mod: Q8,59,PBBFAC | Performed by: PODIATRIST

## 2023-12-12 PROCEDURE — 99499 UNLISTED E&M SERVICE: CPT | Mod: S$PBB,,, | Performed by: PODIATRIST

## 2023-12-12 PROCEDURE — 11056 PR TRIM BENIGN HYPERKERATOTIC SKIN LESION,2-4: ICD-10-PCS | Mod: Q8,S$PBB,, | Performed by: PODIATRIST

## 2023-12-12 PROCEDURE — 99999 PR PBB SHADOW E&M-EST. PATIENT-LVL IV: CPT | Mod: PBBFAC,,, | Performed by: PODIATRIST

## 2023-12-12 PROCEDURE — 99499 NO LOS: ICD-10-PCS | Mod: S$PBB,,, | Performed by: PODIATRIST

## 2023-12-12 PROCEDURE — 11056 PARNG/CUTG B9 HYPRKR LES 2-4: CPT | Mod: Q8,PBBFAC | Performed by: PODIATRIST

## 2023-12-12 NOTE — PROGRESS NOTES
Subjective:      Patient ID: Duran Giordano is a 83 y.o. female.    Chief Complaint: Diabetic Foot Exam (DM last PCP visit on 11/29/2023 Tami Schmidt MD)      Duran Logan is a 83 y.o. female who presents to the clinic for evaluation and treatment of high risk feet. Duran Logan has a past medical history of Adrenal mass- adenoma stable since 2004 (1.8 cm and 8/13/12 (2 cm); stable 2016 2.4 cm, Arthritis, Asthma in adult without complication (6/25/2015), Bilateral carotid artery disease (7/21/2017), Bilateral sciatica (2/7/2017), Cellulitis of right leg (08/16/2017), Cerebral infarction (1/29/2016), Cervical radiculopathy (3/18/2015), Chronic knee pain, Chronic rhinitis (4/9/2013), CKD (chronic kidney disease) stage 3, GFR 30-59 ml/min (1/29/2013), Coronary artery disease due to calcified coronary lesion (6/25/2015), Deep vein thrombosis, Diastolic dysfunction (10/10/2013), Essential hypertension (6/25/2015), Gastroesophageal reflux disease without esophagitis (8/13/2012), Gout, Hives (10/1/2013), Infection of prosthetic right knee joint (10/25/2021), Mixed hyperlipidemia (8/13/2012), Morbid obesity with BMI of 50.0-59.9, adult (2/11/2014), Obstructive sleep apnea syndrome (8/13/2012), Senile cataracts of both eyes (1/22/2015), Traumatic open wound of right lower leg (8/25/2017), Trigeminal neuralgia of right side of face (5/23/2017), Type 2 diabetes mellitus with diabetic polyneuropathy, with long-term current use of insulin (1/29/2013), Venous stasis dermatitis of both lower extremities (8/21/2017), Viral hepatitis A without coma (1/1/1971), and Vitamin D deficiency disease (8/13/2012). The patient's chief complaint is elongated toenails and lymphedema.  This patient has documented high risk feet requiring routine maintenance secondary to diabetes mellitis and those secondary complications of diabetes, as mentioned.    PCP: Tami Schmidt MD    Date Last Seen by PCP:   Chief Complaint    Patient presents with    Diabetic Foot Exam     DM last PCP visit on 11/29/2023 Tami Schmidt MD        Current shoe gear:  Affected Foot: Casual shoes     Unaffected Foot: Casual shoes    Hemoglobin A1C   Date Value Ref Range Status   10/16/2023 6.9 (H) 4.0 - 5.6 % Final     Comment:     ADA Screening Guidelines:  5.7-6.4%  Consistent with prediabetes  >or=6.5%  Consistent with diabetes    High levels of fetal hemoglobin interfere with the HbA1C  assay. Heterozygous hemoglobin variants (HbS, HgC, etc)do  not significantly interfere with this assay.   However, presence of multiple variants may affect accuracy.     04/28/2023 6.8 (H) 4.0 - 5.6 % Final     Comment:     ADA Screening Guidelines:  5.7-6.4%  Consistent with prediabetes  >or=6.5%  Consistent with diabetes    High levels of fetal hemoglobin interfere with the HbA1C  assay. Heterozygous hemoglobin variants (HbS, HgC, etc)do  not significantly interfere with this assay.   However, presence of multiple variants may affect accuracy.     06/06/2022 6.8 (H) 4.0 - 5.6 % Final     Comment:     ADA Screening Guidelines:  5.7-6.4%  Consistent with prediabetes  >or=6.5%  Consistent with diabetes    High levels of fetal hemoglobin interfere with the HbA1C  assay. Heterozygous hemoglobin variants (HbS, HgC, etc)do  not significantly interfere with this assay.   However, presence of multiple variants may affect accuracy.         Review of Systems   Cardiovascular:  Positive for leg swelling.   Skin:  Positive for color change, dry skin, nail changes and unusual hair distribution. Negative for flushing, itching, rash and skin cancer.   Musculoskeletal:  Positive for arthritis and stiffness. Negative for back pain and falls.   Neurological:  Positive for numbness. Negative for paresthesias.   Allergic/Immunologic: Negative for hives.           Objective:       Vitals:    12/12/23 1026   BP: (!) 165/73   Pulse: 86   Weight: 115.6 kg (254 lb 13.6 oz)   Height: 4'  "11" (1.499 m)   PainSc: 0-No pain          Physical Exam  Vitals and nursing note reviewed.   Constitutional:       Appearance: She is well-developed.   Cardiovascular:      Pulses:           Dorsalis pedis pulses are 1+ on the right side and 1+ on the left side.        Posterior tibial pulses are 1+ on the right side and 1+ on the left side.      Comments: Dorsalis pedis and posterior tibial pulses are palpable bilaterally. Toes are cool to touch. Feet are warm proximally.There is decreased digital hair bilateral. Skin is atrophic, hyperpigmented, and moderately edematous.    Musculoskeletal:         General: No tenderness.      Right ankle: Normal.      Left ankle: Normal.      Right foot: No swelling, deformity or crepitus.      Left foot: No swelling, deformity or crepitus.      Comments: Adequate joint range of motion without pain, limitation, nor crepitation Bilateral feet and ankle joints. Muscle strength is 5/5 in all groups bilaterally.      TTP w/ redness and plantar L foot no signs of break in skin no ulceration. Pain to plantar medical tubercle of calcaneus . No flucutance   Lymphadenopathy:      Comments: B/l lymphedema    Skin:     General: Skin is warm and dry.      Coloration: Skin is not pale.      Findings: No abrasion, bruising, burn, erythema, lesion or rash.      Nails: There is no clubbing.      Comments: Nails x10 are elongated by  3-7mm's, thickened by 2-3 mm's, dystrophic, and are darkened in  coloration . Xerosis Bilaterally. No open lesions noted.    Hyperkeratotic tissue noted to distal hallux b/l       Skin thickened, leathery, svetlana blistering noted to legs.    Neurological:      Mental Status: She is alert and oriented to person, place, and time.      Comments: Decreased sharp/dull sensation bilateral feet.   Psychiatric:         Behavior: Behavior normal.               Assessment:       Encounter Diagnoses   Name Primary?    Diabetes mellitus with peripheral vascular disease Yes    " Onychomycosis due to dermatophyte     Corn or callus          Plan:       Duran Logan was seen today for diabetic foot exam.    Diagnoses and all orders for this visit:    Diabetes mellitus with peripheral vascular disease    Onychomycosis due to dermatophyte    Corn or callus     I counseled the patient on her conditions, their implications and medical management.      Shoe inspection. Diabetic Foot Education. Patient reminded of the importance of good nutrition and blood cummings gar control to help prevent podiatric complications of diabetes. Patient instructed on proper foot hygeine. We discussed wearing proper shoe gear, daily foot inspections, never walking without protective shoe gear, never putting sharp instruments to feet    - With patient's permission, nails were aggressively reduced and debrided x 10 to their soft tissue attachment mechanically and with electric , removing all offending nail and debris. Patient relates relief following the procedure. She will continue to monitor the areas daily, inspect her feet, wear protective shoe gear when ambulatory, moisturizer to maintain skin integrity and follow in this office in approximately 2-3 months, sooner p.r.n.    - After cleansing the  area w/ alcohol prep pad the above mentioned hyperkeratosis was trimmed utilizing No 15 scapel, to a smooth base with out incident. Patient tolerated this  well and reported comfort to the area x2

## 2023-12-21 ENCOUNTER — OFFICE VISIT (OUTPATIENT)
Dept: OPHTHALMOLOGY | Facility: CLINIC | Age: 83
End: 2023-12-21
Payer: MEDICARE

## 2023-12-21 DIAGNOSIS — H35.033 HYPERTENSIVE RETINOPATHY OF BOTH EYES: ICD-10-CM

## 2023-12-21 DIAGNOSIS — H25.13 AGE-RELATED NUCLEAR CATARACT OF BOTH EYES: ICD-10-CM

## 2023-12-21 DIAGNOSIS — H34.8120 CENTRAL RETINAL VEIN OCCLUSION WITH MACULAR EDEMA OF LEFT EYE: Primary | ICD-10-CM

## 2023-12-21 DIAGNOSIS — H43.813 VITREOUS DEGENERATION OF BOTH EYES: ICD-10-CM

## 2023-12-21 PROCEDURE — 99214 OFFICE O/P EST MOD 30 MIN: CPT | Mod: 25,S$PBB,, | Performed by: OPHTHALMOLOGY

## 2023-12-21 PROCEDURE — 99999PBSHW PR PBB SHADOW TECHNICAL ONLY FILED TO HB: ICD-10-PCS | Mod: JZ,PBBFAC,,

## 2023-12-21 PROCEDURE — 67028 INJECTION EYE DRUG: CPT | Mod: S$PBB,LT,, | Performed by: OPHTHALMOLOGY

## 2023-12-21 PROCEDURE — 67028 INJECTION EYE DRUG: CPT | Mod: PBBFAC,LT | Performed by: OPHTHALMOLOGY

## 2023-12-21 PROCEDURE — 99999PBSHW PR PBB SHADOW TECHNICAL ONLY FILED TO HB: Mod: JZ,PBBFAC,,

## 2023-12-21 PROCEDURE — 92134 CPTRZ OPH DX IMG PST SGM RTA: CPT | Mod: PBBFAC | Performed by: OPHTHALMOLOGY

## 2023-12-21 PROCEDURE — 99999 PR PBB SHADOW E&M-EST. PATIENT-LVL I: ICD-10-PCS | Mod: PBBFAC,,, | Performed by: OPHTHALMOLOGY

## 2023-12-21 PROCEDURE — 99211 OFF/OP EST MAY X REQ PHY/QHP: CPT | Mod: PBBFAC | Performed by: OPHTHALMOLOGY

## 2023-12-21 PROCEDURE — 99999 PR PBB SHADOW E&M-EST. PATIENT-LVL I: CPT | Mod: PBBFAC,,, | Performed by: OPHTHALMOLOGY

## 2023-12-21 PROCEDURE — 92134 OCT, RETINA - OU - BOTH EYES: ICD-10-PCS | Mod: 26,S$PBB,, | Performed by: OPHTHALMOLOGY

## 2023-12-21 PROCEDURE — 67028 PR INJECT INTRAVITREAL PHARMCOLOGIC: ICD-10-PCS | Mod: S$PBB,LT,, | Performed by: OPHTHALMOLOGY

## 2023-12-21 PROCEDURE — 99214 PR OFFICE/OUTPT VISIT, EST, LEVL IV, 30-39 MIN: ICD-10-PCS | Mod: 25,S$PBB,, | Performed by: OPHTHALMOLOGY

## 2023-12-21 RX ORDER — TORSEMIDE 10 MG/1
10 TABLET ORAL EVERY OTHER DAY
COMMUNITY
Start: 2023-08-25

## 2023-12-21 RX ORDER — COLCHICINE 0.6 MG/1
0.6 TABLET ORAL DAILY
COMMUNITY
Start: 2023-11-08

## 2023-12-21 RX ADMIN — AFLIBERCEPT 2 MG: 40 INJECTION, SOLUTION INTRAVITREAL at 02:12

## 2023-12-21 NOTE — PROGRESS NOTES
HPI    1 month DFE/OCT OU with poss LILA OS    DLS 10/31/2023 BY Dr. Jeff MD    CC: pt c/o floaters OS  x 1 mo    -blurred Va  -diplopia  -eye pain   -flashes/floaters  -headaches  -curtain/shadow/veils    Eye meds: Pataday OU BID    POHx  1. CRVO w/ ME  OS    S/P GLADYS X 2 (09/29/2023) S/p Avastin OS (10/31/2023)     2. AR NSC OU  3. HTN Retinopathy OU         Last edited by Viridiana Suárez MA on 12/21/2023  1:15 PM.          A/P    ICD-10-CM ICD-9-CM   1. Central retinal vein occlusion with macular edema of left eye  H34.8120 362.35     362.83   2. Age-related nuclear cataract of both eyes  H25.13 366.16   3. Vitreous degeneration of both eyes  H43.813 379.21   4. Hypertensive retinopathy of both eyes  H35.033 362.11          1. Central retinal vein occlusion with macular edema of left eye  Here for CRVO f/u    S/p GLADYS x4 10/31/23    Today VA 20/200 (was 20/300), exam notable for CRVO with diffuse heme, worse IRF/SRF, last injection back in Oct    Plan: given worse IRF/SRF despite Avastin, recommend switch to Eylea for better control of CME    Based on todays exam, diagnostic studies, and review of records, the determination was made for treatment today.  Schedule Eylea Injection Left Eye today Patient chooses to proceed with injection R/B/A discussed include infection retinal detachment and stroke    Patient identified.  Timeout performed.    Risks, benefits, and alternatives to treatment were discussed in detail with the patient, including bleeding/infection (endophthalmitis)/etc.  The patient voiced understanding and wished to proceed with the procedure.  See separate consent form.    Injection Procedure Note:  Diagnosis: CME Left Eye    Topical Proparacaine drop placed then topical 5% Betadine, then subcojunctival lidocaine 2% injection  Sterile gloves used, and sterile lid speculum placed.  5% Betadine placed at injection site again prior to injection.  Eylea 2mg in 0.05cc Injected inferotemporally 3.5-4mm  posterior to the limbus.  Complications: None  Va at least CF at 5 feet post injection.  Retina, ONH, IOP normal after injection.    Followup as below.  Patient should return immediately PRN.  Retinal Detachment and Endophthalmitis precautions given     2. Age-related nuclear cataract of both eyes  Mild NS, NVS  Plan: Observation Update Mrx prn     3. Vitreous degeneration of both eyes  No RT/RD, few floaters in left eye  Plan: Observation , counseled on RT/RD risk  Pathology of PVD, Retinal Tear, Retinal Detachment reviewed in great detail  RD precautions discussed in detail, patient expressed understanding  RTC immediately PRN (especially ANY change flashes, floaters, vision, visual field)     4. Hypertensive retinopathy of both eyes  Mod HTN changes, has CRVO OS  PCP Tami Schmidt MD - Recent notes reviewed  10/16/2023  6.9  A1C   Plan: Observation HTN changes for now  Recommend good blood pressure control, tight blood glucose control, and good cholesterol control           RTC 1 mo DFE/OCTm OU, Possible Eylea OS       I saw and examined the patient and reviewed in detail the findings documented. The final examination findings, image interpretations, and plan as documented in the record represent my personal judgment and conclusions.    Ferdinand Rosas MD  Vitreoretinal Surgery   [facility]

## 2024-01-02 RX ORDER — DOXYCYCLINE HYCLATE 100 MG
100 TABLET ORAL 2 TIMES DAILY
Qty: 60 TABLET | Refills: 0 | Status: SHIPPED | OUTPATIENT
Start: 2024-01-02 | End: 2024-01-26

## 2024-01-11 DIAGNOSIS — Z00.00 ENCOUNTER FOR MEDICARE ANNUAL WELLNESS EXAM: ICD-10-CM

## 2024-01-25 ENCOUNTER — OFFICE VISIT (OUTPATIENT)
Dept: OPHTHALMOLOGY | Facility: CLINIC | Age: 84
End: 2024-01-25
Payer: MEDICARE

## 2024-01-25 DIAGNOSIS — H34.8120 CENTRAL RETINAL VEIN OCCLUSION WITH MACULAR EDEMA OF LEFT EYE: Primary | ICD-10-CM

## 2024-01-25 DIAGNOSIS — H43.813 VITREOUS DEGENERATION OF BOTH EYES: ICD-10-CM

## 2024-01-25 DIAGNOSIS — H25.13 AGE-RELATED NUCLEAR CATARACT OF BOTH EYES: ICD-10-CM

## 2024-01-25 DIAGNOSIS — E11.9 DIABETES MELLITUS TYPE 2 WITHOUT RETINOPATHY: ICD-10-CM

## 2024-01-25 DIAGNOSIS — H35.033 HYPERTENSIVE RETINOPATHY OF BOTH EYES: ICD-10-CM

## 2024-01-25 PROCEDURE — 67028 INJECTION EYE DRUG: CPT | Mod: PBBFAC,LT | Performed by: OPHTHALMOLOGY

## 2024-01-25 PROCEDURE — 92134 CPTRZ OPH DX IMG PST SGM RTA: CPT | Mod: PBBFAC | Performed by: OPHTHALMOLOGY

## 2024-01-25 PROCEDURE — 99212 OFFICE O/P EST SF 10 MIN: CPT | Mod: PBBFAC | Performed by: OPHTHALMOLOGY

## 2024-01-25 PROCEDURE — 99999PBSHW PR PBB SHADOW TECHNICAL ONLY FILED TO HB: Mod: JZ,PBBFAC,,

## 2024-01-25 PROCEDURE — 99214 OFFICE O/P EST MOD 30 MIN: CPT | Mod: 25,S$PBB,, | Performed by: OPHTHALMOLOGY

## 2024-01-25 PROCEDURE — 67028 INJECTION EYE DRUG: CPT | Mod: S$PBB,LT,, | Performed by: OPHTHALMOLOGY

## 2024-01-25 PROCEDURE — 99999 PR PBB SHADOW E&M-EST. PATIENT-LVL II: CPT | Mod: PBBFAC,,, | Performed by: OPHTHALMOLOGY

## 2024-01-25 RX ADMIN — AFLIBERCEPT 2 MG: 40 INJECTION, SOLUTION INTRAVITREAL at 11:01

## 2024-01-25 NOTE — PROGRESS NOTES
HPI     Concerns About Ocular Health     Additional comments: 1 mon CRVO OS chk           Comments    1 month DFE/OCT OU with poss LILA OS    Pt states her seems to be fluctuating at times, in OU, over the past   month.    Eye meds: Pataday OU BID                   Last edited by Duran Del Angel on 1/25/2024 10:42 AM.          A/P    ICD-10-CM ICD-9-CM   1. Central retinal vein occlusion with macular edema of left eye  H34.8120 362.35     362.83   2. Age-related nuclear cataract of both eyes  H25.13 366.16   3. Vitreous degeneration of both eyes  H43.813 379.21   4. Hypertensive retinopathy of both eyes  H35.033 362.11   5. Diabetes mellitus type 2 without retinopathy  E11.9 250.00       1. Central retinal vein occlusion with macular edema of left eye  Here for CRVO f/u    S/p GLADYS x4 10/31/23  S/p I'VE 12/21/23    Today VA 20/250 (was 20/200), exam notable for CRVO with diffuse heme, FINALLY with good response to Eylea with resolved SRF and tr IRF, vision limited likely due to ischemia    Plan: Given great response to Eylea, recommend Eylea for control of IRF/SRF    Based on todays exam, diagnostic studies, and review of records, the determination was made for treatment today.  Schedule Eylea Injection Left Eye today Patient chooses to proceed with injection R/B/A discussed include infection retinal detachment and stroke    Patient identified.  Timeout performed.    Risks, benefits, and alternatives to treatment were discussed in detail with the patient, including bleeding/infection (endophthalmitis)/etc.  The patient voiced understanding and wished to proceed with the procedure.  See separate consent form.    Injection Procedure Note:  Diagnosis: CME Left Eye    Topical Proparacaine drop placed then topical 5% Betadine, then subcojunctival lidocaine 2% injection  Sterile gloves used, and sterile lid speculum placed.  5% Betadine placed at injection site again prior to injection.  Eylea 2mg in 0.05cc Injected  inferotemporally 3.5-4mm posterior to the limbus.  Complications: None  Va at least CF at 5 feet post injection.  Retina, ONH, IOP normal after injection.    Followup as below.  Patient should return immediately PRN.  Retinal Detachment and Endophthalmitis precautions given     2. Age-related nuclear cataract of both eyes  Mild NS, NVS  Plan: Observation Update Mrx prn     3. Vitreous degeneration of both eyes  No RT/RD, stable floaters   Plan: Observation , counseled on RT/RD risk  Pathology of PVD, Retinal Tear, Retinal Detachment reviewed in great detail  RD precautions discussed in detail, patient expressed understanding  RTC immediately PRN (especially ANY change flashes, floaters, vision, visual field)     4. Hypertensive retinopathy of both eyes  Mod HTN changes, has CRVO OS  PCP Tami Schmidt MD - Recent notes reviewed  10/16/2023  6.9  A1C   Plan: Observation HTN changes for now  Recommend good blood pressure control, tight blood glucose control, and good cholesterol control     5. Diabetes mellitus type 2 without retinopathy  No DR or DME OU, IRF and heme OS moreso due to CRVO  Plan: Observation for DR changes  Recommend good blood pressure control, tight blood glucose control, and good cholesterol control         RTC 4-6 weeks DFE/OCTm OU, Possible Eylea OS       I saw and examined the patient and reviewed in detail the findings documented. The final examination findings, image interpretations, and plan as documented in the record represent my personal judgment and conclusions.    Ferdinand Rosas MD  Vitreoretinal Surgery   [facility]

## 2024-01-26 RX ORDER — DOXYCYCLINE HYCLATE 100 MG
100 TABLET ORAL 2 TIMES DAILY
Qty: 60 TABLET | Refills: 0 | Status: SHIPPED | OUTPATIENT
Start: 2024-01-26 | End: 2024-02-27

## 2024-02-01 ENCOUNTER — TELEPHONE (OUTPATIENT)
Dept: ORTHOPEDICS | Facility: CLINIC | Age: 84
End: 2024-02-01
Payer: MEDICARE

## 2024-02-01 DIAGNOSIS — T84.50XS INFECTION OF PROSTHETIC JOINT, SEQUELA: Primary | ICD-10-CM

## 2024-02-01 NOTE — TELEPHONE ENCOUNTER
Spoke to pt, rescheduled appt with Dr. Lozoya from 2/12/24 to 2/5/24 at 330 pm due to him being out. Pt pleased and verbalized understanding.

## 2024-02-02 ENCOUNTER — OFFICE VISIT (OUTPATIENT)
Dept: HOME HEALTH SERVICES | Facility: CLINIC | Age: 84
End: 2024-02-02
Payer: MEDICARE

## 2024-02-02 VITALS
DIASTOLIC BLOOD PRESSURE: 81 MMHG | BODY MASS INDEX: 50.4 KG/M2 | HEART RATE: 90 BPM | HEIGHT: 59 IN | WEIGHT: 250 LBS | SYSTOLIC BLOOD PRESSURE: 164 MMHG

## 2024-02-02 DIAGNOSIS — E27.8 ADRENAL MASS: ICD-10-CM

## 2024-02-02 DIAGNOSIS — H25.13 AGE-RELATED NUCLEAR CATARACT OF BOTH EYES: ICD-10-CM

## 2024-02-02 DIAGNOSIS — E11.59 HYPERTENSION ASSOCIATED WITH DIABETES: ICD-10-CM

## 2024-02-02 DIAGNOSIS — Z00.00 ENCOUNTER FOR PREVENTIVE HEALTH EXAMINATION: Primary | ICD-10-CM

## 2024-02-02 DIAGNOSIS — I50.32 CHRONIC DIASTOLIC HEART FAILURE: ICD-10-CM

## 2024-02-02 DIAGNOSIS — E11.42 TYPE 2 DIABETES MELLITUS WITH DIABETIC POLYNEUROPATHY, WITH LONG-TERM CURRENT USE OF INSULIN: ICD-10-CM

## 2024-02-02 DIAGNOSIS — E66.01 MORBID OBESITY WITH BMI OF 50.0-59.9, ADULT: ICD-10-CM

## 2024-02-02 DIAGNOSIS — I77.1 TORTUOUS AORTA: ICD-10-CM

## 2024-02-02 DIAGNOSIS — J45.40 MODERATE PERSISTENT ASTHMA WITHOUT COMPLICATION: ICD-10-CM

## 2024-02-02 DIAGNOSIS — I89.0 LYMPHEDEMA OF BOTH LOWER EXTREMITIES: ICD-10-CM

## 2024-02-02 DIAGNOSIS — Z79.4 TYPE 2 DIABETES MELLITUS WITH DIABETIC POLYNEUROPATHY, WITH LONG-TERM CURRENT USE OF INSULIN: ICD-10-CM

## 2024-02-02 DIAGNOSIS — I15.2 HYPERTENSION ASSOCIATED WITH DIABETES: ICD-10-CM

## 2024-02-02 DIAGNOSIS — N18.32 STAGE 3B CHRONIC KIDNEY DISEASE: ICD-10-CM

## 2024-02-02 PROCEDURE — G0439 PPPS, SUBSEQ VISIT: HCPCS | Mod: S$GLB,,, | Performed by: NURSE PRACTITIONER

## 2024-02-05 ENCOUNTER — HOSPITAL ENCOUNTER (OUTPATIENT)
Dept: RADIOLOGY | Facility: HOSPITAL | Age: 84
Discharge: HOME | End: 2024-02-05
Attending: ORTHOPAEDIC SURGERY
Payer: MEDICARE

## 2024-02-05 ENCOUNTER — OFFICE VISIT (OUTPATIENT)
Dept: ORTHOPEDICS | Facility: CLINIC | Age: 84
End: 2024-02-05
Payer: MEDICARE

## 2024-02-05 VITALS — WEIGHT: 250 LBS | BODY MASS INDEX: 50.4 KG/M2 | HEIGHT: 59 IN

## 2024-02-05 DIAGNOSIS — M17.12 PRIMARY OSTEOARTHRITIS OF LEFT KNEE: Primary | ICD-10-CM

## 2024-02-05 DIAGNOSIS — T84.50XS INFECTION OF PROSTHETIC JOINT, SEQUELA: ICD-10-CM

## 2024-02-05 DIAGNOSIS — E66.01 MORBID OBESITY WITH BMI OF 50.0-59.9, ADULT: ICD-10-CM

## 2024-02-05 PROBLEM — M46.00 SPINAL ENTHESOPATHY: Status: RESOLVED | Noted: 2022-02-14 | Resolved: 2024-02-05

## 2024-02-05 PROBLEM — I87.332 CHRONIC VENOUS HYPERTENSION (IDIOPATHIC) WITH ULCER AND INFLAMMATION OF LEFT LOWER EXTREMITY: Status: RESOLVED | Noted: 2022-04-26 | Resolved: 2024-02-05

## 2024-02-05 PROBLEM — N18.32 STAGE 3B CHRONIC KIDNEY DISEASE: Status: ACTIVE | Noted: 2022-02-14

## 2024-02-05 PROBLEM — J44.9 CHRONIC OBSTRUCTIVE PULMONARY DISEASE: Status: RESOLVED | Noted: 2019-11-06 | Resolved: 2024-02-05

## 2024-02-05 PROBLEM — R53.2 COMPLETE IMMOBILITY DUE TO SEVERE PHYSICAL DISABILITY OR FRAILTY: Status: RESOLVED | Noted: 2022-02-14 | Resolved: 2024-02-05

## 2024-02-05 PROBLEM — D69.2 SENILE PURPURA: Status: RESOLVED | Noted: 2019-11-06 | Resolved: 2024-02-05

## 2024-02-05 PROCEDURE — 73562 X-RAY EXAM OF KNEE 3: CPT | Mod: TC,50

## 2024-02-05 PROCEDURE — 99999 PR PBB SHADOW E&M-EST. PATIENT-LVL II: CPT | Mod: PBBFAC,,, | Performed by: ORTHOPAEDIC SURGERY

## 2024-02-05 PROCEDURE — 99213 OFFICE O/P EST LOW 20 MIN: CPT | Mod: S$PBB,,, | Performed by: ORTHOPAEDIC SURGERY

## 2024-02-05 PROCEDURE — 73562 X-RAY EXAM OF KNEE 3: CPT | Mod: 26,50,, | Performed by: RADIOLOGY

## 2024-02-05 PROCEDURE — 99212 OFFICE O/P EST SF 10 MIN: CPT | Mod: PBBFAC | Performed by: ORTHOPAEDIC SURGERY

## 2024-02-05 NOTE — PROGRESS NOTES
"Subjective:      Patient ID: Duran Giordano is a 83 y.o. female.    Chief Complaint: Pain of the Right Knee and Pain of the Left Knee    HPI  Duran Giordano has left knee pain.  Knee is doing well.  She completed therapy for the left knee but still feels that there is some weakness.              Objective:      Body mass index is 50.49 kg/m².  Vitals:    24 1605   Weight: 113.4 kg (250 lb)   Height: 4' 11" (1.499 m)           General    Constitutional: She is oriented to person, place, and time. She appears well-developed and well-nourished.   HENT:   Head: Normocephalic and atraumatic.   Eyes: EOM are normal.   Cardiovascular:  Normal rate.            Pulmonary/Chest: Effort normal.   Neurological: She is alert and oriented to person, place, and time.   Psychiatric: She has a normal mood and affect. Her behavior is normal.     General Musculoskeletal Exam   Gait: abnormal       Right Knee Exam     Inspection   Scars: present  Swelling: absent  Effusion: absent  Deformity: absent  Bruising: absent    Range of Motion   Extension:  0   Flexion:  110     Tests   Ligament Examination   Lachman: normal (-1 to 2mm)   MCL - Valgus: normal (0 to 2mm)  LCL - Varus: normal    Left Knee Exam     Inspection   Erythema: absent  Scars: absent  Swelling: absent  Effusion: absent  Deformity: absent  Bruising: absent    Tenderness   The patient tender to palpation of the medial joint line and lateral joint line.    Range of Motion   Extension:  0     Tests   Stability   Lachman: normal (-1 to 2mm)   MCL - Valgus: normal (0 to 2mm)  LCL - Varus: normal (0 to 2mm)    Muscle Strength   Right Lower Extremity   Hip Abduction: 5/5   Quadriceps:  5/5   Hamstrin/5   Left Lower Extremity   Hip Abduction: 5/5   Quadriceps:  5/5   Hamstrin/5     Vascular Exam       Edema  Right Lower Leg: present  Left Lower Leg: present            Assessment:       Encounter Diagnoses   Name Primary?    Primary osteoarthritis of left " knee Yes    Morbid obesity with BMI of 50.0-59.9, adult           Plan:       Duran Logan was seen today for pain and pain.    Diagnoses and all orders for this visit:    Primary osteoarthritis of left knee    Morbid obesity with BMI of 50.0-59.9, adult        We will do another course of home health.  I will see her back in 3 months.

## 2024-02-06 NOTE — PATIENT INSTRUCTIONS
Counseling and Referral of Other Preventative  (Italic type indicates deductible and co-insurance are waived)    Patient Name: Duran Giordano  Today's Date: 2/5/2024    Health Maintenance       Date Due Completion Date    TETANUS VACCINE Never done ---    RSV Vaccine (Age 60+ and Pregnant patients) (1 - 1-dose 60+ series) Never done ---    COVID-19 Vaccine (11 - 2023-24 season) 12/04/2023 10/9/2023    Hemoglobin A1c 04/16/2024 10/16/2023    Override on 7/13/2016: Done    Diabetes Urine Screening 04/28/2024 4/28/2023    Lipid Panel 10/16/2024 10/16/2023    Eye Exam 01/25/2025 1/25/2024    Override on 2/7/2020: Done    Override on 2/17/2016: Done    Override on 1/22/2015: Done    Override on 8/16/2012: Done    DEXA Scan 06/01/2027 6/1/2023    Override on 12/13/2011: Done        No orders of the defined types were placed in this encounter.    The following information is provided to all patients.  This information is to help you find resources for any of the problems found today that may be affecting your health:                  Living healthy guide: www.[facility].[place].gov      Understanding Diabetes: www.diabetes.org      Eating healthy: www.cdc.gov/healthyweight      CDC home safety checklist: www.cdc.gov/steadi/patient.html      Agency on Aging: www.goea.[place].[facility]      Alcoholics anonymous (AA): www.aa.org      Physical Activity: www.[place].nih.gov/es8lciu      Tobacco use: www.quitwithusla.org

## 2024-02-06 NOTE — PROGRESS NOTES
"  Duran Giordano presented for an initial Medicare AWV today. The following components were reviewed and updated:    Medical history  Family History  Social history  Allergies and Current Medications  Health Risk Assessment  Health Maintenance  Care Team    **See Completed Assessments for Annual Wellness visit with in the encounter summary    The following assessments were completed:  Depression Screening  Cognitive function Screening    Timed Get Up Test  Whisper Test      Opioid documentation:      Patient does not have a current opioid prescription.          Vitals:    02/02/24 1029   BP: (!) 164/81   Pulse: 90   Weight: 113.4 kg (250 lb)   Height: 4' 11" (1.499 m)     Body mass index is 50.49 kg/m².       Physical Exam  Constitutional:       Appearance: She is obese.   HENT:      Head: Normocephalic and atraumatic.      Nose: Nose normal.      Mouth/Throat:      Mouth: Mucous membranes are moist.   Eyes:      Extraocular Movements: Extraocular movements intact.   Cardiovascular:      Rate and Rhythm: Normal rate and regular rhythm.      Heart sounds: Normal heart sounds.   Pulmonary:      Effort: Pulmonary effort is normal. No respiratory distress.      Breath sounds: Normal breath sounds.   Abdominal:      General: Bowel sounds are normal. There is no distension.      Palpations: Abdomen is soft.   Musculoskeletal:         General: Normal range of motion.      Cervical back: Normal range of motion.      Right lower leg: Edema present.      Left lower leg: Edema present.   Skin:     General: Skin is warm and dry.      Findings: No bruising.   Neurological:      General: No focal deficit present.      Mental Status: She is alert and oriented to person, place, and time.      Gait: Gait abnormal (patient has walker and motorized scooter).   Psychiatric:         Mood and Affect: Mood normal.         Behavior: Behavior normal.           Diagnoses and health risks identified today and associated " recommendations/orders:  1. Encounter for preventive health examination  Assessments completed. Preventive measures, health maintenance, and immunizations reviewed with patient.    2. Type 2 diabetes mellitus with diabetic polyneuropathy, with long-term current use of insulin  Stable, patient on Tresiba and Ozempic insulin. Followed by PCP.    3. Morbid obesity with BMI of 50.0-59.9, adult  Stable, followed by PCP. Healthy diet encouraged.    4. Tortuous aorta  Stable, patient on Lipitor. Followed by PCP.    5. Adrenal mass- adenoma stable since 2004 (1.8 cm and 8/13/12 (2 cm); stable 2016 2.4 cm  Stable, followed by PCP.    6. Stage 3b chronic kidney disease  Stable, followed by PCP.    7. Chronic diastolic heart failure  Stable, patient on Demadex. Followed by PCP.    8. Hypertension associated with diabetes  Stable, patient on Amlodipine. Followed by PCP. Blood pressure elevated at time of visit. Patient has not taken blood pressure medication at this time. Patient denies any symptoms.    9. Moderate persistent asthma without complication  Stable, patient on Albuterol HFA and Duo-Neb tx's. Followed by PCP.    10. Age-related nuclear cataract of both eyes  Stable, followed by Optometry.    11. Lymphedema of both lower extremities  Stable, followed by PCP.      Provided Duran Logan with a 5-10 year written screening schedule and personal prevention plan. Recommendations were developed using the USPSTF age appropriate recommendations. Education, counseling, and referrals were provided as needed.  After Visit Summary printed and given to patient which includes a list of additional screenings\tests needed.    Follow up in about 1 year (around 2/2/2025) for your next annual wellness visit.      Selena Ho NP  I offered to discuss advanced care planning, including how to pick a person who would make decisions for you if you were unable to make them for yourself, called a health care power of , and what  kind of decisions you might make such as use of life sustaining treatments such as ventilators and tube feeding when faced with a life limiting illness recorded on a living will that they will need to know. (How you want to be cared for as you near the end of your natural life)     X  Patient has advanced directives on file, which we reviewed, and they do not wish to make changes.

## 2024-02-27 RX ORDER — DOXYCYCLINE HYCLATE 100 MG
100 TABLET ORAL 2 TIMES DAILY
Qty: 60 TABLET | Refills: 0 | Status: SHIPPED | OUTPATIENT
Start: 2024-02-27 | End: 2024-04-05 | Stop reason: SDUPTHER

## 2024-03-13 ENCOUNTER — TELEPHONE (OUTPATIENT)
Dept: INTERNAL MEDICINE | Facility: CLINIC | Age: 84
End: 2024-03-13
Payer: MEDICARE

## 2024-03-13 ENCOUNTER — TELEPHONE (OUTPATIENT)
Dept: PRIMARY CARE CLINIC | Facility: CLINIC | Age: 84
End: 2024-03-13
Payer: MEDICARE

## 2024-03-13 DIAGNOSIS — Z79.4 TYPE 2 DIABETES MELLITUS WITH DIABETIC POLYNEUROPATHY, WITH LONG-TERM CURRENT USE OF INSULIN: Primary | ICD-10-CM

## 2024-03-13 DIAGNOSIS — J45.40 MODERATE PERSISTENT ASTHMA WITHOUT COMPLICATION: ICD-10-CM

## 2024-03-13 DIAGNOSIS — E11.42 TYPE 2 DIABETES MELLITUS WITH DIABETIC POLYNEUROPATHY, WITH LONG-TERM CURRENT USE OF INSULIN: Primary | ICD-10-CM

## 2024-03-13 RX ORDER — ALBUTEROL SULFATE 90 UG/1
2 AEROSOL, METERED RESPIRATORY (INHALATION) EVERY 4 HOURS PRN
Qty: 54 G | Refills: 3 | Status: SHIPPED | OUTPATIENT
Start: 2024-03-13

## 2024-03-13 RX ORDER — SEMAGLUTIDE 0.68 MG/ML
INJECTION, SOLUTION SUBCUTANEOUS
Qty: 3 ML | Refills: 6 | Status: SHIPPED | OUTPATIENT
Start: 2024-03-13

## 2024-03-13 NOTE — TELEPHONE ENCOUNTER
Spoke with pt and inform her that medication was sent to pharmacy pt stated that she will give them a call

## 2024-03-13 NOTE — TELEPHONE ENCOUNTER
Prior authorization approved semaglutide (OZEMPIC) 0.25 mg or 0.5 mg (2 mg/3 mL) pen injector  Case ID: [id number]      Payer: [employer] Patient's Payer    [phone]   Request Reference Number: [id number]. OZEMPIC INJ 2MG/3ML is approved through 12/31/2024. Your patient may now fill this prescription and it will be covered.   Approval Details    Authorized from March 13, 2024 to December 31, 2024      Electronic appeal: Not supported   View History

## 2024-03-13 NOTE — TELEPHONE ENCOUNTER
----- Message from Ronnie Trujillo sent at 3/13/2024  4:56 PM CDT -----  Contact: [phone]@patient  Requesting an RX refill or new RX.albuterol (PROVENTIL/VENTOLIN HFA) 90 mcg/actuation inhaler  Is this a refill or new RX: refill  RX name and strength (copy/paste from chart):    Is this a 30 day or 90 day RX:   Pharmacy name and phone #  [place], [place] - [address] [facility]   Phone: [phone]  The doctors have asked that we provide their patients with the following 2 reminders -- prescription refills can take up to 72 hours, and a friendly reminder that in the future you can use your [facility] account to request refills:

## 2024-03-13 NOTE — TELEPHONE ENCOUNTER
----- Message from Yessica Giordano sent at 3/12/2024  4:38 PM CDT -----  Contact: [phone] pt  Requesting an RX refill or new RX.  Is this a refill or new RX: new  RX name and strength (copy/paste from chart):  albuterol (PROVENTIL/VENTOLIN HFA) 90 mcg/actuation inhaler 54 g  Is this a 30 day or 90 day RX: 90    [facility] - [place], [place] - [address]  [address] [facility] [address]  Phone: [phone] Fax: [phone]       Pharmacy name and phone # (copy/paste from chart):

## 2024-03-19 ENCOUNTER — TELEPHONE (OUTPATIENT)
Dept: INTERNAL MEDICINE | Facility: CLINIC | Age: 84
End: 2024-03-19
Payer: MEDICARE

## 2024-03-19 NOTE — TELEPHONE ENCOUNTER
----- Message from Lissa Aragon sent at 3/19/2024  9:47 AM CDT -----  Contact: Sister Nupur [phone]  Would like to receive medical advice.    Would they like a call back or a response via [facility]:  call back    Additional information:  Calling to r/s canceled appt from today.

## 2024-03-19 NOTE — TELEPHONE ENCOUNTER
Called number provided twice and asked for Sister Seyrum. Was then placed on hold for several minutes both times. Unable to leave a message    No

## 2024-03-21 ENCOUNTER — TELEPHONE (OUTPATIENT)
Dept: INTERNAL MEDICINE | Facility: CLINIC | Age: 84
End: 2024-03-21
Payer: MEDICARE

## 2024-03-21 NOTE — TELEPHONE ENCOUNTER
Spoke to Sister Marge. She stated she will ask pt which days and times work better for pt and return call to schedule appt

## 2024-03-21 NOTE — TELEPHONE ENCOUNTER
----- Message from Juanita Galicia sent at 3/21/2024 10:09 AM CDT -----  Contact: [phone]  [email or web address]     Patient is calling for Medical Advice regarding:Pt has covid and had an appt for 03/19/24 that she needs a new appt     How long has patient had these symptoms:    Pharmacy name and phone#:    Would like response via [facility]:  call back     Comments:

## 2024-03-27 ENCOUNTER — TELEPHONE (OUTPATIENT)
Dept: PODIATRY | Facility: CLINIC | Age: 84
End: 2024-03-27
Payer: MEDICARE

## 2024-04-01 ENCOUNTER — TELEPHONE (OUTPATIENT)
Dept: PODIATRY | Facility: CLINIC | Age: 84
End: 2024-04-01
Payer: MEDICARE

## 2024-04-01 NOTE — PROGRESS NOTES
HPI    1 month DFE/OCT OU with poss LILA OS    Pt states her seems to be fluctuating at times, in OU, over the past   month.    Eye meds: Pataday OU BID           Last edited by Magui Hong on 4/2/2024  1:28 PM.           A/P    ICD-10-CM ICD-9-CM   1. Central retinal vein occlusion with macular edema of left eye  H34.8120 362.35     362.83   2. Age-related nuclear cataract of both eyes  H25.13 366.16   3. Vitreous degeneration of both eyes  H43.813 379.21   4. Hypertensive retinopathy of both eyes  H35.033 362.11   5. Diabetes mellitus type 2 without retinopathy  E11.9 250.00         1. Central retinal vein occlusion with macular edema of left eye  Here for CRVO f/u    Pt overdue for f/u, - pt had COVID 2 weeks ago    S/p GLADYS x4 10/31/23  S/p I'VE x2 1/25/24    Today VA 20/200 (was 20/250), exam notable for CRVO with diffuse heme, WORSE IRF/SRF at 9w eeks , pt overdue had covid      Plan: Given great response to Eylea, recommend Eylea for control of IRF/SRF but pt just had COVID so we will give her few weeks to get better and bring back in 2 weeks    Visit today is associated with current or anticipated ongoing medical care related to this patients single serious condition/complex condition (central retinal vein occlusion left eye)        2. Age-related nuclear cataract of both eyes  Mild NS, NVS  Plan: Observation Update Mrx prn     3. Vitreous degeneration of both eyes  No RT/RD   Plan: Observation , counseled on RT/RD risk  Pathology of PVD, Retinal Tear, Retinal Detachment reviewed in great detail  RD precautions discussed in detail, patient expressed understanding  RTC immediately PRN (especially ANY change flashes, floaters, vision, visual field)     4. Hypertensive retinopathy of both eyes  Mod HTN changes, has CRVO OS  PCP Tami Schmidt MD - Recent notes reviewed  10/16/2023  6.9  A1C   Plan: Observation HTN changes for now  Recommend good blood pressure control, tight blood glucose control,  and good cholesterol control     5. Diabetes mellitus type 2 without retinopathy  No DR or DME OU, IRF and heme OS moreso due to CRVO  Plan: Observation for DR changes  Recommend good blood pressure control, tight blood glucose control, and good cholesterol control         RTC 2 weeks  Eylea OS  only      I saw and examined the patient and reviewed in detail the findings documented. The final examination findings, image interpretations, and plan as documented in the record represent my personal judgment and conclusions.    Ferdinand Rosas MD  Vitreoretinal Surgery   [facility]

## 2024-04-01 NOTE — TELEPHONE ENCOUNTER
Spoke with pt in regards to r/s missed appt. Pt r/s and verbalized understanding,. Appt reminder mailed out.

## 2024-04-01 NOTE — TELEPHONE ENCOUNTER
----- Message from Lisa Luna sent at 4/1/2024 11:14 AM CDT -----  Who Called: Pt    What is the request in detail: Requesting call back to discuss scheduling nail appt, pt had covid and had to miss last appt, pt declined 04/23 appt. Please advise    Can the clinic reply by [facility]? No    Best Call Back Number: [phone] or [phone], please leave message      Additional Information:

## 2024-04-02 ENCOUNTER — TELEPHONE (OUTPATIENT)
Dept: INTERNAL MEDICINE | Facility: CLINIC | Age: 84
End: 2024-04-02
Payer: MEDICARE

## 2024-04-02 ENCOUNTER — OFFICE VISIT (OUTPATIENT)
Dept: OPHTHALMOLOGY | Facility: CLINIC | Age: 84
End: 2024-04-02
Payer: MEDICARE

## 2024-04-02 DIAGNOSIS — H43.813 VITREOUS DEGENERATION OF BOTH EYES: ICD-10-CM

## 2024-04-02 DIAGNOSIS — H25.13 AGE-RELATED NUCLEAR CATARACT OF BOTH EYES: ICD-10-CM

## 2024-04-02 DIAGNOSIS — H35.033 HYPERTENSIVE RETINOPATHY OF BOTH EYES: ICD-10-CM

## 2024-04-02 DIAGNOSIS — E11.9 DIABETES MELLITUS TYPE 2 WITHOUT RETINOPATHY: ICD-10-CM

## 2024-04-02 DIAGNOSIS — H34.8120 CENTRAL RETINAL VEIN OCCLUSION WITH MACULAR EDEMA OF LEFT EYE: Primary | ICD-10-CM

## 2024-04-02 PROCEDURE — 92202 OPSCPY EXTND ON/MAC DRAW: CPT | Mod: 59,PBBFAC | Performed by: OPHTHALMOLOGY

## 2024-04-02 PROCEDURE — 92134 CPTRZ OPH DX IMG PST SGM RTA: CPT | Mod: PBBFAC | Performed by: OPHTHALMOLOGY

## 2024-04-02 PROCEDURE — 92202 OPSCPY EXTND ON/MAC DRAW: CPT | Mod: 59,S$PBB,, | Performed by: OPHTHALMOLOGY

## 2024-04-02 PROCEDURE — 99213 OFFICE O/P EST LOW 20 MIN: CPT | Mod: S$PBB,,, | Performed by: OPHTHALMOLOGY

## 2024-04-02 PROCEDURE — G2211 COMPLEX E/M VISIT ADD ON: HCPCS | Mod: S$PBB,,, | Performed by: OPHTHALMOLOGY

## 2024-04-02 PROCEDURE — 99999 PR PBB SHADOW E&M-EST. PATIENT-LVL II: CPT | Mod: PBBFAC,,, | Performed by: OPHTHALMOLOGY

## 2024-04-02 PROCEDURE — 99212 OFFICE O/P EST SF 10 MIN: CPT | Mod: PBBFAC,25 | Performed by: OPHTHALMOLOGY

## 2024-04-02 NOTE — TELEPHONE ENCOUNTER
----- Message from LIBRA De Leon, RANULFO sent at 4/1/2024 10:43 AM CDT -----  Regarding: appt -daquan  Appt within next 6 weeks

## 2024-04-02 NOTE — TELEPHONE ENCOUNTER
Tried calling to schedule appt several times. Can not get through or was told they will call back to schedule.  Scheduled appt. Mailed appt letter

## 2024-04-05 ENCOUNTER — TELEPHONE (OUTPATIENT)
Dept: INTERNAL MEDICINE | Facility: CLINIC | Age: 84
End: 2024-04-05
Payer: MEDICARE

## 2024-04-05 DIAGNOSIS — T84.50XD INFECTION OF PROSTHETIC JOINT, SUBSEQUENT ENCOUNTER: Primary | ICD-10-CM

## 2024-04-05 RX ORDER — DOXYCYCLINE HYCLATE 100 MG
100 TABLET ORAL 2 TIMES DAILY
Qty: 60 TABLET | Refills: 3 | Status: SHIPPED | OUTPATIENT
Start: 2024-04-05

## 2024-04-05 NOTE — TELEPHONE ENCOUNTER
----- Message from Ronnie Trujillo sent at 4/5/2024 10:20 AM CDT -----  Contact: [phone]@[email or web address]  Good morning patient would like to request lab orders for her upcoming apt on 5/9. Please give patient a call back [phone]

## 2024-04-09 DIAGNOSIS — R05.9 COUGH, UNSPECIFIED TYPE: Primary | ICD-10-CM

## 2024-04-12 ENCOUNTER — TELEPHONE (OUTPATIENT)
Dept: PRIMARY CARE CLINIC | Facility: CLINIC | Age: 84
End: 2024-04-12
Payer: MEDICARE

## 2024-04-12 NOTE — TELEPHONE ENCOUNTER
Patient call office want  to spoke with provider in regards to her medication has been place on the for a phone call April 17 , 2024

## 2024-04-15 NOTE — PROGRESS NOTES
HPI    Pt presents for Eylea OS injection.  VA OU no changes since last visit.  Last edited by Leonora Ward on 4/16/2024  1:39 PM.            A/P    ICD-10-CM ICD-9-CM   1. Central retinal vein occlusion with macular edema of left eye  H34.8120 362.35     362.83   2. Age-related nuclear cataract of both eyes  H25.13 366.16   3. Vitreous degeneration of both eyes  H43.813 379.21   4. Hypertensive retinopathy of both eyes  H35.033 362.11   5. Diabetes mellitus type 2 without retinopathy  E11.9 250.00       1. Central retinal vein occlusion with macular edema of left eye  Here for CRVO f/u    Pt overdue for f/u, - pt had COVID 2 weeks ago    S/p GLADYS x4 10/31/23  S/p I'VE x2 1/25/24    Today VA 20/200 ([place]), exam notable for CRVO with diffuse heme, WORSE IRF/SRF at 9w eeks , pt overdue had covid      Plan: Given great response to Eylea, recommend Eylea for control of IRF/SRF      Based on todays exam, diagnostic studies, and review of records, the determination was made for treatment today.  Schedule Eylea Injection Left Eye today Patient chooses to proceed with injection R/B/A discussed include infection retinal detachment and stroke    Patient identified.  Timeout performed.    Risks, benefits, and alternatives to treatment were discussed in detail with the patient, including bleeding/infection (endophthalmitis)/etc.  The patient voiced understanding and wished to proceed with the procedure.  See separate consent form.    Injection Procedure Note:  Diagnosis: CME Left Eye    Topical Proparacaine drop placed then topical 5% Betadine, then subcojunctival lidocaine 2% injection  Sterile gloves used, and sterile lid speculum placed.  5% Betadine placed at injection site again prior to injection.  Eylea 2mg in 0.05cc Injected inferotemporally 3.5-4mm posterior to the limbus.  Complications: None  Va at least CF at 5 feet post injection.  Retina, ONH, IOP normal after injection.    Followup as below.  Patient should  return immediately PRN.  Retinal Detachment and Endophthalmitis precautions given     2. Age-related nuclear cataract of both eyes  Mild NS, NVS  Plan: Observation Update Mrx prn     3. Vitreous degeneration of both eyes  No RT/RD   Plan: Observation , counseled on RT/RD risk  Pathology of PVD, Retinal Tear, Retinal Detachment reviewed in great detail  RD precautions discussed in detail, patient expressed understanding  RTC immediately PRN (especially ANY change flashes, floaters, vision, visual field)     4. Hypertensive retinopathy of both eyes  Mod HTN changes, has CRVO OS  PCP Tami Schmidt MD - Recent notes reviewed  10/16/2023  6.9  A1C   Plan: Observation HTN changes for now  Recommend good blood pressure control, tight blood glucose control, and good cholesterol control     5. Diabetes mellitus type 2 without retinopathy  No DR or DME OU, IRF and heme OS moreso due to CRVO  Plan: Observation for DR changes  Recommend good blood pressure control, tight blood glucose control, and good cholesterol control         RTC 8 weeks DFE/OCTm OU, poss Eylea OS       I saw and examined the patient and reviewed in detail the findings documented. The final examination findings, image interpretations, and plan as documented in the record represent my personal judgment and conclusions.    Ferdinand Rosas MD  Vitreoretinal Surgery   [facility]

## 2024-04-16 ENCOUNTER — OFFICE VISIT (OUTPATIENT)
Dept: OPHTHALMOLOGY | Facility: CLINIC | Age: 84
End: 2024-04-16
Payer: MEDICARE

## 2024-04-16 DIAGNOSIS — H43.813 VITREOUS DEGENERATION OF BOTH EYES: ICD-10-CM

## 2024-04-16 DIAGNOSIS — H35.033 HYPERTENSIVE RETINOPATHY OF BOTH EYES: ICD-10-CM

## 2024-04-16 DIAGNOSIS — H34.8120 CENTRAL RETINAL VEIN OCCLUSION WITH MACULAR EDEMA OF LEFT EYE: Primary | ICD-10-CM

## 2024-04-16 DIAGNOSIS — H25.13 AGE-RELATED NUCLEAR CATARACT OF BOTH EYES: ICD-10-CM

## 2024-04-16 DIAGNOSIS — E11.9 DIABETES MELLITUS TYPE 2 WITHOUT RETINOPATHY: ICD-10-CM

## 2024-04-16 PROCEDURE — 99211 OFF/OP EST MAY X REQ PHY/QHP: CPT | Mod: PBBFAC | Performed by: OPHTHALMOLOGY

## 2024-04-16 PROCEDURE — 99499 UNLISTED E&M SERVICE: CPT | Mod: S$PBB,,, | Performed by: OPHTHALMOLOGY

## 2024-04-16 PROCEDURE — 99999PBSHW PR PBB SHADOW TECHNICAL ONLY FILED TO HB: Mod: JZ,PBBFAC,,

## 2024-04-16 PROCEDURE — 67028 INJECTION EYE DRUG: CPT | Mod: S$PBB,LT,, | Performed by: OPHTHALMOLOGY

## 2024-04-16 PROCEDURE — 99999 PR PBB SHADOW E&M-EST. PATIENT-LVL I: CPT | Mod: PBBFAC,,, | Performed by: OPHTHALMOLOGY

## 2024-04-16 PROCEDURE — 67028 INJECTION EYE DRUG: CPT | Mod: PBBFAC,LT | Performed by: OPHTHALMOLOGY

## 2024-04-16 RX ADMIN — AFLIBERCEPT 2 MG: 40 INJECTION, SOLUTION INTRAVITREAL at 01:04

## 2024-04-17 ENCOUNTER — OFFICE VISIT (OUTPATIENT)
Dept: PRIMARY CARE CLINIC | Facility: CLINIC | Age: 84
End: 2024-04-17
Payer: MEDICARE

## 2024-04-17 ENCOUNTER — TELEPHONE (OUTPATIENT)
Dept: PRIMARY CARE CLINIC | Facility: CLINIC | Age: 84
End: 2024-04-17
Payer: MEDICARE

## 2024-04-17 DIAGNOSIS — R19.09 GROIN MASS IN FEMALE: Primary | ICD-10-CM

## 2024-04-17 PROCEDURE — 99441 PR PHYSICIAN TELEPHONE EVALUATION 5-10 MIN: CPT | Mod: 95,,, | Performed by: INTERNAL MEDICINE

## 2024-04-17 NOTE — PROGRESS NOTES
"Established Patient - Audio [facility]  Shared Note: Bonny Luna RN (NP Student) & Dr Tami Schmidt (Attending)     The patient location is: HOME  The chief complaint leading to consultation is: routine care  Visit type: Virtual visit with audio only (telephone)     The reason for the audio only service rather than synchronous audio and video virtual visit was related to technical difficulties or patient preference/necessity.     Each patient to whom I provide medical services by telemedicine is:  (1) informed of the relationship between the physician and patient and the respective role of any other health care provider with respect to management of the patient; and (2) notified that they may decline to receive medical services by telemedicine and may withdraw from such care at any time. Patient verbally consented to receive this service via voice-only telephone call.       Subjective:      Patient ID: Duran Giordano is a 84 y.o. female with a PMH of T2DM, HTN, CKD3, CAD, Pulmonary HTN, central retinal vein occlusion with macular edema of left eye and cataracts.    Prior to this visit, patient's last encounter with PCP was 11/29/2023.    Pt requested a call today to review medications that she takes twice: colace, doxycycline and allopurinol. She wanted to make sure this was correct. I reviewed doses with patient.     Main complaint today is "a growth in between my vagina and rectum; it feels like a boil." Patient denies bleeding or drainage from growth. She does admit to tenderness when wiping.     She reports now having a motorized chair in which she is able to be more independent with ADLs.     DM  - Currently taking: Ozempic .5 and Tresiba, previously on humalog and humalin  - Last A1c on  10/16/23 was 6.9  - Diet consists of see previous note, it is unchanged   - Has boost shakes, will continue those     HTN  - Currently taking: amlodipine 5 mg  - How often " taking BP at home: couple times a week     HLD  - Currently taking: Atorvastatin 80 mg   - Last lipid panel was on 10/16/23  The ASCVD Risk score (Melita DK, et al., 2019) failed to calculate for the following reasons:    The 2019 ASCVD risk score is only valid for ages 40 to 79                   Asthma/COPD  - Currently taking: ipi albuterol, albuterol rescue  - Smoking status: Never  - No home O2     Mental Health  - Currently taking: none  - Sees therapist no and psychiatrist no  - 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 Olson on 10/30/2023  - On allopurinol 400mg. Colchicine stopped  - Counselled on diet modifications    Central retinal vein occlusion with macular edema of left eye  - sees Dr. Sampson, Optometry and receives shots in her left eye every 6-8 weeks. She reports going back to see him in 1 month. She is worried the shots are not working any longer.       4Ms for Medical Decision-Making in Older Adults    Last Completed EAWV: 2024    MOBILITY:  Get Up and Go:      2024    10:45 AM   Get Up and Go   Trial 1 14 seconds     Activities of Daily Livin/2/2024    10:40 AM   Activities of Daily Living   Ambulation Assistance Required   Ambulation Assistance Walker (wheeled)   Dressing Independent   Transfers Independent   Toileting Continent of bladder;Continent of bowel   Feeding Independent   Cleaning home/Chores Assistance Required   Telephone use Independent   Shopping Assistance Required   Paying bills Independent   Taking meds Independent     Whisper Test:      2024    10:45 AM   Whisper Test   Whisper Test Normal     Disability Status:      2024    10:44 AM   Disability Status   Are you deaf or do you have serious difficulty hearing? N   Are you blind or do you have serious difficulty seeing, even when wearing glasses? N   Because of a physical, mental, or emotional condition, do  you have serious difficulty concentrating, remembering, or making decisions? N   Do you have serious difficulty walking or climbing stairs? Y   Do you have difficulty dressing or bathing? Y   Because of a physical, mental, or emotional condition, do you have difficulty doing errands alone such as visiting a doctor's office or shopping? Y     Nutrition Screenin/2/2024    10:40 AM   Nutrition Screening   Has food intake declined over the past three months due to loss of appetite, digestive problems, chewing or swallowing difficulties? No decrease in food intake   Involuntary weight loss during the last 3 months? No weight loss   Mobility? Goes out   Has the patient suffered psychological stress or acute disease in the past three months? No   Neuropsychological problems? No psychological problems   Body Mass Index (BMI)?  BMI 23 or greater   Screening Score 14   Interpretation Normal nutritional status    Screening Score: 0-7 Malnourished, 8-11 At Risk, 12-14 Normal    MENTATION:   Depression Patient Health Questionnaire:      2024    10:45 AM   Depression Patient Health Questionnaire   Over the last two weeks how often have you been bothered by little interest or pleasure in doing things Not at all   Over the last two weeks how often have you been bothered by feeling down, depressed or hopeless Several days   PHQ-2 Total Score 1     Has Dementia Dx: No    Cognitive Function Screenin/2/2024    10:45 AM   Cognitive Function Screening   Clock Drawing Test 1   Mini-Cog 3 Minute Recall 3   Cognitive Function Screening 4     Cognitive Function Screening Total - Less than 4 = Abnormal,  Greater than or equal to 4 = Normal    MEDICATIONS:  High Risk Medications:  Total Active Medications: 1  oxyCODONE - 5 MG    WHAT MATTERS MOST:  Advance Care Planning   ACP Status:   Patient has had an ACP conversation  Living Will: Yes  Power of : No  [place]: Yes    What is most important right now is to focus  "on walking without walker - increasing mobility    Accordingly, we have decided that the best plan to meet the patient's goals includes continuing with treatment      What matters most to patient today is: "getting this growth seen about"                 Social History     Socioeconomic History    Marital status: Single   Tobacco Use    Smoking status: Never    Smokeless tobacco: Never   Substance and Sexual Activity    Alcohol use: Yes     Comment: holidays    Drug use: No    Sexual activity: Never   Social History Narrative    Single with no children.      Social Determinants of Health     Financial Resource Strain: Low Risk  (2/2/2024)    Overall Financial Resource Strain (CARDIA)     Difficulty of Paying Living Expenses: Not hard at all   Food Insecurity: No Food Insecurity (2/2/2024)    Hunger Vital Sign     Worried About Running Out of Food in the Last Year: Never true     Ran Out of Food in the Last Year: Never true   Transportation Needs: No Transportation Needs (2/2/2024)    PRAPARE - Transportation     Lack of Transportation (Medical): No     Lack of Transportation (Non-Medical): No   Physical Activity: Insufficiently Active (2/2/2024)    Exercise Vital Sign     Days of Exercise per Week: 1 day     Minutes of Exercise per Session: 10 min   Stress: No Stress Concern Present (2/2/2024)    [place] [employer] - Occupational Stress Questionnaire     Feeling of Stress : Only a little   Social Connections: Moderately Integrated (2/2/2024)    Social Connection and Isolation Panel [NHANES]     Frequency of Communication with Friends and Family: More than three times a week     Frequency of Social Gatherings with Friends and Family: Once a week     Attends Nondenominational Services: More than 4 times per year     Active Member of Clubs or Organizations: Yes     Attends Club or Organization Meetings: More than 4 times per year     Marital Status: Never    Housing Stability: Low Risk  (2/2/2024) "    Housing Stability Vital Sign     Unable to Pay for Housing in the Last Year: No     Number of Places Lived in the Last Year: 1     Unstable Housing in the Last Year: No       Review of Systems   Genitourinary:  Positive for genital sores.       Objective:     Lab Results   Component Value Date    WBC 9.87 10/30/2023    HGB 11.6 (L) 10/30/2023    HCT 36.8 (L) 10/30/2023    PLT 90 (L) 10/30/2023    CHOL 128 10/16/2023    TRIG 59 10/16/2023    HDL 47 10/16/2023    ALT 14 10/30/2023    AST 21 10/30/2023     10/30/2023    K 4.4 10/30/2023     10/30/2023    CREATININE 1.3 10/30/2023    BUN 33 (H) 10/30/2023    CO2 28 10/30/2023    TSH 0.799 01/16/2020    INR 1.0 06/22/2022    HGBA1C 6.9 (H) 10/16/2023         Assessment:   84 y.o. female with multiple co-morbid illnesses here to continue work-up of chronic issues.     Plan:     1. Groin mass in female  Comments:  - clinic visit this Friday to assess reported genital growth versus rash  - will attempt to get into GYN clinic  Orders:  -     Ambulatory referral/consult to Gynecology; Future; Expected date: 04/24/2024          Health Maintenance         Date Due Completion Date    TETANUS VACCINE Never done ---    RSV Vaccine (Age 60+ and Pregnant patients) (1 - 1-dose 60+ series) Never done ---    Hemoglobin A1c 04/16/2024 10/16/2023    Override on 7/13/2016: Done    Diabetes Urine Screening 04/28/2024 4/28/2023    Lipid Panel 10/16/2024 10/16/2023    Eye Exam 04/02/2025 4/2/2024    Override on 2/7/2020: Done    Override on 2/17/2016: Done    Override on 1/22/2015: Done    Override on 8/16/2012: Done    DEXA Scan 06/01/2027 6/1/2023    Override on 12/13/2011: Done            Follow up in about 3 days (around 4/20/2024). Ten minutes spent with this patient today in a non-face to face encounter.    aTmi Schmidt MD/MPH  Internal Medicine  [facility] [phone]    This service was not originating from a related  E/M service provided within the previous 7 days nor will  to an E/M service or procedure within the next 24 hours or my soonest available appointment.  Prevailing standard of care was able to be met in this audio-only visit.

## 2024-04-17 NOTE — TELEPHONE ENCOUNTER
----- Message from Juanita Galicia sent at 4/17/2024  2:44 PM CDT -----  Contact: [phone]  Patient is returning a phone call.  Who left a message for the patient: miss call   Does patient know what this is regarding:    Would you like a call back, or a response through your [facility] portal?:   call back   Comments:

## 2024-04-18 ENCOUNTER — TELEPHONE (OUTPATIENT)
Dept: PRIMARY CARE CLINIC | Facility: CLINIC | Age: 84
End: 2024-04-18
Payer: MEDICARE

## 2024-04-19 ENCOUNTER — TELEPHONE (OUTPATIENT)
Dept: PRIMARY CARE CLINIC | Facility: CLINIC | Age: 84
End: 2024-04-19
Payer: MEDICARE

## 2024-04-19 ENCOUNTER — OFFICE VISIT (OUTPATIENT)
Dept: PRIMARY CARE CLINIC | Facility: CLINIC | Age: 84
End: 2024-04-19
Payer: MEDICARE

## 2024-04-19 DIAGNOSIS — B37.31 VAGINAL CANDIDIASIS: ICD-10-CM

## 2024-04-19 DIAGNOSIS — Z79.2 LONG TERM (CURRENT) USE OF ANTIBIOTICS: ICD-10-CM

## 2024-04-19 DIAGNOSIS — R19.5 LOOSE STOOLS: Primary | ICD-10-CM

## 2024-04-19 PROCEDURE — 99499 UNLISTED E&M SERVICE: CPT | Mod: 95,,, | Performed by: INTERNAL MEDICINE

## 2024-04-19 RX ORDER — SENNOSIDES 8.6 MG
1 TABLET ORAL DAILY
Qty: 90 TABLET | Refills: 3 | Status: SHIPPED | OUTPATIENT
Start: 2024-04-19

## 2024-04-19 RX ORDER — DOCUSATE SODIUM 100 MG/1
100 CAPSULE, LIQUID FILLED ORAL DAILY
Qty: 90 CAPSULE | Refills: 3 | Status: SHIPPED | OUTPATIENT
Start: 2024-04-19 | End: 2025-04-14

## 2024-04-19 RX ORDER — FLUCONAZOLE 150 MG/1
150 TABLET ORAL
Qty: 3 TABLET | Refills: 0 | Status: SHIPPED | OUTPATIENT
Start: 2024-04-19 | End: 2024-04-26

## 2024-04-19 NOTE — ASSESSMENT & PLAN NOTE
Fluconazole 150mg x 3 doses q 72 hours   Follow up with GYN 5/22 - sooner appointment if possible.

## 2024-04-19 NOTE — PROGRESS NOTES
[facility] Primary Care Provider Telemedicine Appointment  Shared Note: Tosin Higgins MD & Dr Tami Schmidt (Attending)    The patient location is:  Patient Home   The chief complaint leading to consultation is: vaginal discomfort  Total time spent with patient: 20    Visit type: Virtual visit with synchronous audio and video  Each patient to whom he or she provides medical services by telemedicine is:  (1) informed of the relationship between the physician and patient and the respective role of any other health care provider with respect to management of the patient; and (2) notified that he or she may decline to receive medical services by telemedicine and may withdraw from such care at any time.      Subjective:      Patient ID: Duran Giordano is a 84 y.o. female.    Chief Complaint: vaginal discomfort    Prior to this visit, patient's last encounter with PCP was 2024.    HPI: Patient unable to attend PCP appointment today.  She has an appt for GYN scheduled for  to address vaginal discomfort.  She and her nurse are present for audio visual appt with Dr. Schmidt and care team.     Vaginal discomfort consitent with candidiasis, though exam not possible in this setting.   Rx sent  - fluxonazole 150mg x 3 doses q 72 hours.     Patient also will decrease senna/ colace to once daily in the evening.  Probiotic will be added as patient is on long term doxycycline per Orthopedics.  Per hx, she has a TKA with hardware infection/ removal and IV daptomycin course follow by revision with new hardware.    Patient and her RN at [facility] facility had not other acute concerns.      Care Gaps:  - RSV vaccine -- will get at the pharmacy.     Medications: Does have pill packs      4Ms for Medical Decision-Making in Older Adults    Last Completed EAWV: 2024    MOBILITY:  Get Up and Go:      2024    10:45 AM   Get Up and Go   Trial 1 14 seconds     Activities of Daily Livin/2/2024    10:40 AM    Activities of Daily Living   Ambulation Assistance Required   Ambulation Assistance Walker (wheeled)   Dressing Independent   Transfers Independent   Toileting Continent of bladder;Continent of bowel   Feeding Independent   Cleaning home/Chores Assistance Required   Telephone use Independent   Shopping Assistance Required   Paying bills Independent   Taking meds Independent     Whisper Test:      2024    10:45 AM   Whisper Test   Whisper Test Normal     Disability Status:      2024    10:44 AM   Disability Status   Are you deaf or do you have serious difficulty hearing? N   Are you blind or do you have serious difficulty seeing, even when wearing glasses? N   Because of a physical, mental, or emotional condition, do you have serious difficulty concentrating, remembering, or making decisions? N   Do you have serious difficulty walking or climbing stairs? Y   Do you have difficulty dressing or bathing? Y   Because of a physical, mental, or emotional condition, do you have difficulty doing errands alone such as visiting a doctor's office or shopping? Y     Nutrition Screenin/2/2024    10:40 AM   Nutrition Screening   Has food intake declined over the past three months due to loss of appetite, digestive problems, chewing or swallowing difficulties? No decrease in food intake   Involuntary weight loss during the last 3 months? No weight loss   Mobility? Goes out   Has the patient suffered psychological stress or acute disease in the past three months? No   Neuropsychological problems? No psychological problems   Body Mass Index (BMI)?  BMI 23 or greater   Screening Score 14   Interpretation Normal nutritional status    Screening Score: 0-7 Malnourished, 8-11 At Risk, 12-14 Normal    MENTATION:   Depression Patient Health Questionnaire:      2024    10:45 AM   Depression Patient Health Questionnaire   Over the last two weeks how often have you been bothered by little interest or pleasure in doing  things Not at all   Over the last two weeks how often have you been bothered by feeling down, depressed or hopeless Several days   PHQ-2 Total Score 1     Has Dementia Dx: No    Cognitive Function Screenin/2/2024    10:45 AM   Cognitive Function Screening   Clock Drawing Test 1   Mini-Cog 3 Minute Recall 3   Cognitive Function Screening 4     Cognitive Function Screening Total - Less than 4 = Abnormal,  Greater than or equal to 4 = Normal    MEDICATIONS:  High Risk Medications:  Total Active Medications: 1  oxyCODONE - 5 MG    WHAT MATTERS MOST:  Advance Care Planning   ACP Status:   Patient has had an ACP conversation  Living Will: Yes  Power of : No                   Social History     Socioeconomic History    Marital status: Single   Tobacco Use    Smoking status: Never    Smokeless tobacco: Never   Substance and Sexual Activity    Alcohol use: Yes     Comment: holidays    Drug use: No    Sexual activity: Never   Social History Narrative    Single with no children.      Social Determinants of Health     Financial Resource Strain: Low Risk  (2024)    Overall Financial Resource Strain (CARDIA)     Difficulty of Paying Living Expenses: Not hard at all   Food Insecurity: No Food Insecurity (2024)    Hunger Vital Sign     Worried About Running Out of Food in the Last Year: Never true     Ran Out of Food in the Last Year: Never true   Transportation Needs: No Transportation Needs (2024)    PRAPARE - Transportation     Lack of Transportation (Medical): No     Lack of Transportation (Non-Medical): No   Physical Activity: Insufficiently Active (2024)    Exercise Vital Sign     Days of Exercise per Week: 1 day     Minutes of Exercise per Session: 10 min   Stress: No Stress Concern Present (2024)    [employer] [place] [employer] - Occupational Stress Questionnaire     Feeling of Stress : Only a little   Social Connections: Moderately Integrated (2024)    Social Connection  and Isolation Panel [NHANES]     Frequency of Communication with Friends and Family: More than three times a week     Frequency of Social Gatherings with Friends and Family: Once a week     Attends Restoration Services: More than 4 times per year     Active Member of Clubs or Organizations: Yes     Attends Club or Organization Meetings: More than 4 times per year     Marital Status: Never    Housing Stability: Low Risk  (2/2/2024)    Housing Stability Vital Sign     Unable to Pay for Housing in the Last Year: No     Number of Places Lived in the Last Year: 1     Unstable Housing in the Last Year: No       Past Surgical History:   Procedure Laterality Date    HYSTERECTOMY  1982    secondary uterine fibroids    INJECTION OF ANESTHETIC AGENT AROUND NERVE Right 10/21/2021    Procedure: BLOCK, NERVE GENICULAR RIGHT NEEDS CONSENT;  Surgeon: Senia Kilpatrick MD;  Location: [facility];  Service: Pain Management;  Laterality: Right;    INSERTION OF ANTIBIOTIC SPACER Right 10/28/2021    Procedure: INSERTION, ANTIBIOTIC SPACER;  Surgeon: Alfredo Lozoya MD;  Location: [address];  Service: Orthopedics;  Laterality: Right;    JOINT REPLACEMENT      REVISION OF KNEE ARTHROPLASTY Right 10/28/2021    Procedure: REVISION, ARTHROPLASTY, KNEE-DEPUY ANTIBIOTIC SPACER;  Surgeon: Alfredo Lozoya MD;  Location: [address];  Service: Orthopedics;  Laterality: Right;    REVISION OF KNEE ARTHROPLASTY Right 6/30/2022    Procedure: REVISION, ARTHROPLASTY, KNEE-NAKIA- stage 2;  Surgeon: Alfredo Lozoya MD;  Location: [address];  Service: Orthopedics;  Laterality: Right;    Right total knee replacement         Past Medical History:   Diagnosis Date    Adrenal mass- adenoma stable since 2004 (1.8 cm and 8/13/12 (2 cm); stable 2016 2.4 cm     Adrenal mass- adenoma stable since 2004 (1.8 cm and 8/13/12 (2 cm); stable 2016 2.4 cm    Arthritis     Asthma in adult without complication 6/25/2015    Bilateral  carotid artery disease 7/21/2017    Bilateral sciatica 2/7/2017    Cellulitis of right leg 08/16/2017    Cerebral infarction 1/29/2016    Multiple areas of lacunar infarction. Stroke risk factors include HTN, DM2, Dyslipidemia.  Continue ASA 81mg/ Statin therapy I have encouraged 30 minutes of physical activity daily for 5 days a week. She has access to a pool so this should not be so jarring to her joints.     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    Mixed hyperlipidemia 8/13/2012    Morbid obesity with BMI of 50.0-59.9, adult 2/11/2014    Obstructive sleep apnea syndrome 8/13/2012    Senile cataracts of both eyes 1/22/2015    Traumatic open wound of right lower leg 8/25/2017    Trigeminal neuralgia of right side of face 5/23/2017    For years now Previously on Gabapentin, but caused constipation Dissipating over time Not related to intracranial abnormalities Possibly related to dental procedure    Type 2 diabetes mellitus with diabetic polyneuropathy, with long-term current use of insulin 1/29/2013    Venous stasis dermatitis of both lower extremities 8/21/2017    Viral hepatitis A without coma 1/1/1971    Hep B infection in [facility] in 1971, was hospitalize for 2 weeks at that time, unknown treatment.    Vitamin D deficiency disease 8/13/2012       Review of Systems    Objective:   There were no vitals taken for this visit.    Physical Exam    Lab Results   Component Value Date    WBC 9.87 10/30/2023    HGB 11.6 (L) 10/30/2023    HCT 36.8 (L) 10/30/2023    PLT 90 (L) 10/30/2023    CHOL 128  10/16/2023    TRIG 59 10/16/2023    HDL 47 10/16/2023    ALT 14 10/30/2023    AST 21 10/30/2023     10/30/2023    K 4.4 10/30/2023     10/30/2023    CREATININE 1.3 10/30/2023    BUN 33 (H) 10/30/2023    CO2 28 10/30/2023    TSH 0.799 01/16/2020    INR 1.0 06/22/2022    HGBA1C 6.9 (H) 10/16/2023         Assessment:   84 y.o. female with multiple co-morbid illnesses here to continue work-up of chronic issues.     Plan:   1. Loose stools  Comments:  - in setting of chronic constipation - decrease senna and colace to only once in the evening.    2. Vaginal candidiasis  Assessment & Plan:  Fluconazole 150mg x 3 doses q 72 hours   Follow up with GYN 5/22 - sooner appointment if possible.       3. Long term (current) use of antibiotics  Overview:  Per Orthopedics.   H/o TKA with infected hardware > removal > IV daptomycin x 6 weeks > revision of hardware  Now on doxycycline BID      Assessment & Plan:  Add probiotic today for GI health; to prevent secondary infections.       Other orders  -     SENNA 8.6 mg tablet; Take 1 tablet by mouth once daily.  Dispense: 90 tablet; Refill: 3  -     Lactobacillus acidophilus 1 billion cell Cap; Take 1 tablet by mouth once daily.  Dispense: 90 capsule; Refill: 3  -     docusate sodium (COLACE) 100 MG capsule; Take 1 capsule (100 mg total) by mouth once daily.  Dispense: 90 capsule; Refill: 3         Health Maintenance         Date Due Completion Date    TETANUS VACCINE Never done ---    RSV Vaccine (Age 60+ and Pregnant patients) (1 - 1-dose 60+ series) Never done ---    Hemoglobin A1c 04/16/2024 10/16/2023    Override on 7/13/2016: Done    Diabetes Urine Screening 04/28/2024 4/28/2023    Lipid Panel 10/16/2024 10/16/2023    Eye Exam 04/02/2025 4/2/2024    Override on 2/7/2020: Done    Override on 2/17/2016: Done    Override on 1/22/2015: Done    Override on 8/16/2012: Done    DEXA Scan 06/01/2027 6/1/2023    Override on 12/13/2011: Done            No follow-ups on file. .  20 min spent with this patient today, issues addressed: probiotics, vaginal candidiasis, bowel regimen, follow up appts.    Tami Schmidt MD/MPH  [facility]  [phone]

## 2024-04-19 NOTE — TELEPHONE ENCOUNTER
----- Message from Maty Suarez sent at 4/19/2024 12:37 PM CDT -----  Contact: self [phone]  Patient is returning a phone call.    Who left a message for the patient: nurse    Does patient know what this is regarding:  audio appt and medication    Would you like a call back, or a response through your [facility] portal?:   call back  Comments:

## 2024-04-19 NOTE — TELEPHONE ENCOUNTER
----- Message from Rachelsharon Suarez sent at 4/19/2024  8:12 AM CDT -----  Contact: Self [phone]  Would like to receive medical advice.    Would they like a call back or a response via [facility]:  call back     Additional information:  pt states that she is not feeling well and would like to cancel the appt.

## 2024-04-19 NOTE — TELEPHONE ENCOUNTER
Patient unable to attend PCP appointment today.  She has an appt for GYN scheduled for 4/24.  However, will send fluconazole 150mg x 3 doses q 72 hours to address vaginal candidiasis in the interim.  Care discussed with coordinating nurse for Sister Duran Giordano.

## 2024-04-24 ENCOUNTER — OFFICE VISIT (OUTPATIENT)
Dept: PODIATRY | Facility: CLINIC | Age: 84
End: 2024-04-24
Payer: MEDICARE

## 2024-04-24 VITALS
SYSTOLIC BLOOD PRESSURE: 160 MMHG | HEART RATE: 96 BPM | DIASTOLIC BLOOD PRESSURE: 69 MMHG | BODY MASS INDEX: 50.49 KG/M2 | HEIGHT: 59 IN

## 2024-04-24 DIAGNOSIS — L84 CORN OR CALLUS: ICD-10-CM

## 2024-04-24 DIAGNOSIS — E11.51 DIABETES MELLITUS WITH PERIPHERAL VASCULAR DISEASE: ICD-10-CM

## 2024-04-24 DIAGNOSIS — B35.1 ONYCHOMYCOSIS DUE TO DERMATOPHYTE: Primary | ICD-10-CM

## 2024-04-24 PROCEDURE — 11721 DEBRIDE NAIL 6 OR MORE: CPT | Mod: Q8,59,PBBFAC | Performed by: PODIATRIST

## 2024-04-24 PROCEDURE — 11056 PARNG/CUTG B9 HYPRKR LES 2-4: CPT | Mod: Q8,S$PBB,, | Performed by: PODIATRIST

## 2024-04-24 PROCEDURE — 99499 UNLISTED E&M SERVICE: CPT | Mod: S$PBB,,, | Performed by: PODIATRIST

## 2024-04-24 PROCEDURE — 11056 PARNG/CUTG B9 HYPRKR LES 2-4: CPT | Mod: Q8,PBBFAC | Performed by: PODIATRIST

## 2024-04-24 PROCEDURE — 99999 PR PBB SHADOW E&M-EST. PATIENT-LVL II: CPT | Mod: PBBFAC,,, | Performed by: PODIATRIST

## 2024-04-24 PROCEDURE — 11721 DEBRIDE NAIL 6 OR MORE: CPT | Mod: Q8,59,S$PBB, | Performed by: PODIATRIST

## 2024-04-24 PROCEDURE — 99212 OFFICE O/P EST SF 10 MIN: CPT | Mod: PBBFAC | Performed by: PODIATRIST

## 2024-04-24 NOTE — PROGRESS NOTES
Subjective:      Patient ID: Duran Giordano is a 84 y.o. female.    Chief Complaint: Diabetes Mellitus (4/19/24 - Tami Schmidt MD, PCP) and Nail Care      Duran Logan is a 84 y.o. female who presents to the clinic for evaluation and treatment of high risk feet. Duran Logan has a past medical history of Adrenal mass- adenoma stable since 2004 (1.8 cm and 8/13/12 (2 cm); stable 2016 2.4 cm, Arthritis, Asthma in adult without complication (6/25/2015), Bilateral carotid artery disease (7/21/2017), Bilateral sciatica (2/7/2017), Cellulitis of right leg (08/16/2017), Cerebral infarction (1/29/2016), Cervical radiculopathy (3/18/2015), Chronic knee pain, Chronic rhinitis (4/9/2013), CKD (chronic kidney disease) stage 3, GFR 30-59 ml/min (1/29/2013), Coronary artery disease due to calcified coronary lesion (6/25/2015), Deep vein thrombosis, Diastolic dysfunction (10/10/2013), Essential hypertension (6/25/2015), Gastroesophageal reflux disease without esophagitis (8/13/2012), Gout, Hives (10/1/2013), Infection of prosthetic right knee joint (10/25/2021), Mixed hyperlipidemia (8/13/2012), Morbid obesity with BMI of 50.0-59.9, adult (2/11/2014), Obstructive sleep apnea syndrome (8/13/2012), Senile cataracts of both eyes (1/22/2015), Traumatic open wound of right lower leg (8/25/2017), Trigeminal neuralgia of right side of face (5/23/2017), Type 2 diabetes mellitus with diabetic polyneuropathy, with long-term current use of insulin (1/29/2013), Venous stasis dermatitis of both lower extremities (8/21/2017), Viral hepatitis A without coma (1/1/1971), and Vitamin D deficiency disease (8/13/2012). The patient's chief complaint is elongated toenails and lymphedema.  This patient has documented high risk feet requiring routine maintenance secondary to diabetes mellitis and those secondary complications of diabetes, as mentioned.    PCP: Tami Schmidt MD    Date Last Seen by PCP:   Chief Complaint   Patient  "presents with    Diabetes Mellitus     4/19/24 - Tami Schmidt MD, PCP    Nail Care        Current shoe gear:  Affected Foot: Casual shoes     Unaffected Foot: Casual shoes    Hemoglobin A1C   Date Value Ref Range Status   10/16/2023 6.9 (H) 4.0 - 5.6 % Final     Comment:     ADA Screening Guidelines:  5.7-6.4%  Consistent with prediabetes  >or=6.5%  Consistent with diabetes    High levels of fetal hemoglobin interfere with the HbA1C  assay. Heterozygous hemoglobin variants (HbS, HgC, etc)do  not significantly interfere with this assay.   However, presence of multiple variants may affect accuracy.     04/28/2023 6.8 (H) 4.0 - 5.6 % Final     Comment:     ADA Screening Guidelines:  5.7-6.4%  Consistent with prediabetes  >or=6.5%  Consistent with diabetes    High levels of fetal hemoglobin interfere with the HbA1C  assay. Heterozygous hemoglobin variants (HbS, HgC, etc)do  not significantly interfere with this assay.   However, presence of multiple variants may affect accuracy.     06/06/2022 6.8 (H) 4.0 - 5.6 % Final     Comment:     ADA Screening Guidelines:  5.7-6.4%  Consistent with prediabetes  >or=6.5%  Consistent with diabetes    High levels of fetal hemoglobin interfere with the HbA1C  assay. Heterozygous hemoglobin variants (HbS, HgC, etc)do  not significantly interfere with this assay.   However, presence of multiple variants may affect accuracy.         Review of Systems   Cardiovascular:  Positive for leg swelling.   Skin:  Positive for color change, dry skin, nail changes and unusual hair distribution. Negative for flushing, itching, rash and skin cancer.   Musculoskeletal:  Positive for arthritis and stiffness. Negative for back pain and falls.   Neurological:  Positive for numbness. Negative for paresthesias.   Allergic/Immunologic: Negative for hives.           Objective:       Vitals:    04/24/24 1244   BP: (!) 160/69   Pulse: 96   Height: 4' 11" (1.499 m)   PainSc:   7   PainLoc: Foot      "     Physical Exam  Vitals and nursing note reviewed.   Constitutional:       Appearance: She is well-developed.   Cardiovascular:      Pulses:           Dorsalis pedis pulses are 1+ on the right side and 1+ on the left side.        Posterior tibial pulses are 1+ on the right side and 1+ on the left side.      Comments: Dorsalis pedis and posterior tibial pulses are palpable bilaterally. Toes are cool to touch. Feet are warm proximally.There is decreased digital hair bilateral. Skin is atrophic, hyperpigmented, and moderately edematous.    Musculoskeletal:         General: No tenderness.      Right ankle: Normal.      Left ankle: Normal.      Right foot: No swelling, deformity or crepitus.      Left foot: No swelling, deformity or crepitus.      Comments: Adequate joint range of motion without pain, limitation, nor crepitation Bilateral feet and ankle joints. Muscle strength is 5/5 in all groups bilaterally.      TTP w/ redness and plantar L foot no signs of break in skin no ulceration. Pain to plantar medical tubercle of calcaneus . No flucutance   Lymphadenopathy:      Comments: B/l lymphedema    Skin:     General: Skin is warm and dry.      Coloration: Skin is not pale.      Findings: No abrasion, bruising, burn, erythema, lesion or rash.      Nails: There is no clubbing.      Comments: Nails x10 are elongated by  4-5mm's, thickened by 2-3 mm's, dystrophic, and are darkened in  coloration . Xerosis Bilaterally. No open lesions noted.    Hyperkeratotic tissue noted to distal hallux b/l       Skin thickened, leathery, svetlana blistering noted to legs.    Neurological:      Mental Status: She is alert and oriented to person, place, and time.      Comments: Decreased sharp/dull sensation bilateral feet.   Psychiatric:         Behavior: Behavior normal.               Assessment:       Encounter Diagnoses   Name Primary?    Onychomycosis due to dermatophyte Yes    Diabetes mellitus with peripheral vascular disease     Corn  "or callus          Plan:       Duran Logan "Sister Duran Giordano" was seen today for diabetes mellitus and nail care.    Diagnoses and all orders for this visit:    Onychomycosis due to dermatophyte    Diabetes mellitus with peripheral vascular disease    Corn or callus     I counseled the patient on her conditions, their implications and medical management.      Shoe inspection. Diabetic Foot Education. Patient reminded of the importance of good nutrition and blood cummings gar control to help prevent podiatric complications of diabetes. Patient instructed on proper foot hygeine. We discussed wearing proper shoe gear, daily foot inspections, never walking without protective shoe gear, never putting sharp instruments to feet    - With patient's permission, nails were aggressively reduced and debrided x 10 to their soft tissue attachment mechanically and with electric , removing all offending nail and debris. Patient relates relief following the procedure. She will continue to monitor the areas daily, inspect her feet, wear protective shoe gear when ambulatory, moisturizer to maintain skin integrity and follow in this office in approximately 2-3 months, sooner p.r.n.    - After cleansing the  area w/ alcohol prep pad the above mentioned hyperkeratosis was trimmed utilizing No 15 scapel, to a smooth base with out incident. Patient tolerated this  well and reported comfort to the area x2              "

## 2024-05-02 ENCOUNTER — TELEPHONE (OUTPATIENT)
Dept: ORTHOPEDICS | Facility: CLINIC | Age: 84
End: 2024-05-02
Payer: MEDICARE

## 2024-05-02 DIAGNOSIS — M25.561 ACUTE BILATERAL KNEE PAIN: Primary | ICD-10-CM

## 2024-05-02 DIAGNOSIS — M25.562 ACUTE BILATERAL KNEE PAIN: Primary | ICD-10-CM

## 2024-05-06 ENCOUNTER — OFFICE VISIT (OUTPATIENT)
Dept: ORTHOPEDICS | Facility: CLINIC | Age: 84
End: 2024-05-06
Payer: MEDICARE

## 2024-05-06 ENCOUNTER — HOSPITAL ENCOUNTER (OUTPATIENT)
Dept: RADIOLOGY | Facility: HOSPITAL | Age: 84
Discharge: HOME | End: 2024-05-06
Attending: ORTHOPAEDIC SURGERY
Payer: MEDICARE

## 2024-05-06 VITALS — BODY MASS INDEX: 50.4 KG/M2 | WEIGHT: 250 LBS | HEIGHT: 59 IN

## 2024-05-06 DIAGNOSIS — M17.12 PRIMARY OSTEOARTHRITIS OF LEFT KNEE: Primary | ICD-10-CM

## 2024-05-06 DIAGNOSIS — M25.561 ACUTE BILATERAL KNEE PAIN: ICD-10-CM

## 2024-05-06 DIAGNOSIS — E66.01 MORBID OBESITY WITH BMI OF 50.0-59.9, ADULT: ICD-10-CM

## 2024-05-06 DIAGNOSIS — M25.562 ACUTE BILATERAL KNEE PAIN: ICD-10-CM

## 2024-05-06 PROCEDURE — 73562 X-RAY EXAM OF KNEE 3: CPT | Mod: 26,50,, | Performed by: RADIOLOGY

## 2024-05-06 PROCEDURE — 20610 DRAIN/INJ JOINT/BURSA W/O US: CPT | Mod: S$PBB,LT,, | Performed by: ORTHOPAEDIC SURGERY

## 2024-05-06 PROCEDURE — 20610 DRAIN/INJ JOINT/BURSA W/O US: CPT | Mod: PBBFAC,LT | Performed by: ORTHOPAEDIC SURGERY

## 2024-05-06 PROCEDURE — 99999 PR PBB SHADOW E&M-EST. PATIENT-LVL III: CPT | Mod: PBBFAC,,, | Performed by: ORTHOPAEDIC SURGERY

## 2024-05-06 PROCEDURE — 99213 OFFICE O/P EST LOW 20 MIN: CPT | Mod: PBBFAC,25 | Performed by: ORTHOPAEDIC SURGERY

## 2024-05-06 PROCEDURE — 99999PBSHW PR PBB SHADOW TECHNICAL ONLY FILED TO HB: Mod: PBBFAC,,,

## 2024-05-06 PROCEDURE — 99214 OFFICE O/P EST MOD 30 MIN: CPT | Mod: 25,S$PBB,, | Performed by: ORTHOPAEDIC SURGERY

## 2024-05-06 PROCEDURE — 73562 X-RAY EXAM OF KNEE 3: CPT | Mod: TC,50

## 2024-05-06 RX ORDER — TRIAMCINOLONE ACETONIDE 40 MG/ML
40 INJECTION, SUSPENSION INTRA-ARTICULAR; INTRAMUSCULAR
Status: COMPLETED | OUTPATIENT
Start: 2024-05-06 | End: 2024-05-06

## 2024-05-06 RX ADMIN — TRIAMCINOLONE ACETONIDE 40 MG: 40 INJECTION, SUSPENSION INTRA-ARTICULAR; INTRAMUSCULAR at 11:05

## 2024-05-06 NOTE — PROGRESS NOTES
"Subjective:      Patient ID: Duran Giordano is a 84 y.o. female.    Chief Complaint: Post-op Evaluation of the Right Knee and Pain of the Left Knee    HPI  Duran Giordano has left knee pain.  Right knee is doing OK.  On antibiotics. The left knee pain has worsened. The pain is located in the anterior aspect of the knee.  There  is not radiation.  There is associated stiffness.   There is not catching and locking. The pain is described as achy. The pain is aggravated by activity.  It is alleviated by rest.   Her history, medications and problem list were reviewed.    Review of Systems   Constitutional: Negative for chills, fever and night sweats.   HENT:  Negative for hearing loss.    Eyes:  Negative for blurred vision and double vision.   Cardiovascular:  Negative for chest pain, claudication and leg swelling.   Respiratory:  Negative for shortness of breath.    Endocrine: Negative for polydipsia, polyphagia and polyuria.   Hematologic/Lymphatic: Negative for adenopathy and bleeding problem. Does not bruise/bleed easily.   Skin:  Negative for poor wound healing.   Gastrointestinal:  Negative for diarrhea and heartburn.   Genitourinary:  Negative for bladder incontinence.   Neurological:  Negative for focal weakness, headaches, numbness, paresthesias and sensory change.   Psychiatric/Behavioral:  The patient is not nervous/anxious.    Allergic/Immunologic: Negative for persistent infections.         Objective:      Body mass index is 50.49 kg/m².  Vitals:    05/06/24 1103   Weight: 113.4 kg (250 lb)   Height: 4' 11" (1.499 m)           General    Constitutional: She is oriented to person, place, and time.   obese   HENT:   Head: Normocephalic and atraumatic.   Eyes: EOM are normal.   Cardiovascular:  Normal rate and regular rhythm.            Pulmonary/Chest: Effort normal.   Neurological: She is alert and oriented to person, place, and time.   Psychiatric: She has a normal mood and affect.             Left " "Knee Exam     Inspection   Erythema: absent  Scars: absent  Swelling: present  Effusion: absent  Deformity: absent  Bruising: absent    Tenderness   The patient tender to palpation of the medial joint line and lateral joint line.    Range of Motion   Extension:  0   Flexion:  120     Tests   Stability   Lachman: normal (-1 to 2mm)   MCL - Valgus: normal (0 to 2mm)  LCL - Varus: normal (0 to 2mm)    Muscle Strength   Left Lower Extremity   Hip Abduction: 5/5   Quadriceps:  5/5   Hamstrin/5     Vascular Exam       Edema  Left Lower Leg: absent              Assessment:       Encounter Diagnoses   Name Primary?    Primary osteoarthritis of left knee Yes    Morbid obesity with BMI of 50.0-59.9, adult           Plan:       Duran Logan"Sister Duran Giordano" was seen today for post-op evaluation and pain.    Diagnoses and all orders for this visit:    Primary osteoarthritis of left knee    Morbid obesity with BMI of 50.0-59.9, adult    Other orders  -     triamcinolone acetonide injection 40 mg      Treatment options have been discussed.  We have decided to proceed with a  cortico- steroid injection.  The risk of elevated blood glucose and the extremely low risk of infection were discussed. Verbal consent was obtained. .    After time out was performed and patient ID, side, and site were verified, the left knee  was prepped in the standard sterile fashion.  A 22-gauge needle was introduced into the left knee joint from an nisa-lateral site without complication. The left knee(s) was then injected with 40mg of triamcinolone.  Sterile dressing was applied.  The patient was informed that they may resume activities as tolerated. She was instructed to call if there were any problems.     We will see Duran Giordano  back in 6 weeks to see how she has responded.                  "

## 2024-05-07 RX ORDER — ALLOPURINOL 300 MG/1
300 TABLET ORAL DAILY
Qty: 30 TABLET | Refills: 0 | Status: SHIPPED | OUTPATIENT
Start: 2024-05-07 | End: 2024-11-03

## 2024-05-08 ENCOUNTER — PATIENT MESSAGE (OUTPATIENT)
Dept: INTERNAL MEDICINE | Facility: CLINIC | Age: 84
End: 2024-05-08
Payer: MEDICARE

## 2024-05-08 ENCOUNTER — TELEPHONE (OUTPATIENT)
Dept: INTERNAL MEDICINE | Facility: CLINIC | Age: 84
End: 2024-05-08
Payer: MEDICARE

## 2024-05-08 DIAGNOSIS — E11.29 TYPE 2 DIABETES MELLITUS WITH RENAL MANIFESTATIONS NOT AT GOAL: ICD-10-CM

## 2024-05-08 RX ORDER — ALLOPURINOL 100 MG/1
100 TABLET ORAL DAILY
Qty: 30 TABLET | Refills: 0 | Status: SHIPPED | OUTPATIENT
Start: 2024-05-08 | End: 2024-11-04

## 2024-05-08 NOTE — TELEPHONE ENCOUNTER
----- Message from Anabel Cortes sent at 5/8/2024  1:51 PM CDT -----  Contact: Self/ [phone]  Patient is returning a phone call.  Who left a message for the patient:?  Does patient know what this is regarding:  no   Would you like a call back, or a response through your [facility] portal?:   call back   Comments:

## 2024-05-08 NOTE — TELEPHONE ENCOUNTER
Spoke to pt. Pt stated she was returning a call she missed today. Did not see any notes in chart regarding a call to pt on 5/8/24.   Pt confirmed appt tomorrow,.

## 2024-05-09 ENCOUNTER — OFFICE VISIT (OUTPATIENT)
Dept: INTERNAL MEDICINE | Facility: CLINIC | Age: 84
End: 2024-05-09
Payer: MEDICARE

## 2024-05-09 VITALS
HEART RATE: 76 BPM | SYSTOLIC BLOOD PRESSURE: 134 MMHG | OXYGEN SATURATION: 99 % | BODY MASS INDEX: 50.49 KG/M2 | DIASTOLIC BLOOD PRESSURE: 70 MMHG | WEIGHT: 250 LBS

## 2024-05-09 DIAGNOSIS — H25.13 AGE-RELATED NUCLEAR CATARACT OF BOTH EYES: ICD-10-CM

## 2024-05-09 DIAGNOSIS — I87.2 VENOUS STASIS DERMATITIS OF BOTH LOWER EXTREMITIES: ICD-10-CM

## 2024-05-09 DIAGNOSIS — B37.31 VAGINAL CANDIDIASIS: ICD-10-CM

## 2024-05-09 DIAGNOSIS — E11.59 HYPERTENSION ASSOCIATED WITH DIABETES: ICD-10-CM

## 2024-05-09 DIAGNOSIS — I15.2 HYPERTENSION ASSOCIATED WITH DIABETES: ICD-10-CM

## 2024-05-09 DIAGNOSIS — I89.0 LYMPHEDEMA OF BOTH LOWER EXTREMITIES: ICD-10-CM

## 2024-05-09 DIAGNOSIS — E66.01 MORBID OBESITY WITH BMI OF 50.0-59.9, ADULT: ICD-10-CM

## 2024-05-09 DIAGNOSIS — G47.33 OSA ON CPAP: ICD-10-CM

## 2024-05-09 DIAGNOSIS — I27.20 PULMONARY HYPERTENSION: ICD-10-CM

## 2024-05-09 DIAGNOSIS — I50.32 CHRONIC DIASTOLIC HEART FAILURE: ICD-10-CM

## 2024-05-09 DIAGNOSIS — Z79.4 TYPE 2 DIABETES MELLITUS WITH DIABETIC POLYNEUROPATHY, WITH LONG-TERM CURRENT USE OF INSULIN: Primary | ICD-10-CM

## 2024-05-09 DIAGNOSIS — E11.42 TYPE 2 DIABETES MELLITUS WITH DIABETIC POLYNEUROPATHY, WITH LONG-TERM CURRENT USE OF INSULIN: Primary | ICD-10-CM

## 2024-05-09 DIAGNOSIS — H35.033 HYPERTENSIVE RETINOPATHY OF BOTH EYES: ICD-10-CM

## 2024-05-09 PROCEDURE — 99213 OFFICE O/P EST LOW 20 MIN: CPT | Mod: PBBFAC | Performed by: NURSE PRACTITIONER

## 2024-05-09 PROCEDURE — 99214 OFFICE O/P EST MOD 30 MIN: CPT | Mod: S$PBB,,, | Performed by: NURSE PRACTITIONER

## 2024-05-09 PROCEDURE — 99999 PR PBB SHADOW E&M-EST. PATIENT-LVL III: CPT | Mod: PBBFAC,,, | Performed by: NURSE PRACTITIONER

## 2024-05-09 NOTE — PATIENT INSTRUCTIONS
Follow up in 5 months w/Irielle   A1c urine mac in 5 months     Lab Results   Component Value Date    HGBA1C 7.1 (H) 05/06/2024     Goal less than 7.5%    Tresiba 12 units at night   Ozempic 0.5 mg weekly -Saturdays    [email or web address]  Eat fit aylin  MyMoblicationnesspal aylin  [email or web address]    mySugr aylin     Goal  no higher than 200
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
## 2024-05-10 NOTE — TELEPHONE ENCOUNTER
Call made to patient 3  are  more times left voice mails to call office in regards to refill on  Atorvastatin

## 2024-05-13 NOTE — TELEPHONE ENCOUNTER
Spoke with patient stated she needs a refill on pend medication sent to  [facility]

## 2024-05-14 RX ORDER — ATORVASTATIN CALCIUM 80 MG/1
80 TABLET, FILM COATED ORAL
Qty: 30 TABLET | Refills: 0 | Status: SHIPPED | OUTPATIENT
Start: 2024-05-14 | End: 2024-06-10

## 2024-06-05 ENCOUNTER — HOSPITAL ENCOUNTER (INPATIENT)
Facility: HOSPITAL | Age: 84
LOS: 4 days | Discharge: HOME HEALTH | DRG: 176 | End: 2024-06-09
Attending: EMERGENCY MEDICINE | Admitting: STUDENT IN AN ORGANIZED HEALTH CARE EDUCATION/TRAINING PROGRAM
Payer: MEDICARE

## 2024-06-05 DIAGNOSIS — R94.30 LOW LEFT VENTRICULAR EJECTION FRACTION: ICD-10-CM

## 2024-06-05 DIAGNOSIS — I26.99 BILATERAL PULMONARY EMBOLISM: Primary | ICD-10-CM

## 2024-06-05 DIAGNOSIS — I26.99 PULMONARY EMBOLISM: ICD-10-CM

## 2024-06-05 DIAGNOSIS — I48.91 A-FIB: ICD-10-CM

## 2024-06-05 DIAGNOSIS — Z09 HOSPITAL DISCHARGE FOLLOW-UP: ICD-10-CM

## 2024-06-05 DIAGNOSIS — R07.9 CHEST PAIN: ICD-10-CM

## 2024-06-05 DIAGNOSIS — R55 SYNCOPAL EPISODES: ICD-10-CM

## 2024-06-05 DIAGNOSIS — I15.2 HYPERTENSION ASSOCIATED WITH DIABETES: ICD-10-CM

## 2024-06-05 DIAGNOSIS — E11.59 HYPERTENSION ASSOCIATED WITH DIABETES: ICD-10-CM

## 2024-06-05 PROBLEM — I10 ESSENTIAL HYPERTENSION: Status: ACTIVE | Noted: 2024-06-05

## 2024-06-05 PROBLEM — E78.5 HYPERLIPIDEMIA: Status: ACTIVE | Noted: 2024-06-05

## 2024-06-05 LAB
ALBUMIN SERPL BCP-MCNC: 3 G/DL (ref 3.5–5.2)
ALP SERPL-CCNC: 111 U/L (ref 55–135)
ALT SERPL W/O P-5'-P-CCNC: 29 U/L (ref 10–44)
ANION GAP SERPL CALC-SCNC: 11 MMOL/L (ref 8–16)
APTT PPP: 22.4 SEC (ref 21–32)
AST SERPL-CCNC: 33 U/L (ref 10–40)
BACTERIA #/AREA URNS AUTO: NORMAL /HPF
BASOPHILS # BLD AUTO: 0.02 K/UL (ref 0–0.2)
BASOPHILS # BLD AUTO: 0.03 K/UL (ref 0–0.2)
BASOPHILS NFR BLD: 0.2 % (ref 0–1.9)
BASOPHILS NFR BLD: 0.3 % (ref 0–1.9)
BILIRUB SERPL-MCNC: 0.6 MG/DL (ref 0.1–1)
BILIRUB UR QL STRIP: NEGATIVE
BNP SERPL-MCNC: 123 PG/ML (ref 0–99)
BUN SERPL-MCNC: 23 MG/DL (ref 8–23)
CALCIUM SERPL-MCNC: 9.3 MG/DL (ref 8.7–10.5)
CHLORIDE SERPL-SCNC: 106 MMOL/L (ref 95–110)
CLARITY UR REFRACT.AUTO: CLEAR
CO2 SERPL-SCNC: 21 MMOL/L (ref 23–29)
COLOR UR AUTO: YELLOW
CREAT SERPL-MCNC: 1.4 MG/DL (ref 0.5–1.4)
D DIMER PPP IA.FEU-MCNC: >33 MG/L FEU
DIFFERENTIAL METHOD BLD: ABNORMAL
DIFFERENTIAL METHOD BLD: ABNORMAL
EOSINOPHIL # BLD AUTO: 0.1 K/UL (ref 0–0.5)
EOSINOPHIL # BLD AUTO: 0.1 K/UL (ref 0–0.5)
EOSINOPHIL NFR BLD: 0.7 % (ref 0–8)
EOSINOPHIL NFR BLD: 1 % (ref 0–8)
ERYTHROCYTE [DISTWIDTH] IN BLOOD BY AUTOMATED COUNT: 17.5 % (ref 11.5–14.5)
ERYTHROCYTE [DISTWIDTH] IN BLOOD BY AUTOMATED COUNT: 17.5 % (ref 11.5–14.5)
EST. GFR  (NO RACE VARIABLE): 37.6 ML/MIN/1.73 M^2
GLUCOSE SERPL-MCNC: 163 MG/DL (ref 70–110)
GLUCOSE UR QL STRIP: NEGATIVE
HCT VFR BLD AUTO: 36.9 % (ref 37–48.5)
HCT VFR BLD AUTO: 37.4 % (ref 37–48.5)
HGB BLD-MCNC: 11.9 G/DL (ref 12–16)
HGB BLD-MCNC: 12 G/DL (ref 12–16)
HGB UR QL STRIP: ABNORMAL
HYALINE CASTS UR QL AUTO: 0 /LPF
IMM GRANULOCYTES # BLD AUTO: 0.05 K/UL (ref 0–0.04)
IMM GRANULOCYTES # BLD AUTO: 0.05 K/UL (ref 0–0.04)
IMM GRANULOCYTES NFR BLD AUTO: 0.4 % (ref 0–0.5)
IMM GRANULOCYTES NFR BLD AUTO: 0.4 % (ref 0–0.5)
INR PPP: 1.1 (ref 0.8–1.2)
KETONES UR QL STRIP: ABNORMAL
LEUKOCYTE ESTERASE UR QL STRIP: NEGATIVE
LYMPHOCYTES # BLD AUTO: 1.3 K/UL (ref 1–4.8)
LYMPHOCYTES # BLD AUTO: 1.4 K/UL (ref 1–4.8)
LYMPHOCYTES NFR BLD: 10.9 % (ref 18–48)
LYMPHOCYTES NFR BLD: 11.6 % (ref 18–48)
MCH RBC QN AUTO: 29.3 PG (ref 27–31)
MCH RBC QN AUTO: 30 PG (ref 27–31)
MCHC RBC AUTO-ENTMCNC: 31.8 G/DL (ref 32–36)
MCHC RBC AUTO-ENTMCNC: 32.5 G/DL (ref 32–36)
MCV RBC AUTO: 92 FL (ref 82–98)
MCV RBC AUTO: 92 FL (ref 82–98)
MICROSCOPIC COMMENT: NORMAL
MONOCYTES # BLD AUTO: 0.5 K/UL (ref 0.3–1)
MONOCYTES # BLD AUTO: 0.6 K/UL (ref 0.3–1)
MONOCYTES NFR BLD: 4.2 % (ref 4–15)
MONOCYTES NFR BLD: 5 % (ref 4–15)
NEUTROPHILS # BLD AUTO: 9.6 K/UL (ref 1.8–7.7)
NEUTROPHILS # BLD AUTO: 9.8 K/UL (ref 1.8–7.7)
NEUTROPHILS NFR BLD: 82.4 % (ref 38–73)
NEUTROPHILS NFR BLD: 82.9 % (ref 38–73)
NITRITE UR QL STRIP: NEGATIVE
NRBC BLD-RTO: 0 /100 WBC
NRBC BLD-RTO: 0 /100 WBC
OHS QRS DURATION: 120 MS
OHS QTC CALCULATION: 490 MS
PH UR STRIP: 6 [PH] (ref 5–8)
PLATELET # BLD AUTO: 81 K/UL (ref 150–450)
PLATELET # BLD AUTO: ABNORMAL K/UL (ref 150–450)
PLATELET BLD QL SMEAR: ABNORMAL
PMV BLD AUTO: 13.4 FL (ref 9.2–12.9)
PMV BLD AUTO: ABNORMAL FL (ref 9.2–12.9)
POTASSIUM SERPL-SCNC: 4.1 MMOL/L (ref 3.5–5.1)
PROT SERPL-MCNC: 8.3 G/DL (ref 6–8.4)
PROT UR QL STRIP: ABNORMAL
PROTHROMBIN TIME: 11.7 SEC (ref 9–12.5)
RBC # BLD AUTO: 4 M/UL (ref 4–5.4)
RBC # BLD AUTO: 4.06 M/UL (ref 4–5.4)
RBC #/AREA URNS AUTO: 1 /HPF (ref 0–4)
SODIUM SERPL-SCNC: 138 MMOL/L (ref 136–145)
SP GR UR STRIP: 1.01 (ref 1–1.03)
SQUAMOUS #/AREA URNS AUTO: 0 /HPF
TROPONIN I SERPL DL<=0.01 NG/ML-MCNC: 0.1 NG/ML (ref 0–0.03)
TROPONIN I SERPL DL<=0.01 NG/ML-MCNC: 0.39 NG/ML (ref 0–0.03)
TROPONIN I SERPL DL<=0.01 NG/ML-MCNC: 0.53 NG/ML (ref 0–0.03)
URN SPEC COLLECT METH UR: ABNORMAL
WBC # BLD AUTO: 11.66 K/UL (ref 3.9–12.7)
WBC # BLD AUTO: 11.79 K/UL (ref 3.9–12.7)
WBC #/AREA URNS AUTO: 2 /HPF (ref 0–5)

## 2024-06-05 PROCEDURE — 84484 ASSAY OF TROPONIN QUANT: CPT | Mod: 91

## 2024-06-05 PROCEDURE — 85025 COMPLETE CBC W/AUTO DIFF WBC: CPT | Mod: 91 | Performed by: EMERGENCY MEDICINE

## 2024-06-05 PROCEDURE — 85730 THROMBOPLASTIN TIME PARTIAL: CPT | Performed by: EMERGENCY MEDICINE

## 2024-06-05 PROCEDURE — 99285 EMERGENCY DEPT VISIT HI MDM: CPT | Mod: 25

## 2024-06-05 PROCEDURE — 84484 ASSAY OF TROPONIN QUANT: CPT | Performed by: EMERGENCY MEDICINE

## 2024-06-05 PROCEDURE — 81001 URINALYSIS AUTO W/SCOPE: CPT | Performed by: EMERGENCY MEDICINE

## 2024-06-05 PROCEDURE — 99221 1ST HOSP IP/OBS SF/LOW 40: CPT | Mod: ,,, | Performed by: INTERNAL MEDICINE

## 2024-06-05 PROCEDURE — 63600175 PHARM REV CODE 636 W HCPCS: Performed by: EMERGENCY MEDICINE

## 2024-06-05 PROCEDURE — 25000003 PHARM REV CODE 250: Performed by: EMERGENCY MEDICINE

## 2024-06-05 PROCEDURE — 25000003 PHARM REV CODE 250

## 2024-06-05 PROCEDURE — 25500020 PHARM REV CODE 255: Performed by: EMERGENCY MEDICINE

## 2024-06-05 PROCEDURE — 93005 ELECTROCARDIOGRAM TRACING: CPT

## 2024-06-05 PROCEDURE — 83605 ASSAY OF LACTIC ACID: CPT

## 2024-06-05 PROCEDURE — 93010 ELECTROCARDIOGRAM REPORT: CPT | Mod: ,,, | Performed by: INTERNAL MEDICINE

## 2024-06-05 PROCEDURE — 21400001 HC TELEMETRY ROOM

## 2024-06-05 PROCEDURE — 85610 PROTHROMBIN TIME: CPT | Performed by: EMERGENCY MEDICINE

## 2024-06-05 PROCEDURE — 80053 COMPREHEN METABOLIC PANEL: CPT | Performed by: EMERGENCY MEDICINE

## 2024-06-05 PROCEDURE — 85379 FIBRIN DEGRADATION QUANT: CPT | Performed by: EMERGENCY MEDICINE

## 2024-06-05 PROCEDURE — 11000001 HC ACUTE MED/SURG PRIVATE ROOM

## 2024-06-05 PROCEDURE — 83880 ASSAY OF NATRIURETIC PEPTIDE: CPT | Performed by: EMERGENCY MEDICINE

## 2024-06-05 RX ORDER — DOXYCYCLINE HYCLATE 100 MG
100 TABLET ORAL 2 TIMES DAILY
Status: DISCONTINUED | OUTPATIENT
Start: 2024-06-05 | End: 2024-06-09 | Stop reason: HOSPADM

## 2024-06-05 RX ORDER — IBUPROFEN 200 MG
24 TABLET ORAL
Status: DISCONTINUED | OUTPATIENT
Start: 2024-06-05 | End: 2024-06-09 | Stop reason: HOSPADM

## 2024-06-05 RX ORDER — SODIUM CHLORIDE 0.9 % (FLUSH) 0.9 %
10 SYRINGE (ML) INJECTION EVERY 12 HOURS PRN
Status: DISCONTINUED | OUTPATIENT
Start: 2024-06-05 | End: 2024-06-09 | Stop reason: HOSPADM

## 2024-06-05 RX ORDER — AMLODIPINE BESYLATE 5 MG/1
5 TABLET ORAL DAILY
Status: DISCONTINUED | OUTPATIENT
Start: 2024-06-06 | End: 2024-06-05

## 2024-06-05 RX ORDER — ATORVASTATIN CALCIUM 40 MG/1
80 TABLET, FILM COATED ORAL DAILY
Status: DISCONTINUED | OUTPATIENT
Start: 2024-06-06 | End: 2024-06-09 | Stop reason: HOSPADM

## 2024-06-05 RX ORDER — NALOXONE HCL 0.4 MG/ML
0.02 VIAL (ML) INJECTION
Status: DISCONTINUED | OUTPATIENT
Start: 2024-06-05 | End: 2024-06-09 | Stop reason: HOSPADM

## 2024-06-05 RX ORDER — HEPARIN SODIUM,PORCINE/D5W 25000/250
0-40 INTRAVENOUS SOLUTION INTRAVENOUS CONTINUOUS
Status: DISCONTINUED | OUTPATIENT
Start: 2024-06-05 | End: 2024-06-06

## 2024-06-05 RX ORDER — IPRATROPIUM BROMIDE AND ALBUTEROL SULFATE 2.5; .5 MG/3ML; MG/3ML
3 SOLUTION RESPIRATORY (INHALATION) EVERY 8 HOURS
Status: DISCONTINUED | OUTPATIENT
Start: 2024-06-06 | End: 2024-06-09 | Stop reason: HOSPADM

## 2024-06-05 RX ORDER — IBUPROFEN 200 MG
16 TABLET ORAL
Status: DISCONTINUED | OUTPATIENT
Start: 2024-06-05 | End: 2024-06-09 | Stop reason: HOSPADM

## 2024-06-05 RX ORDER — ONDANSETRON HYDROCHLORIDE 2 MG/ML
4 INJECTION, SOLUTION INTRAVENOUS EVERY 6 HOURS PRN
Status: DISCONTINUED | OUTPATIENT
Start: 2024-06-05 | End: 2024-06-09 | Stop reason: HOSPADM

## 2024-06-05 RX ORDER — NALOXONE HCL 0.4 MG/ML
0.02 VIAL (ML) INJECTION
Status: DISCONTINUED | OUTPATIENT
Start: 2024-06-05 | End: 2024-06-06

## 2024-06-05 RX ORDER — IBUPROFEN 200 MG
16 TABLET ORAL
Status: DISCONTINUED | OUTPATIENT
Start: 2024-06-05 | End: 2024-06-06

## 2024-06-05 RX ORDER — NAPROXEN SODIUM 220 MG/1
162 TABLET, FILM COATED ORAL
Status: COMPLETED | OUTPATIENT
Start: 2024-06-05 | End: 2024-06-05

## 2024-06-05 RX ORDER — INSULIN ASPART 100 [IU]/ML
0-5 INJECTION, SOLUTION INTRAVENOUS; SUBCUTANEOUS
Status: DISCONTINUED | OUTPATIENT
Start: 2024-06-05 | End: 2024-06-09 | Stop reason: HOSPADM

## 2024-06-05 RX ORDER — GLUCAGON 1 MG
1 KIT INJECTION
Status: DISCONTINUED | OUTPATIENT
Start: 2024-06-05 | End: 2024-06-06

## 2024-06-05 RX ORDER — IBUPROFEN 200 MG
24 TABLET ORAL
Status: DISCONTINUED | OUTPATIENT
Start: 2024-06-05 | End: 2024-06-06

## 2024-06-05 RX ORDER — TORSEMIDE 10 MG/1
10 TABLET ORAL
Status: DISCONTINUED | OUTPATIENT
Start: 2024-06-05 | End: 2024-06-05

## 2024-06-05 RX ORDER — ALBUTEROL SULFATE 90 UG/1
2 AEROSOL, METERED RESPIRATORY (INHALATION) EVERY 4 HOURS PRN
Status: DISCONTINUED | OUTPATIENT
Start: 2024-06-05 | End: 2024-06-09 | Stop reason: HOSPADM

## 2024-06-05 RX ORDER — SODIUM CHLORIDE 0.9 % (FLUSH) 0.9 %
10 SYRINGE (ML) INJECTION EVERY 12 HOURS PRN
Status: DISCONTINUED | OUTPATIENT
Start: 2024-06-05 | End: 2024-06-06

## 2024-06-05 RX ORDER — GLUCAGON 1 MG
1 KIT INJECTION
Status: DISCONTINUED | OUTPATIENT
Start: 2024-06-05 | End: 2024-06-09 | Stop reason: HOSPADM

## 2024-06-05 RX ORDER — ACETAMINOPHEN 325 MG/1
650 TABLET ORAL EVERY 8 HOURS PRN
Status: DISCONTINUED | OUTPATIENT
Start: 2024-06-05 | End: 2024-06-09 | Stop reason: HOSPADM

## 2024-06-05 RX ORDER — ACETAMINOPHEN 325 MG/1
650 TABLET ORAL EVERY 4 HOURS PRN
Status: DISCONTINUED | OUTPATIENT
Start: 2024-06-05 | End: 2024-06-09 | Stop reason: HOSPADM

## 2024-06-05 RX ORDER — ALLOPURINOL 300 MG/1
300 TABLET ORAL DAILY
Status: DISCONTINUED | OUTPATIENT
Start: 2024-06-06 | End: 2024-06-09 | Stop reason: HOSPADM

## 2024-06-05 RX ADMIN — IOHEXOL 75 ML: 350 INJECTION, SOLUTION INTRAVENOUS at 05:06

## 2024-06-05 RX ADMIN — ASPIRIN 81 MG CHEWABLE TABLET 162 MG: 81 TABLET CHEWABLE at 05:06

## 2024-06-05 RX ADMIN — HEPARIN SODIUM 39.95 UNITS/KG/HR: 10000 INJECTION, SOLUTION INTRAVENOUS at 08:06

## 2024-06-05 RX ADMIN — DOXYCYCLINE HYCLATE 100 MG: 100 TABLET, COATED ORAL at 09:06

## 2024-06-05 NOTE — ED PROVIDER NOTES
Encounter Date: 6/5/2024       History     Chief Complaint   Patient presents with    Loss of Consciousness     Syncopal episode, patient thinks she may have over exerted herself, she became dizzy with SOB and syncopal episode, bumped her head. Unknown how long patient was out. Patient not complaining of any pain. Upon ems arrival patient complained of dizziness and nausea.      84-year-old female with a history of CVA, chronic right knee infection, CKD, obesity, diabetes presenting with syncopal episode.  Patient reports she was on her mobility scooter about to enter her door when she suddenly syncopized.  She notes mild nausea.  Since then she has had mild nausea and a slight discomfort in her chest.  Denies fevers, chills, cough.  Denies new lower extremity edema.  Notes she is chronically on antibiotics for an infected right joint.  Denies history of DVT or PE.  Not currently on anticoagulation.  Denies any complaints prior to the episode today.  Denies history of similar.      Review of patient's allergies indicates:   Allergen Reactions    Bactrim [sulfamethoxazole-trimethoprim]      Other reaction(s): up set stomach rash/hives    Azithromycin      Feels bad    Erythromycin Other (See Comments)     Other reaction(s): Unknown  Other reaction(s): Stomach upset    Gentamicin      Vaginal burning , itching     Levofloxacin Hives     Other reaction(s): nervousness    Penicillins Other (See Comments)     Other reaction(s): Unknown  Other reaction(s): Swelling  Other reaction(s): Rash  Other reaction(s): Hives    Shellfish containing products      Rash      Vancomycin Itching     Other reaction(s): Itching     Past Medical History:   Diagnosis Date    Adrenal mass- adenoma stable since 2004 (1.8 cm and 8/13/12 (2 cm); stable 2016 2.4 cm     Adrenal mass- adenoma stable since 2004 (1.8 cm and 8/13/12 (2 cm); stable 2016 2.4 cm    Arthritis     Asthma in adult without complication 6/25/2015    Bilateral carotid artery  disease 7/21/2017    Bilateral sciatica 2/7/2017    Cellulitis of right leg 08/16/2017    Cerebral infarction 1/29/2016    Multiple areas of lacunar infarction. Stroke risk factors include HTN, DM2, Dyslipidemia.  Continue ASA 81mg/ Statin therapy I have encouraged 30 minutes of physical activity daily for 5 days a week. She has access to a pool so this should not be so jarring to her joints.     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    Mixed hyperlipidemia 8/13/2012    Morbid obesity with BMI of 50.0-59.9, adult 2/11/2014    Obstructive sleep apnea syndrome 8/13/2012    Pulmonary embolism 6/5/2024    Senile cataracts of both eyes 1/22/2015    Traumatic open wound of right lower leg 8/25/2017    Trigeminal neuralgia of right side of face 5/23/2017    For years now Previously on Gabapentin, but caused constipation Dissipating over time Not related to intracranial abnormalities Possibly related to dental procedure    Type 2 diabetes mellitus with diabetic polyneuropathy, with long-term current use of insulin 1/29/2013    Venous stasis dermatitis of both lower extremities 8/21/2017    Viral hepatitis A without coma 1/1/1971    Hep B infection in [facility] in [facility], was hospitalize for 2 weeks at that time, unknown treatment.    Vitamin D deficiency disease 8/13/2012     Past Surgical History:   Procedure Laterality Date    HYSTERECTOMY  1982    secondary uterine fibroids    INJECTION OF ANESTHETIC AGENT AROUND NERVE Right 10/21/2021    Procedure: BLOCK, NERVE GENICULAR RIGHT NEEDS CONSENT;  Surgeon:  Senia Kilpatrick MD;  Location: [facility] PAIN [facility]GT;  Service: Pain Management;  Laterality: Right;    INSERTION OF ANTIBIOTIC SPACER Right 10/28/2021    Procedure: INSERTION, ANTIBIOTIC SPACER;  Surgeon: Alfredo Lozoya MD;  Location: [address];  Service: Orthopedics;  Laterality: Right;    JOINT REPLACEMENT      REVISION OF KNEE ARTHROPLASTY Right 10/28/2021    Procedure: REVISION, ARTHROPLASTY, KNEE-DEPUY ANTIBIOTIC SPACER;  Surgeon: Alfredo Lozoya MD;  Location: [address];  Service: Orthopedics;  Laterality: Right;    REVISION OF KNEE ARTHROPLASTY Right 6/30/2022    Procedure: REVISION, ARTHROPLASTY, KNEE-NAKIA- stage 2;  Surgeon: Alfredo Lozoya MD;  Location: [facility] OR [address] [place] [address];  Service: Orthopedics;  Laterality: Right;    Right total knee replacement       Family History   Problem Relation Name Age of Onset    Cancer Mother Leslie Giordano 69        cancer of jaw    Hypertension Father Oskar Giordano     Cancer Sister Tiffanie Del Angel         half sister - unknown dx    No Known Problems Brother      No Known Problems Maternal Aunt      No Known Problems Maternal Uncle      No Known Problems Paternal Aunt      No Known Problems Paternal Uncle      No Known Problems Maternal Grandmother      No Known Problems Maternal Grandfather      No Known Problems Paternal Grandmother      No Known Problems Paternal Grandfather      Glaucoma Neg Hx      Breast cancer Neg Hx      Colon cancer Neg Hx      Ovarian cancer Neg Hx      Stroke Neg Hx      Allergic rhinitis Neg Hx      Allergies Neg Hx      Angioedema Neg Hx      Asthma Neg Hx      Atopy Neg Hx      Eczema Neg Hx      Immunodeficiency Neg Hx      Rhinitis Neg Hx      Urticaria Neg Hx      Amblyopia Neg Hx      Blindness Neg Hx      Cataracts Neg Hx      Diabetes Neg Hx      Macular degeneration Neg Hx      Retinal detachment Neg Hx      Strabismus Neg Hx      Thyroid disease Neg Hx      Psoriasis Neg Hx       Social History     Tobacco Use     Smoking status: Never    Smokeless tobacco: Never   Substance Use Topics    Alcohol use: Yes     Comment: holidays    Drug use: No     Review of Systems   Constitutional:  Negative for chills and fever.   HENT:  Negative for sore throat.    Respiratory:  Positive for shortness of breath.    Cardiovascular:  Positive for chest pain.   Gastrointestinal:  Negative for nausea.   Genitourinary:  Negative for dysuria.   Musculoskeletal:  Negative for back pain.   Skin:  Negative for rash.   Neurological:  Positive for syncope. Negative for weakness, numbness and headaches.       Physical Exam     Initial Vitals [06/05/24 1156]   BP Pulse Resp Temp SpO2   (!) 152/60 104 18 97.9 °F (36.6 °C) 98 %      MAP       --         Physical Exam    Nursing note and vitals reviewed.  Constitutional: She appears well-developed and well-nourished. She is not diaphoretic. No distress.   HENT:   Head: Normocephalic and atraumatic.   Nose: Nose normal.   Eyes: EOM are normal. Pupils are equal, round, and reactive to light. No scleral icterus.   Neck: Neck supple.   Normal range of motion.  Cardiovascular:  Normal rate, regular rhythm, normal heart sounds and intact distal pulses.     Exam reveals no gallop and no friction rub.       No murmur heard.  Pulmonary/Chest: Breath sounds normal. No stridor. No respiratory distress. She has no wheezes. She has no rhonchi. She has no rales.   Abdominal: Abdomen is soft. Bowel sounds are normal. She exhibits no distension. There is no abdominal tenderness. There is no rebound and no guarding.   Musculoskeletal:         General: No tenderness or edema. Normal range of motion.      Cervical back: Normal range of motion and neck supple.     Neurological: She is alert and oriented to person, place, and time. No cranial nerve deficit. GCS score is 15. GCS eye subscore is 4. GCS verbal subscore is 5. GCS motor subscore is 6.   Skin: Skin is warm and dry. No rash noted.   Psychiatric: She has a normal mood  and affect. Her behavior is normal.         ED Course   Procedures  Labs Reviewed   CBC W/ AUTO DIFFERENTIAL - Abnormal; Notable for the following components:       Result Value    Hematocrit 36.9 (*)     RDW 17.5 (*)     Platelets 81 (*)     MPV 13.4 (*)     Gran # (ANC) 9.6 (*)     Immature Grans (Abs) 0.05 (*)     Gran % 82.4 (*)     Lymph % 10.9 (*)     All other components within normal limits   COMPREHENSIVE METABOLIC PANEL - Abnormal; Notable for the following components:    CO2 21 (*)     Glucose 163 (*)     Albumin 3.0 (*)     eGFR 37.6 (*)     All other components within normal limits   TROPONIN I - Abnormal; Notable for the following components:    Troponin I 0.097 (*)     All other components within normal limits   TROPONIN I - Abnormal; Notable for the following components:    Troponin I 0.389 (*)     All other components within normal limits   B-TYPE NATRIURETIC PEPTIDE - Abnormal; Notable for the following components:     (*)     All other components within normal limits   D DIMER, QUANTITATIVE - Abnormal; Notable for the following components:    D-Dimer >33.00 (*)     All other components within normal limits   URINALYSIS, REFLEX TO URINE CULTURE - Abnormal; Notable for the following components:    Protein, UA 1+ (*)     Ketones, UA Trace (*)     Occult Blood UA Trace (*)     All other components within normal limits    Narrative:     Specimen Source->Urine   CBC W/ AUTO DIFFERENTIAL - Abnormal; Notable for the following components:    Hemoglobin 11.9 (*)     MCHC 31.8 (*)     RDW 17.5 (*)     Gran # (ANC) 9.8 (*)     Immature Grans (Abs) 0.05 (*)     Gran % 82.9 (*)     Lymph % 11.6 (*)     Platelet Estimate Clumped (*)     All other components within normal limits    Narrative:     Draw baseline aPTT prior to starting the heparin bolus or  infusion  (if patient is on warfarin prior to heparin therapy)   URINALYSIS MICROSCOPIC    Narrative:     Specimen Source->Urine   APTT    Narrative:      Draw baseline aPTT prior to starting the heparin bolus or  infusion  (if patient is on warfarin prior to heparin therapy)   PROTIME-INR    Narrative:     Draw baseline aPTT prior to starting the heparin bolus or  infusion  (if patient is on warfarin prior to heparin therapy)   TROPONIN I   POCT GLUCOSE MONITORING CONTINUOUS          Imaging Results              CTA Chest Non-Coronary (PE Studies) (Final result)  Result time 06/05/24 17:56:18      Final result by Lion Ascencio MD (06/05/24 17:56:18)                   Impression:      1. Extensive pulmonary emboli throughout the bilateral lungs including a saddle pulmonary embolus.  There is evidence of right heart strain with flattening of the interventricular septum and enlargement of the right ventricle and main pulmonary artery.  2. Peripheral airspace opacities in the right middle lobe, consistent with areas of pulmonary infarctions.  3. 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  4. Additional findings as above.  These findings were discovered at 17:15 on 06/05/2024 and relayed to Herb Resendiz MD via telephone by Wm Trevino MD at 17:20 on 06/05/2024.    Electronically signed by resident: Wm Trevino  Date:    06/05/2024  Time:    17:15    Electronically signed by: Lion Ascencio MD  Date:    06/05/2024  Time:    17:56               Narrative:    EXAMINATION:  CTA CHEST NON CORONARY (PE STUDIES)    CLINICAL HISTORY:  Pulmonary embolism (PE) suspected, high prob;Pulmonary embolism (PE) suspected, positive D-dimer;    TECHNIQUE:  During intravenous bolus injection of 75 ml of Omnipaque 350 contrast medium and using low dose technique, the chest was surveyed from above the pulmonary apices through the posterior costophrenic angles data was reconstructed for multiplanar images in axial, sagittal and coronal planes and for maximal intensity projection images in the the axial, sagittal and coronal planes.    COMPARISON:  Chest x-ray 06/05/2024  and CT abdomen pelvis 10/26/2016    FINDINGS:  Chest wall and lower neck: No axillary or supraclavicular lymphadenopathy.Visualized thyroid gland is unremarkable.    Heart and mediastinum: Extensive intraluminal filling defects throughout the pulmonary arterial system.  There is a saddle pulmonary embolism in the pulmonary trunk which extends into bilateral main pulmonary arteries.  Filling defects extend into segmental and subsegmental arteries throughout all lobes of bilateral lungs.  There is evidence of right heart strain with flattening of the interventricular septum, enlargement of the right ventricle measuring up to 4.7 cm, and enlargement of the main pulmonary artery, measuring up to 3.3 cm.    No mediastinal or hilar adenopathy.  No pericardial effusion. Multivessel coronary artery calcific atherosclerosis.    Aorta: Left-sided aortic arch.Non-aneurysmal. Noatheromatous disease in the aorta.  Incidental note is made of retropharyngeal course of bilateral common carotid arteries.    Lungs and pleura: Airways are patent. Lungs are symmetrically expanded.  Bibasilar dependent atelectasis.  There are peripheral airspace opacities in the right middle lobe.  Subpleural lipoma adjacent to the right middle lobe measuring approximately 2.3 x 1.2 cm.  No pneumothorax.    Esophagus and upper abdomen: Saccular splenic artery aneurysm measuring 0.7 x 0.7 cm (series 5, image 429).  Layering hyperdense material in the gallbladder, possibly due to small layering stones.  No evidence cholecystitis.  2.3 cm left adrenal nodule (series 5, image 445), previously 2.4 cm in 2016.  Nonspecific nodular thickening the right adrenal gland.  Bilateral simple renal cysts.  No stones or hydronephrosis.    Bones: Osseous degenerative changes. No acute fracture or suspicious osseous lesion.                                       X-Ray Chest AP Portable (Final result)  Result time 06/05/24 15:06:46      Final result by Gee Angeles,   (06/05/24 15:06:46)                   Impression:      No acute abnormality.      Electronically signed by: Gee Angeles  Date:    06/05/2024  Time:    15:06               Narrative:    EXAMINATION:  XR CHEST AP PORTABLE    CLINICAL HISTORY:  Chest Pain;    TECHNIQUE:  Single frontal view of the chest was performed.    COMPARISON:  10/29/2021.    FINDINGS:  The lungs are well expanded and clear. No focal opacities are seen. The pleural spaces are clear. The cardiac silhouette is enlarged.  The visualized osseous structures are unremarkable.                                       CT Head Without Contrast (Final result)  Result time 06/05/24 13:06:35      Final result by Louis Espinal DO (06/05/24 13:06:35)                   Impression:      Significant motion distorted examination allowing for artifacts there is no acute intracranial findings specifically without evidence for acute intracranial hemorrhage or sulcal effacement to suggest large territory recent infarction.  Clinical correlation and further evaluation as warranted      Electronically signed by: Louis Espinal DO  Date:    06/05/2024  Time:    13:06               Narrative:    EXAMINATION:  CT HEAD WITHOUT CONTRAST    CLINICAL HISTORY:  Head trauma, minor (Age >= 65y);    TECHNIQUE:  Multiple sequential 5 mm axial images of the head without contrast.  Coronal and sagittal reformatted imaging from the axial acquisition.    COMPARISON:  MRI 06/02/2017    FINDINGS:  Study is distorted by motion and beam hardening artifacts.  Within limits of the study there is no evidence for acute intracranial hemorrhage or sulcal effacement to suggest large territory recent infarction.  Ventricles relatively stable without hydrocephalus.  There is ill-defined decreased attenuation supratentorial white matter which is nonspecific and may be sequela of chronic ischemic change    None prominent fall seen calcification incidentally identified    No significant  opacification paranasal sinuses or mastoid air cells..                                       Medications   heparin 25,000 units in dextrose 5% (100 units/ml) IV bolus from bag HIGH INTENSITY nomogram - [facility] (has no administration in time range)   heparin 25,000 units in dextrose 5% 250 mL (100 units/mL) infusion HIGH INTENSITY nomogram - [facility] (has no administration in time range)   heparin 25,000 units in dextrose 5% (100 units/ml) IV bolus from bag HIGH INTENSITY nomogram - [facility] (has no administration in time range)   heparin 25,000 units in dextrose 5% (100 units/ml) IV bolus from bag HIGH INTENSITY nomogram - [facility] (has no administration in time range)   sodium chloride 0.9% flush 10 mL (has no administration in time range)   naloxone 0.4 mg/mL injection 0.02 mg (has no administration in time range)   glucose chewable tablet 16 g (has no administration in time range)   glucose chewable tablet 24 g (has no administration in time range)   glucagon (human recombinant) injection 1 mg (has no administration in time range)   acetaminophen tablet 650 mg (has no administration in time range)   acetaminophen tablet 650 mg (has no administration in time range)   dextrose 10% bolus 125 mL 125 mL (has no administration in time range)   dextrose 10% bolus 250 mL 250 mL (has no administration in time range)   albuterol inhaler 2 puff (has no administration in time range)   albuterol-ipratropium 2.5 mg-0.5 mg/3 mL nebulizer solution 3 mL (has no administration in time range)   allopurinoL tablet 300 mg (has no administration in time range)   atorvastatin tablet 80 mg (has no administration in time range)   doxycycline tablet 100 mg (has no administration in time range)   sodium chloride 0.9% flush 10 mL (has no administration in time range)   naloxone 0.4 mg/mL injection 0.02 mg (has no administration in time range)   glucose chewable tablet 16 g (has no administration in time range)   glucose chewable tablet 24 g (has no  administration in time range)   glucagon (human recombinant) injection 1 mg (has no administration in time range)   dextrose 10% bolus 125 mL 125 mL (has no administration in time range)   dextrose 10% bolus 250 mL 250 mL (has no administration in time range)   glucose chewable tablet 16 g (has no administration in time range)   glucose chewable tablet 24 g (has no administration in time range)   glucagon (human recombinant) injection 1 mg (has no administration in time range)   insulin aspart U-100 pen 0-5 Units (has no administration in time range)   dextrose 10% bolus 125 mL 125 mL (has no administration in time range)   dextrose 10% bolus 250 mL 250 mL (has no administration in time range)   aspirin chewable tablet 162 mg (162 mg Oral Given 6/5/24 1723)   iohexoL (OMNIPAQUE 350) injection 75 mL (75 mLs Intravenous Given 6/5/24 1710)     Medical Decision Making  Amount and/or Complexity of Data Reviewed  Labs: ordered.  Radiology: ordered.    Risk  OTC drugs.  Prescription drug management.  Decision regarding hospitalization.                          Medical Decision Making:   Initial Assessment:   84 year old patient presenting secondary to chest pain and syncope. Patient is at higher risk of ACS due to risk factors and HPI with a heart score of >=4. Patient has received an aspirin. Troponins, Cxr, ekg, and labs ordered which showed an elevated troponin.    [email or web address]    Differential includes:    Pneumonia: chest xray negative. No fever or leukoscytosis. No cough and lungs non consistent with pna  Tamponade: unlikely due to chest xray and ekg  STEMI: No STEMI on ekg  Dissection: equal pulses bilaterally and no ripping chest pain to the back  Esophageal rupture: no dysphagia or vomiting and chest xray negative for mediastinal air  Arrhythmia: no arrhythmia on ekg  CHF: no fluid overload on Cxr and physical exam  Pneumothorax: bilateral breath sounds and no signs of  pneumothorax on chest xray     PE:  Minimal tachycardia.  No hypotension, no oxygen requirement.  D-dimer severely elevated.  CTA shows large bilateral saddle PE discussed case with Cardiology.  Patient is not interested in an intervention at this time.  Patient will be started on heparin.  Will admit to Hospital Medicine for further evaluation and treatment.               Clinical Impression:  Final diagnoses:  [R55] Syncopal episodes  [R07.9] Chest pain  [I26.99] Bilateral pulmonary embolism (Primary)          ED Disposition Condition    Admit Stable                Herb Resendiz MD  06/05/24 2011

## 2024-06-05 NOTE — ED TRIAGE NOTES
Patient identifiers for Duran Giordano 84 y.o. female checked and correct. Pt arrives to ED via EMS after a syncopal episode. Pt reports she was walking back from a spiritual conference when she reached her door and had sudden onset dizziness and fainted. Pt was found down by a fellow sister. + SOB, + hit to head, - blood thinners. Pt endorsing nausea; bout of emesis en route; given zofran. Denies chest pain, diarrhea, chills, fever. Hx of bilateral cellulitis.     Chief Complaint   Patient presents with    Loss of Consciousness     Syncopal episode, patient thinks she may have over exerted herself, she became dizzy with SOB and syncopal episode, bumped her head. Unknown how long patient was out. Patient not complaining of any pain. Upon ems arrival patient complained of dizziness and nausea.      Past Medical History:   Diagnosis Date    Adrenal mass- adenoma stable since 2004 (1.8 cm and 8/13/12 (2 cm); stable 2016 2.4 cm     Adrenal mass- adenoma stable since 2004 (1.8 cm and 8/13/12 (2 cm); stable 2016 2.4 cm    Arthritis     Asthma in adult without complication 6/25/2015    Bilateral carotid artery disease 7/21/2017    Bilateral sciatica 2/7/2017    Cellulitis of right leg 08/16/2017    Cerebral infarction 1/29/2016    Multiple areas of lacunar infarction. Stroke risk factors include HTN, DM2, Dyslipidemia.  Continue ASA 81mg/ Statin therapy I have encouraged 30 minutes of physical activity daily for 5 days a week. She has access to a pool so this should not be so jarring to her joints.     Cervical radiculopathy 3/18/2015    Chronic knee pain     Chronic rhinitis 4/9/2013    CKD (chronic kidney disease) stage 3, GFR 30-59 ml/min 1/29/2013    Coronary artery disease due to calcified coronary lesion 6/25/2015    Deep vein thrombosis     Diastolic dysfunction 10/10/2013    Essential hypertension 6/25/2015    Gastroesophageal reflux disease without esophagitis 8/13/2012    Gout     Hives 10/1/2013    Infection  of prosthetic right knee joint 10/25/2021    Mixed hyperlipidemia 8/13/2012    Morbid obesity with BMI of 50.0-59.9, adult 2/11/2014    Obstructive sleep apnea syndrome 8/13/2012    Senile cataracts of both eyes 1/22/2015    Traumatic open wound of right lower leg 8/25/2017    Trigeminal neuralgia of right side of face 5/23/2017    For years now Previously on Gabapentin, but caused constipation Dissipating over time Not related to intracranial abnormalities Possibly related to dental procedure    Type 2 diabetes mellitus with diabetic polyneuropathy, with long-term current use of insulin 1/29/2013    Venous stasis dermatitis of both lower extremities 8/21/2017    Viral hepatitis A without coma 1/1/1971    Hep B infection in [facility] in 1971, was hospitalize for 2 weeks at that time, unknown treatment.    Vitamin D deficiency disease 8/13/2012     Allergies reported:   Review of patient's allergies indicates:   Allergen Reactions    Bactrim [sulfamethoxazole-trimethoprim]      Other reaction(s): up set stomach rash/hives    Azithromycin      Feels bad    Erythromycin Other (See Comments)     Other reaction(s): Unknown  Other reaction(s): Stomach upset    Gentamicin      Vaginal burning , itching     Levofloxacin Hives     Other reaction(s): nervousness    Penicillins Other (See Comments)     Other reaction(s): Unknown  Other reaction(s): Swelling  Other reaction(s): Rash  Other reaction(s): Hives    Shellfish containing products      Rash      Vancomycin Itching     Other reaction(s): Itching

## 2024-06-06 DIAGNOSIS — E11.29 TYPE 2 DIABETES MELLITUS WITH RENAL MANIFESTATIONS NOT AT GOAL: ICD-10-CM

## 2024-06-06 LAB
ALBUMIN SERPL BCP-MCNC: 2.5 G/DL (ref 3.5–5.2)
ALP SERPL-CCNC: 96 U/L (ref 55–135)
ALT SERPL W/O P-5'-P-CCNC: 26 U/L (ref 10–44)
ANION GAP SERPL CALC-SCNC: 6 MMOL/L (ref 8–16)
APTT PPP: 123.1 SEC (ref 21–32)
APTT PPP: >150 SEC (ref 21–32)
ASCENDING AORTA: 2.48 CM
AST SERPL-CCNC: 28 U/L (ref 10–40)
AV INDEX (PROSTH): 0.54
AV MEAN GRADIENT: 6 MMHG
AV PEAK GRADIENT: 9 MMHG
AV VALVE AREA BY VELOCITY RATIO: 1.58 CM²
AV VALVE AREA: 1.71 CM²
AV VELOCITY RATIO: 0.5
BASOPHILS # BLD AUTO: 0.03 K/UL (ref 0–0.2)
BASOPHILS NFR BLD: 0.3 % (ref 0–1.9)
BILIRUB SERPL-MCNC: 0.6 MG/DL (ref 0.1–1)
BSA FOR ECHO PROCEDURE: 2.29 M2
BUN SERPL-MCNC: 19 MG/DL (ref 8–23)
CALCIUM SERPL-MCNC: 9 MG/DL (ref 8.7–10.5)
CHLORIDE SERPL-SCNC: 108 MMOL/L (ref 95–110)
CO2 SERPL-SCNC: 22 MMOL/L (ref 23–29)
CREAT SERPL-MCNC: 1.1 MG/DL (ref 0.5–1.4)
CV ECHO LV RWT: 0.41 CM
DIFFERENTIAL METHOD BLD: ABNORMAL
DOP CALC AO PEAK VEL: 1.49 M/S
DOP CALC AO VTI: 26.64 CM
DOP CALC LVOT AREA: 3.1 CM2
DOP CALC LVOT DIAMETER: 2 CM
DOP CALC LVOT PEAK VEL: 0.75 M/S
DOP CALC LVOT STROKE VOLUME: 45.53 CM3
DOP CALCLVOT PEAK VEL VTI: 14.5 CM
E/E' RATIO: 14.4 M/S
ECHO LV POSTERIOR WALL: 0.9 CM (ref 0.6–1.1)
EJECTION FRACTION: 50 %
EOSINOPHIL # BLD AUTO: 0.2 K/UL (ref 0–0.5)
EOSINOPHIL NFR BLD: 1.3 % (ref 0–8)
ERYTHROCYTE [DISTWIDTH] IN BLOOD BY AUTOMATED COUNT: 17.9 % (ref 11.5–14.5)
EST. GFR  (NO RACE VARIABLE): 50.2 ML/MIN/1.73 M^2
FRACTIONAL SHORTENING: 23 % (ref 28–44)
GLUCOSE SERPL-MCNC: 150 MG/DL (ref 70–110)
HCT VFR BLD AUTO: 34.4 % (ref 37–48.5)
HGB BLD-MCNC: 10.6 G/DL (ref 12–16)
IMM GRANULOCYTES # BLD AUTO: 0.06 K/UL (ref 0–0.04)
IMM GRANULOCYTES NFR BLD AUTO: 0.5 % (ref 0–0.5)
INTERVENTRICULAR SEPTUM: 0.88 CM (ref 0.6–1.1)
LA MAJOR: 5.61 CM
LA MINOR: 4.97 CM
LA WIDTH: 2.97 CM
LACTATE SERPL-SCNC: 1.2 MMOL/L (ref 0.5–2.2)
LACTATE SERPL-SCNC: 1.2 MMOL/L (ref 0.5–2.2)
LACTATE SERPL-SCNC: 1.4 MMOL/L (ref 0.5–2.2)
LACTATE SERPL-SCNC: 1.5 MMOL/L (ref 0.5–2.2)
LACTATE SERPL-SCNC: 1.9 MMOL/L (ref 0.5–2.2)
LACTATE SERPL-SCNC: 2 MMOL/L (ref 0.5–2.2)
LEFT ATRIUM SIZE: 3.54 CM
LEFT ATRIUM VOLUME INDEX: 22.2 ML/M2
LEFT ATRIUM VOLUME: 47.1 CM3
LEFT INTERNAL DIMENSION IN SYSTOLE: 3.34 CM (ref 2.1–4)
LEFT VENTRICLE DIASTOLIC VOLUME INDEX: 40.16 ML/M2
LEFT VENTRICLE DIASTOLIC VOLUME: 85.14 ML
LEFT VENTRICLE MASS INDEX: 58 G/M2
LEFT VENTRICLE SYSTOLIC VOLUME INDEX: 21.4 ML/M2
LEFT VENTRICLE SYSTOLIC VOLUME: 45.34 ML
LEFT VENTRICULAR INTERNAL DIMENSION IN DIASTOLE: 4.35 CM (ref 3.5–6)
LEFT VENTRICULAR MASS: 123.76 G
LV LATERAL E/E' RATIO: 14.4 M/S
LV SEPTAL E/E' RATIO: 14.4 M/S
LYMPHOCYTES # BLD AUTO: 1.5 K/UL (ref 1–4.8)
LYMPHOCYTES NFR BLD: 13.1 % (ref 18–48)
MAGNESIUM SERPL-MCNC: 1.8 MG/DL (ref 1.6–2.6)
MAGNESIUM SERPL-MCNC: 1.8 MG/DL (ref 1.6–2.6)
MCH RBC QN AUTO: 29.2 PG (ref 27–31)
MCHC RBC AUTO-ENTMCNC: 30.8 G/DL (ref 32–36)
MCV RBC AUTO: 95 FL (ref 82–98)
MONOCYTES # BLD AUTO: 0.6 K/UL (ref 0.3–1)
MONOCYTES NFR BLD: 5.4 % (ref 4–15)
MV PEAK E VEL: 0.72 M/S
NEUTROPHILS # BLD AUTO: 9.4 K/UL (ref 1.8–7.7)
NEUTROPHILS NFR BLD: 79.4 % (ref 38–73)
NRBC BLD-RTO: 0 /100 WBC
OHS CV RV/LV RATIO: 0.68 CM
PHOSPHATE SERPL-MCNC: 3.2 MG/DL (ref 2.7–4.5)
PHOSPHATE SERPL-MCNC: 3.2 MG/DL (ref 2.7–4.5)
PISA TR MAX VEL: 2.89 M/S
PLATELET # BLD AUTO: 83 K/UL (ref 150–450)
PMV BLD AUTO: ABNORMAL FL (ref 9.2–12.9)
POCT GLUCOSE: 187 MG/DL (ref 70–110)
POCT GLUCOSE: 188 MG/DL (ref 70–110)
POCT GLUCOSE: 190 MG/DL (ref 70–110)
POCT GLUCOSE: 224 MG/DL (ref 70–110)
POTASSIUM SERPL-SCNC: 4.1 MMOL/L (ref 3.5–5.1)
PROT SERPL-MCNC: 7.2 G/DL (ref 6–8.4)
RA MAJOR: 5.05 CM
RA PRESSURE ESTIMATED: 15 MMHG
RA WIDTH: 3.79 CM
RBC # BLD AUTO: 3.63 M/UL (ref 4–5.4)
RIGHT VENTRICULAR END-DIASTOLIC DIMENSION: 2.96 CM
RV TB RVSP: 18 MMHG
SINUS: 2.1 CM
SODIUM SERPL-SCNC: 136 MMOL/L (ref 136–145)
STJ: 2.03 CM
TDI LATERAL: 0.05 M/S
TDI SEPTAL: 0.05 M/S
TDI: 0.05 M/S
TR MAX PG: 33 MMHG
TRICUSPID ANNULAR PLANE SYSTOLIC EXCURSION: 2.88 CM
TV REST PULMONARY ARTERY PRESSURE: 48 MMHG
WBC # BLD AUTO: 11.76 K/UL (ref 3.9–12.7)
Z-SCORE OF LEFT VENTRICULAR DIMENSION IN END DIASTOLE: -4.32
Z-SCORE OF LEFT VENTRICULAR DIMENSION IN END SYSTOLE: -1.6

## 2024-06-06 PROCEDURE — 94761 N-INVAS EAR/PLS OXIMETRY MLT: CPT

## 2024-06-06 PROCEDURE — 84100 ASSAY OF PHOSPHORUS: CPT

## 2024-06-06 PROCEDURE — 94640 AIRWAY INHALATION TREATMENT: CPT

## 2024-06-06 PROCEDURE — 25000242 PHARM REV CODE 250 ALT 637 W/ HCPCS

## 2024-06-06 PROCEDURE — 83735 ASSAY OF MAGNESIUM: CPT

## 2024-06-06 PROCEDURE — 21400001 HC TELEMETRY ROOM

## 2024-06-06 PROCEDURE — 63600175 PHARM REV CODE 636 W HCPCS: Performed by: EMERGENCY MEDICINE

## 2024-06-06 PROCEDURE — 83605 ASSAY OF LACTIC ACID: CPT | Mod: 91

## 2024-06-06 PROCEDURE — 63600175 PHARM REV CODE 636 W HCPCS

## 2024-06-06 PROCEDURE — 85730 THROMBOPLASTIN TIME PARTIAL: CPT | Mod: 91 | Performed by: HOSPITALIST

## 2024-06-06 PROCEDURE — 85025 COMPLETE CBC W/AUTO DIFF WBC: CPT | Performed by: EMERGENCY MEDICINE

## 2024-06-06 PROCEDURE — 25000003 PHARM REV CODE 250: Performed by: HOSPITALIST

## 2024-06-06 PROCEDURE — 36415 COLL VENOUS BLD VENIPUNCTURE: CPT | Mod: XB

## 2024-06-06 PROCEDURE — 85730 THROMBOPLASTIN TIME PARTIAL: CPT | Performed by: STUDENT IN AN ORGANIZED HEALTH CARE EDUCATION/TRAINING PROGRAM

## 2024-06-06 PROCEDURE — 80053 COMPREHEN METABOLIC PANEL: CPT

## 2024-06-06 PROCEDURE — 25000003 PHARM REV CODE 250

## 2024-06-06 PROCEDURE — 36415 COLL VENOUS BLD VENIPUNCTURE: CPT | Performed by: HOSPITALIST

## 2024-06-06 RX ORDER — SENNOSIDES 8.6 MG/1
8.6 TABLET ORAL DAILY PRN
Status: DISCONTINUED | OUTPATIENT
Start: 2024-06-06 | End: 2024-06-09 | Stop reason: HOSPADM

## 2024-06-06 RX ORDER — AMLODIPINE BESYLATE 5 MG/1
5 TABLET ORAL DAILY
Status: DISCONTINUED | OUTPATIENT
Start: 2024-06-06 | End: 2024-06-06

## 2024-06-06 RX ORDER — APIXABAN 5 MG (74)
KIT ORAL
Qty: 74 TABLET | Refills: 0 | Status: SHIPPED | OUTPATIENT
Start: 2024-06-06

## 2024-06-06 RX ORDER — TORSEMIDE 10 MG/1
10 TABLET ORAL DAILY
Status: DISCONTINUED | OUTPATIENT
Start: 2024-06-06 | End: 2024-06-08

## 2024-06-06 RX ORDER — TORSEMIDE 10 MG/1
10 TABLET ORAL
Status: DISCONTINUED | OUTPATIENT
Start: 2024-06-06 | End: 2024-06-06

## 2024-06-06 RX ADMIN — IPRATROPIUM BROMIDE AND ALBUTEROL SULFATE 3 ML: 2.5; .5 SOLUTION RESPIRATORY (INHALATION) at 07:06

## 2024-06-06 RX ADMIN — APIXABAN 10 MG: 5 TABLET, FILM COATED ORAL at 09:06

## 2024-06-06 RX ADMIN — ALLOPURINOL 300 MG: 300 TABLET ORAL at 09:06

## 2024-06-06 RX ADMIN — APIXABAN 10 MG: 5 TABLET, FILM COATED ORAL at 08:06

## 2024-06-06 RX ADMIN — HEPARIN SODIUM 39.95 UNITS/KG/HR: 10000 INJECTION, SOLUTION INTRAVENOUS at 03:06

## 2024-06-06 RX ADMIN — TORSEMIDE 10 MG: 10 TABLET ORAL at 03:06

## 2024-06-06 RX ADMIN — IPRATROPIUM BROMIDE AND ALBUTEROL SULFATE 3 ML: 2.5; .5 SOLUTION RESPIRATORY (INHALATION) at 11:06

## 2024-06-06 RX ADMIN — IPRATROPIUM BROMIDE AND ALBUTEROL SULFATE 3 ML: 2.5; .5 SOLUTION RESPIRATORY (INHALATION) at 04:06

## 2024-06-06 RX ADMIN — DOXYCYCLINE HYCLATE 100 MG: 100 TABLET, COATED ORAL at 09:06

## 2024-06-06 RX ADMIN — INSULIN ASPART 2 UNITS: 100 INJECTION, SOLUTION INTRAVENOUS; SUBCUTANEOUS at 05:06

## 2024-06-06 RX ADMIN — DOXYCYCLINE HYCLATE 100 MG: 100 TABLET, COATED ORAL at 08:06

## 2024-06-06 RX ADMIN — ATORVASTATIN CALCIUM 80 MG: 40 TABLET, FILM COATED ORAL at 09:06

## 2024-06-06 RX ADMIN — IPRATROPIUM BROMIDE AND ALBUTEROL SULFATE 3 ML: 2.5; .5 SOLUTION RESPIRATORY (INHALATION) at 01:06

## 2024-06-06 RX ADMIN — SENNOSIDES 8.6 MG: 8.6 TABLET, FILM COATED ORAL at 08:06

## 2024-06-06 NOTE — NURSING
Nurses Note -- 4 Eyes      6/6/2024   7:45 AM      Skin assessed during: Admit      [] No Altered Skin Integrity Present    []Prevention Measures Documented      [x] Yes- Altered Skin Integrity Present or Discovered   [] LDA Added if Not in Epic (Describe Wound)   [] New Altered Skin Integrity was Present on Admit and Documented in LDA   [] Wound Image Taken    Wound Care Consulted? Yes    Attending Nurse:Abida Anthony RN/Staff Member:

## 2024-06-06 NOTE — NURSING
Heparin was started in the ED at inaccurate rate. Was stopped MD on call Trent was on call and paged and informed . Md also informed Lab staff on floor and was asked to draw patient lab now. Md informed patient denies any complaints. . Within a few minutes Adriano Maldonado was assisting patient to BSC. Pt was noted to having some bleeding that lasted briefly small amount from Rt and Lt arm. MD came to see patient and stated She appears Ok for now He will hold off on ordering U/S for now.Critical was given to MD of PTT greater than 150 when lab resulted.Will continue to monitor patient

## 2024-06-06 NOTE — ED NOTES
Nurses Note -- 4 Eyes      6/5/2024   8:28 PM      Skin assessed during: Daily Assessment      [x] No Altered Skin Integrity Present    []Prevention Measures Documented      [] Yes- Altered Skin Integrity Present or Discovered   [] LDA Added if Not in Epic (Describe Wound)   [] New Altered Skin Integrity was Present on Admit and Documented in LDA   [] Wound Image Taken    Wound Care Consulted? No    Attending Nurse:  Irma Anthony RN/Staff Member:   Susan PUGH

## 2024-06-06 NOTE — ASSESSMENT & PLAN NOTE
Chronic, controlled. Latest blood pressure and vitals reviewed-     Temp:  [97.9 °F (36.6 °C)-98.1 °F (36.7 °C)]   Pulse:  [102-106]   Resp:  [16-22]   BP: (152-182)/(60-86)   SpO2:  [95 %-100 %] .   Home meds for hypertension were reviewed and noted below.   Hypertension Medications               amLODIPine (NORVASC) 5 MG tablet TAKE ONE TABLET BY MOUTH DAILY    torsemide (DEMADEX) 10 MG Tab Take 1 tablet (10 mg total) by mouth every 48 hours as needed (leg swelling).    torsemide (DEMADEX) 10 MG Tab Take 10 mg by mouth every other day.            While in the hospital, will manage blood pressure as follows; Continue home antihypertensive regimen    Will utilize p.r.n. blood pressure medication only if patient's blood pressure greater than 180/110 and she develops symptoms such as worsening chest pain or shortness of breath.

## 2024-06-06 NOTE — PROGRESS NOTES
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 Patient reports she was on her mobility scooter about to enter her door when she suddenly syncopized. She notes mild nausea and palpitations. Since then she has had mild nausea and a slight discomfort in her chest. She passed out and lost consciousness but denies hitting her head.  Denies fevers, chills, cough but producing some phlegm. Denies new lower extremity edema but she is chronically on antibiotics for an infected right joint. Denies history of DVT or PE. Not currently on anticoagulation. Denies history of similar episodes. Denies any cardiac history, chest pain but does endorse chest discomfort and SOB.    In the ED patient vitally stable, with /60, , RR 20, afebrile. Labs significant for HB 11.9, D dimer >33, Na 138, Na 4.1, BUN 23, Cr 1.4, troponin 0.389, . CT chest was done that showed extensive pulmonary emboli throughout the bilateral lungs including a saddle pulmonary embolus. There is evidence of right heart strain with flattening of the interventricular septum and enlargement of the right ventricle and main pulmonary artery.     Overview/Hospital Course:  No notes on file    Interval History: Overnight patient snagged her IV line and started bleeding she was on heparin gtt. Transitioned to oral apixaban today. Echo showed normal EF will and CVP will resume torsemide today.       Objective:     Vital Signs (Most Recent):  Temp: 98.3 °F (36.8 °C) (06/06/24 1136)  Pulse: 100 (06/06/24  1136)  Resp: 18 (06/06/24 1136)  BP: (!) 133/59 (06/06/24 1136)  SpO2: (!) 94 % (06/06/24 1136) Vital Signs (24h Range):  Temp:  [97.6 °F (36.4 °C)-98.5 °F (36.9 °C)] 98.3 °F (36.8 °C)  Pulse:  [] 100  Resp:  [16-22] 18  SpO2:  [94 %-98 %] 94 %  BP: (122-182)/(59-86) 133/59     Weight: 126.1 kg (278 lb)  Body mass index is 56.15 kg/m².    Intake/Output Summary (Last 24 hours) at 6/6/2024 1506  Last data filed at 6/6/2024 0630  Gross per 24 hour   Intake 0 ml   Output 450 ml   Net -450 ml         Physical Exam      Constitutional:       Appearance: She is obese.      Comments: Morbid obesity   Cardiovascular:      Rate and Rhythm: Normal rate and regular rhythm.      Pulses: Normal pulses.      Heart sounds: Normal heart sounds.   Pulmonary:      Effort: Pulmonary effort is normal.      Breath sounds: Normal breath sounds.   Abdominal:      General: Bowel sounds are normal. There is no distension.      Palpations: Abdomen is soft.      Tenderness: There is no abdominal tenderness.   Musculoskeletal:      Right lower leg: Edema present.      Left lower leg: Edema present.      Comments: Massive bilateral cellulitis with skin excoriations.   Neurological:      General: No focal deficit present.      Mental Status: She is alert and oriented to person, place, and time.   Psychiatric:         Mood and Affect: Mood normal.         Behavior: Behavior normal.   Significant Labs: All pertinent labs within the past 24 hours have been reviewed.    Significant Imaging: I have reviewed all pertinent imaging results/findings within the past 24 hours.    Assessment/Plan:      * Pulmonary embolism  Patient presented with a syncopal episode. Passed out with loss of consciousness, denies hitting her head. She denies any chest pain but does endorse discomfort, and SOB. CT positive for submassive PE.    Plan:    -Cardiology was consulted, PESi score 82, submassive PE according to ACC guidelines and no intervention planned at this  time.   -Diet ordered.  -Monitor electrolytes, replace as needed Mag >2 K >4  -Cardiac telemonitor  -Pulse oximeter continuous  -Heparin gtt transitioned to oral  Eliquis.  -PTT/INR        Hyperlipidemia  Continue lipitor      Essential hypertension  Chronic, controlled. Latest blood pressure and vitals reviewed-     Temp:  [97.6 °F (36.4 °C)-98.5 °F (36.9 °C)]   Pulse:  []   Resp:  [16-22]   BP: (122-182)/(59-86)   SpO2:  [94 %-98 %] .   Home meds for hypertension were reviewed and noted below.   Hypertension Medications               amLODIPine (NORVASC) 5 MG tablet TAKE ONE TABLET BY MOUTH DAILY    torsemide (DEMADEX) 10 MG Tab Take 1 tablet (10 mg total) by mouth every 48 hours as needed (leg swelling).    torsemide (DEMADEX) 10 MG Tab Take 10 mg by mouth every other day.            While in the hospital, will manage blood pressure as follows; Continue home antihypertensive regimen  Resumed torsemide today will resume ccb tomorrow  Will utilize p.r.n. blood pressure medication only if patient's blood pressure greater than 180/110 and she develops symptoms such as worsening chest pain or shortness of breath.    Gout  Continue allopurinol      Morbid obesity with BMI of 50.0-59.9, adult  Body mass index is 56.15 kg/m². Morbid obesity complicates all aspects of disease management from diagnostic modalities to treatment. Weight loss encouraged and health benefits explained to patient.         Type 2 diabetes mellitus with diabetic polyneuropathy, with long-term current use of insulin  Patient's FSGs are controlled on current medication regimen.  Last A1c reviewed-   Lab Results   Component Value Date    HGBA1C 7.1 (H) 05/06/2024     Most recent fingerstick glucose reviewed-   Recent Labs   Lab 06/06/24  0120 06/06/24  1137   POCTGLUCOSE 187* 188*     Current correctional scale  Low  Maintain anti-hyperglycemic dose as follows-   Antihyperglycemics (From admission, onward)      Start     Stop Route Frequency  Ordered    06/05/24 2037  insulin aspart U-100 pen 0-5 Units         -- SubQ Before meals & nightly PRN 06/05/24 1937          Hold Oral hypoglycemics while patient is in the hospital.      VTE Risk Mitigation (From admission, onward)           Ordered     apixaban tablet 10 mg  2 times daily         06/06/24 0931     Reason for No Pharmacological VTE Prophylaxis  Once        Question:  Reasons:  Answer:  Physician Provided (leave comment)  Comment:  on heparin gtt    06/05/24 1829     IP VTE HIGH RISK PATIENT  Once         06/05/24 1829     Place sequential compression device  Until discontinued         06/05/24 1829                    Discharge Planning   JENNIFER: 6/7/2024     Code Status: Full Code   Is the patient medically ready for discharge?:     Reason for patient still in hospital (select all that apply): Treatment  Discharge Plan A: Home, Home with family                  Kym Dent MD  Department of Hospital Medicine   [facility] Surg

## 2024-06-06 NOTE — NURSING
No other signs of bleeding noted. Patient verbal stated to me I feel so good. I assisted her to Bedpan and noted a tiny area to her Lt inner skin fold to lower leg. Pt asked me to look in her vagina. SHe said for over a year she has a hard area there and as not informed her doctor. I could not see anything but was able to feel a harden area as she stated. I informed her when Her day shift doctor makes rounds to makes sure she let them know.

## 2024-06-06 NOTE — HPI
84 F morbidly obese BMI >50, debilitated uses scooter, CKD, chronic prosthetic knee infection, CVA, DM II who presented with syncope and was found to have bilateral PE. Cardiology consulted for evaluation.     (below 110), BP hypertensive, breathing comfortably on room air. She has CT evidence of right heart strain and echo is pending. Troponin 0.1 => 0.4. Cr 1.4. PESI score 82 (Class II, Low Risk: 1.7-3.5% 30-day mortality in this group.)

## 2024-06-06 NOTE — PLAN OF CARE
Problem: Adult Inpatient Plan of Care  Goal: Plan of Care Review  Outcome: Progressing  Goal: Patient-Specific Goal (Individualized)  Outcome: Progressing  Goal: Absence of Hospital-Acquired Illness or Injury  Outcome: Progressing  Goal: Optimal Comfort and Wellbeing  Outcome: Progressing  Goal: Readiness for Transition of Care  Outcome: Progressing     Problem: Bariatric Environmental Safety  Goal: Safety Maintained with Care  Outcome: Progressing     Problem: Diabetes Comorbidity  Goal: Blood Glucose Level Within Targeted Range  Outcome: Progressing     Problem: Wound  Goal: Optimal Coping  Outcome: Progressing  Goal: Absence of Infection Signs and Symptoms  Outcome: Progressing  Goal: Improved Oral Intake  Outcome: Progressing  Goal: Optimal Pain Control and Function  Outcome: Progressing

## 2024-06-06 NOTE — SUBJECTIVE & OBJECTIVE
Interval History: Overnight patient snagged her IV line and started bleeding she was on heparin gtt. Transitioned to oral apixaban today. Echo showed normal EF will and CVP will resume torsemide today.       Objective:     Vital Signs (Most Recent):  Temp: 98.3 °F (36.8 °C) (06/06/24 1136)  Pulse: 100 (06/06/24 1136)  Resp: 18 (06/06/24 1136)  BP: (!) 133/59 (06/06/24 1136)  SpO2: (!) 94 % (06/06/24 1136) Vital Signs (24h Range):  Temp:  [97.6 °F (36.4 °C)-98.5 °F (36.9 °C)] 98.3 °F (36.8 °C)  Pulse:  [] 100  Resp:  [16-22] 18  SpO2:  [94 %-98 %] 94 %  BP: (122-182)/(59-86) 133/59     Weight: 126.1 kg (278 lb)  Body mass index is 56.15 kg/m².    Intake/Output Summary (Last 24 hours) at 6/6/2024 1506  Last data filed at 6/6/2024 0630  Gross per 24 hour   Intake 0 ml   Output 450 ml   Net -450 ml         Physical Exam      Constitutional:       Appearance: She is obese.      Comments: Morbid obesity   Cardiovascular:      Rate and Rhythm: Normal rate and regular rhythm.      Pulses: Normal pulses.      Heart sounds: Normal heart sounds.   Pulmonary:      Effort: Pulmonary effort is normal.      Breath sounds: Normal breath sounds.   Abdominal:      General: Bowel sounds are normal. There is no distension.      Palpations: Abdomen is soft.      Tenderness: There is no abdominal tenderness.   Musculoskeletal:      Right lower leg: Edema present.      Left lower leg: Edema present.      Comments: Massive bilateral cellulitis with skin excoriations.   Neurological:      General: No focal deficit present.      Mental Status: She is alert and oriented to person, place, and time.   Psychiatric:         Mood and Affect: Mood normal.         Behavior: Behavior normal.   Significant Labs: All pertinent labs within the past 24 hours have been reviewed.    Significant Imaging: I have reviewed all pertinent imaging results/findings within the past 24 hours.

## 2024-06-06 NOTE — ASSESSMENT & PLAN NOTE
Body mass index is 50.49 kg/m². Morbid obesity complicates all aspects of disease management from diagnostic modalities to treatment. Weight loss encouraged and health benefits explained to patient.

## 2024-06-06 NOTE — HPI
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 Patient reports she was on her mobility scooter about to enter her door when she suddenly syncopized. She notes mild nausea and palpitations. Since then she has had mild nausea and a slight discomfort in her chest. She passed out and lost consciousness but denies hitting her head.  Denies fevers, chills, cough but producing some phlegm. Denies new lower extremity edema but she is chronically on antibiotics for an infected right joint. Denies history of DVT or PE. Not currently on anticoagulation. Denies history of similar episodes. Denies any cardiac history, chest pain but does endorse chest discomfort and SOB.    In the ED patient vitally stable, with /60, , RR 20, afebrile. Labs significant for HB 11.9, D dimer >33, Na 138, Na 4.1, BUN 23, Cr 1.4, troponin 0.389, . CT chest was done that showed extensive pulmonary emboli throughout the bilateral lungs including a saddle pulmonary embolus. There is evidence of right heart strain with flattening of the interventricular septum and enlargement of the right ventricle and main pulmonary artery.

## 2024-06-06 NOTE — PLAN OF CARE
06/06/24 1102   Medicare Message   Important Message from Medicare regarding Discharge Appeal Rights Given to patient/caregiver;Explained to patient/caregiver;Signed/date by patient/caregiver   Date IMM was signed 06/06/24   Time IMM was signed 0915

## 2024-06-06 NOTE — PROGRESS NOTES
[facility]  Initial Discharge Assessment       Primary Care Provider: Tami Schmidt MD    Admission Diagnosis: Syncopal episodes [R55]  Pulmonary embolism [I26.99]  Bilateral pulmonary embolism [I26.99]  Chest pain [R07.9]    Admission Date: 6/5/2024  Expected Discharge Date: 6/7/2024    Transition of Care Barriers: None    Payor: MEDICARE / Plan: MEDICARE PART A & B / Product Type: Government /     Extended Emergency Contact Information  Primary Emergency Contact: Lanny Payan Phone: [phone]  Relation: Healthcare Power of     Discharge Plan A: Home, Home with family         [facility] - [place] - [address]  [address] [facility]  [address]  [address] [facility] [address]  Phone: [phone] Fax: [phone]    [facility] - [place] - [address]  [address]  [address]  [address] [facility] [address]  Phone: [phone] Fax: [phone]    [facility]  [address] [facility] [address]  Phone: [phone] Fax: [phone]      Initial Assessment (most recent)       Adult Discharge Assessment - 06/06/24 1042          Discharge Assessment    Assessment Type Discharge Planning Assessment     Confirmed/corrected address, phone number and insurance Yes     Confirmed Demographics Correct on Facesheet     Source of Information patient     If unable to respond/provide information was family/caregiver contacted? Yes     Contact Name/Number [place] attorney Schuyler at [phone]     Does patient/caregiver understand observation status Yes     Communicated JENNIFER with patient/caregiver Yes     Reason For Admission patient was admitted for Loss of Consciousness     People in Home other relative(s)   [facility]    Facility Arrived From: patient presented to the Ed from [facility]     Do you expect to return to your current living situation? Yes     Do you have help at home or someone to help you manage  your care at home? Yes     Who are your caregiver(s) and their phone number(s)? Latricia Payan ,  at [phone]     Current cognitive status: Alert/Oriented     Walking or Climbing Stairs Difficulty yes     Walking or Climbing Stairs ambulation difficulty, requires equipment     Mobility Management Rollator     Dressing/Bathing Difficulty no     Home Accessibility other (see comments)     Equipment Currently Used at Home 3-in-1 commode;bath bench;walker, rolling;other (see comments)   Electric scooter    Readmission within 30 days? No     Patient currently being followed by outpatient case management? No     Do you currently have service(s) that help you manage your care at home? Yes     Name and Contact number of agency [facility]     Is the pt/caregiver preference to resume services with current agency Yes     Do you take prescription medications? Yes     Do you have prescription coverage? Yes     Coverage Medicare     Do you have any problems affording any of your prescribed medications? No     Is the patient taking medications as prescribed? yes     Who is going to help you get home at discharge? Latricia Payan ,  at [phone]     How do you get to doctors appointments? other (see comments);family or friend will provide     Are you on dialysis? No     Do you take coumadin? No     Discharge Plan A Home;Home with family     DME Needed Upon Discharge  other (see comments)     Discharge Plan discussed with: Patient     Transition of Care Barriers None        Food Insecurity    Within the past 12 months, you worried that your food would run out before you got the money to buy more. Never true     Within the past 12 months, the food you bought just didn't last and you didn't have money to get more. Never true        Stress    Do you feel stress - tense, restless, nervous, or anxious, or unable to sleep at night because your mind is troubled all the time - these days? Only a little        Social  Isolation    How often do you feel lonely or isolated from those around you?  Never        Alcohol Use    Q2: How many drinks containing alcohol do you have on a typical day when you are drinking? 1 or 2     Q3: How often do you have six or more drinks on one occasion? Never        Utilities    In the past 12 months has the electric, gas, oil, or water company threatened to shut off services in your home? No        Health Literacy    How often do you need to have someone help you when you read instructions, pamphlets, or other written material from your doctor or pharmacy? Never        OTHER    Name(s) of People in Home Latricia Payan ,  at [phone]

## 2024-06-06 NOTE — ASSESSMENT & PLAN NOTE
Patient presented with a syncopal episode. Passed out with loss of consciousness, denies hitting her head. She denies any chest pain but does endorse discomfort, and SOB. CT positive for submassive PE.    Plan:    -Cardiology was consulted, PESi score 82, submassive PE according to ACC guidelines and no intervention planned at this time.   -Diet ordered.  -Monitor electrolytes, replace as needed Mag >2 K >4  -Cardiac telemonitor  -Pulse oximeter continuous  -Heparin gtt transitioned to oral  Eliquis.  -PTT/INR

## 2024-06-06 NOTE — ASSESSMENT & PLAN NOTE
Chronic, controlled. Latest blood pressure and vitals reviewed-     Temp:  [97.6 °F (36.4 °C)-98.5 °F (36.9 °C)]   Pulse:  []   Resp:  [16-22]   BP: (122-182)/(59-86)   SpO2:  [94 %-98 %] .   Home meds for hypertension were reviewed and noted below.   Hypertension Medications               amLODIPine (NORVASC) 5 MG tablet TAKE ONE TABLET BY MOUTH DAILY    torsemide (DEMADEX) 10 MG Tab Take 1 tablet (10 mg total) by mouth every 48 hours as needed (leg swelling).    torsemide (DEMADEX) 10 MG Tab Take 10 mg by mouth every other day.            While in the hospital, will manage blood pressure as follows; Continue home antihypertensive regimen  Resumed torsemide today will resume ccb tomorrow  Will utilize p.r.n. blood pressure medication only if patient's blood pressure greater than 180/110 and she develops symptoms such as worsening chest pain or shortness of breath.

## 2024-06-06 NOTE — PLAN OF CARE
06/06/24 1042   Discharge Assessment   Assessment Type Discharge Planning Assessment   Confirmed/corrected address, phone number and insurance Yes   Confirmed Demographics Correct on Facesheet   Source of Information patient   If unable to respond/provide information was family/caregiver contacted? Yes   Contact Name/Number Latricia Payan  at [phone]   Does patient/caregiver understand observation status Yes   Communicated JENNIFER with patient/caregiver Yes   Reason For Admission patient was admitted for Loss of Consciousness   People in Home other relative(s)  (patient lives in a con[facility])   Facility Arrived From: patient presented to the [facility] from [facility]   Do you expect to return to your current living situation? Yes   Do you have help at home or someone to help you manage your care at home? Yes   Who are your caregiver(s) and their phone number(s)? Latricia Payan ,  at [phone]   Current cognitive status: Alert/Oriented   Walking or Climbing Stairs Difficulty yes   Walking or Climbing Stairs ambulation difficulty, requires equipment   Mobility Management Rollator   Dressing/Bathing Difficulty no   Home Accessibility other (see comments)   Equipment Currently Used at Home 3-in-1 commode;bath bench;walker, rolling;other (see comments)  (Electric scooter)   Readmission within 30 days? No   Patient currently being followed by outpatient case management? No   Do you currently have service(s) that help you manage your care at home? Yes   Name and Contact number of agency [facility]   Is the pt/caregiver preference to resume services with current agency Yes   Do you take prescription medications? Yes   Do you have prescription coverage? Yes   Coverage Medicare   Do you have any problems affording any of your prescribed medications? No   Is the patient taking medications as prescribed? yes   Who is going to help you get home at discharge? Latricia Payan ,  at [phone]   How do  you get to doctors appointments? other (see comments);family or friend will provide   Are you on dialysis? No   Do you take coumadin? No   Discharge Plan A Home;Home with family   DME Needed Upon Discharge  other (see comments)   Discharge Plan discussed with: Patient   Transition of Care Barriers None   Food Insecurity   Within the past 12 months, you worried that your food would run out before you got the money to buy more. Never true   Within the past 12 months, the food you bought just didn't last and you didn't have money to get more. Never true   Stress   Do you feel stress - tense, restless, nervous, or anxious, or unable to sleep at night because your mind is troubled all the time - these days? Only a littl   Social Isolation   How often do you feel lonely or isolated from those around you?  Never   Alcohol Use   Q2: How many drinks containing alcohol do you have on a typical day when you are drinking? 1 or 2   Q3: How often do you have six or more drinks on one occasion? Never   Totally Interactive Weather   In the past 12 months has the electric, gas, oil, or water company threatened to shut off services in your home? No   Health Literacy   How often do you need to have someone help you when you read instructions, pamphlets, or other written material from your doctor or pharmacy? Never   OTHER   Name(s) of People in Home Latricia Payan ,  at [phone]      met with patient at bedside to complete discharge planning assessment.  Patient alert and oriented xs 4.  Patient verified all demographic information on facesheet is correct.  Patient verified PCP is Dr Tami Mojica .  Patient verified primary health insurance is Medicare..  [facility] with [facility] as listed DME.  Patient has Lanny Payan listed as her  [phone].  Patient not on dialysis or medication coumadin.  Patient with no 30 day admission.  Patient with no financial issues at this time.  Patient family will provide  transportation upon discharge from facility.  Patient independent with ADLs, live with family , family assists with transpotation

## 2024-06-06 NOTE — SUBJECTIVE & OBJECTIVE
Past Medical History:   Diagnosis Date    Adrenal mass- adenoma stable since 2004 (1.8 cm and 8/13/12 (2 cm); stable 2016 2.4 cm     Adrenal mass- adenoma stable since 2004 (1.8 cm and 8/13/12 (2 cm); stable 2016 2.4 cm    Arthritis     Asthma in adult without complication 6/25/2015    Bilateral carotid artery disease 7/21/2017    Bilateral sciatica 2/7/2017    Cellulitis of right leg 08/16/2017    Cerebral infarction 1/29/2016    Multiple areas of lacunar infarction. Stroke risk factors include HTN, DM2, Dyslipidemia.  Continue ASA 81mg/ Statin therapy I have encouraged 30 minutes of physical activity daily for 5 days a week. She has access to a pool so this should not be so jarring to her joints.     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    Mixed hyperlipidemia 8/13/2012    Morbid obesity with BMI of 50.0-59.9, adult 2/11/2014    Obstructive sleep apnea syndrome 8/13/2012    Senile cataracts of both eyes 1/22/2015    Traumatic open wound of right lower leg 8/25/2017    Trigeminal neuralgia of right side of face 5/23/2017    For years now Previously on Gabapentin, but caused constipation Dissipating over time Not related to intracranial abnormalities Possibly related to dental procedure    Type 2 diabetes mellitus with diabetic polyneuropathy, with long-term current use of insulin 1/29/2013    Venous stasis dermatitis of both lower extremities 8/21/2017    Viral hepatitis A without coma 1/1/1971    Hep B infection in [facility] in 1971, was hospitalize for 2 weeks at that time, unknown treatment.     Vitamin D deficiency disease 8/13/2012       Past Surgical History:   Procedure Laterality Date    HYSTERECTOMY  1982    secondary uterine fibroids    INJECTION OF ANESTHETIC AGENT AROUND NERVE Right 10/21/2021    Procedure: BLOCK, NERVE GENICULAR RIGHT NEEDS CONSENT;  Surgeon: Senia Kilpatrick MD;  Location: [facility];  Service: Pain Management;  Laterality: Right;    INSERTION OF ANTIBIOTIC SPACER Right 10/28/2021    Procedure: INSERTION, ANTIBIOTIC SPACER;  Surgeon: Alfredo Lozoya MD;  Location: [address];  Service: Orthopedics;  Laterality: Right;    JOINT REPLACEMENT      REVISION OF KNEE ARTHROPLASTY Right 10/28/2021    Procedure: REVISION, ARTHROPLASTY, KNEE-DEPUY ANTIBIOTIC SPACER;  Surgeon: Alfredo Lozoya MD;  Location: [facility] OR [address] [place] [address];  Service: Orthopedics;  Laterality: Right;    REVISION OF KNEE ARTHROPLASTY Right 6/30/2022    Procedure: REVISION, ARTHROPLASTY, KNEE-NAKIA- stage 2;  Surgeon: Alfredo Lozoya MD;  Location: [address];  Service: Orthopedics;  Laterality: Right;    Right total knee replacement         Review of patient's allergies indicates:   Allergen Reactions    Bactrim [sulfamethoxazole-trimethoprim]      Other reaction(s): up set stomach rash/hives    Azithromycin      Feels bad    Erythromycin Other (See Comments)     Other reaction(s): Unknown  Other reaction(s): Stomach upset    Gentamicin      Vaginal burning , itching     Levofloxacin Hives     Other reaction(s): nervousness    Penicillins Other (See Comments)     Other reaction(s): Unknown  Other reaction(s): Swelling  Other reaction(s): Rash  Other reaction(s): Hives    Shellfish containing products      Rash      Vancomycin Itching     Other reaction(s): Itching       No current facility-administered medications on file prior to encounter.     Current Outpatient Medications on File Prior to Encounter   Medication Sig    acetaminophen (TYLENOL) 500 MG tablet Take 2 tablets (1,000 mg total) by  mouth every 8 (eight) hours. Bedside delivery    albuterol (PROVENTIL/VENTOLIN HFA) 90 mcg/actuation inhaler Inhale 2 puffs into the lungs every 4 (four) hours as needed for Wheezing or Shortness of Breath. Rescue    albuterol-ipratropium (DUO-NEB) 2.5 mg-0.5 mg/3 mL nebulizer solution USE 1 VIAL PER NEBULIZER EVERY 6 HOURS AS NEEDED FOR WHEEZING/RESCUE    allopurinoL (ZYLOPRIM) 100 MG tablet Take 1 tablet (100 mg total) by mouth once daily.    allopurinoL (ZYLOPRIM) 300 MG tablet Take 1 tablet (300 mg total) by mouth once daily.    amLODIPine (NORVASC) 5 MG tablet TAKE ONE TABLET BY MOUTH DAILY    ASPERCREME WITH ALOE 10 % Crea Apply 1 Application topically as needed.    ASPERCREME, LIDOCAINE HCL, 4 % Crea Apply topically.    atorvastatin (LIPITOR) 80 MG tablet TAKE ONE TABLET BY MOUTH EVERY DAY    blood sugar diagnostic Strp BG monitoring 4 times a day. Pt needs test strips for [employer] Talking meter. (Patient taking differently: BG monitoring 2 times a day. Pt needs test strips for [employer] Talking meter.)    cholecalciferol, vitamin D3, (VITAMIN D3) 25 mcg (1,000 unit) capsule Take 1 capsule (1,000 Units total) by mouth once daily.    colchicine (COLCRYS) 0.6 mg tablet Take 0.6 mg by mouth once daily.    diclofenac sodium (VOLTAREN) 1 % Gel APPLY 2 GRAMS TOPICALLY DAILY    docusate sodium (COLACE) 100 MG capsule Take 1 capsule (100 mg total) by mouth once daily.    doxycycline (VIBRA-TABS) 100 MG tablet Take 1 tablet (100 mg total) by mouth 2 (two) times daily.    fluticasone (FLONASE) 50 mcg/actuation nasal spray 2 sprays (100 mcg total) by Each Nare route once daily.    insulin degludec (TRESIBA FLEXTOUCH U-100) 100 unit/mL (3 mL) insulin pen Inject 10-18 units at night    Lactobacillus acidophilus 1 billion cell Cap Take 1 tablet by mouth once daily.    lancets Misc Test daily (Patient taking differently: Test twice  daily)    nebulizer and compressor (COMP-AIR ELITE EQUISO NEB SYSTEM) Berna use as directed     "olopatadine (PATANOL) 0.1 % ophthalmic solution Place 1 drop into both eyes 2 (two) times daily.    oxyCODONE (ROXICODONE) 5 MG immediate release tablet Take 1 tablet (5 mg total) by mouth every 6 (six) hours as needed for Pain. May take 1-2 tablets every 6 hours as needed for pain    pen needle, diabetic (PEN NEEDLE) 29 gauge x 1/2" Ndle USE DAILY    semaglutide (OZEMPIC) 0.25 mg or 0.5 mg (2 mg/3 mL) pen injector INJECT 0.5 MG SUBCUTANEOUSLY ONCE A WEEK    semaglutide (OZEMPIC) 0.25 mg or 0.5 mg(2 mg/1.5 mL) pen injector INJECT 0.5 MG SUBCUTANEOUSLY ONCE A WEEK    SENNA 8.6 mg tablet Take 1 tablet by mouth once daily.    torsemide (DEMADEX) 10 MG Tab Take 1 tablet (10 mg total) by mouth every 48 hours as needed (leg swelling).    torsemide (DEMADEX) 10 MG Tab Take 10 mg by mouth every other day.    trolamine salicylate (ASPERCREME) 10 % cream Apply topically as needed.     Family History       Problem Relation (Age of Onset)    Cancer Mother (69), Sister    Hypertension Father    No Known Problems Brother, Maternal Aunt, Maternal Uncle, Paternal Aunt, Paternal Uncle, Maternal Grandmother, Maternal Grandfather, Paternal Grandmother, Paternal Grandfather          Tobacco Use    Smoking status: Never    Smokeless tobacco: Never   Substance and Sexual Activity    Alcohol use: Yes     Comment: holidays    Drug use: No    Sexual activity: Never     Review of Systems   Constitutional:  Negative for activity change, appetite change and fatigue.   Respiratory:  Positive for shortness of breath. Negative for cough.    Cardiovascular:  Positive for leg swelling. Negative for chest pain.   Gastrointestinal:  Positive for nausea and vomiting. Negative for abdominal pain.   Genitourinary:  Negative for frequency and urgency.   Musculoskeletal:  Negative for joint swelling.   Neurological:  Negative for weakness, light-headedness and headaches.   Psychiatric/Behavioral:  Negative for agitation, confusion and decreased " concentration.      Objective:     Vital Signs (Most Recent):  Temp: 98.1 °F (36.7 °C) (06/05/24 1600)  Pulse: 103 (06/05/24 1800)  Resp: 20 (06/05/24 1800)  BP: (!) 156/79 (06/05/24 1800)  SpO2: 97 % (06/05/24 1800) Vital Signs (24h Range):  Temp:  [97.9 °F (36.6 °C)-98.1 °F (36.7 °C)] 98.1 °F (36.7 °C)  Pulse:  [102-106] 103  Resp:  [16-22] 20  SpO2:  [95 %-100 %] 97 %  BP: (152-182)/(60-86) 156/79     Weight: 113.4 kg (250 lb)  Body mass index is 50.49 kg/m².     Physical Exam  Constitutional:       Appearance: She is obese.      Comments: Morbid obesity   Cardiovascular:      Rate and Rhythm: Normal rate and regular rhythm.      Pulses: Normal pulses.      Heart sounds: Normal heart sounds.   Pulmonary:      Effort: Pulmonary effort is normal.      Breath sounds: Normal breath sounds.   Abdominal:      General: Bowel sounds are normal. There is no distension.      Palpations: Abdomen is soft.      Tenderness: There is no abdominal tenderness.   Musculoskeletal:      Right lower leg: Edema present.      Left lower leg: Edema present.      Comments: Massive bilateral cellulitis with skin excoriations.   Neurological:      General: No focal deficit present.      Mental Status: She is alert and oriented to person, place, and time.   Psychiatric:         Mood and Affect: Mood normal.         Behavior: Behavior normal.                Significant Labs: All pertinent labs within the past 24 hours have been reviewed.    Significant Imaging: I have reviewed all pertinent imaging results/findings within the past 24 hours.

## 2024-06-06 NOTE — PHARMACY MED REC
"      Admission Medication History     The home medication history was taken by Germán Aragon.    You may go to "Admission" then "Reconcile Home Medications" tabs to review and/or act upon these items.     The home medication list has been updated by the Pharmacy department.   Please read ALL comments highlighted in yellow.   Please address this information as you see fit.    Feel free to contact us if you have any questions or require assistance.      The medications listed below were removed from the home medication list. Please reorder if appropriate:  Patient reports no longer taking the following medication(s):  ACETAMINOPHEN 500 MG TABLET  COLCHICINE 0.6 MG TABLET  LACTOBACILLUS 1 BILLION CELL CAPSULE  OXYCODONE IR 5 MG TABLET    Medications listed below were obtained from: Patient/family and Analytic software- [employer]  Current Outpatient Medications on File Prior to Encounter   Medication Sig    albuterol (PROVENTIL/VENTOLIN HFA) 90 mcg/actuation inhaler   Inhale 2 puffs into the lungs every 4 (four) hours as needed for wheezing or shortness of breath. Rescue    albuterol-ipratropium (DUO-NEB) 2.5 mg-0.5 mg/3 mL nebulizer solution   Use 1 vial per nebulizer every 6 hours as needed for wheezing/rescue.        allopurinoL (ZYLOPRIM) 100 MG tablet   Take 1 tablet (100 mg total) by mouth once daily.    allopurinoL (ZYLOPRIM) 300 MG tablet   Take 1 tablet (300 mg total) by mouth once daily.    amLODIPine (NORVASC) 5 MG tablet   Take 5 mg by mouth once daily.    ASPERCREME WITH ALOE 10 % Crea   Apply 1 Application topically as needed (to legs/back).    atorvastatin (LIPITOR) 80 MG tablet   Take 1 tablet by mouth once daily.      cholecalciferol, vitamin D3, (VITAMIN D3) 25 mcg (1,000 unit) capsule   Take 1 capsule (1,000 Units total) by mouth once daily.    diclofenac sodium (VOLTAREN) 1 % Gel   Apply 2 gm topically to affected areas daily.      docusate sodium (COLACE) 100 MG capsule   Take 1 capsule (100 mg " "total) by mouth once daily.    doxycycline (VIBRA-TABS) 100 MG tablet   Take 1 tablet (100 mg total) by mouth 2 (two) times daily.    fluticasone (FLONASE) 50 mcg/actuation nasal spray   2 sprays (100 mcg total) by Each Nare route once daily.    insulin degludec (TRESIBA FLEXTOUCH U-100) 100 unit/mL (3 mL) insulin pen   Inject 12 Units into the skin every evening.    olopatadine (PATANOL) 0.1 % ophthalmic solution   Place 1 drop into both eyes 2 (two) times daily.    semaglutide (OZEMPIC) 0.25 mg or 0.5 mg (2 mg/3 mL) pen injector   Inject 0.25 mg into the skin every 7 days on Saturday.    SENNA 8.6 mg tablet   Take 1 tablet by mouth once daily.    torsemide (DEMADEX) 10 MG Tab   Take 1 tablet (10 mg total) by mouth every 48 hours as needed for leg swelling.    blood sugar diagnostic Strp     BG monitoring 2 times a day. Pt needs test strips for embrace talking meter.    lancets Misc   Test twice  daily    nebulizer and compressor (COMP-AIR ELITE COMP NEB SYSTEM) Berna   use as directed    pen needle, diabetic (PEN NEEDLE) 29 gauge x 1/2" Ndle   Use daily.               Potential issues to be addressed PRIOR TO DISCHARGE  Please discuss with the patient barriers to adherence with medication treatment plans  Patient requires education regarding drug therapies     Germán Aragon  EXT [phone]            .          "

## 2024-06-06 NOTE — ASSESSMENT & PLAN NOTE
Patient's FSGs are controlled on current medication regimen.  Last A1c reviewed-   Lab Results   Component Value Date    HGBA1C 7.1 (H) 05/06/2024     Most recent fingerstick glucose reviewed-   Recent Labs   Lab 06/06/24  0120 06/06/24  1137   POCTGLUCOSE 187* 188*     Current correctional scale  Low  Maintain anti-hyperglycemic dose as follows-   Antihyperglycemics (From admission, onward)    Start     Stop Route Frequency Ordered    06/05/24 2037  insulin aspart U-100 pen 0-5 Units         -- SubQ Before meals & nightly PRN 06/05/24 1937        Hold Oral hypoglycemics while patient is in the hospital.

## 2024-06-06 NOTE — ASSESSMENT & PLAN NOTE
Intermediate risk PE based on ESC guidelines. Submassive PE based on ACC guidelines.  With low PESI score, recommend initiating treatment with anticoagulation. Risk of catheter-directed therapies likely outweigh potential benefit at this time. Systemic TPA not indicated. Please notify cardiology if lactic acid uptrending or clinically declining (hypoxia, tachycardia, or hypotension)

## 2024-06-06 NOTE — CONSULTS
[facility] - Emergency Dept  Cardiology  Consult Note    Patient Name: Duran Giordano  MRN: [id number]  Admission Date: 6/5/2024  Hospital Length of Stay: 0 days  Code Status: Full Code   Attending Provider: Miller Mathis MD   Consulting Provider: Ryan Nguyen MD  Primary Care Physician: Tami Schmidt MD  Principal Problem:Pulmonary embolism    Patient information was obtained from patient, ER records, and primary team.     Consults  Subjective:     Chief Complaint:  pulmonary embolism     HPI:   84 F morbidly obese BMI >50, debilitated uses scooter, CKD, chronic prosthetic knee infection, CVA, DM II who presented with syncope and was found to have bilateral PE. Cardiology consulted for evaluation.     (below 110), BP hypertensive, breathing comfortably on room air. She has CT evidence of right heart strain and echo is pending. Troponin 0.1 => 0.4. Cr 1.4. PESI score 82 (Class II, Low Risk: 1.7-3.5% 30-day mortality in this group.)    Past Medical History:   Diagnosis Date    Adrenal mass- adenoma stable since 2004 (1.8 cm and 8/13/12 (2 cm); stable 2016 2.4 cm     Adrenal mass- adenoma stable since 2004 (1.8 cm and 8/13/12 (2 cm); stable 2016 2.4 cm    Arthritis     Asthma in adult without complication 6/25/2015    Bilateral carotid artery disease 7/21/2017    Bilateral sciatica 2/7/2017    Cellulitis of right leg 08/16/2017    Cerebral infarction 1/29/2016    Multiple areas of lacunar infarction. Stroke risk factors include HTN, DM2, Dyslipidemia.  Continue ASA 81mg/ Statin therapy I have encouraged 30 minutes of physical activity daily for 5 days a week. She has access to a pool so this should not be so jarring to her joints.     Cervical radiculopathy 3/18/2015    Chronic knee pain     Chronic rhinitis 4/9/2013    CKD (chronic kidney disease) stage 3, GFR 30-59 ml/min 1/29/2013    Coronary artery disease due to calcified coronary lesion 6/25/2015    Deep vein thrombosis     Diastolic  dysfunction 10/10/2013    Essential hypertension 6/25/2015    Gastroesophageal reflux disease without esophagitis 8/13/2012    Gout     Hives 10/1/2013    Infection of prosthetic right knee joint 10/25/2021    Mixed hyperlipidemia 8/13/2012    Morbid obesity with BMI of 50.0-59.9, adult 2/11/2014    Obstructive sleep apnea syndrome 8/13/2012    Pulmonary embolism 6/5/2024    Senile cataracts of both eyes 1/22/2015    Traumatic open wound of right lower leg 8/25/2017    Trigeminal neuralgia of right side of face 5/23/2017    For years now Previously on Gabapentin, but caused constipation Dissipating over time Not related to intracranial abnormalities Possibly related to dental procedure    Type 2 diabetes mellitus with diabetic polyneuropathy, with long-term current use of insulin 1/29/2013    Venous stasis dermatitis of both lower extremities 8/21/2017    Viral hepatitis A without coma 1/1/1971    Hep B infection in [facility] in 1971, was hospitalize for 2 weeks at that time, unknown treatment.    Vitamin D deficiency disease 8/13/2012       Past Surgical History:   Procedure Laterality Date    HYSTERECTOMY  1982    secondary uterine fibroids    INJECTION OF ANESTHETIC AGENT AROUND NERVE Right 10/21/2021    Procedure: BLOCK, NERVE GENICULAR RIGHT NEEDS CONSENT;  Surgeon: Senia Kilpatrick MD;  Location: [facility];  Service: Pain Management;  Laterality: Right;    INSERTION OF ANTIBIOTIC SPACER Right 10/28/2021    Procedure: INSERTION, ANTIBIOTIC SPACER;  Surgeon: Alfredo Lozoya MD;  Location: [address];  Service: Orthopedics;  Laterality: Right;    JOINT REPLACEMENT      REVISION OF KNEE ARTHROPLASTY Right 10/28/2021    Procedure: REVISION, ARTHROPLASTY, KNEE-DEPUY ANTIBIOTIC SPACER;  Surgeon: Alfredo Lozoya MD;  Location: [facility] OR [address] [place] [address];  Service: Orthopedics;  Laterality: Right;    REVISION OF KNEE ARTHROPLASTY Right 6/30/2022    Procedure: REVISION, ARTHROPLASTY, KNEE-NAKIA- stage 2;   Surgeon: Alfredo Lozoya MD;  Location: [facility] OR [address] [place] [address];  Service: Orthopedics;  Laterality: Right;    Right total knee replacement         Review of patient's allergies indicates:   Allergen Reactions    Bactrim [sulfamethoxazole-trimethoprim]      Other reaction(s): up set stomach rash/hives    Azithromycin      Feels bad    Erythromycin Other (See Comments)     Other reaction(s): Unknown  Other reaction(s): Stomach upset    Gentamicin      Vaginal burning , itching     Levofloxacin Hives     Other reaction(s): nervousness    Penicillins Other (See Comments)     Other reaction(s): Unknown  Other reaction(s): Swelling  Other reaction(s): Rash  Other reaction(s): Hives    Shellfish containing products      Rash      Vancomycin Itching     Other reaction(s): Itching       No current facility-administered medications on file prior to encounter.     Current Outpatient Medications on File Prior to Encounter   Medication Sig    acetaminophen (TYLENOL) 500 MG tablet Take 2 tablets (1,000 mg total) by mouth every 8 (eight) hours. Bedside delivery    albuterol (PROVENTIL/VENTOLIN HFA) 90 mcg/actuation inhaler Inhale 2 puffs into the lungs every 4 (four) hours as needed for Wheezing or Shortness of Breath. Rescue    albuterol-ipratropium (DUO-NEB) 2.5 mg-0.5 mg/3 mL nebulizer solution USE 1 VIAL PER NEBULIZER EVERY 6 HOURS AS NEEDED FOR WHEEZING/RESCUE    allopurinoL (ZYLOPRIM) 100 MG tablet Take 1 tablet (100 mg total) by mouth once daily.    allopurinoL (ZYLOPRIM) 300 MG tablet Take 1 tablet (300 mg total) by mouth once daily.    amLODIPine (NORVASC) 5 MG tablet TAKE ONE TABLET BY MOUTH DAILY    ASPERCREME WITH ALOE 10 % Crea Apply 1 Application topically as needed.    ASPERCREME, LIDOCAINE HCL, 4 % Crea Apply topically.    atorvastatin (LIPITOR) 80 MG tablet TAKE ONE TABLET BY MOUTH EVERY DAY    blood sugar diagnostic Strp BG monitoring 4 times a day. Pt needs test strips for [employer] meter. (Patient taking  "differently: BG monitoring 2 times a day. Pt needs test strips for [employer] meter.)    cholecalciferol, vitamin D3, (VITAMIN D3) 25 mcg (1,000 unit) capsule Take 1 capsule (1,000 Units total) by mouth once daily.    colchicine (COLCRYS) 0.6 mg tablet Take 0.6 mg by mouth once daily.    diclofenac sodium (VOLTAREN) 1 % Gel APPLY 2 GRAMS TOPICALLY DAILY    docusate sodium (COLACE) 100 MG capsule Take 1 capsule (100 mg total) by mouth once daily.    doxycycline (VIBRA-TABS) 100 MG tablet Take 1 tablet (100 mg total) by mouth 2 (two) times daily.    fluticasone (FLONASE) 50 mcg/actuation nasal spray 2 sprays (100 mcg total) by Each Nare route once daily.    insulin degludec (TRESIBA FLEXTOUCH U-100) 100 unit/mL (3 mL) insulin pen Inject 10-18 units at night    Lactobacillus acidophilus 1 billion cell Cap Take 1 tablet by mouth once daily.    lancets Misc Test daily (Patient taking differently: Test twice  daily)    nebulizer and compressor (COMP-AIR ELITE COMP NEB SYSTEM) Berna use as directed    olopatadine (PATANOL) 0.1 % ophthalmic solution Place 1 drop into both eyes 2 (two) times daily.    oxyCODONE (ROXICODONE) 5 MG immediate release tablet Take 1 tablet (5 mg total) by mouth every 6 (six) hours as needed for Pain. May take 1-2 tablets every 6 hours as needed for pain    pen needle, diabetic (PEN NEEDLE) 29 gauge x 1/2" Ndle USE DAILY    semaglutide (OZEMPIC) 0.25 mg or 0.5 mg (2 mg/3 mL) pen injector INJECT 0.5 MG SUBCUTANEOUSLY ONCE A WEEK    semaglutide (OZEMPIC) 0.25 mg or 0.5 mg(2 mg/1.5 mL) pen injector INJECT 0.5 MG SUBCUTANEOUSLY ONCE A WEEK    SENNA 8.6 mg tablet Take 1 tablet by mouth once daily.    torsemide (DEMADEX) 10 MG Tab Take 1 tablet (10 mg total) by mouth every 48 hours as needed (leg swelling).    torsemide (DEMADEX) 10 MG Tab Take 10 mg by mouth every other day.    trolamine salicylate (ASPERCREME) 10 % cream Apply topically as needed.     Family History       Problem Relation (Age of " Onset)    Cancer Mother (69), Sister    Hypertension Father    No Known Problems Brother, Maternal Aunt, Maternal Uncle, Paternal Aunt, Paternal Uncle, Maternal Grandmother, Maternal Grandfather, Paternal Grandmother, Paternal Grandfather          Tobacco Use    Smoking status: Never    Smokeless tobacco: Never   Substance and Sexual Activity    Alcohol use: Yes     Comment: holidays    Drug use: No    Sexual activity: Never     Review of Systems   Constitutional: Negative for chills, fever and night sweats.   HENT:  Negative for congestion and hearing loss.    Eyes:  Negative for blurred vision, discharge, pain and visual disturbance.   Cardiovascular:  Positive for dyspnea on exertion and syncope. Negative for chest pain, leg swelling and palpitations.   Respiratory:  Negative for cough, hemoptysis, shortness of breath and wheezing.    Endocrine: Negative for cold intolerance and heat intolerance.   Skin:  Negative for flushing.   Musculoskeletal:  Negative for muscle weakness, myalgias, neck pain and stiffness.   Gastrointestinal:  Negative for abdominal pain, constipation, diarrhea, nausea and vomiting.   Genitourinary:  Negative for dysuria and hematuria.   Neurological:  Negative for focal weakness, headaches, numbness and paresthesias.   Psychiatric/Behavioral:  Negative for altered mental status and memory loss. The patient is not nervous/anxious.      Objective:     Vital Signs (Most Recent):  Temp: 98.1 °F (36.7 °C) (06/05/24 1600)  Pulse: 103 (06/05/24 1800)  Resp: 20 (06/05/24 1800)  BP: (!) 156/79 (06/05/24 1800)  SpO2: 97 % (06/05/24 1800) Vital Signs (24h Range):  Temp:  [97.9 °F (36.6 °C)-98.1 °F (36.7 °C)] 98.1 °F (36.7 °C)  Pulse:  [102-106] 103  Resp:  [16-22] 20  SpO2:  [95 %-100 %] 97 %  BP: (152-182)/(60-86) 156/79     Weight: 113.4 kg (250 lb)  Body mass index is 50.49 kg/m².    SpO2: 97 %       No intake or output data in the 24 hours ending 06/05/24 2034    Lines/Drains/Airways       Drain   Duration             Female External Urinary Catheter w/ Suction 06/05/24 1520 <1 day              Peripheral Intravenous Line  Duration                  Peripheral IV - Single Lumen 06/1942 20 G Anterior;Left Forearm <1 day         Peripheral IV - Single Lumen 06/1942 22 G Anterior;Distal;Left Forearm <1 day                     Physical Exam  Constitutional:       General: She is not in acute distress.     Appearance: Normal appearance. She is obese. She is not ill-appearing or diaphoretic.   HENT:      Head: Normocephalic and atraumatic.   Eyes:      General: No scleral icterus.        Right eye: No discharge.         Left eye: No discharge.      Extraocular Movements: Extraocular movements intact.      Conjunctiva/sclera: Conjunctivae normal.   Cardiovascular:      Rate and Rhythm: Regular rhythm. Tachycardia present.      Pulses: Normal pulses.   Pulmonary:      Effort: Pulmonary effort is normal. No respiratory distress.   Musculoskeletal:         General: Normal range of motion.   Skin:     General: Skin is warm and dry.   Neurological:      General: No focal deficit present.      Mental Status: She is alert and oriented to person, place, and time.   Psychiatric:         Mood and Affect: Mood normal.         Behavior: Behavior normal.          Significant Labs: All pertinent lab results from the last 24 hours have been reviewed.      Assessment and Plan:     * Pulmonary embolism  Intermediate risk PE based on ESC guidelines. Submassive PE based on ACC guidelines.  With low PESI score, recommend initiating treatment with anticoagulation. Risk of catheter-directed therapies likely outweigh potential benefit at this time. Systemic TPA not indicated. Please notify cardiology if lactic acid uptrending or clinically declining (hypoxia, tachycardia, or hypotension)          VTE Risk Mitigation (From admission, onward)           Ordered     heparin 25,000 units in dextrose 5% (100 units/ml) IV bolus from  bag HIGH INTENSITY nomogram - [facility]  As needed (PRN)        Question:  Heparin Infusion Adjustment (DO NOT MODIFY ANSWER)  Answer:  [email or web address]\epic\Images\Pharmacy\HeparinInfusions\heparin HIGH INTENSITY nomogram for [facility] [id number]    06/05/24 1807     heparin 25,000 units in dextrose 5% (100 units/ml) IV bolus from bag HIGH INTENSITY nomogram - [facility]  As needed (PRN)        Question:  Heparin Infusion Adjustment (DO NOT MODIFY ANSWER)  Answer:  [email or web address]\epic\Images\Pharmacy\HeparinInfusions\heparin HIGH INTENSITY nomogram for [facility] [id number]    06/05/24 1807     heparin 25,000 units in dextrose 5% 250 mL (100 units/mL) infusion HIGH INTENSITY nomogram - [facility]  Continuous        Question:  Begin at (units/kg/hr)  Answer:  18    06/05/24 1807     Reason for No Pharmacological VTE Prophylaxis  Once        Question:  Reasons:  Answer:  Physician Provided (leave comment)  Comment:  on heparin gtt    06/05/24 1829     IP VTE HIGH RISK PATIENT  Once         06/05/24 1829     Place sequential compression device  Until discontinued         06/05/24 1829                    Thank you for your consult. I will sign off. Please contact us if you have any additional questions.    Ryan Nguyen MD  Cardiology   [facility] - Emergency Dept

## 2024-06-06 NOTE — PROGRESS NOTES
Advance Directives:   Living Will: Yes        Copy on chart: Yes    [place]: Yes    Medical Power of : Yes    Goals of Care: The patient endorses that what is most important right now is to focus on remaining as independent as possible    Accordingly, we have decided that the best plan to meet the patient's goals includes continuing with treatment

## 2024-06-06 NOTE — ASSESSMENT & PLAN NOTE
"Patient's FSGs are controlled on current medication regimen.  Last A1c reviewed-   Lab Results   Component Value Date    HGBA1C 7.1 (H) 05/06/2024     Most recent fingerstick glucose reviewed- No results for input(s): "POCTGLUCOSE" in the last 24 hours.  Current correctional scale  Low  Maintain anti-hyperglycemic dose as follows-   Antihyperglycemics (From admission, onward)      Start     Stop Route Frequency Ordered    06/05/24 2037  insulin aspart U-100 pen 0-5 Units         -- SubQ Before meals & nightly PRN 06/05/24 1937          Hold Oral hypoglycemics while patient is in the hospital.  "

## 2024-06-06 NOTE — ASSESSMENT & PLAN NOTE
Patient presented with a syncopal episode. Passed out with loss of consciousness, denies hitting her head. She denies any chest pain but does endorse discomfort, and SOB. CT positive for submassive PE.    Plan:    -Cardiology was consulted, appreciate recs.  -NPO for any possible intervention  -Monitor electrolytes, replace as needed Mag >2 K >4  -Cardiac telemonitor  -Pulse oximeter continuous  -Heparin gtt was started   -PTT/INR

## 2024-06-06 NOTE — PLAN OF CARE
Problem: Adult Inpatient Plan of Care  Goal: Plan of Care Review  Outcome: Not Progressing  Goal: Patient-Specific Goal (Individualized)  Outcome: Not Progressing  Goal: Absence of Hospital-Acquired Illness or Injury  Outcome: Not Progressing  Goal: Optimal Comfort and Wellbeing  Outcome: Not Progressing  Goal: Readiness for Transition of Care  Outcome: Not Progressing     Problem: Bariatric Environmental Safety  Goal: Safety Maintained with Care  Outcome: Not Progressing     Problem: Diabetes Comorbidity  Goal: Blood Glucose Level Within Targeted Range  Outcome: Not Progressing     Problem: Wound  Goal: Optimal Coping  Outcome: Not Progressing  Goal: Optimal Functional Ability  Outcome: Not Progressing  Goal: Absence of Infection Signs and Symptoms  Outcome: Not Progressing  Goal: Improved Oral Intake  Outcome: Not Progressing  Goal: Optimal Pain Control and Function  Outcome: Not Progressing  Goal: Skin Health and Integrity  Outcome: Not Progressing  Goal: Optimal Wound Healing  Outcome: Not Progressing     Problem: Skin Injury Risk Increased  Goal: Skin Health and Integrity  Outcome: Not Progressing     Problem: Fall Injury Risk  Goal: Absence of Fall and Fall-Related Injury  Outcome: Not Progressing

## 2024-06-06 NOTE — SUBJECTIVE & OBJECTIVE
Past Medical History:   Diagnosis Date    Adrenal mass- adenoma stable since 2004 (1.8 cm and 8/13/12 (2 cm); stable 2016 2.4 cm     Adrenal mass- adenoma stable since 2004 (1.8 cm and 8/13/12 (2 cm); stable 2016 2.4 cm    Arthritis     Asthma in adult without complication 6/25/2015    Bilateral carotid artery disease 7/21/2017    Bilateral sciatica 2/7/2017    Cellulitis of right leg 08/16/2017    Cerebral infarction 1/29/2016    Multiple areas of lacunar infarction. Stroke risk factors include HTN, DM2, Dyslipidemia.  Continue ASA 81mg/ Statin therapy I have encouraged 30 minutes of physical activity daily for 5 days a week. She has access to a pool so this should not be so jarring to her joints.     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    Mixed hyperlipidemia 8/13/2012    Morbid obesity with BMI of 50.0-59.9, adult 2/11/2014    Obstructive sleep apnea syndrome 8/13/2012    Pulmonary embolism 6/5/2024    Senile cataracts of both eyes 1/22/2015    Traumatic open wound of right lower leg 8/25/2017    Trigeminal neuralgia of right side of face 5/23/2017    For years now Previously on Gabapentin, but caused constipation Dissipating over time Not related to intracranial abnormalities Possibly related to dental procedure    Type 2 diabetes mellitus with diabetic polyneuropathy, with long-term current use of insulin 1/29/2013    Venous stasis dermatitis of both lower extremities 8/21/2017    Viral hepatitis A without coma 1/1/1971    Hep B infection in [facility] in 1971, was hospitalize for 2 weeks at  that time, unknown treatment.    Vitamin D deficiency disease 8/13/2012       Past Surgical History:   Procedure Laterality Date    HYSTERECTOMY  1982    secondary uterine fibroids    INJECTION OF ANESTHETIC AGENT AROUND NERVE Right 10/21/2021    Procedure: BLOCK, NERVE GENICULAR RIGHT NEEDS CONSENT;  Surgeon: Senia Kilpatrick MD;  Location: [facility];  Service: Pain Management;  Laterality: Right;    INSERTION OF ANTIBIOTIC SPACER Right 10/28/2021    Procedure: INSERTION, ANTIBIOTIC SPACER;  Surgeon: Alfredo Lozoya MD;  Location: [address];  Service: Orthopedics;  Laterality: Right;    JOINT REPLACEMENT      REVISION OF KNEE ARTHROPLASTY Right 10/28/2021    Procedure: REVISION, ARTHROPLASTY, KNEE-DEPUY ANTIBIOTIC SPACER;  Surgeon: Alfredo Lozoya MD;  Location: [facility] OR [address] [place] [address];  Service: Orthopedics;  Laterality: Right;    REVISION OF KNEE ARTHROPLASTY Right 6/30/2022    Procedure: REVISION, ARTHROPLASTY, KNEE-NAKIA- stage 2;  Surgeon: Alfredo Lozoya MD;  Location: [address];  Service: Orthopedics;  Laterality: Right;    Right total knee replacement         Review of patient's allergies indicates:   Allergen Reactions    Bactrim [sulfamethoxazole-trimethoprim]      Other reaction(s): up set stomach rash/hives    Azithromycin      Feels bad    Erythromycin Other (See Comments)     Other reaction(s): Unknown  Other reaction(s): Stomach upset    Gentamicin      Vaginal burning , itching     Levofloxacin Hives     Other reaction(s): nervousness    Penicillins Other (See Comments)     Other reaction(s): Unknown  Other reaction(s): Swelling  Other reaction(s): Rash  Other reaction(s): Hives    Shellfish containing products      Rash      Vancomycin Itching     Other reaction(s): Itching       No current facility-administered medications on file prior to encounter.     Current Outpatient Medications on File Prior to Encounter   Medication Sig    acetaminophen (TYLENOL) 500 MG tablet Take  2 tablets (1,000 mg total) by mouth every 8 (eight) hours. Bedside delivery    albuterol (PROVENTIL/VENTOLIN HFA) 90 mcg/actuation inhaler Inhale 2 puffs into the lungs every 4 (four) hours as needed for Wheezing or Shortness of Breath. Rescue    albuterol-ipratropium (DUO-NEB) 2.5 mg-0.5 mg/3 mL nebulizer solution USE 1 VIAL PER NEBULIZER EVERY 6 HOURS AS NEEDED FOR WHEEZING/RESCUE    allopurinoL (ZYLOPRIM) 100 MG tablet Take 1 tablet (100 mg total) by mouth once daily.    allopurinoL (ZYLOPRIM) 300 MG tablet Take 1 tablet (300 mg total) by mouth once daily.    amLODIPine (NORVASC) 5 MG tablet TAKE ONE TABLET BY MOUTH DAILY    ASPERCREME WITH ALOE 10 % Crea Apply 1 Application topically as needed.    ASPERCREME, LIDOCAINE HCL, 4 % Crea Apply topically.    atorvastatin (LIPITOR) 80 MG tablet TAKE ONE TABLET BY MOUTH EVERY DAY    blood sugar diagnostic Strp BG monitoring 4 times a day. Pt needs test strips for [employer] meter. (Patient taking differently: BG monitoring 2 times a day. Pt needs test strips for [employer] meter.)    cholecalciferol, vitamin D3, (VITAMIN D3) 25 mcg (1,000 unit) capsule Take 1 capsule (1,000 Units total) by mouth once daily.    colchicine (COLCRYS) 0.6 mg tablet Take 0.6 mg by mouth once daily.    diclofenac sodium (VOLTAREN) 1 % Gel APPLY 2 GRAMS TOPICALLY DAILY    docusate sodium (COLACE) 100 MG capsule Take 1 capsule (100 mg total) by mouth once daily.    doxycycline (VIBRA-TABS) 100 MG tablet Take 1 tablet (100 mg total) by mouth 2 (two) times daily.    fluticasone (FLONASE) 50 mcg/actuation nasal spray 2 sprays (100 mcg total) by Each Nare route once daily.    insulin degludec (TRESIBA FLEXTOUCH U-100) 100 unit/mL (3 mL) insulin pen Inject 10-18 units at night    Lactobacillus acidophilus 1 billion cell Cap Take 1 tablet by mouth once daily.    lancets Misc Test daily (Patient taking differently: Test twice  daily)    nebulizer and compressor (COMP-AIR ELITE COMP NEB  "SYSTEM) Berna use as directed    olopatadine (PATANOL) 0.1 % ophthalmic solution Place 1 drop into both eyes 2 (two) times daily.    oxyCODONE (ROXICODONE) 5 MG immediate release tablet Take 1 tablet (5 mg total) by mouth every 6 (six) hours as needed for Pain. May take 1-2 tablets every 6 hours as needed for pain    pen needle, diabetic (PEN NEEDLE) 29 gauge x 1/2" Ndle USE DAILY    semaglutide (OZEMPIC) 0.25 mg or 0.5 mg (2 mg/3 mL) pen injector INJECT 0.5 MG SUBCUTANEOUSLY ONCE A WEEK    semaglutide (OZEMPIC) 0.25 mg or 0.5 mg(2 mg/1.5 mL) pen injector INJECT 0.5 MG SUBCUTANEOUSLY ONCE A WEEK    SENNA 8.6 mg tablet Take 1 tablet by mouth once daily.    torsemide (DEMADEX) 10 MG Tab Take 1 tablet (10 mg total) by mouth every 48 hours as needed (leg swelling).    torsemide (DEMADEX) 10 MG Tab Take 10 mg by mouth every other day.    trolamine salicylate (ASPERCREME) 10 % cream Apply topically as needed.     Family History       Problem Relation (Age of Onset)    Cancer Mother (69), Sister    Hypertension Father    No Known Problems Brother, Maternal Aunt, Maternal Uncle, Paternal Aunt, Paternal Uncle, Maternal Grandmother, Maternal Grandfather, Paternal Grandmother, Paternal Grandfather          Tobacco Use    Smoking status: Never    Smokeless tobacco: Never   Substance and Sexual Activity    Alcohol use: Yes     Comment: holidays    Drug use: No    Sexual activity: Never     Review of Systems   Constitutional: Negative for chills, fever and night sweats.   HENT:  Negative for congestion and hearing loss.    Eyes:  Negative for blurred vision, discharge, pain and visual disturbance.   Cardiovascular:  Positive for dyspnea on exertion and syncope. Negative for chest pain, leg swelling and palpitations.   Respiratory:  Negative for cough, hemoptysis, shortness of breath and wheezing.    Endocrine: Negative for cold intolerance and heat intolerance.   Skin:  Negative for flushing.   Musculoskeletal:  Negative for " muscle weakness, myalgias, neck pain and stiffness.   Gastrointestinal:  Negative for abdominal pain, constipation, diarrhea, nausea and vomiting.   Genitourinary:  Negative for dysuria and hematuria.   Neurological:  Negative for focal weakness, headaches, numbness and paresthesias.   Psychiatric/Behavioral:  Negative for altered mental status and memory loss. The patient is not nervous/anxious.      Objective:     Vital Signs (Most Recent):  Temp: 98.1 °F (36.7 °C) (06/05/24 1600)  Pulse: 103 (06/05/24 1800)  Resp: 20 (06/05/24 1800)  BP: (!) 156/79 (06/05/24 1800)  SpO2: 97 % (06/05/24 1800) Vital Signs (24h Range):  Temp:  [97.9 °F (36.6 °C)-98.1 °F (36.7 °C)] 98.1 °F (36.7 °C)  Pulse:  [102-106] 103  Resp:  [16-22] 20  SpO2:  [95 %-100 %] 97 %  BP: (152-182)/(60-86) 156/79     Weight: 113.4 kg (250 lb)  Body mass index is 50.49 kg/m².    SpO2: 97 %       No intake or output data in the 24 hours ending 06/05/24 2034    Lines/Drains/Airways       Drain  Duration             Female External Urinary Catheter w/ Suction 06/05/24 1520 <1 day              Peripheral Intravenous Line  Duration                  Peripheral IV - Single Lumen 06/1942 20 G Anterior;Left Forearm <1 day         Peripheral IV - Single Lumen 06/1942 22 G Anterior;Distal;Left Forearm <1 day                     Physical Exam  Constitutional:       General: She is not in acute distress.     Appearance: Normal appearance. She is obese. She is not ill-appearing or diaphoretic.   HENT:      Head: Normocephalic and atraumatic.   Eyes:      General: No scleral icterus.        Right eye: No discharge.         Left eye: No discharge.      Extraocular Movements: Extraocular movements intact.      Conjunctiva/sclera: Conjunctivae normal.   Cardiovascular:      Rate and Rhythm: Regular rhythm. Tachycardia present.      Pulses: Normal pulses.   Pulmonary:      Effort: Pulmonary effort is normal. No respiratory distress.   Musculoskeletal:          General: Normal range of motion.   Skin:     General: Skin is warm and dry.   Neurological:      General: No focal deficit present.      Mental Status: She is alert and oriented to person, place, and time.   Psychiatric:         Mood and Affect: Mood normal.         Behavior: Behavior normal.          Significant Labs: All pertinent lab results from the last 24 hours have been reviewed.

## 2024-06-06 NOTE — H&P
[facility] - Emergency Dept  [facility] Medicine  History & Physical    Patient Name: Duran Giordano  MRN: [id number]  Patient Class: IP- Inpatient  Admission Date: 6/5/2024  Attending Physician: Miller Mathis MD   Primary Care Provider: Tami Schmidt MD         Patient information was obtained from patient and ER records.     Subjective:     Principal Problem:Pulmonary embolism    Chief Complaint:   Chief Complaint   Patient presents with    Loss of Consciousness     Syncopal episode, patient thinks she may have over exerted herself, she became dizzy with SOB and syncopal episode, bumped her head. Unknown how long patient was out. Patient not complaining of any pain. Upon ems arrival patient complained of dizziness and nausea.         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 Patient reports she was on her mobility scooter about to enter her door when she suddenly syncopized. She notes mild nausea and palpitations. Since then she has had mild nausea and a slight discomfort in her chest. She passed out and lost consciousness but denies hitting her head.  Denies fevers, chills, cough but producing some phlegm. Denies new lower extremity edema but she is chronically on antibiotics for an infected right joint. Denies history of DVT or PE. Not currently on anticoagulation. Denies history of similar episodes. Denies any cardiac history, chest pain but does endorse chest discomfort and SOB.    In the ED patient vitally stable, with /60, , RR 20, afebrile. Labs significant for HB 11.9, D dimer >33, Na 138, Na 4.1, BUN 23, Cr 1.4, troponin 0.389, . CT chest was done that showed extensive pulmonary emboli throughout the bilateral lungs including a saddle pulmonary embolus. There is evidence of right heart strain with flattening of the interventricular septum and enlargement of  the right ventricle and main pulmonary artery.     Past Medical History:   Diagnosis Date    Adrenal mass- adenoma stable since 2004 (1.8 cm and 8/13/12 (2 cm); stable 2016 2.4 cm     Adrenal mass- adenoma stable since 2004 (1.8 cm and 8/13/12 (2 cm); stable 2016 2.4 cm    Arthritis     Asthma in adult without complication 6/25/2015    Bilateral carotid artery disease 7/21/2017    Bilateral sciatica 2/7/2017    Cellulitis of right leg 08/16/2017    Cerebral infarction 1/29/2016    Multiple areas of lacunar infarction. Stroke risk factors include HTN, DM2, Dyslipidemia.  Continue ASA 81mg/ Statin therapy I have encouraged 30 minutes of physical activity daily for 5 days a week. She has access to a pool so this should not be so jarring to her joints.     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    Mixed hyperlipidemia 8/13/2012    Morbid obesity with BMI of 50.0-59.9, adult 2/11/2014    Obstructive sleep apnea syndrome 8/13/2012    Senile cataracts of both eyes 1/22/2015    Traumatic open wound of right lower leg 8/25/2017    Trigeminal neuralgia of right side of face 5/23/2017    For years now Previously on Gabapentin, but caused constipation Dissipating over time Not related to intracranial abnormalities Possibly related to dental procedure    Type 2 diabetes mellitus with diabetic polyneuropathy, with long-term current use of insulin 1/29/2013    Venous stasis dermatitis of both lower extremities 8/21/2017    Viral hepatitis A without coma 1/1/1971    Hep B infection in [facility] in 1971, was  hospitalize for 2 weeks at that time, unknown treatment.    Vitamin D deficiency disease 8/13/2012       Past Surgical History:   Procedure Laterality Date    HYSTERECTOMY  1982    secondary uterine fibroids    INJECTION OF ANESTHETIC AGENT AROUND NERVE Right 10/21/2021    Procedure: BLOCK, NERVE GENICULAR RIGHT NEEDS CONSENT;  Surgeon: Senia Kilpatrick MD;  Location: [facility] PAIN [facility]GT;  Service: Pain Management;  Laterality: Right;    INSERTION OF ANTIBIOTIC SPACER Right 10/28/2021    Procedure: INSERTION, ANTIBIOTIC SPACER;  Surgeon: Alfredo Lozoya MD;  Location: [address];  Service: Orthopedics;  Laterality: Right;    JOINT REPLACEMENT      REVISION OF KNEE ARTHROPLASTY Right 10/28/2021    Procedure: REVISION, ARTHROPLASTY, KNEE-DEPUY ANTIBIOTIC SPACER;  Surgeon: Alfredo Lozoya MD;  Location: [address];  Service: Orthopedics;  Laterality: Right;    REVISION OF KNEE ARTHROPLASTY Right 6/30/2022    Procedure: REVISION, ARTHROPLASTY, KNEE-NAKIA- stage 2;  Surgeon: Alfredo Lozoya MD;  Location: [address];  Service: Orthopedics;  Laterality: Right;    Right total knee replacement         Review of patient's allergies indicates:   Allergen Reactions    Bactrim [sulfamethoxazole-trimethoprim]      Other reaction(s): up set stomach rash/hives    Azithromycin      Feels bad    Erythromycin Other (See Comments)     Other reaction(s): Unknown  Other reaction(s): Stomach upset    Gentamicin      Vaginal burning , itching     Levofloxacin Hives     Other reaction(s): nervousness    Penicillins Other (See Comments)     Other reaction(s): Unknown  Other reaction(s): Swelling  Other reaction(s): Rash  Other reaction(s): Hives    Shellfish containing products      Rash      Vancomycin Itching     Other reaction(s): Itching       No current facility-administered medications on file prior to encounter.     Current Outpatient Medications on File Prior to Encounter   Medication Sig    acetaminophen  (TYLENOL) 500 MG tablet Take 2 tablets (1,000 mg total) by mouth every 8 (eight) hours. Bedside delivery    albuterol (PROVENTIL/VENTOLIN HFA) 90 mcg/actuation inhaler Inhale 2 puffs into the lungs every 4 (four) hours as needed for Wheezing or Shortness of Breath. Rescue    albuterol-ipratropium (DUO-NEB) 2.5 mg-0.5 mg/3 mL nebulizer solution USE 1 VIAL PER NEBULIZER EVERY 6 HOURS AS NEEDED FOR WHEEZING/RESCUE    allopurinoL (ZYLOPRIM) 100 MG tablet Take 1 tablet (100 mg total) by mouth once daily.    allopurinoL (ZYLOPRIM) 300 MG tablet Take 1 tablet (300 mg total) by mouth once daily.    amLODIPine (NORVASC) 5 MG tablet TAKE ONE TABLET BY MOUTH DAILY    ASPERCREME WITH ALOE 10 % Crea Apply 1 Application topically as needed.    ASPERCREME, LIDOCAINE HCL, 4 % Crea Apply topically.    atorvastatin (LIPITOR) 80 MG tablet TAKE ONE TABLET BY MOUTH EVERY DAY    blood sugar diagnostic Strp BG monitoring 4 times a day. Pt needs test strips for [employer] meter. (Patient taking differently: BG monitoring 2 times a day. Pt needs test strips for [employer] meter.)    cholecalciferol, vitamin D3, (VITAMIN D3) 25 mcg (1,000 unit) capsule Take 1 capsule (1,000 Units total) by mouth once daily.    colchicine (COLCRYS) 0.6 mg tablet Take 0.6 mg by mouth once daily.    diclofenac sodium (VOLTAREN) 1 % Gel APPLY 2 GRAMS TOPICALLY DAILY    docusate sodium (COLACE) 100 MG capsule Take 1 capsule (100 mg total) by mouth once daily.    doxycycline (VIBRA-TABS) 100 MG tablet Take 1 tablet (100 mg total) by mouth 2 (two) times daily.    fluticasone (FLONASE) 50 mcg/actuation nasal spray 2 sprays (100 mcg total) by Each Nare route once daily.    insulin degludec (TRESIBA FLEXTOUCH U-100) 100 unit/mL (3 mL) insulin pen Inject 10-18 units at night    Lactobacillus acidophilus 1 billion cell Cap Take 1 tablet by mouth once daily.    lancets Misc Test daily (Patient taking differently: Test twice  daily)    nebulizer and compressor  "(COMP-AIR ELITE COMP NEB SYSTEM) Berna use as directed    olopatadine (PATANOL) 0.1 % ophthalmic solution Place 1 drop into both eyes 2 (two) times daily.    oxyCODONE (ROXICODONE) 5 MG immediate release tablet Take 1 tablet (5 mg total) by mouth every 6 (six) hours as needed for Pain. May take 1-2 tablets every 6 hours as needed for pain    pen needle, diabetic (PEN NEEDLE) 29 gauge x 1/2" Ndle USE DAILY    semaglutide (OZEMPIC) 0.25 mg or 0.5 mg (2 mg/3 mL) pen injector INJECT 0.5 MG SUBCUTANEOUSLY ONCE A WEEK    semaglutide (OZEMPIC) 0.25 mg or 0.5 mg(2 mg/1.5 mL) pen injector INJECT 0.5 MG SUBCUTANEOUSLY ONCE A WEEK    SENNA 8.6 mg tablet Take 1 tablet by mouth once daily.    torsemide (DEMADEX) 10 MG Tab Take 1 tablet (10 mg total) by mouth every 48 hours as needed (leg swelling).    torsemide (DEMADEX) 10 MG Tab Take 10 mg by mouth every other day.    trolamine salicylate (ASPERCREME) 10 % cream Apply topically as needed.     Family History       Problem Relation (Age of Onset)    Cancer Mother (69), Sister    Hypertension Father    No Known Problems Brother, Maternal Aunt, Maternal Uncle, Paternal Aunt, Paternal Uncle, Maternal Grandmother, Maternal Grandfather, Paternal Grandmother, Paternal Grandfather          Tobacco Use    Smoking status: Never    Smokeless tobacco: Never   Substance and Sexual Activity    Alcohol use: Yes     Comment: holidays    Drug use: No    Sexual activity: Never     Review of Systems   Constitutional:  Negative for activity change, appetite change and fatigue.   Respiratory:  Positive for shortness of breath. Negative for cough.    Cardiovascular:  Positive for leg swelling. Negative for chest pain.   Gastrointestinal:  Positive for nausea and vomiting. Negative for abdominal pain.   Genitourinary:  Negative for frequency and urgency.   Musculoskeletal:  Negative for joint swelling.   Neurological:  Negative for weakness, light-headedness and headaches.   Psychiatric/Behavioral:  " Negative for agitation, confusion and decreased concentration.      Objective:     Vital Signs (Most Recent):  Temp: 98.1 °F (36.7 °C) (06/05/24 1600)  Pulse: 103 (06/05/24 1800)  Resp: 20 (06/05/24 1800)  BP: (!) 156/79 (06/05/24 1800)  SpO2: 97 % (06/05/24 1800) Vital Signs (24h Range):  Temp:  [97.9 °F (36.6 °C)-98.1 °F (36.7 °C)] 98.1 °F (36.7 °C)  Pulse:  [102-106] 103  Resp:  [16-22] 20  SpO2:  [95 %-100 %] 97 %  BP: (152-182)/(60-86) 156/79     Weight: 113.4 kg (250 lb)  Body mass index is 50.49 kg/m².     Physical Exam  Constitutional:       Appearance: She is obese.      Comments: Morbid obesity   Cardiovascular:      Rate and Rhythm: Normal rate and regular rhythm.      Pulses: Normal pulses.      Heart sounds: Normal heart sounds.   Pulmonary:      Effort: Pulmonary effort is normal.      Breath sounds: Normal breath sounds.   Abdominal:      General: Bowel sounds are normal. There is no distension.      Palpations: Abdomen is soft.      Tenderness: There is no abdominal tenderness.   Musculoskeletal:      Right lower leg: Edema present.      Left lower leg: Edema present.      Comments: Massive bilateral cellulitis with skin excoriations.   Neurological:      General: No focal deficit present.      Mental Status: She is alert and oriented to person, place, and time.   Psychiatric:         Mood and Affect: Mood normal.         Behavior: Behavior normal.                Significant Labs: All pertinent labs within the past 24 hours have been reviewed.    Significant Imaging: I have reviewed all pertinent imaging results/findings within the past 24 hours.  Assessment/Plan:     * Pulmonary embolism  Patient presented with a syncopal episode. Passed out with loss of consciousness, denies hitting her head. She denies any chest pain but does endorse discomfort, and SOB. CT positive for submassive PE.    Plan:    -Cardiology was consulted, appreciate recs.  -NPO for any possible intervention  -Monitor  "electrolytes, replace as needed Mag >2 K >4  -Cardiac telemonitor  -Pulse oximeter continuous  -Heparin gtt was started   -PTT/INR        Hyperlipidemia  Continue lipitor      Essential hypertension  Chronic, controlled. Latest blood pressure and vitals reviewed-     Temp:  [97.9 °F (36.6 °C)-98.1 °F (36.7 °C)]   Pulse:  [102-106]   Resp:  [16-22]   BP: (152-182)/(60-86)   SpO2:  [95 %-100 %] .   Home meds for hypertension were reviewed and noted below.   Hypertension Medications               amLODIPine (NORVASC) 5 MG tablet TAKE ONE TABLET BY MOUTH DAILY    torsemide (DEMADEX) 10 MG Tab Take 1 tablet (10 mg total) by mouth every 48 hours as needed (leg swelling).    torsemide (DEMADEX) 10 MG Tab Take 10 mg by mouth every other day.            While in the hospital, will manage blood pressure as follows; Continue home antihypertensive regimen    Will utilize p.r.n. blood pressure medication only if patient's blood pressure greater than 180/110 and she develops symptoms such as worsening chest pain or shortness of breath.    Gout  Continue allopurinol      Morbid obesity with BMI of 50.0-59.9, adult  Body mass index is 50.49 kg/m². Morbid obesity complicates all aspects of disease management from diagnostic modalities to treatment. Weight loss encouraged and health benefits explained to patient.         Type 2 diabetes mellitus with diabetic polyneuropathy, with long-term current use of insulin  Patient's FSGs are controlled on current medication regimen.  Last A1c reviewed-   Lab Results   Component Value Date    HGBA1C 7.1 (H) 05/06/2024     Most recent fingerstick glucose reviewed- No results for input(s): "POCTGLUCOSE" in the last 24 hours.  Current correctional scale  Low  Maintain anti-hyperglycemic dose as follows-   Antihyperglycemics (From admission, onward)      Start     Stop Route Frequency Ordered    06/05/24 2037  insulin aspart U-100 pen 0-5 Units         -- SubQ Before meals & nightly PRN 06/05/24 " 1937          Hold Oral hypoglycemics while patient is in the hospital.      VTE Risk Mitigation (From admission, onward)           Ordered     heparin 25,000 units in dextrose 5% (100 units/ml) IV bolus from bag HIGH INTENSITY nomogram - [facility]  As needed (PRN)        Question:  Heparin Infusion Adjustment (DO NOT MODIFY ANSWER)  Answer:  [email or web address]\epic\Images\Pharmacy\HeparinInfusions\heparin HIGH INTENSITY nomogram for [facility] [id number]    06/05/24 1807     heparin 25,000 units in dextrose 5% (100 units/ml) IV bolus from bag HIGH INTENSITY nomogram - [facility]  As needed (PRN)        Question:  Heparin Infusion Adjustment (DO NOT MODIFY ANSWER)  Answer:  [email or web address]\epic\Images\Pharmacy\HeparinInfusions\heparin HIGH INTENSITY nomogram for [facility] [id number]    06/05/24 1807     heparin 25,000 units in dextrose 5% 250 mL (100 units/mL) infusion HIGH INTENSITY nomogram - [facility]  Continuous        Question:  Begin at (units/kg/hr)  Answer:  18    06/05/24 1807     heparin 25,000 units in dextrose 5% (100 units/ml) IV bolus from bag HIGH INTENSITY nomogram - [facility]  Once        Question:  Heparin Infusion Adjustment (DO NOT MODIFY ANSWER)  Answer:  [email or web address]\epic\Images\Pharmacy\HeparinInfusions\heparin HIGH INTENSITY nomogram for [facility] [id number]    06/05/24 1807     Reason for No Pharmacological VTE Prophylaxis  Once        Question:  Reasons:  Answer:  Physician Provided (leave comment)  Comment:  on heparin gtt    06/05/24 1829     IP VTE HIGH RISK PATIENT  Once         06/05/24 1829     Place sequential compression device  Until discontinued         06/05/24 1829                                    Kym Dent MD  Department of Hospital Medicine  [facility] - Emergency Dept

## 2024-06-07 PROBLEM — I26.99 ACUTE PULMONARY EMBOLISM: Status: ACTIVE | Noted: 2024-06-07

## 2024-06-07 LAB
ALBUMIN SERPL BCP-MCNC: 2.4 G/DL (ref 3.5–5.2)
ALP SERPL-CCNC: 88 U/L (ref 55–135)
ALT SERPL W/O P-5'-P-CCNC: 20 U/L (ref 10–44)
ANION GAP SERPL CALC-SCNC: 8 MMOL/L (ref 8–16)
AST SERPL-CCNC: 20 U/L (ref 10–40)
BASOPHILS # BLD AUTO: 0.04 K/UL (ref 0–0.2)
BASOPHILS NFR BLD: 0.4 % (ref 0–1.9)
BILIRUB SERPL-MCNC: 0.5 MG/DL (ref 0.1–1)
BNP SERPL-MCNC: 387 PG/ML (ref 0–99)
BUN SERPL-MCNC: 28 MG/DL (ref 8–23)
CALCIUM SERPL-MCNC: 8.4 MG/DL (ref 8.7–10.5)
CHLORIDE SERPL-SCNC: 106 MMOL/L (ref 95–110)
CO2 SERPL-SCNC: 24 MMOL/L (ref 23–29)
CREAT SERPL-MCNC: 1.3 MG/DL (ref 0.5–1.4)
DIFFERENTIAL METHOD BLD: ABNORMAL
EOSINOPHIL # BLD AUTO: 0.2 K/UL (ref 0–0.5)
EOSINOPHIL NFR BLD: 2.5 % (ref 0–8)
ERYTHROCYTE [DISTWIDTH] IN BLOOD BY AUTOMATED COUNT: 17.4 % (ref 11.5–14.5)
EST. GFR  (NO RACE VARIABLE): 41.1 ML/MIN/1.73 M^2
GLUCOSE SERPL-MCNC: 143 MG/DL (ref 70–110)
HCT VFR BLD AUTO: 31.5 % (ref 37–48.5)
HGB BLD-MCNC: 10 G/DL (ref 12–16)
IMM GRANULOCYTES # BLD AUTO: 0.04 K/UL (ref 0–0.04)
IMM GRANULOCYTES NFR BLD AUTO: 0.4 % (ref 0–0.5)
LACTATE SERPL-SCNC: 0.9 MMOL/L (ref 0.5–2.2)
LACTATE SERPL-SCNC: 1 MMOL/L (ref 0.5–2.2)
LACTATE SERPL-SCNC: 1.8 MMOL/L (ref 0.5–2.2)
LACTATE SERPL-SCNC: 2.3 MMOL/L (ref 0.5–2.2)
LYMPHOCYTES # BLD AUTO: 1.3 K/UL (ref 1–4.8)
LYMPHOCYTES NFR BLD: 14.3 % (ref 18–48)
MAGNESIUM SERPL-MCNC: 1.8 MG/DL (ref 1.6–2.6)
MCH RBC QN AUTO: 29 PG (ref 27–31)
MCHC RBC AUTO-ENTMCNC: 31.7 G/DL (ref 32–36)
MCV RBC AUTO: 91 FL (ref 82–98)
MONOCYTES # BLD AUTO: 0.6 K/UL (ref 0.3–1)
MONOCYTES NFR BLD: 6.7 % (ref 4–15)
NEUTROPHILS # BLD AUTO: 7.1 K/UL (ref 1.8–7.7)
NEUTROPHILS NFR BLD: 75.7 % (ref 38–73)
NRBC BLD-RTO: 0 /100 WBC
OHS QRS DURATION: 116 MS
OHS QTC CALCULATION: 503 MS
PHOSPHATE SERPL-MCNC: 3.5 MG/DL (ref 2.7–4.5)
PLATELET # BLD AUTO: 88 K/UL (ref 150–450)
PMV BLD AUTO: 12.8 FL (ref 9.2–12.9)
POCT GLUCOSE: 144 MG/DL (ref 70–110)
POCT GLUCOSE: 188 MG/DL (ref 70–110)
POCT GLUCOSE: 188 MG/DL (ref 70–110)
POTASSIUM SERPL-SCNC: 4.3 MMOL/L (ref 3.5–5.1)
PROT SERPL-MCNC: 6.7 G/DL (ref 6–8.4)
RBC # BLD AUTO: 3.45 M/UL (ref 4–5.4)
SODIUM SERPL-SCNC: 138 MMOL/L (ref 136–145)
TROPONIN I SERPL DL<=0.01 NG/ML-MCNC: 0.3 NG/ML (ref 0–0.03)
WBC # BLD AUTO: 9.38 K/UL (ref 3.9–12.7)

## 2024-06-07 PROCEDURE — 21400001 HC TELEMETRY ROOM

## 2024-06-07 PROCEDURE — 93010 ELECTROCARDIOGRAM REPORT: CPT | Mod: ,,, | Performed by: INTERNAL MEDICINE

## 2024-06-07 PROCEDURE — 83735 ASSAY OF MAGNESIUM: CPT

## 2024-06-07 PROCEDURE — 84100 ASSAY OF PHOSPHORUS: CPT

## 2024-06-07 PROCEDURE — 25000003 PHARM REV CODE 250

## 2024-06-07 PROCEDURE — 83880 ASSAY OF NATRIURETIC PEPTIDE: CPT | Performed by: INTERNAL MEDICINE

## 2024-06-07 PROCEDURE — 97161 PT EVAL LOW COMPLEX 20 MIN: CPT

## 2024-06-07 PROCEDURE — 84484 ASSAY OF TROPONIN QUANT: CPT

## 2024-06-07 PROCEDURE — 94640 AIRWAY INHALATION TREATMENT: CPT

## 2024-06-07 PROCEDURE — 63600175 PHARM REV CODE 636 W HCPCS

## 2024-06-07 PROCEDURE — 36415 COLL VENOUS BLD VENIPUNCTURE: CPT

## 2024-06-07 PROCEDURE — 97112 NEUROMUSCULAR REEDUCATION: CPT

## 2024-06-07 PROCEDURE — 83605 ASSAY OF LACTIC ACID: CPT | Mod: 91

## 2024-06-07 PROCEDURE — 85025 COMPLETE CBC W/AUTO DIFF WBC: CPT

## 2024-06-07 PROCEDURE — 36415 COLL VENOUS BLD VENIPUNCTURE: CPT | Performed by: INTERNAL MEDICINE

## 2024-06-07 PROCEDURE — 94761 N-INVAS EAR/PLS OXIMETRY MLT: CPT

## 2024-06-07 PROCEDURE — 80053 COMPREHEN METABOLIC PANEL: CPT

## 2024-06-07 PROCEDURE — 83605 ASSAY OF LACTIC ACID: CPT

## 2024-06-07 PROCEDURE — 93005 ELECTROCARDIOGRAM TRACING: CPT

## 2024-06-07 PROCEDURE — 97165 OT EVAL LOW COMPLEX 30 MIN: CPT

## 2024-06-07 PROCEDURE — 25000242 PHARM REV CODE 250 ALT 637 W/ HCPCS

## 2024-06-07 PROCEDURE — 97535 SELF CARE MNGMENT TRAINING: CPT

## 2024-06-07 PROCEDURE — 97116 GAIT TRAINING THERAPY: CPT

## 2024-06-07 RX ORDER — FUROSEMIDE 10 MG/ML
40 INJECTION INTRAMUSCULAR; INTRAVENOUS ONCE
Status: COMPLETED | OUTPATIENT
Start: 2024-06-07 | End: 2024-06-07

## 2024-06-07 RX ADMIN — APIXABAN 10 MG: 5 TABLET, FILM COATED ORAL at 09:06

## 2024-06-07 RX ADMIN — TORSEMIDE 10 MG: 10 TABLET ORAL at 09:06

## 2024-06-07 RX ADMIN — IPRATROPIUM BROMIDE AND ALBUTEROL SULFATE 3 ML: 2.5; .5 SOLUTION RESPIRATORY (INHALATION) at 04:06

## 2024-06-07 RX ADMIN — DOXYCYCLINE HYCLATE 100 MG: 100 TABLET, COATED ORAL at 09:06

## 2024-06-07 RX ADMIN — FUROSEMIDE 40 MG: 10 INJECTION, SOLUTION INTRAVENOUS at 04:06

## 2024-06-07 RX ADMIN — IPRATROPIUM BROMIDE AND ALBUTEROL SULFATE 3 ML: 2.5; .5 SOLUTION RESPIRATORY (INHALATION) at 11:06

## 2024-06-07 RX ADMIN — ALLOPURINOL 300 MG: 300 TABLET ORAL at 09:06

## 2024-06-07 RX ADMIN — IPRATROPIUM BROMIDE AND ALBUTEROL SULFATE 3 ML: 2.5; .5 SOLUTION RESPIRATORY (INHALATION) at 09:06

## 2024-06-07 RX ADMIN — ATORVASTATIN CALCIUM 80 MG: 40 TABLET, FILM COATED ORAL at 09:06

## 2024-06-07 NOTE — ASSESSMENT & PLAN NOTE
Acute pulmonary embolism    Patient presented with a syncopal episode. Passed out with loss of consciousness, denies hitting her head. She denies any chest pain but does endorse discomfort, and SOB. CT positive for submassive PE.    Plan:    -Cardiology was consulted, PESi score 82, submassive PE according to ACC guidelines and no intervention planned at this time.   -Diet ordered.  -Monitor electrolytes, replace as needed Mag >2 K >4  -Cardiac telemonitor  -Pulse oximeter continuous  -Heparin gtt transitioned to oral  Eliquis.  -PTT/INR

## 2024-06-07 NOTE — PLAN OF CARE
Goals to be met by: 07/07/24     Patient will increase functional independence with ADLs by performing:    UE Dressing with Set-up Assistance.  LE Dressing with Moderate Assistance.  Grooming while seated with Modified [place].  Toileting from bedside commode with Modified [place] for hygiene and clothing management.   Bathing from  edge of bed with Modified [place].  Toilet transfer to bedside commode with Modified [place].

## 2024-06-07 NOTE — ASSESSMENT & PLAN NOTE
Chronic, controlled. Latest blood pressure and vitals reviewed-     Temp:  [98 °F (36.7 °C)-99.8 °F (37.7 °C)]   Pulse:  []   Resp:  [16-18]   BP: (129-157)/(59-77)   SpO2:  [92 %-96 %] .   Home meds for hypertension were reviewed and noted below.   Hypertension Medications               amLODIPine (NORVASC) 5 MG tablet TAKE ONE TABLET BY MOUTH DAILY    torsemide (DEMADEX) 10 MG Tab Take 1 tablet (10 mg total) by mouth every 48 hours as needed (leg swelling).    torsemide (DEMADEX) 10 MG Tab Take 10 mg by mouth every other day.            While in the hospital, will manage blood pressure as follows; Continue home antihypertensive regimen  Resumed torsemide today will resume ccb tomorrow  Will utilize p.r.n. blood pressure medication only if patient's blood pressure greater than 180/110 and she develops symptoms such as worsening chest pain or shortness of breath.    - resumed home amlodipine and toresmide

## 2024-06-07 NOTE — CARE UPDATE
I have reviewed the chart of Duran Giordano who is hospitalized for the following:    Active Hospital Problems    Diagnosis    *Pulmonary embolism    Acute pulmonary embolism     CTA chest 6/5/24: Extensive pulmonary emboli throughout the bilateral lungs including a saddle pulmonary embolus. There is evidence of right heart strain with flattening of the interventricular septum and enlargement of the right ventricle and main pulmonary artery.       Essential hypertension    Hyperlipidemia    Gout     R wrist swelling and warmth, has metabolic syndrome      Morbid obesity with BMI of 50.0-59.9, adult     patient aware of need drastic weight loss and encourage to increase activity      Type 2 diabetes mellitus with diabetic polyneuropathy, with long-term current use of insulin     Abnormal foot exam, followed by podiatry q2 jordy Barber PA-C  Unit Based [facility]

## 2024-06-07 NOTE — TELEPHONE ENCOUNTER
----- Message from Marcie Moya sent at 6/7/2024  9:23 AM CDT -----  Contact: Min with  case management   Patient needs a Hosp follow up appt with their PCP only.       When is the next available appointment:  N/A schedule is private    Symptoms:  N/A    Discharge date:06/07/24 from [facility]      Needs to be seen by:within 7 days    Would you prefer an answer via [employer]?: call back to pt [phone]     Comments:

## 2024-06-07 NOTE — HOSPITAL COURSE
Admitted for submassive PE. Was HDS and on RA, cards without acute intervention. Switched from heparin gtt to Eliquis. TTE without RV strain. Cards to re-evaluate patient given uptrend in lactic acidosis. One time lasix was given. Not a candidate for GDMT due to soft pressures, age and other co morbidities. PT/OT recommended SNF but the patient denied. Stable for discharge with outpatient follow up with PCP and heart failure transitional clinic.

## 2024-06-07 NOTE — PROGRESS NOTES
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 Patient reports she was on her mobility scooter about to enter her door when she suddenly syncopized. She notes mild nausea and palpitations. Since then she has had mild nausea and a slight discomfort in her chest. She passed out and lost consciousness but denies hitting her head.  Denies fevers, chills, cough but producing some phlegm. Denies new lower extremity edema but she is chronically on antibiotics for an infected right joint. Denies history of DVT or PE. Not currently on anticoagulation. Denies history of similar episodes. Denies any cardiac history, chest pain but does endorse chest discomfort and SOB.    In the ED patient vitally stable, with /60, , RR 20, afebrile. Labs significant for HB 11.9, D dimer >33, Na 138, Na 4.1, BUN 23, Cr 1.4, troponin 0.389, . CT chest was done that showed extensive pulmonary emboli throughout the bilateral lungs including a saddle pulmonary embolus. There is evidence of right heart strain with flattening of the interventricular septum and enlargement of the right ventricle and main pulmonary artery.     Overview/Hospital Course:  Admitted for submassive PE. Was HDS and on RA, cards without acute intervention. Switched from heparin gtt to Eliquis. TTE without RV strain. Cards to re-evaluate patient given uptrend in lactic acidosis.     Interval History: NAOE. TTE without RV strain. SpO2 low 90s-95%, worked with PT and got up to have BM. Patient states she  feels SOB with activity at baseline. She became tachycardic and c/o intermittent chest pain, EKG, CXR, troponin unremarkable- LA uptrended from 1.0 - 2.3. Plan to speak to cardiology for new findings, otherwise patient should be able to dc home     Review of Systems   Constitutional:  Negative for activity change, appetite change and fatigue.   Respiratory:  Positive for shortness of breath. Negative for cough.    Cardiovascular:  Positive for leg swelling. Negative for chest pain.   Gastrointestinal:  Negative for abdominal pain, nausea and vomiting.   Genitourinary:  Negative for frequency and urgency.   Musculoskeletal:  Negative for joint swelling.   Neurological:  Negative for weakness, light-headedness and headaches.   Psychiatric/Behavioral:  Negative for agitation, confusion and decreased concentration.      Objective:     Vital Signs (Most Recent):  Temp: 99.8 °F (37.7 °C) (06/07/24 1047)  Pulse: 68 (06/07/24 1047)  Resp: 18 (06/07/24 1047)  BP: (!) 157/71 (06/07/24 1047)  SpO2: (!) 92 % (06/07/24 1047) Vital Signs (24h Range):  Temp:  [98 °F (36.7 °C)-99.8 °F (37.7 °C)] 99.8 °F (37.7 °C)  Pulse:  [] 68  Resp:  [16-18] 18  SpO2:  [92 %-96 %] 92 %  BP: (129-157)/(59-77) 157/71     Weight: 126.1 kg (278 lb)  Body mass index is 56.15 kg/m².    Intake/Output Summary (Last 24 hours) at 6/7/2024 1226  Last data filed at 6/7/2024 0357  Gross per 24 hour   Intake 300 ml   Output 600 ml   Net -300 ml         Physical Exam  Constitutional:       Appearance: She is obese.      Comments: Morbid obesity   Cardiovascular:      Rate and Rhythm: Normal rate and regular rhythm.      Pulses: Normal pulses.      Heart sounds: Normal heart sounds.   Pulmonary:      Effort: Pulmonary effort is normal.      Breath sounds: Wheezing (expiratory wheeze) present.   Abdominal:      General: Bowel sounds are normal. There is no distension.      Palpations: Abdomen is soft.      Tenderness: There is no abdominal tenderness.    Musculoskeletal:      Right lower leg: Edema present.      Left lower leg: Edema present.      Comments: Massive bilateral cellulitis with skin excoriations.   Neurological:      General: No focal deficit present.      Mental Status: She is alert and oriented to person, place, and time.   Psychiatric:         Mood and Affect: Mood normal.         Behavior: Behavior normal.             Significant Labs: All pertinent labs within the past 24 hours have been reviewed.    Significant Imaging: I have reviewed all pertinent imaging results/findings within the past 24 hours.    Assessment/Plan:      * Pulmonary embolism  Acute pulmonary embolism    Patient presented with a syncopal episode. Passed out with loss of consciousness, denies hitting her head. She denies any chest pain but does endorse discomfort, and SOB. CT positive for submassive PE.    Plan:    -Cardiology was consulted, PESi score 82, submassive PE according to ACC guidelines and no intervention planned at this time.   -Diet ordered.  -Monitor electrolytes, replace as needed Mag >2 K >4  -Cardiac telemonitor  -Pulse oximeter continuous  -Heparin gtt transitioned to oral  Eliquis.  -PTT/INR        Acute pulmonary embolism        Hyperlipidemia  Continue lipitor      Essential hypertension  Chronic, controlled. Latest blood pressure and vitals reviewed-     Temp:  [98 °F (36.7 °C)-99.8 °F (37.7 °C)]   Pulse:  []   Resp:  [16-18]   BP: (129-157)/(59-77)   SpO2:  [92 %-96 %] .   Home meds for hypertension were reviewed and noted below.   Hypertension Medications               amLODIPine (NORVASC) 5 MG tablet TAKE ONE TABLET BY MOUTH DAILY    torsemide (DEMADEX) 10 MG Tab Take 1 tablet (10 mg total) by mouth every 48 hours as needed (leg swelling).    torsemide (DEMADEX) 10 MG Tab Take 10 mg by mouth every other day.            While in the hospital, will manage blood pressure as follows; Continue home antihypertensive regimen  Resumed torsemide today  will resume ccb tomorrow  Will utilize p.r.n. blood pressure medication only if patient's blood pressure greater than 180/110 and she develops symptoms such as worsening chest pain or shortness of breath.    - resumed home amlodipine and toresmide    Gout  Continue allopurinol      Morbid obesity with BMI of 50.0-59.9, adult  Body mass index is 56.15 kg/m². Morbid obesity complicates all aspects of disease management from diagnostic modalities to treatment. Weight loss encouraged and health benefits explained to patient.         Type 2 diabetes mellitus with diabetic polyneuropathy, with long-term current use of insulin  Patient's FSGs are controlled on current medication regimen.  Last A1c reviewed-   Lab Results   Component Value Date    HGBA1C 7.1 (H) 05/06/2024     Most recent fingerstick glucose reviewed-   Recent Labs   Lab 06/06/24  0120 06/06/24  1137   POCTGLUCOSE 187* 188*     Current correctional scale  Low  Maintain anti-hyperglycemic dose as follows-   Antihyperglycemics (From admission, onward)      Start     Stop Route Frequency Ordered    06/05/24 2037  insulin aspart U-100 pen 0-5 Units         -- SubQ Before meals & nightly PRN 06/05/24 1937          Hold Oral hypoglycemics while patient is in the hospital.      VTE Risk Mitigation (From admission, onward)           Ordered     apixaban tablet 10 mg  2 times daily         06/06/24 0931     Reason for No Pharmacological VTE Prophylaxis  Once        Question:  Reasons:  Answer:  Physician Provided (leave comment)  Comment:  on heparin gtt    06/05/24 1829     IP VTE HIGH RISK PATIENT  Once         06/05/24 1829     Place sequential compression device  Until discontinued         06/05/24 1829                    Discharge Planning   JENNIFER: 6/10/2024     Code Status: Full Code   Is the patient medically ready for discharge?:     Reason for patient still in hospital (select all that apply): Patient trending condition  Discharge Plan A: Home, Home with  family                  Savanna Comer MD  Department of [facility] Medicine   [facility] Surg

## 2024-06-07 NOTE — PLAN OF CARE
Medical management of PE    IC was contacted to re-evaluate the patient regarding PE. IC initially was consulted on 6/5 (see consult note)    Went to assess the patient with Chino Cline MD and d/w Dr Solano, her risks outweigh benefits in the setting of her baseline frail status, ambulatory dysfunction and BMI 56  She is currently 97% on r/a with , normal BP  Also, symptom free while in bed and talking comfortably  Also, EF 40% with CVP 15 and pulm edema on CXR  Suspect she has some fluid overload, recommend diuresing until euvolemic before we discharge  Please contact general cardiology for further help, if needed, with GDMT or volume status, would need to be careful with GDMT to make sure doesn't get hypotension in the setting of clots  Would need close outpatient cardiology f/up as well for new HFrEF and new EF drop    Patient seen with Chino Cline and d/w Dr Russ Edge  06/07/2024  Interventional Cardiology

## 2024-06-07 NOTE — SUBJECTIVE & OBJECTIVE
Interval History: NAOE. TTE without RV strain. SpO2 low 90s-95%, worked with PT and got up to have BM. Patient states she feels SOB with activity at baseline. She became tachycardic and c/o intermittent chest pain, EKG, CXR, troponin unremarkable- LA uptrended from 1.0 - 2.3. Plan to speak to cardiology for new findings, otherwise patient should be able to dc home     Review of Systems   Constitutional:  Negative for activity change, appetite change and fatigue.   Respiratory:  Positive for shortness of breath. Negative for cough.    Cardiovascular:  Positive for leg swelling. Negative for chest pain.   Gastrointestinal:  Negative for abdominal pain, nausea and vomiting.   Genitourinary:  Negative for frequency and urgency.   Musculoskeletal:  Negative for joint swelling.   Neurological:  Negative for weakness, light-headedness and headaches.   Psychiatric/Behavioral:  Negative for agitation, confusion and decreased concentration.      Objective:     Vital Signs (Most Recent):  Temp: 99.8 °F (37.7 °C) (06/07/24 1047)  Pulse: 68 (06/07/24 1047)  Resp: 18 (06/07/24 1047)  BP: (!) 157/71 (06/07/24 1047)  SpO2: (!) 92 % (06/07/24 1047) Vital Signs (24h Range):  Temp:  [98 °F (36.7 °C)-99.8 °F (37.7 °C)] 99.8 °F (37.7 °C)  Pulse:  [] 68  Resp:  [16-18] 18  SpO2:  [92 %-96 %] 92 %  BP: (129-157)/(59-77) 157/71     Weight: 126.1 kg (278 lb)  Body mass index is 56.15 kg/m².    Intake/Output Summary (Last 24 hours) at 6/7/2024 1226  Last data filed at 6/7/2024 0357  Gross per 24 hour   Intake 300 ml   Output 600 ml   Net -300 ml         Physical Exam  Constitutional:       Appearance: She is obese.      Comments: Morbid obesity   Cardiovascular:      Rate and Rhythm: Normal rate and regular rhythm.      Pulses: Normal pulses.      Heart sounds: Normal heart sounds.   Pulmonary:      Effort: Pulmonary effort is normal.      Breath sounds: Wheezing (expiratory wheeze) present.   Abdominal:      General: Bowel sounds are  normal. There is no distension.      Palpations: Abdomen is soft.      Tenderness: There is no abdominal tenderness.   Musculoskeletal:      Right lower leg: Edema present.      Left lower leg: Edema present.      Comments: Massive bilateral cellulitis with skin excoriations.   Neurological:      General: No focal deficit present.      Mental Status: She is alert and oriented to person, place, and time.   Psychiatric:         Mood and Affect: Mood normal.         Behavior: Behavior normal.             Significant Labs: All pertinent labs within the past 24 hours have been reviewed.    Significant Imaging: I have reviewed all pertinent imaging results/findings within the past 24 hours.

## 2024-06-07 NOTE — PLAN OF CARE
PT eval completed- see note for details, goals and POC established.     Problem: Physical Therapy  Goal: Physical Therapy Goal  Description: Goals to be met by: 24     Patient will increase functional independence with mobility by performin. Supine to sit with Contact Guard Assistance  2. Sit to stand transfer with Supervision  3. Bed to chair transfer with Supervision using Rolling Walker  4. Gait  x 45 feet with Supervision using Rolling Walker.   5. Stand for 8 minutes with Supervision using Rolling Walker    Outcome: Progressing   2024

## 2024-06-07 NOTE — PT/OT/SLP EVAL
"Patient brought to infusion room from [facility], because patient in \"extreme pain\" and not doing well  sitting in wheel chair.  Took 4 people to lift patient to bed, because patient was slipping out of the chair. After cleaning up patient of being incontinent of stool.  Patient stated she was comfortable lying flat in bed.  States with any movement she has pain \"because of my ribs.\"  Patient has several open sores on bottom and between legs.  Patient states are from radiation.   Pateint was seen by provider. Labs drawn through [place] a cath without problem. Called TCU and talked with Elle and July explained patient is unable to tolerated transportation via wheelchair. Plus informed Elle that pateint states the TCU has not been giving here magic mouth wash and patient would like to change her diet to full liquid or more.  Dr. Ray notified to call TCU to change orders. Patient unable to ride back to TCU in wheel chair.  [facility] to take patient back to TCU in a bed.  Took 4 people to transfer patient to [facility].   During visit patient O2 stat stable between 92-98 % on 2 liter of O2.  " Occupational Therapy   Evaluation    Name: Duran Giordano  MRN: [id number]  Admitting Diagnosis: Pulmonary embolism  Recent Surgery: * No surgery found *      Recommendations:     Discharge Recommendations: Moderate Intensity Therapy  Discharge Equipment Recommendations:  none  Barriers to discharge:  None    Assessment:     Duran Giordano is a 84 y.o. female with a medical diagnosis of Pulmonary embolism.  She presents with performance deficits affecting function: weakness, gait instability, impaired cardiopulmonary response to activity, impaired endurance, decreased lower extremity function, impaired balance, impaired coordination, impaired self care skills, impaired skin, impaired functional mobility, edema.  Pt presents A&O x4, agreeable to tx. Pt is kind, aware of PLOF and CLOF as well as safety, and attentive to education overall. She has good support at the [place] where she resides and all necessary equipment for safety. Pt will benefit from skilled tx to address functional decline in activity tolerance, exertional dyspnea, and functional mobility/ADL skills.     Rehab Prognosis: Good; patient would benefit from acute skilled OT services to address these deficits and reach maximum level of function.       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 Pt has a shower with bench and bars.   Previous level of function: min-modA for bed mobility, ADLs, Total socks/threading pants, motor scooter for point A <> B, transfers scooter <> surfaces/toilet  IND, ambulates short distances with RW (door to EOB).   Roles and Routines: Pt's participation in the con[employer] if very important to her, she leads the sisters, was a principle for 35yrs, stays busy.   Equipment Used at Home: 3-in-1 commode, bath bench, walker, rolling, other (see comments) (motorized scooter)  Assistance upon Discharge: Sisters/caregivers/nurses at facility.     Pain/Comfort:  Pain Rating 1: 0/10  Pain Rating Post-Intervention 1: 0/10    Patients cultural, spiritual, Jewish conflicts given the current situation: no    Objective:     Communicated with: Nurse prior to session.  Patient found HOB elevated with SCD, telemetry, PureWick upon OT entry to room.    General Precautions: Standard, fall  Orthopedic Precautions: N/A  Braces: N/A  Respiratory Status: Room air    Occupational Performance:    Bed Mobility:    Patient completed Scooting/Bridging with moderate assistance  Patient completed Supine to Sit with moderate assistance  Patient completed Sit to Supine with moderate assistance    Functional Mobility/Transfers:  Patient completed Sit <> Stand Transfer with contact guard assistance  with  rolling walker  first 2 trials (does not achieve upright completing LB bathing), Pietro x2 for full stand to walker 3rd trial.   Functional Mobility: 10ft x2 with CGA with RW    Activities of Daily Living:  Grooming: modified independence EOB  Bathing: moderate assistance to reach LB, due to body habitus, and for thoroughness  Upper Body Dressing: modified independence EOB, provided HERNANDEZ of gown    Cognitive/Visual Perceptual:  Cognitive/Psychosocial Skills:     -       Follows Commands/attention:Follows multistep  commands  -       Safety awareness/insight to disability: intact     Physical Exam:  Upper Extremity Range of Motion:     -       Right Upper Extremity: WNL  -       Left Upper Extremity: WNL  Upper Extremity Strength:    -       Right Upper Extremity: WFL  -       Left Upper Extremity: WFL    "Strength:    -       Right Upper Extremity: WFL  -       Left Upper Extremity: WFL  Fine Motor Coordination:    -       Intact    AMPAC 6 Click ADL:  AMPAC Total Score: 16    Treatment & Education:  Pt presents in bed, motivated to participate in morning ADL, sits EOB to groom (face, oral care) and bathe (requesting UB/LB bathing of "hot spots" daily). Pt leans forward and stands (part way) for LB bathing with increased IND, upright achieved for ambulation with walker requires increased A due to fatigue. Pt then ambulates and reports moderate exertion/SOB after short distance once EOB. Increased A back to supine and scoots up towards HOB with poor success and increased SOB. Pt cued to allow staff A due to SOB and pull to HOB total x2. Pt educated on positioning for meal safety and breathing ease. Pt inquires to mechanism of decreased act tolerance and educated on PE and symptoms. Pt HERNANDEZ with all needs within reach and other staff present.     -Education on energy conservation and task modification to maximize safety and (I) during ADLs and mobility  -Education on importance of OOB activity to improve overall activity tolerance and promote recovery  -Pt educated to call for assistance and to transfer with hospital staff only  -Provided education regarding role of OT, POC, & discharge recommendations with pt verbalizing understanding.  Pt had no further questions & when asked whether there were any concerns pt reported none.     Patient left HOB elevated with all lines intact, call button in reach, and nurse present    GOALS:   Multidisciplinary Problems       Occupational Therapy Goals          Problem: Occupational Therapy    Goal Priority Disciplines Outcome Interventions   Occupational Therapy Goal     OT, PT/OT Progressing    Description: Goals to be met by: 07/07/24     Patient will increase functional independence with ADLs by performing:    UE Dressing with Set-up Assistance.  LE Dressing with Moderate " Assistance.  Grooming while seated with Modified [place].  Toileting from bedside commode with Modified [place] for hygiene and clothing management.   Bathing from  edge of bed with Modified [place].  Toilet transfer to bedside commode with Modified [place].                         History:     Past Medical History:   Diagnosis Date    Adrenal mass- adenoma stable since 2004 (1.8 cm and 8/13/12 (2 cm); stable 2016 2.4 cm     Adrenal mass- adenoma stable since 2004 (1.8 cm and 8/13/12 (2 cm); stable 2016 2.4 cm    Arthritis     Asthma in adult without complication 6/25/2015    Bilateral carotid artery disease 7/21/2017    Bilateral sciatica 2/7/2017    Cellulitis of right leg 08/16/2017    Cerebral infarction 1/29/2016    Multiple areas of lacunar infarction. Stroke risk factors include HTN, DM2, Dyslipidemia.  Continue ASA 81mg/ Statin therapy I have encouraged 30 minutes of physical activity daily for 5 days a week. She has access to a pool so this should not be so jarring to her joints.     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    Mixed hyperlipidemia 8/13/2012    Morbid obesity with BMI of 50.0-59.9, adult 2/11/2014    Obstructive sleep apnea syndrome 8/13/2012    Pulmonary embolism 6/5/2024    Senile cataracts of both eyes 1/22/2015    Traumatic open wound of right lower leg 8/25/2017    Trigeminal neuralgia of right side of face 5/23/2017    For years now Previously on Gabapentin, but caused constipation Dissipating over time Not related to intracranial abnormalities  Possibly related to dental procedure    Type 2 diabetes mellitus with diabetic polyneuropathy, with long-term current use of insulin 1/29/2013    Venous stasis dermatitis of both lower extremities 8/21/2017    Viral hepatitis A without coma 1/1/1971    Hep B infection in [facility] in [facility], was hospitalize for 2 weeks at that time, unknown treatment.    Vitamin D deficiency disease 8/13/2012         Past Surgical History:   Procedure Laterality Date    HYSTERECTOMY  1982    secondary uterine fibroids    INJECTION OF ANESTHETIC AGENT AROUND NERVE Right 10/21/2021    Procedure: BLOCK, NERVE GENICULAR RIGHT NEEDS CONSENT;  Surgeon: Senia Kilpatrick MD;  Location: [facility]IN [facility];  Service: Pain Management;  Laterality: Right;    INSERTION OF ANTIBIOTIC SPACER Right 10/28/2021    Procedure: INSERTION, ANTIBIOTIC SPACER;  Surgeon: Alfredo Lozoya MD;  Location: [address];  Service: Orthopedics;  Laterality: Right;    JOINT REPLACEMENT      REVISION OF KNEE ARTHROPLASTY Right 10/28/2021    Procedure: REVISION, ARTHROPLASTY, KNEE-DEPUY ANTIBIOTIC SPACER;  Surgeon: Alfredo Lozoya MD;  Location: [address];  Service: Orthopedics;  Laterality: Right;    REVISION OF KNEE ARTHROPLASTY Right 6/30/2022    Procedure: REVISION, ARTHROPLASTY, KNEE-NAKIA- stage 2;  Surgeon: Alfredo Lozoya MD;  Location: [address];  Service: Orthopedics;  Laterality: Right;    Right total knee replacement         Time Tracking:     OT Date of Treatment: 06/07/24  OT Start Time: 0831  OT Stop Time: 0904  OT Total Time (min): 33 min    Billable Minutes:Evaluation 10  Self Care/Home Management 23    6/7/2024

## 2024-06-07 NOTE — PT/OT/SLP EVAL
Physical Therapy Co-Evaluation and Treatment     OT present for coeval due to pt's multiple medical comorbidities and functional/cognition deficits requiring two skilled therapists to appropriately progress pt's musculoskeletal strength, neuromuscular control, and endurance while taking into consideration medical acuity and pt safety.    Patient Name:  Duran Giordano   MRN:  [id number]    Recommendations:     Discharge Recommendations: Moderate Intensity Therapy   Discharge Equipment Recommendations: none   Barriers to discharge: None    Assessment:     Duran Giordano is a 84 y.o. female admitted with a medical diagnosis of Pulmonary embolism.  She presents with the following impairments/functional limitations: weakness, impaired endurance, impaired functional mobility, gait instability, impaired balance, decreased lower extremity function, impaired cardiopulmonary response to activity     Pt receptive and tolerated PT co-eval with OT well. Pt demonstrated good balance and trunk control while performing ADLs sitting and standing at EOB. Pt amb ~20 ft in the room with RW and CGA. Pt reported moderate fatigue/SOB after session with decreased in symptoms after laying down a few moments. Patient currently demonstrates a need for moderate intensity therapy on a daily basis post acute secondary to a decline in functional status due to illness.    Rehab Prognosis: Good; patient would benefit from acute skilled PT services to address these deficits and reach maximum level of function.    Recent Surgery: * No surgery found *      Plan:     During this hospitalization, patient to be seen 3 x/week to address the identified rehab impairments via gait training, therapeutic activities, therapeutic exercises, neuromuscular re-education and progress toward the following goals:    Plan of Care Expires:  07/07/24    Subjective     Chief Complaint: fatigue and SOB after amb  Patient/Family Comments/goals: Pt wanted to clean herself  at EOB  Pain/Comfort:  Pain Rating 1: 0/10  Pain Rating Post-Intervention 1: 0/10    Patients cultural, spiritual, Judaism conflicts given the current situation: no    Patient History:     Living Environment: Pt lives at a [facility] with elevator access to living area on 2nd floor.   Prior Level of Function: Mod I using RW for short distance and scooter for longer distance  DME owned: scooter, RW, SPC, rollator  Caregiver Assistance: sisters at the home    Objective:     Communicated with RN prior to session.  Patient found HOB elevated with telemetry, PureWick  upon PT entry to room.    General Precautions: Standard, fall  Orthopedic Precautions:N/A   Braces: N/A  Respiratory Status: Room air    Exams:  Gross Motor Coordination:  WFL  Sensation:    -       Intact  RLE ROM: WFL  RLE Strength: grossly 4-/5  LLE ROM: WFL  LLE Strength: grossly 4-/5    Functional Mobility:    Bed Mobility:   Supine > Sit: moderate assistance  Sit > Supine: moderate assistance  Scooting: moderate assistance     Transfers:   Sit <> Stand Transfer: contact guard assistance from EOB without AD on 1st Trial  Sit <> stand with RW: minimum assistance     Balance:   Sitting balance: GOOD-: Incosistently Maintains balance through MODERATE excursions of active trunk movement,     Standing balance:   FAIR: Maintains without assist but unable to take challenges  Pt stood with SBA to perform ADLs with OT  FAIR: Needs CONTACT GUARD during gait                 Gait:  Distance: ~20 ft in the room   Assistive Device: RW  Assistance Level: contact guard assistance  Gait Assessment: Pt amb with decreased christo and decreased step length  Pt reported moderate fatigue and SOB at end of gait trial      AM-PAC 6 CLICK MOBILITY  Total Score:16       Treatment & Education:  Pt educated on tip to reduce fall risk and safety with mobility and using call button for assistance from nursing staff with OOB mobility.  Pt educated  on sitting up in chair throughout most of day  All questions answered within the scope of PT.  White board updated accordingly.      Patient left HOB elevated with all lines intact, call button in reach, and MD present.    GOALS:   Multidisciplinary Problems       Physical Therapy Goals          Problem: Physical Therapy    Goal Priority Disciplines Outcome Goal Variances Interventions   Physical Therapy Goal     PT, PT/OT Progressing     Description: Goals to be met by: 24     Patient will increase functional independence with mobility by performin. Supine to sit with Contact Guard Assistance  2. Sit to stand transfer with Supervision  3. Bed to chair transfer with Supervision using Rolling Walker  4. Gait  x 45 feet with Supervision using Rolling Walker.   5. Stand for 8 minutes with Supervision using Rolling Walker                         History:     Past Medical History:   Diagnosis Date    Adrenal mass- adenoma stable since  (1.8 cm and 12 (2 cm); stable 2016 2.4 cm     Adrenal mass- adenoma stable since  (1.8 cm and 12 (2 cm); stable 2016 2.4 cm    Arthritis     Asthma in adult without complication 2015    Bilateral carotid artery disease 2017    Bilateral sciatica 2017    Cellulitis of right leg 2017    Cerebral infarction 2016    Multiple areas of lacunar infarction. Stroke risk factors include HTN, DM2, Dyslipidemia.  Continue ASA 81mg/ Statin therapy I have encouraged 30 minutes of physical activity daily for 5 days a week. She has access to a pool so this should not be so jarring to her joints.     Cervical radiculopathy 3/18/2015    Chronic knee pain     Chronic rhinitis 2013    CKD (chronic kidney disease) stage 3, GFR 30-59 ml/min 2013    Coronary artery disease due to calcified coronary lesion 2015    Deep vein thrombosis     Diastolic dysfunction 10/10/2013    Essential hypertension 2015    Gastroesophageal reflux disease  without esophagitis 8/13/2012    Gout     Hives 10/1/2013    Infection of prosthetic right knee joint 10/25/2021    Mixed hyperlipidemia 8/13/2012    Morbid obesity with BMI of 50.0-59.9, adult 2/11/2014    Obstructive sleep apnea syndrome 8/13/2012    Pulmonary embolism 6/5/2024    Senile cataracts of both eyes 1/22/2015    Traumatic open wound of right lower leg 8/25/2017    Trigeminal neuralgia of right side of face 5/23/2017    For years now Previously on Gabapentin, but caused constipation Dissipating over time Not related to intracranial abnormalities Possibly related to dental procedure    Type 2 diabetes mellitus with diabetic polyneuropathy, with long-term current use of insulin 1/29/2013    Venous stasis dermatitis of both lower extremities 8/21/2017    Viral hepatitis A without coma 1/1/1971    Hep B infection in [facility] in [facility], was hospitalize for 2 weeks at that time, unknown treatment.    Vitamin D deficiency disease 8/13/2012       Past Surgical History:   Procedure Laterality Date    HYSTERECTOMY  1982    secondary uterine fibroids    INJECTION OF ANESTHETIC AGENT AROUND NERVE Right 10/21/2021    Procedure: BLOCK, NERVE GENICULAR RIGHT NEEDS CONSENT;  Surgeon: Senia Kilpatrick MD;  Location: [facility];  Service: Pain Management;  Laterality: Right;    INSERTION OF ANTIBIOTIC SPACER Right 10/28/2021    Procedure: INSERTION, ANTIBIOTIC SPACER;  Surgeon: Alfredo Lozoya MD;  Location: [address];  Service: Orthopedics;  Laterality: Right;    JOINT REPLACEMENT      REVISION OF KNEE ARTHROPLASTY Right 10/28/2021    Procedure: REVISION, ARTHROPLASTY, KNEE-DEPUY ANTIBIOTIC SPACER;  Surgeon: Alfredo Lozoya MD;  Location: [address];  Service: Orthopedics;  Laterality: Right;    REVISION OF KNEE ARTHROPLASTY Right 6/30/2022    Procedure: REVISION, ARTHROPLASTY, KNEE-NAKIA- stage 2;  Surgeon: Alfredo Lozoya MD;  Location: [address];  Service: Orthopedics;  Laterality:  Right;    Right total knee replacement         Time Tracking:     PT Received On: 06/07/24  PT Start Time: 0832     PT Stop Time: 0904  PT Total Time (min): 32 min     Billable Minutes: Evaluation 9, Gait Training 13, and Neuromuscular Re-education 10      06/07/2024

## 2024-06-08 LAB
ALBUMIN SERPL BCP-MCNC: 2.4 G/DL (ref 3.5–5.2)
ALP SERPL-CCNC: 85 U/L (ref 55–135)
ALT SERPL W/O P-5'-P-CCNC: 19 U/L (ref 10–44)
ANION GAP SERPL CALC-SCNC: 12 MMOL/L (ref 8–16)
ANION GAP SERPL CALC-SCNC: 6 MMOL/L (ref 8–16)
AST SERPL-CCNC: 19 U/L (ref 10–40)
BASOPHILS # BLD AUTO: 0.04 K/UL (ref 0–0.2)
BASOPHILS NFR BLD: 0.5 % (ref 0–1.9)
BILIRUB SERPL-MCNC: 0.4 MG/DL (ref 0.1–1)
BUN SERPL-MCNC: 29 MG/DL (ref 8–23)
BUN SERPL-MCNC: 31 MG/DL (ref 8–23)
CALCIUM SERPL-MCNC: 8.8 MG/DL (ref 8.7–10.5)
CALCIUM SERPL-MCNC: 9.1 MG/DL (ref 8.7–10.5)
CHLORIDE SERPL-SCNC: 105 MMOL/L (ref 95–110)
CHLORIDE SERPL-SCNC: 105 MMOL/L (ref 95–110)
CO2 SERPL-SCNC: 21 MMOL/L (ref 23–29)
CO2 SERPL-SCNC: 25 MMOL/L (ref 23–29)
CREAT SERPL-MCNC: 1.3 MG/DL (ref 0.5–1.4)
CREAT SERPL-MCNC: 1.6 MG/DL (ref 0.5–1.4)
DIFFERENTIAL METHOD BLD: ABNORMAL
EOSINOPHIL # BLD AUTO: 0.2 K/UL (ref 0–0.5)
EOSINOPHIL NFR BLD: 2.7 % (ref 0–8)
ERYTHROCYTE [DISTWIDTH] IN BLOOD BY AUTOMATED COUNT: 17.8 % (ref 11.5–14.5)
EST. GFR  (NO RACE VARIABLE): 32 ML/MIN/1.73 M^2
EST. GFR  (NO RACE VARIABLE): 41.1 ML/MIN/1.73 M^2
GLUCOSE SERPL-MCNC: 123 MG/DL (ref 70–110)
GLUCOSE SERPL-MCNC: 252 MG/DL (ref 70–110)
HCT VFR BLD AUTO: 31.6 % (ref 37–48.5)
HGB BLD-MCNC: 9.9 G/DL (ref 12–16)
IMM GRANULOCYTES # BLD AUTO: 0.03 K/UL (ref 0–0.04)
IMM GRANULOCYTES NFR BLD AUTO: 0.4 % (ref 0–0.5)
LYMPHOCYTES # BLD AUTO: 1.6 K/UL (ref 1–4.8)
LYMPHOCYTES NFR BLD: 19.1 % (ref 18–48)
MAGNESIUM SERPL-MCNC: 1.8 MG/DL (ref 1.6–2.6)
MAGNESIUM SERPL-MCNC: 2.2 MG/DL (ref 1.6–2.6)
MCH RBC QN AUTO: 29.4 PG (ref 27–31)
MCHC RBC AUTO-ENTMCNC: 31.3 G/DL (ref 32–36)
MCV RBC AUTO: 94 FL (ref 82–98)
MONOCYTES # BLD AUTO: 0.7 K/UL (ref 0.3–1)
MONOCYTES NFR BLD: 8.6 % (ref 4–15)
NEUTROPHILS # BLD AUTO: 5.6 K/UL (ref 1.8–7.7)
NEUTROPHILS NFR BLD: 68.7 % (ref 38–73)
NRBC BLD-RTO: 0 /100 WBC
PHOSPHATE SERPL-MCNC: 3.6 MG/DL (ref 2.7–4.5)
PLATELET # BLD AUTO: 104 K/UL (ref 150–450)
PMV BLD AUTO: ABNORMAL FL (ref 9.2–12.9)
POCT GLUCOSE: 135 MG/DL (ref 70–110)
POCT GLUCOSE: 136 MG/DL (ref 70–110)
POCT GLUCOSE: 174 MG/DL (ref 70–110)
POCT GLUCOSE: 184 MG/DL (ref 70–110)
POCT GLUCOSE: 254 MG/DL (ref 70–110)
POTASSIUM SERPL-SCNC: 4.3 MMOL/L (ref 3.5–5.1)
POTASSIUM SERPL-SCNC: 4.8 MMOL/L (ref 3.5–5.1)
PROT SERPL-MCNC: 6.7 G/DL (ref 6–8.4)
RBC # BLD AUTO: 3.37 M/UL (ref 4–5.4)
SODIUM SERPL-SCNC: 136 MMOL/L (ref 136–145)
SODIUM SERPL-SCNC: 138 MMOL/L (ref 136–145)
WBC # BLD AUTO: 8.11 K/UL (ref 3.9–12.7)

## 2024-06-08 PROCEDURE — 25000242 PHARM REV CODE 250 ALT 637 W/ HCPCS

## 2024-06-08 PROCEDURE — 63600175 PHARM REV CODE 636 W HCPCS

## 2024-06-08 PROCEDURE — 83735 ASSAY OF MAGNESIUM: CPT | Mod: 91

## 2024-06-08 PROCEDURE — 84100 ASSAY OF PHOSPHORUS: CPT

## 2024-06-08 PROCEDURE — 85025 COMPLETE CBC W/AUTO DIFF WBC: CPT

## 2024-06-08 PROCEDURE — 94640 AIRWAY INHALATION TREATMENT: CPT

## 2024-06-08 PROCEDURE — 25000003 PHARM REV CODE 250

## 2024-06-08 PROCEDURE — 94761 N-INVAS EAR/PLS OXIMETRY MLT: CPT

## 2024-06-08 PROCEDURE — 21400001 HC TELEMETRY ROOM

## 2024-06-08 PROCEDURE — 36415 COLL VENOUS BLD VENIPUNCTURE: CPT

## 2024-06-08 PROCEDURE — 63600175 PHARM REV CODE 636 W HCPCS: Performed by: HOSPITALIST

## 2024-06-08 PROCEDURE — 83735 ASSAY OF MAGNESIUM: CPT

## 2024-06-08 PROCEDURE — 80048 BASIC METABOLIC PNL TOTAL CA: CPT | Mod: XB

## 2024-06-08 PROCEDURE — 80053 COMPREHEN METABOLIC PANEL: CPT

## 2024-06-08 RX ORDER — AMLODIPINE BESYLATE 5 MG/1
5 TABLET ORAL DAILY
Start: 2024-06-08

## 2024-06-08 RX ORDER — SODIUM,POTASSIUM PHOSPHATES 280-250MG
1 POWDER IN PACKET (EA) ORAL ONCE
Status: COMPLETED | OUTPATIENT
Start: 2024-06-08 | End: 2024-06-08

## 2024-06-08 RX ORDER — MAGNESIUM SULFATE HEPTAHYDRATE 40 MG/ML
2 INJECTION, SOLUTION INTRAVENOUS ONCE
Status: COMPLETED | OUTPATIENT
Start: 2024-06-08 | End: 2024-06-08

## 2024-06-08 RX ORDER — FUROSEMIDE 10 MG/ML
40 INJECTION INTRAMUSCULAR; INTRAVENOUS 2 TIMES DAILY WITH MEALS
Status: DISCONTINUED | OUTPATIENT
Start: 2024-06-08 | End: 2024-06-08

## 2024-06-08 RX ADMIN — APIXABAN 10 MG: 5 TABLET, FILM COATED ORAL at 08:06

## 2024-06-08 RX ADMIN — DOXYCYCLINE HYCLATE 100 MG: 100 TABLET, COATED ORAL at 08:06

## 2024-06-08 RX ADMIN — IPRATROPIUM BROMIDE AND ALBUTEROL SULFATE 3 ML: 2.5; .5 SOLUTION RESPIRATORY (INHALATION) at 11:06

## 2024-06-08 RX ADMIN — INSULIN ASPART 1 UNITS: 100 INJECTION, SOLUTION INTRAVENOUS; SUBCUTANEOUS at 09:06

## 2024-06-08 RX ADMIN — IPRATROPIUM BROMIDE AND ALBUTEROL SULFATE 3 ML: 2.5; .5 SOLUTION RESPIRATORY (INHALATION) at 08:06

## 2024-06-08 RX ADMIN — IPRATROPIUM BROMIDE AND ALBUTEROL SULFATE 3 ML: 2.5; .5 SOLUTION RESPIRATORY (INHALATION) at 04:06

## 2024-06-08 RX ADMIN — FUROSEMIDE 40 MG: 10 INJECTION, SOLUTION INTRAVENOUS at 08:06

## 2024-06-08 RX ADMIN — POTASSIUM & SODIUM PHOSPHATES POWDER PACK 280-160-250 MG 1 PACKET: 280-160-250 PACK at 10:06

## 2024-06-08 RX ADMIN — MAGNESIUM SULFATE HEPTAHYDRATE 2 G: 40 INJECTION, SOLUTION INTRAVENOUS at 10:06

## 2024-06-08 RX ADMIN — ALLOPURINOL 300 MG: 300 TABLET ORAL at 08:06

## 2024-06-08 RX ADMIN — ATORVASTATIN CALCIUM 80 MG: 40 TABLET, FILM COATED ORAL at 08:06

## 2024-06-08 NOTE — PLAN OF CARE
Problem: Adult Inpatient Plan of Care  Goal: Plan of Care Review  Outcome: Progressing  Flowsheets (Taken 6/8/2024 0332)  Plan of Care Reviewed With:   patient   family  Goal: Patient-Specific Goal (Individualized)  Outcome: Progressing  Goal: Absence of Hospital-Acquired Illness or Injury  Outcome: Progressing  Intervention: Identify and Manage Fall Risk  Flowsheets (Taken 6/8/2024 0332)  Safety Promotion/Fall Prevention:   assistive device/personal item within reach   commode/urinal/bedpan at bedside   bed alarm set   Fall Risk reviewed with patient/family   lighting adjusted   medications reviewed   nonskid shoes/socks when out of bed   room near unit station   side rails raised x 2   instructed to call staff for mobility  Intervention: Prevent Skin Injury  Flowsheets (Taken 6/8/2024 0332)  Body Position:   turned   left   right  Intervention: Prevent and Manage VTE (Venous Thromboembolism) Risk  Flowsheets (Taken 6/8/2024 0332)  VTE Prevention/Management:   bleeding precations maintained   bleeding risk assessed   dorsiflexion/plantar flexion performed   ROM (active) performed   ROM (passive) performed  Intervention: Prevent Infection  Flowsheets (Taken 6/8/2024 0332)  Infection Prevention:   cohorting utilized   environmental surveillance performed   equipment surfaces disinfected   hand hygiene promoted   personal protective equipment utilized   rest/sleep promoted   single patient room provided  Goal: Optimal Comfort and Wellbeing  Outcome: Progressing  Intervention: Provide Person-Centered Care  Flowsheets (Taken 6/8/2024 0332)  Trust Relationship/Rapport:   care explained   choices provided   questions answered   questions encouraged  Goal: Readiness for Transition of Care  Outcome: Progressing     Problem: Bariatric Environmental Safety  Goal: Safety Maintained with Care  Outcome: Progressing     Problem: Diabetes Comorbidity  Goal: Blood Glucose Level Within Targeted Range  Outcome:  Progressing  Intervention: Monitor and Manage Glycemia  Flowsheets (Taken 6/8/2024 0332)  Glycemic Management: blood glucose monitored     Problem: Wound  Goal: Optimal Coping  Outcome: Progressing  Intervention: Support Patient and Family Response  Flowsheets (Taken 6/8/2024 0332)  Supportive Measures:   active listening utilized   decision-making supported  Goal: Optimal Functional Ability  Outcome: Progressing  Intervention: Optimize Functional Ability  Flowsheets (Taken 6/8/2024 0332)  Activity Management:   Ambulated to bathroom - L4   Standing - L3  Activity Assistance Provided: assistance, 1 person  Goal: Absence of Infection Signs and Symptoms  Outcome: Progressing  Intervention: Prevent or Manage Infection  Flowsheets (Taken 6/8/2024 0332)  Infection Management: aseptic technique maintained  Isolation Precautions:   precautions maintained   protective  Goal: Improved Oral Intake  Outcome: Progressing  Intervention: Promote and Optimize Oral Intake  Flowsheets (Taken 6/8/2024 0332)  Oral Nutrition Promotion: rest periods promoted  Nutrition Interventions:   food preferences provided   meal set-up provided  Goal: Optimal Pain Control and Function  Outcome: Progressing  Intervention: Prevent or Manage Pain  Flowsheets (Taken 6/8/2024 0332)  Sleep/Rest Enhancement:   regular sleep/rest pattern promoted   relaxation techniques promoted   room darkened  Goal: Skin Health and Integrity  Outcome: Progressing  Intervention: Optimize Skin Protection  Flowsheets (Taken 6/8/2024 0332)  Activity Management:   Ambulated to bathroom - L4   Standing - L3  Head of Bed (HOB) Positioning: HOB elevated  Goal: Optimal Wound Healing  Outcome: Progressing  Intervention: Promote Wound Healing  Flowsheets (Taken 6/8/2024 0332)  Sleep/Rest Enhancement:   regular sleep/rest pattern promoted   relaxation techniques promoted   room darkened     Problem: Skin Injury Risk Increased  Goal: Skin Health and Integrity  Outcome:  Progressing  Intervention: Optimize Skin Protection  Flowsheets (Taken 6/8/2024 0332)  Activity Management:   Ambulated to bathroom - L4   Standing - L3  Head of Bed (HOB) Positioning: HOB elevated  Intervention: Promote and Optimize Oral Intake  Flowsheets (Taken 6/8/2024 0332)  Oral Nutrition Promotion: rest periods promoted  Nutrition Interventions:   food preferences provided   meal set-up provided     Problem: Fall Injury Risk  Goal: Absence of Fall and Fall-Related Injury  Outcome: Progressing  Intervention: Identify and Manage Contributors  Flowsheets (Taken 6/8/2024 0332)  Self-Care Promotion:   independence encouraged   BADL personal objects within reach   BADL personal routines maintained   meal set-up provided   adaptive equipment use encouraged  Medication Review/Management: medications reviewed  Intervention: Promote Injury-Free Environment  Flowsheets (Taken 6/8/2024 0332)  Safety Promotion/Fall Prevention:   assistive device/personal item within reach   commode/urinal/bedpan at bedside   bed alarm set   Fall Risk reviewed with patient/family   lighting adjusted   medications reviewed   nonskid shoes/socks when out of bed   room near unit station   side rails raised x 2   instructed to call staff for mobility   A&Ox4, calm and cooperative, up with assistance, using bed pen and pure wick, no abnormal sounds on lung and heart, +3 lower legs bilaterally, skin is flaky and dry, no pain at this moment, normal sinus on tele, c/o numbness and tingling on right toes. Pure wick changed. Questions answered, no need, side rails up x2, peripheral IVs saline locked.

## 2024-06-08 NOTE — PLAN OF CARE
Problem: Adult Inpatient Plan of Care  Goal: Plan of Care Review  Outcome: Adequate for Care Transition  Goal: Patient-Specific Goal (Individualized)  Outcome: Adequate for Care Transition  Goal: Absence of Hospital-Acquired Illness or Injury  Outcome: Adequate for Care Transition  Goal: Optimal Comfort and Wellbeing  Outcome: Adequate for Care Transition  Goal: Readiness for Transition of Care  Outcome: Adequate for Care Transition     Problem: Bariatric Environmental Safety  Goal: Safety Maintained with Care  Outcome: Adequate for Care Transition     Problem: Diabetes Comorbidity  Goal: Blood Glucose Level Within Targeted Range  Outcome: Adequate for Care Transition     Problem: Wound  Goal: Optimal Coping  Outcome: Adequate for Care Transition  Goal: Optimal Functional Ability  Outcome: Adequate for Care Transition  Goal: Absence of Infection Signs and Symptoms  Outcome: Adequate for Care Transition  Goal: Improved Oral Intake  Outcome: Adequate for Care Transition  Goal: Optimal Pain Control and Function  Outcome: Adequate for Care Transition  Goal: Skin Health and Integrity  Outcome: Adequate for Care Transition  Goal: Optimal Wound Healing  Outcome: Adequate for Care Transition     Problem: Skin Injury Risk Increased  Goal: Skin Health and Integrity  Outcome: Adequate for Care Transition     Problem: Fall Injury Risk  Goal: Absence of Fall and Fall-Related Injury  Outcome: Adequate for Care Transition   Pt is A,A,O x 4 . Stable V/S. No C/O pain or discomfort today. Pt remained free of falls and injuries per day. Pt was up to bedside commode with assist. Pt  received Mg IV and potassium sodium phosphate packet. Pt seen by Dr. Naif fields and MD ordered to discharge pt to home.

## 2024-06-08 NOTE — PLAN OF CARE
[facility]      HOME HEALTH ORDERS  FACE TO FACE ENCOUNTER    Patient Name: Duran Giordano  YOB: 1940    PCP: Tami Schmidt MD   PCP Address: [address] / [place] [address]  PCP Phone Number: [phone]  PCP Fax: [phone]    Encounter Date: 6/5/24    Admit to Home Health    Diagnoses:  Active Hospital Problems    Diagnosis  POA    *Pulmonary embolism [I26.99]  Yes    Acute pulmonary embolism [I26.99]  Yes     CTA chest 6/5/24: Extensive pulmonary emboli throughout the bilateral lungs including a saddle pulmonary embolus. There is evidence of right heart strain with flattening of the interventricular septum and enlargement of the right ventricle and main pulmonary artery.       Essential hypertension [I10]  Yes    Hyperlipidemia [E78.5]  Yes    Gout [M10.9]  Yes     R wrist swelling and warmth, has metabolic syndrome      Morbid obesity with BMI of 50.0-59.9, adult [E66.01, Z68.43]  Not Applicable     patient aware of need drastic weight loss and encourage to increase activity      Type 2 diabetes mellitus with diabetic polyneuropathy, with long-term current use of insulin [E11.42, Z79.4]  Not Applicable     Abnormal foot exam, followed by podiatry q2 mos        Resolved Hospital Problems   No resolved problems to display.       Follow Up Appointments:  Future Appointments   Date Time Provider Department Center   6/11/2024  1:30 PM Ferdinand Rosas MD [facility] OPHTHAL [address]   6/17/2024  8:20 AM Scarlet Blue DNP [facility]   6/18/2024 10:30 AM Mallika Mena DNP [facility] MED CLN [address] [facility]   6/24/2024 10:30 AM Alfredo Lozoya MD [facility] ORTHO [address]   7/24/2024 12:30 PM Sonia Willard DPM [facility] POD [address]   10/8/2024  9:50 AM LAB, APPOINTMENT [facility] IM [address] [facility]   10/8/2024 10:00 AM LAB, APPOINTMENT [facility] IM [address] [facility]   10/10/2024 11:00 AM [facility]  [address]        Allergies:  Review of patient's allergies indicates:   Allergen Reactions    Bactrim [sulfamethoxazole-trimethoprim]      Other reaction(s): up set stomach rash/hives    Azithromycin      Feels bad    Erythromycin Other (See Comments)     Other reaction(s): Unknown  Other reaction(s): Stomach upset    Gentamicin      Vaginal burning , itching     Levofloxacin Hives     Other reaction(s): nervousness    Penicillins Other (See Comments)     Other reaction(s): Unknown  Other reaction(s): Swelling  Other reaction(s): Rash  Other reaction(s): Hives    Shellfish containing products      Rash      Vancomycin Itching     Other reaction(s): Itching       Medications: Review discharge medications with patient and family and provide education.    Current Facility-Administered Medications   Medication Dose Route Frequency Provider Last Rate Last Admin    acetaminophen tablet 650 mg  650 mg Oral Q4H PRN Savanna Comer MD        acetaminophen tablet 650 mg  650 mg Oral Q8H PRN Savanna Comer MD        albuterol inhaler 2 puff  2 puff Inhalation Q4H PRN Kym Dent MD        albuterol-ipratropium 2.5 mg-0.5 mg/3 mL nebulizer solution 3 mL  3 mL Nebulization Q8H Kym Dent MD   3 mL at 06/08/24 0836    allopurinoL tablet 300 mg  300 mg Oral Daily Kym Dent MD   300 mg at 06/08/24 0804    apixaban tablet 10 mg  10 mg Oral BID Kym Dent MD   10 mg at 06/08/24 0805    atorvastatin tablet 80 mg  80 mg Oral Daily Kym Dent MD   80 mg at 06/08/24 0804    dextrose 10% bolus 125 mL 125 mL  12.5 g Intravenous PRN Kym Dent MD        dextrose 10% bolus 250 mL 250 mL  25 g Intravenous PRN Kym Dent MD        doxycycline tablet 100 mg  100 mg Oral BID Kym Dent MD   100 mg at 06/08/24 0805    furosemide injection 40 mg  40 mg Intravenous BID  Rissa Disla MD   40 mg at 06/08/24 0811    glucagon (human recombinant) injection 1 mg  1 mg Intramuscular PRN Kym Dent MD         glucose chewable tablet 16 g  16 g Oral PRN Kym Dent MD        glucose chewable tablet 24 g  24 g Oral PRN Kym Dent MD        insulin aspart U-100 pen 0-5 Units  0-5 Units Subcutaneous QID (AC + HS) PRN Kym Dent MD   2 Units at 06/06/24 1733    naloxone 0.4 mg/mL injection 0.02 mg  0.02 mg Intravenous PRN Kym Dent MD        ondansetron injection 4 mg  4 mg Intravenous Q6H PRN Ramo Newman MD        senna tablet 8.6 mg  8.6 mg Oral Daily PRN Rissa Disla MD   8.6 mg at 06/06/24 2055    sodium chloride 0.9% flush 10 mL  10 mL Intravenous Q12H PRN Kym Dent MD         Current Discharge Medication List        START taking these medications    Details   apixaban (ELIQUIS DVT-PE TREAT 30D START) 5 mg (74 tabs) DsPk For the first 7 days take two 5 mg tablets twice daily.  After 7 days take one 5 mg tablet twice daily. 1ST SCRIPT  Qty: 74 tablet, Refills: 0    Associated Diagnoses: Bilateral pulmonary embolism      apixaban (ELIQUIS) 5 mg Tab Take 1 tablet (5 mg total) by mouth 2 (two) times daily. 2ND SCRIPT; DO NOT TAKE UNTIL 1ST SCRIPT IS COMPLETE  Qty: 60 tablet, Refills: 1    Associated Diagnoses: Bilateral pulmonary embolism           CONTINUE these medications which have CHANGED    Details   amLODIPine (NORVASC) 5 MG tablet Take 1 tablet (5 mg total) by mouth once daily.    Comments: .  Associated Diagnoses: Hypertension associated with diabetes           CONTINUE these medications which have NOT CHANGED    Details   albuterol (PROVENTIL/VENTOLIN HFA) 90 mcg/actuation inhaler Inhale 2 puffs into the lungs every 4 (four) hours as needed for Wheezing or Shortness of Breath. Rescue  Qty: 54 g, Refills: 3    Associated Diagnoses: Moderate persistent asthma without complication      albuterol-ipratropium (DUO-NEB) 2.5 mg-0.5 mg/3 mL nebulizer solution USE 1 VIAL PER NEBULIZER EVERY 6 HOURS AS NEEDED FOR WHEEZING/RESCUE  Qty: 90 mL, Refills: 11    Associated Diagnoses:  Uncomplicated asthma, unspecified asthma severity, unspecified whether persistent      !! allopurinoL (ZYLOPRIM) 100 MG tablet Take 1 tablet (100 mg total) by mouth once daily.  Qty: 30 tablet, Refills: 0      !! allopurinoL (ZYLOPRIM) 300 MG tablet Take 1 tablet (300 mg total) by mouth once daily.  Qty: 30 tablet, Refills: 0      ASPERCREME WITH ALOE 10 % Crea Apply 1 Application topically as needed (to legs/back).      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  Qty: 90 capsule, Refills: 3    Associated Diagnoses: Vitamin D deficiency disease      diclofenac sodium (VOLTAREN) 1 % Gel APPLY 2 GRAMS TOPICALLY DAILY  Qty: 100 g, Refills: 0    Associated Diagnoses: Vitamin D deficiency disease      docusate sodium (COLACE) 100 MG capsule Take 1 capsule (100 mg total) by mouth once daily.  Qty: 90 capsule, Refills: 3    Comments: Once in the evening      doxycycline (VIBRA-TABS) 100 MG tablet Take 1 tablet (100 mg total) by mouth 2 (two) times daily.  Qty: 60 tablet, Refills: 3    Associated Diagnoses: Infection of prosthetic joint, subsequent encounter      fluticasone (FLONASE) 50 mcg/actuation nasal spray 2 sprays (100 mcg total) by Each Nare route once daily.  Qty: 1 Bottle, Refills: 3    Associated Diagnoses: Uncomplicated asthma      insulin degludec (TRESIBA FLEXTOUCH U-100) 100 unit/mL (3 mL) insulin pen Inject 10-18 units at night  Qty: 6 mL, Refills: 1      olopatadine (PATANOL) 0.1 % ophthalmic solution Place 1 drop into both eyes 2 (two) times daily.  Qty: 5 mL, Refills: 0    Associated Diagnoses: Vision changes      semaglutide (OZEMPIC) 0.25 mg or 0.5 mg (2 mg/3 mL) pen injector INJECT 0.5 MG SUBCUTANEOUSLY ONCE A WEEK  Qty: 3 mL,  "Refills: 6    Associated Diagnoses: Type 2 diabetes mellitus with diabetic polyneuropathy, with long-term current use of insulin      SENNA 8.6 mg tablet Take 1 tablet by mouth once daily.  Qty: 90 tablet, Refills: 3    Comments: In the evening pill box      torsemide (DEMADEX) 10 MG Tab Take 1 tablet (10 mg total) by mouth every 48 hours as needed (leg swelling).  Qty: 45 tablet, Refills: 3    Comments: DO NOT place in pill packs  Associated Diagnoses: Chronic diastolic heart failure      blood sugar diagnostic Strp BG monitoring 4 times a day. Pt needs test strips for RESPACE Talking meter.  Qty: 450 strip, Refills: prn    Comments: 90 day supply  Associated Diagnoses: Type II or unspecified type diabetes mellitus with other specified manifestations, uncontrolled      lancets Misc Test daily  Qty: 100 each, Refills: 12    Associated Diagnoses: Type II or unspecified type diabetes mellitus with renal manifestations, uncontrolled(250.42)      nebulizer and compressor (COMP-AIR ELITE COMP NEB SYSTEM) Berna use as directed  Qty: 1 each, Refills: 0      pen needle, diabetic (PEN NEEDLE) 29 gauge x 1/2" Ndle USE DAILY  Qty: 100 each, Refills: 4       !! - Potential duplicate medications found. Please discuss with provider.            I have seen and examined this patient within the last 30 days. My clinical findings that support the need for the home health skilled services and home bound status are the following:no   Medical restrictions requiring assistance of another human to leave home due to  Dyspnea on exertion (SOB), Fluid/volume overload, Unstable ambulation, and Morbid Obesity.     Diet:   cardiac diet    Labs:  SN to perform labs:  per facility protocol     Referrals/ Consults  Physical Therapy to evaluate and treat. Evaluate for home safety and equipment needs; Establish/upgrade home exercise program. Perform / instruct on therapeutic exercises, gait training, transfer training, and Range of " Motion.  Occupational Therapy to evaluate and treat. Evaluate home environment for safety and equipment needs. Perform/Instruct on transfers, ADL training, ROM, and therapeutic exercises.    Activities:   activity as tolerated    Nursing:   Agency to admit patient within 24 hours of hospital discharge unless specified on physician order or at patient request    SN to complete comprehensive assessment including routine vital signs. Instruct on disease process and s/s of complications to report to MD. Review/verify medication list sent home with the patient at time of discharge  and instruct patient/caregiver as needed. Frequency may be adjusted depending on start of care date.     Skilled nurse to perform up to 3 visits PRN for symptoms related to diagnosis    Notify MD if SBP > 160 or < 90; DBP > 90 or < 50; HR > 120 or < 50; Temp > 101; O2 < 88%;     Ok to schedule additional visits based on staff availability and patient request on consecutive days within the home health episode.    When multiple disciplines ordered:    Start of Care occurs on Sunday - Wednesday schedule remaining discipline evaluations as ordered on separate consecutive days following the start of care.    Thursday SOC -schedule subsequent evaluations Friday and Monday the following week.     Friday - Saturday SOC - schedule subsequent discipline evaluations on consecutive days starting Monday of the following week.    For all post-discharge communication and subsequent orders please contact patient's primary care physician.         Home Health Aide:  Physical Therapy Three times weekly and Occupational Therapy Three times weekly    Wound Care Orders  no    I certify that this patient is confined to her home and needs physical therapy and occupational therapy.

## 2024-06-08 NOTE — PLAN OF CARE
[facility]  Discharge Final Note    Primary Care Provider: Tami Schmidt MD    Expected Discharge Date: 6/8/2024    Final Discharge Note (most recent)       Final Note - 06/08/24 1551          Final Note    Assessment Type Final Discharge Note (P)      Anticipated Discharge Disposition Home-Health Care Svc (P)      What phone number can be called within the next 1-3 days to see how you are doing after discharge? [phone] (P)      Hospital Resources/Appts/Education Provided Provided patient/caregiver with written discharge plan information;Appointments scheduled and added to AVS (P)         Post-Acute Status    Post-Acute Authorization Home Health (P)      Home Health Status Set-up Complete/Auth obtained (P)      Coverage Medicare (P)      Discharge Delays None known at this time (P)                      Important Message from Medicare  Important Message from Medicare regarding Discharge Appeal Rights: Given to patient/caregiver, Explained to patient/caregiver, Signed/date by patient/caregiver     Date IMM was signed: 06/06/24  Time IMM was signed: 0915    Contact Info       Tami Schmidt MD   Specialty: Internal Medicine   Relationship: PCP - General    [address]  [facility] [address]   Phone: [phone]       Next Steps: Follow up    [facility] [place]   Specialty: Home Health Services, Home Therapy Services, Home Living Aide Services    [address]  [facility] [address]   Phone: [phone]       Next Steps: Follow up in 2 day(s)    Instructions: If you do not hear from [facility] by Monday, please call above  number for an appointment.          Patient is discharging home with [facility] . Pt's hospital follow up appointments are scheduled and in the Pt's AVS.     Pt has no other post acute needs and is cleared for discharge from a case management standpoint.     SAMANTA Cook, SERENA  [facility]  [id number]

## 2024-06-08 NOTE — NURSING
Discharge papers and instructions given and reviewed with pt and she verbalized understanding. PIV removed and pressure applied to the site. Bleeding noticed at the  PIV sites. Pressure held  for about 5 min and bleeding stopped. Tele box and leads removed and returned to the nursing station. Pt's  prescriptions delivered to bedside. CM contacted to arrange ride for the pt. While in the process of discharge the  pt stated her heart is beating fast and she is feeling weak. V/S checked HR 97, /55 and O2 sat 93% on room air. Notified IM 1 on call doctor and he stated he will come see the pt and then decide is she can stay or can go. Will continue  to monitor.

## 2024-06-09 VITALS
OXYGEN SATURATION: 96 % | RESPIRATION RATE: 18 BRPM | SYSTOLIC BLOOD PRESSURE: 109 MMHG | TEMPERATURE: 99 F | HEIGHT: 59 IN | WEIGHT: 269.19 LBS | DIASTOLIC BLOOD PRESSURE: 71 MMHG | HEART RATE: 89 BPM | BODY MASS INDEX: 54.27 KG/M2

## 2024-06-09 LAB
ALBUMIN SERPL BCP-MCNC: 2.5 G/DL (ref 3.5–5.2)
ALP SERPL-CCNC: 87 U/L (ref 55–135)
ALT SERPL W/O P-5'-P-CCNC: 19 U/L (ref 10–44)
ANION GAP SERPL CALC-SCNC: 9 MMOL/L (ref 8–16)
AST SERPL-CCNC: 19 U/L (ref 10–40)
BASOPHILS # BLD AUTO: 0.03 K/UL (ref 0–0.2)
BASOPHILS NFR BLD: 0.4 % (ref 0–1.9)
BILIRUB SERPL-MCNC: 0.3 MG/DL (ref 0.1–1)
BUN SERPL-MCNC: 27 MG/DL (ref 8–23)
CALCIUM SERPL-MCNC: 8.9 MG/DL (ref 8.7–10.5)
CHLORIDE SERPL-SCNC: 105 MMOL/L (ref 95–110)
CO2 SERPL-SCNC: 26 MMOL/L (ref 23–29)
CREAT SERPL-MCNC: 1.4 MG/DL (ref 0.5–1.4)
DIFFERENTIAL METHOD BLD: ABNORMAL
EOSINOPHIL # BLD AUTO: 0.3 K/UL (ref 0–0.5)
EOSINOPHIL NFR BLD: 3 % (ref 0–8)
ERYTHROCYTE [DISTWIDTH] IN BLOOD BY AUTOMATED COUNT: 17.4 % (ref 11.5–14.5)
EST. GFR  (NO RACE VARIABLE): 37.6 ML/MIN/1.73 M^2
GLUCOSE SERPL-MCNC: 112 MG/DL (ref 70–110)
HCT VFR BLD AUTO: 33.2 % (ref 37–48.5)
HGB BLD-MCNC: 10.5 G/DL (ref 12–16)
IMM GRANULOCYTES # BLD AUTO: 0.03 K/UL (ref 0–0.04)
IMM GRANULOCYTES NFR BLD AUTO: 0.4 % (ref 0–0.5)
LYMPHOCYTES # BLD AUTO: 1.6 K/UL (ref 1–4.8)
LYMPHOCYTES NFR BLD: 19.6 % (ref 18–48)
MAGNESIUM SERPL-MCNC: 2.1 MG/DL (ref 1.6–2.6)
MCH RBC QN AUTO: 29.1 PG (ref 27–31)
MCHC RBC AUTO-ENTMCNC: 31.6 G/DL (ref 32–36)
MCV RBC AUTO: 92 FL (ref 82–98)
MONOCYTES # BLD AUTO: 0.7 K/UL (ref 0.3–1)
MONOCYTES NFR BLD: 7.9 % (ref 4–15)
NEUTROPHILS # BLD AUTO: 5.7 K/UL (ref 1.8–7.7)
NEUTROPHILS NFR BLD: 68.7 % (ref 38–73)
NRBC BLD-RTO: 0 /100 WBC
OHS QRS DURATION: 124 MS
OHS QTC CALCULATION: 509 MS
PHOSPHATE SERPL-MCNC: 4 MG/DL (ref 2.7–4.5)
PLATELET # BLD AUTO: 125 K/UL (ref 150–450)
PMV BLD AUTO: ABNORMAL FL (ref 9.2–12.9)
POCT GLUCOSE: 187 MG/DL (ref 70–110)
POCT GLUCOSE: 202 MG/DL (ref 70–110)
POCT GLUCOSE: 214 MG/DL (ref 70–110)
POCT GLUCOSE: 86 MG/DL (ref 70–110)
POTASSIUM SERPL-SCNC: 4.3 MMOL/L (ref 3.5–5.1)
PROT SERPL-MCNC: 7.1 G/DL (ref 6–8.4)
RBC # BLD AUTO: 3.61 M/UL (ref 4–5.4)
SODIUM SERPL-SCNC: 140 MMOL/L (ref 136–145)
WBC # BLD AUTO: 8.33 K/UL (ref 3.9–12.7)

## 2024-06-09 PROCEDURE — 93010 ELECTROCARDIOGRAM REPORT: CPT | Mod: ,,, | Performed by: INTERNAL MEDICINE

## 2024-06-09 PROCEDURE — 94761 N-INVAS EAR/PLS OXIMETRY MLT: CPT

## 2024-06-09 PROCEDURE — 93005 ELECTROCARDIOGRAM TRACING: CPT

## 2024-06-09 PROCEDURE — 80053 COMPREHEN METABOLIC PANEL: CPT

## 2024-06-09 PROCEDURE — 25000003 PHARM REV CODE 250

## 2024-06-09 PROCEDURE — 85025 COMPLETE CBC W/AUTO DIFF WBC: CPT

## 2024-06-09 PROCEDURE — 83735 ASSAY OF MAGNESIUM: CPT

## 2024-06-09 PROCEDURE — 25000242 PHARM REV CODE 250 ALT 637 W/ HCPCS

## 2024-06-09 PROCEDURE — 36415 COLL VENOUS BLD VENIPUNCTURE: CPT

## 2024-06-09 PROCEDURE — 94640 AIRWAY INHALATION TREATMENT: CPT

## 2024-06-09 PROCEDURE — 84100 ASSAY OF PHOSPHORUS: CPT

## 2024-06-09 RX ADMIN — IPRATROPIUM BROMIDE AND ALBUTEROL SULFATE 3 ML: 2.5; .5 SOLUTION RESPIRATORY (INHALATION) at 07:06

## 2024-06-09 RX ADMIN — DOXYCYCLINE HYCLATE 100 MG: 100 TABLET, COATED ORAL at 09:06

## 2024-06-09 RX ADMIN — INSULIN ASPART 2 UNITS: 100 INJECTION, SOLUTION INTRAVENOUS; SUBCUTANEOUS at 11:06

## 2024-06-09 RX ADMIN — ALLOPURINOL 300 MG: 300 TABLET ORAL at 09:06

## 2024-06-09 RX ADMIN — ATORVASTATIN CALCIUM 80 MG: 40 TABLET, FILM COATED ORAL at 09:06

## 2024-06-09 RX ADMIN — INSULIN ASPART 1 UNITS: 100 INJECTION, SOLUTION INTRAVENOUS; SUBCUTANEOUS at 07:06

## 2024-06-09 RX ADMIN — IPRATROPIUM BROMIDE AND ALBUTEROL SULFATE 3 ML: 2.5; .5 SOLUTION RESPIRATORY (INHALATION) at 02:06

## 2024-06-09 RX ADMIN — APIXABAN 10 MG: 5 TABLET, FILM COATED ORAL at 09:06

## 2024-06-09 NOTE — ASSESSMENT & PLAN NOTE
Body mass index is 54.37 kg/m². Morbid obesity complicates all aspects of disease management from diagnostic modalities to treatment. Weight loss encouraged and health benefits explained to patient.

## 2024-06-09 NOTE — NURSING
Pt is ready for discharge per Dr Disla order. Pt had her discharge papers and instruction. Pt had her prescriptions at bedside.  van transportation set up @ 1430. Pt has no IV access. And tele box removed.

## 2024-06-09 NOTE — ASSESSMENT & PLAN NOTE
Acute pulmonary embolism    Patient presented with a syncopal episode. Passed out with loss of consciousness, denies hitting her head. She denies any chest pain but does endorse discomfort, and SOB. CT positive for submassive PE.    Plan:    -Cardiology was consulted, PESi score 82, submassive PE according to ACC guidelines and no intervention planned at this time.   -Diet ordered.  -Monitor electrolytes, replace as needed Mag >2 K >4  -Heparin gtt transitioned to oral  Eliquis.  -PTT/INR

## 2024-06-09 NOTE — ASSESSMENT & PLAN NOTE
Patient's FSGs are controlled on current medication regimen.  Last A1c reviewed-   Lab Results   Component Value Date    HGBA1C 7.1 (H) 05/06/2024     Most recent fingerstick glucose reviewed-   Recent Labs   Lab 06/08/24  0357 06/08/24  0758 06/08/24  1135 06/08/24  1645   POCTGLUCOSE 136* 135* 184* 174*       Current correctional scale  Low  Maintain anti-hyperglycemic dose as follows-   Antihyperglycemics (From admission, onward)    Start     Stop Route Frequency Ordered    06/05/24 2037  insulin aspart U-100 pen 0-5 Units         -- SubQ Before meals & nightly PRN 06/05/24 1937        Hold Oral hypoglycemics while patient is in the hospital.

## 2024-06-09 NOTE — NURSING
IM 1 on call Dr Reyna came at the  bedside and assessed the pt. MD cancelled pt's discharge order and he stated he will keep the pt over the night and do some lab work and monitor the pt, and reassess the pt in the morning. Night shift nurse Pretty made aware to continue pt's care.

## 2024-06-09 NOTE — PLAN OF CARE
Problem: Adult Inpatient Plan of Care  Goal: Plan of Care Review  Outcome: Adequate for Care Transition  Goal: Patient-Specific Goal (Individualized)  Outcome: Adequate for Care Transition  Goal: Absence of Hospital-Acquired Illness or Injury  Outcome: Adequate for Care Transition  Goal: Optimal Comfort and Wellbeing  Outcome: Adequate for Care Transition  Goal: Readiness for Transition of Care  Outcome: Adequate for Care Transition     Problem: Bariatric Environmental Safety  Goal: Safety Maintained with Care  Outcome: Adequate for Care Transition     Problem: Diabetes Comorbidity  Goal: Blood Glucose Level Within Targeted Range  Outcome: Adequate for Care Transition     Problem: Wound  Goal: Optimal Coping  Outcome: Adequate for Care Transition  Goal: Optimal Functional Ability  Outcome: Adequate for Care Transition  Goal: Absence of Infection Signs and Symptoms  Outcome: Adequate for Care Transition  Goal: Improved Oral Intake  Outcome: Adequate for Care Transition  Goal: Optimal Pain Control and Function  Outcome: Adequate for Care Transition  Goal: Skin Health and Integrity  Outcome: Adequate for Care Transition  Goal: Optimal Wound Healing  Outcome: Adequate for Care Transition     Problem: Skin Injury Risk Increased  Goal: Skin Health and Integrity  Outcome: Adequate for Care Transition     Problem: Fall Injury Risk  Goal: Absence of Fall and Fall-Related Injury  Outcome: Adequate for Care Transition   Pt is A,A,O x 4 . Stable V/S. No C/O pain or discomfort. Pt sat up in the chair during the day and she was up to the bedside commode with assist. Pt remained free of falls and injuries. Pt is ready for discharge per MD order. LUI Kate notified to arrange a  van transportation for the pt.

## 2024-06-09 NOTE — CARE UPDATE
DO on call paged by RN, patient scheduled for d/c today but feeling weak light headed.  HR: 97 /55, 93on RA.    DO to bedside, patient describes LAYNE, presyncope, and palpitations. Discussed discharge planning with patient, patient concerned about d/c, wanted to go home wonders if its too early, amenable to staying the night.     BMP, mg and Telemetry ordered.     Patient wants MPOA notified, attempted to call x2, mailbox full.

## 2024-06-09 NOTE — PLAN OF CARE
Problem: Adult Inpatient Plan of Care  Goal: Plan of Care Review  Outcome: Progressing  Flowsheets (Taken 6/9/2024 0345)  Plan of Care Reviewed With: patient  Goal: Patient-Specific Goal (Individualized)  Outcome: Progressing  Goal: Absence of Hospital-Acquired Illness or Injury  Outcome: Progressing  Intervention: Identify and Manage Fall Risk  Flowsheets (Taken 6/9/2024 0345)  Safety Promotion/Fall Prevention:   assistive device/personal item within reach   side rails raised x 3   nonskid shoes/socks when out of bed   lighting adjusted   medications reviewed   Fall Risk reviewed with patient/family   room near unit station   instructed to call staff for mobility  Intervention: Prevent Skin Injury  Flowsheets (Taken 6/9/2024 0345)  Body Position: weight shifting  Skin Protection: incontinence pads utilized  Device Skin Pressure Protection: absorbent pad utilized/changed  Intervention: Prevent and Manage VTE (Venous Thromboembolism) Risk  Flowsheets (Taken 6/9/2024 0345)  VTE Prevention/Management:   bleeding precations maintained   bleeding risk assessed   intravenous hydration   ROM (active) performed   ROM (passive) performed  Intervention: Prevent Infection  Flowsheets (Taken 6/9/2024 0345)  Infection Prevention:   environmental surveillance performed   equipment surfaces disinfected   hand hygiene promoted   personal protective equipment utilized   rest/sleep promoted   single patient room provided  Goal: Optimal Comfort and Wellbeing  Outcome: Progressing  Intervention: Monitor Pain and Promote Comfort  Flowsheets (Taken 6/9/2024 0345)  Pain Management Interventions:   pain management plan reviewed with patient/caregiver   quiet environment facilitated   relaxation techniques promoted  Intervention: Provide Person-Centered Care  Flowsheets (Taken 6/9/2024 0345)  Trust Relationship/Rapport:   care explained   choices provided   emotional support provided   questions answered   questions encouraged  Goal:  Readiness for Transition of Care  Outcome: Progressing     Problem: Bariatric Environmental Safety  Goal: Safety Maintained with Care  Outcome: Progressing     Problem: Diabetes Comorbidity  Goal: Blood Glucose Level Within Targeted Range  Outcome: Progressing  Intervention: Monitor and Manage Glycemia  Flowsheets (Taken 6/9/2024 0345)  Glycemic Management: blood glucose monitored     Problem: Wound  Goal: Optimal Coping  Outcome: Progressing  Intervention: Support Patient and Family Response  Flowsheets (Taken 6/9/2024 0345)  Supportive Measures:   active listening utilized   decision-making supported  Family/Support System Care: self-care encouraged  Goal: Optimal Functional Ability  Outcome: Progressing  Intervention: Optimize Functional Ability  Flowsheets (Taken 6/9/2024 0345)  Activity Management:   Ambulated in room - L4   Up in chair - L3  Activity Assistance Provided: assistance, 1 person  Goal: Absence of Infection Signs and Symptoms  Outcome: Progressing  Intervention: Prevent or Manage Infection  Flowsheets (Taken 6/9/2024 0345)  Infection Management: aseptic technique maintained  Isolation Precautions:   precautions maintained   protective  Goal: Improved Oral Intake  Outcome: Progressing  Goal: Optimal Pain Control and Function  Outcome: Progressing  Intervention: Prevent or Manage Pain  Flowsheets (Taken 6/9/2024 0345)  Sleep/Rest Enhancement:   awakenings minimized   regular sleep/rest pattern promoted   relaxation techniques promoted   room darkened  Pain Management Interventions:   pain management plan reviewed with patient/caregiver   quiet environment facilitated   relaxation techniques promoted  Goal: Skin Health and Integrity  Outcome: Progressing  Intervention: Optimize Skin Protection  Flowsheets (Taken 6/9/2024 0345)  Pressure Reduction Techniques: frequent weight shift encouraged  Pressure Reduction Devices: pressure-redistributing mattress utilized  Skin Protection: incontinence pads  utilized  Activity Management:   Ambulated in room - L4   Up in chair - L3  Head of Bed (HOB) Positioning: HOB elevated  Goal: Optimal Wound Healing  Outcome: Progressing  Intervention: Promote Wound Healing  Flowsheets (Taken 6/9/2024 0345)  Sleep/Rest Enhancement:   awakenings minimized   regular sleep/rest pattern promoted   relaxation techniques promoted   room darkened     Problem: Skin Injury Risk Increased  Goal: Skin Health and Integrity  Outcome: Progressing  Intervention: Optimize Skin Protection  Flowsheets (Taken 6/9/2024 0345)  Pressure Reduction Techniques: frequent weight shift encouraged  Pressure Reduction Devices: pressure-redistributing mattress utilized  Skin Protection: incontinence pads utilized  Activity Management:   Ambulated in room - L4   Up in chair - L3  Head of Bed (HOB) Positioning: HOB elevated     Problem: Fall Injury Risk  Goal: Absence of Fall and Fall-Related Injury  Outcome: Progressing  Intervention: Identify and Manage Contributors  Flowsheets (Taken 6/9/2024 0345)  Self-Care Promotion:   independence encouraged   BADL personal objects within reach   BADL personal routines maintained  Medication Review/Management: medications reviewed  Intervention: Promote Injury-Free Environment  Flowsheets (Taken 6/9/2024 0345)  Safety Promotion/Fall Prevention:   assistive device/personal item within reach   side rails raised x 3   nonskid shoes/socks when out of bed   lighting adjusted   medications reviewed   Fall Risk reviewed with patient/family   room near unit station   instructed to call staff for mobility   A&Ox4, d/c discharge order, put pure wick, applied tele box, normal sinus rhythm, no pain at this moment, inserting peripheral IV holds as per order, need assistance to walk, rails up x2, put call light within reach, will continue to monitor.

## 2024-06-09 NOTE — ASSESSMENT & PLAN NOTE
Chronic, controlled. Latest blood pressure and vitals reviewed-     Temp:  [96.9 °F (36.1 °C)-98.5 °F (36.9 °C)]   Pulse:  [62-97]   Resp:  [15-18]   BP: (109-153)/(55-75)   SpO2:  [93 %-97 %] .   Home meds for hypertension were reviewed and noted below.   Hypertension Medications               amLODIPine (NORVASC) 5 MG tablet TAKE ONE TABLET BY MOUTH DAILY    torsemide (DEMADEX) 10 MG Tab Take 1 tablet (10 mg total) by mouth every 48 hours as needed (leg swelling).    torsemide (DEMADEX) 10 MG Tab Take 10 mg by mouth every other day.            While in the hospital, will manage blood pressure as follows; Continue home antihypertensive regimen  Resumed torsemide today will resume ccb tomorrow  Will utilize p.r.n. blood pressure medication only if patient's blood pressure greater than 180/110 and she develops symptoms such as worsening chest pain or shortness of breath.    - resumed home amlodipine and toresmide

## 2024-06-09 NOTE — PLAN OF CARE
Update at 7:05 PM  Received message from bedside nurse, requested hold on wheelchair van. Team will re-assess patient and update following. Discharge cancelled.       On-call CM received message from bedside nurse, patient needs assistance with medications ($9.60). CM completed cost transfer form for total amount and delivered to pharmacist. Following receipt of medications, will arrange wheelchair van.      Ragini Enriquez RN  Weekend  - [facility]  Spectralink: [phone]

## 2024-06-09 NOTE — PROGRESS NOTES
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 Patient reports she was on her mobility scooter about to enter her door when she suddenly syncopized. She notes mild nausea and palpitations. Since then she has had mild nausea and a slight discomfort in her chest. She passed out and lost consciousness but denies hitting her head.  Denies fevers, chills, cough but producing some phlegm. Denies new lower extremity edema but she is chronically on antibiotics for an infected right joint. Denies history of DVT or PE. Not currently on anticoagulation. Denies history of similar episodes. Denies any cardiac history, chest pain but does endorse chest discomfort and SOB.    In the ED patient vitally stable, with /60, , RR 20, afebrile. Labs significant for HB 11.9, D dimer >33, Na 138, Na 4.1, BUN 23, Cr 1.4, troponin 0.389, . CT chest was done that showed extensive pulmonary emboli throughout the bilateral lungs including a saddle pulmonary embolus. There is evidence of right heart strain with flattening of the interventricular septum and enlargement of the right ventricle and main pulmonary artery.     Overview/Hospital Course:  Admitted for submassive PE. Was HDS and on RA, cards without acute intervention. Switched from heparin gtt to Eliquis. TTE without RV strain. Cards to re-evaluate patient given uptrend in lactic acidosis. One time lasix was given. Not a candidate for GDMT due to soft pressures, age and other co morbidities. PT/OT recommended SNF but  the patient denied. Stable for discharge with outpatient follow up with PCP and heart failure transitional clinic.    Interval History: DANNIE GUADALUPE, patient was stable and ready for discharge when she palpitations and started feeling weak and decision was made to keep her another day and discharge in the am.      Objective:     Vital Signs (Most Recent):  Temp: 96.9 °F (36.1 °C) (06/08/24 1643)  Pulse: 97 (06/08/24 1800)  Resp: 16 (06/08/24 1650)  BP: (!) 119/55 (06/08/24 1800)  SpO2: (!) 93 % (06/08/24 1800) Vital Signs (24h Range):  Temp:  [96.9 °F (36.1 °C)-98.5 °F (36.9 °C)] 96.9 °F (36.1 °C)  Pulse:  [62-97] 97  Resp:  [15-18] 16  SpO2:  [93 %-97 %] 93 %  BP: (109-153)/(55-75) 119/55     Weight: 122.1 kg (269 lb 2.9 oz)  Body mass index is 54.37 kg/m².    Intake/Output Summary (Last 24 hours) at 6/8/2024 1915  Last data filed at 6/8/2024 1700  Gross per 24 hour   Intake 1060 ml   Output 3350 ml   Net -2290 ml         Physical Exam      Constitutional:       Appearance: She is obese.      Comments: Morbid obesity   Cardiovascular:      Rate and Rhythm: Normal rate and regular rhythm.      Pulses: Normal pulses.      Heart sounds: Normal heart sounds.   Pulmonary:      Effort: Pulmonary effort is normal.      Breath sounds: Normal  Abdominal:      General: Bowel sounds are normal. There is no distension.      Palpations: Abdomen is soft.      Tenderness: There is no abdominal tenderness.   Musculoskeletal:      Right lower leg: Edema present.      Left lower leg: Edema present.      Comments: Massive bilateral cellulitis with skin excoriations.   Neurological:      General: No focal deficit present.      Mental Status: She is alert and oriented to person, place, and time.   Psychiatric:         Mood and Affect: Mood normal.         Behavior: Behavior normal.   Significant Labs: All pertinent labs within the past 24 hours have been reviewed.    Significant Imaging: I have reviewed all pertinent imaging  results/findings within the past 24 hours.    Assessment/Plan:      * Pulmonary embolism  Acute pulmonary embolism    Patient presented with a syncopal episode. Passed out with loss of consciousness, denies hitting her head. She denies any chest pain but does endorse discomfort, and SOB. CT positive for submassive PE.    Plan:    -Cardiology was consulted, PESi score 82, submassive PE according to ACC guidelines and no intervention planned at this time.   -Diet ordered.  -Monitor electrolytes, replace as needed Mag >2 K >4  -Heparin gtt transitioned to oral  Eliquis.  -PTT/INR        Acute pulmonary embolism        Hyperlipidemia  Continue lipitor      Essential hypertension  Chronic, controlled. Latest blood pressure and vitals reviewed-     Temp:  [96.9 °F (36.1 °C)-98.5 °F (36.9 °C)]   Pulse:  [62-97]   Resp:  [15-18]   BP: (109-153)/(55-75)   SpO2:  [93 %-97 %] .   Home meds for hypertension were reviewed and noted below.   Hypertension Medications               amLODIPine (NORVASC) 5 MG tablet TAKE ONE TABLET BY MOUTH DAILY    torsemide (DEMADEX) 10 MG Tab Take 1 tablet (10 mg total) by mouth every 48 hours as needed (leg swelling).    torsemide (DEMADEX) 10 MG Tab Take 10 mg by mouth every other day.            While in the hospital, will manage blood pressure as follows; Continue home antihypertensive regimen  Resumed torsemide today will resume ccb tomorrow  Will utilize p.r.n. blood pressure medication only if patient's blood pressure greater than 180/110 and she develops symptoms such as worsening chest pain or shortness of breath.    - resumed home amlodipine and toresmide    Gout  Continue allopurinol      Morbid obesity with BMI of 50.0-59.9, adult  Body mass index is 54.37 kg/m². Morbid obesity complicates all aspects of disease management from diagnostic modalities to treatment. Weight loss encouraged and health benefits explained to patient.         Type 2 diabetes mellitus with diabetic  polyneuropathy, with long-term current use of insulin  Patient's FSGs are controlled on current medication regimen.  Last A1c reviewed-   Lab Results   Component Value Date    HGBA1C 7.1 (H) 05/06/2024     Most recent fingerstick glucose reviewed-   Recent Labs   Lab 06/08/24  0357 06/08/24  0758 06/08/24  1135 06/08/24  1645   POCTGLUCOSE 136* 135* 184* 174*       Current correctional scale  Low  Maintain anti-hyperglycemic dose as follows-   Antihyperglycemics (From admission, onward)      Start     Stop Route Frequency Ordered    06/05/24 2037  insulin aspart U-100 pen 0-5 Units         -- SubQ Before meals & nightly PRN 06/05/24 1937          Hold Oral hypoglycemics while patient is in the hospital.      VTE Risk Mitigation (From admission, onward)           Ordered     apixaban tablet 10 mg  2 times daily         06/06/24 0931     Reason for No Pharmacological VTE Prophylaxis  Once        Question:  Reasons:  Answer:  Physician Provided (leave comment)  Comment:  on heparin gtt    06/05/24 1829     IP VTE HIGH RISK PATIENT  Once         06/05/24 1829     Place sequential compression device  Until discontinued         06/05/24 1829                    Discharge Planning   JENNIFER: 6/8/2024     Code Status: Full Code   Is the patient medically ready for discharge?:     Reason for patient still in hospital (select all that apply): Treatment  Discharge Plan A: Home Health   Discharge Delays: None known at this time              Kym Dent MD  Department of Hospital Medicine   [facility]

## 2024-06-09 NOTE — SUBJECTIVE & OBJECTIVE
Interval History: DANNEI GUADALUPE, patient was stable and ready for discharge when she palpitations and started feeling weak and decision was made to keep her another day and discharge in the am.      Objective:     Vital Signs (Most Recent):  Temp: 96.9 °F (36.1 °C) (06/08/24 1643)  Pulse: 97 (06/08/24 1800)  Resp: 16 (06/08/24 1650)  BP: (!) 119/55 (06/08/24 1800)  SpO2: (!) 93 % (06/08/24 1800) Vital Signs (24h Range):  Temp:  [96.9 °F (36.1 °C)-98.5 °F (36.9 °C)] 96.9 °F (36.1 °C)  Pulse:  [62-97] 97  Resp:  [15-18] 16  SpO2:  [93 %-97 %] 93 %  BP: (109-153)/(55-75) 119/55     Weight: 122.1 kg (269 lb 2.9 oz)  Body mass index is 54.37 kg/m².    Intake/Output Summary (Last 24 hours) at 6/8/2024 1915  Last data filed at 6/8/2024 1700  Gross per 24 hour   Intake 1060 ml   Output 3350 ml   Net -2290 ml         Physical Exam      Constitutional:       Appearance: She is obese.      Comments: Morbid obesity   Cardiovascular:      Rate and Rhythm: Normal rate and regular rhythm.      Pulses: Normal pulses.      Heart sounds: Normal heart sounds.   Pulmonary:      Effort: Pulmonary effort is normal.      Breath sounds: Normal  Abdominal:      General: Bowel sounds are normal. There is no distension.      Palpations: Abdomen is soft.      Tenderness: There is no abdominal tenderness.   Musculoskeletal:      Right lower leg: Edema present.      Left lower leg: Edema present.      Comments: Massive bilateral cellulitis with skin excoriations.   Neurological:      General: No focal deficit present.      Mental Status: She is alert and oriented to person, place, and time.   Psychiatric:         Mood and Affect: Mood normal.         Behavior: Behavior normal.   Significant Labs: All pertinent labs within the past 24 hours have been reviewed.    Significant Imaging: I have reviewed all pertinent imaging results/findings within the past 24 hours.

## 2024-06-10 ENCOUNTER — DOCUMENTATION ONLY (OUTPATIENT)
Dept: CARDIOLOGY | Facility: CLINIC | Age: 84
End: 2024-06-10
Payer: MEDICARE

## 2024-06-10 DIAGNOSIS — R06.02 SOB (SHORTNESS OF BREATH): Primary | ICD-10-CM

## 2024-06-10 RX ORDER — ATORVASTATIN CALCIUM 80 MG/1
80 TABLET, FILM COATED ORAL
Qty: 30 TABLET | Refills: 0 | Status: SHIPPED | OUTPATIENT
Start: 2024-06-10

## 2024-06-10 NOTE — NURSING
Wheelchair van transportation was schedule for 1430. Time was pushed back to 1800. Pt still waiting for her ride. Night shift nurse and night shift charge nurse made aware. Pt remained stable and no C/O pain or discomfort.

## 2024-06-10 NOTE — PROGRESS NOTES
[facility] Phone Enrollment Complete.    Phone enrollment completed due to :patient discharged before enrollment completed    Called and spoke to Sister Doreen via phone. Introduced self to pt as [facility] nurse navigator Concepcion LEON     Patient education will be completed on phone.     Reviewed the following key points of [facility] program with pt and family:              1.) Take your medications as directed.               2.) Weight yourself daily              3.) Follow low salt and limited fluid diet.               4.) Stop smoking and start exercising              5.) Go to your appointments and call your team.          Reviewed plan for follow up once discharged to include phone calls, in person and virtual visits to assist pt optimizing their heart failure medication regimen and encouraging healthy lifestyle modifications.  Reminded pt that program will assist them over the next 4-6 weeks and then patient will be transferred to long term care provider .  Reminded pt how to contact [facility] navigator via phone and or via Pri[employer].      Pt given appointment today via phone and mail.      Pt also reminded RN will call 48-72 hours after discharge to check on them.    Patient discharged on 06/09/2024  PT and family verbalize read back of information given.  Encouraged pt and family to read over information often and contact team with any questions or concerns.

## 2024-06-13 DIAGNOSIS — R54 FRAILTY SYNDROME IN GERIATRIC PATIENT: Primary | ICD-10-CM

## 2024-06-17 ENCOUNTER — OFFICE VISIT (OUTPATIENT)
Dept: OBSTETRICS AND GYNECOLOGY | Facility: CLINIC | Age: 84
End: 2024-06-17
Payer: MEDICARE

## 2024-06-17 VITALS — DIASTOLIC BLOOD PRESSURE: 70 MMHG | SYSTOLIC BLOOD PRESSURE: 140 MMHG

## 2024-06-17 DIAGNOSIS — R19.09 GROIN MASS IN FEMALE: ICD-10-CM

## 2024-06-17 DIAGNOSIS — N90.89 SKIN TAG OF FEMALE PERINEUM: Primary | ICD-10-CM

## 2024-06-17 PROCEDURE — 99203 OFFICE O/P NEW LOW 30 MIN: CPT | Mod: S$PBB,,,

## 2024-06-17 PROCEDURE — 99214 OFFICE O/P EST MOD 30 MIN: CPT | Mod: PBBFAC

## 2024-06-17 PROCEDURE — 99999 PR PBB SHADOW E&M-EST. PATIENT-LVL IV: CPT | Mod: PBBFAC,,,

## 2024-06-17 RX ORDER — CLOTRIMAZOLE AND BETAMETHASONE DIPROPIONATE 10; .64 MG/G; MG/G
CREAM TOPICAL 2 TIMES DAILY
Qty: 45 G | Refills: 3 | Status: SHIPPED | OUTPATIENT
Start: 2024-06-17

## 2024-06-17 NOTE — PROGRESS NOTES
Chief Complaint: Vaginal  Growth     HPI:      Duran Giordano is a 84 y.o.  who presents with complaint of vaginal/vulvar growth.  Reports a flesh colored growth between the entrance of her vagina and rectum.  Denies pain, bleeding, or discharge. Noticed it about 6 months ago.  Hasn't changed in size.  Also with redness and itching in area.  Underwear makes symptoms worse.   No LMP recorded. Patient has had a hysterectomy.    ROS:   GENERAL: Denies weight gain or weight loss. Feeling well overall.   SKIN: Denies rash or lesions.   ABDOMEN: Denies abdominal pain, constipation, diarrhea, nausea, vomiting, change in appetite or rectal bleeding.   URINARY: Denies frequency, dysuria, hematuria.  GYN: See HPI.    Physical Exam:      PHYSICAL EXAM:   Vitals:    24 0841   BP: (!) 140/70   PainSc: 0-No pain     There is no height or weight on file to calculate BMI.    General: No acute distress, alert and engaged  VULVA: normal appearing vulva with no masses, tenderness or lesions  VAGINA: normal appearing vagina with normal color. no discharge, no lesions   CERVIX: normal appearing cervix without discharge or lesions   UTERUS: uterus is normal size, shape, consistency and nontender   ADNEXA: normal adnexa in size, nontender and no masses  Note:  Skin tag noted in perineum area. Redness and scaling to buttocks around area of elastic from underwear.    Assessment/Plan:     Skin tag of female perineum    Other orders  -     clotrimazole-betamethasone 1-0.05% (LOTRISONE) cream; Apply topically 2 (two) times daily.  Dispense: 45 g; Refill: 3    Reassurance given to patient regarding skin tag.  Recommendation against removal due to size and location and risk for infection.  Pt verbalized understanding.  Lotrisone to area of rash/itching.  Discussed changing type of underwear she is wearing and keeping area dry.    Use of obopayhart and Patient Portal recommended to patient today.    FOLLOW UP: PRN lack of  olinda.    Scarlet Blue (Maggie), LILIANA  Obstetrics and Gynecology  [facility]

## 2024-06-18 ENCOUNTER — OFFICE VISIT (OUTPATIENT)
Dept: PRIMARY CARE CLINIC | Facility: CLINIC | Age: 84
End: 2024-06-18
Payer: MEDICARE

## 2024-06-18 VITALS
HEART RATE: 96 BPM | TEMPERATURE: 98 F | SYSTOLIC BLOOD PRESSURE: 130 MMHG | DIASTOLIC BLOOD PRESSURE: 64 MMHG | HEIGHT: 59 IN | BODY MASS INDEX: 53.16 KG/M2 | OXYGEN SATURATION: 99 % | WEIGHT: 263.69 LBS

## 2024-06-18 DIAGNOSIS — Z09 HOSPITAL DISCHARGE FOLLOW-UP: ICD-10-CM

## 2024-06-18 DIAGNOSIS — I50.21 ACUTE HFREF (HEART FAILURE WITH REDUCED EJECTION FRACTION): ICD-10-CM

## 2024-06-18 DIAGNOSIS — I89.0 LYMPHEDEMA OF BOTH LOWER EXTREMITIES: ICD-10-CM

## 2024-06-18 DIAGNOSIS — I26.92 SADDLE EMBOLUS OF PULMONARY ARTERY, UNSPECIFIED CHRONICITY, UNSPECIFIED WHETHER ACUTE COR PULMONALE PRESENT: Primary | ICD-10-CM

## 2024-06-18 NOTE — PATIENT INSTRUCTIONS
Use your Circaids daily- this is so important to help with the swelling    Continue your current medications    You have an appointment with Niki DIGGS tomorrow in the heart failure clinic tomorrow

## 2024-06-18 NOTE — PROGRESS NOTES
[facility] Provider Note (Initial Clinic) Consult Note    Age: 84 y.o.  Gender: female  Ethnicity: Black or   Type of Congestive Heart Failure: Diastolic   Etiology: unspecified  Enrolled in Infusion suite: no    Diagnostic Labs:   EKG - 06/09/2024  CXR - 06/07/2024  ECHO - 06/06/2024  Stress test -   Stress echo -   Pharmacologic stress -   Cardiac catheterization -    Cardiac MRI -     Lab Results   Component Value Date     06/09/2024     06/08/2024    K 4.3 06/09/2024    K 4.8 06/08/2024     06/09/2024     06/08/2024    CO2 26 06/09/2024    CO2 21 (L) 06/08/2024     (H) 06/09/2024     (H) 06/08/2024    BUN 27 (H) 06/09/2024    BUN 31 (H) 06/08/2024    CREATININE 1.4 06/09/2024    CREATININE 1.6 (H) 06/08/2024    CALCIUM 8.9 06/09/2024    CALCIUM 9.1 06/08/2024    PROT 7.1 06/09/2024    PROT 6.7 06/08/2024    ALBUMIN 2.5 (L) 06/09/2024    ALBUMIN 2.4 (L) 06/08/2024    BILITOT 0.3 06/09/2024    BILITOT 0.4 06/08/2024    ALKPHOS 87 06/09/2024    ALKPHOS 85 06/08/2024    AST 19 06/09/2024    AST 19 06/08/2024    ALT 19 06/09/2024    ALT 19 06/08/2024    ANIONGAP 9 06/09/2024    ANIONGAP 12 06/08/2024    ESTGFRAFRICA 44.8 (A) 07/01/2022    ESTGFRAFRICA 37.7 (A) 06/22/2022    EGFRNONAA 38.8 (A) 07/01/2022    EGFRNONAA 32.7 (A) 06/22/2022       Lab Results   Component Value Date    WBC 8.33 06/09/2024    WBC 8.11 06/08/2024    RBC 3.61 (L) 06/09/2024    RBC 3.37 (L) 06/08/2024    HGB 10.5 (L) 06/09/2024    HGB 9.9 (L) 06/08/2024    HCT 33.2 (L) 06/09/2024    HCT 31.6 (L) 06/08/2024    MCV 92 06/09/2024    MCV 94 06/08/2024    MCH 29.1 06/09/2024    MCH 29.4 06/08/2024    MCHC 31.6 (L) 06/09/2024    MCHC 31.3 (L) 06/08/2024    RDW 17.4 (H) 06/09/2024    RDW 17.8 (H) 06/08/2024     (L) 06/09/2024     (L) 06/08/2024    MPV SEE COMMENT 06/09/2024    MPV SEE COMMENT 06/08/2024    IMMGR 0.4 06/09/2024    IMMGR 0.4 06/08/2024    IGABS 0.03 06/09/2024    IGABS 0.03  06/08/2024    LYMPH 1.6 06/09/2024    LYMPH 19.6 06/09/2024    MONO 0.7 06/09/2024    MONO 7.9 06/09/2024    EOS 0.3 06/09/2024    EOS 0.2 06/08/2024    BASO 0.03 06/09/2024    BASO 0.04 06/08/2024    NRBC 0 06/09/2024    NRBC 0 06/08/2024    GRAN 5.7 06/09/2024    GRAN 68.7 06/09/2024    EOSINOPHIL 3.0 06/09/2024    EOSINOPHIL 2.7 06/08/2024    BASOPHIL 0.4 06/09/2024    BASOPHIL 0.5 06/08/2024    PLTEST Clumped (A) 06/05/2024    PLTEST Decreased (A) 10/30/2023    ANISO Slight 03/09/2018    ANISO Slight 08/26/2017    HYPO Occasional 03/09/2018    HYPO Occasional 08/26/2017       Lab Results   Component Value Date     (H) 06/07/2024     (H) 06/05/2024    MG 2.1 06/09/2024    MG 2.2 06/08/2024    PHOS 4.0 06/09/2024    PHOS 3.6 06/08/2024    TROPONINI 0.297 (H) 06/07/2024    TROPONINI 0.533 (H) 06/05/2024    HGBA1C 7.1 (H) 05/06/2024    HGBA1C 6.9 (H) 10/16/2023    TSH 0.799 01/16/2020    TSH 0.746 05/21/2018    T4QJQKV 8.4 09/29/2004    O0VYOTM 7.3 05/14/2004       Lab Results   Component Value Date    IRON 43 02/11/2014    TIBC 339 02/11/2014    FERRITIN 131 02/11/2014    CHOL 128 10/16/2023    TRIG 59 10/16/2023    HDL 47 10/16/2023    LDLCALC 69.2 10/16/2023    CHOLHDL 36.7 10/16/2023    TOTALCHOLEST 2.7 10/16/2023    NONHDLCHOL 81 10/16/2023    COLORU Yellow 06/05/2024    APPEARANCEUA Clear 06/05/2024    PHUR 6.0 06/05/2024    SPECGRAV 1.015 06/05/2024    PROTEINUA 1+ (A) 06/05/2024    GLUCUA Negative 06/05/2024    KETONESU Trace (A) 06/05/2024    BILIRUBINUA Negative 06/05/2024    OCCULTUA Trace (A) 06/05/2024    NITRITE Negative 06/05/2024    LEUKOCYTESUR Negative 06/05/2024       No implanted cardiac devices    Current Outpatient Medications on File Prior to Visit   Medication Sig Dispense Refill    albuterol (PROVENTIL/VENTOLIN HFA) 90 mcg/actuation inhaler Inhale 2 puffs into the lungs every 4 (four) hours as needed for Wheezing or Shortness of Breath. Rescue 54 g 3    albuterol-ipratropium  (DUO-NEB) 2.5 mg-0.5 mg/3 mL nebulizer solution USE 1 VIAL PER NEBULIZER EVERY 6 HOURS AS NEEDED FOR WHEEZING/RESCUE 90 mL 11    allopurinoL (ZYLOPRIM) 100 MG tablet Take 1 tablet (100 mg total) by mouth once daily. 30 tablet 0    allopurinoL (ZYLOPRIM) 300 MG tablet Take 1 tablet (300 mg total) by mouth once daily. 30 tablet 0    amLODIPine (NORVASC) 5 MG tablet Take 1 tablet (5 mg total) by mouth once daily.      apixaban (ELIQUIS DVT-PE TREAT 30D START) 5 mg (74 tabs) DsPk For the first 7 days take two 5 mg tablets twice daily.  After 7 days take one 5 mg tablet twice daily. 1ST SCRIPT 74 tablet 0    apixaban (ELIQUIS) 5 mg Tab Take 1 tablet (5 mg total) by mouth 2 (two) times daily. 2ND SCRIPT; DO NOT TAKE UNTIL 1ST SCRIPT IS COMPLETE 60 tablet 1    ASPERCREME WITH ALOE 10 % Crea Apply 1 Application topically as needed (to legs/back).      atorvastatin (LIPITOR) 80 MG tablet TAKE ONE TABLET BY MOUTH EVERY DAY 30 tablet 0    blood sugar diagnostic Strp BG monitoring 4 times a day. Pt needs test strips for [employer] meter. (Patient taking differently: BG monitoring 2 times a day. Pt needs test strips for [employer]alking meter.) 450 strip prn    cholecalciferol, vitamin D3, (VITAMIN D3) 25 mcg (1,000 unit) capsule Take 1 capsule (1,000 Units total) by mouth once daily. 90 capsule 3    clotrimazole-betamethasone 1-0.05% (LOTRISONE) cream Apply topically 2 (two) times daily. 45 g 3    diclofenac sodium (VOLTAREN) 1 % Gel APPLY 2 GRAMS TOPICALLY DAILY 100 g 0    docusate sodium (COLACE) 100 MG capsule Take 1 capsule (100 mg total) by mouth once daily. 90 capsule 3    doxycycline (VIBRA-TABS) 100 MG tablet Take 1 tablet (100 mg total) by mouth 2 (two) times daily. 60 tablet 3    fluticasone (FLONASE) 50 mcg/actuation nasal spray 2 sprays (100 mcg total) by Each Nare route once daily. 1 Bottle 3    insulin degludec (TRESIBA FLEXTOUCH U-100) 100 unit/mL (3 mL) insulin pen Inject 10-18 units at night (Patient taking  "differently: Inject 12 Units into the skin every evening.) 6 mL 1    lancets Misc Test daily (Patient taking differently: Test twice  daily) 100 each 12    nebulizer and compressor (COMP-AIR ELITE COMP NEB SYSTEM) Berna use as directed 1 each 0    olopatadine (PATANOL) 0.1 % ophthalmic solution Place 1 drop into both eyes 2 (two) times daily. 5 mL 0    pen needle, diabetic (PEN NEEDLE) 29 gauge x 1/2" Ndle USE DAILY 100 each 4    semaglutide (OZEMPIC) 0.25 mg or 0.5 mg (2 mg/3 mL) pen injector INJECT 0.5 MG SUBCUTANEOUSLY ONCE A WEEK (Patient taking differently: Inject 0.25 mg into the skin every 7 days. On Saturday.) 3 mL 6    SENNA 8.6 mg tablet Take 1 tablet by mouth once daily. 90 tablet 3    torsemide (DEMADEX) 10 MG Tab Take 1 tablet (10 mg total) by mouth every 48 hours as needed (leg swelling). 45 tablet 3     No current facility-administered medications on file prior to visit.         HPI:  Patient estimates can walk to bathroom within room and pace slow before SOB. Presents to clinic in wheelchair. SpO2 99% on RA    Patient sleeps in recliner at baseline    Patient wakes up SOB, has to get out of bed, associated cough- reports cough productive for white sputum over the past month worse in evening   Palpitations - intermittent   Dizzy, light-headed, pre-syncope or syncope- denies   Since discharge frequency of performing weights, home weight and weight change- not performing daily weights   Other information felt pertinent to HPI: Ms. 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 Admitted to  for management of acute, unprovoked pulmonary embolism. CTA on admission noted extensive pulmonary emboli throughout the bilateral lungs including a saddle pulmonary embolus with evidence of right heart strain with " flattening of the interventricular septum and enlargement of the RV and main pulmonary artery. Clinically, she was stable and in no acute distress. She was urgently evaluated by Cardiology team who did not think she would benefit from catheter-directed therapies or TPA given overall clinical stability, low PESI score and comorbidities. A formal echocardiogram did not indicate right heart strain and she initially responded well to IV heparin gtt. We transitioned her to OAC 6/6. She experienced increased dyspnea 6/7 following PT, accompanied with substernal pressure, labored breathing, tachycardia on telemetry. Interventional cardiology was consulted for POCUS in abundance of caution. Volume overload suspected with new drop in estimated EF of 40% and CVP 15. We initiated diuresis and will plan for slow titration of GDMT OP given submassive PE.     PHYSICAL:   Vitals:    06/19/24 1044   BP: (!) 146/65   Pulse: 86      @[id number](3)@    JVD: difficult to assess   Heart rhythm: regular  Cardiac murmur: No    S3: no  S4: no  Lungs: crackles, wheezing and diminished breath sounds in posterior lung bases  Hepatojugular reflux: difficult to assess  Edema: yes, lymphedema with 2+ pitting       Echo 6/6/24:    Left Ventricle: The left ventricle is normal in size. Ventricular mass is normal. Normal wall thickness. Regional wall motion abnormalities present. See diagram for wall motion findings. Ejection fraction by visual approximation is low normal to mildly impaired at 50% ( upper 40s to low 50s). There is normal diastolic function.    Right Ventricle: Normal right ventricular cavity size. Wall thickness is normal. Systolic function is borderline low.    Right Atrium: Right atrium is mildly dilated.    Aortic Valve: There is moderate aortic valve sclerosis. Mildly restricted motion. There is mild stenosis. Aortic valve area by VTI is 1.71 cm². Aortic valve peak velocity is 1.49 m/s. Mean gradient is 6 mmHg. The  dimensionless index is 0.54.    Mitral Valve: There is moderate anterior leaflet sclerosis.    Tricuspid Valve: There is mild regurgitation.    Pulmonary Artery: The estimated pulmonary artery systolic pressure is 48 mmHg.    IVC/SVC: Elevated venous pressure at 15 mmHg.    Pericardium: There is a trivial posterior effusion more toward the base.    ASSESSMENT: HFpEF    PLAN:      Patient Instructions:   Instruct the patient to notify this clinic if HH, a physician or an advanced care provider wants to change medication one of their HF medications   Activity and Diet restrictions:   Recommend 2-3 gram sodium restriction and 1500cc- 2000cc fluid restriction.  Encourage physical activity with graded exercise program.  Requested patient to weigh themselves daily, and to notify us if their weight increases by more than 3 lbs in 1 day or 5 lbs in 3 days.    Assigned dry weight on home scale: unsure  Medication changes (include current dose and changed dose): NYHA Class III symptoms. Volume up on exam. Increase torsemide from 10mg every other day to 10mg daily. Will refer to vascular cardiology for further management of lymphedema. Likely will need BLE venous US prior to resuming lymphedema therapy given recent admission for saddle PE.   Upcoming labs and date anticipated: RTC in 1.5 weeks for repeat labs and close follow up    Niki Smith PA-C

## 2024-06-18 NOTE — PROGRESS NOTES
[facility]  Transitional Care Management (TCM) Clinic Visit  Shared Note: Mallika Mena (MS4) & Dr Schmidt (Attending)    Consult Requested By: Dr. Kym Dent  Admit Date: 6/5/24   IP Discharge Date: 6/9/24  Hospital Length of Stay:RRHLOS 5 days  Days since discharge (from IP or SNF): 9 days   [facility] On Call Contact Note: 9/1/2017 last noted in chart  Hospital Diagnosis: Hospital discharge follow-up [Z09]     HISTORY OF PRESENT ILLNESS      Patient ID: Duran Giordano is a 84 y.o. female was recently admitted to the hospital, this is their TCM encounter.    Hospital Course Synopsis:            1) Patient experienced a syncopal episode while riding her scooter  into her room. This was associated with nausea and palpitations.  +LOC without head trauma.  Unprovoked PE found on CTA, received hepain gtt and was transitioned to Eliquis 6/6/24. Right heart strain was noted on CTA but not present on echo.  Initial EF normal.  She has s/s of hypervolemia 6/7 and POCUS suggested reduced EF at 40%. She was diuresed and referred to HF TCC to evaluate for GDMT initiation and titration.    2) Developments since hospitalization and current needs- the left leg of her wheelchair is not locking appropriately.  Occasional dizziness that she will stop and rest- this helps.  Has help with ADLs (mostly does everything herself) goes to the chapel and dining area on her scooter. Tolerating medication. Has lost a bit of weight. A little shortness of breath and has some when she gets onto the scooter.  No chest pain, coughing some in the evenings.  Sleeps in a recliner.  Sleeps well- naps a bit somedays- works all day and is very busy. Feels like the cellulitis may be a bit worse comparatively.  No breakage or openings- getting hard blisters though.  Has tried massage, leg wraps, circaids, compression socks (did not like these).  She feels like her legs are getting larger- used to be able to wear pants for exercise but can't  anymore. Not wearing Circaids today and doesn't always use them. Has wheezing most days around 4 pm and will take coricidin    Rarely checks BP- taking Tresiba 12 units and this works for her.  Feels like it helps as she does not get up feeling dizzy or bad.  Taking Ozempic.  Had been constipated and taking Senna and Colace- was not having a good BM- had watery slimy stools. Taking a fluid pill- had been taking torsemide 10 mg EOD.  Felt like it worked better than current regimen.  Current med rec shows that she is still taking the torsemide EOD    The nurse on duty updated her pill packs,  Had been on a probiotic but she had to stop as it was making her feel worse.       Had a tough time getting into the van for transport.      3) Please confirm dates of admit, discharge, LOS above are correct, yes              DECISION MAKING TODAY       Assessment & Plan:  1. Saddle embolus of pulmonary artery, unspecified chronicity, unspecified whether acute cor pulmonale present  Overview:  Noted on admit  Transitioned to Eliquis prior to discharge    Assessment & Plan:  Unclear if provoked or unprovoked  Discussed repeating d dimer in 6 months to reassess.  Discussed that staying active can help prevent incidence of blood clots        2. Hospital discharge follow-up  -     Ambulatory referral/consult to Internal Medicine    3. Lymphedema of both lower extremities  Overview:  LE wounds resolved,right leg showing so skin changes that should be monitored closely.  Reinforced daily circaid application  by Sister Ana Arevalo or Doreen Ribera. Last seen by lymphedema clinic in 2014      4. Acute HFrEF (heart failure with reduced ejection fraction)  Overview:  EF 40% on POCUS, previously 50-55%  Will be establishing with HFTCC  Encourage low sodium diet  Has yuliana BERRIOS, unable to assess JVD due to body habitus    Assessment & Plan:  As above           Medication List on Discharge:     Medication List            Accurate as of June  18, 2024 11:59 PM. If you have any questions, ask your nurse or doctor.                CHANGE how you take these medications      blood sugar diagnostic Strp  BG monitoring 4 times a day. Pt needs test strips for [employer] Talking meter.  What changed: additional instructions     insulin degludec 100 unit/mL (3 mL) insulin pen  Commonly known as: TRESIBA FLEXTOUCH U-100  Inject 10-18 units at night  What changed:   how much to take  how to take this  when to take this  additional instructions     lancets Misc  Test daily  What changed: additional instructions     OZEMPIC 0.25 mg or 0.5 mg (2 mg/3 mL) pen injector  Generic drug: semaglutide  INJECT 0.5 MG SUBCUTANEOUSLY ONCE A WEEK  What changed: See the new instructions.            CONTINUE taking these medications      albuterol 90 mcg/actuation inhaler  Commonly known as: PROVENTIL/VENTOLIN HFA  Inhale 2 puffs into the lungs every 4 (four) hours as needed for Wheezing or Shortness of Breath. Rescue     albuterol-ipratropium 2.5 mg-0.5 mg/3 mL nebulizer solution  Commonly known as: DUO-NEB  USE 1 VIAL PER NEBULIZER EVERY 6 HOURS AS NEEDED FOR WHEEZING/RESCUE     * allopurinoL 300 MG tablet  Commonly known as: ZYLOPRIM  Take 1 tablet (300 mg total) by mouth once daily.     * allopurinoL 100 MG tablet  Commonly known as: ZYLOPRIM  Take 1 tablet (100 mg total) by mouth once daily.     amLODIPine 5 MG tablet  Commonly known as: NORVASC  Take 1 tablet (5 mg total) by mouth once daily.     ASPERCREME WITH ALOE 10 % Crea  Generic drug: trolamine salicylate-aloe vera  Apply 1 Application topically as needed (to legs/back).     atorvastatin 80 MG tablet  Commonly known as: LIPITOR  TAKE ONE TABLET BY MOUTH EVERY DAY     clotrimazole-betamethasone 1-0.05% cream  Commonly known as: LOTRISONE  Apply topically 2 (two) times daily.     COMP-AIR ELITE COMP NEB SYSTEM Berna  Generic drug: nebulizer and compressor  use as directed     diclofenac sodium 1 % Gel  Commonly known as:  "VOLTAREN  APPLY 2 GRAMS TOPICALLY DAILY     docusate sodium 100 MG capsule  Commonly known as: COLACE  Take 1 capsule (100 mg total) by mouth once daily.     doxycycline 100 MG tablet  Commonly known as: VIBRA-TABS  Take 1 tablet (100 mg total) by mouth 2 (two) times daily.     * ELIQUIS DVT-PE TREAT 30D START 5 mg (74 tabs) Dspk  Generic drug: apixaban  For the first 7 days take two 5 mg tablets twice daily.  After 7 days take one 5 mg tablet twice daily. 1ST SCRIPT     * ELIQUIS 5 mg Tab  Generic drug: apixaban  Take 1 tablet (5 mg total) by mouth 2 (two) times daily. 2ND SCRIPT; DO NOT TAKE UNTIL 1ST SCRIPT IS COMPLETE     fluticasone propionate 50 mcg/actuation nasal spray  Commonly known as: FLONASE  2 sprays (100 mcg total) by Each Nare route once daily.     olopatadine 0.1 % ophthalmic solution  Commonly known as: PATANOL  Place 1 drop into both eyes 2 (two) times daily.     pen needle, diabetic 29 gauge x 1/2" Ndle  Commonly known as: PEN NEEDLE  USE DAILY     SENNA 8.6 mg tablet  Generic drug: senna  Take 1 tablet by mouth once daily.     torsemide 10 MG Tab  Commonly known as: DEMADEX  Take 1 tablet (10 mg total) by mouth every 48 hours as needed (leg swelling).     VITAMIN D3 25 mcg (1,000 unit) capsule  Generic drug: cholecalciferol (vitamin D3)  Take 1 capsule (1,000 Units total) by mouth once daily.           * This list has 4 medication(s) that are the same as other medications prescribed for you. Read the directions carefully, and ask your doctor or other care provider to review them with you.                  Medication Reconciliation:  Were medications changed on discharge? Yes  Were medications in the home? Yes  Is the patient taking the medications as directed? Yes  Does the patient understand the medications and changes? Yes  Does updated med list accurately reflects meds patient is currently taking? Yes    ENVIRONMENT OF CARE      Family and/or Caregiver present at visit?  No  Name of " Caregiver: N/A  History provided by: patient    Advance Care Planning   Advanced Care Planning Status:  Patient has had an ACP conversation  Living Will: Yes  Power of : No  [place]: Yes    Does Caregiver have HCPoA: Yes  Changes today: No       Needs assessment:  Functional Status: moderate assistance  Mobility: chair bound  Nutritional access: adequate intake and access  Home Health: Yes,  [facility]    DME/Supplies:  no needs today      Diagnostic tests reviewed/disposition: I have reviewed all completed as well as pending diagnostic tests at the time of discharge.  Disease/illness education:  lymphedema  Establishment or re-establishment of referral orders for community resources: No other necessary community resources.   Discussion with other health care providers:  Discussed with Dr. Schmidt .   Does patient have an ostomy (ileostomy, colostomy, suprapubic catheter, nephrostomy tube, tracheostomy, PEG tube, pleurex catheter, cholecystostomy, etc)? No  Were BPAs reviewed? Yes    Social History     Socioeconomic History    Marital status: Single   Tobacco Use    Smoking status: Never    Smokeless tobacco: Never   Substance and Sexual Activity    Alcohol use: Yes     Comment: holidays    Drug use: No    Sexual activity: Never   Social History Narrative    Single with no children.      Social Determinants of Health     Financial Resource Strain: Low Risk  (6/7/2024)    Overall Financial Resource Strain (CARDIA)     Difficulty of Paying Living Expenses: Not hard at all   Food Insecurity: No Food Insecurity (6/7/2024)    Hunger Vital Sign     Worried About Running Out of Food in the Last Year: Never true     Ran Out of Food in the Last Year: Never true   Transportation Needs: No Transportation Needs (6/7/2024)    TRANSPORTATION NEEDS     Transportation : No   Physical Activity: Insufficiently Active (2/2/2024)    Exercise Vital Sign     Days of Exercise per Week: 1 day     Minutes of Exercise per  Session: 10 min   Stress: No Stress Concern Present (6/7/2024)    [employer] - Occupational Stress Questionnaire     Feeling of Stress : Only a little   Housing Stability: Low Risk  (6/7/2024)    Housing Stability Vital Sign     Unable to Pay for Housing in the Last Year: No     Homeless in the Last Year: No         OBJECTIVE:     Vital Signs:  Vitals:    06/18/24 1053   BP: 130/64   Pulse: 96   Temp: 98.2 °F (36.8 °C)       Review of Systems   Constitutional:  Negative for diaphoresis, fatigue and unexpected weight change.   HENT:  Negative for trouble swallowing.    Eyes:  Negative for visual disturbance.   Respiratory:  Positive for shortness of breath. Negative for chest tightness.    Cardiovascular:  Positive for leg swelling. Negative for chest pain and palpitations.   Gastrointestinal:  Negative for abdominal distention.   Endocrine: Negative for polydipsia, polyphagia and polyuria.   Genitourinary:  Negative for difficulty urinating and frequency.   Musculoskeletal:  Negative for arthralgias.   Skin:  Negative for color change and pallor.   Allergic/Immunologic: Negative for immunocompromised state.   Neurological:  Negative for dizziness, syncope and light-headedness.   Hematological:  Negative for adenopathy.   Psychiatric/Behavioral:  The patient is not nervous/anxious.        Physical Exam:  Physical Exam  Constitutional:       Appearance: Normal appearance. She is not toxic-appearing.      Comments: BMI 53, in wheelchair for exam   HENT:      Head: Normocephalic and atraumatic.      Right Ear: External ear normal.      Left Ear: External ear normal.      Nose: Nose normal.      Mouth/Throat:      Mouth: Mucous membranes are moist.      Pharynx: Oropharynx is clear.   Eyes:      Pupils: Pupils are equal, round, and reactive to light.   Cardiovascular:      Rate and Rhythm: Normal rate and regular rhythm.      Pulses: Normal pulses.      Heart sounds: Normal heart sounds. No  murmur heard.  Pulmonary:      Effort: Pulmonary effort is normal.      Breath sounds: Normal breath sounds.   Abdominal:      General: Abdomen is flat. Bowel sounds are normal.      Palpations: Abdomen is soft.   Musculoskeletal:      Cervical back: Normal range of motion and neck supple.      Right lower leg: Edema present.      Left lower leg: Edema present.   Skin:     General: Skin is warm and dry.      Findings: Erythema present.      Comments: See media tab   Neurological:      General: No focal deficit present.      Mental Status: She is alert and oriented to person, place, and time.   Psychiatric:         Mood and Affect: Mood normal.         Behavior: Behavior normal.         INSTRUCTIONS FOR PATIENT:     Scheduled Follow-up, Appts Reviewed with Modifications if Needed: Yes  Future Appointments   Date Time Provider Department Center   6/24/2024 10:30 AM Alfredo Lozoya MD [facility] ORTHO [address] [facility]   6/28/2024 10:30 AM LAB, APPOINTMENT [facility]   6/28/2024 11:00 AM Niki Smith PA-C [facility] HRTFTC [address]   7/2/2024 10:15 AM Ferdinand Rosas MD [facility] OPHTHAL [address]   7/24/2024 12:30 PM Sonia Willard DPM [facility] POD [address] [facility]   9/20/2024 10:40 AM Tami Schmidt MD [facility] MED CLN [address] [facility]   10/8/2024  9:50 AM LAB, APPOINTMENT [facility] IM [address] [facility]   10/8/2024 10:00 AM LAB, APPOINTMENT [facility] [address] [facility]   10/10/2024 11:00 AM Yogi Contreras APRN, FNP [facility] IM [address] [facility]       Signature: LILIANA Ellington MD/MPH  [facility]  [phone] [facility]     Transition of Care Visit:  I have reviewed and updated the history and problem list.  I have reconciled the medication list.  I have discussed the hospitalization and current medical issues, prognosis and plans with the patient/family.

## 2024-06-19 ENCOUNTER — TELEPHONE (OUTPATIENT)
Dept: CARDIOLOGY | Facility: CLINIC | Age: 84
End: 2024-06-19

## 2024-06-19 ENCOUNTER — DOCUMENTATION ONLY (OUTPATIENT)
Dept: CARDIOLOGY | Facility: CLINIC | Age: 84
End: 2024-06-19

## 2024-06-19 ENCOUNTER — OFFICE VISIT (OUTPATIENT)
Dept: CARDIOLOGY | Facility: CLINIC | Age: 84
End: 2024-06-19
Payer: MEDICARE

## 2024-06-19 VITALS
BODY MASS INDEX: 52.96 KG/M2 | SYSTOLIC BLOOD PRESSURE: 146 MMHG | HEART RATE: 86 BPM | DIASTOLIC BLOOD PRESSURE: 65 MMHG | WEIGHT: 262.69 LBS | OXYGEN SATURATION: 99 % | HEIGHT: 59 IN

## 2024-06-19 DIAGNOSIS — G47.33 OSA ON CPAP: ICD-10-CM

## 2024-06-19 DIAGNOSIS — Z79.4 TYPE 2 DIABETES MELLITUS WITH DIABETIC POLYNEUROPATHY, WITH LONG-TERM CURRENT USE OF INSULIN: ICD-10-CM

## 2024-06-19 DIAGNOSIS — I10 ESSENTIAL HYPERTENSION: ICD-10-CM

## 2024-06-19 DIAGNOSIS — E11.59 HYPERTENSION ASSOCIATED WITH DIABETES: ICD-10-CM

## 2024-06-19 DIAGNOSIS — I89.0 LYMPHEDEMA: ICD-10-CM

## 2024-06-19 DIAGNOSIS — I26.92 ACUTE SADDLE PULMONARY EMBOLISM WITHOUT ACUTE COR PULMONALE: ICD-10-CM

## 2024-06-19 DIAGNOSIS — I15.2 HYPERTENSION ASSOCIATED WITH DIABETES: ICD-10-CM

## 2024-06-19 DIAGNOSIS — I50.32 CHRONIC DIASTOLIC HEART FAILURE: Primary | ICD-10-CM

## 2024-06-19 DIAGNOSIS — E66.01 MORBID OBESITY WITH BMI OF 50.0-59.9, ADULT: ICD-10-CM

## 2024-06-19 DIAGNOSIS — E11.42 TYPE 2 DIABETES MELLITUS WITH DIABETIC POLYNEUROPATHY, WITH LONG-TERM CURRENT USE OF INSULIN: ICD-10-CM

## 2024-06-19 DIAGNOSIS — E78.5 HYPERLIPIDEMIA, UNSPECIFIED HYPERLIPIDEMIA TYPE: ICD-10-CM

## 2024-06-19 DIAGNOSIS — N18.32 STAGE 3B CHRONIC KIDNEY DISEASE: ICD-10-CM

## 2024-06-19 PROCEDURE — 99999 PR PBB SHADOW E&M-EST. PATIENT-LVL V: CPT | Mod: PBBFAC,,,

## 2024-06-19 PROCEDURE — 99215 OFFICE O/P EST HI 40 MIN: CPT | Mod: PBBFAC

## 2024-06-19 RX ORDER — TORSEMIDE 10 MG/1
10 TABLET ORAL DAILY
Qty: 90 TABLET | Refills: 3 | Status: SHIPPED | OUTPATIENT
Start: 2024-06-19 | End: 2025-06-14

## 2024-06-19 NOTE — TELEPHONE ENCOUNTER
Spoke with Sis. Duran Giordano to inform her of the following.     Can we call her pharmacy and make sure they have removed lactobacillus probiotic from pill packs. Med is no longer on her list.     Also called the pharmacy to have them remove the lactobacillus probiotic at [facility].

## 2024-06-19 NOTE — PROGRESS NOTES
"PT here for her first HFTCC appointment unaccompanied. Hospital Education repeated and Home Care Guide given with Nurse and PA-C cards stapled to the inside as PT was Phone Enrolled.    Reviewed the following key points of HFTCC program with pt:              1.) Take your medications as directed. Call Ms Smith if any health Care Professional changes your Heart/Fluid medications.               2.) Weight yourself daily. Daily Dry Weights. Upon waking ,empty your bladder, weigh with as little clothes as possible before eating/drinking. Record weight in Symptom Tracker and compare these weights for fluid gain. Weight gain overnight of 3-4 lbs is Fluid, also a gain of 5 lbs in 3 days is Fluid as well. Call US!              3.) Follow low salt and limited fluid diet. Salt/Sodium Restriction 0758-1532 mg, see page 4. Sodium makes you hold onto Fluid. High Sodium Foods;Deli Meat/Cheese, Sausages/Hot Dogs, Fast Food/Restaurant Food, Anything in a box, bottle or can. Cook with Fresh or Frozen ingredients and use seasonings that are labeled NO SALT. Check Portion Sizes, Salt is reported for 1 portion. A can may contain 2-3 portions. Fluid Restriction 1.5 -2 Liters/Day, see page 6, measure all of your Fluid, the milk in your cereal, broth in your soup and ice cream because anything that melts at room temp is a liquid.              4.) Stop smoking and start exercising. Brisk walking is good, don't walk like you are going to the [employer] and DON"T WALK IN THE HEAT! Start low and increase as tolerated. Remember if you don't use it you lose it.              5.) Go to your appointments and call your team. Have your weight and BP/P ready when we call to do the Phone Check ins and call us if you have fluid gain or questions     PT asks if she should start wearing her Circ Aids again and I tell her yes indeed. PT also asks if the Lymphedema Clinic at [place] is a good idea and I tell her yes it is. Dandre steps in to meet Sister Doreen " so she can put a face to her name.  Reminded that we will call her weekly and to call us if she has any S/S of Fluid gain.

## 2024-06-19 NOTE — PATIENT INSTRUCTIONS
Increase torsemide to 10mg once daily in the morning.    Notify us ([phone]) with any worsening shortness of breath, swelling or 2-3lb weight gain in 1 day/5lb weight gain in 1 week on home scale

## 2024-06-20 ENCOUNTER — TELEPHONE (OUTPATIENT)
Dept: CARDIOLOGY | Facility: CLINIC | Age: 84
End: 2024-06-20
Payer: MEDICARE

## 2024-06-20 NOTE — TELEPHONE ENCOUNTER
----- Message from Niki Smith PA-C sent at 6/19/2024  3:53 PM CDT -----  Regarding: Appointment  Hi team,    Would you mind getting patient scheduled for appointment to establish care for lymphedema?    Thanks,  Niki

## 2024-06-21 PROBLEM — I50.21 ACUTE HFREF (HEART FAILURE WITH REDUCED EJECTION FRACTION): Status: ACTIVE | Noted: 2024-06-21

## 2024-06-21 NOTE — ASSESSMENT & PLAN NOTE
Unclear if provoked or unprovoked  Discussed repeating d dimer in 6 months to reassess.  Discussed that staying active can help prevent incidence of blood clots

## 2024-06-24 ENCOUNTER — OFFICE VISIT (OUTPATIENT)
Dept: ORTHOPEDICS | Facility: CLINIC | Age: 84
End: 2024-06-24
Payer: MEDICARE

## 2024-06-24 VITALS — BODY MASS INDEX: 52.98 KG/M2 | HEIGHT: 59 IN | WEIGHT: 262.81 LBS

## 2024-06-24 DIAGNOSIS — M17.12 PRIMARY OSTEOARTHRITIS OF LEFT KNEE: Primary | ICD-10-CM

## 2024-06-24 DIAGNOSIS — E66.01 MORBID OBESITY WITH BMI OF 50.0-59.9, ADULT: ICD-10-CM

## 2024-06-24 PROCEDURE — 99213 OFFICE O/P EST LOW 20 MIN: CPT | Mod: S$PBB,,, | Performed by: ORTHOPAEDIC SURGERY

## 2024-06-24 PROCEDURE — 99213 OFFICE O/P EST LOW 20 MIN: CPT | Mod: PBBFAC | Performed by: ORTHOPAEDIC SURGERY

## 2024-06-24 PROCEDURE — 99999 PR PBB SHADOW E&M-EST. PATIENT-LVL III: CPT | Mod: PBBFAC,,, | Performed by: ORTHOPAEDIC SURGERY

## 2024-06-24 NOTE — PROGRESS NOTES
Duran Giordano is in for 4 week follow-up from a left knee corticosteroid injection. She received excellent relief.  Currently there is minimal pain. She had an unprovoked PE and was hospitalized. Getting home health. On eliquis    Review of Systems   Constitution: Negative for chills, fever and night sweats.   Cardiovascular: Negative for chest pain, claudication and leg swelling.   Respiratory: Negative for shortness of breath.   Hematologic/Lymphatic: Negative for adenopathy and bleeding problem. Does not bruise/bleed easily.   Skin: Negative for poor wound healing.   Gastrointestinal: Negative for diarrhea and heartburn.   Genitourinary: Negative for bladder incontinence.   Neurological: Negative for focal weakness, numbness, paresthesias and sensory change.   Psychiatric/Behavioral: The patient is not nervous/anxious.   Allergic/Immunologic: Negative for persistent infections.    EXAM:  left  Knee:  Skin intact.  No effusion.  Minimal tenderness.  Minimal crepitus.  No instability.  ROM: 0-110  NVI distally      Imp: DJD Left knee      Plan:       Doing well.  F/U in 3 months with xrays

## 2024-06-25 ENCOUNTER — TELEPHONE (OUTPATIENT)
Dept: CARDIOLOGY | Facility: CLINIC | Age: 84
End: 2024-06-25
Payer: MEDICARE

## 2024-06-25 NOTE — TELEPHONE ENCOUNTER
Call from PT stating she lost her HFTCC Home Care Guide with all of it's valuable information. Home Care Guide and Dietary Booklet mailed out  to PT at the [facility] where she resides.

## 2024-06-27 NOTE — PROGRESS NOTES
HF TCC Provider Note (Follow-up) Consult Note      HPI:     Patient estimates can walk 15 feet and pace slow before SOB. Presents to clinic in wheelchair. SpO2 99% on RA               Patient sleeps in recliner at baseline              Patient wakes up SOB, has to get out of bed, associated cough- improvement in cough from last visit, now resolved               Palpitations - denies recent              Dizzy, light-headed, pre-syncope or syncope- denies recent episodes              Since discharge frequency of performing weights, home weight and weight change- reports home weight similar to clinic value              Other information felt pertinent to HPI: Ms. 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 Admitted to  for management of acute, unprovoked pulmonary embolism. CTA on admission noted extensive pulmonary emboli throughout the bilateral lungs including a saddle pulmonary embolus with evidence of right heart strain with flattening of the interventricular septum and enlargement of the RV and main pulmonary artery. Clinically, she was stable and in no acute distress. She was urgently evaluated by Cardiology team who did not think she would benefit from catheter-directed therapies or TPA given overall clinical stability, low PESI score and comorbidities. A formal echocardiogram did not indicate right heart strain and she initially responded well to IV heparin gtt. We transitioned her to OAC 6/6. She experienced increased dyspnea 6/7 following PT, accompanied with substernal pressure, labored breathing, tachycardia on telemetry. Interventional cardiology was consulted for POCUS in abundance of caution. Volume overload suspected with new drop in estimated EF of 40% and CVP 15. We initiated diuresis and will  plan for slow titration of GDMT OP given submassive PE.     PHYSICAL:   Vitals:    06/28/24 1115   BP: (!) 181/75   Pulse: 94      @[id number](3)@    JVD: no, difficult to assess  Heart rhythm: regular  Cardiac murmur: No    S3: no  S4: no  Lungs: clear  Hepatojugular reflux: no  Edema: yes, lymphedema with 1+ pitting       Lab Results   Component Value Date     06/28/2024    K 4.5 06/28/2024    MG 1.6 06/28/2024     06/28/2024    CO2 25 06/28/2024    BUN 30 (H) 06/28/2024    CREATININE 1.3 06/28/2024     (H) 06/28/2024    CALCIUM 9.6 06/28/2024    AST 18 06/28/2024    ALT 12 06/28/2024    ALBUMIN 2.8 (L) 06/28/2024    PROT 8.0 06/28/2024    BILITOT 0.4 06/28/2024     Lab Results   Component Value Date    BNP 73 06/28/2024     (H) 06/19/2024     (H) 06/07/2024       ASSESSMENT: HFpEF     PLAN:      Patient Instructions:   Instruct the patient to notify this clinic if HH, a physician or an advanced care provider wants to change medication one of their HF medications   Activity and Diet restrictions:   Recommend 2-3 gram sodium restriction and 1500cc- 2000cc fluid restriction.  Encourage physical activity with graded exercise program.  Requested patient to weigh themselves daily, and to notify us if their weight increases by more than 3 lbs in 1 day or 5 lbs in 3 days.    Assigned dry weight on home scale: 262lbs  Medication changes (include current dose and changed dose): NYHA Class III symptoms. Well compensated on exam. Continue torsemide 10mg daily. Increase amlodipine to 10mg daily for additional BP management with instructions to bring daily BP log to next clinic visit. Will refer to vascular cardiology for further management of lymphedema. Likely will need BLE venous US prior to resuming lymphedema therapy given recent admission for saddle PE.   Upcoming labs and date anticipated: RTC in 2 weeks for repeat labs and close follow up     Niki Smith PA-C

## 2024-06-28 ENCOUNTER — OFFICE VISIT (OUTPATIENT)
Dept: CARDIOLOGY | Facility: CLINIC | Age: 84
End: 2024-06-28
Payer: MEDICARE

## 2024-06-28 ENCOUNTER — LAB VISIT (OUTPATIENT)
Dept: LAB | Facility: HOSPITAL | Age: 84
End: 2024-06-28
Payer: MEDICARE

## 2024-06-28 VITALS
DIASTOLIC BLOOD PRESSURE: 75 MMHG | SYSTOLIC BLOOD PRESSURE: 181 MMHG | OXYGEN SATURATION: 99 % | HEART RATE: 94 BPM | BODY MASS INDEX: 52.84 KG/M2 | WEIGHT: 262.13 LBS | HEIGHT: 59 IN

## 2024-06-28 DIAGNOSIS — I50.32 CHRONIC DIASTOLIC HEART FAILURE: Primary | ICD-10-CM

## 2024-06-28 DIAGNOSIS — I26.92 ACUTE SADDLE PULMONARY EMBOLISM WITHOUT ACUTE COR PULMONALE: ICD-10-CM

## 2024-06-28 DIAGNOSIS — I89.0 LYMPHEDEMA: ICD-10-CM

## 2024-06-28 DIAGNOSIS — E11.59 HYPERTENSION ASSOCIATED WITH DIABETES: ICD-10-CM

## 2024-06-28 DIAGNOSIS — Z79.4 TYPE 2 DIABETES MELLITUS WITH DIABETIC POLYNEUROPATHY, WITH LONG-TERM CURRENT USE OF INSULIN: ICD-10-CM

## 2024-06-28 DIAGNOSIS — E66.01 MORBID OBESITY WITH BMI OF 50.0-59.9, ADULT: ICD-10-CM

## 2024-06-28 DIAGNOSIS — R06.02 SOB (SHORTNESS OF BREATH): ICD-10-CM

## 2024-06-28 DIAGNOSIS — G47.33 OSA ON CPAP: ICD-10-CM

## 2024-06-28 DIAGNOSIS — E11.42 TYPE 2 DIABETES MELLITUS WITH DIABETIC POLYNEUROPATHY, WITH LONG-TERM CURRENT USE OF INSULIN: ICD-10-CM

## 2024-06-28 DIAGNOSIS — E78.5 HYPERLIPIDEMIA, UNSPECIFIED HYPERLIPIDEMIA TYPE: ICD-10-CM

## 2024-06-28 DIAGNOSIS — I15.2 HYPERTENSION ASSOCIATED WITH DIABETES: ICD-10-CM

## 2024-06-28 DIAGNOSIS — N18.32 STAGE 3B CHRONIC KIDNEY DISEASE: ICD-10-CM

## 2024-06-28 DIAGNOSIS — I10 ESSENTIAL HYPERTENSION: ICD-10-CM

## 2024-06-28 LAB
ALBUMIN SERPL BCP-MCNC: 2.8 G/DL (ref 3.5–5.2)
ALP SERPL-CCNC: 102 U/L (ref 55–135)
ALT SERPL W/O P-5'-P-CCNC: 12 U/L (ref 10–44)
ANION GAP SERPL CALC-SCNC: 11 MMOL/L (ref 8–16)
AST SERPL-CCNC: 18 U/L (ref 10–40)
BILIRUB SERPL-MCNC: 0.4 MG/DL (ref 0.1–1)
BNP SERPL-MCNC: 73 PG/ML (ref 0–99)
BUN SERPL-MCNC: 30 MG/DL (ref 8–23)
CALCIUM SERPL-MCNC: 9.6 MG/DL (ref 8.7–10.5)
CHLORIDE SERPL-SCNC: 105 MMOL/L (ref 95–110)
CO2 SERPL-SCNC: 25 MMOL/L (ref 23–29)
CREAT SERPL-MCNC: 1.3 MG/DL (ref 0.5–1.4)
EST. GFR  (NO RACE VARIABLE): 41.1 ML/MIN/1.73 M^2
GLUCOSE SERPL-MCNC: 143 MG/DL (ref 70–110)
MAGNESIUM SERPL-MCNC: 1.6 MG/DL (ref 1.6–2.6)
PHOSPHATE SERPL-MCNC: 3.3 MG/DL (ref 2.7–4.5)
POTASSIUM SERPL-SCNC: 4.5 MMOL/L (ref 3.5–5.1)
PROT SERPL-MCNC: 8 G/DL (ref 6–8.4)
SODIUM SERPL-SCNC: 141 MMOL/L (ref 136–145)

## 2024-06-28 PROCEDURE — 99215 OFFICE O/P EST HI 40 MIN: CPT | Mod: PBBFAC

## 2024-06-28 PROCEDURE — 83880 ASSAY OF NATRIURETIC PEPTIDE: CPT

## 2024-06-28 PROCEDURE — 83735 ASSAY OF MAGNESIUM: CPT

## 2024-06-28 PROCEDURE — 36415 COLL VENOUS BLD VENIPUNCTURE: CPT

## 2024-06-28 PROCEDURE — 84100 ASSAY OF PHOSPHORUS: CPT

## 2024-06-28 PROCEDURE — 99999 PR PBB SHADOW E&M-EST. PATIENT-LVL V: CPT | Mod: PBBFAC,,,

## 2024-06-28 PROCEDURE — 80053 COMPREHEN METABOLIC PANEL: CPT

## 2024-06-28 RX ORDER — AMLODIPINE BESYLATE 10 MG/1
10 TABLET ORAL DAILY
Qty: 90 TABLET | Refills: 3 | Status: SHIPPED | OUTPATIENT
Start: 2024-06-28 | End: 2025-06-28

## 2024-06-28 NOTE — PATIENT INSTRUCTIONS
Continue torsemide 10mg once daily.    Increase amlodipine to 10mg daily to help additionally with blood pressure. Keep daily blood pressure log and bring to next clinic visit for review.    Notify us ([phone]) with any worsening shortness of breath, swelling or 3lb weight gain in 1 day/5lb weight gain in 1 week.

## 2024-07-01 NOTE — PROGRESS NOTES
HPI    8 wk OCT/DFE/poss Eylea OS    Pt states va seems stable since last visit. Pt recently in hospital for   bilateral pulmonary embolism. No new symptoms or concerns today.  Pt states stable BSL /hypertention  Hemoglobin A1C       Date                     Value               Ref Range             Status                05/06/2024               7.1 (H)             4.0 - 5.6 %           Final              No flashes  No floaters  No pain  Gtts: [place], very occasional    POHx:  Central retinal vein occlusion with macular edema of left eye  Age-related nuclear cataract of both eyes  Vitreous degeneration of both eyes  Hypertensive retinopathy of both eyes  Diabetes mellitus type 2 without retinopathy    Last edited by Prisca No on 7/2/2024 10:39 AM.             A/P    ICD-10-CM ICD-9-CM   1. Central retinal vein occlusion with macular edema of left eye  H34.8120 362.35     362.83   2. Age-related nuclear cataract of both eyes  H25.13 366.16   3. Vitreous degeneration of both eyes  H43.813 379.21   4. Hypertensive retinopathy of both eyes  H35.033 362.11   5. Diabetes mellitus type 2 without retinopathy  E11.9 250.00         1. Central retinal vein occlusion with macular edema of left eye  Here for CRVO f/u    Pt overdue for f/u, - pt was hospitalized and had PE, on Eliquis now - Recent notes reviewed     S/p GLADYS x4 10/31/23  S/p I'VE x3 4/2/24    Today VA 20/200 (stable), exam notable for CRVO with diffuse heme, Incr IRF/SRF at 12  weeks , pt overdue was hospitalized for PE  Plan:    given recent PE and on new blood thinners, will pause of antiVEGF treatment for now and give pt time to heal, will get auth for Ozurdex for alternative means of better controlling CME without risk of further thrombotic event, recheck 1 mo     Visit today is associated with current or anticipated ongoing medical care related to this patients single serious condition/complex condition (central retinal vein occlusion left eye)     2.  Age-related nuclear cataract of both eyes  Mild NS, NVS  Plan: Observation      3. Vitreous degeneration of both eyes  No RT/RD , few floaters   Plan: Observation , counseled on RT/RD risk  Pathology of PVD, Retinal Tear, Retinal Detachment reviewed in great detail  RD precautions discussed in detail, patient expressed understanding  RTC immediately PRN (especially ANY change flashes, floaters, vision, visual field)     4. Hypertensive retinopathy of both eyes  Mod HTN changes, has CRVO OS  PCP Tami Schmidt MD - Recent notes reviewed  05/06/2024  7.1  A1C   Plan: Observation HTN changes for now  Recommend good blood pressure control, tight blood glucose control, and good cholesterol control     5. Diabetes mellitus type 2 without retinopathy  No DR or DME OU, IRF and heme OS moreso due to CRVO  Plan: Observation for DR changes  Recommend good blood pressure control, tight blood glucose control, and good cholesterol control         RTC 4 weeks DFE/OCTm OU, get auth Ozurdex OS      I saw and examined the patient and reviewed in detail the findings documented. The final examination findings, image interpretations, and plan as documented in the record represent my personal judgment and conclusions.    Ferdinand Rosas MD  Vitreoretinal Surgery   [facility]

## 2024-07-02 ENCOUNTER — OFFICE VISIT (OUTPATIENT)
Dept: OPHTHALMOLOGY | Facility: CLINIC | Age: 84
End: 2024-07-02
Payer: MEDICARE

## 2024-07-02 ENCOUNTER — CLINICAL SUPPORT (OUTPATIENT)
Dept: OPHTHALMOLOGY | Facility: CLINIC | Age: 84
End: 2024-07-02
Payer: MEDICARE

## 2024-07-02 DIAGNOSIS — H34.8120 CENTRAL RETINAL VEIN OCCLUSION WITH MACULAR EDEMA OF LEFT EYE: ICD-10-CM

## 2024-07-02 DIAGNOSIS — H25.13 AGE-RELATED NUCLEAR CATARACT OF BOTH EYES: ICD-10-CM

## 2024-07-02 DIAGNOSIS — E11.9 DIABETES MELLITUS TYPE 2 WITHOUT RETINOPATHY: ICD-10-CM

## 2024-07-02 DIAGNOSIS — H43.813 VITREOUS DEGENERATION OF BOTH EYES: ICD-10-CM

## 2024-07-02 DIAGNOSIS — H35.033 HYPERTENSIVE RETINOPATHY OF BOTH EYES: ICD-10-CM

## 2024-07-02 DIAGNOSIS — H34.8120 CENTRAL RETINAL VEIN OCCLUSION WITH MACULAR EDEMA OF LEFT EYE: Primary | ICD-10-CM

## 2024-07-02 PROCEDURE — 92134 CPTRZ OPH DX IMG PST SGM RTA: CPT | Mod: PBBFAC

## 2024-07-02 PROCEDURE — 92202 OPSCPY EXTND ON/MAC DRAW: CPT | Mod: 59,S$PBB,, | Performed by: OPHTHALMOLOGY

## 2024-07-02 PROCEDURE — 92134 CPTRZ OPH DX IMG PST SGM RTA: CPT | Mod: 26,S$PBB,, | Performed by: OPHTHALMOLOGY

## 2024-07-02 PROCEDURE — 99214 OFFICE O/P EST MOD 30 MIN: CPT | Mod: S$PBB,,, | Performed by: OPHTHALMOLOGY

## 2024-07-02 PROCEDURE — 92202 OPSCPY EXTND ON/MAC DRAW: CPT | Mod: 59,PBBFAC | Performed by: OPHTHALMOLOGY

## 2024-07-02 PROCEDURE — 99212 OFFICE O/P EST SF 10 MIN: CPT | Mod: PBBFAC,25 | Performed by: OPHTHALMOLOGY

## 2024-07-02 PROCEDURE — 99999 PR PBB SHADOW E&M-EST. PATIENT-LVL II: CPT | Mod: PBBFAC,,, | Performed by: OPHTHALMOLOGY

## 2024-07-02 PROCEDURE — G2211 COMPLEX E/M VISIT ADD ON: HCPCS | Mod: S$PBB,,, | Performed by: OPHTHALMOLOGY

## 2024-07-02 NOTE — PROGRESS NOTES
Oct macula done ou, per Dr. Rosas./MA                  Diagnoses and all orders for this visit:    Central retinal vein occlusion with macular edema of left eye  -     OCT, Retina - OU - Both Eyes         84 y.o. y/o here for screening for Diabetic Renopathy with non-dilated fundus photos per Tami Schmidt MD

## 2024-07-03 ENCOUNTER — TELEPHONE (OUTPATIENT)
Dept: CARDIOLOGY | Facility: CLINIC | Age: 84
End: 2024-07-03
Payer: MEDICARE

## 2024-07-03 NOTE — TELEPHONE ENCOUNTER
"[facility]    Attempted to call pt to complete 1 week "check in" call. Unable to reach pt at listed phone numbers.  Was able to leave message on voicemail encouraging pt to return call with TCC phone number..         "

## 2024-07-05 ENCOUNTER — EXTERNAL HOME HEALTH (OUTPATIENT)
Dept: HOME HEALTH SERVICES | Facility: HOSPITAL | Age: 84
End: 2024-07-05
Payer: MEDICARE

## 2024-07-05 NOTE — TELEPHONE ENCOUNTER
Return call made to patient stated that she has request a refill on pend medication stated she has had bowel movement needs refill sent to [facility]

## 2024-07-08 RX ORDER — POLYETHYLENE GLYCOL 3350 17 G/17G
POWDER, FOR SOLUTION ORAL
Qty: 510 G | Refills: 5 | Status: SHIPPED | OUTPATIENT
Start: 2024-07-08

## 2024-07-09 DIAGNOSIS — E11.29 TYPE 2 DIABETES MELLITUS WITH RENAL MANIFESTATIONS NOT AT GOAL: ICD-10-CM

## 2024-07-09 RX ORDER — ALLOPURINOL 100 MG/1
100 TABLET ORAL DAILY
Qty: 30 TABLET | Refills: 0 | Status: SHIPPED | OUTPATIENT
Start: 2024-07-09 | End: 2025-01-05

## 2024-07-10 ENCOUNTER — DOCUMENT SCAN (OUTPATIENT)
Dept: HOME HEALTH SERVICES | Facility: HOSPITAL | Age: 84
End: 2024-07-10
Payer: MEDICARE

## 2024-07-10 RX ORDER — ATORVASTATIN CALCIUM 80 MG/1
80 TABLET, FILM COATED ORAL
Qty: 30 TABLET | Refills: 0 | Status: SHIPPED | OUTPATIENT
Start: 2024-07-10

## 2024-07-10 NOTE — PROGRESS NOTES
HF TCC Provider Note (Follow-up) Consult Note      HPI:     Patient estimates can walk 15 feet and pace slow before SOB. Presents to clinic in wheelchair. SpO2 98% on RA. Reports continued improvement in SOB from last visit              Patient sleeps in recliner at baseline              Patient wakes up SOB, has to get out of bed, associated cough- cough resolved              Palpitations - denies recent              Dizzy, light-headed, pre-syncope or syncope- denies recent episodes              Since discharge frequency of performing weights, home weight and weight change- not performing daily weights, weight yesterday 260lbs              Other information felt pertinent to HPI: Ms. 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 Admitted to  for management of acute, unprovoked pulmonary embolism. CTA on admission noted extensive pulmonary emboli throughout the bilateral lungs including a saddle pulmonary embolus with evidence of right heart strain with flattening of the interventricular septum and enlargement of the RV and main pulmonary artery. Clinically, she was stable and in no acute distress. She was urgently evaluated by Cardiology team who did not think she would benefit from catheter-directed therapies or TPA given overall clinical stability, low PESI score and comorbidities. A formal echocardiogram did not indicate right heart strain and she initially responded well to IV heparin gtt. We transitioned her to OAC 6/6. She experienced increased dyspnea 6/7 following PT, accompanied with substernal pressure, labored breathing, tachycardia on telemetry. Interventional cardiology was consulted for POCUS in abundance of caution. Volume overload suspected with new drop in estimated EF of 40% and CVP 15. We  initiated diuresis and will plan for slow titration of GDMT OP given submassive PE.     PHYSICAL:   Vitals:    07/19/24 1105   BP: (!) 147/67   Pulse: 92        @[id number](3)@    JVD: no, difficult to assess  Heart rhythm: regular  Cardiac murmur: No    S3: no  S4: no  Lungs: clear  Hepatojugular reflux: no  Edema: yes, lymphedema with 1+ pitting       Lab Results   Component Value Date     07/19/2024    K 4.2 07/19/2024    MG 1.8 07/19/2024     07/19/2024    CO2 24 07/19/2024    BUN 29 (H) 07/19/2024    CREATININE 1.3 07/19/2024     (H) 07/19/2024    CALCIUM 9.1 07/19/2024    AST 18 07/19/2024    ALT 15 07/19/2024    ALBUMIN 2.7 (L) 07/19/2024    PROT 7.9 07/19/2024    BILITOT 0.3 07/19/2024     Lab Results   Component Value Date    BNP 88 07/19/2024    BNP 73 06/28/2024     (H) 06/19/2024       ASSESSMENT: HFpEF     PLAN:      Patient Instructions:   Instruct the patient to notify this clinic if HH, a physician or an advanced care provider wants to change medication one of their HF medications   Activity and Diet restrictions:   Recommend 2-3 gram sodium restriction and 1500cc- 2000cc fluid restriction.  Encourage physical activity with graded exercise program.  Requested patient to weigh themselves daily, and to notify us if their weight increases by more than 3 lbs in 1 day or 5 lbs in 3 days.    Assigned dry weight on home scale: 260lbs  Medication changes (include current dose and changed dose): NYHA Class III symptoms. Well compensated on exam. Continue torsemide 10mg daily. Will refer to vascular cardiology for further management of lymphedema. Likely will need BLE venous US prior to resuming lymphedema therapy given recent admission for saddle PE.   Upcoming labs and date anticipated: pt has completed HFTCC clinic with no hospital readmissions for 31 days. Gen cards referral placed to establish care for recent PE.     Niki Smith PA-C

## 2024-07-12 ENCOUNTER — DOCUMENTATION ONLY (OUTPATIENT)
Dept: CARDIOLOGY | Facility: CLINIC | Age: 84
End: 2024-07-12
Payer: MEDICARE

## 2024-07-12 NOTE — PROGRESS NOTES
"[facility]) weekly phone follow up / triage call completed.     TCC LPN Navigator spoke with Sis Duran Giordano.     Current Patient reported weight: 252lbs   Patient Goal Weight: (262lbs)  Recent Patient reported blood pressure and heart rate: the pt did not check her BP or HR. The pt states she is feeling fine.     Pt reports the following:  []  Shortness of Breath with Activity  []  Shortness of Breath at rest   []  Fatigue  [x]  Edema still there but it is less than what it usually is.   [] Chest pain or tightness  [] Weight Increase since discharge  [] None of the above    Pt reports using "Daily weight and symptom tracker".    Pt reports being in the Green (color) Zone. If in yellow/red, reminded that they should be calling HFTCC today or now.     Medications:   Medication compliance reviewed with pt.  Pt reports having medication list available and has all medications at home for use per list.     Education:   Confirmed pt still has "Heart Failure [facility] Home Care Guide"  .     Reminded of key points as listed below.     Recommend 2 -3 gram sodium restriction and 1500 cc-2000 cc fluid restriction.  Encourage physical activity with graded exercise program.  Requested patient to weigh themselves daily, and to notify us if their weight increases by more than 3 lbs in 1 day or 5 lbs in 3 days.   Reminded to use "Daily weight and symptom tracker".  Even if pt does not have a scale, to use symptom tracker.       Watch for these Signs and Symptoms: If any of these occur, contact HFTCC immediately:   Increase in shortness of breath with movement   Increase in swelling in your legs and ankles   Weight gain of more than 3 pounds in a day or 5 pounds in 3 days.   Difficulty breathing when you are lying down   Worsening fatigue or tiredness   Stomach bloating, a full feeling or a loss of appetite   Increased coughing--especially when you are lying down      Pt was " able to verbalize back to LPN in their own words correct diet/fluid restrictions, necessity for exercise, warning signs and symptoms, when and how to contact their HFTCC team.      Pt reminded of upcoming appointment.  PT reports they will attend. The pt was reminded of her appt with us 7/19/2024 for 11a with labs prior.       Pt reminded of how and when to contact [facility]:  [phone] (Mon-Fri, 8a-5p) & for urgent issues on the weekend to page the Heart Transplant MD on call.  Pt also encouraged utilize [facility] messaging as well.      Pt verbalized understanding and in agreement of plan.       Will follow up with pt at next clinic visit and Nurse navigator available for pt questions, issues or concerns.

## 2024-07-19 ENCOUNTER — OFFICE VISIT (OUTPATIENT)
Dept: CARDIOLOGY | Facility: CLINIC | Age: 84
End: 2024-07-19
Payer: MEDICARE

## 2024-07-19 ENCOUNTER — LAB VISIT (OUTPATIENT)
Dept: LAB | Facility: HOSPITAL | Age: 84
End: 2024-07-19
Payer: MEDICARE

## 2024-07-19 ENCOUNTER — TELEPHONE (OUTPATIENT)
Dept: CARDIOLOGY | Facility: CLINIC | Age: 84
End: 2024-07-19

## 2024-07-19 VITALS
BODY MASS INDEX: 55.08 KG/M2 | DIASTOLIC BLOOD PRESSURE: 67 MMHG | HEIGHT: 59 IN | HEART RATE: 92 BPM | OXYGEN SATURATION: 98 % | SYSTOLIC BLOOD PRESSURE: 147 MMHG | WEIGHT: 273.25 LBS

## 2024-07-19 DIAGNOSIS — I50.32 CHRONIC DIASTOLIC HEART FAILURE: Primary | ICD-10-CM

## 2024-07-19 DIAGNOSIS — I26.92 ACUTE SADDLE PULMONARY EMBOLISM WITHOUT ACUTE COR PULMONALE: ICD-10-CM

## 2024-07-19 DIAGNOSIS — E11.42 TYPE 2 DIABETES MELLITUS WITH DIABETIC POLYNEUROPATHY, WITH LONG-TERM CURRENT USE OF INSULIN: ICD-10-CM

## 2024-07-19 DIAGNOSIS — I10 ESSENTIAL HYPERTENSION: ICD-10-CM

## 2024-07-19 DIAGNOSIS — G47.33 OSA ON CPAP: ICD-10-CM

## 2024-07-19 DIAGNOSIS — E11.59 HYPERTENSION ASSOCIATED WITH DIABETES: ICD-10-CM

## 2024-07-19 DIAGNOSIS — R06.02 SOB (SHORTNESS OF BREATH): ICD-10-CM

## 2024-07-19 DIAGNOSIS — Z79.4 TYPE 2 DIABETES MELLITUS WITH DIABETIC POLYNEUROPATHY, WITH LONG-TERM CURRENT USE OF INSULIN: ICD-10-CM

## 2024-07-19 DIAGNOSIS — N18.32 STAGE 3B CHRONIC KIDNEY DISEASE: ICD-10-CM

## 2024-07-19 DIAGNOSIS — I89.0 LYMPHEDEMA: ICD-10-CM

## 2024-07-19 DIAGNOSIS — E78.5 HYPERLIPIDEMIA, UNSPECIFIED HYPERLIPIDEMIA TYPE: ICD-10-CM

## 2024-07-19 DIAGNOSIS — E66.01 MORBID OBESITY WITH BMI OF 50.0-59.9, ADULT: ICD-10-CM

## 2024-07-19 DIAGNOSIS — I15.2 HYPERTENSION ASSOCIATED WITH DIABETES: ICD-10-CM

## 2024-07-19 LAB
ALBUMIN SERPL BCP-MCNC: 2.7 G/DL (ref 3.5–5.2)
ALP SERPL-CCNC: 94 U/L (ref 55–135)
ALT SERPL W/O P-5'-P-CCNC: 15 U/L (ref 10–44)
ANION GAP SERPL CALC-SCNC: 10 MMOL/L (ref 8–16)
AST SERPL-CCNC: 18 U/L (ref 10–40)
BILIRUB SERPL-MCNC: 0.3 MG/DL (ref 0.1–1)
BNP SERPL-MCNC: 88 PG/ML (ref 0–99)
BUN SERPL-MCNC: 29 MG/DL (ref 8–23)
CALCIUM SERPL-MCNC: 9.1 MG/DL (ref 8.7–10.5)
CHLORIDE SERPL-SCNC: 106 MMOL/L (ref 95–110)
CO2 SERPL-SCNC: 24 MMOL/L (ref 23–29)
CREAT SERPL-MCNC: 1.3 MG/DL (ref 0.5–1.4)
EST. GFR  (NO RACE VARIABLE): 41.1 ML/MIN/1.73 M^2
GLUCOSE SERPL-MCNC: 142 MG/DL (ref 70–110)
MAGNESIUM SERPL-MCNC: 1.8 MG/DL (ref 1.6–2.6)
PHOSPHATE SERPL-MCNC: 3.6 MG/DL (ref 2.7–4.5)
POTASSIUM SERPL-SCNC: 4.2 MMOL/L (ref 3.5–5.1)
PROT SERPL-MCNC: 7.9 G/DL (ref 6–8.4)
SODIUM SERPL-SCNC: 140 MMOL/L (ref 136–145)

## 2024-07-19 PROCEDURE — 83735 ASSAY OF MAGNESIUM: CPT

## 2024-07-19 PROCEDURE — 80053 COMPREHEN METABOLIC PANEL: CPT

## 2024-07-19 PROCEDURE — 84100 ASSAY OF PHOSPHORUS: CPT

## 2024-07-19 PROCEDURE — 83880 ASSAY OF NATRIURETIC PEPTIDE: CPT

## 2024-07-19 PROCEDURE — 99999 PR PBB SHADOW E&M-EST. PATIENT-LVL V: CPT | Mod: PBBFAC,,,

## 2024-07-19 PROCEDURE — 99215 OFFICE O/P EST HI 40 MIN: CPT | Mod: PBBFAC

## 2024-07-19 PROCEDURE — 36415 COLL VENOUS BLD VENIPUNCTURE: CPT

## 2024-07-19 NOTE — TELEPHONE ENCOUNTER
"Call to PT with Lab results as follows;    Can we call and let her know labs look good. Electrolytes normal, kidney function stable, BNP still wnl. We're good to dc. I did place a Gen cards referral just to make sure someone's going to follow PE in case Dr. Palmer focuses on lymphedema.    PT states that appts in [place] are hard for her as the [facility]" will drop her at her appt but she is on her own when she gets here and she uses the  to get a W/C to her appt. Phone # for the [place] clinic give so that appt can be changed to [place]. PT understands that she can call us if she has trouble getting re-scheduled. PT informed of her D/C from the [facility] and states that she enjoyed coming to us! I tell her that most of our patients do as we are happy to see them. Certificate to be mailed to Sister Duran Giordano, address verified.      "

## 2024-07-19 NOTE — TELEPHONE ENCOUNTER
[facility]    Spoke with Sis. Duran Logan Pasquale to inform her of her labs as follow.     Can we call and let her know labs look good. electrolytes normal, kidney function stable. BNP still wnl. We're good to dc. I did place a Gen cards referral just to make sure someone's going to follow PE in case Dr. Palmer focuses on lymphedema.    The pt verbalized understanding.

## 2024-07-22 ENCOUNTER — PATIENT MESSAGE (OUTPATIENT)
Dept: PODIATRY | Facility: CLINIC | Age: 84
End: 2024-07-22
Payer: MEDICARE

## 2024-07-24 ENCOUNTER — OFFICE VISIT (OUTPATIENT)
Dept: PODIATRY | Facility: CLINIC | Age: 84
End: 2024-07-24
Payer: MEDICARE

## 2024-07-24 VITALS
BODY MASS INDEX: 55.19 KG/M2 | DIASTOLIC BLOOD PRESSURE: 74 MMHG | HEART RATE: 94 BPM | SYSTOLIC BLOOD PRESSURE: 150 MMHG | HEIGHT: 59 IN

## 2024-07-24 DIAGNOSIS — B35.1 ONYCHOMYCOSIS DUE TO DERMATOPHYTE: ICD-10-CM

## 2024-07-24 DIAGNOSIS — I89.0 LYMPHEDEMA: ICD-10-CM

## 2024-07-24 DIAGNOSIS — Z79.4 TYPE 2 DIABETES MELLITUS WITH DIABETIC POLYNEUROPATHY, WITH LONG-TERM CURRENT USE OF INSULIN: Primary | ICD-10-CM

## 2024-07-24 DIAGNOSIS — E11.42 TYPE 2 DIABETES MELLITUS WITH DIABETIC POLYNEUROPATHY, WITH LONG-TERM CURRENT USE OF INSULIN: Primary | ICD-10-CM

## 2024-07-24 DIAGNOSIS — L84 CORN OR CALLUS: ICD-10-CM

## 2024-07-24 PROCEDURE — 11721 DEBRIDE NAIL 6 OR MORE: CPT | Mod: Q8,59,S$PBB, | Performed by: PODIATRIST

## 2024-07-24 PROCEDURE — 11056 PARNG/CUTG B9 HYPRKR LES 2-4: CPT | Mod: Q8,PBBFAC | Performed by: PODIATRIST

## 2024-07-24 PROCEDURE — 11721 DEBRIDE NAIL 6 OR MORE: CPT | Mod: Q8,59,PBBFAC | Performed by: PODIATRIST

## 2024-07-24 PROCEDURE — 99499 UNLISTED E&M SERVICE: CPT | Mod: S$PBB,,, | Performed by: PODIATRIST

## 2024-07-24 PROCEDURE — 99214 OFFICE O/P EST MOD 30 MIN: CPT | Mod: PBBFAC,25 | Performed by: PODIATRIST

## 2024-07-24 PROCEDURE — 99999 PR PBB SHADOW E&M-EST. PATIENT-LVL IV: CPT | Mod: PBBFAC,,, | Performed by: PODIATRIST

## 2024-07-24 PROCEDURE — 11056 PARNG/CUTG B9 HYPRKR LES 2-4: CPT | Mod: Q8,S$PBB,, | Performed by: PODIATRIST

## 2024-07-24 NOTE — PROGRESS NOTES
Subjective:      Patient ID: Duran Giordano is a 84 y.o. female.    Chief Complaint: Diabetic Foot Exam (6/18/24 - Mallika Mena DNP)      Duran Logan is a 84 y.o. female who presents to the clinic for evaluation and treatment of high risk feet. Duran Logan has a past medical history of Adrenal mass- adenoma stable since 2004 (1.8 cm and 8/13/12 (2 cm); stable 2016 2.4 cm, Arthritis, Asthma in adult without complication (6/25/2015), Bilateral carotid artery disease (7/21/2017), Bilateral sciatica (2/7/2017), Cellulitis of right leg (08/16/2017), Cerebral infarction (1/29/2016), Cervical radiculopathy (3/18/2015), Chronic knee pain, Chronic rhinitis (4/9/2013), CKD (chronic kidney disease) stage 3, GFR 30-59 ml/min (1/29/2013), Coronary artery disease due to calcified coronary lesion (6/25/2015), Deep vein thrombosis, Diastolic dysfunction (10/10/2013), Essential hypertension (6/25/2015), Gastroesophageal reflux disease without esophagitis (8/13/2012), Gout, Hives (10/1/2013), 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 The patient's chief complaint is elongated toenails and lymphedema.  This patient has documented high risk feet requiring routine maintenance secondary to diabetes mellitis and those secondary complications of diabetes, as mentioned.    PCP: Tami Schmidt MD    Date Last Seen by PCP:   Chief Complaint  "  Patient presents with    Diabetic Foot Exam     6/18/24 - Mallika Mena, DNP        Current shoe gear:  Affected Foot: Casual shoes     Unaffected Foot: Casual shoes    Hemoglobin A1C   Date Value Ref Range Status   05/06/2024 7.1 (H) 4.0 - 5.6 % Final     Comment:     ADA Screening Guidelines:  5.7-6.4%  Consistent with prediabetes  >or=6.5%  Consistent with diabetes    High levels of fetal hemoglobin interfere with the HbA1C  assay. Heterozygous hemoglobin variants (HbS, HgC, etc)do  not significantly interfere with this assay.   However, presence of multiple variants may affect accuracy.     10/16/2023 6.9 (H) 4.0 - 5.6 % Final     Comment:     ADA Screening Guidelines:  5.7-6.4%  Consistent with prediabetes  >or=6.5%  Consistent with diabetes    High levels of fetal hemoglobin interfere with the HbA1C  assay. Heterozygous hemoglobin variants (HbS, HgC, etc)do  not significantly interfere with this assay.   However, presence of multiple variants may affect accuracy.     04/28/2023 6.8 (H) 4.0 - 5.6 % Final     Comment:     ADA Screening Guidelines:  5.7-6.4%  Consistent with prediabetes  >or=6.5%  Consistent with diabetes    High levels of fetal hemoglobin interfere with the HbA1C  assay. Heterozygous hemoglobin variants (HbS, HgC, etc)do  not significantly interfere with this assay.   However, presence of multiple variants may affect accuracy.         Review of Systems   Cardiovascular:  Positive for leg swelling.   Skin:  Positive for color change, dry skin, nail changes and unusual hair distribution. Negative for flushing, itching, rash and skin cancer.   Musculoskeletal:  Positive for arthritis and stiffness. Negative for back pain and falls.   Neurological:  Positive for numbness. Negative for paresthesias.   Allergic/Immunologic: Negative for hives.           Objective:       Vitals:    07/24/24 1250   BP: (!) 150/74   Pulse: 94   Height: 4' 11" (1.499 m)   PainSc: 0-No pain   PainLoc: Foot      "     Physical Exam  Vitals and nursing note reviewed.   Constitutional:       Appearance: She is well-developed.   Cardiovascular:      Pulses:           Dorsalis pedis pulses are 1+ on the right side and 1+ on the left side.        Posterior tibial pulses are 1+ on the right side and 1+ on the left side.      Comments: Dorsalis pedis and posterior tibial pulses are palpable bilaterally. Toes are cool to touch. Feet are warm proximally.There is decreased digital hair bilateral. Skin is atrophic, hyperpigmented, and moderately edematous.    Musculoskeletal:         General: No tenderness.      Right ankle: Normal.      Left ankle: Normal.      Right foot: No swelling, deformity or crepitus.      Left foot: No swelling, deformity or crepitus.      Comments: Adequate joint range of motion without pain, limitation, nor crepitation Bilateral feet and ankle joints. Muscle strength is 5/5 in all groups bilaterally.      TTP w/ redness and plantar L foot no signs of break in skin no ulceration. Pain to plantar medical tubercle of calcaneus . No flucutance   Lymphadenopathy:      Comments: B/l lymphedema    Skin:     General: Skin is warm and dry.      Coloration: Skin is not pale.      Findings: No abrasion, bruising, burn, erythema, lesion or rash.      Nails: There is no clubbing.      Comments: Nails x10 are elongated by  4-6mm's, thickened by 2-3 mm's, dystrophic, and are darkened in  coloration . Xerosis Bilaterally. No open lesions noted.    Hyperkeratotic tissue noted to distal hallux b/l       Skin thickened, leathery, svetlana blistering noted to legs.    Neurological:      Mental Status: She is alert and oriented to person, place, and time.      Comments: Decreased sharp/dull sensation bilateral feet.   Psychiatric:         Behavior: Behavior normal.               Assessment:       Encounter Diagnoses   Name Primary?    Type 2 diabetes mellitus with diabetic polyneuropathy, with long-term current use of insulin Yes     "Onychomycosis due to dermatophyte     Corn or callus     Lymphedema          Plan:       Duran Logan "Sister Duran Giordano" was seen today for diabetic foot exam.    Diagnoses and all orders for this visit:    Type 2 diabetes mellitus with diabetic polyneuropathy, with long-term current use of insulin    Onychomycosis due to dermatophyte    Corn or callus    Lymphedema  -     Ambulatory referral/consult to Wound Clinic; Future     I counseled the patient on her conditions, their implications and medical management.    Referral to wound care sent for lymphedema and skin breaks    Shoe inspection. Diabetic Foot Education. Patient reminded of the importance of good nutrition and blood cummings gar control to help prevent podiatric complications of diabetes. Patient instructed on proper foot hygeine. We discussed wearing proper shoe gear, daily foot inspections, never walking without protective shoe gear, never putting sharp instruments to feet    - With patient's permission, nails were aggressively reduced and debrided x 10 to their soft tissue attachment mechanically and with electric , removing all offending nail and debris. Patient relates relief following the procedure. She will continue to monitor the areas daily, inspect her feet, wear protective shoe gear when ambulatory, moisturizer to maintain skin integrity and follow in this office in approximately 2-3 months, sooner p.r.n.    - After cleansing the  area w/ alcohol prep pad the above mentioned hyperkeratosis was trimmed utilizing No 15 scapel, to a smooth base with out incident. Patient tolerated this  well and reported comfort to the area x2                "

## 2024-07-29 ENCOUNTER — TELEPHONE (OUTPATIENT)
Dept: WOUND CARE | Facility: CLINIC | Age: 84
End: 2024-07-29
Payer: MEDICARE

## 2024-07-29 NOTE — TELEPHONE ENCOUNTER
Called and spoke with patient offered and scheduled appointment for wound care 8/12/24 at 1pm - instructed patient to report to 5th floor clinic [facility] Phillip Ross

## 2024-07-31 NOTE — PROGRESS NOTES
HOSPITALIST DISCHARGE INSTRUCTIONS    NAME: Alexandr Lewis   :  1939   MRN:  [id number]     Date/Time:  2024 1:19 PM    ADMIT DATE: 2024     DISCHARGE DATE: 2024     DISCHARGE DIAGNOSIS:  Right inguinal Hematoma POA- POD # 6 with stable swelling with HH stability, cleared by Gen Surgery for DC off Plavix till 2024 - pt aware, Op followup in 2-3 days for H/H Check in office with Dr Pryor  Chest pain/?NSTEMI/elevated troponins- cleared s/p Lexiscan, normal Echo, s/p IV heparin  Suspected PNA- s/p abx, now off it with resolved ASD on repeat CXR  CKD3  HLD  Full code    MEDICATIONS:  As per medication reconciliation  list  It is important that you take the medication exactly as they are prescribed.   Keep your medication in the bottles provided by the pharmacist and keep a list of the medication names, dosages, and times to be taken in your wallet.   Do not take other medications without consulting your doctor.     Pain Management: per above medications    What to do at Home    Recommended diet:  cardiac diet    Recommended activity: activity as tolerated    If you have questions regarding the hospital related prescriptions or hospital related issues please call at .    If you experience any of the following symptoms then please call your primary care physician or return to the emergency room if you cannot get hold of your doctor:  Fever, chills, nausea, vomiting, diarrhea, change in mentation, falling, bleeding, shortness of breath,     Follow Up:  Dr Pryor in 3 days for repeat H/H check and Groin wound check    Information obtained by :  I understand that if any problems occur once I am at home I am to contact my physician.    I understand and acknowledge receipt of the instructions indicated above.                                                                                                                                           Physician's or R.N.'s Signature       Subjective:       Patient ID: Sue Giordano is a 76 y.o. female.    Chief Complaint: Nail Problem (Right Big Toe)    HPI   A 76 y.o female with PMH of Morbid obesity, DM on insulin , lymphedema presents to the clinic stating that she has toe infection. Patient had partial removal of ingrowing left big toenail 3 weeks ago, stated after that she started to have swelling , puss discharge and pain , puss has stopped last week however the pain is getting worse 6/10. States it doesn't affect her baseline mobility. Denies fever, chills, n/v . She states she has chronic rash in her shins and was admitted couple of times for cellulitis in the past 25 years.     Review of Systems   Constitutional: Negative for activity change, appetite change, chills, diaphoresis, fatigue, fever and unexpected weight change.   HENT: Negative for congestion, dental problem, ear discharge and ear pain.    Eyes: Negative.  Negative for discharge.   Respiratory: Negative.    Cardiovascular: Negative for chest pain, palpitations and leg swelling.   Gastrointestinal: Negative for abdominal distention and abdominal pain.   Endocrine: Negative for polydipsia and polyphagia.   Genitourinary: Negative for dysuria and enuresis.   Musculoskeletal: Positive for arthralgias and back pain.   Skin: Positive for rash and wound. Negative for color change.   Allergic/Immunologic: Negative for environmental allergies, food allergies and immunocompromised state.   Neurological: Negative for dizziness and facial asymmetry.   Hematological: Negative for adenopathy. Does not bruise/bleed easily.   Psychiatric/Behavioral: Negative for agitation and behavioral problems.       Objective:      Physical Exam   Constitutional: She appears well-developed and well-nourished.   HENT:   Head: Normocephalic and atraumatic.   Right Ear: External ear normal.   Left Ear: External ear normal.   Eyes: EOM are normal. Pupils are equal, round, and reactive to light.   Neck:  Normal range of motion. Neck supple.   Cardiovascular: Normal rate, regular rhythm, normal heart sounds and intact distal pulses.    Pulmonary/Chest: Effort normal and breath sounds normal. No respiratory distress. She has no wheezes. She has no rales. She exhibits no tenderness.   Abdominal: Bowel sounds are normal. She exhibits no distension. There is no tenderness.   Skin: Rash noted. There is erythema (bilateral erythema on the medial aspect of the tibia. ).   Psychiatric: She has a normal mood and affect. Her behavior is normal.           Assessment:       1. Ingrowing nail with infection    2. Morbid obesity due to excess calories        Plan:       Sue Garzon was seen today for nail problem.    Diagnoses and all orders for this visit:    Ingrowing nail with infection  -     Ambulatory consult to Podiatry        -     amoxicillin-clavulanate 875-125mg (AUGMENTIN) 875-125 mg per tablet; Take 1 tablet by mouth 2 (two) times daily.    Refer to Podiatry with appt in next 1-3 days for nail excision      Case discussed with Dr. Rivas.   ---------------------------------      Alejandro Lozada  Internal Medicine PGY3  [phone]

## 2024-08-01 DIAGNOSIS — T84.50XD INFECTION OF PROSTHETIC JOINT, SUBSEQUENT ENCOUNTER: ICD-10-CM

## 2024-08-01 RX ORDER — ALLOPURINOL 300 MG/1
300 TABLET ORAL DAILY
Qty: 90 TABLET | Refills: 0 | Status: SHIPPED | OUTPATIENT
Start: 2024-08-01 | End: 2025-01-28

## 2024-08-02 RX ORDER — DOXYCYCLINE HYCLATE 100 MG
100 TABLET ORAL 2 TIMES DAILY
Qty: 60 TABLET | Refills: 0 | Status: SHIPPED | OUTPATIENT
Start: 2024-08-02

## 2024-08-07 ENCOUNTER — TELEPHONE (OUTPATIENT)
Dept: PODIATRY | Facility: CLINIC | Age: 84
End: 2024-08-07
Payer: MEDICARE

## 2024-08-08 DIAGNOSIS — E11.29 TYPE 2 DIABETES MELLITUS WITH RENAL MANIFESTATIONS NOT AT GOAL: ICD-10-CM

## 2024-08-08 RX ORDER — ATORVASTATIN CALCIUM 80 MG/1
80 TABLET, FILM COATED ORAL
Qty: 30 TABLET | Refills: 11 | Status: SHIPPED | OUTPATIENT
Start: 2024-08-08

## 2024-08-12 ENCOUNTER — TELEPHONE (OUTPATIENT)
Dept: WOUND CARE | Facility: CLINIC | Age: 84
End: 2024-08-12
Payer: MEDICARE

## 2024-08-12 NOTE — TELEPHONE ENCOUNTER
Called no answer, left voice message asking patient to call [phone] regarding 1pm appointment this afternoon.

## 2024-08-13 NOTE — TELEPHONE ENCOUNTER
----- Message from Abril Colvin MA sent at 4/26/2022 11:50 AM CDT -----  Can you help me with this please?    ----- Message -----  From: Viri Rodriguez  Sent: 4/26/2022  11:44 AM CDT  To: Dare[facility] STEELE Staff    .Type: Patient Call Back    Who called: Self     What is the request in detail: Pt is requesting a call back in regards to bleeding due to medication, she doesn't know what to do. She states she's been calling since last week and has not heard back from anybody     Can the clinic reply by [facility]?    Would the patient rather a call back or a response via [facility]?  Call back     Best call back number: [phone]    Thank you          [facility]   DISCHARGE INSTRUCTIONS      Thank you for coming in today. It was a pleasure to see you. We want to give the best care possible. If you are notified to take a patient experience survey, please take the time to answer the questions. Your feedback is important to us and helps us know how we are doing. Our team appreciates it. Thank you!    In Summary of our visit:  Today we discussed your stroke history.    Continue to follow with EP about your loop implant readings.    Get your lipid panel updated. This is a fasting lab. I will call with results    Get us the driving evaluation results from the DMV if you have them    Truman price      IF YOU NEED ASSISTANCE BEFORE YOUR NEXT APPOINTMENT:  -Please do not hesitate to reach out with questions or concerns. You can send a message on the [facility] amna or call the office at [phone].   -My clinic is open Monday-Thursday 8-5 pm and I am off on Fridays. Urgent needs can be addressed by a covering provider  -Imaging and lab results can take up to 10 days. We will contact you with results once they are reviewed.   -Please send prescription refill requests in at least 3 days in advance as to not risk disruption in treatment.

## 2024-08-19 ENCOUNTER — OFFICE VISIT (OUTPATIENT)
Dept: WOUND CARE | Facility: CLINIC | Age: 84
End: 2024-08-19
Payer: MEDICARE

## 2024-08-19 VITALS — BODY MASS INDEX: 52.72 KG/M2 | DIASTOLIC BLOOD PRESSURE: 77 MMHG | WEIGHT: 261 LBS | SYSTOLIC BLOOD PRESSURE: 173 MMHG

## 2024-08-19 DIAGNOSIS — I15.2 HYPERTENSION ASSOCIATED WITH DIABETES: ICD-10-CM

## 2024-08-19 DIAGNOSIS — E11.59 HYPERTENSION ASSOCIATED WITH DIABETES: ICD-10-CM

## 2024-08-19 DIAGNOSIS — S81.801A MULTIPLE OPEN WOUNDS OF RIGHT LOWER EXTREMITY, INITIAL ENCOUNTER: ICD-10-CM

## 2024-08-19 DIAGNOSIS — Z79.4 TYPE 2 DIABETES MELLITUS WITH DIABETIC POLYNEUROPATHY, WITH LONG-TERM CURRENT USE OF INSULIN: ICD-10-CM

## 2024-08-19 DIAGNOSIS — E11.42 TYPE 2 DIABETES MELLITUS WITH DIABETIC POLYNEUROPATHY, WITH LONG-TERM CURRENT USE OF INSULIN: ICD-10-CM

## 2024-08-19 DIAGNOSIS — I89.0 LYMPHEDEMA OF BOTH LOWER EXTREMITIES: ICD-10-CM

## 2024-08-19 DIAGNOSIS — S81.802A MULTIPLE OPEN WOUNDS OF LEFT LOWER EXTREMITY, INITIAL ENCOUNTER: Primary | ICD-10-CM

## 2024-08-19 DIAGNOSIS — I50.32 CHRONIC DIASTOLIC HEART FAILURE: ICD-10-CM

## 2024-08-19 DIAGNOSIS — I26.99 BILATERAL PULMONARY EMBOLISM: ICD-10-CM

## 2024-08-19 DIAGNOSIS — N18.31 STAGE 3A CHRONIC KIDNEY DISEASE: ICD-10-CM

## 2024-08-19 DIAGNOSIS — I87.2 VENOUS STASIS DERMATITIS OF BOTH LOWER EXTREMITIES: ICD-10-CM

## 2024-08-19 DIAGNOSIS — R60.0 BILATERAL LOWER EXTREMITY EDEMA: ICD-10-CM

## 2024-08-19 PROCEDURE — 99999 PR PBB SHADOW E&M-EST. PATIENT-LVL IV: CPT | Mod: PBBFAC,,,

## 2024-08-19 PROCEDURE — 99214 OFFICE O/P EST MOD 30 MIN: CPT | Mod: PBBFAC

## 2024-08-19 NOTE — PROGRESS NOTES
Subjective:       Patient ID: Duran Giordano is a 84 y.o. female.    Chief Complaint: Wound Consult       Patient presents for an evaluation of multiple bilateral lower extremity wounds. She reports her left lower extremity wounds began in July 2024. She reports she was pulling off a scab and formed a wound. Pt reports her right lower extremity wounds started around the beginning of August 2024. She reports the wound started as a blister. Pt reports she was diagnosed with bilateral pulmonary embolisms in June 2024. Pt was told she is no longer able to use compression therapy. She has an appointment with Dr. Palmer in September to clear her for compression therapy.  She reports a long history of leg swelling and reccurent wounds. Pt notices blisters form, they rupture, and she is left with open wounds. Pt previously followed with Mercedes Whittaker NP and Kellie Blue NP for her wounds.  She was last seen by myself in 10/23-11/23. Her wounds were treated with unna boots. Pt has [facility] that has been coming out once a week to change her dressings. They are dressing her wounds with aquacel border dressings. Denies fever, chills, erythema, warmth, purulent drainage, or pain.      Review of Systems   Constitutional:  Negative for activity change, chills, diaphoresis, fatigue and fever.   Respiratory:  Negative for apnea, chest tightness and shortness of breath.    Cardiovascular:  Positive for leg swelling. Negative for chest pain and palpitations.   Musculoskeletal:  Negative for gait problem and joint swelling.   Skin:  Positive for wound. Negative for pallor and rash.   Neurological:  Negative for syncope, weakness and numbness.   Psychiatric/Behavioral:  Negative for agitation. The patient is not nervous/anxious.    All other systems reviewed and are negative.      Objective:      Physical Exam  Vitals reviewed.   Constitutional:       General: She is not in acute distress.     Appearance: Normal appearance.    Cardiovascular:      Rate and Rhythm: Normal rate and regular rhythm.      Pulses: Normal pulses.   Pulmonary:      Effort: No respiratory distress.   Musculoskeletal:         General: No swelling.      Right lower leg: Edema present.      Left lower leg: Edema present.      Comments: Pt ambulates with walker   Skin:     General: Skin is warm and dry.      Capillary Refill: Capillary refill takes less than 2 seconds.      Findings: Wound present. No erythema.          Neurological:      General: No focal deficit present.      Mental Status: She is alert and oriented to person, place, and time.   Psychiatric:         Mood and Affect: Mood normal.         Behavior: Behavior normal.         Thought Content: Thought content normal.         Judgment: Judgment normal.           Assessment:       1. Multiple open wounds of left lower extremity, initial encounter    2. Multiple open wounds of right lower extremity, initial encounter    3. Saddle embolus of pulmonary artery, unspecified chronicity, unspecified whether acute cor pulmonale present    4. Lymphedema of both lower extremities    5. Chronic diastolic heart failure    6. Hypertension associated with diabetes    7. Venous stasis dermatitis of both lower extremities    8. Stage 3a chronic kidney disease    9. Type 2 diabetes mellitus with diabetic polyneuropathy, with long-term current use of insulin    10. Bilateral lower extremity edema             Altered Skin Integrity Right anterior;lower;medial;posterior;lateral Leg (Active)       Altered Skin Integrity Present on Admission - Did Patient arrive to the hospital with altered skin?:    Side: Right   Orientation: anterior;lower;medial;posterior;lateral   Location: Leg   Wound Number:    Is this injury device related?:    Primary Wound Type:    Description of Altered Skin Integrity:    Ankle-Brachial Index:    Pulses:    Removal Indication and Assessment:    Wound Outcome:    (Retired) Wound Length (cm):     (Retired) Wound Width (cm):    (Retired) Depth (cm):    Wound Description (Comments):    Removal Indications:    Wound Image   08/19/24 1407   Dressing Appearance Dry;Intact;Clean;Moist drainage 08/19/24 1407   Drainage Amount Large 08/19/24 1407   Drainage Characteristics/Odor Serosanguineous 08/19/24 1407   Red (%), Wound Tissue Color 100 % 08/19/24 1407   Periwound Area Intact;Edematous;Pink 08/19/24 1407   Care Cleansed with:;Soap and water 08/19/24 1407   Dressing Applied;Calcium alginate;Island/border 08/19/24 1407   Periwound Care Skin barrier film applied 08/19/24 1407            Altered Skin Integrity Left lower;medial;posterior;anterior;lateral Leg (Active)       Altered Skin Integrity Present on Admission - Did Patient arrive to the hospital with altered skin?:    Side: Left   Orientation: lower;medial;posterior;anterior;lateral   Location: Leg   Wound Number:    Is this injury device related?:    Primary Wound Type:    Description of Altered Skin Integrity:    Ankle-Brachial Index:    Pulses:    Removal Indication and Assessment:    Wound Outcome:    (Retired) Wound Length (cm):    (Retired) Wound Width (cm):    (Retired) Depth (cm):    Wound Description (Comments):    Removal Indications:    Wound Image   08/19/24 1407   Dressing Appearance Dry;Intact;Clean;Moist drainage 08/19/24 1407   Drainage Amount Large 08/19/24 1407   Drainage Characteristics/Odor Serosanguineous 08/19/24 1407   Red (%), Wound Tissue Color 100 % 08/19/24 1407   Periwound Area Intact;Edematous;Pink 08/19/24 1407   Care Cleansed with:;Soap and water 08/19/24 1407   Dressing Applied;Calcium alginate;Island/border 08/19/24 1407   Periwound Care Skin barrier film applied 08/19/24 1407         Duran Logan was seen in the clinic room and placed in the supine position on the treatment table.  The dressing was removed and the area was cleansed with Easi-clense sponges and dried thoroughly. No odor, erythema, or warmth noted.       Plan of  Care: Skin prep to periwounds and covered with aquacel border dressings.          Plan:       Bilateral lower extremities were dressed as detailed above.  Patient was instructed to not get the dressings wet and to use cast covers for showering.  Should the dressing become wet, she is to remove it, place a moist dressing over the wound, cover with gauze and roll gauze and to secure bandages.  She should then notify home health/this office as soon as possible to have a new dressing applied.    Discussed nutrition and the role of protein in wound healing with the patient. Instructed patient to optimize protein for wound healing.     Discussed bilateral lower extremity edema with patient. Instructed patient to elevate legs whenever sedentary, follow a low sodium diet, and to take demadex as prescribed.    Note given to patient discussing sodium restrictions to give to the kitchen.    Instructed patient to keep appointment with Dr. Palmer for compression clearance.    Faxed home health orders.  HOME HEALTH WOUND CARE ORDERS  D/C previous orders  Wound care and nurse visits   1 X a week and PRN complications  Wound location- bilateral lower extremities  1. Cleanse wound with saline or wound cleanser    ( pt may shower)  2.Apply- skin prep to periwounds and covered with aquacel border dressings.  3.Cover with appropriate cover dressing and contact the office for any substituions in dressings  Monitor for infection, teach patient/caregiver signs and symptoms, as well as how to do dressing changes        Written and verbal instructions given to patient  RTC in 4 weeks      Amrita Davis PA-C                   n/a

## 2024-08-28 DIAGNOSIS — T84.50XD INFECTION OF PROSTHETIC JOINT, SUBSEQUENT ENCOUNTER: ICD-10-CM

## 2024-08-28 RX ORDER — DOXYCYCLINE HYCLATE 100 MG
100 TABLET ORAL 2 TIMES DAILY
Qty: 60 TABLET | Refills: 0 | Status: SHIPPED | OUTPATIENT
Start: 2024-08-28

## 2024-08-29 ENCOUNTER — OFFICE VISIT (OUTPATIENT)
Dept: OPHTHALMOLOGY | Facility: CLINIC | Age: 84
End: 2024-08-29
Payer: MEDICARE

## 2024-08-29 ENCOUNTER — CLINICAL SUPPORT (OUTPATIENT)
Dept: OPHTHALMOLOGY | Facility: CLINIC | Age: 84
End: 2024-08-29
Payer: MEDICARE

## 2024-08-29 DIAGNOSIS — H34.8120 CENTRAL RETINAL VEIN OCCLUSION WITH MACULAR EDEMA OF LEFT EYE: Primary | ICD-10-CM

## 2024-08-29 DIAGNOSIS — H43.813 VITREOUS DEGENERATION OF BOTH EYES: ICD-10-CM

## 2024-08-29 DIAGNOSIS — H35.033 HYPERTENSIVE RETINOPATHY OF BOTH EYES: ICD-10-CM

## 2024-08-29 DIAGNOSIS — H34.8120 CENTRAL RETINAL VEIN OCCLUSION WITH MACULAR EDEMA OF LEFT EYE: ICD-10-CM

## 2024-08-29 DIAGNOSIS — H25.13 AGE-RELATED NUCLEAR CATARACT OF BOTH EYES: ICD-10-CM

## 2024-08-29 PROCEDURE — 99999PBSHW PR PBB SHADOW TECHNICAL ONLY FILED TO HB: Mod: JZ,PBBFAC,,

## 2024-08-29 PROCEDURE — 99999 PR PBB SHADOW E&M-EST. PATIENT-LVL IV: CPT | Mod: PBBFAC,,, | Performed by: OPHTHALMOLOGY

## 2024-08-29 PROCEDURE — 92134 CPTRZ OPH DX IMG PST SGM RTA: CPT | Mod: PBBFAC

## 2024-08-29 PROCEDURE — 67028 INJECTION EYE DRUG: CPT | Mod: PBBFAC | Performed by: OPHTHALMOLOGY

## 2024-08-29 PROCEDURE — 99214 OFFICE O/P EST MOD 30 MIN: CPT | Mod: PBBFAC,25 | Performed by: OPHTHALMOLOGY

## 2024-08-29 RX ORDER — HYDROCODONE BITARTRATE AND ACETAMINOPHEN 5; 325 MG/1; MG/1
1 TABLET ORAL EVERY 6 HOURS PRN
COMMUNITY
Start: 2024-07-26

## 2024-08-29 RX ORDER — IBUPROFEN 800 MG/1
800 TABLET ORAL EVERY 6 HOURS PRN
COMMUNITY
Start: 2024-07-26

## 2024-08-29 RX ORDER — SEMAGLUTIDE 0.68 MG/ML
0.25 INJECTION, SOLUTION SUBCUTANEOUS
COMMUNITY
Start: 2024-06-11

## 2024-08-29 RX ORDER — TORSEMIDE 10 MG/1
1 TABLET ORAL DAILY
COMMUNITY
Start: 2024-06-10

## 2024-08-29 NOTE — PROGRESS NOTES
HPI    1 month DFE/OCT OU // Get Auth : Ozurdex OS    DLS 07/02/2024 BY Dr. Jeff MD     CC: pt states : my left eye is very blurry now and OD is good     ++blurred Va  --diplopia  --eye pain   --flashes/floater  --headaches  --curtain/shadow/veils    Eye meds: Pataday OU BID     POHx   1. CRVO w/ ME  OS    S/P GLADYS X 2 (09/29/2023) S/p Avastin OS (10/31/2023)     2. AR NSC OU   3. HTN Retinopathy OU          Last edited by Viridiana Suárez MA on 8/29/2024  1:51 PM.              A/P    ICD-10-CM ICD-9-CM   1. Central retinal vein occlusion with macular edema of left eye  H34.8120 362.35     362.83   2. Age-related nuclear cataract of both eyes  H25.13 366.16   3. Vitreous degeneration of both eyes  H43.813 379.21   4. Hypertensive retinopathy of both eyes  H35.033 362.11     1. Central retinal vein occlusion with macular edema of left eye  Here for CRVO f/u       S/p GLADYS x4 10/31/23  S/p I'VE x3 4/2/24    Today VA CF (Was 20/200), worse IRF/SRF today   Plan:    given recent PE and on new blood thinners, will pause of antiVEGF treatment for now and recommend Ozurdex OS    Based on todays exam, diagnostic studies, and review of records, the determination was made for treatment today.  Schedule Ozurdex Injection Left Eye today Patient chooses to proceed with injection R/B/A discussed including but not limited to: glaucoma, infection, retinal detachment and cataract    Patient identified.  Timeout performed.    Risks, benefits, and alternatives to treatment were discussed in detail with the patient, including bleeding/infection (endophthalmitis)/glaucoma/cataract, etc.  The patient voiced understanding and wished to proceed with the procedure.  See separate consent form.    Ozurdex Injection Procedure Note:  Diagnosis: CME Left Eye    Topical Proparacaine drop placed then topical 5% Betadine.  Subconjunctival injection of 2% lidocaine placed without complication.  Sterile gloves used, and sterile lid speculum  placed.  5% Betadine at injection site again prior to injection.  Ozurdex 0.7 mg implant injected at  inferotemporally 3.5-4mm posterior to the limbus.  Complications: None  Va at least CF at 5 feet post injection.  Retina, ONH, IOP normal after injection.    Followup as below.  Patient should return immediately PRN.  Retinal Detachment and Endophthalmitis precautions given.          2. Age-related nuclear cataract of both eyes  Mild NS, NVS  Plan: Observation      3. Vitreous degeneration of both eyes  No RT/RD   Plan: Observation , counseled on RT/RD risk  Pathology of PVD, Retinal Tear, Retinal Detachment reviewed in great detail  RD precautions discussed in detail, patient expressed understanding  RTC immediately PRN (especially ANY change flashes, floaters, vision, visual field)     4. Hypertensive retinopathy of both eyes  Mod HTN changes, has CRVO OS  PCP Tami Schmidt MD - Recent notes reviewed  05/06/2024  7.1  A1C   Plan: Observation HTN changes    Recommend good blood pressure control, tight blood glucose control, and good cholesterol control     5. Diabetes mellitus type 2 without retinopathy  No DR or DME OU, IRF and heme OS moreso due to CRVO  Plan: Observation for DR changes  Recommend good blood pressure control, tight blood glucose control, and good cholesterol control         RTC 6 weeks DFE/OCTm OU       I saw and examined the patient and reviewed in detail the findings documented. The final examination findings, image interpretations, and plan as documented in the record represent my personal judgment and conclusions.    Ferdinand Rosas MD  Vitreoretinal Surgery   [facility]

## 2024-09-04 ENCOUNTER — TELEPHONE (OUTPATIENT)
Dept: PRIMARY CARE CLINIC | Facility: CLINIC | Age: 84
End: 2024-09-04
Payer: MEDICARE

## 2024-09-04 NOTE — TELEPHONE ENCOUNTER
RN spoke to Sister Alta and rescheduled pt's appt.  RN sent all appointments on the schedule for Sr Duran Logan to her.

## 2024-09-09 ENCOUNTER — TELEPHONE (OUTPATIENT)
Dept: ORTHOPEDICS | Facility: CLINIC | Age: 84
End: 2024-09-09
Payer: MEDICARE

## 2024-09-09 DIAGNOSIS — Z96.659 STATUS POST KNEE REPLACEMENT, UNSPECIFIED LATERALITY: Primary | ICD-10-CM

## 2024-09-09 DIAGNOSIS — M17.12 PRIMARY OSTEOARTHRITIS OF LEFT KNEE: ICD-10-CM

## 2024-09-09 PROBLEM — I26.99 ACUTE PULMONARY EMBOLISM: Status: RESOLVED | Noted: 2024-06-07 | Resolved: 2024-09-09

## 2024-09-09 NOTE — TELEPHONE ENCOUNTER
Called pt and informed her to arrive at 10:00 for her 10:30 appointment with Dr. Lozoya on 9/23 to get x-rays and pt agreed.

## 2024-09-17 ENCOUNTER — OFFICE VISIT (OUTPATIENT)
Dept: CARDIOLOGY | Facility: CLINIC | Age: 84
End: 2024-09-17
Payer: MEDICARE

## 2024-09-17 VITALS
SYSTOLIC BLOOD PRESSURE: 186 MMHG | WEIGHT: 261 LBS | HEIGHT: 59 IN | DIASTOLIC BLOOD PRESSURE: 75 MMHG | HEART RATE: 83 BPM | BODY MASS INDEX: 52.62 KG/M2

## 2024-09-17 DIAGNOSIS — G47.33 OSA ON CPAP: ICD-10-CM

## 2024-09-17 DIAGNOSIS — E11.59 HYPERTENSION ASSOCIATED WITH DIABETES: ICD-10-CM

## 2024-09-17 DIAGNOSIS — Z79.4 TYPE 2 DIABETES MELLITUS WITH DIABETIC POLYNEUROPATHY, WITH LONG-TERM CURRENT USE OF INSULIN: ICD-10-CM

## 2024-09-17 DIAGNOSIS — I25.10 CORONARY ARTERY DISEASE DUE TO CALCIFIED CORONARY LESION: Chronic | ICD-10-CM

## 2024-09-17 DIAGNOSIS — Z86.718 HISTORY OF THROMBOSIS: ICD-10-CM

## 2024-09-17 DIAGNOSIS — I26.92 ACUTE SADDLE PULMONARY EMBOLISM WITHOUT ACUTE COR PULMONALE: Primary | ICD-10-CM

## 2024-09-17 DIAGNOSIS — I50.32 CHRONIC DIASTOLIC HEART FAILURE: ICD-10-CM

## 2024-09-17 DIAGNOSIS — I89.0 LYMPHEDEMA OF BOTH LOWER EXTREMITIES: ICD-10-CM

## 2024-09-17 DIAGNOSIS — I77.1 TORTUOUS AORTA: ICD-10-CM

## 2024-09-17 DIAGNOSIS — E11.42 TYPE 2 DIABETES MELLITUS WITH DIABETIC POLYNEUROPATHY, WITH LONG-TERM CURRENT USE OF INSULIN: ICD-10-CM

## 2024-09-17 DIAGNOSIS — E78.2 MIXED HYPERLIPIDEMIA: ICD-10-CM

## 2024-09-17 DIAGNOSIS — I15.2 HYPERTENSION ASSOCIATED WITH DIABETES: ICD-10-CM

## 2024-09-17 DIAGNOSIS — I10 ESSENTIAL HYPERTENSION: ICD-10-CM

## 2024-09-17 DIAGNOSIS — N18.32 STAGE 3B CHRONIC KIDNEY DISEASE: ICD-10-CM

## 2024-09-17 DIAGNOSIS — I87.2 VENOUS STASIS DERMATITIS OF BOTH LOWER EXTREMITIES: ICD-10-CM

## 2024-09-17 DIAGNOSIS — I25.84 CORONARY ARTERY DISEASE DUE TO CALCIFIED CORONARY LESION: Chronic | ICD-10-CM

## 2024-09-17 DIAGNOSIS — E66.01 MORBID OBESITY WITH BMI OF 50.0-59.9, ADULT: ICD-10-CM

## 2024-09-17 PROCEDURE — 99215 OFFICE O/P EST HI 40 MIN: CPT | Mod: PBBFAC | Performed by: INTERNAL MEDICINE

## 2024-09-17 PROCEDURE — 99999 PR PBB SHADOW E&M-EST. PATIENT-LVL V: CPT | Mod: PBBFAC,,, | Performed by: INTERNAL MEDICINE

## 2024-09-17 PROCEDURE — 99205 OFFICE O/P NEW HI 60 MIN: CPT | Mod: S$PBB,,, | Performed by: INTERNAL MEDICINE

## 2024-09-17 NOTE — PROGRESS NOTES
[facility]      Chief Complaint   Patient presents with    Chronic diastolic heart failure       Patient ID: Duran Giordano is a 84 y.o. female with chronic diastolic heart failure, HTN, DM2, HLD, CAD, REJI, morbid obesity, history of CVA, who presents for an initial appointment. Pertinent history/events are as follows:     -Pt kindly referred by Niki Smith PA-C for evaluation of lymphedema/acute saddle PE without acute cor pulmonale/chronic diastolic heart failure.    HPI:  Sister Pasquale reports being admitted 6/5/2024 with syncope and found to have submassive PE.  She was subsequently discharged on Eliquis 5 mg bid.      Past Medical History:   Diagnosis Date    Adrenal mass- adenoma stable since 2004 (1.8 cm and 8/13/12 (2 cm); stable 2016 2.4 cm     Adrenal mass- adenoma stable since 2004 (1.8 cm and 8/13/12 (2 cm); stable 2016 2.4 cm    Arthritis     Asthma in adult without complication 6/25/2015    Bilateral carotid artery disease 7/21/2017    Bilateral sciatica 2/7/2017    Cellulitis of right leg 08/16/2017    Cerebral infarction 1/29/2016    Multiple areas of lacunar infarction. Stroke risk factors include HTN, DM2, Dyslipidemia.  Continue ASA 81mg/ Statin therapy I have encouraged 30 minutes of physical activity daily for 5 days a week. She has access to a pool so this should not be so jarring to her joints.     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    Mixed hyperlipidemia 8/13/2012    Morbid obesity with BMI of 50.0-59.9, adult 2/11/2014    Obstructive sleep apnea  syndrome 8/13/2012    Pulmonary embolism 6/5/2024    Senile cataracts of both eyes 1/22/2015    Traumatic open wound of right lower leg 8/25/2017    Trigeminal neuralgia of right side of face 5/23/2017    For years now Previously on Gabapentin, but caused constipation Dissipating over time Not related to intracranial abnormalities Possibly related to dental procedure    Type 2 diabetes mellitus with diabetic polyneuropathy, with long-term current use of insulin 1/29/2013    Venous stasis dermatitis of both lower extremities 8/21/2017    Viral hepatitis A without coma 1/1/1971    Hep B infection in [facility] in 1971, was hospitalize for 2 weeks at that time, unknown treatment.    Vitamin D deficiency disease 8/13/2012     Past Surgical History:   Procedure Laterality Date    HYSTERECTOMY  1982    secondary uterine fibroids    INJECTION OF ANESTHETIC AGENT AROUND NERVE Right 10/21/2021    Procedure: BLOCK, NERVE GENICULAR RIGHT NEEDS CONSENT;  Surgeon: Senia Kilpatrick MD;  Location: [facility];  Service: Pain Management;  Laterality: Right;    INSERTION OF ANTIBIOTIC SPACER Right 10/28/2021    Procedure: INSERTION, ANTIBIOTIC SPACER;  Surgeon: Alfredo Lozoya MD;  Location: [address];  Service: Orthopedics;  Laterality: Right;    JOINT REPLACEMENT      REVISION OF KNEE ARTHROPLASTY Right 10/28/2021    Procedure: REVISION, ARTHROPLASTY, KNEE-DEPUY ANTIBIOTIC SPACER;  Surgeon: Alfredo Lozoya MD;  Location: [address];  Service: Orthopedics;  Laterality: Right;    REVISION OF KNEE ARTHROPLASTY Right 6/30/2022    Procedure: REVISION, ARTHROPLASTY, KNEE-NAKIA- stage 2;  Surgeon: Alfredo Lozoya MD;  Location: [address];  Service: Orthopedics;  Laterality: Right;    Right total knee replacement       Social History     Socioeconomic History    Marital status: Single   Tobacco Use    Smoking status: Never    Smokeless tobacco: Never   Substance and Sexual Activity    Alcohol use: Yes      Comment: holidays    Drug use: No    Sexual activity: Never   Social History Narrative    Single with no children.      Social Determinants of Health     Financial Resource Strain: Low Risk  (6/7/2024)    Overall Financial Resource Strain (CARDIA)     Difficulty of Paying Living Expenses: Not hard at all   Food Insecurity: No Food Insecurity (6/7/2024)    Hunger Vital Sign     Worried About Running Out of Food in the Last Year: Never true     Ran Out of Food in the Last Year: Never true   Transportation Needs: No Transportation Needs (6/7/2024)    TRANSPORTATION NEEDS     Transportation : No   Physical Activity: Insufficiently Active (2/2/2024)    Exercise Vital Sign     Days of Exercise per Week: 1 day     Minutes of Exercise per Session: 10 min   Stress: No Stress Concern Present (6/7/2024)    [employer] - Occupational Stress Questionnaire     Feeling of Stress : Only a little   Housing Stability: Low Risk  (6/7/2024)    Housing Stability Vital Sign     Unable to Pay for Housing in the Last Year: No     Homeless in the Last Year: No     Family History   Problem Relation Name Age of Onset    Cancer Mother Leslie Giordano 69        cancer of jaw    Hypertension Father Oskar Giordano     Cancer Sister Tiffanie Del Angel         half sister - unknown dx    No Known Problems Brother      No Known Problems Maternal Aunt      No Known Problems Maternal Uncle      No Known Problems Paternal Aunt      No Known Problems Paternal Uncle      No Known Problems Maternal Grandmother      No Known Problems Maternal Grandfather      No Known Problems Paternal Grandmother      No Known Problems Paternal Grandfather      Glaucoma Neg Hx      Breast cancer Neg Hx      Colon cancer Neg Hx      Ovarian cancer Neg Hx      Stroke Neg Hx      Allergic rhinitis Neg Hx      Allergies Neg Hx      Angioedema Neg Hx      Asthma Neg Hx      Atopy Neg Hx      Eczema Neg Hx      Immunodeficiency Neg Hx      Rhinitis Neg  Hx      Urticaria Neg Hx      Amblyopia Neg Hx      Blindness Neg Hx      Cataracts Neg Hx      Diabetes Neg Hx      Macular degeneration Neg Hx      Retinal detachment Neg Hx      Strabismus Neg Hx      Thyroid disease Neg Hx      Psoriasis Neg Hx         Review of patient's allergies indicates:   Allergen Reactions    Sulfamethoxazole-trimethoprim      Other reaction(s): up set stomach rash/hives    Azithromycin      Feels bad    Erythromycin Other (See Comments)     Other reaction(s): Unknown  Other reaction(s): Stomach upset    Gentamicin      Vaginal burning , itching     Levofloxacin Hives     Other reaction(s): nervousness    Shellfish containing products      Rash      Vancomycin Itching     Other reaction(s): Itching       Medication List with Changes/Refills   Current Medications    ALBUTEROL (PROVENTIL/VENTOLIN HFA) 90 MCG/ACTUATION INHALER    Inhale 2 puffs into the lungs every 4 (four) hours as needed for Wheezing or Shortness of Breath. Rescue    ALBUTEROL-IPRATROPIUM (DUO-NEB) 2.5 MG-0.5 MG/3 ML NEBULIZER SOLUTION    USE 1 VIAL PER NEBULIZER EVERY 6 HOURS AS NEEDED FOR WHEEZING/RESCUE    ALLOPURINOL (ZYLOPRIM) 100 MG TABLET    Take 1 tablet (100 mg total) by mouth once daily.    ALLOPURINOL (ZYLOPRIM) 300 MG TABLET    Take 1 tablet (300 mg total) by mouth once daily.    AMLODIPINE (NORVASC) 10 MG TABLET    Take 1 tablet (10 mg total) by mouth once daily.    APIXABAN (ELIQUIS) 5 MG TAB    Take 1 tablet (5 mg total) by mouth 2 (two) times daily.    ASPERCREME WITH ALOE 10 % CREA    Apply 1 Application topically as needed (to legs/back).    ATORVASTATIN (LIPITOR) 80 MG TABLET    TAKE ONE TABLET BY MOUTH EVERY DAY    BLOOD SUGAR DIAGNOSTIC STRP    BG monitoring 4 times a day. Pt needs test strips for [employer] Talking meter.    CHOLECALCIFEROL, VITAMIN D3, (VITAMIN D3) 25 MCG (1,000 UNIT) CAPSULE    Take 1 capsule (1,000 Units total) by mouth once daily.    CLOTRIMAZOLE-BETAMETHASONE 1-0.05% (LOTRISONE)  "CREAM    Apply topically 2 (two) times daily.    DICLOFENAC SODIUM (VOLTAREN) 1 % GEL    APPLY 2 GRAMS TOPICALLY DAILY    DOCUSATE SODIUM (COLACE) 100 MG CAPSULE    Take 1 capsule (100 mg total) by mouth once daily.    DOXYCYCLINE (VIBRA-TABS) 100 MG TABLET    Take 1 tablet (100 mg total) by mouth 2 (two) times daily.    FLUTICASONE (FLONASE) 50 MCG/ACTUATION NASAL SPRAY    2 sprays (100 mcg total) by Each Nare route once daily.    HYDROCODONE-ACETAMINOPHEN (NORCO) 5-325 MG PER TABLET    Take 1 tablet by mouth every 6 (six) hours as needed.    IBUPROFEN (ADVIL,MOTRIN) 800 MG TABLET    Take 800 mg by mouth every 6 (six) hours as needed.    INSULIN DEGLUDEC (TRESIBA FLEXTOUCH U-100) 100 UNIT/ML (3 ML) INSULIN PEN    Inject 10-18 units at night    LANCETS MISC    Test daily    NEBULIZER AND COMPRESSOR (COMP-AIR ELITE COMP NEB SYSTEM) TORRES    use as directed    OLOPATADINE (PATANOL) 0.1 % OPHTHALMIC SOLUTION    Place 1 drop into both eyes 2 (two) times daily.    PEN NEEDLE, DIABETIC (PEN NEEDLE) 29 GAUGE X 1/2" NDLE    USE DAILY    POLYETHYLENE GLYCOL (GLYCOLAX) 17 GRAM/DOSE POWDER    MIX 17 GM (1 CAPFUL) IN 4 TO 8 OUNCES OF FLUID AND TAKE DAILY    SEMAGLUTIDE (OZEMPIC) 0.25 MG OR 0.5 MG (2 MG/3 ML) PEN INJECTOR    INJECT 0.5 MG SUBCUTANEOUSLY ONCE A WEEK    SENNA 8.6 MG TABLET    Take 1 tablet by mouth once daily.    TORSEMIDE (DEMADEX) 10 MG TAB    Take 1 tablet (10 mg total) by mouth once daily.   Discontinued Medications    SEMAGLUTIDE (OZEMPIC) 0.25 MG OR 0.5 MG (2 MG/3 ML) PEN INJECTOR    Inject 0.25 mg into the skin every 7 days.    TORSEMIDE (DEMADEX) 10 MG TAB    Take 1 tablet by mouth once daily.       Review of Systems  Constitution: Denies chills, fever, and sweats.  HENT: Denies headaches or blurry vision.  Cardiovascular: Denies chest pain or irregular heart beat.  Respiratory: Denies cough or shortness of breath.  Gastrointestinal: Denies abdominal pain, nausea, or vomiting.  Musculoskeletal: Denies " "muscle cramps.  Neurological: Denies dizziness or focal weakness.  Psychiatric/Behavioral: Normal mental status.  Hematologic/Lymphatic: Denies bleeding problem or easy bruising/bleeding.  Skin: Denies rash or suspicious lesions    Physical Examination  BP (!) 186/75   Pulse 83   Ht 4' 11" (1.499 m)   Wt 118.4 kg (261 lb 0.4 oz)   BMI 52.72 kg/m²     Constitutional: No acute distress, conversant  HEENT: Sclera anicteric, Pupils equal, round and reactive to light, extraocular motions intact, Oropharynx clear  Neck: No JVD, no carotid bruits  Cardiovascular: regular rate and rhythm, no murmur, rubs or gallops, normal S1/S2  Pulmonary: Clear to auscultation bilaterally  Abdominal: Abdomen soft, nontender, nondistended, positive bowel sounds  Extremities: No lower extremity edema,   Pulses:  Carotid pulses are 2+ on the right side, and 2+ on the left side.  Radial pulses are 2+ on the right side, and 2+ on the left side.   Femoral pulses are 2+ on the right side, and 2+ on the left side.  Popliteal pulses are 2+ on the right side, and 2+ on the left side.   Dorsalis pedis pulses are 2+ on the right side, and 2+ on the left side.   Posterior tibial pulses are 2+ on the right side, and 2+ on the left side.    Skin: No ecchymosis, erythema, or ulcers  Psych: Alert and oriented x 3, appropriate affect  Neuro: CNII-XII intact, no focal deficits    Labs:  Most Recent Data  CBC:   Lab Results   Component Value Date    WBC 8.29 06/19/2024    HGB 11.7 (L) 06/19/2024    HCT 37.5 06/19/2024     06/19/2024    MCV 94 06/19/2024    RDW 17.5 (H) 06/19/2024     BMP:   Lab Results   Component Value Date     07/19/2024    K 4.2 07/19/2024     07/19/2024    CO2 24 07/19/2024    BUN 29 (H) 07/19/2024    CREATININE 1.3 07/19/2024     (H) 07/19/2024    CALCIUM 9.1 07/19/2024    MG 1.8 07/19/2024    PHOS 3.6 07/19/2024     LFTS;   Lab Results   Component Value Date    PROT 7.9 07/19/2024    ALBUMIN 2.7 (L) " 07/19/2024    BILITOT 0.3 07/19/2024    AST 18 07/19/2024    ALKPHOS 94 07/19/2024    ALT 15 07/19/2024     COAGS:   Lab Results   Component Value Date    INR 1.1 06/05/2024     FLP:   Lab Results   Component Value Date    CHOL 128 10/16/2023    HDL 47 10/16/2023    LDLCALC 69.2 10/16/2023    TRIG 59 10/16/2023    CHOLHDL 36.7 10/16/2023     CARDIAC:   Lab Results   Component Value Date    TROPONINI 0.297 (H) 06/07/2024    BNP 88 07/19/2024       Imaging:    CTA chest 6/5/2024:  1. Extensive pulmonary emboli throughout the bilateral lungs including a saddle pulmonary embolus.  There is evidence of right heart strain with flattening of the interventricular septum and enlargement of the right ventricle and main pulmonary artery.  2. Peripheral airspace opacities in the right middle lobe, consistent with areas of pulmonary infarctions.  3. 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    Assessment/Plan:  Duran Giordano is a 84 y.o. female with chronic diastolic heart failure, HTN, DM2, HLD, CAD, REJI, morbid obesity, history of CVA, who presents for an initial appointment.     Chronic diastolic heart failure- Stable. Continue current medications.    2. Acute saddle pulmonary embolism without acute cor pulmonale- Appears unprovoked.  Refer to Heme/Onc for hypercoagulable workup and age appropriate cancer screening. Continue Eliquis 5 mg bid.  Check repeat CTA chest and BLE venous ultrasound prior to next visit.    3. HTN- Stable. Continue current medications.    4. Morbid Obesity- Encourage diet, exercise, and weight loss.     5. HLD- Continue statin therapy.     6. BLE lymphedema- Pt presents with findings consistent with severe lymphedema.  Refer to lymphedema clinic after at least 3 months on full dose anticoagulation (plan to refer at next visit). Recommend wearing graduated compression hose.  Limit sodium intake to 2000 mg daily.  Limit volume intake to 1.5 L daily.  Elevate legs when  resting.    Follow up in 3 months with CTA chest and BLE venous ultrasound prior    Total duration of face to face visit time 60 minutes.  Total time spent counseling greater than fifty percent of total visit time.  Counseling included discussion regarding imaging findings, diagnosis, possibilities, treatment options, risks and benefits.  The patient had many questions regarding the options and long-term effects.    Brant Palmer MD, PhD  Interventional Cardiology

## 2024-09-17 NOTE — PATIENT INSTRUCTIONS
Assessment/Plan:  Duran Giordano is a 84 y.o. female with chronic diastolic heart failure, HTN, DM2, HLD, CAD, REJI, morbid obesity, history of CVA, who presents for an initial appointment.     Chronic diastolic heart failure- Stable. Continue current medications.    2. Acute saddle pulmonary embolism without acute cor pulmonale- Appears unprovoked.  Refer to Heme/Onc for hypercoagulable workup and age appropriate cancer screening. Continue Eliquis 5 mg bid.  Check repeat CTA chest and BLE venous ultrasound prior to next visit.    3. HTN- Stable. Continue current medications.    4. Morbid Obesity- Encourage diet, exercise, and weight loss.     5. HLD- Continue statin therapy.     6. BLE lymphedema- Pt presents with findings consistent with severe lymphedema.  Refer to lymphedema clinic after at least 3 months on full dose anticoagulation (plan to refer at next visit). Recommend wearing graduated compression hose.  Limit sodium intake to 2000 mg daily.  Limit volume intake to 1.5 L daily.  Elevate legs when resting.    Follow up in 3 months with CTA chest and BLE venous ultrasound prior

## 2024-09-18 ENCOUNTER — OFFICE VISIT (OUTPATIENT)
Dept: WOUND CARE | Facility: CLINIC | Age: 84
End: 2024-09-18
Payer: MEDICARE

## 2024-09-18 VITALS — DIASTOLIC BLOOD PRESSURE: 67 MMHG | HEART RATE: 89 BPM | SYSTOLIC BLOOD PRESSURE: 150 MMHG

## 2024-09-18 DIAGNOSIS — I89.0 LYMPHEDEMA OF BOTH LOWER EXTREMITIES: Primary | ICD-10-CM

## 2024-09-18 DIAGNOSIS — Z87.828 HEALED WOUND: ICD-10-CM

## 2024-09-18 PROCEDURE — 99999 PR PBB SHADOW E&M-EST. PATIENT-LVL IV: CPT | Mod: PBBFAC,,,

## 2024-09-18 PROCEDURE — 99214 OFFICE O/P EST MOD 30 MIN: CPT | Mod: S$PBB,,,

## 2024-09-18 PROCEDURE — 99214 OFFICE O/P EST MOD 30 MIN: CPT | Mod: PBBFAC

## 2024-09-18 NOTE — PROGRESS NOTES
Subjective:       Patient ID: Duran Giordano is a 84 y.o. female.    Chief Complaint: Wound Check       Patient presents for a re-evaluation of multiple bilateral lower extremity wounds. She reports her left lower extremity wounds began in July 2024. She reports she was pulling off a scab and formed a wound. Pt reports her right lower extremity wounds started around the beginning of August 2024. She reports the wound started as a blister. Pt reports she was diagnosed with bilateral pulmonary embolisms in June 2024. Pt was told she is no longer able to use compression therapy. She has an appointment with Dr. Palmer in September to clear her for compression therapy.  She reports a long history of leg swelling and reccurent wounds. Pt notices blisters form, they rupture, and she is left with open wounds. Pt previously followed with Mercedes Whittaker NP and Kellie Blue NP for her wounds.  She was last seen by myself in 10/23-11/23. Her wounds were treated with unna boots. Pt has [facility] that has been coming out once a week to change her dressings. They are dressing her wounds with aquacel border dressings.       Interval history: Pt follwoed with Dr. Bal who cleared the patient for compression therapy. Denies fever, chills, erythema, warmth, purulent drainage, or pain.      Review of Systems   Constitutional:  Negative for activity change, chills, diaphoresis, fatigue and fever.   Respiratory:  Negative for apnea, chest tightness and shortness of breath.    Cardiovascular:  Positive for leg swelling. Negative for chest pain and palpitations.   Musculoskeletal:  Negative for gait problem and joint swelling.   Skin:  Positive for wound. Negative for pallor and rash.   Neurological:  Negative for syncope, weakness and numbness.   Psychiatric/Behavioral:  Negative for agitation. The patient is not nervous/anxious.    All other systems reviewed and are negative.      Objective:      Physical Exam  Vitals  reviewed.   Constitutional:       General: She is not in acute distress.     Appearance: Normal appearance.   Cardiovascular:      Rate and Rhythm: Normal rate and regular rhythm.      Pulses: Normal pulses.   Pulmonary:      Effort: No respiratory distress.   Musculoskeletal:         General: No swelling.      Right lower leg: Edema present.      Left lower leg: Edema present.      Comments: Pt ambulates with walker   Skin:     General: Skin is warm and dry.      Capillary Refill: Capillary refill takes less than 2 seconds.      Findings: No erythema or wound.          Neurological:      General: No focal deficit present.      Mental Status: She is alert and oriented to person, place, and time.   Psychiatric:         Mood and Affect: Mood normal.         Behavior: Behavior normal.         Thought Content: Thought content normal.         Judgment: Judgment normal.           Assessment:       1. Lymphedema of both lower extremities    2. Healed wound             Altered Skin Integrity Right anterior;lower;medial;posterior;lateral Leg (Active)       Altered Skin Integrity Present on Admission - Did Patient arrive to the hospital with altered skin?:    Side: Right   Orientation: anterior;lower;medial;posterior;lateral   Location: Leg   Wound Number:    Is this injury device related?:    Primary Wound Type:    Description of Altered Skin Integrity:    Ankle-Brachial Index:    Pulses:    Removal Indication and Assessment:    Wound Outcome:    (Retired) Wound Length (cm):    (Retired) Wound Width (cm):    (Retired) Depth (cm):    Wound Description (Comments):    Removal Indications:    Wound Image    09/18/24 1327   Dressing Appearance Dry;Intact;Clean 09/18/24 1327   Drainage Amount None 09/18/24 1327   Care Cleansed with:;Soap and water 09/18/24 1327            Altered Skin Integrity Left lower;medial;posterior;anterior;lateral Leg (Active)       Altered Skin Integrity Present on Admission - Did Patient arrive to the  [facility] with altered skin?:    Side: Left   Orientation: lower;medial;posterior;anterior;lateral   Location: Leg   Wound Number:    Is this injury device related?:    Primary Wound Type:    Description of Altered Skin Integrity:    Ankle-Brachial Index:    Pulses:    Removal Indication and Assessment:    Wound Outcome:    (Retired) Wound Length (cm):    (Retired) Wound Width (cm):    (Retired) Depth (cm):    Wound Description (Comments):    Removal Indications:    Wound Image   09/18/24 1327   Dressing Appearance Dry;Intact;Clean 09/18/24 1327   Drainage Amount None 09/18/24 1327   Care Cleansed with:;Soap and water 09/18/24 1327         Duran Logan was seen in the clinic room and placed in the supine position on the treatment table.  The dressing was removed and the area was cleansed with Easi-clense sponges and dried thoroughly. No odor, erythema, or warmth noted.  No open wounds noted.      Plan of Care: Compression stockings to bilateral lower extremities daily as cleared by vascular medicine.          Plan:       No open wounds noted.  Precautions given to prevent wounds from re-opening. Discussed how area is extremely fragile. Protect area from friction, wash area gently, and pat dry. If the wound should re-open, instructed patient to contact the office.    Discussed bilateral lower extremity edema. Instructed patient to utilize compression stockings to bilateral lower extremities. Place on first thing in the morning and remove before bed.   Instructed patient to elevate legs whenever sedentary, follow a low sodium diet, and to take demadex as prescribed.    Instructed patient to keep appointments with vascular medicine.    Faxed home health orders.  HOME HEALTH WOUND CARE ORDERS  D/C wound care home health        Written and verbal instructions given to patient  RTC PRMORA Davis PA-C

## 2024-09-23 ENCOUNTER — OFFICE VISIT (OUTPATIENT)
Dept: PRIMARY CARE CLINIC | Facility: CLINIC | Age: 84
End: 2024-09-23
Payer: MEDICARE

## 2024-09-23 VITALS
WEIGHT: 267.63 LBS | OXYGEN SATURATION: 98 % | RESPIRATION RATE: 18 BRPM | TEMPERATURE: 99 F | HEART RATE: 100 BPM | BODY MASS INDEX: 53.95 KG/M2 | DIASTOLIC BLOOD PRESSURE: 68 MMHG | SYSTOLIC BLOOD PRESSURE: 142 MMHG | HEIGHT: 59 IN

## 2024-09-23 DIAGNOSIS — E78.5 HYPERLIPIDEMIA, UNSPECIFIED HYPERLIPIDEMIA TYPE: ICD-10-CM

## 2024-09-23 DIAGNOSIS — N90.89 SKIN TAG OF FEMALE PERINEUM: Primary | ICD-10-CM

## 2024-09-23 DIAGNOSIS — L30.9 DERMATITIS OF ANOGENITAL REGION: ICD-10-CM

## 2024-09-23 PROBLEM — I50.21 ACUTE HFREF (HEART FAILURE WITH REDUCED EJECTION FRACTION): Status: RESOLVED | Noted: 2024-06-21 | Resolved: 2024-09-23

## 2024-09-23 PROBLEM — I26.99 PULMONARY EMBOLISM: Status: RESOLVED | Noted: 2024-06-05 | Resolved: 2024-09-23

## 2024-09-23 PROCEDURE — 99215 OFFICE O/P EST HI 40 MIN: CPT | Mod: S$GLB,,, | Performed by: HOSPITALIST

## 2024-09-23 RX ORDER — MENTHOL AND ZINC OXIDE .44; 20.625 G/100G; G/100G
OINTMENT TOPICAL 2 TIMES DAILY PRN
Qty: 71 G | Refills: 3 | Status: SHIPPED | OUTPATIENT
Start: 2024-09-23

## 2024-09-23 RX ORDER — WITCH HAZEL 50 %
1 PADS, MEDICATED (EA) TOPICAL DAILY PRN
Qty: 48 EACH | Refills: 1 | Status: SHIPPED | OUTPATIENT
Start: 2024-09-23

## 2024-09-23 RX ORDER — CLOTRIMAZOLE AND BETAMETHASONE DIPROPIONATE 10; .64 MG/G; MG/G
CREAM TOPICAL 2 TIMES DAILY
Qty: 45 G | Refills: 3 | Status: SHIPPED | OUTPATIENT
Start: 2024-09-23

## 2024-09-23 NOTE — PROGRESS NOTES
Primary Care Provider Appointment - [facility]  SHARED NOTE: Lee Aguilar (MS4), Dr Mccray (Attending)    Subjective:      Patient ID: Duran Giordano is a 84 y.o. 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     Chief Complaint: Diabetes and Follow-up    Prior to this visit, patient's last encounter with PCP was 6/18/2024.    She presents today for ongoing complaints related to skin growth in perineal area previously diagnosed as skin tag by gynecology. Notes that the growth is increasing in size with associated odor and itchiness. Pt has attempted to treat area with Preparation H ointment without significant relief.     Of note, pt recently admitted on 6/5/24 to [facility] [place], [place] saddle PE. Initially placed on heparin transitioned to apixaban prior to discharge. No invasive interventions due to comorbidities. Denies any current chest pain, SOB, changes in urination or bowel movements, or worsening lower extremity edema.     DDM  - Currently taking: Ozempic .5 and Tresiba, previously on humalog and humalin  - Last A1c on  10/16/23 was 6.9  - Diet consists of see previous note, it is unchanged   - Has boost shakes, will continue those     HTN  - Currently taking: amlodipine 5 mg  - How often taking BP at home: couple times a week     HLD  - Currently taking: Atorvastatin 80 mg   - Last lipid panel was on 10/16/23  The ASCVD Risk score (Oneill DK, et al., 2019) failed to calculate for the following reasons:    The 2019 ASCVD risk score is only valid for ages 40 to 79                Asthma/COPD  - Currently taking: ipi albuterol, albuterol rescue  - Smoking status: Never  - No home O2     Mental Health  - Currently taking: none  - Sees therapist no and psychiatrist no  - 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 Olson on 10/30/2023  - On allopurinol 400mg. Colchicine stopped  - Counselled on diet modifications     Central retinal vein occlusion with macular edema of left eye  - sees Dr. Sampson, Optometry and receives shots in her left eye every 6-8 weeks. She reports going back to see him in 1 month. She is worried the shots are not working any longer.      4Ms for Medical Decision-Making in Older Adults     Last Completed EAWV: 2024    MOBILITY:  Get Up and Go:      2024    10:45 AM   Get Up and Go   Trial 1 14 seconds     Activities of Daily Livin/2/2024    10:40 AM   Activities of Daily Living   Ambulation Assistance Required   Ambulation Assistance Walker (wheeled)   Dressing Independent   Transfers Independent   Toileting Continent of bladder;Continent of bowel   Feeding Independent   Cleaning home/Chores Assistance Required   Telephone use Independent   Shopping Assistance Required   Paying bills Independent   Taking meds Independent     Whisper Test:      2024    10:45 AM   Whisper Test   Whisper Test Normal     Disability Status:      2024    10:44 AM   Disability Status   Are you deaf or do you have serious difficulty hearing? N   Are you blind or do you have serious difficulty seeing, even when wearing glasses? N   Because of a physical, mental, or emotional condition, do you have serious difficulty concentrating, remembering, or making decisions? N   Do you have serious difficulty walking or climbing stairs? Y   Do you have difficulty dressing or bathing? Y   Because of a physical, mental, or emotional condition, do you have difficulty doing errands alone such as visiting a doctor's office or shopping? Y     Nutrition Screenin/2/2024    10:40 AM   Nutrition Screening   Has food intake declined over the past three months due to loss of appetite, digestive problems, chewing or swallowing difficulties? No decrease in food intake   Involuntary weight loss during  the last 3 months? No weight loss   Mobility? Goes out   Has the patient suffered psychological stress or acute disease in the past three months? No   Neuropsychological problems? No psychological problems   Body Mass Index (BMI)?  BMI 23 or greater   Screening Score 14   Interpretation Normal nutritional status    Screening Score: 0-7 Malnourished, 8-11 At Risk, 12-14 Normal  Fall Risk:      2024     1:00 PM 2024    10:30 AM 2024     1:30 PM   Fall Risk Assessment - Outpatient   Mobility Status Wheelchair Bound Ambulatory w/ assistance Ambulatory   Number of falls 1 1 0   Identified as fall risk True True False           MENTATION:   Depression Patient Health Questionnaire:      2024     1:41 PM   Depression Patient Health Questionnaire   PHQ-4 Total Score 6     Has Dementia Dx: No  Has Anxiety Dx: No    Cognitive Function Screenin/2/2024    10:45 AM   Cognitive Function Screening   Clock Drawing Test 1   Mini-Cog 3 Minute Recall 3   Cognitive Function Screening 4     Cognitive Function Screening Total - Less than 4 = Abnormal,  Greater than or equal to 4 = Normal        MEDICATIONS:  High Risk Medications:  Total Active Medications: 1  HYDROcodone-acetaminophen - 5-325 mg    WHAT MATTERS MOST:  Advance Care Planning   ACP Status:   Patient has had an ACP conversation  Living Will: Yes  Power of : No  [place]: Yes    What is most important right now is to focus on remaining as independent as possible    Accordingly, we have decided that the best plan to meet the patient's goals includes continuing with treatment      What matters most to patient today is: perineal skin tag           PHQ-4 Score: 6     Social History     Socioeconomic History    Marital status: Single   Tobacco Use    Smoking status: Never    Smokeless tobacco: Never   Substance and Sexual Activity    Alcohol use: Yes     Comment: holidays    Drug use: No    Sexual activity: Never   Social History Narrative     "Single with no children.      Social Determinants of Health     Financial Resource Strain: Low Risk  (6/7/2024)    Overall Financial Resource Strain (CARDIA)     Difficulty of Paying Living Expenses: Not hard at all   Food Insecurity: No Food Insecurity (6/7/2024)    Hunger Vital Sign     Worried About Running Out of Food in the Last Year: Never true     Ran Out of Food in the Last Year: Never true   Transportation Needs: No Transportation Needs (6/7/2024)    TRANSPORTATION NEEDS     Transportation : No   Physical Activity: Insufficiently Active (2/2/2024)    Exercise Vital Sign     Days of Exercise per Week: 1 day     Minutes of Exercise per Session: 10 min   Stress: No Stress Concern Present (6/7/2024)    [employer] - Occupational Stress Questionnaire     Feeling of Stress : Only a little   Housing Stability: Low Risk  (6/7/2024)    Housing Stability Vital Sign     Unable to Pay for Housing in the Last Year: No     Homeless in the Last Year: No       Review of Systems   Constitutional:  Negative for chills and fever.   Cardiovascular:  Negative for chest pain and leg swelling.   Gastrointestinal:  Negative for abdominal pain.   Genitourinary:  Negative for dysuria, frequency and urgency.     Objective:   BP (!) 142/68 (BP Location: Left arm, Patient Position: Sitting, BP Method: Large (Manual))   Pulse 100   Temp 98.5 °F (36.9 °C) (Oral)   Resp 18   Ht 4' 11" (1.499 m)   Wt 121.4 kg (267 lb 10.2 oz)   SpO2 98%   BMI 54.06 kg/m²     Physical Exam  Vitals reviewed. Exam conducted with a chaperone present.   Constitutional:       Appearance: She is obese.   Cardiovascular:      Rate and Rhythm: Normal rate and regular rhythm.      Pulses: Normal pulses.      Heart sounds: Normal heart sounds.   Pulmonary:      Effort: Pulmonary effort is normal.      Breath sounds: Normal breath sounds.   Abdominal:      General: Abdomen is flat.      Palpations: Abdomen is soft. "   Genitourinary:     Exam position: Lithotomy position.      Pubic Area: No rash.       Comments: Loose skin of B labia at base of introitus.  No apparent erythema or induration.  No visible external hemorrhoids.  Musculoskeletal:      Right lower leg: Edema present.      Left lower leg: Edema present.   Skin:     General: Skin is warm and dry.      Capillary Refill: Capillary refill takes less than 2 seconds.   Neurological:      General: No focal deficit present.      Mental Status: She is alert and oriented to person, place, and time.   Psychiatric:         Mood and Affect: Mood normal.         Behavior: Behavior normal.         Thought Content: Thought content normal.         Judgment: Judgment normal.           Lab Results   Component Value Date    WBC 8.29 06/19/2024    HGB 11.7 (L) 06/19/2024    HCT 37.5 06/19/2024     06/19/2024    CHOL 128 10/16/2023    TRIG 59 10/16/2023    HDL 47 10/16/2023    ALT 15 07/19/2024    AST 18 07/19/2024     07/19/2024    K 4.2 07/19/2024     07/19/2024    CREATININE 1.3 07/19/2024    BUN 29 (H) 07/19/2024    CO2 24 07/19/2024    TSH 0.799 01/16/2020    INR 1.1 06/05/2024    HGBA1C 7.1 (H) 05/06/2024       Current Outpatient Medications on File Prior to Visit   Medication Sig Dispense Refill    albuterol (PROVENTIL/VENTOLIN HFA) 90 mcg/actuation inhaler Inhale 2 puffs into the lungs every 4 (four) hours as needed for Wheezing or Shortness of Breath. Rescue 54 g 3    albuterol-ipratropium (DUO-NEB) 2.5 mg-0.5 mg/3 mL nebulizer solution USE 1 VIAL PER NEBULIZER EVERY 6 HOURS AS NEEDED FOR WHEEZING/RESCUE 90 mL 11    allopurinoL (ZYLOPRIM) 100 MG tablet Take 1 tablet (100 mg total) by mouth once daily. 30 tablet 0    allopurinoL (ZYLOPRIM) 300 MG tablet Take 1 tablet (300 mg total) by mouth once daily. 90 tablet 0    amLODIPine (NORVASC) 10 MG tablet Take 1 tablet (10 mg total) by mouth once daily. 90 tablet 3    apixaban (ELIQUIS) 5 mg Tab Take 1 tablet (5 mg  "total) by mouth 2 (two) times daily. 60 tablet 6    ASPERCREME WITH ALOE 10 % Crea Apply 1 Application topically as needed (to legs/back).      atorvastatin (LIPITOR) 80 MG tablet TAKE ONE TABLET BY MOUTH EVERY DAY 30 tablet 11    blood sugar diagnostic Strp BG monitoring 4 times a day. Pt needs test strips for Em[employer] Talking meter. (Patient taking differently: BG monitoring 2 times a day. Pt needs test strips for Em[employer] Talking meter.) 450 strip prn    cholecalciferol, vitamin D3, (VITAMIN D3) 25 mcg (1,000 unit) capsule Take 1 capsule (1,000 Units total) by mouth once daily. 90 capsule 3    diclofenac sodium (VOLTAREN) 1 % Gel APPLY 2 GRAMS TOPICALLY DAILY 100 g 0    docusate sodium (COLACE) 100 MG capsule Take 1 capsule (100 mg total) by mouth once daily. 90 capsule 3    fluticasone (FLONASE) 50 mcg/actuation nasal spray 2 sprays (100 mcg total) by Each Nare route once daily. 1 Bottle 3    ibuprofen (ADVIL,MOTRIN) 800 MG tablet Take 800 mg by mouth every 6 (six) hours as needed.      insulin degludec (TRESIBA FLEXTOUCH U-100) 100 unit/mL (3 mL) insulin pen Inject 10-18 units at night (Patient taking differently: Inject 12 Units into the skin every evening.) 6 mL 1    lancets Misc Test daily (Patient taking differently: Test twice  daily) 100 each 12    pen needle, diabetic (PEN NEEDLE) 29 gauge x 1/2" Ndle USE DAILY 100 each 4    polyethylene glycol (GLYCOLAX) 17 gram/dose powder MIX 17 GM (1 CAPFUL) IN 4 TO 8 OUNCES OF FLUID AND TAKE DAILY 510 g 5    semaglutide (OZEMPIC) 0.25 mg or 0.5 mg (2 mg/3 mL) pen injector INJECT 0.5 MG SUBCUTANEOUSLY ONCE A WEEK (Patient taking differently: Inject 0.25 mg into the skin every 7 days. On Saturday.) 3 mL 6    SENNA 8.6 mg tablet Take 1 tablet by mouth once daily. 90 tablet 3    torsemide (DEMADEX) 10 MG Tab Take 1 tablet (10 mg total) by mouth once daily. 90 tablet 3    clotrimazole-betamethasone 1-0.05% (LOTRISONE) cream Apply topically 2 (two) times daily. (Patient not " taking: Reported on 9/23/2024) 45 g 3    doxycycline (VIBRA-TABS) 100 MG tablet Take 1 tablet (100 mg total) by mouth 2 (two) times daily. (Patient not taking: Reported on 9/23/2024) 60 tablet 0    HYDROcodone-acetaminophen (NORCO) 5-325 mg per tablet Take 1 tablet by mouth every 6 (six) hours as needed. (Patient not taking: Reported on 9/23/2024)      nebulizer and compressor (COMP-AIR ELITE COMP NEB SYSTEM) Berna use as directed (Patient not taking: Reported on 9/23/2024) 1 each 0    olopatadine (PATANOL) 0.1 % ophthalmic solution Place 1 drop into both eyes 2 (two) times daily. 5 mL 0     No current facility-administered medications on file prior to visit.         Assessment:   84 y.o. female with multiple co-morbid illnesses here to follow-up with PCP and continue work-up of chronic issues    Plan:   1. Skin tag of female perineum  Assessment & Plan:  Lesion appears to be loose labial skin, which continues to be subject to friction irritation as well as subjected to dampness.  Will refill lotrisone ointment which should treat any intertrigo causing the odor change and reduce irritation in area.  Prescribed Tucks Pads to use to try to shrink up the loose skin causing the symptoms.    Orders:  -     clotrimazole-betamethasone 1-0.05% (LOTRISONE) cream; Apply topically 2 (two) times daily.  Dispense: 45 g; Refill: 3  -     witch hazeL (TUCKS, WITCH HAZEL,) 50 % PadM; Apply 1 each topically daily as needed (irritated skin tags or hemorrhoids).  Dispense: 48 each; Refill: 1    2. Hyperlipidemia, unspecified hyperlipidemia type  Assessment & Plan:  Due to update lipid panel.  On atorvastatin.    Orders:  -     LIPID PANEL; Future; Expected date: 09/23/2024    3. Dermatitis of anogenital region  Assessment & Plan:  Pt reports increased odor from perineum lately, and also complains of perineal chafing when using motorized scooter.  She plans to try some different undergarments to see if this reduces chafing, and also  suggested to try barrier ointment in perineal folds to reduce friction.    Orders:  -     menthol-zinc oxide (CALMOSEPTINE) 0.44-20.6 % Oint; Apply topically 2 (two) times daily as needed (chafing).  Dispense: 71 g; Refill: 3         Health Maintenance         Date Due Completion Date    TETANUS VACCINE Never done ---    Diabetes Urine Screening 04/28/2024 4/28/2023    Lipid Panel 10/16/2024 10/16/2023    Hemoglobin A1c 11/06/2024 5/6/2024    Override on 7/13/2016: Done    Eye Exam 08/29/2025 8/29/2024    Override on 2/7/2020: Done    Override on 2/17/2016: Done    Override on 1/22/2015: Done    Override on 8/16/2012: Done    DEXA Scan 06/01/2027 6/1/2023    Override on 12/13/2011: Done            Future Appointments   Date Time Provider Department Center   9/25/2024 10:30 AM Sonia Willard DPM [facility] POD [facility]   10/8/2024  9:50 AM LAB, APPOINTMENT [facility] INTMED [facility] IM [facility]   10/8/2024 10:00 AM LAB, APPOINTMENT [facility] INTMED [facility] IM [facility]   10/10/2024 11:00 AM Yogi Contreras APRN, CASEYP [facility] IM [facility]   10/10/2024 12:45 PM Ferdinand Rosas MD [facility]   11/4/2024 10:00 AM NOM XRIM1 485 LB LIMIT [facility] XRAY IM [facility]   11/4/2024 11:15 AM Alfredo Lozoya MD [facility] ORTHO [facility]   11/15/2024 10:00 AM LAB, APPOINTMENT [place] [facility] LAB VNP [facility]   11/20/2024 10:00 AM VASCULAR, CARDIOLOGY [facility]ASCCRD [facility]   11/22/2024  8:30 AM [facility] OIC-CT1 500 LB LIMIT [facility] CTSC [facility]         Follow up in about 6 months (around 3/23/2025). Total clinical care time was 40 min      Lee Formanek, MS4  -[employer]    Sony Mccray MD   [facility], [facility]  [facility]  [facility]  [facility]

## 2024-09-24 DIAGNOSIS — T84.50XD INFECTION OF PROSTHETIC JOINT, SUBSEQUENT ENCOUNTER: ICD-10-CM

## 2024-09-24 PROBLEM — L30.9: Status: ACTIVE | Noted: 2024-09-24

## 2024-09-24 PROBLEM — N90.89 SKIN TAG OF FEMALE PERINEUM: Status: ACTIVE | Noted: 2024-09-24

## 2024-09-24 PROBLEM — Z01.818 PREOP EXAM FOR INTERNAL MEDICINE: Status: RESOLVED | Noted: 2022-02-04 | Resolved: 2024-09-24

## 2024-09-24 RX ORDER — DOXYCYCLINE HYCLATE 100 MG
100 TABLET ORAL 2 TIMES DAILY
Qty: 60 TABLET | Refills: 0 | Status: SHIPPED | OUTPATIENT
Start: 2024-09-24

## 2024-09-24 NOTE — ASSESSMENT & PLAN NOTE
Pt reports increased odor from perineum lately, and also complains of perineal chafing when using motorized scooter.  She plans to try some different undergarments to see if this reduces chafing, and also suggested to try barrier ointment in perineal folds to reduce friction.

## 2024-09-24 NOTE — ASSESSMENT & PLAN NOTE
Lesion appears to be loose labial skin, which continues to be subject to friction irritation as well as subjected to dampness.  Will refill lotrisone ointment which should treat any intertrigo causing the odor change and reduce irritation in area.  Prescribed Tucks Pads to use to try to shrink up the loose skin causing the symptoms.

## 2024-09-25 ENCOUNTER — OFFICE VISIT (OUTPATIENT)
Dept: PODIATRY | Facility: CLINIC | Age: 84
End: 2024-09-25
Payer: MEDICARE

## 2024-09-25 VITALS
WEIGHT: 263 LBS | DIASTOLIC BLOOD PRESSURE: 70 MMHG | SYSTOLIC BLOOD PRESSURE: 182 MMHG | HEART RATE: 97 BPM | BODY MASS INDEX: 53.02 KG/M2 | HEIGHT: 59 IN

## 2024-09-25 DIAGNOSIS — Z79.4 TYPE 2 DIABETES MELLITUS WITH DIABETIC POLYNEUROPATHY, WITH LONG-TERM CURRENT USE OF INSULIN: Primary | ICD-10-CM

## 2024-09-25 DIAGNOSIS — L84 CORN OR CALLUS: ICD-10-CM

## 2024-09-25 DIAGNOSIS — E11.42 TYPE 2 DIABETES MELLITUS WITH DIABETIC POLYNEUROPATHY, WITH LONG-TERM CURRENT USE OF INSULIN: Primary | ICD-10-CM

## 2024-09-25 DIAGNOSIS — B35.1 ONYCHOMYCOSIS DUE TO DERMATOPHYTE: ICD-10-CM

## 2024-09-25 PROCEDURE — 11721 DEBRIDE NAIL 6 OR MORE: CPT | Mod: Q9,59,PBBFAC | Performed by: PODIATRIST

## 2024-09-25 PROCEDURE — 11056 PARNG/CUTG B9 HYPRKR LES 2-4: CPT | Mod: Q9,PBBFAC | Performed by: PODIATRIST

## 2024-09-25 PROCEDURE — 99212 OFFICE O/P EST SF 10 MIN: CPT | Mod: PBBFAC | Performed by: PODIATRIST

## 2024-09-25 PROCEDURE — 99999 PR PBB SHADOW E&M-EST. PATIENT-LVL II: CPT | Mod: PBBFAC,,, | Performed by: PODIATRIST

## 2024-09-25 NOTE — PROGRESS NOTES
Subjective:      Patient ID: Duran Giordano is a 84 y.o. female.    Chief Complaint: Diabetic Foot Exam (9/23/24 - Sony Mccray MD)      Duran Logan is a 84 y.o. female who presents to the clinic for evaluation and treatment of high risk feet. Duran Logan has a past medical history of Adrenal mass- adenoma stable since 2004 (1.8 cm and 8/13/12 (2 cm); stable 2016 2.4 cm, Arthritis, Asthma in adult without complication (6/25/2015), Bilateral carotid artery disease (7/21/2017), Bilateral sciatica (2/7/2017), Cellulitis of right leg (08/16/2017), Cerebral infarction (1/29/2016), Cervical radiculopathy (3/18/2015), Chronic knee pain, Chronic rhinitis (4/9/2013), CKD (chronic kidney disease) stage 3, GFR 30-59 ml/min (1/29/2013), Coronary artery disease due to calcified coronary lesion (6/25/2015), Deep vein thrombosis, Diastolic dysfunction (10/10/2013), Essential hypertension (6/25/2015), Gastroesophageal reflux disease without esophagitis (8/13/2012), Gout, Hives (10/1/2013), 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 The patient's chief complaint is elongated toenails and lymphedema.  This patient has documented high risk feet requiring routine maintenance secondary to diabetes mellitis and those secondary complications of diabetes, as mentioned.    PCP: Tami Schmidt MD    Date Last Seen by PCP:   Chief  "Complaint   Patient presents with    Diabetic Foot Exam     9/23/24 - Sony Mccray MD        Current shoe gear:  Affected Foot: Casual shoes     Unaffected Foot: Casual shoes    Hemoglobin A1C   Date Value Ref Range Status   05/06/2024 7.1 (H) 4.0 - 5.6 % Final     Comment:     ADA Screening Guidelines:  5.7-6.4%  Consistent with prediabetes  >or=6.5%  Consistent with diabetes    High levels of fetal hemoglobin interfere with the HbA1C  assay. Heterozygous hemoglobin variants (HbS, HgC, etc)do  not significantly interfere with this assay.   However, presence of multiple variants may affect accuracy.     10/16/2023 6.9 (H) 4.0 - 5.6 % Final     Comment:     ADA Screening Guidelines:  5.7-6.4%  Consistent with prediabetes  >or=6.5%  Consistent with diabetes    High levels of fetal hemoglobin interfere with the HbA1C  assay. Heterozygous hemoglobin variants (HbS, HgC, etc)do  not significantly interfere with this assay.   However, presence of multiple variants may affect accuracy.     04/28/2023 6.8 (H) 4.0 - 5.6 % Final     Comment:     ADA Screening Guidelines:  5.7-6.4%  Consistent with prediabetes  >or=6.5%  Consistent with diabetes    High levels of fetal hemoglobin interfere with the HbA1C  assay. Heterozygous hemoglobin variants (HbS, HgC, etc)do  not significantly interfere with this assay.   However, presence of multiple variants may affect accuracy.         Review of Systems   Cardiovascular:  Positive for leg swelling.   Skin:  Positive for color change, dry skin, nail changes and unusual hair distribution. Negative for flushing, itching, rash and skin cancer.   Musculoskeletal:  Positive for arthritis and stiffness. Negative for back pain and falls.   Neurological:  Positive for numbness. Negative for paresthesias.   Allergic/Immunologic: Negative for hives.           Objective:       Vitals:    09/25/24 1053   BP: (!) 182/70   Pulse: 97   Weight: 119.3 kg (263 lb)   Height: 4' 11" (1.499 m) "   PainSc: 0-No pain   PainLoc: Foot          Physical Exam  Vitals reviewed.   Constitutional:       Appearance: She is well-developed.   Cardiovascular:      Pulses:           Dorsalis pedis pulses are 1+ on the right side and 1+ on the left side.        Posterior tibial pulses are 1+ on the right side and 1+ on the left side.      Comments: Dorsalis pedis and posterior tibial pulses are palpable bilaterally. Toes are cool to touch. Feet are warm proximally.There is decreased digital hair bilateral. Skin is atrophic, hyperpigmented, and moderately edematous.    Musculoskeletal:         General: No tenderness.      Right lower leg: Edema present.      Left lower leg: Edema present.      Right ankle: Swelling and deformity present. Decreased range of motion.      Left ankle: Swelling and deformity present. Decreased range of motion.      Right foot: Decreased range of motion. Swelling and deformity present. No crepitus. Abnormal pulse.      Left foot: Decreased range of motion. Swelling and deformity present. No crepitus. Abnormal pulse.   Lymphadenopathy:      Comments: B/l lymphedema    Skin:     General: Skin is warm and dry.      Findings: No abrasion or burn.      Nails: There is no clubbing.      Comments: Nails x10 are elongated by  5-7mm's, thickened by 2-3 mm's, dystrophic, and are darkened in  coloration . Xerosis Bilaterally. No open lesions noted.    Hyperkeratotic tissue noted to distal hallux b/l       Skin thickened, leathery, svetlana blistering noted to legs.    Neurological:      Mental Status: She is alert and oriented to person, place, and time.      Sensory: Sensory deficit present.      Motor: Weakness present.      Comments: Decreased sharp/dull sensation bilateral feet.   Psychiatric:         Behavior: Behavior normal. Behavior is cooperative.               Assessment:       Encounter Diagnoses   Name Primary?    Type 2 diabetes mellitus with diabetic polyneuropathy, with long-term current use of  "insulin Yes    Onychomycosis due to dermatophyte     Corn or callus          Plan:       Duran Logan "Sister Duran Giordano" was seen today for diabetic foot exam.    Diagnoses and all orders for this visit:    Type 2 diabetes mellitus with diabetic polyneuropathy, with long-term current use of insulin    Onychomycosis due to dermatophyte    Corn or callus     I counseled the patient on her conditions, their implications and medical management.      Shoe inspection. Diabetic Foot Education. Patient reminded of the importance of good nutrition and blood cummings gar control to help prevent podiatric complications of diabetes. Patient instructed on proper foot hygeine. We discussed wearing proper shoe gear, daily foot inspections, never walking without protective shoe gear, never putting sharp instruments to feet    - With patient's permission, nails were aggressively reduced and debrided x 10 to their soft tissue attachment mechanically and with electric , removing all offending nail and debris. Patient relates relief following the procedure. She will continue to monitor the areas daily, inspect her feet, wear protective shoe gear when ambulatory, moisturizer to maintain skin integrity and follow in this office in approximately 2-3 months, sooner p.r.n.    - After cleansing the  area w/ alcohol prep pad the above mentioned hyperkeratosis was trimmed utilizing No 15 scapel, to a smooth base with out incident. Patient tolerated this  well and reported comfort to the area x2                  "

## 2024-09-26 DIAGNOSIS — E11.42 TYPE 2 DIABETES MELLITUS WITH DIABETIC POLYNEUROPATHY, WITH LONG-TERM CURRENT USE OF INSULIN: ICD-10-CM

## 2024-09-26 DIAGNOSIS — Z79.4 TYPE 2 DIABETES MELLITUS WITH DIABETIC POLYNEUROPATHY, WITH LONG-TERM CURRENT USE OF INSULIN: ICD-10-CM

## 2024-09-26 RX ORDER — SEMAGLUTIDE 0.68 MG/ML
INJECTION, SOLUTION SUBCUTANEOUS
Qty: 3 ML | Refills: 5 | Status: SHIPPED | OUTPATIENT
Start: 2024-09-26

## 2024-10-07 ENCOUNTER — TELEPHONE (OUTPATIENT)
Dept: OPHTHALMOLOGY | Facility: CLINIC | Age: 84
End: 2024-10-07
Payer: MEDICARE

## 2024-10-07 ENCOUNTER — OFFICE VISIT (OUTPATIENT)
Dept: PRIMARY CARE CLINIC | Facility: CLINIC | Age: 84
End: 2024-10-07
Payer: MEDICARE

## 2024-10-07 ENCOUNTER — TELEPHONE (OUTPATIENT)
Dept: PRIMARY CARE CLINIC | Facility: CLINIC | Age: 84
End: 2024-10-07
Payer: MEDICARE

## 2024-10-07 DIAGNOSIS — M10.079 ACUTE IDIOPATHIC GOUT OF ANKLE, UNSPECIFIED LATERALITY: Primary | ICD-10-CM

## 2024-10-07 DIAGNOSIS — M10.00 IDIOPATHIC GOUT, UNSPECIFIED CHRONICITY, UNSPECIFIED SITE: Primary | ICD-10-CM

## 2024-10-07 PROCEDURE — 99499 UNLISTED E&M SERVICE: CPT | Mod: 95,,, | Performed by: HOSPITALIST

## 2024-10-07 RX ORDER — COLCHICINE 0.6 MG/1
0.6 TABLET ORAL DAILY PRN
Qty: 30 TABLET | Refills: 11 | Status: SHIPPED | OUTPATIENT
Start: 2024-10-07 | End: 2025-10-07

## 2024-10-07 RX ORDER — ALLOPURINOL 100 MG/1
100 TABLET ORAL DAILY
Qty: 90 TABLET | Refills: 1 | Status: SHIPPED | OUTPATIENT
Start: 2024-10-07 | End: 2025-04-05

## 2024-10-07 RX ORDER — ALLOPURINOL 300 MG/1
300 TABLET ORAL DAILY
Qty: 90 TABLET | Refills: 1 | Status: SHIPPED | OUTPATIENT
Start: 2024-10-07 | End: 2025-04-05

## 2024-10-07 NOTE — TELEPHONE ENCOUNTER
Refill request by phone from Leah Watkins RN at [facility].   4 = No assist / stand by assistance

## 2024-10-07 NOTE — TELEPHONE ENCOUNTER
Veronica Ford Staff  Caller: Pt  [phone] (Today, 11:04 AM)              Current Appt date:  10/10/24    Type of Appt: Ep    Physician: Ferdinand Rosas MD      Reason for rescheduling:  Have gout can not stand on    Caller: Sister Duran Logan      Contact Preference: [phone]  Jacques aappt.10/24/24 @1:15

## 2024-10-07 NOTE — PROGRESS NOTES
Established Patient - Audio Only Telehealth Visit     The patient location is: home  The chief complaint leading to consultation is: gout attack  Visit type: Virtual visit with audio only (telephone)  Total time spent with patient: 10 min       The reason for the audio only service rather than synchronous audio and video virtual visit was related to technical difficulties or patient preference/necessity.     Each patient to whom I provide medical services by telemedicine is:  (1) informed of the relationship between the physician and patient and the respective role of any other health care provider with respect to management of the patient; and (2) notified that they may decline to receive medical services by telemedicine and may withdraw from such care at any time. Patient verbally consented to receive this service via voice-only telephone call.       HPI: Pt reports being unable to ambulate due to severe pain in B ankles c/w prior gout attacks.  Also has lymphedema so hasn't noticed any rubor, calor, or swelling.  She realized that one of her pills was missing and had been without the additional 100mg allopurinol she takes along with the 300mg, so has been without her full dose for some time.  Refill request already addressed earlier today.  Per chart review she had previously been on colchicine but was taking it daily, which was stopped at some point in the past year.        Assessment and plan:  Will Rx colchicine for PRN use only via 1-2-2 rule, and schedule f/u call in 1 wk.                        This service was not originating from a related E/M service provided within the previous 7 days nor will  to an E/M service or procedure within the next 24 hours or my soonest available appointment.  Prevailing standard of care was able to be met in this audio-only visit.

## 2024-10-08 ENCOUNTER — TELEPHONE (OUTPATIENT)
Dept: PRIMARY CARE CLINIC | Facility: CLINIC | Age: 84
End: 2024-10-08
Payer: MEDICARE

## 2024-10-08 NOTE — TELEPHONE ENCOUNTER
Pt called to get clarification on colchicine directions.  RN explained to both her and Leah Watkins RN.  RN faxed copy of script to Ms Watkins.

## 2024-10-10 RX ORDER — INSULIN DEGLUDEC 100 U/ML
INJECTION, SOLUTION SUBCUTANEOUS
Qty: 6 ML | Refills: 5 | Status: SHIPPED | OUTPATIENT
Start: 2024-10-10

## 2024-10-14 ENCOUNTER — TELEPHONE (OUTPATIENT)
Dept: PRIMARY CARE CLINIC | Facility: CLINIC | Age: 84
End: 2024-10-14
Payer: MEDICARE

## 2024-10-15 ENCOUNTER — DOCUMENT SCAN (OUTPATIENT)
Dept: HOME HEALTH SERVICES | Facility: HOSPITAL | Age: 84
End: 2024-10-15
Payer: MEDICARE

## 2024-10-23 DIAGNOSIS — T84.50XD INFECTION OF PROSTHETIC JOINT, SUBSEQUENT ENCOUNTER: ICD-10-CM

## 2024-10-23 RX ORDER — DOXYCYCLINE HYCLATE 100 MG
100 TABLET ORAL 2 TIMES DAILY
Qty: 60 TABLET | Refills: 0 | Status: SHIPPED | OUTPATIENT
Start: 2024-10-23
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
## 2024-11-15 ENCOUNTER — LAB VISIT (OUTPATIENT)
Dept: LAB | Facility: HOSPITAL | Age: 84
End: 2024-11-15
Attending: INTERNAL MEDICINE
Payer: MEDICARE

## 2024-11-15 DIAGNOSIS — I26.92 ACUTE SADDLE PULMONARY EMBOLISM WITHOUT ACUTE COR PULMONALE: ICD-10-CM

## 2024-11-15 LAB
CREAT SERPL-MCNC: 1.3 MG/DL (ref 0.5–1.4)
EST. GFR  (NO RACE VARIABLE): 41.1 ML/MIN/1.73 M^2

## 2024-11-15 PROCEDURE — 82565 ASSAY OF CREATININE: CPT | Performed by: INTERNAL MEDICINE

## 2024-11-15 PROCEDURE — 36415 COLL VENOUS BLD VENIPUNCTURE: CPT | Performed by: INTERNAL MEDICINE

## 2024-11-15 NOTE — PROGRESS NOTES
"Primary Care Provider Appointment    Subjective:      Patient ID: Duran Giordano is a 78 y.o. female with CHF, HTN, DM, CKD, venous stasis ulcers    Chief Complaint: Diabetes; Hyperlipidemia; and Follow-up    Patients legs acutely worsened today. She reports that she stopped using it when she developed blisters, so the circaids caused pain. Is now only using OTC compression stockings from [employer]. She is requesting a looser set of stockings than what she is using with the cercaids. She is elevating her legs at night.     She continues to take diuretic, and is urinating appropriately, but does not notice the dramatic weight loss that she previously had following diuretic use after a period of noncompliance.    She is not compliant with her CPAP at this time. Needs sleep medicine follow-up.    Her intermittent subjective fevers are resolved.    She states "I'm eating like a little oink oink." Her last A1c was 7.3. Her DM regimen is levemir 48 units daily, humalog 18 units w/ meals (2) plus scale 180-230+2, etc    BP well-controlled today. She is now taking tumeric capsule daily.    Pills packed as follows:  Adherence packed for 30 days:     Morning card:  Aspirin 81 mg  Torsemide 20 mg     Evening card:  Atorvastatin 80 mg  Irbesartan 300 mg  Vitamin D3 1,000 IU    [facility], will be participating in the [facility]USH study for dementia.    Past Surgical History:   Procedure Laterality Date    HYSTERECTOMY  1982    secondary uterine fibroids    JOINT REPLACEMENT      Right total knee replacement         Past Medical History:   Diagnosis Date    Adrenal mass- adenoma stable since 2004 (1.8 cm and 8/13/12 (2 cm); stable 2016 2.4 cm     Adrenal mass- adenoma stable since 2004 (1.8 cm and 8/13/12 (2 cm); stable 2016 2.4 cm    Asthma in adult without complication 6/25/2015    Bilateral carotid artery disease 7/21/2017    Bilateral sciatica 2/7/2017    Cellulitis of right leg 08/16/2017    Cerebral " infarction 1/29/2016    Multiple areas of lacunar infarction. Stroke risk factors include HTN, DM2, Dyslipidemia.  Continue ASA 81mg/ Statin therapy I have encouraged 30 minutes of physical activity daily for 5 days a week. She has access to a pool so this should not be so jarring to her joints.     Cervical radiculopathy 3/18/2015    Chronic knee pain     Chronic rhinitis 4/9/2013    CKD (chronic kidney disease) stage 3, GFR 30-59 ml/min 1/29/2013    Coronary artery disease due to calcified coronary lesion 6/25/2015    Diastolic dysfunction 10/10/2013    Essential hypertension 6/25/2015    Gastroesophageal reflux disease without esophagitis 8/13/2012    Gout     Hives 10/1/2013    Mixed hyperlipidemia 8/13/2012    Morbid obesity with BMI of 50.0-59.9, adult 2/11/2014    Obstructive sleep apnea syndrome 8/13/2012    Senile cataracts of both eyes 1/22/2015    Traumatic open wound of right lower leg 8/25/2017    Trigeminal neuralgia of right side of face 5/23/2017    For years now Previously on Gabapentin, but caused constipation Dissipating over time Not related to intracranial abnormalities Possibly related to dental procedure    Type 2 diabetes mellitus with diabetic polyneuropathy, with long-term current use of insulin 1/29/2013    Venous stasis dermatitis of both lower extremities 8/21/2017    Vitamin D deficiency disease 8/13/2012       Review of Systems   Constitutional: Positive for activity change and appetite change. Negative for unexpected weight change.   Cardiovascular: Positive for leg swelling.   Gastrointestinal: Negative for constipation and diarrhea.   Skin: Positive for color change, rash and wound.   Hematological: Does not bruise/bleed easily.   Psychiatric/Behavioral: Positive for sleep disturbance. Negative for behavioral problems and decreased concentration.       Objective:   /60 (BP Location: Right arm, Patient Position: Sitting, BP Method: Large (Manual))   Pulse  "100   Ht 4' 11" (1.499 m)   Wt 126.9 kg (279 lb 12.2 oz)   SpO2 99%   BMI 56.51 kg/m²     Physical Exam   Constitutional: She appears well-developed.   Severe obesity   Eyes: EOM are normal.   Cardiovascular: Normal rate.   Pulmonary/Chest: Effort normal and breath sounds normal.   No wheezing, but obese body habitus   Abdominal:   Obese abdomen   Musculoskeletal: She exhibits edema.   Worsened venous stasis   Neurological: She is alert.   Skin:   Stage 1 ulcer on L leg  Worsened venous stasis   Psychiatric: She has a normal mood and affect.   Poor insight into disease       Lab Results   Component Value Date    WBC 8.33 12/11/2018    HGB 11.6 (L) 12/11/2018    HCT 40.0 12/11/2018     12/11/2018    CHOL 124 05/21/2018    TRIG 86 05/21/2018    HDL 36 (L) 05/21/2018    ALT 19 12/11/2018    AST 18 12/11/2018     12/11/2018    K 4.8 12/11/2018     12/11/2018    CREATININE 1.1 12/11/2018    BUN 22 12/11/2018    CO2 28 12/11/2018    TSH 0.746 05/21/2018    INR 1.1 04/17/2005    HGBA1C 7.3 (H) 10/10/2018                         Assessment:   78 y.o. female with multiple co-morbid illnesses here to continue work-up of chronic issues notably CHF, HTN, DM, CKD, venous stasis ulcers.     Plan:     Problem List Items Addressed This Visit        Cardiac/Vascular    Chronic diastolic heart failure     Diastolic heart failure, edema treated with torsemide  · Continue torsemide  · Increase to BID for next week  · Check labs today         Hypertension associated with diabetes     BP controlled ibesartan 300mg, torsemide; carvedilol discontinued by pulm due to SOB, amlodipine discontinued by PCP due to leg swelling  · Discontinued hydralazine due to overtreatment of HTN  · Changed losartan to ibesartan 300mg  · Consider Digital HTN program in future         Relevant Orders    Ambulatory Referral to Optometry    Hemoglobin A1c    Comprehensive metabolic panel (Completed)    CBC auto differential (Completed)    " Venous stasis dermatitis of both lower extremities     LE swelling bilaterally with poor wound healing, routine circaids with wound care  · Continue circaids boot, per wound care  · Treat cellulitis PRN  · Monitor CBC  · Discontinued amlodipine at previous appt  · New script for compression stockings given   · Will call [employer] for fitting         Relevant Orders    Ambulatory consult to Wound Clinic    CBC auto differential (Completed)    COMPRESSION STOCKINGS       Other    REJI on CPAP     Previously compliant with CPAP only 4 hours per night, now noncompliant  · Advised to continue using machine  · Advised to place machine next to recliner chair or use in her bedroom  · Increase hours on machine  · Advised to lose weight               Health Maintenance       Date Due Completion Date    TETANUS VACCINE 03/08/1958 ---    Foot Exam 01/19/2019 1/19/2018 (Done)- DONE    Override on 1/19/2018: Done (Dr head)    Override on 5/31/2017: Done    Override on 7/20/2016: Done    Eye Exam 02/15/2019 2/15/2018- TODAY    Override on 2/17/2016: Done    Override on 1/22/2015: Done    Override on 8/16/2012: Done    Hemoglobin A1c 04/10/2019 10/10/2018    Override on 7/13/2016: Done    Lipid Panel 05/21/2019 5/21/2018    Mammogram 02/22/2020 2/22/2018    DEXA SCAN 02/22/2021 2/22/2018    Override on 12/13/2011: Done          Follow-up in about 6 weeks (around 1/22/2019). One hour spent with this patient today, half of that in counseling.    Tami Schmidt MD/MPH  Internal Medicine  [facility]  [phone]   General Sunscreen Counseling: I recommended a broad spectrum sunscreen with a SPF of 30 or higher.  I explained that SPF 30 sunscreens block approximately 97 percent of the sun's harmful rays.  Sunscreens should be applied at least 15 minutes prior to expected sun exposure and then every 2 hours after that as long as sun exposure continues. If swimming or exercising sunscreen should be reapplied every 45 minutes to an hour after getting wet or sweating.  One ounce, or the equivalent of a shot glass full of sunscreen, is adequate to protect the skin not covered by a bathing suit. I also recommended a lip balm with a sunscreen as well. Sun protective clothing can be used in lieu of sunscreen but must be worn the entire time you are exposed to the sun's rays. Detail Level: Detailed

## 2024-11-18 NOTE — PROGRESS NOTES
HPI    6 WK DFE/OCT OU      DLS 08/29/2024  BY Dr. Jeff MD     CC: pt states : Vision has been stable and I gets some discomfort at   times.     ++blurred Va OS  --diplopia  --eye pain   --flashes/floater  --headaches  --curtain/shadow/veils    Eye meds: Pataday OU BID     POHx   1. CRVO w/ ME  OS    S/P GLADYS X 2 (09/29/2023) S/p Avastin OS (10/31/2023)  (08/29/2024)    2. AR NSC OU   3. HTN Retinopathy OU          Last edited by Viridiana Suárez MA on 11/19/2024  1:49 PM.     HPI    6 WK DFE/OCT OU      DLS 08/29/2024  BY Dr. Jeff MD     CC: pt states : Vision has been stable and I gets some discomfort at   times.     ++blurred Va OS  --diplopia  --eye pain   --flashes/floater  --headaches  --curtain/shadow/veils    Eye meds: Pataday OU BID     POHx   1. CRVO w/ ME  OS    S/P GLADYS X 2 (09/29/2023) S/p Avastin OS (10/31/2023)  (08/29/2024)    2. AR NSC OU   3. HTN Retinopathy OU          Last edited by Viridiana Suárez MA on 11/19/2024  1:49 PM.     HPI    6 WK DFE/OCT OU      DLS 08/29/2024  BY Dr. Jeff MD     CC: pt states : Vision has been stable and I gets some discomfort at   times.     ++blurred Va OS  --diplopia  --eye pain   --flashes/floater  --headaches  --curtain/shadow/veils    Eye meds: Pataday OU BID     POHx   1. CRVO w/ ME  OS    S/P GLADYS X 2 (09/29/2023) S/p Avastin OS (10/31/2023)  (08/29/2024)    2. AR NSC OU   3. HTN Retinopathy OU          Last edited by Viridiana Suárez MA on 11/19/2024  1:49 PM.     HPI    6 WK DFE/OCT OU      DLS 08/29/2024  BY Dr. Jeff MD     CC: pt states : Vision has been stable and I gets some discomfort at   times.     ++blurred Va OS  --diplopia  --eye pain   --flashes/floater  --headaches  --curtain/shadow/veils    Eye meds: Pataday OU BID     POHx   1. CRVO w/ ME  OS    S/P GLADYS X 2 (09/29/2023) S/p Avastin OS (10/31/2023)  (08/29/2024)    2. AR NSC OU   3. HTN Retinopathy OU          Last edited by Viridiana Suárez MA on 11/19/2024  1:49 PM.     HPI    6  WK DFE/OCT OU      DLS 08/29/2024  BY Dr. Jeff MD     CC: pt states : Vision has been stable and I gets some discomfort at   times.     ++blurred Va OS  --diplopia  --eye pain   --flashes/floater  --headaches  --curtain/shadow/veils    Eye meds: Pataday OU BID     POHx   1. CRVO w/ ME  OS    S/P GLADYS X 2 (09/29/2023) S/p Avastin OS (10/31/2023)  (08/29/2024)    2. AR NSC OU   3. HTN Retinopathy OU          Last edited by Viridiana Suárez MA on 11/19/2024  1:49 PM.     HPI    6 WK DFE/OCT OU      DLS 08/29/2024  BY Dr. Jeff MD     CC: pt states : Vision has been stable and I gets some discomfort at   times.     ++blurred Va OS  --diplopia  --eye pain   --flashes/floater  --headaches  --curtain/shadow/veils    Eye meds: Pataday OU BID     POHx   1. CRVO w/ ME  OS    S/P GLADYS X 2 (09/29/2023) S/p Avastin OS (10/31/2023)  (08/29/2024)    2. AR NSC OU   3. HTN Retinopathy OU          Last edited by Viridiana Suárez MA on 11/19/2024  1:49 PM.          A/P    ICD-10-CM ICD-9-CM   1. Central retinal vein occlusion with macular edema of left eye  H34.8120 362.35     362.83   2. Age-related nuclear cataract of both eyes  H25.13 366.16   3. Vitreous degeneration of both eyes  H43.813 379.21   4. Hypertensive retinopathy of both eyes  H35.033 362.11   5. Diabetes mellitus type 2 without retinopathy  E11.9 250.00   6. Ocular hypertension, bilateral  H40.053 365.04         1. Central retinal vein occlusion with macular edema of left eye  Here for CRVO f/u       S/p GLADYS x4 10/31/23  S/p I'VE x3 4/2/24  S/p IVO 8/29/24    Today VA 20/300 (Was CF), better IRF, resolved SRF     Plan:    given recent PE and on new blood thinners, will pause of antiVEGF treatment for now and bbring back 1-2 weeks for Ozurdex only     Recommend good blood pressure control, tight blood glucose control, and good cholesterol control     Visit today is associated with current or anticipated ongoing medical care related to this patients single serious  condition/complex condition (central retinal vein occlusion)     2. Age-related nuclear cataract of both eyes  Mild NS, NVS  Plan: Observation      3. Vitreous degeneration of both eyes  No RT/RD   Plan: Observation , counseled on RT/RD risk  Pathology of PVD, Retinal Tear, Retinal Detachment reviewed in great detail  RD precautions discussed in detail, patient expressed understanding  RTC immediately PRN (especially ANY change flashes, floaters, vision, visual field)     4. Hypertensive retinopathy of both eyes  Mod HTN changes, has CRVO OS  PCP Tami Schmidt MD - Recent notes reviewed  05/06/2024  7.1  A1C   Plan: Observation   Recommend good blood pressure control, tight blood glucose control, and good cholesterol control     5. Diabetes mellitus type 2 without retinopathy  No DR or DME OU, IRF and heme OS moreso due to CRVO  Plan: Observation for DR changes  Recommend good blood pressure control, tight blood glucose control, and good cholesterol control     6. Ocular hypertension, bilateral  IOP 23/25  Possible steroid response  Plan: start latan qHS OU, recheck next visit         RTC 1-2 weeks Ozurdex OS only, no dilation or testing       I saw and examined the patient and reviewed in detail the findings documented. The final examination findings, image interpretations, and plan as documented in the record represent my personal judgment and conclusions.    Ferdinand Rosas MD  Vitreoretinal Surgery   [facility]

## 2024-11-19 ENCOUNTER — OFFICE VISIT (OUTPATIENT)
Dept: OPHTHALMOLOGY | Facility: CLINIC | Age: 84
End: 2024-11-19
Payer: MEDICARE

## 2024-11-19 ENCOUNTER — CLINICAL SUPPORT (OUTPATIENT)
Dept: OPHTHALMOLOGY | Facility: CLINIC | Age: 84
End: 2024-11-19
Payer: MEDICARE

## 2024-11-19 DIAGNOSIS — H25.13 AGE-RELATED NUCLEAR CATARACT OF BOTH EYES: ICD-10-CM

## 2024-11-19 DIAGNOSIS — H40.053 OCULAR HYPERTENSION, BILATERAL: ICD-10-CM

## 2024-11-19 DIAGNOSIS — E11.9 DIABETES MELLITUS TYPE 2 WITHOUT RETINOPATHY: ICD-10-CM

## 2024-11-19 DIAGNOSIS — H35.033 HYPERTENSIVE RETINOPATHY OF BOTH EYES: ICD-10-CM

## 2024-11-19 DIAGNOSIS — H34.8120 CENTRAL RETINAL VEIN OCCLUSION WITH MACULAR EDEMA OF LEFT EYE: Primary | ICD-10-CM

## 2024-11-19 DIAGNOSIS — H43.813 VITREOUS DEGENERATION OF BOTH EYES: ICD-10-CM

## 2024-11-19 DIAGNOSIS — H34.8120 CENTRAL RETINAL VEIN OCCLUSION WITH MACULAR EDEMA OF LEFT EYE: ICD-10-CM

## 2024-11-19 PROCEDURE — 92202 OPSCPY EXTND ON/MAC DRAW: CPT | Mod: 59,S$PBB,, | Performed by: OPHTHALMOLOGY

## 2024-11-19 PROCEDURE — 92134 CPTRZ OPH DX IMG PST SGM RTA: CPT | Mod: 26,S$PBB,, | Performed by: OPHTHALMOLOGY

## 2024-11-19 PROCEDURE — 99213 OFFICE O/P EST LOW 20 MIN: CPT | Mod: PBBFAC | Performed by: OPHTHALMOLOGY

## 2024-11-19 PROCEDURE — 92202 OPSCPY EXTND ON/MAC DRAW: CPT | Mod: 59,PBBFAC | Performed by: OPHTHALMOLOGY

## 2024-11-19 PROCEDURE — 92134 CPTRZ OPH DX IMG PST SGM RTA: CPT | Mod: PBBFAC

## 2024-11-19 PROCEDURE — G2211 COMPLEX E/M VISIT ADD ON: HCPCS | Mod: S$PBB,,, | Performed by: OPHTHALMOLOGY

## 2024-11-19 PROCEDURE — 99214 OFFICE O/P EST MOD 30 MIN: CPT | Mod: S$PBB,,, | Performed by: OPHTHALMOLOGY

## 2024-11-19 PROCEDURE — 99999 PR PBB SHADOW E&M-EST. PATIENT-LVL III: CPT | Mod: PBBFAC,,, | Performed by: OPHTHALMOLOGY

## 2024-11-19 RX ORDER — LATANOPROST 50 UG/ML
1 SOLUTION/ DROPS OPHTHALMIC NIGHTLY
Qty: 2.5 ML | Refills: 3 | Status: SHIPPED | OUTPATIENT
Start: 2024-11-19 | End: 2025-11-19

## 2024-11-20 ENCOUNTER — HOSPITAL ENCOUNTER (OUTPATIENT)
Dept: CARDIOLOGY | Facility: HOSPITAL | Age: 84
Discharge: HOME | End: 2024-11-20
Attending: INTERNAL MEDICINE
Payer: MEDICARE

## 2024-11-20 DIAGNOSIS — I26.92 ACUTE SADDLE PULMONARY EMBOLISM WITHOUT ACUTE COR PULMONALE: ICD-10-CM

## 2024-11-20 PROCEDURE — 93970 EXTREMITY STUDY: CPT | Mod: 26,,, | Performed by: INTERNAL MEDICINE

## 2024-11-20 PROCEDURE — 93970 EXTREMITY STUDY: CPT

## 2024-11-22 ENCOUNTER — HOSPITAL ENCOUNTER (OUTPATIENT)
Dept: RADIOLOGY | Facility: HOSPITAL | Age: 84
Discharge: HOME | End: 2024-11-22
Attending: INTERNAL MEDICINE
Payer: MEDICARE

## 2024-11-22 DIAGNOSIS — I26.92 ACUTE SADDLE PULMONARY EMBOLISM WITHOUT ACUTE COR PULMONALE: ICD-10-CM

## 2024-11-22 PROCEDURE — 71275 CT ANGIOGRAPHY CHEST: CPT | Mod: 26,,, | Performed by: RADIOLOGY

## 2024-11-22 PROCEDURE — 71275 CT ANGIOGRAPHY CHEST: CPT | Mod: TC

## 2024-11-22 PROCEDURE — 25500020 PHARM REV CODE 255: Performed by: INTERNAL MEDICINE

## 2024-11-22 RX ADMIN — IOHEXOL 100 ML: 350 INJECTION, SOLUTION INTRAVENOUS at 10:11

## 2024-11-27 DIAGNOSIS — T84.50XD INFECTION OF PROSTHETIC JOINT, SUBSEQUENT ENCOUNTER: ICD-10-CM

## 2024-11-27 RX ORDER — DOXYCYCLINE HYCLATE 100 MG
100 TABLET ORAL 2 TIMES DAILY
Qty: 60 TABLET | Refills: 0 | Status: SHIPPED | OUTPATIENT
Start: 2024-11-27

## 2024-12-04 ENCOUNTER — OFFICE VISIT (OUTPATIENT)
Dept: PODIATRY | Facility: CLINIC | Age: 84
End: 2024-12-04
Payer: MEDICARE

## 2024-12-04 VITALS — BODY MASS INDEX: 53.12 KG/M2 | HEIGHT: 59 IN | DIASTOLIC BLOOD PRESSURE: 79 MMHG | SYSTOLIC BLOOD PRESSURE: 169 MMHG

## 2024-12-04 DIAGNOSIS — L84 CORN OR CALLUS: ICD-10-CM

## 2024-12-04 DIAGNOSIS — B35.1 ONYCHOMYCOSIS DUE TO DERMATOPHYTE: ICD-10-CM

## 2024-12-04 DIAGNOSIS — E11.42 TYPE 2 DIABETES MELLITUS WITH DIABETIC POLYNEUROPATHY, WITH LONG-TERM CURRENT USE OF INSULIN: Primary | ICD-10-CM

## 2024-12-04 DIAGNOSIS — Z79.4 TYPE 2 DIABETES MELLITUS WITH DIABETIC POLYNEUROPATHY, WITH LONG-TERM CURRENT USE OF INSULIN: Primary | ICD-10-CM

## 2024-12-04 PROCEDURE — 11056 PARNG/CUTG B9 HYPRKR LES 2-4: CPT | Mod: Q9,PBBFAC | Performed by: PODIATRIST

## 2024-12-04 PROCEDURE — 99214 OFFICE O/P EST MOD 30 MIN: CPT | Mod: PBBFAC | Performed by: PODIATRIST

## 2024-12-04 PROCEDURE — 99999 PR PBB SHADOW E&M-EST. PATIENT-LVL IV: CPT | Mod: PBBFAC,,, | Performed by: PODIATRIST

## 2024-12-04 PROCEDURE — 11721 DEBRIDE NAIL 6 OR MORE: CPT | Mod: Q9,59,PBBFAC | Performed by: PODIATRIST

## 2024-12-04 NOTE — PROGRESS NOTES
Subjective:      Patient ID: Duran Giordano is a 84 y.o. female.    Chief Complaint: Diabetic Foot Exam (9/23/24 - Sony Mccray MD) and Heel Pain (Left only at night/)      Duran Logan is a 84 y.o. female who presents to the clinic for evaluation and treatment of high risk feet. Duran Logan has a past medical history of Adrenal mass- adenoma stable since 2004 (1.8 cm and 8/13/12 (2 cm); stable 2016 2.4 cm, Arthritis, Asthma in adult without complication (6/25/2015), Bilateral carotid artery disease (7/21/2017), Bilateral sciatica (2/7/2017), Cellulitis of right leg (08/16/2017), Cerebral infarction (1/29/2016), Cervical radiculopathy (3/18/2015), Chronic knee pain, Chronic rhinitis (4/9/2013), CKD (chronic kidney disease) stage 3, GFR 30-59 ml/min (1/29/2013), Coronary artery disease due to calcified coronary lesion (6/25/2015), Deep vein thrombosis, Diastolic dysfunction (10/10/2013), Essential hypertension (6/25/2015), Gastroesophageal reflux disease without esophagitis (8/13/2012), Gout, Hives (10/1/2013), 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 The patient's chief complaint is elongated toenails and lymphedema.  This patient has documented high risk feet requiring routine maintenance secondary to diabetes mellitis and those secondary complications of diabetes, as mentioned.    PCP: Tami Schmidt MD     Date Last Seen by PCP:   Chief Complaint   Patient presents with    Diabetic Foot Exam     9/23/24 - Sony Mccray MD    Heel Pain     Left only at night          Current shoe gear:  Affected Foot: Casual shoes     Unaffected Foot: Casual shoes    Hemoglobin A1C   Date Value Ref Range Status   05/06/2024 7.1 (H) 4.0 - 5.6 % Final     Comment:     ADA Screening Guidelines:  5.7-6.4%  Consistent with prediabetes  >or=6.5%  Consistent with diabetes    High levels of fetal hemoglobin interfere with the HbA1C  assay. Heterozygous hemoglobin variants (HbS, HgC, etc)do  not significantly interfere with this assay.   However, presence of multiple variants may affect accuracy.     10/16/2023 6.9 (H) 4.0 - 5.6 % Final     Comment:     ADA Screening Guidelines:  5.7-6.4%  Consistent with prediabetes  >or=6.5%  Consistent with diabetes    High levels of fetal hemoglobin interfere with the HbA1C  assay. Heterozygous hemoglobin variants (HbS, HgC, etc)do  not significantly interfere with this assay.   However, presence of multiple variants may affect accuracy.     04/28/2023 6.8 (H) 4.0 - 5.6 % Final     Comment:     ADA Screening Guidelines:  5.7-6.4%  Consistent with prediabetes  >or=6.5%  Consistent with diabetes    High levels of fetal hemoglobin interfere with the HbA1C  assay. Heterozygous hemoglobin variants (HbS, HgC, etc)do  not significantly interfere with this assay.   However, presence of multiple variants may affect accuracy.         Review of Systems   Cardiovascular:  Positive for leg swelling.   Skin:  Positive for color change, dry skin, nail changes and unusual hair distribution. Negative for flushing, itching, rash and skin cancer.   Musculoskeletal:  Positive for arthritis and stiffness. Negative for back pain and falls.   Neurological:  Positive for numbness. Negative for paresthesias.   Allergic/Immunologic: Negative for hives.           Objective:       Vitals:    12/04/24 1353   BP: (!) 169/79  "  Height: 4' 11" (1.499 m)          Physical Exam  Vitals reviewed.   Constitutional:       Appearance: She is well-developed.   Cardiovascular:      Pulses:           Dorsalis pedis pulses are 1+ on the right side and 1+ on the left side.        Posterior tibial pulses are 1+ on the right side and 1+ on the left side.      Comments: Dorsalis pedis and posterior tibial pulses are palpable bilaterally. Toes are cool to touch. Feet are warm proximally.There is decreased digital hair bilateral. Skin is atrophic, hyperpigmented, and moderately edematous.    Musculoskeletal:         General: No tenderness.      Right lower leg: Edema present.      Left lower leg: Edema present.      Right ankle: Swelling and deformity present. Decreased range of motion.      Left ankle: Swelling and deformity present. Decreased range of motion.      Right foot: Decreased range of motion. Swelling and deformity present. No crepitus. Abnormal pulse.      Left foot: Decreased range of motion. Swelling and deformity present. No crepitus. Abnormal pulse.   Lymphadenopathy:      Comments: B/l lymphedema    Skin:     General: Skin is warm and dry.      Findings: No abrasion or burn.      Nails: There is no clubbing.      Comments: Nails x10 are elongated by  6-8mm's, thickened by 2-3 mm's, dystrophic, and are darkened in  coloration . Xerosis Bilaterally. No open lesions noted.    Hyperkeratotic tissue noted to distal hallux b/l       Skin thickened, leathery, svetlana blistering noted to legs.    Neurological:      Mental Status: She is alert and oriented to person, place, and time.      Sensory: Sensory deficit present.      Motor: Weakness present.      Comments: Decreased sharp/dull sensation bilateral feet.   Psychiatric:         Behavior: Behavior normal. Behavior is cooperative.               Assessment:       Encounter Diagnoses   Name Primary?    Type 2 diabetes mellitus with diabetic polyneuropathy, with long-term current use of insulin " "Yes    Onychomycosis due to dermatophyte     Corn or callus          Plan:       Duran Logan "Sister Duran Giordano" was seen today for diabetic foot exam and heel pain.    Diagnoses and all orders for this visit:    Type 2 diabetes mellitus with diabetic polyneuropathy, with long-term current use of insulin    Onychomycosis due to dermatophyte    Corn or callus     I counseled the patient on her conditions, their implications and medical management.      Shoe inspection. Diabetic Foot Education. Patient reminded of the importance of good nutrition and blood cummings gar control to help prevent podiatric complications of diabetes. Patient instructed on proper foot hygeine. We discussed wearing proper shoe gear, daily foot inspections, never walking without protective shoe gear, never putting sharp instruments to feet    - With patient's permission, nails were aggressively reduced and debrided x 10 to their soft tissue attachment mechanically and with electric , removing all offending nail and debris. Patient relates relief following the procedure. She will continue to monitor the areas daily, inspect her feet, wear protective shoe gear when ambulatory, moisturizer to maintain skin integrity and follow in this office in approximately 2-3 months, sooner p.r.n.    - After cleansing the  area w/ alcohol prep pad the above mentioned hyperkeratosis was trimmed utilizing No 15 scapel, to a smooth base with out incident. Patient tolerated this  well and reported comfort to the area x2                    "

## 2024-12-11 ENCOUNTER — OFFICE VISIT (OUTPATIENT)
Dept: ORTHOPEDICS | Facility: CLINIC | Age: 84
End: 2024-12-11
Payer: MEDICARE

## 2024-12-11 ENCOUNTER — HOSPITAL ENCOUNTER (OUTPATIENT)
Dept: RADIOLOGY | Facility: HOSPITAL | Age: 84
Discharge: HOME | End: 2024-12-11
Attending: ORTHOPAEDIC SURGERY
Payer: MEDICARE

## 2024-12-11 VITALS — BODY MASS INDEX: 52.89 KG/M2 | HEIGHT: 59 IN | WEIGHT: 262.38 LBS

## 2024-12-11 DIAGNOSIS — Z96.659 STATUS POST KNEE REPLACEMENT, UNSPECIFIED LATERALITY: ICD-10-CM

## 2024-12-11 DIAGNOSIS — Z96.659 STATUS POST KNEE REPLACEMENT, UNSPECIFIED LATERALITY: Primary | ICD-10-CM

## 2024-12-11 DIAGNOSIS — E66.01 MORBID OBESITY WITH BMI OF 50.0-59.9, ADULT: ICD-10-CM

## 2024-12-11 DIAGNOSIS — M17.12 PRIMARY OSTEOARTHRITIS OF LEFT KNEE: ICD-10-CM

## 2024-12-11 PROCEDURE — 73562 X-RAY EXAM OF KNEE 3: CPT | Mod: TC,50

## 2024-12-11 PROCEDURE — 99999 PR PBB SHADOW E&M-EST. PATIENT-LVL V: CPT | Mod: PBBFAC,,, | Performed by: ORTHOPAEDIC SURGERY

## 2024-12-11 PROCEDURE — 99215 OFFICE O/P EST HI 40 MIN: CPT | Mod: PBBFAC,25 | Performed by: ORTHOPAEDIC SURGERY

## 2024-12-11 PROCEDURE — 99213 OFFICE O/P EST LOW 20 MIN: CPT | Mod: S$PBB,,, | Performed by: ORTHOPAEDIC SURGERY

## 2024-12-11 PROCEDURE — 73562 X-RAY EXAM OF KNEE 3: CPT | Mod: 26,50,, | Performed by: RADIOLOGY

## 2024-12-11 NOTE — PROGRESS NOTES
"Subjective:      Patient ID: Duran Giordano is a 84 y.o. female.    Chief Complaint: Pain of the Right Knee and Pain of the Left Knee    HPI  Duran Giordano has minimal bilateral knee pain.  The left knee did fine with the injection and she is not having any significant right knee pain.  She states her biggest issue is weakness.  She is enquiring about home physical therapy.          Objective:      Body mass index is 52.99 kg/m².  Vitals:    24 1535   Weight: 119 kg (262 lb 5.6 oz)   Height: 4' 11" (1.499 m)           General    Constitutional: She is oriented to person, place, and time.   obese   HENT:   Head: Normocephalic and atraumatic.   Eyes: EOM are normal.   Cardiovascular:  Normal rate and regular rhythm.            Pulmonary/Chest: Effort normal.   Neurological: She is alert and oriented to person, place, and time.   Psychiatric: She has a normal mood and affect.     General Musculoskeletal Exam   Gait: normal       Right Knee Exam     Inspection   Erythema: absent  Scars: present  Swelling: absent  Effusion: absent  Deformity: absent  Bruising: absent    Tenderness   The patient is experiencing no tenderness.     Range of Motion   Extension:  0   Flexion:  110     Tests   Ligament Examination   Lachman: normal (-1 to 2mm)   MCL - Valgus: normal (0 to 2mm)  LCL - Varus: normal  Patella   Passive Patellar Tilt: neutral    Other   Sensation: normal    Left Knee Exam     Inspection   Erythema: absent  Scars: absent  Swelling: present  Effusion: absent  Deformity: absent  Bruising: absent    Tenderness   The patient is experiencing no tenderness.     Range of Motion   Extension:  0   Flexion:  110     Tests   Stability   Lachman: normal (-1 to 2mm)   MCL - Valgus: normal (0 to 2mm)  LCL - Varus: normal (0 to 2mm)  Patella   Passive Patellar Tilt: neutral    Other   Sensation: normal    Muscle Strength   Right Lower Extremity   Hip Abduction: 5/5   Quadriceps:  4/5   Hamstrin/5   Left Lower " "Extremity   Hip Abduction: 5/5   Quadriceps:  4/5   Hamstrin/5     Vascular Exam       Edema  Right Lower Leg: present  Left Lower Leg: present      Knee radiographs obtained today reviewed by me demonstrate a well-fixed and positioned revision right total knee arthroplasty.  There is Kellgren Abdelrahman grade 3-4 arthritic change of the left knee.  No fractures.        Assessment:       Encounter Diagnoses   Name Primary?    Status post knee replacement, unspecified laterality Yes    Primary osteoarthritis of left knee     Morbid obesity with BMI of 50.0-59.9, adult           Plan:       Duran Logan "Sister Duran Giordano" was seen today for pain and pain.    Diagnoses and all orders for this visit:    Status post knee replacement, unspecified laterality    Primary osteoarthritis of left knee    Morbid obesity with BMI of 50.0-59.9, adult      Options were discussed.  We will pursue a course of physical therapy.  She is homebound and we will go with home physical therapy.  F/U in 8 weeks.                " show

## 2024-12-11 NOTE — PROGRESS NOTES
[facility]      No chief complaint on file.      Patient ID: Duran Giordano is a 84 y.o. female with chronic diastolic heart failure, HTN, DM2, HLD, CAD, REJI, morbid obesity, history of CVA, who presents for an follow up appointment. Pertinent history/events are as follows:     -Pt kindly referred by Niki Smith PA-C for evaluation of lymphedema/acute saddle PE without acute cor pulmonale/chronic diastolic heart failure.    At our initial appointment on 09/17/2024, Sister Pasquale reports being admitted 6/5/2024 with syncope and found to have submassive PE.  She was subsequently discharged on Eliquis 5 mg bid.   Chronic diastolic heart failure- Stable. Continue current medications.  Acute saddle pulmonary embolism without acute cor pulmonale- Appears unprovoked.  Refer to Heme/Onc for hypercoagulable workup and age appropriate cancer screening. Continue Eliquis 5 mg bid.  Check repeat CTA chest and BLE venous ultrasound prior to next visit.  HTN- Stable. Continue current medications.  Morbid Obesity- Encourage diet, exercise, and weight loss.   HLD- Continue statin therapy.   BLE lymphedema- Pt presents with findings consistent with severe lymphedema.  Refer to lymphedema clinic after at least 3 months on full dose anticoagulation (plan to refer at next visit). Recommend wearing graduated compression hose.  Limit sodium intake to 2000 mg daily.  Limit volume intake to 1.5 L daily.  Elevate legs when resting.    HPI:  Sister Pasquale reports doing well. No chest pain or shortness of breath.CTA chest 11/22/2024 revealed suboptimal contrast opacification of the pulmonary arteries. Allowing for this limitation, no convincing evidence of central pulmonary thromboembolism.  BLE venous ultrasound 11/20/2024 revealed no evidence of right or left lower extremity DVT. Right PTVs, Peroneal and ATV's not visualized due to severe lymphedema.    Past Medical History:   Diagnosis Date    Adrenal mass- adenoma  stable since 2004 (1.8 cm and 8/13/12 (2 cm); stable 2016 2.4 cm     Adrenal mass- adenoma stable since 2004 (1.8 cm and 8/13/12 (2 cm); stable 2016 2.4 cm    Arthritis     Asthma in adult without complication 6/25/2015    Bilateral carotid artery disease 7/21/2017    Bilateral sciatica 2/7/2017    Cellulitis of right leg 08/16/2017    Cerebral infarction 1/29/2016    Multiple areas of lacunar infarction. Stroke risk factors include HTN, DM2, Dyslipidemia.  Continue ASA 81mg/ Statin therapy I have encouraged 30 minutes of physical activity daily for 5 days a week. She has access to a pool so this should not be so jarring to her joints.     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    Mixed hyperlipidemia 8/13/2012    Morbid obesity with BMI of 50.0-59.9, adult 2/11/2014    Obstructive sleep apnea syndrome 8/13/2012    Pulmonary embolism 6/5/2024    Senile cataracts of both eyes 1/22/2015    Traumatic open wound of right lower leg 8/25/2017    Trigeminal neuralgia of right side of face 5/23/2017    For years now Previously on Gabapentin, but caused constipation Dissipating over time Not related to intracranial abnormalities Possibly related to dental procedure    Type 2 diabetes mellitus with diabetic polyneuropathy, with long-term current use of insulin 1/29/2013    Venous stasis dermatitis of both lower extremities 8/21/2017    Viral hepatitis A without coma 1/1/1971    Hep B infection in [facility] in [facility], was hospitalize for 2 weeks at that time, unknown treatment.    Vitamin D deficiency disease  8/13/2012     Past Surgical History:   Procedure Laterality Date    HYSTERECTOMY  1982    secondary uterine fibroids    INJECTION OF ANESTHETIC AGENT AROUND NERVE Right 10/21/2021    Procedure: BLOCK, NERVE GENICULAR RIGHT NEEDS CONSENT;  Surgeon: Senia Kilpatrick MD;  Location: [facility];  Service: Pain Management;  Laterality: Right;    INSERTION OF ANTIBIOTIC SPACER Right 10/28/2021    Procedure: INSERTION, ANTIBIOTIC SPACER;  Surgeon: Alfredo Lozoya MD;  Location: [facility] OR [address] [place] [address];  Service: Orthopedics;  Laterality: Right;    JOINT REPLACEMENT      REVISION OF KNEE ARTHROPLASTY Right 10/28/2021    Procedure: REVISION, ARTHROPLASTY, KNEE-DEPUY ANTIBIOTIC SPACER;  Surgeon: Alfredo Lozoya MD;  Location: [facility] OR [address] [place] [address];  Service: Orthopedics;  Laterality: Right;    REVISION OF KNEE ARTHROPLASTY Right 6/30/2022    Procedure: REVISION, ARTHROPLASTY, KNEE-NAKIA- stage 2;  Surgeon: Alfredo Lozoya MD;  Location: [facility] OR [address] [place] [address];  Service: Orthopedics;  Laterality: Right;    Right total knee replacement       Social History     Socioeconomic History    Marital status: Single   Tobacco Use    Smoking status: Never    Smokeless tobacco: Never   Substance and Sexual Activity    Alcohol use: Yes     Comment: holidays    Drug use: No    Sexual activity: Never   Social History Narrative    Single with no children.      Social Drivers of Health     Financial Resource Strain: Low Risk  (6/7/2024)    Overall Financial Resource Strain (CARDIA)     Difficulty of Paying Living Expenses: Not hard at all   Food Insecurity: No Food Insecurity (6/7/2024)    Hunger Vital Sign     Worried About Running Out of Food in the Last Year: Never true     Ran Out of Food in the Last Year: Never true   Transportation Needs: No Transportation Needs (6/7/2024)    TRANSPORTATION NEEDS     Transportation : No   Physical Activity: Insufficiently Active (2/2/2024)    Exercise Vital Sign     Days of Exercise per Week: 1 day      Minutes of Exercise per Session: 10 min   Stress: No Stress Concern Present (6/7/2024)    [employer] [place] [employer] - Occupational Stress Questionnaire     Feeling of Stress : Only a little   Housing Stability: Low Risk  (6/7/2024)    Housing Stability Vital Sign     Unable to Pay for Housing in the Last Year: No     Homeless in the Last Year: No     Family History   Problem Relation Name Age of Onset    Cancer Mother Leslie Giordano 69        cancer of jaw    Hypertension Father Oskar Giordano     Cancer Sister Tiffanie Del Angel         half sister - unknown dx    No Known Problems Brother      No Known Problems Maternal Aunt      No Known Problems Maternal Uncle      No Known Problems Paternal Aunt      No Known Problems Paternal Uncle      No Known Problems Maternal Grandmother      No Known Problems Maternal Grandfather      No Known Problems Paternal Grandmother      No Known Problems Paternal Grandfather      Glaucoma Neg Hx      Breast cancer Neg Hx      Colon cancer Neg Hx      Ovarian cancer Neg Hx      Stroke Neg Hx      Allergic rhinitis Neg Hx      Allergies Neg Hx      Angioedema Neg Hx      Asthma Neg Hx      Atopy Neg Hx      Eczema Neg Hx      Immunodeficiency Neg Hx      Rhinitis Neg Hx      Urticaria Neg Hx      Amblyopia Neg Hx      Blindness Neg Hx      Cataracts Neg Hx      Diabetes Neg Hx      Macular degeneration Neg Hx      Retinal detachment Neg Hx      Strabismus Neg Hx      Thyroid disease Neg Hx      Psoriasis Neg Hx         Review of patient's allergies indicates:   Allergen Reactions    Sulfamethoxazole-trimethoprim      Other reaction(s): up set stomach rash/hives    Azithromycin      Feels bad    Erythromycin Other (See Comments)     Other reaction(s): Unknown  Other reaction(s): Stomach upset    Gentamicin      Vaginal burning , itching     Levofloxacin Hives     Other reaction(s): nervousness    Shellfish containing products      Rash      Vancomycin Itching     Other  reaction(s): Itching       Medication List with Changes/Refills   Current Medications    ALBUTEROL (PROVENTIL/VENTOLIN HFA) 90 MCG/ACTUATION INHALER    Inhale 2 puffs into the lungs every 4 (four) hours as needed for Wheezing or Shortness of Breath. Rescue    ALBUTEROL-IPRATROPIUM (DUO-NEB) 2.5 MG-0.5 MG/3 ML NEBULIZER SOLUTION    USE 1 VIAL PER NEBULIZER EVERY 6 HOURS AS NEEDED FOR WHEEZING/RESCUE    ALLOPURINOL (ZYLOPRIM) 100 MG TABLET    Take 1 tablet (100 mg total) by mouth once daily.    ALLOPURINOL (ZYLOPRIM) 300 MG TABLET    Take 1 tablet (300 mg total) by mouth once daily.    AMLODIPINE (NORVASC) 10 MG TABLET    Take 1 tablet (10 mg total) by mouth once daily.    APIXABAN (ELIQUIS) 5 MG TAB    Take 1 tablet (5 mg total) by mouth 2 (two) times daily.    ASPERCREME WITH ALOE 10 % CREA    Apply 1 Application topically as needed (to legs/back).    ATORVASTATIN (LIPITOR) 80 MG TABLET    TAKE ONE TABLET BY MOUTH EVERY DAY    BLOOD SUGAR DIAGNOSTIC STRP    BG monitoring 4 times a day. Pt needs test strips for TELOS Talking meter.    CHOLECALCIFEROL, VITAMIN D3, (VITAMIN D3) 25 MCG (1,000 UNIT) CAPSULE    Take 1 capsule (1,000 Units total) by mouth once daily.    CLOTRIMAZOLE-BETAMETHASONE 1-0.05% (LOTRISONE) CREAM    Apply topically 2 (two) times daily.    COLCHICINE (COLCRYS) 0.6 MG TABLET    Take 1 tablet (0.6 mg total) by mouth daily as needed (gout attack). Take 1 tablet in event of gout attack.  If still having symptoms after 2 hours take 2 more tablets.  Do not take more than 3 in one day.    DICLOFENAC SODIUM (VOLTAREN) 1 % GEL    APPLY 2 GRAMS TOPICALLY DAILY    DOCUSATE SODIUM (COLACE) 100 MG CAPSULE    Take 1 capsule (100 mg total) by mouth once daily.    DOXYCYCLINE (VIBRA-TABS) 100 MG TABLET    Take 1 tablet (100 mg total) by mouth 2 (two) times daily.    FLUTICASONE (FLONASE) 50 MCG/ACTUATION NASAL SPRAY    2 sprays (100 mcg total) by Each Nare route once daily.    HYDROCODONE-ACETAMINOPHEN (NORCO)  "5-325 MG PER TABLET    Take 1 tablet by mouth every 6 (six) hours as needed.    IBUPROFEN (ADVIL,MOTRIN) 800 MG TABLET    Take 800 mg by mouth every 6 (six) hours as needed.    INSULIN DEGLUDEC (TRESIBA FLEXTOUCH U-100) 100 UNIT/ML (3 ML) INSULIN PEN    Inject 12-18 units at night.    LANCETS MISC    Test daily    LATANOPROST (XALATAN) 0.005 % OPHTHALMIC SOLUTION    Place 1 drop into both eyes every evening.    MENTHOL-ZINC OXIDE (CALMOSEPTINE) 0.44-20.6 % OINT    Apply topically 2 (two) times daily as needed (chafing).    NEBULIZER AND COMPRESSOR (COMP-AIR ELITE COMP NEB SYSTEM) TORRES    use as directed    OLOPATADINE (PATANOL) 0.1 % OPHTHALMIC SOLUTION    Place 1 drop into both eyes 2 (two) times daily.    OZEMPIC 0.25 MG OR 0.5 MG (2 MG/3 ML) PEN INJECTOR    INJECT 0.5 MG SUBCUTANEOUSLY ONCE A WEEK    PEN NEEDLE, DIABETIC (PEN NEEDLE) 29 GAUGE X 1/2" NDLE    USE DAILY    POLYETHYLENE GLYCOL (GLYCOLAX) 17 GRAM/DOSE POWDER    MIX 17 GM (1 CAPFUL) IN 4 TO 8 OUNCES OF FLUID AND TAKE DAILY    SENNA 8.6 MG TABLET    Take 1 tablet by mouth once daily.    TORSEMIDE (DEMADEX) 10 MG TAB    Take 1 tablet (10 mg total) by mouth once daily.    WITCH HAZEL (TUCKS, WITCH HAZEL,) 50 % PADM    Apply 1 each topically daily as needed (irritated skin tags or hemorrhoids).    ZINC OXIDE 10 % OINT    Apply 1 Application topically 2 (two) times daily as needed (wounds or blisters).       Review of Systems  Constitution: Denies chills, fever, and sweats.  HENT: Denies headaches or blurry vision.  Cardiovascular: Denies chest pain or irregular heart beat.  Respiratory: Denies cough or shortness of breath.  Gastrointestinal: Denies abdominal pain, nausea, or vomiting.  Musculoskeletal: Denies muscle cramps.  Neurological: Denies dizziness or focal weakness.  Psychiatric/Behavioral: Normal mental status.  Hematologic/Lymphatic: Denies bleeding problem or easy bruising/bleeding.  Skin: Denies rash or suspicious lesions    Physical " Examination  There were no vitals taken for this visit.    Constitutional: No acute distress, conversant  HEENT: Sclera anicteric, Pupils equal, round and reactive to light, extraocular motions intact, Oropharynx clear  Neck: No JVD, no carotid bruits  Cardiovascular: regular rate and rhythm, no murmur, rubs or gallops, normal S1/S2  Pulmonary: Clear to auscultation bilaterally  Abdominal: Abdomen soft, nontender, nondistended, positive bowel sounds  Extremities: LE lymphedema with venous dermatitis R>L.   Pulses:  Carotid pulses are 2+ on the right side, and 2+ on the left side.  Radial pulses are 2+ on the right side, and 2+ on the left side.   Femoral pulses are 2+ on the right side, and 2+ on the left side.  Popliteal pulses are 2+ on the right side, and 2+ on the left side.   Dorsalis pedis pulses are 2+ on the right side, and 2+ on the left side.   Posterior tibial pulses are 2+ on the right side, and 2+ on the left side.    Skin: No ecchymosis, erythema, or ulcers  Psych: Alert and oriented x 3, appropriate affect  Neuro: CNII-XII intact, no focal deficits    Labs:  Most Recent Data  CBC:   Lab Results   Component Value Date    WBC 8.29 06/19/2024    HGB 11.7 (L) 06/19/2024    HCT 37.5 06/19/2024     06/19/2024    MCV 94 06/19/2024    RDW 17.5 (H) 06/19/2024     BMP:   Lab Results   Component Value Date     07/19/2024    K 4.2 07/19/2024     07/19/2024    CO2 24 07/19/2024    BUN 29 (H) 07/19/2024    CREATININE 1.3 11/15/2024     (H) 07/19/2024    CALCIUM 9.1 07/19/2024    MG 1.8 07/19/2024    PHOS 3.6 07/19/2024     LFTS;   Lab Results   Component Value Date    PROT 7.9 07/19/2024    ALBUMIN 2.7 (L) 07/19/2024    BILITOT 0.3 07/19/2024    AST 18 07/19/2024    ALKPHOS 94 07/19/2024    ALT 15 07/19/2024     COAGS:   Lab Results   Component Value Date    INR 1.1 06/05/2024     FLP:   Lab Results   Component Value Date    CHOL 128 10/16/2023    HDL 47 10/16/2023    LDLCALC 69.2  10/16/2023    TRIG 59 10/16/2023    CHOLHDL 36.7 10/16/2023     CARDIAC:   Lab Results   Component Value Date    TROPONINI 0.297 (H) 06/07/2024    BNP 88 07/19/2024       Imaging:    CTA chest 11/22/2024:   Suboptimal contrast opacification of the pulmonary arteries. Allowing for this limitation, no convincing evidence of central pulmonary thromboembolism.      BLE venous ultrasound 11/20/2024:     There is no evidence of right or left lower extremity DVT.    Right PTVs, Peroneal and ATV's not visualized due to severe lymphedema.    CTA chest 6/5/2024:  1. Extensive pulmonary emboli throughout the bilateral lungs including a saddle pulmonary embolus.  There is evidence of right heart strain with flattening of the interventricular septum and enlargement of the right ventricle and main pulmonary artery.  2. Peripheral airspace opacities in the right middle lobe, consistent with areas of pulmonary infarctions.  3. 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    Assessment/Plan:  Duran Giordano is a 84 y.o. female with chronic diastolic heart failure, HTN, DM2, HLD, CAD, REJI, morbid obesity, history of CVA, who presents for an follow up appointment.     Chronic diastolic heart failure- Stable. Continue current medications.    2. Acute saddle pulmonary embolism without acute cor pulmonale- Appears unprovoked.  Refer to Heme/Onc for hypercoagulable workup and age appropriate cancer screening. Repeat CTA chest 11/22/2024 showed suboptimal contrast opacification of the pulmonary arteries. Allowing for this limitation, no convincing evidence of central pulmonary thromboembolism. BLE venous ultrasound 11/20/2024 revealed no evidence of right or left lower extremity DVT. The right PTVs, Peroneal and ATV's not visualized due to severe lymphedema. Continue Eliquis 5 mg bid.      3. HTN- Stable. Continue current medications.    4. Morbid Obesity- Encourage diet, exercise, and weight loss.     5. HLD- Continue  statin therapy.     6. BLE lymphedema- Pt presents with findings consistent with severe lymphedema.  Refer to lymphedema clinic.  Recommend wearing graduated compression hose.  Limit sodium intake to 2000 mg daily.  Limit volume intake to 1.5 L daily.  Elevate legs when resting.    Follow up in 4 months     Total duration of face to face visit time 60 minutes.  Total time spent counseling greater than fifty percent of total visit time.  Counseling included discussion regarding imaging findings, diagnosis, possibilities, treatment options, risks and benefits.  The patient had many questions regarding the options and long-term effects.    Patient seen and discussed with Dr. Palmer. Further recommendations per the attending addendum.    Celeste Comer MD  PGY IV - Vascular Medicine Fellow   [facility]

## 2024-12-12 ENCOUNTER — OFFICE VISIT (OUTPATIENT)
Dept: CARDIOLOGY | Facility: CLINIC | Age: 84
End: 2024-12-12
Payer: MEDICARE

## 2024-12-12 VITALS
WEIGHT: 262 LBS | BODY MASS INDEX: 52.82 KG/M2 | HEIGHT: 59 IN | HEART RATE: 87 BPM | SYSTOLIC BLOOD PRESSURE: 146 MMHG | DIASTOLIC BLOOD PRESSURE: 81 MMHG

## 2024-12-12 DIAGNOSIS — I25.84 CORONARY ARTERY DISEASE DUE TO CALCIFIED CORONARY LESION: Chronic | ICD-10-CM

## 2024-12-12 DIAGNOSIS — I15.2 HYPERTENSION ASSOCIATED WITH DIABETES: ICD-10-CM

## 2024-12-12 DIAGNOSIS — E66.01 MORBID OBESITY WITH BMI OF 50.0-59.9, ADULT: ICD-10-CM

## 2024-12-12 DIAGNOSIS — I87.2 VENOUS STASIS DERMATITIS OF BOTH LOWER EXTREMITIES: ICD-10-CM

## 2024-12-12 DIAGNOSIS — G47.33 OSA ON CPAP: ICD-10-CM

## 2024-12-12 DIAGNOSIS — E11.59 HYPERTENSION ASSOCIATED WITH DIABETES: ICD-10-CM

## 2024-12-12 DIAGNOSIS — I89.0 LYMPHEDEMA OF BOTH LOWER EXTREMITIES: Primary | ICD-10-CM

## 2024-12-12 DIAGNOSIS — I50.32 CHRONIC DIASTOLIC HEART FAILURE: ICD-10-CM

## 2024-12-12 DIAGNOSIS — E78.2 MIXED HYPERLIPIDEMIA: ICD-10-CM

## 2024-12-12 DIAGNOSIS — I25.10 CORONARY ARTERY DISEASE DUE TO CALCIFIED CORONARY LESION: Chronic | ICD-10-CM

## 2024-12-12 DIAGNOSIS — I10 ESSENTIAL HYPERTENSION: ICD-10-CM

## 2024-12-12 PROCEDURE — 99215 OFFICE O/P EST HI 40 MIN: CPT | Mod: PBBFAC | Performed by: INTERNAL MEDICINE

## 2024-12-12 PROCEDURE — 99999 PR PBB SHADOW E&M-EST. PATIENT-LVL V: CPT | Mod: PBBFAC,,, | Performed by: INTERNAL MEDICINE

## 2024-12-12 PROCEDURE — 99213 OFFICE O/P EST LOW 20 MIN: CPT | Mod: S$PBB,,, | Performed by: INTERNAL MEDICINE

## 2024-12-24 DIAGNOSIS — T84.50XD INFECTION OF PROSTHETIC JOINT, SUBSEQUENT ENCOUNTER: ICD-10-CM

## 2024-12-27 RX ORDER — DOXYCYCLINE HYCLATE 100 MG
100 TABLET ORAL 2 TIMES DAILY
Qty: 60 TABLET | Refills: 0 | Status: SHIPPED | OUTPATIENT
Start: 2024-12-27

## 2024-12-27 NOTE — TELEPHONE ENCOUNTER
PCP called patient to clarify her concerns about pill packing, but went to voicemail. Patient advised to call office phone directly and leave details on voicemail.    Thanks,  BARB   DM (diabetes mellitus), type 2 Dry cough Anemia

## 2024-12-31 RX ORDER — PEN NEEDLE, DIABETIC 29 G X1/2"
NEEDLE, DISPOSABLE MISCELLANEOUS
Qty: 100 EACH | Refills: 3 | Status: SHIPPED | OUTPATIENT
Start: 2024-12-31

## 2025-01-16 ENCOUNTER — TELEPHONE (OUTPATIENT)
Dept: INTERNAL MEDICINE | Facility: CLINIC | Age: 85
End: 2025-01-16
Payer: MEDICARE

## 2025-01-16 NOTE — TELEPHONE ENCOUNTER
----- Message from Ronnie sent at 1/16/2025  2:16 PM CST -----  Contact: [phone]@[email or web address]  Good afternoon patient would like to request that Dr. Contreras contact her ins company to send over info for her to get her med OZEMPIC 0.25 mg or 0.5 mg (2 mg/3 mL) pen injector so they can approve it cause patient needs a refill by Saturday 1/18/25. Please call patient to advise [phone]

## 2025-01-17 ENCOUNTER — TELEPHONE (OUTPATIENT)
Facility: CLINIC | Age: 85
End: 2025-01-17

## 2025-01-17 NOTE — TELEPHONE ENCOUNTER
----- Message from Natasha sent at 1/17/2025  3:02 PM CST -----  Contact: Mobile  [phone]  Patient would like for you to write a letter in regards to Ozempic, that she take once a week. Patient said that the letter would have to state that she can take Ozempic ..    Please send the letter to..      [facility] [facility] [id number]  [facility]  [address]  [facility] [address]  Phone: [phone] Fax: [phone]    Patient said that she's out of medication, and she would like for you to check the Cover My Meds website.      Urgent!

## 2025-01-24 ENCOUNTER — TELEPHONE (OUTPATIENT)
Dept: INTERNAL MEDICINE | Facility: CLINIC | Age: 85
End: 2025-01-24
Payer: MEDICARE

## 2025-01-24 DIAGNOSIS — T84.50XD INFECTION OF PROSTHETIC JOINT, SUBSEQUENT ENCOUNTER: ICD-10-CM

## 2025-01-24 RX ORDER — DOXYCYCLINE HYCLATE 100 MG
100 TABLET ORAL 2 TIMES DAILY
Qty: 60 TABLET | Refills: 0 | Status: SHIPPED | OUTPATIENT
Start: 2025-01-24

## 2025-01-24 NOTE — TELEPHONE ENCOUNTER
Lm for [facility]ord[facility]- asking for a returned call to discuss insurance and medication.  Pt has medicare- medicaid and additional coverage but clarification is needed on the 3rd plan.  Spoke to 1 of the nuns who explained the nurse is not available today.  Lm a direct phone number w/ hopes to get clarification on current insurance plan.

## 2025-01-24 NOTE — TELEPHONE ENCOUNTER
I called Medicaid and they confirmed pt does have Medicare part D benefit.  Pharmacy is closed now.  Reached out to ask pt when was the last time Ozempic.

## 2025-02-06 ENCOUNTER — PATIENT MESSAGE (OUTPATIENT)
Dept: INTERNAL MEDICINE | Facility: CLINIC | Age: 85
End: 2025-02-06
Payer: MEDICARE

## 2025-02-10 PROBLEM — Z86.711 HISTORY OF PULMONARY EMBOLISM: Status: ACTIVE | Noted: 2025-02-10
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
## 2025-02-21 DIAGNOSIS — Z00.00 ENCOUNTER FOR MEDICARE ANNUAL WELLNESS EXAM: ICD-10-CM

## 2025-02-21 DIAGNOSIS — T84.50XD INFECTION OF PROSTHETIC JOINT, SUBSEQUENT ENCOUNTER: ICD-10-CM

## 2025-02-25 RX ORDER — DOXYCYCLINE HYCLATE 100 MG
100 TABLET ORAL 2 TIMES DAILY
Qty: 60 TABLET | Refills: 5 | Status: SHIPPED | OUTPATIENT
Start: 2025-02-25

## 2025-03-12 ENCOUNTER — TELEPHONE (OUTPATIENT)
Dept: ORTHOPEDICS | Facility: CLINIC | Age: 85
End: 2025-03-12
Payer: MEDICARE

## 2025-03-12 NOTE — TELEPHONE ENCOUNTER
Called pt and LVM informing her we rescheduled her appt to 4/2 at 3:30 PM and to call back if she can not make that appt.     ----- Message from Helen sent at 3/12/2025  9:23 AM CDT -----  Regarding: Reschedule  Contact: [phone]  Pt is calling in ref to rescheduling her 3/12 cancelled appt. Pt has the FLU and would like something sooner than June.  Preferably in March says pt. Patient Requesting Call Back @  [phone]

## 2025-03-31 DIAGNOSIS — Z96.659 STATUS POST KNEE REPLACEMENT, UNSPECIFIED LATERALITY: Primary | ICD-10-CM

## 2025-03-31 DIAGNOSIS — M17.12 PRIMARY OSTEOARTHRITIS OF LEFT KNEE: ICD-10-CM

## 2025-04-01 RX ORDER — APIXABAN 5 MG/1
5 TABLET, FILM COATED ORAL 2 TIMES DAILY
Qty: 60 TABLET | Refills: 5 | Status: SHIPPED | OUTPATIENT
Start: 2025-04-01

## 2025-04-02 ENCOUNTER — HOSPITAL ENCOUNTER (OUTPATIENT)
Dept: RADIOLOGY | Facility: HOSPITAL | Age: 85
Discharge: HOME | End: 2025-04-02
Attending: ORTHOPAEDIC SURGERY
Payer: MEDICARE

## 2025-04-02 ENCOUNTER — OFFICE VISIT (OUTPATIENT)
Dept: ORTHOPEDICS | Facility: CLINIC | Age: 85
End: 2025-04-02
Payer: MEDICARE

## 2025-04-02 ENCOUNTER — OFFICE VISIT (OUTPATIENT)
Dept: PODIATRY | Facility: CLINIC | Age: 85
End: 2025-04-02
Payer: MEDICARE

## 2025-04-02 VITALS
HEART RATE: 106 BPM | HEIGHT: 59 IN | SYSTOLIC BLOOD PRESSURE: 144 MMHG | DIASTOLIC BLOOD PRESSURE: 75 MMHG | BODY MASS INDEX: 52.92 KG/M2

## 2025-04-02 VITALS — BODY MASS INDEX: 52.8 KG/M2 | WEIGHT: 261.94 LBS | HEIGHT: 59 IN

## 2025-04-02 DIAGNOSIS — Z79.4 TYPE 2 DIABETES MELLITUS WITH DIABETIC POLYNEUROPATHY, WITH LONG-TERM CURRENT USE OF INSULIN: Primary | ICD-10-CM

## 2025-04-02 DIAGNOSIS — L84 CORN OR CALLUS: ICD-10-CM

## 2025-04-02 DIAGNOSIS — E66.01 MORBID OBESITY WITH BMI OF 50.0-59.9, ADULT: ICD-10-CM

## 2025-04-02 DIAGNOSIS — M17.12 PRIMARY OSTEOARTHRITIS OF LEFT KNEE: ICD-10-CM

## 2025-04-02 DIAGNOSIS — Z96.659 STATUS POST KNEE REPLACEMENT, UNSPECIFIED LATERALITY: ICD-10-CM

## 2025-04-02 DIAGNOSIS — M17.12 PRIMARY OSTEOARTHRITIS OF LEFT KNEE: Primary | ICD-10-CM

## 2025-04-02 DIAGNOSIS — E11.42 TYPE 2 DIABETES MELLITUS WITH DIABETIC POLYNEUROPATHY, WITH LONG-TERM CURRENT USE OF INSULIN: Primary | ICD-10-CM

## 2025-04-02 DIAGNOSIS — B35.1 ONYCHOMYCOSIS DUE TO DERMATOPHYTE: ICD-10-CM

## 2025-04-02 PROCEDURE — 99999 PR PBB SHADOW E&M-EST. PATIENT-LVL IV: CPT | Mod: PBBFAC,,, | Performed by: ORTHOPAEDIC SURGERY

## 2025-04-02 PROCEDURE — 73560 X-RAY EXAM OF KNEE 1 OR 2: CPT | Mod: TC,50

## 2025-04-02 PROCEDURE — 99213 OFFICE O/P EST LOW 20 MIN: CPT | Mod: S$PBB,,, | Performed by: ORTHOPAEDIC SURGERY

## 2025-04-02 PROCEDURE — 99214 OFFICE O/P EST MOD 30 MIN: CPT | Mod: PBBFAC,25,27 | Performed by: ORTHOPAEDIC SURGERY

## 2025-04-02 PROCEDURE — 99214 OFFICE O/P EST MOD 30 MIN: CPT | Mod: PBBFAC | Performed by: PODIATRIST

## 2025-04-02 PROCEDURE — 99999 PR PBB SHADOW E&M-EST. PATIENT-LVL IV: CPT | Mod: PBBFAC,,, | Performed by: PODIATRIST

## 2025-04-02 PROCEDURE — 11721 DEBRIDE NAIL 6 OR MORE: CPT | Mod: Q9,PBBFAC | Performed by: PODIATRIST

## 2025-04-02 PROCEDURE — 11056 PARNG/CUTG B9 HYPRKR LES 2-4: CPT | Mod: Q9,59,PBBFAC | Performed by: PODIATRIST

## 2025-04-02 NOTE — PROGRESS NOTES
Subjective:      Patient ID: Duran Giordano is a 85 y.o. female.    Chief Complaint: Pain of the Right Knee      History of Present Illness    CHIEF COMPLAINT:  - Duran presents for follow-up s/p left knee CSI.    HPI:  - Has a history of right knee surgery  - Denies pain when straightening the left leg or bending it up underneath her-responded well to injection   Presents with concerns about gout affecting her other knee and heel  - Pain is excruciating, especially at night  - Reports taking medication for gout, including colchicine  - Had an appointment earlier the same day where increasing the medication dosage was discussed  - Expresses a desire to improve her mobility, stating she wants to walk  - Uses a walker for short distances and a wheelchair for longer distances  - Has a job at [employer] requiring movement, seeing people, and picking up things  - Feels she is not performing as well as she should due to her condition  - Expresses a need for PT and home health services, preferably at [facility]    PREVIOUS TREATMENTS:  - Right revision total knee arthroplasty: Provided relief    WORK STATUS:  - Job at [employer] house  - Involves moving, seeing people, and picking up things  - Currently having difficulty performing tasks due to mobility issues  - Using a walker for short distances  - Using a wheelchair for longer distances         Review of Systems   Constitutional: Negative for chills, fever and night sweats.   HENT:  Negative for hearing loss.    Eyes:  Negative for blurred vision and double vision.   Cardiovascular:  Negative for chest pain, claudication and leg swelling.   Respiratory:  Negative for shortness of breath.    Endocrine: Negative for polydipsia, polyphagia and polyuria.   Hematologic/Lymphatic: Negative for adenopathy and bleeding problem. Does not bruise/bleed easily.   Skin:  Negative for poor wound healing.   Gastrointestinal:  Negative for diarrhea and heartburn.   Genitourinary:   "Negative for bladder incontinence.   Neurological:  Negative for focal weakness, headaches, numbness, paresthesias and sensory change.   Psychiatric/Behavioral:  The patient is not nervous/anxious.    Allergic/Immunologic: Negative for persistent infections.         Objective:      Body mass index is 52.9 kg/m².  Vitals:    25 1550   Weight: 118.8 kg (261 lb 14.5 oz)   Height: 4' 11" (1.499 m)           General    Constitutional: She is oriented to person, place, and time.   obese   HENT:   Head: Normocephalic and atraumatic.   Eyes: EOM are normal.   Cardiovascular:  Normal rate and regular rhythm.            Pulmonary/Chest: Effort normal.   Neurological: She is alert and oriented to person, place, and time.   Psychiatric: She has a normal mood and affect.           Right Knee Exam     Inspection   Scars: present    Range of Motion   Extension:  10   Flexion:  110     Tests   Ligament Examination   Lachman: normal (-1 to 2mm)   MCL - Valgus: normal (0 to 2mm)  LCL - Varus: normal    Left Knee Exam     Range of Motion   Extension:  0   Flexion:  110     Tests   Stability   Lachman: normal (-1 to 2mm)   MCL - Valgus: normal (0 to 2mm)  LCL - Varus: normal (0 to 2mm)    Muscle Strength   Right Lower Extremity   Hip Abduction: 4/5   Quadriceps:  4/5   Hamstrin/5   Left Lower Extremity   Hip Abduction: 4/5   Quadriceps:  4/5   Hamstrin/5     Vascular Exam       Edema  Right Lower Leg: present  Left Lower Leg: present      Physical Exam    IMAGING:  - XR Bilateral Knees: Right knee shows a well-fixed and position revision total knee arthroplasty. Left knee demonstrates Kellgren Abdelrahman grade 3 changes.               Assessment:       Encounter Diagnoses   Name Primary?    Primary osteoarthritis of left knee Yes    Status post knee replacement, unspecified laterality     Morbid obesity with BMI of 50.0-59.9, adult           Plan:       Duran Logan "Sister Duran Giordano" was seen today for " pain.    Diagnoses and all orders for this visit:    Primary osteoarthritis of left knee  -     Ambulatory referral/consult to Home Health; Future    Status post knee replacement, unspecified laterality  -     Ambulatory referral/consult to Home Health; Future    Morbid obesity with BMI of 50.0-59.9, adult  -     Ambulatory referral/consult to Home Health; Future      Assessment & Plan            She will follow-up with her primary regarding her gout medication.  To aid in her mobility we will get home physical therapy set up for some conditioning and gait training.  I will see her back in 3 months to see if this is helped her.          This note was generated with the assistance of ambient listening technology. Verbal consent was obtained by the patient and accompanying visitor(s) for the recording of patient appointment to facilitate this note. I attest to having reviewed and edited the generated note for accuracy, though some syntax or spelling errors may persist. Please contact the author of this note for any clarification.

## 2025-04-02 NOTE — PROGRESS NOTES
Subjective:      Patient ID: Duran Giordano is a 85 y.o. female.    Chief Complaint: Diabetic Foot Exam (10/7/24 - Sony Mccray MD, PCP/)      Duran Logan is a 85 y.o. female who presents to the clinic for evaluation and treatment of high risk feet. Duran Logan has a past medical history of Adrenal mass- adenoma stable since 2004 (1.8 cm and 8/13/12 (2 cm); stable 2016 2.4 cm, Arthritis, Asthma in adult without complication (6/25/2015), Bilateral carotid artery disease (7/21/2017), Bilateral sciatica (2/7/2017), Cellulitis of right leg (08/16/2017), Cerebral infarction (1/29/2016), Cervical radiculopathy (3/18/2015), Chronic knee pain, Chronic rhinitis (4/9/2013), CKD (chronic kidney disease) stage 3, GFR 30-59 ml/min (1/29/2013), Coronary artery disease due to calcified coronary lesion (6/25/2015), Deep vein thrombosis, Diastolic dysfunction (10/10/2013), Essential hypertension (6/25/2015), Gastroesophageal reflux disease without esophagitis (8/13/2012), Gout, Hives (10/1/2013), 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 The patient's chief complaint is elongated toenails and lymphedema.  This patient has documented high risk feet requiring routine maintenance secondary to diabetes mellitis and those secondary complications of diabetes, as mentioned.    PCP: Tami Schmidt MD    Date Last Seen by PCP:   Chief  "Complaint   Patient presents with    Diabetic Foot Exam     10/7/24 - Sony Mccray MD, PCP          Current shoe gear:  Affected Foot: Casual shoes     Unaffected Foot: Casual shoes    Hemoglobin A1C   Date Value Ref Range Status   05/06/2024 7.1 (H) 4.0 - 5.6 % Final     Comment:     ADA Screening Guidelines:  5.7-6.4%  Consistent with prediabetes  >or=6.5%  Consistent with diabetes    High levels of fetal hemoglobin interfere with the HbA1C  assay. Heterozygous hemoglobin variants (HbS, HgC, etc)do  not significantly interfere with this assay.   However, presence of multiple variants may affect accuracy.     10/16/2023 6.9 (H) 4.0 - 5.6 % Final     Comment:     ADA Screening Guidelines:  5.7-6.4%  Consistent with prediabetes  >or=6.5%  Consistent with diabetes    High levels of fetal hemoglobin interfere with the HbA1C  assay. Heterozygous hemoglobin variants (HbS, HgC, etc)do  not significantly interfere with this assay.   However, presence of multiple variants may affect accuracy.     04/28/2023 6.8 (H) 4.0 - 5.6 % Final     Comment:     ADA Screening Guidelines:  5.7-6.4%  Consistent with prediabetes  >or=6.5%  Consistent with diabetes    High levels of fetal hemoglobin interfere with the HbA1C  assay. Heterozygous hemoglobin variants (HbS, HgC, etc)do  not significantly interfere with this assay.   However, presence of multiple variants may affect accuracy.         Review of Systems   Cardiovascular:  Positive for leg swelling.   Skin:  Positive for color change, dry skin, nail changes and unusual hair distribution. Negative for flushing, itching, rash and skin cancer.   Musculoskeletal:  Positive for arthritis and stiffness. Negative for back pain and falls.   Neurological:  Positive for numbness. Negative for paresthesias.   Allergic/Immunologic: Negative for hives.           Objective:       Vitals:    04/02/25 0803   BP: (!) 144/75   Pulse: 106   Height: 4' 11" (1.499 m)   PainSc:   7   PainLoc: " Foot          Physical Exam  Vitals reviewed.   Constitutional:       Appearance: She is well-developed.   Cardiovascular:      Pulses:           Dorsalis pedis pulses are 1+ on the right side and 1+ on the left side.        Posterior tibial pulses are 1+ on the right side and 1+ on the left side.      Comments: Dorsalis pedis and posterior tibial pulses are palpable bilaterally. Toes are cool to touch. Feet are warm proximally.There is decreased digital hair bilateral. Skin is atrophic, hyperpigmented, and moderately edematous.    Musculoskeletal:         General: No tenderness.      Right lower leg: Edema present.      Left lower leg: Edema present.      Right ankle: Swelling and deformity present. Decreased range of motion.      Left ankle: Swelling and deformity present. Decreased range of motion.      Right foot: Decreased range of motion. Swelling and deformity present. No crepitus. Abnormal pulse.      Left foot: Decreased range of motion. Swelling and deformity present. No crepitus. Abnormal pulse.   Lymphadenopathy:      Comments: B/l lymphedema    Skin:     General: Skin is warm and dry.      Findings: No abrasion or burn.      Nails: There is no clubbing.      Comments: Nails x10 are elongated by  7-9mm's, thickened by 2-3 mm's, dystrophic, and are darkened in  coloration . Xerosis Bilaterally. No open lesions noted.    Hyperkeratotic tissue noted to distal hallux b/l       Skin thickened, leathery, svetlana blistering noted to legs.    Neurological:      Mental Status: She is alert and oriented to person, place, and time.      Sensory: Sensory deficit present.      Motor: Weakness present.      Comments: Decreased sharp/dull sensation bilateral feet.   Psychiatric:         Behavior: Behavior normal. Behavior is cooperative.               Assessment:       Encounter Diagnoses   Name Primary?    Type 2 diabetes mellitus with diabetic polyneuropathy, with long-term current use of insulin Yes    Onychomycosis due  "to dermatophyte     Corn or callus          Plan:       Duran Logan "Sister Duran Giordano" was seen today for diabetic foot exam.    Diagnoses and all orders for this visit:    Type 2 diabetes mellitus with diabetic polyneuropathy, with long-term current use of insulin    Onychomycosis due to dermatophyte    Corn or callus     I counseled the patient on her conditions, their implications and medical management.      Shoe inspection. Diabetic Foot Education. Patient reminded of the importance of good nutrition and blood cummings gar control to help prevent podiatric complications of diabetes. Patient instructed on proper foot hygeine. We discussed wearing proper shoe gear, daily foot inspections, never walking without protective shoe gear, never putting sharp instruments to feet    - With patient's permission, nails were aggressively reduced and debrided x 10 to their soft tissue attachment mechanically and with electric , removing all offending nail and debris. Patient relates relief following the procedure. She will continue to monitor the areas daily, inspect her feet, wear protective shoe gear when ambulatory, moisturizer to maintain skin integrity and follow in this office in approximately 2-3 months, sooner p.r.n.    - After cleansing the  area w/ alcohol prep pad the above mentioned hyperkeratosis was trimmed utilizing No 15 scapel, to a smooth base with out incident. Patient tolerated this  well and reported comfort to the area x2                      "

## 2025-04-03 ENCOUNTER — TELEPHONE (OUTPATIENT)
Dept: PRIMARY CARE CLINIC | Facility: CLINIC | Age: 85
End: 2025-04-03
Payer: MEDICARE

## 2025-04-03 DIAGNOSIS — M10.00 IDIOPATHIC GOUT, UNSPECIFIED CHRONICITY, UNSPECIFIED SITE: ICD-10-CM

## 2025-04-03 RX ORDER — ALLOPURINOL 300 MG/1
300 TABLET ORAL DAILY
Qty: 90 TABLET | Refills: 1 | Status: SHIPPED | OUTPATIENT
Start: 2025-04-03 | End: 2025-09-30

## 2025-04-03 NOTE — TELEPHONE ENCOUNTER
----- Message from Angélica sent at 4/3/2025 12:20 PM CDT -----  Contact: Janee [phone]  Requesting an RX refill or new RX.Is this a refill or new RX: RX name and strength (allopurinoL (ZYLOPRIM) 300 MG tabletallopurinoL (ZYLOPRIM) 100 MG tabletIs this a 30 day or 90 day RX: 30Pharmacy name and phone # ([facility] - [place], [place] - [address] [address] [facility] [id number] [facility] [id number]: [phone] Fax: [phone]

## 2025-04-07 ENCOUNTER — TELEPHONE (OUTPATIENT)
Dept: PRIMARY CARE CLINIC | Facility: CLINIC | Age: 85
End: 2025-04-07
Payer: MEDICARE

## 2025-04-07 DIAGNOSIS — R09.81 NASAL CONGESTION: Primary | ICD-10-CM

## 2025-04-07 RX ORDER — IPRATROPIUM BROMIDE 21 UG/1
2 SPRAY, METERED NASAL 2 TIMES DAILY PRN
Qty: 30 ML | Refills: 3 | Status: SHIPPED | OUTPATIENT
Start: 2025-04-07

## 2025-04-07 RX ORDER — BENZONATATE 100 MG/1
100 CAPSULE ORAL 3 TIMES DAILY PRN
Qty: 30 CAPSULE | Refills: 0 | Status: SHIPPED | OUTPATIENT
Start: 2025-04-07 | End: 2025-04-17

## 2025-04-07 NOTE — TELEPHONE ENCOUNTER
RN called pt and left message in regards to meds.  RN also spoke to SN Nahomy Watkins and passed on information about the meds to her.

## 2025-04-14 ENCOUNTER — OFFICE VISIT (OUTPATIENT)
Dept: HEMATOLOGY/ONCOLOGY | Facility: CLINIC | Age: 85
End: 2025-04-14
Attending: INTERNAL MEDICINE
Payer: MEDICARE

## 2025-04-14 ENCOUNTER — LAB VISIT (OUTPATIENT)
Dept: LAB | Facility: OTHER | Age: 85
End: 2025-04-14
Attending: INTERNAL MEDICINE
Payer: MEDICARE

## 2025-04-14 VITALS
RESPIRATION RATE: 18 BRPM | WEIGHT: 260.13 LBS | HEART RATE: 92 BPM | SYSTOLIC BLOOD PRESSURE: 159 MMHG | HEIGHT: 59 IN | OXYGEN SATURATION: 100 % | DIASTOLIC BLOOD PRESSURE: 67 MMHG | TEMPERATURE: 99 F | BODY MASS INDEX: 52.44 KG/M2

## 2025-04-14 DIAGNOSIS — I89.0 LYMPHEDEMA OF LEFT LEG: ICD-10-CM

## 2025-04-14 DIAGNOSIS — I26.92 ACUTE SADDLE PULMONARY EMBOLISM WITHOUT ACUTE COR PULMONALE: ICD-10-CM

## 2025-04-14 DIAGNOSIS — K64.4 ANAL SKIN TAG: ICD-10-CM

## 2025-04-14 DIAGNOSIS — Z86.711 HISTORY OF PULMONARY EMBOLISM: ICD-10-CM

## 2025-04-14 DIAGNOSIS — Z86.711 HISTORY OF PULMONARY EMBOLISM: Primary | ICD-10-CM

## 2025-04-14 PROCEDURE — 86147 CARDIOLIPIN ANTIBODY EA IG: CPT | Mod: 59

## 2025-04-14 PROCEDURE — 36415 COLL VENOUS BLD VENIPUNCTURE: CPT

## 2025-04-14 PROCEDURE — 99999 PR PBB SHADOW E&M-EST. PATIENT-LVL V: CPT | Mod: PBBFAC,,, | Performed by: INTERNAL MEDICINE

## 2025-04-14 PROCEDURE — 99215 OFFICE O/P EST HI 40 MIN: CPT | Mod: PBBFAC | Performed by: INTERNAL MEDICINE

## 2025-04-14 NOTE — PROGRESS NOTES
Subjective     Patient ID: Duran Giordano is a 85 y.o. female.    Chief Complaint: No chief complaint on file.    HPI 85-year-old female referred with a history of pulmonary embolism.      She was admitted to the hospital on June 5th 2024 after a syncopal episode.  CT scan of the chest showed bilateral pulmonary emboli as with a saddle embolus.  She was treated with intravenous heparin and then transitioned to oral anticoagulation with Eliquis which she continues.      Although there were no changes in her health that were felt to represent clear provoking factors for her pulmonary embolism, she has a history of severe chronic lower extremity lymphedema left greater than right which has been present for greater than 10 years.  In addition, she has  poor mobility and vascular disease.    She has not had a mammogram since 2020. She has not had colonoscopy.    Today she reports that she has some dyspnea on exertion primarily in the morning which improves with activity.  That did not start with her pulmonary embolism, rather it started a number of months after that event.  (about 6 months ago)  Uses an electric scooter for her mobility.      Past medical history:  Chronic diastolic heart failure, hypertension, hyperlipidemia, diabetes, right knee arthroplasty 2021, 2022 revision.    FH:  Negative for hematological disease      SH:Sister in the  [facility] family  order      Review of Systems   Constitutional: Negative.    Respiratory:  Positive for shortness of breath.    Cardiovascular:  Positive for leg swelling.   Gastrointestinal: Negative.         Anal skin tag   Musculoskeletal:         Bilateral lymphedema   Neurological: Negative.    Psychiatric/Behavioral: Negative.            Objective     Physical Exam  Vitals reviewed.   Constitutional:       General: She is not in acute distress.     Appearance: She is obese.   Eyes:      General: No scleral icterus.  Cardiovascular:      Rate and Rhythm: Normal rate and  regular rhythm.   Pulmonary:      Effort: Pulmonary effort is normal. No respiratory distress.      Breath sounds: Normal breath sounds. No wheezing or rales.   Musculoskeletal:         General: No swelling.      Right lower leg: Edema present.      Left lower leg: Edema present.      Comments: Right calf 36.5 cm, left calf 38.5 cm   Skin:     Findings: No rash.   Neurological:      Mental Status: She is alert and oriented to person, place, and time.   Psychiatric:         Mood and Affect: Mood normal.         Behavior: Behavior normal.         Thought Content: Thought content normal.         Judgment: Judgment normal.          Assessment and Plan     1. History of pulmonary embolism    Lower extremity lymphedema seems like a lifelong risk factor.    Recommend that she get a mammogram and do a Cologuard for cancer screening.  Hereditary thrombophilia is too unlikely to make testing worthwhile.  Will check lupus anti-coagulant and anti-phospholipid ab.     Indefinite anti-coagulation is appropriate.    We will refer back to lymphedema clinic so she can discuss compression pump.  She also would like to see colorectal surgery about her anal tag or growth.    No follow-ups on file.  Route Chart for Scheduling    Med Onc Chart Routing      Follow up with physician No follow up needed.   Follow up with ADAN    Infusion scheduling note    Injection scheduling note    Labs None   Scheduling:  Preferred lab:  Lab interval:     Imaging None      Pharmacy appointment No pharmacy appointment needed      Other referrals       Additional referrals needed  Colorectal surgery, physical therapy

## 2025-04-15 ENCOUNTER — OFFICE VISIT (OUTPATIENT)
Dept: CARDIOLOGY | Facility: CLINIC | Age: 85
End: 2025-04-15
Payer: MEDICARE

## 2025-04-15 ENCOUNTER — OFFICE VISIT (OUTPATIENT)
Dept: OPHTHALMOLOGY | Facility: CLINIC | Age: 85
End: 2025-04-15
Payer: MEDICARE

## 2025-04-15 ENCOUNTER — CLINICAL SUPPORT (OUTPATIENT)
Dept: OPHTHALMOLOGY | Facility: CLINIC | Age: 85
End: 2025-04-15
Payer: MEDICARE

## 2025-04-15 VITALS
BODY MASS INDEX: 55.55 KG/M2 | SYSTOLIC BLOOD PRESSURE: 169 MMHG | OXYGEN SATURATION: 99 % | HEART RATE: 86 BPM | HEIGHT: 59 IN | WEIGHT: 275.56 LBS | DIASTOLIC BLOOD PRESSURE: 70 MMHG

## 2025-04-15 DIAGNOSIS — Z86.711 HISTORY OF PULMONARY EMBOLISM: ICD-10-CM

## 2025-04-15 DIAGNOSIS — I15.2 HYPERTENSION ASSOCIATED WITH DIABETES: ICD-10-CM

## 2025-04-15 DIAGNOSIS — H43.813 VITREOUS DEGENERATION OF BOTH EYES: ICD-10-CM

## 2025-04-15 DIAGNOSIS — I48.0 PAROXYSMAL ATRIAL FIBRILLATION: ICD-10-CM

## 2025-04-15 DIAGNOSIS — H35.033 HYPERTENSIVE RETINOPATHY OF BOTH EYES: ICD-10-CM

## 2025-04-15 DIAGNOSIS — E66.01 MORBID OBESITY WITH BMI OF 50.0-59.9, ADULT: ICD-10-CM

## 2025-04-15 DIAGNOSIS — I27.20 PULMONARY HYPERTENSION: ICD-10-CM

## 2025-04-15 DIAGNOSIS — E11.59 HYPERTENSION ASSOCIATED WITH DIABETES: ICD-10-CM

## 2025-04-15 DIAGNOSIS — Z86.718 HISTORY OF THROMBOSIS: ICD-10-CM

## 2025-04-15 DIAGNOSIS — I77.1 TORTUOUS AORTA: ICD-10-CM

## 2025-04-15 DIAGNOSIS — H40.053 OCULAR HYPERTENSION, BILATERAL: ICD-10-CM

## 2025-04-15 DIAGNOSIS — I87.2 VENOUS STASIS DERMATITIS OF BOTH LOWER EXTREMITIES: ICD-10-CM

## 2025-04-15 DIAGNOSIS — I10 ESSENTIAL HYPERTENSION: ICD-10-CM

## 2025-04-15 DIAGNOSIS — R53.83 OTHER FATIGUE: ICD-10-CM

## 2025-04-15 DIAGNOSIS — H34.8120 CENTRAL RETINAL VEIN OCCLUSION WITH MACULAR EDEMA OF LEFT EYE: Primary | ICD-10-CM

## 2025-04-15 DIAGNOSIS — I25.84 CORONARY ARTERY DISEASE DUE TO CALCIFIED CORONARY LESION: Chronic | ICD-10-CM

## 2025-04-15 DIAGNOSIS — E66.2 OBESITY HYPOVENTILATION SYNDROME: ICD-10-CM

## 2025-04-15 DIAGNOSIS — I50.32 CHRONIC DIASTOLIC HEART FAILURE: ICD-10-CM

## 2025-04-15 DIAGNOSIS — G47.33 OSA ON CPAP: ICD-10-CM

## 2025-04-15 DIAGNOSIS — R06.02 SOB (SHORTNESS OF BREATH) ON EXERTION: Primary | ICD-10-CM

## 2025-04-15 DIAGNOSIS — I27.81 COR PULMONALE, CHRONIC: ICD-10-CM

## 2025-04-15 DIAGNOSIS — E78.2 MIXED HYPERLIPIDEMIA: ICD-10-CM

## 2025-04-15 DIAGNOSIS — H25.13 AGE-RELATED NUCLEAR CATARACT OF BOTH EYES: ICD-10-CM

## 2025-04-15 DIAGNOSIS — I89.0 LYMPHEDEMA OF BOTH LOWER EXTREMITIES: ICD-10-CM

## 2025-04-15 DIAGNOSIS — E11.9 DIABETES MELLITUS TYPE 2 WITHOUT RETINOPATHY: ICD-10-CM

## 2025-04-15 DIAGNOSIS — I25.10 CORONARY ARTERY DISEASE DUE TO CALCIFIED CORONARY LESION: Chronic | ICD-10-CM

## 2025-04-15 PROCEDURE — 67028 INJECTION EYE DRUG: CPT | Mod: S$PBB,LT,, | Performed by: OPHTHALMOLOGY

## 2025-04-15 PROCEDURE — 99999 PR PBB SHADOW E&M-EST. PATIENT-LVL V: CPT | Mod: PBBFAC,,, | Performed by: INTERNAL MEDICINE

## 2025-04-15 PROCEDURE — 99215 OFFICE O/P EST HI 40 MIN: CPT | Mod: S$PBB,,, | Performed by: INTERNAL MEDICINE

## 2025-04-15 PROCEDURE — 67028 INJECTION EYE DRUG: CPT | Mod: PBBFAC,LT | Performed by: OPHTHALMOLOGY

## 2025-04-15 PROCEDURE — 99215 OFFICE O/P EST HI 40 MIN: CPT | Mod: PBBFAC | Performed by: INTERNAL MEDICINE

## 2025-04-15 PROCEDURE — 99214 OFFICE O/P EST MOD 30 MIN: CPT | Mod: PBBFAC,27,25 | Performed by: OPHTHALMOLOGY

## 2025-04-15 PROCEDURE — 99999 PR PBB SHADOW E&M-EST. PATIENT-LVL IV: CPT | Mod: PBBFAC,,, | Performed by: OPHTHALMOLOGY

## 2025-04-15 PROCEDURE — 99214 OFFICE O/P EST MOD 30 MIN: CPT | Mod: 25,S$PBB,, | Performed by: OPHTHALMOLOGY

## 2025-04-15 PROCEDURE — 99999PBSHW PR PBB SHADOW TECHNICAL ONLY FILED TO HB: Mod: JZ,PBBFAC,,

## 2025-04-15 PROCEDURE — 92134 CPTRZ OPH DX IMG PST SGM RTA: CPT | Mod: PBBFAC | Performed by: OPHTHALMOLOGY

## 2025-04-15 RX ADMIN — Medication 1.25 MG: at 12:04

## 2025-04-15 NOTE — PATIENT INSTRUCTIONS
Assessment/Plan:  Duran Giordano is a 84 y.o. female with chronic diastolic heart failure, HTN, DM2, HLD, CAD, REJI, morbid obesity, history of CVA, who presents for an follow up appointment.     SOB on Exertion/Fatigue- Pt with risk factors for CAD, including morbid obesity with BMI of 55. Given morbid body habitus, chest PET stress test  and echo to evaluate further.      2. History of saddle pulmonary embolism without acute cor pulmonale- Contributing factors include sedentary lifestyle and severe lymphedema. No other provoking factors have been indentified. Pt evaluated by Heme/Onc for hypercoagulable workup and age appropriate cancer screening. Repeat CTA chest 11/22/2024 showed suboptimal contrast opacification of the pulmonary arteries. Allowing for this limitation, no convincing evidence of central pulmonary thromboembolism. BLE venous ultrasound 11/20/2024 revealed no evidence of right or left lower extremity DVT. The right PTVs, Peroneal and ATV's not visualized due to severe lymphedema. Continue Eliquis 5 mg bid lifelong.      3. Chronic Diastolic Heart Failure- Workup as per above. Continue current medications.     4. HTN- Stable. Continue current medications.    5. Morbid Obesity- Encourage diet, exercise, and weight loss. Refer to Bariatric Medicine for evaluation for GLP-1 agonist.     6. HLD- Continue statin therapy.     7. BLE lymphedema- Pt presents with findings consistent with severe lymphedema.  Refer to lymphedema clinic.  Recommend wearing graduated compression hose.  Limit sodium intake to 2000 mg daily.  Limit volume intake to 1.5 L daily.  Elevate legs when resting.    Will call pt with results of studies  Otherwise, follow up in 2 months

## 2025-04-15 NOTE — PROGRESS NOTES
[facility]      Chief Complaint   Patient presents with    Hypertension    Congestive Heart Failure    lymphedema of bilateral lower extremeties       Patient ID: Duran Giordano is a 85 y.o. female with chronic diastolic heart failure, HTN, DM2, HLD, CAD, REJI, morbid obesity, history of CVA, who presents for an follow up appointment. Pertinent history/events are as follows:     -Pt kindly referred by Niki Smith PA-C for evaluation of lymphedema/acute saddle PE without acute cor pulmonale/chronic diastolic heart failure.    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  She was subsequently discharged on Eliquis 5 mg bid.   Chronic diastolic heart failure- Stable. Continue current medications.  Acute saddle pulmonary embolism without acute cor pulmonale- Appears unprovoked.  Refer to Heme/Onc for hypercoagulable workup and age appropriate cancer screening. Continue Eliquis 5 mg bid.  Check repeat CTA chest and BLE venous ultrasound prior to next visit.  HTN- Stable. Continue current medications.  Morbid Obesity- Encourage diet, exercise, and weight loss.   HLD- Continue statin therapy.   BLE lymphedema- Pt presents with findings consistent with severe lymphedema.  Refer to lymphedema clinic after at least 3 months on full dose anticoagulation (plan to refer at next visit). Recommend wearing graduated compression hose.  Limit sodium intake to 2000 mg daily.  Limit volume intake to 1.5 L daily.  Elevate legs when resting.    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 No chest pain or shortness of breath. CTA chest 11/22/2024 revealed suboptimal contrast opacification of the pulmonary arteries. Allowing for this limitation, no convincing evidence of central pulmonary thromboembolism.  BLE venous ultrasound 11/20/2024 revealed no evidence of right or left lower extremity DVT.  Right PTVs, Peroneal and ATV's not visualized due to severe lymphedema.  Plan:  Chronic diastolic heart failure- Stable. Continue current medications.  Acute saddle pulmonary embolism without acute cor pulmonale- Appears unprovoked.  Refer to Heme/Onc for hypercoagulable workup and age appropriate cancer screening. Repeat CTA chest 11/22/2024 showed suboptimal contrast opacification of the pulmonary arteries. Allowing for this limitation, no convincing evidence of central pulmonary thromboembolism. BLE venous ultrasound 11/20/2024 revealed no evidence of right or left lower extremity DVT. The right PTVs, Peroneal and ATV's not visualized due to severe lymphedema. Continue Eliquis 5 mg bid.    HTN- Stable. Continue current medications.  Morbid Obesity- Encourage diet, exercise, and weight loss.   HLD- Continue statin therapy.   BLE lymphedema- Pt presents with findings consistent with severe lymphedema.  Refer to lymphedema clinic.  Recommend wearing graduated compression hose.  Limit sodium intake to 2000 mg daily.  Limit volume intake to 1.5 L daily.  Elevate legs when resting.    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 She has no chest pain or chest discomfort. She has not yet started lymphedema clinic therapy.      Past Medical History:   Diagnosis Date    Adrenal mass- adenoma stable since 2004 (1.8 cm and 8/13/12 (2 cm); stable 2016 2.4 cm     Adrenal mass- adenoma stable since 2004 (1.8 cm and 8/13/12 (2 cm); stable 2016 2.4 cm    Arthritis     Asthma in adult without complication 6/25/2015    Bilateral carotid artery disease 7/21/2017    Bilateral sciatica 2/7/2017    Cellulitis of right leg 08/16/2017    Cerebral infarction 1/29/2016    Multiple areas of lacunar infarction. Stroke risk factors include HTN, DM2, Dyslipidemia.  Continue ASA 81mg/ Statin therapy I have encouraged 30 minutes of physical activity daily for 5 days a week. She has access to a pool so this  should not be so jarring to her joints.     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    Mixed hyperlipidemia 8/13/2012    Morbid obesity with BMI of 50.0-59.9, adult 2/11/2014    Obstructive sleep apnea syndrome 8/13/2012    Pulmonary embolism 6/5/2024    Senile cataracts of both eyes 1/22/2015    Traumatic open wound of right lower leg 8/25/2017    Trigeminal neuralgia of right side of face 5/23/2017    For years now Previously on Gabapentin, but caused constipation Dissipating over time Not related to intracranial abnormalities Possibly related to dental procedure    Type 2 diabetes mellitus with diabetic polyneuropathy, with long-term current use of insulin 1/29/2013    Venous stasis dermatitis of both lower extremities 8/21/2017    Viral hepatitis A without coma 1/1/1971    Hep B infection in [facility] in 1971, was hospitalize for 2 weeks at that time, unknown treatment.    Vitamin D deficiency disease 8/13/2012     Past Surgical History:   Procedure Laterality Date    HYSTERECTOMY  01/01/1982    secondary uterine fibroids    INJECTION OF ANESTHETIC AGENT AROUND NERVE Right 10/21/2021    Procedure: BLOCK, NERVE GENICULAR RIGHT NEEDS CONSENT;  Surgeon: Senia Kilpatrick MD;  Location: [facility];  Service: Pain Management;  Laterality: Right;    INSERTION OF ANTIBIOTIC SPACER Right 10/28/2021    Procedure: INSERTION, ANTIBIOTIC SPACER;  Surgeon: Alfredo Lozoya MD;  Location: [facility] OR [address] [place] [address];  Service: Orthopedics;  Laterality: Right;    JOINT REPLACEMENT      REVISION OF KNEE ARTHROPLASTY  Right 10/28/2021    Procedure: REVISION, ARTHROPLASTY, KNEE-DEPUY ANTIBIOTIC SPACER;  Surgeon: Alfredo Lozoya MD;  Location: [facility] OR [address] [place] [address];  Service: Orthopedics;  Laterality: Right;    REVISION OF KNEE ARTHROPLASTY Right 06/30/2022    Procedure: REVISION, ARTHROPLASTY, KNEE-NAKIA- stage 2;  Surgeon: Alfredo Lozoya MD;  [facility];  Service: Orthopedics;  Laterality: Right;    Right total knee replacement       Social History     Socioeconomic History    Marital status: Single   Tobacco Use    Smoking status: Never    Smokeless tobacco: Never   Substance and Sexual Activity    Alcohol use: Yes     Comment: holidays    Drug use: No    Sexual activity: Never   Social History Narrative    Single with no children.      Social Drivers of Health     Financial Resource Strain: Low Risk  (6/7/2024)    Overall Financial Resource Strain (CARDIA)     Difficulty of Paying Living Expenses: Not hard at all   Food Insecurity: No Food Insecurity (6/7/2024)    Hunger Vital Sign     Worried About Running Out of Food in the Last Year: Never true     Ran Out of Food in the Last Year: Never true   Transportation Needs: No Transportation Needs (6/7/2024)    TRANSPORTATION NEEDS     Transportation : No   Physical Activity: Insufficiently Active (2/2/2024)    Exercise Vital Sign     Days of Exercise per Week: 1 day     Minutes of Exercise per Session: 10 min   Stress: No Stress Concern Present (6/7/2024)    [employer] [place] [employer] - Occupational Stress Questionnaire     Feeling of Stress : Only a little   Housing Stability: Unknown (6/7/2024)    Housing Stability Vital Sign     Unable to Pay for Housing in the Last Year: No     Homeless in the Last Year: No     Family History   Problem Relation Name Age of Onset    Cancer Mother Leslie Giordano 69        cancer of jaw    Hypertension Father Oskar Giordano     Cancer Sister Tiffanie Del Angel         half sister - unknown dx    No Known Problems  Brother      No Known Problems Maternal Aunt      No Known Problems Maternal Uncle      No Known Problems Paternal Aunt      No Known Problems Paternal Uncle      No Known Problems Maternal Grandmother      No Known Problems Maternal Grandfather      No Known Problems Paternal Grandmother      No Known Problems Paternal Grandfather      Glaucoma Neg Hx      Breast cancer Neg Hx      Colon cancer Neg Hx      Ovarian cancer Neg Hx      Stroke Neg Hx      Allergic rhinitis Neg Hx      Allergies Neg Hx      Angioedema Neg Hx      Asthma Neg Hx      Atopy Neg Hx      Eczema Neg Hx      Immunodeficiency Neg Hx      Rhinitis Neg Hx      Urticaria Neg Hx      Amblyopia Neg Hx      Blindness Neg Hx      Cataracts Neg Hx      Diabetes Neg Hx      Macular degeneration Neg Hx      Retinal detachment Neg Hx      Strabismus Neg Hx      Thyroid disease Neg Hx      Psoriasis Neg Hx         Review of patient's allergies indicates:   Allergen Reactions    Sulfamethoxazole-trimethoprim      Other reaction(s): up set stomach rash/hives    Azithromycin      Feels bad    Erythromycin Other (See Comments)     Other reaction(s): Unknown  Other reaction(s): Stomach upset    Gentamicin      Vaginal burning , itching     Levofloxacin Hives     Other reaction(s): nervousness    Shellfish containing products      Rash      Vancomycin Itching     Other reaction(s): Itching       Medication List with Changes/Refills   Current Medications    ALBUTEROL (PROVENTIL/VENTOLIN HFA) 90 MCG/ACTUATION INHALER    Inhale 2 puffs into the lungs every 4 (four) hours as needed for Wheezing or Shortness of Breath. Rescue    ALBUTEROL-IPRATROPIUM (DUO-NEB) 2.5 MG-0.5 MG/3 ML NEBULIZER SOLUTION    USE 1 VIAL PER NEBULIZER EVERY 6 HOURS AS NEEDED FOR WHEEZING/RESCUE    ALLOPURINOL (ZYLOPRIM) 100 MG TABLET    Take 1 tablet (100 mg total) by mouth once daily.    ALLOPURINOL (ZYLOPRIM) 300 MG TABLET    Take 1 tablet (300 mg total) by mouth once daily.    AMLODIPINE  (NORVASC) 10 MG TABLET    Take 1 tablet (10 mg total) by mouth once daily.    ASPERCREME WITH ALOE 10 % CREA    Apply 1 Application topically as needed (to legs/back).    ATORVASTATIN (LIPITOR) 80 MG TABLET    TAKE ONE TABLET BY MOUTH EVERY DAY    BENZONATATE (TESSALON) 100 MG CAPSULE    Take 1 capsule (100 mg total) by mouth 3 (three) times daily as needed for Cough.    BLOOD SUGAR DIAGNOSTIC STRP    BG monitoring 4 times a day. Pt needs test strips for [employer] meter.    CHOLECALCIFEROL, VITAMIN D3, (VITAMIN D3) 25 MCG (1,000 UNIT) CAPSULE    Take 1 capsule (1,000 Units total) by mouth once daily.    CLOTRIMAZOLE-BETAMETHASONE 1-0.05% (LOTRISONE) CREAM    Apply topically 2 (two) times daily.    COLCHICINE (COLCRYS) 0.6 MG TABLET    Take 1 tablet (0.6 mg total) by mouth daily as needed (gout attack). Take 1 tablet in event of gout attack.  If still having symptoms after 2 hours take 2 more tablets.  Do not take more than 3 in one day.    DICLOFENAC SODIUM (VOLTAREN) 1 % GEL    APPLY 2 GRAMS TOPICALLY DAILY    DOXYCYCLINE (VIBRA-TABS) 100 MG TABLET    Take 1 tablet (100 mg total) by mouth 2 (two) times daily.    ELIQUIS 5 MG TAB    Take 1 tablet (5 mg total) by mouth 2 (two) times daily.    FLUTICASONE (FLONASE) 50 MCG/ACTUATION NASAL SPRAY    2 sprays (100 mcg total) by Each Nare route once daily.    HYDROCODONE-ACETAMINOPHEN (NORCO) 5-325 MG PER TABLET    Take 1 tablet by mouth every 6 (six) hours as needed.    IBUPROFEN (ADVIL,MOTRIN) 800 MG TABLET    Take 800 mg by mouth every 6 (six) hours as needed.    INSULIN DEGLUDEC (TRESIBA FLEXTOUCH U-100) 100 UNIT/ML (3 ML) INSULIN PEN    Inject 12-18 units at night.    IPRATROPIUM (ATROVENT) 21 MCG (0.03 %) NASAL SPRAY    2 sprays by Each Nostril route 2 (two) times daily as needed for Rhinitis (congestion).    LANCETS MISC    Test daily    LATANOPROST (XALATAN) 0.005 % OPHTHALMIC SOLUTION    Place 1 drop into both eyes every evening.    MENTHOL-ZINC OXIDE  "(CALMOSEPTINE) 0.44-20.6 % OINT    Apply topically 2 (two) times daily as needed (chafing).    NEBULIZER AND COMPRESSOR (COMP-AIR ELITE COMP NEB SYSTEM) TORRES    use as directed    OLOPATADINE (PATANOL) 0.1 % OPHTHALMIC SOLUTION    Place 1 drop into both eyes 2 (two) times daily.    OZEMPIC 0.25 MG OR 0.5 MG (2 MG/3 ML) PEN INJECTOR    INJECT 0.5 MG SUBCUTANEOUSLY ONCE A WEEK    PEN NEEDLE, DIABETIC (PEN NEEDLE) 29 GAUGE X 1/2" NDLE    USE DAILY    POLYETHYLENE GLYCOL (GLYCOLAX) 17 GRAM/DOSE POWDER    MIX 17 GM (1 CAPFUL) IN 4 TO 8 OUNCES OF FLUID AND TAKE DAILY    SENNA 8.6 MG TABLET    Take 1 tablet by mouth once daily.    TORSEMIDE (DEMADEX) 10 MG TAB    Take 1 tablet (10 mg total) by mouth once daily.    WITCH HAZEL (TUCKS, WITCH HAZEL,) 50 % PADM    Apply 1 each topically daily as needed (irritated skin tags or hemorrhoids).    ZINC OXIDE 10 % OINT    Apply 1 Application topically 2 (two) times daily as needed (wounds or blisters).       Review of Systems  Constitution: Denies chills, fever, and sweats.  HENT: Denies headaches or blurry vision.  Cardiovascular: Denies chest pain or irregular heart beat.  Respiratory: Denies cough or shortness of breath.  Gastrointestinal: Denies abdominal pain, nausea, or vomiting.  Musculoskeletal: Denies muscle cramps.  Neurological: Denies dizziness or focal weakness.  Psychiatric/Behavioral: Normal mental status.  Hematologic/Lymphatic: Denies bleeding problem or easy bruising/bleeding.  Skin: Denies rash or suspicious lesions    Physical Examination  BP (!) 169/70 (BP Location: Left arm, Patient Position: Sitting)   Pulse 86   Ht 4' 11" (1.499 m)   Wt 125 kg (275 lb 9.2 oz)   SpO2 99%   BMI 55.66 kg/m²     Constitutional: No acute distress, conversant  HEENT: Sclera anicteric, Pupils equal, round and reactive to light, extraocular motions intact, Oropharynx clear  Neck: No JVD, no carotid bruits  Cardiovascular: regular rate and rhythm, no murmur, rubs or gallops, " normal S1/S2  Pulmonary: Clear to auscultation bilaterally  Abdominal: Abdomen soft, nontender, nondistended, positive bowel sounds  Extremities: LE lymphedema with venous dermatitis R>L.   Pulses:  Carotid pulses are 2+ on the right side, and 2+ on the left side.  Radial pulses are 2+ on the right side, and 2+ on the left side.   Femoral pulses are 2+ on the right side, and 2+ on the left side.  Popliteal pulses are 2+ on the right side, and 2+ on the left side.   Dorsalis pedis pulses are 2+ on the right side, and 2+ on the left side.   Posterior tibial pulses are 2+ on the right side, and 2+ on the left side.    Skin: No ecchymosis, erythema, or ulcers  Psych: Alert and oriented x 3, appropriate affect  Neuro: CNII-XII intact, no focal deficits    Labs:  Most Recent Data  CBC:   Lab Results   Component Value Date    WBC 8.29 06/19/2024    HGB 11.7 (L) 06/19/2024    HCT 37.5 06/19/2024     06/19/2024    MCV 94 06/19/2024    RDW 17.5 (H) 06/19/2024     BMP:   Lab Results   Component Value Date     07/19/2024    K 4.2 07/19/2024     07/19/2024    CO2 24 07/19/2024    BUN 29 (H) 07/19/2024    CREATININE 1.3 11/15/2024     (H) 07/19/2024    CALCIUM 9.1 07/19/2024    MG 1.8 07/19/2024    PHOS 3.6 07/19/2024     LFTS;   Lab Results   Component Value Date    PROT 7.9 07/19/2024    ALBUMIN 2.7 (L) 07/19/2024    BILITOT 0.3 07/19/2024    AST 18 07/19/2024    ALKPHOS 94 07/19/2024    ALT 15 07/19/2024     COAGS:   Lab Results   Component Value Date    INR 1.1 06/05/2024     FLP:   Lab Results   Component Value Date    CHOL 128 10/16/2023    HDL 47 10/16/2023    LDLCALC 69.2 10/16/2023    TRIG 59 10/16/2023    CHOLHDL 36.7 10/16/2023     CARDIAC:   Lab Results   Component Value Date    TROPONINI 0.297 (H) 06/07/2024    BNP 88 07/19/2024       Imaging:    CTA chest 11/22/2024:   Suboptimal contrast opacification of the pulmonary arteries. Allowing for this limitation, no convincing evidence of  central pulmonary thromboembolism.      BLE venous ultrasound 11/20/2024:     There is no evidence of right or left lower extremity DVT.    Right PTVs, Peroneal and ATV's not visualized due to severe lymphedema.    CTA chest 6/5/2024:  1. Extensive pulmonary emboli throughout the bilateral lungs including a saddle pulmonary embolus.  There is evidence of right heart strain with flattening of the interventricular septum and enlargement of the right ventricle and main pulmonary artery.  2. Peripheral airspace opacities in the right middle lobe, consistent with areas of pulmonary infarctions.  3. 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    Assessment/Plan:  Duran Giordano is a 85 y.o. female with chronic diastolic heart failure, HTN, DM2, HLD, CAD, REJI, morbid obesity, history of CVA, who presents for an follow up appointment.     SOB on Exertion/Fatigue- Pt with risk factors for CAD, including morbid obesity with BMI of 55. Given morbid body habitus, chest PET stress test  and echo to evaluate further.      2. History of saddle pulmonary embolism without acute cor pulmonale- Contributing factors include sedentary lifestyle and severe lymphedema. No other provoking factors have been indentified. Pt evaluated by Heme/Onc for hypercoagulable workup and age appropriate cancer screening. Repeat CTA chest 11/22/2024 showed suboptimal contrast opacification of the pulmonary arteries. Allowing for this limitation, no convincing evidence of central pulmonary thromboembolism. BLE venous ultrasound 11/20/2024 revealed no evidence of right or left lower extremity DVT. The right PTVs, Peroneal and ATV's not visualized due to severe lymphedema. Continue Eliquis 5 mg bid lifelong.      3. Chronic Diastolic Heart Failure- Workup as per above. Continue current medications.     4. HTN- Stable. Continue current medications.    5. Morbid Obesity- Encourage diet, exercise, and weight loss. Refer to Bariatric Medicine  for evaluation for GLP-1 agonist.     6. HLD- Continue statin therapy.     7. BLE lymphedema- Pt presents with findings consistent with severe lymphedema.  Refer to lymphedema clinic.  Recommend wearing graduated compression hose.  Limit sodium intake to 2000 mg daily.  Limit volume intake to 1.5 L daily.  Elevate legs when resting.    Will call pt with results of studies  Otherwise, follow up in 2 months     Total duration of face to face visit time 30 minutes.  Total time spent counseling greater than fifty percent of total visit time.  Counseling included discussion regarding imaging findings, diagnosis, possibilities, treatment options, risks and benefits.  The patient had many questions regarding the options and long-term effects.    Brant Palmer MD, PhD  Interventional Cardiology

## 2025-04-15 NOTE — PROGRESS NOTES
HPI    Pt is here for ret eval. DFE/OCT    Pt states that OS feels heavy and vision has gotten worse. No eye pain or   discomfort. No flashes or floaters. No other complaints.  Last edited by Julio Glez MA on 4/15/2025 11:13 AM.           A/P    ICD-10-CM ICD-9-CM   1. Central retinal vein occlusion with macular edema of left eye  H34.8120 362.35     362.83   2. Age-related nuclear cataract of both eyes  H25.13 366.16   3. Vitreous degeneration of both eyes  H43.813 379.21   4. Hypertensive retinopathy of both eyes  H35.033 362.11   5. Diabetes mellitus type 2 without retinopathy  E11.9 250.00   6. Ocular hypertension, bilateral  H40.053 365.04           1. Central retinal vein occlusion with macular edema of left eye  Here for CRVO f/u     OVERDUE    S/p GLADYS x4 10/31/23  S/p I'VE x3 4/2/24  S/p IVO 8/29/24    Today [facility] CF (Was 20/300), much worse heme and IRF today    Plan: given worse IRF, recommend Avastin OS today. Will get auth for Ozurdex for better CME control    Based on todays exam, diagnostic studies, and review of records, the determination was made for treatment today.  Schedule Avastin Injection today Left Eye Patient chooses to proceed with injection R/B/A discussed include infection retinal detachment and stroke    Patient identified.  Timeout performed.    Risks, benefits, and alternatives to treatment were discussed in detail with the patient, including bleeding/infection (endophthalmitis)/etc.  The patient voiced understanding and wished to proceed with the procedure.  See separate consent form.    Injection Procedure Note:  Diagnosis: CME Left Eye    Topical Proparacaine drop placed then topical 5% Betadine and then subconjunctival lidocaine 2% injection  Sterile gloves used, and sterile lid speculum placed.  5% Betadine placed at injection site again prior to injection.  Avastin 1.25mg in 0.05cc Injected inferotemporally 3.5-4mm posterior to the limbus.  Complications: None  Va at least CF at 5  feet post injection.  Retina, ONH, IOP normal after injection.    Followup as below.  Patient should return immediately PRN.  Retinal Detachment and Endophthalmitis precautions given.       Recommend good blood pressure control, tight blood glucose control, and good cholesterol control     Visit today is associated with current or anticipated ongoing medical care related to this patients single serious condition/complex condition (central retinal vein occlusion)     2. Age-related nuclear cataract of both eyes  Mild NS, NVS  Plan: Observation for now    3. Vitreous degeneration of both eyes  No RT/RD   Plan: Observation   Pathology of PVD, Retinal Tear, Retinal Detachment reviewed in great detail  RD precautions discussed in detail, patient expressed understanding  RTC immediately PRN (especially ANY change flashes, floaters, vision, visual field)     4. Hypertensive retinopathy of both eyes  Mod HTN changes, has CRVO OS  PCP Tami Schmidt MD - Recent notes reviewed  05/06/2024  7.1  A1C   Plan: Observation   Recommend good blood pressure control, tight blood glucose control, and good cholesterol control     5. Diabetes mellitus type 2 without retinopathy  No DR or DME OU, IRF and heme OS moreso due to CRVO  Plan: Observation for DR changes  Recommend good blood pressure control, tight blood glucose control, and good cholesterol control     6. Ocular hypertension, bilateral  IOP 18/18  Possible steroid response  Plan: Observation off drops        RTC 1 mo DFE/OCTm OU, get auth Ozurdex OS        I saw and examined the patient and reviewed in detail the findings documented. The final examination findings, image interpretations, and plan as documented in the record represent my personal judgment and conclusions.    Ferdinand Rosas MD  Vitreoretinal Surgery   [facility]

## 2025-04-17 ENCOUNTER — TELEPHONE (OUTPATIENT)
Dept: INTERNAL MEDICINE | Facility: CLINIC | Age: 85
End: 2025-04-17
Payer: MEDICARE

## 2025-04-17 LAB
W CARDIOLIPIN IGG AB: 2.5 GPL
W CARDIOLIPIN IGM AB: 1 MPL

## 2025-04-17 RX ORDER — INSULIN GLARGINE 100 [IU]/ML
INJECTION, SOLUTION SUBCUTANEOUS
Qty: 6 ML | Refills: 6 | Status: SHIPPED | OUTPATIENT
Start: 2025-04-17

## 2025-04-17 NOTE — TELEPHONE ENCOUNTER
----- Message from Letha sent at 4/17/2025  2:35 PM CDT -----  Contact: patient @ [phone]  .[email or web address] Patient is calling for Medical Advice regarding:patient calling because insurance is no longer covering medication, was disconnected before I got the medication name How long has patient had these symptoms:Pharmacy name and phone#:. Patient wants a call back or thru [facility]:callComments:Please advise patient replies from provider may take up to 48 hours.

## 2025-04-17 NOTE — TELEPHONE ENCOUNTER
"Called pt and let her know     "Hi!  Let pt know that based on glucose on labs  Lantus 12 units is needed  Rx sent in place of tresiba.  Thanks  Yogi "      Pt v/u   "

## 2025-04-22 ENCOUNTER — OFFICE VISIT (OUTPATIENT)
Dept: PRIMARY CARE CLINIC | Facility: CLINIC | Age: 85
End: 2025-04-22
Payer: MEDICARE

## 2025-04-22 VITALS
HEART RATE: 94 BPM | HEIGHT: 59 IN | DIASTOLIC BLOOD PRESSURE: 71 MMHG | SYSTOLIC BLOOD PRESSURE: 169 MMHG | OXYGEN SATURATION: 98 % | BODY MASS INDEX: 55.66 KG/M2 | TEMPERATURE: 99 F

## 2025-04-22 DIAGNOSIS — I27.20 PULMONARY HYPERTENSION: ICD-10-CM

## 2025-04-22 DIAGNOSIS — E11.59 HYPERTENSION ASSOCIATED WITH DIABETES: ICD-10-CM

## 2025-04-22 DIAGNOSIS — J45.20 MILD INTERMITTENT ASTHMA WITHOUT COMPLICATION: ICD-10-CM

## 2025-04-22 DIAGNOSIS — I48.0 PAROXYSMAL ATRIAL FIBRILLATION: ICD-10-CM

## 2025-04-22 DIAGNOSIS — E66.01 MORBID OBESITY WITH BMI OF 50.0-59.9, ADULT: ICD-10-CM

## 2025-04-22 DIAGNOSIS — Z86.79 HISTORY OF HEART FAILURE: ICD-10-CM

## 2025-04-22 DIAGNOSIS — I15.2 HYPERTENSION ASSOCIATED WITH DIABETES: ICD-10-CM

## 2025-04-22 DIAGNOSIS — Z79.2 LONG TERM (CURRENT) USE OF ANTIBIOTICS: ICD-10-CM

## 2025-04-22 DIAGNOSIS — I87.2 VENOUS STASIS DERMATITIS OF BOTH LOWER EXTREMITIES: ICD-10-CM

## 2025-04-22 DIAGNOSIS — I10 ESSENTIAL HYPERTENSION: ICD-10-CM

## 2025-04-22 DIAGNOSIS — I89.0 LYMPHEDEMA OF BOTH LOWER EXTREMITIES: ICD-10-CM

## 2025-04-22 DIAGNOSIS — I27.81 COR PULMONALE, CHRONIC: ICD-10-CM

## 2025-04-22 DIAGNOSIS — E11.42 TYPE 2 DIABETES MELLITUS WITH DIABETIC POLYNEUROPATHY, WITH LONG-TERM CURRENT USE OF INSULIN: Primary | ICD-10-CM

## 2025-04-22 DIAGNOSIS — Z79.4 TYPE 2 DIABETES MELLITUS WITH DIABETIC POLYNEUROPATHY, WITH LONG-TERM CURRENT USE OF INSULIN: Primary | ICD-10-CM

## 2025-04-22 DIAGNOSIS — M10.079 ACUTE IDIOPATHIC GOUT OF ANKLE, UNSPECIFIED LATERALITY: ICD-10-CM

## 2025-04-22 DIAGNOSIS — E27.8 ADRENAL MASS: ICD-10-CM

## 2025-04-22 DIAGNOSIS — N18.32 STAGE 3B CHRONIC KIDNEY DISEASE: ICD-10-CM

## 2025-04-22 DIAGNOSIS — N90.89 SKIN TAG OF FEMALE PERINEUM: ICD-10-CM

## 2025-04-22 DIAGNOSIS — M1A.09X0 IDIOPATHIC CHRONIC GOUT OF MULTIPLE SITES WITHOUT TOPHUS: ICD-10-CM

## 2025-04-22 DIAGNOSIS — E78.2 MIXED HYPERLIPIDEMIA: ICD-10-CM

## 2025-04-22 LAB — GLUCOSE SERPL-MCNC: 154 MG/DL (ref 70–110)

## 2025-04-22 PROCEDURE — 99215 OFFICE O/P EST HI 40 MIN: CPT | Mod: S$GLB,,, | Performed by: HOSPITALIST

## 2025-04-22 PROCEDURE — 82962 GLUCOSE BLOOD TEST: CPT | Mod: ,,, | Performed by: HOSPITALIST

## 2025-04-22 RX ORDER — SEMAGLUTIDE 1.34 MG/ML
1 INJECTION, SOLUTION SUBCUTANEOUS
Qty: 3 ML | Refills: 11 | Status: SHIPPED | OUTPATIENT
Start: 2025-04-22 | End: 2026-04-22

## 2025-04-22 RX ORDER — COLCHICINE 0.6 MG/1
0.6 TABLET ORAL DAILY PRN
Qty: 30 TABLET | Refills: 11 | Status: SHIPPED | OUTPATIENT
Start: 2025-04-22 | End: 2026-04-22

## 2025-04-22 NOTE — Clinical Note
This is the patient I'd like to see you Friday to try to get her new compression stockings on, or if not able to then do another round of compression wraps.

## 2025-04-22 NOTE — PROGRESS NOTES
Primary Care Provider Appointment - [facility]  SHARED NOTE: Jose Jc (MS4), Dr Mccray (Attending)    Subjective:      Patient ID: Duran Giordano is a 85 y.o. 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     Chief Complaint: No chief complaint on file.    Prior to this visit, patient's last encounter with PCP was 9/24/2024.    4/22/2025  Pt is doing well. She still has the perineal skin growth and it has not gotten better. Pt has an appointment May 27 for colon doctor.    Pt has lymphedema bilaterally but it is worse right leg. She has noticed anything in particular causing the flare ups. She hasn't had any pain.     Pt has 2 operations in her knee and would like to get out of her wheelchair. She would like home health to try to get to walk. She has some problems keeping the scooter straight sometimes. She had some difficulty on the ramp and believes that caused the high blood pressure today. She also has some numbness in her right toes occasionally.     She is having gout flare ups in the left heel. It is red and inflamed. Would like a prescription for the colchicine to have on hand.     Pt was in rehab for 9 months at various locations and lost 45 pounds. Pt is also on Ozempic and it has been helping. Pt wants to work on fixing her diet. She has been drinking soda and knows she needs to cut down.     She has been taking all her medications and has had no problems. She did not notice any drops in her blood sugars.       9/24/2024  She presents today for ongoing complaints related to skin growth in perineal area previously diagnosed as skin tag by gynecology. Notes that the growth is increasing in size with associated odor and itchiness. Pt has attempted to treat area with Preparation H ointment without significant relief.     Of note, pt recently admitted on 6/5/24 to  [facility] saddle PE. Initially placed on heparin transitioned to apixaban prior to discharge. No invasive interventions due to comorbidities. Denies any current chest pain, SOB, changes in urination or bowel movements, or worsening lower extremity edema.     DDM  - Currently taking: Ozempic .5 and Tresiba, previously on humalog and humalin  - Last A1c on  10/16/23 was 6.9  - Diet consists of see previous note, it is unchanged   - Has boost shakes, will continue those     HTN  - Currently taking: amlodipine 5 mg  - How often taking BP at home: couple times a week     HLD  - Currently taking: Atorvastatin 80 mg   - Last lipid panel was on 10/16/23  The ASCVD Risk score (Melita DK, et al., 2019) failed to calculate for the following reasons:    The 2019 ASCVD risk score is only valid for ages 40 to 79                Asthma/COPD  - Currently taking: ipi albuterol, albuterol rescue  - Smoking status: Never  - No home O2     Mental Health  - Currently taking: none  - Sees therapist no and psychiatrist no  - 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 Olson on 10/30/2023  - On allopurinol 400mg. Colchicine stopped  - Counselled on diet modifications     Central retinal vein occlusion with macular edema of left eye  - sees Dr. Rosas, Optometry and receives shots in her left eye every 6-8 weeks. She reports going back to see him in 1 month. She is worried the shots are not working any longer.      4Ms for Medical Decision-Making in Older Adults     Last Completed EAWV: 2024    MOBILITY:  Get Up and Go:      2024    10:45 AM   Get Up and Go   Trial 1 14 seconds     Activities of Daily Livin/2/2024    10:40 AM   Activities of Daily Living   Ambulation Assistance Required   Ambulation Assistance Walker (wheeled)   Dressing Independent   Transfers Independent   Toileting Continent of bladder;Continent of bowel   Feeding  Independent   Cleaning home/Chores Assistance Required   Telephone use Independent   Shopping Assistance Required   Paying bills Independent   Taking meds Independent     Whisper Test:      2024    10:45 AM   Whisper Test   Whisper Test Normal     Disability Status:      2024    10:44 AM   Disability Status   Are you deaf or do you have serious difficulty hearing? N   Are you blind or do you have serious difficulty seeing, even when wearing glasses? N   Because of a physical, mental, or emotional condition, do you have serious difficulty concentrating, remembering, or making decisions? N   Do you have serious difficulty walking or climbing stairs? Y   Do you have difficulty dressing or bathing? Y   Because of a physical, mental, or emotional condition, do you have difficulty doing errands alone such as visiting a doctor's office or shopping? Y     Nutrition Screenin/2/2024    10:40 AM   Nutrition Screening   Has food intake declined over the past three months due to loss of appetite, digestive problems, chewing or swallowing difficulties? No decrease in food intake   Involuntary weight loss during the last 3 months? No weight loss   Mobility? Goes out   Has the patient suffered psychological stress or acute disease in the past three months? No   Neuropsychological problems? No psychological problems   Body Mass Index (BMI)?  BMI 23 or greater   Screening Score 14   Interpretation Normal nutritional status    Screening Score: 0-7 Malnourished, 8-11 At Risk, 12-14 Normal  Fall Risk:      4/15/2025    10:30 AM 4/15/2025     9:40 AM 2025     2:20 PM   Fall Risk Assessment - Outpatient   Mobility Status Wheelchair Bound  Wheelchair Bound   Number of falls 0 0 0   Identified as fall risk True  True           MENTATION:   Depression Patient Health Questionnaire:      2024     1:41 PM   Depression Patient Health Questionnaire   PHQ-4 Total Score 6     Has Dementia Dx: No  Has Anxiety Dx:  No    Cognitive Function Screenin/2/2024    10:45 AM   Cognitive Function Screening   Clock Drawing Test 1    Mini-Cog 3 Minute Recall 3   Cognitive Function Screening 4       Data saved with a previous flowsheet row definition     Cognitive Function Screening Total - Less than 4 = Abnormal,  Greater than or equal to 4 = Normal        MEDICATIONS:  High Risk Medications:  Total Active Medications: 1  HYDROcodone-acetaminophen - 5-325 mg    WHAT MATTERS MOST:  Advance Care Planning   ACP Status:   Patient has had an ACP conversation  Living Will: Yes  Power of : No  [place]: Yes    What is most important right now is to focus on remaining as independent as possible    Accordingly, we have decided that the best plan to meet the patient's goals includes continuing with treatment      What matters most to patient today is: perineal skin tag                 Social History     Socioeconomic History    Marital status: Single   Tobacco Use    Smoking status: Never    Smokeless tobacco: Never   Substance and Sexual Activity    Alcohol use: Yes     Comment: holidays    Drug use: No    Sexual activity: Never   Social History Narrative    Single with no children.      Social Drivers of Health     Financial Resource Strain: Low Risk  (2024)    Overall Financial Resource Strain (CARDIA)     Difficulty of Paying Living Expenses: Not hard at all   Food Insecurity: No Food Insecurity (2024)    Hunger Vital Sign     Worried About Running Out of Food in the Last Year: Never true     Ran Out of Food in the Last Year: Never true   Transportation Needs: No Transportation Needs (2024)    TRANSPORTATION NEEDS     Transportation : No   Physical Activity: Insufficiently Active (2024)    Exercise Vital Sign     Days of Exercise per Week: 1 day     Minutes of Exercise per Session: 10 min   Stress: No Stress Concern Present (2024)    Citizen of the [place] of Occupational Health - Occupational Stress Questionnaire  "    Feeling of Stress : Only a little   Housing Stability: Unknown (6/7/2024)    Housing Stability Vital Sign     Unable to Pay for Housing in the Last Year: No     Homeless in the Last Year: No       Review of Systems   Constitutional:  Negative for chills and fever.   HENT:  Negative for sore throat.    Eyes:  Positive for visual disturbance.   Respiratory:  Positive for cough and shortness of breath.    Cardiovascular:  Positive for leg swelling. Negative for chest pain.   Gastrointestinal:  Negative for abdominal pain, constipation, diarrhea, nausea and vomiting.   Genitourinary:  Negative for difficulty urinating, dysuria, frequency and urgency.   Neurological:  Positive for numbness. Negative for dizziness and headaches.   Psychiatric/Behavioral:  Negative for sleep disturbance.        Objective:   BP (!) 169/71 (BP Location: Left forearm, Patient Position: Sitting)   Pulse 94   Temp 98.9 °F (37.2 °C) (Oral)   Ht 4' 11" (1.499 m)   SpO2 98%   BMI 55.66 kg/m²     Physical Exam  Vitals reviewed. Exam conducted with a chaperone present.   Constitutional:       Appearance: She is obese.      Comments: Pt seen in power scooter   Cardiovascular:      Rate and Rhythm: Normal rate and regular rhythm.      Pulses: Normal pulses.      Heart sounds: Normal heart sounds.   Pulmonary:      Effort: Pulmonary effort is normal.      Breath sounds: Normal breath sounds.   Abdominal:      General: Abdomen is flat.      Palpations: Abdomen is soft.   Genitourinary:     Exam position: Lithotomy position.      Pubic Area: No rash.       Comments: Loose skin of B labia at base of introitus.  No apparent erythema or induration.  No visible external hemorrhoids.  Musculoskeletal:      Right lower leg: Edema present.      Left lower leg: Edema present.      Comments: Massive lymphedema BLE.  Wearing loose support stockings only.   Skin:     General: Skin is warm and dry.      Capillary Refill: Capillary refill takes less than 2 " seconds.      Findings: Rash present.      Comments: Extensive venous stasis dermatitis BLE.  Some increased rubor and warmth of BLE likely secondary to vascular congestion but not pronounced enough to be concerning for superimposed cellulitis.  Multiple hyperkeratotic plaques BLE with scattered subdermal seromas.  Small adhesive bandage on medial R foreleg in middle of hyperkeratotic plaque c/w recent drainage.   Neurological:      General: No focal deficit present.      Mental Status: She is alert and oriented to person, place, and time.   Psychiatric:         Mood and Affect: Mood normal.         Behavior: Behavior normal.         Thought Content: Thought content normal.         Judgment: Judgment normal.           Lab Results   Component Value Date    WBC 8.29 06/19/2024    HGB 11.7 (L) 06/19/2024    HCT 37.5 06/19/2024     06/19/2024    CHOL 128 10/16/2023    TRIG 59 10/16/2023    HDL 47 10/16/2023    ALT 15 07/19/2024    AST 18 07/19/2024     07/19/2024    K 4.2 07/19/2024     07/19/2024    CREATININE 1.3 11/15/2024    BUN 29 (H) 07/19/2024    CO2 24 07/19/2024    TSH 0.799 01/16/2020    INR 1.1 06/05/2024    HGBA1C 7.1 (H) 05/06/2024       Current Outpatient Medications on File Prior to Visit   Medication Sig Dispense Refill    albuterol (PROVENTIL/VENTOLIN HFA) 90 mcg/actuation inhaler Inhale 2 puffs into the lungs every 4 (four) hours as needed for Wheezing or Shortness of Breath. Rescue 54 g 3    albuterol-ipratropium (DUO-NEB) 2.5 mg-0.5 mg/3 mL nebulizer solution USE 1 VIAL PER NEBULIZER EVERY 6 HOURS AS NEEDED FOR WHEEZING/RESCUE 90 mL 11    allopurinoL (ZYLOPRIM) 300 MG tablet Take 1 tablet (300 mg total) by mouth once daily. 90 tablet 1    amLODIPine (NORVASC) 10 MG tablet Take 1 tablet (10 mg total) by mouth once daily. 90 tablet 3    ASPERCREME WITH ALOE 10 % Crea Apply 1 Application topically as needed (to legs/back).      atorvastatin (LIPITOR) 80 MG tablet TAKE ONE TABLET BY  MOUTH EVERY DAY 30 tablet 11    blood sugar diagnostic Strp BG monitoring 4 times a day. Pt needs test strips for [employer] Talking meter. 450 strip prn    cholecalciferol, vitamin D3, (VITAMIN D3) 25 mcg (1,000 unit) capsule Take 1 capsule (1,000 Units total) by mouth once daily. 90 capsule 3    clotrimazole-betamethasone 1-0.05% (LOTRISONE) cream Apply topically 2 (two) times daily. 45 g 3    colchicine (COLCRYS) 0.6 mg tablet Take 1 tablet (0.6 mg total) by mouth daily as needed (gout attack). Take 1 tablet in event of gout attack.  If still having symptoms after 2 hours take 2 more tablets.  Do not take more than 3 in one day. 30 tablet 11    diclofenac sodium (VOLTAREN) 1 % Gel APPLY 2 GRAMS TOPICALLY DAILY 100 g 0    doxycycline (VIBRA-TABS) 100 MG tablet Take 1 tablet (100 mg total) by mouth 2 (two) times daily. 60 tablet 5    ELIQUIS 5 mg Tab Take 1 tablet (5 mg total) by mouth 2 (two) times daily. 60 tablet 5    fluticasone (FLONASE) 50 mcg/actuation nasal spray 2 sprays (100 mcg total) by Each Nare route once daily. 1 Bottle 3    HYDROcodone-acetaminophen (NORCO) 5-325 mg per tablet Take 1 tablet by mouth every 6 (six) hours as needed.      ibuprofen (ADVIL,MOTRIN) 800 MG tablet Take 800 mg by mouth every 6 (six) hours as needed.      insulin glargine U-100, Lantus, (LANTUS SOLOSTAR U-100 INSULIN) 100 unit/mL (3 mL) InPn pen Inject 12-18 units at night 6 mL 6    ipratropium (ATROVENT) 21 mcg (0.03 %) nasal spray 2 sprays by Each Nostril route 2 (two) times daily as needed for Rhinitis (congestion). 30 mL 3    lancets Misc Test daily 100 each 12    latanoprost (XALATAN) 0.005 % ophthalmic solution Place 1 drop into both eyes every evening. 2.5 mL 3    menthol-zinc oxide (CALMOSEPTINE) 0.44-20.6 % Oint Apply topically 2 (two) times daily as needed (chafing). 71 g 3    nebulizer and compressor (COMP-AIR ELITE COMP NEB SYSTEM) Berna use as directed 1 each 0    olopatadine (PATANOL) 0.1 % ophthalmic solution Place  "1 drop into both eyes 2 (two) times daily. 5 mL 0    OZEMPIC 0.25 mg or 0.5 mg (2 mg/3 mL) pen injector INJECT 0.5 MG SUBCUTANEOUSLY ONCE A WEEK 3 mL 5    pen needle, diabetic (PEN NEEDLE) 29 gauge x 1/2" Ndle USE DAILY 100 each 3    polyethylene glycol (GLYCOLAX) 17 gram/dose powder MIX 17 GM (1 CAPFUL) IN 4 TO 8 OUNCES OF FLUID AND TAKE DAILY 510 g 5    SENNA 8.6 mg tablet Take 1 tablet by mouth once daily. 90 tablet 3    torsemide (DEMADEX) 10 MG Tab Take 1 tablet (10 mg total) by mouth once daily. 90 tablet 3    witch hazeL (TUCKS, WITCH HAZEL,) 50 % PadM Apply 1 each topically daily as needed (irritated skin tags or hemorrhoids). 48 each 1    zinc oxide 10 % Oint Apply 1 Application topically 2 (two) times daily as needed (wounds or blisters). 85 g 5     No current facility-administered medications on file prior to visit.         Assessment:   85 y.o. female with multiple co-morbid illnesses here to follow-up with PCP and continue work-up of chronic issues    Plan:   1. Type 2 diabetes mellitus with diabetic polyneuropathy, with long-term current use of insulin  Overview:  Abnormal foot exam, followed by podiatry q2 mos    Assessment & Plan:  Hemoglobin A1C   Date Value Ref Range Status   05/06/2024 7.1 (H) 4.0 - 5.6 % Final     Comment:     ADA Screening Guidelines:  5.7-6.4%  Consistent with prediabetes  >or=6.5%  Consistent with diabetes    High levels of fetal hemoglobin interfere with the HbA1C  assay. Heterozygous hemoglobin variants (HbS, HgC, etc)do  not significantly interfere with this assay.   However, presence of multiple variants may affect accuracy.     10/16/2023 6.9 (H) 4.0 - 5.6 % Final     Comment:     ADA Screening Guidelines:  5.7-6.4%  Consistent with prediabetes  >or=6.5%  Consistent with diabetes    High levels of fetal hemoglobin interfere with the HbA1C  assay. Heterozygous hemoglobin variants (HbS, HgC, etc)do  not significantly interfere with this assay.   However, presence of " "multiple variants may affect accuracy.     04/28/2023 6.8 (H) 4.0 - 5.6 % Final     Comment:     ADA Screening Guidelines:  5.7-6.4%  Consistent with prediabetes  >or=6.5%  Consistent with diabetes    High levels of fetal hemoglobin interfere with the HbA1C  assay. Heterozygous hemoglobin variants (HbS, HgC, etc)do  not significantly interfere with this assay.   However, presence of multiple variants may affect accuracy.      Slight uptrend in A1c over past year.  She is due to update A1c and urine protein screen.  Increasing Ozempic to 1 mg as mentioned under obesity" to facilitate weight loss and improve glycemic control.    Orders:  -     semaglutide (OZEMPIC) 1 mg/dose (4 mg/3 mL); Inject 1 mg into the skin every 7 days.  Dispense: 3 mL; Refill: 11  -     Hemoglobin A1C; Future; Expected date: 04/22/2025  -     TSH; Future; Expected date: 04/22/2025  -     Comprehensive Metabolic Panel; Future; Expected date: 04/22/2025  -     CBC Auto Differential; Future; Expected date: 04/22/2025  -     Microalbumin/Creatinine Ratio, Urine; Future; Expected date: 04/22/2025    2. Morbid obesity with BMI of 50.0-59.9, adult  Overview:  patient aware of need drastic weight loss and encourage to increase activity    Assessment & Plan:  Her morbid obesity has been complicating multiple health issues such as chronic lymphedema, impaired mobility, hypertension, and type 2 diabetes.  She has been tolerating Ozempic 0.5 mg for several months with mild improvement in appetite symptoms but no appreciable weight loss; we will increase dose to 1 mg to further aid in weight loss.      3. Hypertension associated with diabetes  Overview:  BP controlled ibesartan 300mg, torsemide    Assessment & Plan:  Blood pressure not at goal today due to patient missing her medication this morning as well as having a scare driving her scooter down a ramp on her way into appointment.  We will plan to reassess at follow-up.  She is on amlodipine 10 mg.  " She has not been on an ARB for at least the past couple of years for an unclear reason.  If blood pressure remains high we will plan on restarting 1.    Orders:  -     POCT Glucose, Hand-Held Device    4. Lymphedema of both lower extremities  Overview:  LE wounds resolved,right leg showing so skin changes that should be monitored closely.  Reinforced daily circaid application  by Sister Ana Arevalo or Doreen Ribera. Last seen by lymphedema clinic in 2014    Assessment & Plan:  Patient has received new compression stockings but is unable to get them on without assistance.  She has had interval worsening of her lymphedema due to inability to don compression devices.  Therapeutic compression wrap applied today in clinic with plan to repeat in approximately 3 days while awaiting home health arrangements to continue leg wraps until she is able to get her new compression stockings on.  She remains on torsemide 10 mg daily.    Orders:  -     Ambulatory referral/consult to Home Health; Future; Expected date: 04/23/2025    5. Mixed hyperlipidemia  Assessment & Plan:  Last lipids at goal on atorvastatin 80 mg.  Due to update lipid panel.    Orders:  -     Lipid Panel; Future; Expected date: 04/22/2025    6. Acute idiopathic gout of ankle, unspecified laterality  Overview:  R wrist swelling and warmth, has metabolic syndrome    Assessment & Plan:  Colchicine refill sent for p.r.n. use.    Orders:  -     colchicine (COLCRYS) 0.6 mg tablet; Take 1 tablet (0.6 mg total) by mouth daily as needed (gout attack). Take 1 tablet in event of gout attack.  If still having symptoms after 2 hours take 2 more tablets.  Do not take more than 3 in one day.  Dispense: 30 tablet; Refill: 11    7. Venous stasis dermatitis of both lower extremities  Overview:  LE swelling bilaterally with poor wound healing, routine circaids with wound care    Assessment & Plan:  Interval worsening of chronic skin changes with several apparent dermal bullae at  most severely affected area of medial right foreleg, 1 of which has recently ruptured and drained serous material that is presently bandaged.  The site was cleaned in clinic today and re bandaged with Medihoney, and both legs had compression wraps applied.  Home health orders placed with orders for compression leg wraps until patient is able to get into her new compression stockings, which she notably also requires assistance to put on.  Until home health is able to be set up we will arrange for patient to come to my other clinic site at [facility] for my nurse there to rewrap her legs in a few days.      8. Mild intermittent asthma without complication  Overview:  >>OVERVIEW FOR REACTIVE AIRWAY DISEASE WITHOUT COMPLICATION WRITTEN ON 10/4/2018 12:01 PM BY ENRIQUE REAVES, NP    Continue advair and albuterol prn, dyspnea may be multifactorial given late onset symptoms, Recommend patient check BNP, repeat chest xray.     Assessment & Plan:  No longer on controller inhaler; symptoms adequately controlled with rescue inhaler and nebulizer as needed.      9. History of heart failure  Overview:  H/o Diastolic heart failure, recovery of diastolic function on echo 2024    Assessment & Plan:  H/o Diastolic heart failure, recovery of diastolic function on echo 2024         10. Cor pulmonale, chronic  Assessment & Plan:  Last echo from June of 2024 showed borderline low RV systolic function and mild right atrial dilation; likely secondary to longstanding pulmonary hypertension in context of morbid obesity and REJI.  She has a pending repeat echo ordered by her cardiologist.      11. Essential hypertension  Assessment & Plan:  Blood pressure not at goal today due to patient missing her medication this morning as well as having a scare driving her scooter down a ramp on her way into appointment.  We will plan to reassess at follow-up.      12. Paroxysmal atrial fibrillation  Assessment & Plan:  Anticoagulated on Eliquis 5 mg b.i.d.  for stroke prophylaxis.      13. Pulmonary hypertension  Overview:  Noted on 2D echo 8/23/2017    Assessment & Plan:  Elevate PAP of 48 mm Hg on most recent echocardiogram from June 2024.  Likely related to longstanding morbid obesity, REJI, and systemic hypertension; however patient also has history of saddle pulmonary embolus at the time of that echocardiogram done last year.  A repeat echocardiogram has been ordered by her cardiologist and is pending.      14. Stage 3b chronic kidney disease  Overview:  CKD related to longstanding DM and diuretic use    Assessment & Plan:  GFR has remained stable over past year.  She is due to update urine protein screen.      15. Skin tag of female perineum  Assessment & Plan:  Skin tag noted in 2023 persistent despite topical steroids and witch Hazel; she is already scheduled to see someone about excision.      16. Long term (current) use of antibiotics  Overview:  Per Orthopedics.   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      17. Idiopathic chronic gout of multiple sites without tophus  Overview:  R wrist swelling and warmth, has metabolic syndrome    Assessment & Plan:  Colchicine refill sent for p.r.n. use.      18. Adrenal mass- adenoma stable since 2004 (1.8 cm and 8/13/12 (2 cm); stable 2016 2.4 cm  Overview:  Adrenal mass- adenoma stable since 2004 (1.8 cm and 8/13/12 (2 cm); stable 2016 2.4 cm    Assessment & Plan:  Incidental imaging finding which has remained stable for many years; last reassessed in September 2024.             Health Maintenance         Date Due Completion Date    TETANUS VACCINE Never done ---    Diabetes Urine Screening 04/28/2024 4/28/2023    Lipid Panel 10/16/2024 10/16/2023    Hemoglobin A1c 11/06/2024 5/6/2024    Override on 7/13/2016: Done    Diabetic Eye Exam 04/15/2026  4/15/2025    Override on 2/7/2020: Done    Override on 2/17/2016: Done    Override on 1/22/2015: Done    Override on 8/16/2012: Done    DEXA Scan 06/01/2027 6/1/2023    Override on 12/13/2011: Done            Future Appointments   Date Time Provider Department Center   4/28/2025 12:30 PM CARDIAC, PET IMAGING [facility] CARDPET [facility]   4/28/2025  1:45 PM ECHO, [facility] ECHOSTR [facility]   4/29/2025  9:00 AM [facility] BARIATRIC MED WT LOSS FIN COORD [facility] BAR[facility]   5/7/2025  2:30 PM Ragini Matthews, NP [facility]   5/20/2025 10:15 AM Ferdinand Rosas MD [facility] OPHTHAL [address]   6/17/2025  3:00 PM Brant Palmer MD PhD [facility] PERVASC [address]   7/2/2025  1:45 PM Alfredo Lozoya MD [facility] ORTHO [facility]   7/3/2025  9:45 AM Sonia Willard, DPM [facility] POD [facility]         Follow up in about 4 weeks (around 5/20/2025) for here or at [facility]. Total clinical care time was 40 min    Jose Jc MS4  [employer]    Sony Mccray MD   [facility] [employer] and [employer] Plus  [facility]  [facility]

## 2025-04-23 PROBLEM — J45.20 MILD INTERMITTENT ASTHMA WITHOUT COMPLICATION: Status: ACTIVE | Noted: 2019-01-18

## 2025-04-23 PROBLEM — J45.20 MILD INTERMITTENT ASTHMA WITHOUT COMPLICATION: Status: ACTIVE | Noted: 2018-10-04

## 2025-04-23 PROBLEM — D63.8 ANEMIA IN OTHER CHRONIC DISEASES CLASSIFIED ELSEWHERE: Status: ACTIVE | Noted: 2022-06-22

## 2025-04-23 PROBLEM — B37.31 VAGINAL CANDIDIASIS: Status: RESOLVED | Noted: 2024-04-19 | Resolved: 2025-04-23

## 2025-04-23 PROBLEM — Z86.711 HISTORY OF PULMONARY EMBOLISM: Status: ACTIVE | Noted: 2024-09-17

## 2025-04-23 PROBLEM — M1A.09X0 IDIOPATHIC CHRONIC GOUT OF MULTIPLE SITES WITHOUT TOPHUS: Status: ACTIVE | Noted: 2019-04-09

## 2025-04-24 ENCOUNTER — TELEPHONE (OUTPATIENT)
Dept: PRIMARY CARE CLINIC | Facility: CLINIC | Age: 85
End: 2025-04-24
Payer: MEDICARE

## 2025-04-29 DIAGNOSIS — M10.00 IDIOPATHIC GOUT, UNSPECIFIED CHRONICITY, UNSPECIFIED SITE: ICD-10-CM

## 2025-04-29 PROCEDURE — G0180 MD CERTIFICATION HHA PATIENT: HCPCS | Mod: ,,, | Performed by: HOSPITALIST

## 2025-04-29 RX ORDER — ALLOPURINOL 300 MG/1
300 TABLET ORAL DAILY
Qty: 90 TABLET | Refills: 1 | Status: SHIPPED | OUTPATIENT
Start: 2025-04-29 | End: 2025-10-26

## 2025-04-29 NOTE — TELEPHONE ENCOUNTER
----- Message from Anamaria sent at 4/29/2025  8:38 AM CDT -----  Contact: [phone]  Requesting an RX refill or new RX.Is this a refill or new RX: refillRX name and strength (copy/paste from chart):  allopurinoL (ZYLOPRIM) 300 MG tabletIs this a 30 day or 90 day RX: 30Pharmacy name and phone # (copy/paste from chart):  [facility] - [facility] [address] [facility] [facility] [place] [id number] [facility] [address]Sashq: [phone] Fax: [phone] The doctors have asked that we provide their patients with the following 2 reminders -- prescription refills can take up to 72 hours, and a friendly reminder that in the future you can use your [facility] account to request refills: pharmacy  ----- Message -----  From: Anamaria Suresh  Sent: 4/29/2025   8:40 AM CDT  To: Rajesh Britoe Staff    Requesting an RX refill or new RX.Is this a refill or new RX: refillRX name and strength (copy/paste from chart):  allopurinoL (ZYLOPRIM) 300 MG tabletIs this a 30 day or 90 day RX: 30Pharmacy name and phone # (copy/paste from chart):  [facility] - [facility] [address] [facility] [address] [facility] [place] [id number] [facility] [id number]: [phone] Fax: [phone] The doctors have asked that we provide their patients with the following 2 reminders -- prescription refills can take up to 72 hours, and a friendly reminder that in the future you can use your [facility] account to request refills: pharmacy

## 2025-04-30 NOTE — ASSESSMENT & PLAN NOTE
No longer on controller inhaler; symptoms adequately controlled with rescue inhaler and nebulizer as needed.

## 2025-04-30 NOTE — ASSESSMENT & PLAN NOTE
Elevate PAP of 48 mm Hg on most recent echocardiogram from June 2024.  Likely related to longstanding morbid obesity, REJI, and systemic hypertension; however patient also has history of saddle pulmonary embolus at the time of that echocardiogram done last year.  A repeat echocardiogram has been ordered by her cardiologist and is pending.

## 2025-04-30 NOTE — ASSESSMENT & PLAN NOTE
Blood pressure not at goal today due to patient missing her medication this morning as well as having a scare driving her scooter down a ramp on her way into appointment.  We will plan to reassess at follow-up.  She is on amlodipine 10 mg.  She has not been on an ARB for at least the past couple of years for an unclear reason.  If blood pressure remains high we will plan on restarting 1.

## 2025-04-30 NOTE — ASSESSMENT & PLAN NOTE
Skin tag noted in 2023 persistent despite topical steroids and witch Hazel; she is already scheduled to see someone about excision.

## 2025-04-30 NOTE — ASSESSMENT & PLAN NOTE
Patient has received new compression stockings but is unable to get them on without assistance.  She has had interval worsening of her lymphedema due to inability to don compression devices.  Therapeutic compression wrap applied today in clinic with plan to repeat in approximately 3 days while awaiting home health arrangements to continue leg wraps until she is able to get her new compression stockings on.  She remains on torsemide 10 mg daily.

## 2025-04-30 NOTE — ASSESSMENT & PLAN NOTE
Her morbid obesity has been complicating multiple health issues such as chronic lymphedema, impaired mobility, hypertension, and type 2 diabetes.  She has been tolerating Ozempic 0.5 mg for several months with mild improvement in appetite symptoms but no appreciable weight loss; we will increase dose to 1 mg to further aid in weight loss.

## 2025-04-30 NOTE — ASSESSMENT & PLAN NOTE
Blood pressure not at goal today due to patient missing her medication this morning as well as having a scare driving her scooter down a ramp on her way into appointment.  We will plan to reassess at follow-up.

## 2025-04-30 NOTE — ASSESSMENT & PLAN NOTE
Incidental imaging finding which has remained stable for many years; last reassessed in September 2024.

## 2025-04-30 NOTE — ASSESSMENT & PLAN NOTE
"Hemoglobin A1C   Date Value Ref Range Status   05/06/2024 7.1 (H) 4.0 - 5.6 % Final     Comment:     ADA Screening Guidelines:  5.7-6.4%  Consistent with prediabetes  >or=6.5%  Consistent with diabetes    High levels of fetal hemoglobin interfere with the HbA1C  assay. Heterozygous hemoglobin variants (HbS, HgC, etc)do  not significantly interfere with this assay.   However, presence of multiple variants may affect accuracy.     10/16/2023 6.9 (H) 4.0 - 5.6 % Final     Comment:     ADA Screening Guidelines:  5.7-6.4%  Consistent with prediabetes  >or=6.5%  Consistent with diabetes    High levels of fetal hemoglobin interfere with the HbA1C  assay. Heterozygous hemoglobin variants (HbS, HgC, etc)do  not significantly interfere with this assay.   However, presence of multiple variants may affect accuracy.     04/28/2023 6.8 (H) 4.0 - 5.6 % Final     Comment:     ADA Screening Guidelines:  5.7-6.4%  Consistent with prediabetes  >or=6.5%  Consistent with diabetes    High levels of fetal hemoglobin interfere with the HbA1C  assay. Heterozygous hemoglobin variants (HbS, HgC, etc)do  not significantly interfere with this assay.   However, presence of multiple variants may affect accuracy.      Slight uptrend in A1c over past year.  She is due to update A1c and urine protein screen.  Increasing Ozempic to 1 mg as mentioned under obesity" to facilitate weight loss and improve glycemic control.  "

## 2025-04-30 NOTE — ASSESSMENT & PLAN NOTE
Last echo from June of 2024 showed borderline low RV systolic function and mild right atrial dilation; likely secondary to longstanding pulmonary hypertension in context of morbid obesity and REJI.  She has a pending repeat echo ordered by her cardiologist.

## 2025-04-30 NOTE — ASSESSMENT & PLAN NOTE
Interval worsening of chronic skin changes with several apparent dermal bullae at most severely affected area of medial right foreleg, 1 of which has recently ruptured and drained serous material that is presently bandaged.  The site was cleaned in clinic today and re bandaged with Medihoney, and both legs had compression wraps applied.  Home health orders placed with orders for compression leg wraps until patient is able to get into her new compression stockings, which she notably also requires assistance to put on.  Until home health is able to be set up we will arrange for patient to come to my other clinic site at [facility] for my nurse there to rewrap her legs in a few days.

## 2025-05-02 ENCOUNTER — TELEPHONE (OUTPATIENT)
Dept: SURGERY | Facility: CLINIC | Age: 85
End: 2025-05-02
Payer: MEDICARE

## 2025-05-05 ENCOUNTER — TELEPHONE (OUTPATIENT)
Dept: SURGERY | Facility: CLINIC | Age: 85
End: 2025-05-05
Payer: MEDICARE

## 2025-05-09 ENCOUNTER — PATIENT MESSAGE (OUTPATIENT)
Dept: INTERNAL MEDICINE | Facility: CLINIC | Age: 85
End: 2025-05-09
Payer: MEDICARE

## 2025-05-09 DIAGNOSIS — N18.32 STAGE 3B CHRONIC KIDNEY DISEASE: ICD-10-CM

## 2025-05-09 DIAGNOSIS — E78.2 MIXED HYPERLIPIDEMIA: ICD-10-CM

## 2025-05-09 DIAGNOSIS — Z79.4 TYPE 2 DIABETES MELLITUS WITH DIABETIC POLYNEUROPATHY, WITH LONG-TERM CURRENT USE OF INSULIN: Primary | ICD-10-CM

## 2025-05-09 DIAGNOSIS — E11.42 TYPE 2 DIABETES MELLITUS WITH DIABETIC POLYNEUROPATHY, WITH LONG-TERM CURRENT USE OF INSULIN: Primary | ICD-10-CM

## 2025-05-15 ENCOUNTER — OFFICE VISIT (OUTPATIENT)
Dept: SURGERY | Facility: CLINIC | Age: 85
End: 2025-05-15
Payer: MEDICARE

## 2025-05-15 VITALS
RESPIRATION RATE: 20 BRPM | HEIGHT: 59 IN | HEART RATE: 105 BPM | BODY MASS INDEX: 53.69 KG/M2 | SYSTOLIC BLOOD PRESSURE: 179 MMHG | DIASTOLIC BLOOD PRESSURE: 77 MMHG | WEIGHT: 266.31 LBS

## 2025-05-15 DIAGNOSIS — K59.01 CONSTIPATION BY DELAYED COLONIC TRANSIT: ICD-10-CM

## 2025-05-15 DIAGNOSIS — K62.89 ANAL OR RECTAL PAIN: ICD-10-CM

## 2025-05-15 DIAGNOSIS — K64.2 PROLAPSED INTERNAL HEMORRHOIDS, GRADE 3: Primary | ICD-10-CM

## 2025-05-15 DIAGNOSIS — K64.4 ANAL SKIN TAG: ICD-10-CM

## 2025-05-15 PROCEDURE — 46600 DIAGNOSTIC ANOSCOPY SPX: CPT | Mod: PBBFAC | Performed by: COLON & RECTAL SURGERY

## 2025-05-15 PROCEDURE — 99215 OFFICE O/P EST HI 40 MIN: CPT | Mod: PBBFAC | Performed by: COLON & RECTAL SURGERY

## 2025-05-15 PROCEDURE — 99999 PR PBB SHADOW E&M-EST. PATIENT-LVL V: CPT | Mod: PBBFAC,,, | Performed by: COLON & RECTAL SURGERY

## 2025-05-15 NOTE — PROGRESS NOTES
No chief complaint on file.        Duran Giordano is a 85 y.o. female who presents with feeling a fleshy mass outside of her anus     She says it has always been there     She says it has gotten worse over the last 6 months     She is constantly sitting in a wheelchair     She does not take any fiber     She spends roughly 15 minutes on the toilet     She has a bowel movement daily that is varied in consistency    Past Medical History:   Diagnosis Date    Adrenal mass- adenoma stable since 2004 (1.8 cm and 8/13/12 (2 cm); stable 2016 2.4 cm     Adrenal mass- adenoma stable since 2004 (1.8 cm and 8/13/12 (2 cm); stable 2016 2.4 cm    Arthritis     Asthma in adult without complication 6/25/2015    Bilateral carotid artery disease 7/21/2017    Bilateral sciatica 2/7/2017    Cellulitis of right leg 08/16/2017    Cerebral infarction 1/29/2016    Multiple areas of lacunar infarction. Stroke risk factors include HTN, DM2, Dyslipidemia.  Continue ASA 81mg/ Statin therapy I have encouraged 30 minutes of physical activity daily for 5 days a week. She has access to a pool so this should not be so jarring to her joints.     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    Mixed hyperlipidemia 8/13/2012    Morbid obesity with BMI of 50.0-59.9, adult 2/11/2014    Obstructive sleep apnea syndrome 8/13/2012    Pulmonary embolism 6/5/2024    Senile cataracts of both eyes 1/22/2015    Traumatic open wound of right lower leg 8/25/2017    Trigeminal neuralgia of right side of face 5/23/2017    For years now Previously  on Gabapentin, but caused constipation Dissipating over time Not related to intracranial abnormalities Possibly related to dental procedure    Type 2 diabetes mellitus with diabetic polyneuropathy, with long-term current use of insulin 1/29/2013    Venous stasis dermatitis of both lower extremities 8/21/2017    Viral hepatitis A without coma 1/1/1971    Hep B infection in [facility] in [facility], was hospitalize for 2 weeks at that time, unknown treatment.    Vitamin D deficiency disease 8/13/2012      Past Surgical History:   Procedure Laterality Date    COLONOSCOPY      HYSTERECTOMY  01/01/1982    secondary uterine fibroids    INJECTION OF ANESTHETIC AGENT AROUND NERVE Right 10/21/2021    Procedure: BLOCK, NERVE GENICULAR RIGHT NEEDS CONSENT;  Surgeon: Senia Kilpatrick MD;  Location: [facility]IN [facility];  Service: Pain Management;  Laterality: Right;    INSERTION OF ANTIBIOTIC SPACER Right 10/28/2021    Procedure: INSERTION, ANTIBIOTIC SPACER;  Surgeon: Alfredo Lozoya MD;  Location: [address];  Service: Orthopedics;  Laterality: Right;    JOINT REPLACEMENT      REVISION OF KNEE ARTHROPLASTY Right 10/28/2021    Procedure: REVISION, ARTHROPLASTY, KNEE-DEPUY ANTIBIOTIC SPACER;  Surgeon: Alfredo Lozoya MD;  Location: [address];  Service: Orthopedics;  Laterality: Right;    REVISION OF KNEE ARTHROPLASTY Right 06/30/2022    Procedure: REVISION, ARTHROPLASTY, KNEE-NAKIA- stage 2;  Surgeon: Alfredo Lozoya MD;  Location: [address];  Service: Orthopedics;  Laterality: Right;    Right total knee replacement        Social History[1]   Family History   Problem Relation Name Age of Onset    Cancer Mother Leslie Giordano 69        cancer of jaw    Hypertension Father Oskar Giordano     Cancer Sister Tiffanie Del Angel         half sister - unknown dx    No Known Problems Brother      No Known Problems Maternal Aunt      No Known Problems Maternal Uncle      No Known Problems Paternal Aunt      No Known Problems  "Paternal Uncle      No Known Problems Maternal Grandmother      No Known Problems Maternal Grandfather      No Known Problems Paternal Grandmother      No Known Problems Paternal Grandfather      Glaucoma Neg Hx      Breast cancer Neg Hx      Colon cancer Neg Hx      Ovarian cancer Neg Hx      Stroke Neg Hx      Allergic rhinitis Neg Hx      Allergies Neg Hx      Angioedema Neg Hx      Asthma Neg Hx      Atopy Neg Hx      Eczema Neg Hx      Immunodeficiency Neg Hx      Rhinitis Neg Hx      Urticaria Neg Hx      Amblyopia Neg Hx      Blindness Neg Hx      Cataracts Neg Hx      Diabetes Neg Hx      Macular degeneration Neg Hx      Retinal detachment Neg Hx      Strabismus Neg Hx      Thyroid disease Neg Hx      Psoriasis Neg Hx          Scheduled Meds:  Continuous Infusions:  PRN Meds:.     Review of patient's allergies indicates:   Allergen Reactions    Sulfamethoxazole-trimethoprim      Other reaction(s): up set stomach rash/hives    Azithromycin      Feels bad    Erythromycin Other (See Comments)     Other reaction(s): Unknown  Other reaction(s): Stomach upset    Gentamicin      Vaginal burning , itching     Levofloxacin Hives     Other reaction(s): nervousness    Shellfish containing products      Rash      Vancomycin Itching     Other reaction(s): Itching          Review of Systems  Please see review of systems scanned into the chart       BP (!) 179/77 (BP Location: Left arm, Patient Position: Sitting)   Pulse 105   Resp 20   Ht 4' 11" (1.499 m)   Wt 120.8 kg (266 lb 5.1 oz)   BMI 53.79 kg/m²     General:  Well nourished, no apparent distress  Skin:  Warm, dry  Eyes:  Pupils equally round and reactive to light and accommodation, extraocular muscles intact  Neck:  No jugular distention, no lymphadenopathy  Lungs:  Chest rises symmetrically, equal breath sounds bilaterally  Cardiac:  Regular rate and rhythm  Abdomen:  Soft, nondistended, nontender  Extremities:  Moving bilateral extremities symmetrically, " strength intact  Psych:  Appropriate, cooperative  Rectal:  Normal anal contour, no erythema or edema.  GABRIELLE:  Good tone and control, no masses or fluctuance.  Anoscopy:  The anoscope was gently inserted without issue.  Grade 3 internal hemorrhoids, otherwise normal mucosa         ASSESSMENT:  1. Prolapsed internal hemorrhoids, grade 3        2. Anal skin tag  Ambulatory referral/consult to Colorectal Surgery      3. Anal or rectal pain        4. Constipation by delayed colonic transit                PLAN:  -Increase fiber and water intake    -Kegel exercise were discussed and a handout was given    -we discussed the risks, benefits, and alternatives of a hemorrhoidectomy.  I have given her comorbid conditions and they are fairly mild nature that this is not worth the risks.  The patient agreed     -follow-up as needed          [1]   Social History  Socioeconomic History    Marital status: Single   Tobacco Use    Smoking status: Never    Smokeless tobacco: Never   Substance and Sexual Activity    Alcohol use: Yes     Comment: holidays    Drug use: No    Sexual activity: Never   Social History Narrative    Single with no children.      Social Drivers of Health     Financial Resource Strain: Low Risk  (6/7/2024)    Overall Financial Resource Strain (CARDIA)     Difficulty of Paying Living Expenses: Not hard at all   Food Insecurity: No Food Insecurity (6/7/2024)    Hunger Vital Sign     Worried About Running Out of Food in the Last Year: Never true     Ran Out of Food in the Last Year: Never true   Transportation Needs: No Transportation Needs (6/7/2024)    TRANSPORTATION NEEDS     Transportation : No   Physical Activity: Insufficiently Active (2/2/2024)    Exercise Vital Sign     Days of Exercise per Week: 1 day     Minutes of Exercise per Session: 10 min   Stress: No Stress Concern Present (6/7/2024)    [employer] [place] [employer] - Occupational Stress Questionnaire     Feeling of Stress : Only a little    Housing Stability: Unknown (6/7/2024)    Housing Stability Vital Sign     Unable to Pay for Housing in the Last Year: No     Homeless in the Last Year: No

## 2025-05-20 ENCOUNTER — HOSPITAL ENCOUNTER (OUTPATIENT)
Dept: CARDIOLOGY | Facility: HOSPITAL | Age: 85
Discharge: HOME | End: 2025-05-20
Attending: INTERNAL MEDICINE
Payer: MEDICARE

## 2025-05-20 ENCOUNTER — OFFICE VISIT (OUTPATIENT)
Dept: OPHTHALMOLOGY | Facility: CLINIC | Age: 85
End: 2025-05-20
Payer: MEDICARE

## 2025-05-20 ENCOUNTER — CLINICAL SUPPORT (OUTPATIENT)
Dept: OPHTHALMOLOGY | Facility: CLINIC | Age: 85
End: 2025-05-20
Payer: MEDICARE

## 2025-05-20 VITALS
DIASTOLIC BLOOD PRESSURE: 77 MMHG | SYSTOLIC BLOOD PRESSURE: 179 MMHG | HEART RATE: 105 BPM | BODY MASS INDEX: 53.33 KG/M2 | WEIGHT: 264.56 LBS | HEIGHT: 59 IN

## 2025-05-20 VITALS
DIASTOLIC BLOOD PRESSURE: 64 MMHG | SYSTOLIC BLOOD PRESSURE: 134 MMHG | WEIGHT: 265 LBS | BODY MASS INDEX: 53.42 KG/M2 | HEIGHT: 59 IN | HEART RATE: 105 BPM

## 2025-05-20 DIAGNOSIS — H40.053 OCULAR HYPERTENSION, BILATERAL: ICD-10-CM

## 2025-05-20 DIAGNOSIS — H35.033 HYPERTENSIVE RETINOPATHY OF BOTH EYES: ICD-10-CM

## 2025-05-20 DIAGNOSIS — R06.02 SOB (SHORTNESS OF BREATH) ON EXERTION: ICD-10-CM

## 2025-05-20 DIAGNOSIS — H43.813 VITREOUS DEGENERATION OF BOTH EYES: ICD-10-CM

## 2025-05-20 DIAGNOSIS — R53.83 OTHER FATIGUE: ICD-10-CM

## 2025-05-20 DIAGNOSIS — H34.8120 CENTRAL RETINAL VEIN OCCLUSION WITH MACULAR EDEMA OF LEFT EYE: ICD-10-CM

## 2025-05-20 DIAGNOSIS — H34.8120 CENTRAL RETINAL VEIN OCCLUSION WITH MACULAR EDEMA OF LEFT EYE: Primary | ICD-10-CM

## 2025-05-20 DIAGNOSIS — H25.13 AGE-RELATED NUCLEAR CATARACT OF BOTH EYES: ICD-10-CM

## 2025-05-20 DIAGNOSIS — E11.9 DIABETES MELLITUS TYPE 2 WITHOUT RETINOPATHY: ICD-10-CM

## 2025-05-20 LAB
AORTIC SIZE INDEX (SOV): 1.1 CM/M2
AORTIC SIZE INDEX: 1.3 CM/M2
ASCENDING AORTA: 2.6 CM
AV AREA BY CONTINUOUS VTI: 1.3 CM2
AV INDEX (PROSTH): 0.46
AV LVOT MEAN GRADIENT: 2 MMHG
AV LVOT PEAK GRADIENT: 4 MMHG
AV MEAN GRADIENT: 11 MMHG
AV PEAK GRADIENT: 19 MMHG
AV VALVE AREA BY VELOCITY RATIO: 1.3 CM²
AV VALVE AREA: 1.5 CM²
AV VELOCITY RATIO: 0.41
BSA FOR ECHO PROCEDURE: 2.23 M2
CFR FLOW - ANTERIOR: 1.35
CFR FLOW - INFERIOR: 1.65
CFR FLOW - LATERAL: 1.44
CFR FLOW - MAX: 2.24
CFR FLOW - MIN: 0.95
CFR FLOW - SEPTAL: 1.75
CFR FLOW - WHOLE HEART: 1.55
CV ECHO LV RWT: 0.63 CM
CV STRESS BASE HR: 100 BPM
DIASTOLIC BLOOD PRESSURE: 88 MMHG
DOP CALC AO PEAK VEL: 2.2 M/S
DOP CALC AO VTI: 41 CM
DOP CALC LVOT AREA: 3.1 CM2
DOP CALC LVOT DIAMETER: 2 CM
DOP CALC LVOT PEAK VEL: 0.9 M/S
DOP CALC LVOT STROKE VOLUME: 59.7 CM3
DOP CALCLVOT PEAK VEL VTI: 19 CM
E WAVE DECELERATION TIME: 159 MS
E/A RATIO: 0.7
E/E' RATIO: 12 M/S
ECHO EF ESTIMATED: 48 %
ECHO LV POSTERIOR WALL: 1.2 CM (ref 0.6–1.1)
EJECTION FRACTION- HIGH: 65 %
EJECTION FRACTION: 50 %
END DIASTOLIC INDEX-HIGH: 153 ML/M2
END DIASTOLIC INDEX-LOW: 93 ML/M2
END SYSTOLIC INDEX-HIGH: 71 ML/M2
END SYSTOLIC INDEX-LOW: 31 ML/M2
FRACTIONAL SHORTENING: 23.7 % (ref 28–44)
INTERVENTRICULAR SEPTUM: 1.1 CM (ref 0.6–1.1)
IVC DIAMETER: 1.66 CM
IVRT: 103 MS
LA MAJOR: 4.9 CM
LA MINOR: 4.8 CM
LA WIDTH: 3.6 CM
LEFT ATRIUM SIZE: 3.1 CM
LEFT ATRIUM VOLUME INDEX MOD: 23 ML/M2
LEFT ATRIUM VOLUME INDEX: 22 ML/M2
LEFT ATRIUM VOLUME MOD: 47 ML
LEFT ATRIUM VOLUME: 46 CM3
LEFT INTERNAL DIMENSION IN SYSTOLE: 2.9 CM (ref 2.1–4)
LEFT VENTRICLE DIASTOLIC VOLUME INDEX: 30.92 ML/M2
LEFT VENTRICLE DIASTOLIC VOLUME: 64 ML
LEFT VENTRICLE MASS INDEX: 69.5 G/M2
LEFT VENTRICLE SYSTOLIC VOLUME INDEX: 15.9 ML/M2
LEFT VENTRICLE SYSTOLIC VOLUME: 33 ML
LEFT VENTRICULAR INTERNAL DIMENSION IN DIASTOLE: 3.8 CM (ref 3.5–6)
LEFT VENTRICULAR MASS: 143.8 G
LV LATERAL E/E' RATIO: 11.1
LV SEPTAL E/E' RATIO: 12.7
MV MEAN GRADIENT: 4 MMHG
MV PEAK A VEL: 1.28 M/S
MV PEAK E VEL: 0.89 M/S
MV PEAK GRADIENT: 9 MMHG
NUC REST DIASTOLIC VOLUME INDEX: 137
NUC REST EJECTION FRACTION: 47
NUC REST SYSTOLIC VOLUME INDEX: 73
NUC STRESS DIASTOLIC VOLUME INDEX: 123
NUC STRESS EJECTION FRACTION: 43 %
NUC STRESS SYSTOLIC VOLUME INDEX: 70
OHS CV CPX 1 MINUTE RECOVERY HEART RATE: 101 BPM
OHS CV CPX 85 PERCENT MAX PREDICTED HEART RATE MALE: 116
OHS CV CPX MAX PREDICTED HEART RATE: 137
OHS CV CPX PATIENT IS FEMALE: 1
OHS CV CPX PATIENT IS MALE: 0
OHS CV CPX PEAK DIASTOLIC BLOOD PRESSURE: 69 MMHG
OHS CV CPX PEAK HEAR RATE: 130 BPM
OHS CV CPX PEAK RATE PRESSURE PRODUCT: NORMAL
OHS CV CPX PEAK SYSTOLIC BLOOD PRESSURE: 128 MMHG
OHS CV CPX PERCENT MAX PREDICTED HEART RATE ACHIEVED: 98
OHS CV CPX RATE PRESSURE PRODUCT PRESENTING: NORMAL
OHS CV INITIAL DOSE: 36 MCG/KG/MIN
OHS CV MODERATELY REDUCED FLOW CAPACITY: 17 %
OHS CV NO ISCHEMIA MILDLY REDUCED FLOW CAPACTY: 83 %
OHS CV NO ISCHEMIA MINIMALLY REDUCED FLOW CAPACITY: 0 %
OHS CV NORMAL FLOW CAPACITY COMPARABLE TO HEALTHY YOUNG VOLUNTEERS: 0 %
OHS CV PEAK DOSE: 35.6 MCG/KG/MIN
OHS CV PET ID: 8879
OHS CV RV/LV RATIO: 0.87 CM
OHS CV SEVERELY REDUCED FLOW CAPACITY: 0 %
OHS CV TOTAL EXAM DLP: 524.34 MGY-CM
PISA TR MAX VEL: 3.4 M/S
PULM VEIN S/D RATIO: 1.37
PV PEAK D VEL: 0.38 M/S
PV PEAK S VEL: 0.52 M/S
RA MAJOR: 4.56 CM
RA PRESSURE ESTIMATED: 3 MMHG
RA WIDTH: 3.48 CM
REST FLOW - ANTERIOR: 0.9 CC/MIN/G
REST FLOW - INFERIOR: 0.75 CC/MIN/G
REST FLOW - LATERAL: 1.01 CC/MIN/G
REST FLOW - MAX: 1.33 CC/MIN/G
REST FLOW - MIN: 0.45 CC/MIN/G
REST FLOW - SEPTAL: 0.64 CC/MIN/G
REST FLOW - WHOLE HEART: 0.83 CC/MIN/G
RETIRED EF AND QEF - SEE NOTES: 53 %
RIGHT ATRIAL AREA: 12.9 CM2
RIGHT VENTRICLE DIASTOLIC BASEL DIMENSION: 3.3 CM
RV TB RVSP: 6 MMHG
RV TISSUE DOPPLER FREE WALL SYSTOLIC VELOCITY 1 (APICAL 4 CHAMBER VIEW): 13.07 CM/S
SINUS: 2.22 CM
STJ: 2 CM
STRESS FLOW - ANTERIOR: 1.2 CC/MIN/G
STRESS FLOW - INFERIOR: 1.24 CC/MIN/G
STRESS FLOW - LATERAL: 1.44 CC/MIN/G
STRESS FLOW - MAX: 1.87 CC/MIN/G
STRESS FLOW - MIN: 0.61 CC/MIN/G
STRESS FLOW - SEPTAL: 1.11 CC/MIN/G
STRESS FLOW - WHOLE HEART: 1.25 CC/MIN/G
SYSTOLIC BLOOD PRESSURE: 166 MMHG
TDI LATERAL: 0.08 M/S
TDI SEPTAL: 0.07 M/S
TDI: 0.08 M/S
TRICUSPID ANNULAR PLANE SYSTOLIC EXCURSION: 1.9 CM
TV PEAK GRADIENT: 47 MMHG
TV REST PULMONARY ARTERY PRESSURE: 49 MMHG
Z-SCORE OF LEFT VENTRICULAR DIMENSION IN END DIASTOLE: -5.05
Z-SCORE OF LEFT VENTRICULAR DIMENSION IN END SYSTOLE: -2.24

## 2025-05-20 PROCEDURE — 67028 INJECTION EYE DRUG: CPT | Mod: PBBFAC,LT | Performed by: OPHTHALMOLOGY

## 2025-05-20 PROCEDURE — A9555 RB82 RUBIDIUM: HCPCS | Performed by: INTERNAL MEDICINE

## 2025-05-20 PROCEDURE — 92134 CPTRZ OPH DX IMG PST SGM RTA: CPT | Mod: 26,S$PBB,, | Performed by: OPHTHALMOLOGY

## 2025-05-20 PROCEDURE — 78431 MYOCRD IMG PET RST&STRS CT: CPT | Mod: 26,,, | Performed by: INTERNAL MEDICINE

## 2025-05-20 PROCEDURE — 92134 CPTRZ OPH DX IMG PST SGM RTA: CPT | Mod: PBBFAC

## 2025-05-20 PROCEDURE — 99214 OFFICE O/P EST MOD 30 MIN: CPT | Mod: PBBFAC,25 | Performed by: OPHTHALMOLOGY

## 2025-05-20 PROCEDURE — 78434 AQMBF PET REST & RX STRESS: CPT

## 2025-05-20 PROCEDURE — 99999PBSHW PR PBB SHADOW TECHNICAL ONLY FILED TO HB: Mod: JZ,PBBFAC,,

## 2025-05-20 PROCEDURE — 93306 TTE W/DOPPLER COMPLETE: CPT

## 2025-05-20 PROCEDURE — 93306 TTE W/DOPPLER COMPLETE: CPT | Mod: 26,,, | Performed by: INTERNAL MEDICINE

## 2025-05-20 PROCEDURE — 93016 CV STRESS TEST SUPVJ ONLY: CPT | Mod: ,,, | Performed by: INTERNAL MEDICINE

## 2025-05-20 PROCEDURE — 99999 PR PBB SHADOW E&M-EST. PATIENT-LVL IV: CPT | Mod: PBBFAC,,, | Performed by: OPHTHALMOLOGY

## 2025-05-20 PROCEDURE — 78434 AQMBF PET REST & RX STRESS: CPT | Mod: 26,,, | Performed by: INTERNAL MEDICINE

## 2025-05-20 PROCEDURE — 93018 CV STRESS TEST I&R ONLY: CPT | Mod: ,,, | Performed by: INTERNAL MEDICINE

## 2025-05-20 PROCEDURE — 63600175 PHARM REV CODE 636 W HCPCS: Performed by: INTERNAL MEDICINE

## 2025-05-20 RX ORDER — REGADENOSON 0.08 MG/ML
0.4 INJECTION, SOLUTION INTRAVENOUS
Status: COMPLETED | OUTPATIENT
Start: 2025-05-20 | End: 2025-05-20

## 2025-05-20 RX ORDER — AMINOPHYLLINE 25 MG/ML
75 INJECTION, SOLUTION INTRAVENOUS
Status: COMPLETED | OUTPATIENT
Start: 2025-05-20 | End: 2025-05-20

## 2025-05-20 RX ADMIN — REGADENOSON 0.4 MG: 0.08 INJECTION, SOLUTION INTRAVENOUS at 09:05

## 2025-05-20 RX ADMIN — RUBIDIUM CHLORIDE RB-82 36 MILLICURIE: 150 INJECTION, SOLUTION INTRAVENOUS at 09:05

## 2025-05-20 RX ADMIN — DEXAMETHASONE 0.7 MG: 0.7 IMPLANT INTRAVITREAL at 11:05

## 2025-05-20 RX ADMIN — AMINOPHYLLINE 75 MG: 25 INJECTION, SOLUTION INTRAVENOUS at 09:05

## 2025-05-20 RX ADMIN — RUBIDIUM CHLORIDE RB-82 35.6 MILLICURIE: 150 INJECTION, SOLUTION INTRAVENOUS at 09:05

## 2025-05-20 NOTE — PROGRESS NOTES
HPI    Pt is here for 1 month DFE/OCT    Pt states no eye pain or discomfort. No flashes or floaters. States vision   has been improving. States that she has been having a lot of itching   lately. No other complaints  Last edited by Julio Glez MA on 5/20/2025 10:05 AM.            A/P    ICD-10-CM ICD-9-CM   1. Central retinal vein occlusion with macular edema of left eye  H34.8120 362.35     362.83   2. Age-related nuclear cataract of both eyes  H25.13 366.16   3. Vitreous degeneration of both eyes  H43.813 379.21   4. Hypertensive retinopathy of both eyes  H35.033 362.11   5. Diabetes mellitus type 2 without retinopathy  E11.9 250.00   6. Ocular hypertension, bilateral  H40.053 365.04             1. Central retinal vein occlusion with macular edema of left eye  Here for CRVO f/u       S/p GLADYS x5 4/15/25  S/p I'VE x3 4/2/24  S/p IVO 8/29/24    Today VA CF (stable), still significant heme and IRF today    Plan: given significant IRF, recommend Ozurdex OS today (pt wishes to have injection and will go to financial aid office)    Based on todays exam, diagnostic studies, and review of records, the determination was made for treatment today.  Schedule Ozurdex Injection today Left Eye Patient chooses to proceed with injection R/B/A discussed include infection retinal detachment and stroke    Patient identified.  Timeout performed.    Risks, benefits, and alternatives to treatment were discussed in detail with the patient, including bleeding/infection (endophthalmitis)/etc.  The patient voiced understanding and wished to proceed with the procedure.  See separate consent form.    Injection Procedure Note:  Diagnosis: CME Left Eye    Topical Proparacaine drop placed then topical 5% Betadine and then subconjunctival lidocaine 2% injection  Sterile gloves used, and sterile lid speculum placed.  5% Betadine placed at injection site again prior to injection.  Ozurdex implant Injected inferotemporally 3.5-4mm posterior to the  limbus.  Complications: None  Va at least CF at 5 feet post injection.  Retina, ONH, IOP normal after injection.    Followup as below.  Patient should return immediately PRN.  Retinal Detachment and Endophthalmitis precautions given.       Recommend good blood pressure control, tight blood glucose control, and good cholesterol control     Visit today is associated with current or anticipated ongoing medical care related to this patients single serious condition/complex condition (central retinal vein occlusion)     2. Age-related nuclear cataract of both eyes  Mild NS, NVS  Plan: Observation     3. Vitreous degeneration of both eyes  No RT/RD   Plan: Observation   Pathology of PVD, Retinal Tear, Retinal Detachment reviewed in great detail  RD precautions discussed in detail, patient expressed understanding  RTC immediately PRN (especially ANY change flashes, floaters, vision, visual field)     4. Hypertensive retinopathy of both eyes  Mod HTN changes, has CRVO OS  PCP Sony Mccray MD - Recent notes reviewed  05/06/2024  7.1  A1C   Plan: Observation   Recommend good blood pressure control, tight blood glucose control, and good cholesterol control     5. Diabetes mellitus type 2 without retinopathy  No DR or DME OU, IRF due to CRVO  Plan: Observation for DR changes  Recommend good blood pressure control, tight blood glucose control, and good cholesterol control     6. Ocular hypertension, bilateral  IOP 13 OU  Possible steroid response  Plan: Observation off drops after we inj Ozurdex        RTC 6 weeks DFE/OCTm OU     I saw and examined the patient and reviewed in detail the findings documented. The final examination findings, image interpretations, and plan as documented in the record represent my personal judgment and conclusions.    Ferdinand Rosas MD  Vitreoretinal Surgery   [facility]

## 2025-06-04 ENCOUNTER — TELEPHONE (OUTPATIENT)
Dept: BARIATRICS | Facility: CLINIC | Age: 85
End: 2025-06-04
Payer: MEDICARE

## 2025-06-09 ENCOUNTER — LAB VISIT (OUTPATIENT)
Dept: LAB | Facility: HOSPITAL | Age: 85
End: 2025-06-09
Payer: MEDICARE

## 2025-06-09 DIAGNOSIS — N18.32 STAGE 3B CHRONIC KIDNEY DISEASE: ICD-10-CM

## 2025-06-09 LAB
ALBUMIN/CREAT UR: 17.6 UG/MG
CREAT UR-MCNC: 165 MG/DL (ref 15–325)
MICROALBUMIN UR-MCNC: 29 UG/ML (ref ?–5000)

## 2025-06-09 PROCEDURE — 82570 ASSAY OF URINE CREATININE: CPT

## 2025-06-10 ENCOUNTER — PATIENT MESSAGE (OUTPATIENT)
Dept: INTERNAL MEDICINE | Facility: CLINIC | Age: 85
End: 2025-06-10
Payer: MEDICARE
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
## 2025-06-12 ENCOUNTER — OFFICE VISIT (OUTPATIENT)
Dept: INTERNAL MEDICINE | Facility: CLINIC | Age: 85
End: 2025-06-12
Payer: MEDICARE

## 2025-06-12 VITALS
BODY MASS INDEX: 53.95 KG/M2 | HEIGHT: 59 IN | WEIGHT: 267.63 LBS | DIASTOLIC BLOOD PRESSURE: 70 MMHG | SYSTOLIC BLOOD PRESSURE: 120 MMHG | HEART RATE: 92 BPM | OXYGEN SATURATION: 98 %

## 2025-06-12 DIAGNOSIS — E11.59 HYPERTENSION ASSOCIATED WITH DIABETES: ICD-10-CM

## 2025-06-12 DIAGNOSIS — E66.01 MORBID OBESITY WITH BMI OF 50.0-59.9, ADULT: ICD-10-CM

## 2025-06-12 DIAGNOSIS — I25.84 CORONARY ARTERY DISEASE DUE TO CALCIFIED CORONARY LESION: Chronic | ICD-10-CM

## 2025-06-12 DIAGNOSIS — E78.2 MIXED HYPERLIPIDEMIA: ICD-10-CM

## 2025-06-12 DIAGNOSIS — I87.2 VENOUS STASIS DERMATITIS OF BOTH LOWER EXTREMITIES: ICD-10-CM

## 2025-06-12 DIAGNOSIS — E11.42 TYPE 2 DIABETES MELLITUS WITH DIABETIC POLYNEUROPATHY, WITH LONG-TERM CURRENT USE OF INSULIN: Primary | ICD-10-CM

## 2025-06-12 DIAGNOSIS — N18.32 STAGE 3B CHRONIC KIDNEY DISEASE: ICD-10-CM

## 2025-06-12 DIAGNOSIS — H35.033 HYPERTENSIVE RETINOPATHY OF BOTH EYES: ICD-10-CM

## 2025-06-12 DIAGNOSIS — I10 ESSENTIAL HYPERTENSION: ICD-10-CM

## 2025-06-12 DIAGNOSIS — I15.2 HYPERTENSION ASSOCIATED WITH DIABETES: ICD-10-CM

## 2025-06-12 DIAGNOSIS — I89.0 LYMPHEDEMA OF BOTH LOWER EXTREMITIES: ICD-10-CM

## 2025-06-12 DIAGNOSIS — I48.0 PAROXYSMAL ATRIAL FIBRILLATION: ICD-10-CM

## 2025-06-12 DIAGNOSIS — I25.10 CORONARY ARTERY DISEASE DUE TO CALCIFIED CORONARY LESION: Chronic | ICD-10-CM

## 2025-06-12 DIAGNOSIS — Z79.4 TYPE 2 DIABETES MELLITUS WITH DIABETIC POLYNEUROPATHY, WITH LONG-TERM CURRENT USE OF INSULIN: Primary | ICD-10-CM

## 2025-06-12 LAB — GLUCOSE SERPL-MCNC: 167 MG/DL (ref 70–110)

## 2025-06-12 PROCEDURE — 99213 OFFICE O/P EST LOW 20 MIN: CPT | Mod: PBBFAC | Performed by: NURSE PRACTITIONER

## 2025-06-12 PROCEDURE — 82962 GLUCOSE BLOOD TEST: CPT | Mod: PBBFAC | Performed by: NURSE PRACTITIONER

## 2025-06-12 PROCEDURE — 99999 PR PBB SHADOW E&M-EST. PATIENT-LVL III: CPT | Mod: PBBFAC,,, | Performed by: NURSE PRACTITIONER

## 2025-06-12 PROCEDURE — 99999PBSHW POCT GLUCOSE, HAND-HELD DEVICE: Mod: PBBFAC,,,

## 2025-06-12 RX ORDER — INSULIN PUMP SYRINGE, 3 ML
EACH MISCELLANEOUS
Qty: 1 EACH | Refills: 0 | Status: SHIPPED | OUTPATIENT
Start: 2025-06-12 | End: 2026-06-12

## 2025-06-12 RX ORDER — LANCETS
EACH MISCELLANEOUS
Qty: 100 EACH | Refills: 11 | Status: SHIPPED | OUTPATIENT
Start: 2025-06-12

## 2025-06-12 NOTE — PATIENT INSTRUCTIONS
Follow up in 5-6 months w/Irielle  A1c prior -5-6 months     Ozempic 1 mg weekly  Lantus 12 units at night       [email or web address]  Eat fit aylin  Equipoisnesspal aylin  [email or web address]   MySugr aylin  Glucose guide aylin  [email or web address]     Goal less than 7.5%     Lab Results   Component Value Date    HGBA1C 6.9 (H) 06/09/2025

## 2025-06-13 ENCOUNTER — TELEPHONE (OUTPATIENT)
Dept: INTERNAL MEDICINE | Facility: CLINIC | Age: 85
End: 2025-06-13
Payer: MEDICARE

## 2025-06-13 NOTE — TELEPHONE ENCOUNTER
Copied from CRM #[id number]. Topic: General Inquiry - Test Results  >> Jun 13, 2025  2:12 PM Anamaria wrote:  2TESTRESULTS    Type: Test Results    What test was performed? Lab     Who ordered the test? [facility]     When and where were the test performed? 06/12 at [facility]     Who called and call back number: home health call the pt back at [phone]    Would you like a call back and or thru [facility]: call back     Comments:

## 2025-06-17 ENCOUNTER — LAB VISIT (OUTPATIENT)
Dept: LAB | Facility: HOSPITAL | Age: 85
End: 2025-06-17
Attending: INTERNAL MEDICINE
Payer: MEDICARE

## 2025-06-17 ENCOUNTER — OFFICE VISIT (OUTPATIENT)
Dept: CARDIOLOGY | Facility: CLINIC | Age: 85
End: 2025-06-17
Payer: MEDICARE

## 2025-06-17 ENCOUNTER — TELEPHONE (OUTPATIENT)
Dept: TRANSPLANT | Facility: CLINIC | Age: 85
End: 2025-06-17
Payer: MEDICARE

## 2025-06-17 VITALS
DIASTOLIC BLOOD PRESSURE: 70 MMHG | WEIGHT: 267 LBS | HEART RATE: 99 BPM | BODY MASS INDEX: 53.83 KG/M2 | SYSTOLIC BLOOD PRESSURE: 133 MMHG | HEIGHT: 59 IN

## 2025-06-17 DIAGNOSIS — R06.82 TACHYPNEA: ICD-10-CM

## 2025-06-17 DIAGNOSIS — E11.59 HYPERTENSION ASSOCIATED WITH DIABETES: ICD-10-CM

## 2025-06-17 DIAGNOSIS — Z79.899 POLYPHARMACY: Primary | ICD-10-CM

## 2025-06-17 DIAGNOSIS — I25.10 CORONARY ARTERY DISEASE DUE TO CALCIFIED CORONARY LESION: Chronic | ICD-10-CM

## 2025-06-17 DIAGNOSIS — I77.1 TORTUOUS AORTA: ICD-10-CM

## 2025-06-17 DIAGNOSIS — I87.2 VENOUS STASIS DERMATITIS OF BOTH LOWER EXTREMITIES: ICD-10-CM

## 2025-06-17 DIAGNOSIS — R06.02 SOB (SHORTNESS OF BREATH) ON EXERTION: ICD-10-CM

## 2025-06-17 DIAGNOSIS — Z86.718 HISTORY OF THROMBOSIS: ICD-10-CM

## 2025-06-17 DIAGNOSIS — R53.83 OTHER FATIGUE: ICD-10-CM

## 2025-06-17 DIAGNOSIS — I48.0 PAROXYSMAL ATRIAL FIBRILLATION: ICD-10-CM

## 2025-06-17 DIAGNOSIS — I50.32 CHRONIC DIASTOLIC HEART FAILURE: ICD-10-CM

## 2025-06-17 DIAGNOSIS — I27.20 PULMONARY HYPERTENSION: ICD-10-CM

## 2025-06-17 DIAGNOSIS — E66.01 MORBID OBESITY WITH BMI OF 50.0-59.9, ADULT: ICD-10-CM

## 2025-06-17 DIAGNOSIS — Z86.711 HISTORY OF PULMONARY EMBOLISM: ICD-10-CM

## 2025-06-17 DIAGNOSIS — G47.33 OSA ON CPAP: ICD-10-CM

## 2025-06-17 DIAGNOSIS — R06.02 SOB (SHORTNESS OF BREATH) ON EXERTION: Primary | ICD-10-CM

## 2025-06-17 DIAGNOSIS — I89.0 LYMPHEDEMA OF BOTH LOWER EXTREMITIES: ICD-10-CM

## 2025-06-17 DIAGNOSIS — I27.9 CHRONIC PULMONARY HEART DISEASE: ICD-10-CM

## 2025-06-17 DIAGNOSIS — E78.2 MIXED HYPERLIPIDEMIA: ICD-10-CM

## 2025-06-17 DIAGNOSIS — I25.84 CORONARY ARTERY DISEASE DUE TO CALCIFIED CORONARY LESION: Chronic | ICD-10-CM

## 2025-06-17 DIAGNOSIS — N18.32 STAGE 3B CHRONIC KIDNEY DISEASE: ICD-10-CM

## 2025-06-17 DIAGNOSIS — I15.2 HYPERTENSION ASSOCIATED WITH DIABETES: ICD-10-CM

## 2025-06-17 DIAGNOSIS — Z79.4 TYPE 2 DIABETES MELLITUS WITH DIABETIC POLYNEUROPATHY, WITH LONG-TERM CURRENT USE OF INSULIN: ICD-10-CM

## 2025-06-17 DIAGNOSIS — E11.42 TYPE 2 DIABETES MELLITUS WITH DIABETIC POLYNEUROPATHY, WITH LONG-TERM CURRENT USE OF INSULIN: ICD-10-CM

## 2025-06-17 DIAGNOSIS — I10 ESSENTIAL HYPERTENSION: ICD-10-CM

## 2025-06-17 LAB
ALBUMIN SERPL BCP-MCNC: 2.7 G/DL (ref 3.5–5.2)
ALP SERPL-CCNC: 95 UNIT/L (ref 40–150)
ALT SERPL W/O P-5'-P-CCNC: 13 UNIT/L (ref 10–44)
ANION GAP (OHS): 6 MMOL/L (ref 8–16)
AST SERPL-CCNC: 20 UNIT/L (ref 11–45)
BILIRUB SERPL-MCNC: 0.3 MG/DL (ref 0.1–1)
BUN SERPL-MCNC: 27 MG/DL (ref 8–23)
CALCIUM SERPL-MCNC: 8.7 MG/DL (ref 8.7–10.5)
CHLORIDE SERPL-SCNC: 106 MMOL/L (ref 95–110)
CO2 SERPL-SCNC: 26 MMOL/L (ref 23–29)
CREAT SERPL-MCNC: 1.4 MG/DL (ref 0.5–1.4)
GFR SERPLBLD CREATININE-BSD FMLA CKD-EPI: 37 ML/MIN/1.73/M2
GLUCOSE SERPL-MCNC: 104 MG/DL (ref 70–110)
POTASSIUM SERPL-SCNC: 4.2 MMOL/L (ref 3.5–5.1)
PROT SERPL-MCNC: 8.7 GM/DL (ref 6–8.4)
SODIUM SERPL-SCNC: 138 MMOL/L (ref 136–145)

## 2025-06-17 PROCEDURE — 99999 PR PBB SHADOW E&M-EST. PATIENT-LVL V: CPT | Mod: PBBFAC,,, | Performed by: INTERNAL MEDICINE

## 2025-06-17 PROCEDURE — 80053 COMPREHEN METABOLIC PANEL: CPT

## 2025-06-17 PROCEDURE — 99215 OFFICE O/P EST HI 40 MIN: CPT | Mod: PBBFAC | Performed by: INTERNAL MEDICINE

## 2025-06-17 PROCEDURE — 36415 COLL VENOUS BLD VENIPUNCTURE: CPT

## 2025-06-17 RX ORDER — DOCUSATE SODIUM 100 MG/1
100 CAPSULE, LIQUID FILLED ORAL
COMMUNITY
Start: 2025-04-15

## 2025-06-17 RX ORDER — LIDOCAINE HCL 4 G/100G
CREAM TOPICAL
COMMUNITY
Start: 2025-05-05

## 2025-06-17 RX ORDER — MENTHOL 1 %
POWDER (GRAM) TOPICAL
COMMUNITY
Start: 2025-05-05

## 2025-06-17 RX ORDER — TORSEMIDE 10 MG/1
10 TABLET ORAL DAILY
Qty: 90 TABLET | Refills: 5 | Status: SHIPPED | OUTPATIENT
Start: 2025-06-17 | End: 2026-06-12

## 2025-06-17 RX ORDER — MAGNESIUM HYDROXIDE 400 MG/5ML
SUSPENSION, ORAL (FINAL DOSE FORM) ORAL
COMMUNITY
Start: 2025-04-21

## 2025-06-17 RX ORDER — INSULIN GLARGINE-YFGN 100 [IU]/ML
INJECTION, SOLUTION SUBCUTANEOUS
COMMUNITY
Start: 2025-06-16

## 2025-06-17 RX ORDER — SPIRONOLACTONE 25 MG/1
25 TABLET ORAL DAILY
Qty: 30 TABLET | Refills: 11 | Status: SHIPPED | OUTPATIENT
Start: 2025-06-17 | End: 2026-06-17

## 2025-06-17 RX ORDER — CONTAINER,EMPTY
EACH MISCELLANEOUS
COMMUNITY
Start: 2025-04-24

## 2025-06-17 NOTE — PATIENT INSTRUCTIONS
Assessment/Plan:  Duran Giordano is a 85 y.o. female with chronic diastolic heart failure, HTN, DM2, HLD, CAD, REJI, morbid obesity, history of CVA, who presents for an follow up appointment.     SOB on Exertion/Fatigue- Likely due to pulmonary HTN and morbid obesity. PET Stress Test on 5/20/2025 showed no no clinically significant perfusion abnormalities. There is base to apical gradient in the anteroseptal wall(s) consistent with diffuse atherosclerosis.  The whole heart absolute myocardial perfusion values were normal at rest, mildly reduced during stress and CFR is moderately reduced.  Echo on 5/20/2025 revealed EF approximately 50%; normal RV size and systolic function; mild aortic stenosis (aortic valve area by VTI is 1.5 cm². Aortic valve peak velocity is 2.2 m/s. Mean gradient is 11 mmHg. The dimensionless index is 0.46); trace aortic regurgitation; mild MR/TR; mild to moderate pulmonary hypertension with estimated pulmonary artery systolic pressure is 49 mmHg; normal venous pressure at 3 mmHg. Start spironolactone 25 mg daily. Continue torsemide 10 mg daily. Check cmp today, 1 week and 1 month. Check V/Q scan to evaluate for chronic PE and refer to Pulmonary HTN clinic for evaluation.     2. History of saddle pulmonary embolism without acute cor pulmonale- Contributing factors include sedentary lifestyle and severe lymphedema. No other provoking factors have been indentified. Pt evaluated by Heme/Onc for hypercoagulable workup and age appropriate cancer screening. Repeat CTA chest 11/22/2024 showed suboptimal contrast opacification of the pulmonary arteries. Allowing for this limitation, no convincing evidence of central pulmonary thromboembolism. BLE venous ultrasound 11/20/2024 revealed no evidence of right or left lower extremity DVT. The right PTVs, Peroneal and ATV's not visualized due to severe lymphedema. Check V/Q scan to evaluate for chronic PE. Continue Eliquis 5 mg bid lifelong.      3. Chronic  Diastolic Heart Failure- Echo on 5/20/2025 revealed EF approximately 50%; normal RV size and systolic function; mild aortic stenosis (aortic valve area by VTI is 1.5 cm². Aortic valve peak velocity is 2.2 m/s. Mean gradient is 11 mmHg. The dimensionless index is 0.46); trace aortic regurgitation; mild MR/TR; mild to moderate pulmonary hypertension with estimated pulmonary artery systolic pressure is 49 mmHg; normal venous pressure at 3 mmHg. Start spironolactone 25 mg daily. Continue torsemide 10 mg daily. Check cmp today, 1 week and 1 month.    4. HTN- Stable. Continue current medications.    5. Morbid Obesity- Encourage diet, exercise, and weight loss. Pt referred to Bariatric Medicine for evaluation for GLP-1 agonist.     6. HLD- Continue statin therapy.     7. BLE lymphedema- Pt presents with findings consistent with severe lymphedema.  Refer to lymphedema clinic.  Recommend wearing graduated compression hose.  Limit sodium intake to 2000 mg daily.  Limit volume intake to 1.5 L daily.  Elevate legs when resting.    8. REJI- Encourage CPAP usage.     Follow up in 3 months

## 2025-06-17 NOTE — PROGRESS NOTES
[facility]      CC: Lymphedema      Patient ID: Duran Giordano is a 85 y.o. female with chronic diastolic heart failure, HTN, DM2, HLD, CAD, REJI, morbid obesity, history of CVA, who presents for an follow up appointment. Pertinent history/events are as follows:     -Pt kindly referred by Niki Smith PA-C for evaluation of lymphedema/acute saddle PE without acute cor pulmonale/chronic diastolic heart failure.    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  She was subsequently discharged on Eliquis 5 mg bid.   Chronic diastolic heart failure- Stable. Continue current medications.  Acute saddle pulmonary embolism without acute cor pulmonale- Appears unprovoked.  Refer to Heme/Onc for hypercoagulable workup and age appropriate cancer screening. Continue Eliquis 5 mg bid.  Check repeat CTA chest and BLE venous ultrasound prior to next visit.  HTN- Stable. Continue current medications.  Morbid Obesity- Encourage diet, exercise, and weight loss.   HLD- Continue statin therapy.   BLE lymphedema- Pt presents with findings consistent with severe lymphedema.  Refer to lymphedema clinic after at least 3 months on full dose anticoagulation (plan to refer at next visit). Recommend wearing graduated compression hose.  Limit sodium intake to 2000 mg daily.  Limit volume intake to 1.5 L daily.  Elevate legs when resting.    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 No chest pain or shortness of breath. CTA chest 11/22/2024 revealed suboptimal contrast opacification of the pulmonary arteries. Allowing for this limitation, no convincing evidence of central pulmonary thromboembolism.  BLE venous ultrasound 11/20/2024 revealed no evidence of right or left lower extremity DVT. Right PTVs, Peroneal and ATV's not visualized due to severe lymphedema.  Plan:  Chronic diastolic heart failure-  Stable. Continue current medications.  Acute saddle pulmonary embolism without acute cor pulmonale- Appears unprovoked.  Refer to Heme/Onc for hypercoagulable workup and age appropriate cancer screening. Repeat CTA chest 11/22/2024 showed suboptimal contrast opacification of the pulmonary arteries. Allowing for this limitation, no convincing evidence of central pulmonary thromboembolism. BLE venous ultrasound 11/20/2024 revealed no evidence of right or left lower extremity DVT. The right PTVs, Peroneal and ATV's not visualized due to severe lymphedema. Continue Eliquis 5 mg bid.    HTN- Stable. Continue current medications.  Morbid Obesity- Encourage diet, exercise, and weight loss.   HLD- Continue statin therapy.   BLE lymphedema- Pt presents with findings consistent with severe lymphedema.  Refer to lymphedema clinic.  Recommend wearing graduated compression hose.  Limit sodium intake to 2000 mg daily.  Limit volume intake to 1.5 L daily.  Elevate legs when resting.    -4/15/2025 clinic visit: Sister Pasquale reports fatigue and SOB when transferring from power chair. She has no chest pain or chest discomfort. She has not yet started lymphedema clinic therapy.    Plan:  SOB on Exertion/Fatigue- Pt with risk factors for CAD, including morbid obesity with BMI of 55. Given morbid body habitus, chest PET stress test  and echo to evaluate further.    History of saddle pulmonary embolism without acute cor pulmonale- Contributing factors include sedentary lifestyle and severe lymphedema. No other provoking factors have been indentified. Pt evaluated by Heme/Onc for hypercoagulable workup and age appropriate cancer screening. Repeat CTA chest 11/22/2024 showed suboptimal contrast opacification of the pulmonary arteries. Allowing for this limitation, no convincing evidence of central pulmonary thromboembolism. BLE venous ultrasound 11/20/2024 revealed no evidence of right or left lower extremity DVT. The right PTVs,  Peroneal and ATV's not visualized due to severe lymphedema. Continue Eliquis 5 mg bid lifelong.    Chronic Diastolic Heart Failure- Workup as per above. Continue current medications.   HTN- Stable. Continue current medications.  Morbid Obesity- Encourage diet, exercise, and weight loss. Refer to Bariatric Medicine for evaluation for GLP-1 agonist.   HLD- Continue statin therapy.   BLE lymphedema- Pt presents with findings consistent with severe lymphedema.  Refer to lymphedema clinic.  Recommend wearing graduated compression hose.  Limit sodium intake to 2000 mg daily.  Limit volume intake to 1.5 L daily.  Elevate legs when resting.    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 She has no chest pain or chest discomfort. She has not yet started lymphedema clinic therapy. PET Stress Test on 5/20/2025 showed no no clinically significant perfusion abnormalities. There is base to apical gradient in the anteroseptal wall(s) consistent with diffuse atherosclerosis.  The whole heart absolute myocardial perfusion values were normal at rest, mildly reduced during stress and CFR is moderately reduced.  Echo on 5/20/2025 revealed EF 50%; normal RV size and systolic function; mild aortic stenosis (aortic valve area by VTI is 1.5 cm². Aortic valve peak velocity is 2.2 m/s. Mean gradient is 11 mmHg. The dimensionless index is 0.46); trace aortic regurgitation; mild MR/TR; mild to moderate pulmonary hypertension with estimated pulmonary artery systolic pressure is 49 mmHg; normal venous pressure at 3 mmHg.    Past Medical History:   Diagnosis Date    Adrenal mass- adenoma stable since 2004 (1.8 cm and 8/13/12 (2 cm); stable 2016 2.4 cm     Adrenal mass- adenoma stable since 2004 (1.8 cm and 8/13/12 (2 cm); stable 2016 2.4 cm    Arthritis     Asthma in adult without complication 6/25/2015    Bilateral carotid artery disease 7/21/2017    Bilateral sciatica 2/7/2017    Cellulitis of right leg  08/16/2017    Cerebral infarction 1/29/2016    Multiple areas of lacunar infarction. Stroke risk factors include HTN, DM2, Dyslipidemia.  Continue ASA 81mg/ Statin therapy I have encouraged 30 minutes of physical activity daily for 5 days a week. She has access to a pool so this should not be so jarring to her joints.     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    Mixed hyperlipidemia 8/13/2012    Morbid obesity with BMI of 50.0-59.9, adult 2/11/2014    Obstructive sleep apnea syndrome 8/13/2012    Pulmonary embolism 6/5/2024    Senile cataracts of both eyes 1/22/2015    Traumatic open wound of right lower leg 8/25/2017    Trigeminal neuralgia of right side of face 5/23/2017    For years now Previously on Gabapentin, but caused constipation Dissipating over time Not related to intracranial abnormalities Possibly related to dental procedure    Type 2 diabetes mellitus with diabetic polyneuropathy, with long-term current use of insulin 1/29/2013    Venous stasis dermatitis of both lower extremities 8/21/2017    Viral hepatitis A without coma 1/1/1971    Hep B infection in [facility] in 1971, was hospitalize for 2 weeks at that time, unknown treatment.    Vitamin D deficiency disease 8/13/2012     Past Surgical History:   Procedure Laterality Date    COLONOSCOPY      HYSTERECTOMY  01/01/1982    secondary uterine fibroids    INJECTION OF ANESTHETIC AGENT AROUND NERVE Right 10/21/2021    Procedure: BLOCK, NERVE GENICULAR RIGHT NEEDS CONSENT;  Surgeon: Senia Kilpatrick MD;  Location: [facility];   Service: Pain Management;  Laterality: Right;    INSERTION OF ANTIBIOTIC SPACER Right 10/28/2021    Procedure: INSERTION, ANTIBIOTIC SPACER;  Surgeon: Alfredo Lozoya MD;  Location: [facility] OR [address] [place] [address];  Service: Orthopedics;  Laterality: Right;    JOINT REPLACEMENT      REVISION OF KNEE ARTHROPLASTY Right 10/28/2021    Procedure: REVISION, ARTHROPLASTY, KNEE-DEPUY ANTIBIOTIC SPACER;  Surgeon: Alfredo Lozoya MD;  [facility];  Service: Orthopedics;  Laterality: Right;    REVISION OF KNEE ARTHROPLASTY Right 06/30/2022    Procedure: REVISION, ARTHROPLASTY, KNEE-NAKIA- stage 2;  Surgeon: Alfredo Lozoya MD;  [facility];  Service: Orthopedics;  Laterality: Right;    Right total knee replacement       Social History     Socioeconomic History    Marital status: Single   Tobacco Use    Smoking status: Never    Smokeless tobacco: Never   Substance and Sexual Activity    Alcohol use: Yes     Comment: holidays    Drug use: No    Sexual activity: Never   Social History Narrative    Single with no children.      Social Drivers of Health     Financial Resource Strain: Low Risk  (6/7/2024)    Overall Financial Resource Strain (CARDIA)     Difficulty of Paying Living Expenses: Not hard at all   Food Insecurity: No Food Insecurity (6/7/2024)    Hunger Vital Sign     Worried About Running Out of Food in the Last Year: Never true     Ran Out of Food in the Last Year: Never true   Transportation Needs: No Transportation Needs (6/7/2024)    TRANSPORTATION NEEDS     Transportation : No   Physical Activity: Insufficiently Active (2/2/2024)    Exercise Vital Sign     Days of Exercise per Week: 1 day     Minutes of Exercise per Session: 10 min   Stress: No Stress Concern Present (6/7/2024)    [employer] [place] [employer] - Occupational Stress Questionnaire     Feeling of Stress : Only a little   Housing Stability: Unknown (6/7/2024)    Housing Stability Vital Sign     Unable to Pay for  Housing in the Last Year: No     Homeless in the Last Year: No     Family History   Problem Relation Name Age of Onset    Cancer Mother Leslie Giordano 69        cancer of jaw    Hypertension Father Oskar Giordano     Cancer Sister Tiffanie Del Angel         half sister - unknown dx    No Known Problems Brother      No Known Problems Maternal Aunt      No Known Problems Maternal Uncle      No Known Problems Paternal Aunt      No Known Problems Paternal Uncle      No Known Problems Maternal Grandmother      No Known Problems Maternal Grandfather      No Known Problems Paternal Grandmother      No Known Problems Paternal Grandfather      Glaucoma Neg Hx      Breast cancer Neg Hx      Colon cancer Neg Hx      Ovarian cancer Neg Hx      Stroke Neg Hx      Allergic rhinitis Neg Hx      Allergies Neg Hx      Angioedema Neg Hx      Asthma Neg Hx      Atopy Neg Hx      Eczema Neg Hx      Immunodeficiency Neg Hx      Rhinitis Neg Hx      Urticaria Neg Hx      Amblyopia Neg Hx      Blindness Neg Hx      Cataracts Neg Hx      Diabetes Neg Hx      Macular degeneration Neg Hx      Retinal detachment Neg Hx      Strabismus Neg Hx      Thyroid disease Neg Hx      Psoriasis Neg Hx         Review of patient's allergies indicates:   Allergen Reactions    Sulfamethoxazole-trimethoprim      Other reaction(s): up set stomach rash/hives    Azithromycin      Feels bad    Erythromycin Other (See Comments)     Other reaction(s): Unknown  Other reaction(s): Stomach upset    Gentamicin      Vaginal burning , itching     Levofloxacin Hives     Other reaction(s): nervousness    Shellfish containing products      Rash      Vancomycin Itching     Other reaction(s): Itching       Medication List with Changes/Refills   Current Medications    ALBUTEROL (PROVENTIL/VENTOLIN HFA) 90 MCG/ACTUATION INHALER    Inhale 2 puffs into the lungs every 4 (four) hours as needed for Wheezing or Shortness of Breath. Rescue    ALBUTEROL-IPRATROPIUM (DUO-NEB) 2.5 MG-0.5  MG/3 ML NEBULIZER SOLUTION    USE 1 VIAL PER NEBULIZER EVERY 6 HOURS AS NEEDED FOR WHEEZING/RESCUE    ALLOPURINOL (ZYLOPRIM) 300 MG TABLET    Take 1 tablet (300 mg total) by mouth once daily.    AMLODIPINE (NORVASC) 10 MG TABLET    Take 1 tablet (10 mg total) by mouth once daily.    ANTISEPTIC SKIN CLNSR,CHLORHE, 4 % EXTERNAL LIQUID    Apply topically.    ASPERCREME WITH ALOE 10 % CREA    Apply 1 Application topically as needed (to legs/back).    ATORVASTATIN (LIPITOR) 80 MG TABLET    TAKE ONE TABLET BY MOUTH EVERY DAY    BLOOD SUGAR DIAGNOSTIC STRP    To check BG 2 times daily, to use with insurance preferred meter, e 11.65 type 2 diabetes    BLOOD-GLUCOSE METER KIT    To check BG 2 times daily, to use with insurance preferred meter    CHOLECALCIFEROL, VITAMIN D3, (VITAMIN D3) 25 MCG (1,000 UNIT) CAPSULE    Take 1 capsule (1,000 Units total) by mouth once daily.    CLOTRIMAZOLE-BETAMETHASONE 1-0.05% (LOTRISONE) CREAM    Apply topically 2 (two) times daily.    COLCHICINE (COLCRYS) 0.6 MG TABLET    Take 1 tablet (0.6 mg total) by mouth daily as needed (gout attack). Take 1 tablet in event of gout attack.  If still having symptoms after 2 hours take 2 more tablets.  Do not take more than 3 in one day.    DICLOFENAC SODIUM (VOLTAREN) 1 % GEL    APPLY 2 GRAMS TOPICALLY DAILY    DOCUSATE SODIUM (COLACE) 100 MG CAPSULE    Take 100 mg by mouth.    DOXYCYCLINE (VIBRA-TABS) 100 MG TABLET    Take 1 tablet (100 mg total) by mouth 2 (two) times daily.    ELIQUIS 5 MG TAB    Take 1 tablet (5 mg total) by mouth 2 (two) times daily.    FLUTICASONE (FLONASE) 50 MCG/ACTUATION NASAL SPRAY    2 sprays (100 mcg total) by Each Nare route once daily.    GOLD SMALL MEDICATED FOOT 1 % POWD    Apply topically.    HYDROCODONE-ACETAMINOPHEN (NORCO) 5-325 MG PER TABLET    Take 1 tablet by mouth every 6 (six) hours as needed.    IBUPROFEN (ADVIL,MOTRIN) 800 MG TABLET    Take 800 mg by mouth every 6 (six) hours as needed.    INSULIN GLARGINE  "U-100, LANTUS, (LANTUS SOLOSTAR U-100 INSULIN) 100 UNIT/ML (3 ML) INPN PEN    Inject 12-18 units at night    INSULIN GLARGINE-YFGN 100 UNIT/ML (3 ML) INPN        IPRATROPIUM (ATROVENT) 21 MCG (0.03 %) NASAL SPRAY    2 sprays by Each Nostril route 2 (two) times daily as needed for Rhinitis (congestion).    LANCETS MISC    To check BG 2 times daily, to use with insurance preferred meter    LATANOPROST (XALATAN) 0.005 % OPHTHALMIC SOLUTION    Place 1 drop into both eyes every evening.    LIDOCAINE HCL 4 % CREA    Apply topically.    MENTHOL-ZINC OXIDE (CALMOSEPTINE) 0.44-20.6 % OINT    Apply topically 2 (two) times daily as needed (chafing).    NEBULIZER AND COMPRESSOR (COMP-AIR ELITE COMP NEB SYSTEM) TORRES    use as directed    OLOPATADINE (PATANOL) 0.1 % OPHTHALMIC SOLUTION    Place 1 drop into both eyes 2 (two) times daily.    PEN NEEDLE, DIABETIC (PEN NEEDLE) 29 GAUGE X 1/2" NDLE    USE DAILY    POLYETHYLENE GLYCOL (GLYCOLAX) 17 GRAM/DOSE POWDER    MIX 17 GM (1 CAPFUL) IN 4 TO 8 OUNCES OF FLUID AND TAKE DAILY    SEMAGLUTIDE (OZEMPIC) 1 MG/DOSE (4 MG/3 ML)    Inject 1 mg into the skin every 7 days.    SENNA 8.6 MG TABLET    Take 1 tablet by mouth once daily.    SHARPS CONTAINER        TORSEMIDE (DEMADEX) 10 MG TAB    Take 1 tablet (10 mg total) by mouth once daily.    WITCH HAZEL (TUCKS, WITCH HAZEL,) 50 % PADM    Apply 1 each topically daily as needed (irritated skin tags or hemorrhoids).    ZINC OXIDE 10 % OINT    Apply 1 Application topically 2 (two) times daily as needed (wounds or blisters).       Review of Systems  Constitution: Denies chills, fever, and sweats.  HENT: Denies headaches or blurry vision.  Cardiovascular: Denies chest pain or irregular heart beat.  Respiratory: Denies cough or shortness of breath.  Gastrointestinal: Denies abdominal pain, nausea, or vomiting.  Musculoskeletal: Denies muscle cramps.  Neurological: Denies dizziness or focal weakness.  Psychiatric/Behavioral: Normal mental " "status.  Hematologic/Lymphatic: Denies bleeding problem or easy bruising/bleeding.  Skin: Denies rash or suspicious lesions    Physical Examination  /70   Pulse 99   Ht 4' 11" (1.499 m)   Wt 121.1 kg (267 lb)   BMI 53.93 kg/m²     Constitutional: No acute distress, conversant  HEENT: Sclera anicteric, Pupils equal, round and reactive to light, extraocular motions intact, Oropharynx clear  Neck: No JVD, no carotid bruits  Cardiovascular: regular rate and rhythm, no murmur, rubs or gallops, normal S1/S2  Pulmonary: Clear to auscultation bilaterally  Abdominal: Abdomen soft, nontender, nondistended, positive bowel sounds  Extremities: LE lymphedema with venous dermatitis R>L.   Pulses:  Carotid pulses are 2+ on the right side, and 2+ on the left side.  Radial pulses are 2+ on the right side, and 2+ on the left side.   Femoral pulses are 2+ on the right side, and 2+ on the left side.  Popliteal pulses are 2+ on the right side, and 2+ on the left side.   Dorsalis pedis pulses are 2+ on the right side, and 2+ on the left side.   Posterior tibial pulses are 2+ on the right side, and 2+ on the left side.    Skin: No ecchymosis, erythema, or ulcers  Psych: Alert and oriented x 3, appropriate affect  Neuro: CNII-XII intact, no focal deficits    Labs:  Most Recent Data  CBC:   Lab Results   Component Value Date    WBC 8.29 06/19/2024    HGB 11.7 (L) 06/19/2024    HCT 37.5 06/19/2024     06/19/2024    MCV 94 06/19/2024    RDW 17.5 (H) 06/19/2024     BMP:   Lab Results   Component Value Date     07/19/2024    K 4.2 07/19/2024     07/19/2024    CO2 24 07/19/2024    BUN 29 (H) 07/19/2024    CREATININE 1.3 11/15/2024     (H) 07/19/2024    CALCIUM 9.1 07/19/2024    MG 1.8 07/19/2024    PHOS 3.6 07/19/2024     LFTS;   Lab Results   Component Value Date    PROT 7.9 07/19/2024    ALBUMIN 2.7 (L) 07/19/2024    BILITOT 0.3 07/19/2024    AST 18 07/19/2024    ALKPHOS 94 07/19/2024    ALT 15 07/19/2024 "     COAGS:   Lab Results   Component Value Date    INR 1.1 06/05/2024     FLP:   Lab Results   Component Value Date    CHOL 108 (L) 06/09/2025    HDL 44 06/09/2025    LDLCALC 52.4 (L) 06/09/2025    TRIG 58 06/09/2025    CHOLHDL 40.7 06/09/2025     CARDIAC:   Lab Results   Component Value Date    TROPONINI 0.297 (H) 06/07/2024    BNP 88 07/19/2024       Imaging:    PET Stress Test 5/20/2025:    There are no clinically significant perfusion abnormalities. There is base to apical gradient in the anteroseptal wall(s) consistent with diffuse atherosclerosis.    The whole heart absolute myocardial perfusion values were normal at rest, mildly reduced during stress and CFR is moderately reduced.    CT attenuation images demonstrate mild diffuse coronary calcifications in the LAD, LCX and RCA territory and moderate diffuse aortic calcifications in the ascending aorta and severe calciication in the descending aorta.    The gated perfusion images showed an ejection fraction of 47% at rest and 43% during stress. A normal ejection fraction is greater than 53%.    There is mild global hypokinesis at rest and during stress.    The study's ECG is negative for ischemia.    There is no prior study for comparison.    Stress ECG: During stress, frequent PACs are noted. Short run of atrial tachycardia was noted at the end of the study.    Echo 5/20/2025:    Left Ventricle: The left ventricle is normal in size. Ventricular mass is normal. Mildly increased wall thickness. Mild septal thickening. There is concentric remodeling. Regional wall motion abnormalities present. See diagram for wall motion findings. There is mildly reduced systolic function. Ejection fraction is approximately 50% ( upper 40s to low 50s).    Right Ventricle: The right ventricle is normal in size Wall thickness is normal. Systolic function is normal.    Aortic Valve: There is mild aortic valve sclerosis. There is mild annular calcification present. Mildly restricted  motion. There is mild stenosis. Aortic valve area by VTI is 1.5 cm². Aortic valve peak velocity is 2.2 m/s. Mean gradient is 11 mmHg. The dimensionless index is 0.46. There is trace aortic regurgitation.    Mitral Valve: There is mild regurgitation.    Tricuspid Valve: There is mild regurgitation.    Pulmonary Artery: There is mild to moderate pulmonary hypertension. The estimated pulmonary artery systolic pressure is 49 mmHg.    IVC/SVC: Normal venous pressure at 3 mmHg.    CTA chest 11/22/2024:   Suboptimal contrast opacification of the pulmonary arteries. Allowing for this limitation, no convincing evidence of central pulmonary thromboembolism.      BLE venous ultrasound 11/20/2024:     There is no evidence of right or left lower extremity DVT.    Right PTVs, Peroneal and ATV's not visualized due to severe lymphedema.    CTA chest 6/5/2024:  1. Extensive pulmonary emboli throughout the bilateral lungs including a saddle pulmonary embolus.  There is evidence of right heart strain with flattening of the interventricular septum and enlargement of the right ventricle and main pulmonary artery.  2. Peripheral airspace opacities in the right middle lobe, consistent with areas of pulmonary infarctions.  3. 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    Assessment/Plan:  Duran Giordano is a 85 y.o. female with chronic diastolic heart failure, HTN, DM2, HLD, CAD, REJI, morbid obesity, history of CVA, who presents for an follow up appointment.     SOB on Exertion/Fatigue- Likely due to pulmonary HTN and morbid obesity. PET Stress Test on 5/20/2025 showed no no clinically significant perfusion abnormalities. There is base to apical gradient in the anteroseptal wall(s) consistent with diffuse atherosclerosis.  The whole heart absolute myocardial perfusion values were normal at rest, mildly reduced during stress and CFR is moderately reduced.  Echo on 5/20/2025 revealed EF approximately 50%; normal RV size and  systolic function; mild aortic stenosis (aortic valve area by VTI is 1.5 cm². Aortic valve peak velocity is 2.2 m/s. Mean gradient is 11 mmHg. The dimensionless index is 0.46); trace aortic regurgitation; mild MR/TR; mild to moderate pulmonary hypertension with estimated pulmonary artery systolic pressure is 49 mmHg; normal venous pressure at 3 mmHg. Start spironolactone 25 mg daily. Continue torsemide 10 mg daily. Check cmp today, 1 week and 1 month. Check V/Q scan to evaluate for chronic PE and refer to Pulmonary HTN clinic for evaluation.     2. History of saddle pulmonary embolism without acute cor pulmonale- Contributing factors include sedentary lifestyle and severe lymphedema. No other provoking factors have been indentified. Pt evaluated by Heme/Onc for hypercoagulable workup and age appropriate cancer screening. Repeat CTA chest 11/22/2024 showed suboptimal contrast opacification of the pulmonary arteries. Allowing for this limitation, no convincing evidence of central pulmonary thromboembolism. BLE venous ultrasound 11/20/2024 revealed no evidence of right or left lower extremity DVT. The right PTVs, Peroneal and ATV's not visualized due to severe lymphedema. Check V/Q scan to evaluate for chronic PE. Continue Eliquis 5 mg bid lifelong.      3. Chronic Diastolic Heart Failure- Echo on 5/20/2025 revealed EF approximately 50%; normal RV size and systolic function; mild aortic stenosis (aortic valve area by VTI is 1.5 cm². Aortic valve peak velocity is 2.2 m/s. Mean gradient is 11 mmHg. The dimensionless index is 0.46); trace aortic regurgitation; mild MR/TR; mild to moderate pulmonary hypertension with estimated pulmonary artery systolic pressure is 49 mmHg; normal venous pressure at 3 mmHg. Start spironolactone 25 mg daily. Continue torsemide 10 mg daily. Check cmp today, 1 week and 1 month.    4. HTN- Stable. Continue current medications.    5. Morbid Obesity- Encourage diet, exercise, and weight loss. Pt  referred to Bariatric Medicine for evaluation for GLP-1 agonist.     6. HLD- Continue statin therapy.     7. BLE lymphedema- Pt presents with findings consistent with severe lymphedema.  Refer to lymphedema clinic.  Recommend wearing graduated compression hose.  Limit sodium intake to 2000 mg daily.  Limit volume intake to 1.5 L daily.  Elevate legs when resting.    8. REJI- Encourage CPAP usage.     Follow up in 3 months     Total duration of face to face visit time 30 minutes.  Total time spent counseling greater than fifty percent of total visit time.  Counseling included discussion regarding imaging findings, diagnosis, possibilities, treatment options, risks and benefits.  The patient had many questions regarding the options and long-term effects.    Brant Palmer MD, PhD  Interventional Cardiology

## 2025-06-19 ENCOUNTER — CLINICAL SUPPORT (OUTPATIENT)
Dept: REHABILITATION | Facility: HOSPITAL | Age: 85
End: 2025-06-19
Payer: MEDICARE

## 2025-06-19 ENCOUNTER — TELEPHONE (OUTPATIENT)
Dept: CARDIOLOGY | Facility: CLINIC | Age: 85
End: 2025-06-19
Payer: MEDICARE

## 2025-06-19 ENCOUNTER — TELEPHONE (OUTPATIENT)
Dept: PODIATRY | Facility: CLINIC | Age: 85
End: 2025-06-19
Payer: MEDICARE

## 2025-06-19 DIAGNOSIS — I87.2 VENOUS STASIS DERMATITIS OF BOTH LOWER EXTREMITIES: Primary | ICD-10-CM

## 2025-06-19 DIAGNOSIS — I89.0 LYMPHEDEMA OF BOTH LOWER EXTREMITIES: ICD-10-CM

## 2025-06-19 DIAGNOSIS — R26.89 DECREASED FUNCTIONAL MOBILITY: ICD-10-CM

## 2025-06-19 PROCEDURE — 97163 PT EVAL HIGH COMPLEX 45 MIN: CPT

## 2025-06-19 PROCEDURE — 97535 SELF CARE MNGMENT TRAINING: CPT

## 2025-06-19 NOTE — TELEPHONE ENCOUNTER
Spoke with pt in regards to r/s 7/3 appt due to provider being on hospital rounds. Pt r/s and verbalized understanding.

## 2025-06-24 ENCOUNTER — OFFICE VISIT (OUTPATIENT)
Dept: PRIMARY CARE CLINIC | Facility: CLINIC | Age: 85
End: 2025-06-24
Payer: MEDICARE

## 2025-06-24 ENCOUNTER — LAB VISIT (OUTPATIENT)
Dept: LAB | Facility: HOSPITAL | Age: 85
End: 2025-06-24
Attending: INTERNAL MEDICINE
Payer: MEDICARE

## 2025-06-24 VITALS
SYSTOLIC BLOOD PRESSURE: 138 MMHG | BODY MASS INDEX: 53.93 KG/M2 | HEIGHT: 59 IN | OXYGEN SATURATION: 100 % | TEMPERATURE: 99 F | DIASTOLIC BLOOD PRESSURE: 64 MMHG | HEART RATE: 88 BPM

## 2025-06-24 DIAGNOSIS — Z79.899 POLYPHARMACY: ICD-10-CM

## 2025-06-24 DIAGNOSIS — E66.813 CLASS 3 SEVERE OBESITY DUE TO EXCESS CALORIES WITH SERIOUS COMORBIDITY AND BODY MASS INDEX (BMI) OF 50.0 TO 59.9 IN ADULT: ICD-10-CM

## 2025-06-24 DIAGNOSIS — I27.81 COR PULMONALE, CHRONIC: ICD-10-CM

## 2025-06-24 DIAGNOSIS — R06.82 TACHYPNEA: ICD-10-CM

## 2025-06-24 DIAGNOSIS — L89.301 PRESSURE INJURY OF BUTTOCK, STAGE 1, UNSPECIFIED LATERALITY: Primary | ICD-10-CM

## 2025-06-24 DIAGNOSIS — I10 PRIMARY HYPERTENSION: Primary | ICD-10-CM

## 2025-06-24 DIAGNOSIS — E66.01 CLASS 3 SEVERE OBESITY DUE TO EXCESS CALORIES WITH SERIOUS COMORBIDITY AND BODY MASS INDEX (BMI) OF 50.0 TO 59.9 IN ADULT: ICD-10-CM

## 2025-06-24 DIAGNOSIS — G47.33 OSA ON CPAP: ICD-10-CM

## 2025-06-24 DIAGNOSIS — I89.0 LYMPHEDEMA OF BOTH LOWER EXTREMITIES: ICD-10-CM

## 2025-06-24 LAB
ABSOLUTE EOSINOPHIL (OHS): 0.18 K/UL
ABSOLUTE MONOCYTE (OHS): 0.49 K/UL (ref 0.3–1)
ABSOLUTE NEUTROPHIL COUNT (OHS): 7.35 K/UL (ref 1.8–7.7)
ALBUMIN SERPL BCP-MCNC: 2.7 G/DL (ref 3.5–5.2)
ALP SERPL-CCNC: 99 UNIT/L (ref 40–150)
ALT SERPL W/O P-5'-P-CCNC: 14 UNIT/L (ref 10–44)
ANION GAP (OHS): 8 MMOL/L (ref 8–16)
AST SERPL-CCNC: 20 UNIT/L (ref 11–45)
BASOPHILS # BLD AUTO: 0.02 K/UL
BASOPHILS NFR BLD AUTO: 0.2 %
BILIRUB SERPL-MCNC: 0.4 MG/DL (ref 0.1–1)
BNP SERPL-MCNC: 174 PG/ML (ref 0–99)
BUN SERPL-MCNC: 25 MG/DL (ref 8–23)
CALCIUM SERPL-MCNC: 9.1 MG/DL (ref 8.7–10.5)
CHLORIDE SERPL-SCNC: 104 MMOL/L (ref 95–110)
CO2 SERPL-SCNC: 27 MMOL/L (ref 23–29)
CREAT SERPL-MCNC: 1.4 MG/DL (ref 0.5–1.4)
ERYTHROCYTE [DISTWIDTH] IN BLOOD BY AUTOMATED COUNT: 17.5 % (ref 11.5–14.5)
GFR SERPLBLD CREATININE-BSD FMLA CKD-EPI: 37 ML/MIN/1.73/M2
GLUCOSE SERPL-MCNC: 121 MG/DL (ref 70–110)
HCT VFR BLD AUTO: 32.7 % (ref 37–48.5)
HGB BLD-MCNC: 10.4 GM/DL (ref 12–16)
IMM GRANULOCYTES # BLD AUTO: 0.03 K/UL (ref 0–0.04)
IMM GRANULOCYTES NFR BLD AUTO: 0.3 % (ref 0–0.5)
LYMPHOCYTES # BLD AUTO: 1.65 K/UL (ref 1–4.8)
MAGNESIUM SERPL-MCNC: 1.7 MG/DL (ref 1.6–2.6)
MCH RBC QN AUTO: 28.3 PG (ref 27–31)
MCHC RBC AUTO-ENTMCNC: 31.8 G/DL (ref 32–36)
MCV RBC AUTO: 89 FL (ref 82–98)
NUCLEATED RBC (/100WBC) (OHS): 0 /100 WBC
PLATELET # BLD AUTO: 173 K/UL (ref 150–450)
PMV BLD AUTO: 13.1 FL (ref 9.2–12.9)
POTASSIUM SERPL-SCNC: 4.5 MMOL/L (ref 3.5–5.1)
PROT SERPL-MCNC: 9 GM/DL (ref 6–8.4)
RBC # BLD AUTO: 3.68 M/UL (ref 4–5.4)
RELATIVE EOSINOPHIL (OHS): 1.9 %
RELATIVE LYMPHOCYTE (OHS): 17 % (ref 18–48)
RELATIVE MONOCYTE (OHS): 5 % (ref 4–15)
RELATIVE NEUTROPHIL (OHS): 75.6 % (ref 38–73)
SODIUM SERPL-SCNC: 139 MMOL/L (ref 136–145)
WBC # BLD AUTO: 9.72 K/UL (ref 3.9–12.7)

## 2025-06-24 PROCEDURE — 83735 ASSAY OF MAGNESIUM: CPT

## 2025-06-24 PROCEDURE — 84155 ASSAY OF PROTEIN SERUM: CPT

## 2025-06-24 PROCEDURE — 99215 OFFICE O/P EST HI 40 MIN: CPT | Mod: S$GLB,,, | Performed by: HOSPITALIST

## 2025-06-24 PROCEDURE — 85025 COMPLETE CBC W/AUTO DIFF WBC: CPT

## 2025-06-24 PROCEDURE — G2211 COMPLEX E/M VISIT ADD ON: HCPCS | Mod: S$GLB,,, | Performed by: HOSPITALIST

## 2025-06-24 PROCEDURE — 36415 COLL VENOUS BLD VENIPUNCTURE: CPT

## 2025-06-24 PROCEDURE — 83880 ASSAY OF NATRIURETIC PEPTIDE: CPT

## 2025-06-24 RX ORDER — AMLODIPINE BESYLATE 10 MG/1
10 TABLET ORAL DAILY
Qty: 90 TABLET | Refills: 5 | Status: SHIPPED | OUTPATIENT
Start: 2025-06-24 | End: 2026-06-24

## 2025-06-24 NOTE — ASSESSMENT & PLAN NOTE
Reviewed most recent echo from last month which showed normalization of RV systolic function from echo 1 y/a.  PA pressure about the same.  Counseled pt on pathophysiology of pulmonary hypertension leading to cor pulmonale and that although RV systolic function appears to have normalized it could still worsen unless pulmonary HTN definitively managed likely through treating REJI.

## 2025-06-24 NOTE — ASSESSMENT & PLAN NOTE
Pt has been sleeping in a recliner for several years now; denies any sleep difficulties and feels well rested.  Not using CPAP.  Discussed relationship b/t REJI, PAH, and emerging evidence of a/w dementia risk.

## 2025-06-24 NOTE — PROGRESS NOTES
Primary Care Provider Appointment - [facility]  Dr Mccray (Attending)    Subjective:      Patient ID: Duran Giordano is a 85 y.o. 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     Chief Complaint: Follow-up    Prior to this visit, patient's last encounter with PCP was 4/22/2025.    Has been having a lot of appointments lately which has been challenging [place]    Has lost 10 lbs with the Ozempic; tolerating well.  Going to lymphedema therapy; had 1st visit last week and going again next week.  Insulin recently changed from Tresiba to Lantus; taking 12 units at night; if she takes higher dose feels bad the next morning w/ HA, nausea, etc.  Am BG typically in 120s.  Having soreness of buttocks from sitting in scooter.  Does get up to bathe and can ambulate around her room with walker but otherwise in chair.    4/22/2025  Pt is doing well. She still has the perineal skin growth and it has not gotten better. Pt has an appointment May 27 for colon doctor.    Pt has lymphedema bilaterally but it is worse right leg. She has noticed anything in particular causing the flare ups. She hasn't had any pain.     Pt has 2 operations in her knee and would like to get out of her wheelchair. She would like home health to try to get to walk. She has some problems keeping the scooter straight sometimes. She had some difficulty on the ramp and believes that caused the high blood pressure today. She also has some numbness in her right toes occasionally.     She is having gout flare ups in the left heel. It is red and inflamed. Would like a prescription for the colchicine to have on hand.     Pt was in rehab for 9 months at various locations and lost 45 pounds. Pt is also on Ozempic and it has been helping. Pt wants to work on fixing her diet. She  has been drinking soda and knows she needs to cut down.     She has been taking all her medications and has had no problems. She did not notice any drops in her blood sugars.       9/24/2024  She presents today for ongoing complaints related to skin growth in perineal area previously diagnosed as skin tag by gynecology. Notes that the growth is increasing in size with associated odor and itchiness. Pt has attempted to treat area with Preparation H ointment without significant relief.     Of note, pt recently admitted on 6/5/24 to [facility]. Initially placed on heparin transitioned to apixaban prior to discharge. No invasive interventions due to comorbidities. Denies any current chest pain, SOB, changes in urination or bowel movements, or worsening lower extremity edema.     DDM  - Currently taking: Ozempic .5 and Tresiba, previously on humalog and humalin  - Last A1c on  10/16/23 was 6.9  - Diet consists of see previous note, it is unchanged   - Has boost shakes, will continue those     HTN  - Currently taking: amlodipine 5 mg  - How often taking BP at home: couple times a week     HLD  - Currently taking: Atorvastatin 80 mg   - Last lipid panel was on 10/16/23  The ASCVD Risk score ([place] DK, et al., 2019) failed to calculate for the following reasons:    The 2019 ASCVD risk score is only valid for ages 40 to 79                Asthma/COPD  - Currently taking: ipi albuterol, albuterol rescue  - Smoking status: Never  - No home O2     Mental Health  - Currently taking: none  - Sees therapist no and psychiatrist no  - 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 Olson on 10/30/2023  - On allopurinol 400mg. Colchicine stopped  - Counselled on diet modifications     Central retinal vein occlusion with macular edema of left eye  - sees Dr. Rosas, Optometry and receives shots in her left eye every 6-8 weeks. She reports  going back to see him in 1 month. She is worried the shots are not working any longer.      4Ms for Medical Decision-Making in Older Adults     Last Completed EAWV: 2024    MOBILITY:  Get Up and Go:      2024    10:45 AM   Get Up and Go   Trial 1 14 seconds     Activities of Daily Livin/2/2024    10:40 AM   Activities of Daily Living   Ambulation Assistance Required   Ambulation Assistance Walker (wheeled)   Dressing Independent   Transfers Independent   Toileting Continent of bladder;Continent of bowel   Feeding Independent   Cleaning home/Chores Assistance Required   Telephone use Independent   Shopping Assistance Required   Paying bills Independent   Taking meds Independent     Whisper Test:      2024    10:45 AM   Whisper Test   Whisper Test Normal     Disability Status:      2024    10:44 AM   Disability Status   Are you deaf or do you have serious difficulty hearing? N   Are you blind or do you have serious difficulty seeing, even when wearing glasses? N   Because of a physical, mental, or emotional condition, do you have serious difficulty concentrating, remembering, or making decisions? N   Do you have serious difficulty walking or climbing stairs? Y   Do you have difficulty dressing or bathing? Y   Because of a physical, mental, or emotional condition, do you have difficulty doing errands alone such as visiting a doctor's office or shopping? Y     Nutrition Screenin/2/2024    10:40 AM   Nutrition Screening   Has food intake declined over the past three months due to loss of appetite, digestive problems, chewing or swallowing difficulties? No decrease in food intake   Involuntary weight loss during the last 3 months? No weight loss   Mobility? Goes out   Has the patient suffered psychological stress or acute disease in the past three months? No   Neuropsychological problems? No psychological problems   Body Mass Index (BMI)?  BMI 23 or greater   Screening Score 14    Interpretation Normal nutritional status    Screening Score: 0-7 Malnourished, 8-11 At Risk, 12-14 Normal  Fall Risk:      2025     3:00 PM 2025    10:00 AM 2025    10:15 AM   Fall Risk Assessment - Outpatient   Mobility Status Ambulatory w/ assistance Ambulatory Wheelchair Bound   Number of falls 0 0 0   Identified as fall risk True False True           MENTATION:   Depression Patient Health Questionnaire:      2025     9:54 AM   Depression Patient Health Questionnaire   Over the last two weeks how often have you been bothered by little interest or pleasure in doing things Not at all   Over the last two weeks how often have you been bothered by feeling down, depressed or hopeless Not at all   PHQ-2 Total Score 0     Has Dementia Dx: No  Has Anxiety Dx: No    Cognitive Function Screenin/2/2024    10:45 AM   Cognitive Function Screening   Clock Drawing Test 1    Mini-Cog 3 Minute Recall 3   Cognitive Function Screening 4       Data saved with a previous flowsheet row definition     Cognitive Function Screening Total - Less than 4 = Abnormal,  Greater than or equal to 4 = Normal        MEDICATIONS:  High Risk Medications:  Total Active Medications: 1  HYDROcodone-acetaminophen - 5-325 mg    WHAT MATTERS MOST:  Advance Care Planning   ACP Status:   Patient has had an ACP conversation  Living Will: Yes  Power of : No  POLST: Yes    What is most important right now is to focus on remaining as independent as possible    Accordingly, we have decided that the best plan to meet the patient's goals includes continuing with treatment      What matters most to patient today is: perineal skin tag                 Social History     Socioeconomic History    Marital status: Single   Tobacco Use    Smoking status: Never    Smokeless tobacco: Never   Substance and Sexual Activity    Alcohol use: Yes     Comment: holidays    Drug use: No    Sexual activity: Never   Social History Narrative     "Single with no children.      Social Drivers of Health     Financial Resource Strain: Low Risk  (6/7/2024)    Overall Financial Resource Strain (CARDIA)     Difficulty of Paying Living Expenses: Not hard at all   Food Insecurity: No Food Insecurity (6/7/2024)    Hunger Vital Sign     Worried About Running Out of Food in the Last Year: Never true     Ran Out of Food in the Last Year: Never true   Transportation Needs: No Transportation Needs (6/7/2024)    TRANSPORTATION NEEDS     Transportation : No   Physical Activity: Insufficiently Active (2/2/2024)    Exercise Vital Sign     Days of Exercise per Week: 1 day     Minutes of Exercise per Session: 10 min   Stress: No Stress Concern Present (6/7/2024)    [employer] [place] [employer] - Occupational Stress Questionnaire     Feeling of Stress : Only a little   Housing Stability: Unknown (6/7/2024)    Housing Stability Vital Sign     Unable to Pay for Housing in the Last Year: No     Homeless in the Last Year: No       Review of Systems   Constitutional:  Negative for chills and fever.   HENT:  Negative for sore throat.    Eyes:  Positive for visual disturbance.   Respiratory:  Positive for cough and shortness of breath.    Cardiovascular:  Positive for leg swelling. Negative for chest pain.   Gastrointestinal:  Negative for abdominal pain, constipation, diarrhea, nausea and vomiting.   Genitourinary:  Negative for difficulty urinating, dysuria, frequency and urgency.   Neurological:  Positive for numbness. Negative for dizziness and headaches.   Psychiatric/Behavioral:  Negative for sleep disturbance.        Objective:   /64 (BP Location: Right forearm, Patient Position: Sitting)   Pulse 88   Temp 98.9 °F (37.2 °C) (Oral)   Ht 4' 11" (1.499 m)   SpO2 100%   BMI 53.93 kg/m²     Physical Exam  Vitals reviewed. Exam conducted with a chaperone present.   Constitutional:       Appearance: She is obese.      Comments: Pt seen in power scooter "   Cardiovascular:      Rate and Rhythm: Normal rate and regular rhythm.      Pulses: Normal pulses.      Heart sounds: Normal heart sounds.   Pulmonary:      Effort: Pulmonary effort is normal.      Breath sounds: Normal breath sounds.   Abdominal:      General: Abdomen is flat.      Palpations: Abdomen is soft.   Genitourinary:     Exam position: Lithotomy position.      Pubic Area: No rash.       Comments: Loose skin of B labia at base of introitus.  No apparent erythema or induration.  No visible external hemorrhoids.  Musculoskeletal:      Right lower leg: Edema present.      Left lower leg: Edema present.      Comments: Massive lymphedema BLE.  Wearing loose support stockings only.   Skin:     General: Skin is warm and dry.      Capillary Refill: Capillary refill takes less than 2 seconds.      Findings: Rash present.      Comments: Extensive venous stasis dermatitis BLE.  Some increased rubor and warmth of BLE likely secondary to vascular congestion but not pronounced enough to be concerning for superimposed cellulitis.  Multiple hyperkeratotic plaques BLE with scattered subdermal seromas.  Small adhesive bandage on medial R foreleg in middle of hyperkeratotic plaque c/w recent drainage.   Neurological:      General: No focal deficit present.      Mental Status: She is alert and oriented to person, place, and time.   Psychiatric:         Mood and Affect: Mood normal.         Behavior: Behavior normal.         Thought Content: Thought content normal.         Judgment: Judgment normal.           Lab Results   Component Value Date    WBC 8.29 06/19/2024    HGB 11.7 (L) 06/19/2024    HCT 37.5 06/19/2024     06/19/2024    CHOL 108 (L) 06/09/2025    TRIG 58 06/09/2025    HDL 44 06/09/2025    ALT 13 06/17/2025    AST 20 06/17/2025     06/17/2025    K 4.2 06/17/2025     06/17/2025    CREATININE 1.4 06/17/2025    BUN 27 (H) 06/17/2025    CO2 26 06/17/2025    TSH 0.799 01/16/2020    INR 1.1 06/05/2024     HGBA1C 6.9 (H) 06/09/2025       Current Outpatient Medications on File Prior to Visit   Medication Sig Dispense Refill    albuterol (PROVENTIL/VENTOLIN HFA) 90 mcg/actuation inhaler Inhale 2 puffs into the lungs every 4 (four) hours as needed for Wheezing or Shortness of Breath. Rescue 54 g 3    albuterol-ipratropium (DUO-NEB) 2.5 mg-0.5 mg/3 mL nebulizer solution USE 1 VIAL PER NEBULIZER EVERY 6 HOURS AS NEEDED FOR WHEEZING/RESCUE 90 mL 11    allopurinoL (ZYLOPRIM) 300 MG tablet Take 1 tablet (300 mg total) by mouth once daily. 90 tablet 1    amLODIPine (NORVASC) 10 MG tablet Take 1 tablet (10 mg total) by mouth once daily. 90 tablet 3    ANTISEPTIC SKIN CLNSR,CHLORHE, 4 % external liquid Apply topically.      ASPERCREME WITH ALOE 10 % Crea Apply 1 Application topically as needed (to legs/back).      atorvastatin (LIPITOR) 80 MG tablet TAKE ONE TABLET BY MOUTH EVERY DAY 30 tablet 11    blood sugar diagnostic Strp To check BG 2 times daily, to use with insurance preferred meter, e 11.65 type 2 diabetes 100 each 11    blood-glucose meter kit To check BG 2 times daily, to use with insurance preferred meter 1 each 0    cholecalciferol, vitamin D3, (VITAMIN D3) 25 mcg (1,000 unit) capsule Take 1 capsule (1,000 Units total) by mouth once daily. 90 capsule 3    clotrimazole-betamethasone 1-0.05% (LOTRISONE) cream Apply topically 2 (two) times daily. 45 g 3    colchicine (COLCRYS) 0.6 mg tablet Take 1 tablet (0.6 mg total) by mouth daily as needed (gout attack). Take 1 tablet in event of gout attack.  If still having symptoms after 2 hours take 2 more tablets.  Do not take more than 3 in one day. 30 tablet 11    diclofenac sodium (VOLTAREN) 1 % Gel APPLY 2 GRAMS TOPICALLY DAILY 100 g 0    docusate sodium (COLACE) 100 MG capsule Take 100 mg by mouth.      doxycycline (VIBRA-TABS) 100 MG tablet Take 1 tablet (100 mg total) by mouth 2 (two) times daily. 60 tablet 5    ELIQUIS 5 mg Tab Take 1 tablet (5 mg total) by mouth 2  "(two) times daily. 60 tablet 5    fluticasone (FLONASE) 50 mcg/actuation nasal spray 2 sprays (100 mcg total) by Each Nare route once daily. 1 Bottle 3    GOLD BOND MEDICATED FOOT 1 % Powd Apply topically.      HYDROcodone-acetaminophen (NORCO) 5-325 mg per tablet Take 1 tablet by mouth every 6 (six) hours as needed.      ibuprofen (ADVIL,MOTRIN) 800 MG tablet Take 800 mg by mouth every 6 (six) hours as needed.      insulin glargine U-100, Lantus, (LANTUS SOLOSTAR U-100 INSULIN) 100 unit/mL (3 mL) InPn pen Inject 12-18 units at night 6 mL 6    insulin glargine-yfgn 100 unit/mL (3 mL) InPn       ipratropium (ATROVENT) 21 mcg (0.03 %) nasal spray 2 sprays by Each Nostril route 2 (two) times daily as needed for Rhinitis (congestion). 30 mL 3    lancets Misc To check BG 2 times daily, to use with insurance preferred meter 100 each 11    latanoprost (XALATAN) 0.005 % ophthalmic solution Place 1 drop into both eyes every evening. 2.5 mL 3    LIDOcaine HCL 4 % Crea Apply topically.      menthol-zinc oxide (CALMOSEPTINE) 0.44-20.6 % Oint Apply topically 2 (two) times daily as needed (chafing). 71 g 3    nebulizer and compressor (COMP-AIR ELITE COMP NEB SYSTEM) Berna use as directed 1 each 0    olopatadine (PATANOL) 0.1 % ophthalmic solution Place 1 drop into both eyes 2 (two) times daily. 5 mL 0    pen needle, diabetic (PEN NEEDLE) 29 gauge x 1/2" Ndle USE DAILY 100 each 3    polyethylene glycol (GLYCOLAX) 17 gram/dose powder MIX 17 GM (1 CAPFUL) IN 4 TO 8 OUNCES OF FLUID AND TAKE DAILY 510 g 5    semaglutide (OZEMPIC) 1 mg/dose (4 mg/3 mL) Inject 1 mg into the skin every 7 days. 3 mL 11    SENNA 8.6 mg tablet Take 1 tablet by mouth once daily. 90 tablet 3    SHARPS CONTAINER       spironolactone (ALDACTONE) 25 MG tablet Take 1 tablet (25 mg total) by mouth once daily. 30 tablet 11    torsemide (DEMADEX) 10 MG Tab Take 1 tablet (10 mg total) by mouth once daily. 90 tablet 5    witch hazeL (TUCKS, WITCH HAZEL,) 50 % PadM " Apply 1 each topically daily as needed (irritated skin tags or hemorrhoids). 48 each 1    zinc oxide 10 % Oint Apply 1 Application topically 2 (two) times daily as needed (wounds or blisters). 85 g 5     No current facility-administered medications on file prior to visit.         Assessment:   85 y.o. female with multiple co-morbid illnesses here to follow-up with PCP and continue work-up of chronic issues    Plan:   1. Pressure injury of buttock, stage 1, unspecified laterality  Assessment & Plan:  Pt spends most of day in motorized scooter; does not have pressure-distributing cushion.  Will order gel cushion for WC.    Orders:  -     WHEELCHAIR GEL CUSHION FOR HOME USE    2. Cor pulmonale, chronic  Assessment & Plan:  Reviewed most recent echo from last month which showed normalization of RV systolic function from echo 1 y/a.  PA pressure about the same.  Counseled pt on pathophysiology of pulmonary hypertension leading to cor pulmonale and that although RV systolic function appears to have normalized it could still worsen unless pulmonary HTN definitively managed likely through treating REJI.      3. Class 3 severe obesity due to excess calories with serious comorbidity and body mass index (BMI) of 50.0 to 59.9 in adult  Overview:  patient aware of need drastic weight loss and encourage to increase activity    Assessment & Plan:  Congratulated pt on 10 lbs wt loss since increasing Ozempic to 1mg; will continue to monitor progress.      4. Lymphedema of both lower extremities  Overview:  LE wounds resolved,right leg showing so skin changes that should be monitored closely.  Reinforced daily circaid application  by Sister Ana Irina or Doreen Ribera. Last seen by lymphedema clinic in 2014    Assessment & Plan:  Pt has started lymphedema therapy; needs assistance getting compression stockings on.  Suggested discussing this with PT to see if they have solutions worth trying.      5. REJI on CPAP  Overview:  Compliant  with CPAP only 4 hours per night (previously noncompliant)    Assessment & Plan:  Pt has been sleeping in a recliner for several years now; denies any sleep difficulties and feels well rested.  Not using CPAP.  Discussed relationship b/t REJI, PAH, and emerging evidence of a/w dementia risk.             Health Maintenance         Date Due Completion Date    TETANUS VACCINE Never done ---    Hemoglobin A1c 12/09/2025 6/9/2025    Override on 7/13/2016: Done    Diabetic Eye Exam 05/20/2026 5/20/2025    Override on 2/7/2020: Done    Override on 2/17/2016: Done    Override on 1/22/2015: Done    Override on 8/16/2012: Done    Diabetes Urine Screening 06/09/2026 6/9/2025    Lipid Panel 06/09/2026 6/9/2025    DEXA Scan 06/01/2027 6/1/2023    Override on 12/13/2011: Done            Future Appointments   Date Time Provider Department Center   6/30/2025  1:30 PM Vandana Lai, PTA [facility] OP RHB2 [place] 2 fl   7/1/2025 10:00 AM Ferdinand Rosas MD NOM OPHTHAL [address]   7/2/2025  1:45 PM Alfredo Lozoya MD NOM ORTHO [facility]   7/3/2025 10:15 AM Ezekiel Crespo DPM NOM POD [facility]   7/8/2025  2:00 PM Nahomy Perkins, PT [facility] OP RHB2 [place] 2 fl   7/9/2025 11:00 AM [facility] NM3 MG1 400LB LIMIT NOM NUCLEAR [address]   7/15/2025  2:00 PM Nahomy Perkins, PT EL OP R[facility]2 [place] 2 fl   7/17/2025 10:00 AM LAB, APPOINTMENT STACIA GAXIOLA NOM LAB IM [facility]   7/22/2025  2:00 PM Nahomy Perkins, PT EL OP RHB2 [place] 2 fl   8/19/2025 11:30 AM LAB, APPOINTMENT [place] NOM LAB [facility]   8/19/2025 12:00 PM SIX, MINUTE WALK [facility]UL [facility]   8/19/2025  1:00 PM Renan Underwood Jr., MD [facility] HEARTTX [address]   9/18/2025  9:40 AM Brant Palmer MD PhD [facility] PERVAS [address] [facility]   9/29/2025 11:00 AM Sony Mccray MD [facility] MED CLN [address]   11/10/2025 10:00 AM LAB, APPOINTMENT [facility] INTMED [facility] LAB IM [address]   11/13/2025  9:30 AM Yogi Contreras, APRN, FNP [facility] [address] [facility]          Follow up in about 3 months (around 9/24/2025) for here or Brearacelis. Total clinical care time was 40 min      Sony Mccray MD   [facility]  [facility]  [facility]

## 2025-06-24 NOTE — ASSESSMENT & PLAN NOTE
Pt spends most of day in motorized scooter; does not have pressure-distributing cushion.  Will order gel cushion for WC.

## 2025-06-24 NOTE — ASSESSMENT & PLAN NOTE
Congratulated pt on 10 lbs wt loss since increasing Ozempic to 1mg; will continue to monitor progress.

## 2025-06-24 NOTE — ASSESSMENT & PLAN NOTE
Pt has started lymphedema therapy; needs assistance getting compression stockings on.  Suggested discussing this with PT to see if they have solutions worth trying.

## 2025-06-25 ENCOUNTER — PATIENT MESSAGE (OUTPATIENT)
Dept: TRANSPLANT | Facility: CLINIC | Age: 85
End: 2025-06-25
Payer: MEDICARE

## 2025-06-25 ENCOUNTER — TELEPHONE (OUTPATIENT)
Dept: PRIMARY CARE CLINIC | Facility: CLINIC | Age: 85
End: 2025-06-25
Payer: MEDICARE

## 2025-06-27 ENCOUNTER — TELEPHONE (OUTPATIENT)
Dept: PRIMARY CARE CLINIC | Facility: CLINIC | Age: 85
End: 2025-06-27
Payer: MEDICARE

## 2025-06-27 NOTE — TELEPHONE ENCOUNTER
Copied from CRM #[id number]. Topic: General Inquiry - Patient Advice  >> Jun 27, 2025 10:51 AM Mariaa wrote:  Nicolette @ [phone] with HH states patient BP was elevated today 158/70.

## 2025-06-28 PROCEDURE — G0179 MD RECERTIFICATION HHA PT: HCPCS | Mod: ,,, | Performed by: HOSPITALIST

## 2025-06-30 ENCOUNTER — CLINICAL SUPPORT (OUTPATIENT)
Dept: REHABILITATION | Facility: HOSPITAL | Age: 85
End: 2025-06-30
Payer: MEDICARE

## 2025-06-30 DIAGNOSIS — R26.89 DECREASED FUNCTIONAL MOBILITY: ICD-10-CM

## 2025-06-30 DIAGNOSIS — M10.00 IDIOPATHIC GOUT, UNSPECIFIED CHRONICITY, UNSPECIFIED SITE: ICD-10-CM

## 2025-06-30 DIAGNOSIS — I87.2 VENOUS STASIS DERMATITIS OF BOTH LOWER EXTREMITIES: Primary | ICD-10-CM

## 2025-06-30 DIAGNOSIS — I89.0 LYMPHEDEMA OF BOTH LOWER EXTREMITIES: ICD-10-CM

## 2025-06-30 PROCEDURE — 97535 SELF CARE MNGMENT TRAINING: CPT | Mod: CQ

## 2025-06-30 PROCEDURE — 97140 MANUAL THERAPY 1/> REGIONS: CPT | Mod: CQ

## 2025-06-30 RX ORDER — ALLOPURINOL 300 MG/1
300 TABLET ORAL DAILY
Qty: 90 TABLET | Refills: 3 | Status: SHIPPED | OUTPATIENT
Start: 2025-06-30

## 2025-06-30 RX ORDER — ALLOPURINOL 100 MG/1
100 TABLET ORAL
Qty: 90 TABLET | Refills: 5 | OUTPATIENT
Start: 2025-06-30

## 2025-06-30 NOTE — PROGRESS NOTES
Outpatient Rehab    Physical Therapy Evaluation    Patient Name: Sister Duran Giordano  MRN: [id number]  YOB: 1940  Encounter Date: 6/19/2025    Therapy Diagnosis:   Encounter Diagnoses   Name Primary?    Lymphedema of both lower extremities     Venous stasis dermatitis of both lower extremities Yes    Decreased functional mobility      Physician: Brant Palmer MD*    Physician Orders: Eval and Treat  Medical Diagnosis: Lymphedema of both lower extremities  Surgical Diagnosis: Not applicable for this Episode   Surgical Date: Not applicable for this Episode  Days Since Last Surgery: Not applicable for this Episode    Visit # / Visits Authorized:  1 / 1  Insurance Authorization Period: 4/15/2025 to 12/31/2025  Date of Evaluation: 6/19/2025  Plan of Care Certification: 6/19/2025 to 8/19/2025     Time In: 0910   Time Out: 1000  Total Time (in minutes): 50   Total Billable Time (in minutes):  50    Intake Outcome Measure for FOTO Survey    Therapist reviewed FOTO scores for Sister Duran Giordano on 6/19/2025.   FOTO report - see Media section or FOTO account episode details.     Intake Score:  %    Precautions:       Subjective   History of Present Illness  Sister Duran Giordano is a 85 y.o. female who reports to physical therapy with a chief concern of B lymphedema.     The patient reports a medical diagnosis of I89.0 (ICD-10-CM) - Lymphedema, not elsewhere classified.    Diagnostic tests related to this condition: Ultrasound.   Ultrasound Details:   There is no evidence of right or left lower extremity DVT.    Right PTVs, Peroneal and ATV's not visualized due to severe lymphedema.    History of Present Condition/Illness: She would like to walk. Her L leg is difficult to . She has had leg swelling for 20+ years. It started with cellulitis. She has had 2-3 cellulitis infections total. Her upper legs and lower legs are swollen. Dr. Palmer suggested exercises. She uses  aquaphor for her dry skin/ patches of very dry skin. She doesn't have any wounds currently. She has velcro wraps for the lower legs but they are hard to get on. She has not replaced them since.     Additional Lymphedema History  The patient's medical history relevant to lymphedema includes History of blood clot in at-risk limb, Other (Comment), and History of infection in at-risk limb. Specifically, there is a history of Cellulitis in the at-risk limb.   Hx of pulmonary embolism, is taking blood thinners daily. DM, R TKA x2, gout     Activities of Daily Living  Previously independent with activities of daily living? Yes     Currently independent with activities of daily living? No     She uses a walker for short distances but uses an electric WC for longer times. She requires assistance with bathing     Previously independent with instrumental activities of daily living? Yes                    Pain     Patient reports a current pain level of 0/10. Pain at best is reported as 0/10. Pain at worst is reported as 6/10.   Location: L heel  Pain Qualities: Sharp, Other (Comment)  Other Pain Qualities: tingling         Living Arrangements  Living Situation  Living Arrangements: Other (Comment)  Other Living Arrangements Comment: lives with [employer] sisters    Equipment/Treatments  Mobility Equipment: Standard walker, Power wheelchair        Employment      She is in charge of the house       Past Medical History/Physical Systems Review:   Duran Giordano  has a past medical history of Adrenal mass- adenoma stable since 2004 (1.8 cm and 8/13/12 (2 cm); stable 2016 2.4 cm, Arthritis, Asthma in adult without complication, Bilateral carotid artery disease, Bilateral sciatica, Cellulitis of right leg, Cerebral infarction, Cervical radiculopathy, Chronic knee pain, Chronic rhinitis, CKD (chronic kidney disease) stage 3, GFR 30-59 ml/min, Coronary artery disease due to calcified coronary lesion, Deep vein thrombosis, Diastolic  dysfunction, Essential hypertension, Gastroesophageal reflux disease without esophagitis, Gout, Hives, Infection of prosthetic right knee joint, Mixed hyperlipidemia, Morbid obesity with BMI of 50.0-59.9, adult, Obstructive sleep apnea syndrome, Pulmonary embolism, Senile cataracts of both eyes, Traumatic open wound of right lower leg, Trigeminal neuralgia of right side of face, Type 2 diabetes mellitus with diabetic polyneuropathy, with long-term current use of insulin, Venous stasis dermatitis of both lower extremities, Viral hepatitis A without coma, and Vitamin D deficiency disease.    Duran Giordano  has a past surgical history that includes Right total knee replacement; Joint replacement; Hysterectomy (01/01/1982); Injection of anesthetic agent around nerve (Right, 10/21/2021); Revision of knee arthroplasty (Right, 10/28/2021); Insertion of antibiotic spacer (Right, 10/28/2021); Revision of knee arthroplasty (Right, 06/30/2022); and Colonoscopy.    Duran Logan has a current medication list which includes the following prescription(s): albuterol, albuterol-ipratropium, allopurinol, amlodipine, antiseptic skin clnsr(chlorhe), aspercreme with aloe, atorvastatin, blood sugar diagnostic, blood-glucose meter, cholecalciferol (vitamin d3), clotrimazole-betamethasone 1-0.05%, colchicine, diclofenac sodium, docusate sodium, doxycycline, eliquis, fluticasone propionate, gold bond medicated foot, hydrocodone-acetaminophen, ibuprofen, lantus solostar u-100 insulin, insulin glargine-yfgn, ipratropium, lancets, latanoprost, lidocaine hcl, menthol-zinc oxide, nebulizer and compressor, olopatadine, pen needle, diabetic, polyethylene glycol, ozempic, senna, sharps container, spironolactone, torsemide, tucks (witch hazel), and zinc oxide.    Review of patient's allergies indicates:   Allergen Reactions    Sulfamethoxazole-trimethoprim      Other reaction(s): up set stomach rash/hives    Azithromycin      Feels bad     Erythromycin Other (See Comments)     Other reaction(s): Unknown  Other reaction(s): Stomach upset    Gentamicin      Vaginal burning , itching     Levofloxacin Hives     Other reaction(s): nervousness    Shellfish containing products      Rash      Vancomycin Itching     Other reaction(s): Itching        Objective   Lower Extremity Skin Observations  Right Lower Extremity Skin Observations  Right Lower Extremity Skin Appearance: Hyperkeratosis, Dry/flaky, Blisters, Elephantiasis, Firm skin  Right Lower Extremity Edema Non-pitting or Pitting: Non-pitting  Right Lower Extremity Non-pitting Edema Severity: Severe  Right Lower Extremity Skin Temperature: Normal  Right Lower Extremity Vascular Changes: Hypertrophic nail changes  Right Lower Extremity Stemmer's Sign: Yes    Left Lower Extremity Skin Observations  Left Lower Extremity Skin Appearance: Hyperkeratosis, Firm skin, Elephantiasis, Dry/flaky, Blisters  Left Lower Extremity Edema Non-pitting or Pitting: Non-pitting  Left Lower Extremity Non-pitting Edema Severity: Severe  Left Lower Extremity Skin Temperature: Normal  Left Lower Extremity Vascular Changes: Hypertrophic nail changes  Left Lower Extremity Stemmer's Sign: Yes                                 Treatment:  Other Activities  Activity 1: Self care/ home management: Patient was educated in   Compression needs: inelastic Velcro wrap- foot/ankle and inelastic Velcro wrap- calf  Home management plan:  Wear schedule: Jose first thing in the morning, remove at the end of the day as close to bedtime as possible. Do not sleep in garments. If using a home pneumatic pump, remove garments when using pump. If it is not yet bedtime, resume wearing garments until bed.  Cost/coverage of compression garments: Medicare now pays for compression. Private insurance coverage is on a case-by-case basis. In order to determine coverage an Rx with a diagnosis of lymphedema is sent to a participating DME for a benefits check.    Commitment to attendance and securing compression needs are critical to lymphedema management  Monitor for signs/symptoms of infection and seek medical attention immediately if symptoms occur.    Time Entry(in minutes):  PT Evaluation (Complex) Time Entry: 30  Self Care/Home Management (ADLs) Time Entry: 20    Assessment & Plan   Assessment  Sister Duran Giordano presents with a condition of High complexity.   Presentation of Symptoms: Unstable  Will Comorbidities Impact Care: Yes  Infection, wounds, hx of blood clot, DM, leg sx,     Functional Limitations: Activity tolerance, Bed mobility, Decreased ambulation distance/endurance, Functional mobility, Gait limitations, Transfers  Impairments: Activity intolerance, Pain with functional activity, Other (Comment)  Other Impairments: edema, skin integrity    Patient Goal for Therapy (PT): would like to walk  Assessment Details: Pt presents to clinic with severe lymphedema of the bilateral upper and lower legs. Significant skin changes are present with fibrosis hypertrophic nails and chronic wounds. Patient displays significant limitations in functional mobility and transfers due to lymphedema severity. Patient and patient's guest were educated on need for external help with management of lymphedema. Patient reports she lives with other sisters who would be able to assist and patient was strongly encouraged to bring these individuals into clinic with her to receive education on lymph management. Pt was educated on lymphedema causes and physiology, infection signs and symptoms, complete decongestive therapy and its components, and expectations for therapy. Pt was also educated on the need for some type of compression.  Pt would benefit from therapy to decrease edema, aid in finding compression, and prepare for home management.     Plan  From a physical therapy perspective, the patient would benefit from: Skilled Rehab Services    Planned therapy interventions include:  Therapeutic exercise, Therapeutic activities, Neuromuscular re-education, Manual therapy, ADLs/IADLs, and Lymphatic compression wrapping.            Visit Frequency: 2 times Per Week for 8 Weeks.       This plan was discussed with Patient.   Discussion participants: Agreed Upon Plan of Care             The patient's spiritual, cultural, and educational needs were considered, and the patient is agreeable to the plan of care and goals.           Goals:   Active       Long Term Goals        1. Patient will show decreased girth in B LE by up to 4 cm  to allow for LE symmetry, shoe and clothing choice, and ability to apply needed compression daily.         Start:  06/30/25    Expected End:  08/19/25            2. Patient will show reduction in density to mild or less with improved contour of limb to allow for cosmesis, LE symmetry, infection risk reduction, and clothing and compression choice       Start:  06/30/25    Expected End:  08/19/25            3. Patient and /or caretaker to maria antonia/doff compression garment with daily compliance to assist in lymphedema management, skin elasticity, and tissue density.         Start:  06/30/25    Expected End:  08/19/25               Short Term Goals        1. Patient will show decreased girth in B LE by up to 2 cm to allow for LE symmetry, shoe and clothing choice, and ability to apply needed compression       Start:  06/30/25    Expected End:  07/19/25            2. Patient will demonstrate 100% knowledge of lymphedema precautions and signs of infection to allow for reduced lymphedema risk, infection risk, and/or exacerbation of condition.         Start:  06/30/25    Expected End:  07/19/25            3. Patient or caregiver will perform self-bandaging techniques and/or wearing of compression garments to allow for lymphatic drainage support, skin elasticity, and reduction in shape and size of limb       Start:  06/30/25    Expected End:  07/19/25                Nahomy Perkins  PT

## 2025-07-01 ENCOUNTER — OFFICE VISIT (OUTPATIENT)
Dept: OPHTHALMOLOGY | Facility: CLINIC | Age: 85
End: 2025-07-01
Payer: MEDICARE

## 2025-07-01 ENCOUNTER — CLINICAL SUPPORT (OUTPATIENT)
Dept: OPHTHALMOLOGY | Facility: CLINIC | Age: 85
End: 2025-07-01
Payer: MEDICARE

## 2025-07-01 DIAGNOSIS — H43.813 VITREOUS DEGENERATION OF BOTH EYES: ICD-10-CM

## 2025-07-01 DIAGNOSIS — H34.8120 CENTRAL RETINAL VEIN OCCLUSION WITH MACULAR EDEMA OF LEFT EYE: ICD-10-CM

## 2025-07-01 DIAGNOSIS — H35.033 HYPERTENSIVE RETINOPATHY OF BOTH EYES: ICD-10-CM

## 2025-07-01 DIAGNOSIS — H40.053 OCULAR HYPERTENSION, BILATERAL: ICD-10-CM

## 2025-07-01 DIAGNOSIS — H25.13 AGE-RELATED NUCLEAR CATARACT OF BOTH EYES: ICD-10-CM

## 2025-07-01 DIAGNOSIS — E11.9 DIABETES MELLITUS TYPE 2 WITHOUT RETINOPATHY: ICD-10-CM

## 2025-07-01 DIAGNOSIS — H34.8120 CENTRAL RETINAL VEIN OCCLUSION WITH MACULAR EDEMA OF LEFT EYE: Primary | ICD-10-CM

## 2025-07-01 PROCEDURE — G2211 COMPLEX E/M VISIT ADD ON: HCPCS | Mod: ,,, | Performed by: OPHTHALMOLOGY

## 2025-07-01 PROCEDURE — 99214 OFFICE O/P EST MOD 30 MIN: CPT | Mod: S$PBB,,, | Performed by: OPHTHALMOLOGY

## 2025-07-01 PROCEDURE — 99214 OFFICE O/P EST MOD 30 MIN: CPT | Mod: PBBFAC | Performed by: OPHTHALMOLOGY

## 2025-07-01 PROCEDURE — 99999 PR PBB SHADOW E&M-EST. PATIENT-LVL IV: CPT | Mod: PBBFAC,,, | Performed by: OPHTHALMOLOGY

## 2025-07-01 PROCEDURE — 92202 OPSCPY EXTND ON/MAC DRAW: CPT | Mod: S$PBB,,, | Performed by: OPHTHALMOLOGY

## 2025-07-01 PROCEDURE — 92134 CPTRZ OPH DX IMG PST SGM RTA: CPT | Mod: 26,S$PBB,, | Performed by: OPHTHALMOLOGY

## 2025-07-01 PROCEDURE — 92202 OPSCPY EXTND ON/MAC DRAW: CPT | Mod: 59,PBBFAC | Performed by: OPHTHALMOLOGY

## 2025-07-01 PROCEDURE — 92134 CPTRZ OPH DX IMG PST SGM RTA: CPT | Mod: PBBFAC

## 2025-07-01 NOTE — PROGRESS NOTES
Outpatient Rehab    Physical Therapy Visit    Patient Name: Sister Duran Giordano  MRN: [id number]  YOB: 1940  Encounter Date: 6/30/2025    Therapy Diagnosis:   Encounter Diagnoses   Name Primary?    Venous stasis dermatitis of both lower extremities Yes    Lymphedema of both lower extremities     Decreased functional mobility      Physician: Brant Palmer MD*    Physician Orders: Eval and Treat  Medical Diagnosis: Lymphedema, not elsewhere classified  Surgical Diagnosis: Not applicable for this Episode   Surgical Date: Not applicable for this Episode  Days Since Last Surgery: Not applicable for this Episode    Visit # / Visits Authorized:  1 / 20  Insurance Authorization Period: 6/17/2025 to 6/17/2026  Date of Evaluation: 6/19/2025  Plan of Care Certification: 6/19/2025 to 8/19/2025      PT/PTA: PTA   Number of PTA visits since last PT visit:1  Time In: 1330   Time Out: 1430  Total Time (in minutes): 60   Total Billable Time (in minutes): 60    FOTO:  Intake Score:  %  Survey Score 2:  %  Survey Score 3:  %    Precautions:     Standard , fall risk       Subjective   She is doing well and has someone who helps her put on her edema wear. The large edema wear does not feel supportive and she requests if she can have another segment in the medium size. She has a sore on her buttock which causes her some discomfort while laying down or if seated for awhile- she is using cream on it..  Pain reported as 0/10.      Objective            Treatment:  Therapeutic Exercise  TE 1: Reviewed walking throughout the day , seated heel raises , ankle pumps and mobility while in compression.  Manual Therapy  MT 1: MANUAL LYMPHATIC DRAINAGE (MLD):    While supine with LEs elevated stimulation at terminus, along GI region, B inguinal regions, drainage of entire L LE divina lower leg, ankle, and foot with return proximally,  Use of Aquaphor/Eucerin due to dryness.   Consider self massage to abdominal areas, B  inguinal areas, thigh, and remaining LE within reach.  MT 2: MULTILAYERED BANDAGING:  issued supplies and bandaged L LE with cotton stockinette, 6cm cellona cotton roll foot to mid calf , Rosidal soft section dorsum of foot, 1 Rosidal soft roll ankle to knee, 1-8cm and 2- 10cm Rosidal K rolls foot to knee, to leave intact 12-24 hrs as tolerated, discontinue with any problems, return rolled bandages next session. Wash and wear schedules confirmed.  MT 3: Re-applid edema wear medium to R leg with double fold at foot and ankle for extra support. Cut and provided a new segment of edema wear medium to apply on left leg after removing bandages.  Other Activities  Activity 1: Self care/home management : Compression bandaging instructions - remove after 24-48 hours or if experiencing any adverse effects ( skin irritation, pain, decreased circulation - numbnesss/tingling, instability while walking as can pose a fall risk). Do not re-apply yourself , after removing can apply temporary compression options provided . Continue use of edema wear daily , apply in the morning and remove before bed with proper fit reviewed. Importance of skin care due to increased infection risk with lymphedem. All instructions printed and provided .    Time Entry(in minutes):  Manual Therapy Time Entry: 50  Self Care/Home Management (ADLs) Time Entry: 10    Assessment & Plan   Assessment: Pt exhibits dense , pitting, hyperkeratosis skin changed to BLEs. Pt reports compliance with use of edema wear and was encouraged to continue use daily for management. Initiated treatment and bandaging to pts LLE today to help decongest limb and aid in reductions.Encouraged mobility daily and elevation as pt is mainly sitting all day which is contributing to worsening lymphedema. Pt will need assistance daily with application of compression. Will continue to monitor and progress.   Evaluation/Treatment Tolerance: Patient tolerated treatment well    The patient  will continue to benefit from skilled outpatient physical therapy in order to address the deficits listed in the problem list on the initial evaluation, provide patient and family education, and maximize the patients level of independence in the home and community environments.     The patient's spiritual, cultural, and educational needs were considered, and the patient is agreeable to the plan of care and goals.     Education  Education was done with Patient.  The patient Verbalizes understanding.         In the morning , put your edema wear ( white compression with yellow stripe) on both legs. You can fold it to double the layers at the foot for more compression at the foot. Remore the edema wear before bed.  24 hours after applying the short stretch brown bandages , you can remove them and put the edema wear back on your leg until you go to bed. Roll up all of the brown bandages and white cotton/foam and bring it back with you to every appointment.  Wear the bandages for 24 hours , but remove them soner if you have any pain, skin irritation , or discomfort.         Plan: Will continue per POC towards treatment goals. PT/PTA met face to face to discuss patient's treatment plan and progress towards established goals. Patient will be seen by physical therapist every sixth visit and minimally once per month.    Goals:   Active       Long Term Goals        1. Patient will show decreased girth in B LE by up to 4 cm  to allow for LE symmetry, shoe and clothing choice, and ability to apply needed compression daily.   (Progressing)       Start:  06/30/25    Expected End:  08/19/25            2. Patient will show reduction in density to mild or less with improved contour of limb to allow for cosmesis, LE symmetry, infection risk reduction, and clothing and compression choice (Progressing)       Start:  06/30/25    Expected End:  08/19/25            3. Patient and /or caretaker to maria antonia/doff compression garment with daily  compliance to assist in lymphedema management, skin elasticity, and tissue density.   (Progressing)       Start:  06/30/25    Expected End:  08/19/25               Short Term Goals        1. Patient will show decreased girth in B LE by up to 2 cm to allow for LE symmetry, shoe and clothing choice, and ability to apply needed compression (Progressing)       Start:  06/30/25    Expected End:  07/19/25            2. Patient will demonstrate 100% knowledge of lymphedema precautions and signs of infection to allow for reduced lymphedema risk, infection risk, and/or exacerbation of condition.   (Progressing)       Start:  06/30/25    Expected End:  07/19/25            3. Patient or caregiver will perform self-bandaging techniques and/or wearing of compression garments to allow for lymphatic drainage support, skin elasticity, and reduction in shape and size of limb (Progressing)       Start:  06/30/25    Expected End:  07/19/25                Vandana Lai, PTA

## 2025-07-01 NOTE — PROGRESS NOTES
HPI     Follow-up     Additional comments: 6 wk F/U.            Comments    Pt. Presents today for 6 wk ocular health F/U with DFE/aOCTm OU.     Pt. States OS still seems blurred all around. Certain areas seem okay out   of OS only, but mostly blurred. OD is seeing very stable at DV and NV.   Denies F/F/pain.  Eye meds:  None.        DM2 is controlled via meds.   Hemoglobin A1c       Date                     Value               Ref Range             Status                06/09/2025               6.9 (H)             4.0 - 5.6 %           Final                        Last edited by Rich Galvan MA on 7/1/2025 10:23 AM.            A/P    ICD-10-CM ICD-9-CM   1. Central retinal vein occlusion with macular edema of left eye  H34.8120 362.35     362.83   2. Age-related nuclear cataract of both eyes  H25.13 366.16   3. Vitreous degeneration of both eyes  H43.813 379.21   4. Hypertensive retinopathy of both eyes  H35.033 362.11   5. Diabetes mellitus type 2 without retinopathy  E11.9 250.00   6. Ocular hypertension, bilateral  H40.053 365.04         1. Central retinal vein occlusion with macular edema of left eye  Here for CRVO f/u       S/p GLADYS x5 4/15/25  S/p I'VE x3 4/2/24  S/p IVO x2 5/20/25    Today VA 20/600 (was CF), still significant heme and IRF today but slowly better     Plan: monitor for now, will get auth for Ozurdex for next visit     Recommend good blood pressure control, tight blood glucose control, and good cholesterol control     Visit today is associated with current or anticipated ongoing medical care related to this patients single serious condition/complex condition (central retinal vein occlusion)     2. Age-related nuclear cataract of both eyes  Mild NS, NVS  Plan: Observation     3. Vitreous degeneration of both eyes  No RT/RD   Plan: Observation   Pathology of PVD, Retinal Tear, Retinal Detachment reviewed in great detail  RD precautions discussed in detail, patient expressed  understanding  RTC immediately PRN (especially ANY change flashes, floaters, vision, visual field)     4. Hypertensive retinopathy of both eyes  Mod HTN changes, has CRVO OS  PCP Sony Mccray MD - Recent notes reviewed  06/09/2025  6.9  A1C   Plan: Observation   Recommend good blood pressure control, tight blood glucose control, and good cholesterol control     5. Diabetes mellitus type 2 without retinopathy  No DR or DME OU, IRF due to CRVO  Plan: Observation for DR changes  Recommend good blood pressure control, tight blood glucose control, and good cholesterol control     6. Ocular hypertension, bilateral  IOP 14/15 on latan   Possible steroid response in past   Plan: continue latan qHS OU      RTC 6 weeks DFE/OCTm OU , poss Ozurdex OS     I saw and examined the patient and reviewed in detail the findings documented. The final examination findings, image interpretations, and plan as documented in the record represent my personal judgment and conclusions.    Ferdinand Rosas MD  Vitreoretinal Surgery   [facility]

## 2025-07-03 ENCOUNTER — OFFICE VISIT (OUTPATIENT)
Dept: PODIATRY | Facility: CLINIC | Age: 85
End: 2025-07-03
Payer: MEDICARE

## 2025-07-03 ENCOUNTER — TELEPHONE (OUTPATIENT)
Dept: PRIMARY CARE CLINIC | Facility: CLINIC | Age: 85
End: 2025-07-03
Payer: MEDICARE

## 2025-07-03 VITALS
HEIGHT: 59 IN | BODY MASS INDEX: 53.93 KG/M2 | DIASTOLIC BLOOD PRESSURE: 69 MMHG | HEART RATE: 95 BPM | SYSTOLIC BLOOD PRESSURE: 150 MMHG

## 2025-07-03 DIAGNOSIS — B35.1 TINEA UNGUIUM: ICD-10-CM

## 2025-07-03 DIAGNOSIS — I73.9 PERIPHERAL ARTERIAL DISEASE: Primary | ICD-10-CM

## 2025-07-03 PROCEDURE — 11721 DEBRIDE NAIL 6 OR MORE: CPT | Mod: Q8,PBBFAC | Performed by: STUDENT IN AN ORGANIZED HEALTH CARE EDUCATION/TRAINING PROGRAM

## 2025-07-03 PROCEDURE — 99999 PR PBB SHADOW E&M-EST. PATIENT-LVL IV: CPT | Mod: PBBFAC,,, | Performed by: STUDENT IN AN ORGANIZED HEALTH CARE EDUCATION/TRAINING PROGRAM

## 2025-07-03 PROCEDURE — 99214 OFFICE O/P EST MOD 30 MIN: CPT | Mod: PBBFAC | Performed by: STUDENT IN AN ORGANIZED HEALTH CARE EDUCATION/TRAINING PROGRAM

## 2025-07-03 NOTE — PROGRESS NOTES
Subjective:     Patient    Duran Giordano is a 85 y.o. female.    Problem    Regularly followed by Dr Willard. Presents for routine foot care in setting of neuropathy and peripheral arterial disease. Has attempted multiple nerve medications including gabapentin, pregabalin, duloxetine but currently her neuropathy is primarily numbness and occasional sharp shooting pains in the heels which she tolerates, does not want to try more medications. Has difficulty walking and deals with swelling. In lymphedema therapy which helps somewhat.      Primary Care Provider    Primary Care Provider: Sony Mccray MD   Last Seen: 6/24/2025     History    History obtained from patient and review of medical records.     Past Medical History:   Diagnosis Date    Adrenal mass- adenoma stable since 2004 (1.8 cm and 8/13/12 (2 cm); stable 2016 2.4 cm     Adrenal mass- adenoma stable since 2004 (1.8 cm and 8/13/12 (2 cm); stable 2016 2.4 cm    Arthritis     Asthma in adult without complication 6/25/2015    Bilateral carotid artery disease 7/21/2017    Bilateral sciatica 2/7/2017    Cellulitis of right leg 08/16/2017    Cerebral infarction 1/29/2016    Multiple areas of lacunar infarction. Stroke risk factors include HTN, DM2, Dyslipidemia.  Continue ASA 81mg/ Statin therapy I have encouraged 30 minutes of physical activity daily for 5 days a week. She has access to a pool so this should not be so jarring to her joints.     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    Mixed hyperlipidemia 8/13/2012    Morbid  obesity with BMI of 50.0-59.9, adult 2/11/2014    Obstructive sleep apnea syndrome 8/13/2012    Pulmonary embolism 6/5/2024    Senile cataracts of both eyes 1/22/2015    Traumatic open wound of right lower leg 8/25/2017    Trigeminal neuralgia of right side of face 5/23/2017    For years now Previously on Gabapentin, but caused constipation Dissipating over time Not related to intracranial abnormalities Possibly related to dental procedure    Type 2 diabetes mellitus with diabetic polyneuropathy, with long-term current use of insulin 1/29/2013    Venous stasis dermatitis of both lower extremities 8/21/2017    Viral hepatitis A without coma 1/1/1971    Hep B infection in [facility] in 1971, was hospitalize for 2 weeks at that time, unknown treatment.    Vitamin D deficiency disease 8/13/2012       Past Surgical History:   Procedure Laterality Date    COLONOSCOPY      HYSTERECTOMY  01/01/1982    secondary uterine fibroids    INJECTION OF ANESTHETIC AGENT AROUND NERVE Right 10/21/2021    Procedure: BLOCK, NERVE GENICULAR RIGHT NEEDS CONSENT;  Surgeon: Senia Kilpatrick MD;  Location: [facility];  Service: Pain Management;  Laterality: Right;    INSERTION OF ANTIBIOTIC SPACER Right 10/28/2021    Procedure: INSERTION, ANTIBIOTIC SPACER;  Surgeon: Alfredo Lozoya MD;  [facility];  Service: Orthopedics;  Laterality: Right;    JOINT REPLACEMENT      REVISION OF KNEE ARTHROPLASTY Right 10/28/2021    Procedure: REVISION, ARTHROPLASTY, KNEE-DEPUY ANTIBIOTIC SPACER;  Surgeon: Alfredo Lozoya MD;  Location: [facility];  Service: Orthopedics;  Laterality: Right;    REVISION OF KNEE ARTHROPLASTY Right 06/30/2022    Procedure: REVISION, ARTHROPLASTY, KNEE-NAKIA- stage 2;  Surgeon: Alfredo Lozoya MD;  Location: [facility];  Service: Orthopedics;  Laterality: Right;    Right total knee replacement          Objective:     Vitals  Wt Readings from Last 1 Encounters:   06/17/25 121.1 kg (267 lb)      Temp Readings from Last 1 Encounters:   25 98.9 °F (37.2 °C) (Oral)     BP Readings from Last 1 Encounters:   25 (!) 150/69     Pulse Readings from Last 1 Encounters:   25 95       Dermatological Exam    Skin:  Pedal hair growth diminished and Pedal skin thin and shiny on right  Pedal hair growth diminished and Pedal skin thin and shiny on left     Nails:  10 nail(s) thickened and 10 nail(s) discolored    Vascular Exam    Arteries:  Posterior tibial artery nonpalpable on right  Dorsalis pedis artery nonpalpable on right  Posterior tibial artery nonpalpable on left  Dorsalis pedis artery nonpalpable on left    Veins:  Superficial veins unremarkable on right  Superficial veins unremarkable on left    Swellin+ nonpitting on right  4+ nonpitting on left    Neurological Exam    Eden touch test:  5/6 sites sensed, light touch intact     Musculoskeletal Exam    Footwear:  Casual on right  Casual on left    Gait Exam:   Ambulatory Status: Ambulatory  Gait: Apropulsive  Assistive Devices: Wheelchair    Foot Progression Angle:  Normal on right  Normal on left     Right Lower Extremity Additional Findings:  Right foot and ankle function, strength, and range of motion unremarkable except as noted above.     Left Lower Extremity Additional Findings:  Left foot and ankle function, strength, and range of motion unremarkable except as noted above.    Imaging and Other Tests    Imaging:  Independently reviewed and interpreted imaging, findings are as follows: N/A    Other Tests: The following A1c results were reviewed.   Hemoglobin A1C   Date Value Ref Range Status   2024 7.1 (H) 4.0 - 5.6 % Final     Comment:     ADA Screening Guidelines:  5.7-6.4%  Consistent with prediabetes  >or=6.5%  Consistent with diabetes    High levels of fetal hemoglobin interfere with the HbA1C  assay. Heterozygous hemoglobin variants (HbS, HgC, etc)do  not significantly interfere with this assay.   However, presence of  multiple variants may affect accuracy.     10/16/2023 6.9 (H) 4.0 - 5.6 % Final     Comment:     ADA Screening Guidelines:  5.7-6.4%  Consistent with prediabetes  >or=6.5%  Consistent with diabetes    High levels of fetal hemoglobin interfere with the HbA1C  assay. Heterozygous hemoglobin variants (HbS, HgC, etc)do  not significantly interfere with this assay.   However, presence of multiple variants may affect accuracy.     04/28/2023 6.8 (H) 4.0 - 5.6 % Final     Comment:     ADA Screening Guidelines:  5.7-6.4%  Consistent with prediabetes  >or=6.5%  Consistent with diabetes    High levels of fetal hemoglobin interfere with the HbA1C  assay. Heterozygous hemoglobin variants (HbS, HgC, etc)do  not significantly interfere with this assay.   However, presence of multiple variants may affect accuracy.       Hemoglobin A1c   Date Value Ref Range Status   06/09/2025 6.9 (H) 4.0 - 5.6 % Final     Comment:     ADA Screening Guidelines:  5.7-6.4%  Consistent with prediabetes  >=6.5%  Consistent with diabetes    High levels of fetal hemoglobin interfere with the HbA1C  assay. Heterozygous hemoglobin variants (HbS, HgC, etc)do  not significantly interfere with this assay.   However, presence of multiple variants may affect accuracy.         Assessment:     Encounter Diagnoses   Name Primary?    Peripheral arterial disease Yes    Tinea unguium         Plan:     I counseled the patient on her conditions, their implications and medical management.    Diabetic foot with peripheral neuropathy and peripheral arterial disease: chronic stable   -Performed shoe inspection and diabetic foot education. Reviewed importance of blood glucose control, proper nutrition, and foot hygiene to minimize risk of complications of diabetes. Recommended daily foot inspections, daily moisturizer to feet, avoiding sharp instruments to feet, appropriate footwear at all times when ambulating, and following up regularly for routine foot care.   -Diabetic  foot risk: 2023 IWGDF low ulcer risk.      Tinea unguium: chronic stable   -Nails debrided x10, thickness and length reduced using sterile nail nippers, see procedure note.            Return to clinic in 3 months for routine foot care, call sooner PRN.

## 2025-07-03 NOTE — PROCEDURES
"Routine Foot Care    Date/Time: 7/3/2025 10:15 AM    Performed by: Ezekiel Crespo DPM  Authorized by: Ezekiel Crespo DPM    Time out: Immediately prior to procedure a "time out" was called to verify the correct patient, procedure, equipment, support staff and site/side marked as required.    Consent Done?:  Yes (Verbal)  Hyperkeratotic Skin Lesions?: No      Nail Care Type:  Debride  Location(s): All  (Left 1st Toe, Left 3rd Toe, Left 2nd Toe, Left 4th Toe, Left 5th Toe, Right 1st Toe, Right 2nd Toe, Right 3rd Toe, Right 4th Toe and Right 5th Toe)  Patient tolerance:  Patient tolerated the procedure well with no immediate complications    "

## 2025-07-03 NOTE — TELEPHONE ENCOUNTER
Copied from CRM #[id number]. Topic: General Inquiry - Patient Advice  >> Jul 3, 2025  2:43 PM Mariaa wrote:  Nicolette MARTINEZ nurse @ [phone] states elevated  /70.

## 2025-07-07 ENCOUNTER — TELEPHONE (OUTPATIENT)
Dept: PRIMARY CARE CLINIC | Facility: CLINIC | Age: 85
End: 2025-07-07
Payer: MEDICARE

## 2025-07-07 DIAGNOSIS — R26.89 DECREASED FUNCTIONAL MOBILITY: Primary | ICD-10-CM

## 2025-07-08 ENCOUNTER — CLINICAL SUPPORT (OUTPATIENT)
Dept: REHABILITATION | Facility: HOSPITAL | Age: 85
End: 2025-07-08
Payer: MEDICARE

## 2025-07-08 DIAGNOSIS — I87.2 VENOUS STASIS DERMATITIS OF BOTH LOWER EXTREMITIES: Primary | ICD-10-CM

## 2025-07-08 DIAGNOSIS — I89.0 LYMPHEDEMA: Primary | ICD-10-CM

## 2025-07-08 DIAGNOSIS — R26.89 DECREASED FUNCTIONAL MOBILITY: ICD-10-CM

## 2025-07-08 DIAGNOSIS — I89.0 LYMPHEDEMA OF BOTH LOWER EXTREMITIES: ICD-10-CM

## 2025-07-08 PROCEDURE — 97110 THERAPEUTIC EXERCISES: CPT

## 2025-07-08 PROCEDURE — 97140 MANUAL THERAPY 1/> REGIONS: CPT

## 2025-07-09 ENCOUNTER — HOSPITAL ENCOUNTER (OUTPATIENT)
Dept: RADIOLOGY | Facility: HOSPITAL | Age: 85
Discharge: HOME | End: 2025-07-09
Attending: INTERNAL MEDICINE
Payer: MEDICARE

## 2025-07-09 DIAGNOSIS — Z86.711 HISTORY OF PULMONARY EMBOLISM: ICD-10-CM

## 2025-07-09 DIAGNOSIS — R06.02 SOB (SHORTNESS OF BREATH) ON EXERTION: ICD-10-CM

## 2025-07-09 PROCEDURE — 78582 LUNG VENTILAT&PERFUS IMAGING: CPT | Mod: TC

## 2025-07-09 PROCEDURE — A9540 TC99M MAA: HCPCS | Performed by: INTERNAL MEDICINE

## 2025-07-09 PROCEDURE — A9567 TECHNETIUM TC-99M AEROSOL: HCPCS | Performed by: INTERNAL MEDICINE

## 2025-07-09 PROCEDURE — 78582 LUNG VENTILAT&PERFUS IMAGING: CPT | Mod: 26,,, | Performed by: NUCLEAR MEDICINE

## 2025-07-09 RX ADMIN — KIT FOR THE PREPARATION OF TECHNETIUM TC 99M PENTETATE 38 MILLICURIE: 20 INJECTION, POWDER, LYOPHILIZED, FOR SOLUTION INTRAVENOUS; RESPIRATORY (INHALATION) at 02:07

## 2025-07-09 RX ADMIN — KIT FOR THE PREPARATION OF TECHNETIUM TC 99M ALBUMIN AGGREGATED 4.8 MILLICURIE: 2 INJECTION, POWDER, LYOPHILIZED, FOR SUSPENSION INTRAPERITONEAL; INTRAVENOUS at 02:07

## 2025-07-10 ENCOUNTER — TELEPHONE (OUTPATIENT)
Dept: PRIMARY CARE CLINIC | Facility: CLINIC | Age: 85
End: 2025-07-10
Payer: MEDICARE

## 2025-07-10 NOTE — TELEPHONE ENCOUNTER
----- Message from Sony Mccray MD sent at 7/10/2025  9:22 AM CDT -----  Could you f/u with [employer] and make sure the repair order is what they need, and that they also got the order for the gel cushion from last month's visit?

## 2025-07-10 NOTE — PROGRESS NOTES
Outpatient Rehab    Physical Therapy Visit    Patient Name: Sister Duran Giordano  MRN: [id number]  YOB: 1940  Encounter Date: 7/8/2025    Therapy Diagnosis:   Encounter Diagnoses   Name Primary?    Venous stasis dermatitis of both lower extremities Yes    Lymphedema of both lower extremities     Decreased functional mobility      Physician: Brant Palmer MD*    Physician Orders: Eval and Treat  Medical Diagnosis: Lymphedema, not elsewhere classified  Surgical Diagnosis: Not applicable for this Episode   Surgical Date: Not applicable for this Episode  Days Since Last Surgery: Not applicable for this Episode    Visit # / Visits Authorized:  2 / 20  Insurance Authorization Period: 6/17/2025 to 6/17/2026  Date of Evaluation: 6/19/2025  Plan of Care Certification: 6/19/2025 to 8/19/2025      PT/PTA:     Number of PTA visits since last PT visit:   Time In: 1400   Time Out: 1500  Total Time (in minutes): 60   Total Billable Time (in minutes):  60    FOTO:  Intake Score:  %  Survey Score 2:  %  Survey Score 3:  %    Precautions:       Subjective   She is doing well and has someone who helps her put on her edema wear. The large edema wear does not feel supportive and she requests if she can have another segment in the medium size. She has a sore on her buttock which causes her some discomfort while laying down or if seated for awhile- she is using cream on it..         Objective              Girth Measurements:             Treatment:  Manual Therapy  MT 2: MULTILAYERED BANDAGING:  issued supplies and bandaged L LE with cotton stockinette, 6cm cellona cotton roll foot to mid calf , Rosidal soft section dorsum of foot, 1 Rosidal soft roll ankle to knee, 1-8cm and 2- 10cm Rosidal K rolls foot to knee, to leave intact 12-24 hrs as tolerated, discontinue with any problems, return rolled bandages next session. Wash and wear schedules confirmed.  MT 3: Re-applid edema wear medium to R leg with double  fold at foot and ankle for extra support.  Other Activities  Activity 1: Self care/home management : pt's BLEs measured for velcro wraps, given info on donning and doffing    Time Entry(in minutes):  Manual Therapy Time Entry: 30  Self Care/Home Management (ADLs) Time Entry: 30    Assessment & Plan   Assessment: Patient presents with significant lymphedema of the bilateral lower legs as well as upper legs. Measurements were taken today that were unable to be taken during initial evaluation due to time. Patient reports compliance with a daily aware of edema aware, and report some difficulty with bandaging at home. Patient was sized for Velcro wraps and orders have been requested. Patient's left leg was bandaged and right leg was assisted with edema wear  size medium. Will continue to progress.   Evaluation/Treatment Tolerance: Patient tolerated treatment well    The patient will continue to benefit from skilled outpatient physical therapy in order to address the deficits listed in the problem list on the initial evaluation, provide patient and family education, and maximize the patients level of independence in the home and community environments.     The patient's spiritual, cultural, and educational needs were considered, and the patient is agreeable to the plan of care and goals.           Plan:      Goals:   Active       Long Term Goals        1. Patient will show decreased girth in B LE by up to 4 cm  to allow for LE symmetry, shoe and clothing choice, and ability to apply needed compression daily.   (Progressing)       Start:  06/30/25    Expected End:  08/19/25            2. Patient will show reduction in density to mild or less with improved contour of limb to allow for cosmesis, LE symmetry, infection risk reduction, and clothing and compression choice (Progressing)       Start:  06/30/25    Expected End:  08/19/25            3. Patient and /or caretaker to maria antonia/doff compression garment with daily  compliance to assist in lymphedema management, skin elasticity, and tissue density.   (Progressing)       Start:  06/30/25    Expected End:  08/19/25               Short Term Goals        1. Patient will show decreased girth in B LE by up to 2 cm to allow for LE symmetry, shoe and clothing choice, and ability to apply needed compression (Progressing)       Start:  06/30/25    Expected End:  07/19/25            2. Patient will demonstrate 100% knowledge of lymphedema precautions and signs of infection to allow for reduced lymphedema risk, infection risk, and/or exacerbation of condition.   (Progressing)       Start:  06/30/25    Expected End:  07/19/25            3. Patient or caregiver will perform self-bandaging techniques and/or wearing of compression garments to allow for lymphatic drainage support, skin elasticity, and reduction in shape and size of limb (Progressing)       Start:  06/30/25    Expected End:  07/19/25                Nahomy Perkins, PT

## 2025-07-10 NOTE — TELEPHONE ENCOUNTER
Spoke with [employer] in regards to patient power chair order , gel cushion company stated  gel cushion is not covered by patient insurance power chair was refax patient has been inform about outer packet gel cushion inform patient that [employer]

## 2025-07-15 ENCOUNTER — CLINICAL SUPPORT (OUTPATIENT)
Dept: REHABILITATION | Facility: HOSPITAL | Age: 85
End: 2025-07-15
Payer: MEDICARE

## 2025-07-15 DIAGNOSIS — E11.29 TYPE 2 DIABETES MELLITUS WITH RENAL MANIFESTATIONS NOT AT GOAL: ICD-10-CM

## 2025-07-15 DIAGNOSIS — I89.0 LYMPHEDEMA OF BOTH LOWER EXTREMITIES: ICD-10-CM

## 2025-07-15 DIAGNOSIS — R26.89 DECREASED FUNCTIONAL MOBILITY: ICD-10-CM

## 2025-07-15 DIAGNOSIS — I87.2 VENOUS STASIS DERMATITIS OF BOTH LOWER EXTREMITIES: Primary | ICD-10-CM

## 2025-07-15 PROCEDURE — 97016 VASOPNEUMATIC DEVICE THERAPY: CPT

## 2025-07-15 PROCEDURE — 97535 SELF CARE MNGMENT TRAINING: CPT

## 2025-07-15 RX ORDER — ATORVASTATIN CALCIUM 80 MG/1
80 TABLET, FILM COATED ORAL
Qty: 30 TABLET | Refills: 5 | Status: SHIPPED | OUTPATIENT
Start: 2025-07-15

## 2025-07-17 ENCOUNTER — LAB VISIT (OUTPATIENT)
Dept: LAB | Facility: HOSPITAL | Age: 85
End: 2025-07-17
Attending: INTERNAL MEDICINE
Payer: MEDICARE

## 2025-07-17 DIAGNOSIS — R06.02 SOB (SHORTNESS OF BREATH) ON EXERTION: ICD-10-CM

## 2025-07-17 LAB
ALBUMIN SERPL BCP-MCNC: 2.9 G/DL (ref 3.5–5.2)
ALP SERPL-CCNC: 88 UNIT/L (ref 40–150)
ALT SERPL W/O P-5'-P-CCNC: 14 UNIT/L (ref 10–44)
ANION GAP (OHS): 8 MMOL/L (ref 8–16)
AST SERPL-CCNC: 21 UNIT/L (ref 11–45)
BILIRUB SERPL-MCNC: 0.5 MG/DL (ref 0.1–1)
BUN SERPL-MCNC: 36 MG/DL (ref 8–23)
CALCIUM SERPL-MCNC: 9.6 MG/DL (ref 8.7–10.5)
CHLORIDE SERPL-SCNC: 106 MMOL/L (ref 95–110)
CO2 SERPL-SCNC: 22 MMOL/L (ref 23–29)
CREAT SERPL-MCNC: 1.6 MG/DL (ref 0.5–1.4)
GFR SERPLBLD CREATININE-BSD FMLA CKD-EPI: 32 ML/MIN/1.73/M2
GLUCOSE SERPL-MCNC: 144 MG/DL (ref 70–110)
POTASSIUM SERPL-SCNC: 4.8 MMOL/L (ref 3.5–5.1)
PROT SERPL-MCNC: 9.4 GM/DL (ref 6–8.4)
SODIUM SERPL-SCNC: 136 MMOL/L (ref 136–145)

## 2025-07-17 PROCEDURE — 82374 ASSAY BLOOD CARBON DIOXIDE: CPT

## 2025-07-17 PROCEDURE — 36415 COLL VENOUS BLD VENIPUNCTURE: CPT

## 2025-07-22 ENCOUNTER — EXTERNAL HOME HEALTH (OUTPATIENT)
Dept: HOME HEALTH SERVICES | Facility: HOSPITAL | Age: 85
End: 2025-07-22
Payer: MEDICARE

## 2025-07-22 ENCOUNTER — DOCUMENTATION ONLY (OUTPATIENT)
Dept: REHABILITATION | Facility: HOSPITAL | Age: 85
End: 2025-07-22
Payer: MEDICARE

## 2025-07-22 NOTE — PROGRESS NOTES
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     Pt presents significant improvements in swelling and skin integrity. Pt reports wearing Velcro wraps daily. Pt was issued compression garments and given discharge/ home management instructions. Pt and pt's caregiver assistant displayed good understanding. Pt discharged at this time       Nahomy Perkins, PT, DPT, CLT

## 2025-07-31 ENCOUNTER — TELEPHONE (OUTPATIENT)
Dept: PRIMARY CARE CLINIC | Facility: CLINIC | Age: 85
End: 2025-07-31
Payer: MEDICARE

## 2025-07-31 DIAGNOSIS — I10 PRIMARY HYPERTENSION: ICD-10-CM

## 2025-07-31 RX ORDER — AMLODIPINE BESYLATE 10 MG/1
5 TABLET ORAL DAILY
Qty: 45 TABLET | Refills: 3 | Status: SHIPPED | OUTPATIENT
Start: 2025-07-31 | End: 2026-07-31

## 2025-07-31 NOTE — TELEPHONE ENCOUNTER
"Call received from Nurse Mcgovern. She stated the patient asked her to check her blood pressure.     She went to check her b/p and when she looked at patient her eyes rolled in back of head. She checked her BG and b/p.     She started responding a little more during assessment    96/52, pulse 102, then 130/70 p 117(around noon today. )  BG-139    She stated [facility]    Said they have seen her like this before but end up being ok.     She stated the patient felt funny at the time and she did complain of dizziness.     Unable to do EKG    Patient has normal hand squeeze, no facial drooping or speech alteration.       Nahomy Mcgovern stated she will be leaving for now and that we can call back and speak with Nurse Davis at [phone]      Dr. Mccray notified . Awaiting reply.       CAROLYN Huynh  07/31/2025  3:32 PM      "

## 2025-07-31 NOTE — PROGRESS NOTES
Outpatient Rehab    Physical Therapy Visit    Patient Name: Sister Duran Giordano  MRN: [id number]  YOB: 1940  Encounter Date: 7/15/2025    Therapy Diagnosis:   Encounter Diagnoses   Name Primary?    Venous stasis dermatitis of both lower extremities Yes    Lymphedema of both lower extremities     Decreased functional mobility      Physician: Brant Palmer MD*    Physician Orders: Eval and Treat  Medical Diagnosis: Lymphedema, not elsewhere classified  Surgical Diagnosis: Not applicable for this Episode   Surgical Date: Not applicable for this Episode  Days Since Last Surgery: Not applicable for this Episode    Visit # / Visits Authorized:  3 / 20  Insurance Authorization Period: 6/17/2025 to 6/17/2026  Date of Evaluation: 6/19/2025  Plan of Care Certification: 6/19/2025 to 8/19/2025      PT/PTA:     Number of PTA visits since last PT visit:   Time In: 1400   Time Out: 1500  Total Time (in minutes): 60   Total Billable Time (in minutes):  60    FOTO:  Intake Score (%): Not applicable for this Episode  Survey Score 2 (%): Not applicable for this Episode  Survey Score 3 (%): Not applicable for this Episode    Precautions:  Additional Precautions and Protocol Details: Standard , fall risk       Subjective   She has been doing ok, has been having the other sister she lives with assist her in wearing the edemawear.         Objective            Treatment:  Modalities  Pneumatic Pump: SEQUENTIAL COMPRESSION PUMP: full leg sleeve applied to LLE  Biotab Pump with default setting with distal pressures starting at 45mmHg entire extremity simultaneous to education.  Self Care and ADLs  ADL 1: Self care/ home management: Pt sized and issued one set of velcro leg and foot wraps per leg. Pt educated on donning and doffing. Demonstration of donning and doffing for pt to instruct others on at home. Pt educated on wear times, when to replace    Time Entry(in minutes):  Vasopneumatic Devices Time Entry:  20  Self Care/Home Management (ADLs) Time Entry: 40    Assessment & Plan   Assessment: Patient presents with significant swelling of the bilateral lower legs. Treatment today consisted of sizing patient for compression Velcro wraps and issuing patient set per leg. Patient was educated on proper daunting and doffing, and was educated on how to instruct others at her home to Don and do compression. Patients left leg was pumped to assist in fluid drain drainage. Will continue to progress.  Evaluation/Treatment Tolerance: Patient tolerated treatment well    The patient will continue to benefit from skilled outpatient physical therapy in order to address the deficits listed in the problem list on the initial evaluation, provide patient and family education, and maximize the patients level of independence in the home and community environments.     The patient's spiritual, cultural, and educational needs were considered, and the patient is agreeable to the plan of care and goals.           Plan:      Goals:   Active       Long Term Goals        1. Patient will show decreased girth in B LE by up to 4 cm  to allow for LE symmetry, shoe and clothing choice, and ability to apply needed compression daily.   (Progressing)       Start:  06/30/25    Expected End:  08/19/25            2. Patient will show reduction in density to mild or less with improved contour of limb to allow for cosmesis, LE symmetry, infection risk reduction, and clothing and compression choice (Progressing)       Start:  06/30/25    Expected End:  08/19/25            3. Patient and /or caretaker to maria antonia/doff compression garment with daily compliance to assist in lymphedema management, skin elasticity, and tissue density.   (Met)       Start:  06/30/25    Expected End:  08/19/25    Resolved:  07/30/25            Short Term Goals        1. Patient will show decreased girth in B LE by up to 2 cm to allow for LE symmetry, shoe and clothing choice, and ability  to apply needed compression (Progressing)       Start:  06/30/25    Expected End:  07/19/25            2. Patient will demonstrate 100% knowledge of lymphedema precautions and signs of infection to allow for reduced lymphedema risk, infection risk, and/or exacerbation of condition.   (Met)       Start:  06/30/25    Expected End:  07/19/25    Resolved:  07/30/25         3. Patient or caregiver will perform self-bandaging techniques and/or wearing of compression garments to allow for lymphatic drainage support, skin elasticity, and reduction in shape and size of limb (Met)       Start:  06/30/25    Expected End:  07/19/25    Resolved:  07/30/25             Nahomy Perkins PT

## 2025-07-31 NOTE — PROGRESS NOTES
Notified of what appears to have been a witnessed syncopal event at [facility] around noon.  Pt was feeling light-headed and asked to have her BP checked prior to losing consciousness briefly with return to baseline afterwards.  Noted pt has been losing weight on Ozempic and her amlodipine was increased to 10mg from 5 about a year ago, which may also be complicating lymphedema.  Will decrease amlodipine back to 5mg for now.

## 2025-08-01 ENCOUNTER — TELEPHONE (OUTPATIENT)
Facility: CLINIC | Age: 85
End: 2025-08-01
Payer: MEDICARE

## 2025-08-01 NOTE — TELEPHONE ENCOUNTER
"Call placed and spoke to patient. She stated she was at a .   She reports feeling better. Patient educated on Dr. Jarquin advising. Verbalized ok.       Advising per Dr. Jarquin,   "  she has been losing weight with ozempic and may need BP meds adjusted. I see she's on amlodipine 10mg which is also not ideal considering her lymphedema; it was increased to 10 from 5 1 year ago and probably should go back down.     MB  let's have her cut the amlodipine in half for the time being .  "   Patient instructed to provide the nurse where she is with this information and to have her give us a call back.     Verbalized ok. Awaiting return call.       CAROLYN Huynh  2025  11:22 AM    "

## 2025-08-04 ENCOUNTER — PATIENT MESSAGE (OUTPATIENT)
Dept: OPHTHALMOLOGY | Facility: CLINIC | Age: 85
End: 2025-08-04
Payer: MEDICARE

## 2025-08-15 ENCOUNTER — TELEPHONE (OUTPATIENT)
Dept: TRANSPLANT | Facility: CLINIC | Age: 85
End: 2025-08-15
Payer: MEDICARE

## 2025-08-15 ENCOUNTER — TELEPHONE (OUTPATIENT)
Dept: CARDIOLOGY | Facility: CLINIC | Age: 85
End: 2025-08-15
Payer: MEDICARE

## 2025-08-19 ENCOUNTER — HOSPITAL ENCOUNTER (OUTPATIENT)
Dept: PULMONOLOGY | Facility: CLINIC | Age: 85
Discharge: HOME | End: 2025-08-19
Attending: INTERNAL MEDICINE
Payer: MEDICARE

## 2025-08-19 ENCOUNTER — OFFICE VISIT (OUTPATIENT)
Dept: TRANSPLANT | Facility: CLINIC | Age: 85
End: 2025-08-19
Attending: INTERNAL MEDICINE
Payer: MEDICARE

## 2025-08-19 VITALS
OXYGEN SATURATION: 97 % | HEIGHT: 59 IN | SYSTOLIC BLOOD PRESSURE: 131 MMHG | WEIGHT: 240 LBS | BODY MASS INDEX: 48.38 KG/M2 | HEART RATE: 96 BPM | DIASTOLIC BLOOD PRESSURE: 67 MMHG

## 2025-08-19 VITALS — HEIGHT: 59 IN | WEIGHT: 241 LBS | BODY MASS INDEX: 48.59 KG/M2

## 2025-08-19 DIAGNOSIS — I50.32 CHRONIC DIASTOLIC HEART FAILURE: ICD-10-CM

## 2025-08-19 DIAGNOSIS — I10 ESSENTIAL HYPERTENSION: ICD-10-CM

## 2025-08-19 DIAGNOSIS — E66.813 CLASS 3 SEVERE OBESITY DUE TO EXCESS CALORIES WITH SERIOUS COMORBIDITY AND BODY MASS INDEX (BMI) OF 45.0 TO 49.9 IN ADULT: ICD-10-CM

## 2025-08-19 DIAGNOSIS — Z86.711 HISTORY OF PULMONARY EMBOLISM: ICD-10-CM

## 2025-08-19 DIAGNOSIS — I27.20 PULMONARY HYPERTENSION: Primary | ICD-10-CM

## 2025-08-19 DIAGNOSIS — I89.0 LYMPHEDEMA OF BOTH LOWER EXTREMITIES: ICD-10-CM

## 2025-08-19 DIAGNOSIS — I27.9 CHRONIC PULMONARY HEART DISEASE: ICD-10-CM

## 2025-08-19 DIAGNOSIS — G47.33 OSA (OBSTRUCTIVE SLEEP APNEA): ICD-10-CM

## 2025-08-19 DIAGNOSIS — Z79.01 CHRONIC ANTICOAGULATION: ICD-10-CM

## 2025-08-19 DIAGNOSIS — I35.0 MILD AORTIC STENOSIS: ICD-10-CM

## 2025-08-19 PROCEDURE — 99214 OFFICE O/P EST MOD 30 MIN: CPT | Mod: S$PBB,,, | Performed by: INTERNAL MEDICINE

## 2025-08-19 PROCEDURE — 99999 PR PBB SHADOW E&M-EST. PATIENT-LVL V: CPT | Mod: PBBFAC,,, | Performed by: INTERNAL MEDICINE

## 2025-08-19 PROCEDURE — 94618 PULMONARY STRESS TESTING: CPT | Mod: 53,PBBFAC | Performed by: INTERNAL MEDICINE

## 2025-08-19 PROCEDURE — 99215 OFFICE O/P EST HI 40 MIN: CPT | Mod: PBBFAC,25 | Performed by: INTERNAL MEDICINE

## 2025-08-22 ENCOUNTER — TELEPHONE (OUTPATIENT)
Dept: UROGYNECOLOGY | Facility: CLINIC | Age: 85
End: 2025-08-22
Payer: MEDICARE

## 2025-08-25 ENCOUNTER — EXTERNAL HOME HEALTH (OUTPATIENT)
Dept: HOME HEALTH SERVICES | Facility: HOSPITAL | Age: 85
End: 2025-08-25
Payer: MEDICARE

## 2025-08-29 RX ORDER — DOXYCYCLINE HYCLATE 100 MG
100 TABLET ORAL 2 TIMES DAILY
Qty: 60 TABLET | Refills: 5 | Status: SHIPPED | OUTPATIENT
Start: 2025-08-29

## (undated) DEVICE — SHEET DRAPE FAN-FOLDED 3/4

## (undated) DEVICE — BRUSH INTRAMEDULLARY

## (undated) DEVICE — SEE MEDLINE ITEM 152515

## (undated) DEVICE — DRAPE INCISE IOBAN 2 23X33IN

## (undated) DEVICE — SET CEMENT (SCULP)

## (undated) DEVICE — SUT VICRYL PLUS 2-0 CT1 18

## (undated) DEVICE — SUT VICRYL PLUS 0 CT1 18IN

## (undated) DEVICE — PUMP COLD THERAPY

## (undated) DEVICE — TAPE SURG DURAPORE 2 X10YD

## (undated) DEVICE — HOOD T-5 TEAR AWAY STERILE

## (undated) DEVICE — KIT TOTAL KNEE TKOFG

## (undated) DEVICE — PAD COLD THERAPY KNEE WRAP ON

## (undated) DEVICE — CANISTER INFOV.A.C WOUND 500ML

## (undated) DEVICE — MASK FLYTE HOOD PEEL AWAY

## (undated) DEVICE — ADHESIVE DERMABOND ADVANCED

## (undated) DEVICE — KIT PREVENA PLUS

## (undated) DEVICE — DRAPE STERI INSTRUMENT 1018

## (undated) DEVICE — BOWL CEMENT

## (undated) DEVICE — BLADE SAG 18.0X1.27X100

## (undated) DEVICE — SUT VICRYL+ 1 CT1 18IN

## (undated) DEVICE — TOURNIQUET SB QC DP 34X4IN

## (undated) DEVICE — PULSAVAC ZIMMER

## (undated) DEVICE — KIT DRESSING PREVENA PLUS

## (undated) DEVICE — BANDAGE ACE ELASTIC 6"

## (undated) DEVICE — Device

## (undated) DEVICE — STAPLER SKIN PROXIMATE WIDE

## (undated) DEVICE — CONTAINER SPECIMEN STRL 4OZ

## (undated) DEVICE — BANDAGE ACE DOUBLE STER 6IN

## (undated) DEVICE — KIT IRR SUCTION HND PIECE

## (undated) DEVICE — ELECTRODE REM PLYHSV RETURN 9

## (undated) DEVICE — PADDING CAST SPECIALIST 6X4YD

## (undated) DEVICE — SOL IRR NACL .9% 3000ML

## (undated) DEVICE — PAD CAST SPECIALIST STRL 6

## (undated) DEVICE — SEE MEDLINE ITEM 146298

## (undated) DEVICE — BLADE SAG 13.0 X1.27X90

## (undated) DEVICE — DRESSING LEUKOPLAST FLEX 1X3IN

## (undated) DEVICE — BRACE KNEE T SCOPE PREMIER

## (undated) DEVICE — BLADE SAGITTAL 18 X 1.27 X 90M